# Patient Record
Sex: MALE | Race: WHITE | Employment: OTHER
[De-identification: names, ages, dates, MRNs, and addresses within clinical notes are randomized per-mention and may not be internally consistent; named-entity substitution may affect disease eponyms.]

---

## 2017-01-05 ENCOUNTER — OFFICE VISIT (OUTPATIENT)
Dept: FAMILY MEDICINE CLINIC | Facility: CLINIC | Age: 62
End: 2017-01-05

## 2017-01-05 VITALS
SYSTOLIC BLOOD PRESSURE: 132 MMHG | HEIGHT: 71 IN | WEIGHT: 225 LBS | HEART RATE: 72 BPM | BODY MASS INDEX: 31.5 KG/M2 | OXYGEN SATURATION: 98 % | DIASTOLIC BLOOD PRESSURE: 74 MMHG

## 2017-01-05 DIAGNOSIS — E11.610 CHARCOT FOOT DUE TO DIABETES MELLITUS (HCC): ICD-10-CM

## 2017-01-05 DIAGNOSIS — Z12.5 SCREENING FOR PROSTATE CANCER: Primary | ICD-10-CM

## 2017-01-05 DIAGNOSIS — E08.40 DIABETES MELLITUS DUE TO UNDERLYING CONDITION WITH DIABETIC NEUROPATHY, WITHOUT LONG-TERM CURRENT USE OF INSULIN (HCC): ICD-10-CM

## 2017-01-05 DIAGNOSIS — E78.2 MIXED HYPERLIPIDEMIA: ICD-10-CM

## 2017-01-05 PROBLEM — E78.5 HYPERLIPIDEMIA: Status: ACTIVE | Noted: 2017-01-05

## 2017-01-05 PROCEDURE — 99212 OFFICE O/P EST SF 10 MIN: CPT | Performed by: FAMILY MEDICINE

## 2017-01-05 PROCEDURE — G8422 PT INELIG BMI CALCULATION: HCPCS | Performed by: FAMILY MEDICINE

## 2017-01-05 RX ORDER — NAPROXEN 500 MG/1
TABLET ORAL
Qty: 60 TABLET | Refills: 6 | Status: ON HOLD
Start: 2017-01-05 | End: 2018-04-26 | Stop reason: HOSPADM

## 2017-01-05 RX ORDER — HYDROCODONE BITARTRATE AND ACETAMINOPHEN 5; 325 MG/1; MG/1
1 TABLET ORAL DAILY PRN
Qty: 30 TABLET | Refills: 0
Start: 2017-01-05 | End: 2017-02-03 | Stop reason: SDUPTHER

## 2017-01-05 RX ORDER — LISINOPRIL 5 MG/1
5 TABLET ORAL DAILY
Qty: 90 TABLET | Refills: 1 | Status: SHIPPED | OUTPATIENT
Start: 2017-01-05 | End: 2017-12-11 | Stop reason: CLARIF

## 2017-01-05 RX ORDER — GLIPIZIDE 5 MG/1
5 TABLET ORAL
Qty: 180 TABLET | Refills: 1 | Status: SHIPPED | OUTPATIENT
Start: 2017-01-05 | End: 2017-12-11 | Stop reason: CLARIF

## 2017-01-05 RX ORDER — SIMVASTATIN 40 MG
80 TABLET ORAL NIGHTLY
Qty: 180 TABLET | Refills: 1 | Status: SHIPPED | OUTPATIENT
Start: 2017-01-05 | End: 2018-04-24 | Stop reason: ALTCHOICE

## 2017-01-05 RX ORDER — GABAPENTIN 300 MG/1
900 CAPSULE ORAL 3 TIMES DAILY
Qty: 810 CAPSULE | Refills: 1 | Status: SHIPPED | OUTPATIENT
Start: 2017-01-05 | End: 2017-12-11 | Stop reason: CLARIF

## 2017-01-05 ASSESSMENT — PATIENT HEALTH QUESTIONNAIRE - PHQ9
2. FEELING DOWN, DEPRESSED OR HOPELESS: 0
1. LITTLE INTEREST OR PLEASURE IN DOING THINGS: 0
SUM OF ALL RESPONSES TO PHQ QUESTIONS 1-9: 0
SUM OF ALL RESPONSES TO PHQ9 QUESTIONS 1 & 2: 0

## 2017-01-05 ASSESSMENT — ENCOUNTER SYMPTOMS
CHEST TIGHTNESS: 0
ABDOMINAL DISTENTION: 0
SHORTNESS OF BREATH: 0
EYE PAIN: 0
BLOOD IN STOOL: 0
TROUBLE SWALLOWING: 0

## 2017-01-09 DIAGNOSIS — E11.610 CHARCOT FOOT DUE TO DIABETES MELLITUS (HCC): Primary | ICD-10-CM

## 2017-01-10 ENCOUNTER — NURSE ONLY (OUTPATIENT)
Dept: FAMILY MEDICINE CLINIC | Facility: CLINIC | Age: 62
End: 2017-01-10

## 2017-01-10 DIAGNOSIS — Z12.11 COLON CANCER SCREENING: Primary | ICD-10-CM

## 2017-01-10 DIAGNOSIS — Z12.11 COLON CANCER SCREENING: ICD-10-CM

## 2017-01-10 LAB
CONTROL: POSITIVE
HEMOCCULT STL QL: NEGATIVE

## 2017-01-10 PROCEDURE — 82274 ASSAY TEST FOR BLOOD FECAL: CPT | Performed by: FAMILY MEDICINE

## 2017-02-03 ENCOUNTER — OFFICE VISIT (OUTPATIENT)
Dept: FAMILY MEDICINE CLINIC | Facility: CLINIC | Age: 62
End: 2017-02-03

## 2017-02-03 VITALS
SYSTOLIC BLOOD PRESSURE: 120 MMHG | DIASTOLIC BLOOD PRESSURE: 64 MMHG | OXYGEN SATURATION: 92 % | WEIGHT: 226 LBS | BODY MASS INDEX: 31.64 KG/M2 | HEIGHT: 71 IN | HEART RATE: 72 BPM

## 2017-02-03 DIAGNOSIS — Z23 NEED FOR PROPHYLACTIC VACCINATION AGAINST DIPHTHERIA-TETANUS-PERTUSSIS (DTP): ICD-10-CM

## 2017-02-03 DIAGNOSIS — Z23 NEED FOR PROPHYLACTIC VACCINATION AGAINST STREPTOCOCCUS PNEUMONIAE (PNEUMOCOCCUS): ICD-10-CM

## 2017-02-03 DIAGNOSIS — Z23 NEED FOR PROPHYLACTIC VACCINATION AND INOCULATION AGAINST VARICELLA: ICD-10-CM

## 2017-02-03 DIAGNOSIS — E11.610 CHARCOT FOOT DUE TO DIABETES MELLITUS (HCC): ICD-10-CM

## 2017-02-03 DIAGNOSIS — Z00.00 MEDICARE WELCOME EXAM: Primary | ICD-10-CM

## 2017-02-03 PROCEDURE — 90732 PPSV23 VACC 2 YRS+ SUBQ/IM: CPT | Performed by: FAMILY MEDICINE

## 2017-02-03 PROCEDURE — 90471 IMMUNIZATION ADMIN: CPT | Performed by: FAMILY MEDICINE

## 2017-02-03 PROCEDURE — G0402 INITIAL PREVENTIVE EXAM: HCPCS | Performed by: FAMILY MEDICINE

## 2017-02-03 RX ORDER — SILDENAFIL 100 MG/1
100 TABLET, FILM COATED ORAL PRN
Qty: 30 TABLET | Refills: 3 | Status: ON HOLD | OUTPATIENT
Start: 2017-02-03 | End: 2018-08-04

## 2017-02-03 RX ORDER — HYDROCODONE BITARTRATE AND ACETAMINOPHEN 5; 325 MG/1; MG/1
1 TABLET ORAL DAILY PRN
Qty: 10 TABLET | Refills: 0 | Status: SHIPPED | OUTPATIENT
Start: 2017-02-03 | End: 2017-03-05

## 2017-02-03 ASSESSMENT — LIFESTYLE VARIABLES: HOW OFTEN DO YOU HAVE A DRINK CONTAINING ALCOHOL: 0

## 2017-02-03 ASSESSMENT — ANXIETY QUESTIONNAIRES: GAD7 TOTAL SCORE: 0

## 2017-02-03 ASSESSMENT — PATIENT HEALTH QUESTIONNAIRE - PHQ9: SUM OF ALL RESPONSES TO PHQ QUESTIONS 1-9: 0

## 2017-07-26 ENCOUNTER — OFFICE VISIT (OUTPATIENT)
Dept: PODIATRY | Age: 62
End: 2017-07-26
Payer: MEDICARE

## 2017-07-26 VITALS — HEIGHT: 70 IN | BODY MASS INDEX: 35.22 KG/M2 | WEIGHT: 246 LBS

## 2017-07-26 DIAGNOSIS — B35.1 DERMATOPHYTOSIS OF NAIL: ICD-10-CM

## 2017-07-26 DIAGNOSIS — E11.51 TYPE II DIABETES MELLITUS WITH PERIPHERAL CIRCULATORY DISORDER (HCC): Primary | ICD-10-CM

## 2017-07-26 DIAGNOSIS — E11.42 DIABETIC POLYNEUROPATHY ASSOCIATED WITH TYPE 2 DIABETES MELLITUS (HCC): ICD-10-CM

## 2017-07-26 PROCEDURE — 11721 DEBRIDE NAIL 6 OR MORE: CPT | Performed by: PODIATRIST

## 2017-10-09 ENCOUNTER — OFFICE VISIT (OUTPATIENT)
Dept: PODIATRY | Age: 62
End: 2017-10-09
Payer: MEDICARE

## 2017-10-09 VITALS
HEART RATE: 73 BPM | BODY MASS INDEX: 35.22 KG/M2 | SYSTOLIC BLOOD PRESSURE: 96 MMHG | WEIGHT: 246 LBS | DIASTOLIC BLOOD PRESSURE: 56 MMHG | HEIGHT: 70 IN

## 2017-10-09 DIAGNOSIS — E11.42 DIABETIC POLYNEUROPATHY ASSOCIATED WITH TYPE 2 DIABETES MELLITUS (HCC): ICD-10-CM

## 2017-10-09 DIAGNOSIS — E11.51 TYPE II DIABETES MELLITUS WITH PERIPHERAL CIRCULATORY DISORDER (HCC): Primary | ICD-10-CM

## 2017-10-09 DIAGNOSIS — B35.1 DERMATOPHYTOSIS OF NAIL: ICD-10-CM

## 2017-10-09 PROCEDURE — 11721 DEBRIDE NAIL 6 OR MORE: CPT | Performed by: PODIATRIST

## 2017-10-09 NOTE — PROGRESS NOTES
[facility] Podiatry  Return Patient    Chief Complaint   Patient presents with    Diabetes     [facility] & [place]    Peripheral Neuropathy       Subjective: Carlos Aviles comes to clinic for Diabetes [facility] & [place]) and Peripheral Neuropathy    he is a diabetic and states that he checks his sugars once a day. Pt currently has complaint of thickened, elongated nails that they cannot manage by themselves. Pt's primary care physician is No primary care provider on file. Pt's last blood sugar was 112 . Lab Results   Component Value Date    LABA1C 5.4 01/09/2017      Complains of numbness in the feet bilat. Past Medical History:   Diagnosis Date    Hyperlipidemia     Hypertension     Neuropathy (HCC)     Type II or unspecified type diabetes mellitus without mention of complication, not stated as uncontrolled     Vertigo        Allergies   Allergen Reactions    Dye [Barium-Containing Compounds]      Current Outpatient Prescriptions on File Prior to Visit   Medication Sig Dispense Refill    sildenafil (VIAGRA) 100 MG tablet Take 1 tablet by mouth as needed for Erectile Dysfunction 30 tablet 3    simvastatin (ZOCOR) 40 MG tablet Take 2 tablets by mouth nightly TAKE 1 TABLET BY MOUTH AT BEDTIME 180 tablet 1    metFORMIN (GLUCOPHAGE) 1000 MG tablet Take 1 tablet by mouth 2 times daily (with meals) 180 tablet 1    lisinopril (PRINIVIL;ZESTRIL) 5 MG tablet Take 1 tablet by mouth daily TAKE ONE TABLET BY MOUTH EVERY DAY 90 tablet 1    glipiZIDE (GLUCOTROL) 5 MG tablet Take 1 tablet by mouth 2 times daily (before meals) TAKE ONE TABLET BY MOUTH TWICE A DAY BEFORE MEALS 180 tablet 1    gabapentin (NEURONTIN) 300 MG capsule Take 3 capsules by mouth 3 times daily 810 capsule 1    naproxen (NAPROSYN) 500 MG tablet TAKE ONE TABLET BY MOUTH TWICE A DAY 60 tablet 6     No current facility-administered medications on file prior to visit. Review of Systems          Objective:  General: AAO x 3 in NAD. Derm  Toenail Description  Sites of Onychomycosis Involvement (Check affected area)  [x] [x] [x] [x] [x] [x] [x] [x] [x] [x]  5 4 3 2 1 1 2 3 4 5                          Right                                        Left    Thickness  [x] [x] [x] [x] [x] [x] [x] [x] [x] [x]  5 4 3 2 1 1 2 3 4 5                         Right                                        Left    Dystrophic Changes   [x] [x] [x] [x] [x] [x] [x] [x] [x] [x]  5 4 3 2 1 1 2 3 4 5                         Right                                        Left    Color   [x] [x] [x] [x] [x] [x] [x] [x] [x] [x]  5 4 3 2 1 1 2 3 4 5                          Right                                        Left    Incurvation/Ingrowin   [] [] [] [] [] [] [] [] [] []  5 4 3 2 1 1 2 3 4 5                         Right                                        Left    Inflammation/Pain   [x] [x] [x] [x] [x] [x] [x] [x] [x] [x]  5 4 3 2 1 1 2 3 4 5                         Right                                        Left      Dermatologic Exam:  Skin lesion/ulceration Absent . Skin No rashes or nodules noted. .       Musculoskeletal:     1st MPJ ROM decreased, Bilateral.  Muscle strength 5/5, Bilateral.  Pain present upon palpation of toenails 1-5, Bilateral. decreased medial longitudinal arch, Bilateral.  Ankle ROM decreased,Bilateral.    Dorsally contracted digits present digits 2, Bilateral.     Vascular: DP and PT pulses palpable 1/4, Bilateral.  CFT <5 seconds, Bilateral.  Hair growth absent to the level of the digits, Bilateral.  Edema present, Bilateral.  Varicosities absent, Bilateral. Erythema absent, Bilateral    Neurological: Sensation diminshed to light touch to level of digits, Bilateral.  Protective sensation intact 6/10 sites via 5.07/10g [place]-Jorge Monofilament, Bilateral.  negative Tinel's, Bilateral.  negative Valleix sign, Bilateral.      Integument: Warm, dry, supple, Bilateral.  Open lesion absent, Bilateral.  Interdigital maceration absent to web spaces 4, Bilateral.  Nails 1-5 left and 1-5 right thickened > 3.0 mm, dystrophic and crumbly, discolored with subungual debris. Fissures absent, Bilateral.     Assessment:  65 y.o. male with:  1. Type II diabetes mellitus with peripheral circulatory disorder (HCC)   DIABETES FOOT EXAM    51832 - [place] DEBRIDEMENT OF NAILS, 6 OR MORE   2. Diabetic polyneuropathy associated with type 2 diabetes mellitus (HCC)   DIABETES FOOT EXAM    27960 - MO DEBRIDEMENT OF NAILS, 6 OR MORE   3. Dermatophytosis of nail  33950 - MO DEBRIDEMENT OF NAILS, 6 OR MORE           Q7   []Yes    []No                Q8   [x]Yes    []No                     Q9   []Yes    []No    Plan:   Pt was evaluated and examined. Patient was given personalized discharge instructions. Nails 1-10 were debrided sharply in length and thickness with a nipper and , without incident. Pt will follow up in 9 weeks or sooner if any problems arise. Diagnosis was discussed with the pt and all of their questions were answered in detail. Proper foot hygiene and care was discussed with the pt. Informed patient on proper diabetic foot care and importance of tight glycemic control. Patient to check feet daily and contact the office with any questions/problems/concerns. Other comorbidity noted and will be managed by PCP.   10/9/2017    Electronically signed by Dr. Lor Shukla DPM on 10/9/2017 at 2:05 PM

## 2017-12-11 ENCOUNTER — OFFICE VISIT (OUTPATIENT)
Dept: PODIATRY | Age: 62
End: 2017-12-11
Payer: MEDICARE

## 2017-12-11 VITALS
HEART RATE: 77 BPM | SYSTOLIC BLOOD PRESSURE: 106 MMHG | WEIGHT: 246 LBS | DIASTOLIC BLOOD PRESSURE: 63 MMHG | BODY MASS INDEX: 35.22 KG/M2 | HEIGHT: 70 IN

## 2017-12-11 DIAGNOSIS — L97.512 RIGHT FOOT ULCER, WITH FAT LAYER EXPOSED (HCC): ICD-10-CM

## 2017-12-11 DIAGNOSIS — L97.509 DIABETIC FOOT ULCER WITH OSTEOMYELITIS (HCC): ICD-10-CM

## 2017-12-11 DIAGNOSIS — B35.1 DERMATOPHYTOSIS OF NAIL: ICD-10-CM

## 2017-12-11 DIAGNOSIS — E11.42 DIABETIC POLYNEUROPATHY ASSOCIATED WITH TYPE 2 DIABETES MELLITUS (HCC): ICD-10-CM

## 2017-12-11 DIAGNOSIS — E11.69 DIABETIC FOOT ULCER WITH OSTEOMYELITIS (HCC): ICD-10-CM

## 2017-12-11 DIAGNOSIS — E11.51 TYPE II DIABETES MELLITUS WITH PERIPHERAL CIRCULATORY DISORDER (HCC): Primary | ICD-10-CM

## 2017-12-11 DIAGNOSIS — M86.9 DIABETIC FOOT ULCER WITH OSTEOMYELITIS (HCC): ICD-10-CM

## 2017-12-11 DIAGNOSIS — E11.621 DIABETIC FOOT ULCER WITH OSTEOMYELITIS (HCC): ICD-10-CM

## 2017-12-11 PROCEDURE — G8417 CALC BMI ABV UP PARAM F/U: HCPCS | Performed by: PODIATRIST

## 2017-12-11 PROCEDURE — G8427 DOCREV CUR MEDS BY ELIG CLIN: HCPCS | Performed by: PODIATRIST

## 2017-12-11 PROCEDURE — G8484 FLU IMMUNIZE NO ADMIN: HCPCS | Performed by: PODIATRIST

## 2017-12-11 PROCEDURE — 11721 DEBRIDE NAIL 6 OR MORE: CPT | Performed by: PODIATRIST

## 2017-12-11 PROCEDURE — 4004F PT TOBACCO SCREEN RCVD TLK: CPT | Performed by: PODIATRIST

## 2017-12-11 PROCEDURE — 11042 DBRDMT SUBQ TIS 1ST 20SQCM/<: CPT | Performed by: PODIATRIST

## 2017-12-11 PROCEDURE — 73630 X-RAY EXAM OF FOOT: CPT | Performed by: PODIATRIST

## 2017-12-11 PROCEDURE — 3044F HG A1C LEVEL LT 7.0%: CPT | Performed by: PODIATRIST

## 2017-12-11 PROCEDURE — 3017F COLORECTAL CA SCREEN DOC REV: CPT | Performed by: PODIATRIST

## 2017-12-11 RX ORDER — SIMVASTATIN 40 MG
TABLET ORAL
COMMUNITY
Start: 2017-10-23 | End: 2018-01-15

## 2017-12-11 RX ORDER — HYDROCODONE BITARTRATE AND ACETAMINOPHEN 5; 325 MG/1; MG/1
TABLET ORAL
Refills: 0 | Status: ON HOLD | COMMUNITY
Start: 2017-10-31 | End: 2018-04-26 | Stop reason: HOSPADM

## 2017-12-11 NOTE — PROGRESS NOTES
[facility] Podiatry  Return Patient    Chief Complaint   Patient presents with    Diabetes     [facility] & [place]    Peripheral Neuropathy       Subjective: John Valenzuela comes to clinic for Diabetes [facility] & [place]) and Peripheral Neuropathy    he is a diabetic and states that he checks his sugars daily. He does not check his feet nightly. Pt currently has complaint of thickened, elongated nails that they cannot manage by themselves. Pt's primary care physician is Suze Humphries MD   Pt's last blood sugar was 70 . Lab Results   Component Value Date    LABA1C 5.4 01/09/2017      Complains of numbness in the feet bilat. Past Medical History:   Diagnosis Date    Hyperlipidemia     Hypertension     Neuropathy (HCC)     Type II or unspecified type diabetes mellitus without mention of complication, not stated as uncontrolled     Vertigo        Allergies   Allergen Reactions    Dye [Barium-Containing Compounds]      Current Outpatient Prescriptions on File Prior to Visit   Medication Sig Dispense Refill    sildenafil (VIAGRA) 100 MG tablet Take 1 tablet by mouth as needed for Erectile Dysfunction 30 tablet 3    naproxen (NAPROSYN) 500 MG tablet TAKE ONE TABLET BY MOUTH TWICE A DAY 60 tablet 6    simvastatin (ZOCOR) 40 MG tablet Take 2 tablets by mouth nightly TAKE 1 TABLET BY MOUTH AT BEDTIME 180 tablet 1     No current facility-administered medications on file prior to visit. Review of Systems          Objective:  General: AAO x 3 in NAD.     Derm  Toenail Description  Sites of Onychomycosis Involvement (Check affected area)  [x] [x] [x] [x] [x] [x] [x] [x] [x] [x]  5 4 3 2 1 1 2 3 4 5                          Right                                        Left    Thickness  [x] [x] [x] [x] [x] [x] [x] [x] [x] [x]  5 4 3 2 1 1 2 3 4 5                         Right                                        Left    Dystrophic Changes   [x] [x] [x] [x] [x] [x] [x] [x] [x] [x]  5 4 3 2 1 1 2 3 4 5 Right                                        Left    Color   [x] [x] [x] [x] [x] [x] [x] [x] [x] [x]  5 4 3 2 1 1 2 3 4 5                          Right                                        Left    Incurvation/Ingrowin   [] [] [] [] [] [] [] [] [] []  5 4 3 2 1 1 2 3 4 5                         Right                                        Left    Inflammation/Pain   [x] [x] [x] [x] [x] [x] [x] [x] [x] [x]  5 4 3 2 1 1 2 3 4 5                         Right                                        Left    Dermatologic Exam:  Skin ulceration is full thickness in the right plantar medial midfoot with subq tissue exposed. There is undermining noted and HPK periwound. meauring 1cmx1.1cm x 0.2cm in depth. No SOI  Skin No rashes or nodules noted. .       Musculoskeletal:     1st MPJ ROM decreased, Bilateral.  Muscle strength 5/5, Bilateral.  Pain present upon palpation of toenails 1-5, Bilateral. decreased medial longitudinal arch, Bilateral.  Ankle ROM decreased,Bilateral.    Dorsally contracted digits present digits 2, Bilateral.     Vascular: DP and PT pulses palpable 1/4, Bilateral.  CFT <5 seconds, Bilateral.  Hair growth absent to the level of the digits, Bilateral.  Edema present, Bilateral.  Varicosities absent, Bilateral. Erythema absent, Bilateral    Neurological: Sensation diminshed to light touch to level of digits, Bilateral.  Protective sensation intact 6/10 sites via 5.07/10g [place]-Jorge Monofilament, Bilateral.  negative Tinel's, Bilateral.  negative Valleix sign, Bilateral.      Integument: Warm, dry, supple, Bilateral.  Open lesion absent, Bilateral.  Interdigital maceration absent to web spaces 4, Bilateral.  Nails 1-5 left and 1-5 right thickened > 3.0 mm, dystrophic and crumbly, discolored with subungual debris. Fissures absent, Bilateral.       Assessment:  65 y.o. male with:  1.  Type II diabetes mellitus with peripheral circulatory disorder ([facility] [address]   DIABETES FOOT EXAM    49508 - [place] DEBRIDEMENT OF NAILS, 6 OR MORE    [place] DEBRIDEMENT, SKIN, SUB-Q TISSUE,=<20 SQ CM   2. Diabetic polyneuropathy associated with type 2 diabetes mellitus (HCC)  [facility] DIABETES FOOT EXAM    81266 - [place] DEBRIDEMENT OF NAILS, 6 OR MORE   3. Dermatophytosis of nail  41964 - SC DEBRIDEMENT OF NAILS, 6 OR MORE   4. Diabetic foot ulcer with osteomyelitis ([address]  SC DEBRIDEMENT, SKIN, SUB-Q TISSUE,=<20 SQ CM   5. Right foot ulcer, with fat layer exposed ([address]  SC DEBRIDEMENT, SKIN, SUB-Q TISSUE,=<20 SQ CM       Q7   []Yes    []No                Q8   [x]Yes    []No                     Q9   []Yes    []No    Plan:   Pt was evaluated and examined. Patient was given personalized discharge instructions. Sharp excisional debridement down thru and including subcutaneous tissue was done after topical EMLA cream was applied with a currett and tissue nippers. .  All necrotic tissue was removed. Hemostasis was achieved via direct pressure. X rays 3 views right foot show the above findings. Sterile dressings were placed on the patient. 0/10 post procedural pain  . Nails 1-10 were debrided sharply in length and thickness with a nipper and , without incident. Pt will follow up in 2 weeks or sooner if any problems arise. Diagnosis was discussed with the pt and all of their questions were answered in detail. Proper foot hygiene and care was discussed with the pt. Informed patient on proper diabetic foot care and importance of tight glycemic control. Patient to check feet daily and contact the office with any questions/problems/concerns.    Other comorbidity noted and will be managed by PCP.  12/11/2017    Electronically signed by Dr. Liat Cramer DPM

## 2017-12-21 ENCOUNTER — OFFICE VISIT (OUTPATIENT)
Dept: PODIATRY | Age: 62
End: 2017-12-21
Payer: MEDICARE

## 2017-12-21 VITALS — HEIGHT: 70 IN | WEIGHT: 246 LBS | BODY MASS INDEX: 35.22 KG/M2

## 2017-12-21 DIAGNOSIS — L97.512 RIGHT FOOT ULCER, WITH FAT LAYER EXPOSED (HCC): ICD-10-CM

## 2017-12-21 DIAGNOSIS — M86.9 DIABETIC FOOT ULCER WITH OSTEOMYELITIS (HCC): ICD-10-CM

## 2017-12-21 DIAGNOSIS — E11.51 TYPE II DIABETES MELLITUS WITH PERIPHERAL CIRCULATORY DISORDER (HCC): Primary | ICD-10-CM

## 2017-12-21 DIAGNOSIS — L97.509 DIABETIC FOOT ULCER WITH OSTEOMYELITIS (HCC): ICD-10-CM

## 2017-12-21 DIAGNOSIS — E11.42 DIABETIC POLYNEUROPATHY ASSOCIATED WITH TYPE 2 DIABETES MELLITUS (HCC): ICD-10-CM

## 2017-12-21 DIAGNOSIS — E11.69 DIABETIC FOOT ULCER WITH OSTEOMYELITIS (HCC): ICD-10-CM

## 2017-12-21 DIAGNOSIS — E11.621 DIABETIC FOOT ULCER WITH OSTEOMYELITIS (HCC): ICD-10-CM

## 2017-12-21 PROCEDURE — 4004F PT TOBACCO SCREEN RCVD TLK: CPT | Performed by: PODIATRIST

## 2017-12-21 PROCEDURE — G8427 DOCREV CUR MEDS BY ELIG CLIN: HCPCS | Performed by: PODIATRIST

## 2017-12-21 PROCEDURE — G8484 FLU IMMUNIZE NO ADMIN: HCPCS | Performed by: PODIATRIST

## 2017-12-21 PROCEDURE — 11042 DBRDMT SUBQ TIS 1ST 20SQCM/<: CPT | Performed by: PODIATRIST

## 2017-12-21 PROCEDURE — G8417 CALC BMI ABV UP PARAM F/U: HCPCS | Performed by: PODIATRIST

## 2017-12-21 PROCEDURE — 3017F COLORECTAL CA SCREEN DOC REV: CPT | Performed by: PODIATRIST

## 2017-12-21 PROCEDURE — 3044F HG A1C LEVEL LT 7.0%: CPT | Performed by: PODIATRIST

## 2017-12-21 NOTE — PROGRESS NOTES
[facility] Podiatry  Return Patient Progress Note      Barrett Ramirez  AGE: 65 y.o. GENDER: male  : 1955  TODAY'S DATE:  2017    Subjective:       Barrett Ramirez is a 65 y.o. male presents to the office today for follow up of an ulceration. Denies F/C/N/V. Wound Type:diabetic and pressure  Wound Location: right planter foot  Modifying factors:diabetes, poor glucose control and chronic pressure            Lab Results   Component Value Date    LABA1C 5.4 2017       ALLERGIES    Allergies   Allergen Reactions    Dye [Barium-Containing Compounds]        Medications:    Current Outpatient Prescriptions:     simvastatin (ZOCOR) 40 MG tablet, , Disp: , Rfl:     HYDROcodone-acetaminophen (NORCO) 5-325 MG per tablet, TAKE 1 TABLET BY MOUTH EVERY DAY AS NEEDED FOR PAIN, Disp: , Rfl: 0    sildenafil (VIAGRA) 100 MG tablet, Take 1 tablet by mouth as needed for Erectile Dysfunction, Disp: 30 tablet, Rfl: 3    naproxen (NAPROSYN) 500 MG tablet, TAKE ONE TABLET BY MOUTH TWICE A DAY, Disp: 60 tablet, Rfl: 6    simvastatin (ZOCOR) 40 MG tablet, Take 2 tablets by mouth nightly TAKE 1 TABLET BY MOUTH AT BEDTIME, Disp: 180 tablet, Rfl: 1    Review of Systems    Objective:       Physical Exam:  Ht 5' 10\" (1.778 m)   Wt 246 lb (111.6 kg)   BMI 35.30 kg/m²     NV status remains unchanged since last visit. Wound #:   1    diabetic foot ulcer   right right    Wound measurements:  #1  4 mm by 6 mm  by   1 mm         Wound Depth: subcutaneous. Drainage:    serosanguinous Type: clear    Wound Bed status:   Granulation Tissue: 100%   Necrotic 0%  Type:   Epithelialization 30%   Hypergranular 0%   Periwound hyperkeratosis:  present  Erythema absent  Odor:no  Maceration:no  Undermining/tract/tunneling:no  Culture taken:   no           Assessment:     Assessment: Patient is a 65 y.o. male with:  1.  Type II diabetes mellitus with peripheral circulatory disorder (HCC)  [place] DEBRIDEMENT, SKIN,

## 2018-01-15 ENCOUNTER — OFFICE VISIT (OUTPATIENT)
Dept: PODIATRY | Age: 63
End: 2018-01-15
Payer: MEDICARE

## 2018-01-15 VITALS — BODY MASS INDEX: 35.22 KG/M2 | WEIGHT: 246 LBS | HEIGHT: 70 IN

## 2018-01-15 DIAGNOSIS — M86.9 DIABETIC FOOT ULCER WITH OSTEOMYELITIS (HCC): ICD-10-CM

## 2018-01-15 DIAGNOSIS — E11.69 DIABETIC FOOT ULCER WITH OSTEOMYELITIS (HCC): ICD-10-CM

## 2018-01-15 DIAGNOSIS — E11.51 TYPE II DIABETES MELLITUS WITH PERIPHERAL CIRCULATORY DISORDER (HCC): Primary | ICD-10-CM

## 2018-01-15 DIAGNOSIS — E11.42 DIABETIC POLYNEUROPATHY ASSOCIATED WITH TYPE 2 DIABETES MELLITUS (HCC): ICD-10-CM

## 2018-01-15 DIAGNOSIS — L97.512 RIGHT FOOT ULCER, WITH FAT LAYER EXPOSED (HCC): ICD-10-CM

## 2018-01-15 DIAGNOSIS — L97.509 DIABETIC FOOT ULCER WITH OSTEOMYELITIS (HCC): ICD-10-CM

## 2018-01-15 DIAGNOSIS — E11.621 DIABETIC FOOT ULCER WITH OSTEOMYELITIS (HCC): ICD-10-CM

## 2018-01-15 PROCEDURE — G8484 FLU IMMUNIZE NO ADMIN: HCPCS | Performed by: PODIATRIST

## 2018-01-15 PROCEDURE — 3017F COLORECTAL CA SCREEN DOC REV: CPT | Performed by: PODIATRIST

## 2018-01-15 PROCEDURE — 3046F HEMOGLOBIN A1C LEVEL >9.0%: CPT | Performed by: PODIATRIST

## 2018-01-15 PROCEDURE — G8427 DOCREV CUR MEDS BY ELIG CLIN: HCPCS | Performed by: PODIATRIST

## 2018-01-15 PROCEDURE — G8417 CALC BMI ABV UP PARAM F/U: HCPCS | Performed by: PODIATRIST

## 2018-01-15 PROCEDURE — 11042 DBRDMT SUBQ TIS 1ST 20SQCM/<: CPT | Performed by: PODIATRIST

## 2018-01-15 PROCEDURE — 4004F PT TOBACCO SCREEN RCVD TLK: CPT | Performed by: PODIATRIST

## 2018-01-15 RX ORDER — GABAPENTIN 300 MG/1
900 CAPSULE ORAL 3 TIMES DAILY
COMMUNITY

## 2018-01-15 RX ORDER — LISINOPRIL 5 MG/1
5 TABLET ORAL DAILY
Status: ON HOLD | COMMUNITY
Start: 2018-01-05 | End: 2018-08-10 | Stop reason: HOSPADM

## 2018-02-05 ENCOUNTER — OFFICE VISIT (OUTPATIENT)
Dept: PODIATRY | Age: 63
End: 2018-02-05
Payer: MEDICARE

## 2018-02-05 VITALS
WEIGHT: 206 LBS | DIASTOLIC BLOOD PRESSURE: 70 MMHG | HEIGHT: 70 IN | HEART RATE: 84 BPM | SYSTOLIC BLOOD PRESSURE: 111 MMHG | BODY MASS INDEX: 29.49 KG/M2

## 2018-02-05 DIAGNOSIS — E11.51 TYPE II DIABETES MELLITUS WITH PERIPHERAL CIRCULATORY DISORDER (HCC): Primary | ICD-10-CM

## 2018-02-05 DIAGNOSIS — M86.9 DIABETIC FOOT ULCER WITH OSTEOMYELITIS (HCC): ICD-10-CM

## 2018-02-05 DIAGNOSIS — L97.509 DIABETIC FOOT ULCER WITH OSTEOMYELITIS (HCC): ICD-10-CM

## 2018-02-05 DIAGNOSIS — E11.42 DIABETIC POLYNEUROPATHY ASSOCIATED WITH TYPE 2 DIABETES MELLITUS (HCC): ICD-10-CM

## 2018-02-05 DIAGNOSIS — E11.69 DIABETIC FOOT ULCER WITH OSTEOMYELITIS (HCC): ICD-10-CM

## 2018-02-05 DIAGNOSIS — E11.621 DIABETIC FOOT ULCER WITH OSTEOMYELITIS (HCC): ICD-10-CM

## 2018-02-05 DIAGNOSIS — L97.512 RIGHT FOOT ULCER, WITH FAT LAYER EXPOSED (HCC): ICD-10-CM

## 2018-02-05 PROCEDURE — G8427 DOCREV CUR MEDS BY ELIG CLIN: HCPCS | Performed by: PODIATRIST

## 2018-02-05 PROCEDURE — G8417 CALC BMI ABV UP PARAM F/U: HCPCS | Performed by: PODIATRIST

## 2018-02-05 PROCEDURE — 99213 OFFICE O/P EST LOW 20 MIN: CPT | Performed by: PODIATRIST

## 2018-02-05 PROCEDURE — A5500 DIAB SHOE FOR DENSITY INSERT: HCPCS | Performed by: PODIATRIST

## 2018-02-05 PROCEDURE — 4004F PT TOBACCO SCREEN RCVD TLK: CPT | Performed by: PODIATRIST

## 2018-02-05 PROCEDURE — A5513 MULTI DEN INSERT CUSTOM MOLD: HCPCS | Performed by: PODIATRIST

## 2018-02-05 PROCEDURE — 3046F HEMOGLOBIN A1C LEVEL >9.0%: CPT | Performed by: PODIATRIST

## 2018-02-05 PROCEDURE — G8484 FLU IMMUNIZE NO ADMIN: HCPCS | Performed by: PODIATRIST

## 2018-02-05 PROCEDURE — 11042 DBRDMT SUBQ TIS 1ST 20SQCM/<: CPT | Performed by: PODIATRIST

## 2018-02-05 PROCEDURE — 3017F COLORECTAL CA SCREEN DOC REV: CPT | Performed by: PODIATRIST

## 2018-02-05 NOTE — PROGRESS NOTES
[facility] Podiatry  Return Patient Progress Note      Butch Ansari  AGE: 65 y.o. GENDER: male  : 1955  TODAY'S DATE:  2018    Subjective:       Butch Ansari is a 65 y.o. male presents to the office today for follow up of an ulceration. Denies F/C/N/V. Wound Type:diabetic and pressure  Wound Location: right planter foot  Modifying factors:diabetes, poor glucose control and chronic pressure            Lab Results   Component Value Date    LABA1C 5.4 2017       ALLERGIES    Allergies   Allergen Reactions    Dye [Barium-Containing Compounds]        Medications:    Current Outpatient Prescriptions:     lisinopril (PRINIVIL;ZESTRIL) 5 MG tablet, Take 5 mg by mouth daily, Disp: , Rfl:     metFORMIN (GLUCOPHAGE) 1000 MG tablet, Take 1,000 mg by mouth 2 times daily, Disp: , Rfl:     gabapentin (NEURONTIN) 300 MG capsule, Take 300 mg by mouth 3 times daily Take 3 pills 3 times a day ., Disp: , Rfl:     HYDROcodone-acetaminophen (NORCO) 5-325 MG per tablet, TAKE 1 TABLET BY MOUTH EVERY DAY AS NEEDED FOR PAIN, Disp: , Rfl: 0    sildenafil (VIAGRA) 100 MG tablet, Take 1 tablet by mouth as needed for Erectile Dysfunction, Disp: 30 tablet, Rfl: 3    naproxen (NAPROSYN) 500 MG tablet, TAKE ONE TABLET BY MOUTH TWICE A DAY, Disp: 60 tablet, Rfl: 6    simvastatin (ZOCOR) 40 MG tablet, Take 2 tablets by mouth nightly TAKE 1 TABLET BY MOUTH AT BEDTIME, Disp: 180 tablet, Rfl: 1    Review of Systems    Objective:       Physical Exam:  /70 (Site: Right Arm, Position: Sitting, Cuff Size: Large Adult)   Pulse 84   Ht 5' 10\" (1.778 m)   Wt 206 lb (93.4 kg)   BMI 29.56 kg/m²     NV status remains unchanged since last visit. Wound #:   1    diabetic foot ulcer   right right    Wound measurements:  #1  8 mm by 7 mm  by 2  mm         Wound Depth: subcutaneous.      Drainage:    serosanguinous Type: clear    Wound Bed status:   Granulation Tissue: 100%   Necrotic 0%  Type: Epithelialization 30%   Hypergranular 0%   Periwound hyperkeratosis:  present  Erythema absent  Odor:no  Maceration:no  Undermining/tract/tunneling:no  Culture taken:   no           Assessment:     Assessment: Patient is a 65 y.o. male with:  1. Type II diabetes mellitus with peripheral circulatory disorder (HCC)  [place] DEBRIDEMENT, SKIN, SUB-Q TISSUE,=<20 SQ CM    [place] MULTI DEN INSERT CUSTOM MOLD    [place] MULTI DEN INSERT CUSTOM MOLD    [place] DIAB SHOE FOR DENSITY INSERT    [place] DIAB SHOE FOR DENSITY INSERT   2. Diabetic polyneuropathy associated with type 2 diabetes mellitus (HCC)  [place] DEBRIDEMENT, SKIN, SUB-Q TISSUE,=<20 SQ CM    [place] MULTI DEN INSERT CUSTOM MOLD    [place] MULTI DEN INSERT CUSTOM MOLD    [place] DIAB SHOE FOR DENSITY INSERT    [place] DIAB SHOE FOR DENSITY INSERT   3. Diabetic foot ulcer with osteomyelitis (HCC)  [place] DEBRIDEMENT, SKIN, SUB-Q TISSUE,=<20 SQ CM    [place] MULTI DEN INSERT CUSTOM MOLD    NY MULTI DEN INSERT CUSTOM MOLD    NY DIAB SHOE FOR DENSITY INSERT    [place] DIAB SHOE FOR DENSITY INSERT   4. Right foot ulcer, with fat layer exposed ([address]  [place] DEBRIDEMENT, SKIN, SUB-Q TISSUE,=<20 SQ CM    [place] MULTI DEN INSERT CUSTOM MOLD    NY MULTI DEN INSERT CUSTOM MOLD    NY DIAB SHOE FOR DENSITY INSERT    [place] DIAB SHOE FOR DENSITY INSERT       Overall Wound Assessment  Wound is has improved. Size has decreased  Appearance has significantly improved      Plan:     Pt examined and evaluated. Current condition and treatment options discussed in detail. Jose Ron Age:  65 y.o. Gender:  male   :  1955  Today's Date:  2018      Procedure: With the patient in supine position, Lidocaine soaked gauze was applied per physician order at beginning of wound evaluation. It was subsequently removed.     Using a curette and Pick-up, sharp excisional debridement of the wound down to and included the removal of the following tissue:     [] epidermis, [x] dermis, [x]  subcutaneous tissue,  [] muscle/fascia tissue, and/or  [] bone     Also debrided was all  [x] fibrin, [x] biofilm, [x] slough,  [x] necrotic,  [] hypergranulation tissue, and/or  [] hyperkeratotic tissue. Wound was copiously irrigated with normal saline solution. Bleeding:  Minimal.  Hemostasis:  pressure     100%  of wound debrided. Patient tolerated procedure well. Pain 0 / 10 during procedure and pain 0 / 10 after procedure. Discussed appropriate home care of this wound. Wound redressed. Patient instructions were given. Discussed appropriate home care of this wound. Dressings: No orders of the defined types were placed in this encounter. No orders of the defined types were placed in this encounter. Follow up: 2 week. Electronically signed by Dr. Yani Ray DPM on 2/5/2018 at 1:57 PM  2/5/2018    . Subjective:  Pt is here for diabetic shoe and insert despensing. Relates to no pedal pain. Denies N/F/V/C/SOB/Cp    Objective:  Shoes dispensed today. A:  Diabetic with neuropathy, PVD, hammertoe right and left 2nd    Plan:  Dispensed extra depth diabetic shoes X2 off the shelf depth inlay shoes to accomodate multidensity inserts right and left. Dispensed custom diabetic multidensity inserts X 2. Inserts are custom molded to patient's feet by heat molding with direct contact to patients foot. Devices are multidensity in nature in compliance with CMS guidelines. . Instruct patient to change inserts at 4 month intervals. Reviewed with patient CMS Medicare DMEPOS Supplier Standards and patient signed and acknowledge receipt of these standards. Instruct patient to wear shoes only indoors until certain shoes are comfortable and to wear only one hour the first day then increase at hourly increments the first week. . Patient advised to call our office if there is any problems with the shoes or inserts. Patient to be seen in 4 weeks for follow-up care.

## 2018-03-29 ENCOUNTER — OFFICE VISIT (OUTPATIENT)
Dept: PODIATRY | Age: 63
End: 2018-03-29
Payer: MEDICARE

## 2018-03-29 VITALS — WEIGHT: 206 LBS | BODY MASS INDEX: 29.49 KG/M2 | HEIGHT: 70 IN

## 2018-03-29 DIAGNOSIS — E11.69 DIABETIC FOOT ULCER WITH OSTEOMYELITIS (HCC): ICD-10-CM

## 2018-03-29 DIAGNOSIS — E11.51 TYPE II DIABETES MELLITUS WITH PERIPHERAL CIRCULATORY DISORDER (HCC): Primary | ICD-10-CM

## 2018-03-29 DIAGNOSIS — M86.9 DIABETIC FOOT ULCER WITH OSTEOMYELITIS (HCC): ICD-10-CM

## 2018-03-29 DIAGNOSIS — E11.621 DIABETIC FOOT ULCER WITH OSTEOMYELITIS (HCC): ICD-10-CM

## 2018-03-29 DIAGNOSIS — L97.509 DIABETIC FOOT ULCER WITH OSTEOMYELITIS (HCC): ICD-10-CM

## 2018-03-29 DIAGNOSIS — L97.512 RIGHT FOOT ULCER, WITH FAT LAYER EXPOSED (HCC): ICD-10-CM

## 2018-03-29 PROCEDURE — G8484 FLU IMMUNIZE NO ADMIN: HCPCS | Performed by: PODIATRIST

## 2018-03-29 PROCEDURE — 11042 DBRDMT SUBQ TIS 1ST 20SQCM/<: CPT | Performed by: PODIATRIST

## 2018-03-29 PROCEDURE — G8417 CALC BMI ABV UP PARAM F/U: HCPCS | Performed by: PODIATRIST

## 2018-03-29 PROCEDURE — 3046F HEMOGLOBIN A1C LEVEL >9.0%: CPT | Performed by: PODIATRIST

## 2018-03-29 PROCEDURE — G8427 DOCREV CUR MEDS BY ELIG CLIN: HCPCS | Performed by: PODIATRIST

## 2018-03-29 PROCEDURE — 3017F COLORECTAL CA SCREEN DOC REV: CPT | Performed by: PODIATRIST

## 2018-03-29 PROCEDURE — 4004F PT TOBACCO SCREEN RCVD TLK: CPT | Performed by: PODIATRIST

## 2018-03-29 NOTE — PROGRESS NOTES
absent  Odor:no  Maceration:no  Undermining/tract/tunneling:no  Culture taken:   no             X rays 3 views right foot show a plantar prominance noted at the medial arech at the cuneiform and 1st metatarsal joint. Assessment:     Assessment: Patient is a 65 y.o. male with:  1. Type II diabetes mellitus with peripheral circulatory disorder (HCC)  [place] DEBRIDEMENT, SKIN, SUB-Q TISSUE,=<20 SQ CM   2. Diabetic foot ulcer with osteomyelitis ([address]  DC DEBRIDEMENT, SKIN, SUB-Q TISSUE,=<20 SQ CM   3. Right foot ulcer, with fat layer exposed ([address]  DC DEBRIDEMENT, SKIN, SUB-Q TISSUE,=<20 SQ CM       Overall Wound Assessment  Wound is has gotten deeper  Size has decreased  Appearance has not changed      Plan:     Pt examined and evaluated. Current condition and treatment options discussed in detail. Suze Leon Age:  65 y.o. Gender:  male   :  1955  Today's Date:  3/29/2018      Procedure: With the patient in supine position, Lidocaine soaked gauze was applied per physician order at beginning of wound evaluation. It was subsequently removed. Using a curette and Pick-up, sharp excisional debridement of the wound down to and included the removal of the following tissue:     [] epidermis, [x] dermis, [x]  subcutaneous tissue,  [] muscle/fascia tissue,   and/or  [] bone     Also debrided was all  [x] fibrin, [x] biofilm, [x] slough,  [x] necrotic,  [] hypergranulation tissue, and/or  [] hyperkeratotic tissue. Wound was copiously irrigated with normal saline solution. Bleeding:  Minimal.  Hemostasis:  pressure     100%  of wound debrided. Patient tolerated procedure well. Pain 0 / 10 during procedure and pain 0 / 10 after procedure. Discussed appropriate home care of this wound. Wound redressed. Patient instructions were given. Discussed appropriate home care of this wound. Dressings: Follow up: 2 week.       Electronically signed by Dr. Elias Calzada DPM on 3/29/2018 at 1:06 PM  3/29/2018

## 2018-03-30 ENCOUNTER — TELEPHONE (OUTPATIENT)
Dept: PODIATRY | Age: 63
End: 2018-03-30

## 2018-04-12 ENCOUNTER — OFFICE VISIT (OUTPATIENT)
Dept: PODIATRY | Age: 63
End: 2018-04-12
Payer: MEDICARE

## 2018-04-12 VITALS — BODY MASS INDEX: 29.49 KG/M2 | WEIGHT: 206 LBS | HEIGHT: 70 IN

## 2018-04-12 DIAGNOSIS — E11.621 DIABETIC FOOT ULCER WITH OSTEOMYELITIS (HCC): ICD-10-CM

## 2018-04-12 DIAGNOSIS — E11.51 TYPE II DIABETES MELLITUS WITH PERIPHERAL CIRCULATORY DISORDER (HCC): Primary | ICD-10-CM

## 2018-04-12 DIAGNOSIS — L97.509 DIABETIC FOOT ULCER WITH OSTEOMYELITIS (HCC): ICD-10-CM

## 2018-04-12 DIAGNOSIS — L97.512 RIGHT FOOT ULCER, WITH FAT LAYER EXPOSED (HCC): ICD-10-CM

## 2018-04-12 DIAGNOSIS — E11.69 DIABETIC FOOT ULCER WITH OSTEOMYELITIS (HCC): ICD-10-CM

## 2018-04-12 DIAGNOSIS — M86.9 DIABETIC FOOT ULCER WITH OSTEOMYELITIS (HCC): ICD-10-CM

## 2018-04-12 PROCEDURE — 3017F COLORECTAL CA SCREEN DOC REV: CPT | Performed by: PODIATRIST

## 2018-04-12 PROCEDURE — G8417 CALC BMI ABV UP PARAM F/U: HCPCS | Performed by: PODIATRIST

## 2018-04-12 PROCEDURE — 2022F DILAT RTA XM EVC RTNOPTHY: CPT | Performed by: PODIATRIST

## 2018-04-12 PROCEDURE — 11042 DBRDMT SUBQ TIS 1ST 20SQCM/<: CPT | Performed by: PODIATRIST

## 2018-04-12 PROCEDURE — G8427 DOCREV CUR MEDS BY ELIG CLIN: HCPCS | Performed by: PODIATRIST

## 2018-04-12 PROCEDURE — 3046F HEMOGLOBIN A1C LEVEL >9.0%: CPT | Performed by: PODIATRIST

## 2018-04-12 PROCEDURE — 4004F PT TOBACCO SCREEN RCVD TLK: CPT | Performed by: PODIATRIST

## 2018-04-12 RX ORDER — VARENICLINE TARTRATE
KIT
COMMUNITY
Start: 2018-03-15 | End: 2018-04-20 | Stop reason: SDDI

## 2018-04-20 ENCOUNTER — HOSPITAL ENCOUNTER (OUTPATIENT)
Dept: PREADMISSION TESTING | Age: 63
Discharge: HOME | DRG: 988 | End: 2018-04-24
Payer: MEDICARE

## 2018-04-20 VITALS
OXYGEN SATURATION: 96 % | DIASTOLIC BLOOD PRESSURE: 63 MMHG | HEIGHT: 71 IN | TEMPERATURE: 97.5 F | RESPIRATION RATE: 17 BRPM | SYSTOLIC BLOOD PRESSURE: 106 MMHG | HEART RATE: 73 BPM | WEIGHT: 223.9 LBS | BODY MASS INDEX: 31.35 KG/M2

## 2018-04-20 LAB
ABSOLUTE EOS #: 0.3 K/UL (ref 0–0.4)
ABSOLUTE IMMATURE GRANULOCYTE: ABNORMAL K/UL (ref 0–0.3)
ABSOLUTE LYMPH #: 2.3 K/UL (ref 1–4.8)
ABSOLUTE MONO #: 0.9 K/UL (ref 0.2–0.8)
ANION GAP SERPL CALCULATED.3IONS-SCNC: 11 MMOL/L (ref 9–17)
BASOPHILS # BLD: 1 % (ref 0–2)
BASOPHILS ABSOLUTE: 0.2 K/UL (ref 0–0.2)
BUN BLDV-MCNC: 19 MG/DL (ref 8–23)
BUN/CREAT BLD: 15 (ref 9–20)
CALCIUM SERPL-MCNC: 9.7 MG/DL (ref 8.6–10.4)
CHLORIDE BLD-SCNC: 98 MMOL/L (ref 98–107)
CO2: 27 MMOL/L (ref 20–31)
CREAT SERPL-MCNC: 1.25 MG/DL (ref 0.7–1.2)
DIFFERENTIAL TYPE: ABNORMAL
EKG ATRIAL RATE: 68 BPM
EKG P AXIS: -4 DEGREES
EKG P-R INTERVAL: 222 MS
EKG Q-T INTERVAL: 358 MS
EKG QRS DURATION: 86 MS
EKG QTC CALCULATION (BAZETT): 380 MS
EKG R AXIS: 15 DEGREES
EKG T AXIS: 44 DEGREES
EKG VENTRICULAR RATE: 68 BPM
EOSINOPHILS RELATIVE PERCENT: 2 % (ref 1–4)
GFR AFRICAN AMERICAN: >60 ML/MIN
GFR NON-AFRICAN AMERICAN: 59 ML/MIN
GFR SERPL CREATININE-BSD FRML MDRD: ABNORMAL ML/MIN/{1.73_M2}
GFR SERPL CREATININE-BSD FRML MDRD: ABNORMAL ML/MIN/{1.73_M2}
GLUCOSE BLD-MCNC: 146 MG/DL (ref 70–99)
HCT VFR BLD CALC: 44.3 % (ref 41–53)
HEMOGLOBIN: 14.7 G/DL (ref 13.5–17.5)
IMMATURE GRANULOCYTES: ABNORMAL %
LYMPHOCYTES # BLD: 16 % (ref 24–44)
MCH RBC QN AUTO: 30.1 PG (ref 26–34)
MCHC RBC AUTO-ENTMCNC: 33.2 G/DL (ref 31–37)
MCV RBC AUTO: 90.6 FL (ref 80–100)
MONOCYTES # BLD: 7 % (ref 1–7)
NRBC AUTOMATED: ABNORMAL PER 100 WBC
PDW BLD-RTO: 12.4 % (ref 11.5–14.5)
PLATELET # BLD: 273 K/UL (ref 130–400)
PLATELET ESTIMATE: ABNORMAL
PMV BLD AUTO: ABNORMAL FL (ref 6–12)
POTASSIUM SERPL-SCNC: 4.9 MMOL/L (ref 3.7–5.3)
RBC # BLD: 4.89 M/UL (ref 4.5–5.9)
RBC # BLD: ABNORMAL 10*6/UL
SEG NEUTROPHILS: 74 % (ref 36–66)
SEGMENTED NEUTROPHILS ABSOLUTE COUNT: 10.8 K/UL (ref 1.8–7.7)
SODIUM BLD-SCNC: 136 MMOL/L (ref 135–144)
WBC # BLD: 14.5 K/UL (ref 3.5–11)
WBC # BLD: ABNORMAL 10*3/UL

## 2018-04-20 PROCEDURE — 80048 BASIC METABOLIC PNL TOTAL CA: CPT

## 2018-04-20 PROCEDURE — 85025 COMPLETE CBC W/AUTO DIFF WBC: CPT

## 2018-04-20 PROCEDURE — 93005 ELECTROCARDIOGRAM TRACING: CPT

## 2018-04-20 PROCEDURE — 36415 COLL VENOUS BLD VENIPUNCTURE: CPT

## 2018-04-24 ENCOUNTER — HOSPITAL ENCOUNTER (INPATIENT)
Age: 63
LOS: 2 days | Discharge: HOME | DRG: 988 | End: 2018-04-26
Attending: PODIATRIST | Admitting: PODIATRIST
Payer: MEDICARE

## 2018-04-24 ENCOUNTER — APPOINTMENT (OUTPATIENT)
Dept: GENERAL RADIOLOGY | Age: 63
DRG: 988 | End: 2018-04-24
Attending: PODIATRIST
Payer: MEDICARE

## 2018-04-24 ENCOUNTER — OFFICE VISIT (OUTPATIENT)
Dept: PODIATRY | Age: 63
End: 2018-04-24
Payer: MEDICARE

## 2018-04-24 ENCOUNTER — ANESTHESIA (OUTPATIENT)
Dept: OPERATING ROOM | Age: 63
DRG: 988 | End: 2018-04-24
Payer: MEDICARE

## 2018-04-24 ENCOUNTER — ANESTHESIA EVENT (OUTPATIENT)
Dept: OPERATING ROOM | Age: 63
DRG: 988 | End: 2018-04-24
Payer: MEDICARE

## 2018-04-24 VITALS — WEIGHT: 206 LBS | BODY MASS INDEX: 29.49 KG/M2 | HEIGHT: 70 IN

## 2018-04-24 VITALS — OXYGEN SATURATION: 98 % | DIASTOLIC BLOOD PRESSURE: 47 MMHG | SYSTOLIC BLOOD PRESSURE: 79 MMHG

## 2018-04-24 DIAGNOSIS — E11.628 DIABETIC FOOT INFECTION (HCC): Primary | ICD-10-CM

## 2018-04-24 DIAGNOSIS — M79.5 FOREIGN BODY (FB) IN SOFT TISSUE: ICD-10-CM

## 2018-04-24 DIAGNOSIS — S90.852A FOREIGN BODY IN LEFT FOOT, INITIAL ENCOUNTER: Primary | ICD-10-CM

## 2018-04-24 DIAGNOSIS — Z98.890 S/P FOOT SURGERY, LEFT: ICD-10-CM

## 2018-04-24 DIAGNOSIS — L08.9 DIABETIC FOOT INFECTION (HCC): Primary | ICD-10-CM

## 2018-04-24 DIAGNOSIS — E11.610 CHARCOT FOOT DUE TO DIABETES MELLITUS (HCC): ICD-10-CM

## 2018-04-24 DIAGNOSIS — L03.032 CELLULITIS AND ABSCESS OF TOE OF LEFT FOOT: ICD-10-CM

## 2018-04-24 DIAGNOSIS — L02.612 CELLULITIS AND ABSCESS OF TOE OF LEFT FOOT: ICD-10-CM

## 2018-04-24 DIAGNOSIS — M79.672 PAIN IN LEFT FOOT: ICD-10-CM

## 2018-04-24 PROBLEM — L03.116 CELLULITIS OF LEFT FOOT: Status: ACTIVE | Noted: 2018-04-24

## 2018-04-24 PROBLEM — L03.90 CELLULITIS: Status: ACTIVE | Noted: 2018-04-24

## 2018-04-24 PROBLEM — Z72.0 TOBACCO ABUSE: Status: ACTIVE | Noted: 2018-04-24

## 2018-04-24 LAB
ANION GAP SERPL CALCULATED.3IONS-SCNC: 12 MMOL/L (ref 9–17)
BUN BLDV-MCNC: 17 MG/DL (ref 8–23)
BUN BLDV-MCNC: 17 MG/DL (ref 8–23)
BUN/CREAT BLD: 15 (ref 9–20)
CALCIUM SERPL-MCNC: 9.3 MG/DL (ref 8.6–10.4)
CHLORIDE BLD-SCNC: 94 MMOL/L (ref 98–107)
CO2: 24 MMOL/L (ref 20–31)
CREAT SERPL-MCNC: 1.14 MG/DL (ref 0.7–1.2)
CREAT SERPL-MCNC: 1.18 MG/DL (ref 0.7–1.2)
GFR AFRICAN AMERICAN: >60 ML/MIN
GFR AFRICAN AMERICAN: >60 ML/MIN
GFR NON-AFRICAN AMERICAN: >60 ML/MIN
GFR NON-AFRICAN AMERICAN: >60 ML/MIN
GFR SERPL CREATININE-BSD FRML MDRD: ABNORMAL ML/MIN/{1.73_M2}
GFR SERPL CREATININE-BSD FRML MDRD: ABNORMAL ML/MIN/{1.73_M2}
GFR SERPL CREATININE-BSD FRML MDRD: NORMAL ML/MIN/{1.73_M2}
GFR SERPL CREATININE-BSD FRML MDRD: NORMAL ML/MIN/{1.73_M2}
GLUCOSE BLD-MCNC: 121 MG/DL (ref 70–99)
GLUCOSE BLD-MCNC: 128 MG/DL (ref 75–110)
GLUCOSE BLD-MCNC: 98 MG/DL (ref 75–110)
HCT VFR BLD CALC: 39.3 % (ref 41–53)
HEMOGLOBIN: 13.5 G/DL (ref 13.5–17.5)
MCH RBC QN AUTO: 31.3 PG (ref 26–34)
MCHC RBC AUTO-ENTMCNC: 34.3 G/DL (ref 31–37)
MCV RBC AUTO: 91.3 FL (ref 80–100)
NRBC AUTOMATED: ABNORMAL PER 100 WBC
PDW BLD-RTO: 13.2 % (ref 11.5–14.5)
PLATELET # BLD: 251 K/UL (ref 130–400)
PMV BLD AUTO: 7 FL (ref 6–12)
POTASSIUM SERPL-SCNC: 4.4 MMOL/L (ref 3.7–5.3)
RBC # BLD: 4.31 M/UL (ref 4.5–5.9)
SODIUM BLD-SCNC: 130 MMOL/L (ref 135–144)
WBC # BLD: 12.6 K/UL (ref 3.5–11)

## 2018-04-24 PROCEDURE — 3600000012 HC SURGERY LEVEL 2 ADDTL 15MIN: Performed by: PODIATRIST

## 2018-04-24 PROCEDURE — 2500000003 HC RX 250 WO HCPCS: Performed by: ANESTHESIOLOGY

## 2018-04-24 PROCEDURE — 2580000003 HC RX 258: Performed by: ANESTHESIOLOGY

## 2018-04-24 PROCEDURE — 6370000000 HC RX 637 (ALT 250 FOR IP): Performed by: INTERNAL MEDICINE

## 2018-04-24 PROCEDURE — 87075 CULTR BACTERIA EXCEPT BLOOD: CPT

## 2018-04-24 PROCEDURE — 1200000000 HC SEMI PRIVATE

## 2018-04-24 PROCEDURE — 6360000002 HC RX W HCPCS: Performed by: INTERNAL MEDICINE

## 2018-04-24 PROCEDURE — 99222 1ST HOSP IP/OBS MODERATE 55: CPT | Performed by: INTERNAL MEDICINE

## 2018-04-24 PROCEDURE — G8417 CALC BMI ABV UP PARAM F/U: HCPCS | Performed by: PODIATRIST

## 2018-04-24 PROCEDURE — 2580000003 HC RX 258: Performed by: INTERNAL MEDICINE

## 2018-04-24 PROCEDURE — 90715 TDAP VACCINE 7 YRS/> IM: CPT | Performed by: INTERNAL MEDICINE

## 2018-04-24 PROCEDURE — 87040 BLOOD CULTURE FOR BACTERIA: CPT

## 2018-04-24 PROCEDURE — 4004F PT TOBACCO SCREEN RCVD TLK: CPT | Performed by: PODIATRIST

## 2018-04-24 PROCEDURE — 3017F COLORECTAL CA SCREEN DOC REV: CPT | Performed by: PODIATRIST

## 2018-04-24 PROCEDURE — 87070 CULTURE OTHR SPECIMN AEROBIC: CPT

## 2018-04-24 PROCEDURE — 36415 COLL VENOUS BLD VENIPUNCTURE: CPT

## 2018-04-24 PROCEDURE — 82947 ASSAY GLUCOSE BLOOD QUANT: CPT

## 2018-04-24 PROCEDURE — 7100000000 HC PACU RECOVERY - FIRST 15 MIN: Performed by: PODIATRIST

## 2018-04-24 PROCEDURE — 82565 ASSAY OF CREATININE: CPT

## 2018-04-24 PROCEDURE — 0JCR0ZZ EXTIRPATION OF MATTER FROM LEFT FOOT SUBCUTANEOUS TISSUE AND FASCIA, OPEN APPROACH: ICD-10-PCS | Performed by: PODIATRIST

## 2018-04-24 PROCEDURE — 3600000002 HC SURGERY LEVEL 2 BASE: Performed by: PODIATRIST

## 2018-04-24 PROCEDURE — 3700000001 HC ADD 15 MINUTES (ANESTHESIA): Performed by: PODIATRIST

## 2018-04-24 PROCEDURE — 28003 TREATMENT OF FOOT INFECTION: CPT | Performed by: PODIATRIST

## 2018-04-24 PROCEDURE — 6360000002 HC RX W HCPCS: Performed by: ANESTHESIOLOGY

## 2018-04-24 PROCEDURE — 84520 ASSAY OF UREA NITROGEN: CPT

## 2018-04-24 PROCEDURE — 3700000000 HC ANESTHESIA ATTENDED CARE: Performed by: PODIATRIST

## 2018-04-24 PROCEDURE — 0J9R0ZZ DRAINAGE OF LEFT FOOT SUBCUTANEOUS TISSUE AND FASCIA, OPEN APPROACH: ICD-10-PCS | Performed by: PODIATRIST

## 2018-04-24 PROCEDURE — 10121 INC&RMVL FB SUBQ TISS COMP: CPT | Performed by: PODIATRIST

## 2018-04-24 PROCEDURE — 88300 SURGICAL PATH GROSS: CPT

## 2018-04-24 PROCEDURE — 2500000003 HC RX 250 WO HCPCS: Performed by: PODIATRIST

## 2018-04-24 PROCEDURE — 99221 1ST HOSP IP/OBS SF/LOW 40: CPT | Performed by: INTERNAL MEDICINE

## 2018-04-24 PROCEDURE — 99213 OFFICE O/P EST LOW 20 MIN: CPT | Performed by: PODIATRIST

## 2018-04-24 PROCEDURE — 87077 CULTURE AEROBIC IDENTIFY: CPT

## 2018-04-24 PROCEDURE — 87205 SMEAR GRAM STAIN: CPT

## 2018-04-24 PROCEDURE — 7100000001 HC PACU RECOVERY - ADDTL 15 MIN: Performed by: PODIATRIST

## 2018-04-24 PROCEDURE — G8427 DOCREV CUR MEDS BY ELIG CLIN: HCPCS | Performed by: PODIATRIST

## 2018-04-24 PROCEDURE — 87186 SC STD MICRODIL/AGAR DIL: CPT

## 2018-04-24 PROCEDURE — 80048 BASIC METABOLIC PNL TOTAL CA: CPT

## 2018-04-24 PROCEDURE — 85027 COMPLETE CBC AUTOMATED: CPT

## 2018-04-24 PROCEDURE — 3209999900 FLUORO FOR SURGICAL PROCEDURES

## 2018-04-24 PROCEDURE — 90471 IMMUNIZATION ADMIN: CPT | Performed by: INTERNAL MEDICINE

## 2018-04-24 PROCEDURE — 73620 X-RAY EXAM OF FOOT: CPT

## 2018-04-24 PROCEDURE — 6370000000 HC RX 637 (ALT 250 FOR IP): Performed by: PODIATRIST

## 2018-04-24 RX ORDER — NICOTINE POLACRILEX 4 MG
15 LOZENGE BUCCAL PRN
Status: DISCONTINUED | OUTPATIENT
Start: 2018-04-24 | End: 2018-04-26 | Stop reason: HOSPADM

## 2018-04-24 RX ORDER — SODIUM CHLORIDE 0.9 % (FLUSH) 0.9 %
10 SYRINGE (ML) INJECTION PRN
Status: DISCONTINUED | OUTPATIENT
Start: 2018-04-24 | End: 2018-04-26 | Stop reason: HOSPADM

## 2018-04-24 RX ORDER — LIDOCAINE HYDROCHLORIDE 20 MG/ML
INJECTION, SOLUTION EPIDURAL; INFILTRATION; INTRACAUDAL; PERINEURAL PRN
Status: DISCONTINUED | OUTPATIENT
Start: 2018-04-24 | End: 2018-04-24 | Stop reason: SDUPTHER

## 2018-04-24 RX ORDER — LISINOPRIL 5 MG/1
5 TABLET ORAL DAILY
Status: DISCONTINUED | OUTPATIENT
Start: 2018-04-24 | End: 2018-04-26 | Stop reason: HOSPADM

## 2018-04-24 RX ORDER — SODIUM CHLORIDE, SODIUM LACTATE, POTASSIUM CHLORIDE, CALCIUM CHLORIDE 600; 310; 30; 20 MG/100ML; MG/100ML; MG/100ML; MG/100ML
INJECTION, SOLUTION INTRAVENOUS CONTINUOUS PRN
Status: DISCONTINUED | OUTPATIENT
Start: 2018-04-24 | End: 2018-04-24 | Stop reason: SDUPTHER

## 2018-04-24 RX ORDER — HYDROMORPHONE HCL 110MG/55ML
0.5 PATIENT CONTROLLED ANALGESIA SYRINGE INTRAVENOUS EVERY 5 MIN PRN
Status: DISCONTINUED | OUTPATIENT
Start: 2018-04-24 | End: 2018-04-24 | Stop reason: HOSPADM

## 2018-04-24 RX ORDER — DEXAMETHASONE SODIUM PHOSPHATE 10 MG/ML
4 INJECTION INTRAMUSCULAR; INTRAVENOUS
Status: DISCONTINUED | OUTPATIENT
Start: 2018-04-24 | End: 2018-04-24 | Stop reason: HOSPADM

## 2018-04-24 RX ORDER — FENTANYL CITRATE 50 UG/ML
25 INJECTION, SOLUTION INTRAMUSCULAR; INTRAVENOUS EVERY 5 MIN PRN
Status: DISCONTINUED | OUTPATIENT
Start: 2018-04-24 | End: 2018-04-24 | Stop reason: HOSPADM

## 2018-04-24 RX ORDER — ONDANSETRON 2 MG/ML
4 INJECTION INTRAMUSCULAR; INTRAVENOUS EVERY 6 HOURS PRN
Status: DISCONTINUED | OUTPATIENT
Start: 2018-04-24 | End: 2018-04-24

## 2018-04-24 RX ORDER — PROPOFOL 10 MG/ML
INJECTION, EMULSION INTRAVENOUS PRN
Status: DISCONTINUED | OUTPATIENT
Start: 2018-04-24 | End: 2018-04-24 | Stop reason: SDUPTHER

## 2018-04-24 RX ORDER — MIDAZOLAM HYDROCHLORIDE 1 MG/ML
INJECTION INTRAMUSCULAR; INTRAVENOUS PRN
Status: DISCONTINUED | OUTPATIENT
Start: 2018-04-24 | End: 2018-04-24 | Stop reason: SDUPTHER

## 2018-04-24 RX ORDER — GABAPENTIN 300 MG/1
300 CAPSULE ORAL 3 TIMES DAILY
Status: DISCONTINUED | OUTPATIENT
Start: 2018-04-24 | End: 2018-04-25

## 2018-04-24 RX ORDER — ONDANSETRON 2 MG/ML
4 INJECTION INTRAMUSCULAR; INTRAVENOUS EVERY 6 HOURS PRN
Status: DISCONTINUED | OUTPATIENT
Start: 2018-04-24 | End: 2018-04-26 | Stop reason: HOSPADM

## 2018-04-24 RX ORDER — OXYCODONE HYDROCHLORIDE AND ACETAMINOPHEN 5; 325 MG/1; MG/1
1 TABLET ORAL EVERY 4 HOURS PRN
Status: DISCONTINUED | OUTPATIENT
Start: 2018-04-24 | End: 2018-04-26

## 2018-04-24 RX ORDER — LABETALOL HYDROCHLORIDE 5 MG/ML
5 INJECTION, SOLUTION INTRAVENOUS EVERY 10 MIN PRN
Status: DISCONTINUED | OUTPATIENT
Start: 2018-04-24 | End: 2018-04-24 | Stop reason: HOSPADM

## 2018-04-24 RX ORDER — ONDANSETRON 2 MG/ML
4 INJECTION INTRAMUSCULAR; INTRAVENOUS
Status: DISCONTINUED | OUTPATIENT
Start: 2018-04-24 | End: 2018-04-24 | Stop reason: HOSPADM

## 2018-04-24 RX ORDER — OXYCODONE HYDROCHLORIDE AND ACETAMINOPHEN 5; 325 MG/1; MG/1
2 TABLET ORAL EVERY 4 HOURS PRN
Status: DISCONTINUED | OUTPATIENT
Start: 2018-04-24 | End: 2018-04-26

## 2018-04-24 RX ORDER — FENTANYL CITRATE 50 UG/ML
25 INJECTION, SOLUTION INTRAMUSCULAR; INTRAVENOUS EVERY 5 MIN PRN
Status: DISCONTINUED | OUTPATIENT
Start: 2018-04-24 | End: 2018-04-26 | Stop reason: HOSPADM

## 2018-04-24 RX ORDER — ACETAMINOPHEN 325 MG/1
650 TABLET ORAL EVERY 4 HOURS PRN
Status: DISCONTINUED | OUTPATIENT
Start: 2018-04-24 | End: 2018-04-26 | Stop reason: HOSPADM

## 2018-04-24 RX ORDER — DEXTROSE MONOHYDRATE 25 G/50ML
12.5 INJECTION, SOLUTION INTRAVENOUS PRN
Status: DISCONTINUED | OUTPATIENT
Start: 2018-04-24 | End: 2018-04-26 | Stop reason: HOSPADM

## 2018-04-24 RX ORDER — MEPERIDINE HYDROCHLORIDE 50 MG/ML
12.5 INJECTION INTRAMUSCULAR; INTRAVENOUS; SUBCUTANEOUS EVERY 5 MIN PRN
Status: DISCONTINUED | OUTPATIENT
Start: 2018-04-24 | End: 2018-04-24 | Stop reason: HOSPADM

## 2018-04-24 RX ORDER — DIPHENHYDRAMINE HYDROCHLORIDE 50 MG/ML
12.5 INJECTION INTRAMUSCULAR; INTRAVENOUS
Status: DISCONTINUED | OUTPATIENT
Start: 2018-04-24 | End: 2018-04-24 | Stop reason: HOSPADM

## 2018-04-24 RX ORDER — ASPIRIN 81 MG/1
81 TABLET ORAL DAILY
Status: DISCONTINUED | OUTPATIENT
Start: 2018-04-24 | End: 2018-04-26 | Stop reason: HOSPADM

## 2018-04-24 RX ORDER — MORPHINE SULFATE 2 MG/ML
2 INJECTION, SOLUTION INTRAMUSCULAR; INTRAVENOUS
Status: DISCONTINUED | OUTPATIENT
Start: 2018-04-24 | End: 2018-04-26 | Stop reason: HOSPADM

## 2018-04-24 RX ORDER — MORPHINE SULFATE 4 MG/ML
4 INJECTION, SOLUTION INTRAMUSCULAR; INTRAVENOUS
Status: DISCONTINUED | OUTPATIENT
Start: 2018-04-24 | End: 2018-04-26 | Stop reason: HOSPADM

## 2018-04-24 RX ORDER — NICOTINE 21 MG/24HR
1 PATCH, TRANSDERMAL 24 HOURS TRANSDERMAL DAILY
Status: DISCONTINUED | OUTPATIENT
Start: 2018-04-24 | End: 2018-04-26 | Stop reason: HOSPADM

## 2018-04-24 RX ORDER — FENTANYL CITRATE 50 UG/ML
INJECTION, SOLUTION INTRAMUSCULAR; INTRAVENOUS PRN
Status: DISCONTINUED | OUTPATIENT
Start: 2018-04-24 | End: 2018-04-24 | Stop reason: SDUPTHER

## 2018-04-24 RX ORDER — ONDANSETRON 4 MG/1
4 TABLET, ORALLY DISINTEGRATING ORAL EVERY 6 HOURS PRN
Status: DISCONTINUED | OUTPATIENT
Start: 2018-04-24 | End: 2018-04-26 | Stop reason: HOSPADM

## 2018-04-24 RX ORDER — ONDANSETRON 2 MG/ML
INJECTION INTRAMUSCULAR; INTRAVENOUS PRN
Status: DISCONTINUED | OUTPATIENT
Start: 2018-04-24 | End: 2018-04-24 | Stop reason: SDUPTHER

## 2018-04-24 RX ORDER — DEXTROSE MONOHYDRATE 50 MG/ML
100 INJECTION, SOLUTION INTRAVENOUS PRN
Status: DISCONTINUED | OUTPATIENT
Start: 2018-04-24 | End: 2018-04-26 | Stop reason: HOSPADM

## 2018-04-24 RX ORDER — SODIUM CHLORIDE 0.9 % (FLUSH) 0.9 %
10 SYRINGE (ML) INJECTION EVERY 12 HOURS SCHEDULED
Status: DISCONTINUED | OUTPATIENT
Start: 2018-04-24 | End: 2018-04-26 | Stop reason: HOSPADM

## 2018-04-24 RX ORDER — HYDRALAZINE HYDROCHLORIDE 20 MG/ML
5 INJECTION INTRAMUSCULAR; INTRAVENOUS EVERY 10 MIN PRN
Status: DISCONTINUED | OUTPATIENT
Start: 2018-04-24 | End: 2018-04-24 | Stop reason: HOSPADM

## 2018-04-24 RX ADMIN — MIDAZOLAM HYDROCHLORIDE 2 MG: 1 INJECTION, SOLUTION INTRAMUSCULAR; INTRAVENOUS at 20:12

## 2018-04-24 RX ADMIN — SODIUM CHLORIDE, POTASSIUM CHLORIDE, SODIUM LACTATE AND CALCIUM CHLORIDE: 600; 310; 30; 20 INJECTION, SOLUTION INTRAVENOUS at 20:08

## 2018-04-24 RX ADMIN — VANCOMYCIN HYDROCHLORIDE 1500 MG: 1 INJECTION, POWDER, LYOPHILIZED, FOR SOLUTION INTRAVENOUS at 14:49

## 2018-04-24 RX ADMIN — PHENYLEPHRINE HYDROCHLORIDE 200 MCG: 10 INJECTION INTRAVENOUS at 20:20

## 2018-04-24 RX ADMIN — FENTANYL CITRATE 25 MCG: 50 INJECTION INTRAMUSCULAR; INTRAVENOUS at 22:35

## 2018-04-24 RX ADMIN — TETANUS TOXOID, REDUCED DIPHTHERIA TOXOID AND ACELLULAR PERTUSSIS VACCINE, ADSORBED 0.5 ML: 5; 2.5; 8; 8; 2.5 SUSPENSION INTRAMUSCULAR at 16:42

## 2018-04-24 RX ADMIN — CEFEPIME 2 G: 2 INJECTION, POWDER, FOR SOLUTION INTRAMUSCULAR; INTRAVENOUS at 17:41

## 2018-04-24 RX ADMIN — ONDANSETRON 4 MG: 2 INJECTION, SOLUTION INTRAMUSCULAR; INTRAVENOUS at 20:50

## 2018-04-24 RX ADMIN — PROPOFOL 200 MG: 10 INJECTION, EMULSION INTRAVENOUS at 20:12

## 2018-04-24 RX ADMIN — FENTANYL CITRATE 25 MCG: 50 INJECTION INTRAMUSCULAR; INTRAVENOUS at 22:25

## 2018-04-24 RX ADMIN — LIDOCAINE HYDROCHLORIDE 5 ML: 20 INJECTION, SOLUTION EPIDURAL; INFILTRATION; INTRACAUDAL; PERINEURAL at 20:12

## 2018-04-24 RX ADMIN — ACETAMINOPHEN 650 MG: 325 TABLET ORAL at 22:03

## 2018-04-24 RX ADMIN — FENTANYL CITRATE 100 MCG: 50 INJECTION, SOLUTION INTRAMUSCULAR; INTRAVENOUS at 20:12

## 2018-04-24 RX ADMIN — PHENYLEPHRINE HYDROCHLORIDE 100 MCG: 10 INJECTION INTRAVENOUS at 20:26

## 2018-04-24 ASSESSMENT — PULMONARY FUNCTION TESTS
PIF_VALUE: 1
PIF_VALUE: 4
PIF_VALUE: 1
PIF_VALUE: 3
PIF_VALUE: 7
PIF_VALUE: 0
PIF_VALUE: 1
PIF_VALUE: 2
PIF_VALUE: 1
PIF_VALUE: 11
PIF_VALUE: 2
PIF_VALUE: 0
PIF_VALUE: 1
PIF_VALUE: 1
PIF_VALUE: 3
PIF_VALUE: 1
PIF_VALUE: 2
PIF_VALUE: 1
PIF_VALUE: 6
PIF_VALUE: 1
PIF_VALUE: 1
PIF_VALUE: 2
PIF_VALUE: 2
PIF_VALUE: 1
PIF_VALUE: 2
PIF_VALUE: 1
PIF_VALUE: 2
PIF_VALUE: 0
PIF_VALUE: 1
PIF_VALUE: 3
PIF_VALUE: 1
PIF_VALUE: 0
PIF_VALUE: 1
PIF_VALUE: 2
PIF_VALUE: 1
PIF_VALUE: 2
PIF_VALUE: 0
PIF_VALUE: 2
PIF_VALUE: 1
PIF_VALUE: 1
PIF_VALUE: 0
PIF_VALUE: 3
PIF_VALUE: 2
PIF_VALUE: 1
PIF_VALUE: 11
PIF_VALUE: 1

## 2018-04-24 ASSESSMENT — PAIN SCALES - GENERAL
PAINLEVEL_OUTOF10: 3
PAINLEVEL_OUTOF10: 10
PAINLEVEL_OUTOF10: 9
PAINLEVEL_OUTOF10: 3
PAINLEVEL_OUTOF10: 6
PAINLEVEL_OUTOF10: 3
PAINLEVEL_OUTOF10: 0

## 2018-04-25 LAB
BUN BLDV-MCNC: 19 MG/DL (ref 8–23)
CREAT SERPL-MCNC: 1.23 MG/DL (ref 0.7–1.2)
GFR AFRICAN AMERICAN: >60 ML/MIN
GFR NON-AFRICAN AMERICAN: 60 ML/MIN
GFR SERPL CREATININE-BSD FRML MDRD: ABNORMAL ML/MIN/{1.73_M2}
GFR SERPL CREATININE-BSD FRML MDRD: ABNORMAL ML/MIN/{1.73_M2}
GLUCOSE BLD-MCNC: 113 MG/DL (ref 75–110)
GLUCOSE BLD-MCNC: 131 MG/DL (ref 75–110)
GLUCOSE BLD-MCNC: 159 MG/DL (ref 75–110)
GLUCOSE BLD-MCNC: 168 MG/DL (ref 75–110)

## 2018-04-25 PROCEDURE — 97166 OT EVAL MOD COMPLEX 45 MIN: CPT

## 2018-04-25 PROCEDURE — 6370000000 HC RX 637 (ALT 250 FOR IP): Performed by: STUDENT IN AN ORGANIZED HEALTH CARE EDUCATION/TRAINING PROGRAM

## 2018-04-25 PROCEDURE — 2580000003 HC RX 258: Performed by: PODIATRIST

## 2018-04-25 PROCEDURE — 6370000000 HC RX 637 (ALT 250 FOR IP): Performed by: PODIATRIST

## 2018-04-25 PROCEDURE — 97535 SELF CARE MNGMENT TRAINING: CPT

## 2018-04-25 PROCEDURE — 6360000002 HC RX W HCPCS: Performed by: INTERNAL MEDICINE

## 2018-04-25 PROCEDURE — 1200000000 HC SEMI PRIVATE

## 2018-04-25 PROCEDURE — G8979 MOBILITY GOAL STATUS: HCPCS

## 2018-04-25 PROCEDURE — 97161 PT EVAL LOW COMPLEX 20 MIN: CPT

## 2018-04-25 PROCEDURE — 99232 SBSQ HOSP IP/OBS MODERATE 35: CPT | Performed by: INTERNAL MEDICINE

## 2018-04-25 PROCEDURE — G8978 MOBILITY CURRENT STATUS: HCPCS

## 2018-04-25 PROCEDURE — 6370000000 HC RX 637 (ALT 250 FOR IP): Performed by: INTERNAL MEDICINE

## 2018-04-25 PROCEDURE — 6360000002 HC RX W HCPCS: Performed by: PODIATRIST

## 2018-04-25 PROCEDURE — 2580000003 HC RX 258: Performed by: INTERNAL MEDICINE

## 2018-04-25 PROCEDURE — 6370000000 HC RX 637 (ALT 250 FOR IP): Performed by: NURSE PRACTITIONER

## 2018-04-25 PROCEDURE — 82565 ASSAY OF CREATININE: CPT

## 2018-04-25 PROCEDURE — G8988 SELF CARE GOAL STATUS: HCPCS

## 2018-04-25 PROCEDURE — 97530 THERAPEUTIC ACTIVITIES: CPT

## 2018-04-25 PROCEDURE — G8987 SELF CARE CURRENT STATUS: HCPCS

## 2018-04-25 PROCEDURE — 36415 COLL VENOUS BLD VENIPUNCTURE: CPT

## 2018-04-25 PROCEDURE — 84520 ASSAY OF UREA NITROGEN: CPT

## 2018-04-25 PROCEDURE — 82947 ASSAY GLUCOSE BLOOD QUANT: CPT

## 2018-04-25 PROCEDURE — 97116 GAIT TRAINING THERAPY: CPT

## 2018-04-25 RX ORDER — BUTALBITAL, ACETAMINOPHEN AND CAFFEINE 50; 325; 40 MG/1; MG/1; MG/1
1 TABLET ORAL EVERY 4 HOURS PRN
Status: DISCONTINUED | OUTPATIENT
Start: 2018-04-25 | End: 2018-04-26 | Stop reason: HOSPADM

## 2018-04-25 RX ORDER — GABAPENTIN 300 MG/1
900 CAPSULE ORAL 3 TIMES DAILY
Status: DISCONTINUED | OUTPATIENT
Start: 2018-04-25 | End: 2018-04-26 | Stop reason: HOSPADM

## 2018-04-25 RX ORDER — BUTALBITAL, ACETAMINOPHEN AND CAFFEINE 50; 325; 40 MG/1; MG/1; MG/1
1 TABLET ORAL ONCE
Status: COMPLETED | OUTPATIENT
Start: 2018-04-25 | End: 2018-04-25

## 2018-04-25 RX ORDER — SIMVASTATIN 40 MG
40 TABLET ORAL NIGHTLY
Status: DISCONTINUED | OUTPATIENT
Start: 2018-04-25 | End: 2018-04-26 | Stop reason: HOSPADM

## 2018-04-25 RX ORDER — SIMVASTATIN 40 MG
40 TABLET ORAL NIGHTLY
Status: ON HOLD | COMMUNITY
End: 2018-08-04

## 2018-04-25 RX ORDER — MULTIVITAMIN WITH FOLIC ACID 400 MCG
1 TABLET ORAL DAILY
Status: DISCONTINUED | OUTPATIENT
Start: 2018-04-25 | End: 2018-04-26 | Stop reason: HOSPADM

## 2018-04-25 RX ADMIN — CEFEPIME 2 G: 2 INJECTION, POWDER, FOR SOLUTION INTRAMUSCULAR; INTRAVENOUS at 06:42

## 2018-04-25 RX ADMIN — ONDANSETRON 4 MG: 4 TABLET, ORALLY DISINTEGRATING ORAL at 08:53

## 2018-04-25 RX ADMIN — GABAPENTIN 900 MG: 300 CAPSULE ORAL at 14:18

## 2018-04-25 RX ADMIN — SALINE NASAL SPRAY 1 SPRAY: 1.5 SOLUTION NASAL at 18:06

## 2018-04-25 RX ADMIN — OXYCODONE HYDROCHLORIDE AND ACETAMINOPHEN 2 TABLET: 5; 325 TABLET ORAL at 04:00

## 2018-04-25 RX ADMIN — OXYCODONE HYDROCHLORIDE AND ACETAMINOPHEN 2 TABLET: 5; 325 TABLET ORAL at 19:51

## 2018-04-25 RX ADMIN — BUTALBITAL, ACETAMINOPHEN, AND CAFFEINE 1 TABLET: 50; 325; 40 TABLET ORAL at 12:28

## 2018-04-25 RX ADMIN — ASPIRIN 81 MG: 81 TABLET, COATED ORAL at 12:27

## 2018-04-25 RX ADMIN — Medication 10 ML: at 09:29

## 2018-04-25 RX ADMIN — GABAPENTIN 900 MG: 300 CAPSULE ORAL at 20:48

## 2018-04-25 RX ADMIN — METFORMIN HYDROCHLORIDE 500 MG: 500 TABLET, FILM COATED ORAL at 17:58

## 2018-04-25 RX ADMIN — MULTIVITAMIN TABLET 1 TABLET: TABLET at 17:58

## 2018-04-25 RX ADMIN — VANCOMYCIN HYDROCHLORIDE 1500 MG: 1 INJECTION, POWDER, LYOPHILIZED, FOR SOLUTION INTRAVENOUS at 03:43

## 2018-04-25 RX ADMIN — METFORMIN HYDROCHLORIDE 500 MG: 500 TABLET, FILM COATED ORAL at 12:27

## 2018-04-25 RX ADMIN — OXYCODONE HYDROCHLORIDE AND ACETAMINOPHEN 2 TABLET: 5; 325 TABLET ORAL at 00:06

## 2018-04-25 RX ADMIN — OXYCODONE HYDROCHLORIDE AND ACETAMINOPHEN 2 TABLET: 5; 325 TABLET ORAL at 08:53

## 2018-04-25 RX ADMIN — CEFEPIME 2 G: 2 INJECTION, POWDER, FOR SOLUTION INTRAMUSCULAR; INTRAVENOUS at 19:07

## 2018-04-25 RX ADMIN — Medication 10 ML: at 20:49

## 2018-04-25 RX ADMIN — SALINE NASAL SPRAY 1 SPRAY: 1.5 SOLUTION NASAL at 20:53

## 2018-04-25 RX ADMIN — SIMVASTATIN 40 MG: 40 TABLET, FILM COATED ORAL at 20:48

## 2018-04-25 RX ADMIN — VANCOMYCIN HYDROCHLORIDE 1500 MG: 1 INJECTION, POWDER, LYOPHILIZED, FOR SOLUTION INTRAVENOUS at 16:27

## 2018-04-25 ASSESSMENT — PAIN DESCRIPTION - PAIN TYPE
TYPE: SURGICAL PAIN
TYPE: SURGICAL PAIN
TYPE: ACUTE PAIN
TYPE: SURGICAL PAIN
TYPE: SURGICAL PAIN

## 2018-04-25 ASSESSMENT — PAIN DESCRIPTION - FREQUENCY
FREQUENCY: CONTINUOUS

## 2018-04-25 ASSESSMENT — PAIN DESCRIPTION - ORIENTATION
ORIENTATION: LEFT

## 2018-04-25 ASSESSMENT — PAIN DESCRIPTION - LOCATION
LOCATION: FOOT
LOCATION: HEAD
LOCATION: FOOT
LOCATION: FOOT

## 2018-04-25 ASSESSMENT — PAIN DESCRIPTION - ONSET
ONSET: ON-GOING
ONSET: ON-GOING

## 2018-04-25 ASSESSMENT — PAIN DESCRIPTION - DESCRIPTORS
DESCRIPTORS: ACHING;DISCOMFORT;HEADACHE;SORE
DESCRIPTORS: DULL
DESCRIPTORS: BURNING
DESCRIPTORS: ACHING;DISCOMFORT;SORE
DESCRIPTORS: DULL
DESCRIPTORS: DISCOMFORT;SORE

## 2018-04-25 ASSESSMENT — PAIN SCALES - GENERAL
PAINLEVEL_OUTOF10: 7
PAINLEVEL_OUTOF10: 8
PAINLEVEL_OUTOF10: 5
PAINLEVEL_OUTOF10: 7
PAINLEVEL_OUTOF10: 8
PAINLEVEL_OUTOF10: 5
PAINLEVEL_OUTOF10: 8
PAINLEVEL_OUTOF10: 8

## 2018-04-25 ASSESSMENT — PAIN DESCRIPTION - PROGRESSION: CLINICAL_PROGRESSION: GRADUALLY IMPROVING

## 2018-04-26 VITALS
HEIGHT: 70 IN | WEIGHT: 205.91 LBS | HEART RATE: 78 BPM | SYSTOLIC BLOOD PRESSURE: 104 MMHG | OXYGEN SATURATION: 96 % | TEMPERATURE: 98.6 F | BODY MASS INDEX: 29.48 KG/M2 | DIASTOLIC BLOOD PRESSURE: 58 MMHG | RESPIRATION RATE: 16 BRPM

## 2018-04-26 LAB
ABSOLUTE EOS #: 0.3 K/UL (ref 0–0.4)
ABSOLUTE IMMATURE GRANULOCYTE: ABNORMAL K/UL (ref 0–0.3)
ABSOLUTE LYMPH #: 2 K/UL (ref 1–4.8)
ABSOLUTE MONO #: 0.9 K/UL (ref 0.2–0.8)
ANION GAP SERPL CALCULATED.3IONS-SCNC: 14 MMOL/L (ref 9–17)
BASOPHILS # BLD: 1 % (ref 0–2)
BASOPHILS ABSOLUTE: 0.1 K/UL (ref 0–0.2)
BUN BLDV-MCNC: 16 MG/DL (ref 8–23)
BUN/CREAT BLD: 13 (ref 9–20)
CALCIUM SERPL-MCNC: 9.4 MG/DL (ref 8.6–10.4)
CHLORIDE BLD-SCNC: 96 MMOL/L (ref 98–107)
CO2: 23 MMOL/L (ref 20–31)
CREAT SERPL-MCNC: 1.19 MG/DL (ref 0.7–1.2)
DIFFERENTIAL TYPE: ABNORMAL
EOSINOPHILS RELATIVE PERCENT: 3 % (ref 1–4)
GFR AFRICAN AMERICAN: >60 ML/MIN
GFR NON-AFRICAN AMERICAN: >60 ML/MIN
GFR SERPL CREATININE-BSD FRML MDRD: ABNORMAL ML/MIN/{1.73_M2}
GFR SERPL CREATININE-BSD FRML MDRD: ABNORMAL ML/MIN/{1.73_M2}
GLUCOSE BLD-MCNC: 122 MG/DL (ref 75–110)
GLUCOSE BLD-MCNC: 153 MG/DL (ref 75–110)
GLUCOSE BLD-MCNC: 156 MG/DL (ref 70–99)
GLUCOSE BLD-MCNC: 184 MG/DL (ref 75–110)
HCT VFR BLD CALC: 39.4 % (ref 41–53)
HEMOGLOBIN: 13.5 G/DL (ref 13.5–17.5)
IMMATURE GRANULOCYTES: ABNORMAL %
LYMPHOCYTES # BLD: 19 % (ref 24–44)
MCH RBC QN AUTO: 31.4 PG (ref 26–34)
MCHC RBC AUTO-ENTMCNC: 34.3 G/DL (ref 31–37)
MCV RBC AUTO: 91.7 FL (ref 80–100)
MONOCYTES # BLD: 8 % (ref 1–7)
NRBC AUTOMATED: ABNORMAL PER 100 WBC
PDW BLD-RTO: 12.7 % (ref 11.5–14.5)
PLATELET # BLD: 269 K/UL (ref 130–400)
PLATELET ESTIMATE: ABNORMAL
PMV BLD AUTO: 6.9 FL (ref 6–12)
POTASSIUM SERPL-SCNC: 4.7 MMOL/L (ref 3.7–5.3)
RBC # BLD: 4.29 M/UL (ref 4.5–5.9)
RBC # BLD: ABNORMAL 10*6/UL
SEG NEUTROPHILS: 69 % (ref 36–66)
SEGMENTED NEUTROPHILS ABSOLUTE COUNT: 7.3 K/UL (ref 1.8–7.7)
SODIUM BLD-SCNC: 133 MMOL/L (ref 135–144)
SURGICAL PATHOLOGY REPORT: NORMAL
VANCOMYCIN TROUGH DATE LAST DOSE: ABNORMAL
VANCOMYCIN TROUGH DOSE AMOUNT: ABNORMAL
VANCOMYCIN TROUGH TIME LAST DOSE: ABNORMAL
VANCOMYCIN TROUGH: 21.9 UG/ML (ref 10–20)
WBC # BLD: 10.5 K/UL (ref 3.5–11)
WBC # BLD: ABNORMAL 10*3/UL

## 2018-04-26 PROCEDURE — 6360000002 HC RX W HCPCS: Performed by: INTERNAL MEDICINE

## 2018-04-26 PROCEDURE — 99232 SBSQ HOSP IP/OBS MODERATE 35: CPT | Performed by: INTERNAL MEDICINE

## 2018-04-26 PROCEDURE — 6370000000 HC RX 637 (ALT 250 FOR IP): Performed by: STUDENT IN AN ORGANIZED HEALTH CARE EDUCATION/TRAINING PROGRAM

## 2018-04-26 PROCEDURE — 80202 ASSAY OF VANCOMYCIN: CPT

## 2018-04-26 PROCEDURE — 36415 COLL VENOUS BLD VENIPUNCTURE: CPT

## 2018-04-26 PROCEDURE — 6370000000 HC RX 637 (ALT 250 FOR IP): Performed by: INTERNAL MEDICINE

## 2018-04-26 PROCEDURE — 80048 BASIC METABOLIC PNL TOTAL CA: CPT

## 2018-04-26 PROCEDURE — 6360000002 HC RX W HCPCS: Performed by: STUDENT IN AN ORGANIZED HEALTH CARE EDUCATION/TRAINING PROGRAM

## 2018-04-26 PROCEDURE — 97116 GAIT TRAINING THERAPY: CPT

## 2018-04-26 PROCEDURE — 2580000003 HC RX 258: Performed by: INTERNAL MEDICINE

## 2018-04-26 PROCEDURE — 97535 SELF CARE MNGMENT TRAINING: CPT

## 2018-04-26 PROCEDURE — 6370000000 HC RX 637 (ALT 250 FOR IP): Performed by: PODIATRIST

## 2018-04-26 PROCEDURE — 82947 ASSAY GLUCOSE BLOOD QUANT: CPT

## 2018-04-26 PROCEDURE — 85025 COMPLETE CBC W/AUTO DIFF WBC: CPT

## 2018-04-26 RX ORDER — SENNA PLUS 8.6 MG/1
1 TABLET ORAL ONCE
Status: COMPLETED | OUTPATIENT
Start: 2018-04-26 | End: 2018-04-26

## 2018-04-26 RX ORDER — HYDROCODONE BITARTRATE AND ACETAMINOPHEN 5; 325 MG/1; MG/1
2 TABLET ORAL EVERY 4 HOURS PRN
Status: DISCONTINUED | OUTPATIENT
Start: 2018-04-26 | End: 2018-04-26 | Stop reason: HOSPADM

## 2018-04-26 RX ORDER — HEPARIN SODIUM 5000 [USP'U]/ML
5000 INJECTION, SOLUTION INTRAVENOUS; SUBCUTANEOUS EVERY 8 HOURS SCHEDULED
Status: DISCONTINUED | OUTPATIENT
Start: 2018-04-26 | End: 2018-04-26 | Stop reason: HOSPADM

## 2018-04-26 RX ORDER — HYDROCODONE BITARTRATE AND ACETAMINOPHEN 5; 325 MG/1; MG/1
1 TABLET ORAL EVERY 4 HOURS PRN
Status: DISCONTINUED | OUTPATIENT
Start: 2018-04-26 | End: 2018-04-26 | Stop reason: HOSPADM

## 2018-04-26 RX ORDER — HYDROCODONE BITARTRATE AND ACETAMINOPHEN 5; 325 MG/1; MG/1
1 TABLET ORAL EVERY 4 HOURS PRN
Qty: 28 TABLET | Refills: 0 | Status: SHIPPED | OUTPATIENT
Start: 2018-04-26 | End: 2018-05-03

## 2018-04-26 RX ORDER — AMOXICILLIN 500 MG/1
500 CAPSULE ORAL 3 TIMES DAILY
Qty: 30 CAPSULE | Refills: 0 | Status: SHIPPED | OUTPATIENT
Start: 2018-04-26 | End: 2018-05-06

## 2018-04-26 RX ADMIN — SIMVASTATIN 40 MG: 40 TABLET, FILM COATED ORAL at 20:33

## 2018-04-26 RX ADMIN — HYDROCODONE BITARTRATE AND ACETAMINOPHEN 2 TABLET: 5; 325 TABLET ORAL at 12:24

## 2018-04-26 RX ADMIN — LISINOPRIL 5 MG: 5 TABLET ORAL at 08:16

## 2018-04-26 RX ADMIN — GABAPENTIN 900 MG: 300 CAPSULE ORAL at 14:32

## 2018-04-26 RX ADMIN — MULTIVITAMIN TABLET 1 TABLET: TABLET at 17:00

## 2018-04-26 RX ADMIN — CEFEPIME 2 G: 2 INJECTION, POWDER, FOR SOLUTION INTRAMUSCULAR; INTRAVENOUS at 06:55

## 2018-04-26 RX ADMIN — ASPIRIN 81 MG: 81 TABLET, COATED ORAL at 08:17

## 2018-04-26 RX ADMIN — VANCOMYCIN HYDROCHLORIDE 1500 MG: 1 INJECTION, POWDER, LYOPHILIZED, FOR SOLUTION INTRAVENOUS at 03:05

## 2018-04-26 RX ADMIN — GABAPENTIN 900 MG: 300 CAPSULE ORAL at 08:17

## 2018-04-26 RX ADMIN — HEPARIN SODIUM 5000 UNITS: 5000 INJECTION, SOLUTION INTRAVENOUS; SUBCUTANEOUS at 08:15

## 2018-04-26 RX ADMIN — BUTALBITAL, ACETAMINOPHEN, AND CAFFEINE 1 TABLET: 50; 325; 40 TABLET ORAL at 03:05

## 2018-04-26 RX ADMIN — GABAPENTIN 900 MG: 300 CAPSULE ORAL at 20:33

## 2018-04-26 RX ADMIN — METFORMIN HYDROCHLORIDE 500 MG: 500 TABLET, FILM COATED ORAL at 08:16

## 2018-04-26 RX ADMIN — HYDROCODONE BITARTRATE AND ACETAMINOPHEN 2 TABLET: 5; 325 TABLET ORAL at 08:16

## 2018-04-26 RX ADMIN — SENNOSIDES 8.6 MG: 8.6 TABLET, FILM COATED ORAL at 14:36

## 2018-04-26 RX ADMIN — HEPARIN SODIUM 5000 UNITS: 5000 INJECTION, SOLUTION INTRAVENOUS; SUBCUTANEOUS at 14:36

## 2018-04-26 RX ADMIN — CEFEPIME 2 G: 2 INJECTION, POWDER, FOR SOLUTION INTRAMUSCULAR; INTRAVENOUS at 17:00

## 2018-04-26 RX ADMIN — METFORMIN HYDROCHLORIDE 500 MG: 500 TABLET, FILM COATED ORAL at 17:00

## 2018-04-26 RX ADMIN — HYDROCODONE BITARTRATE AND ACETAMINOPHEN 2 TABLET: 5; 325 TABLET ORAL at 18:22

## 2018-04-26 ASSESSMENT — PAIN SCALES - GENERAL
PAINLEVEL_OUTOF10: 8
PAINLEVEL_OUTOF10: 9
PAINLEVEL_OUTOF10: 3
PAINLEVEL_OUTOF10: 3
PAINLEVEL_OUTOF10: 9
PAINLEVEL_OUTOF10: 7
PAINLEVEL_OUTOF10: 6
PAINLEVEL_OUTOF10: 8

## 2018-04-26 ASSESSMENT — PAIN DESCRIPTION - LOCATION
LOCATION: FOOT
LOCATION: HEAD
LOCATION: FOOT
LOCATION: HEAD

## 2018-04-26 ASSESSMENT — PAIN DESCRIPTION - PAIN TYPE
TYPE: ACUTE PAIN
TYPE: SURGICAL PAIN
TYPE: ACUTE PAIN
TYPE: SURGICAL PAIN

## 2018-04-26 ASSESSMENT — PAIN DESCRIPTION - DESCRIPTORS
DESCRIPTORS: HEADACHE
DESCRIPTORS: HEADACHE

## 2018-04-26 ASSESSMENT — PAIN DESCRIPTION - ORIENTATION
ORIENTATION: LEFT
ORIENTATION: LEFT

## 2018-04-26 ASSESSMENT — PAIN DESCRIPTION - PROGRESSION
CLINICAL_PROGRESSION: GRADUALLY IMPROVING
CLINICAL_PROGRESSION: GRADUALLY IMPROVING

## 2018-04-26 ASSESSMENT — PAIN DESCRIPTION - FREQUENCY
FREQUENCY: INTERMITTENT
FREQUENCY: INTERMITTENT

## 2018-04-26 ASSESSMENT — PAIN DESCRIPTION - ONSET: ONSET: ON-GOING

## 2018-04-29 LAB
CULTURE: ABNORMAL
DIRECT EXAM: ABNORMAL
Lab: ABNORMAL
ORGANISM: ABNORMAL
ORGANISM: ABNORMAL
SPECIMEN DESCRIPTION: ABNORMAL
SPECIMEN DESCRIPTION: ABNORMAL
STATUS: ABNORMAL

## 2018-04-30 LAB
CULTURE: NORMAL
Lab: NORMAL
Lab: NORMAL
SPECIMEN DESCRIPTION: NORMAL
STATUS: NORMAL
STATUS: NORMAL

## 2018-05-01 ENCOUNTER — OFFICE VISIT (OUTPATIENT)
Dept: PODIATRY | Age: 63
End: 2018-05-01

## 2018-05-01 VITALS — HEIGHT: 70 IN | WEIGHT: 206 LBS | RESPIRATION RATE: 16 BRPM | HEART RATE: 68 BPM | BODY MASS INDEX: 29.49 KG/M2

## 2018-05-01 DIAGNOSIS — Z98.890 POST-OPERATIVE STATE: Primary | ICD-10-CM

## 2018-05-01 PROCEDURE — 99024 POSTOP FOLLOW-UP VISIT: CPT | Performed by: PODIATRIST

## 2018-05-10 ENCOUNTER — OFFICE VISIT (OUTPATIENT)
Dept: PODIATRY | Age: 63
End: 2018-05-10

## 2018-05-10 VITALS — RESPIRATION RATE: 16 BRPM | HEART RATE: 68 BPM | HEIGHT: 70 IN | BODY MASS INDEX: 29.49 KG/M2 | WEIGHT: 206 LBS

## 2018-05-10 DIAGNOSIS — Z98.890 POST-OPERATIVE STATE: Primary | ICD-10-CM

## 2018-05-10 PROCEDURE — 99024 POSTOP FOLLOW-UP VISIT: CPT | Performed by: PODIATRIST

## 2018-05-24 ENCOUNTER — OFFICE VISIT (OUTPATIENT)
Dept: PODIATRY | Age: 63
End: 2018-05-24

## 2018-05-24 VITALS — BODY MASS INDEX: 29.49 KG/M2 | RESPIRATION RATE: 16 BRPM | HEART RATE: 68 BPM | WEIGHT: 206 LBS | HEIGHT: 70 IN

## 2018-05-24 DIAGNOSIS — Z98.890 POST-OPERATIVE STATE: Primary | ICD-10-CM

## 2018-05-24 PROCEDURE — 99024 POSTOP FOLLOW-UP VISIT: CPT | Performed by: PODIATRIST

## 2018-06-07 ENCOUNTER — OFFICE VISIT (OUTPATIENT)
Dept: PODIATRY | Age: 63
End: 2018-06-07
Payer: MEDICARE

## 2018-06-07 VITALS — BODY MASS INDEX: 29.49 KG/M2 | WEIGHT: 206 LBS | HEART RATE: 72 BPM | RESPIRATION RATE: 16 BRPM | HEIGHT: 70 IN

## 2018-06-07 DIAGNOSIS — I73.9 PVD (PERIPHERAL VASCULAR DISEASE) (HCC): ICD-10-CM

## 2018-06-07 DIAGNOSIS — M79.672 PAIN IN BOTH FEET: ICD-10-CM

## 2018-06-07 DIAGNOSIS — E11.621 DIABETIC FOOT ULCER WITH OSTEOMYELITIS (HCC): Primary | ICD-10-CM

## 2018-06-07 DIAGNOSIS — M86.9 DIABETIC FOOT ULCER WITH OSTEOMYELITIS (HCC): Primary | ICD-10-CM

## 2018-06-07 DIAGNOSIS — M79.671 PAIN IN BOTH FEET: ICD-10-CM

## 2018-06-07 DIAGNOSIS — L97.512 RIGHT FOOT ULCER, WITH FAT LAYER EXPOSED (HCC): ICD-10-CM

## 2018-06-07 DIAGNOSIS — L97.509 DIABETIC FOOT ULCER WITH OSTEOMYELITIS (HCC): Primary | ICD-10-CM

## 2018-06-07 DIAGNOSIS — E11.69 DIABETIC FOOT ULCER WITH OSTEOMYELITIS (HCC): Primary | ICD-10-CM

## 2018-06-07 PROCEDURE — 3046F HEMOGLOBIN A1C LEVEL >9.0%: CPT | Performed by: PODIATRIST

## 2018-06-07 PROCEDURE — 11042 DBRDMT SUBQ TIS 1ST 20SQCM/<: CPT | Performed by: PODIATRIST

## 2018-06-07 PROCEDURE — 4004F PT TOBACCO SCREEN RCVD TLK: CPT | Performed by: PODIATRIST

## 2018-06-07 PROCEDURE — 99024 POSTOP FOLLOW-UP VISIT: CPT | Performed by: PODIATRIST

## 2018-06-07 PROCEDURE — 3017F COLORECTAL CA SCREEN DOC REV: CPT | Performed by: PODIATRIST

## 2018-06-07 PROCEDURE — G8417 CALC BMI ABV UP PARAM F/U: HCPCS | Performed by: PODIATRIST

## 2018-06-07 PROCEDURE — G8428 CUR MEDS NOT DOCUMENT: HCPCS | Performed by: PODIATRIST

## 2018-06-07 PROCEDURE — 2022F DILAT RTA XM EVC RTNOPTHY: CPT | Performed by: PODIATRIST

## 2018-07-12 ENCOUNTER — OFFICE VISIT (OUTPATIENT)
Dept: PODIATRY | Age: 63
End: 2018-07-12
Payer: MEDICARE

## 2018-07-12 VITALS — BODY MASS INDEX: 29.49 KG/M2 | HEIGHT: 70 IN | WEIGHT: 206 LBS

## 2018-07-12 DIAGNOSIS — L97.509 DIABETIC FOOT ULCER WITH OSTEOMYELITIS (HCC): Primary | ICD-10-CM

## 2018-07-12 DIAGNOSIS — E11.69 DIABETIC FOOT ULCER WITH OSTEOMYELITIS (HCC): Primary | ICD-10-CM

## 2018-07-12 DIAGNOSIS — I73.9 PERIPHERAL VASCULAR DISEASE (HCC): ICD-10-CM

## 2018-07-12 DIAGNOSIS — M79.671 PAIN IN BOTH FEET: ICD-10-CM

## 2018-07-12 DIAGNOSIS — M86.9 DIABETIC FOOT ULCER WITH OSTEOMYELITIS (HCC): Primary | ICD-10-CM

## 2018-07-12 DIAGNOSIS — L97.512 RIGHT FOOT ULCER, WITH FAT LAYER EXPOSED (HCC): ICD-10-CM

## 2018-07-12 DIAGNOSIS — E11.621 DIABETIC FOOT ULCER WITH OSTEOMYELITIS (HCC): Primary | ICD-10-CM

## 2018-07-12 DIAGNOSIS — M79.672 PAIN IN BOTH FEET: ICD-10-CM

## 2018-07-12 PROCEDURE — G8427 DOCREV CUR MEDS BY ELIG CLIN: HCPCS | Performed by: PODIATRIST

## 2018-07-12 PROCEDURE — 4004F PT TOBACCO SCREEN RCVD TLK: CPT | Performed by: PODIATRIST

## 2018-07-12 PROCEDURE — 99024 POSTOP FOLLOW-UP VISIT: CPT | Performed by: PODIATRIST

## 2018-07-12 PROCEDURE — 3046F HEMOGLOBIN A1C LEVEL >9.0%: CPT | Performed by: PODIATRIST

## 2018-07-12 PROCEDURE — 2022F DILAT RTA XM EVC RTNOPTHY: CPT | Performed by: PODIATRIST

## 2018-07-12 PROCEDURE — 11042 DBRDMT SUBQ TIS 1ST 20SQCM/<: CPT | Performed by: PODIATRIST

## 2018-07-12 PROCEDURE — G8417 CALC BMI ABV UP PARAM F/U: HCPCS | Performed by: PODIATRIST

## 2018-07-12 PROCEDURE — 3017F COLORECTAL CA SCREEN DOC REV: CPT | Performed by: PODIATRIST

## 2018-07-12 NOTE — PROGRESS NOTES
[facility] PHYSICIANS  [facility] [address]  Suite [facility] [place]   Dept: [phone]  Dept Fax: [phone]    RETURN WOUND CARE PROGRESS NOTE  Date of patient's visit: 2018  Patient's Name:  Robinson Gold YOB: 1955            Patient Care Team:  Brittanie Dennis MD as PCP - General (Family Medicine)      Chief Complaint   Patient presents with    Foot Ulcer     Right Foot       Robinson Gold  AGE: 65 y.o. GENDER: male  : 1955  TODAY'S DATE:  2018    Subjective:       Robinson Gold is a 65 y.o. male presents to the office today for follow up of an ulceration. Denies F/C/N/V. Wound Type:diabetic and pressure  Wound Location:right plantar medial foot  Modifying factors:diabetes, poor glucose control and chronic pressure            Lab Results   Component Value Date    LABA1C 5.4 2017       ALLERGIES    Allergies   Allergen Reactions    Dye [Iodides] Rash     Rash and swelling       Medications:    Current Outpatient Prescriptions:     simvastatin (ZOCOR) 40 MG tablet, Take 40 mg by mouth nightly, Disp: , Rfl:     aspirin 81 MG tablet, Take 81 mg by mouth daily, Disp: , Rfl:     metFORMIN (GLUCOPHAGE) 500 MG tablet, Take 500 mg by mouth 2 times daily (with meals) , Disp: , Rfl:     lisinopril (PRINIVIL;ZESTRIL) 5 MG tablet, Take 5 mg by mouth daily, Disp: , Rfl:     gabapentin (NEURONTIN) 300 MG capsule, Take 900 mg by mouth 3 times daily. Take 3 pills 3 times a day ., Disp: , Rfl:     sildenafil (VIAGRA) 100 MG tablet, Take 1 tablet by mouth as needed for Erectile Dysfunction, Disp: 30 tablet, Rfl: 3    Review of Systems  Review of Systems - History obtained from chart review and the patient  General ROS: negative for - chills, fatigue, fever, night sweats or weight gain  Constitutional: Negative for chills, diaphoresis, fatigue, fever and unexpected weight change.   Musculoskeletal: Positive for arthralgias, gait problem and joint swelling. Neurological ROS: negative for - behavioral changes, confusion, headaches or seizures. Negative for weakness and numbness. Dermatological ROS: negative for - mole changes, rash  Cardiovascular: Negative for leg swelling. Gastrointestinal: Negative for constipation, diarrhea, nausea and vomiting. Objective:       Physical Exam:  Ht 5' 10\" (1.778 m)   Wt 206 lb (93.4 kg)   BMI 29.56 kg/m²     NV status remains unchanged since last visit. Wound #:   1    diabetic foot ulcer   right right    Wound measurements:  #1  2 cm by 2 cm  by   3 cm         Wound Depth: subcutaneous. Drainage:    serosanguinous Type: serosanguinous    Wound Bed status:   Granulation Tissue: 100%   Necrotic 0%  Type:   Epithelialization 0%   Hypergranular 0%   Periwound hyperkeratosis:  present  Erythema absent  Odor:no  Maceration:no  Undermining/tract/tunneling:no  Culture taken:   no             Assessment:     Assessment: Patient is a 65 y.o. male with:   Diagnosis Orders   1. Diabetic foot ulcer with osteomyelitis ([address]  MN DEBRIDEMENT, SKIN, SUB-Q TISSUE,=<20 SQ CM   2. Right foot ulcer, with fat layer exposed ([address]  MN DEBRIDEMENT, SKIN, SUB-Q TISSUE,=<20 SQ CM   3. Peripheral vascular disease (HCC)  MN DEBRIDEMENT, SKIN, SUB-Q TISSUE,=<20 SQ CM   4. Pain in both feet  MN DEBRIDEMENT, SKIN, SUB-Q TISSUE,=<20 SQ CM       Overall Wound Assessment  Wound is has worsened. Size has increased  Appearance has improved      Plan:     Pt examined and evaluated. Current condition and treatment options discussed in detail. Teresaanju Wing Age:  65 y.o. Gender:  male   :  1955  Today's Date:  2018      Procedure: With the patient in supine position, Lidocaine soaked gauze was applied per physician order at beginning of wound evaluation. It was subsequently removed.     Using a #15 blade scalpel and Pick-up, the wound was debrided down to and included the removal of the following tissue:     [] epidermis, [] dermis, [x]  subcutaneous tissue,  [] muscle/fascia tissue,   and/or  [] bone     Also debrided was all  [x] fibrin, [x] biofilm, [x] slough,  [x] necrotic,  [] hypergranulation tissue, and/or  [x] hyperkeratotic tissue. Wound was copiously irrigated with normal saline solution. Bleeding:  Minimal.  Hemostasis:  pressure     100%  of wound debrided. Patient tolerated procedure well. Pain 1 / 10 during procedure and pain 1 / 10 after procedure. Discussed appropriate home care of this wound. Wound redressed. Patient instructions were given. Discussed appropriate home care of this wound. Dressings: No orders of the defined types were placed in this encounter. No orders of the defined types were placed in this encounter. Follow up: 1 week.       Electronically signed by Ana Maria Ott DPM on 7/12/2018 at 3:27 PM  7/12/2018

## 2018-07-17 ENCOUNTER — OFFICE VISIT (OUTPATIENT)
Dept: PODIATRY | Age: 63
End: 2018-07-17
Payer: MEDICARE

## 2018-07-17 VITALS — BODY MASS INDEX: 29.49 KG/M2 | HEIGHT: 70 IN | WEIGHT: 206 LBS

## 2018-07-17 DIAGNOSIS — E11.69 DIABETIC FOOT ULCER WITH OSTEOMYELITIS (HCC): Primary | ICD-10-CM

## 2018-07-17 DIAGNOSIS — M14.671 CHARCOT'S JOINT OF FOOT, RIGHT: ICD-10-CM

## 2018-07-17 DIAGNOSIS — L97.509 DIABETIC FOOT ULCER WITH OSTEOMYELITIS (HCC): Primary | ICD-10-CM

## 2018-07-17 DIAGNOSIS — I73.9 PVD (PERIPHERAL VASCULAR DISEASE) (HCC): ICD-10-CM

## 2018-07-17 DIAGNOSIS — E11.621 DIABETIC FOOT ULCER WITH OSTEOMYELITIS (HCC): Primary | ICD-10-CM

## 2018-07-17 DIAGNOSIS — L97.512 RIGHT FOOT ULCER, WITH FAT LAYER EXPOSED (HCC): ICD-10-CM

## 2018-07-17 DIAGNOSIS — M86.9 DIABETIC FOOT ULCER WITH OSTEOMYELITIS (HCC): Primary | ICD-10-CM

## 2018-07-17 PROCEDURE — 11042 DBRDMT SUBQ TIS 1ST 20SQCM/<: CPT | Performed by: PODIATRIST

## 2018-07-17 PROCEDURE — 99024 POSTOP FOLLOW-UP VISIT: CPT | Performed by: PODIATRIST

## 2018-07-17 RX ORDER — DOXYCYCLINE HYCLATE 100 MG
100 TABLET ORAL 2 TIMES DAILY
Qty: 28 TABLET | Refills: 0 | Status: SHIPPED | OUTPATIENT
Start: 2018-07-17 | End: 2018-07-31

## 2018-07-17 NOTE — PROGRESS NOTES
[facility]YSICIANS  [facility] [address]  [address] [place] [address]  Dept: [phone]  Dept Fax: [phone]    POST-OP PROGRESS NOTE  Date of patient's visit: 7/17/2018  Patient's Name:  Eric Varghese YOB: 1955            Patient Care Team:  Vika Cunningham MD as PCP - General (Family Medicine)        Chief Complaint   Patient presents with    Post-op Problem     Right Foot    Foot Swelling     Right           Subjective: Eric Varghese is a 65 y.o. male who presents to the office today several week(s). Patient states has concerns with right foot swelling and pain   Patient relates pain is Absent  and improved. Pain is rated 0 out of 10 and is described as none. Currently denies F/C/N/V. Physical Examination:  Incision is coapted, sutures/steri-strips are intact. Minimal bleeding post operatively. Edema present. No erythema. No Pus. Operative correction is satisfactory. Wound to the right foot measures 3x2x0.1 cm in depth with HPK tissue and 100% granular tissue. Assessment: Eric Varghese is status post left foot I and D  Normal post operative course. Doing well       Diagnosis Orders   1. Diabetic foot ulcer with osteomyelitis ([address]  MN DEBRIDEMENT, SKIN, SUB-Q TISSUE,=<20 SQ CM   2. PVD (peripheral vascular disease) ([facility])  MN DEBRIDEMENT, SKIN, SUB-Q TISSUE,=<20 SQ CM   3. Charcot's joint of foot, right  MN DEBRIDEMENT, SKIN, SUB-Q TISSUE,=<20 SQ CM   4. Right foot ulcer, with fat layer exposed ([address]  MN DEBRIDEMENT, SKIN, SUB-Q TISSUE,=<20 SQ CM         No diagnosis found. Plan:  Patient examined and evaluated. Current condition and treatment options discussed in detail. Advised pt to his condtion. Compression stockings dispensed. Milly Lopeznell excisional debridement down thru and including subcutaneous tissue was done after topical EMLA cream was applied with a currett and tissue nippers. .  All necrotic tissue was removed. Hemostasis was achieved via direct pressure. Sterile dressings were placed on the patient. 0/10 post procedural pain. verbal and written instructions given to patient. Orders: No orders of the defined types were placed in this encounter. Contact office with any questions/problems/concerns. RTC in 4week(s).      Electronically signed by Chary Flores DPM on 7/17/2018 at 3:48 PM  7/17/2018

## 2018-07-26 ENCOUNTER — OFFICE VISIT (OUTPATIENT)
Dept: PODIATRY | Age: 63
End: 2018-07-26
Payer: MEDICARE

## 2018-07-26 VITALS — RESPIRATION RATE: 16 BRPM | HEIGHT: 70 IN | HEART RATE: 68 BPM | BODY MASS INDEX: 29.49 KG/M2 | WEIGHT: 206 LBS

## 2018-07-26 DIAGNOSIS — M79.672 PAIN IN BOTH FEET: ICD-10-CM

## 2018-07-26 DIAGNOSIS — L97.512 RIGHT FOOT ULCER, WITH FAT LAYER EXPOSED (HCC): Primary | ICD-10-CM

## 2018-07-26 DIAGNOSIS — I73.9 PVD (PERIPHERAL VASCULAR DISEASE) (HCC): ICD-10-CM

## 2018-07-26 DIAGNOSIS — M79.671 PAIN IN BOTH FEET: ICD-10-CM

## 2018-07-26 PROCEDURE — 11042 DBRDMT SUBQ TIS 1ST 20SQCM/<: CPT | Performed by: PODIATRIST

## 2018-07-26 PROCEDURE — G8427 DOCREV CUR MEDS BY ELIG CLIN: HCPCS | Performed by: PODIATRIST

## 2018-07-26 PROCEDURE — G8417 CALC BMI ABV UP PARAM F/U: HCPCS | Performed by: PODIATRIST

## 2018-07-26 PROCEDURE — 4004F PT TOBACCO SCREEN RCVD TLK: CPT | Performed by: PODIATRIST

## 2018-07-26 PROCEDURE — 3017F COLORECTAL CA SCREEN DOC REV: CPT | Performed by: PODIATRIST

## 2018-07-26 NOTE — PROGRESS NOTES
[facility] PHYSICIANS  [facility] [address]  Suite [facility] [place]   Dept: [phone]  Dept Fax: [phone]    RETURN PATIENT PROGRESS NOTE  Date of patient's visit: 7/26/2018  Patient's Name:  Dana Butts YOB: 1955            Patient Care Team:  Meche Castro MD as PCP - General (Family Medicine)       Dana Butts 65 y.o. male that presents for follow-up of diabetic ulcer right foot  Chief Complaint   Patient presents with    Foot Ulcer       Symptoms began months ago and are unchanged . Patient relates pain is Absent . Pain is rated 0 out of 10 and is described as moderate. Treatments prior to today's visit include: packing with fibricol and changing his dressings daily. Currently denies F/C/N/V. Allergies   Allergen Reactions    Dye [Iodides] Rash     Rash and swelling       Past Medical History:   Diagnosis Date    Charcot foot due to diabetes mellitus ([facility] 2014    Right foot     Diabetic polyneuropathy associated with type 2 diabetes mellitus ([facility]     Fractures, multiple 07/21/2014    Right foot fractures     Hyperlipidemia     Hypertension     Neuropathy ([facility]     Pneumonia 2009    Tobacco abuse 4/24/2018    Type II or unspecified type diabetes mellitus without mention of complication, not stated as uncontrolled     Vertigo     Wound, open     Left Ball of  foot, pt. stepped on sharp object. Covered by dressing.  Wound, open     Right posterior -Diabetic Ulcer       Prior to Admission medications    Medication Sig Start Date End Date Taking?  Authorizing Provider   doxycycline hyclate (VIBRA-TABS) 100 MG tablet Take 1 tablet by mouth 2 times daily for 14 days 7/17/18 7/31/18  Veronique Dimas DPM   simvastatin (ZOCOR) 40 MG tablet Take 40 mg by mouth nightly    Historical Provider, MD   aspirin 81 MG tablet Take 81 mg by mouth daily    Historical Provider, MD   metFORMIN (GLUCOPHAGE) 500 MG tablet Take 500 mg by mouth 2 times daily (with meals)  4/4/18   Historical Provider, MD   lisinopril (PRINIVIL;ZESTRIL) 5 MG tablet Take 5 mg by mouth daily 1/5/18   Historical Provider, MD   gabapentin (NEURONTIN) 300 MG capsule Take 900 mg by mouth 3 times daily. Take 3 pills 3 times a day . Historical Provider, MD   sildenafil (VIAGRA) 100 MG tablet Take 1 tablet by mouth as needed for Erectile Dysfunction 2/3/17   Caty Freeman MD       Review of Systems  Review of Systems - History obtained from chart review and the patient  General ROS: negative for - chills, fatigue, fever, night sweats or weight gain  Constitutional: Negative for chills, diaphoresis, fatigue, fever and unexpected weight change. Musculoskeletal: Positive for arthralgias, gait problem and joint swelling. Neurological ROS: negative for - behavioral changes, confusion, headaches or seizures. Negative for weakness and numbness. Dermatological ROS: negative for - mole changes, rash  Cardiovascular: Negative for leg swelling. Gastrointestinal: Negative for constipation, diarrhea, nausea and vomiting. Lower Extremity Physical Examination:     Vitals:   Vitals:    07/26/18 1520   Pulse: 68   Resp: 16     General: AAO x 3 in NAD. NV status remains unchanged since last visit. Wound #:   1    diabetic foot ulcer   right right    Wound measurements:  #1  1.2 cm by 1.4 cm  by   2 cm         Wound Depth: subcutaneous. Drainage:    serosanguinous Type: serosanguinous    Wound Bed status:   Granulation Tissue: 100%   Necrotic 0%  Type:   Epithelialization 0%   Hypergranular 0%   Periwound hyperkeratosis:  present  Erythema absent  Odor:no  Maceration:no  Undermining/tract/tunneling:no  Culture taken:   no           Asessment: Patient is a 65 y.o. male with:    Diagnosis Orders   1. Right foot ulcer, with fat layer exposed ([address]  [place] DEBRIDEMENT, SKIN, SUB-Q TISSUE,=<20 SQ CM   2.  PVD (peripheral vascular disease) ([facility])  [place] DEBRIDEMENT, SKIN, SUB-Q TISSUE,=<20 SQ CM   3. Pain in both feet  [place] DEBRIDEMENT, SKIN, SUB-Q TISSUE,=<20 SQ CM       Plan: Patient examined and evaluated. Current condition and treatment options discussed in detail. Advised pt to his condtion. Sharp excisional debridement down thru and including subcutaneous tissue was done after topical EMLA cream was applied with a currett and tissue nippers. .  All necrotic tissue was removed. Hemostasis was achieved via direct pressure. Sterile dressings were placed on the patient. 0/10 post procedural pain. .  Verbal and written instructions given to patient. Contact office with any questions/problems/concerns. No orders of the defined types were placed in this encounter. No orders of the defined types were placed in this encounter. RTC in 2week(s)  Continue to use the fibricol .     7/26/2018      Electronically signed by Carolina Valladares DPM on 7/26/2018 at 3:36 PM  7/26/2018

## 2018-08-04 ENCOUNTER — HOSPITAL ENCOUNTER (INPATIENT)
Age: 63
LOS: 6 days | Discharge: HOME | DRG: 580 | End: 2018-08-10
Attending: FAMILY MEDICINE | Admitting: INTERNAL MEDICINE
Payer: MEDICARE

## 2018-08-04 ENCOUNTER — APPOINTMENT (OUTPATIENT)
Dept: GENERAL RADIOLOGY | Age: 63
DRG: 580 | End: 2018-08-04
Payer: MEDICARE

## 2018-08-04 DIAGNOSIS — L02.611 ABSCESS OF RIGHT FOOT: ICD-10-CM

## 2018-08-04 DIAGNOSIS — L08.9 RIGHT FOOT INFECTION: Primary | ICD-10-CM

## 2018-08-04 PROBLEM — Z72.0 TOBACCO ABUSE: Status: ACTIVE | Noted: 2018-08-04

## 2018-08-04 PROBLEM — R07.9 CHEST PAIN AT REST: Status: ACTIVE | Noted: 2018-08-04

## 2018-08-04 PROBLEM — L03.116 CELLULITIS OF LEFT LEG: Status: ACTIVE | Noted: 2018-08-04

## 2018-08-04 PROBLEM — E11.49 WELL CONTROLLED TYPE 2 DIABETES MELLITUS WITH NEUROLOGICAL MANIFESTATIONS (HCC): Status: ACTIVE | Noted: 2018-08-04

## 2018-08-04 LAB
% CKMB: 1.5 % (ref 0–3.5)
ABSOLUTE EOS #: 0.1 K/UL (ref 0–0.4)
ABSOLUTE IMMATURE GRANULOCYTE: ABNORMAL K/UL (ref 0–0.3)
ABSOLUTE LYMPH #: 1.9 K/UL (ref 1–4.8)
ABSOLUTE MONO #: 1 K/UL (ref 0.2–0.8)
ANION GAP SERPL CALCULATED.3IONS-SCNC: 12 MMOL/L (ref 9–17)
BASOPHILS # BLD: 0 % (ref 0–2)
BASOPHILS ABSOLUTE: 0 K/UL (ref 0–0.2)
BUN BLDV-MCNC: 17 MG/DL (ref 8–23)
BUN/CREAT BLD: 15 (ref 9–20)
C-REACTIVE PROTEIN: 84.4 MG/L (ref 0–5)
CALCIUM SERPL-MCNC: 9.4 MG/DL (ref 8.6–10.4)
CHLORIDE BLD-SCNC: 98 MMOL/L (ref 98–107)
CK MB: <1 NG/ML
CKMB INTERPRETATION: NORMAL
CO2: 21 MMOL/L (ref 20–31)
CREAT SERPL-MCNC: 1.17 MG/DL (ref 0.7–1.2)
DIFFERENTIAL TYPE: ABNORMAL
EKG ATRIAL RATE: 98 BPM
EKG P AXIS: 31 DEGREES
EKG P-R INTERVAL: 186 MS
EKG Q-T INTERVAL: 316 MS
EKG QRS DURATION: 80 MS
EKG QTC CALCULATION (BAZETT): 403 MS
EKG R AXIS: 36 DEGREES
EKG T AXIS: 55 DEGREES
EKG VENTRICULAR RATE: 98 BPM
EOSINOPHILS RELATIVE PERCENT: 1 % (ref 1–4)
GFR AFRICAN AMERICAN: >60 ML/MIN
GFR NON-AFRICAN AMERICAN: >60 ML/MIN
GFR SERPL CREATININE-BSD FRML MDRD: ABNORMAL ML/MIN/{1.73_M2}
GFR SERPL CREATININE-BSD FRML MDRD: ABNORMAL ML/MIN/{1.73_M2}
GLUCOSE BLD-MCNC: 160 MG/DL (ref 75–110)
GLUCOSE BLD-MCNC: 167 MG/DL (ref 70–99)
HCT VFR BLD CALC: 37.3 % (ref 41–53)
HEMOGLOBIN: 12.7 G/DL (ref 13.5–17.5)
IMMATURE GRANULOCYTES: ABNORMAL %
LACTIC ACID: 1.1 MMOL/L (ref 0.5–2.2)
LYMPHOCYTES # BLD: 12 % (ref 24–44)
MCH RBC QN AUTO: 31.1 PG (ref 26–34)
MCHC RBC AUTO-ENTMCNC: 34.1 G/DL (ref 31–37)
MCV RBC AUTO: 91.3 FL (ref 80–100)
MONOCYTES # BLD: 7 % (ref 1–7)
MYOGLOBIN: 29 NG/ML (ref 28–72)
MYOGLOBIN: 37 NG/ML (ref 28–72)
NRBC AUTOMATED: ABNORMAL PER 100 WBC
PDW BLD-RTO: 14.2 % (ref 11.5–14.5)
PLATELET # BLD: 245 K/UL (ref 130–400)
PLATELET ESTIMATE: ABNORMAL
PMV BLD AUTO: 7.5 FL (ref 6–12)
POTASSIUM SERPL-SCNC: 4.5 MMOL/L (ref 3.7–5.3)
RBC # BLD: 4.09 M/UL (ref 4.5–5.9)
RBC # BLD: ABNORMAL 10*6/UL
SEDIMENTATION RATE, ERYTHROCYTE: 45 MM (ref 0–15)
SEG NEUTROPHILS: 80 % (ref 36–66)
SEGMENTED NEUTROPHILS ABSOLUTE COUNT: 12.8 K/UL (ref 1.8–7.7)
SODIUM BLD-SCNC: 131 MMOL/L (ref 135–144)
TOTAL CK: 67 U/L (ref 39–308)
TROPONIN INTERP: NORMAL
TROPONIN INTERP: NORMAL
TROPONIN T: <0.03 NG/ML
TROPONIN T: <0.03 NG/ML
WBC # BLD: 15.9 K/UL (ref 3.5–11)
WBC # BLD: ABNORMAL 10*3/UL

## 2018-08-04 PROCEDURE — 87181 SC STD AGAR DILUTION PER AGT: CPT

## 2018-08-04 PROCEDURE — 36415 COLL VENOUS BLD VENIPUNCTURE: CPT

## 2018-08-04 PROCEDURE — 85651 RBC SED RATE NONAUTOMATED: CPT

## 2018-08-04 PROCEDURE — 82947 ASSAY GLUCOSE BLOOD QUANT: CPT

## 2018-08-04 PROCEDURE — 6360000002 HC RX W HCPCS: Performed by: NURSE PRACTITIONER

## 2018-08-04 PROCEDURE — 6370000000 HC RX 637 (ALT 250 FOR IP): Performed by: INTERNAL MEDICINE

## 2018-08-04 PROCEDURE — 82550 ASSAY OF CK (CPK): CPT

## 2018-08-04 PROCEDURE — 99223 1ST HOSP IP/OBS HIGH 75: CPT | Performed by: PODIATRIST

## 2018-08-04 PROCEDURE — 80048 BASIC METABOLIC PNL TOTAL CA: CPT

## 2018-08-04 PROCEDURE — 1200000000 HC SEMI PRIVATE

## 2018-08-04 PROCEDURE — 82553 CREATINE MB FRACTION: CPT

## 2018-08-04 PROCEDURE — 28002 TREATMENT OF FOOT INFECTION: CPT | Performed by: PODIATRIST

## 2018-08-04 PROCEDURE — 87186 SC STD MICRODIL/AGAR DIL: CPT

## 2018-08-04 PROCEDURE — 2500000003 HC RX 250 WO HCPCS: Performed by: STUDENT IN AN ORGANIZED HEALTH CARE EDUCATION/TRAINING PROGRAM

## 2018-08-04 PROCEDURE — 0J9Q0ZZ DRAINAGE OF RIGHT FOOT SUBCUTANEOUS TISSUE AND FASCIA, OPEN APPROACH: ICD-10-PCS | Performed by: PODIATRIST

## 2018-08-04 PROCEDURE — 83874 ASSAY OF MYOGLOBIN: CPT

## 2018-08-04 PROCEDURE — 87205 SMEAR GRAM STAIN: CPT

## 2018-08-04 PROCEDURE — 6370000000 HC RX 637 (ALT 250 FOR IP): Performed by: NURSE PRACTITIONER

## 2018-08-04 PROCEDURE — 99223 1ST HOSP IP/OBS HIGH 75: CPT | Performed by: INTERNAL MEDICINE

## 2018-08-04 PROCEDURE — 93005 ELECTROCARDIOGRAM TRACING: CPT

## 2018-08-04 PROCEDURE — 87040 BLOOD CULTURE FOR BACTERIA: CPT

## 2018-08-04 PROCEDURE — 2500000003 HC RX 250 WO HCPCS: Performed by: INTERNAL MEDICINE

## 2018-08-04 PROCEDURE — 99285 EMERGENCY DEPT VISIT HI MDM: CPT

## 2018-08-04 PROCEDURE — 86140 C-REACTIVE PROTEIN: CPT

## 2018-08-04 PROCEDURE — 96375 TX/PRO/DX INJ NEW DRUG ADDON: CPT

## 2018-08-04 PROCEDURE — 85025 COMPLETE CBC W/AUTO DIFF WBC: CPT

## 2018-08-04 PROCEDURE — 86403 PARTICLE AGGLUT ANTBDY SCRN: CPT

## 2018-08-04 PROCEDURE — 84484 ASSAY OF TROPONIN QUANT: CPT

## 2018-08-04 PROCEDURE — 96365 THER/PROPH/DIAG IV INF INIT: CPT

## 2018-08-04 PROCEDURE — 71045 X-RAY EXAM CHEST 1 VIEW: CPT

## 2018-08-04 PROCEDURE — 87075 CULTR BACTERIA EXCEPT BLOOD: CPT

## 2018-08-04 PROCEDURE — 87070 CULTURE OTHR SPECIMN AEROBIC: CPT

## 2018-08-04 PROCEDURE — 2580000003 HC RX 258: Performed by: FAMILY MEDICINE

## 2018-08-04 PROCEDURE — 6360000002 HC RX W HCPCS: Performed by: FAMILY MEDICINE

## 2018-08-04 PROCEDURE — 81003 URINALYSIS AUTO W/O SCOPE: CPT

## 2018-08-04 PROCEDURE — 83605 ASSAY OF LACTIC ACID: CPT

## 2018-08-04 PROCEDURE — 73630 X-RAY EXAM OF FOOT: CPT

## 2018-08-04 RX ORDER — GABAPENTIN 300 MG/1
900 CAPSULE ORAL 3 TIMES DAILY
Status: DISCONTINUED | OUTPATIENT
Start: 2018-08-04 | End: 2018-08-10 | Stop reason: HOSPADM

## 2018-08-04 RX ORDER — 0.9 % SODIUM CHLORIDE 0.9 %
250 INTRAVENOUS SOLUTION INTRAVENOUS ONCE
Status: DISCONTINUED | OUTPATIENT
Start: 2018-08-04 | End: 2018-08-06

## 2018-08-04 RX ORDER — GLIPIZIDE 10 MG/1
40 TABLET ORAL ONCE
Status: COMPLETED | OUTPATIENT
Start: 2018-08-04 | End: 2018-08-04

## 2018-08-04 RX ORDER — SODIUM CHLORIDE 9 MG/ML
INJECTION, SOLUTION INTRAVENOUS CONTINUOUS
Status: DISCONTINUED | OUTPATIENT
Start: 2018-08-04 | End: 2018-08-04

## 2018-08-04 RX ORDER — MORPHINE SULFATE 2 MG/ML
2 INJECTION, SOLUTION INTRAMUSCULAR; INTRAVENOUS ONCE
Status: COMPLETED | OUTPATIENT
Start: 2018-08-04 | End: 2018-08-04

## 2018-08-04 RX ORDER — ONDANSETRON 2 MG/ML
4 INJECTION INTRAMUSCULAR; INTRAVENOUS EVERY 6 HOURS PRN
Status: DISCONTINUED | OUTPATIENT
Start: 2018-08-04 | End: 2018-08-10 | Stop reason: HOSPADM

## 2018-08-04 RX ORDER — SODIUM CHLORIDE 0.9 % (FLUSH) 0.9 %
10 SYRINGE (ML) INJECTION PRN
Status: DISCONTINUED | OUTPATIENT
Start: 2018-08-04 | End: 2018-08-06 | Stop reason: SDUPTHER

## 2018-08-04 RX ORDER — SIMVASTATIN 40 MG
40 TABLET ORAL NIGHTLY
Status: DISCONTINUED | OUTPATIENT
Start: 2018-08-05 | End: 2018-08-04

## 2018-08-04 RX ORDER — GLIPIZIDE 10 MG/1
20 TABLET ORAL
COMMUNITY

## 2018-08-04 RX ORDER — METOPROLOL TARTRATE 5 MG/5ML
2.5 INJECTION INTRAVENOUS PRN
Status: ACTIVE | OUTPATIENT
Start: 2018-08-04 | End: 2018-08-05

## 2018-08-04 RX ORDER — MORPHINE SULFATE 2 MG/ML
2 INJECTION, SOLUTION INTRAMUSCULAR; INTRAVENOUS
Status: DISCONTINUED | OUTPATIENT
Start: 2018-08-04 | End: 2018-08-05

## 2018-08-04 RX ORDER — SODIUM CHLORIDE 0.9 % (FLUSH) 0.9 %
10 SYRINGE (ML) INJECTION EVERY 12 HOURS SCHEDULED
Status: DISCONTINUED | OUTPATIENT
Start: 2018-08-04 | End: 2018-08-10 | Stop reason: HOSPADM

## 2018-08-04 RX ORDER — MORPHINE SULFATE 4 MG/ML
4 INJECTION, SOLUTION INTRAMUSCULAR; INTRAVENOUS
Status: DISCONTINUED | OUTPATIENT
Start: 2018-08-04 | End: 2018-08-05

## 2018-08-04 RX ORDER — ASPIRIN 81 MG/1
81 TABLET, CHEWABLE ORAL DAILY
Status: DISCONTINUED | OUTPATIENT
Start: 2018-08-05 | End: 2018-08-10 | Stop reason: HOSPADM

## 2018-08-04 RX ORDER — AMINOPHYLLINE DIHYDRATE 25 MG/ML
100 INJECTION, SOLUTION INTRAVENOUS
Status: ACTIVE | OUTPATIENT
Start: 2018-08-04 | End: 2018-08-04

## 2018-08-04 RX ORDER — ONDANSETRON 2 MG/ML
4 INJECTION INTRAMUSCULAR; INTRAVENOUS EVERY 6 HOURS PRN
Status: DISCONTINUED | OUTPATIENT
Start: 2018-08-04 | End: 2018-08-04 | Stop reason: SDUPTHER

## 2018-08-04 RX ORDER — LIDOCAINE HYDROCHLORIDE 10 MG/ML
5 INJECTION, SOLUTION EPIDURAL; INFILTRATION; INTRACAUDAL; PERINEURAL ONCE
Status: COMPLETED | OUTPATIENT
Start: 2018-08-04 | End: 2018-08-04

## 2018-08-04 RX ORDER — LISINOPRIL 5 MG/1
5 TABLET ORAL DAILY
Status: DISCONTINUED | OUTPATIENT
Start: 2018-08-05 | End: 2018-08-05

## 2018-08-04 RX ORDER — NITROGLYCERIN 0.4 MG/1
0.4 TABLET SUBLINGUAL EVERY 5 MIN PRN
Status: ACTIVE | OUTPATIENT
Start: 2018-08-04 | End: 2018-08-05

## 2018-08-04 RX ORDER — NAPROXEN 500 MG/1
500 TABLET ORAL 2 TIMES DAILY
Status: ON HOLD | COMMUNITY
End: 2018-08-10 | Stop reason: HOSPADM

## 2018-08-04 RX ORDER — ONDANSETRON 4 MG/1
4 TABLET, ORALLY DISINTEGRATING ORAL EVERY 6 HOURS PRN
Status: DISCONTINUED | OUTPATIENT
Start: 2018-08-04 | End: 2018-08-10 | Stop reason: HOSPADM

## 2018-08-04 RX ORDER — SODIUM CHLORIDE 0.9 % (FLUSH) 0.9 %
10 SYRINGE (ML) INJECTION PRN
Status: ACTIVE | OUTPATIENT
Start: 2018-08-04 | End: 2018-08-05

## 2018-08-04 RX ORDER — ASPIRIN 81 MG/1
324 TABLET, CHEWABLE ORAL ONCE
Status: COMPLETED | OUTPATIENT
Start: 2018-08-04 | End: 2018-08-04

## 2018-08-04 RX ADMIN — ASPIRIN 81 MG 324 MG: 81 TABLET ORAL at 15:40

## 2018-08-04 RX ADMIN — MORPHINE SULFATE 2 MG: 2 INJECTION, SOLUTION INTRAMUSCULAR; INTRAVENOUS at 15:40

## 2018-08-04 RX ADMIN — GABAPENTIN 900 MG: 300 CAPSULE ORAL at 22:40

## 2018-08-04 RX ADMIN — METFORMIN HYDROCHLORIDE 500 MG: 500 TABLET ORAL at 22:29

## 2018-08-04 RX ADMIN — CEFTRIAXONE SODIUM 1 G: 1 INJECTION, POWDER, FOR SOLUTION INTRAMUSCULAR; INTRAVENOUS at 16:22

## 2018-08-04 RX ADMIN — VANCOMYCIN HYDROCHLORIDE 1500 MG: 1 INJECTION, POWDER, LYOPHILIZED, FOR SOLUTION INTRAVENOUS at 17:22

## 2018-08-04 RX ADMIN — MORPHINE SULFATE 4 MG: 4 INJECTION, SOLUTION INTRAMUSCULAR; INTRAVENOUS at 22:23

## 2018-08-04 RX ADMIN — TAZOBACTAM SODIUM AND PIPERACILLIN SODIUM 3.38 G: 375; 3 INJECTION, SOLUTION INTRAVENOUS at 22:25

## 2018-08-04 RX ADMIN — GLIPIZIDE 40 MG: 10 TABLET ORAL at 22:29

## 2018-08-04 RX ADMIN — SODIUM CHLORIDE: 9 INJECTION, SOLUTION INTRAVENOUS at 16:22

## 2018-08-04 RX ADMIN — LIDOCAINE HYDROCHLORIDE 5 ML: 10 INJECTION, SOLUTION EPIDURAL; INFILTRATION; INTRACAUDAL; PERINEURAL at 20:32

## 2018-08-04 RX ADMIN — MORPHINE SULFATE 2 MG: 2 INJECTION, SOLUTION INTRAMUSCULAR; INTRAVENOUS at 19:04

## 2018-08-04 ASSESSMENT — PAIN SCALES - GENERAL
PAINLEVEL_OUTOF10: 4
PAINLEVEL_OUTOF10: 6
PAINLEVEL_OUTOF10: 1
PAINLEVEL_OUTOF10: 7
PAINLEVEL_OUTOF10: 6
PAINLEVEL_OUTOF10: 2
PAINLEVEL_OUTOF10: 2
PAINLEVEL_OUTOF10: 3

## 2018-08-04 ASSESSMENT — PAIN DESCRIPTION - DESCRIPTORS
DESCRIPTORS: PRESSURE;SHARP
DESCRIPTORS: PRESSURE;SHARP
DESCRIPTORS: PRESSURE

## 2018-08-04 ASSESSMENT — PAIN DESCRIPTION - PROGRESSION
CLINICAL_PROGRESSION: GRADUALLY IMPROVING
CLINICAL_PROGRESSION: GRADUALLY WORSENING

## 2018-08-04 ASSESSMENT — PAIN DESCRIPTION - PAIN TYPE
TYPE: ACUTE PAIN
TYPE: ACUTE PAIN

## 2018-08-04 ASSESSMENT — ENCOUNTER SYMPTOMS
ABDOMINAL PAIN: 0
CHEST TIGHTNESS: 0
DIARRHEA: 0
SHORTNESS OF BREATH: 0
VOMITING: 0
CONSTIPATION: 0
COUGH: 0
NAUSEA: 0
COLOR CHANGE: 1

## 2018-08-04 ASSESSMENT — PAIN DESCRIPTION - ORIENTATION
ORIENTATION: RIGHT;LEFT
ORIENTATION: LEFT;LOWER;MID
ORIENTATION: RIGHT

## 2018-08-04 ASSESSMENT — PAIN DESCRIPTION - LOCATION
LOCATION: CHEST;FOOT
LOCATION: CHEST;FOOT

## 2018-08-04 ASSESSMENT — PAIN DESCRIPTION - ONSET
ONSET: ON-GOING
ONSET: ON-GOING

## 2018-08-04 ASSESSMENT — PAIN DESCRIPTION - FREQUENCY
FREQUENCY: INTERMITTENT

## 2018-08-04 NOTE — PROGRESS NOTES
Pharmacy Accuracy Service Medication History Note    The patient's list of current home medications has been reviewed. Source(s) of information: Patient/Surescripts    Based on information provided by the above source(s), I have updated the patient's home med list as described below. Please review the ACTION REQUESTED BY PHYSICIAN section of this note below for any discrepancies on current hospital orders. I changed or updated the following medications on the patient's home medication list:  Discontinued · None     Added · Naproxen 500mg- 500mg BID     Adjusted   · None   Other Notes · None         PHYSICIAN ACTION REQUESTED  Discrepancies on current hospital orders that need to be addressed by a physician:    Medication Action Requested     Naproxen     Restart if appropriate         Please feel free to call me with any questions about this encounter. Thank you.     Joy Bales PharmD  Pharmacy Medication Accuracy Review Service  Phone:  [phone]  Fax: [phone]      Electronically signed by CARLOS Herrera [facility] on 8/4/2018 at 6:05 PM

## 2018-08-04 NOTE — H&P
[facility]    HISTORY AND PHYSICAL EXAMINATION            Date:   8/4/2018  Patient name:  Molly Carlson  Date of admission:  8/4/2018  3:10 PM  MRN:   [id number]  Account:  [de-identified]  YOB: 1955  PCP:    Jaz Aparicio MD  Room:   [place] [address]  Code Status:    Prior    Chief Complaint:     Chief Complaint   Patient presents with    Chest Pain     x 1 day; intermittent;     Foot Pain     right     History Obtained From:     Patient, medical record    History of Present Illness:     Mr Markel Tellez is a nice 59year old gentleman with history of diabetes, hypertension, extensive tobacco abuse, neuropathy with ulcers in the past. Reasonably active at home. Admitted with multiple symptoms. Main symptom of right leg swelling, pain, noted yesterday morning, evolved into bleb with pain. Also noted significantly worsened swelling. In the ER noted to have leukocytosis of 15. Also complained of chest pain, intermittent, central chest, for last two days that lasts for few minutes. No associated SOB, coughing, palpitations, fevers. Past Medical History:     Past Medical History:   Diagnosis Date    Charcot foot due to diabetes mellitus ([address] 2014    Right foot     Diabetic polyneuropathy associated with type 2 diabetes mellitus ([address]     Fractures, multiple 07/21/2014    Right foot fractures     Hyperlipidemia     Hypertension     Neuropathy     Pneumonia 2009    Tobacco abuse 4/24/2018    Type II or unspecified type diabetes mellitus without mention of complication, not stated as uncontrolled     Vertigo     Wound, open     Left Ball of  foot, pt. stepped on sharp object. Covered by dressing.     Wound, open     Right posterior -Diabetic Ulcer      Past Surgical History:     Past Surgical History:   Procedure Laterality Date    APPENDECTOMY      at age 24   [facility] FOOT DEBRIDEMENT Left 04/24/2018    I&D foreign body removal    KNEE ARTHROSCOPY Right 1989    KNEE ARTHROSCOPY Left 1990    KNEE SURGERY Bilateral 1983    arthroscopy    NECK SURGERY  1987    AL DEEP [address] FOOT INFEC,1 BURSA Left 4/24/2018    LEFT FOOT MULTIPLE  INCISIONS  AND DRAINAGE AND REMOVAL FOREIGN BODY  IRENE performed by Williams Thurman DPM at [facility]      Medications Prior to Admission:     Prior to Admission medications    Medication Sig Start Date End Date Taking? Authorizing Provider   simvastatin (ZOCOR) 40 MG tablet Take 40 mg by mouth nightly   Yes Historical Provider, MD   aspirin 81 MG tablet Take 81 mg by mouth daily   Yes Historical Provider, MD   metFORMIN (GLUCOPHAGE) 500 MG tablet Take 500 mg by mouth 2 times daily (with meals)  4/4/18  Yes Historical Provider, MD   lisinopril (PRINIVIL;ZESTRIL) 5 MG tablet Take 5 mg by mouth daily 1/5/18  Yes Historical Provider, MD   gabapentin (NEURONTIN) 300 MG capsule Take 900 mg by mouth 3 times daily. Take 3 pills 3 times a day . Yes Historical Provider, MD   sildenafil (VIAGRA) 100 MG tablet Take 1 tablet by mouth as needed for Erectile Dysfunction 2/3/17   Clementina Butts MD      Allergies:     Dye [iodides]    Social History:     Tobacco:    reports that he has been smoking. He has been smoking about 0.25 packs per day. He has never used smokeless tobacco.  Alcohol:      reports that he does not drink alcohol. Drug Use:  reports that he uses drugs about 1 time per week. Family History:     Family History   Problem Relation Age of Onset    Diabetes Mother     Cancer Father     Hypertension Maternal Grandmother      Review of Systems:     Positive and Negative as described in HPI. CONSTITUTIONAL:  negative for fevers, chills, sweats, has fatigue  HEENT:  negative for vision, hearing changes, runny nose, throat pain  RESPIRATORY:  negative for shortness of breath, cough, congestion, wheezing.   CARDIOVASCULAR:  Has chest pain  GASTROINTESTINAL:  negative for nausea, vomiting, diarrhea, constipation, change in bowel habits, abdominal pain   GENITOURINARY:  negative for difficulty of urination, burning with urination, frequency   INTEGUMENT: has rash in the foot  HEMATOLOGIC/LYMPHATIC:  negative for swelling/edema   ALLERGIC/IMMUNOLOGIC:  negative for urticaria , itching  ENDOCRINE:  negative increase in drinking, increase in urination, hot or cold intolerance  MUSCULOSKELETAL:  negative joint pains, muscle aches, swelling of joints  NEUROLOGICAL:  negative for headaches, dizziness, lightheadedness, numbness, pain, tingling extremities  BEHAVIOR/PSYCH:  negative for depression, anxiety    Physical Exam:   /62   Pulse 80   Temp 98.6 °F (37 °C) (Oral)   Resp 15   Ht 6' (1.829 m)   Wt 230 lb (104.3 kg)   SpO2 98%   BMI 31.19 kg/m²   Temp (24hrs), Av.6 °F (37 °C), Min:98.6 °F (37 °C), Max:98.6 °F (37 °C)    No results for input(s): POCGLU in the last 72 hours. No intake or output data in the 24 hours ending 18 [id number]    General Appearance:  alert, well appearing, and in no acute distress  Mental status: oriented to person, place, and time with normal affect  Head:  normocephalic, atraumatic. Eye: no icterus, redness, pupils equal and reactive, extraocular eye movements intact, conjunctiva clear  Ear: normal external ear, no discharge, hearing intact  Nose:  no drainage noted  Mouth: mucous membranes moist  Neck: supple, no carotid bruits, thyroid not palpable  Lungs: Bilateral equal air entry, clear to ausculation, no wheezing, rales or rhonchi, normal effort  Cardiovascular: normal rate, regular rhythm, no murmur, gallop, rub.   Abdomen: Soft, nontender, nondistended, normal bowel sounds, no hepatomegaly or splenomegaly  Neurologic: There are no new focal motor or sensory deficits, normal muscle tone and bulk, no abnormal sensation, normal speech, cranial nerves II through XII grossly intact  Skin: No gross lesions except as described  Extremities:  peripheral pulses palpable, right foot has

## 2018-08-04 NOTE — ED PROVIDER NOTES
Provider, MD   aspirin 81 MG tablet Take 81 mg by mouth daily   Yes Historical Provider, MD   metFORMIN (GLUCOPHAGE) 500 MG tablet Take 500 mg by mouth 2 times daily (with meals)  4/4/18  Yes Historical Provider, MD   lisinopril (PRINIVIL;ZESTRIL) 5 MG tablet Take 5 mg by mouth daily 1/5/18  Yes Historical Provider, MD   gabapentin (NEURONTIN) 300 MG capsule Take 900 mg by mouth 3 times daily. Take 3 pills 3 times a day . Yes Historical Provider, MD   sildenafil (VIAGRA) 100 MG tablet Take 1 tablet by mouth as needed for Erectile Dysfunction 2/3/17   Bettejane Gosselin, MD     Current vitals: /66   Pulse 100   Temp 98.6 °F (37 °C) (Oral)   Resp 16   Ht 6' (1.829 m)   Wt 230 lb (104.3 kg)   SpO2 97%   BMI 31.19 kg/m²   Exam Brief:  GENERAL: A&Ox3, in NAD. HEENT: NC AT, PERRL, EOMI, TM's without fluid behind the membranes bilaterally, nasal mucosa negative. Posterior kenya-pharynx without erythema and no exudate. NECK:  Soft & supple, non-tender to the touch, neg lymphadenopathy  LUNGS: CTAB  HEART:  RRR without murmur  ABDOMEN: Bowel sounds present, soft, non-tender. EXTREMITIES: No c/c/e. Erythema on the dorsum of the foot on the right with a diabetic callus on the arch just behind the ball of the foot and a small blister on the ball of the foot. Neuro: MSE: A & O x 3   CN II - XII: Grossly intact   Motor: DTR 3+/4 bilaterally UE and LE   Sensory:     Cortical: Graphasthesia and tactile ID intact    General: Light touch intact. No other deficiets noted. Babinski: Down going bilaterally. Labs Reviewed   CBC WITH AUTO DIFFERENTIAL   BASIC METABOLIC PANEL   TROP/MYOGLOBIN   TROP/MYOGLOBIN   TROP/MYOGLOBIN   CK ISOENZYMES   SEDIMENTATION RATE   C-REACTIVE PROTEIN       No results found.     Current orders:  Orders Placed This Encounter   Procedures    XR FOOT RIGHT (MIN 3 VIEWS)     Standing Status:   Standing     Number of Occurrences:   1     Order Specific Question:   Reason for exam: Answer:   diabetic ulcer, redness, warmth,    XR CHEST PORTABLE     Standing Status:   Standing     Number of Occurrences:   1     Order Specific Question:   Reason for exam:     Answer:   Chest Pain    CBC Auto Differential     Standing Status:   Standing     Number of Occurrences:   1    Basic Metabolic Panel     Standing Status:   Standing     Number of Occurrences:   1    TROP/MYOGLOBIN     Standing Status:   Standing     Number of Occurrences:   3    Creat Kinase-MB Iso     Standing Status:   Standing     Number of Occurrences:   1    Sedimentation Rate     Standing Status:   Standing     Number of Occurrences:   1    C-Reactive Protein     Standing Status:   Standing     Number of Occurrences:   1    Telemetry monitoring     Standing Status:   Standing     Number of Occurrences:   1     Order Specific Question:   Patient may go off unit without monitor     Answer:   No    Initiate Oxygen Therapy Protocol     - If patient has any of the following conditions, initiate oxygen therapy: SpO2 less than 92%, Cyanosis, Chest Pain, Dyspnea, Home oxygen, or Altered level of consciousness    - If oxygen therapy initiated, enter the RT51 Nasal cannula oxygen order using Per Protocol order mode using the defaulted order parameters and titrate as specified in that order    - If oxygen therapy initiated, notify provider         Standing Status:   Standing     Number of Occurrences:   4    EKG 12 Lead     Standing Status:   Standing     Number of Occurrences:   1     Order Specific Question:   Reason for Exam?     Answer:   Chest pain    Insert peripheral IV     Standing Status:   Standing     Number of Occurrences:   1       RN Notes:   Pt presents to ED with c/o chest pain x 1 day; pt states pain started yesterday; intermittent, for short intervals; less than a minute, pressure pain, 2/10 at this time; SOB and diaphoresis;  nausea started this morning; pt states right hand and finger numbness started 1 week ago those procedures where I was not present during the key portions. I have reviewed the emergency nurses triage note. I agree with the chief complaint, past medical history, past surgical history, allergies, medications, social and family history as documented unless otherwise noted below. Documentation of the HPI, Physical Exam and Medical Decision Making performed by medical students or scribes is based on my personal performance of the HPI, PE and MDM. For Phys Assistant/ Nurse Practitioner cases/documentation I have had a face to face evaluation this patient and have completed at least one if not all key elements of the E/M (history, physical exam, and MDM). Additional findings are as noted.         (Please note that portions of this note were completed with a voice recognition program.  Efforts were made to edit the dictations but occasionally words are mis-transcribed.)    @Chalo Burgos DO,   3:33 PM  8/4/18    Attending Emergency  Medicine Physician      Audelia Hendrickson,   08/04/18 [address] Burgos,   08/04/18 0831

## 2018-08-04 NOTE — ED PROVIDER NOTES
[facility] ED  eMERGENCY dEPARTMENT eNCOUnter      Pt Name: Jaun Boas  MRN: [id number]  [place] 1955  Date of evaluation: 8/4/2018  Provider: Wan Elizondo       Chief Complaint   Patient presents with    Chest Pain     x 1 day; intermittent;     Foot Pain     right         HISTORY OF PRESENT ILLNESS  (Location/Symptom, Timing/Onset, Context/Setting, Quality, Duration, Modifying Factors, Severity.)   Jaun Boas is a 59 y.o. male who presents to the emergency department Private auto with complaint of chest pain and foot wound. Midsternal and left mid chest pain started yesterday without exertional activity. Pain is rated a 2 and described as pressure. Pain has been intermittent without aggravating factors. Pain last 1-2 minutes before resolving on its own. He does have associated diaphoresis and shortness of breath without dizziness. He has also noticed numbness and tingling to the right hand intermittently that has been going on for several days. His numbness and tingling through fourth fingers and occasionally into the thumb. He complains of generalized weakness but denies focal weakness, headaches, blurred vision or slurred speech. He is diabetic and history very of hypertension and hyperlipidemia but denies history of CAD. He is a smoker. He is also concern for possible foreign body in the foot. He is under the care of podiatrist for diabetic ulcer. 2 days ago he noticed a bump near the ulcer is concern for her body. He does not remember stepping on anything. He denies bleeding. Blood sugars have been running less than 200. Nursing Notes were reviewed. ALLERGIES     Dye [iodides]    CURRENT MEDICATIONS       Previous Medications    ASPIRIN 81 MG TABLET    Take 81 mg by mouth daily    GABAPENTIN (NEURONTIN) 300 MG CAPSULE    Take 900 mg by mouth 3 times daily. Take 3 pills 3 times a day .     LISINOPRIL (PRINIVIL;ZESTRIL) 5 MG TABLET    Take 5 Systems   Constitutional: Positive for fatigue. Negative for activity change and fever. Respiratory: Negative for cough, chest tightness and shortness of breath. Cardiovascular: Positive for chest pain. Negative for palpitations. Gastrointestinal: Negative for abdominal pain, constipation, diarrhea, nausea and vomiting. Musculoskeletal: Positive for arthralgias. Negative for joint swelling. Skin: Positive for color change and wound. Neurological: Negative for dizziness and headaches. Except as noted above the remainder of the review of systems was reviewed and negative. PHYSICAL EXAM    (up to 7 for level 4, 8 or more for level 5)     ED Triage Vitals   BP Temp Temp Source Pulse Resp SpO2 Height Weight   08/04/18 1513 08/04/18 1513 08/04/18 1513 08/04/18 1513 08/04/18 1525 08/04/18 1513 08/04/18 1513 08/04/18 1513   135/66 98.6 °F (37 °C) Oral 100 16 97 % 6' (1.829 m) 230 lb (104.3 kg)       Physical Exam   Constitutional: He appears well-developed and well-nourished. HENT:   Head: Normocephalic and atraumatic. Cardiovascular: Normal rate, regular rhythm and intact distal pulses. Good capillary refill   Pulmonary/Chest: Effort normal and breath sounds normal. No respiratory distress. He exhibits no tenderness. Abdominal: Soft. He exhibits no distension. There is no tenderness. Musculoskeletal:        Feet:    Erythema to the dorsum of the right mid and distal foot that does not extend to the toes. skin is hot to touch   Neurological:   Patient was alert and oriented. He is answering questions and following commands appropriately. He is able to provide a full history. No cranial nerve deficit. Moving all 4 extremities equally and purposefully   Skin: Skin is warm and dry. There is erythema.          DIAGNOSTIC RESULTS     EKG: All EKG's are interpreted by the Emergency Department Physician who either signs or Co-signs this chart in the absence of a cardiologist.    EKG was interpreted by Dr. Archana Dias:   Non-plain film images such as CT, Ultrasound and MRI are read by the radiologist. Plain radiographic images are visualized and preliminarily interpreted by the emergency physician with the below findings:    Interpretation per the Radiologist below, if available at the time of this note:    XR CHEST PORTABLE   Final Result   1. Minimal interstitial edema. XR FOOT RIGHT (MIN 3 VIEWS)   Final Result   1. Diffuse soft tissues swelling with focal lucency involving the distal   aspect of the 1st proximal phalanx. Osteomyelitis not excluded given history   of ulceration at the great toe. MRI of the forefoot could be obtained for   further evaluation. 2. Charcot arthropathy of the midfoot. LABS:  Labs Reviewed   CBC WITH AUTO DIFFERENTIAL - Abnormal; Notable for the following:        Result Value    WBC 15.9 (*)     RBC 4.09 (*)     Hemoglobin 12.7 (*)     Hematocrit 37.3 (*)     Seg Neutrophils 80 (*)     Lymphocytes 12 (*)     Segs Absolute 12.80 (*)     Absolute Mono # 1.00 (*)     All other components within normal limits   BASIC METABOLIC PANEL - Abnormal; Notable for the following:     Glucose 167 (*)     Sodium 131 (*)     All other components within normal limits   SEDIMENTATION RATE - Abnormal; Notable for the following:     Sed Rate 45 (*)     All other components within normal limits   CULTURE BLOOD #1   CULTURE BLOOD #1   TROP/MYOGLOBIN   CK ISOENZYMES   LACTIC ACID   TROP/MYOGLOBIN   TROP/MYOGLOBIN   C-REACTIVE PROTEIN       All other labs were within normal range or not returned as of this dictation.     EMERGENCY DEPARTMENT COURSE and DIFFERENTIAL DIAGNOSIS/MDM:   Vitals:    Vitals:    08/04/18 1626 08/04/18 1641 08/04/18 1658 08/04/18 1711   BP: 115/63 110/61 (!) 96/56 (!) 112/50   Pulse: 88 86 91 85   Resp: 20 17 19 15   Temp:       TempSrc:       SpO2: 97% 97% 98% 98%   Weight:       Height:           Medical Decision Making: 60-year-old male with chest pain and infection to the foot. He is afebrile and appears ill but not toxic. White count was over 15,000. X-ray of foot cannot rule out osteomyelitis. ESR is 45 and CRP is pending. Vancomycin and Zosyn have been ordered and he will receive further evaluation on admission. Cardiac workup did not have any significant abnormalities here in the hospital but he will be admitted for stress test on Monday. CONSULTS:  IP CONSULT TO INTERNAL MEDICINE  PHARMACY TO DOSE VANCOMYCIN  IP CONSULT TO PODIATRY      FINAL IMPRESSION      1. Right foot infection          DISPOSITION/PLAN   DISPOSITION Admitted 08/04/2018 04:49:36 PM      PATIENT REFERRED TO:   Eliana Gatica MD  [address] [place] [address]  [phone]            DISCHARGE MEDICATIONS:     New Prescriptions    No medications on file           (Please note that portions of this note were completed with a voice recognition program.  Efforts were made to edit the dictations but occasionally words are mis-transcribed. )    ROCHELLE EVERETT CNP, APRN - CNP  08/04/18 5011

## 2018-08-05 PROBLEM — N17.9 AKI (ACUTE KIDNEY INJURY) (HCC): Status: ACTIVE | Noted: 2018-08-05

## 2018-08-05 LAB
ANION GAP SERPL CALCULATED.3IONS-SCNC: 12 MMOL/L (ref 9–17)
ANION GAP SERPL CALCULATED.3IONS-SCNC: 9 MMOL/L (ref 9–17)
BUN BLDV-MCNC: 19 MG/DL (ref 8–23)
BUN BLDV-MCNC: 22 MG/DL (ref 8–23)
BUN/CREAT BLD: 12 (ref 9–20)
BUN/CREAT BLD: 15 (ref 9–20)
CALCIUM SERPL-MCNC: 8.6 MG/DL (ref 8.6–10.4)
CALCIUM SERPL-MCNC: 8.8 MG/DL (ref 8.6–10.4)
CHLORIDE BLD-SCNC: 102 MMOL/L (ref 98–107)
CHLORIDE BLD-SCNC: 103 MMOL/L (ref 98–107)
CO2: 23 MMOL/L (ref 20–31)
CO2: 24 MMOL/L (ref 20–31)
CREAT SERPL-MCNC: 1.49 MG/DL (ref 0.7–1.2)
CREAT SERPL-MCNC: 1.56 MG/DL (ref 0.7–1.2)
GFR AFRICAN AMERICAN: 55 ML/MIN
GFR AFRICAN AMERICAN: 58 ML/MIN
GFR NON-AFRICAN AMERICAN: 45 ML/MIN
GFR NON-AFRICAN AMERICAN: 48 ML/MIN
GFR SERPL CREATININE-BSD FRML MDRD: ABNORMAL ML/MIN/{1.73_M2}
GLUCOSE BLD-MCNC: 135 MG/DL (ref 70–99)
GLUCOSE BLD-MCNC: 143 MG/DL (ref 75–110)
GLUCOSE BLD-MCNC: 145 MG/DL (ref 75–110)
GLUCOSE BLD-MCNC: 163 MG/DL (ref 75–110)
GLUCOSE BLD-MCNC: 180 MG/DL (ref 75–110)
GLUCOSE BLD-MCNC: 60 MG/DL (ref 70–99)
GLUCOSE BLD-MCNC: 65 MG/DL (ref 75–110)
HCT VFR BLD CALC: 35.3 % (ref 41–53)
HEMOGLOBIN: 11.7 G/DL (ref 13.5–17.5)
MCH RBC QN AUTO: 30.5 PG (ref 26–34)
MCHC RBC AUTO-ENTMCNC: 33.2 G/DL (ref 31–37)
MCV RBC AUTO: 91.9 FL (ref 80–100)
NRBC AUTOMATED: ABNORMAL PER 100 WBC
PDW BLD-RTO: 14.1 % (ref 11.5–14.5)
PLATELET # BLD: 219 K/UL (ref 130–400)
PMV BLD AUTO: 7.2 FL (ref 6–12)
POTASSIUM SERPL-SCNC: 4.2 MMOL/L (ref 3.7–5.3)
POTASSIUM SERPL-SCNC: 4.3 MMOL/L (ref 3.7–5.3)
RBC # BLD: 3.84 M/UL (ref 4.5–5.9)
SODIUM BLD-SCNC: 135 MMOL/L (ref 135–144)
SODIUM BLD-SCNC: 138 MMOL/L (ref 135–144)
WBC # BLD: 12.6 K/UL (ref 3.5–11)

## 2018-08-05 PROCEDURE — 99232 SBSQ HOSP IP/OBS MODERATE 35: CPT | Performed by: INTERNAL MEDICINE

## 2018-08-05 PROCEDURE — 85027 COMPLETE CBC AUTOMATED: CPT

## 2018-08-05 PROCEDURE — 82947 ASSAY GLUCOSE BLOOD QUANT: CPT

## 2018-08-05 PROCEDURE — 1200000000 HC SEMI PRIVATE

## 2018-08-05 PROCEDURE — 6360000002 HC RX W HCPCS: Performed by: NURSE PRACTITIONER

## 2018-08-05 PROCEDURE — 6370000000 HC RX 637 (ALT 250 FOR IP): Performed by: INTERNAL MEDICINE

## 2018-08-05 PROCEDURE — 2500000003 HC RX 250 WO HCPCS: Performed by: INTERNAL MEDICINE

## 2018-08-05 PROCEDURE — 36415 COLL VENOUS BLD VENIPUNCTURE: CPT

## 2018-08-05 PROCEDURE — 6360000002 HC RX W HCPCS: Performed by: INTERNAL MEDICINE

## 2018-08-05 PROCEDURE — 2580000003 HC RX 258: Performed by: INTERNAL MEDICINE

## 2018-08-05 PROCEDURE — 2580000003 HC RX 258: Performed by: NURSE PRACTITIONER

## 2018-08-05 PROCEDURE — 80048 BASIC METABOLIC PNL TOTAL CA: CPT

## 2018-08-05 RX ORDER — NICOTINE 21 MG/24HR
1 PATCH, TRANSDERMAL 24 HOURS TRANSDERMAL DAILY
Status: DISCONTINUED | OUTPATIENT
Start: 2018-08-05 | End: 2018-08-10 | Stop reason: HOSPADM

## 2018-08-05 RX ORDER — SODIUM CHLORIDE 9 MG/ML
INJECTION, SOLUTION INTRAVENOUS CONTINUOUS
Status: DISCONTINUED | OUTPATIENT
Start: 2018-08-05 | End: 2018-08-05

## 2018-08-05 RX ORDER — KETOROLAC TROMETHAMINE 30 MG/ML
30 INJECTION, SOLUTION INTRAMUSCULAR; INTRAVENOUS EVERY 6 HOURS PRN
Status: DISCONTINUED | OUTPATIENT
Start: 2018-08-05 | End: 2018-08-08

## 2018-08-05 RX ORDER — NICOTINE 21 MG/24HR
1 PATCH, TRANSDERMAL 24 HOURS TRANSDERMAL DAILY PRN
Status: DISCONTINUED | OUTPATIENT
Start: 2018-08-05 | End: 2018-08-09 | Stop reason: SDUPTHER

## 2018-08-05 RX ORDER — DEXTROSE AND SODIUM CHLORIDE 5; .45 G/100ML; G/100ML
INJECTION, SOLUTION INTRAVENOUS CONTINUOUS
Status: DISCONTINUED | OUTPATIENT
Start: 2018-08-05 | End: 2018-08-06

## 2018-08-05 RX ADMIN — VANCOMYCIN HYDROCHLORIDE 1500 MG: 1 INJECTION, POWDER, LYOPHILIZED, FOR SOLUTION INTRAVENOUS at 17:11

## 2018-08-05 RX ADMIN — MORPHINE SULFATE 4 MG: 4 INJECTION, SOLUTION INTRAMUSCULAR; INTRAVENOUS at 00:23

## 2018-08-05 RX ADMIN — ENOXAPARIN SODIUM 40 MG: 40 INJECTION SUBCUTANEOUS at 10:21

## 2018-08-05 RX ADMIN — KETOROLAC TROMETHAMINE 30 MG: 30 INJECTION, SOLUTION INTRAMUSCULAR at 05:11

## 2018-08-05 RX ADMIN — GABAPENTIN 900 MG: 300 CAPSULE ORAL at 10:20

## 2018-08-05 RX ADMIN — TAZOBACTAM SODIUM AND PIPERACILLIN SODIUM 3.38 G: 375; 3 INJECTION, SOLUTION INTRAVENOUS at 06:40

## 2018-08-05 RX ADMIN — VANCOMYCIN HYDROCHLORIDE 1500 MG: 1 INJECTION, POWDER, LYOPHILIZED, FOR SOLUTION INTRAVENOUS at 04:37

## 2018-08-05 RX ADMIN — TAZOBACTAM SODIUM AND PIPERACILLIN SODIUM 3.38 G: 375; 3 INJECTION, SOLUTION INTRAVENOUS at 14:13

## 2018-08-05 RX ADMIN — SODIUM CHLORIDE: 9 INJECTION, SOLUTION INTRAVENOUS at 02:15

## 2018-08-05 RX ADMIN — GABAPENTIN 900 MG: 300 CAPSULE ORAL at 14:12

## 2018-08-05 RX ADMIN — ASPIRIN 81 MG 81 MG: 81 TABLET ORAL at 10:20

## 2018-08-05 RX ADMIN — ENOXAPARIN SODIUM 40 MG: 40 INJECTION SUBCUTANEOUS at 00:21

## 2018-08-05 RX ADMIN — DEXTROSE AND SODIUM CHLORIDE: 5; 450 INJECTION, SOLUTION INTRAVENOUS at 08:06

## 2018-08-05 RX ADMIN — DEXTROSE AND SODIUM CHLORIDE: 5; 450 INJECTION, SOLUTION INTRAVENOUS at 17:15

## 2018-08-05 RX ADMIN — GABAPENTIN 900 MG: 300 CAPSULE ORAL at 20:46

## 2018-08-05 RX ADMIN — KETOROLAC TROMETHAMINE 30 MG: 30 INJECTION, SOLUTION INTRAMUSCULAR at 18:17

## 2018-08-05 RX ADMIN — TAZOBACTAM SODIUM AND PIPERACILLIN SODIUM 3.38 G: 375; 3 INJECTION, SOLUTION INTRAVENOUS at 20:46

## 2018-08-05 RX ADMIN — Medication 10 ML: at 20:46

## 2018-08-05 ASSESSMENT — PAIN DESCRIPTION - PROGRESSION
CLINICAL_PROGRESSION: GRADUALLY WORSENING
CLINICAL_PROGRESSION: GRADUALLY WORSENING
CLINICAL_PROGRESSION: GRADUALLY IMPROVING
CLINICAL_PROGRESSION: GRADUALLY WORSENING
CLINICAL_PROGRESSION: GRADUALLY IMPROVING
CLINICAL_PROGRESSION: GRADUALLY WORSENING
CLINICAL_PROGRESSION: GRADUALLY WORSENING

## 2018-08-05 ASSESSMENT — PAIN DESCRIPTION - PAIN TYPE
TYPE: ACUTE PAIN
TYPE: SURGICAL PAIN

## 2018-08-05 ASSESSMENT — PAIN DESCRIPTION - ORIENTATION
ORIENTATION: RIGHT;LEFT
ORIENTATION: RIGHT;LEFT
ORIENTATION: RIGHT
ORIENTATION: RIGHT

## 2018-08-05 ASSESSMENT — PAIN DESCRIPTION - LOCATION
LOCATION: FOOT
LOCATION: CHEST;FOOT
LOCATION: FOOT
LOCATION: CHEST;FOOT

## 2018-08-05 ASSESSMENT — PAIN DESCRIPTION - ONSET
ONSET: ON-GOING

## 2018-08-05 ASSESSMENT — PAIN DESCRIPTION - DESCRIPTORS
DESCRIPTORS: PRESSURE
DESCRIPTORS: PRESSURE;SHARP
DESCRIPTORS: DISCOMFORT
DESCRIPTORS: PRESSURE;SHARP

## 2018-08-05 ASSESSMENT — ENCOUNTER SYMPTOMS
SHORTNESS OF BREATH: 0
ABDOMINAL PAIN: 0
CHEST TIGHTNESS: 0
COUGH: 0
COLOR CHANGE: 1

## 2018-08-05 ASSESSMENT — PAIN DESCRIPTION - FREQUENCY
FREQUENCY: INTERMITTENT
FREQUENCY: INTERMITTENT
FREQUENCY: CONTINUOUS
FREQUENCY: INTERMITTENT

## 2018-08-05 ASSESSMENT — PAIN SCALES - GENERAL
PAINLEVEL_OUTOF10: 6
PAINLEVEL_OUTOF10: 5
PAINLEVEL_OUTOF10: 2
PAINLEVEL_OUTOF10: 5
PAINLEVEL_OUTOF10: 7
PAINLEVEL_OUTOF10: 6

## 2018-08-05 NOTE — CARE COORDINATION
Case Management Initial Discharge Plan  Jonny Fuentes,         Readmission Risk              Risk of Unplanned Readmission:        8               Met with:patient to discuss discharge plans. Information verified: address, contacts, phone number, , insurance Yes  PCP: Jayleen Saunders MD  Date of last visit: 6 months ago    Insurance Provider: [employer]    Discharge Planning  Current Residence:  Private home  Living Arrangements:  Spouse/Significant Other, Children   Home has 1 stories/2 stairs to climb  Support Systems:  Spouse/Significant Other, Children, Friends/Neighbors  Current Services PTA:  None Agency: No previous home care  Patient able to perform ADL's:Assisted  DME in home:  Walker, cane, knee scooter, glucometer  DME used to aid ambulation prior to admission:   walker  DME used during admission:  walker    Potential Assistance Needed:  N/A    Ph[facility]acy: [facility] in [address] [place] [address] Medications:  No  Does patient want to participate in local refill/ meds to beds program?  No    Patient agreeable to home care: only if IV antibiotics needed  Freedom of choice provided:  Yes, list of home care agencies given to pt      Type of Home Care Services:  None  Patient expects to be discharged to:  Home    Prior SNF/Rehab Placement and Facility: None  Agreeable to SNF/Rehab: No  [place] of choice provided: n/a   Evaluation: n/a    Expected Discharge date:  18  Follow Up Appointment: Best Day/ Time: Thursday AM    Transportation provider: wife  Transportation arrangements needed for discharge: No    Discharge Plan:   Met with patient to review plan of care. Pt drowsy and does not want to engage in lengthy conversation. He has not used home care in the past.  Had to do drsg changes this year and his wife did them and he did not need IV antibiotics at that time. He would only consider home care if he needs IV Antibiotics.     Denies need for any more

## 2018-08-05 NOTE — FLOWSHEET NOTE
Patient + spouse was present. Patient had consult for DNR,  inquires about consult. Patient declines about the order. Patient states he wants to sleep. Patient declines visit.  leaves prayer card for possible follow up.     08/05/18 5338   Encounter Summary   Services provided to: Patient and family together   Referral/Consult From: [facility]   Support System Spouse   Continue Visiting (8-5-18)   Complexity of Encounter Low   Length of Encounter 15 minutes   Spiritual Assessment Completed Yes   Routine   Type Initial   Assessment Passive   Intervention Explored feelings, thoughts, concerns; Discussed illness/injury and it's impact; Discussed belief system/Confucianism practices/ab   Outcome Refused/declined

## 2018-08-05 NOTE — CONSULTS
Consultation Note  Podiatric Medicine and Surgery     Subjective     Chief Complaint: R foot cellulitis and abscess    HPI:  Júnior Sawant is a 59 y.o. male seen at bedside for evaluation of right foot cellulitis and abscess. He states he noticed increased redness, swelling, and pain to the right foot starting 2 days ago. He states he follows with Dr. Ousmane Real and recently completed a course of antibiotics this Monday. States his wife noticed a small raised black dot that has been icnreasing in size and appears to have fluid in it. He has a history of charcot neuroarthropathy and diabetic foot ulceration to the right foot. Hx of previous left foot surgery which is healed. States he wears diabetic shoes. PCP is Luis Murphy MD    ROS:   Review of Systems   Constitutional: Positive for chills. Negative for fatigue and fever. Respiratory: Negative for cough, chest tightness and shortness of breath. Cardiovascular: Positive for chest pain (intermittant. negative currently). Gastrointestinal: Negative for abdominal pain. Musculoskeletal: Positive for arthralgias (r foot pain), joint swelling and myalgias. Skin: Positive for color change (redness) and wound (r foot). Neurological: Positive for weakness (2/2 pain) and numbness (2/2 neuropathy). Negative for dizziness. Past Medical History   has a past medical history of Charcot foot due to diabetes mellitus ([facility] [address] Diabetic polyneuropathy associated with type 2 diabetes mellitus ([facility] [address] Fractures, multiple; Hyperlipidemia; Hypertension; Neuropathy; Pneumonia; Tobacco abuse; Type II or unspecified type diabetes mellitus without mention of complication, not stated as uncontrolled; Vertigo; Wound, open; and Wound, open. Past Surgical History   has a past surgical history that includes Neck surgery (1987); Appendectomy; knee surgery (Bilateral, 1983); Knee arthroscopy (Right, 1989); Knee arthroscopy (Left, 1990);  Foot Debridement (Left, 04/24/2018); and pr deep dissec foot infec,1 bursa (Left, 4/24/2018). Medications  Prior to Admission medications    Medication Sig Start Date End Date Taking? Authorizing Provider   naproxen (NAPROSYN) 500 MG tablet Take 500 mg by mouth 2 times daily   Yes Historical Provider, MD   glipiZIDE (GLUCOTROL) 10 MG tablet Take 40 mg by mouth once   Yes Historical Provider, MD   aspirin 81 MG tablet Take 81 mg by mouth daily   Yes Historical Provider, MD   metFORMIN (GLUCOPHAGE) 500 MG tablet Take 500 mg by mouth 2 times daily (with meals)  4/4/18  Yes Historical Provider, MD   lisinopril (PRINIVIL;ZESTRIL) 5 MG tablet Take 5 mg by mouth daily 1/5/18  Yes Historical Provider, MD   gabapentin (NEURONTIN) 300 MG capsule Take 900 mg by mouth 3 times daily. Take 3 pills 3 times a day . Yes Historical Provider, MD    Scheduled Meds:   [START ON 8/5/2018] aspirin  81 mg Oral Daily    gabapentin  900 mg Oral TID    [START ON 8/5/2018] lisinopril  5 mg Oral Daily    metFORMIN  500 mg Oral BID WC    sodium chloride flush  10 mL Intravenous 2 times per day    enoxaparin  40 mg Subcutaneous Daily    piperacillin-tazobactam  3.375 g Intravenous Q8H    dipyridamole (PERSANTINE) IVPB  0.57 mg/kg Intravenous Once    0.9 % sodium chloride  250 mL Intravenous Once    [START ON 8/5/2018] vancomycin  1,500 mg Intravenous Q12H    vancomycin (VANCOCIN) intermittent dosing (placeholder)   Other RX Placeholder    lidocaine PF  5 mL Intradermal Once     Continuous Infusions:  PRN Meds:.sodium chloride flush, magnesium hydroxide, aminophylline, metoprolol, nitroGLYCERIN, sodium chloride flush, ondansetron **OR** ondansetron, morphine **OR** morphine    Allergies  is allergic to dye [iodides]. Family History  family history includes Cancer in his father; Diabetes in his mother; Hypertension in his maternal grandmother. Social History   reports that he has been smoking. He has been smoking about 0.25 packs per day.  He has never used smokeless tobacco.   reports that he does not drink alcohol. reports that he uses drugs about 1 time per week. Objective     Vitals:  Patient Vitals for the past 8 hrs:   BP Temp Temp src Pulse Resp SpO2 Height Weight   18 (!) 122/97 99.5 °F (37.5 °C) Oral 75 18 98 % - -   18 (!) 104/51 - - 79 21 95 % - -   18 (!) 101/52 - - 80 21 95 % - -   18 192 (!) 103/49 - - 81 21 97 % - -   18 191 (!) 104/52 - - 84 18 97 % - -   18 1856 - 98.6 °F (37 °C) - 81 16 - - -   18 1844 104/61 - - 84 17 98 % - -   18 1827 116/70 - - 82 17 - - -   18 1814 (!) 109/59 - - 81 15 96 % - -   18 1741 (!) 105/58 - - 82 12 97 % - -   18 1726 103/62 - - 80 15 98 % - -   18 1711 (!) 112/50 - - 85 15 98 % - -   18 1658 (!) 96/56 - - 91 19 98 % - -   18 1641 110/61 - - 86 17 97 % - -   18 1626 115/63 - - 88 20 97 % - -   18 1611 116/67 - - 89 18 97 % - -   18 1556 106/64 - - 93 12 96 % - -   18 1541 106/70 - - 93 18 97 % - -   18 1527 100/69 - - 95 12 97 % - -   18 1525 - - - - 16 - - -   18 1513 135/66 98.6 °F (37 °C) Oral 100 - 97 % 6' (1.829 m) 230 lb (104.3 kg)   18 1512 135/66 - - - - - - -     Average, Min, and Max for last 24 hours Vitals:  TEMPERATURE:  Temp  Av.9 °F (37.2 °C)  Min: 98.6 °F (37 °C)  Max: 99.5 °F (37.5 °C)    RESPIRATIONS RANGE: Resp  Av.9  Min: 12  Max: 21    PULSE RANGE: Pulse  Av.5  Min: 75  Max: 100    BLOOD PRESSURE RANGE:  Systolic (04DVL), HMP:803 , Min:96 , ZGB:636   ; Diastolic (79GSK), XUD:75, Min:49, Max:97      PULSE OXIMETRY RANGE: SpO2  Av.9 %  Min: 95 %  Max: 98 %  I&O:  No intake/output data recorded.     CBC:  Recent Labs      18   1530   WBC  15.9*   HGB  12.7*   HCT  37.3*   PLT  245   CRP  84.4*        BMP:  Recent Labs      18   1530   NA  131*   K  4.5   CL  98   CO2  21   BUN  17   CREATININE  1.17   GLUCOSE foot  2. Diabetic foot ulcer, right midfoot (Yanes grade 2)   3. 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 *        Plan     · Patient examined and evaluated at bedside   · Treatment options discussed in detail with the patient   · Xrays reviewed - no evidence of ST emphysema. Midfoot charcot. · Patient has an abscess to the plantar right foot. Plan for bedside incision and drainage. · F/u Cultures obtained from I&D  · IV Abx: Continue Vanc/Zosyn   · Patient consented for an Incision and drainage below fascial plains in one incision. . Right foot marked. Time out performed with RN in room. An  2 cmincision was made to the plantar aspect of the right foot overlying fluctuant  abscess. Blunt dissection performed using a hemostat down to the level of muscle. Wound did track proximally about 4cm. 6mL of purulent drainage expressed. Wound irrigated and cultures obtained. Patient tolerated the procedure well. Packing and DSD applied. Hemostasis spontaneous with packing. · Dressing applied to Right foot: Plain packing, DSD   · NWB to Right lower extremity  · Discussed with Dr. Karen Bustamante, [place]  Podiatric Medicine & Surgery      I performed a history and physical examination of the patient and discussed management with the resident. I reviewed the residents note and agree with the documented findings and plan of care. Any areas of disagreement are noted on the chart. I was personally present for the key portions of any procedures. I have documented in the chart those procedures where I was not present during the key portions. I have reviewed the Podiatry Resident progress note.  I agree with the chief complaint, past medical history, past surgical history, allergies, medications, social and family history as documented unless otherwise noted below. Documentation of the HPI, Physical Exam and Medical Decision Making performed by medical students or scribes is based on my personal performance of the HPI, PE and MDM. I have personally evaluated this patient and have completed at least one if not all key elements of the E/M (history, physical exam, and MDM). Additional findings are as noted.      Electronically signed by Alex Booker DPM on 8/6/2018 at 8:49 AM

## 2018-08-05 NOTE — PROGRESS NOTES
abx.  · F/u Cultures - [facility]  · IV Abx: Continue Vanc/Zosyn   · Dressing changed to Right foot: Plain packing, DSD   · NWB to Right lower extremity  · Discussed with Dr. Chandan Allred, [place]   Podiatric Medicine & Surgery

## 2018-08-06 ENCOUNTER — APPOINTMENT (OUTPATIENT)
Dept: MRI IMAGING | Age: 63
DRG: 580 | End: 2018-08-06
Payer: MEDICARE

## 2018-08-06 ENCOUNTER — APPOINTMENT (OUTPATIENT)
Dept: NUCLEAR MEDICINE | Age: 63
DRG: 580 | End: 2018-08-06
Payer: MEDICARE

## 2018-08-06 LAB
ABSOLUTE EOS #: 0.3 K/UL (ref 0–0.4)
ABSOLUTE IMMATURE GRANULOCYTE: ABNORMAL K/UL (ref 0–0.3)
ABSOLUTE LYMPH #: 1.7 K/UL (ref 1–4.8)
ABSOLUTE MONO #: 0.6 K/UL (ref 0.2–0.8)
ANION GAP SERPL CALCULATED.3IONS-SCNC: 10 MMOL/L (ref 9–17)
BASOPHILS # BLD: 1 % (ref 0–2)
BASOPHILS ABSOLUTE: 0 K/UL (ref 0–0.2)
BUN BLDV-MCNC: 21 MG/DL (ref 8–23)
BUN/CREAT BLD: 14 (ref 9–20)
CALCIUM SERPL-MCNC: 8.6 MG/DL (ref 8.6–10.4)
CHLORIDE BLD-SCNC: 101 MMOL/L (ref 98–107)
CO2: 22 MMOL/L (ref 20–31)
CREAT SERPL-MCNC: 1.55 MG/DL (ref 0.7–1.2)
DIFFERENTIAL TYPE: ABNORMAL
EOSINOPHILS RELATIVE PERCENT: 3 % (ref 1–4)
GFR AFRICAN AMERICAN: 55 ML/MIN
GFR NON-AFRICAN AMERICAN: 46 ML/MIN
GFR SERPL CREATININE-BSD FRML MDRD: ABNORMAL ML/MIN/{1.73_M2}
GFR SERPL CREATININE-BSD FRML MDRD: ABNORMAL ML/MIN/{1.73_M2}
GLUCOSE BLD-MCNC: 146 MG/DL (ref 75–110)
GLUCOSE BLD-MCNC: 149 MG/DL (ref 75–110)
GLUCOSE BLD-MCNC: 159 MG/DL (ref 70–99)
GLUCOSE BLD-MCNC: 176 MG/DL (ref 75–110)
GLUCOSE BLD-MCNC: 191 MG/DL (ref 75–110)
HCT VFR BLD CALC: 33.7 % (ref 41–53)
HEMOGLOBIN: 11.4 G/DL (ref 13.5–17.5)
IMMATURE GRANULOCYTES: ABNORMAL %
LV EF: 62 %
LVEF MODALITY: NORMAL
LYMPHOCYTES # BLD: 22 % (ref 24–44)
MCH RBC QN AUTO: 30.9 PG (ref 26–34)
MCHC RBC AUTO-ENTMCNC: 33.9 G/DL (ref 31–37)
MCV RBC AUTO: 91.2 FL (ref 80–100)
MONOCYTES # BLD: 8 % (ref 1–7)
NRBC AUTOMATED: ABNORMAL PER 100 WBC
PDW BLD-RTO: 13.9 % (ref 11.5–14.5)
PLATELET # BLD: 211 K/UL (ref 130–400)
PLATELET ESTIMATE: ABNORMAL
PMV BLD AUTO: 7.4 FL (ref 6–12)
POTASSIUM SERPL-SCNC: 4.6 MMOL/L (ref 3.7–5.3)
RBC # BLD: 3.69 M/UL (ref 4.5–5.9)
RBC # BLD: ABNORMAL 10*6/UL
SEG NEUTROPHILS: 66 % (ref 36–66)
SEGMENTED NEUTROPHILS ABSOLUTE COUNT: 5.1 K/UL (ref 1.8–7.7)
SODIUM BLD-SCNC: 133 MMOL/L (ref 135–144)
VANCOMYCIN TROUGH DATE LAST DOSE: ABNORMAL
VANCOMYCIN TROUGH DOSE AMOUNT: ABNORMAL
VANCOMYCIN TROUGH TIME LAST DOSE: ABNORMAL
VANCOMYCIN TROUGH: 26.6 UG/ML (ref 10–20)
WBC # BLD: 7.7 K/UL (ref 3.5–11)
WBC # BLD: ABNORMAL 10*3/UL

## 2018-08-06 PROCEDURE — 2580000003 HC RX 258: Performed by: INTERNAL MEDICINE

## 2018-08-06 PROCEDURE — 36415 COLL VENOUS BLD VENIPUNCTURE: CPT

## 2018-08-06 PROCEDURE — A9500 TC99M SESTAMIBI: HCPCS | Performed by: INTERNAL MEDICINE

## 2018-08-06 PROCEDURE — 3430000000 HC RX DIAGNOSTIC RADIOPHARMACEUTICAL: Performed by: INTERNAL MEDICINE

## 2018-08-06 PROCEDURE — 80048 BASIC METABOLIC PNL TOTAL CA: CPT

## 2018-08-06 PROCEDURE — 99232 SBSQ HOSP IP/OBS MODERATE 35: CPT | Performed by: INTERNAL MEDICINE

## 2018-08-06 PROCEDURE — 93017 CV STRESS TEST TRACING ONLY: CPT

## 2018-08-06 PROCEDURE — 78452 HT MUSCLE IMAGE SPECT MULT: CPT

## 2018-08-06 PROCEDURE — 6370000000 HC RX 637 (ALT 250 FOR IP): Performed by: NURSE PRACTITIONER

## 2018-08-06 PROCEDURE — 82947 ASSAY GLUCOSE BLOOD QUANT: CPT

## 2018-08-06 PROCEDURE — 6370000000 HC RX 637 (ALT 250 FOR IP): Performed by: INTERNAL MEDICINE

## 2018-08-06 PROCEDURE — 6360000002 HC RX W HCPCS: Performed by: NURSE PRACTITIONER

## 2018-08-06 PROCEDURE — 6360000002 HC RX W HCPCS: Performed by: INTERNAL MEDICINE

## 2018-08-06 PROCEDURE — 80202 ASSAY OF VANCOMYCIN: CPT

## 2018-08-06 PROCEDURE — 85025 COMPLETE CBC W/AUTO DIFF WBC: CPT

## 2018-08-06 PROCEDURE — 2500000003 HC RX 250 WO HCPCS: Performed by: INTERNAL MEDICINE

## 2018-08-06 PROCEDURE — 1200000000 HC SEMI PRIVATE

## 2018-08-06 RX ORDER — DEXTROSE MONOHYDRATE 25 G/50ML
12.5 INJECTION, SOLUTION INTRAVENOUS PRN
Status: DISCONTINUED | OUTPATIENT
Start: 2018-08-06 | End: 2018-08-10 | Stop reason: HOSPADM

## 2018-08-06 RX ORDER — METOPROLOL TARTRATE 5 MG/5ML
2.5 INJECTION INTRAVENOUS PRN
Status: ACTIVE | OUTPATIENT
Start: 2018-08-06 | End: 2018-08-07

## 2018-08-06 RX ORDER — SODIUM CHLORIDE 0.9 % (FLUSH) 0.9 %
10 SYRINGE (ML) INJECTION PRN
Status: ACTIVE | OUTPATIENT
Start: 2018-08-06 | End: 2018-08-07

## 2018-08-06 RX ORDER — AMINOPHYLLINE DIHYDRATE 25 MG/ML
100 INJECTION, SOLUTION INTRAVENOUS
Status: ACTIVE | OUTPATIENT
Start: 2018-08-06 | End: 2018-08-06

## 2018-08-06 RX ORDER — DEXTROSE MONOHYDRATE 50 MG/ML
100 INJECTION, SOLUTION INTRAVENOUS PRN
Status: DISCONTINUED | OUTPATIENT
Start: 2018-08-06 | End: 2018-08-10 | Stop reason: HOSPADM

## 2018-08-06 RX ORDER — SODIUM CHLORIDE 450 MG/100ML
INJECTION, SOLUTION INTRAVENOUS CONTINUOUS
Status: DISCONTINUED | OUTPATIENT
Start: 2018-08-06 | End: 2018-08-08

## 2018-08-06 RX ORDER — OXYCODONE HYDROCHLORIDE AND ACETAMINOPHEN 5; 325 MG/1; MG/1
1 TABLET ORAL EVERY 4 HOURS PRN
Status: DISCONTINUED | OUTPATIENT
Start: 2018-08-06 | End: 2018-08-07

## 2018-08-06 RX ORDER — NICOTINE POLACRILEX 4 MG
15 LOZENGE BUCCAL PRN
Status: DISCONTINUED | OUTPATIENT
Start: 2018-08-06 | End: 2018-08-10 | Stop reason: HOSPADM

## 2018-08-06 RX ORDER — OXYCODONE HYDROCHLORIDE AND ACETAMINOPHEN 5; 325 MG/1; MG/1
2 TABLET ORAL EVERY 4 HOURS PRN
Status: DISCONTINUED | OUTPATIENT
Start: 2018-08-06 | End: 2018-08-07

## 2018-08-06 RX ORDER — 0.9 % SODIUM CHLORIDE 0.9 %
250 INTRAVENOUS SOLUTION INTRAVENOUS ONCE
Status: DISCONTINUED | OUTPATIENT
Start: 2018-08-06 | End: 2018-08-10 | Stop reason: HOSPADM

## 2018-08-06 RX ADMIN — VANCOMYCIN HYDROCHLORIDE 1500 MG: 1 INJECTION, POWDER, LYOPHILIZED, FOR SOLUTION INTRAVENOUS at 03:56

## 2018-08-06 RX ADMIN — ENOXAPARIN SODIUM 40 MG: 40 INJECTION SUBCUTANEOUS at 11:25

## 2018-08-06 RX ADMIN — TETRAKIS(2-METHOXYISOBUTYLISOCYANIDE)COPPER(I) TETRAFLUOROBORATE 40.3 MILLICURIE: 1 INJECTION, POWDER, LYOPHILIZED, FOR SOLUTION INTRAVENOUS at 13:05

## 2018-08-06 RX ADMIN — SODIUM CHLORIDE: 4.5 INJECTION, SOLUTION INTRAVENOUS at 22:19

## 2018-08-06 RX ADMIN — ASPIRIN 81 MG 81 MG: 81 TABLET ORAL at 11:26

## 2018-08-06 RX ADMIN — KETOROLAC TROMETHAMINE 30 MG: 30 INJECTION, SOLUTION INTRAMUSCULAR at 13:07

## 2018-08-06 RX ADMIN — GABAPENTIN 900 MG: 300 CAPSULE ORAL at 11:26

## 2018-08-06 RX ADMIN — TAZOBACTAM SODIUM AND PIPERACILLIN SODIUM 3.38 G: 375; 3 INJECTION, SOLUTION INTRAVENOUS at 22:19

## 2018-08-06 RX ADMIN — REGADENOSON 0.4 MG: 0.08 INJECTION, SOLUTION INTRAVENOUS at 09:33

## 2018-08-06 RX ADMIN — KETOROLAC TROMETHAMINE 30 MG: 30 INJECTION, SOLUTION INTRAMUSCULAR at 01:00

## 2018-08-06 RX ADMIN — TETRAKIS(2-METHOXYISOBUTYLISOCYANIDE)COPPER(I) TETRAFLUOROBORATE 16.3 MILLICURIE: 1 INJECTION, POWDER, LYOPHILIZED, FOR SOLUTION INTRAVENOUS at 09:33

## 2018-08-06 RX ADMIN — TAZOBACTAM SODIUM AND PIPERACILLIN SODIUM 3.38 G: 375; 3 INJECTION, SOLUTION INTRAVENOUS at 05:53

## 2018-08-06 RX ADMIN — GABAPENTIN 900 MG: 300 CAPSULE ORAL at 15:32

## 2018-08-06 RX ADMIN — SODIUM CHLORIDE: 4.5 INJECTION, SOLUTION INTRAVENOUS at 12:16

## 2018-08-06 RX ADMIN — TAZOBACTAM SODIUM AND PIPERACILLIN SODIUM 3.38 G: 375; 3 INJECTION, SOLUTION INTRAVENOUS at 13:07

## 2018-08-06 RX ADMIN — KETOROLAC TROMETHAMINE 30 MG: 30 INJECTION, SOLUTION INTRAMUSCULAR at 06:46

## 2018-08-06 RX ADMIN — DEXTROSE AND SODIUM CHLORIDE: 5; 450 INJECTION, SOLUTION INTRAVENOUS at 05:53

## 2018-08-06 RX ADMIN — INSULIN LISPRO 1 UNITS: 100 INJECTION, SOLUTION INTRAVENOUS; SUBCUTANEOUS at 23:55

## 2018-08-06 RX ADMIN — KETOROLAC TROMETHAMINE 30 MG: 30 INJECTION, SOLUTION INTRAMUSCULAR at 19:18

## 2018-08-06 RX ADMIN — OXYCODONE HYDROCHLORIDE AND ACETAMINOPHEN 2 TABLET: 5; 325 TABLET ORAL at 22:13

## 2018-08-06 RX ADMIN — GABAPENTIN 900 MG: 300 CAPSULE ORAL at 20:51

## 2018-08-06 ASSESSMENT — PAIN DESCRIPTION - DESCRIPTORS
DESCRIPTORS: ACHING

## 2018-08-06 ASSESSMENT — PAIN DESCRIPTION - LOCATION
LOCATION: FOOT

## 2018-08-06 ASSESSMENT — PAIN SCALES - GENERAL
PAINLEVEL_OUTOF10: 6
PAINLEVEL_OUTOF10: 4
PAINLEVEL_OUTOF10: 0
PAINLEVEL_OUTOF10: 7
PAINLEVEL_OUTOF10: 7
PAINLEVEL_OUTOF10: 6
PAINLEVEL_OUTOF10: 5

## 2018-08-06 ASSESSMENT — PAIN DESCRIPTION - ORIENTATION
ORIENTATION: RIGHT

## 2018-08-06 ASSESSMENT — PAIN DESCRIPTION - PAIN TYPE
TYPE: ACUTE PAIN
TYPE: SURGICAL PAIN
TYPE: ACUTE PAIN
TYPE: SURGICAL PAIN

## 2018-08-06 ASSESSMENT — PAIN DESCRIPTION - PROGRESSION
CLINICAL_PROGRESSION: GRADUALLY WORSENING
CLINICAL_PROGRESSION: GRADUALLY WORSENING

## 2018-08-06 ASSESSMENT — PAIN DESCRIPTION - FREQUENCY
FREQUENCY: CONTINUOUS

## 2018-08-06 ASSESSMENT — PAIN DESCRIPTION - ONSET
ONSET: ON-GOING

## 2018-08-06 NOTE — PROGRESS NOTES
Subcutaneous Daily    piperacillin-tazobactam  3.375 g Intravenous Q8H    vancomycin  1,500 mg Intravenous Q12H    vancomycin (VANCOCIN) intermittent dosing (placeholder)   Other RX Placeholder     Continuous Infusions:    sodium chloride       PRN Meds: regadenoson, aminophylline, metoprolol, sodium chloride flush, ketorolac, nicotine, magnesium hydroxide, ondansetron **OR** ondansetron    Data:     Past Medical History:   has a past medical history of ALEJANDRO (acute kidney injury) ([facility] Charcot foot due to diabetes mellitus ([facility] Diabetic polyneuropathy associated with type 2 diabetes mellitus ([facility] Fractures, multiple; Hyperlipidemia; Hypertension; Neuropathy; Pneumonia; Tobacco abuse; Type II or unspecified type diabetes mellitus without mention of complication, not stated as uncontrolled; Vertigo; Wound, open; and Wound, open. Social History:   reports that he has been smoking. He has been smoking about 0.25 packs per day. He has never used smokeless tobacco. He reports that he uses drugs about 1 time per week. He reports that he does not drink alcohol. Family History:   Family History   Problem Relation Age of Onset    Diabetes Mother     Cancer Father     Hypertension Maternal Grandmother      Vitals:  /66   Pulse 62   Temp 97.9 °F (36.6 °C) (Oral)   Resp 20   Ht 6' (1.829 m)   Wt 230 lb (104.3 kg)   SpO2 96%   BMI 31.19 kg/m²   Temp (24hrs), Av.5 °F (36.9 °C), Min:97.9 °F (36.6 °C), Max:99 °F (37.2 °C)    Recent Labs      18   1102  18   1633  18   2059  18   0628   POCGLU  180*  145*  163*  149*     I/O (24Hr):     Intake/Output Summary (Last 24 hours) at 18 0853  Last data filed at 18 9910   Gross per 24 hour   Intake          2397.05 ml   Output             1625 ml   Net           772.05 ml     Labs:    Lab Results   Component Value Date/Time    SPECIAL NOT REPORTED 2018 11:25 PM     Lab Results   Component Value Date/Time    CULTURE

## 2018-08-06 NOTE — PROCEDURES
[address]                   [address] [place] [address], [address] [facility], [place]                                CARDIAC STRESS TEST    PATIENT NAME: Mica Dia                   :        1955  MED REC NO:   [id number]                             ROOM:       2006  ACCOUNT NO:   [de-identified]                           ADMIT DATE: 2018  PROVIDER:     Grisel Buckley    DATE OF STUDY:  2018    PRIMARY CARE PROVIDER: Mattie Peña MD    PERFORMING PHYSICIAN: DO ISABELLE Richardson MYOVIEW STRESS TEST    INDICATION:  Chest pain. 100% max predicted heart rate:  158%  85% max predicted heart rate:  134%  Resting heart rate:  57  Maximum heart rate achieved:  90  Duration:  1:00  Resting BP: 124/73  Peak BP:  129/67  Peak Double Product: N/A  % of Age Predicted Maximum:  56%  METS:  1.0  Medications Given: 0.4 mg Lexiscan. Reason for Termination:  Medication Infusion Complete. During the stress the patient reported:  No symptoms. Heart Rate Response:  Normal.  Blood Pressure Response:  Normal response. Baseline EKG demonstrated:  Sinus rhythm. Stress EKG Changes Demonstrated:  No abnormal change. ECG IMPRESSION:  Negative. FINAL IMPRESSION:  Negative. Nuclear medicine report to follow.         Carol Saglado    D: 2018 14:34:34       T: 2018 14:35:14     MA/BURKE_EDIT  Job#: [id number]     Doc#: Unknown

## 2018-08-06 NOTE — PROGRESS NOTES
Nutrition Assessment    Type and Reason for Visit: Initial, Positive Nutrition Screen (PU/non-healing wound)    Nutrition Recommendations: 1. Suggest including 2,000 kcal level with carb controlled diet. 2. Pt is declining taking an ONS. Malnutrition Assessment:  · Malnutrition Status: At risk for malnutrition  · Findings of the 6 clinical characteristics of malnutrition (Minimum of 2 out of 6 clinical characteristics is required to make the diagnosis of moderate or severe Protein Calorie Malnutrition based on AND/ASPEN Guidelines):  1. Energy Intake-Not available, not able to assess    2. Weight Loss-No significant weight loss  3. Fat Loss-No significant subcutaneous fat loss  4. Muscle Loss-No significant muscle mass loss  5. Fluid Accumulation-Mild fluid accumulation, Extremities  6.  Strength-Not measured    Nutrition Diagnosis:   · Problem: Increased nutrient needs  · Etiology: related to Increased demand for energy/nutrients due to (Wound healing)     Signs and symptoms:  as evidenced by Presence of wounds, Localized or generalized fluid accumulation, Other (8/4 XR Foot Rt=results)    Nutrition Assessment:  · Subjective Assessment: Per Pt:  denies having any (R) foot pain, appetite is good, and is eating well @ meals. Noted +wt gain of 10.9 kg (11.7%) since 7/26 (93.4 kg), x past 9 days?   · Nutrition-Focused Physical Findings: GI:  +soft, +nontender, +nausea, +last BM (8/4), +passing flatus, +active bowel sounds; PV:  +RLE, pitting, +1; Skin:  +wound, foot (R) blisters/calluses (Ace wrap, c/d/i)  · Wound Type:  (Blisters/Calluses)  · Current Nutrition Therapies:  · Oral Diet Orders: Carb Control 4 Carbs/Meal   · Oral Diet intake: %  · Anthropometric Measures:  · Ht: 6' (182.9 cm)   · Admission Body Wt: 229 lb 15 oz (104.3 kg)  · Usual Body Wt: 205 lb 14.6 oz (93.4 kg) (7/26/18)  · % Weight Change: 11.7% (gain),  x 9 days  · Ideal Body Wt: 169 lb 12.1 oz (77 kg), % Ideal Body 135%  · BMI Classification: BMI 30.0 - 34.9 Obese Class I  · Comparative Standards (Estimated Nutrition Needs):  · Estimated Daily Total Kcal: 2,100-2,250 kcal  · Estimated Daily Protein (g):  g    Estimated Intake vs Estimated Needs: Insufficient Data    Nutrition Risk Level: High    Nutrition Interventions:   Modify current diet, ONS not indicated  Continued Inpatient Monitoring, Discharge Planning, Education Not Indicated    Nutrition Evaluation:   · Evaluation: Goals set   · Goals: PO intake to meet >75% of estimated kcal/protein needs, with good glycemic control, management of renal labs    · Monitoring: Meal Intake, Diet Tolerance, Skin Integrity, Wound Healing, Ascites/Edema, Pertinent Labs    See Adult Nutrition Doc Flowsheet for more detail.      Electronically signed by Chris Hussein, FERDINAND, LD on 8/6/18 at [address] [place] [address]    Contact Number: [phone]

## 2018-08-06 NOTE — PLAN OF CARE
[facility]    Second Note  For more detailed information please refer to the progress note of the day      8/6/2018    4:37 PM    Name:   Molly Carlson  MRN:     [id number]     [place]:      [de-identified]   Room:   2006/2006-02  IP Day:  2  Admit Date:  8/4/2018  3:10 PM    PCP:   Jaz Aparicio MD  Code Status:  Full Code      Pt vitals were reviewed   Vancomycin trough high, await pharmacy dose change  IV fluids continued along with BMP in am  Discussed with podiatry team  Stress test reviewed that was low risk       Catalino Fontaine MD  8/6/2018  4:37 PM

## 2018-08-06 NOTE — PROGRESS NOTES
medial and plantar right foot with associated increase in warmth. Does not probe to bone, sinus track, or undermine. No fluctuance, crepitus, or induration. Interdigital maceration absent. Incision and drainage site to right plantar medial midfoot. I&D measures approximately 3 cm in length. No further purulence expressed from the I and D site. There is improvement in soft tissue edema, cellulitis remains relatively unchanged. Serosanguineous drainage noted on dressing. 1cc of purulence expressed. Imaging:   XR CHEST PORTABLE   Final Result   1. Minimal interstitial edema. XR FOOT RIGHT (MIN 3 VIEWS)   Final Result   1. Diffuse soft tissues swelling with focal lucency involving the distal   aspect of the 1st proximal phalanx. Osteomyelitis not excluded given history   of ulceration at the great toe. MRI of the forefoot could be obtained for   further evaluation. 2. Charcot arthropathy of the midfoot. NM Myocardial Spect Rest Exercise or Rx    (Results Pending)       Cultures: GPC    Assessment   Mayco Tran is a 59 y.o. male with   1. Abscess with cellulitis, right foot  2. Diabetic foot ulcer, right midfoot (Yanes grade 2)   3. Charcot neuroarthropathy, right foot   4. S/p bedside I&D 8/4/18  5. 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 *       Plan     · Patient examined and evaluated at bedside   · Treatment options discussed in detail with the patient   · Xrays reviewed - no evidence of ST emphysema. Midfoot charcot. · Patient has superficial abscess to the plantar right foot.    · There is some clinical improvement and improvement in leukocytosis today. No purulence expressed today. Continue to monitor improvement with IV abx. · Ordered MRI without contrast to evaluate for any remaining abscess.    · Leukocytosis resolved   · F/u Cultures - GPC  · IV Abx: Continue Vanc/Zosyn   · Dressing changed to Right foot: Plain packing, DSD (irrigated with sterile saline)  · NWB to Right lower extremity  · Discussed with Dr. Samuel Moran, [facility]   Podiatric Medicine & Surgery

## 2018-08-07 ENCOUNTER — APPOINTMENT (OUTPATIENT)
Dept: MRI IMAGING | Age: 63
DRG: 580 | End: 2018-08-07
Payer: MEDICARE

## 2018-08-07 ENCOUNTER — APPOINTMENT (OUTPATIENT)
Dept: ULTRASOUND IMAGING | Age: 63
DRG: 580 | End: 2018-08-07
Payer: MEDICARE

## 2018-08-07 LAB
ABSOLUTE EOS #: 0.3 K/UL (ref 0–0.4)
ABSOLUTE IMMATURE GRANULOCYTE: ABNORMAL K/UL (ref 0–0.3)
ABSOLUTE LYMPH #: 2.3 K/UL (ref 1–4.8)
ABSOLUTE MONO #: 0.5 K/UL (ref 0.2–0.8)
ANION GAP SERPL CALCULATED.3IONS-SCNC: 11 MMOL/L (ref 9–17)
BASOPHILS # BLD: 0 % (ref 0–2)
BASOPHILS ABSOLUTE: 0 K/UL (ref 0–0.2)
BUN BLDV-MCNC: 19 MG/DL (ref 8–23)
BUN/CREAT BLD: 11 (ref 9–20)
CALCIUM SERPL-MCNC: 8.9 MG/DL (ref 8.6–10.4)
CHLORIDE BLD-SCNC: 99 MMOL/L (ref 98–107)
CO2: 22 MMOL/L (ref 20–31)
CREAT SERPL-MCNC: 1.67 MG/DL (ref 0.7–1.2)
DIFFERENTIAL TYPE: ABNORMAL
EOSINOPHILS RELATIVE PERCENT: 5 % (ref 1–4)
ESTIMATED AVERAGE GLUCOSE: 137 MG/DL
GFR AFRICAN AMERICAN: 51 ML/MIN
GFR NON-AFRICAN AMERICAN: 42 ML/MIN
GFR SERPL CREATININE-BSD FRML MDRD: ABNORMAL ML/MIN/{1.73_M2}
GFR SERPL CREATININE-BSD FRML MDRD: ABNORMAL ML/MIN/{1.73_M2}
GLUCOSE BLD-MCNC: 106 MG/DL (ref 75–110)
GLUCOSE BLD-MCNC: 109 MG/DL (ref 70–99)
GLUCOSE BLD-MCNC: 144 MG/DL (ref 75–110)
GLUCOSE BLD-MCNC: 161 MG/DL (ref 75–110)
GLUCOSE BLD-MCNC: 175 MG/DL (ref 75–110)
HBA1C MFR BLD: 6.4 % (ref 4–6)
HCT VFR BLD CALC: 33.2 % (ref 41–53)
HEMOGLOBIN: 11.1 G/DL (ref 13.5–17.5)
IMMATURE GRANULOCYTES: ABNORMAL %
LYMPHOCYTES # BLD: 34 % (ref 24–44)
MCH RBC QN AUTO: 30.8 PG (ref 26–34)
MCHC RBC AUTO-ENTMCNC: 33.3 G/DL (ref 31–37)
MCV RBC AUTO: 92.3 FL (ref 80–100)
MONOCYTES # BLD: 8 % (ref 1–7)
NRBC AUTOMATED: ABNORMAL PER 100 WBC
PDW BLD-RTO: 13.8 % (ref 11.5–14.5)
PLATELET # BLD: 223 K/UL (ref 130–400)
PLATELET ESTIMATE: ABNORMAL
PMV BLD AUTO: 7.2 FL (ref 6–12)
POTASSIUM SERPL-SCNC: 5 MMOL/L (ref 3.7–5.3)
POTASSIUM SERPL-SCNC: 5.4 MMOL/L (ref 3.7–5.3)
RBC # BLD: 3.6 M/UL (ref 4.5–5.9)
RBC # BLD: ABNORMAL 10*6/UL
SEG NEUTROPHILS: 53 % (ref 36–66)
SEGMENTED NEUTROPHILS ABSOLUTE COUNT: 3.5 K/UL (ref 1.8–7.7)
SODIUM BLD-SCNC: 132 MMOL/L (ref 135–144)
WBC # BLD: 6.6 K/UL (ref 3.5–11)
WBC # BLD: ABNORMAL 10*3/UL

## 2018-08-07 PROCEDURE — 2500000003 HC RX 250 WO HCPCS: Performed by: INTERNAL MEDICINE

## 2018-08-07 PROCEDURE — 82947 ASSAY GLUCOSE BLOOD QUANT: CPT

## 2018-08-07 PROCEDURE — 1200000000 HC SEMI PRIVATE

## 2018-08-07 PROCEDURE — 83036 HEMOGLOBIN GLYCOSYLATED A1C: CPT

## 2018-08-07 PROCEDURE — 2580000003 HC RX 258: Performed by: INTERNAL MEDICINE

## 2018-08-07 PROCEDURE — 6370000000 HC RX 637 (ALT 250 FOR IP): Performed by: INTERNAL MEDICINE

## 2018-08-07 PROCEDURE — 36415 COLL VENOUS BLD VENIPUNCTURE: CPT

## 2018-08-07 PROCEDURE — 6360000002 HC RX W HCPCS: Performed by: NURSE PRACTITIONER

## 2018-08-07 PROCEDURE — 51798 US URINE CAPACITY MEASURE: CPT

## 2018-08-07 PROCEDURE — 73718 MRI LOWER EXTREMITY W/O DYE: CPT

## 2018-08-07 PROCEDURE — 76775 US EXAM ABDO BACK WALL LIM: CPT

## 2018-08-07 PROCEDURE — 6370000000 HC RX 637 (ALT 250 FOR IP): Performed by: NURSE PRACTITIONER

## 2018-08-07 PROCEDURE — 84132 ASSAY OF SERUM POTASSIUM: CPT

## 2018-08-07 PROCEDURE — 85025 COMPLETE CBC W/AUTO DIFF WBC: CPT

## 2018-08-07 PROCEDURE — 80048 BASIC METABOLIC PNL TOTAL CA: CPT

## 2018-08-07 PROCEDURE — 99232 SBSQ HOSP IP/OBS MODERATE 35: CPT | Performed by: INTERNAL MEDICINE

## 2018-08-07 PROCEDURE — 6360000002 HC RX W HCPCS: Performed by: INTERNAL MEDICINE

## 2018-08-07 RX ORDER — HYDROCODONE BITARTRATE AND ACETAMINOPHEN 5; 325 MG/1; MG/1
2 TABLET ORAL EVERY 6 HOURS PRN
Status: DISCONTINUED | OUTPATIENT
Start: 2018-08-07 | End: 2018-08-10 | Stop reason: HOSPADM

## 2018-08-07 RX ORDER — DEXTROSE MONOHYDRATE 25 G/50ML
25 INJECTION, SOLUTION INTRAVENOUS ONCE
Status: COMPLETED | OUTPATIENT
Start: 2018-08-07 | End: 2018-08-07

## 2018-08-07 RX ORDER — DEXTROSE 25 % IN WATER 25 %
25 SYRINGE (ML) INTRAVENOUS ONCE
Status: DISCONTINUED | OUTPATIENT
Start: 2018-08-07 | End: 2018-08-07

## 2018-08-07 RX ADMIN — CEFTAROLINE FOSAMIL 600 MG: 600 POWDER, FOR SOLUTION INTRAVENOUS at 11:48

## 2018-08-07 RX ADMIN — ENOXAPARIN SODIUM 40 MG: 40 INJECTION SUBCUTANEOUS at 09:01

## 2018-08-07 RX ADMIN — TAZOBACTAM SODIUM AND PIPERACILLIN SODIUM 3.38 G: 375; 3 INJECTION, SOLUTION INTRAVENOUS at 07:28

## 2018-08-07 RX ADMIN — Medication 10 ML: at 10:36

## 2018-08-07 RX ADMIN — KETOROLAC TROMETHAMINE 30 MG: 30 INJECTION, SOLUTION INTRAMUSCULAR at 10:36

## 2018-08-07 RX ADMIN — DEXTROSE MONOHYDRATE 25 G: 25 INJECTION, SOLUTION INTRAVENOUS at 11:48

## 2018-08-07 RX ADMIN — INSULIN LISPRO 2 UNITS: 100 INJECTION, SOLUTION INTRAVENOUS; SUBCUTANEOUS at 13:14

## 2018-08-07 RX ADMIN — INSULIN LISPRO 1 UNITS: 100 INJECTION, SOLUTION INTRAVENOUS; SUBCUTANEOUS at 17:25

## 2018-08-07 RX ADMIN — Medication 10 ML: at 22:59

## 2018-08-07 RX ADMIN — INSULIN LISPRO 1 UNITS: 100 INJECTION, SOLUTION INTRAVENOUS; SUBCUTANEOUS at 21:47

## 2018-08-07 RX ADMIN — DEXTROSE MONOHYDRATE 25 G: 250 INJECTION, SOLUTION INTRAVENOUS at 12:40

## 2018-08-07 RX ADMIN — TAZOBACTAM SODIUM AND PIPERACILLIN SODIUM 3.38 G: 375; 3 INJECTION, SOLUTION INTRAVENOUS at 14:23

## 2018-08-07 RX ADMIN — KETOROLAC TROMETHAMINE 30 MG: 30 INJECTION, SOLUTION INTRAMUSCULAR at 01:26

## 2018-08-07 RX ADMIN — GABAPENTIN 900 MG: 300 CAPSULE ORAL at 09:01

## 2018-08-07 RX ADMIN — TAZOBACTAM SODIUM AND PIPERACILLIN SODIUM 3.38 G: 375; 3 INJECTION, SOLUTION INTRAVENOUS at 22:46

## 2018-08-07 RX ADMIN — GABAPENTIN 900 MG: 300 CAPSULE ORAL at 21:47

## 2018-08-07 RX ADMIN — ASPIRIN 81 MG 81 MG: 81 TABLET ORAL at 09:01

## 2018-08-07 RX ADMIN — VANCOMYCIN HYDROCHLORIDE 1500 MG: 5 INJECTION, POWDER, LYOPHILIZED, FOR SOLUTION INTRAVENOUS at 04:40

## 2018-08-07 RX ADMIN — HYDROCODONE BITARTRATE AND ACETAMINOPHEN 2 TABLET: 5; 325 TABLET ORAL at 22:46

## 2018-08-07 RX ADMIN — HYDROCODONE BITARTRATE AND ACETAMINOPHEN 2 TABLET: 5; 325 TABLET ORAL at 14:28

## 2018-08-07 RX ADMIN — INSULIN HUMAN 10 UNITS: 100 INJECTION, SOLUTION PARENTERAL at 11:48

## 2018-08-07 RX ADMIN — KETOROLAC TROMETHAMINE 30 MG: 30 INJECTION, SOLUTION INTRAMUSCULAR at 20:03

## 2018-08-07 RX ADMIN — OXYCODONE HYDROCHLORIDE AND ACETAMINOPHEN 2 TABLET: 5; 325 TABLET ORAL at 04:43

## 2018-08-07 RX ADMIN — GABAPENTIN 900 MG: 300 CAPSULE ORAL at 13:11

## 2018-08-07 RX ADMIN — SODIUM CHLORIDE: 4.5 INJECTION, SOLUTION INTRAVENOUS at 13:16

## 2018-08-07 ASSESSMENT — PAIN DESCRIPTION - FREQUENCY
FREQUENCY: CONTINUOUS

## 2018-08-07 ASSESSMENT — PAIN SCALES - GENERAL
PAINLEVEL_OUTOF10: 6
PAINLEVEL_OUTOF10: 7
PAINLEVEL_OUTOF10: 7
PAINLEVEL_OUTOF10: 5
PAINLEVEL_OUTOF10: 7
PAINLEVEL_OUTOF10: 0
PAINLEVEL_OUTOF10: 7
PAINLEVEL_OUTOF10: 7
PAINLEVEL_OUTOF10: 2
PAINLEVEL_OUTOF10: 5

## 2018-08-07 ASSESSMENT — PAIN DESCRIPTION - ORIENTATION
ORIENTATION: RIGHT

## 2018-08-07 ASSESSMENT — PAIN DESCRIPTION - DESCRIPTORS
DESCRIPTORS: ACHING

## 2018-08-07 ASSESSMENT — PAIN DESCRIPTION - LOCATION
LOCATION: FOOT

## 2018-08-07 ASSESSMENT — PAIN DESCRIPTION - ONSET
ONSET: ON-GOING

## 2018-08-07 ASSESSMENT — PAIN DESCRIPTION - PAIN TYPE
TYPE: ACUTE PAIN

## 2018-08-07 ASSESSMENT — PAIN DESCRIPTION - PROGRESSION
CLINICAL_PROGRESSION: NOT CHANGED
CLINICAL_PROGRESSION: NOT CHANGED

## 2018-08-07 NOTE — PROGRESS NOTES
0758 110/70 97.5 °F (36.4 °C) Oral 53 16 96 %     Average, Min, and Max for last 24 hours Vitals:  TEMPERATURE:  Temp  Av.8 °F (36.6 °C)  Min: 97.5 °F (36.4 °C)  Max: 98.1 °F (36.7 °C)    RESPIRATIONS RANGE: Resp  Av  Min: 14  Max: 18    PULSE RANGE: Pulse  Av.8  Min: 53  Max: 67    BLOOD PRESSURE RANGE:  Systolic (73JHZ), XXB:076 , Min:109 , SJ   ; Diastolic (52MRO), UJR:32, Min:60, Max:70      PULSE OXIMETRY RANGE: SpO2  Av.3 %  Min: 96 %  Max: 100 %    I/O last 3 completed shifts: In: 1669 [P.O.:360; I.V.:1309]  Out: 950 [Urine:950]    CBC:  Recent Labs      18   1530  18   0659  18   0806  18   0555   WBC  15.9*  12.6*  7.7  6.6   HGB  12.7*  11.7*  11.4*  11.1*   HCT  37.3*  35.3*  33.7*  33.2*   PLT  245  219  211  223   CRP  84.4*   --    --    --         BMP:  Recent Labs      18   1705  18   0806  18   0918   NA  135  133*  132*   K  4.3  4.6  5.4*   CL  102  101  99   CO2  24  22  22   BUN  22  21  19   CREATININE  1.49*  1.55*  1.67*   GLUCOSE  135*  159*  109*   CALCIUM  8.6  8.6  8.9        Coags:  No results for input(s): APTT, PROT, INR in the last 72 hours. Lab Results   Component Value Date    SEDRATE 45 (H) 2018     Lab Results   Component Value Date    CRP 84.4 (H) 2018       Bilateral Lower Extremity Physical Exam: Previous physical exam, patient off the floor  Vascular: DP and PT pulses are palpable +1/4. CFT <3 seconds to all digits. Hair growth is absent to the level of the digits. Nonpitting edema right foot .       Neuro: Saph/sural/SP/DP/plantar sensation diminished to light touch.     Musculoskeletal: Muscle strength is 5/5 to all lower extremity muscle groups left. Pt states it is too painful to do muscle test on Right. He does have increased . Gross deformity is present at the right midfoot 2/2 charcot neuroarthropathy.     Dermatologic: Diffuse cellulitis to medial right midfoot.    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 *       Plan     · Patient off the floor for MRI, will reattempt to see patient later today. · Patient has superficial abscess to the plantar right foot.    · F/u MRI   · Leukocytosis resolved   · F/u Cultures - GPC  · IV Abx: Continue Vanc/Zosyn   · Dressing to Right foot: Plain packing, DSD    · NWB to Right lower extremity  · Will discuss with Dr. Chandan Allred, [facility]   Podiatric Medicine & Surgery

## 2018-08-08 LAB
ANION GAP SERPL CALCULATED.3IONS-SCNC: 10 MMOL/L (ref 9–17)
BUN BLDV-MCNC: 19 MG/DL (ref 8–23)
BUN/CREAT BLD: 11 (ref 9–20)
CALCIUM SERPL-MCNC: 8.5 MG/DL (ref 8.6–10.4)
CHLORIDE BLD-SCNC: 103 MMOL/L (ref 98–107)
CO2: 22 MMOL/L (ref 20–31)
CREAT SERPL-MCNC: 1.66 MG/DL (ref 0.7–1.2)
GFR AFRICAN AMERICAN: 51 ML/MIN
GFR NON-AFRICAN AMERICAN: 42 ML/MIN
GFR SERPL CREATININE-BSD FRML MDRD: ABNORMAL ML/MIN/{1.73_M2}
GFR SERPL CREATININE-BSD FRML MDRD: ABNORMAL ML/MIN/{1.73_M2}
GLUCOSE BLD-MCNC: 126 MG/DL (ref 75–110)
GLUCOSE BLD-MCNC: 150 MG/DL (ref 75–110)
GLUCOSE BLD-MCNC: 209 MG/DL (ref 75–110)
GLUCOSE BLD-MCNC: 93 MG/DL (ref 75–110)
GLUCOSE BLD-MCNC: 94 MG/DL (ref 70–99)
POTASSIUM SERPL-SCNC: 5.1 MMOL/L (ref 3.7–5.3)
SODIUM BLD-SCNC: 135 MMOL/L (ref 135–144)

## 2018-08-08 PROCEDURE — 99232 SBSQ HOSP IP/OBS MODERATE 35: CPT | Performed by: INTERNAL MEDICINE

## 2018-08-08 PROCEDURE — 2580000003 HC RX 258: Performed by: INTERNAL MEDICINE

## 2018-08-08 PROCEDURE — 36415 COLL VENOUS BLD VENIPUNCTURE: CPT

## 2018-08-08 PROCEDURE — 6360000002 HC RX W HCPCS: Performed by: INTERNAL MEDICINE

## 2018-08-08 PROCEDURE — 2500000003 HC RX 250 WO HCPCS: Performed by: INTERNAL MEDICINE

## 2018-08-08 PROCEDURE — 1200000000 HC SEMI PRIVATE

## 2018-08-08 PROCEDURE — 80048 BASIC METABOLIC PNL TOTAL CA: CPT

## 2018-08-08 PROCEDURE — 6370000000 HC RX 637 (ALT 250 FOR IP): Performed by: INTERNAL MEDICINE

## 2018-08-08 PROCEDURE — 6370000000 HC RX 637 (ALT 250 FOR IP): Performed by: NURSE PRACTITIONER

## 2018-08-08 PROCEDURE — 82947 ASSAY GLUCOSE BLOOD QUANT: CPT

## 2018-08-08 PROCEDURE — 6360000002 HC RX W HCPCS: Performed by: NURSE PRACTITIONER

## 2018-08-08 RX ORDER — SODIUM CHLORIDE 9 MG/ML
INJECTION, SOLUTION INTRAVENOUS CONTINUOUS
Status: DISCONTINUED | OUTPATIENT
Start: 2018-08-08 | End: 2018-08-10 | Stop reason: HOSPADM

## 2018-08-08 RX ADMIN — HYDROCODONE BITARTRATE AND ACETAMINOPHEN 2 TABLET: 5; 325 TABLET ORAL at 20:43

## 2018-08-08 RX ADMIN — INSULIN LISPRO 4 UNITS: 100 INJECTION, SOLUTION INTRAVENOUS; SUBCUTANEOUS at 12:04

## 2018-08-08 RX ADMIN — HYDROCODONE BITARTRATE AND ACETAMINOPHEN 2 TABLET: 5; 325 TABLET ORAL at 08:18

## 2018-08-08 RX ADMIN — HYDROCODONE BITARTRATE AND ACETAMINOPHEN 2 TABLET: 5; 325 TABLET ORAL at 14:19

## 2018-08-08 RX ADMIN — GABAPENTIN 900 MG: 300 CAPSULE ORAL at 08:06

## 2018-08-08 RX ADMIN — TAZOBACTAM SODIUM AND PIPERACILLIN SODIUM 3.38 G: 375; 3 INJECTION, SOLUTION INTRAVENOUS at 13:28

## 2018-08-08 RX ADMIN — CEFTAROLINE FOSAMIL 600 MG: 600 POWDER, FOR SOLUTION INTRAVENOUS at 00:04

## 2018-08-08 RX ADMIN — INSULIN LISPRO 2 UNITS: 100 INJECTION, SOLUTION INTRAVENOUS; SUBCUTANEOUS at 16:43

## 2018-08-08 RX ADMIN — SODIUM CHLORIDE: 9 INJECTION, SOLUTION INTRAVENOUS at 16:44

## 2018-08-08 RX ADMIN — TAZOBACTAM SODIUM AND PIPERACILLIN SODIUM 3.38 G: 375; 3 INJECTION, SOLUTION INTRAVENOUS at 06:04

## 2018-08-08 RX ADMIN — ENOXAPARIN SODIUM 40 MG: 40 INJECTION SUBCUTANEOUS at 08:07

## 2018-08-08 RX ADMIN — CEFTAROLINE FOSAMIL 600 MG: 600 POWDER, FOR SOLUTION INTRAVENOUS at 12:04

## 2018-08-08 RX ADMIN — Medication 10 ML: at 08:06

## 2018-08-08 RX ADMIN — CEFTAROLINE FOSAMIL 600 MG: 600 POWDER, FOR SOLUTION INTRAVENOUS at 23:58

## 2018-08-08 RX ADMIN — ASPIRIN 81 MG 81 MG: 81 TABLET ORAL at 08:06

## 2018-08-08 RX ADMIN — SODIUM CHLORIDE: 4.5 INJECTION, SOLUTION INTRAVENOUS at 02:23

## 2018-08-08 RX ADMIN — GABAPENTIN 900 MG: 300 CAPSULE ORAL at 20:43

## 2018-08-08 RX ADMIN — GABAPENTIN 900 MG: 300 CAPSULE ORAL at 13:28

## 2018-08-08 RX ADMIN — KETOROLAC TROMETHAMINE 30 MG: 30 INJECTION, SOLUTION INTRAMUSCULAR at 13:34

## 2018-08-08 ASSESSMENT — PAIN SCALES - GENERAL
PAINLEVEL_OUTOF10: 5
PAINLEVEL_OUTOF10: 5
PAINLEVEL_OUTOF10: 6
PAINLEVEL_OUTOF10: 6
PAINLEVEL_OUTOF10: 7
PAINLEVEL_OUTOF10: 5

## 2018-08-08 ASSESSMENT — PAIN DESCRIPTION - ONSET: ONSET: ON-GOING

## 2018-08-08 ASSESSMENT — PAIN DESCRIPTION - FREQUENCY
FREQUENCY: CONTINUOUS
FREQUENCY: CONTINUOUS

## 2018-08-08 ASSESSMENT — PAIN DESCRIPTION - PAIN TYPE
TYPE: ACUTE PAIN
TYPE: ACUTE PAIN

## 2018-08-08 ASSESSMENT — PAIN DESCRIPTION - PROGRESSION: CLINICAL_PROGRESSION: NOT CHANGED

## 2018-08-08 ASSESSMENT — PAIN DESCRIPTION - ORIENTATION
ORIENTATION: RIGHT
ORIENTATION: RIGHT

## 2018-08-08 ASSESSMENT — PAIN DESCRIPTION - DESCRIPTORS
DESCRIPTORS: ACHING
DESCRIPTORS: ACHING;DISCOMFORT;SORE

## 2018-08-08 ASSESSMENT — PAIN DESCRIPTION - LOCATION
LOCATION: FOOT
LOCATION: FOOT

## 2018-08-08 NOTE — CONSULTS
(NICODERM CQ) 21 MG/24HR 1 patch Daily PRN   aspirin chewable tablet 81 mg Daily   gabapentin (NEURONTIN) capsule 900 mg TID   sodium chloride flush 0.9 % injection 10 mL 2 times per day   magnesium hydroxide (MILK OF MAGNESIA) 400 MG/5ML suspension 30 mL Daily PRN   enoxaparin (LOVENOX) injection 40 mg Daily   ondansetron (ZOFRAN-ODT) disintegrating tablet 4 mg Q6H PRN   Or    ondansetron (ZOFRAN) injection 4 mg Q6H PRN       Allergies:  Percocet [oxycodone-acetaminophen] and Dye [iodides]    Social History:   Social History     Social History    Marital status:      Spouse name: N/A    Number of children: N/A    Years of education: N/A     Occupational History    Not on file. Social History Main Topics    Smoking status: Current Every Day Smoker     Packs/day: 0.25    Smokeless tobacco: Never Used    Alcohol use No    Drug use: Yes     Frequency: 1.0 time per week    Sexual activity: Not on file     Other Topics Concern    Not on file     Social History Narrative    No narrative on file       Family History:   Family History   Problem Relation Age of Onset    Diabetes Mother     Cancer Father     Hypertension Maternal Grandmother        Review of Systems:    Constitutional: No fever, no chills, no night sweats, fatigue, generalized weakness, loss of appetite  HEENT:  No headache, otalgia, itchy eyes, epistaxis, nasal discharge or sore throat. Cardiac:  Mild intermittent chest pain, no dyspnea, orthopnea or PND, palpitations  Chest:  No cough, hemoptysis, pleuritic chest pain, wheezing,SOB  Abdomen:  No abdominal pain, nausea, vomiting, diarrhea, melena, dysphagia hematemesis,constipation, abdominal bloating, flank pain  Neuro:  No CVA, TIA or seizure like activity. Skin:   No rashes, no itching. :   No hematuria, no pyuria, no dysuria, no flank pain.   Extremities:  Right foot pain and swelling      Objective:  CURRENT TEMPERATURE:  Temp: 98.2 °F (36.8 °C)  MAXIMUM TEMPERATURE OVER 24HRS:  Temp (24hrs), Av.8 °F (36.6 °C), Min:97.6 °F (36.4 °C), Max:98.2 °F (36.8 °C)    CURRENT RESPIRATORY RATE:  Resp: 14  CURRENT PULSE:  Pulse: 54  CURRENT BLOOD PRESSURE:  BP: 103/67  24HR BLOOD PRESSURE RANGE:  Systolic (21HYC), DMR:556 , Min:99 , VCF:681   ; Diastolic (38TDN), ORZ:84, Min:48, Max:68    24HR INTAKE/OUTPUT:    Intake/Output Summary (Last 24 hours) at 18 1540  Last data filed at 18 1346   Gross per 24 hour   Intake             3673 ml   Output             2475 ml   Net             1198 ml     No data found. Physical Exam:  GENERAL APPEARANCE: Alert and cooperative, and appears to be in no acute distress. HEAD: normocephalic  EYES: PERRL, EOMI. Not pale, anicteric   NOSE:  No nasal discharge. THROAT:  Oral cavity and pharynx normal. Moist  CARDIAC: Normal S1 and S2. No S3, S4 or murmurs. Rhythm is regular. LUNGS: Clear to auscultation and percussion without rales, rhonchi, wheezing or diminished breath sounds. NECK: Neck supple, non-tender without lymphadenopathy, masses or thyromegaly. JVD-neg  BACK: Examination of the spine reveals normal gait and posture, no spinal deformity, symmetry of spinal muscles, without tenderness, decreased range of motion or muscular spasm  MUSKULOSKELETAL: Adequately aligned spine. No joint erythema or tenderness. EXTREMITIES: No edema.  Right foot swelling and tenderness dressing noted  NEURO:Nonfocal      Labs:   CBC:  Recent Labs      18   0806  18   0555   WBC  7.7  6.6   RBC  3.69*  3.60*   HGB  11.4*  11.1*   HCT  33.7*  33.2*   MCV  91.2  92.3   MCH  30.9  30.8   MCHC  33.9  33.3   RDW  13.9  13.8   PLT  211  223   MPV  7.4  7.2      BMP: Recent Labs      18   0806  18   0918  18   1522  18   0530   NA  133*  132*   --   135   K  4.6  5.4*  5.0  5.1   CL  101  99   --   103   CO2  22  22   --   22   BUN  21  19   --   19   CREATININE  1.55*  1.67*   --   1.66*   GLUCOSE  159*  109*   --   94 obstructing. 2. Type 2 diabetes non-insulin-dependent diabetes mellitus. 3.  diabetic neuropathy. 4. Essential hypertension  5. Chest pain with negative stress test       Plan:  1. Comprehensive urine testing including Urinalysis, Urine sodium, potassium, chloride, Urine protein and creatinine to quantify the proteinuria if present. Will check urinary eosinophils. 2. Post void bladder scan  3. DC IV Toradol avoid NSAIDs  4. Continue to hold lisinopril and metformin. 5. Continue IV fluids as ordered  6. Avoid hypotension. 7. Avoid other nephrotoxic agents avoid contrast  8. BMP in the morning      Thank you for the consultation.       Electronically signed by MD Neo on 8/8/2018 at 3:40 PM

## 2018-08-08 NOTE — PROGRESS NOTES
[facility]    Progress Note    8/8/2018    3:04 PM    Name:   Luna Waters  MRN:     [id number]     [place]:      [de-identified]   Room:   2006/2006-02  IP Day:  4  Admit Date:  8/4/2018  3:10 PM    PCP:   Dannie Bojorquez MD  Code Status:  Full Code    Subjective:     C/C:   Chief Complaint   Patient presents with    Chest Pain     x 1 day; intermittent;     Foot Pain     right     Interval History Status: improved. Continued pain in foot  No issues overnight  Ceftaroline continued at this time, tolerating well  Blood sugars well controlled    Brief History:     Mr Pablo Holbrook is a nice 59year old gentleman with history of diabetes, hypertension, extensive tobacco abuse, neuropathy with ulcers in the past. Reasonably active at home. Admitted with multiple symptoms. Main symptom of right leg swelling, pain, noted yesterday morning, evolved into bleb with pain. Also noted significantly worsened swelling. In the ER noted to have leukocytosis of 15. Also complained of chest pain, intermittent, central chest, for last two days that lasts for few minutes. No associated SOB, coughing, palpitations, fevers.      Review of Systems:     Constitutional:  negative for chills, fevers, sweats  Respiratory:  negative for cough, dyspnea on exertion, shortness of breath, wheezing  Cardiovascular: no chest pain last night  Gastrointestinal:  negative for abdominal pain, constipation, diarrhea, nausea, vomiting  Neurological:  negative for dizziness, headache    Medications: Allergies:     Allergies   Allergen Reactions    Percocet [Oxycodone-Acetaminophen]      Facial swelling    Dye [Iodides] Rash     Rash and swelling       Current Meds:   Scheduled Meds:    ceftaroline fosamil (TEFLARO) IVPB  600 mg Intravenous Q12H    0.9 % sodium chloride  250 mL Intravenous Once    insulin lispro  0-12 Units Subcutaneous TID     insulin lispro  0-6 Units Subcutaneous Nightly    nicotine  1 patch Transdermal Daily    aspirin  81 mg Oral Daily    gabapentin  900 mg Oral TID    sodium chloride flush  10 mL Intravenous 2 times per day    enoxaparin  40 mg Subcutaneous Daily    piperacillin-tazobactam  3.375 g Intravenous Q8H     Continuous Infusions:    sodium chloride 100 mL/hr at 18 0223    dextrose       PRN Meds: HYDROcodone 5 mg - acetaminophen, glucose, dextrose, glucagon (rDNA), dextrose, ketorolac, nicotine, magnesium hydroxide, ondansetron **OR** ondansetron    Data:     Past Medical History:   has a past medical history of ALEJANDRO (acute [facility]) ([facility] Charcot foot due to diabetes mellitus ([facility] Diabetic polyneuropathy associated with type 2 diabetes mellitus ([facility] Fractures, multiple; Hyperlipidemia; Hypertension; Neuropathy; Pneumonia; Tobacco abuse; Type II or unspecified type diabetes mellitus without mention of complication, not stated as uncontrolled; Vertigo; Wound, open; and Wound, open. Social History:   reports that he has been smoking. He has been smoking about 0.25 packs per day. He has never used smokeless tobacco. He reports that he uses drugs about 1 time per week. He reports that he does not drink alcohol. Family History:   Family History   Problem Relation Age of Onset    Diabetes Mother     Cancer Father     Hypertension Maternal Grandmother      Vitals:  /67   Pulse 54   Temp 98.2 °F (36.8 °C) (Oral)   Resp 14   Ht 6' (1.829 m)   Wt 230 lb (104.3 kg)   SpO2 97%   BMI 31.19 kg/m²   Temp (24hrs), Av.8 °F (36.6 °C), Min:97.6 °F (36.4 °C), Max:98.2 °F (36.8 °C)    Recent Labs      18   1617  18   2048  18   0530  18   1121   POCGLU  161*  144*  93  209*     I/O (24Hr):     Intake/Output Summary (Last 24 hours) at 18 1504  Last data filed at 18 1346   Gross per 24 hour   Intake             3673 ml   Output             2475 ml   Net             1198 ml     Labs:    Lab Results Component Value Date/Time    SPECIAL NOT REPORTED 08/04/2018 11:25 PM     Lab Results   Component Value Date/Time    CULTURE (A) 08/04/2018 11:25 PM     METHICILLIN RESISTANT STAPHYLOCOCCUS AUREUS MODERATE GROWTH    CULTURE NO ANAEROBIC ORGANISMS ISOLATED AT 2 DAYS (A) 08/04/2018 11:25 PM     Physical Examination:        General appearance:  alert, cooperative and no distress  Mental Status:  oriented to person, place and time and normal affect  Lungs:  clear to auscultation bilaterally, normal effort  Heart:  regular rate and rhythm, no murmur  Abdomen:  soft, nontender, nondistended, normal bowel sounds, no masses, hepatomegaly, splenomegaly  Extremities:  Dressing noted in foot, discussed with podiatry team  Skin:  As above    Assessment:        Primary Problem  Abscess of right foot    Active Hospital Problems    Diagnosis Date Noted    [facility] [N17.9] 08/05/2018    Cellulitis of left leg [L03.116] 08/04/2018    Abscess of right foot [L02.611] 08/04/2018    Chest pain at rest [R07.9] 08/04/2018    Tobacco abuse [Z72.0] 08/04/2018    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 MRSA with resistance to tetracycline. Ceftaroline continued, would stop zosyn at this time. Possible IV home antibiotic, await ID evaluation  - Chest pain, negative stress test  - ALEJANDRO; with hyperkalemia: potassium improved. Likely related to vancomycin.  Stopped drug yesterday  - Hyperkalemia, improved now  - Tobacco abuse  - Well controlled diabetes, change fluids to half normal saline      Gabe Morales MD  8/8/2018  3:04 PM

## 2018-08-08 NOTE — PLAN OF CARE
Problem: Falls - Risk of:  Goal: Will remain free from falls  Will remain free from falls   Outcome: Met This Shift  Patient is a fall risk during this admission. Fall risk assessment was performed. Patient is absent of falls. Bed is in the lowest position. Wheels on the bed are locked. Call light and bed side table are within reach. Clutter is removed. Patient was educated to call out when needing assistance or wanting to get out of bed. Patient offered toileting assistance during rounding. Hourly rounds have been performed.        Problem: Skin Integrity:  Goal: Demonstration of wound healing without infection will improve  Demonstration of wound healing without infection will improve   Outcome: Ongoing  Wound healing patient receiving dressing changes per Pod. IV antibiotics, ID consult

## 2018-08-08 NOTE — PLAN OF CARE
Problem: Falls - Risk of:  Goal: Absence of physical injury  Absence of physical injury   Outcome: Ongoing  Patient ambulatory in room with a steady gait    Problem: Pain:  Goal: Control of acute pain  Control of acute pain   Outcome: Ongoing  Pain in foot

## 2018-08-08 NOTE — PROGRESS NOTES
history and other test results may increase or decrease the risk   stratification reported for this examination. Risk stratification criteria are adapted from \"Noninvasive Risk   Stratification\" criteria from [employer]. Al,   ACC/AATS/AHA/ASE/ASNC/SCAI/SCCT/STS 2017 Appropriate Use Criteria For   Coronary Revascularization in Patients With Stable Ischemic Heart Disease   [facility] Volume 69, Issue 17, May 2017      High risk (>3% annual death or MI)      1. Severe resting LV dysfunction (LVEF >35%) not readily explained by non   coronary causes      2. Resting perfusion abnormalities greater than 10% of the myocardium in   patients without prior history or evidence of MI      3. Stress-induced perfusion abnormalities encumbering greater than or equal   to 10% myocardium or stress segmental scores indicating multiple vascular   territories with abnormalities      4. Stress-induced LV dilatation (TID ratio greater than 1.19 for exercise   and greater than 1.39 for regadenoson)      Intermediate risk (1% to 3% annual death or MI)      1. Mild/moderate resting LV dysfunction (LVEF 35% to 49%) not readily   explained by non coronary causes. 2.  Resting perfusion abnormalities in 5%-9.9% of the myocardium in patients   without a history or prior evidence of MI      3. Stress-induced perfusion abnormality encumbering 5%-9.9% of the   myocardium or stress segmental scores indicating 1 vascular territory with   abnormalities but without LV dilation      4. Small wall motion abnormality involving 1-2 segments and only 1 coronary   bed. Low Risk (Less than 1% annual death or MI)      1. Normal or small myocardial perfusion defect at rest or with stress   encumbering less than 5% of the myocardium. XR CHEST PORTABLE   Final Result   1. Minimal interstitial edema. XR FOOT RIGHT (MIN 3 VIEWS)   Final Result   1.  Diffuse soft tissues swelling with focal lucency involving the distal   aspect of the

## 2018-08-09 ENCOUNTER — APPOINTMENT (OUTPATIENT)
Dept: INTERVENTIONAL RADIOLOGY/VASCULAR | Age: 63
DRG: 580 | End: 2018-08-09
Payer: MEDICARE

## 2018-08-09 LAB
ANION GAP SERPL CALCULATED.3IONS-SCNC: 10 MMOL/L (ref 9–17)
ANION GAP SERPL CALCULATED.3IONS-SCNC: 8 MMOL/L (ref 9–17)
BILIRUBIN URINE: NEGATIVE
BUN BLDV-MCNC: 19 MG/DL (ref 8–23)
BUN/CREAT BLD: 11 (ref 9–20)
CALCIUM SERPL-MCNC: 8.4 MG/DL (ref 8.6–10.4)
CHLORIDE BLD-SCNC: 103 MMOL/L (ref 98–107)
CHLORIDE BLD-SCNC: 106 MMOL/L (ref 98–107)
CO2: 23 MMOL/L (ref 20–31)
CO2: 23 MMOL/L (ref 20–31)
COLOR: YELLOW
COMMENT UA: NORMAL
CREAT SERPL-MCNC: 1.8 MG/DL (ref 0.7–1.2)
CREATININE URINE: 85.6 MG/DL (ref 39–259)
EOSINOPHIL,URINE: NORMAL
GFR AFRICAN AMERICAN: 47 ML/MIN
GFR NON-AFRICAN AMERICAN: 38 ML/MIN
GFR SERPL CREATININE-BSD FRML MDRD: ABNORMAL ML/MIN/{1.73_M2}
GFR SERPL CREATININE-BSD FRML MDRD: ABNORMAL ML/MIN/{1.73_M2}
GLUCOSE BLD-MCNC: 100 MG/DL (ref 75–110)
GLUCOSE BLD-MCNC: 104 MG/DL (ref 75–110)
GLUCOSE BLD-MCNC: 110 MG/DL (ref 70–99)
GLUCOSE BLD-MCNC: 133 MG/DL (ref 75–110)
GLUCOSE BLD-MCNC: 167 MG/DL (ref 75–110)
GLUCOSE URINE: NEGATIVE
KETONES, URINE: NEGATIVE
LEUKOCYTE ESTERASE, URINE: NEGATIVE
NITRITE, URINE: NEGATIVE
PH UA: 5.5 (ref 5–8)
POTASSIUM SERPL-SCNC: 5 MMOL/L (ref 3.7–5.3)
POTASSIUM SERPL-SCNC: 5.4 MMOL/L (ref 3.7–5.3)
PROTEIN UA: NEGATIVE
SODIUM BLD-SCNC: 136 MMOL/L (ref 135–144)
SODIUM BLD-SCNC: 137 MMOL/L (ref 135–144)
SODIUM,UR: 60 MMOL/L
SPECIFIC GRAVITY UA: 1.02 (ref 1–1.03)
TURBIDITY: CLEAR
URINE HGB: NEGATIVE
UROBILINOGEN, URINE: NORMAL

## 2018-08-09 PROCEDURE — 36415 COLL VENOUS BLD VENIPUNCTURE: CPT

## 2018-08-09 PROCEDURE — 82570 ASSAY OF URINE CREATININE: CPT

## 2018-08-09 PROCEDURE — 2580000003 HC RX 258: Performed by: INTERNAL MEDICINE

## 2018-08-09 PROCEDURE — 81003 URINALYSIS AUTO W/O SCOPE: CPT

## 2018-08-09 PROCEDURE — 6370000000 HC RX 637 (ALT 250 FOR IP): Performed by: NURSE PRACTITIONER

## 2018-08-09 PROCEDURE — 82947 ASSAY GLUCOSE BLOOD QUANT: CPT

## 2018-08-09 PROCEDURE — 84300 ASSAY OF URINE SODIUM: CPT

## 2018-08-09 PROCEDURE — 2580000003 HC RX 258: Performed by: NURSE PRACTITIONER

## 2018-08-09 PROCEDURE — C1751 CATH, INF, PER/CENT/MIDLINE: HCPCS

## 2018-08-09 PROCEDURE — 1200000000 HC SEMI PRIVATE

## 2018-08-09 PROCEDURE — 6370000000 HC RX 637 (ALT 250 FOR IP): Performed by: INTERNAL MEDICINE

## 2018-08-09 PROCEDURE — 80051 ELECTROLYTE PANEL: CPT

## 2018-08-09 PROCEDURE — 6360000002 HC RX W HCPCS: Performed by: INTERNAL MEDICINE

## 2018-08-09 PROCEDURE — 87205 SMEAR GRAM STAIN: CPT

## 2018-08-09 PROCEDURE — 99232 SBSQ HOSP IP/OBS MODERATE 35: CPT | Performed by: INTERNAL MEDICINE

## 2018-08-09 PROCEDURE — 36569 INSJ PICC 5 YR+ W/O IMAGING: CPT

## 2018-08-09 PROCEDURE — 99222 1ST HOSP IP/OBS MODERATE 55: CPT | Performed by: INTERNAL MEDICINE

## 2018-08-09 PROCEDURE — 76937 US GUIDE VASCULAR ACCESS: CPT

## 2018-08-09 PROCEDURE — 80048 BASIC METABOLIC PNL TOTAL CA: CPT

## 2018-08-09 RX ORDER — HEPARIN SODIUM 5000 [USP'U]/ML
5000 INJECTION, SOLUTION INTRAVENOUS; SUBCUTANEOUS EVERY 12 HOURS SCHEDULED
Status: DISCONTINUED | OUTPATIENT
Start: 2018-08-09 | End: 2018-08-10 | Stop reason: HOSPADM

## 2018-08-09 RX ORDER — SODIUM CHLORIDE 0.9 % (FLUSH) 0.9 %
10 SYRINGE (ML) INJECTION EVERY 12 HOURS SCHEDULED
Status: DISCONTINUED | OUTPATIENT
Start: 2018-08-09 | End: 2018-08-09 | Stop reason: SDUPTHER

## 2018-08-09 RX ORDER — SODIUM CHLORIDE 0.9 % (FLUSH) 0.9 %
10 SYRINGE (ML) INJECTION EVERY 12 HOURS SCHEDULED
Status: DISCONTINUED | OUTPATIENT
Start: 2018-08-09 | End: 2018-08-10 | Stop reason: HOSPADM

## 2018-08-09 RX ORDER — LIDOCAINE HYDROCHLORIDE 10 MG/ML
5 INJECTION, SOLUTION EPIDURAL; INFILTRATION; INTRACAUDAL; PERINEURAL ONCE
Status: DISCONTINUED | OUTPATIENT
Start: 2018-08-09 | End: 2018-08-10 | Stop reason: HOSPADM

## 2018-08-09 RX ORDER — DIPHENHYDRAMINE HCL 25 MG
25 TABLET ORAL EVERY 6 HOURS PRN
Status: DISCONTINUED | OUTPATIENT
Start: 2018-08-09 | End: 2018-08-10 | Stop reason: HOSPADM

## 2018-08-09 RX ORDER — SODIUM CHLORIDE 0.9 % (FLUSH) 0.9 %
10 SYRINGE (ML) INJECTION PRN
Status: DISCONTINUED | OUTPATIENT
Start: 2018-08-09 | End: 2018-08-09 | Stop reason: SDUPTHER

## 2018-08-09 RX ORDER — SODIUM POLYSTYRENE SULFONATE 15 G/60ML
15 SUSPENSION ORAL; RECTAL ONCE
Status: COMPLETED | OUTPATIENT
Start: 2018-08-09 | End: 2018-08-09

## 2018-08-09 RX ORDER — SODIUM CHLORIDE 0.9 % (FLUSH) 0.9 %
10 SYRINGE (ML) INJECTION PRN
Status: DISCONTINUED | OUTPATIENT
Start: 2018-08-09 | End: 2018-08-10 | Stop reason: HOSPADM

## 2018-08-09 RX ADMIN — GABAPENTIN 900 MG: 300 CAPSULE ORAL at 15:53

## 2018-08-09 RX ADMIN — HYDROCODONE BITARTRATE AND ACETAMINOPHEN 2 TABLET: 5; 325 TABLET ORAL at 02:37

## 2018-08-09 RX ADMIN — CEFTAROLINE FOSAMIL 600 MG: 600 POWDER, FOR SOLUTION INTRAVENOUS at 12:34

## 2018-08-09 RX ADMIN — HYDROCODONE BITARTRATE AND ACETAMINOPHEN 2 TABLET: 5; 325 TABLET ORAL at 08:43

## 2018-08-09 RX ADMIN — SODIUM POLYSTYRENE SULFONATE 15 G: 15 SUSPENSION ORAL; RECTAL at 08:43

## 2018-08-09 RX ADMIN — SODIUM CHLORIDE, PRESERVATIVE FREE 10 ML: 5 INJECTION INTRAVENOUS at 22:06

## 2018-08-09 RX ADMIN — ASPIRIN 81 MG 81 MG: 81 TABLET ORAL at 08:34

## 2018-08-09 RX ADMIN — HYDROCODONE BITARTRATE AND ACETAMINOPHEN 2 TABLET: 5; 325 TABLET ORAL at 22:01

## 2018-08-09 RX ADMIN — SODIUM CHLORIDE: 9 INJECTION, SOLUTION INTRAVENOUS at 03:33

## 2018-08-09 RX ADMIN — HYDROCODONE BITARTRATE AND ACETAMINOPHEN 2 TABLET: 5; 325 TABLET ORAL at 15:53

## 2018-08-09 RX ADMIN — INSULIN LISPRO 1 UNITS: 100 INJECTION, SOLUTION INTRAVENOUS; SUBCUTANEOUS at 22:01

## 2018-08-09 RX ADMIN — GABAPENTIN 900 MG: 300 CAPSULE ORAL at 08:34

## 2018-08-09 RX ADMIN — HEPARIN SODIUM 5000 UNITS: 5000 INJECTION INTRAVENOUS; SUBCUTANEOUS at 22:01

## 2018-08-09 RX ADMIN — GABAPENTIN 900 MG: 300 CAPSULE ORAL at 22:00

## 2018-08-09 RX ADMIN — SODIUM CHLORIDE: 9 INJECTION, SOLUTION INTRAVENOUS at 16:38

## 2018-08-09 RX ADMIN — DEXTROSE MONOHYDRATE 100 ML/HR: 50 INJECTION, SOLUTION INTRAVENOUS at 12:34

## 2018-08-09 RX ADMIN — HEPARIN SODIUM 5000 UNITS: 5000 INJECTION INTRAVENOUS; SUBCUTANEOUS at 08:42

## 2018-08-09 ASSESSMENT — PAIN DESCRIPTION - ORIENTATION
ORIENTATION: RIGHT
ORIENTATION: RIGHT

## 2018-08-09 ASSESSMENT — PAIN DESCRIPTION - DESCRIPTORS
DESCRIPTORS: ACHING;DISCOMFORT;SORE
DESCRIPTORS: ACHING;DISCOMFORT;SORE

## 2018-08-09 ASSESSMENT — PAIN SCALES - GENERAL
PAINLEVEL_OUTOF10: 7
PAINLEVEL_OUTOF10: 4
PAINLEVEL_OUTOF10: 6

## 2018-08-09 ASSESSMENT — PAIN DESCRIPTION - LOCATION
LOCATION: FOOT
LOCATION: FOOT

## 2018-08-09 ASSESSMENT — ENCOUNTER SYMPTOMS
SHORTNESS OF BREATH: 0
COLOR CHANGE: 1
NAUSEA: 1
RESPIRATORY NEGATIVE: 1
EYES NEGATIVE: 1
ALLERGIC/IMMUNOLOGIC NEGATIVE: 1

## 2018-08-09 ASSESSMENT — PAIN DESCRIPTION - FREQUENCY
FREQUENCY: CONTINUOUS
FREQUENCY: CONTINUOUS

## 2018-08-09 ASSESSMENT — PAIN DESCRIPTION - PAIN TYPE: TYPE: ACUTE PAIN

## 2018-08-09 NOTE — CONSULTS
this patient. Please call with questions. Zane Salinas MD  Office: [phone]    This note is created with the assistance of a speech recognition program.  While intending to generate a document that actually reflects the content of the visit, the document can still have some errors including those of syntax and sound a like substitutions which may escape proof reading. It such instances, actual meaning can be extrapolated by contextual diversion.

## 2018-08-09 NOTE — PROGRESS NOTES
swelling    Dye [Iodides] Rash     Rash and swelling     Current Meds:   Scheduled Meds:    heparin (porcine)  5,000 Units Subcutaneous 2 times per day    lidocaine 1 % injection  5 mL Intradermal Once    sodium chloride flush  10 mL Intravenous 2 times per day    lidocaine 1 % injection  5 mL Intradermal Once    ceftaroline fosamil (TEFLARO) IVPB  600 mg Intravenous Q12H    0.9 % sodium chloride  250 mL Intravenous Once    insulin lispro  0-12 Units Subcutaneous TID WC    insulin lispro  0-6 Units Subcutaneous Nightly    nicotine  1 patch Transdermal Daily    aspirin  81 mg Oral Daily    gabapentin  900 mg Oral TID    sodium chloride flush  10 mL Intravenous 2 times per day     Continuous Infusions:    sodium chloride 75 mL/hr at 08/09/18 0333    dextrose 100 mL/hr (08/09/18 1234)     PRN Meds: sodium chloride flush, HYDROcodone 5 mg - acetaminophen, glucose, dextrose, glucagon (rDNA), dextrose, nicotine, magnesium hydroxide, ondansetron **OR** ondansetron    Data:     Past Medical History:   has a past medical history of ALEJANDRO [facility]) ([facility] Charcot foot due to diabetes mellitus ([facility] Diabetic polyneuropathy associated with type 2 diabetes mellitus ([facility] Fractures, multiple; Hyperlipidemia; Hypertension; Neuropathy; Pneumonia; Tobacco abuse; Type II or unspecified type diabetes mellitus without mention of complication, not stated as uncontrolled; Vertigo; Wound, open; and Wound, open. Social History:   reports that he has been smoking. He has been smoking about 0.25 packs per day. He has never used smokeless tobacco. He reports that he uses drugs about 1 time per week. He reports that he does not drink alcohol.      Family History:   Family History   Problem Relation Age of Onset    Diabetes Mother     Cancer Father     Hypertension Maternal Grandmother      Vitals:  /60   Pulse 52   Temp 98.1 °F (36.7 °C) (Oral)   Resp 16   Ht 6' (1.829 m)   Wt 230 lb (104.3 kg) SpO2 98%   BMI 31.19 kg/m²   Temp (24hrs), Av.7 °F (36.5 °C), Min:97.3 °F (36.3 °C), Max:98.1 °F (36.7 °C)    Recent Labs      18   1607  18   2039  18   0604  18   1110   POCGLU  150*  126*  100  133*     I/O (24Hr): Intake/Output Summary (Last 24 hours) at 18 1421  Last data filed at 18 1229   Gross per 24 hour   Intake             2040 ml   Output             1200 ml   Net              840 ml     Labs:    Lab Results   Component Value Date/Time    SPECIAL NOT REPORTED 2018 11:25 PM     Lab Results   Component Value Date/Time    CULTURE (A) 2018 11:25 PM     METHICILLIN RESISTANT STAPHYLOCOCCUS AUREUS MODERATE GROWTH    CULTURE NO ANAEROBIC ORGANISMS ISOLATED AT 3 DAYS (A) 2018 11:25 PM     Physical Examination:        General appearance:  alert, cooperative and no distress  Mental Status:  oriented to person, place and time and normal affect  Lungs:  clear to auscultation bilaterally, normal effort  Heart:  regular rate and rhythm, no murmur  Abdomen:  soft, nontender, nondistended, normal bowel sounds, no masses, hepatomegaly, splenomegaly  Extremities: right foot area noted with some redness and swelling, much improved since admission  Skin:  As above    Assessment:        Primary Problem  Abscess of right foot    Active Hospital Problems    Diagnosis Date Noted    Bandemia [D72.825]     [facility] [N17.9] 2018    Cellulitis of left leg [L03.116] 2018    Abscess of right foot [L02.611] 2018    Chest pain at rest [R07.9] 2018    Tobacco abuse [Z72.0] 2018    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 MRSA.  Ceftaroline continued, discussed with ID team, if insurance approves, ceftarolin would be best course of action, if not possibly levofloxacin.  - Chest pain, negative stress test  - ALEJANDRO; with hyperkalemia: potassium improved. Likely related to vancomycin and also Toradol.  Creatinine at 1.7 today, possible discharge home next 24 hours depending on creatinine levels in am  - Tobacco abuse  - Well controlled diabetes, change fluids to half normal saline      Maru Arriaga MD  8/9/2018  2:21 PM

## 2018-08-09 NOTE — PLAN OF CARE
Problem: Falls - Risk of:  Goal: Will remain free from falls  Will remain free from falls   Outcome: Met This Shift  Patient is a fall risk during this admission. Fall risk assessment was performed. Patient is absent of falls. Bed is in the lowest position. Wheels on the bed are locked. Call light and bed side table are within reach. Clutter is removed. Patient was educated to call out when needing assistance or wanting to get out of bed. Patient offered toileting assistance during rounding. Hourly rounds have been performed. Problem: Pain:  Goal: Control of acute pain  Control of acute pain   Outcome: Ongoing  Patient continue with pain to right foot.  Patient takes Norco prn

## 2018-08-09 NOTE — PROGRESS NOTES
Progress Note  Podiatric Medicine and Surgery     Subjective     CC: R foot cellulitis and abscess    Patient seen and examined at bedside. No acute events overnight  Afebrile, VS stable        HPI :  Alfreda Shi is a 59 y.o. male seen at bedside for evaluation of right foot cellulitis and abscess. He states he noticed increased redness, swelling, and pain to the right foot starting 2 days ago. He states he follows with Dr. Norberto Gaines and recently completed a course of antibiotics this Monday. States his wife noticed a small raised black dot that has been icnreasing in size and appears to have fluid in it. He has a history of charcot neuroarthropathy and diabetic foot ulceration to the right foot. Hx of previous left foot surgery which is healed. States he wears diabetic shoes. PCP is Akin Barrera MD    ROS: Denies N/V/F/C/SOB/CP. Otherwise negative except at stated in the HPI.      Medications:  Scheduled Meds:   heparin (porcine)  5,000 Units Subcutaneous 2 times per day    ceftaroline fosamil (TEFLARO) IVPB  600 mg Intravenous Q12H    0.9 % sodium chloride  250 mL Intravenous Once    insulin lispro  0-12 Units Subcutaneous TID WC    insulin lispro  0-6 Units Subcutaneous Nightly    nicotine  1 patch Transdermal Daily    aspirin  81 mg Oral Daily    gabapentin  900 mg Oral TID    sodium chloride flush  10 mL Intravenous 2 times per day       Continuous Infusions:   sodium chloride 75 mL/hr at 18 0333    dextrose         PRN Meds:HYDROcodone 5 mg - acetaminophen, glucose, dextrose, glucagon (rDNA), dextrose, nicotine, magnesium hydroxide, ondansetron **OR** ondansetron    Objective     Vitals:  Patient Vitals for the past 8 hrs:   BP Temp Temp src Pulse Resp SpO2   18 0748 135/60 98.1 °F (36.7 °C) Oral 52 16 98 %     Average, Min, and Max for last 24 hours Vitals:  TEMPERATURE:  Temp  Av.7 °F (36.5 °C)  Min: 97.3 °F (36.3 °C)  Max: 98.1 °F (36.7 °C)    RESPIRATIONS RANGE: Resp Av  Min: 16  Max: 18    PULSE RANGE: Pulse  Av  Min: 52  Max: 62    BLOOD PRESSURE RANGE:  Systolic (59EPA), TPO:478 , Min:117 , YS   ; Diastolic (81PXB), QMH:86, Min:60, Max:63      PULSE OXIMETRY RANGE: SpO2  Av.5 %  Min: 97 %  Max: 98 %    I/O last 3 completed shifts: In: 4335 [P.O.:480; I.V.:]  Out: 1950 [Urine:1950]    CBC:  Recent Labs      18   0555   WBC  6.6   HGB  11.1*   HCT  33.2*   PLT  223        BMP:  Recent Labs      18   0918  18   1522  18   0530  18   0526   NA  132*   --   135  137   K  5.4*  5.0  5.1  5.4*   CL  99   --   103  106   CO2  22   --   22  23   BUN  19   --   19  19   CREATININE  1.67*   --   1.66*  1.80*   GLUCOSE  109*   --   94  110*   CALCIUM  8.9   --   8.5*  8.4*        Coags:  No results for input(s): APTT, PROT, INR in the last 72 hours. Lab Results   Component Value Date    SEDRATE 45 (H) 2018     Lab Results   Component Value Date    CRP 84.4 (H) 2018       Bilateral Lower Extremity Physical Exam:   Vascular: DP and PT pulses are palpable +1/4. CFT <3 seconds to all digits. Hair growth is absent to the level of the digits. Nonpitting edema right foot .       Neuro: Saph/sural/SP/DP/plantar sensation diminished to light touch.     Musculoskeletal: Muscle strength is 5/5 to all lower extremity muscle groups left. Pt states it is too painful to do muscle test on Right. He does have increased . Gross deformity is present at the right midfoot 2/2 charcot neuroarthropathy.     Dermatologic:   Full thickness ulcer #1 located to right plantar medial midfoot and measures approximately 0.2 cm x 0.3cm x 2.5cm. Base is mixed fibrogranular and is deep to level of muscle. Periwound skin is macerated. Scant amount of serous drainage noted with no associated mal odor. Erythema to medial and plantar right foot with associated increase in warmth- improved. Does not probe to bone, sinus track, or undermine.  No fluctuance, crepitus, or induration. Interdigital maceration absent. Imaging:   US RETROPERITONEAL LIMITED   Final Result   Probable nonobstructive lower pole right renal calculus. Otherwise   unremarkable bilateral renal ultrasound. MRI FOOT RIGHT WO CONTRAST   Final Result   1. Ulcer with associated susceptibility artifact subjacent to the distal 1st   metatarsal diaphysis at the medial plantar forefoot which may be related to a   recent incision and drainage and may contain packing material.   2. Ulcer at the medial plantar base of the midfoot with subjacent fluid   containing sinus tract and associated phlegmonous changes without discrete   well organized abscess formation identified at this point. Findings at this   point suggest phlegmonous changes. 3. Findings consistent with Charcot arthropathy/neuropathic foot about the   tarsometatarsal joints. 4. No clear MR evidence for osteomyelitis. 5. Severe cellulitis of the dorsum of the foot. Mild cellulitis of the   remainder of the foot. Myositis of the visualized intertarsal musculature. The findings were sent to the Radiology Results [address] at 11:45   am on 8/7/2018to be communicated to a licensed caregiver. NM Myocardial Spect Rest Exercise or Rx   Final Result   1. No definitive scintigraphic evidence for reversible ischemia or infarct. 2. Left ventricular ejection fraction of 62%. 3.  Please see report for EKG portion of the examination which will be   performed separately by physician from cardiology. Risk stratification:  Low risk      Note:  Risk stratification incorporates both clinical history and test   results. Final risk determination is the responsibility of the ordering   provider as history and other test results may increase or decrease the risk   stratification reported for this examination.       Risk stratification criteria are adapted from \"Noninvasive Risk   Stratification\" midfoot. Cultures: MRSA    Assessment   Elsa Subramanian is a 59 y.o. male with   1. Abscess with cellulitis, right foot- improving  2. Diabetic foot ulcer, right midfoot (Yanes grade 2)   3. Charcot neuroarthropathy, right foot   4. S/p bedside I&D 8/4/18  5. 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 *       Plan     · Patient examined and evaluated at bedside  · Medical management per primary team   · IV Abx: Ceftaroline/Zosyn. ID consult- abx recommendations  · Nephrology consulted- persistent elevation of creatinine  · Dressing to Right foot: Plain packing, DSD    · NWB to Right lower extremity  · No acute surgical intervention planned from podiatry standpoint. [id number] Ena Joya for discharge from podiatry standpoint. Will need daily dressing changes with plain packing, DSD to right foot. F/up with Dr. Tyson Anderson within 1 week of discharge.    · Will discuss with Dr. Horace Marrero, [place]   Podiatric Medicine & Surgery

## 2018-08-10 VITALS
OXYGEN SATURATION: 95 % | DIASTOLIC BLOOD PRESSURE: 67 MMHG | SYSTOLIC BLOOD PRESSURE: 136 MMHG | BODY MASS INDEX: 31.15 KG/M2 | HEART RATE: 55 BPM | TEMPERATURE: 97.6 F | HEIGHT: 72 IN | WEIGHT: 230 LBS | RESPIRATION RATE: 18 BRPM

## 2018-08-10 LAB
ANION GAP SERPL CALCULATED.3IONS-SCNC: 10 MMOL/L (ref 9–17)
BUN BLDV-MCNC: 17 MG/DL (ref 8–23)
BUN/CREAT BLD: 11 (ref 9–20)
CALCIUM SERPL-MCNC: 9.1 MG/DL (ref 8.6–10.4)
CHLORIDE BLD-SCNC: 105 MMOL/L (ref 98–107)
CO2: 22 MMOL/L (ref 20–31)
CREAT SERPL-MCNC: 1.53 MG/DL (ref 0.7–1.2)
CULTURE: NORMAL
CULTURE: NORMAL
GFR AFRICAN AMERICAN: 56 ML/MIN
GFR NON-AFRICAN AMERICAN: 46 ML/MIN
GFR SERPL CREATININE-BSD FRML MDRD: ABNORMAL ML/MIN/{1.73_M2}
GFR SERPL CREATININE-BSD FRML MDRD: ABNORMAL ML/MIN/{1.73_M2}
GLUCOSE BLD-MCNC: 107 MG/DL (ref 75–110)
GLUCOSE BLD-MCNC: 95 MG/DL (ref 75–110)
GLUCOSE BLD-MCNC: 97 MG/DL (ref 70–99)
GLUCOSE BLD-MCNC: 99 MG/DL (ref 75–110)
Lab: NORMAL
Lab: NORMAL
POTASSIUM SERPL-SCNC: 5 MMOL/L (ref 3.7–5.3)
SODIUM BLD-SCNC: 137 MMOL/L (ref 135–144)
SPECIMEN DESCRIPTION: NORMAL
SPECIMEN DESCRIPTION: NORMAL
STATUS: NORMAL
STATUS: NORMAL

## 2018-08-10 PROCEDURE — 6360000002 HC RX W HCPCS: Performed by: INTERNAL MEDICINE

## 2018-08-10 PROCEDURE — 80048 BASIC METABOLIC PNL TOTAL CA: CPT

## 2018-08-10 PROCEDURE — 2580000003 HC RX 258: Performed by: INTERNAL MEDICINE

## 2018-08-10 PROCEDURE — 36415 COLL VENOUS BLD VENIPUNCTURE: CPT

## 2018-08-10 PROCEDURE — 99239 HOSP IP/OBS DSCHRG MGMT >30: CPT | Performed by: INTERNAL MEDICINE

## 2018-08-10 PROCEDURE — 6370000000 HC RX 637 (ALT 250 FOR IP): Performed by: INTERNAL MEDICINE

## 2018-08-10 PROCEDURE — 82947 ASSAY GLUCOSE BLOOD QUANT: CPT

## 2018-08-10 RX ORDER — HYDROCODONE BITARTRATE AND ACETAMINOPHEN 5; 325 MG/1; MG/1
2 TABLET ORAL EVERY 6 HOURS PRN
Qty: 14 TABLET | Refills: 0 | Status: SHIPPED | OUTPATIENT
Start: 2018-08-10 | End: 2018-08-17

## 2018-08-10 RX ADMIN — GABAPENTIN 900 MG: 300 CAPSULE ORAL at 08:33

## 2018-08-10 RX ADMIN — HYDROCODONE BITARTRATE AND ACETAMINOPHEN 2 TABLET: 5; 325 TABLET ORAL at 04:51

## 2018-08-10 RX ADMIN — CEFTAROLINE FOSAMIL 600 MG: 600 POWDER, FOR SOLUTION INTRAVENOUS at 13:19

## 2018-08-10 RX ADMIN — SODIUM CHLORIDE: 9 INJECTION, SOLUTION INTRAVENOUS at 07:01

## 2018-08-10 RX ADMIN — ASPIRIN 81 MG 81 MG: 81 TABLET ORAL at 08:33

## 2018-08-10 RX ADMIN — CEFTAROLINE FOSAMIL 600 MG: 600 POWDER, FOR SOLUTION INTRAVENOUS at 00:45

## 2018-08-10 RX ADMIN — HYDROCODONE BITARTRATE AND ACETAMINOPHEN 2 TABLET: 5; 325 TABLET ORAL at 11:04

## 2018-08-10 RX ADMIN — GABAPENTIN 900 MG: 300 CAPSULE ORAL at 13:19

## 2018-08-10 RX ADMIN — HEPARIN SODIUM 5000 UNITS: 5000 INJECTION INTRAVENOUS; SUBCUTANEOUS at 08:34

## 2018-08-10 ASSESSMENT — PAIN DESCRIPTION - ORIENTATION: ORIENTATION: RIGHT

## 2018-08-10 ASSESSMENT — PAIN SCALES - GENERAL
PAINLEVEL_OUTOF10: 4
PAINLEVEL_OUTOF10: 3
PAINLEVEL_OUTOF10: 7
PAINLEVEL_OUTOF10: 6

## 2018-08-10 ASSESSMENT — PAIN DESCRIPTION - PAIN TYPE: TYPE: ACUTE PAIN

## 2018-08-10 ASSESSMENT — PAIN DESCRIPTION - LOCATION: LOCATION: FOOT

## 2018-08-10 ASSESSMENT — PAIN DESCRIPTION - FREQUENCY: FREQUENCY: CONTINUOUS

## 2018-08-10 ASSESSMENT — PAIN DESCRIPTION - DESCRIPTORS: DESCRIPTORS: ACHING;DISCOMFORT

## 2018-08-10 NOTE — PROGRESS NOTES
stratification criteria are adapted from \"Noninvasive Risk   Stratification\" criteria from [employer] [facility]. Al,   ACC/AATS/AHA/ASE/ASNC/SCAI/SCCT/STS 2017 Appropriate Use Criteria For   Coronary Revascularization in Patients With Stable Ischemic Heart Disease   [facility] Volume 69, Issue 17, May 2017      High risk (>3% annual death or MI)      1. Severe resting LV dysfunction (LVEF >35%) not readily explained by non   coronary causes      2. Resting perfusion abnormalities greater than 10% of the myocardium in   patients without prior history or evidence of MI      3. Stress-induced perfusion abnormalities encumbering greater than or equal   to 10% myocardium or stress segmental scores indicating multiple vascular   territories with abnormalities      4. Stress-induced LV dilatation (TID ratio greater than 1.19 for exercise   and greater than 1.39 for regadenoson)      Intermediate risk (1% to 3% annual death or MI)      1. Mild/moderate resting LV dysfunction (LVEF 35% to 49%) not readily   explained by non coronary causes. 2.  Resting perfusion abnormalities in 5%-9.9% of the myocardium in patients   without a history or prior evidence of MI      3. Stress-induced perfusion abnormality encumbering 5%-9.9% of the   myocardium or stress segmental scores indicating 1 vascular territory with   abnormalities but without LV dilation      4. Small wall motion abnormality involving 1-2 segments and only 1 coronary   bed. Low Risk (Less than 1% annual death or MI)      1. Normal or small myocardial perfusion defect at rest or with stress   encumbering less than 5% of the myocardium. XR CHEST PORTABLE   Final Result   1. Minimal interstitial edema. XR FOOT RIGHT (MIN 3 VIEWS)   Final Result   1. Diffuse soft tissues swelling with focal lucency involving the distal   aspect of the 1st proximal phalanx. Osteomyelitis not excluded given history   of ulceration at the great toe.   MRI of the forefoot could be obtained for   further evaluation. 2. Charcot arthropathy of the midfoot. Cultures: MRSA    Assessment   Alfreda Shi is a 59 y.o. male with   1. Abscess with cellulitis, right foot- improving  2. Diabetic foot ulcer, right midfoot (Yanes grade 2)   3. Charcot neuroarthropathy, right foot   4. S/p bedside I&D 8/4/18  5. 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 *       Plan     · Patient examined and evaluated at bedside  · Medical management per primary team   · IV Abx: Ceftaroline/Zosyn per ID- received midline. · Nephrology on board  · Dressing to Right foot: Plain packing, DSD    · NWB to Right lower extremity  · No acute surgical intervention planned from podiatry standpoint. [id number] Ena Joya for discharge from podiatry standpoint. Will need daily dressing changes with plain packing, DSD to right foot. F/up with Dr. Norberto Gaines within 1 week of discharge.    · Will discuss with Dr. Jodi Cortez, [place]   Podiatric Medicine & Surgery

## 2018-08-10 NOTE — PROGRESS NOTES
[facility]    Progress Note    8/10/2018    8:50 AM    Name:   Genoveva Joy  MRN:     [id number]     [place]:      [de-identified]   Room:   2006/2006-02  IP Day:  6  Admit Date:  8/4/2018  3:10 PM    PCP:   Patty Gu MD  Code Status:  Full Code    Subjective:     C/C:   Chief Complaint   Patient presents with    Chest Pain     x 1 day; intermittent;     Foot Pain     right       Interval History Status: Improving    Patient feels better today. Plan for discharge with home wound care and close podiatry follow up. I reviewed new lab results, imaging, and vitals summary from the past 24 hours. Also, I spoke with the RN caring for patient. Also, I reviewed all nursing/consult/specialty notes and addressed any concerns that may have been brought to light by Consultants, RNs, , or Social Workers. Brief History:     Mr Virgilio Piedra is a nice 59year old gentleman with history of diabetes, hypertension, extensive tobacco abuse, neuropathy with ulcers in the past. Reasonably active at home. Admitted with multiple symptoms. Main symptom of right leg swelling, pain, noted yesterday morning, evolved into bleb with pain. Also noted significantly worsened swelling. In the ER noted to have leukocytosis of 15. Also complained of chest pain, intermittent, central chest, for last two days that lasts for few minutes. No associated SOB, coughing, palpitations, fevers. Seen by podiatry with need for incision and drainage of foot at bedside. MRI showed no deep abscess but charcot changes mid foot. Cultures positive for MRSA. Also has stress test that showed no ischemia. Worsened creatinine from baseline normal to 1.6, likely attributed from vancomycin along with IV toradol.  Awaiting improvement    Review of Systems:     Negative except for those highlighted:    CONSTITUTIONAL:  fevers, chills, sweats, fatigue, weight loss, generalized weakness  HEENT: Vision changes, hearing changes, runny nose, throat pain  RESPIRATORY:  shortness of breath, cough, congestion, wheezing. CARDIOVASCULAR:  chest pain, palpitations  GASTROINTESTINAL:  nausea, vomiting, diarrhea, constipation, change in bowel habits, abdominal pain   GENITOURINARY:  difficulty of urination, burning with urination, frequency   INTEGUMENT:  rash, skin lesions, easy bruising   HEMATOLOGIC/LYMPHATIC:  swelling/edema   ALLERGIC/IMMUNOLOGIC:  urticaria , itching  ENDOCRINE:  increase in drinking, increase in urination, hot or cold intolerance  MUSCULOSKELETAL:  Right leg swelling and pain  NEUROLOGICAL:  headaches, dizziness, lightheadedness, numbness, pain, tingling extremities  BEHAVIOR/PSYCH:  depression, anxiety    Medications: Allergies:     Allergies   Allergen Reactions    Percocet [Oxycodone-Acetaminophen]      Facial swelling    Dye [Iodides] Rash     Rash and swelling       Current Meds:   Scheduled Meds:    heparin (porcine)  5,000 Units Subcutaneous 2 times per day    lidocaine 1 % injection  5 mL Intradermal Once    sodium chloride flush  10 mL Intravenous 2 times per day    lidocaine 1 % injection  5 mL Intradermal Once    ceftaroline fosamil (TEFLARO) IVPB  600 mg Intravenous Q12H    0.9 % sodium chloride  250 mL Intravenous Once    insulin lispro  0-12 Units Subcutaneous TID WC    insulin lispro  0-6 Units Subcutaneous Nightly    nicotine  1 patch Transdermal Daily    aspirin  81 mg Oral Daily    gabapentin  900 mg Oral TID    sodium chloride flush  10 mL Intravenous 2 times per day     Continuous Infusions:    sodium chloride 75 mL/hr at 08/10/18 0701    dextrose 100 mL/hr (08/09/18 1234)     PRN Meds: sodium chloride flush, diphenhydrAMINE, HYDROcodone 5 mg - acetaminophen, glucose, dextrose, glucagon (rDNA), dextrose, magnesium hydroxide, ondansetron **OR** ondansetron    Data:     Past Medical History:   has a past medical history of ALEJANDRO (acute kidney injury) ([facility] 2018  Radiology exam is complete. No Radiologist dictation. Please follow up with ordering provider. Us Retroperitoneal Limited    Result Date: 2018  EXAMINATION: ULTRASOUND OF THE KIDNEYS 2018 12:51 pm COMPARISON: None. HISTORY: ORDERING SYSTEM PROVIDED HISTORY: BLADDER OUTLET SYMPTOMS, BPH SUSPECT TECHNOLOGIST PROVIDED HISTORY: Ordering Physician Provided Reason for Exam: elevated creatinine FINDINGS: The right kidney measures 9.9 cm and length with renal cortical thickness of 15 mm. The left kidney measures 10.9 cm in length with cortical thickness of 15 mm. Renal parenchymal echogenicity is maintained with no hydronephrosis. No cystic or solid renal mass. Small echogenic focus in the lower pole of the right kidney, likely representing a nonobstructive calculus. Probable nonobstructive lower pole right renal calculus. Otherwise unremarkable bilateral renal ultrasound. Physical Examination:        /67   Pulse 55   Temp 97.6 °F (36.4 °C) (Oral)   Resp 18   Ht 6' (1.829 m)   Wt 230 lb (104.3 kg)   SpO2 95%   BMI 31.19 kg/m²   Temp (24hrs), Av.9 °F (36.6 °C), Min:97.6 °F (36.4 °C), Max:98.1 °F (36.7 °C)    Recent Labs      18   1110  18   1630  18   2152  08/10/18   0626   POCGLU  133*  104  167*  107       Intake/Output Summary (Last 24 hours) at 08/10/18 0850  Last data filed at 18 2207   Gross per 24 hour   Intake              939 ml   Output             2275 ml   Net            -1336 ml       General Appearance:  alert, well appearing, and in no acute distress  Mental status: oriented to person, place, and time with normal affect  Head:  normocephalic, atraumatic.   Eye: no icterus, redness, pupils equal and reactive, extraocular eye movements intact, conjunctiva clear  Ear: normal external ear, no discharge, hearing intact  Nose:  no drainage noted  Mouth: mucous membranes moist  Neck: supple, no carotid bruits, thyroid not

## 2018-08-10 NOTE — PROGRESS NOTES
Department of Internal Medicine  Nephrology [facility], MD    Progress Note        SUBJECTIVE: This is a 59 y.o. male with history of diabetes 2 non-insulin-dependent diabetes mellitus, essential hypertension, extensive tobacco abuse smokes half pack per day, neuropathy with ulcers in the past. Reasonably active at home. Patient admitted with with multiple symptoms. Patient noticed right foot swelling on Friday evening, with foot pain, evolved into bleb. Patient also been feeling intermittent chest pain, intermittent, central chest, for last two days that lasts for few minutes. No associated SOB, coughing, palpitations, fevers. On initial investigation lab work showed elevated WBCs, except for showed diffuse soft tissue swelling and focal lucency involving the distal aspect of the first proximal phalanx. He underwent MRI but there was no clear evidence of possible cellulitis. Wound cultures positive for MRSA blood cultures are no growth  serum creatinine was 1.1 mg/dL on admission 8/4/2018. Serum creatinine increased to 1.5 mg/dL on the 8/5/2018. Most recent serum creatinine was plateaued at 1.6 mg/dL. Renal ultrasound showed nonobstructive lower pole right renal calculus no hydronephrosis otherwise unremarkable renal ultrasound. Patient was taking naproxen 2 tablets twice a day for few years and he was also receiving IV Toradol as well as lisinopril. Which was discontinued due to worsening kidney function. Patient also received IV vancomycin and Vancomycin trough levels were slightly elevated to 26     Interval history: Today, patient Not feeling good, complains of nausea decrease in appetite and loose stools. He does not have shortness of breath or chest pain.   He continues to have pain and swelling in the right foot  Serum creatinine has plateaued at 1.5 mg/dL    Physical Exam:    VITALS:  /67   Pulse 55   Temp 97.6 °F (36.4 °C) (Oral)   Resp 18   Ht 6' (1.829 m)   [facility]

## 2018-08-10 NOTE — PROGRESS NOTES
The pt was discharged to home. The discharge papers were given and reviewed with the pt and his wife. He was escorted to the front door per wheelchair in stable condition.

## 2018-08-10 NOTE — PLAN OF CARE
Problem: Falls - Risk of:  Goal: Will remain free from falls  Will remain free from falls   Outcome: Ongoing      Problem: Pain:  Goal: Pain level will decrease  Pain level will decrease   Outcome: Ongoing      Problem: Skin Integrity:  Goal: Demonstration of wound healing without infection will improve  Demonstration of wound healing without infection will improve   Outcome: Ongoing

## 2018-08-11 LAB
CULTURE: ABNORMAL
CULTURE: ABNORMAL
DIRECT EXAM: ABNORMAL
DIRECT EXAM: ABNORMAL
Lab: ABNORMAL
ORGANISM: ABNORMAL
ORGANISM: ABNORMAL
SPECIMEN DESCRIPTION: ABNORMAL
STATUS: ABNORMAL

## 2018-08-12 NOTE — DISCHARGE SUMMARY
Lab Results   Component Value Date    CHOL 132 01/09/2017    TRIG 128 01/09/2017    HDL 37 01/09/2017     U/A:    Lab Results   Component Value Date    COLORU YELLOW 08/08/2018    TURBIDITY CLEAR 08/08/2018    SPECGRAV 1.020 08/08/2018    HGBUR NEGATIVE 08/08/2018    PHUR 5.5 08/08/2018    PROTEINU NEGATIVE 08/08/2018    GLUCOSEU NEGATIVE 08/08/2018    KETUA NEGATIVE 08/08/2018    BILIRUBINUR NEGATIVE 08/08/2018    UROBILINOGEN Normal 08/08/2018    NITRU NEGATIVE 08/08/2018    LEUKOCYTESUR NEGATIVE 08/08/2018       Radiology:    Xr Foot Right (min 3 Views)    Result Date: 8/4/2018  EXAMINATION: 3 XRAY VIEWS OF THE RIGHT FOOT 8/4/2018 3:29 pm COMPARISON: None. HISTORY: ORDERING SYSTEM PROVIDED HISTORY: diabetic ulcer, redness, warmth, TECHNOLOGIST PROVIDED HISTORY: Reason for exam:->diabetic ulcer, redness, warmth, Ordering Physician Provided Reason for Exam: new ulcerative sore on bottom of right foot below grt toe Acuity: Unknown Type of Exam: Unknown Relevant Medical/Surgical History: diabetes  charcot foot  HTN FINDINGS: Three views of the right foot demonstrate destructive changes of the tarsal metatarsal articulations with loss of joint space and malalignment of the 1st and 2nd tarsometatarsal joints compatible with underlying Charcot arthropathy and disruption of the Lisfranc joint there are mild-to-moderate osteoarthritic changes of the 1st MTP joint. There is lucency noted at the distal aspect of the 1st proximal phalanx medially. Osteomyelitis cannot be excluded given history of ulceration of the great toe. There is diffuse soft tissue swelling. 1. Diffuse soft tissues swelling with focal lucency involving the distal aspect of the 1st proximal phalanx. Osteomyelitis not excluded given history of ulceration at the great toe. MRI of the forefoot could be obtained for further evaluation. 2. Charcot arthropathy of the midfoot.      Xr Chest Portable    Result Date: 8/4/2018  EXAMINATION: SINGLE XRAY VIEW OF THE CHEST 8/4/2018 3:40 pm COMPARISON: January 20, 2009 HISTORY: ORDERING SYSTEM PROVIDED HISTORY: Chest Pain TECHNOLOGIST PROVIDED HISTORY: Reason for exam:->Chest Pain Ordering Physician Provided Reason for Exam: chest pain Acuity: Acute Type of Exam: Initial Relevant Medical/Surgical History: HTN, diabetes FINDINGS: Frontal view of the chest demonstrates no lines or tubes. Stable cardiomediastinal silhouette. The lungs show mild vascular prominence in the mid and lower lung zones. No pneumothorax. No pulmonary edema. No dense consolidation or pleural effusion. No acute osseous abnormality. 1. Minimal interstitial edema. Mri Foot Right Wo Contrast    Result Date: 8/7/2018  EXAMINATION: MRI OF THE RIGHT FOOT WITHOUT CONTRAST, 8/7/2018 8:45 am TECHNIQUE: Multiplanar multisequence MRI of the right foot was performed without the administration of intravenous contrast. COMPARISON: Plain radiographs of the right foot from 08/04/2018 HISTORY: ORDERING SYSTEM PROVIDED HISTORY: abscess and cellulitis right foot, s/p bedside I&D TECHNOLOGIST PROVIDED HISTORY: Ordering Physician Provided Reason for Exam: Pt to ER 8/4/18 with open foot wound to right foot. Chronic right foot pain. Cellulitis and abscess, increased redness and swelling. Acuity: Chronic A 61-year-old male with abscess and cellulitis of the right foot status post bedside incision and drainage; open wound to the right foot, chronic right foot pain, cellulitis and abscess with increased redness and swelling of the right foot FINDINGS: LISFRANC JOINT: A normal Lisfranc ligament complex is not identified. BONE MARROW: Moderate to severe degenerative changes including erosive changes, marrow edema, subcortical cystic changes, and loss of the normal tarsometatarsal articulations are seen about the 1st through 5th tarsal metatarsal joints raising concern for Charcot arthropathy/neuropathic foot.  No definite marrow edema are osseous destruction subjacent to the ulcers to suggest acute osteomyelitis. GREATER AND LESSER MTP JOINTS: Mild degenerative changes of the 1st MTP/ MTS joints. SOFT TISSUES: Moderate to severe edema and soft tissue swelling of the dorsum of the forefoot. Mild edema throughout the remainder of the visualized subcutaneous fat. Ulcer at the medial plantar base of the midfoot on image 22, series 10 with a subjacent fluid containing sinus tract and associated phlegmonous changes on image 10, series 8 and image 22, series 10. No discrete well-organized abscess formation identified in this location. There is an ulcer with associated susceptibility artifact subjacent to the distal 1st metatarsal diaphysis at the medial and plantar aspect of the forefoot on image 24, series 9 and image 18, series 8. There may be packing material within this lesion related to recent incision and drainage. Moderate diffuse edema throughout the visualized intertarsal musculature. No discrete organized fluid collection is identified. TENDONS:  The visualized peroneal, flexor, and extensor tendons appear grossly intact without evidence of tearing or tenosynovitis. 1. Ulcer with associated susceptibility artifact subjacent to the distal 1st metatarsal diaphysis at the medial plantar forefoot which may be related to a recent incision and drainage and may contain packing material. 2. Ulcer at the medial plantar base of the midfoot with subjacent fluid containing sinus tract and associated phlegmonous changes without discrete well organized abscess formation identified at this point. Findings at this point suggest phlegmonous changes. 3. Findings consistent with Charcot arthropathy/neuropathic foot about the tarsometatarsal joints. 4. No clear MR evidence for osteomyelitis. 5. Severe cellulitis of the dorsum of the foot. Mild cellulitis of the remainder of the foot. Myositis of the visualized intertarsal musculature.  The findings were sent to the Radiology Results Communication Center at 11:45 am on 8/7/2018to be communicated to a licensed caregiver. Nm Myocardial Spect Rest Exercise Or Rx    Result Date: 8/6/2018  EXAMINATION: MYOCARDIAL PERFUSION IMAGING 8/6/2018 2:08 pm TECHNIQUE: Rest dose:  40.3 mCi Tc-99m sestamibi intravenously Stress dose:  16.3  mCi Tc-99m sestamibi intravenously Under cardiology supervision, 0.4 mg Lexiscan was infused intravenously prior to injection of the stress dose. SPECT imaging was acquired following injection of the sestamibi. ECG gating was obtained following the stress acquisition. COMPARISON: None Available. HISTORY: ORDERING SYSTEM PROVIDED HISTORY: chest pain TECHNOLOGIST PROVIDED HISTORY: Procedure Type->Rx Ordering Physician Provided Reason for Exam: cp Acuity: Unknown Type of Exam: Unknown 55-year-old male with chest pain, numbness, nausea, sweating, underlying diabetes FINDINGS: The patient achieved a maximum heart rate of 90 beats per minute, 56 % of the maximum age predicted heart rate of 158 beats per minute. Perfusion: There is no scintigraphic evidence for a reversible or fixed perfusion defect to suggest reversible ischemia or infarct. Function: The gated SPECT data demonstrates normal left ventricular size and normal wall motion. Left ventricular ejection fraction:  62% TID score:  1.07  (Threshold value of 1.39 is used for Lexiscan stress with Tc-99m). There is no stress-induced cavitary dilatation to suggest compensated triple vessel disease. End diastolic volume:  532UM Scores are visually adjusted to account for potential artifact. Summed stress score:  0 Summed rest score:  0 Summed reversibility score:  0     1. No definitive scintigraphic evidence for reversible ischemia or infarct. 2. Left ventricular ejection fraction of 62%. 3.  Please see report for EKG portion of the examination which will be performed separately by physician from cardiology.  Risk stratification:  Low risk Note:  Risk stratification incorporates both clinical history and test results. Final risk determination is the responsibility of the ordering provider as history and other test results may increase or decrease the risk stratification reported for this examination. Risk stratification criteria are adapted from \"Noninvasive Risk Stratification\" criteria from Rita Jacob. Al, ACC/AATS/AHA/ASE/ASNC/SCAI/SCCT/STS 2017 Appropriate Use Criteria For Coronary Revascularization in Patients With Stable Ischemic Heart Disease [facility] Volume 69, Issue 17, May 2017 High risk (>3% annual death or MI) 1. Severe resting LV dysfunction (LVEF >35%) not readily explained by non coronary causes 2. Resting perfusion abnormalities greater than 10% of the myocardium in patients without prior history or evidence of MI 3. Stress-induced perfusion abnormalities encumbering greater than or equal to 10% myocardium or stress segmental scores indicating multiple vascular territories with abnormalities 4. Stress-induced LV dilatation (TID ratio greater than 1.19 for exercise and greater than 1.39 for regadenoson) Intermediate risk (1% to 3% annual death or MI) 1. Mild/moderate resting LV dysfunction (LVEF 35% to 49%) not readily explained by non coronary causes. 2.  Resting perfusion abnormalities in 5%-9.9% of the myocardium in patients without a history or prior evidence of MI 3. Stress-induced perfusion abnormality encumbering 5%-9.9% of the myocardium or stress segmental scores indicating 1 vascular territory with abnormalities but without LV dilation 4. Small wall motion abnormality involving 1-2 segments and only 1 coronary bed. Low Risk (Less than 1% annual death or MI) 1. Normal or small myocardial perfusion defect at rest or with stress encumbering less than 5% of the myocardium. Ir Ultrasound Guidance Vascular Access    Result Date: 8/9/2018  Radiology exam is complete. No Radiologist dictation. Please follow up with ordering provider.      [facility] podiatry, ID, PCP    Diet: cardiac diet, diabetic diet    Activity: As tolerated    Instructions to Patient: F/U with PCP, Podiatry, ID    Discharge Medications:      Medication List      START taking these medications    ceftaroline fosamil 600 MG Solr  Commonly known as:  TEFLARO  Infuse 600 mg intravenously every 12 hours     HYDROcodone-acetaminophen 5-325 MG per tablet  Commonly known as:  NORCO  Take 2 tablets by mouth every 6 hours as needed for Pain for up to 7 days. .        Nia Ramírez taking these medications    aspirin 81 MG tablet     gabapentin 300 MG capsule  Commonly known as:  NEURONTIN     glipiZIDE 10 MG tablet  Commonly known as:  GLUCOTROL     metFORMIN 500 MG tablet  Commonly known as:  GLUCOPHAGE        STOP taking these medications    lisinopril 5 MG tablet  Commonly known as:  PRINIVIL;ZESTRIL     naproxen 500 MG tablet  Commonly known as:  NAPROSYN     sildenafil 100 MG tablet  Commonly known as:  VIAGRA     simvastatin 40 MG tablet  Commonly known as:  ZOCOR           Where to Get Your Medications      You can get these medications from any pharmacy    Bring a paper prescription for each of these medications  · ceftaroline fosamil 600 MG Solr  · HYDROcodone-acetaminophen 5-325 MG per tablet         Time Spent on discharge is  31 mins in patient examination, evaluation, counseling as well as medication reconciliation, prescriptions for required medications, discharge plan and follow up. Electronically signed by   Malina Sorenson MD  8/11/2018  11:28 PM      Thank you Dr. Luis Murphy MD for the opportunity to be involved in this patient's care.

## 2018-08-13 ENCOUNTER — TELEPHONE (OUTPATIENT)
Dept: INFECTIOUS DISEASES | Age: 63
End: 2018-08-13

## 2018-08-16 ENCOUNTER — OFFICE VISIT (OUTPATIENT)
Dept: PODIATRY | Age: 63
End: 2018-08-16
Payer: MEDICARE

## 2018-08-16 VITALS — RESPIRATION RATE: 16 BRPM | WEIGHT: 230 LBS | HEIGHT: 68 IN | BODY MASS INDEX: 34.86 KG/M2

## 2018-08-16 DIAGNOSIS — E11.51 TYPE II DIABETES MELLITUS WITH PERIPHERAL CIRCULATORY DISORDER (HCC): ICD-10-CM

## 2018-08-16 DIAGNOSIS — L97.512 RIGHT FOOT ULCER, WITH FAT LAYER EXPOSED (HCC): ICD-10-CM

## 2018-08-16 DIAGNOSIS — I73.9 PVD (PERIPHERAL VASCULAR DISEASE) (HCC): ICD-10-CM

## 2018-08-16 DIAGNOSIS — L02.611 ABSCESS OF RIGHT FOOT: ICD-10-CM

## 2018-08-16 PROCEDURE — 3017F COLORECTAL CA SCREEN DOC REV: CPT | Performed by: PODIATRIST

## 2018-08-16 PROCEDURE — 11042 DBRDMT SUBQ TIS 1ST 20SQCM/<: CPT | Performed by: PODIATRIST

## 2018-08-16 PROCEDURE — 1111F DSCHRG MED/CURRENT MED MERGE: CPT | Performed by: PODIATRIST

## 2018-08-16 PROCEDURE — G8427 DOCREV CUR MEDS BY ELIG CLIN: HCPCS | Performed by: PODIATRIST

## 2018-08-16 PROCEDURE — 3044F HG A1C LEVEL LT 7.0%: CPT | Performed by: PODIATRIST

## 2018-08-16 PROCEDURE — G8417 CALC BMI ABV UP PARAM F/U: HCPCS | Performed by: PODIATRIST

## 2018-08-16 PROCEDURE — 4004F PT TOBACCO SCREEN RCVD TLK: CPT | Performed by: PODIATRIST

## 2018-08-16 PROCEDURE — 2022F DILAT RTA XM EVC RTNOPTHY: CPT | Performed by: PODIATRIST

## 2018-08-30 ENCOUNTER — OFFICE VISIT (OUTPATIENT)
Dept: PODIATRY | Age: 63
End: 2018-08-30

## 2018-08-30 VITALS — HEIGHT: 68 IN | RESPIRATION RATE: 16 BRPM | WEIGHT: 230 LBS | BODY MASS INDEX: 34.86 KG/M2

## 2018-08-30 DIAGNOSIS — Z98.890 POST-OPERATIVE STATE: ICD-10-CM

## 2018-08-30 DIAGNOSIS — E11.51 TYPE II DIABETES MELLITUS WITH PERIPHERAL CIRCULATORY DISORDER (HCC): Primary | ICD-10-CM

## 2018-08-30 DIAGNOSIS — E11.610 CHARCOT FOOT DUE TO DIABETES MELLITUS (HCC): ICD-10-CM

## 2018-08-30 DIAGNOSIS — I73.9 PVD (PERIPHERAL VASCULAR DISEASE) (HCC): ICD-10-CM

## 2018-08-30 PROCEDURE — 99024 POSTOP FOLLOW-UP VISIT: CPT | Performed by: PODIATRIST

## 2018-09-10 ENCOUNTER — OFFICE VISIT (OUTPATIENT)
Dept: INFECTIOUS DISEASES | Age: 63
End: 2018-09-10
Payer: MEDICARE

## 2018-09-10 VITALS
HEART RATE: 64 BPM | BODY MASS INDEX: 32.06 KG/M2 | TEMPERATURE: 98.3 F | SYSTOLIC BLOOD PRESSURE: 123 MMHG | DIASTOLIC BLOOD PRESSURE: 72 MMHG | WEIGHT: 229 LBS | HEIGHT: 71 IN

## 2018-09-10 DIAGNOSIS — L02.611 ABSCESS OF RIGHT FOOT: ICD-10-CM

## 2018-09-10 DIAGNOSIS — L08.9 DIABETIC INFECTION OF RIGHT FOOT (HCC): Primary | ICD-10-CM

## 2018-09-10 DIAGNOSIS — E11.628 DIABETIC INFECTION OF RIGHT FOOT (HCC): Primary | ICD-10-CM

## 2018-09-10 DIAGNOSIS — L08.9 RIGHT FOOT INFECTION: ICD-10-CM

## 2018-09-10 PROCEDURE — 99213 OFFICE O/P EST LOW 20 MIN: CPT | Performed by: INTERNAL MEDICINE

## 2018-09-10 PROCEDURE — 4004F PT TOBACCO SCREEN RCVD TLK: CPT | Performed by: INTERNAL MEDICINE

## 2018-09-10 PROCEDURE — 2022F DILAT RTA XM EVC RTNOPTHY: CPT | Performed by: INTERNAL MEDICINE

## 2018-09-10 PROCEDURE — G8427 DOCREV CUR MEDS BY ELIG CLIN: HCPCS | Performed by: INTERNAL MEDICINE

## 2018-09-10 PROCEDURE — 3044F HG A1C LEVEL LT 7.0%: CPT | Performed by: INTERNAL MEDICINE

## 2018-09-10 PROCEDURE — 3017F COLORECTAL CA SCREEN DOC REV: CPT | Performed by: INTERNAL MEDICINE

## 2018-09-10 PROCEDURE — G8417 CALC BMI ABV UP PARAM F/U: HCPCS | Performed by: INTERNAL MEDICINE

## 2018-09-10 NOTE — PROGRESS NOTES
[facility] - Initial Consult Note  Today's Date: 9/10/2018    Impression :   · Right diabetic foot infection, possible osteomyelitis  · Initial MRI was negative, could not rule out osteomyelitis clinically. · Culture of the pus. MRSA  · Post 4 weeks of ceftaroline, foot, recovered. Stopped 9/10/18  ·     Recommendations   · Pulling the PICC line in the office today, his ceftaroline will be stopped. He needs no further antibiotics because his foot looks excellent. Back to normal  · Watch for diarrhea, or relapse of induration. · Follow-up with podiatry  · See me when necessary if infections relapse. Diagnosis Orders   1. Diabetic infection of right foot ([address]         Return if symptoms worsen or fail to improve. History of Present Illness:   Jonny Fuentes is a 59y.o.-year-old  male who presents with   Chief Complaint   Patient presents with    Frequent Infections     Post [place]'s     Visit of 9/10/18  This is a gentleman who I have seen at [facility] for right diabetic foot infection affecting the sole of the midfoot, with induration, abscess formation, with drainage showing MRSA. MRI of the time was done and was negative for osteomyelitis, yet there was a concern that it could have been done too early to show the osteomyelitis and hence the patient was spent on 4 weeks of ceftaroline to be evaluated today in the office. Today the redness has gone induration has resolved. No more purulence or drainage. He is able to walk without any tenderness on the right foot. I pulled the PICC line today in the office and was starting ceftaroline. No nausea, vomiting, diarrhea, no dysuria. No chest pain. I have personally reviewed the past medical history, past surgical history, medications, social history, and family history, and I have updated the database accordingly.   Past Medical History:     Past Medical History:   Diagnosis Date    ALEJANDRO (acute kidney injury) ([facility] 8/5/2018    Cellulitis of right foot     and abscess    Charcot foot due to diabetes mellitus ([facility] 2014    Right foot     [facility]     Fractures, multiple 07/21/2014    Right foot fractures     Hyperlipidemia     Hypertension     Leukocytosis     Neuropathy     diabetic with charcot affecting the right foot    Pain in right foot     redness and swelling    Pneumonia 2009    Tobacco abuse 4/24/2018    Type II or unspecified type diabetes mellitus without mention of complication, not stated as uncontrolled     Vertigo     Wound, open     Left Ball of  foot, pt. stepped on sharp object. Covered by dressing.  Wound, open     Right posterior -Diabetic Ulcer       Past Surgical  History:     Past Surgical History:   Procedure Laterality Date    APPENDECTOMY      at age 24   [facility] FOOT DEBRIDEMENT Left 04/24/2018    I&D foreign body removal    KNEE ARTHROSCOPY Right 1989    KNEE ARTHROSCOPY Left 1990    KNEE SURGERY Bilateral 1983    arthroscopy    NECK SURGERY  1987    CA DEEP DISSEC FOOT INFEC,1 BURSA Left 4/24/2018    LEFT FOOT MULTIPLE  INCISIONS  AND DRAINAGE AND REMOVAL FOREIGN BODY  IRENE performed by Anatoliy Dominguez DPM at [address] [place] [address]       Medications:     Current Outpatient Prescriptions:     ceftaroline fosamil (TEFLARO) 600 MG SOLR, Infuse 600 mg intravenously every 12 hours, Disp: 32012 mg, Rfl: 0    glipiZIDE (GLUCOTROL) 10 MG tablet, Take 40 mg by mouth once, Disp: , Rfl:     aspirin 81 MG tablet, Take 81 mg by mouth daily, Disp: , Rfl:     metFORMIN (GLUCOPHAGE) 500 MG tablet, Take 500 mg by mouth 2 times daily (with meals) , Disp: , Rfl:     gabapentin (NEURONTIN) 300 MG capsule, Take 900 mg by mouth 3 times daily.  Take 3 pills 3 times a day ., Disp: , Rfl:       Social History:     Social History     Social History    Marital status:      Spouse name: N/A    Number of children: N/A    Years Psychiatric: He has a normal mood and affect. His behavior is normal.         Medical Decision Making:   I have independently reviewed/ordered the following labs:    CBC with Differential:   Lab Results   Component Value Date    WBC 6.6 08/07/2018    WBC 7.7 08/06/2018    HGB 11.1 08/07/2018    HGB 11.4 08/06/2018    HCT 33.2 08/07/2018    HCT 33.7 08/06/2018     08/07/2018     08/06/2018    LYMPHOPCT 34 08/07/2018    LYMPHOPCT 22 08/06/2018    MONOPCT 8 08/07/2018    MONOPCT 8 08/06/2018     BMP:   Lab Results   Component Value Date     08/10/2018     08/09/2018    K 5.0 08/10/2018    K 5.0 08/09/2018     08/10/2018     08/09/2018    CO2 22 08/10/2018    CO2 23 08/09/2018    BUN 17 08/10/2018    BUN 19 08/09/2018    CREATININE 1.53 08/10/2018    CREATININE 1.80 08/09/2018     Hepatic Function Panel:   Lab Results   Component Value Date    PROT 7.5 01/09/2017    PROT 6.8 12/30/2014    LABALBU 4.5 01/09/2017    LABALBU 4.0 12/30/2014    BILITOT 0.39 01/09/2017    BILITOT 0.31 12/30/2014    ALKPHOS 89 01/09/2017    ALKPHOS 92 12/30/2014    ALT 15 01/09/2017    ALT 31 12/30/2014    AST 16 01/09/2017    AST 24 12/30/2014     No results found for: RPR  No results found for: HIV  No results found for: [facility]  Lab Results   Component Value Date    RBC 3.60 08/07/2018    WBC 6.6 08/07/2018    TURBIDITY CLEAR 08/08/2018     Lab Results   Component Value Date    CREATININE 1.53 08/10/2018    GLUCOSE 97 08/10/2018     Thank you for allowing us to participate in the care of this patient. Please call with questions. Luna Gutierrez MD  - Office: [phone]    Please note that this chart was generated using voice recognition Dragon dictation software. Although every effort was made to ensure the accuracy of this automated transcription, some errors in transcription may have occurred.

## 2018-09-11 ASSESSMENT — ENCOUNTER SYMPTOMS
GASTROINTESTINAL NEGATIVE: 1
ALLERGIC/IMMUNOLOGIC NEGATIVE: 1
EYES NEGATIVE: 1
RESPIRATORY NEGATIVE: 1

## 2018-09-27 ENCOUNTER — OFFICE VISIT (OUTPATIENT)
Dept: PODIATRY | Age: 63
End: 2018-09-27
Payer: MEDICARE

## 2018-09-27 VITALS — WEIGHT: 230 LBS | HEIGHT: 68 IN | BODY MASS INDEX: 34.86 KG/M2

## 2018-09-27 DIAGNOSIS — M77.42 METATARSALGIA OF LEFT FOOT: Primary | ICD-10-CM

## 2018-09-27 PROCEDURE — 73630 X-RAY EXAM OF FOOT: CPT | Performed by: PODIATRIST

## 2018-09-27 PROCEDURE — 99213 OFFICE O/P EST LOW 20 MIN: CPT | Performed by: PODIATRIST

## 2018-09-27 RX ORDER — NAPROXEN 500 MG/1
TABLET ORAL
COMMUNITY
Start: 2018-09-26 | End: 2020-12-07

## 2018-10-30 ENCOUNTER — OFFICE VISIT (OUTPATIENT)
Dept: PODIATRY | Age: 63
End: 2018-10-30
Payer: MEDICARE

## 2018-10-30 VITALS — BODY MASS INDEX: 32.93 KG/M2 | HEIGHT: 70 IN | WEIGHT: 230 LBS

## 2018-10-30 DIAGNOSIS — I73.9 PERIPHERAL VASCULAR DISORDER (HCC): ICD-10-CM

## 2018-10-30 DIAGNOSIS — E11.51 TYPE II DIABETES MELLITUS WITH PERIPHERAL CIRCULATORY DISORDER (HCC): ICD-10-CM

## 2018-10-30 DIAGNOSIS — M79.672 PAIN IN BOTH FEET: Primary | ICD-10-CM

## 2018-10-30 DIAGNOSIS — B35.1 DERMATOPHYTOSIS OF NAIL: ICD-10-CM

## 2018-10-30 DIAGNOSIS — M79.671 PAIN IN BOTH FEET: Primary | ICD-10-CM

## 2018-10-30 PROCEDURE — 3044F HG A1C LEVEL LT 7.0%: CPT | Performed by: PODIATRIST

## 2018-10-30 PROCEDURE — 11721 DEBRIDE NAIL 6 OR MORE: CPT | Performed by: PODIATRIST

## 2018-10-30 PROCEDURE — 4004F PT TOBACCO SCREEN RCVD TLK: CPT | Performed by: PODIATRIST

## 2018-10-30 PROCEDURE — G8417 CALC BMI ABV UP PARAM F/U: HCPCS | Performed by: PODIATRIST

## 2018-10-30 PROCEDURE — G8484 FLU IMMUNIZE NO ADMIN: HCPCS | Performed by: PODIATRIST

## 2018-10-30 PROCEDURE — 3017F COLORECTAL CA SCREEN DOC REV: CPT | Performed by: PODIATRIST

## 2018-10-30 PROCEDURE — G8427 DOCREV CUR MEDS BY ELIG CLIN: HCPCS | Performed by: PODIATRIST

## 2018-10-30 PROCEDURE — 2022F DILAT RTA XM EVC RTNOPTHY: CPT | Performed by: PODIATRIST

## 2018-12-18 ENCOUNTER — OFFICE VISIT (OUTPATIENT)
Dept: PODIATRY | Age: 63
End: 2018-12-18
Payer: MEDICARE

## 2018-12-18 VITALS — BODY MASS INDEX: 32.93 KG/M2 | WEIGHT: 230 LBS | RESPIRATION RATE: 16 BRPM | HEIGHT: 70 IN

## 2018-12-18 DIAGNOSIS — M79.671 BILATERAL FOOT PAIN: ICD-10-CM

## 2018-12-18 DIAGNOSIS — E11.51 TYPE II DIABETES MELLITUS WITH PERIPHERAL CIRCULATORY DISORDER (HCC): Primary | ICD-10-CM

## 2018-12-18 DIAGNOSIS — M79.672 BILATERAL FOOT PAIN: ICD-10-CM

## 2018-12-18 DIAGNOSIS — M14.671 CHARCOT'S JOINT OF FOOT, RIGHT: ICD-10-CM

## 2018-12-18 DIAGNOSIS — I73.9 PVD (PERIPHERAL VASCULAR DISEASE) (HCC): ICD-10-CM

## 2018-12-18 DIAGNOSIS — B35.1 DERMATOPHYTOSIS OF NAIL: ICD-10-CM

## 2018-12-18 PROCEDURE — 11721 DEBRIDE NAIL 6 OR MORE: CPT | Performed by: PODIATRIST

## 2018-12-18 PROCEDURE — G8427 DOCREV CUR MEDS BY ELIG CLIN: HCPCS | Performed by: PODIATRIST

## 2018-12-18 PROCEDURE — 99213 OFFICE O/P EST LOW 20 MIN: CPT | Performed by: PODIATRIST

## 2018-12-18 PROCEDURE — 4004F PT TOBACCO SCREEN RCVD TLK: CPT | Performed by: PODIATRIST

## 2018-12-18 PROCEDURE — 3044F HG A1C LEVEL LT 7.0%: CPT | Performed by: PODIATRIST

## 2018-12-18 PROCEDURE — 3017F COLORECTAL CA SCREEN DOC REV: CPT | Performed by: PODIATRIST

## 2018-12-18 PROCEDURE — 2022F DILAT RTA XM EVC RTNOPTHY: CPT | Performed by: PODIATRIST

## 2018-12-18 PROCEDURE — G8417 CALC BMI ABV UP PARAM F/U: HCPCS | Performed by: PODIATRIST

## 2018-12-18 PROCEDURE — G8484 FLU IMMUNIZE NO ADMIN: HCPCS | Performed by: PODIATRIST

## 2018-12-18 NOTE — PROGRESS NOTES
[facility]  Return Patient    Chief Complaint   Patient presents with    Diabetes    Foot Pain     bilateral       Subjective: Juana Mcqueen comes to clinic for Diabetes and Foot Pain (bilateral)    he is a diabetic and states that they check their sugars daily. Pt currently has complaint of thickened, elongated nails that they cannot manage by themselves. Pt is also having a shooting pain to left heel and trouble walking on right foot. Right foot turns inward when walking Pt's primary care physician is Adam Price MD .  He was seen 3 months ago. Pt's last blood sugar was 155 . Lab Results   Component Value Date    LABA1C 6.4 (H) 08/07/2018      Complains of numbness in the feet bilat. Past Medical History:   Diagnosis Date    ALEJANDRO (acute kidney injury) ([address] 8/5/2018    Cellulitis of right foot     and abscess    Charcot foot due to diabetes mellitus ([address] 2014    Right foot     Diabetic polyneuropathy associated with type 2 diabetes mellitus [facility]     Fractures, multiple 07/21/2014    Right foot fractures     Hyperlipidemia     Hypertension     Leukocytosis     Neuropathy     diabetic with charcot affecting the right foot    Pain in right foot     redness and swelling    Pneumonia 2009    Tobacco abuse 4/24/2018    Type II or unspecified type diabetes mellitus without mention of complication, not stated as uncontrolled     Vertigo     Wound, open     Left Ball of  foot, pt. stepped on sharp object. Covered by dressing.     Wound, open     Right posterior -Diabetic Ulcer       Allergies   Allergen Reactions    Morphine Itching    Percocet [Oxycodone-Acetaminophen]      Facial swelling    Dye [Iodides] Rash     Rash and swelling     Current Outpatient Prescriptions on File Prior to Visit   Medication Sig Dispense Refill    naproxen (NAPROSYN) 500 MG tablet       glipiZIDE (GLUCOTROL) 10 MG tablet Take 40 mg by mouth once      aspirin 81 MG tablet Take 81 mg by

## 2019-01-15 ENCOUNTER — OFFICE VISIT (OUTPATIENT)
Dept: PODIATRY | Age: 64
End: 2019-01-15
Payer: MEDICARE

## 2019-01-15 VITALS — WEIGHT: 241 LBS | BODY MASS INDEX: 33.74 KG/M2 | HEIGHT: 71 IN

## 2019-01-15 DIAGNOSIS — M14.671 CHARCOT'S JOINT OF FOOT, RIGHT: ICD-10-CM

## 2019-01-15 DIAGNOSIS — I73.9 PERIPHERAL VASCULAR DISORDER (HCC): ICD-10-CM

## 2019-01-15 DIAGNOSIS — M79.672 BILATERAL FOOT PAIN: Primary | ICD-10-CM

## 2019-01-15 DIAGNOSIS — E11.51 TYPE II DIABETES MELLITUS WITH PERIPHERAL CIRCULATORY DISORDER (HCC): ICD-10-CM

## 2019-01-15 DIAGNOSIS — M79.671 BILATERAL FOOT PAIN: Primary | ICD-10-CM

## 2019-01-15 DIAGNOSIS — B35.1 DERMATOPHYTOSIS OF NAIL: ICD-10-CM

## 2019-01-15 PROCEDURE — 99213 OFFICE O/P EST LOW 20 MIN: CPT | Performed by: PODIATRIST

## 2019-01-15 PROCEDURE — 11721 DEBRIDE NAIL 6 OR MORE: CPT | Performed by: PODIATRIST

## 2019-01-15 PROCEDURE — 2022F DILAT RTA XM EVC RTNOPTHY: CPT | Performed by: PODIATRIST

## 2019-01-15 PROCEDURE — 3046F HEMOGLOBIN A1C LEVEL >9.0%: CPT | Performed by: PODIATRIST

## 2019-01-15 PROCEDURE — G8427 DOCREV CUR MEDS BY ELIG CLIN: HCPCS | Performed by: PODIATRIST

## 2019-01-15 PROCEDURE — 4004F PT TOBACCO SCREEN RCVD TLK: CPT | Performed by: PODIATRIST

## 2019-01-15 PROCEDURE — G8484 FLU IMMUNIZE NO ADMIN: HCPCS | Performed by: PODIATRIST

## 2019-01-15 PROCEDURE — 3017F COLORECTAL CA SCREEN DOC REV: CPT | Performed by: PODIATRIST

## 2019-01-15 PROCEDURE — G8417 CALC BMI ABV UP PARAM F/U: HCPCS | Performed by: PODIATRIST

## 2019-01-15 RX ORDER — LISINOPRIL 10 MG/1
10 TABLET ORAL DAILY
Status: ON HOLD | COMMUNITY
Start: 2018-11-15 | End: 2022-10-05

## 2019-01-15 RX ORDER — SIMVASTATIN 40 MG
40 TABLET ORAL NIGHTLY
COMMUNITY
Start: 2018-11-13

## 2019-02-05 ENCOUNTER — OFFICE VISIT (OUTPATIENT)
Dept: PODIATRY | Age: 64
End: 2019-02-05
Payer: MEDICARE

## 2019-02-05 DIAGNOSIS — M79.671 BILATERAL FOOT PAIN: ICD-10-CM

## 2019-02-05 DIAGNOSIS — M79.672 BILATERAL FOOT PAIN: ICD-10-CM

## 2019-02-05 DIAGNOSIS — E11.51 TYPE II DIABETES MELLITUS WITH PERIPHERAL CIRCULATORY DISORDER (HCC): Primary | ICD-10-CM

## 2019-02-05 PROCEDURE — A5500 DIAB SHOE FOR DENSITY INSERT: HCPCS | Performed by: PODIATRIST

## 2019-02-05 PROCEDURE — A5513 MULTI DEN INSERT CUSTOM MOLD: HCPCS | Performed by: PODIATRIST

## 2019-02-05 PROCEDURE — 99999 PR OFFICE/OUTPT VISIT,PROCEDURE ONLY: CPT | Performed by: PODIATRIST

## 2019-03-19 ENCOUNTER — OFFICE VISIT (OUTPATIENT)
Dept: PODIATRY | Age: 64
End: 2019-03-19
Payer: MEDICARE

## 2019-03-19 VITALS — HEIGHT: 70 IN | WEIGHT: 241 LBS | BODY MASS INDEX: 34.5 KG/M2 | RESPIRATION RATE: 16 BRPM

## 2019-03-19 DIAGNOSIS — E11.51 TYPE II DIABETES MELLITUS WITH PERIPHERAL CIRCULATORY DISORDER (HCC): Primary | ICD-10-CM

## 2019-03-19 DIAGNOSIS — S93.492A SPRAIN OF ANTERIOR TALOFIBULAR LIGAMENT OF LEFT ANKLE, INITIAL ENCOUNTER: ICD-10-CM

## 2019-03-19 DIAGNOSIS — I73.9 PERIPHERAL VASCULAR DISORDER (HCC): ICD-10-CM

## 2019-03-19 DIAGNOSIS — B35.1 DERMATOPHYTOSIS OF NAIL: ICD-10-CM

## 2019-03-19 DIAGNOSIS — M79.672 BILATERAL FOOT PAIN: ICD-10-CM

## 2019-03-19 DIAGNOSIS — M79.671 BILATERAL FOOT PAIN: ICD-10-CM

## 2019-03-19 PROCEDURE — 11721 DEBRIDE NAIL 6 OR MORE: CPT | Performed by: PODIATRIST

## 2019-03-19 PROCEDURE — 3017F COLORECTAL CA SCREEN DOC REV: CPT | Performed by: PODIATRIST

## 2019-03-19 PROCEDURE — 3046F HEMOGLOBIN A1C LEVEL >9.0%: CPT | Performed by: PODIATRIST

## 2019-03-19 PROCEDURE — G8484 FLU IMMUNIZE NO ADMIN: HCPCS | Performed by: PODIATRIST

## 2019-03-19 PROCEDURE — G8417 CALC BMI ABV UP PARAM F/U: HCPCS | Performed by: PODIATRIST

## 2019-03-19 PROCEDURE — 99213 OFFICE O/P EST LOW 20 MIN: CPT | Performed by: PODIATRIST

## 2019-03-19 PROCEDURE — G8427 DOCREV CUR MEDS BY ELIG CLIN: HCPCS | Performed by: PODIATRIST

## 2019-03-19 PROCEDURE — 2022F DILAT RTA XM EVC RTNOPTHY: CPT | Performed by: PODIATRIST

## 2019-03-19 PROCEDURE — 4004F PT TOBACCO SCREEN RCVD TLK: CPT | Performed by: PODIATRIST

## 2019-05-30 ENCOUNTER — OFFICE VISIT (OUTPATIENT)
Dept: PODIATRY | Age: 64
End: 2019-05-30
Payer: MEDICARE

## 2019-05-30 VITALS — RESPIRATION RATE: 16 BRPM | WEIGHT: 241 LBS | BODY MASS INDEX: 34.5 KG/M2 | HEIGHT: 70 IN

## 2019-05-30 DIAGNOSIS — E11.51 TYPE II DIABETES MELLITUS WITH PERIPHERAL CIRCULATORY DISORDER (HCC): Primary | ICD-10-CM

## 2019-05-30 DIAGNOSIS — M79.671 BILATERAL FOOT PAIN: ICD-10-CM

## 2019-05-30 DIAGNOSIS — I73.9 PERIPHERAL VASCULAR DISORDER (HCC): ICD-10-CM

## 2019-05-30 DIAGNOSIS — M79.672 BILATERAL FOOT PAIN: ICD-10-CM

## 2019-05-30 DIAGNOSIS — B35.1 DERMATOPHYTOSIS OF NAIL: ICD-10-CM

## 2019-05-30 PROCEDURE — 99213 OFFICE O/P EST LOW 20 MIN: CPT | Performed by: PODIATRIST

## 2019-05-30 PROCEDURE — 3046F HEMOGLOBIN A1C LEVEL >9.0%: CPT | Performed by: PODIATRIST

## 2019-05-30 PROCEDURE — 11721 DEBRIDE NAIL 6 OR MORE: CPT | Performed by: PODIATRIST

## 2019-05-30 PROCEDURE — G8417 CALC BMI ABV UP PARAM F/U: HCPCS | Performed by: PODIATRIST

## 2019-05-30 PROCEDURE — 2022F DILAT RTA XM EVC RTNOPTHY: CPT | Performed by: PODIATRIST

## 2019-05-30 PROCEDURE — 4004F PT TOBACCO SCREEN RCVD TLK: CPT | Performed by: PODIATRIST

## 2019-05-30 PROCEDURE — 3017F COLORECTAL CA SCREEN DOC REV: CPT | Performed by: PODIATRIST

## 2019-05-30 PROCEDURE — G8428 CUR MEDS NOT DOCUMENT: HCPCS | Performed by: PODIATRIST

## 2019-05-30 NOTE — PROGRESS NOTES
[facility] PHYSICIANS  [facility] [address]  [address] [facility] [address]  Dept: [phone]  Dept Fax: [phone]    DIABETIC NAIL PROGRESS NOTE  Date of patient's visit: 5/30/2019  Patient's Name:  Ji Gutiérrez YOB: 1955            Patient Care Team:  Brody Marley MD as PCP - General (Family Medicine)  Samy Danielle MD as Consulting Physician (Infectious Diseases)  Melina Reed DPM as Physician (Podiatry)          Chief Complaint   Patient presents with    Diabetes    Nail Problem       Subjective:   Ji Gutiérrez comes to clinic for Diabetes and Nail Problem    he is a diabetic and states that he checks sugars regularly Pt currently has complaint of thickened, elongated nails that they cannot manage by themselves. Pt's primary care physician is Brody Marley MD last seen 2/3/17   Pt's last blood sugar was   142. He wanted to have us check his feet as he has some pain to the right foot. Lab Results   Component Value Date    LABA1C 6.4 (H) 08/07/2018      Complains of numbness in the feet bilat. Past Medical History:   Diagnosis Date    ALEJANDRO (acute kidney injury) ([address] 8/5/2018    Cellulitis of right foot     and abscess    Charcot foot due to diabetes mellitus ([address] 2014    Right foot     Diabetic polyneuropathy associated with type 2 diabetes mellitus ([address]     Fractures, multiple 07/21/2014    Right foot fractures     Hyperlipidemia     Hypertension     Leukocytosis     Neuropathy     diabetic with charcot affecting the right foot    Pain in right foot     redness and swelling    Pneumonia 2009    Tobacco abuse 4/24/2018    Type II or unspecified type diabetes mellitus without mention of complication, not stated as uncontrolled     Vertigo     Wound, open     Left Ball of  foot, pt. stepped on sharp object. Covered by dressing.     Wound, open     Right posterior -Diabetic Ulcer       Allergies   Allergen Reactions    Morphine Itching    Percocet [Oxycodone-Acetaminophen]      Facial swelling    Dye [Iodides] Rash     Rash and swelling     Current Outpatient Medications on File Prior to Visit   Medication Sig Dispense Refill    lisinopril (PRINIVIL;ZESTRIL) 10 MG tablet Take 10 mg by mouth daily      simvastatin (ZOCOR) 40 MG tablet Take 40 mg by mouth daily      naproxen (NAPROSYN) 500 MG tablet       glipiZIDE (GLUCOTROL) 10 MG tablet Take 40 mg by mouth once      aspirin 81 MG tablet Take 81 mg by mouth daily      metFORMIN (GLUCOPHAGE) 500 MG tablet Take 500 mg by mouth 2 times daily (with meals)       gabapentin (NEURONTIN) 300 MG capsule Take 900 mg by mouth 3 times daily. Take 3 pills 3 times a day . No current facility-administered medications on file prior to visit. Review of Systems    Review of Systems:   History obtained from chart review and the patient  General ROS: negative for - chills, fatigue, fever, night sweats or weight gain  Constitutional: Negative for chills, diaphoresis, fatigue, fever and unexpected weight change. Musculoskeletal: Positive for arthralgias, gait problem and joint swelling. Neurological ROS: negative for - behavioral changes, confusion, headaches or seizures. Negative for weakness and numbness. Dermatological ROS: negative for - mole changes, rash  Cardiovascular: Negative for leg swelling. Gastrointestinal: Negative for constipation, diarrhea, nausea and vomiting. Objective:  General: AAO x 3 in NAD.     Derm  Toenail Description  Sites of Onychomycosis Involvement (Check affected area)  [x] [x] [x] [x] [x] [x] [x] [x] [x] [x]  5 4 3 2 1 1 2 3 4 5                          Right                                        Left    Thickness  [x] [x] [x] [x] [x] [x] [x] [x] [x] [x]  5 4 3 2 1 1 2 3 4 5                         Right                                        Left    Dystrophic Changes   [x] [x] [x] [x] [x] [x] [x] [x] [x] [x]  5 4 3 2 1 1 2 3 4 5 Right                                        Left    Color   [x] [x] [x] [x] [x] [x] [x] [x] [x] [x]  5 4 3 2 1 1 2 3 4 5                          Right                                        Left    Incurvation/Ingrowin   [] [] [] [] [] [] [] [] [] []  5 4 3 2 1 1 2 3 4 5                         Right                                        Left    Inflammation/Pain   [x] [x] [x] [x] [x] [x] [x] [x] [x] [x]  5 4 3 2 1 1 2 3 4 5                         Right                                        Left      Dermatologic Exam:  Skin lesion/ulceration Absent . Skin No rashes or nodules noted. .       Musculoskeletal:     1st MPJ ROM decreased, Bilateral.  Muscle strength 5/5, Bilateral.  Pain present upon palpation of toenails 1-5, Bilateral. decreased medial longitudinal arch, Bilateral.  Ankle ROM decreased,Bilateral.    Dorsally contracted digits present digits 2, Bilateral.     Vascular: DP and PT pulses palpable 1/4, Bilateral.  CFT <5 seconds, Bilateral.  Hair growth absent to the level of the digits, Bilateral.  Edema present, Bilateral.  Varicosities absent, Bilateral. Erythema absent, Bilateral    Neurological: Sensation diminshed to light touch to level of digits, Bilateral.  Protective sensation intact 6/10 sites via 5.07/10g [place]-Jorge Monofilament, Bilateral.  negative Tinel's, Bilateral.  negative Valleix sign, Bilateral.      Integument: Warm, dry, supple, Bilateral.  Open lesion absent, Bilateral.  Interdigital maceration absent to web spaces 4, Bilateral.  Nails 1-5 left and 1-5 right thickened > 3.0 mm, dystrophic and crumbly, discolored with subungual debris.   Fissures absent, Bilateral.     Visual inspection:  Deformity: hammertoe deformity tyrone feet  amputation: absent  Skin lesions: absent  Edema: right- 2+ pitting edema, left- 2+ pitting edema    Sensory exam:  Monofilament sensation: abnormal - 6/10 via SW 5.07/10g monofilament to the plantar foot bilateral feet    Pulses: abnormal - 1/4 dorsalis pedis pulse and 1/4 Posterior tibial pulse,   Pinprick: Impaired  Proprioception: Impaired  Vibration (128 Hz): Impaired       DM with PVD       [x]Yes    []No      Assessment:  59 y.o. male with:   Diagnosis Orders   1. Type II diabetes mellitus with peripheral circulatory disorder (HCC)   DIABETES FOOT EXAM   2. Dermatophytosis of nail   DIABETES FOOT EXAM   3. Bilateral foot pain   DIABETES FOOT EXAM   4. Peripheral vascular disorder (HCC)   DIABETES FOOT EXAM           Q7   []Yes    []No                Q8   [x]Yes    []No                     Q9   []Yes    []No    Plan:   Pt was evaluated and examined. Patient was given personalized discharge instructions. Nails 1-10 were debrided sharply in length and thickness with a nipper and , without incident. Pt will follow up in 9 weeks or sooner if any problems arise. Diagnosis was discussed with the pt and all of their questions were answered in detail. Proper foot hygiene and care was discussed with the pt. Informed patient on proper diabetic foot care and importance of tight glycemic control. Patient to check feet daily and contact the office with any questions/problems/concerns.    Other comorbidity noted and will be managed by PCP.  5/30/2019    Electronically signed by Sofia Doan DPM on 5/30/2019 at 3:32 PM  5/30/2019

## 2019-06-17 ENCOUNTER — HOSPITAL ENCOUNTER (OUTPATIENT)
Dept: GENERAL RADIOLOGY | Age: 64
Discharge: HOME | End: 2019-06-19
Payer: MEDICARE

## 2019-06-17 ENCOUNTER — HOSPITAL ENCOUNTER (OUTPATIENT)
Age: 64
Discharge: HOME | End: 2019-06-19
Payer: MEDICARE

## 2019-06-17 DIAGNOSIS — R05.9 COUGH: ICD-10-CM

## 2019-06-17 PROCEDURE — 71046 X-RAY EXAM CHEST 2 VIEWS: CPT

## 2019-06-26 ENCOUNTER — HOSPITAL ENCOUNTER (OUTPATIENT)
Dept: CT IMAGING | Age: 64
Discharge: HOME | End: 2019-06-28
Payer: MEDICARE

## 2019-06-26 DIAGNOSIS — R91.1 LUNG NODULE: ICD-10-CM

## 2019-06-26 LAB
CREAT SERPL-MCNC: 1.48 MG/DL (ref 0.7–1.2)
GFR AFRICAN AMERICAN: 58 ML/MIN
GFR NON-AFRICAN AMERICAN: 48 ML/MIN
GFR SERPL CREATININE-BSD FRML MDRD: ABNORMAL ML/MIN/{1.73_M2}
GFR SERPL CREATININE-BSD FRML MDRD: ABNORMAL ML/MIN/{1.73_M2}

## 2019-06-26 PROCEDURE — 71260 CT THORAX DX C+: CPT

## 2019-06-26 PROCEDURE — 2580000003 HC RX 258: Performed by: FAMILY MEDICINE

## 2019-06-26 PROCEDURE — 6360000004 HC RX CONTRAST MEDICATION: Performed by: FAMILY MEDICINE

## 2019-06-26 PROCEDURE — 82565 ASSAY OF CREATININE: CPT

## 2019-06-26 PROCEDURE — 36415 COLL VENOUS BLD VENIPUNCTURE: CPT

## 2019-06-26 RX ORDER — 0.9 % SODIUM CHLORIDE 0.9 %
80 INTRAVENOUS SOLUTION INTRAVENOUS ONCE
Status: COMPLETED | OUTPATIENT
Start: 2019-06-26 | End: 2019-06-26

## 2019-06-26 RX ORDER — SODIUM CHLORIDE 0.9 % (FLUSH) 0.9 %
10 SYRINGE (ML) INJECTION
Status: COMPLETED | OUTPATIENT
Start: 2019-06-26 | End: 2019-06-26

## 2019-06-26 RX ADMIN — SODIUM CHLORIDE 80 ML: 9 INJECTION, SOLUTION INTRAVENOUS at 08:32

## 2019-06-26 RX ADMIN — IOPAMIDOL 80 ML: 755 INJECTION, SOLUTION INTRAVENOUS at 08:32

## 2019-06-26 RX ADMIN — Medication 10 ML: at 08:32

## 2019-08-13 ENCOUNTER — PROCEDURE VISIT (OUTPATIENT)
Dept: PODIATRY | Age: 64
End: 2019-08-13
Payer: MEDICARE

## 2019-08-13 VITALS — WEIGHT: 241 LBS | HEIGHT: 70 IN | RESPIRATION RATE: 16 BRPM | BODY MASS INDEX: 34.5 KG/M2

## 2019-08-13 DIAGNOSIS — M79.672 BILATERAL FOOT PAIN: ICD-10-CM

## 2019-08-13 DIAGNOSIS — M79.671 BILATERAL FOOT PAIN: ICD-10-CM

## 2019-08-13 DIAGNOSIS — M19.071 DEGENERATIVE JOINT DISEASE OF BOTH ANKLES AND FEET: ICD-10-CM

## 2019-08-13 DIAGNOSIS — B35.1 DERMATOPHYTOSIS OF NAIL: ICD-10-CM

## 2019-08-13 DIAGNOSIS — E11.51 TYPE II DIABETES MELLITUS WITH PERIPHERAL CIRCULATORY DISORDER (HCC): Primary | ICD-10-CM

## 2019-08-13 DIAGNOSIS — M19.072 DEGENERATIVE JOINT DISEASE OF BOTH ANKLES AND FEET: ICD-10-CM

## 2019-08-13 DIAGNOSIS — I73.9 PERIPHERAL VASCULAR DISORDER (HCC): ICD-10-CM

## 2019-08-13 PROCEDURE — 2022F DILAT RTA XM EVC RTNOPTHY: CPT | Performed by: PODIATRIST

## 2019-08-13 PROCEDURE — 4004F PT TOBACCO SCREEN RCVD TLK: CPT | Performed by: PODIATRIST

## 2019-08-13 PROCEDURE — 99213 OFFICE O/P EST LOW 20 MIN: CPT | Performed by: PODIATRIST

## 2019-08-13 PROCEDURE — 3017F COLORECTAL CA SCREEN DOC REV: CPT | Performed by: PODIATRIST

## 2019-08-13 PROCEDURE — 3046F HEMOGLOBIN A1C LEVEL >9.0%: CPT | Performed by: PODIATRIST

## 2019-08-13 PROCEDURE — 11721 DEBRIDE NAIL 6 OR MORE: CPT | Performed by: PODIATRIST

## 2019-08-13 PROCEDURE — G8417 CALC BMI ABV UP PARAM F/U: HCPCS | Performed by: PODIATRIST

## 2019-08-13 PROCEDURE — G8427 DOCREV CUR MEDS BY ELIG CLIN: HCPCS | Performed by: PODIATRIST

## 2019-10-29 ENCOUNTER — OFFICE VISIT (OUTPATIENT)
Dept: PODIATRY | Age: 64
End: 2019-10-29
Payer: MEDICARE

## 2019-10-29 VITALS — WEIGHT: 241 LBS | BODY MASS INDEX: 34.5 KG/M2 | RESPIRATION RATE: 18 BRPM | HEIGHT: 70 IN

## 2019-10-29 DIAGNOSIS — B35.1 DERMATOPHYTOSIS OF NAIL: ICD-10-CM

## 2019-10-29 DIAGNOSIS — M79.672 BILATERAL FOOT PAIN: ICD-10-CM

## 2019-10-29 DIAGNOSIS — M79.671 BILATERAL FOOT PAIN: ICD-10-CM

## 2019-10-29 DIAGNOSIS — I73.9 PERIPHERAL VASCULAR DISORDER (HCC): ICD-10-CM

## 2019-10-29 DIAGNOSIS — E11.51 TYPE II DIABETES MELLITUS WITH PERIPHERAL CIRCULATORY DISORDER (HCC): Primary | ICD-10-CM

## 2019-10-29 DIAGNOSIS — T24.201A PARTIAL THICKNESS BURN OF RIGHT LOWER EXTREMITY, INITIAL ENCOUNTER: ICD-10-CM

## 2019-10-29 PROCEDURE — 99213 OFFICE O/P EST LOW 20 MIN: CPT | Performed by: PODIATRIST

## 2019-10-29 PROCEDURE — 3017F COLORECTAL CA SCREEN DOC REV: CPT | Performed by: PODIATRIST

## 2019-10-29 PROCEDURE — 2022F DILAT RTA XM EVC RTNOPTHY: CPT | Performed by: PODIATRIST

## 2019-10-29 PROCEDURE — 4004F PT TOBACCO SCREEN RCVD TLK: CPT | Performed by: PODIATRIST

## 2019-10-29 PROCEDURE — G8417 CALC BMI ABV UP PARAM F/U: HCPCS | Performed by: PODIATRIST

## 2019-10-29 PROCEDURE — G8427 DOCREV CUR MEDS BY ELIG CLIN: HCPCS | Performed by: PODIATRIST

## 2019-10-29 PROCEDURE — 11721 DEBRIDE NAIL 6 OR MORE: CPT | Performed by: PODIATRIST

## 2019-10-29 PROCEDURE — G8484 FLU IMMUNIZE NO ADMIN: HCPCS | Performed by: PODIATRIST

## 2019-10-29 PROCEDURE — 3046F HEMOGLOBIN A1C LEVEL >9.0%: CPT | Performed by: PODIATRIST

## 2019-10-29 RX ORDER — TAMSULOSIN HYDROCHLORIDE 0.4 MG/1
CAPSULE ORAL
Refills: 5 | COMMUNITY
Start: 2019-09-24 | End: 2020-12-07

## 2019-10-29 RX ORDER — SULFAMETHOXAZOLE AND TRIMETHOPRIM 800; 160 MG/1; MG/1
TABLET ORAL
Refills: 0 | Status: ON HOLD | COMMUNITY
Start: 2019-09-24 | End: 2020-03-20

## 2019-10-29 RX ORDER — CETIRIZINE HYDROCHLORIDE 10 MG/1
10 TABLET ORAL NIGHTLY
COMMUNITY
Start: 2019-10-21

## 2020-01-07 ENCOUNTER — OFFICE VISIT (OUTPATIENT)
Dept: PODIATRY | Age: 65
End: 2020-01-07
Payer: MEDICARE

## 2020-01-07 PROCEDURE — 2022F DILAT RTA XM EVC RTNOPTHY: CPT | Performed by: PODIATRIST

## 2020-01-07 PROCEDURE — G8484 FLU IMMUNIZE NO ADMIN: HCPCS | Performed by: PODIATRIST

## 2020-01-07 PROCEDURE — G8428 CUR MEDS NOT DOCUMENT: HCPCS | Performed by: PODIATRIST

## 2020-01-07 PROCEDURE — 3046F HEMOGLOBIN A1C LEVEL >9.0%: CPT | Performed by: PODIATRIST

## 2020-01-07 PROCEDURE — 4004F PT TOBACCO SCREEN RCVD TLK: CPT | Performed by: PODIATRIST

## 2020-01-07 PROCEDURE — 99213 OFFICE O/P EST LOW 20 MIN: CPT | Performed by: PODIATRIST

## 2020-01-07 PROCEDURE — G8417 CALC BMI ABV UP PARAM F/U: HCPCS | Performed by: PODIATRIST

## 2020-01-07 PROCEDURE — 11721 DEBRIDE NAIL 6 OR MORE: CPT | Performed by: PODIATRIST

## 2020-01-07 PROCEDURE — 3017F COLORECTAL CA SCREEN DOC REV: CPT | Performed by: PODIATRIST

## 2020-01-07 NOTE — PROGRESS NOTES
stated as uncontrolled     Vertigo     Wound, open     Left Ball of  foot, pt. stepped on sharp object. Covered by dressing.  Wound, open     Right posterior -Diabetic Ulcer       Allergies   Allergen Reactions    Morphine Itching    Percocet [Oxycodone-Acetaminophen]      Facial swelling    Dye [Iodides] Rash     Rash and swelling     Current Outpatient Medications on File Prior to Visit   Medication Sig Dispense Refill    cetirizine (ZYRTEC) 10 MG tablet       metFORMIN (GLUCOPHAGE) 1000 MG tablet       sulfamethoxazole-trimethoprim (BACTRIM DS;SEPTRA DS) 800-160 MG per tablet TAKE 1 TABLET BY MOUTH TWICE DAILY  0    tamsulosin (FLOMAX) 0.4 MG capsule TAKE 1 CAPSULE BY MOUTH EVERY DAY 1/2 HOUR FOLLOWING THE SAME MEAL EACH DAY  5    silver sulfADIAZINE (SILVADENE) 1 % cream Apply topically daily. 50 g 1    lisinopril (PRINIVIL;ZESTRIL) 10 MG tablet Take 10 mg by mouth daily      simvastatin (ZOCOR) 40 MG tablet Take 40 mg by mouth daily      naproxen (NAPROSYN) 500 MG tablet       glipiZIDE (GLUCOTROL) 10 MG tablet Take 40 mg by mouth once      aspirin 81 MG tablet Take 81 mg by mouth daily      metFORMIN (GLUCOPHAGE) 500 MG tablet Take 500 mg by mouth 2 times daily (with meals)       gabapentin (NEURONTIN) 300 MG capsule Take 900 mg by mouth 3 times daily. Take 3 pills 3 times a day . No current facility-administered medications on file prior to visit. Review of Systems    Review of Systems:   History obtained from chart review and the patient  General ROS: negative for - chills, fatigue, fever, night sweats or weight gain  Constitutional: Negative for chills, diaphoresis, fatigue, fever and unexpected weight change. Musculoskeletal: Positive for arthralgias, gait problem and joint swelling. Neurological ROS: negative for - behavioral changes, confusion, headaches or seizures. Negative for weakness and numbness.    Dermatological ROS: negative for - mole changes, rash  Cardiovascular: Negative for leg swelling. Gastrointestinal: Negative for constipation, diarrhea, nausea and vomiting. Objective:  General: AAO x 3 in NAD. Derm  Toenail Description  Sites of Onychomycosis Involvement (Check affected area)  [x] [x] [x] [x] [x] [x] [x] [x] [x] [x]  5 4 3 2 1 1 2 3 4 5                          Right                                        Left    Thickness  [x] [x] [x] [x] [x] [x] [x] [x] [x] [x]  5 4 3 2 1 1 2 3 4 5                         Right                                        Left    Dystrophic Changes   [x] [x] [x] [x] [x] [x] [x] [x] [x] [x]  5 4 3 2 1 1 2 3 4 5                         Right                                        Left    Color   [x] [x] [x] [x] [x] [x] [x] [x] [x] [x]  5 4 3 2 1 1 2 3 4 5                          Right                                        Left    Incurvation/Ingrowin   [] [] [] [] [] [] [] [] [] []  5 4 3 2 1 1 2 3 4 5                         Right                                        Left    Inflammation/Pain   [x] [x] [x] [x] [x] [x] [x] [x] [x] [x]  5 4 3 2 1 1 2 3 4 5                         Right                                        Left      Dermatologic Exam:   Dry scaly skin noted to the plantar surface of the right and left foot.   Small superficial fissuring of the skin noted to the plantar heel bilateral      Skin is thin, with flaky sloughing skin as well as decreased hair growth to the lower leg   Small red hemosiderin deposits seen dorsal foot    Musculoskeletal:     1st MPJ ROM decreased, Bilateral.  Muscle strength 5/5, Bilateral.  Pain present upon palpation of toenails 1-5, Bilateral. decreased medial longitudinal arch, Bilateral.  Ankle ROM decreased,Bilateral.    Dorsally contracted digits present digits 2, Bilateral.     Vascular: DP and PT pulses palpable 1/4, Bilateral.  CFT <5 seconds, Bilateral.  Hair growth absent to the level of the digits, Bilateral.  Edema present, Bilateral.  Varicosities absent, Bilateral. Erythema absent, Bilateral    Neurological: Sensation diminshed to light touch to level of digits, Bilateral.  Protective sensation intact 6/10 sites via 5.07/10g [place]orge Monofilament, Bilateral.  negative Tinel's, Bilateral.  negative Valleix sign, Bilateral.      Integument: Warm, dry, supple, Bilateral.  Open lesion absent, Bilateral.  Interdigital maceration absent to web spaces 4, Bilateral.  Nails 1-5 left and 1-5 right thickened > 3.0 mm, dystrophic and crumbly, discolored with subungual debris. Fissures absent, Bilateral.     Visual inspection:  Deformity: hammertoe deformity tyrone feet  amputation: absent  Skin lesions: present - as above  Edema: right- 2+ pitting edema, left- 2+ pitting edema    Sensory exam:  Monofilament sensation: abnormal - 6/10 via SW 5.07/10g monofilament to the plantar foot bilateral feet    Pulses: abnormal - 1/4 dorsalis pedis pulse and 1/4 Posterior tibial pulse,   Pinprick: Impaired  Proprioception: Impaired  Vibration (128 Hz): Impaired       DM with PVD       [x]Yes    []No      Assessment:  62 y.o. male with:   Diagnosis Orders   1. Type II diabetes mellitus with peripheral circulatory disorder (HCC)   DIABETES FOOT EXAM    41415 - [place] DEBRIDEMENT OF NAILS, 6 OR MORE   2. Dermatophytosis of nail  HM DIABETES FOOT EXAM    54408 - [place] DEBRIDEMENT OF NAILS, 6 OR MORE   3. Bilateral foot pain   DIABETES FOOT EXAM    72379 - [place] DEBRIDEMENT OF NAILS, 6 OR MORE   4. Peripheral vascular disorder (HCC)  [facility] DIABETES FOOT EXAM    99467 - [place] DEBRIDEMENT OF NAILS, 6 OR MORE   5. Xerosis of skin   DIABETES FOOT EXAM             Q7   []Yes    []No                Q8   [x]Yes    []No                     Q9   []Yes    []No    Plan:   Pt was evaluated and examined. Patient was given personalized discharge instructions. For patients xerosis of skin pt to apply OTC foot cream or Aquaphor to the area to be applied daily.   Pt to use a pummuce stone to the plantar surface of the feet weekly     Nails 1-10 were debrided sharply in length and thickness with a nipper and , without incident. Pt will follow up in 9 weeks or sooner if any problems arise. Diagnosis was discussed with the pt and all of their questions were answered in detail. Proper foot hygiene and care was discussed with the pt. Informed patient on proper diabetic foot care and importance of tight glycemic control. Patient to check feet daily and contact the office with any questions/problems/concerns.    Other comorbidity noted and will be managed by PCP.  1/7/2020    Electronically signed by Marci Duran DPM on 1/7/2020 at 3:10 PM  1/7/2020

## 2020-03-05 ENCOUNTER — OFFICE VISIT (OUTPATIENT)
Dept: PODIATRY | Age: 65
End: 2020-03-05
Payer: MEDICARE

## 2020-03-05 VITALS — WEIGHT: 241 LBS | RESPIRATION RATE: 16 BRPM | HEIGHT: 70 IN | BODY MASS INDEX: 34.5 KG/M2

## 2020-03-05 PROCEDURE — G8428 CUR MEDS NOT DOCUMENT: HCPCS | Performed by: PODIATRIST

## 2020-03-05 PROCEDURE — 2022F DILAT RTA XM EVC RTNOPTHY: CPT | Performed by: PODIATRIST

## 2020-03-05 PROCEDURE — 99213 OFFICE O/P EST LOW 20 MIN: CPT | Performed by: PODIATRIST

## 2020-03-05 PROCEDURE — 3046F HEMOGLOBIN A1C LEVEL >9.0%: CPT | Performed by: PODIATRIST

## 2020-03-05 PROCEDURE — G8484 FLU IMMUNIZE NO ADMIN: HCPCS | Performed by: PODIATRIST

## 2020-03-05 PROCEDURE — G8417 CALC BMI ABV UP PARAM F/U: HCPCS | Performed by: PODIATRIST

## 2020-03-05 PROCEDURE — 4004F PT TOBACCO SCREEN RCVD TLK: CPT | Performed by: PODIATRIST

## 2020-03-05 PROCEDURE — 3017F COLORECTAL CA SCREEN DOC REV: CPT | Performed by: PODIATRIST

## 2020-03-05 RX ORDER — DOXYCYCLINE HYCLATE 100 MG
100 TABLET ORAL 2 TIMES DAILY
Qty: 28 TABLET | Refills: 0 | Status: SHIPPED | OUTPATIENT
Start: 2020-03-05 | End: 2020-03-19

## 2020-03-05 NOTE — PROGRESS NOTES
circulatory disorder ([facility]  Misc. Devices MISC    doxycycline hyclate (VIBRA-TABS) 100 MG tablet   3. PVD (peripheral vascular disease) ([facility]  Misc. Devices MISC    doxycycline hyclate (VIBRA-TABS) 100 MG tablet   4. Pain in both feet  Misc. Devices MISC    doxycycline hyclate (VIBRA-TABS) 100 MG tablet           Q7   []Yes    []No                Q8   [x]Yes    []No                     Q9   []Yes    []No    Plan:   Pt was evaluated and examined. Patient was given personalized discharge instructions. rx given for Doxycycline to be taken BID x 14 days    rx for diabetic shoes. Pt will follow up in 1 weeks or sooner if any problems arise. Diagnosis was discussed with the pt and all of their questions were answered in detail. Proper foot hygiene and care was discussed with the pt. Informed patient on proper diabetic foot care and importance of tight glycemic control. Patient to check feet daily and contact the office with any questions/problems/concerns.    Other comorbidity noted and will be managed by PCP.  3/5/2020    Electronically signed by Darian Olea DPM on 3/5/2020 at 1:55 PM  3/5/2020

## 2020-03-12 ENCOUNTER — OFFICE VISIT (OUTPATIENT)
Dept: PODIATRY | Age: 65
End: 2020-03-12
Payer: MEDICARE

## 2020-03-12 VITALS — WEIGHT: 241 LBS | HEIGHT: 70 IN | RESPIRATION RATE: 16 BRPM | BODY MASS INDEX: 34.5 KG/M2

## 2020-03-12 PROCEDURE — 4004F PT TOBACCO SCREEN RCVD TLK: CPT | Performed by: PODIATRIST

## 2020-03-12 PROCEDURE — 3046F HEMOGLOBIN A1C LEVEL >9.0%: CPT | Performed by: PODIATRIST

## 2020-03-12 PROCEDURE — G8484 FLU IMMUNIZE NO ADMIN: HCPCS | Performed by: PODIATRIST

## 2020-03-12 PROCEDURE — G8417 CALC BMI ABV UP PARAM F/U: HCPCS | Performed by: PODIATRIST

## 2020-03-12 PROCEDURE — 11042 DBRDMT SUBQ TIS 1ST 20SQCM/<: CPT | Performed by: PODIATRIST

## 2020-03-12 PROCEDURE — 99213 OFFICE O/P EST LOW 20 MIN: CPT | Performed by: PODIATRIST

## 2020-03-12 PROCEDURE — G8427 DOCREV CUR MEDS BY ELIG CLIN: HCPCS | Performed by: PODIATRIST

## 2020-03-12 PROCEDURE — 2022F DILAT RTA XM EVC RTNOPTHY: CPT | Performed by: PODIATRIST

## 2020-03-12 PROCEDURE — 3017F COLORECTAL CA SCREEN DOC REV: CPT | Performed by: PODIATRIST

## 2020-03-12 NOTE — PROGRESS NOTES
[facility] PHYSICIANS  [facility]  [address] [place] [address]  Dept: [phone]  Dept Fax: [phone]    RETURN WOUND CARE PROGRESS NOTE  Date of patient's visit: 3/12/2020  Patient's Name:  Samson Ramirez YOB: 1955            Patient Care Team:  Mary Beltran MD as PCP - General (Family Medicine)  Ju De Anda MD as Consulting Physician (Infectious Diseases)  Arie Suggs DPM as Physician (Podiatry)      Chief Complaint   Patient presents with    Diabetes     blood sugar was 137    Peripheral Neuropathy    Toe Pain    Wound Check       Samson Ramirez  AGE: 62 y.o. GENDER: male  : 1955  TODAY'S DATE:  3/12/2020  Pt's primary care physician is Mary Beltran MD last seen 2019  Subjective:       Samson Ramirez is a 62 y.o. male presents to the office today for follow up of an ulceration. Denies F/C/N/V. Wound Type:diabetic  Wound Location:right 3rd toe laterally  Modifying factors:diabetes, poor glucose control and chronic pressure            Lab Results   Component Value Date    LABA1C 6.4 (H) 2018       ALLERGIES    Allergies   Allergen Reactions    Morphine Itching    Percocet [Oxycodone-Acetaminophen]      Facial swelling    Dye [Iodides] Rash     Rash and swelling       Medications:    Current Outpatient Medications:     mupirocin (BACTROBAN) 2 % ointment, Apply 3 times daily. , Disp: 30 g, Rfl: 1    Misc.  Devices MISC, 1 PAIR OF DIABETIC SHOES (1 LEFT/ 1 RIGHT) 1-3 PAIRS OF INSERTS (LEFT/ RIGHT), Disp: 2 each, Rfl: 0    doxycycline hyclate (VIBRA-TABS) 100 MG tablet, Take 1 tablet by mouth 2 times daily for 14 days, Disp: 28 tablet, Rfl: 0    cetirizine (ZYRTEC) 10 MG tablet, , Disp: , Rfl:     metFORMIN (GLUCOPHAGE) 1000 MG tablet, , Disp: , Rfl:     sulfamethoxazole-trimethoprim (BACTRIM DS;SEPTRA DS) 800-160 MG per tablet, TAKE 1 TABLET BY MOUTH TWICE DAILY, Disp: , Rfl: 0    tamsulosin (FLOMAX) 0.4 MG capsule, TAKE 1 CAPSULE BY MOUTH EVERY DAY 1/2 HOUR FOLLOWING THE SAME MEAL EACH DAY, Disp: , Rfl: 5    silver sulfADIAZINE (SILVADENE) 1 % cream, Apply topically daily. , Disp: 50 g, Rfl: 1    lisinopril (PRINIVIL;ZESTRIL) 10 MG tablet, Take 10 mg by mouth daily, Disp: , Rfl:     simvastatin (ZOCOR) 40 MG tablet, Take 40 mg by mouth daily, Disp: , Rfl:     naproxen (NAPROSYN) 500 MG tablet, , Disp: , Rfl:     glipiZIDE (GLUCOTROL) 10 MG tablet, Take 40 mg by mouth once, Disp: , Rfl:     aspirin 81 MG tablet, Take 81 mg by mouth daily, Disp: , Rfl:     metFORMIN (GLUCOPHAGE) 500 MG tablet, Take 500 mg by mouth 2 times daily (with meals) , Disp: , Rfl:     gabapentin (NEURONTIN) 300 MG capsule, Take 900 mg by mouth 3 times daily. Take 3 pills 3 times a day ., Disp: , Rfl:     Review of Systems  Review of Systems - History obtained from chart review and the patient  General ROS: negative for - chills, fatigue, fever, night sweats or weight gain  Constitutional: Negative for chills, diaphoresis, fatigue, fever and unexpected weight change. Musculoskeletal: Positive for arthralgias, gait problem and joint swelling. Neurological ROS: negative for - behavioral changes, confusion, headaches or seizures. Negative for weakness and numbness. Dermatological ROS: negative for - mole changes, rash  Cardiovascular: Negative for leg swelling. Gastrointestinal: Negative for constipation, diarrhea, nausea and vomiting. Objective:       Physical Exam:  Resp 16   Ht 5' 10\" (1.778 m)   Wt 241 lb (109.3 kg)   BMI 34.58 kg/m²     NV status remains unchanged since last visit. Wound #:   1    diabetic foot ulcer   right foot    Wound measurements:  #1  5 mm by 4 mm  by   2 mm         Wound Depth: subcutaneous.      Drainage:    minimal Type: serosanguinous    Wound Bed status:   Granulation Tissue: 90%   Necrotic 10%  Type:   Epithelialization 0%   Hypergranular 0%   Periwound hyperkeratosis:

## 2020-03-19 ENCOUNTER — ANESTHESIA EVENT (OUTPATIENT)
Dept: OPERATING ROOM | Age: 65
End: 2020-03-19
Payer: MEDICARE

## 2020-03-19 ENCOUNTER — OFFICE VISIT (OUTPATIENT)
Dept: PODIATRY | Age: 65
End: 2020-03-19
Payer: MEDICARE

## 2020-03-19 VITALS — HEIGHT: 70 IN | BODY MASS INDEX: 34.5 KG/M2 | RESPIRATION RATE: 16 BRPM | WEIGHT: 241 LBS

## 2020-03-19 PROCEDURE — 2022F DILAT RTA XM EVC RTNOPTHY: CPT | Performed by: PODIATRIST

## 2020-03-19 PROCEDURE — 99213 OFFICE O/P EST LOW 20 MIN: CPT | Performed by: PODIATRIST

## 2020-03-19 PROCEDURE — G8417 CALC BMI ABV UP PARAM F/U: HCPCS | Performed by: PODIATRIST

## 2020-03-19 PROCEDURE — 4004F PT TOBACCO SCREEN RCVD TLK: CPT | Performed by: PODIATRIST

## 2020-03-19 PROCEDURE — 3046F HEMOGLOBIN A1C LEVEL >9.0%: CPT | Performed by: PODIATRIST

## 2020-03-19 PROCEDURE — G8428 CUR MEDS NOT DOCUMENT: HCPCS | Performed by: PODIATRIST

## 2020-03-19 PROCEDURE — G8484 FLU IMMUNIZE NO ADMIN: HCPCS | Performed by: PODIATRIST

## 2020-03-19 PROCEDURE — 3017F COLORECTAL CA SCREEN DOC REV: CPT | Performed by: PODIATRIST

## 2020-03-20 ENCOUNTER — ANESTHESIA (OUTPATIENT)
Dept: OPERATING ROOM | Age: 65
End: 2020-03-20
Payer: MEDICARE

## 2020-03-20 ENCOUNTER — HOSPITAL ENCOUNTER (OUTPATIENT)
Age: 65
Setting detail: OUTPATIENT SURGERY
Discharge: HOME | End: 2020-03-20
Attending: PODIATRIST | Admitting: PODIATRIST
Payer: MEDICARE

## 2020-03-20 VITALS
HEIGHT: 71 IN | SYSTOLIC BLOOD PRESSURE: 119 MMHG | WEIGHT: 257.5 LBS | HEART RATE: 81 BPM | BODY MASS INDEX: 36.05 KG/M2 | RESPIRATION RATE: 15 BRPM | TEMPERATURE: 97.3 F | DIASTOLIC BLOOD PRESSURE: 62 MMHG | OXYGEN SATURATION: 95 %

## 2020-03-20 VITALS — TEMPERATURE: 94.6 F | SYSTOLIC BLOOD PRESSURE: 131 MMHG | OXYGEN SATURATION: 97 % | DIASTOLIC BLOOD PRESSURE: 54 MMHG

## 2020-03-20 LAB
GLUCOSE BLD-MCNC: 102 MG/DL (ref 75–110)
GLUCOSE BLD-MCNC: 122 MG/DL (ref 75–110)

## 2020-03-20 PROCEDURE — 88304 TISSUE EXAM BY PATHOLOGIST: CPT

## 2020-03-20 PROCEDURE — 7100000010 HC PHASE II RECOVERY - FIRST 15 MIN: Performed by: PODIATRIST

## 2020-03-20 PROCEDURE — 2580000003 HC RX 258: Performed by: ANESTHESIOLOGY

## 2020-03-20 PROCEDURE — 2500000003 HC RX 250 WO HCPCS: Performed by: PODIATRIST

## 2020-03-20 PROCEDURE — 2709999900 HC NON-CHARGEABLE SUPPLY: Performed by: PODIATRIST

## 2020-03-20 PROCEDURE — 3700000000 HC ANESTHESIA ATTENDED CARE: Performed by: PODIATRIST

## 2020-03-20 PROCEDURE — 3600000012 HC SURGERY LEVEL 2 ADDTL 15MIN: Performed by: PODIATRIST

## 2020-03-20 PROCEDURE — 6360000002 HC RX W HCPCS: Performed by: NURSE ANESTHETIST, CERTIFIED REGISTERED

## 2020-03-20 PROCEDURE — 82947 ASSAY GLUCOSE BLOOD QUANT: CPT

## 2020-03-20 PROCEDURE — 87205 SMEAR GRAM STAIN: CPT

## 2020-03-20 PROCEDURE — 2500000003 HC RX 250 WO HCPCS: Performed by: NURSE ANESTHETIST, CERTIFIED REGISTERED

## 2020-03-20 PROCEDURE — 88311 DECALCIFY TISSUE: CPT

## 2020-03-20 PROCEDURE — 28005 TREAT FOOT BONE LESION: CPT | Performed by: PODIATRIST

## 2020-03-20 PROCEDURE — 3700000001 HC ADD 15 MINUTES (ANESTHESIA): Performed by: PODIATRIST

## 2020-03-20 PROCEDURE — 87075 CULTR BACTERIA EXCEPT BLOOD: CPT

## 2020-03-20 PROCEDURE — 87070 CULTURE OTHR SPECIMN AEROBIC: CPT

## 2020-03-20 PROCEDURE — 7100000011 HC PHASE II RECOVERY - ADDTL 15 MIN: Performed by: PODIATRIST

## 2020-03-20 PROCEDURE — 3600000002 HC SURGERY LEVEL 2 BASE: Performed by: PODIATRIST

## 2020-03-20 RX ORDER — FENTANYL CITRATE 50 UG/ML
25 INJECTION, SOLUTION INTRAMUSCULAR; INTRAVENOUS EVERY 5 MIN PRN
Status: DISCONTINUED | OUTPATIENT
Start: 2020-03-20 | End: 2020-03-20 | Stop reason: HOSPADM

## 2020-03-20 RX ORDER — FENTANYL CITRATE 50 UG/ML
50 INJECTION, SOLUTION INTRAMUSCULAR; INTRAVENOUS EVERY 5 MIN PRN
Status: DISCONTINUED | OUTPATIENT
Start: 2020-03-20 | End: 2020-03-20 | Stop reason: HOSPADM

## 2020-03-20 RX ORDER — PROPOFOL 10 MG/ML
INJECTION, EMULSION INTRAVENOUS CONTINUOUS PRN
Status: DISCONTINUED | OUTPATIENT
Start: 2020-03-20 | End: 2020-03-20 | Stop reason: SDUPTHER

## 2020-03-20 RX ORDER — LIDOCAINE HYDROCHLORIDE 20 MG/ML
INJECTION, SOLUTION EPIDURAL; INFILTRATION; INTRACAUDAL; PERINEURAL PRN
Status: DISCONTINUED | OUTPATIENT
Start: 2020-03-20 | End: 2020-03-20 | Stop reason: SDUPTHER

## 2020-03-20 RX ORDER — CEFAZOLIN SODIUM 1 G/3ML
INJECTION, POWDER, FOR SOLUTION INTRAMUSCULAR; INTRAVENOUS PRN
Status: DISCONTINUED | OUTPATIENT
Start: 2020-03-20 | End: 2020-03-20 | Stop reason: SDUPTHER

## 2020-03-20 RX ORDER — SODIUM CHLORIDE, SODIUM LACTATE, POTASSIUM CHLORIDE, CALCIUM CHLORIDE 600; 310; 30; 20 MG/100ML; MG/100ML; MG/100ML; MG/100ML
INJECTION, SOLUTION INTRAVENOUS CONTINUOUS
Status: DISCONTINUED | OUTPATIENT
Start: 2020-03-21 | End: 2020-03-20 | Stop reason: HOSPADM

## 2020-03-20 RX ORDER — DOXYCYCLINE HYCLATE 100 MG
100 TABLET ORAL 2 TIMES DAILY
Qty: 20 TABLET | Refills: 0 | Status: SHIPPED | OUTPATIENT
Start: 2020-03-20 | End: 2020-03-30

## 2020-03-20 RX ORDER — HYDROCODONE BITARTRATE AND ACETAMINOPHEN 5; 325 MG/1; MG/1
1 TABLET ORAL EVERY 6 HOURS PRN
Qty: 28 TABLET | Refills: 0 | Status: SHIPPED | OUTPATIENT
Start: 2020-03-20 | End: 2020-03-27

## 2020-03-20 RX ORDER — ONDANSETRON 2 MG/ML
4 INJECTION INTRAMUSCULAR; INTRAVENOUS
Status: DISCONTINUED | OUTPATIENT
Start: 2020-03-20 | End: 2020-03-20 | Stop reason: HOSPADM

## 2020-03-20 RX ORDER — DOXYCYCLINE HYCLATE 100 MG
100 TABLET ORAL 2 TIMES DAILY
Status: ON HOLD | COMMUNITY
End: 2020-03-20 | Stop reason: HOSPADM

## 2020-03-20 RX ORDER — SODIUM CHLORIDE 9 MG/ML
INJECTION, SOLUTION INTRAVENOUS CONTINUOUS
Status: DISCONTINUED | OUTPATIENT
Start: 2020-03-21 | End: 2020-03-20

## 2020-03-20 RX ORDER — LIDOCAINE HYDROCHLORIDE 10 MG/ML
1 INJECTION, SOLUTION EPIDURAL; INFILTRATION; INTRACAUDAL; PERINEURAL
Status: DISCONTINUED | OUTPATIENT
Start: 2020-03-21 | End: 2020-03-20 | Stop reason: HOSPADM

## 2020-03-20 RX ORDER — SODIUM CHLORIDE 0.9 % (FLUSH) 0.9 %
10 SYRINGE (ML) INJECTION PRN
Status: DISCONTINUED | OUTPATIENT
Start: 2020-03-20 | End: 2020-03-20 | Stop reason: HOSPADM

## 2020-03-20 RX ORDER — SODIUM CHLORIDE 0.9 % (FLUSH) 0.9 %
10 SYRINGE (ML) INJECTION EVERY 12 HOURS SCHEDULED
Status: DISCONTINUED | OUTPATIENT
Start: 2020-03-20 | End: 2020-03-20 | Stop reason: HOSPADM

## 2020-03-20 RX ADMIN — LIDOCAINE HYDROCHLORIDE 100 MG: 20 INJECTION, SOLUTION EPIDURAL; INFILTRATION; INTRACAUDAL; PERINEURAL at 07:24

## 2020-03-20 RX ADMIN — PROPOFOL 75 MCG/KG/MIN: 10 INJECTION, EMULSION INTRAVENOUS at 07:24

## 2020-03-20 RX ADMIN — CEFAZOLIN 2000 MG: 1 INJECTION, POWDER, FOR SOLUTION INTRAMUSCULAR; INTRAVENOUS at 07:28

## 2020-03-20 RX ADMIN — SODIUM CHLORIDE, POTASSIUM CHLORIDE, SODIUM LACTATE AND CALCIUM CHLORIDE: 600; 310; 30; 20 INJECTION, SOLUTION INTRAVENOUS at 06:41

## 2020-03-20 ASSESSMENT — PULMONARY FUNCTION TESTS
PIF_VALUE: 1
PIF_VALUE: 0
PIF_VALUE: 1

## 2020-03-20 ASSESSMENT — PAIN SCALES - GENERAL
PAINLEVEL_OUTOF10: 0

## 2020-03-20 NOTE — ANESTHESIA PRE PROCEDURE
 [START ON 3/21/2020] lactated ringers infusion   Intravenous Continuous Marvia Angst,  mL/hr at 03/20/20 9779      sodium chloride flush 0.9 % injection 10 mL  10 mL Intravenous 2 times per day Marvia Angst, DO        sodium chloride flush 0.9 % injection 10 mL  10 mL Intravenous PRN Marvia Angst, DO        [START ON 3/21/2020] lidocaine PF 1 % injection 1 mL  1 mL Intradermal Once PRN Marvia Angst, DO           Allergies: Allergies   Allergen Reactions    Morphine Itching    Percocet [Oxycodone-Acetaminophen]      Facial swelling    Dye [Iodides] Rash     Rash and swelling       Problem List:    Patient Active Problem List   Diagnosis Code    Essential hypertension I10    Type II or unspecified type diabetes mellitus without mention of complication, not stated as uncontrolled E11.9    Neuropathy G62.9    Vertigo R44    Charcot foot due to diabetes mellitus ([address] E11.610    Hyperlipidemia E78.5    Cellulitis L03.90    Cellulitis of left foot L03. 80    Right foot infection L08.9    Diabetic polyneuropathy associated with type 2 diabetes mellitus ([address] E11.42    Foreign body (FB) in soft tissue M79.5    Tobacco abuse Z72.0    Cellulitis of left leg L03. 116    Abscess of right foot L02.611    Chest pain at rest R07.9    Tobacco abuse Z72.0    Well controlled type 2 diabetes mellitus with neurological manifestations ([address] E11.49    ALEJANDRO (acute kidney injury) ([address] N17.9    Bandemia D72.825       Past Medical History:        Diagnosis Date    ALEJANDRO (acute kidney injury) ([address] 8/5/2018    Cellulitis of right foot     and abscess    Charcot foot due to diabetes mellitus ([address] 2014    Right foot     Diabetic polyneuropathy associated with type 2 diabetes mellitus ([address]     Fractures, multiple 07/21/2014    Right foot fractures     Hyperlipidemia     Hypertension     Leukocytosis     Neuropathy     diabetic with charcot affecting the right foot    Pain in 08/07/2018    RBC 3.60 08/07/2018    HGB 11.1 08/07/2018    HCT 33.2 08/07/2018    MCV 92.3 08/07/2018    RDW 13.8 08/07/2018     08/07/2018       CMP:   Lab Results   Component Value Date     08/10/2018    K 5.0 08/10/2018     08/10/2018    CO2 22 08/10/2018    BUN 17 08/10/2018    CREATININE 1.48 06/26/2019    GFRAA 58 06/26/2019    LABGLOM 48 06/26/2019    GLUCOSE 97 08/10/2018    PROT 7.5 01/09/2017    CALCIUM 9.1 08/10/2018    BILITOT 0.39 01/09/2017    ALKPHOS 89 01/09/2017    AST 16 01/09/2017    ALT 15 01/09/2017       POC Tests:   Recent Labs     03/20/20  0636   POCGLU 102       Coags: No results found for: PROTIME, INR, APTT    HCG (If Applicable): No results found for: PREGTESTUR, PREGSERUM, HCG, HCGQUANT     ABGs: No results found for: PHART, PO2ART, LXV5WEO, LOZ2BKU, BEART, W5XSOLPE     Type & Screen (If Applicable):  No results found for: LABABO, LABRH    Anesthesia Evaluation    Airway: Mallampati: I  TM distance: >3 FB   Neck ROM: full  Mouth opening: > = 3 FB Dental:          Pulmonary:       (-) pneumonia                           Cardiovascular:    (+) hypertension:,     (-)  angina                Neuro/Psych:               GI/Hepatic/Renal:             Endo/Other:    (+) Diabetes, . Abdominal:           Vascular:                                        Anesthesia Plan      MAC     ASA 3             Anesthetic plan and risks discussed with patient.                     Mary Ann De Leon MD   3/20/2020

## 2020-03-20 NOTE — H&P
History and Physical Update    Pt Name: Creig Primrose  MRN: [id number]  YOB: 1955  Date of evaluation: 3/20/2020      [x] I have reviewed the progress note found in Epic by Dr. Larry Monsalve from 03/05/2020 which meets the criteria for an Interval History and Physical note. [x] I have examined  Creig Primrose a 62 y.o., male who is scheduled for a right foot incision and drainage with bone biopsy by Dr. Larry Monsalve due to right foot cellulitis/abscess. The patient denies health changes since his appointment with Dr. Larry Monsalve on 03/05/2020. Pt denies fever, chills, productive cough, SOB, and chest pain. History of MRSA. History of diabetes. Pt's blood glucose today is 102. Naproxen and ASA were last taken on 03/19/2020. Vital signs: /63   Pulse 90   Temp 98.6 °F (37 °C) (Oral)   Resp 18   Ht 5' 11\" (1.803 m)   Wt 257 lb 8 oz (116.8 kg)   SpO2 95%   BMI 35.91 kg/m²      Allergies:  Morphine; Percocet [oxycodone-acetaminophen]; and Dye [iodides]    Past medical history, surgical history, social history, and family history were reviewed and updated in EPIC as indicated. Medications:    Prior to Admission medications    Medication Sig Start Date End Date Taking? Authorizing Provider   doxycycline hyclate (VIBRA-TABS) 100 MG tablet Take 100 mg by mouth 2 times daily   Yes Historical Provider, MD   cetirizine (ZYRTEC) 10 MG tablet  10/21/19  Yes Historical Provider, MD   metFORMIN (GLUCOPHAGE) 1000 MG tablet  8/14/19  Yes Historical Provider, MD   tamsulosin (FLOMAX) 0.4 MG capsule TAKE 1 CAPSULE BY MOUTH EVERY DAY 1/2 HOUR FOLLOWING THE SAME MEAL EACH DAY 9/24/19  Yes Historical Provider, MD   silver sulfADIAZINE (SILVADENE) 1 % cream Apply topically daily.  10/29/19  Yes Obdulia Johnson DPM   lisinopril (PRINIVIL;ZESTRIL) 10 MG tablet Take 10 mg by mouth daily 11/15/18  Yes Historical Provider, MD   simvastatin (ZOCOR) 40 MG tablet Take 40 mg by mouth daily 11/13/18  Yes diaphoresis, fatigue, fever and unexpected weight change. Musculoskeletal: Positive for arthralgias, gait problem and joint swelling. Neurological ROS: negative for - behavioral changes, confusion, headaches or seizures. Negative for weakness and numbness. Dermatological ROS: negative for - mole changes, rash  Cardiovascular: Negative for leg swelling. Gastrointestinal: Negative for constipation, diarrhea, nausea and vomiting. Objective:  Dermatologic Exam:  Blister formation to the right 3rd and 4th toes with edema   Skin is thin, with flaky sloughing skin as well as decreased hair growth to the lower leg  Small red hemosiderin deposits seen dorsal foot   Musculoskeletal:     1st MPJ ROM decreased, Bilateral.  Muscle strength 5/5, Bilateral.  Pain present upon palpation of right foot interdigital 3, 4 digits right      decreased medial longitudinal arch, Bilateral.  Ankle ROM decreased,Bilateral.    Dorsally contracted digits present digits 2, Bilateral.      Vascular: DP pulses 1/4 bilateral.  PT pulses 0/4 bilateral.   CFT <5 seconds, Bilateral.  Hair growth absent to the level of the digits, Bilateral.  Edema present, Bilateral.  Varicosities absent, Bilateral. Erythema absent, Bilateral     Neurological: Sensation diminshed to light touch to level of digits, Bilateral.  Protective sensation intact 6/10 sites via 5.07/10g [place] Monofilament, Bilateral.  negative Tinel's, Bilateral.  negative Valleix sign, Bilateral.       Integument: Warm, dry, supple, Bilateral.  Open lesion absent, Bilateral.  Interdigital maceration absent to web spaces 4, Bilateral.  .  Fissures absent, Bilateral.   General: AAO x 3 in NAD.        absentVisual inspection:  Deformity: hammertoe deformity tyrone feet  amputation: absent  Skin lesions: present - as above  Edema: right- 2+ pitting edema, left- 2+ pitting edema     Sensory exam:  Monofilament sensation: abnormal - 6/10 via SW 5.07/10g monofilament to the

## 2020-03-20 NOTE — ANESTHESIA POSTPROCEDURE EVALUATION
Department of Anesthesiology  Postprocedure Note    Patient: Mario Covington  MRN: [id number]  YOB: 1955  Date of evaluation: 3/20/2020  Time:  10:29 AM     Procedure Summary     Date:  03/20/20 Room / Location:  [address] - INPATIENT    Anesthesia Start:  0720 Anesthesia Stop:  3300    Procedure:  RIGHT  FOOT   INCISION AND DRAINAGE WITH BONE BIOPSY (Right Foot) Diagnosis:  (DX RIGHT FOOT CELLULITIS/ABSCESS)    Surgeon:  Janice Lefort, DPM Responsible Provider:  Mary Ann De Leon MD    Anesthesia Type:  MAC, general ASA Status:  3          Anesthesia Type: MAC, general    Amy Phase I: Amy Score: 10    Amy Phase II: Amy Score: 9    Last vitals: Reviewed and per EMR flowsheets.        Anesthesia Post Evaluation    Complications: no

## 2020-03-20 NOTE — OP NOTE
PODIATRY OP NOTE     PATIENT NAME: Forrest Santiago  YOB: 1955  -  62 y.o. male  MRN: [id number]  DATE: 3/20/2020  BILLING #: [id number]     Surgeon(s):  Estrada Berrios DPM      ASSISTANTS: Ruth Nissen, DPM      PRE-OP DIAGNOSIS:   1. Osteomyelitis, right foot  2. Cellulitis, right foot  3. Infected diabetic ulceration 2nd digit, right foot   4. Diabetes type II with associated peripheral neuropathy      POST-OP DIAGNOSIS: Same as above.     PROCEDURE:   1. Incision and drainage of bone, right foot     ANESTHESIA: MAC, 10cc 1:1 mixture of 1% lidocaine plain and 0.5% marcaine      HEMOSTASIS: Pneumatic ankle tourniquet @ 250 mmHg for 16 minutes.     ESTIMATED BLOOD LOSS: Less than 3cc.     MATERIALS: 4-0 Monocryl, 4-0 Prolene  * No implants in log *     INJECTABLES: None     SPECIMEN:   ID Type Source Tests Collected by Time Destination   1 : RIGHT 3RD TOE PRE-LAVAGE Swab Toe CULTURE, ANAEROBIC AND AEROBIC [place], [place] 3/20/2020 0750     A : RIGHT 3RD TOE MIDDLE AND PROXIMAL PHALYNX BONE BIOPSY Bone Toe SURGICAL PATHOLOGY [place], [place] 3/20/2020 2603           COMPLICATIONS: None     FINDINGS: Decreased osseous density and necrosis of proximal phalanx and middle phalanx of right 2nd digit consistent with osteomyelitis. INDICATIONS FOR PROCEDURE:  Patient is a 62year-old  male well known to Dr. Christina kee with a chief complaint of infected diabetic ulceration and osteomyelitis of the second digit of the right foot. Pain film radiographs of the right foot demonstrate osteomyelitis of the second digit. There is a wound present to the second digit with purulent drainage. The patient has elected to undergo surgical treatment. In pre-op all risks and rewards of the aforementioned procedure were discussed at length prior to the patient signing the consent form which was witnessed by a nurse and placed in his chart.  The right foot was marked, labs and images reviewed, NPO status confirmed. No guarantees were given or implied. PROCEDURE IN DETAIL: The patient was brought into the operating room and placed on the operating table in the supine position. A timeout was performed confirming the patient identity, correct site, correct procedure, allergies, and preoperative antibiotics. Ankle tourniquet was applied to the right ankle. After adequate sedation by Anesthesia, a local block of 10cc of 1:1 mixture of 1% lidocaine plain and 0.5% Marcaine plain was injected. The surgical site was then scrubbed, prepped, and draped in the usual aseptic manner. An Esmarch bandage was utilized to exsanguinate the right foot. The pneumatic ankle tourniquet was inflated to 250 mmHg. Attention was directed to the lateral aspect of the second digit of the right foot where there was a wound that probes to bone with purulent drainage appreciated. The second digit demonstrated geovanni cellulitis. The wound had a fibrotic base with central necrotic. 1.5 cc of purulence was expressed. Incision and drainage was performed utilizing a #15 blade down to the level of periosteum. Two semielliptical incisions were utilized to excise the wound. All non-viable and fibrotic tissue was excised utilizing a combination of #15 blade and rongeur. There was adequate bleeding noted to the wound bed. The proximal phalanx was identified and freed from all soft tissue attachments. The distal proximal phalanx was noted to be decrease in osseous density and necrotic with cortical erosions consistent with osteomyelitis. Utilizing bone cutters the distal proximal phalanx was resected and excised and sent for pathology. Upon inspection of the middle phalanx but also demonstrated decreased osseous density and necrosis consistent with osteomyelitis. Middle phalanx was then freed from all soft tissue attachments and excised, it was passed from the table to be sent for pathology. Swab cultures were obtained.  The surgical site was then irrigated with copious amounts of sterile saline. The remaining surgical site appears healthy and viable. The surgical site was then closed in layers utilizing 4-0 Monocryl and 3-0 Prolene. Dressings were applied consisting of Adaptic, gauze 4 x 4's, Melissa, Kerlix, Ace. Patient tolerated above procedure and anesthesia well without complications. The patient was transferred from the operating room to the PACU with vital signs stable vascular status intact to the right foot.      Silvino Jamison DPM   Podiatric Medicine & Surgery   3/20/2020 at 10:53 AM

## 2020-03-22 LAB
CULTURE: ABNORMAL
CULTURE: ABNORMAL
DIRECT EXAM: ABNORMAL
Lab: ABNORMAL
SPECIMEN DESCRIPTION: ABNORMAL

## 2020-03-24 LAB — SURGICAL PATHOLOGY REPORT: NORMAL

## 2020-03-25 PROBLEM — Z72.0 TOBACCO ABUSE: Status: RESOLVED | Noted: 2018-04-24 | Resolved: 2020-03-24

## 2020-03-31 ENCOUNTER — OFFICE VISIT (OUTPATIENT)
Dept: PODIATRY | Age: 65
End: 2020-03-31

## 2020-03-31 VITALS — HEIGHT: 70 IN | RESPIRATION RATE: 16 BRPM | TEMPERATURE: 97.6 F | WEIGHT: 241 LBS | BODY MASS INDEX: 34.5 KG/M2

## 2020-03-31 PROCEDURE — 99024 POSTOP FOLLOW-UP VISIT: CPT | Performed by: PODIATRIST

## 2020-03-31 NOTE — PROGRESS NOTES
[facility] PHYSICIANS  [facility]  [address] [place] [address]  Dept: [phone]  Dept Fax: [phone]    POST-OP PROGRESS NOTE  Date of patient's visit: 3/31/2020  Patient's Name:  Mary Carmen Redmond YOB: 1955            Patient Care Team:  Claire William MD as PCP - General (Family Medicine)  Nancy Skinner MD as Consulting Physician (Infectious Diseases)  Erum Boucher DPM as Physician (Podiatry)        Chief Complaint   Patient presents with    Post-Op Check     1 week post op    Diabetes     blood sugar was 120 today       Pt's primary care physician is Claire William MD last seen 12/22/2020     Subjective: Mary Carmen Redmond is a 62 y.o. male who presents to the office today 1week(s)  S/P 3rd toe arthroplasty and removal of infected bone  for correction of osteomyelitis from a wound  Problem List Items Addressed This Visit     None      Visit Diagnoses     Type II diabetes mellitus with peripheral circulatory disorder ([facility] [address]    -  Primary      . Patient relates pain is Present and improved. Pain is rated 1 out of 10 and is described as mild. Currently denies F/C/N/V. Is patient taking pain medications as prescribed and is controlling pain    Physical Examination:  Incision is coapted with slight masceration tissue interdigitally. Minimal bleeding post operatively. Edema present. No erythema. No Pus. Operative correction is satisfactory. Assessment: Mary Carmen Redmond is status post as above  Normal post operative course. Doing well          ICD-10-CM    1. Type II diabetes mellitus with peripheral circulatory disorder (HCC) E11.51          Plan:  Patient examined and evaluated. Current condition and treatment options discussed in detail. Advised pt to place betadine to the mascerated interspace daily and change dressing daily with slivercell. Verbal and written instructions given to patient.   Orders: No orders of the defined types were placed in this encounter. Contact office with any questions/problems/concerns. RTC in 1week(s).      Electronically signed by Luke Eckert DPM on 3/31/2020 at 10:19 AM  3/31/2020

## 2020-04-07 ENCOUNTER — OFFICE VISIT (OUTPATIENT)
Dept: PODIATRY | Age: 65
End: 2020-04-07

## 2020-04-07 VITALS — RESPIRATION RATE: 18 BRPM | BODY MASS INDEX: 34.5 KG/M2 | HEIGHT: 70 IN | WEIGHT: 241 LBS | TEMPERATURE: 97.7 F

## 2020-04-07 PROCEDURE — 99024 POSTOP FOLLOW-UP VISIT: CPT | Performed by: PODIATRIST

## 2020-04-07 NOTE — PROGRESS NOTES
[facility] PHYSICIANS  [facility]  [address] [address] [place] [address]  Dept: [phone]  Dept Fax: [phone]    POST-OP PROGRESS NOTE  Date of patient's visit: 4/7/2020  Patient's Name:  Doroteo Medina YOB: 1955            Patient Care Team:  Joseph Dimas MD as PCP - General (Family Medicine)  Garth Jaramillo MD as Consulting Physician (Infectious Diseases)  Giovanni Palmer DPM as Physician (Podiatry)        Chief Complaint   Patient presents with    Post-Op Check     2 wk post op    Toe Pain    Diabetes       Pt's primary care physician is Joseph Dimas MD last seen 12/22/2019     Subjective: Doroteo Medina is a 62 y.o. male who presents to the office today 2week(s)  S/P right foot I and D of bone for correction of osteomyelitis right foot  Problem List Items Addressed This Visit     None      Visit Diagnoses     Type II diabetes mellitus with peripheral circulatory disorder ([facility] [address]    -  Primary    Post-op pain          . Patient relates pain is Present and improved. Pain is rated 1 out of 10 and is described as none. Currently denies F/C/N/V. Is patient taking pain medications as prescribed and is controlling pain    Physical Examination:  Incision is coapted, sutures/steri-strips are intact. Minimal bleeding post operatively. Edema present. No erythema. No Pus. Operative correction is satisfactory. Assessment: Doroteo Medina is status post   Normal post operative course. Doing well          ICD-10-CM    1. Type II diabetes mellitus with peripheral circulatory disorder (HCC) E11.51    2. Post-op pain G89.18          Plan:  Patient examined and evaluated. Current condition and treatment options discussed in detail. Advised pt to keep the area very dry and use betadine interdigitally. Verbal and written instructions given to patient. Orders: No orders of the defined types were placed in this encounter.      Contact office with any

## 2020-04-14 ENCOUNTER — OFFICE VISIT (OUTPATIENT)
Dept: PODIATRY | Age: 65
End: 2020-04-14

## 2020-04-14 VITALS — TEMPERATURE: 98.1 F | WEIGHT: 241 LBS | HEIGHT: 70 IN | BODY MASS INDEX: 34.5 KG/M2

## 2020-04-14 PROCEDURE — 99024 POSTOP FOLLOW-UP VISIT: CPT | Performed by: PODIATRIST

## 2020-04-14 RX ORDER — LORATADINE 10 MG/1
10 TABLET ORAL DAILY
Status: ON HOLD | COMMUNITY
End: 2020-12-11

## 2020-04-14 RX ORDER — HYDROCODONE BITARTRATE AND ACETAMINOPHEN 5; 325 MG/1; MG/1
1 TABLET ORAL PRN
Status: ON HOLD | COMMUNITY
End: 2020-12-11 | Stop reason: HOSPADM

## 2020-04-14 RX ORDER — FENOFIBRATE 160 MG/1
160 TABLET ORAL DAILY
COMMUNITY
End: 2020-12-07

## 2020-04-14 NOTE — PROGRESS NOTES
[facility] PHYSICIANS  [facility]  [address] [address]  Dept: [phone]  Dept Fax: [phone]    POST-OP PROGRESS NOTE  Date of patient's visit: 4/14/2020  Patient's Name:  Shannan Monet YOB: 1955            Patient Care Team:  Mook Harding MD as PCP - General (Family Medicine)  Jaimie Salamanca MD as Consulting Physician (Infectious Diseases)  Jerry Dang DPM as Physician (Podiatry)        Chief Complaint   Patient presents with    Post-Op Check       Pt's primary care physician is Mook Harding MD last seen 12/22/2019     Subjective: Shannan Monet is a 62 y.o. male who presents to the office today 4 week(s)  S/P right foot I and D of bone for correction of osteomyelitis right foot  Problem List Items Addressed This Visit     None      Visit Diagnoses     Post-operative state    -  Primary      . Patient relates pain is Absent and improved. Pain is rated 0 out of 10 and is described as none. Currently denies F/C/N/V. Is patient taking pain medications as prescribed and is controlling pain    Physical Examination:   Minimal bleeding post operatively. Edema present. No erythema. No Pus. Operative correction is satisfactory. Assessment: Shannan Monet is status post   Normal post operative course. Doing well          ICD-10-CM    1. Post-operative state Z98.890          Plan:  Patient examined and evaluated. Current condition and treatment options discussed in detail. Verbal and written instructions given to patient. Orders: No orders of the defined types were placed in this encounter. I placed 4 sutures to the wound to allow for closer approxmation of the tissue    Contact office with any questions/problems/concerns.   RTC in 1 week       Electronically signed by Jerry Dang DPM on 4/14/2020 at 2:50 PM  4/14/2020

## 2020-04-21 ENCOUNTER — OFFICE VISIT (OUTPATIENT)
Dept: PODIATRY | Age: 65
End: 2020-04-21

## 2020-04-21 VITALS — RESPIRATION RATE: 18 BRPM | WEIGHT: 241 LBS | BODY MASS INDEX: 34.5 KG/M2 | TEMPERATURE: 98.3 F | HEIGHT: 70 IN

## 2020-04-21 PROCEDURE — 99024 POSTOP FOLLOW-UP VISIT: CPT | Performed by: PODIATRIST

## 2020-04-28 ENCOUNTER — OFFICE VISIT (OUTPATIENT)
Dept: PODIATRY | Age: 65
End: 2020-04-28
Payer: MEDICARE

## 2020-04-28 VITALS — WEIGHT: 241 LBS | TEMPERATURE: 98.2 F | BODY MASS INDEX: 34.5 KG/M2 | HEIGHT: 70 IN | RESPIRATION RATE: 16 BRPM

## 2020-04-28 PROCEDURE — 11042 DBRDMT SUBQ TIS 1ST 20SQCM/<: CPT | Performed by: PODIATRIST

## 2020-04-28 PROCEDURE — 99024 POSTOP FOLLOW-UP VISIT: CPT | Performed by: PODIATRIST

## 2020-04-28 NOTE — PROGRESS NOTES
[facility] PHYSICIANS  [facility]  [address] [place] [address]  Dept: [phone]  Dept Fax: [phone]    POST-OP PROGRESS NOTE  Date of patient's visit: 4/28/2020  Patient's Name:  Tj Barrios YOB: 1955            Patient Care Team:  Pari Vasquez MD as PCP - General (Family Medicine)  Albina Randolph MD as Consulting Physician (Infectious Diseases)  Mely Matson DPM as Physician (Podiatry)  Mely Matson DPM as Physician (Podiatry)        Chief Complaint   Patient presents with    Post-Op Check    Diabetes       Pt's primary care physician is Pari Vasquez MD last seen 2/11/20    Subjective: Tj Barrios is a 62 y.o. male who presents to the office today 4week(s)  S/P  3rd toe arthroplasty for correction of infected bone  Problem List Items Addressed This Visit     None      . Patient relates pain is Absent  . Pain is rated 0 out of 10 and is described as none. Currently denies F/C/N/V. Is patient taking pain medications as prescribed and is controlling pain    Physical Examination:  Wound #:   1    diabetic foot ulcer and dehisced surgical wound   right foot    Wound measurements:  #1  5 mm by 4 mm  by   2 mm        Wound Depth: subcutaneous. Drainage:    minimal, serosanguinous Type:minimal, serosanguinous  Wound Bed status:   Granulation Tissue: 80%   Necrotic 20%  Type:   Epithelialization 0%   Hypergranular 0%   Periwound hyperkeratosis:  absent  Erythema absent    Odor:no  Maceration:yes  Undermining/tract/tunneling:no  Culture taken:   Previous bone culture                   Assessment: Tj Barrios is status post as above  Normal post operative course. Doing well         Diagnosis Orders   1. Right foot ulcer, with fat layer exposed ([address]  HI DEBRIDEMENT, SKIN, SUB-Q TISSUE,=<20 SQ CM   2. Diabetic foot ulcer with osteomyelitis ([address]  HI DEBRIDEMENT, SKIN, SUB-Q TISSUE,=<20 SQ CM   3.  PVD (peripheral vascular

## 2020-05-14 ENCOUNTER — OFFICE VISIT (OUTPATIENT)
Dept: PODIATRY | Age: 65
End: 2020-05-14
Payer: MEDICARE

## 2020-05-14 VITALS — HEIGHT: 70 IN | TEMPERATURE: 98.4 F | RESPIRATION RATE: 16 BRPM | BODY MASS INDEX: 34.5 KG/M2 | WEIGHT: 241 LBS

## 2020-05-14 PROCEDURE — 11042 DBRDMT SUBQ TIS 1ST 20SQCM/<: CPT | Performed by: PODIATRIST

## 2020-05-14 PROCEDURE — 99024 POSTOP FOLLOW-UP VISIT: CPT | Performed by: PODIATRIST

## 2020-05-14 NOTE — PROGRESS NOTES
foot with new wound dehiscense 3rd toe right foot  Normal post operative course. Doing well          ICD-10-CM    1. Post-operative state Z98.890    2. Type II diabetes mellitus with peripheral circulatory disorder (HCC) E11.51    3. Right foot ulcer, with fat layer exposed ([facility] [address] L97.512          Plan:  Patient examined and evaluated. Current condition and treatment options discussed in detail. Advised pt to his conditon. With the patient in supine position, Lidocaine soaked gauze was applied per physician order at beginning of wound evaluation. It was subsequently removed. Using a #15 blade scalpel and Pick-up, the wound was debrided down to and included the removal of the following tissue:     [] epidermis, [] dermis, [x]  subcutaneous tissue,  [] muscle/fascia tissue,   and/or  [] bone     Also debrided was all  [x] fibrin, [] biofilm, [x] slough,  [x] necrotic,  [] hypergranulation tissue, and/or  [] hyperkeratotic tissue. Wound was copiously irrigated with normal saline solution. Bleeding:  Minimal.  Hemostasis:  pressure     100%  of wound debrided. Patient tolerated procedure well. Pain 0 / 10 during procedure and pain 0 / 10 after procedure. Discussed appropriate home care of this wound. Wound redressed. Patient instructions were given. Dispensed dressing supplies and instructions on their use. .  Verbal and written instructions given to patient. Orders: No orders of the defined types were placed in this encounter. Contact office with any questions/problems/concerns. RTC in 2week(s).      Electronically signed by Luke Eckert DPM on 5/14/2020 at 3:23 PM  5/14/2020

## 2020-06-04 ENCOUNTER — OFFICE VISIT (OUTPATIENT)
Dept: PODIATRY | Age: 65
End: 2020-06-04

## 2020-06-04 VITALS — TEMPERATURE: 97.8 F | WEIGHT: 241 LBS | RESPIRATION RATE: 16 BRPM | BODY MASS INDEX: 34.5 KG/M2 | HEIGHT: 70 IN

## 2020-06-04 PROCEDURE — 99024 POSTOP FOLLOW-UP VISIT: CPT | Performed by: PODIATRIST

## 2020-06-04 NOTE — PROGRESS NOTES
[facility] PHYSICIANS  [facility]  [address] [place] [address]  Dept: [phone]  Dept Fax: [phone]    POST-OP PROGRESS NOTE  Date of patient's visit: 6/4/2020  Patient's Name:  Gabriella Evans YOB: 1955            Patient Care Team:  Franchesca Barrett MD as PCP - General (Family Medicine)  Carolyn Duong MD as Consulting Physician (Infectious Diseases)  Billie Browning DPM as Physician (Podiatry)  Billie Browning DPM as Physician (Podiatry)            Pt's primary care physician is Franchesca Barrett MD last seen 4/28/20    Subjective: Gabriella Evans is a 62 y.o. male who presents to the office today 11 week(s)  S/P I & D D for correction of ulcer  Problem List Items Addressed This Visit     None      . Patient relates pain is Absent  and . Pain is rated 0 out of 10 and is described as none. Currently denies F/C/N/V. Is patient taking pain medications as prescribed and is controlling pain    Physical Examination:  Incision is coapted, sutures/steri-strips are intact. Minimal bleeding post operatively. Edema present. No erythema. No Pus. Operative correction is satisfactory. wound is closed          Assessment: Gabriella Evans is status post as above  Normal post operative course. Doing well        No diagnosis found. Plan:  Patient examined and evaluated. Current condition and treatment options discussed in detail. Advised pt to keep the toes spaced out to prevent it from rubbing. .  Verbal and written instructions given to patient. Orders: No orders of the defined types were placed in this encounter. Contact office with any questions/problems/concerns. RTC in 4week(s).      Electronically signed by Billie Browning DPM on 6/4/2020 at 3:23 PM  6/4/2020

## 2020-07-30 ENCOUNTER — OFFICE VISIT (OUTPATIENT)
Dept: PODIATRY | Age: 65
End: 2020-07-30
Payer: MEDICARE

## 2020-07-30 VITALS — HEIGHT: 70 IN | WEIGHT: 241 LBS | TEMPERATURE: 98 F | RESPIRATION RATE: 16 BRPM | BODY MASS INDEX: 34.5 KG/M2

## 2020-07-30 PROCEDURE — G8417 CALC BMI ABV UP PARAM F/U: HCPCS | Performed by: PODIATRIST

## 2020-07-30 PROCEDURE — G8427 DOCREV CUR MEDS BY ELIG CLIN: HCPCS | Performed by: PODIATRIST

## 2020-07-30 PROCEDURE — 3046F HEMOGLOBIN A1C LEVEL >9.0%: CPT | Performed by: PODIATRIST

## 2020-07-30 PROCEDURE — 99213 OFFICE O/P EST LOW 20 MIN: CPT | Performed by: PODIATRIST

## 2020-07-30 PROCEDURE — 2022F DILAT RTA XM EVC RTNOPTHY: CPT | Performed by: PODIATRIST

## 2020-07-30 PROCEDURE — 4004F PT TOBACCO SCREEN RCVD TLK: CPT | Performed by: PODIATRIST

## 2020-07-30 PROCEDURE — 3017F COLORECTAL CA SCREEN DOC REV: CPT | Performed by: PODIATRIST

## 2020-07-30 RX ORDER — METHYLPREDNISOLONE 4 MG/1
TABLET ORAL
Qty: 1 KIT | Refills: 0 | Status: SHIPPED | OUTPATIENT
Start: 2020-07-30 | End: 2020-08-05

## 2020-07-30 NOTE — PROGRESS NOTES
[facility] PHYSICIANS  [facility]  [address] [place] [address]  Dept: [phone]  Dept Fax: [phone]    RETURN PATIENT PROGRESS NOTE  Date of patient's visit: 7/30/2020  Patient's Name:  Leigh Tran YOB: 1955            Patient Care Team:  Carolyn Andres MD as PCP - General (Family Medicine)  Leticia Levin MD as Consulting Physician (Infectious Diseases)  Reed Hogan DPM as Physician (Podiatry)  Reed Hogan DPM as Physician (Podiatry)       Leigh Tran 60 y.o. male that presents for follow-up of   Chief Complaint   Patient presents with    Ankle Pain     right ankle, x1 day    Diabetes     blood sugar was 127 today    Peripheral Neuropathy     Pt's primary care physician is Carolyn Andres MD last seen 06/19/2020  Symptoms began 1 day(s) ago and are unchanged . Patient relates pain is Present. Pain is rated 9 out of 10 and is described as intermittent, severe. Treatments prior to today's visit include: pads. Pt states his mother passed away recently. Currently denies F/C/N/V.      Allergies   Allergen Reactions    Morphine Itching    Percocet [Oxycodone-Acetaminophen]      Facial swelling    Dye [Iodides] Rash     Rash and swelling       Past Medical History:   Diagnosis Date    ALEJANDRO (acute kidney injury) ([address] 8/5/2018    Cellulitis of right foot     and abscess    Charcot foot due to diabetes mellitus ([address] 2014    Right foot     Diabetic polyneuropathy associated with type 2 diabetes mellitus ([address]     Fractures, multiple 07/21/2014    Right foot fractures     Hyperlipidemia     Hypertension     Leukocytosis     Neuropathy     diabetic with charcot affecting the right foot    Pain in right foot     redness and swelling    Pneumonia 2009    Tobacco abuse 4/24/2018    Type II or unspecified type diabetes mellitus without mention of complication, not stated as uncontrolled     Vertigo     Wound, open Left Ball of  foot, pt. stepped on sharp object. Covered by dressing.  Wound, open     Right posterior -Diabetic Ulcer       Prior to Admission medications    Medication Sig Start Date End Date Taking? Authorizing Provider   loratadine (CLARITIN) 10 MG tablet Take 10 mg by mouth daily   Yes Historical Provider, MD   HYDROcodone-acetaminophen (NORCO) 5-325 MG per tablet Take 1 tablet by mouth as needed. Yes Historical Provider, MD   fenofibrate (TRIGLIDE) 160 MG tablet Take 160 mg by mouth daily   Yes Historical Provider, MD   Misc. Devices MISC 1 PAIR OF DIABETIC SHOES (1 LEFT/ 1 RIGHT)  1-3 PAIRS OF INSERTS (LEFT/ RIGHT) 3/5/20  Yes Yuri Farias DPM   cetirizine (ZYRTEC) 10 MG tablet  10/21/19  Yes Historical Provider, MD   metFORMIN (GLUCOPHAGE) 1000 MG tablet  8/14/19  Yes Historical Provider, MD   tamsulosin (FLOMAX) 0.4 MG capsule TAKE 1 CAPSULE BY MOUTH EVERY DAY 1/2 HOUR FOLLOWING THE SAME MEAL EACH DAY 9/24/19  Yes Historical Provider, MD   silver sulfADIAZINE (SILVADENE) 1 % cream Apply topically daily. 10/29/19  Yes Yuri Farias DPM   lisinopril (PRINIVIL;ZESTRIL) 10 MG tablet Take 10 mg by mouth daily 11/15/18  Yes Historical Provider, MD   simvastatin (ZOCOR) 40 MG tablet Take 40 mg by mouth daily 11/13/18  Yes Historical Provider, MD   naproxen (NAPROSYN) 500 MG tablet  9/26/18  Yes Historical Provider, MD   glipiZIDE (GLUCOTROL) 10 MG tablet Take 40 mg by mouth once   Yes Historical Provider, MD   aspirin 81 MG tablet Take 81 mg by mouth daily   Yes Historical Provider, MD   metFORMIN (GLUCOPHAGE) 500 MG tablet Take 500 mg by mouth 2 times daily (with meals)  4/4/18  Yes Historical Provider, MD   gabapentin (NEURONTIN) 300 MG capsule Take 900 mg by mouth 3 times daily. Take 3 pills 3 times a day .    Yes Historical Provider, MD       Review of Systems    Review of Systems:  History obtained from chart review and the patient  General ROS: negative for - chills, fatigue, fever, night sweats or weight gain  Constitutional: Negative for chills, diaphoresis, fatigue, fever and unexpected weight change. Musculoskeletal: Positive for arthralgias, gait problem and joint swelling. Neurological ROS: negative for - behavioral changes, confusion, headaches or seizures. Negative for weakness and numbness. Dermatological ROS: negative for - mole changes, rash  Cardiovascular: Negative for leg swelling. Gastrointestinal: Negative for constipation, diarrhea, nausea and vomiting. Lower Extremity Physical Examination:     Vitals:   Vitals:    07/30/20 1357   Resp: 16   Temp: 98 °F (36.7 °C)     General: AAO x 3 in NAD. Dermatologic Exam:  Skin lesion/ulceration Absent . Skin No rashes or nodules noted. .   Skin is thin, with flaky sloughing skin as well as decreased hair growth to the lower leg  Small red hemosiderin deposits seen dorsal foot   Musculoskeletal:     1st MPJ ROM decreased, Bilateral.  Muscle strength 5/5, Bilateral.  Pain present upon palpation ofright ankle at the lateral aspect decreased medial longitudinal arch, Bilateral.  Ankle ROM decreased,Bilateral.    Dorsally contracted digits present digits 2, Bilateral.     Vascular: DP pulses 1/4 bilateral.  PT pulses 0/4 bilateral.   CFT <5 seconds, Bilateral.  Hair growth absent to the level of the digits, Bilateral.  Edema present, Bilateral.  Varicosities absent, Bilateral. Erythema absent, Bilateral    Neurological: Sensation diminshed to light touch to level of digits, Bilateral.  Protective sensation intact 6/10 sites via 5.07/10g [place]orge Monofilament, Bilateral.  negative Tinel's, Bilateral.  negative Valleix sign, Bilateral.      Integument: Warm, dry, supple, Bilateral.  Open lesion absent, Bilateral.  Interdigital maceration absent to web spaces 4, Bilateral.    Fissures absent, Bilateral.       Right ankle xrays  intraossesous mass to the right medial malleolus not seen on previous x rays    Asessment: Patient is

## 2020-08-08 ENCOUNTER — HOSPITAL ENCOUNTER (OUTPATIENT)
Age: 65
Setting detail: SPECIMEN
Discharge: HOME | End: 2020-08-08
Payer: MEDICARE

## 2020-08-08 ENCOUNTER — OFFICE VISIT (OUTPATIENT)
Dept: FAMILY MEDICINE CLINIC | Age: 65
End: 2020-08-08
Payer: MEDICARE

## 2020-08-08 VITALS
OXYGEN SATURATION: 98 % | TEMPERATURE: 97.7 F | WEIGHT: 242 LBS | HEART RATE: 92 BPM | BODY MASS INDEX: 34.65 KG/M2 | HEIGHT: 70 IN

## 2020-08-08 PROCEDURE — 99202 OFFICE O/P NEW SF 15 MIN: CPT | Performed by: NURSE PRACTITIONER

## 2020-08-08 PROCEDURE — G8417 CALC BMI ABV UP PARAM F/U: HCPCS | Performed by: NURSE PRACTITIONER

## 2020-08-08 PROCEDURE — 4004F PT TOBACCO SCREEN RCVD TLK: CPT | Performed by: NURSE PRACTITIONER

## 2020-08-08 PROCEDURE — G8427 DOCREV CUR MEDS BY ELIG CLIN: HCPCS | Performed by: NURSE PRACTITIONER

## 2020-08-08 PROCEDURE — 3017F COLORECTAL CA SCREEN DOC REV: CPT | Performed by: NURSE PRACTITIONER

## 2020-08-08 ASSESSMENT — ENCOUNTER SYMPTOMS
VOMITING: 0
SORE THROAT: 1
NAUSEA: 0
ABDOMINAL PAIN: 0
COUGH: 1
SINUS PAIN: 0
DIARRHEA: 0
EYE PAIN: 0
SHORTNESS OF BREATH: 0
BACK PAIN: 0

## 2020-08-08 ASSESSMENT — PATIENT HEALTH QUESTIONNAIRE - PHQ9
1. LITTLE INTEREST OR PLEASURE IN DOING THINGS: 0
2. FEELING DOWN, DEPRESSED OR HOPELESS: 0
SUM OF ALL RESPONSES TO PHQ QUESTIONS 1-9: 0
SUM OF ALL RESPONSES TO PHQ QUESTIONS 1-9: 0
SUM OF ALL RESPONSES TO PHQ9 QUESTIONS 1 & 2: 0

## 2020-08-08 NOTE — LETTER
[address] [facility]  [place] [address]  Phone: [phone]  Fax: [phone]    ROCHELLE Diaz CNP        August 8, 2020     Patient: Damaso Mays   YOB: 1955   Date of Visit: 8/8/2020       To Whom It May Concern: It is my medical opinion that Heydi Mayo is excused pending COVID results. If you have any questions or concerns, please don't hesitate to call.     Sincerely,        ROCHELLE Diaz CNP

## 2020-08-08 NOTE — PATIENT INSTRUCTIONS
Advance Care Planning  People with COVID-19 may have no symptoms, mild symptoms, such as fever, cough, and shortness of breath or they may have more severe illness, developing severe and fatal pneumonia. As a result, Advance Care Planning with attention to naming a health care decision maker (someone you trust to make healthcare decisions for you if you could not speak for yourself) and sharing other health care preferences is important BEFORE a possible health crisis. Please contact your Primary Care Provider to discuss Advance Care Planning. Preventing the Spread of Coronavirus Disease 2019 in Homes and Residential Communities  For the most recent information go to [email or web address]    Prevention steps for People with confirmed or suspected COVID-19 (including persons under investigation) who do not need to be hospitalized  and   People with confirmed COVID-19 who were hospitalized and determined to be medically stable to go home    Your healthcare provider and public health staff will evaluate whether you can be cared for at home. If it is determined that you do not need to be hospitalized and can be isolated at home, you will be monitored by staff from your local or state health department. You should follow the prevention steps below until a healthcare provider or local or state health department says you can return to your normal activities. Stay home except to get medical care  People who are mildly ill with COVID-19 are able to isolate at home during their illness. You should restrict activities outside your home, except for getting medical care. Do not go to work, school, or public areas. Avoid using public transportation, ride-sharing, or taxis. Separate yourself from other people and animals in your home  People: As much as possible, you should stay in a specific room and away from other people in your home.  Also, you should use a separate before eating or preparing food. If soap and water are not readily available, use an alcohol-based hand  with at least 60% alcohol, covering all surfaces of your hands and rubbing them together until they feel dry. Soap and water are the best option if hands are visibly dirty. Avoid touching your eyes, nose, and mouth with unwashed hands. Avoid sharing personal household items  You should not share dishes, drinking glasses, cups, eating utensils, towels, or bedding with other people or pets in your home. After using these items, they should be washed thoroughly with soap and water. Clean all high-touch surfaces everyday  High touch surfaces include counters, tabletops, doorknobs, bathroom fixtures, toilets, phones, keyboards, tablets, and bedside tables. Also, clean any surfaces that may have blood, stool, or body fluids on them. Use a household cleaning spray or wipe, according to the label instructions. Labels contain instructions for safe and effective use of the cleaning product including precautions you should take when applying the product, such as wearing gloves and making sure you have good ventilation during use of the product. Monitor your symptoms  Seek prompt medical attention if your illness is worsening (e.g., difficulty breathing). Before seeking care, call your healthcare provider and tell them that you have, or are being evaluated for, COVID-19. Put on a facemask before you enter the facility. These steps will help the healthcare providers office to keep other people in the office or waiting room from getting infected or exposed. Ask your healthcare provider to call the local or state health department. Persons who are placed under active monitoring or facilitated self-monitoring should follow instructions provided by their local health department or occupational health professionals, as appropriate. When working with your local health department check their available hours.   If you have a medical emergency and need to call 911, notify the dispatch personnel that you have, or are being evaluated for COVID-19. If possible, put on a facemask before emergency medical services arrive. Discontinuing home isolation  Patients with confirmed COVID-19 should remain under home isolation precautions until the risk of secondary transmission to others is thought to be low. The decision to discontinue home isolation precautions should be made on a case-by-case basis, in consultation with healthcare providers and state and local health departments.

## 2020-08-08 NOTE — PROGRESS NOTES
RIGHT)  1-3 PAIRS OF INSERTS (LEFT/ RIGHT) 2 each 0    cetirizine (ZYRTEC) 10 MG tablet       metFORMIN (GLUCOPHAGE) 1000 MG tablet       tamsulosin (FLOMAX) 0.4 MG capsule TAKE 1 CAPSULE BY MOUTH EVERY DAY 1/2 HOUR FOLLOWING THE SAME MEAL EACH DAY  5    silver sulfADIAZINE (SILVADENE) 1 % cream Apply topically daily. 50 g 1    lisinopril (PRINIVIL;ZESTRIL) 10 MG tablet Take 10 mg by mouth daily      simvastatin (ZOCOR) 40 MG tablet Take 40 mg by mouth daily      naproxen (NAPROSYN) 500 MG tablet       glipiZIDE (GLUCOTROL) 10 MG tablet Take 40 mg by mouth once      aspirin 81 MG tablet Take 81 mg by mouth daily      metFORMIN (GLUCOPHAGE) 500 MG tablet Take 500 mg by mouth 2 times daily (with meals)       gabapentin (NEURONTIN) 300 MG capsule Take 900 mg by mouth 3 times daily. Take 3 pills 3 times a day . No current facility-administered medications for this visit. Allergies   Allergen Reactions    Morphine Itching    Percocet [Oxycodone-Acetaminophen]      Facial swelling    Dye [Iodides] Rash     Rash and swelling       Subjective:      Review of Systems   Constitutional: Negative for chills and fatigue. HENT: Positive for sore throat. Negative for congestion, ear pain and sinus pain. Eyes: Negative for pain and visual disturbance. Respiratory: Positive for cough. Negative for shortness of breath. Cardiovascular: Negative for chest pain and palpitations. Gastrointestinal: Negative for abdominal pain, diarrhea, nausea and vomiting. Genitourinary: Negative for penile pain and testicular pain. Musculoskeletal: Negative for back pain, joint swelling and neck pain. Skin: Negative for rash. Neurological: Positive for headaches. Negative for dizziness and light-headedness. Hematological: Does not bruise/bleed easily. All other systems reviewed and are negative. Objective:     Physical Exam  Vitals signs and nursing note reviewed.    Constitutional:       Appearance: Normal appearance. He is not ill-appearing. HENT:      Nose: Nose normal.      Mouth/Throat:      Mouth: Mucous membranes are moist.      Pharynx: No posterior oropharyngeal erythema. Cardiovascular:      Rate and Rhythm: Normal rate. Pulmonary:      Effort: Pulmonary effort is normal. No respiratory distress. Breath sounds: Normal breath sounds. Skin:     General: Skin is warm and dry. Neurological:      General: No focal deficit present. Mental Status: He is alert and oriented to person, place, and time. Pulse 92   Temp 97.7 °F (36.5 °C)   Ht 5' 10\" (1.778 m)   Wt 242 lb (109.8 kg)   SpO2 98%   BMI 34.72 kg/m²   Lab Review   No visits with results within 2 Month(s) from this visit. Latest known visit with results is:   Admission on 03/20/2020, Discharged on 03/20/2020   Component Date Value    POC Glucose 03/20/2020 102     Specimen Description 03/20/2020 . TOE RIGHT     Special Requests 03/20/2020 NOT REPORTED     Direct Exam 03/20/2020 RARE NEUTROPHILS*    Direct Exam 03/20/2020 FEW GRAM NEGATIVE RODS*    Direct Exam 03/20/2020 MODERATE GRAM POSITIVE COCCI IN PAIRS*    Culture 03/20/2020 NORMAL ARABELLA     Culture 03/20/2020 MIXED ANAEROBIC ARABELLA*    POC Glucose 03/20/2020 122*    Surgical Pathology Report 03/20/2020                      Value:-- Diagnosis --    Right third middle toe bone: Acute osteomyelitis. LIYAH Jay  **Electronically Signed Out**         rdd/3/23/2020       Clinical Information  Pre-op Diagnosis:  RIGHT FOOT CELLULITIS/ABSCESS   Operative Findings:  RIGHT 3RD TOE MIDDLE AND PROXIMAL PHALANX BONE  BIOPSY   Operation Performed:  INCISION & DRAINAGE WITH BONE BIOPSY     Source of Specimen  1: RIGHT 3RD TOE MIDDLE AND PROXIMAL PHALYNX BONE BIOPSY (A)    Gross Description  \"MICHELLE BOCANEGRA, RIGHT 3RD MIDDLE TOE\" Two pieces of firm bone, 1.5 x  1.5 x 0.5 cm. Entirely 1cs after decalcification.   tm      Microscopic Description  Microscopic

## 2020-08-10 LAB — SARS-COV-2, NAA: NOT DETECTED

## 2020-08-11 ENCOUNTER — TELEPHONE (OUTPATIENT)
Dept: FAMILY MEDICINE CLINIC | Age: 65
End: 2020-08-11

## 2020-12-01 ENCOUNTER — OFFICE VISIT (OUTPATIENT)
Dept: PODIATRY | Age: 65
End: 2020-12-01
Payer: MEDICARE

## 2020-12-01 VITALS — TEMPERATURE: 98.1 F | WEIGHT: 242 LBS | RESPIRATION RATE: 16 BRPM | BODY MASS INDEX: 33.88 KG/M2 | HEIGHT: 71 IN

## 2020-12-01 PROCEDURE — 2022F DILAT RTA XM EVC RTNOPTHY: CPT | Performed by: PODIATRIST

## 2020-12-01 PROCEDURE — 3046F HEMOGLOBIN A1C LEVEL >9.0%: CPT | Performed by: PODIATRIST

## 2020-12-01 PROCEDURE — G8417 CALC BMI ABV UP PARAM F/U: HCPCS | Performed by: PODIATRIST

## 2020-12-01 PROCEDURE — 99213 OFFICE O/P EST LOW 20 MIN: CPT | Performed by: PODIATRIST

## 2020-12-01 PROCEDURE — 4004F PT TOBACCO SCREEN RCVD TLK: CPT | Performed by: PODIATRIST

## 2020-12-01 PROCEDURE — G8484 FLU IMMUNIZE NO ADMIN: HCPCS | Performed by: PODIATRIST

## 2020-12-01 PROCEDURE — G8427 DOCREV CUR MEDS BY ELIG CLIN: HCPCS | Performed by: PODIATRIST

## 2020-12-01 PROCEDURE — 3017F COLORECTAL CA SCREEN DOC REV: CPT | Performed by: PODIATRIST

## 2020-12-01 RX ORDER — SITAGLIPTIN 100 MG/1
100 TABLET, FILM COATED ORAL DAILY
COMMUNITY
Start: 2020-11-13

## 2020-12-01 NOTE — PROGRESS NOTES
Left Ball of  foot, pt. stepped on sharp object. Covered by dressing.  Wound, open     Right posterior -Diabetic Ulcer       Prior to Admission medications    Medication Sig Start Date End Date Taking? Authorizing Provider   JANUVIA 100 MG tablet  11/13/20  Yes Historical Provider, MD   loratadine (CLARITIN) 10 MG tablet Take 10 mg by mouth daily   Yes Historical Provider, MD   HYDROcodone-acetaminophen (NORCO) 5-325 MG per tablet Take 1 tablet by mouth as needed. Yes Historical Provider, MD   fenofibrate (TRIGLIDE) 160 MG tablet Take 160 mg by mouth daily   Yes Historical Provider, MD Gu[place]ac. Devices MISC 1 PAIR OF DIABETIC SHOES (1 LEFT/ 1 RIGHT)  1-3 PAIRS OF INSERTS (LEFT/ RIGHT) 3/5/20  Yes Gardner ColaJAX   cetirizine (ZYRTEC) 10 MG tablet  10/21/19  Yes Historical Provider, MD   tamsulosin (FLOMAX) 0.4 MG capsule TAKE 1 CAPSULE BY MOUTH EVERY DAY 1/2 HOUR FOLLOWING THE SAME MEAL EACH DAY 9/24/19  Yes Historical Provider, MD   silver sulfADIAZINE (SILVADENE) 1 % cream Apply topically daily. 10/29/19  Yes Gardner ColaJAX   lisinopril (PRINIVIL;ZESTRIL) 10 MG tablet Take 10 mg by mouth daily 11/15/18  Yes Historical Provider, MD   simvastatin (ZOCOR) 40 MG tablet Take 40 mg by mouth daily 11/13/18  Yes Historical Provider, MD   naproxen (NAPROSYN) 500 MG tablet  9/26/18  Yes Historical Provider, MD   glipiZIDE (GLUCOTROL) 10 MG tablet Take 40 mg by mouth once   Yes Historical Provider, MD   aspirin 81 MG tablet Take 81 mg by mouth daily   Yes Historical Provider, MD   gabapentin (NEURONTIN) 300 MG capsule Take 900 mg by mouth 3 times daily. Take 3 pills 3 times a day . Yes Historical Provider, MD       Review of Systems    Review of Systems:  History obtained from chart review and the patient  General ROS: negative for - chills, fatigue, fever, night sweats or weight gain  Constitutional: Negative for chills, diaphoresis, fatigue, fever and unexpected weight change.   Musculoskeletal: Positive for arthralgias, gait problem and joint swelling. Neurological ROS: negative for - behavioral changes, confusion, headaches or seizures. Negative for weakness and numbness. Dermatological ROS: negative for - mole changes, rash  Cardiovascular: Negative for leg swelling. Gastrointestinal: Negative for constipation, diarrhea, nausea and vomiting. Lower Extremity Physical Examination:     Vitals:   Vitals:    12/01/20 1301   Resp: 16   Temp: 98.1 °F (36.7 °C)     General: AAO x 3 in NAD. Dermatologic Exam:  Skin lesion/ulceration Absent . Skin No rashes or nodules noted. .   Skin is thin, with flaky sloughing skin as well as decreased hair growth to the lower leg  Small red hemosiderin deposits seen dorsal foot   Musculoskeletal:     1st MPJ ROM decreased, Bilateral.  Muscle strength 5/5, Bilateral.  Pain present upon palpation of right great toe at the IPJ of the right hallux. Pain with ROM  decreased medial longitudinal arch, Bilateral.  Ankle ROM decreased,Bilateral.    Dorsally contracted digits present digits 2, Bilateral.     Vascular: DP pulses 1/4 bilateral.  PT pulses 0/4 bilateral.   CFT <5 seconds, Bilateral.  Hair growth absent to the level of the digits, Bilateral.  Edema present, Bilateral.  Varicosities absent, Bilateral. Erythema absent, Bilateral    Neurological: Sensation diminshed to light touch to level of digits, Bilateral.  Protective sensation intact 6/10 sites via 5.07/10g [place]-Jorge Monofilament, Bilateral.  negative Tinel's, Bilateral.  negative Valleix sign, Bilateral.      Integument: Warm, dry, supple, Bilateral.  Open lesion absent, Bilateral.  Interdigital maceration absent to web spaces 4, Bilateral.  .  Fissures absent, Bilateral.     X rays right foot 3 views  Fracture of the medial aspect of the proximal phalax with dorsal dislocation at the IPJ. Asessment: Patient is a 60 y.o. male with:    Diagnosis Orders   1.  Right foot pain  XR FOOT RIGHT (MIN 3 VIEWS)   2. Type II diabetes mellitus with peripheral circulatory disorder ([facility] [address]     3. Displaced fracture of proximal phalanx of right great toe, initial encounter for closed fracture         Plan: Patient examined and evaluated. Current condition and treatment options discussed in detail. Advised pt to the x ray findings. I had a long discussion today with the patient about the likely diagnosis and its natural history, physical exam and imaging findings, as well as treatment options. We discussed both surgical and nonsurgical treatments, including risks and benefits. From a nonoperative standpoint, we discussed rest/activity modification, NSAIDs/Acetaminophen/topical anesthetics, orthotics, bracing/immobilization, and physical therapy. Surgically, we discussed fixation of the fragment vs remvoing the fracture fragment. Pt would rather not have hardware in him as it has caused issues in the past and just remove the fragment. I am in agreement of this procedure  . Verbal and written instructions given to patient. Contact office with any questions/problems/concerns. Orders Placed This Encounter   Procedures    XR FOOT RIGHT (MIN 3 VIEWS)     No orders of the defined types were placed in this encounter. I have educated the patient on the details of the procedures as well as medically reasonable risks, benefits, alternatives and prognosis.  I also educated the patient on perioperative management including preoperative medical clearance, preoperative physical therapy consultation, postoperative care and rehabilitation, anticipated time to full weightbearing, return to shoes and maximum medical improvement, Lastly I educated that patient to their apparent understanding on potential complications including but not limited to infection, recurrence, overcorrection or undercorrection, persistent pain, swelling or disability, nerve injury or entrapment, numbness, tingling, bone and soft tissue healing complications, complications with anesthesia and deep venous thrombosis/ pulmonary embolism. The patient has elected to proceed with surgical intervention.    Answered all questions to patients satisfaction  Call if any other questions  Follow up in the hospital     RTC in 1week(s) post op.    12/1/2020      Electronically signed by Mike Brock DPM on 12/1/2020 at 1:06 PM  12/1/2020

## 2020-12-07 ENCOUNTER — HOSPITAL ENCOUNTER (OUTPATIENT)
Dept: PREADMISSION TESTING | Age: 65
Discharge: HOME | End: 2020-12-11
Payer: MEDICARE

## 2020-12-07 ENCOUNTER — HOSPITAL ENCOUNTER (OUTPATIENT)
Dept: PREADMISSION TESTING | Age: 65
Setting detail: SPECIMEN
Discharge: HOME | End: 2020-12-11
Payer: MEDICARE

## 2020-12-07 VITALS
OXYGEN SATURATION: 97 % | TEMPERATURE: 96 F | DIASTOLIC BLOOD PRESSURE: 69 MMHG | HEIGHT: 71 IN | WEIGHT: 252 LBS | SYSTOLIC BLOOD PRESSURE: 125 MMHG | BODY MASS INDEX: 35.28 KG/M2 | RESPIRATION RATE: 18 BRPM

## 2020-12-07 LAB
ANION GAP SERPL CALCULATED.3IONS-SCNC: 9 MMOL/L (ref 9–17)
BUN BLDV-MCNC: 24 MG/DL (ref 8–23)
BUN/CREAT BLD: 12 (ref 9–20)
CALCIUM SERPL-MCNC: 9.6 MG/DL (ref 8.6–10.4)
CHLORIDE BLD-SCNC: 97 MMOL/L (ref 98–107)
CO2: 24 MMOL/L (ref 20–31)
CREAT SERPL-MCNC: 1.97 MG/DL (ref 0.7–1.2)
GFR AFRICAN AMERICAN: 42 ML/MIN
GFR NON-AFRICAN AMERICAN: 34 ML/MIN
GFR SERPL CREATININE-BSD FRML MDRD: ABNORMAL ML/MIN/{1.73_M2}
GFR SERPL CREATININE-BSD FRML MDRD: ABNORMAL ML/MIN/{1.73_M2}
GLUCOSE BLD-MCNC: 289 MG/DL (ref 70–99)
HCT VFR BLD CALC: 39.9 % (ref 40.7–50.3)
HEMOGLOBIN: 13.4 G/DL (ref 13–17)
MCH RBC QN AUTO: 30.7 PG (ref 25.2–33.5)
MCHC RBC AUTO-ENTMCNC: 33.6 G/DL (ref 28.4–34.8)
MCV RBC AUTO: 91.3 FL (ref 82.6–102.9)
NRBC AUTOMATED: 0 PER 100 WBC
PDW BLD-RTO: 13.5 % (ref 11.8–14.4)
PLATELET # BLD: 223 K/UL (ref 138–453)
PMV BLD AUTO: 9.7 FL (ref 8.1–13.5)
POTASSIUM SERPL-SCNC: 4.7 MMOL/L (ref 3.7–5.3)
RBC # BLD: 4.37 M/UL (ref 4.21–5.77)
SODIUM BLD-SCNC: 130 MMOL/L (ref 135–144)
WBC # BLD: 10.8 K/UL (ref 3.5–11.3)

## 2020-12-07 PROCEDURE — 80048 BASIC METABOLIC PNL TOTAL CA: CPT

## 2020-12-07 PROCEDURE — 93005 ELECTROCARDIOGRAM TRACING: CPT | Performed by: ANESTHESIOLOGY

## 2020-12-07 PROCEDURE — 85027 COMPLETE CBC AUTOMATED: CPT

## 2020-12-07 PROCEDURE — 36415 COLL VENOUS BLD VENIPUNCTURE: CPT

## 2020-12-07 PROCEDURE — U0003 INFECTIOUS AGENT DETECTION BY NUCLEIC ACID (DNA OR RNA); SEVERE ACUTE RESPIRATORY SYNDROME CORONAVIRUS 2 (SARS-COV-2) (CORONAVIRUS DISEASE [COVID-19]), AMPLIFIED PROBE TECHNIQUE, MAKING USE OF HIGH THROUGHPUT TECHNOLOGIES AS DESCRIBED BY CMS-2020-01-R: HCPCS

## 2020-12-07 ASSESSMENT — PAIN SCALES - GENERAL: PAINLEVEL_OUTOF10: 10

## 2020-12-07 ASSESSMENT — PAIN DESCRIPTION - LOCATION: LOCATION: TOE (COMMENT WHICH ONE)

## 2020-12-07 ASSESSMENT — PAIN DESCRIPTION - ORIENTATION: ORIENTATION: RIGHT

## 2020-12-07 ASSESSMENT — PAIN DESCRIPTION - DESCRIPTORS: DESCRIPTORS: SHARP

## 2020-12-07 ASSESSMENT — PAIN DESCRIPTION - PAIN TYPE: TYPE: CHRONIC PAIN

## 2020-12-07 ASSESSMENT — PAIN DESCRIPTION - FREQUENCY: FREQUENCY: CONTINUOUS

## 2020-12-07 NOTE — PRE-PROCEDURE INSTRUCTIONS
[facility] ON Friday, 12/11/2020 at 1115 AM    Please call [phone] when you arrive at the main entrance of the hospital.     Continue to take your home medications as you normally do up to and including the night before surgery with the exception of any blood thinning medications. Please stop any blood thinning medications as directed by your surgeon or prescribing physician. Failure to stop certain medications may interfere with your scheduled surgery. These may include:  Aspirin, Warfarin (Coumadin), Clopidogrel (Plavix), Ibuprofen (Motrin, Advil), Naproxen (Aleve), Meloxicam (Mobic), Celecoxib (Celebrex), Eliquis, Pradaxa, Xarelto, Effient, Fish Oil, Herbal supplements. If you are diabetic, do not take any of your diabetic medications by mouth the morning of surgery. If you are taking insulin contact the doctor that manages your diabetes for instructions about any changes to your insulin dosages the day before surgery. Do not inject insulin or other injectable diabetic medications the morning of surgery unless otherwise instructed by the doctor who manages your diabetes. Please take the following medication(s) the day of surgery with a small sip of water:  Norco if needed for pain, gabapentin     Please use your inhalers at home the day of surgery. PREPARING FOR YOUR SURGERY:     Before surgery, you can play an important role in your own health. Because skin is not sterile, we need to be sure that your skin is as free of germs as possible before surgery by carefully washing before surgery. Preparing or prepping skin before surgery can reduce the risk of a surgical site infection.   Do not shave the area of your body where your surgery will be performed unless you received specific permission from your physician. You will need to shower at home the night before surgery and the morning of surgery with a special soap called chlorhexidine gluconate (CHG*).      *Not to be used by people allergic to Chlorhexidine Gluconate (CHG). Following these instructions will help you be sure that your skin is clean before surgery. Instructions on cleaning your skin before surgery: The night before your surgery:      You will need to shower with warm water (not hot) and the CHG soap.  Use a clean wash cloth and a clean towel. Have clean clothes available to put on after the shower.    First wash your hair with regular shampoo. Rinse your hair and body thoroughly to remove the shampoo.  Wash your face and genital area (private parts) with your regular soap or water only. Thoroughly rinse your body with warm water from the neck down.  Turn water off to prevent rinsing the soap off too soon.  With a clean wet washcloth and half of the CHG soap in the bottle, lather your entire body from the neck down. Do not use CHG soap near your eyes or ears to avoid injury to those areas.  Wash thoroughly, paying special attention to the area where your surgery will be performed.  Wash your body gently for five (5) minutes. Avoid scrubbing your skin too hard.  Turn the water back on and rinse your body thoroughly.  Pat yourself dry with a clean, soft towel. Do not apply lotion, cream or powder.  Dress with clean freshly washed clothes. The morning of surgery:     Repeat shower following steps above - using remaining half of CHG soap in bottle. Patient Instructions:    Floydene Ada If you are having any type of anesthesia you are to have nothing to eat or drink after midnight the night before your surgery. This includes gum, hard candy, mints, water or smoking or chewing tobacco.  The only exception to this is a small sip of water to take with any morning dose of heart, blood pressure, or seizure medications. No alcoholic beverages for 24 hours prior to surgery.  Brush your teeth but do not swallow water.  Bring your eyeglasses and case with you.

## 2020-12-08 LAB
EKG ATRIAL RATE: 86 BPM
EKG P AXIS: 1 DEGREES
EKG P-R INTERVAL: 196 MS
EKG Q-T INTERVAL: 338 MS
EKG QRS DURATION: 84 MS
EKG QTC CALCULATION (BAZETT): 404 MS
EKG R AXIS: 8 DEGREES
EKG T AXIS: 37 DEGREES
EKG VENTRICULAR RATE: 86 BPM
SARS-COV-2, RAPID: NORMAL
SARS-COV-2: NORMAL
SARS-COV-2: NOT DETECTED
SOURCE: NORMAL

## 2020-12-08 PROCEDURE — 93010 ELECTROCARDIOGRAM REPORT: CPT | Performed by: INTERNAL MEDICINE

## 2020-12-08 NOTE — PROGRESS NOTES
Spoke with Earle Banks at Dr. Neena Bean and she is aware that a renal clearance is needed.  She verbalized understanding

## 2020-12-09 ENCOUNTER — TELEPHONE (OUTPATIENT)
Dept: PRIMARY CARE CLINIC | Age: 65
End: 2020-12-09

## 2020-12-10 RX ORDER — SODIUM CHLORIDE 9 MG/ML
INJECTION, SOLUTION INTRAVENOUS CONTINUOUS
Status: CANCELLED | OUTPATIENT
Start: 2020-12-11

## 2020-12-10 RX ORDER — SODIUM CHLORIDE, SODIUM LACTATE, POTASSIUM CHLORIDE, CALCIUM CHLORIDE 600; 310; 30; 20 MG/100ML; MG/100ML; MG/100ML; MG/100ML
INJECTION, SOLUTION INTRAVENOUS CONTINUOUS
Status: CANCELLED | OUTPATIENT
Start: 2020-12-11

## 2020-12-10 RX ORDER — SODIUM CHLORIDE 0.9 % (FLUSH) 0.9 %
10 SYRINGE (ML) INJECTION EVERY 12 HOURS SCHEDULED
Status: CANCELLED | OUTPATIENT
Start: 2020-12-10

## 2020-12-10 RX ORDER — SODIUM CHLORIDE 0.9 % (FLUSH) 0.9 %
10 SYRINGE (ML) INJECTION PRN
Status: CANCELLED | OUTPATIENT
Start: 2020-12-10

## 2020-12-10 RX ORDER — LIDOCAINE HYDROCHLORIDE 10 MG/ML
1 INJECTION, SOLUTION EPIDURAL; INFILTRATION; INTRACAUDAL; PERINEURAL
Status: CANCELLED | OUTPATIENT
Start: 2020-12-11 | End: 2020-12-11

## 2020-12-11 ENCOUNTER — HOSPITAL ENCOUNTER (OUTPATIENT)
Age: 65
Setting detail: OUTPATIENT SURGERY
Discharge: HOME | End: 2020-12-11
Attending: PODIATRIST | Admitting: PODIATRIST
Payer: MEDICARE

## 2020-12-11 VITALS
HEART RATE: 70 BPM | DIASTOLIC BLOOD PRESSURE: 79 MMHG | TEMPERATURE: 96.7 F | RESPIRATION RATE: 14 BRPM | SYSTOLIC BLOOD PRESSURE: 118 MMHG | OXYGEN SATURATION: 96 % | BODY MASS INDEX: 35.15 KG/M2 | WEIGHT: 252 LBS

## 2020-12-11 PROCEDURE — 28234 INCISION OF FOOT TENDON: CPT | Performed by: PODIATRIST

## 2020-12-11 PROCEDURE — 3600000002 HC SURGERY LEVEL 2 BASE: Performed by: PODIATRIST

## 2020-12-11 PROCEDURE — 7100000011 HC PHASE II RECOVERY - ADDTL 15 MIN: Performed by: PODIATRIST

## 2020-12-11 PROCEDURE — 7100000010 HC PHASE II RECOVERY - FIRST 15 MIN: Performed by: PODIATRIST

## 2020-12-11 PROCEDURE — 2500000003 HC RX 250 WO HCPCS: Performed by: PODIATRIST

## 2020-12-11 PROCEDURE — 2709999900 HC NON-CHARGEABLE SUPPLY: Performed by: PODIATRIST

## 2020-12-11 PROCEDURE — 28124 PARTIAL REMOVAL OF TOE: CPT | Performed by: PODIATRIST

## 2020-12-11 PROCEDURE — 3600000012 HC SURGERY LEVEL 2 ADDTL 15MIN: Performed by: PODIATRIST

## 2020-12-11 RX ORDER — HYDROCODONE BITARTRATE AND ACETAMINOPHEN 10; 325 MG/1; MG/1
1 TABLET ORAL EVERY 6 HOURS PRN
Qty: 28 TABLET | Refills: 0 | Status: SHIPPED | OUTPATIENT
Start: 2020-12-11 | End: 2020-12-18

## 2020-12-11 RX ORDER — BUPIVACAINE HYDROCHLORIDE 5 MG/ML
INJECTION, SOLUTION EPIDURAL; INTRACAUDAL PRN
Status: DISCONTINUED | OUTPATIENT
Start: 2020-12-11 | End: 2020-12-11 | Stop reason: ALTCHOICE

## 2020-12-11 ASSESSMENT — PAIN - FUNCTIONAL ASSESSMENT
PAIN_FUNCTIONAL_ASSESSMENT: PREVENTS OR INTERFERES SOME ACTIVE ACTIVITIES AND ADLS
PAIN_FUNCTIONAL_ASSESSMENT: 0-10

## 2020-12-11 ASSESSMENT — PAIN DESCRIPTION - DESCRIPTORS: DESCRIPTORS: SHARP

## 2020-12-11 ASSESSMENT — PAIN SCALES - GENERAL: PAINLEVEL_OUTOF10: 0

## 2020-12-11 NOTE — OP NOTE
PATIENT NAME: Martha Damico  YOB: 1955  -  60 y.o. male  MRN: [id number]  DATE: 12/11/2020  BILLING #: [id number]     Surgeon(s):  Gosia Covarrubias DPM      ASSISTANTS: Alanna Gilliland DPM     PRE-OP DIAGNOSIS:   1. Painful right hallux hammertoe  2. Fracture of proximal phalanx of the hallux, right  3. Contracted extensor digitorum tendon 3rd digit, right     POST-OP DIAGNOSIS: Same as above.     PROCEDURE:   1. Hallux arthroplasty , right  2. Extensor tenotomy of the 3rd digit, right     ANESTHESIA: MAC/Local (10cc of a 1:1 mixture of 1% lidocaine plain and 0.5% marcaine plain)     HEMOSTASIS: Pneumatic ankle tourniquet @ 250 mmHg for 19 minutes.     ESTIMATED BLOOD LOSS: Less than 5cc.     MATERIALS:   * No implants in log *     INJECTABLES: 10cc of 0.5% marcaine plain     SPECIMEN:   * No specimens in log *     COMPLICATIONS: None.     FINDINGS: As expected. INDICATION FOR PROCEDURE: The patient has a fracture of the medial aspect of the proximal phalanx of the right hallux for some time. The patient also has an extensus deformity of the 3rd digit secondary to a contracture of the dorsal extensor digitorum tendons. This has failed conservative treatments thus far, and the patient elects to undergo surgical correction. No guarantees were given or implied. All risks and benefits discussed with the patient in detail. Informed consent was obtained, signed and placed in the patient's chart. PROCEDURE IN DETAIL: The patient was transported from pre-op to the operating room and placed on the operating table in the supine position with a safety strap across the lap. After adequate sedation by the Anesthesia, a Carrillo block of 10cc of a 1:1 mixture of 1% lidocaine plain and 0.5% Marcaine plain was injected. The foot was then prepped and draped in the usual aseptic fashion.  A pneumatic ankle tourniquet was placed on the right lower extremity, and inflated to 250mmHg where it remained inflated for 19 minutes. Attention was directed to the right dorsal hallux. A transverse incision was created over the hallux interphalangeal joint with the #15 blade. The incision was deepened with a combination of sharp and blunt dissection. The extensor tendon was identified and retracted laterally. There was noted to be a bone fragment medially. The remaining distal head of the proximal phalanx was fractured as well but appeared to be minimally displaced. Using a rongeur the remaining distal head of the proximal phalanx was resected flush with the fractured medial portion. The site was irrigated with normal saline. Attention was then directed to the dorsal aspect of the 3rd MTPJ where there was noted to be an extensus deformity of the extensor digitorum tendon. Using a #15 blade the overlying skin was incised and then deepened to the level of the tendon which was visualized. The extensor tendon was then transected completely and improvement was noted to the alignment of the 3rd digit. The surgical site was irrigated with normal saline. Dressings consisted of adaptic, sterile 4 x 4s, Kerlix and ACE bandage were applied. The patient tolerated the above procedure and anesthesia well without complications. The patient was transported from the operating room to the PACU with vital signs stable and vascular status intact to the right foot. The patient will follow up with Dr. Christian Renee as scheduled.       Electronically signed by Alex Leo DPM on 12/11/2020 at 1:44 PM

## 2020-12-11 NOTE — H&P
History and Physical Update    Pt Name: Karen Araujo  MRN: [id number]  YOB: 1955  Date of evaluation: 12/11/2020      [x] I have reviewed the Progress Note by Dr Sandy Thornton dated 12/1/20 in epic which meets the criteria for an Interval History and Physical note and is attached below. [x] I have examined  Karen Araujo  There are no changes to the patient who is scheduled for a Local right hallux exostectomy vs arthroplasty by Dr Rocky Do dated 12/1/20 for right hallux fracture. The patient denies new health changes, fever, chills, w heezing, cough, increased SOB, chest pain, open sores or wounds. +DM      PMH, Surgical History, Social History, Psych, and Family History reviewed and updated in EPIC in appropriate section. Vital signs: /74   Pulse 66   Temp 96 °F (35.6 °C) (Oral)   Resp 16   Wt 252 lb (114.3 kg)   SpO2 97%   BMI 35.15 kg/m²     Allergies:  Morphine; Percocet [oxycodone-acetaminophen]; and Dye [iodides]    Medications:    Prior to Admission medications    Medication Sig Start Date End Date Taking? Authorizing Provider   JANUVIA 100 MG tablet 50 mg daily  11/13/20  Yes Historical Provider, MD   HYDROcodone-acetaminophen (NORCO) 5-325 MG per tablet Take 1 tablet by mouth as needed. Yes Historical Provider, MD   cetirizine (ZYRTEC) 10 MG tablet  10/21/19  Yes Historical Provider, MD   lisinopril (PRINIVIL;ZESTRIL) 10 MG tablet Take 10 mg by mouth daily 11/15/18  Yes Historical Provider, MD   simvastatin (ZOCOR) 40 MG tablet Take 40 mg by mouth daily 11/13/18  Yes Historical Provider, MD   glipiZIDE (GLUCOTROL) 10 MG tablet Take 40 mg by mouth once   Yes Historical Provider, MD   aspirin 81 MG tablet Take 81 mg by mouth daily   Yes Historical Provider, MD   gabapentin (NEURONTIN) 300 MG capsule Take 900 mg by mouth 3 times daily. Take 3 pills 3 times a day . Yes Historical Provider, MD   Misc.  Devices MISC 1 PAIR OF DIABETIC SHOES (1 LEFT/ 1 RIGHT)  1-3 PAIRS OF INSERTS (LEFT/ RIGHT) 3/5/20   Farhan Marrero DPM       This is a 60 y.o. male who is pleasant, cooperative, alert and oriented x3, in no acute distress. Heart: Heart sounds are normal.  HR 66 regular rate and rhythm without murmur, gallop or rub. Lungs: Normal respiratory effort with diminished breath sounds, unlabored and without wheezes or rales bilaterally   Abdomen: round, soft, nontender, nondistended with bowel sounds. Eremites:  Band-Aid over right foot      Labs:  Recent Labs     12/07/20  1442   HGB 13.4   HCT 39.9*   WBC 10.8   MCV 91.3      *   K 4.7   CL 97*   CO2 24   BUN 24*   CREATININE 1.97*   GLUCOSE 289*       Recent Labs     12/07/20  24 [facility]       Not Detected             Dilip Krishnan, ANP-BC  Electronically signed 12/11/2020 at 11:46 AM        Farhan Marrero DPM    Podiatrist    Specialty:  Podiatry    Progress Notes    Signed    Encounter Date:  12/1/2020          Related encounter: Office Visit from 12/1/2020 in 2014 [facility] Collapse All       [facility]  [address]  [address]  Dept: [phone]  Dept Fax: [phone]     RETURN PATIENT PROGRESS NOTE  Date of patient's visit: 12/1/2020  Patient's Name:  Adonay Aldrich  YOB: 1955             Patient Care Team:  Loretta Sevilla MD as PCP - General (Family Medicine)  Humza Theodore MD as Consulting Physician (Infectious Diseases)  Farhan Marrero DPM as Physician (Podiatry)  Farhan Marrero DPM as Physician (Podiatry)         Adonay Aldrich 60 y.o. male that presents for follow-up of        Chief Complaint   Patient presents with    Toe Pain       right great toe x3 days    Wound Check    Diabetes       blood sugar was 141 today      Pt's primary care physician is Loretta Sevilla MD last seen 08/08/2020  Symptoms began 2 day(s) ago and are unchanged .   Patient relates pain is Present. Pain is rated 10 out of 10 and is described as intermittent, mild. Treatments prior to today's visit include: none. States he hit his toe hard and felt pain immediately . Currently denies F/C/N/V. Allergies   Allergen Reactions    Morphine Itching    Percocet [Oxycodone-Acetaminophen]         Facial swelling    Dye [Iodides] Rash       Rash and swelling         Past Medical History        Past Medical History:   Diagnosis Date    ALEJANDRO (acute kidney injury) ([facility] [address] 8/5/2018    Cellulitis of right foot       and abscess    Charcot foot due to diabetes mellitus ([facility] [address] 2014     Right foot     [facility] with [facility]      Fractures, multiple 07/21/2014     Right foot fractures     Hyperlipidemia      Hypertension      Leukocytosis      Neuropathy       diabetic with charcot affecting the right foot    Pain in right foot       redness and swelling    Pneumonia 2009    Tobacco abuse 4/24/2018    Type II or unspecified type diabetes mellitus without mention of complication, not stated as uncontrolled      Vertigo      Wound, open       Left Ball of  foot, pt. stepped on sharp object. Covered by dressing.  Wound, open       Right posterior -Diabetic Ulcer           Home Medications           Prior to Admission medications    Medication Sig Start Date End Date Taking? Authorizing Provider   JANUVIA 100 MG tablet   11/13/20   Yes Historical Provider, MD   loratadine (CLARITIN) 10 MG tablet Take 10 mg by mouth daily     Yes Historical Provider, MD   HYDROcodone-acetaminophen (NORCO) 5-325 MG per tablet Take 1 tablet by mouth as needed. Yes Historical Provider, MD   fenofibrate (TRIGLIDE) 160 MG tablet Take 160 mg by mouth daily     Yes Historical Provider, MD   Misc.  Devices MISC 1 PAIR OF DIABETIC SHOES (1 LEFT/ 1 RIGHT)  1-3 PAIRS OF INSERTS (LEFT/ RIGHT) 3/5/20   Yes Lady Sanchez DPM   cetirizine (ZYRTEC) 10 MG tablet   10/21/19   Yes Historical Provider, MD   tamsulosin (FLOMAX) 0.4 MG capsule TAKE 1 CAPSULE BY MOUTH EVERY DAY 1/2 HOUR FOLLOWING THE SAME MEAL EACH DAY 9/24/19   Yes Historical Provider, MD   silver sulfADIAZINE (SILVADENE) 1 % cream Apply topically daily. 10/29/19   Yes Angi Tse DPM   lisinopril (PRINIVIL;ZESTRIL) 10 MG tablet Take 10 mg by mouth daily 11/15/18   Yes Historical Provider, MD   simvastatin (ZOCOR) 40 MG tablet Take 40 mg by mouth daily 11/13/18   Yes Historical Provider, MD   naproxen (NAPROSYN) 500 MG tablet   9/26/18   Yes Historical Provider, MD   glipiZIDE (GLUCOTROL) 10 MG tablet Take 40 mg by mouth once     Yes Historical Provider, MD   aspirin 81 MG tablet Take 81 mg by mouth daily     Yes Historical Provider, MD   gabapentin (NEURONTIN) 300 MG capsule Take 900 mg by mouth 3 times daily. Take 3 pills 3 times a day . Yes Historical Provider, MD           Review of Systems     Review of Systems:  History obtained from chart review and the patient  General ROS: negative for - chills, fatigue, fever, night sweats or weight gain  Constitutional: Negative for chills, diaphoresis, fatigue, fever and unexpected weight change. Musculoskeletal: Positive for arthralgias, gait problem and joint swelling. Neurological ROS: negative for - behavioral changes, confusion, headaches or seizures. Negative for weakness and numbness. Dermatological ROS: negative for - mole changes, rash  Cardiovascular: Negative for leg swelling. Gastrointestinal: Negative for constipation, diarrhea, nausea and vomiting. Lower Extremity Physical Examination:      Vitals:       Vitals:     12/01/20 1301   Resp: 16   Temp: 98.1 °F (36.7 °C)      General: AAO x 3 in NAD. Dermatologic Exam:  Skin lesion/ulceration Absent . Skin No rashes or nodules noted. .   Skin is thin, with flaky sloughing skin as well as decreased hair growth to the lower leg  Small red hemosiderin deposits seen dorsal foot   Musculoskeletal:     1st MPJ ROM decreased, Bilateral.  Muscle strength 5/5, Bilateral.  Pain present upon palpation of right great toe at the IPJ of the right hallux. Pain with ROM  decreased medial longitudinal arch, Bilateral.  Ankle ROM decreased,Bilateral.    Dorsally contracted digits present digits 2, Bilateral.      Vascular: DP pulses 1/4 bilateral.  PT pulses 0/4 bilateral.   CFT <5 seconds, Bilateral.  Hair growth absent to the level of the digits, Bilateral.  Edema present, Bilateral.  Varicosities absent, Bilateral. Erythema absent, Bilateral     Neurological: Sensation diminshed to light touch to level of digits, Bilateral.  Protective sensation intact 6/10 sites via 5.07/10g Metter-Jorge Monofilament, Bilateral.  negative Tinel's, Bilateral.  negative Valleix sign, Bilateral.       Integument: Warm, dry, supple, Bilateral.  Open lesion absent, Bilateral.  Interdigital maceration absent to web spaces 4, Bilateral.  .  Fissures absent, Bilateral.      X rays right foot 3 views  Fracture of the medial aspect of the proximal phalax with dorsal dislocation at the IPJ. Asessment: Patient is a 60 y.o. male with:     Diagnosis Orders   1. Right foot pain  XR FOOT RIGHT (MIN 3 VIEWS)   2. Type II diabetes mellitus with peripheral circulatory disorder ([facility] [address]      3. Displaced fracture of proximal phalanx of right great toe, initial encounter for closed fracture            Plan: Patient examined and evaluated. Current condition and treatment options discussed in detail. Advised pt to the x ray findings. I had a long discussion today with the patient about the likely diagnosis and its natural history, physical exam and imaging findings, as well as treatment options. We discussed both surgical and nonsurgical treatments, including risks and benefits.  From a nonoperative standpoint, we discussed rest/activity modification, NSAIDs/Acetaminophen/topical anesthetics, orthotics, bracing/immobilization, and physical therapy. Surgically, we discussed fixation of the fragment vs remvoing the fracture fragment. Pt would rather not have hardware in him as it has caused issues in the past and just remove the fragment. I am in agreement of this procedure  . Verbal and written instructions given to patient. Contact office with any questions/problems/concerns. Orders Placed This Encounter   Procedures    XR FOOT RIGHT (MIN 3 VIEWS)        Encounter Medications    No orders of the defined types were placed in this encounter. I have educated the patient on the details of the procedures as well as medically reasonable risks, benefits, alternatives and prognosis. I also educated the patient on perioperative management including preoperative medical clearance, preoperative physical therapy consultation, postoperative care and rehabilitation, anticipated time to full weightbearing, return to shoes and maximum medical improvement, Lastly I educated that patient to their apparent understanding on potential complications including but not limited to infection, recurrence, overcorrection or undercorrection, persistent pain, swelling or disability, nerve injury or entrapment, numbness, tingling, bone and soft tissue healing complications, complications with anesthesia and deep venous thrombosis/ pulmonary embolism. The patient has elected to proceed with surgical intervention.    Answered all questions to patients satisfaction  Call if any other questions  Follow up in the hospital      RTC in 1week(s) post op.    12/1/2020        Electronically signed by Angi Tse DPM on 12/1/2020 at 1:06 PM  12/1/2020

## 2020-12-17 ENCOUNTER — OFFICE VISIT (OUTPATIENT)
Dept: PODIATRY | Age: 65
End: 2020-12-17

## 2020-12-17 VITALS — RESPIRATION RATE: 16 BRPM | TEMPERATURE: 97.9 F | WEIGHT: 252 LBS | HEIGHT: 71 IN | BODY MASS INDEX: 35.28 KG/M2

## 2020-12-17 PROCEDURE — 99024 POSTOP FOLLOW-UP VISIT: CPT | Performed by: PODIATRIST

## 2020-12-17 RX ORDER — HYDROCODONE BITARTRATE AND ACETAMINOPHEN 10; 325 MG/1; MG/1
1 TABLET ORAL EVERY 8 HOURS PRN
Qty: 21 TABLET | Refills: 0 | Status: SHIPPED | OUTPATIENT
Start: 2020-12-17 | End: 2020-12-24

## 2020-12-17 NOTE — PROGRESS NOTES
Plan:  Patient examined and evaluated. Current condition and treatment options discussed in detail. Advised pt to keep the new dressings clean dry and intact. Pt to use surgical shoe to weightbear and a cane /walker for assistance . Verbal and written instructions given to patient. Orders: No orders of the defined types were placed in this encounter. Contact office with any questions/problems/concerns. RTC in 2week(s).      Electronically signed by Angi Tse DPM on 12/17/2020 at 1:44 PM  12/17/2020

## 2020-12-29 ENCOUNTER — OFFICE VISIT (OUTPATIENT)
Dept: PODIATRY | Age: 65
End: 2020-12-29

## 2020-12-29 VITALS — BODY MASS INDEX: 36.08 KG/M2 | RESPIRATION RATE: 18 BRPM | WEIGHT: 252 LBS | HEIGHT: 70 IN | TEMPERATURE: 97.4 F

## 2020-12-29 PROCEDURE — 99024 POSTOP FOLLOW-UP VISIT: CPT | Performed by: PODIATRIST

## 2020-12-29 NOTE — PROGRESS NOTES
[facility]  [facility]  [address] [place] [address]  Dept: [phone]  Dept Fax: [phone]    POST-OP PROGRESS NOTE  Date of patient's visit: 12/29/2020  Patient's Name:  Trino Stafford YOB: 1955            Patient Care Team:  Yoselin Zhao MD as PCP - General (Family Medicine)  Ailin Fonseca MD as Consulting Physician (Infectious Diseases)  Anastasia Andrews DPM as Physician (Podiatry)  Anastasia Andrews DPM as Physician (Podiatry)        Chief Complaint   Patient presents with    Post-Op Check     3 wks post op    Diabetes     blood sugar was 150 today    Foot Pain     right foot       Pt's primary care physician is Yoselin Zhao MD last seen 08/08/2020     Subjective: Trino Stafford is a 60 y.o. male who presents to the office today 3week(s)  S/P left hallux arthroplasty and tenotomy of the left 3rd toe extensor tendon  for correction of left hallux djd and left 3rd hammertoe   Problem List Items Addressed This Visit     Right foot pain      Other Visit Diagnoses     Post-operative state    -  Primary    Type II diabetes mellitus with peripheral circulatory disorder ([facility] [address]          . Patient relates pain is Absent  and improved. Pain is rated 1 out of 10 and is described as mild. Currently denies F/C/N/V. Is patient taking pain medications as prescribed and is controlling pain    Physical Examination:  Incision is coapted, sutures/steri-strips are intact. Minimal bleeding post operatively. Edema present. No erythema. No Pus. Operative correction is satisfactory. Radiographs:      Assessment: Trino Stafford is status post as above  Normal post operative course. Doing well          ICD-10-CM    1. Post-operative state  Z98.890    2. Type II diabetes mellitus with peripheral circulatory disorder (HCC)  E11.51    3.  Right foot pain  M79.671 Plan:  Patient examined and evaluated. Current condition and treatment options discussed in detail. Advised pt to his treatment plan. Patient presents for suture removal. The wound is well healed without signs of infection. The sutures are removed. Wound care and activity instructions given. .  Verbal and written instructions given to patient. Pt able to get in shower in 2 days  Orders: No orders of the defined types were placed in this encounter. Contact office with any questions/problems/concerns. RTC in 2week(s) with x rays.      Electronically signed by Angelica Beaulieu DPM on 12/29/2020 at 12:54 PM  12/29/2020

## 2021-03-02 ENCOUNTER — OFFICE VISIT (OUTPATIENT)
Dept: PODIATRY | Age: 66
End: 2021-03-02
Payer: MEDICARE

## 2021-03-02 VITALS — WEIGHT: 248 LBS | RESPIRATION RATE: 16 BRPM | TEMPERATURE: 97 F | HEIGHT: 70 IN | BODY MASS INDEX: 35.5 KG/M2

## 2021-03-02 DIAGNOSIS — M79.671 PAIN IN BOTH FEET: ICD-10-CM

## 2021-03-02 DIAGNOSIS — M79.672 PAIN IN BOTH FEET: ICD-10-CM

## 2021-03-02 DIAGNOSIS — I73.9 PVD (PERIPHERAL VASCULAR DISEASE) (HCC): ICD-10-CM

## 2021-03-02 DIAGNOSIS — E11.51 TYPE II DIABETES MELLITUS WITH PERIPHERAL CIRCULATORY DISORDER (HCC): Primary | ICD-10-CM

## 2021-03-02 DIAGNOSIS — B35.1 DERMATOPHYTOSIS OF NAIL: ICD-10-CM

## 2021-03-02 PROCEDURE — 11721 DEBRIDE NAIL 6 OR MORE: CPT | Performed by: PODIATRIST

## 2021-03-02 NOTE — PROGRESS NOTES
strength 5/5, Bilateral.  Pain present upon palpation of toenails 1-5, Bilateral. decreased medial longitudinal arch, Bilateral.  Ankle ROM decreased,Bilateral.    Dorsally contracted digits present digits 2, Bilateral.     Vascular: DP pulses 1/4 bilateral.  PT pulses 0/4 bilateral.   CFT <5 seconds, Bilateral.  Hair growth absent to the level of the digits, Bilateral.  Edema present, Bilateral.  Varicosities absent, Bilateral. Erythema absent, Bilateral    Neurological: Sensation diminshed to light touch to level of digits, Bilateral.  Protective sensation intact 6/10 sites via 5.07/10g [place] Monofilament, Bilateral.  negative Tinel's, Bilateral.  negative Valleix sign, Bilateral.      Integument: Warm, dry, supple, Bilateral.  Open lesion absent, Bilateral.  Interdigital maceration absent to web spaces 4, Bilateral.  Nails 1-5 left and 1-5 right thickened > 3.0 mm, dystrophic and crumbly, discolored with subungual debris. Fissures absent, Bilateral.   General: AAO x 3 in NAD.     Derm  Toenail Description  Sites of Onychomycosis Involvement (Check affected area)  [x] [x] [x] [x] [x] [x] [x] [x] [x] [x]  5 4 3 2 1 1 2 3 4 5                          Right                                        Left    Thickness  [x] [x] [x] [x] [x] [x] [x] [x] [x] [x]  5 4 3 2 1 1 2 3 4 5                         Right                                        Left    Dystrophic Changes   [x] [x] [x] [x] [x] [x] [x] [x] [x] [x]  5 4 3 2 1 1 2 3 4 5                         Right                                        Left    Color   [x] [x] [x] [x] [x] [x] [x] [x] [x] [x]  5 4 3 2 1 1 2 3 4 5                          Right                                        Left    Incurvation/Ingrowin   [] [] [] [] [] [] [] [] [] []  5 4 3 2 1 1 2 3 4 5                         Right                                        Left    Inflammation/Pain   [x] [x] [x] [x] [x] [x] [x] [x] [x] [x]  5 4 3 2 1 1 2 3 4 5                         Right Left        Visual inspection:  Deformity: hammertoe deformity tryone feet  amputation: absent  Skin lesions: absent  Edema: right- 2+ pitting edema, left- 2+ pitting edema    Sensory exam:  Monofilament sensation: abnormal - 6/10 via SW 5.07/10g monofilament to the plantar foot bilateral feet    Pulses: abnormal - 1/4 dorsalis pedis pulse and 1/4 Posterior tibial pulse,   Pinprick: Impaired  Proprioception: Impaired  Vibration (128 Hz): Impaired       DM with PVD       [x]Yes    []No      Assessment:  60 y.o. male with:   Diagnosis Orders   1. Type II diabetes mellitus with peripheral circulatory disorder (HCC)   DIABETES FOOT EXAM    76230 - IN DEBRIDEMENT OF NAILS, 6 OR MORE   2. PVD (peripheral vascular disease) ([facility])   DIABETES FOOT EXAM    72282 - IN DEBRIDEMENT OF NAILS, 6 OR MORE   3. Dermatophytosis of nail   DIABETES FOOT EXAM    77005 - IN DEBRIDEMENT OF NAILS, 6 OR MORE   4. Pain in both feet  HM DIABETES FOOT EXAM    96011 - IN DEBRIDEMENT OF NAILS, 6 OR MORE           Q7   []Yes    []No                Q8   [x]Yes    []No                     Q9   []Yes    []No    Plan:   Pt was evaluated and examined. Patient was given personalized discharge instructions. Nails 1-10 were debrided sharply in length and thickness with a nipper and , without incident. Pt will follow up in 9 weeks or sooner if any problems arise. Diagnosis was discussed with the pt and all of their questions were answered in detail. Proper foot hygiene and care was discussed with the pt. Informed patient on proper diabetic foot care and importance of tight glycemic control. Patient to check feet daily and contact the office with any questions/problems/concerns.    Other comorbidity noted and will be managed by PCP.  3/2/2021    Electronically signed by Hannah Mcleod DPM on 3/2/2021 at 1:34 PM  3/2/2021

## 2021-05-06 ENCOUNTER — HOSPITAL ENCOUNTER (EMERGENCY)
Age: 66
Discharge: HOME | End: 2021-05-06
Attending: EMERGENCY MEDICINE
Payer: MEDICARE

## 2021-05-06 ENCOUNTER — APPOINTMENT (OUTPATIENT)
Dept: GENERAL RADIOLOGY | Age: 66
End: 2021-05-06
Payer: MEDICARE

## 2021-05-06 VITALS
WEIGHT: 247 LBS | OXYGEN SATURATION: 98 % | BODY MASS INDEX: 35.36 KG/M2 | HEART RATE: 95 BPM | TEMPERATURE: 98.3 F | DIASTOLIC BLOOD PRESSURE: 85 MMHG | HEIGHT: 70 IN | SYSTOLIC BLOOD PRESSURE: 140 MMHG | RESPIRATION RATE: 16 BRPM

## 2021-05-06 DIAGNOSIS — T14.8XXA PUNCTURE WOUND: Primary | ICD-10-CM

## 2021-05-06 DIAGNOSIS — N28.9 RENAL INSUFFICIENCY: ICD-10-CM

## 2021-05-06 DIAGNOSIS — R07.9 CHEST PAIN, UNSPECIFIED TYPE: ICD-10-CM

## 2021-05-06 DIAGNOSIS — S90.852A ACUTE FOREIGN BODY OF LEFT FOOT, INITIAL ENCOUNTER: ICD-10-CM

## 2021-05-06 LAB
ABSOLUTE EOS #: 0.22 K/UL (ref 0–0.44)
ABSOLUTE IMMATURE GRANULOCYTE: 0.08 K/UL (ref 0–0.3)
ABSOLUTE LYMPH #: 2.62 K/UL (ref 1.1–3.7)
ABSOLUTE MONO #: 0.71 K/UL (ref 0.1–1.2)
ANION GAP SERPL CALCULATED.3IONS-SCNC: 12 MMOL/L (ref 9–17)
BASOPHILS # BLD: 1 % (ref 0–2)
BASOPHILS ABSOLUTE: 0.07 K/UL (ref 0–0.2)
BUN BLDV-MCNC: 33 MG/DL (ref 8–23)
BUN/CREAT BLD: 15 (ref 9–20)
CALCIUM SERPL-MCNC: 9.9 MG/DL (ref 8.6–10.4)
CHLORIDE BLD-SCNC: 95 MMOL/L (ref 98–107)
CO2: 21 MMOL/L (ref 20–31)
CREAT SERPL-MCNC: 2.15 MG/DL (ref 0.7–1.2)
DIFFERENTIAL TYPE: ABNORMAL
EOSINOPHILS RELATIVE PERCENT: 2 % (ref 1–4)
GFR AFRICAN AMERICAN: 38 ML/MIN
GFR NON-AFRICAN AMERICAN: 31 ML/MIN
GFR SERPL CREATININE-BSD FRML MDRD: ABNORMAL ML/MIN/{1.73_M2}
GFR SERPL CREATININE-BSD FRML MDRD: ABNORMAL ML/MIN/{1.73_M2}
GLUCOSE BLD-MCNC: 266 MG/DL (ref 70–99)
HCT VFR BLD CALC: 40.9 % (ref 40.7–50.3)
HEMOGLOBIN: 13.9 G/DL (ref 13–17)
IMMATURE GRANULOCYTES: 1 %
LYMPHOCYTES # BLD: 24 % (ref 24–43)
MCH RBC QN AUTO: 30.2 PG (ref 25.2–33.5)
MCHC RBC AUTO-ENTMCNC: 34 G/DL (ref 28.4–34.8)
MCV RBC AUTO: 88.9 FL (ref 82.6–102.9)
MONOCYTES # BLD: 7 % (ref 3–12)
MYOGLOBIN: 69 NG/ML (ref 28–72)
NRBC AUTOMATED: 0 PER 100 WBC
PDW BLD-RTO: 13.3 % (ref 11.8–14.4)
PLATELET # BLD: 221 K/UL (ref 138–453)
PLATELET ESTIMATE: ABNORMAL
PMV BLD AUTO: 9.8 FL (ref 8.1–13.5)
POTASSIUM SERPL-SCNC: 4.9 MMOL/L (ref 3.7–5.3)
RBC # BLD: 4.6 M/UL (ref 4.21–5.77)
RBC # BLD: ABNORMAL 10*6/UL
SEG NEUTROPHILS: 65 % (ref 36–65)
SEGMENTED NEUTROPHILS ABSOLUTE COUNT: 7.09 K/UL (ref 1.5–8.1)
SODIUM BLD-SCNC: 128 MMOL/L (ref 135–144)
TROPONIN INTERP: NORMAL
TROPONIN T: NORMAL NG/ML
TROPONIN, HIGH SENSITIVITY: 12 NG/L (ref 0–22)
WBC # BLD: 10.8 K/UL (ref 3.5–11.3)
WBC # BLD: ABNORMAL 10*3/UL

## 2021-05-06 PROCEDURE — 6370000000 HC RX 637 (ALT 250 FOR IP): Performed by: PHYSICIAN ASSISTANT

## 2021-05-06 PROCEDURE — 99283 EMERGENCY DEPT VISIT LOW MDM: CPT

## 2021-05-06 PROCEDURE — 80048 BASIC METABOLIC PNL TOTAL CA: CPT

## 2021-05-06 PROCEDURE — 84484 ASSAY OF TROPONIN QUANT: CPT

## 2021-05-06 PROCEDURE — 85025 COMPLETE CBC W/AUTO DIFF WBC: CPT

## 2021-05-06 PROCEDURE — 93005 ELECTROCARDIOGRAM TRACING: CPT | Performed by: PHYSICIAN ASSISTANT

## 2021-05-06 PROCEDURE — 73630 X-RAY EXAM OF FOOT: CPT

## 2021-05-06 PROCEDURE — 83874 ASSAY OF MYOGLOBIN: CPT

## 2021-05-06 RX ORDER — CIPROFLOXACIN 500 MG/1
500 TABLET, FILM COATED ORAL 2 TIMES DAILY
Qty: 14 TABLET | Refills: 0 | Status: SHIPPED | OUTPATIENT
Start: 2021-05-06 | End: 2021-05-13

## 2021-05-06 RX ORDER — ONDANSETRON 4 MG/1
4 TABLET, ORALLY DISINTEGRATING ORAL ONCE
Status: COMPLETED | OUTPATIENT
Start: 2021-05-06 | End: 2021-05-06

## 2021-05-06 RX ORDER — CIPROFLOXACIN 500 MG/1
500 TABLET, FILM COATED ORAL ONCE
Status: COMPLETED | OUTPATIENT
Start: 2021-05-06 | End: 2021-05-06

## 2021-05-06 RX ORDER — IBUPROFEN 800 MG/1
800 TABLET ORAL ONCE
Status: COMPLETED | OUTPATIENT
Start: 2021-05-06 | End: 2021-05-06

## 2021-05-06 RX ORDER — CIPROFLOXACIN 500 MG/1
500 TABLET, FILM COATED ORAL EVERY 12 HOURS SCHEDULED
Status: DISCONTINUED | OUTPATIENT
Start: 2021-05-06 | End: 2021-05-06 | Stop reason: HOSPADM

## 2021-05-06 RX ADMIN — CIPROFLOXACIN 500 MG: 500 TABLET ORAL at 19:05

## 2021-05-06 RX ADMIN — IBUPROFEN 800 MG: 800 TABLET, FILM COATED ORAL at 18:35

## 2021-05-06 RX ADMIN — ONDANSETRON 4 MG: 4 TABLET, ORALLY DISINTEGRATING ORAL at 18:35

## 2021-05-06 ASSESSMENT — ENCOUNTER SYMPTOMS
COUGH: 0
NAUSEA: 0
VOMITING: 0
SHORTNESS OF BREATH: 0
SORE THROAT: 0
RHINORRHEA: 0
CHEST TIGHTNESS: 0

## 2021-05-06 ASSESSMENT — PAIN DESCRIPTION - FREQUENCY: FREQUENCY: INTERMITTENT

## 2021-05-06 ASSESSMENT — PAIN DESCRIPTION - LOCATION: LOCATION: FOOT

## 2021-05-06 ASSESSMENT — PAIN SCALES - GENERAL
PAINLEVEL_OUTOF10: 6
PAINLEVEL_OUTOF10: 6

## 2021-05-06 ASSESSMENT — PAIN DESCRIPTION - DESCRIPTORS: DESCRIPTORS: PRESSURE

## 2021-05-06 ASSESSMENT — PAIN DESCRIPTION - ORIENTATION: ORIENTATION: RIGHT

## 2021-05-06 NOTE — ED PROVIDER NOTES
[address] [place] [address] ED  EMERGENCY DEPARTMENT ENCOUNTER   ATTENDING ATTESTATION     Pt Name: Harjit Blackwood  MRN: [id number]  [place] 1955  Date of evaluation: 5/6/21       Harjit Blackwood is a 60 y.o. male who presents with Foot Injury (right, foreign body bottom of foot, states has neuropathy)      MDM:     71-year-old male presents with complaints of a possible foreign body the right foot. He states he stepped on a nail earlier. X-ray shows a 1 mm area that could be retained foreign body, plan is consultation with podiatry and reevaluation. Vitals:   Vitals:    05/06/21 1706   BP: (!) 140/85   Pulse: 95   Resp: 16   Temp: 98.3 °F (36.8 °C)   TempSrc: Oral   SpO2: 98%   Weight: 247 lb (112 kg)   Height: 5' 10\" (1.778 m)         I personally evaluated and examined the patient in conjunction with the Midlevel provider and agree with the assessment, treatment plan, and disposition of the patient as recorded by the midlevel. I performed a history and physical examination of the patient and discussed management with the midlevel. I reviewed the midlevels note and agree with the documented findings and plan of care. Any areas of disagreement are noted on the chart. I was personally present for the key portions of any procedures. I have documented in the chart those procedures where I was not present during the key portions. I have personally reviewed all images and agree with the midlevel's interpretation. I have reviewed the emergency nurses triage note. I agree with the chief complaint, past medical history, past surgical history, allergies, medications, social and family history as documented unless otherwise noted.     Anjana Connor MD  Attending Emergency  Physician                 Agapito Chou MD  05/06/21 9608

## 2021-05-06 NOTE — ED PROVIDER NOTES
[address] [place] [address] ED  [facility]      Pt Name: Jae Nguyen  MRN: [id number]  [place] 1955  Date ofevaluation: 5/6/2021  Provider: Pardeep Fernández Dr       Chief Complaint   Patient presents with    Foot Injury     right, foreign body bottom of foot, states has neuropathy         HISTORY OF PRESENT ILLNESS  (Location/Symptom, Timing/Onset, Context/Setting, Quality, Duration, Modifying Factors, Severity.)   Jae Nguyen is a 60 y.o. male who presents to the emergency department with puncture wound to the right foot. Patient states he has history of neuropathy. He is a diabetic. Patient states he stepped on a nail while wearing shoes earlier today. He looked down and noticed some blood to his right foot. Patient then decided come to the ER. He was seen yesterday for a dog bite and was placed on Keflex and also given a tetanus shot. Nursing Notes were reviewed. ALLERGIES     Morphine, Percocet [oxycodone-acetaminophen], and Dye [iodides]    CURRENT MEDICATIONS       Discharge Medication List as of 5/6/2021  9:39 PM      CONTINUE these medications which have NOT CHANGED    Details   JANUVIA 100 MG tablet 50 mg daily , DAWHistorical Med      cetirizine (ZYRTEC) 10 MG tablet Historical Med      lisinopril (PRINIVIL;ZESTRIL) 10 MG tablet Take 10 mg by mouth dailyHistorical Med      simvastatin (ZOCOR) 40 MG tablet Take 40 mg by mouth dailyHistorical Med      glipiZIDE (GLUCOTROL) 10 MG tablet Take 40 mg by mouth onceHistorical Med      aspirin 81 MG tablet Take 81 mg by mouth dailyHistorical Med      gabapentin (NEURONTIN) 300 MG capsule Take 900 mg by mouth 3 times daily. Take 3 pills 3 times a day . Historical Med      fluticasone-salmeterol (ADVAIR) 250-50 MCG/DOSE AEPB Historical Med      Misc.  Devices MISC Disp-2 each, R-0, Print1 PAIR OF DIABETIC SHOES (1 LEFT/ 1 RIGHT) 1-3 PAIRS OF INSERTS (LEFT/ RIGHT)             PAST MEDICAL HISTORY Diagnosis Date    ALEJANDRO (acute kidney injury) ([facility] 2018    Cellulitis of right foot     and abscess    Charcot foot due to diabetes mellitus ([facility] [facility]     Right foot     Diabetic polyneuropathy associated with type 2 diabetes mellitus ([facility] [address] [facility]     Fractures, multiple 2014    Right foot fractures     Hyperlipidemia     Hypertension     Leukocytosis     Neuropathy     diabetic with charcot affecting the right foot    Pain in right foot     redness and swelling    Pneumonia 2009    Tobacco abuse 2018    Type II or unspecified type diabetes mellitus without mention of complication, not stated as uncontrolled     Vertigo     Wound, open     Left Ball of  foot, pt. stepped on sharp object. Covered by dressing.  Wound, open     Right posterior -Diabetic Ulcer       SURGICAL HISTORY           Procedure Laterality Date    APPENDECTOMY      at age 24    ARTHROPLASTY Right 2020    RIGHT HALLUX EXOSTECTOMY AND 3RD DIGIT EXTENSIOR TENOTOMY performed by Favio Carrasquillo DPM at [address] [place] Left 2018    I&D foreign body removal    FOOT DEBRIDEMENT Right 3/20/2020    RIGHT  FOOT   INCISION AND DRAINAGE WITH BONE BIOPSY performed by Favio Carrasquillo DPM at [place] [address] ARTHROSCOPY Left     KNEE SURGERY Bilateral     arthroscopy    NECK SURGERY      ME DEEP [address] FOOT INFEC,1 BURSA Left 2018    LEFT FOOT MULTIPLE  INCISIONS  AND DRAINAGE AND REMOVAL FOREIGN BODY  IRENE performed by Favio Carrasquillo DPM at [place] [address]           Problem Relation Age of Onset    Diabetes Mother     Cancer Father     Hypertension Maternal Grandmother      Family Status   Relation Name Status    Mother  Alive    Father      MGM  (Not Specified)        SOCIAL HISTORY      reports that he has been smoking. He has been smoking about 1.00 pack per day.  He has never used smokeless tobacco. He reports previous drug use. He reports that he does not drink alcohol. REVIEW OFSYSTEMS    (2-9 systems for level 4, 10 or more for level 5)   Review of Systems    Except as noted above the remainder of the review of systems was reviewed and negative. PHYSICAL EXAM    (up to 7 for level 4, 8 or more for level 5)     ED Triage Vitals [05/06/21 1706]   BP Temp Temp Source Pulse Resp SpO2 Height Weight   (!) 140/85 98.3 °F (36.8 °C) Oral 95 16 98 % 5' 10\" (1.778 m) 247 lb (112 kg)      Physical Exam  Constitutional:       Appearance: He is well-developed. HENT:      Head: Normocephalic and atraumatic. Neck:      Musculoskeletal: Normal range of motion and neck supple. Cardiovascular:      Rate and Rhythm: Normal rate and regular rhythm. Pulmonary:      Effort: Pulmonary effort is normal.      Breath sounds: Normal breath sounds. Abdominal:      Palpations: Abdomen is soft. Musculoskeletal: Normal range of motion. Feet:    Skin:     General: Skin is warm. Neurological:      Mental Status: He is alert and oriented to person, place, and time.    Psychiatric:         Behavior: Behavior normal.                 DIAGNOSTIC RESULTS     EKG: All EKG's are interpreted by the Emergency Department Physician who either signs or Co-signs this chart in the absence of a cardiologist.        RADIOLOGY:   Non-plain film images such as CT, Ultrasound and MRI are read by the radiologist. Plain radiographic images arevisualized and preliminarily interpreted by the emergency physician with the below findings:        Interpretation per the Radiologist below, if available at thetime of this note:          ED BEDSIDE ULTRASOUND:   Performed by ED Physician - none    LABS:  Labs Reviewed   CBC WITH AUTO DIFFERENTIAL - Abnormal; Notable for the following components:       Result Value    Immature Granulocytes 1 (*)     All other components within normal limits   BASIC METABOLIC PANEL - Abnormal; Notable for the following components:    Glucose 266 (*)     BUN 33 (*)     CREATININE 2.15 (*)     Sodium 128 (*)     Chloride 95 (*)     GFR Non- 31 (*)     GFR  38 (*)     All other components within normal limits   TROP/MYOGLOBIN   TROP/MYOGLOBIN   TROP/MYOGLOBIN       All other labs were within normal range or not returned as of this dictation. EMERGENCY DEPARTMENT COURSE and DIFFERENTIAL DIAGNOSIS/MDM:   Vitals:    Vitals:    05/06/21 1706   BP: (!) 140/85   Pulse: 95   Resp: 16   Temp: 98.3 °F (36.8 °C)   TempSrc: Oral   SpO2: 98%   Weight: 247 lb (112 kg)   Height: 5' 10\" (1.778 m)     Case discussed with podiatry they will be down to see the patient. X-rays concerning for possible foreign body. No visible foreign body on physical exam.  Patient was reassessed after the x-ray he informed me that he was having some chest discomfort and nausea. Therefore EKG, Zofran and cardiac enzymes were added on. Patient was seen by podiatry. Cipro was given in the ER and will be discharged home with as well after discussing with podiatry. Patient also instructed follow-up with his family doctor for blood sugar control and to monitor his kidneys    Patient was not agreeable to have a second set of cardiac enzymes drawn. Risk and benefits were discussed. He was discharged home. .    CONSULTS:  IP CONSULT TO PODIATRY    PROCEDURES:  Procedures        FINAL IMPRESSION      1. Puncture wound    2. Acute foreign body of left foot, initial encounter    3. Renal insufficiency    4.  Chest pain, unspecified type          DISPOSITION/PLAN   DISPOSITION Decision To Discharge 05/06/2021 09:36:09 PM      PATIENTREFERRED TO:   Edouard Hammond, [address]  [phone]    In 1 day      Sweta Georges MD  [address] [place] [address]  [phone]    In 3 days        DISCHARGE MEDICATIONS:     Discharge Medication List as of 5/6/2021  9:39 PM      START taking these medications    Details   ciprofloxacin (CIPRO) 500 MG tablet Take 1 tablet by mouth 2 times daily for 7 days, Disp-14 tablet, R-0Normal                 (Please note that portions of this note were completed with a voice recognition program.  Efforts were made to edit thedictations but occasionally words are mis-transcribed.)    YOEL Valle PA-C  05/06/21 0737       Clark Mcgee PA-C  05/06/21 6815

## 2021-05-07 LAB
EKG ATRIAL RATE: 83 BPM
EKG P AXIS: 21 DEGREES
EKG P-R INTERVAL: 206 MS
EKG Q-T INTERVAL: 346 MS
EKG QRS DURATION: 86 MS
EKG QTC CALCULATION (BAZETT): 406 MS
EKG R AXIS: 18 DEGREES
EKG T AXIS: 55 DEGREES
EKG VENTRICULAR RATE: 83 BPM

## 2021-05-07 PROCEDURE — 93010 ELECTROCARDIOGRAM REPORT: CPT | Performed by: INTERNAL MEDICINE

## 2021-05-07 NOTE — CONSULTS
Consultation Note  Podiatric Medicine and Surgery     Subjective     Chief Complaint: Right foot foreign body, puncture wound    HPI:  Lita Yates is a 60 y.o. male who presents to the emergency department with puncture wound to the right foot. Patient states he has history of neuropathy. He is a diabetic. Patient states he stepped on a nail while wearing shoes earlier today with limited pain due to his neuropathic status. He looked down and noticed some blood to his right foot in his shoe. He arrived at the ER with his wife immediately as his history of pedal wounds and surgeries has him keen on pedal injury  He was seen yesterday for a dog bite and was placed on Keflex and also given a tetanus shot. PCP is Gregorio Yeung MD    ROS:   Review of Systems   Constitutional: Negative for chills, fatigue and fever. HENT: Negative for rhinorrhea and sore throat. Eyes: Negative for visual disturbance. Respiratory: Negative for cough, chest tightness and shortness of breath. Cardiovascular: Negative for chest pain and palpitations. Gastrointestinal: Negative for nausea and vomiting. Musculoskeletal: Negative for arthralgias and myalgias. Skin: Positive for wound. Neurological: Positive for numbness. Negative for weakness and headaches. Psychiatric/Behavioral: Negative for agitation and confusion. Past Medical History   has a past medical history of ALEJANDRO (acute kidney injury) ([facility] [address] Cellulitis of right foot, Charcot foot due to diabetes mellitus ([facility] [address] Diabetic polyneuropathy associated with type 2 diabetes mellitus ([facility] [address] Fractures, multiple, Hyperlipidemia, Hypertension, Leukocytosis, Neuropathy, Pain in right foot, Pneumonia, Tobacco abuse, Type II or unspecified type diabetes mellitus without mention of complication, not stated as uncontrolled, Vertigo, Wound, open, and Wound, open.     Past Surgical History   has a past surgical history that includes Neck surgery (1987); Appendectomy; knee surgery (Bilateral, 1983); Knee arthroscopy (Right, 1989); Knee arthroscopy (Left, 1990); Foot Debridement (Left, 04/24/2018); pr deep dissec foot infec,1 bursa (Left, 4/24/2018); Colonoscopy; Foot Debridement (Right, 3/20/2020); and arthroplasty (Right, 12/11/2020). Medications  Prior to Admission medications    Medication Sig Start Date End Date Taking? Authorizing Provider   JANUVIA 100 MG tablet 50 mg daily  11/13/20  Yes Historical Provider, MD   cetirizine (ZYRTEC) 10 MG tablet  10/21/19  Yes Historical Provider, MD   lisinopril (PRINIVIL;ZESTRIL) 10 MG tablet Take 10 mg by mouth daily 11/15/18  Yes Historical Provider, MD   simvastatin (ZOCOR) 40 MG tablet Take 40 mg by mouth daily 11/13/18  Yes Historical Provider, MD   glipiZIDE (GLUCOTROL) 10 MG tablet Take 40 mg by mouth once   Yes Historical Provider, MD   aspirin 81 MG tablet Take 81 mg by mouth daily   Yes Historical Provider, MD   gabapentin (NEURONTIN) 300 MG capsule Take 900 mg by mouth 3 times daily. Take 3 pills 3 times a day . Yes Historical Provider, MD   fluticasone-salmeterol (ADVAIR) 250-50 MCG/DOSE AEPB  11/1/20   Historical Provider, MD   Misc. Devices MISC 1 PAIR OF DIABETIC SHOES (1 LEFT/ 1 RIGHT)  1-3 PAIRS OF INSERTS (LEFT/ RIGHT) 3/5/20   Ana M Hardy DPM    Scheduled Meds:  Continuous Infusions:  PRN Meds:. Allergies  is allergic to morphine; percocet [oxycodone-acetaminophen]; and dye [iodides]. Family History  family history includes Cancer in his father; Diabetes in his mother; Hypertension in his maternal grandmother. Social History   reports that he has been smoking. He has been smoking about 1.00 pack per day. He has never used smokeless tobacco.   reports no history of alcohol use. reports previous drug use.     Objective     Vitals:  Patient Vitals for the past 8 hrs:   BP Temp Temp src Pulse Resp SpO2 Height Weight   05/06/21 1706 (!) 140/85 98.3 °F (36.8 °C) Oral 95 16 98 % 5' 10\" (1.778 m) 247 lb (112 kg)     Average, Min, and Max for last 24 hours Vitals:  TEMPERATURE:  Temp  Av.3 °F (36.8 °C)  Min: 98.3 °F (36.8 °C)  Max: 98.3 °F (36.8 °C)    RESPIRATIONS RANGE: Resp  Av  Min: 16  Max: 16    PULSE RANGE: Pulse  Av  Min: 95  Max: 95    BLOOD PRESSURE RANGE:  Systolic (25CBR), JUU:389 , Min:140 , QFE:709   ; Diastolic (91EKJ), LKF:44, Min:85, Max:85      PULSE OXIMETRY RANGE: SpO2  Av %  Min: 98 %  Max: 98 %  I&O:  No intake/output data recorded. CBC:  Recent Labs     21  1835   WBC 10.8   HGB 13.9   HCT 40.9           BMP:  Recent Labs     21  1835   *   K 4.9   CL 95*   CO2 21   BUN 33*   CREATININE 2.15*   GLUCOSE 266*   CALCIUM 9.9        Coags:  No results for input(s): APTT, PROT, INR in the last 72 hours. Lab Results   Component Value Date    LABA1C 6.4 (H) 2018     Lab Results   Component Value Date    SEDRATE 45 (H) 2018     Lab Results   Component Value Date    CRP 84.4 (H) 2018         Lower Extremity Physical Exam:  Vascular: DP and PT pulses are palpable. CFT <3 seconds to all digits. Hair growth is absent to the level of the digits. Localized non-pitting right foot medial arch edema. Neuro: Saph/sural/SP/DP/plantar sensation diminished to light touch. Musculoskeletal: Muscle strength is 5/5 to all lower extremity muscle groups. Gross deformity is present right foot lesser toe post-surgical decreased length. Dermatologic: Full thickness puncture woundlocated right foot medial arch and measures approximately 1cm x 1cm x 0.5cm. Base is granular. Periwound skin is jagged. Sanguinous drainage noted with out associated mal odor. Erythema present locally with out associated increase in warmth. Does not probe to bone, sinus track, or undermine. No fluctuance, crepitus, or induration. Wound site appears clean without visible debris noted. Interdigital maceration absent.        Clinical: Imaging:   XR FOOT RIGHT (MIN 3 VIEWS)   Final Result   1. There are punctate radiodensities at the margin of the plantar skin, seen   on lateral view, which may reside in the skin, but could be external.   Clinical correlation is recommended. 2. Osseous findings are unchanged from earlier radiograph. XR FOOT RIGHT (MIN 3 VIEWS)   Final Result   1 mm density overlying the plantar soft tissues of the foot at the level of   the distal aspect of the metatarsals on the lateral view may reflect small   retained foreign body. Interval progression of erosive changes seen affecting the medial head of the   1st proximal phalanx. This may reflect underlying inflammatory arthritis;   however, correlate clinically with signs of infection. Possible subacute nondisplaced fracture of the 5th proximal phalanx. Changes suggestive of underlying Charcot arthropathy of the midfoot with pes   planus deformity. Changes of prior amputation of the 3rd toe. Cultures: none    Assessment     Chris Colunga is a 60 y.o. male with   1. Puncture wound, right foot  2. DMII with neuropathy  3. Foreign body, right foot  4. Hx of osteomyelitis, right foot    Active Problems:    * No active hospital problems. *  Resolved Problems:    * No resolved hospital problems. *        Plan     · Patient examined and evaluated at bedside   · Treatment options discussed in detail with the patient  · Radiographs reviewed and discussed in detail with patient and small fragment located superficially within wound displayed to patient. Follow up radiographs reviewed with foreign body removed from site of puncture. · Sharp excisional debridement performed of the puncture site of the right foot down to and including dermal tissue via 15 blade. Small fragment located and removed. Wound site irrigated with copious amounts of NSS and dressed with adaptic, 4x4's, kerlix, ace. No anesthesia required.  Hemostasis obtained with direct pressure. Patient reports 0/10 post procedure pain.         · Patient prescribed cipro per ED for pseudomonal risk, 7 days; continue keflex per ED as prescribed  · Discussed the importance of maintaining heel weight bearing status at all times with daily dressing changes using NSS rinse, adaptic, 4x4's, kerlix, ace once per day - he expressed an understanding  · Educated him on the emergent nature of any wound changes representing potential infection and he will promptly arrive to the ED if symptoms arise  · He understands the importance of tight glycemic control and the pedal risks posed by his diabetic condition  · Advised twice daily pedal exams, never going barefoot, always wearing shoes, maintaining a clean living environment free of debris on the floor, and job conditions and the risks involved  · HWB to Right lower extremity  · Discussed with Dr. Sri Mabry and pt will follow up early next week in his office    Delicia Chen DPM   Podiatric Medicine & Surgery   5/6/2021 at 9:28 PM

## 2021-05-11 ENCOUNTER — OFFICE VISIT (OUTPATIENT)
Dept: PODIATRY | Age: 66
End: 2021-05-11
Payer: MEDICARE

## 2021-05-11 VITALS — WEIGHT: 247 LBS | HEIGHT: 70 IN | BODY MASS INDEX: 35.36 KG/M2 | RESPIRATION RATE: 16 BRPM

## 2021-05-11 DIAGNOSIS — E11.51 TYPE II DIABETES MELLITUS WITH PERIPHERAL CIRCULATORY DISORDER (HCC): Primary | ICD-10-CM

## 2021-05-11 DIAGNOSIS — M14.671 CHARCOT'S JOINT OF RIGHT FOOT: ICD-10-CM

## 2021-05-11 DIAGNOSIS — S91.331A PUNCTURE WOUND OF RIGHT FOOT, INITIAL ENCOUNTER: ICD-10-CM

## 2021-05-11 DIAGNOSIS — E11.42 DIABETIC POLYNEUROPATHY ASSOCIATED WITH TYPE 2 DIABETES MELLITUS (HCC): ICD-10-CM

## 2021-05-11 DIAGNOSIS — M79.671 PAIN IN RIGHT FOOT: ICD-10-CM

## 2021-05-11 DIAGNOSIS — M79.671 RIGHT FOOT PAIN: ICD-10-CM

## 2021-05-11 PROCEDURE — 4004F PT TOBACCO SCREEN RCVD TLK: CPT | Performed by: PODIATRIST

## 2021-05-11 PROCEDURE — 3017F COLORECTAL CA SCREEN DOC REV: CPT | Performed by: PODIATRIST

## 2021-05-11 PROCEDURE — G8427 DOCREV CUR MEDS BY ELIG CLIN: HCPCS | Performed by: PODIATRIST

## 2021-05-11 PROCEDURE — 3046F HEMOGLOBIN A1C LEVEL >9.0%: CPT | Performed by: PODIATRIST

## 2021-05-11 PROCEDURE — 99214 OFFICE O/P EST MOD 30 MIN: CPT | Performed by: PODIATRIST

## 2021-05-11 PROCEDURE — G8417 CALC BMI ABV UP PARAM F/U: HCPCS | Performed by: PODIATRIST

## 2021-05-11 PROCEDURE — 2022F DILAT RTA XM EVC RTNOPTHY: CPT | Performed by: PODIATRIST

## 2021-05-11 RX ORDER — HYDROCODONE BITARTRATE AND ACETAMINOPHEN 5; 325 MG/1; MG/1
TABLET ORAL
Status: ON HOLD | COMMUNITY
Start: 2021-05-05 | End: 2021-06-08 | Stop reason: ALTCHOICE

## 2021-05-11 RX ORDER — ALBUTEROL SULFATE 90 UG/1
2 AEROSOL, METERED RESPIRATORY (INHALATION) EVERY 6 HOURS PRN
Status: ON HOLD | COMMUNITY
Start: 2021-04-01 | End: 2022-10-05 | Stop reason: ALTCHOICE

## 2021-05-11 RX ORDER — INSULIN GLARGINE 100 [IU]/ML
10 INJECTION, SOLUTION SUBCUTANEOUS NIGHTLY
Status: ON HOLD | COMMUNITY
Start: 2021-03-12 | End: 2021-09-17 | Stop reason: SDUPTHER

## 2021-05-11 RX ORDER — PEN NEEDLE, DIABETIC 31 G X1/4"
NEEDLE, DISPOSABLE MISCELLANEOUS
Status: ON HOLD | COMMUNITY
Start: 2021-03-15 | End: 2021-06-08

## 2021-05-11 RX ORDER — MELOXICAM 15 MG/1
15 TABLET ORAL NIGHTLY
Status: ON HOLD | COMMUNITY
Start: 2021-05-05 | End: 2022-10-05

## 2021-05-11 NOTE — PROGRESS NOTES
[facility] PHYSICIANS  [facility]  [address] [place] [address]  Dept: [phone]  Dept Fax: [phone]    RETURN PATIENT PROGRESS NOTE  Date of patient's visit: 5/11/2021  Patient's Name:  Jeannine Cochran YOB: 1955            Patient Care Team:  Leydi Rodriguez MD as PCP - General (Family Medicine)  Suad Marcano MD as Consulting Physician (Infectious Diseases)  Yohan Sinha DPM as Physician (Podiatry)  Yohan Sinha DPM as Physician (Podiatry)       Jeannine Cochran 60 y.o. male that presents for follow-up of No chief complaint on file. Pt's primary care physician is Leydi Rodriguez MD last seen3/24/2021  Pt has a new complaint of a right foot puncture wound from a nail that went thru his shoe Thursday night. He is a diabetic with neuropathy and noticed blood all over his sock. He went to the ED and was given his tentanus booster as well as antibiotics. He was previously on antibiotics from . Allergies   Allergen Reactions    Morphine Itching    Percocet [Oxycodone-Acetaminophen]      Facial swelling    Dye [Iodides] Rash     Rash and swelling       Past Medical History:   Diagnosis Date    ALEJANDRO (acute kidney injury) ([address] 8/5/2018    Cellulitis of right foot     and abscess    Charcot foot due to diabetes mellitus ([address] 2014    Right foot     Diabetic polyneuropathy associated with type 2 diabetes mellitus ([address]     Fractures, multiple 07/21/2014    Right foot fractures     Hyperlipidemia     Hypertension     Leukocytosis     Neuropathy     diabetic with charcot affecting the right foot    Pain in right foot     redness and swelling    Pneumonia 2009    Tobacco abuse 4/24/2018    Type II or unspecified type diabetes mellitus without mention of complication, not stated as uncontrolled     Vertigo     Wound, open     Left Ball of  foot, pt. stepped on sharp object. Covered by dressing.     Wound, open     Right posterior -Diabetic Ulcer       Prior to Admission medications    Medication Sig Start Date End Date Taking? Authorizing Provider   ciprofloxacin (CIPRO) 500 MG tablet Take 1 tablet by mouth 2 times daily for 7 days 5/6/21 5/13/21  Vivian Roca PA-C   fluticasone-salmeterol (ADVAIR) 250-50 MCG/DOSE AEPB  11/1/20   Historical Provider, MD CHAKRABORTY 100 MG tablet 50 mg daily  11/13/20   Historical Provider, MD   Misc. Devices MISC 1 PAIR OF DIABETIC SHOES (1 LEFT/ 1 RIGHT)  1-3 PAIRS OF INSERTS (LEFT/ RIGHT) 3/5/20   Fernanda Jeter DPM   cetirizine (ZYRTEC) 10 MG tablet  10/21/19   Historical Provider, MD   lisinopril (PRINIVIL;ZESTRIL) 10 MG tablet Take 10 mg by mouth daily 11/15/18   Historical Provider, MD   simvastatin (ZOCOR) 40 MG tablet Take 40 mg by mouth daily 11/13/18   Historical Provider, MD   glipiZIDE (GLUCOTROL) 10 MG tablet Take 40 mg by mouth once    Historical Provider, MD   aspirin 81 MG tablet Take 81 mg by mouth daily    Historical Provider, MD   gabapentin (NEURONTIN) 300 MG capsule Take 900 mg by mouth 3 times daily. Take 3 pills 3 times a day . Historical Provider, MD       Review of Systems    Review of Systems:  History obtained from chart review and the patient  General ROS: negative for - chills, fatigue, fever, night sweats or weight gain  Constitutional: Negative for chills, diaphoresis, fatigue, fever and unexpected weight change. Musculoskeletal: Positive for arthralgias, gait problem and joint swelling. Neurological ROS: negative for - behavioral changes, confusion, headaches or seizures. Negative for weakness and numbness. Dermatological ROS: negative for - mole changes, rash  Cardiovascular: Negative for leg swelling. Gastrointestinal: Negative for constipation, diarrhea, nausea and vomiting. Lower Extremity Physical Examination:     Vitals: There were no vitals filed for this visit. General: AAO x 3 in NAD.    Dermatologic Exam:  Puncture wound to the right foot plantar midfoot at the site of the charcot collapse   Skin is thin, with flaky sloughing skin as well as decreased hair growth to the lower leg  Small red hemosiderin deposits seen dorsal foot   Musculoskeletal:     1st MPJ ROM decreased, Bilateral.  Muscle strength 5/5, Bilateral.  Pain present upon palpation of right foot plantar foot at the punture wound site. . decreased medial longitudinal arch, Bilateral.  Ankle ROM decreased,Bilateral.    Dorsally contracted digits present digits 2, Bilateral.     Vascular: DP pulses 1/4 bilateral.  PT pulses 0/4 bilateral.   CFT <5 seconds, Bilateral.  Hair growth absent to the level of the digits, Bilateral.  Edema present, Bilateral.  Varicosities absent, Bilateral. Erythema absent, Bilateral    Neurological: Sensation diminshed to light touch to level of digits, Bilateral.  Protective sensation intact 6/10 sites via 5.07/10g [place] Monofilament, Bilateral.  negative Tinel's, Bilateral.  negative Valleix sign, Bilateral.      Integument: Warm, dry, supple, Bilateral.  Open lesion absent, Bilateral.  Interdigital maceration absent to web spaces 4, Bilateral.    Fissures absent, Bilateral.     Asessment: Patient is a 60 y.o. male with:    Diagnosis Orders   1. Type II diabetes mellitus with peripheral circulatory disorder (HCC)     2. Diabetic polyneuropathy associated with type 2 diabetes mellitus ([facility]     3. Puncture wound of right foot, initial encounter     4. Pain in right foot     5. Charcot's joint of right foot           Plan: Patient examined and evaluated. Current condition and treatment options discussed in detail. Advised pt to his condition. Pt is placed in silvercell and an offloading pad applied. Pt to place bactroban to the wounds and pt is to continue to take his antibiotics. I feel pt can go back into his diabetic shoes as it is offloaded to the right foot charcot site where the puncture wound was.   Verbal and written instructions given to patient. Contact office with any questions/problems/concerns. No orders of the defined types were placed in this encounter. No orders of the defined types were placed in this encounter. All labs were reviewed and all imagining including the above findings were reviewed PRIOR to the patients arrival and with the patient today. Previous patient encounter was reviewed. Encounters from the patients other medical providers were reviewed and noted. Time was spent educating the patient and their families/caregivers on proper care of the feet and ankles. All the above diagnosis were addressed at todays visit and all questions were answered. A total of 35 minutes was spent with this patients encounter which included charting after the patients visit   RTC in 2week(s).     5/11/2021      Electronically signed by Jayson Godinez DPM on 5/11/2021 at 8:26 AM  5/11/2021

## 2021-05-26 ENCOUNTER — HOSPITAL ENCOUNTER (EMERGENCY)
Age: 66
Discharge: HOME | End: 2021-05-26
Attending: EMERGENCY MEDICINE
Payer: MEDICARE

## 2021-05-26 ENCOUNTER — APPOINTMENT (OUTPATIENT)
Dept: GENERAL RADIOLOGY | Age: 66
End: 2021-05-26
Payer: MEDICARE

## 2021-05-26 VITALS
HEART RATE: 64 BPM | TEMPERATURE: 98.2 F | HEIGHT: 70 IN | RESPIRATION RATE: 16 BRPM | OXYGEN SATURATION: 95 % | WEIGHT: 246 LBS | SYSTOLIC BLOOD PRESSURE: 103 MMHG | DIASTOLIC BLOOD PRESSURE: 71 MMHG | BODY MASS INDEX: 35.22 KG/M2

## 2021-05-26 DIAGNOSIS — U07.1 COVID-19: Primary | ICD-10-CM

## 2021-05-26 LAB
ABSOLUTE EOS #: 0.04 K/UL (ref 0–0.44)
ABSOLUTE IMMATURE GRANULOCYTE: 0.14 K/UL (ref 0–0.3)
ABSOLUTE LYMPH #: 1.98 K/UL (ref 1.1–3.7)
ABSOLUTE MONO #: 0.54 K/UL (ref 0.1–1.2)
ANION GAP SERPL CALCULATED.3IONS-SCNC: 14 MMOL/L (ref 9–17)
BASOPHILS # BLD: 0 % (ref 0–2)
BASOPHILS ABSOLUTE: <0.03 K/UL (ref 0–0.2)
BETA-HYDROXYBUTYRATE: 0.15 MMOL/L (ref 0.02–0.27)
BUN BLDV-MCNC: 32 MG/DL (ref 8–23)
BUN/CREAT BLD: 17 (ref 9–20)
CALCIUM SERPL-MCNC: 8.9 MG/DL (ref 8.6–10.4)
CHLORIDE BLD-SCNC: 97 MMOL/L (ref 98–107)
CO2: 21 MMOL/L (ref 20–31)
CREAT SERPL-MCNC: 1.9 MG/DL (ref 0.7–1.2)
DIFFERENTIAL TYPE: ABNORMAL
EOSINOPHILS RELATIVE PERCENT: 0 % (ref 1–4)
GFR AFRICAN AMERICAN: 43 ML/MIN
GFR NON-AFRICAN AMERICAN: 36 ML/MIN
GFR SERPL CREATININE-BSD FRML MDRD: ABNORMAL ML/MIN/{1.73_M2}
GFR SERPL CREATININE-BSD FRML MDRD: ABNORMAL ML/MIN/{1.73_M2}
GLUCOSE BLD-MCNC: 410 MG/DL (ref 70–99)
HCT VFR BLD CALC: 38.3 % (ref 40.7–50.3)
HEMOGLOBIN: 13 G/DL (ref 13–17)
IMMATURE GRANULOCYTES: 1 %
LYMPHOCYTES # BLD: 19 % (ref 24–43)
MCH RBC QN AUTO: 30 PG (ref 25.2–33.5)
MCHC RBC AUTO-ENTMCNC: 33.9 G/DL (ref 28.4–34.8)
MCV RBC AUTO: 88.5 FL (ref 82.6–102.9)
MONOCYTES # BLD: 5 % (ref 3–12)
NRBC AUTOMATED: 0 PER 100 WBC
PDW BLD-RTO: 13.2 % (ref 11.8–14.4)
PLATELET # BLD: 228 K/UL (ref 138–453)
PLATELET ESTIMATE: ABNORMAL
PMV BLD AUTO: 9.7 FL (ref 8.1–13.5)
POTASSIUM SERPL-SCNC: 5.2 MMOL/L (ref 3.7–5.3)
RBC # BLD: 4.33 M/UL (ref 4.21–5.77)
RBC # BLD: ABNORMAL 10*6/UL
SARS-COV-2, RAPID: DETECTED
SEG NEUTROPHILS: 75 % (ref 36–65)
SEGMENTED NEUTROPHILS ABSOLUTE COUNT: 7.7 K/UL (ref 1.5–8.1)
SODIUM BLD-SCNC: 132 MMOL/L (ref 135–144)
SPECIMEN DESCRIPTION: ABNORMAL
WBC # BLD: 10.4 K/UL (ref 3.5–11.3)
WBC # BLD: ABNORMAL 10*3/UL

## 2021-05-26 PROCEDURE — 85025 COMPLETE CBC W/AUTO DIFF WBC: CPT

## 2021-05-26 PROCEDURE — 99283 EMERGENCY DEPT VISIT LOW MDM: CPT

## 2021-05-26 PROCEDURE — 96374 THER/PROPH/DIAG INJ IV PUSH: CPT

## 2021-05-26 PROCEDURE — 96372 THER/PROPH/DIAG INJ SC/IM: CPT

## 2021-05-26 PROCEDURE — 80048 BASIC METABOLIC PNL TOTAL CA: CPT

## 2021-05-26 PROCEDURE — 6360000002 HC RX W HCPCS: Performed by: NURSE PRACTITIONER

## 2021-05-26 PROCEDURE — 82010 KETONE BODYS QUAN: CPT

## 2021-05-26 PROCEDURE — 71045 X-RAY EXAM CHEST 1 VIEW: CPT

## 2021-05-26 PROCEDURE — 87635 SARS-COV-2 COVID-19 AMP PRB: CPT

## 2021-05-26 PROCEDURE — 6370000000 HC RX 637 (ALT 250 FOR IP): Performed by: NURSE PRACTITIONER

## 2021-05-26 RX ORDER — METHYLPREDNISOLONE SODIUM SUCCINATE 125 MG/2ML
125 INJECTION, POWDER, LYOPHILIZED, FOR SOLUTION INTRAMUSCULAR; INTRAVENOUS ONCE
Status: COMPLETED | OUTPATIENT
Start: 2021-05-26 | End: 2021-05-26

## 2021-05-26 RX ORDER — DEXTROMETHORPHAN HYDROBROMIDE AND PROMETHAZINE HYDROCHLORIDE 15; 6.25 MG/5ML; MG/5ML
5 SYRUP ORAL 4 TIMES DAILY PRN
Qty: 118 ML | Refills: 0 | Status: SHIPPED | OUTPATIENT
Start: 2021-05-26 | End: 2021-06-02

## 2021-05-26 RX ORDER — METHYLPREDNISOLONE 4 MG/1
TABLET ORAL
Qty: 1 KIT | Refills: 0 | Status: SHIPPED | OUTPATIENT
Start: 2021-05-26 | End: 2021-06-01

## 2021-05-26 RX ADMIN — INSULIN HUMAN 7 UNITS: 100 INJECTION, SOLUTION PARENTERAL at 12:47

## 2021-05-26 RX ADMIN — METHYLPREDNISOLONE SODIUM SUCCINATE 125 MG: 125 INJECTION, POWDER, FOR SOLUTION INTRAMUSCULAR; INTRAVENOUS at 12:06

## 2021-05-26 ASSESSMENT — ENCOUNTER SYMPTOMS
SORE THROAT: 0
COUGH: 1
NAUSEA: 0
COLOR CHANGE: 0
RHINORRHEA: 0
SINUS PRESSURE: 0
WHEEZING: 0
CONSTIPATION: 0
SHORTNESS OF BREATH: 1
DIARRHEA: 0
VOMITING: 0
ABDOMINAL PAIN: 0

## 2021-05-27 ENCOUNTER — CARE COORDINATION (OUTPATIENT)
Dept: CARE COORDINATION | Age: 66
End: 2021-05-27

## 2021-05-27 NOTE — CARE COORDINATION
Patient contacted regarding COVID-19 diagnosis. Discussed COVID-19 related testing which was available at this time. Test results were positive. Patient informed of results, if available? Patient aware of results while in ED      LPN Care Coordinator contacted the patient by telephone to perform post discharge assessment. Call within 2 business days of discharge: Yes. Verified name and  with patient as identifiers. Provided introduction to self, and explanation of the CTN/ACM role, and reason for call due to risk factors for infection and/or exposure to COVID-19. Symptoms reviewed with patient who verbalized the following symptoms: cough, shortness of breath, no new symptoms and no worsening symptoms. Due to no new or worsening symptoms encounter was not routed to provider for escalation. Discussed follow-up appointments. If no appointment was previously scheduled, appointment scheduling offered: Patient to call PCP for ED follow up call, for worsening of symptoms. [facility][1] follow up appointment(s):   Future Appointments   Date Time Provider Florencio Murillo   8/3/2021  8:30 AM [facility]     Non-[facility] follow up appointment(s): no    Non-face-to-face services provided:  N/A     Advance Care Planning:   Does patient have an Advance Directive:  reviewed and current. Educated patient about risk for severe COVID-19 due to risk factors according to CDC guidelines. LPN CC reviewed discharge instructions, medical action plan and red flag symptoms patient who verbalized understanding. Discussed COVID vaccination status No. Education provided on COVID-19 vaccination as appropriate. Discussed exposure protocols and quarantine with CDC Guidelines. Patient was given an opportunity to verbalize any questions and concerns and agrees to contact LPN CC or health care provider for questions related to their healthcare.     Reviewed and educated patient on any new and changed medications related to discharge diagnosis     Was patient discharged with a pulse oximeter? No Discussed and confirmed pulse oximeter discharge instructions and when to notify provider or seek emergency care. For patients discharged due to monoclonal antibody infusion or pulse ox at discharge; information provided for remote patient monitoring, and agrees to enroll for follow up No.  Patient's preferred e-mail:    Patient's preferred phone number:   Based on remote monitoring alert triggers, patient will be contacted by nurse care manager for worsening symptoms. LPN CC provided contact information. Plan for follow-up call in 3-5 days based on severity of symptoms and risk factors. Patient is still taking prednisone, PCP had put patient on an ATB 5 days ago. Patient instructed per writer if shortness of breath gets worse to contact PCP or go back to ED. Patient still has to  promethazine-DM from pharmacy. Patient able to talk in full sentences without difficulty.

## 2021-05-27 NOTE — ED PROVIDER NOTES
[address] [place] [address] ED  eMERGENCY dEPARTMENT eNCOUnter      Pt Name: Harjit Blackwood  MRN: [id number]  Marielagfjose 1955  Date of evaluation: 5/26/2021  Provider: [address] [place] [address] NP, ROCHELLE Martines 0304       Chief Complaint   Patient presents with    Shortness of Breath    Cough         HISTORY OF PRESENT ILLNESS  (Location/Symptom, Timing/Onset, Context/Setting, Quality, Duration, Modifying Factors, Severity.)   Harjit Blackwood is a 60 y.o. male who presents to the emergency department by private vehicle for evaluation of a cough and shortness of breath. Patient states that he has had a cough and some shortness of breath in the last 1 week. He is also had some generalized weakness and fatigue. He states that he just has not felt well. He got his first Covid vaccine a couple of weeks ago and is scheduled for his second 1 on June 2. He denies any nausea or vomiting. He does report that his blood sugars have been elevated but he was recently on prednisone from his primary care physician. Nursing Notes were reviewed. ALLERGIES     Morphine, Percocet [oxycodone-acetaminophen], and Dye [iodides]    CURRENT MEDICATIONS       Discharge Medication List as of 5/26/2021  1:44 PM      CONTINUE these medications which have NOT CHANGED    Details   mupirocin (BACTROBAN) 2 % ointment APPLY TO AFFECTED AREA THREE TIMES DAILY, Historical Med      meloxicam (MOBIC) 15 MG tablet TAKE 1 TABLET BY MOUTH EVERY DAYHistorical Med      TRUEPLUS PEN NEEDLES 31G X 6 MM MISC DAWHistorical Med      LANTUS SOLOSTAR 100 UNIT/ML injection pen DAWHistorical Med      HYDROcodone-acetaminophen (NORCO) 5-325 MG per tablet TAKE 1 TABLET BY MOUTH TWICE DAILY AS NEEDEDHistorical Med      albuterol sulfate  (90 Base) MCG/ACT inhaler Historical Med      fluticasone-salmeterol (ADVAIR) 250-50 MCG/DOSE AEPB Historical Med      JANUVIA 100 MG tablet 50 mg daily , DAWHistorical Med      Misc.  Devices MISC Disp-2 each, R-0, Print1 PAIR OF DIABETIC SHOES (1 LEFT/ 1 RIGHT) 1-3 PAIRS OF INSERTS (LEFT/ RIGHT)      cetirizine (ZYRTEC) 10 MG tablet Historical Med      lisinopril (PRINIVIL;ZESTRIL) 10 MG tablet Take 10 mg by mouth dailyHistorical Med      simvastatin (ZOCOR) 40 MG tablet Take 40 mg by mouth dailyHistorical Med      glipiZIDE (GLUCOTROL) 10 MG tablet Take 40 mg by mouth onceHistorical Med      aspirin 81 MG tablet Take 81 mg by mouth dailyHistorical Med      gabapentin (NEURONTIN) 300 MG capsule Take 900 mg by mouth 3 times daily. Take 3 pills 3 times a day . Historical Med             PAST MEDICAL HISTORY         Diagnosis Date    ALEJANDRO (acute kidney injury) ([facility] 8/5/2018    Cellulitis of right foot     and abscess    Charcot foot due to diabetes mellitus ([facility] 2014    Right foot     Diabetic polyneuropathy associated with type 2 diabetes mellitus ([facility]     Fractures, multiple 07/21/2014    Right foot fractures     Hyperlipidemia     Hypertension     Leukocytosis     Neuropathy     diabetic with charcot affecting the right foot    Pain in right foot     redness and swelling    Pneumonia 2009    Tobacco abuse 4/24/2018    Type II or unspecified type diabetes mellitus without mention of complication, not stated as uncontrolled     Vertigo     Wound, open     Left Ball of  foot, pt. stepped on sharp object. Covered by dressing.     Wound, open     Right posterior -Diabetic Ulcer       SURGICAL HISTORY           Procedure Laterality Date    APPENDECTOMY      at age 24    ARTHROPLASTY Right 12/11/2020    RIGHT HALLUX EXOSTECTOMY AND 3RD DIGIT EXTENSIOR TENOTOMY performed by Kyle Walls DPM at [facility] Left 04/24/2018    I&D foreign body removal    FOOT DEBRIDEMENT Right 3/20/2020    RIGHT  FOOT   INCISION AND DRAINAGE WITH BONE BIOPSY performed by Kyle Walls DPM at [facility] ARTHROSCOPY Right 1989    KNEE ARTHROSCOPY Left 1990    KNEE SURGERY Bilateral 1983    arthroscopy    NECK SURGERY  1987    MD DEEP DISSEC FOOT INFEC,1 BURSA Left 2018    LEFT FOOT MULTIPLE  INCISIONS  AND DRAINAGE AND REMOVAL FOREIGN BODY  IRENE performed by Naya Whitmore DPM at [place] [address]           Problem Relation Age of Onset    Diabetes Mother     Cancer Father     Hypertension Maternal Grandmother      Family Status   Relation Name Status    Mother  Alive    Father      MGM  (Not Specified)        SOCIAL HISTORY      reports that he has been smoking. He has been smoking about 1.00 pack per day. He has never used smokeless tobacco. He reports previous drug use. He reports that he does not drink alcohol. REVIEW OF SYSTEMS    (2-9 systems for level 4, 10 or more for level 5)     Review of Systems   Constitutional: Negative for chills, fever and unexpected weight change. HENT: Negative for congestion, rhinorrhea, sinus pressure and sore throat. Respiratory: Positive for cough and shortness of breath. Negative for wheezing. Cardiovascular: Negative for chest pain and palpitations. Gastrointestinal: Negative for abdominal pain, constipation, diarrhea, nausea and vomiting. Genitourinary: Negative for dysuria and hematuria. Musculoskeletal: Negative for arthralgias and myalgias. Skin: Negative for color change and rash. Neurological: Negative for dizziness, weakness and headaches. Hematological: Negative for adenopathy. All other systems reviewed and are negative. Except as noted above the remainder of the review of systems was reviewed and negative. PHYSICAL EXAM    (up to 7 for level 4, 8 or more for level 5)     ED Triage Vitals [21 1142]   BP Temp Temp Source Pulse Resp SpO2 Height Weight   103/71 98.2 °F (36.8 °C) Oral 64 16 96 % 5' 10\" (1.778 m) 246 lb (111.6 kg)       Physical Exam  Vitals reviewed. Constitutional:       Appearance: He is well-developed. HENT:      Head: Normocephalic and atraumatic. Eyes:      Conjunctiva/sclera: Conjunctivae normal.      Pupils: Pupils are equal, round, and reactive to light. Cardiovascular:      Rate and Rhythm: Normal rate and regular rhythm. Pulmonary:      Effort: Pulmonary effort is normal. No respiratory distress. Breath sounds: Normal breath sounds. No stridor. Abdominal:      General: Bowel sounds are normal.      Palpations: Abdomen is soft. Musculoskeletal:         General: Normal range of motion. Cervical back: Normal range of motion and neck supple. Lymphadenopathy:      Cervical: No cervical adenopathy. Skin:     General: Skin is warm and dry. Findings: No rash. Neurological:      Mental Status: He is alert and oriented to person, place, and time. DIAGNOSTIC RESULTS   Y:   Non-plain film images such as CT, Ultrasound and MRI are read by the radiologist. Plain radiographic images are visualized and preliminarily interpreted by the emergency physician with the below findings:    XR FOOT RIGHT (MIN 3 VIEWS)    Result Date: 5/6/2021  EXAMINATION: THREE XRAY VIEWS OF THE RIGHT FOOT 5/6/2021 9:02 pm COMPARISON: Radiograph performed earlier the same day. HISTORY: ORDERING SYSTEM PROVIDED HISTORY: REPEAT XRAY. s/p REMOVAL OF Fb TECHNOLOGIST PROVIDED HISTORY: REPEAT XRAY. s/p REMOVAL OF Fb Reason for Exam: status post removal of foreign body Acuity: Unknown Type of Exam: Unknown FINDINGS: There are few punctate radiodensities in the plantar soft tissues of the foot. There is soft tissue edema. Erosive changes in 1st proximal phalanx, irregularity in the 5th proximal phalanx, chronic changes in the 3rd digit phalanges, and Charcot arthropathy in the midfoot are unchanged. Calcaneal enthesophytes are noted. 1. There are punctate radiodensities at the margin of the plantar skin, seen on lateral view, which may reside in the skin, but could be external. Clinical correlation is recommended.  2. Osseous findings are unchanged from earlier radiograph. XR FOOT RIGHT (MIN 3 VIEWS)    Result Date: 5/6/2021  EXAMINATION: THREE XRAY VIEWS OF THE RIGHT FOOT 5/6/2021 5:24 pm COMPARISON: Right foot radiograph dated 12/01/2020. HISTORY: ORDERING SYSTEM PROVIDED HISTORY: rule out FB TECHNOLOGIST PROVIDED HISTORY: rule out FB Reason for Exam: rule out FB Acuity: Acute Type of Exam: Initial Relevant Medical/Surgical History: pt stepped on a nail today with rt foot, rule out FB FINDINGS: The erosive changes of the medial head of the 1st proximal phalanx appears to have progressed when compared to the prior study. There is slight cortical irregularity of the distal shaft of the 5th proximal phalanx, which may be subacute in origin. There is redemonstration of changes of prior amputation of the 3rd digit, appearing grossly similar to the prior study. There is redemonstration of multiple foci of sclerosis and lucency seen within the midfoot as well as productive degenerative changes, which may reflect underlying Charcot arthropathy. There is moderate-sized plantar calcaneal spur. There is a 1 mm density seen overlying the plantar soft tissues of the foot at the level of the distal aspect of the metatarsal seen on the lateral view, which may reflect a small retained foreign body     1 mm density overlying the plantar soft tissues of the foot at the level of the distal aspect of the metatarsals on the lateral view may reflect small retained foreign body. Interval progression of erosive changes seen affecting the medial head of the 1st proximal phalanx. This may reflect underlying inflammatory arthritis; however, correlate clinically with signs of infection. Possible subacute nondisplaced fracture of the 5th proximal phalanx. Changes suggestive of underlying Charcot arthropathy of the midfoot with pes planus deformity. Changes of prior amputation of the 3rd toe.      XR CHEST PORTABLE    Result Date: 5/26/2021  EXAMINATION: ONE XRAY VIEW OF THE CHEST 5/26/2021 12:01 pm COMPARISON: 06/17/2019 HISTORY: ORDERING SYSTEM PROVIDED HISTORY: Chest Pain TECHNOLOGIST PROVIDED HISTORY: Chest Pain Acuity: Acute Type of Exam: Unknown FINDINGS: Single portable frontal view of the chest is submitted for review. The cardiac silhouette is borderline prominent. Lung parenchyma is clear without focal airspace consolidation, sizeable pleural effusion, or pneumothorax. Stable left upper lobe nodule measuring 1.2 cm, essentially unchanged as compared to 08/04/2018 study. Trachea is midline. Visualized osseous structures and soft tissues are grossly intact. Borderline cardiomegaly. No evidence for acute cardiopulmonary pathology. 1.2 cm left upper lobe nodule, stable from multiple previous studies. No further follow-up required for this finding. .     Interpretation per the Radiologist below, if available at the time of this note:    XR CHEST PORTABLE   Final Result   Borderline cardiomegaly. No evidence for acute cardiopulmonary pathology. 1.2 cm left upper lobe nodule, stable from multiple previous studies. No   further follow-up required for this finding. [facility]                  LABS:  Labs Reviewed   COVID-19, RAPID - Abnormal; Notable for the following components:       Result Value    SARS-CoV-2, Rapid DETECTED (*)     All other components within normal limits   CBC WITH AUTO DIFFERENTIAL - Abnormal; Notable for the following components:    Hematocrit 38.3 (*)     Seg Neutrophils 75 (*)     Lymphocytes 19 (*)     Eosinophils % 0 (*)     Immature Granulocytes 1 (*)     All other components within normal limits   BASIC METABOLIC PANEL - Abnormal; Notable for the following components:    Glucose 410 (*)     BUN 32 (*)     CREATININE 1.90 (*)     Sodium 132 (*)     Chloride 97 (*)     GFR Non- 36 (*)     GFR  43 (*)     All other components within normal limits   BETA-HYDROXYBUTYRATE       All other labs were within normal range or not returned as of this dictation. EMERGENCY DEPARTMENT COURSE and DIFFERENTIAL DIAGNOSIS/MDM:   Vitals:    Vitals:    05/26/21 1142 05/26/21 1200 05/26/21 1230   BP: 103/71     Pulse: 64     Resp: 16     Temp: 98.2 °F (36.8 °C)     TempSrc: Oral     SpO2: 96% 97% 95%   Weight: 246 lb (111.6 kg)     Height: 5' 10\" (1.778 m)         Medical Decision Making: Patient was found to have COVID-19. His blood sugar is also elevated but he is also been on steroids. He will be discharged home on some cough medication and on just a small Medrol Dosepak. Follow-up with his primary care physician for recheck and reevaluation. His vital signs are stable and he is not hypoxic. He is alert and able to speak in full sentences.   He does not require admission to the hospital.  Medications   methylPREDNISolone sodium (SOLU-MEDROL) injection 125 mg (125 mg Intravenous Given 5/26/21 1206)   insulin regular (HUMULIN R;NOVOLIN R) injection 7 Units (7 Units Subcutaneous Given 5/26/21 1247)     FINAL IMPRESSION      1. COVID-19          DISPOSITION/PLAN   DISPOSITION Decision To Discharge 05/26/2021 01:43:32 PM      PATIENT REFERRED TO:   Mitchell Mejia MD  [address]  [phone]    Schedule an appointment as soon as possible for a visit       [facility] ED  [address] [facility]  [phone]    If symptoms worsen      DISCHARGE MEDICATIONS:     Discharge Medication List as of 5/26/2021  1:44 PM      START taking these medications    Details   promethazine-dextromethorphan (PROMETHAZINE-DM) 6.25-15 MG/5ML syrup Take 5 mLs by mouth 4 times daily as needed for Cough, Disp-118 mL, R-0Normal      methylPREDNISolone (MEDROL, ROEL,) 4 MG tablet Take by mouth., Disp-1 kit, R-0Normal                 (Please note that portions of this note were completed with a voice recognition program.  Efforts were made to edit the dictations but occasionally words are mis-transcribed.)    Susana Serrano

## 2021-05-27 NOTE — ED PROVIDER NOTES
eMERGENCY dEPARTMENT eNCOUnter   [address] Name: Jovan Lynn  MRN: [id number]  [place] 1955  Date of evaluation: 5/27/21     Jovan Lynn is a 60 y.o. male with CC: Shortness of Breath and Cough      Based on the medical record the care appears appropriate. I was personally available for consultation in the Emergency Department.     Feroz De Santiago MD  Attending Emergency Physician                    Feroz De Santiago MD  05/27/21 6632

## 2021-06-01 ENCOUNTER — CARE COORDINATION (OUTPATIENT)
Dept: CARE COORDINATION | Age: 66
End: 2021-06-01

## 2021-06-01 NOTE — CARE COORDINATION
Patient contacted regarding COVID-19 diagnosis. Discussed COVID-19 related testing which was not done at this time. Test results were not done. Patient informed of results, if available? N/A    LPN Care Coordinator contacted the patient by telephone to perform follow-up assessment. Verified name and  with patient as identifiers. Patient has following risk factors of: diabetes. Symptoms reviewed with patient who verbalized the following symptoms: cough, shortness of breath, no new symptoms and no worsening symptoms. Due to no new or worsening symptoms encounter was not routed to provider for escalation. Educated patient about risk for severe COVID-19 due to risk factors according to CDC guidelines. LPN CC reviewed discharge instructions, medical action plan and red flag symptoms with the patient who verbalized understanding. Discussed COVID vaccination status: patient has had 1 Covid 19 vaccine, asking writer when he should get the 2nd vaccine, writer advised patient to call PCP for advise. Education provided on COVID-19 vaccination as appropriate. Discussed exposure protocols and quarantine with CDC Guidelines. Patient was given an opportunity to verbalize any questions and concerns and agrees to contact LPN CC or health care provider for questions related to their healthcare. Was patient discharged with a pulse oximeter? No Discussed and confirmed pulse oximeter discharge instructions and when to notify provider or seek emergency care. LPN CC provided contact information. Plan for follow-up call in 5-7 days based on severity of symptoms and risk factors. Patient still has a bit of a cough, states some days he has shortness of breath and other days he is fine. Patient able to talk in full sentences without diffficulty. Patient states he feels he is doing better.

## 2021-06-06 ENCOUNTER — APPOINTMENT (OUTPATIENT)
Dept: GENERAL RADIOLOGY | Age: 66
DRG: 623 | End: 2021-06-06
Payer: MEDICARE

## 2021-06-06 ENCOUNTER — HOSPITAL ENCOUNTER (INPATIENT)
Age: 66
LOS: 7 days | Discharge: HOME | DRG: 623 | End: 2021-06-13
Attending: EMERGENCY MEDICINE | Admitting: STUDENT IN AN ORGANIZED HEALTH CARE EDUCATION/TRAINING PROGRAM
Payer: MEDICARE

## 2021-06-06 DIAGNOSIS — G89.18 POST-OPERATIVE PAIN: ICD-10-CM

## 2021-06-06 DIAGNOSIS — M86.9 OSTEOMYELITIS OF RIGHT FOOT, UNSPECIFIED TYPE (HCC): Primary | ICD-10-CM

## 2021-06-06 DIAGNOSIS — G89.18 POST-OP PAIN: ICD-10-CM

## 2021-06-06 LAB
ABSOLUTE EOS #: 0.18 K/UL (ref 0–0.44)
ABSOLUTE IMMATURE GRANULOCYTE: 0.1 K/UL (ref 0–0.3)
ABSOLUTE LYMPH #: 2.21 K/UL (ref 1.1–3.7)
ABSOLUTE MONO #: 0.63 K/UL (ref 0.1–1.2)
ANION GAP SERPL CALCULATED.3IONS-SCNC: 12 MMOL/L (ref 9–17)
BASOPHILS # BLD: 0 % (ref 0–2)
BASOPHILS ABSOLUTE: 0.05 K/UL (ref 0–0.2)
BUN BLDV-MCNC: 35 MG/DL (ref 8–23)
BUN/CREAT BLD: 18 (ref 9–20)
C-REACTIVE PROTEIN: 63.4 MG/L (ref 0–5)
CALCIUM SERPL-MCNC: 9.2 MG/DL (ref 8.6–10.4)
CHLORIDE BLD-SCNC: 100 MMOL/L (ref 98–107)
CO2: 20 MMOL/L (ref 20–31)
CREAT SERPL-MCNC: 1.92 MG/DL (ref 0.7–1.2)
DIFFERENTIAL TYPE: ABNORMAL
EOSINOPHILS RELATIVE PERCENT: 1 % (ref 1–4)
GFR AFRICAN AMERICAN: 43 ML/MIN
GFR NON-AFRICAN AMERICAN: 35 ML/MIN
GFR SERPL CREATININE-BSD FRML MDRD: ABNORMAL ML/MIN/{1.73_M2}
GFR SERPL CREATININE-BSD FRML MDRD: ABNORMAL ML/MIN/{1.73_M2}
GLUCOSE BLD-MCNC: 275 MG/DL (ref 70–99)
GLUCOSE BLD-MCNC: 388 MG/DL (ref 75–110)
HCT VFR BLD CALC: 40.6 % (ref 40.7–50.3)
HEMOGLOBIN: 13.6 G/DL (ref 13–17)
IMMATURE GRANULOCYTES: 1 %
LACTIC ACID: 0.9 MMOL/L (ref 0.5–2.2)
LYMPHOCYTES # BLD: 17 % (ref 24–43)
MCH RBC QN AUTO: 30.2 PG (ref 25.2–33.5)
MCHC RBC AUTO-ENTMCNC: 33.5 G/DL (ref 28.4–34.8)
MCV RBC AUTO: 90 FL (ref 82.6–102.9)
MONOCYTES # BLD: 5 % (ref 3–12)
NRBC AUTOMATED: 0 PER 100 WBC
PDW BLD-RTO: 13.4 % (ref 11.8–14.4)
PLATELET # BLD: 164 K/UL (ref 138–453)
PLATELET ESTIMATE: ABNORMAL
PMV BLD AUTO: 10.2 FL (ref 8.1–13.5)
POTASSIUM SERPL-SCNC: 4.7 MMOL/L (ref 3.7–5.3)
RBC # BLD: 4.51 M/UL (ref 4.21–5.77)
RBC # BLD: ABNORMAL 10*6/UL
SEDIMENTATION RATE, ERYTHROCYTE: 37 MM (ref 0–20)
SEG NEUTROPHILS: 76 % (ref 36–65)
SEGMENTED NEUTROPHILS ABSOLUTE COUNT: 9.79 K/UL (ref 1.5–8.1)
SODIUM BLD-SCNC: 132 MMOL/L (ref 135–144)
WBC # BLD: 13 K/UL (ref 3.5–11.3)
WBC # BLD: ABNORMAL 10*3/UL

## 2021-06-06 PROCEDURE — 94640 AIRWAY INHALATION TREATMENT: CPT

## 2021-06-06 PROCEDURE — 85025 COMPLETE CBC W/AUTO DIFF WBC: CPT

## 2021-06-06 PROCEDURE — 99222 1ST HOSP IP/OBS MODERATE 55: CPT | Performed by: STUDENT IN AN ORGANIZED HEALTH CARE EDUCATION/TRAINING PROGRAM

## 2021-06-06 PROCEDURE — 87040 BLOOD CULTURE FOR BACTERIA: CPT

## 2021-06-06 PROCEDURE — 86140 C-REACTIVE PROTEIN: CPT

## 2021-06-06 PROCEDURE — 6370000000 HC RX 637 (ALT 250 FOR IP): Performed by: EMERGENCY MEDICINE

## 2021-06-06 PROCEDURE — 6360000002 HC RX W HCPCS: Performed by: STUDENT IN AN ORGANIZED HEALTH CARE EDUCATION/TRAINING PROGRAM

## 2021-06-06 PROCEDURE — 6370000000 HC RX 637 (ALT 250 FOR IP): Performed by: STUDENT IN AN ORGANIZED HEALTH CARE EDUCATION/TRAINING PROGRAM

## 2021-06-06 PROCEDURE — 86403 PARTICLE AGGLUT ANTBDY SCRN: CPT

## 2021-06-06 PROCEDURE — 87070 CULTURE OTHR SPECIMN AEROBIC: CPT

## 2021-06-06 PROCEDURE — 82947 ASSAY GLUCOSE BLOOD QUANT: CPT

## 2021-06-06 PROCEDURE — 2580000003 HC RX 258: Performed by: STUDENT IN AN ORGANIZED HEALTH CARE EDUCATION/TRAINING PROGRAM

## 2021-06-06 PROCEDURE — 2580000003 HC RX 258: Performed by: EMERGENCY MEDICINE

## 2021-06-06 PROCEDURE — 87186 SC STD MICRODIL/AGAR DIL: CPT

## 2021-06-06 PROCEDURE — 99223 1ST HOSP IP/OBS HIGH 75: CPT | Performed by: PODIATRIST

## 2021-06-06 PROCEDURE — 99283 EMERGENCY DEPT VISIT LOW MDM: CPT

## 2021-06-06 PROCEDURE — 85652 RBC SED RATE AUTOMATED: CPT

## 2021-06-06 PROCEDURE — 80048 BASIC METABOLIC PNL TOTAL CA: CPT

## 2021-06-06 PROCEDURE — 28002 TREATMENT OF FOOT INFECTION: CPT | Performed by: PODIATRIST

## 2021-06-06 PROCEDURE — 1200000000 HC SEMI PRIVATE

## 2021-06-06 PROCEDURE — 87205 SMEAR GRAM STAIN: CPT

## 2021-06-06 PROCEDURE — 83605 ASSAY OF LACTIC ACID: CPT

## 2021-06-06 PROCEDURE — 73630 X-RAY EXAM OF FOOT: CPT

## 2021-06-06 PROCEDURE — 6360000002 HC RX W HCPCS: Performed by: EMERGENCY MEDICINE

## 2021-06-06 RX ORDER — HYDROCODONE BITARTRATE AND ACETAMINOPHEN 5; 325 MG/1; MG/1
1 TABLET ORAL ONCE
Status: COMPLETED | OUTPATIENT
Start: 2021-06-06 | End: 2021-06-06

## 2021-06-06 RX ORDER — HYDROCODONE BITARTRATE AND ACETAMINOPHEN 5; 325 MG/1; MG/1
1 TABLET ORAL EVERY 4 HOURS PRN
Status: DISCONTINUED | OUTPATIENT
Start: 2021-06-06 | End: 2021-06-11

## 2021-06-06 RX ORDER — ASPIRIN 81 MG/1
81 TABLET ORAL DAILY
Status: DISCONTINUED | OUTPATIENT
Start: 2021-06-07 | End: 2021-06-13 | Stop reason: HOSPADM

## 2021-06-06 RX ORDER — SODIUM CHLORIDE 0.9 % (FLUSH) 0.9 %
5-40 SYRINGE (ML) INJECTION PRN
Status: DISCONTINUED | OUTPATIENT
Start: 2021-06-06 | End: 2021-06-13 | Stop reason: HOSPADM

## 2021-06-06 RX ORDER — BUDESONIDE AND FORMOTEROL FUMARATE DIHYDRATE 160; 4.5 UG/1; UG/1
2 AEROSOL RESPIRATORY (INHALATION) 2 TIMES DAILY
Status: DISCONTINUED | OUTPATIENT
Start: 2021-06-06 | End: 2021-06-13 | Stop reason: HOSPADM

## 2021-06-06 RX ORDER — ONDANSETRON 4 MG/1
4 TABLET, ORALLY DISINTEGRATING ORAL EVERY 8 HOURS PRN
Status: DISCONTINUED | OUTPATIENT
Start: 2021-06-06 | End: 2021-06-13 | Stop reason: HOSPADM

## 2021-06-06 RX ORDER — HEPARIN SODIUM 5000 [USP'U]/ML
5000 INJECTION, SOLUTION INTRAVENOUS; SUBCUTANEOUS EVERY 8 HOURS SCHEDULED
Status: DISCONTINUED | OUTPATIENT
Start: 2021-06-06 | End: 2021-06-13 | Stop reason: HOSPADM

## 2021-06-06 RX ORDER — ACETAMINOPHEN 650 MG/1
650 SUPPOSITORY RECTAL EVERY 6 HOURS PRN
Status: DISCONTINUED | OUTPATIENT
Start: 2021-06-06 | End: 2021-06-13 | Stop reason: HOSPADM

## 2021-06-06 RX ORDER — BENZONATATE 100 MG/1
100 CAPSULE ORAL 3 TIMES DAILY PRN
Status: DISCONTINUED | OUTPATIENT
Start: 2021-06-06 | End: 2021-06-13 | Stop reason: HOSPADM

## 2021-06-06 RX ORDER — CETIRIZINE HYDROCHLORIDE 10 MG/1
10 TABLET ORAL NIGHTLY
Status: DISCONTINUED | OUTPATIENT
Start: 2021-06-06 | End: 2021-06-13 | Stop reason: HOSPADM

## 2021-06-06 RX ORDER — SODIUM CHLORIDE 9 MG/ML
25 INJECTION, SOLUTION INTRAVENOUS PRN
Status: DISCONTINUED | OUTPATIENT
Start: 2021-06-06 | End: 2021-06-13 | Stop reason: HOSPADM

## 2021-06-06 RX ORDER — NICOTINE POLACRILEX 4 MG
15 LOZENGE BUCCAL PRN
Status: DISCONTINUED | OUTPATIENT
Start: 2021-06-06 | End: 2021-06-13 | Stop reason: HOSPADM

## 2021-06-06 RX ORDER — ACETAMINOPHEN 325 MG/1
650 TABLET ORAL EVERY 6 HOURS PRN
Status: DISCONTINUED | OUTPATIENT
Start: 2021-06-06 | End: 2021-06-13 | Stop reason: HOSPADM

## 2021-06-06 RX ORDER — GABAPENTIN 100 MG/1
200 CAPSULE ORAL 3 TIMES DAILY
Status: DISCONTINUED | OUTPATIENT
Start: 2021-06-06 | End: 2021-06-13 | Stop reason: HOSPADM

## 2021-06-06 RX ORDER — DEXTROSE MONOHYDRATE 25 G/50ML
12.5 INJECTION, SOLUTION INTRAVENOUS PRN
Status: DISCONTINUED | OUTPATIENT
Start: 2021-06-06 | End: 2021-06-13 | Stop reason: HOSPADM

## 2021-06-06 RX ORDER — MELOXICAM 7.5 MG/1
7.5 TABLET ORAL 2 TIMES DAILY
Status: DISCONTINUED | OUTPATIENT
Start: 2021-06-06 | End: 2021-06-13 | Stop reason: HOSPADM

## 2021-06-06 RX ORDER — SODIUM CHLORIDE 0.9 % (FLUSH) 0.9 %
5-40 SYRINGE (ML) INJECTION EVERY 12 HOURS SCHEDULED
Status: DISCONTINUED | OUTPATIENT
Start: 2021-06-06 | End: 2021-06-13 | Stop reason: HOSPADM

## 2021-06-06 RX ORDER — ONDANSETRON 2 MG/ML
4 INJECTION INTRAMUSCULAR; INTRAVENOUS EVERY 6 HOURS PRN
Status: DISCONTINUED | OUTPATIENT
Start: 2021-06-06 | End: 2021-06-13 | Stop reason: HOSPADM

## 2021-06-06 RX ORDER — ATORVASTATIN CALCIUM 20 MG/1
20 TABLET, FILM COATED ORAL DAILY
Status: DISCONTINUED | OUTPATIENT
Start: 2021-06-06 | End: 2021-06-13 | Stop reason: HOSPADM

## 2021-06-06 RX ORDER — BENZONATATE 100 MG/1
100 CAPSULE ORAL ONCE
Status: COMPLETED | OUTPATIENT
Start: 2021-06-06 | End: 2021-06-06

## 2021-06-06 RX ORDER — SODIUM CHLORIDE 9 MG/ML
INJECTION, SOLUTION INTRAVENOUS CONTINUOUS
Status: DISCONTINUED | OUTPATIENT
Start: 2021-06-06 | End: 2021-06-10

## 2021-06-06 RX ORDER — DEXTROSE MONOHYDRATE 50 MG/ML
100 INJECTION, SOLUTION INTRAVENOUS PRN
Status: DISCONTINUED | OUTPATIENT
Start: 2021-06-06 | End: 2021-06-13 | Stop reason: HOSPADM

## 2021-06-06 RX ADMIN — HYDROCODONE BITARTRATE AND ACETAMINOPHEN 1 TABLET: 5; 325 TABLET ORAL at 22:12

## 2021-06-06 RX ADMIN — INSULIN LISPRO 5 UNITS: 100 INJECTION, SOLUTION INTRAVENOUS; SUBCUTANEOUS at 22:11

## 2021-06-06 RX ADMIN — BUDESONIDE AND FORMOTEROL FUMARATE DIHYDRATE 2 PUFF: 160; 4.5 AEROSOL RESPIRATORY (INHALATION) at 20:42

## 2021-06-06 RX ADMIN — ATORVASTATIN CALCIUM 20 MG: 20 TABLET, FILM COATED ORAL at 22:13

## 2021-06-06 RX ADMIN — HEPARIN SODIUM 5000 UNITS: 5000 INJECTION INTRAVENOUS; SUBCUTANEOUS at 22:14

## 2021-06-06 RX ADMIN — SODIUM CHLORIDE: 9 INJECTION, SOLUTION INTRAVENOUS at 17:59

## 2021-06-06 RX ADMIN — CETIRIZINE HYDROCHLORIDE 10 MG: 10 TABLET, FILM COATED ORAL at 22:13

## 2021-06-06 RX ADMIN — BENZONATATE 100 MG: 100 CAPSULE ORAL at 18:30

## 2021-06-06 RX ADMIN — VANCOMYCIN HYDROCHLORIDE 2500 MG: 5 INJECTION, POWDER, LYOPHILIZED, FOR SOLUTION INTRAVENOUS at 14:51

## 2021-06-06 RX ADMIN — GABAPENTIN 200 MG: 100 CAPSULE ORAL at 22:12

## 2021-06-06 RX ADMIN — MELOXICAM 7.5 MG: 7.5 TABLET ORAL at 22:12

## 2021-06-06 RX ADMIN — HYDROCODONE BITARTRATE AND ACETAMINOPHEN 1 TABLET: 5; 325 TABLET ORAL at 14:07

## 2021-06-06 RX ADMIN — CEFEPIME HYDROCHLORIDE 2000 MG: 2 INJECTION, POWDER, FOR SOLUTION INTRAVENOUS at 13:37

## 2021-06-06 ASSESSMENT — PAIN DESCRIPTION - LOCATION
LOCATION: FOOT
LOCATION: FOOT

## 2021-06-06 ASSESSMENT — PAIN SCALES - GENERAL
PAINLEVEL_OUTOF10: 5
PAINLEVEL_OUTOF10: 10
PAINLEVEL_OUTOF10: 5
PAINLEVEL_OUTOF10: 9
PAINLEVEL_OUTOF10: 0

## 2021-06-06 ASSESSMENT — ENCOUNTER SYMPTOMS
CHEST TIGHTNESS: 0
SHORTNESS OF BREATH: 0
NAUSEA: 0
ABDOMINAL DISTENTION: 0
SORE THROAT: 0
VOMITING: 0
APNEA: 0
DIARRHEA: 0
COUGH: 0
COLOR CHANGE: 1
SINUS PAIN: 0
EYES NEGATIVE: 1
CONSTIPATION: 0
COLOR CHANGE: 0

## 2021-06-06 ASSESSMENT — PAIN DESCRIPTION - DESCRIPTORS: DESCRIPTORS: SHOOTING

## 2021-06-06 ASSESSMENT — PAIN DESCRIPTION - PAIN TYPE
TYPE: ACUTE PAIN
TYPE: ACUTE PAIN

## 2021-06-06 ASSESSMENT — PAIN DESCRIPTION - ORIENTATION
ORIENTATION: RIGHT
ORIENTATION: RIGHT

## 2021-06-06 NOTE — PROGRESS NOTES
Admitted from ER /PCU to room 1005-2. Pt alert and oriented. Pt oriented to call light system and agreeable to call for assistance. Admission documentation completed  Flu shot not ordered. Pneumonia shot not indicated.

## 2021-06-06 NOTE — H&P
COVID-19 was treated in February third 2021 and received bamalivumab on 2/3/2021. Patient still has persistent cough afterwards. Past Medical History:     Past Medical History:   Diagnosis Date    ALEJANDRO (acute kidney injury) ([facility] [address] 8/5/2018    Cellulitis of right foot     and abscess    Charcot foot due to diabetes mellitus ([facility] [address] 2014    Right foot     Diabetic polyneuropathy associated with type 2 diabetes mellitus ([facility] [address]     Fractures, multiple 07/21/2014    Right foot fractures     Hyperlipidemia     Hypertension     Leukocytosis     Neuropathy     diabetic with charcot affecting the right foot    Pain in right foot     redness and swelling    Pneumonia 2009    Tobacco abuse 4/24/2018    Type II or unspecified type diabetes mellitus without mention of complication, not stated as uncontrolled     Vertigo     Wound, open     Left Ball of  foot, pt. stepped on sharp object. Covered by dressing.  Wound, open     Right posterior -Diabetic Ulcer        Past Surgical History:     Past Surgical History:   Procedure Laterality Date    APPENDECTOMY      at age 24    ARTHROPLASTY Right 12/11/2020    RIGHT HALLUX EXOSTECTOMY AND 3RD DIGIT EXTENSIOR TENOTOMY performed by Krysten Copeland DPM at [address] [facility] Left 04/24/2018    I&D foreign body removal    FOOT DEBRIDEMENT Right 3/20/2020    RIGHT  FOOT   INCISION AND DRAINAGE WITH BONE BIOPSY performed by Krysten Copeland DPM at [address] [facility] ARTHROSCOPY Right [address] ARTHROSCOPY Left 1990    KNEE SURGERY Bilateral 1983    arthroscopy   [facility]    GA DEEP [address] INFEC,1 BURSA Left 4/24/2018    LEFT FOOT MULTIPLE  INCISIONS  AND DRAINAGE AND REMOVAL FOREIGN BODY  IRENE performed by Krysten Copeland DPM at [address] [facility]        Medications Prior to Admission:     Prior to Admission medications    Medication Sig Start Date End Date Taking?  Authorizing Provider   mupirocin (BACTROBAN) 2 % ointment APPLY TO AFFECTED AREA THREE TIMES DAILY 5/5/21   Historical Provider, MD   meloxicam (MOBIC) 15 MG tablet TAKE 1 TABLET BY MOUTH EVERY DAY 5/5/21   Historical Provider, MD   TRUEPLUS PEN NEEDLES 31G X 6 MM MISC  3/15/21   Historical Provider, MD ROSEANN ALLISONAR 100 UNIT/ML injection pen  3/12/21   Historical Provider, MD   HYDROcodone-acetaminophen ([facility]) 5-325 MG per tablet TAKE 1 TABLET BY MOUTH TWICE DAILY AS NEEDED 5/5/21   Historical Provider, MD   albuterol sulfate  (90 Base) MCG/ACT inhaler  4/1/21   Historical Provider, MD   fluticasone-salmeterol (ADVAIR) 250-50 MCG/DOSE AEPB  11/1/20   Historical Provider, MD   JANUVIA 100 MG tablet 50 mg daily  11/13/20   Historical Provider, MD   Misc. Devices MISC 1 PAIR OF DIABETIC SHOES (1 LEFT/ 1 RIGHT)  1-3 PAIRS OF INSERTS (LEFT/ RIGHT) 3/5/20   Favio Carrasquillo DPM   cetirizine (ZYRTEC) 10 MG tablet  10/21/19   Historical Provider, MD   lisinopril (PRINIVIL;ZESTRIL) 10 MG tablet Take 10 mg by mouth daily 11/15/18   Historical Provider, MD   simvastatin (ZOCOR) 40 MG tablet Take 40 mg by mouth daily 11/13/18   Historical Provider, MD   glipiZIDE (GLUCOTROL) 10 MG tablet Take 40 mg by mouth once    Historical Provider, MD   aspirin 81 MG tablet Take 81 mg by mouth daily    Historical Provider, MD   gabapentin (NEURONTIN) 300 MG capsule Take 900 mg by mouth 3 times daily. Take 3 pills 3 times a day . Historical Provider, MD        Allergies:     Morphine, Percocet [oxycodone-acetaminophen], and Dye [iodides]    Social History:     Tobacco:    reports that he has been smoking. He has been smoking about 1.00 pack per day. He has never used smokeless tobacco.  Alcohol:      reports no history of alcohol use. Drug Use:  reports previous drug use.     Family History:     Family History   Problem Relation Age of Onset    Diabetes Mother     Cancer Father     Hypertension Maternal Grandmother        Review of Systems:     Positive and Negative as described in HPI. CONSTITUTIONAL:  negative for fevers, chills, sweats, fatigue, weight loss  HEENT:  negative for vision, hearing changes, runny nose, throat pain  RESPIRATORY:  negative for shortness of breath, cough, congestion, wheezing  CARDIOVASCULAR:  negative for chest pain, palpitations  GASTROINTESTINAL:  negative for nausea, vomiting, diarrhea, constipation, change in bowel habits, abdominal pain   GENITOURINARY:  negative for difficulty of urination, burning with urination, frequency   INTEGUMENT:  negative for rash, skin lesions, easy bruising   HEMATOLOGIC/LYMPHATIC:  negative for swelling/edema   ALLERGIC/IMMUNOLOGIC:  negative for urticaria , itching  ENDOCRINE:  negative increase in drinking, increase in urination, hot or cold intolerance  MUSCULOSKELETAL:  negative joint pains, muscle aches, swelling of joints  NEUROLOGICAL:  negative for headaches, dizziness, lightheadedness, numbness, pain, tingling extremities  BEHAVIOR/PSYCH:  negative for depression, anxiety    Physical Exam:   BP (!) 114/58   Pulse 84   Temp 98.4 °F (36.9 °C)   Resp 14   Ht 5' 11\" (1.803 m)   Wt 240 lb 12.8 oz (109.2 kg)   SpO2 98%   BMI 33.58 kg/m²   Temp (24hrs), Av.4 °F (36.9 °C), Min:98.4 °F (36.9 °C), Max:98.4 °F (36.9 °C)    No results for input(s): POCGLU in the last 72 hours.   No intake or output data in the 24 hours ending 21 1430    General Appearance:  alert, well appearing, and in no acute distress  Mental status: oriented to person, place, and time  Head:  normocephalic, atraumatic  Eye: no icterus, redness, pupils equal and reactive, extraocular eye movements intact, conjunctiva clear  Ear: normal external ear, no discharge, hearing intact  Nose:  no drainage noted  Mouth: mucous membranes moist  Neck: supple, no carotid bruits, thyroid not palpable  Lungs: Decreased breath sounds bilaterally, no wheezing, no rhonchi  Cardiovascular: normal rate, regular rhythm, no murmur, gallop, rub.  Abdomen: Soft, nontender, slight distention, normal bowel sounds  Neurologic: There are no new focal motor or sensory deficits, normal muscle tone and bulk, no abnormal sensation, normal speech, cranial nerves II through XII grossly intact  Skin: No gross lesions, rashes, bruising or bleeding on exposed skin area  Extremities:  peripheral pulses palpable, no pedal edema or calf pain with palpation  Psych: normal affect     Investigations:      Laboratory Testing:  Recent Results (from the past 24 hour(s))   Culture, Wound    Collection Time: 06/06/21 11:02 AM    Specimen: Foot   Result Value Ref Range    Specimen Description . FOOT RIGHT     Special Requests NOT REPORTED     Direct Exam PENDING     Culture PENDING    Basic Metabolic Panel w/ Reflex to MG    Collection Time: 06/06/21 11:05 AM   Result Value Ref Range    Glucose 275 (H) 70 - 99 mg/dL    BUN 35 (H) 8 - 23 mg/dL    CREATININE 1.92 (H) 0.70 - 1.20 mg/dL    Bun/Cre Ratio 18 9 - 20    Calcium 9.2 8.6 - 10.4 mg/dL    Sodium 132 (L) 135 - 144 mmol/L    Potassium 4.7 3.7 - 5.3 mmol/L    Chloride 100 98 - 107 mmol/L    CO2 20 20 - 31 mmol/L    Anion Gap 12 9 - 17 mmol/L    GFR Non-African American 35 (L) >60 mL/min    GFR  43 (L) >60 mL/min    GFR Comment          GFR Staging NOT REPORTED    C-Reactive Protein    Collection Time: 06/06/21 11:05 AM   Result Value Ref Range    CRP 63.4 (H) 0.0 - 5.0 mg/L   Sedimentation Rate    Collection Time: 06/06/21 11:05 AM   Result Value Ref Range    Sed Rate 37 (H) 0 - 20 mm   Lactic Acid    Collection Time: 06/06/21 11:05 AM   Result Value Ref Range    Lactic Acid 0.9 0.5 - 2.2 mmol/L   CBC Auto Differential    Collection Time: 06/06/21 11:05 AM   Result Value Ref Range    WBC 13.0 (H) 3.5 - 11.3 k/uL    RBC 4.51 4.21 - 5.77 m/uL    Hemoglobin 13.6 13.0 - 17.0 g/dL    Hematocrit 40.6 (L) 40.7 - 50.3 %    MCV 90.0 82.6 - 102.9 fL    MCH 30.2 25.2 - 33.5 pg    MCHC 33.5 28.4 - 34.8 g/dL    RDW 13.4 11.8 - 14.4 %    Platelets 049 502 - 463 k/uL    MPV 10.2 8.1 - 13.5 fL    NRBC Automated 0.0 0.0 per 100 WBC    Differential Type NOT REPORTED     Seg Neutrophils 76 (H) 36 - 65 %    Lymphocytes 17 (L) 24 - 43 %    Monocytes 5 3 - 12 %    Eosinophils % 1 1 - 4 %    Basophils 0 0 - 2 %    Immature Granulocytes 1 (H) 0 %    Segs Absolute 9.79 (H) 1.50 - 8.10 k/uL    Absolute Lymph # 2.21 1.10 - 3.70 k/uL    Absolute Mono # 0.63 0.10 - 1.20 k/uL    Absolute Eos # 0.18 0.00 - 0.44 k/uL    Basophils Absolute 0.05 0.00 - 0.20 k/uL    Absolute Immature Granulocyte 0.10 0.00 - 0.30 k/uL    WBC Morphology NOT REPORTED     RBC Morphology NOT REPORTED     Platelet Estimate NOT REPORTED        Imaging/Diagnostics:  XR FOOT RIGHT (MIN 3 VIEWS)    Result Date: 6/6/2021  Soft tissue ulceration and soft tissue swelling. Persistent osseous irregularity at the 1st and 3rd digits, similar to prior, for which osteomyelitis could be considered in the appropriate clinical setting. Charcot arthropathy again demonstrated of the midfoot. Assessment :      Hospital Problems         Last Modified POA    Osteomyelitis ([facility] [address] 6/6/2021 Yes          Plan:     Patient status inpatient in the Med/Surge    1. Right foot osteomyelitis. Appreciate podiatry and ID recommendations. Continue with IV antibiotics of vancomycin and cefepime. Follow-up wound and blood cultures. Status post incision and drainage on 6/6/2021 in the ER. 2. Diabetes with history of CKD stage III. Hold oral hypoglycemics. Insulin sliding scale  3. Monitor blood pressure, gentle IV hydration as patient is hypotensive in the ER. 4. PT/OT  5.  Encourage smoking cessation    Consultations:   IP CONSULT TO PODIATRY  IP CONSULT TO INTERNAL MEDICINE  IP CONSULT TO SOCIAL WORK  IP CONSULT TO INFECTIOUS DISEASES    Patient is admitted as inpatient status because of co-morbidities listed above, severity of signs and symptoms as outlined, requirement for current medical

## 2021-06-06 NOTE — ED PROVIDER NOTES
EMERGENCY DEPARTMENT ENCOUNTER    Pt Name: Arcenio Bhatt  MRN: [id number]  [place] 1955  Date of evaluation: 6/6/21  CHIEF COMPLAINT       Chief Complaint   Patient presents with    Foot Pain     HISTORY OF PRESENT ILLNESS   71-year-old male presents emergency room for concern about wound to the right foot. Patient is diabetic with Charcot foot stepped on a nail recently and has been monitored by podiatry. He states that there has been increased swelling and redness to the foot. He has neuropathy and does not have much sensation to the foot. No fevers. REVIEW OF SYSTEMS     Review of Systems   Constitutional: Negative for activity change, chills and diaphoresis. HENT: Negative for congestion, sinus pain and tinnitus. Eyes: Negative. Respiratory: Negative for apnea, chest tightness and shortness of breath. Gastrointestinal: Negative for abdominal distention, constipation, diarrhea and vomiting. Genitourinary: Negative for difficulty urinating and frequency. Musculoskeletal: Negative for arthralgias and myalgias. Skin: Negative for color change and rash. Neurological: Negative for dizziness. Hematological: Negative. Psychiatric/Behavioral: Negative.         PASTMEDICAL HISTORY     Past Medical History:   Diagnosis Date    ALEJANDRO (acute kidney injury) ([address] 8/5/2018    Cellulitis of right foot     and abscess    Charcot foot due to diabetes mellitus ([address] 2014    Right foot     Diabetic polyneuropathy associated with type 2 diabetes mellitus ([address]     Fractures, multiple 07/21/2014    Right foot fractures     Hyperlipidemia     Hypertension     Leukocytosis     Neuropathy     diabetic with charcot affecting the right foot    Pain in right foot     redness and swelling    Pneumonia 2009    Tobacco abuse 4/24/2018    Type II or unspecified type diabetes mellitus without mention of complication, not stated as uncontrolled     Vertigo     Wound, open     Left [place] of  foot, pt. stepped on sharp object. Covered by dressing.  Wound, open     Right posterior -Diabetic Ulcer     Past Problem List  Patient Active Problem List   Diagnosis Code    Essential hypertension I10    Type II or unspecified type diabetes mellitus without mention of complication, not stated as uncontrolled E11.9    Neuropathy G62.9    Vertigo R44    Charcot foot due to diabetes mellitus ([facility] [address] E11.610    Hyperlipidemia E78.5    Cellulitis L03.90    Cellulitis of left foot L03. 80    Right foot infection L08.9    Diabetic polyneuropathy associated with type 2 diabetes mellitus ([facility] [address] E11.42    Foreign body (FB) in soft tissue M79.5    Cellulitis of left leg L03. 116    Abscess of right foot L02.611    Chest pain at rest R07.9    Tobacco abuse Z72.0    Well controlled type 2 diabetes mellitus with neurological manifestations ([facility]) E11.49    ALEJANDRO (acute kidney injury) ([facility] [address] N17.9    Bandemia D72.825    Chronic multifocal osteomyelitis of right foot ([facility]) M86.371    Diabetic foot ulcer with osteomyelitis ([facility] [address] E11.621, E11.69, L97.509, M86.9    Acquired hammer toe deformity of lesser toe of right foot M20.41    Post-op pain G89.18    Right foot pain M79.671    Hallux hammertoe, right M20.31    Osteomyelitis ([facility]) M86.9     SURGICAL HISTORY       Past Surgical History:   Procedure Laterality Date    APPENDECTOMY      at age 24    ARTHROPLASTY Right 12/11/2020    RIGHT HALLUX EXOSTECTOMY AND 3RD DIGIT EXTENSIOR TENOTOMY performed by Joleen Man DPM at [facility] Left 04/24/2018    I&D foreign body removal    FOOT DEBRIDEMENT Right 3/20/2020    RIGHT  FOOT   INCISION AND DRAINAGE WITH BONE BIOPSY performed by Joleen Man DPM at [place] [address] ARTHROSCOPY Left 1990    KNEE SURGERY Bilateral 1983    arthroscopy    NECK SURGERY  1987    AL DEEP [address]FEC[address] BURSA Left 4/24/2018    LEFT FOOT MULTIPLE INCISIONS  AND DRAINAGE AND REMOVAL FOREIGN BODY  IRENE performed by Marleni Madrid DPM at [facility]       Previous Medications    ALBUTEROL SULFATE  (90 BASE) MCG/ACT INHALER        ASPIRIN 81 MG TABLET    Take 81 mg by mouth daily    CETIRIZINE (ZYRTEC) 10 MG TABLET        FLUTICASONE-SALMETEROL (ADVAIR) 250-50 MCG/DOSE AEPB        GABAPENTIN (NEURONTIN) 300 MG CAPSULE    Take 900 mg by mouth 3 times daily. Take 3 pills 3 times a day . GLIPIZIDE (GLUCOTROL) 10 MG TABLET    Take 40 mg by mouth once    HYDROCODONE-ACETAMINOPHEN (NORCO) 5-325 MG PER TABLET    TAKE 1 TABLET BY MOUTH TWICE DAILY AS NEEDED    JANUVIA 100 MG TABLET    50 mg daily     LANTUS SOLOSTAR 100 UNIT/ML INJECTION PEN        LISINOPRIL (PRINIVIL;ZESTRIL) 10 MG TABLET    Take 10 mg by mouth daily    MELOXICAM (MOBIC) 15 MG TABLET    TAKE 1 TABLET BY MOUTH EVERY DAY    MISC. DEVICES MISC    1 PAIR OF DIABETIC SHOES (1 LEFT/ 1 RIGHT)  1-3 PAIRS OF INSERTS (LEFT/ RIGHT)    MUPIROCIN (BACTROBAN) 2 % OINTMENT    APPLY TO AFFECTED AREA THREE TIMES DAILY    SIMVASTATIN (ZOCOR) 40 MG TABLET    Take 40 mg by mouth daily    TRUEPLUS PEN NEEDLES 31G X 6 MM MISC         ALLERGIES     is allergic to morphine, percocet [oxycodone-acetaminophen], and dye [iodides]. FAMILY HISTORY     He indicated that his mother is alive. He indicated that his father is . He indicated that the status of his maternal grandmother is unknown.      SOCIAL HISTORY       Social History     Tobacco Use    Smoking status: Current Every Day Smoker     Packs/day: 1.00    Smokeless tobacco: Never Used   Vaping Use    Vaping Use: Never used   Substance Use Topics    Alcohol use: No    Drug use: Not Currently     PHYSICAL EXAM     INITIAL VITALS: BP (!) 114/58   Pulse 84   Temp 98.4 °F (36.9 °C)   Resp 14   Ht 5' 11\" (1.803 m)   Wt 240 lb 12.8 oz (109.2 kg)   SpO2 98%   BMI 33.58 kg/m²    Physical Exam  Constitutional:       General: He is not in acute distress. Appearance: He is well-developed. HENT:      Head: Normocephalic. Eyes:      Pupils: Pupils are equal, round, and reactive to light. Cardiovascular:      Rate and Rhythm: Normal rate and regular rhythm. Heart sounds: Normal heart sounds. Pulmonary:      Effort: Pulmonary effort is normal. No respiratory distress. Breath sounds: Normal breath sounds. Abdominal:      General: Bowel sounds are normal.      Palpations: Abdomen is soft. Tenderness: There is no abdominal tenderness. Musculoskeletal:         General: Normal range of motion. Right foot: Charcot foot present. Feet:    Skin:     General: Skin is warm and dry. Neurological:      Mental Status: He is alert and oriented to person, place, and time. MEDICAL DECISION MAKIN-year-old male presenting to the emergency room with worsening redness and swelling to the right foot in conjunction with history of diabetes and Charcot foot. Patient following with Dr. Helder Alvarez with podiatry. The foot is slightly erythematous with ulceration to the bottom of the foot. Per podiatry there was purulent drainage upon I&D of the ulceration. Patient admitted to medicine with podiatry consult. Zosyn and cefepime started here in the ED. ED Course as of  1511   Sun 2021   1219 Patient evaluated by podiatry; plan for admission, IV abx    [EG]   21  Case discussed with [facility] who will accept    [EG]      ED Course User Index  [EG] Salvatore Wolf MD       CRITICAL CARE:       PROCEDURES:    Procedures    DIAGNOSTIC RESULTS   EKG:All EKG's are interpreted by the Emergency Department Physician who either signs or Co-signs this chart in the absence of a cardiologist.        RADIOLOGY:All plain film, CT, MRI, and formal ultrasound images (except ED bedside ultrasound) are read by the radiologist, see reports below, unless otherwisenoted in MDM or here.   XR FOOT RIGHT (MIN 3 VIEWS) Final Result   Soft tissue ulceration and soft tissue swelling. Persistent osseous   irregularity at the 1st and 3rd digits, similar to prior, for which   osteomyelitis could be considered in the appropriate clinical setting. Charcot arthropathy again demonstrated of the midfoot. LABS: All lab results were reviewed by myself, and all abnormals are listed below.   Labs Reviewed   CULTURE, WOUND - Abnormal; Notable for the following components:       Result Value    Direct Exam MODERATE NEUTROPHILS (*)     Direct Exam MANY GRAM POSITIVE COCCI IN PAIRS (*)     Direct Exam FEW GRAM POSITIVE RODS (*)     All other components within normal limits   BASIC METABOLIC PANEL W/ REFLEX TO MG FOR LOW K - Abnormal; Notable for the following components:    Glucose 275 (*)     BUN 35 (*)     CREATININE 1.92 (*)     Sodium 132 (*)     GFR Non- 35 (*)     GFR  43 (*)     All other components within normal limits   C-REACTIVE PROTEIN - Abnormal; Notable for the following components:    CRP 63.4 (*)     All other components within normal limits   SEDIMENTATION RATE - Abnormal; Notable for the following components:    Sed Rate 37 (*)     All other components within normal limits   CBC WITH AUTO DIFFERENTIAL - Abnormal; Notable for the following components:    WBC 13.0 (*)     Hematocrit 40.6 (*)     Seg Neutrophils 76 (*)     Lymphocytes 17 (*)     Immature Granulocytes 1 (*)     Segs Absolute 9.79 (*)     All other components within normal limits   CULTURE, BLOOD 1   CULTURE, BLOOD 1   LACTIC ACID       EMERGENCY DEPARTMENTCOURSE:         Vitals:    Vitals:    06/06/21 1025 06/06/21 1230   BP: (!) 114/58    Pulse: 84    Resp: 14    Temp: 98.4 °F (36.9 °C)    SpO2: 98%    Weight: 240 lb 12.8 oz (109.2 kg)    Height:  5' 11\" (1.803 m)       The patient was given the following medications while in the emergency department:  Orders Placed This Encounter   Medications    cefepime (MAXIPIME)

## 2021-06-06 NOTE — CONSULTS
Consultation Note  Podiatric Medicine and Surgery     Subjective     Chief Complaint: Right foot infection    HPI:  Jovan Lynn is a 60 y.o. male seen at [address] for a wound on the right foot is well-known to Dr. Jolly Collins. On 5/05 patient presented to the ED because he was bitten by a dog. At that time was given a tetanus shot and placed on Keflex. The following day on 5/06/2021 was seen by podiatry in the ED after stepping on a nail to the right foot. In addition to the Keflex he is already taking patient was prescribed Cipro. At this time patient states he is no longer taking any antibiotics but presents because the right foot has become increasingly swollen, red, and painful with drainage. He denies any constitutional symptoms at this time. Of note, patient has history of Charcot arthropathy but does not wear custom molded shoes. PCP is Adelso Wilde MD    ROS:   Review of Systems   Constitutional: Negative for chills and fever. HENT: Negative for congestion and sore throat. Respiratory: Negative for cough and shortness of breath. Cardiovascular: Negative for chest pain and leg swelling. Gastrointestinal: Negative for diarrhea, nausea and vomiting. Musculoskeletal: Positive for gait problem, joint swelling and myalgias. Negative for arthralgias. Skin: Positive for color change and wound. Neurological: Positive for numbness. Negative for syncope. Psychiatric/Behavioral: Negative for behavioral problems and confusion.        Past Medical History   has a past medical history of ALEJANDRO (acute kidney injury) ([facility] [address] Cellulitis of right foot, Charcot foot due to diabetes mellitus ([facility] [address] Diabetic polyneuropathy associated with type 2 diabetes mellitus ([facility] [address] Fractures, multiple, Hyperlipidemia, Hypertension, Leukocytosis, Neuropathy, Pain in right foot, Pneumonia, Tobacco abuse, Type II or unspecified type diabetes mellitus without mention of complication, not stated as uncontrolled, Vertigo, Wound, open, and Wound, open. Past Surgical History   has a past surgical history that includes Neck surgery (1987); Appendectomy; knee surgery (Bilateral, 1983); Knee arthroscopy (Right, 1989); Knee arthroscopy (Left, 1990); Foot Debridement (Left, 04/24/2018); pr deep dissec foot infec,1 bursa (Left, 4/24/2018); Colonoscopy; Foot Debridement (Right, 3/20/2020); and arthroplasty (Right, 12/11/2020). Medications  Prior to Admission medications    Medication Sig Start Date End Date Taking? Authorizing Provider   mupirocin (BACTROBAN) 2 % ointment APPLY TO AFFECTED AREA THREE TIMES DAILY 5/5/21   Historical Provider, MD   meloxicam (MOBIC) 15 MG tablet TAKE 1 TABLET BY MOUTH EVERY DAY 5/5/21   Historical Provider, MD   TRUEPLUS PEN NEEDLES 31G X 6 MM MISC  3/15/21   Historical Provider, MD   LANTUS SOLOSTAR 100 UNIT/ML injection pen  3/12/21   Historical Provider, MD   HYDROcodone-acetaminophen ([facility]) 5-325 MG per tablet TAKE 1 TABLET BY MOUTH TWICE DAILY AS NEEDED 5/5/21   Historical Provider, MD   albuterol sulfate  (90 Base) MCG/ACT inhaler  4/1/21   Historical Provider, MD   fluticasone-salmeterol (ADVAIR) 250-50 MCG/DOSE AEPB  11/1/20   Historical Provider, MD   JANUVIA 100 MG tablet 50 mg daily  11/13/20   Historical Provider, MD   Misc. Devices MISC 1 PAIR OF DIABETIC SHOES (1 LEFT/ 1 RIGHT)  1-3 PAIRS OF INSERTS (LEFT/ RIGHT) 3/5/20   Nicole Dhillon DPM   cetirizine (ZYRTEC) 10 MG tablet  10/21/19   Historical Provider, MD   lisinopril (PRINIVIL;ZESTRIL) 10 MG tablet Take 10 mg by mouth daily 11/15/18   Historical Provider, MD   simvastatin (ZOCOR) 40 MG tablet Take 40 mg by mouth daily 11/13/18   Historical Provider, MD   glipiZIDE (GLUCOTROL) 10 MG tablet Take 40 mg by mouth once    Historical Provider, MD   aspirin 81 MG tablet Take 81 mg by mouth daily    Historical Provider, MD   gabapentin (NEURONTIN) 300 MG capsule Take 900 mg by mouth 3 times daily.  Take 3 pills 3 times a day .    Historical Provider, MD    Scheduled Meds:  Continuous Infusions:  PRN Meds:. Allergies  is allergic to morphine, percocet [oxycodone-acetaminophen], and dye [iodides]. Family History  family history includes Cancer in his father; Diabetes in his mother; Hypertension in his maternal grandmother. Social History   reports that he has been smoking. He has been smoking about 1.00 pack per day. He has never used smokeless tobacco.   reports no history of alcohol use. reports previous drug use. Objective     Vitals:  Patient Vitals for the past 8 hrs:   BP Temp Pulse Resp SpO2 Weight   21 1025 (!) 114/58 98.4 °F (36.9 °C) 84 14 98 % 240 lb 12.8 oz (109.2 kg)     Average, Min, and Max for last 24 hours Vitals:  TEMPERATURE:  Temp  Av.4 °F (36.9 °C)  Min: 98.4 °F (36.9 °C)  Max: 98.4 °F (36.9 °C)    RESPIRATIONS RANGE: Resp  Av  Min: 14  Max: 14    PULSE RANGE: Pulse  Av  Min: 84  Max: 84    BLOOD PRESSURE RANGE:  Systolic (88CER), TWN:417 , Min:114 , EGJ:477   ; Diastolic (26ENY), CYU:88, Min:58, Max:58      PULSE OXIMETRY RANGE: SpO2  Av %  Min: 98 %  Max: 98 %  I&O:  No intake/output data recorded. CBC:  Recent Labs     21  1105   WBC 13.0*   HGB 13.6   HCT 40.6*           BMP:  Recent Labs     21  1105   *   K 4.7      CO2 20   BUN 35*   CREATININE 1.92*   GLUCOSE 275*   CALCIUM 9.2        Coags:  No results for input(s): APTT, PROT, INR in the last 72 hours. Lab Results   Component Value Date    LABA1C 6.4 (H) 2018     Lab Results   Component Value Date    SEDRATE 37 (H) 2021     Lab Results   Component Value Date    CRP 84.4 (H) 2018         Lower Extremity Physical Exam:    Vascular: DP and PT pulses are Palpable . CFT <3 seconds to all digits. Hair growth is absent to the level of the digits. Moderate non-pitting edema to the right lower extremity.       Neuro: Saph/sural/SP/DP/plantar sensation diminished to light touch.    Musculoskeletal: Muscle strength is adequate ROM, adequate strength to all lower extremity muscle groups. Gross deformity is medial rocker-bottom deformity. TTP periwound. Compartments are soft and compressible. Dermatologic: Open wound to right medial foot that measures approximately 0.5 cm x 0.5 cm x 0.5 cm with undermining proximally. The wound base is granular with erythematous periwound skin. Seropurulent drainage is noted with no associated malodor. There is erythema periwound that tracks proximally towards the leg with associated increase in warmth. Does not probe to bone or sinus tract. Fluctuance appreciated proximally to wound. No crepitus or induration. Interdigital maceration absent. Clinical:      Imaging:   XR FOOT RIGHT (MIN 3 VIEWS)   Final Result   Soft tissue ulceration and soft tissue swelling. Persistent osseous   irregularity at the 1st and 3rd digits, similar to prior, for which   osteomyelitis could be considered in the appropriate clinical setting. Charcot arthropathy again demonstrated of the midfoot. Cultures:  Wound Cx, 6/06: Pending    Assessment     Daphnie Barillas is a 60 y.o. male with   1. Diabetic foot ulcer to level of subcutaneous, right  2. Recent hx of puncture wound, right foot   3. Cellulitis, RLE  4. 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 Hx of Charcot Arthropathy     Active Problems:    Osteomyelitis ([address]  Resolved Problems:    * No resolved hospital problems. *        Plan     · Patient examined and evaluated at bedside. · Treatment options discussed in detail with the patient. · Radiographs reviewed and discussed in detail with the patient. · Low suspicion for soft tissue emphysema or osteomyelitis. · MRI of the RLE ordered to rule out deep abscess/osteomyelitis. · Bedside I&D, procedure note below. · Plan for admission for IV abx, medical management per Internal Medicine.   · Abx: Vanc/Cefepime   · Recommend ID consult   · Dressing applied to Right foot: Packed with 1/4 iodoform, betadine, DSD, Ace. · Heel-touch WB  to Right lower extremity in surgical shoe. · Patient okay to have diet. No imminent surgery planned at this time. Will see patient on the floor in the morning. · Discussed with  Dr. Henok Spaulding. Procedure: Patient understands that right foot I&D needs to be performed. Patient agrees to procedure. Right foot was agreed upon as the correct foot. Timeout performed with patient and writer in room. No local anesthetic was required because patient is neuropathic. An incison  was made with #11 blade over apex of the abscess to the level of subcutaneous tissue and past fascial plains and into the busal space . 1cc of purulent drainage expressed. The tissue planes were dilated using a hemostat and any loculations were freed. Roughly 1cc purulence was expressed. The site was irrigated with sterile saline then packed with 1/4\" Iodoform packing. Dry sterile dressing applied. Hemostasis spontaneous with direct pressure and packing. Reports 2/10 post debridement pain. Patient tolerated procedure well. Electronically signed by Yaima Nix DPM on 6/6/2021 at 12:27 PM    I performed a history and physical examination of the patient and discussed management with the resident. I reviewed the residents note and agree with the documented findings and plan of care. Any areas of disagreement are noted on the chart. I was personally present for the key portions of any procedures. I have documented in the chart those procedures where I was not present during the key portions. I have reviewed the Podiatry Resident progress note. I agree with the chief complaint, past medical history, past surgical history, allergies, medications, social and family history as documented unless otherwise noted below.  Documentation of the HPI, Physical Exam and Medical Decision Making performed by medical students or scribes is based on my personal performance of the HPI, PE and MDM. I have personally evaluated this patient and have completed at least one if not all key elements of the E/M (history, physical exam, and MDM). Additional findings are as noted.      Electronically signed by Ana M Hardy DPM on 6/7/2021 at 11:10 AM

## 2021-06-07 ENCOUNTER — APPOINTMENT (OUTPATIENT)
Dept: MRI IMAGING | Age: 66
DRG: 623 | End: 2021-06-07
Payer: MEDICARE

## 2021-06-07 LAB
ABSOLUTE EOS #: 0.21 K/UL (ref 0–0.44)
ABSOLUTE IMMATURE GRANULOCYTE: 0.05 K/UL (ref 0–0.3)
ABSOLUTE LYMPH #: 2.25 K/UL (ref 1.1–3.7)
ABSOLUTE MONO #: 0.48 K/UL (ref 0.1–1.2)
ANION GAP SERPL CALCULATED.3IONS-SCNC: 12 MMOL/L (ref 9–17)
BASOPHILS # BLD: 0 % (ref 0–2)
BASOPHILS ABSOLUTE: 0.03 K/UL (ref 0–0.2)
BUN BLDV-MCNC: 33 MG/DL (ref 8–23)
BUN/CREAT BLD: 18 (ref 9–20)
CALCIUM SERPL-MCNC: 8.6 MG/DL (ref 8.6–10.4)
CHLORIDE BLD-SCNC: 101 MMOL/L (ref 98–107)
CO2: 20 MMOL/L (ref 20–31)
CREAT SERPL-MCNC: 1.8 MG/DL (ref 0.7–1.2)
DIFFERENTIAL TYPE: ABNORMAL
EKG ATRIAL RATE: 63 BPM
EKG P AXIS: 6 DEGREES
EKG P-R INTERVAL: 232 MS
EKG Q-T INTERVAL: 380 MS
EKG QRS DURATION: 90 MS
EKG QTC CALCULATION (BAZETT): 388 MS
EKG R AXIS: 5 DEGREES
EKG T AXIS: 30 DEGREES
EKG VENTRICULAR RATE: 63 BPM
EOSINOPHILS RELATIVE PERCENT: 3 % (ref 1–4)
GFR AFRICAN AMERICAN: 46 ML/MIN
GFR NON-AFRICAN AMERICAN: 38 ML/MIN
GFR SERPL CREATININE-BSD FRML MDRD: ABNORMAL ML/MIN/{1.73_M2}
GFR SERPL CREATININE-BSD FRML MDRD: ABNORMAL ML/MIN/{1.73_M2}
GLUCOSE BLD-MCNC: 188 MG/DL (ref 75–110)
GLUCOSE BLD-MCNC: 196 MG/DL (ref 75–110)
GLUCOSE BLD-MCNC: 226 MG/DL (ref 70–99)
GLUCOSE BLD-MCNC: 292 MG/DL (ref 75–110)
GLUCOSE BLD-MCNC: 350 MG/DL (ref 75–110)
HCT VFR BLD CALC: 37.7 % (ref 40.7–50.3)
HEMOGLOBIN: 12.3 G/DL (ref 13–17)
IMMATURE GRANULOCYTES: 1 %
LYMPHOCYTES # BLD: 26 % (ref 24–43)
MCH RBC QN AUTO: 29.8 PG (ref 25.2–33.5)
MCHC RBC AUTO-ENTMCNC: 32.6 G/DL (ref 28.4–34.8)
MCV RBC AUTO: 91.3 FL (ref 82.6–102.9)
MONOCYTES # BLD: 6 % (ref 3–12)
NRBC AUTOMATED: 0 PER 100 WBC
PDW BLD-RTO: 13.3 % (ref 11.8–14.4)
PLATELET # BLD: 155 K/UL (ref 138–453)
PLATELET ESTIMATE: ABNORMAL
PMV BLD AUTO: 9.9 FL (ref 8.1–13.5)
POTASSIUM SERPL-SCNC: 4.6 MMOL/L (ref 3.7–5.3)
RBC # BLD: 4.13 M/UL (ref 4.21–5.77)
RBC # BLD: ABNORMAL 10*6/UL
SEG NEUTROPHILS: 65 % (ref 36–65)
SEGMENTED NEUTROPHILS ABSOLUTE COUNT: 5.51 K/UL (ref 1.5–8.1)
SODIUM BLD-SCNC: 133 MMOL/L (ref 135–144)
WBC # BLD: 8.5 K/UL (ref 3.5–11.3)
WBC # BLD: ABNORMAL 10*3/UL

## 2021-06-07 PROCEDURE — 99222 1ST HOSP IP/OBS MODERATE 55: CPT | Performed by: INTERNAL MEDICINE

## 2021-06-07 PROCEDURE — 80048 BASIC METABOLIC PNL TOTAL CA: CPT

## 2021-06-07 PROCEDURE — 2500000003 HC RX 250 WO HCPCS: Performed by: STUDENT IN AN ORGANIZED HEALTH CARE EDUCATION/TRAINING PROGRAM

## 2021-06-07 PROCEDURE — 82947 ASSAY GLUCOSE BLOOD QUANT: CPT

## 2021-06-07 PROCEDURE — 99232 SBSQ HOSP IP/OBS MODERATE 35: CPT | Performed by: STUDENT IN AN ORGANIZED HEALTH CARE EDUCATION/TRAINING PROGRAM

## 2021-06-07 PROCEDURE — 6360000002 HC RX W HCPCS: Performed by: STUDENT IN AN ORGANIZED HEALTH CARE EDUCATION/TRAINING PROGRAM

## 2021-06-07 PROCEDURE — 94640 AIRWAY INHALATION TREATMENT: CPT

## 2021-06-07 PROCEDURE — 73718 MRI LOWER EXTREMITY W/O DYE: CPT

## 2021-06-07 PROCEDURE — 36415 COLL VENOUS BLD VENIPUNCTURE: CPT

## 2021-06-07 PROCEDURE — 6370000000 HC RX 637 (ALT 250 FOR IP): Performed by: STUDENT IN AN ORGANIZED HEALTH CARE EDUCATION/TRAINING PROGRAM

## 2021-06-07 PROCEDURE — 85025 COMPLETE CBC W/AUTO DIFF WBC: CPT

## 2021-06-07 PROCEDURE — 2580000003 HC RX 258: Performed by: STUDENT IN AN ORGANIZED HEALTH CARE EDUCATION/TRAINING PROGRAM

## 2021-06-07 PROCEDURE — 93005 ELECTROCARDIOGRAM TRACING: CPT | Performed by: STUDENT IN AN ORGANIZED HEALTH CARE EDUCATION/TRAINING PROGRAM

## 2021-06-07 PROCEDURE — 1200000000 HC SEMI PRIVATE

## 2021-06-07 RX ORDER — INSULIN GLARGINE 100 [IU]/ML
10 INJECTION, SOLUTION SUBCUTANEOUS DAILY
Status: DISCONTINUED | OUTPATIENT
Start: 2021-06-07 | End: 2021-06-13 | Stop reason: HOSPADM

## 2021-06-07 RX ADMIN — BUDESONIDE AND FORMOTEROL FUMARATE DIHYDRATE 2 PUFF: 160; 4.5 AEROSOL RESPIRATORY (INHALATION) at 21:11

## 2021-06-07 RX ADMIN — GABAPENTIN 200 MG: 100 CAPSULE ORAL at 21:33

## 2021-06-07 RX ADMIN — HEPARIN SODIUM 5000 UNITS: 5000 INJECTION INTRAVENOUS; SUBCUTANEOUS at 15:39

## 2021-06-07 RX ADMIN — BENZONATATE 100 MG: 100 CAPSULE ORAL at 15:39

## 2021-06-07 RX ADMIN — GABAPENTIN 200 MG: 100 CAPSULE ORAL at 08:59

## 2021-06-07 RX ADMIN — CEFEPIME HYDROCHLORIDE 2000 MG: 2 INJECTION, POWDER, FOR SOLUTION INTRAVENOUS at 00:59

## 2021-06-07 RX ADMIN — HYDROCODONE BITARTRATE AND ACETAMINOPHEN 1 TABLET: 5; 325 TABLET ORAL at 07:29

## 2021-06-07 RX ADMIN — INSULIN LISPRO 5 UNITS: 100 INJECTION, SOLUTION INTRAVENOUS; SUBCUTANEOUS at 21:32

## 2021-06-07 RX ADMIN — HYDROCODONE BITARTRATE AND ACETAMINOPHEN 1 TABLET: 5; 325 TABLET ORAL at 12:56

## 2021-06-07 RX ADMIN — MELOXICAM 7.5 MG: 7.5 TABLET ORAL at 21:33

## 2021-06-07 RX ADMIN — VANCOMYCIN HYDROCHLORIDE 1500 MG: 5 INJECTION, POWDER, LYOPHILIZED, FOR SOLUTION INTRAVENOUS at 15:39

## 2021-06-07 RX ADMIN — HYDROCODONE BITARTRATE AND ACETAMINOPHEN 1 TABLET: 5; 325 TABLET ORAL at 22:28

## 2021-06-07 RX ADMIN — BUDESONIDE AND FORMOTEROL FUMARATE DIHYDRATE 2 PUFF: 160; 4.5 AEROSOL RESPIRATORY (INHALATION) at 09:39

## 2021-06-07 RX ADMIN — SILVER SULFADIAZINE: 10 CREAM TOPICAL at 16:09

## 2021-06-07 RX ADMIN — CEFEPIME HYDROCHLORIDE 2000 MG: 2 INJECTION, POWDER, FOR SOLUTION INTRAVENOUS at 12:04

## 2021-06-07 RX ADMIN — CETIRIZINE HYDROCHLORIDE 10 MG: 10 TABLET, FILM COATED ORAL at 21:32

## 2021-06-07 RX ADMIN — ASPIRIN 81 MG: 81 TABLET, COATED ORAL at 08:59

## 2021-06-07 RX ADMIN — INSULIN LISPRO 2 UNITS: 100 INJECTION, SOLUTION INTRAVENOUS; SUBCUTANEOUS at 09:00

## 2021-06-07 RX ADMIN — INSULIN LISPRO 2 UNITS: 100 INJECTION, SOLUTION INTRAVENOUS; SUBCUTANEOUS at 12:04

## 2021-06-07 RX ADMIN — HEPARIN SODIUM 5000 UNITS: 5000 INJECTION INTRAVENOUS; SUBCUTANEOUS at 06:02

## 2021-06-07 RX ADMIN — INSULIN GLARGINE 10 UNITS: 100 INJECTION, SOLUTION SUBCUTANEOUS at 09:00

## 2021-06-07 RX ADMIN — ATORVASTATIN CALCIUM 20 MG: 20 TABLET, FILM COATED ORAL at 21:33

## 2021-06-07 RX ADMIN — HEPARIN SODIUM 5000 UNITS: 5000 INJECTION INTRAVENOUS; SUBCUTANEOUS at 21:32

## 2021-06-07 RX ADMIN — INSULIN LISPRO 4 UNITS: 100 INJECTION, SOLUTION INTRAVENOUS; SUBCUTANEOUS at 17:39

## 2021-06-07 RX ADMIN — MELOXICAM 7.5 MG: 7.5 TABLET ORAL at 08:59

## 2021-06-07 RX ADMIN — GABAPENTIN 200 MG: 100 CAPSULE ORAL at 15:38

## 2021-06-07 ASSESSMENT — PAIN SCALES - GENERAL
PAINLEVEL_OUTOF10: 8
PAINLEVEL_OUTOF10: 6
PAINLEVEL_OUTOF10: 7
PAINLEVEL_OUTOF10: 5
PAINLEVEL_OUTOF10: 8
PAINLEVEL_OUTOF10: 6
PAINLEVEL_OUTOF10: 5
PAINLEVEL_OUTOF10: 5

## 2021-06-07 ASSESSMENT — PAIN DESCRIPTION - LOCATION
LOCATION: BACK;FOOT
LOCATION: FOOT;BACK
LOCATION: BACK;FOOT

## 2021-06-07 ASSESSMENT — PAIN - FUNCTIONAL ASSESSMENT: PAIN_FUNCTIONAL_ASSESSMENT: ACTIVITIES ARE NOT PREVENTED

## 2021-06-07 ASSESSMENT — PAIN DESCRIPTION - PAIN TYPE
TYPE: ACUTE PAIN

## 2021-06-07 NOTE — CARE COORDINATION
Case Management Initial Discharge Plan  Malena Nageotte,         Readmission Risk              Risk of Unplanned Readmission:  13             Met with:patient to discuss discharge plans. Information verified: address, contacts, phone number, , insurance Yes  PCP: Faina Ly MD  Date of last visit: 3 weeks ago     Insurance Provider: [address]     Discharge Planning  Current Residence:  Private home   Living Arrangements:  Spouse/Significant Other       Home has 1 stories/2 stairs to climb to enter the home   Support Systems:  Spouse/Significant Other       Current Services PTA:  None  Agency: none      Patient able to perform ADL's:Independent  DME in home:  Walker, cane, knee scooter, glucometer, RW  DME used to aid ambulation prior to admission:   Cane every now and then   DME used during admission:  tbd , possible walker, he is heel weight bearing with surgical shoe only     Potential Assistance Needed:  N/A    Pharmacy: [facility]    Potential Assistance Purchasing Medications:  No  Does patient want to participate in local refill/ meds to beds program?  No    Patient agreeable to home care: Yes only if needed for iv atb , otherwise no   Freedom of choice provided:  yes      Type of Home Care Services:  None  Patient expects to be discharged to:  home    Prior SNF/Rehab Placement and Facility:  None   Agreeable to SNF/Rehab: No  [place] of choice provided: n/a   Evaluation: n/a    Expected Discharge date:  21  Follow Up Appointment: Best Day/ Time: Monday PM    Transportation provider: per family  Transportation arrangements needed for discharge: No    Discharge Plan:   Patient lives at home with wife. He is independent using cane once in while. He has history of COVID in feb. He has had cough ever since covid. .    Patient admitted to rule out osteo to right foot. Podiatry and ID are following.  2 years ago he needed IV atb and went thru [facility] and use [facility] for home care.    He is hoping not to need any iv atb on discharge. If he does he would want to use same companies. Podiatry is not planning on any surgery at this time. I&D done at bedside yesterday. POD   Plan      · MRI of the RLE ordered to rule out deep abscess/osteomyelitis. · Abx: Vanc/Cefepime   ? Recommend ID consult   · Dressing applied to Right foot: Packed with 1/4 iodoform, betadine, DSD, Ace. · Heel-touch WB to Right lower extremity in surgical shoe.   · No imminent surgery planned at this time.  Will await results of MRI for further treatment planning. ID has been consulted. Will send face sheet and progress notes from today to [place] to follow.      Electronically signed by Felisha Wilkins RN on 6/7/21 at 2:37 PM EDT

## 2021-06-07 NOTE — PROGRESS NOTES
Occupational [address]  Occupational Therapy Not Seen Note    Patient not available for Occupational Therapy due to:    [x] Testing:    [] Hemodialysis    [] Cancelled by RN:    []Refusal by Patient:    [] Surgery:     [] Intubation:     [] Pain Medication:    [] Sedation:     [] Spine Precautions :    [] Medical Instability:    [] Other:

## 2021-06-07 NOTE — CONSULTS
Pharmacy Note  Vancomycin Consult - Initial Note     Rosa Torres is a 60 y.o. male ordered Vancomycin. .  Current diagnosis for which MRSA is suspected/confirmed: SSTI  Vancomycin order/consult received from Dr. Emanuel Borjas. Additional antimicrobials:  Cefepime    Patient Active Problem List   Diagnosis    Essential hypertension    Type II or unspecified type diabetes mellitus without mention of complication, not stated as uncontrolled    Neuropathy    Vertigo    Charcot foot due to diabetes mellitus ([address]    Hyperlipidemia    Cellulitis    Cellulitis of left foot    Right foot infection    Diabetic polyneuropathy associated with type 2 diabetes mellitus ([address]    Foreign body (FB) in soft tissue    Cellulitis of left leg    Abscess of right foot    Chest pain at rest    Tobacco abuse    Well controlled type 2 diabetes mellitus with neurological manifestations ([address]    ALEJANDRO (acute kidney injury) ([address]    Bandemia    Chronic multifocal osteomyelitis of right foot ([address]    Diabetic foot ulcer with osteomyelitis ([address]    Acquired hammer toe deformity of lesser toe of right foot    Post-op pain    Right foot pain    Hallux hammertoe, right    Osteomyelitis (HCC)       Height:     Wt Readings from Last 1 Encounters:   06/06/21 240 lb 12.8 oz (109.2 kg)     Allergies:  Morphine, Percocet [oxycodone-acetaminophen], and Dye [iodides]       No results found for: PROCAL  Recent Labs     06/06/21  1105   BUN 35*     Recent Labs     06/06/21  1105   CREATININE 1.92*     Recent Labs     06/06/21  1105   WBC 13.0*     No intake or output data in the 24 hours ending 06/06/21 2002    Temp: 98.4 F    Culture Date / Source  /  Results  6/6/21               Blood        No growth x 4 hr    Actual Weight:    Wt Readings from Last 1 Encounters:   06/06/21 240 lb 12.8 oz (109.2 kg)     CrCl based on IBW\"  41.38 mL/min      PLAN   Initial loading dose of 25mg/kg (max of 2500 mg) = 2500  mg   2.    Vancomycin 1500 mg IV every 24 hours.    3.   Ensured BUN/sCr ordered at baseline and at least every 3rd day. 4.   ONLY for suspected pneumonia or COPD: MRSA nasal swab  is not ordered. Non respiratory infection. .    5. Trough ordered for: 6/8/21 14:00. Trough Goals (Non-dialysis patient) Peaks are not routinely recommended. 10-20 mcg/mL for mild skin/soft tissue infections or UTI.  15-20 mcg/mL is the target trough for all other indications that MRSA infection is suspected. TROUGH TIMING: (Additional levels drawn based on renal function and/or clinical response). Dosing interval Timing of Trough   Every 8 hr, 12 hr, or 18 hr regimen Prior to the 4th dose  Twice weekly troughs for every 8 hour dosing   Every 24 hr regimen Prior to the 3rd or 4th dose   Every 36 hr regimen Prior to the 3rd dose       Thank you for the consult. Pharmacy will continue to follow.   Jose Zapata, [address] [place] [address]  6/6/2021  8:02 PM

## 2021-06-07 NOTE — PROGRESS NOTES
Day of Therapy:2  Current Dose:vancomycin 1500mg ivpb q24h   Culture Results           Blood:pending            Sputum:           Wound:foot - pending            Urine: Other:  Temp Readings from Last 3 Encounters:   06/07/21 97.7 °F (36.5 °C) (Oral)   05/26/21 98.2 °F (36.8 °C) (Oral)   05/06/21 98.3 °F (36.8 °C) (Oral)     Recent Labs     06/06/21  1105 06/07/21  0528   WBC 13.0* 8.5     Recent Labs     06/06/21  1105 06/07/21  0528   CREATININE 1.92* 1.80*     Estimated Creatinine Clearance: 52 mL/min (A) (based on SCr of 1.8 mg/dL (H)).   No intake or output data in the 24 hours ending 06/07/21 6646

## 2021-06-07 NOTE — PROGRESS NOTES
Progress Note  Podiatric Medicine and Surgery     Subjective     CC: Right foot infection    Patient seen and examined at bedside. Pain under control   Afebrile, vital signs stable       HPI :  Kashif Lay is a 60 y.o. male seen at [address] for a wound on the right foot is well-known to Dr. Hiren Lazo. On 5/05 patient presented to the ED because he was bitten by a dog. At that time was given a tetanus shot and placed on Keflex. The following day on 5/06/2021 was seen by podiatry in the ED after stepping on a nail to the right foot. In addition to the Keflex he is already taking patient was prescribed Cipro. At this time patient states he is no longer taking any antibiotics but presents because the right foot has become increasingly swollen, red, and painful with drainage. He denies any constitutional symptoms at this time. Of note, patient has history of Charcot arthropathy but does not wear custom molded shoes.     PCP is Alex Stanton MD    ROS: Denies N/V/F/C/SOB/CP/Cough.        Medications:  Scheduled Meds:   cefepime  2,000 mg Intravenous Q12H    aspirin  81 mg Oral Daily    cetirizine  10 mg Oral Nightly    atorvastatin  20 mg Oral Daily    sodium chloride flush  5-40 mL Intravenous 2 times per day    heparin (porcine)  5,000 Units Subcutaneous 3 times per day    gabapentin  200 mg Oral TID    insulin lispro  0-12 Units Subcutaneous TID WC    insulin lispro  0-6 Units Subcutaneous Nightly    budesonide-formoterol  2 puff Inhalation BID    meloxicam  7.5 mg Oral BID    vancomycin (VANCOCIN) intermittent dosing (placeholder)   Other RX Placeholder    vancomycin  1,500 mg Intravenous Q24H       Continuous Infusions:   sodium chloride      sodium chloride 50 mL/hr at 06/06/21 1759    dextrose         PRN Meds:HYDROcodone-acetaminophen, sodium chloride flush, sodium chloride, ondansetron **OR** ondansetron, magnesium hydroxide, acetaminophen **OR** acetaminophen, benzonatate, glucose, dextrose, glucagon (rDNA), dextrose    Objective     Vitals:  Patient Vitals for the past 8 hrs:   BP Temp Temp src Pulse Resp SpO2   21 0009 112/77 98.1 °F (36.7 °C) Oral 74 16 96 %   21 1949 (!) 120/56 98.1 °F (36.7 °C) Oral 71 16 94 %     Average, Min, and Max for last 24 hours Vitals:  TEMPERATURE:  Temp  Av.2 °F (36.8 °C)  Min: 98.1 °F (36.7 °C)  Max: 98.4 °F (36.9 °C)    RESPIRATIONS RANGE: Resp  Av  Min: 14  Max: 18    PULSE RANGE: Pulse  Av.8  Min: 70  Max: 84    BLOOD PRESSURE RANGE:  Systolic (78GRF), PGO:205 , Min:112 , JFI:679   ; Diastolic (57IGX), TJK:62, Min:56, Max:77      PULSE OXIMETRY RANGE: SpO2  Av.8 %  Min: 94 %  Max: 99 %    No intake/output data recorded. CBC:  Recent Labs     21  1105   WBC 13.0*   HGB 13.6   HCT 40.6*      CRP 63.4*        BMP:  Recent Labs     21  1105   *   K 4.7      CO2 20   BUN 35*   CREATININE 1.92*   GLUCOSE 275*   CALCIUM 9.2        Coags:  No results for input(s): APTT, PROT, INR in the last 72 hours. Lab Results   Component Value Date    SEDRATE 37 (H) 2021     Recent Labs     21  1105   CRP 63.4*       Lower Extremity Physical Exam:  Vascular: DP and PT pulses are Palpable . CFT <3 seconds to all digits. Hair growth is absent to the level of the digits. Moderate non-pitting edema to the right lower extremity.       Neuro: Saph/sural/SP/DP/plantar sensation diminished to light touch.     Musculoskeletal: Muscle strength is adequate ROM, adequate strength to all lower extremity muscle groups. Gross deformity is medial rocker-bottom deformity. TTP periwound. Compartments are soft and compressible.      Dermatologic: Open wound to right medial foot that measures approximately 0.5 cm x 0.5 cm x 0.5 cm with undermining proximally. The wound base is granular with erythematous periwound skin. Minimal seropurulent drainage is noted with no associated malodor.   There is erythema periwound that tracks proximally towards the leg with associated increase in warmth, improved from yesterday's exam.  Does not probe to bone or sinus tract. No crepitus, fluctuance or induration. Interdigital maceration absent. Clinical Images:      Imaging:   XR FOOT RIGHT (MIN 3 VIEWS)   Final Result   Soft tissue ulceration and soft tissue swelling. Persistent osseous   irregularity at the 1st and 3rd digits, similar to prior, for which   osteomyelitis could be considered in the appropriate clinical setting. Charcot arthropathy again demonstrated of the midfoot. MRI FOOT RIGHT WO CONTRAST    (Results Pending)       Cultures:   Wound Cx, 6/06: GPC, GPR    Assessment   Brand Alireza is a 60 y.o. male with   1. Diabetic foot ulcer to level of subcutaneous, right  2. Recent hx of puncture wound, right foot   3. Cellulitis, RLE  4. 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 Hx of Charcot Arthropathy     Principal Problem:    Osteomyelitis ([address]  Active Problems:    Essential hypertension    Neuropathy    Charcot foot due to diabetes mellitus ([address]    Diabetic polyneuropathy associated with type 2 diabetes mellitus ([address]    Tobacco abuse  Resolved Problems:    * No resolved hospital problems. *       Plan     · Patient examined and evaluated at bedside. · Treatment options discussed in detail with the patient. · MRI of the RLE ordered to rule out deep abscess/osteomyelitis. · Continue medical management per Internal Medicine. · Abx: Vanc/Cefepime   ? Recommend ID consult   · Dressing applied to Right foot: Packed with 1/4 iodoform, betadine, DSD, Ace. · Heel-touch WB to Right lower extremity in surgical shoe. · No imminent surgery planned at this time. Will await results of MRI for further treatment planning. Will see patient daily. · Discussed with  Dr. Hiren Lazo.     Misael Quintanilla, MIAM   Podiatric Medicine & Surgery   6/7/2021 at 1:05 AM

## 2021-06-07 NOTE — PROGRESS NOTES
Comprehensive Nutrition Assessment    Type and Reason for Visit:  Initial, Positive Nutrition Screen, Wound    Nutrition Recommendations/Plan:   1. Continue current 4 Carb Choices (60gms/meal); Low Sodium (2gm) Low potassium (<3000 mg/odalis) and low Phosphorous (<1000 mg) diet  2. Add Glucerna 2x/d to meal trays  3. Monitor p.o intakes, supplement acceptance, and labs    Nutrition Assessment:  Patient admitted for possible osteomyelitis- MRI to r/o. Patient with multiple episodes of cellulitis and infection in his right foot. Bedside I&D performed in ER by Podiatry. Pt reports 11# wt loss x 5 weeks ~3.4%. UBW: 250#. States having a decreased appetite and not knowing why. Patient consumed 100% of lunch today. Will add Glucerna 2x/d to meal trays to help meet protein intake. Will monitor p.o intakes and supplement acceptance. Malnutrition Assessment:  Malnutrition Status: At risk for malnutrition (Comment)    Context:  Chronic Illness     Findings of the 6 clinical characteristics of malnutrition:  Energy Intake:  Mild decrease in energy intake (Comment)  Weight Loss:  1 - Mild weight loss (specify amount and time period) (3.4% x 5 weeks)     Body Fat Loss:  Unable to assess     Muscle Mass Loss:  Unable to assess    Fluid Accumulation:  No significant fluid accumulation     Strength:  Not Performed    Estimated Daily Nutrient Needs:  Energy (kcal):  2288 kcal (1.2); Weight Used for Energy Requirements:  Current     Protein (g):   gm (1.2-1.5 g/kg IBW); Weight Used for Protein Requirements:  Ideal        Method Used for Fluid Requirements:  1 ml/kcal      Nutrition Related Findings:  Hx: T2DM and diabetic neiropathy. Glucose:226 (H). GI: WDL. No edema      Wounds:  Open Wounds (Right foot)       Current Nutrition Therapies:    ADULT DIET; Regular; 4 carb choices (60 gm/meal); Low Sodium (2 gm); Low Potassium (Less than 3000 mg/day);  Low Phosphorus (Less than 1000 mg)    Anthropometric Measures:  · Height: 5' 11\" (180.3 cm)  · Current Body Weight: 241 lb 6.4 oz (109.5 kg)   · Usual Body Weight: 250 lb (113.4 kg)     · Ideal Body Weight: 172 lbs; % Ideal Body Weight 140.3 %   · BMI: 33.7  · Adjusted Body Weight:  ; No Adjustment   · BMI Categories: Obese Class 1 (BMI 30.0-34. 9)       Nutrition Diagnosis:   · Increased nutrient needs related to increase demand for energy/nutrients as evidenced by wounds    Nutrition Interventions:   Food and/or Nutrient Delivery:  Continue Current Diet, Start Oral Nutrition Supplement  Nutrition Education/Counseling:  Education not indicated   Coordination of Nutrition Care:  Continue to monitor while inpatient    Goals:  p.o intake to meet >75% of estimated needs       Nutrition Monitoring and Evaluation:   Behavioral-Environmental Outcomes:  None Identified   Food/Nutrient Intake Outcomes:  Supplement Intake, Food and Nutrient Intake  Physical Signs/Symptoms Outcomes:  Biochemical Data, Skin, Weight     Discharge Planning:     Too soon to determine     Luis May, [address] [place] [address], [address] [place] [address]  Office Number: [phone]

## 2021-06-07 NOTE — ACP (ADVANCE CARE PLANNING)
Advance Care Planning     Advance Care Planning Activator (Inpatient)  Conversation Note      Date of ACP Conversation: 6/7/2021     Conversation Conducted with: Patient with Decision Making Capacity    ACP Activator: Siddharth Kunz RN        Health Care Decision Maker:     Current Designated Health Care Decision Maker:     Primary Decision Maker: [facility] - [phone]  Click here to complete Healthcare Decision Makers including section of the Healthcare Decision Maker Relationship (ie \"Primary\")  Today we documented Decision Maker(s) consistent with Legal Next of Kin hierarchy. Care Preferences    Ventilation: \"If you were in your present state of health and suddenly became very ill and were unable to breathe on your own, what would your preference be about the use of a ventilator (breathing machine) if it were available to you? \"      Would the patient desire the use of ventilator (breathing machine)?: yes    \"If your health worsens and it becomes clear that your chance of recovery is unlikely, what would your preference be about the use of a ventilator (breathing machine) if it were available to you? \"     Would the patient desire the use of ventilator (breathing machine)?: Yes      Resuscitation  \"CPR works best to restart the heart when there is a sudden event, like a heart attack, in someone who is otherwise healthy. Unfortunately, CPR does not typically restart the heart for people who have serious health conditions or who are very sick. \"    \"In the event your heart stopped as a result of an underlying serious health condition, would you want attempts to be made to restart your heart (answer \"yes\" for attempt to resuscitate) or would you prefer a natural death (answer \"no\" for do not attempt to resuscitate)? \" yes       [x] Yes   [] No   Educated Patient / Jessie Cleaning regarding differences between Advance Directives and portable DNR orders.     Length of ACP Conversation in minutes: Conversation Outcomes:  [x] ACP discussion completed  [] Existing advance directive reviewed with patient; no changes to patient's previously recorded wishes  [] New Advance Directive completed  [] Portable Do Not Rescitate prepared for Provider review and signature  [] POLST/POST/MOLST/MOST prepared for Provider review and signature      Follow-up plan:    [] Schedule follow-up conversation to continue planning  [] Referred individual to Provider for additional questions/concerns   [] Advised patient/agent/surrogate to review completed ACP document and update if needed with changes in condition, patient preferences or care setting    [x] This note routed to one or more involved healthcare providers

## 2021-06-07 NOTE — PROGRESS NOTES
Physical Therapy  DATE: 2021    NAME: Kashif Lay  MRN: [id number]   : 1955    Patient not seen this date for Physical Therapy due to:  [] Blood transfusion in progress  [] Cancel by RN  [] Hemodialysis  []  Refusal by Patient   [] Spine Precautions   [] Strict Bedrest  [] Surgery  [x] Testing: MRI      [] Other        [] PT being discontinued at this time. Patient independent. No further needs. [] PT being discontinued at this time as the patient has been transferred to hospice care. No further needs.     Tremayne Gee, PT

## 2021-06-07 NOTE — PLAN OF CARE
Problem: Breathing Pattern - Ineffective  Goal: Ability to achieve and maintain a regular respiratory rate  Outcome: Ongoing     Problem:  Body Temperature -  Risk of, Imbalanced  Goal: Ability to maintain a body temperature within defined limits  Outcome: Ongoing     Problem: Falls - Risk of:  Goal: Will remain free from falls  Description: Will remain free from falls  Outcome: Ongoing     Problem: Falls - Risk of:  Goal: Absence of physical injury  Description: Absence of physical injury  Outcome: Ongoing     Problem: Pain:  Goal: Pain level will decrease  Description: Pain level will decrease  Outcome: Ongoing

## 2021-06-07 NOTE — CONSULTS
[facility]  Initial Consult Note  Date: 6/7/2021    Hospital day :1     Impression:   1. Diabetes mellitus with associated diabetic neuropathy and Charcot deformity in the right foot. 2. Recent puncturewound to the plantar aspect of the right foot  3. Cellulitis of the right lower extremity    Recommendations   · Continue intravenous antimicrobial therapy with cefepime and vancomycin  · The patient had bedside I&D done and the Gram stain so far with gram-positive cocci as well as gram-positive rods  · MRI of the foot is ordered for later today  · We will follow the MRI findings and make antibiotic recommendations thereafter    Chief complaint/reason for consultation:   Diabetic foot infection, puncture wound    History of Present Illness:   Cole Garcia is a 60y.o.-year-old male who was initially admitted on 6/6/2021. Eleni Garcia is seen and evaluated at bedside he tells me that he recently sustained a dog bite to his right arm on May 5, 2021 and he was seen in the urgent care and was given a tetanus shot and also was prescribed Keflex for that. He does report that the following day on 5/6/2021 he stepped on a nail in his right foot and he was seen by Dr. Jaky Cook from podiatry and levofloxacin was added to his antibiotics regimen. The patient reports that he completed the course of antibiotics last week on Wednesday. The patient is an underlying diabetic has a Charcot arthropathy and also has diabetic neuropathy. He does wear custom molded shoes and he reports that on Friday he noticed some redness in the foot and it was more so a red streak going up to his ankle. As the weekend progressed the foot started to swell up with increasing redness and pain but no associated fevers or chills. He came into the emergency room for evaluation and was found to have a soft tissue ulceration soft tissue swelling.     The patient was admitted started on antimicrobial therapy with cefepime and vancomycin. The patient was seen by the podiatry service and an MRI of the right lower extremity was ordered to rule out a deep abscess/osteomyelitis. I was asked to evaluate and help with antibiotic choice. I have personally reviewed the past medical history, past surgical history, medications, social history, and family history, and I have updated the database accordingly. Past Medical History:     Past Medical History:   Diagnosis Date    ALEJANDRO (acute kidney injury) ([facility] [address] 8/5/2018    Cellulitis of right foot     and abscess    Charcot foot due to diabetes mellitus ([facility] [address] 2014    Right foot     Diabetic polyneuropathy associated with type 2 diabetes mellitus ([facility] [address]     Fractures, multiple 07/21/2014    Right foot fractures     Hyperlipidemia     Hypertension     Leukocytosis     Neuropathy     diabetic with charcot affecting the right foot    Pain in right foot     redness and swelling    Pneumonia 2009    Tobacco abuse 4/24/2018    Type II or unspecified type diabetes mellitus without mention of complication, not stated as uncontrolled     Vertigo     Wound, open     Left Ball of  foot, pt. stepped on sharp object. Covered by dressing.     Wound, open     Right posterior -Diabetic Ulcer     Past Surgical  History:     Past Surgical History:   Procedure Laterality Date    APPENDECTOMY      at age 24    ARTHROPLASTY Right 12/11/2020    RIGHT HALLUX EXOSTECTOMY AND 3RD DIGIT EXTENSIOR TENOTOMY performed by Krysten Copeland DPM at [address] [facility] Left 04/24/2018    I&D foreign body removal    FOOT DEBRIDEMENT Right 3/20/2020    RIGHT  FOOT   INCISION AND DRAINAGE WITH BONE BIOPSY performed by Krysten Copeland DPM at [facility] ARTHROSCOPY Left 1990    KNEE SURGERY Bilateral 1983    arthroscopy    NECK SURGERY  1987    IA DEEP [address] [facility],[address] [facility]UR[place] Left 4/24/2018    LEFT FOOT MULTIPLE  INCISIONS  AND DRAINAGE AND REMOVAL FOREIGN BODY  IRENE performed by Jayson Godinez DPM at [facility] OR     Medications:      insulin glargine  10 Units Subcutaneous Daily    cefepime  2,000 mg Intravenous Q12H    aspirin  81 mg Oral Daily    cetirizine  10 mg Oral Nightly    atorvastatin  20 mg Oral Daily    sodium chloride flush  5-40 mL Intravenous 2 times per day    heparin (porcine)  5,000 Units Subcutaneous 3 times per day    gabapentin  200 mg Oral TID    insulin lispro  0-12 Units Subcutaneous TID WC    insulin lispro  0-6 Units Subcutaneous Nightly    budesonide-formoterol  2 puff Inhalation BID    meloxicam  7.5 mg Oral BID    vancomycin (VANCOCIN) intermittent dosing (placeholder)   Other RX Placeholder    vancomycin  1,500 mg Intravenous Q24H     Social History:     Social History     Socioeconomic History    Marital status:      Spouse name: Not on file    Number of children: Not on file    Years of education: Not on file    Highest education level: Not on file   Occupational History    Not on file   Tobacco Use    Smoking status: Current Every Day Smoker     Packs/day: 1.00    Smokeless tobacco: Never Used   Vaping Use    Vaping Use: Never used   Substance and Sexual Activity    Alcohol use: No    Drug use: Not Currently    Sexual activity: Not on file   Other Topics Concern    Not on file   Social History Narrative    Not on file     Social Determinants of Health     Financial Resource Strain:     Difficulty of Paying Living Expenses:    Food Insecurity:     Worried About Running Out of Food in the Last Year:     Ran Out of Food in the Last Year:    Transportation Needs:     Lack of Transportation (Medical):      Lack of Transportation (Non-Medical):    Physical Activity:     Days of Exercise per Week:     Minutes of Exercise per Session:    Stress:     Feeling of Stress :    Social Connections:     Frequency of Communication with Friends and Family:     Frequency of Social Gatherings with Friends and Family:     Attends [employer] Services:     Active Member of Clubs or Organizations:     Attends Club or Organization Meetings:     Marital Status:    Intimate Partner Violence:     Fear of Current or Ex-Partner:     Emotionally Abused:     Physically Abused:     Sexually Abused:      Family History:     Family History   Problem Relation Age of Onset    Diabetes Mother     Cancer Father     Hypertension Maternal Grandmother       Allergies:   Morphine, Percocet [oxycodone-acetaminophen], and Dye [iodides]     Review of Systems:   General: No fevers or chills. Eyes: No double vision or blurry vision. ENT: No sore throat or runny nose. Cardiovascular: No chest pain or palpitations. Lung: No shortness of breath or cough. Abdomen: No nausea, vomiting, diarrhea, or abdominal pain. Genitourinary: No increased urinary frequency, or dysuria. Musculoskeletal: Right foot pain and swelling as well as redness  Hematologic: No bleeding or bruising. Neurologic: No headache, weakness, numbness, or tingling. Physical Examination :   /69   Pulse 70   Temp 98 °F (36.7 °C) (Oral)   Resp 16   Ht 5' 11\" (1.803 m)   Wt 241 lb 6.4 oz (109.5 kg)   SpO2 96%   BMI 33.67 kg/m²     Temperature Range: Temp: 98 °F (36.7 °C) Temp  Av °F (36.7 °C)  Min: 97.7 °F (36.5 °C)  Max: 98.2 °F (36.8 °C)  General Appearance: Awake, alert, and in no apparent distress  Head: Normocephalic, without obvious abnormality, atraumatic  Eyes: Pupils equal, round, reactive, to light and accommodation; extraocular movements intact; sclera anicteric; conjunctivae pink  ENT: Oropharynx clear, without erythema, exudate, or thrush. Neck: Supple, without lymphadenopathy. Pulmonary/Chest: Clear to auscultation, without wheezes, rales, or rhonchi  Cardiovascular: Regular rate and rhythm without murmurs, rubs, or gallops. Abdomen: Soft, nontender, nondistended.   Obese abdomen positive bowel sounds in all 4 quadrants  Extremities: The right foot dressing was not removed  Neurologic: No gross sensory or motor deficits. Skin: Warm and dry with no rash. Medical Decision Making:   I have independently reviewed/ordered the following labs:  CBC with Differential:   Recent Labs     06/06/21  1105 06/07/21  0528   WBC 13.0* 8.5   HGB 13.6 12.3*   HCT 40.6* 37.7*    155   LYMPHOPCT 17* 26   MONOPCT 5 6     BMP:   Recent Labs     06/06/21  1105 06/07/21  0528   * 133*   K 4.7 4.6    101   CO2 20 20   BUN 35* 33*   CREATININE 1.92* 1.80*     Hepatic Function Panel: No results for input(s): PROT, LABALBU, BILIDIR, IBILI, BILITOT, ALKPHOS, ALT, AST in the last 72 hours. No results found for: PROCAL    Lab Results   Component Value Date    CRP 63.4 06/06/2021    CRP 84.4 08/04/2018     No results found for: FERRITIN  No results found for: FIBRINOGEN  No results found for: DDIMER  No results found for: LDH    Lab Results   Component Value Date    SEDRATE 37 (H) 06/06/2021       Lab Results   Component Value Date    COVID19 DETECTED 05/26/2021    COVID19 Not Detected 12/07/2020    COVID19 Not Detected 08/08/2020     No results found for requested labs within last 30 days. Imaging Studies:   XR FOOT RIGHT (MIN 3 VIEWS)    Result Date: 6/6/2021  EXAMINATION: THREE XRAY VIEWS OF THE RIGHT FOOT 6/6/2021 11:24 am COMPARISON: 05/06/2021 HISTORY: ORDERING SYSTEM PROVIDED HISTORY: diabetic foot wound TECHNOLOGIST PROVIDED HISTORY: diabetic foot wound Reason for Exam: diabetic food wound and foot pain. decreased ROM, painful to move, bear weight. Acuity: Chronic Type of Exam: Ongoing FINDINGS: Erosive changes again demonstrated of the 1st proximal phalanx. Osteolysis of the portions of the 3rd proximal and middle phalanges again demonstrated. Soft tissue emphysema is seen adjacent to the proximal 1st metatarsal along the plantar surface, with soft tissue swelling.   Irregularity of the midfoot can be in keeping with Charcot arthropathy. Dorsal midfoot spurring. Plantar calcaneal spur. Soft tissue ulceration and soft tissue swelling. Persistent osseous irregularity at the 1st and 3rd digits, similar to prior, for which osteomyelitis could be considered in the appropriate clinical setting. Charcot arthropathy again demonstrated of the midfoot. Cultures:     Culture, Blood 1 [[id number]] Collected: 06/06/21 1058   Order Status: Completed Specimen: Blood Updated: 06/07/21 0033    Specimen Description . BLOOD    Special Requests LT[facility]    Culture NO GROWTH 10 HOURS   Culture, Blood 1 [[id number]] Collected: 06/06/21 1105   Order Status: Completed Specimen: Blood Updated: 06/07/21 0033    Specimen Description . BLOOD    Special Requests [facility]    Culture NO GROWTH 10 HOURS   Culture, Wound [[id number]] (Abnormal) Collected: 06/06/21 1102   Order Status: Completed Specimen: Foot Updated: 06/06/21 1441    Specimen Description . FOOT RIGHT    Special Requests NOT REPORTED    Direct Exam MODERATE NEUTROPHILSAbnormal      MANY GRAM POSITIVE COCCI IN PAIRSAbnormal      FEW GRAM POSITIVE RODSAbnormal     Culture PENDING           Thank you for allowing us to participate in the care of this patient. Please call with questions. Electronically signed by [address], [place] on 6/7/2021 at 11:25 AM      [facility]  [address], [place]  [employer] messaging  OFFICE: [phone]      This note is created with the assistance of a speech recognition program.  While intending to generate a document that actually reflects the content of the visit, the document can still have some errors including those of syntax and sound a like substitutions which may escape proof reading. In such instances, actual meaning can be extrapolated by contextual diversion.

## 2021-06-07 NOTE — PROGRESS NOTES
[facility]  Office: [address], DO, Shaquillejaquelin Samson, DO, Charlotte Disla, DO, Sourav Gloria Blood, DO, Lewis Orozco MD, Francis Clayton MD, Sarah Harris MD, Maureen Horvath MD, Anirudh Garsia MD, Pravin Varner MD, Christian Her MD, Samantha Gifford MD, Manda Ng, DO, Helder Mane MD, Chase Feliciano DO, Chinmay Maldonado MD,  Camille Wilde DO, Kandice Paniagua MD, Inez Mccabe MD, Luis Barnett MD, Henry Marcano MD, Carly Rodriguez [facility], [facility], CNP, Daniel Carmona, CNP, Julio Saunders, CNS, Lionel Delarosa, [facility], Beryle Fix, CNP, Stephany Aquino, CNP, Svetlana Cazares, CNP, Tc Fontaine, CNP, Rochelle Diggs PA-C, Russ Interiano [facility], Grace Walker, CNP, Sandeep Palmer, CNP, Sarah Murphy, CNP, Arabella De Anda, CNP, Jamey Godwin, [facility], Mendez Daniele, [facility]    Progress Note    6/7/2021    10:13 AM    Name:   Ryan Sandoval  MRN:     [id number]     Acct:      [de-identified]   Room:   [address] [place] [address] Day:  1  Admit Date:  6/6/2021 10:33 AM    PCP:   Chidi Mccullough MD  Code Status:  Full Code    Subjective:     C/C:   Chief Complaint   Patient presents with    Foot Pain     Interval History Status: not changed. Patient seen and examined. Drainiage improved after bedside incision and drainiage in the ER by podiatry. Brief History:     71-year-old male past medical history of hypertension, diabetes, diabetic neuropathy, tobacco use, multiple episodes of cellulitis and infections of his right foot presents after stepping on a nail approximately 2 weeks ago, patient noticed bloody drainage starting Friday and has continued to worsen and decided to present to the ER afterwards. Patient denies any fevers or chills, does admit to generalized malaise.          Patient has a past medical history of COVID-19 was treated in February third 2021 and received bamalivumab on 2/3/2021.   Patient still has persistent cough afterwards. Review of Systems:     Constitutional:  negative for chills, fevers, sweats  Respiratory:  negative for cough, dyspnea on exertion, shortness of breath, wheezing  Cardiovascular:  negative for chest pain, chest pressure/discomfort, lower extremity edema, palpitations  Gastrointestinal:  negative for abdominal pain, constipation, diarrhea, nausea, vomiting  Neurological:  negative for dizziness, headache    Medications: Allergies:     Allergies   Allergen Reactions    Morphine Itching    Percocet [Oxycodone-Acetaminophen]      Facial swelling    Dye [Iodides] Rash     Rash and swelling       Current Meds:   Scheduled Meds:    insulin glargine  10 Units Subcutaneous Daily    cefepime  2,000 mg Intravenous Q12H    aspirin  81 mg Oral Daily    cetirizine  10 mg Oral Nightly    atorvastatin  20 mg Oral Daily    sodium chloride flush  5-40 mL Intravenous 2 times per day    heparin (porcine)  5,000 Units Subcutaneous 3 times per day    gabapentin  200 mg Oral TID    insulin lispro  0-12 Units Subcutaneous TID WC    insulin lispro  0-6 Units Subcutaneous Nightly    budesonide-formoterol  2 puff Inhalation BID    meloxicam  7.5 mg Oral BID    vancomycin (VANCOCIN) intermittent dosing (placeholder)   Other RX Placeholder    vancomycin  1,500 mg Intravenous Q24H     Continuous Infusions:    sodium chloride      sodium chloride 50 mL/hr at 06/06/21 1759    dextrose       PRN Meds: HYDROcodone-acetaminophen, sodium chloride flush, sodium chloride, ondansetron **OR** ondansetron, magnesium hydroxide, acetaminophen **OR** acetaminophen, benzonatate, glucose, dextrose, glucagon (rDNA), dextrose    Data:     Past Medical History:   has a past medical history of ALEJANDRO (acute kidney injury) ([facility] [address] Cellulitis of right foot, Charcot foot due to diabetes mellitus ([facility] [address] Diabetic [facility]urop[facility] Fractures, multiple, Hyperlipidemia, Hypertension, Leukocytosis, Neuropathy, Pain in right foot, Pneumonia, Tobacco abuse, Type II or unspecified type diabetes mellitus without mention of complication, not stated as uncontrolled, Vertigo, Wound, open, and Wound, open. Social History:   reports that he has been smoking. He has been smoking about 1.00 pack per day. He has never used smokeless tobacco. He reports previous drug use. He reports that he does not drink alcohol. Family History:   Family History   Problem Relation Age of Onset    Diabetes Mother     Cancer Father     Hypertension Maternal Grandmother        Vitals:  /69   Pulse 70   Temp 98 °F (36.7 °C) (Oral)   Resp 16   Ht 5' 11\" (1.803 m)   Wt 241 lb 6.4 oz (109.5 kg)   SpO2 96%   BMI 33.67 kg/m²   Temp (24hrs), Av.1 °F (36.7 °C), Min:97.7 °F (36.5 °C), Max:98.4 °F (36.9 °C)    Recent Labs     21  0740   POCGLU 388* 196*       I/O (24Hr):     Intake/Output Summary (Last 24 hours) at 2021 1013  Last data filed at 2021 0600  Gross per 24 hour   Intake 1080 ml   Output 1300 ml   Net -220 ml       Labs:  Hematology:  Recent Labs     21  0528   WBC 13.0* 8.5   RBC 4.51 4.13*   HGB 13.6 12.3*   HCT 40.6* 37.7*   MCV 90.0 91.3   MCH 30.2 29.8   MCHC 33.5 32.6   RDW 13.4 13.3    155   MPV 10.2 9.9   SEDRATE 37*  --    CRP 63.4*  --      Chemistry:  Recent Labs     21  0528   * 133*   K 4.7 4.6    101   CO2 20 20   GLUCOSE 275* 226*   BUN 35* 33*   CREATININE 1.92* 1.80*   ANIONGAP 12 12   LABGLOM 35* 38*   GFRAA 43* 46*   CALCIUM 9.2 8.6     Recent Labs     21  0740   POCGLU 388* 196*     ABG:No results found for: POCPH, PHART, PH, POCPCO2, ZJX0FXL, PCO2, POCPO2, PO2ART, PO2, POCHCO3, GDD4YTY, HCO3, NBEA, PBEA, BEART, BE, THGBART, THB, SDQ5SRR, VMUN8YYO, I2SWSQGR, O2SAT, FIO2  Lab Results   Component Value Date/Time    SPECIAL [facility] 2021 11:05 AM     Lab Results   Component Value Date/Time    CULTURE NO GROWTH 10 HOURS 06/06/2021 11:05 AM       Radiology:  XR FOOT RIGHT (MIN 3 VIEWS)    Result Date: 6/6/2021  Soft tissue ulceration and soft tissue swelling. Persistent osseous irregularity at the 1st and 3rd digits, similar to prior, for which osteomyelitis could be considered in the appropriate clinical setting. Charcot arthropathy again demonstrated of the midfoot. Physical Examination:        General appearance:  alert, cooperative and no distress  Mental Status:  oriented to person, place and time and normal affect  Lungs:  clear to auscultation bilaterally, normal effort  Heart:  regular rate and rhythm, no murmur  Abdomen:  soft, nontender, nondistended, normal bowel sounds, no masses, hepatomegaly, splenomegaly  Extremities: foot pain improved. Skin:  no gross lesions, rashes, induration    Assessment:        Hospital Problems         Last Modified POA    * (Principal) Osteomyelitis ([facility] [address] 6/6/2021 Yes    Essential hypertension 6/6/2021 Yes    Neuropathy 6/6/2021 Yes    Charcot foot due to diabetes mellitus ([facility] [address] 6/6/2021 Yes    Diabetic polyneuropathy associated with type 2 diabetes mellitus ([facility] [address] 6/6/2021 Yes    Tobacco abuse 6/6/2021 Yes          Plan:        1. Right foot osteomyelitis. Appreciate podiatry and ID recs. Vancomycin and cefepime. Final wound coultures still pending. mobid dose reduced due to creatinine clearance  2. Diabetes with peripheral neuropathy and CKD stage III. Insulin sliding scale. Latnus 10 units, neurontin for peripheral neuropathy  3. CAD, PVD. ASA 81 mg, Lipitor 20 mg  4. Remote history of COVID, previously treated 4 months prior. Remove isolation.  Not infectious for COVID at this time    Nava Mcfarland MD  6/7/2021  10:13 AM

## 2021-06-08 ENCOUNTER — ANESTHESIA (OUTPATIENT)
Dept: OPERATING ROOM | Age: 66
DRG: 623 | End: 2021-06-08
Payer: MEDICARE

## 2021-06-08 ENCOUNTER — ANESTHESIA EVENT (OUTPATIENT)
Dept: OPERATING ROOM | Age: 66
DRG: 623 | End: 2021-06-08
Payer: MEDICARE

## 2021-06-08 VITALS — OXYGEN SATURATION: 97 % | SYSTOLIC BLOOD PRESSURE: 107 MMHG | DIASTOLIC BLOOD PRESSURE: 58 MMHG

## 2021-06-08 LAB
ANION GAP SERPL CALCULATED.3IONS-SCNC: 10 MMOL/L (ref 9–17)
BUN BLDV-MCNC: 29 MG/DL (ref 8–23)
BUN/CREAT BLD: 15 (ref 9–20)
CALCIUM SERPL-MCNC: 8.9 MG/DL (ref 8.6–10.4)
CHLORIDE BLD-SCNC: 102 MMOL/L (ref 98–107)
CO2: 20 MMOL/L (ref 20–31)
CREAT SERPL-MCNC: 1.89 MG/DL (ref 0.7–1.2)
CULTURE: ABNORMAL
CULTURE: ABNORMAL
DIRECT EXAM: ABNORMAL
GFR AFRICAN AMERICAN: 44 ML/MIN
GFR NON-AFRICAN AMERICAN: 36 ML/MIN
GFR SERPL CREATININE-BSD FRML MDRD: ABNORMAL ML/MIN/{1.73_M2}
GFR SERPL CREATININE-BSD FRML MDRD: ABNORMAL ML/MIN/{1.73_M2}
GLUCOSE BLD-MCNC: 206 MG/DL (ref 75–110)
GLUCOSE BLD-MCNC: 207 MG/DL (ref 70–99)
GLUCOSE BLD-MCNC: 239 MG/DL (ref 75–110)
GLUCOSE BLD-MCNC: 273 MG/DL (ref 75–110)
GLUCOSE BLD-MCNC: 94 MG/DL (ref 75–110)
GLUCOSE BLD-MCNC: 96 MG/DL (ref 75–110)
Lab: ABNORMAL
POTASSIUM SERPL-SCNC: 5 MMOL/L (ref 3.7–5.3)
SODIUM BLD-SCNC: 132 MMOL/L (ref 135–144)
SPECIMEN DESCRIPTION: ABNORMAL

## 2021-06-08 PROCEDURE — 94640 AIRWAY INHALATION TREATMENT: CPT

## 2021-06-08 PROCEDURE — 28003 TREATMENT OF FOOT INFECTION: CPT | Performed by: PODIATRIST

## 2021-06-08 PROCEDURE — 3700000001 HC ADD 15 MINUTES (ANESTHESIA): Performed by: PODIATRIST

## 2021-06-08 PROCEDURE — 6370000000 HC RX 637 (ALT 250 FOR IP): Performed by: STUDENT IN AN ORGANIZED HEALTH CARE EDUCATION/TRAINING PROGRAM

## 2021-06-08 PROCEDURE — 3700000000 HC ANESTHESIA ATTENDED CARE: Performed by: PODIATRIST

## 2021-06-08 PROCEDURE — 6360000002 HC RX W HCPCS: Performed by: STUDENT IN AN ORGANIZED HEALTH CARE EDUCATION/TRAINING PROGRAM

## 2021-06-08 PROCEDURE — 97116 GAIT TRAINING THERAPY: CPT

## 2021-06-08 PROCEDURE — 6360000002 HC RX W HCPCS: Performed by: INTERNAL MEDICINE

## 2021-06-08 PROCEDURE — 3600000012 HC SURGERY LEVEL 2 ADDTL 15MIN: Performed by: PODIATRIST

## 2021-06-08 PROCEDURE — 87185 SC STD ENZYME DETCJ PER NZM: CPT

## 2021-06-08 PROCEDURE — 87075 CULTR BACTERIA EXCEPT BLOOD: CPT

## 2021-06-08 PROCEDURE — 2580000003 HC RX 258: Performed by: STUDENT IN AN ORGANIZED HEALTH CARE EDUCATION/TRAINING PROGRAM

## 2021-06-08 PROCEDURE — 80048 BASIC METABOLIC PNL TOTAL CA: CPT

## 2021-06-08 PROCEDURE — 36415 COLL VENOUS BLD VENIPUNCTURE: CPT

## 2021-06-08 PROCEDURE — 7100000001 HC PACU RECOVERY - ADDTL 15 MIN: Performed by: PODIATRIST

## 2021-06-08 PROCEDURE — 6360000002 HC RX W HCPCS: Performed by: NURSE ANESTHETIST, CERTIFIED REGISTERED

## 2021-06-08 PROCEDURE — 87070 CULTURE OTHR SPECIMN AEROBIC: CPT

## 2021-06-08 PROCEDURE — 97530 THERAPEUTIC ACTIVITIES: CPT

## 2021-06-08 PROCEDURE — 2500000003 HC RX 250 WO HCPCS: Performed by: PODIATRIST

## 2021-06-08 PROCEDURE — 2060000000 HC ICU INTERMEDIATE R&B

## 2021-06-08 PROCEDURE — 2580000003 HC RX 258: Performed by: NURSE ANESTHETIST, CERTIFIED REGISTERED

## 2021-06-08 PROCEDURE — 3600000002 HC SURGERY LEVEL 2 BASE: Performed by: PODIATRIST

## 2021-06-08 PROCEDURE — 86403 PARTICLE AGGLUT ANTBDY SCRN: CPT

## 2021-06-08 PROCEDURE — 2500000003 HC RX 250 WO HCPCS: Performed by: NURSE ANESTHETIST, CERTIFIED REGISTERED

## 2021-06-08 PROCEDURE — 7100000000 HC PACU RECOVERY - FIRST 15 MIN: Performed by: PODIATRIST

## 2021-06-08 PROCEDURE — 2580000003 HC RX 258: Performed by: INTERNAL MEDICINE

## 2021-06-08 PROCEDURE — 82947 ASSAY GLUCOSE BLOOD QUANT: CPT

## 2021-06-08 PROCEDURE — 99232 SBSQ HOSP IP/OBS MODERATE 35: CPT | Performed by: INTERNAL MEDICINE

## 2021-06-08 PROCEDURE — 97162 PT EVAL MOD COMPLEX 30 MIN: CPT

## 2021-06-08 PROCEDURE — 97166 OT EVAL MOD COMPLEX 45 MIN: CPT

## 2021-06-08 PROCEDURE — 2709999900 HC NON-CHARGEABLE SUPPLY: Performed by: PODIATRIST

## 2021-06-08 PROCEDURE — 0J9Q0ZZ DRAINAGE OF RIGHT FOOT SUBCUTANEOUS TISSUE AND FASCIA, OPEN APPROACH: ICD-10-PCS | Performed by: PODIATRIST

## 2021-06-08 PROCEDURE — 87205 SMEAR GRAM STAIN: CPT

## 2021-06-08 PROCEDURE — 87076 CULTURE ANAEROBE IDENT EACH: CPT

## 2021-06-08 PROCEDURE — 87186 SC STD MICRODIL/AGAR DIL: CPT

## 2021-06-08 RX ORDER — LIDOCAINE HYDROCHLORIDE 10 MG/ML
INJECTION, SOLUTION EPIDURAL; INFILTRATION; INTRACAUDAL; PERINEURAL PRN
Status: DISCONTINUED | OUTPATIENT
Start: 2021-06-08 | End: 2021-06-08 | Stop reason: HOSPADM

## 2021-06-08 RX ORDER — MIDAZOLAM HYDROCHLORIDE 1 MG/ML
INJECTION INTRAMUSCULAR; INTRAVENOUS PRN
Status: DISCONTINUED | OUTPATIENT
Start: 2021-06-08 | End: 2021-06-08 | Stop reason: SDUPTHER

## 2021-06-08 RX ORDER — FENTANYL CITRATE 50 UG/ML
50 INJECTION, SOLUTION INTRAMUSCULAR; INTRAVENOUS EVERY 5 MIN PRN
Status: DISCONTINUED | OUTPATIENT
Start: 2021-06-08 | End: 2021-06-08 | Stop reason: HOSPADM

## 2021-06-08 RX ORDER — FENTANYL CITRATE 50 UG/ML
INJECTION, SOLUTION INTRAMUSCULAR; INTRAVENOUS PRN
Status: DISCONTINUED | OUTPATIENT
Start: 2021-06-08 | End: 2021-06-08 | Stop reason: SDUPTHER

## 2021-06-08 RX ORDER — SODIUM CHLORIDE 9 MG/ML
INJECTION, SOLUTION INTRAVENOUS CONTINUOUS PRN
Status: DISCONTINUED | OUTPATIENT
Start: 2021-06-08 | End: 2021-06-08 | Stop reason: SDUPTHER

## 2021-06-08 RX ORDER — ONDANSETRON 2 MG/ML
4 INJECTION INTRAMUSCULAR; INTRAVENOUS
Status: DISCONTINUED | OUTPATIENT
Start: 2021-06-08 | End: 2021-06-08 | Stop reason: HOSPADM

## 2021-06-08 RX ORDER — FENTANYL CITRATE 50 UG/ML
25 INJECTION, SOLUTION INTRAMUSCULAR; INTRAVENOUS EVERY 5 MIN PRN
Status: DISCONTINUED | OUTPATIENT
Start: 2021-06-08 | End: 2021-06-08 | Stop reason: HOSPADM

## 2021-06-08 RX ORDER — LIDOCAINE HYDROCHLORIDE 20 MG/ML
INJECTION, SOLUTION EPIDURAL; INFILTRATION; INTRACAUDAL; PERINEURAL PRN
Status: DISCONTINUED | OUTPATIENT
Start: 2021-06-08 | End: 2021-06-08 | Stop reason: SDUPTHER

## 2021-06-08 RX ORDER — PROPOFOL 10 MG/ML
INJECTION, EMULSION INTRAVENOUS CONTINUOUS PRN
Status: DISCONTINUED | OUTPATIENT
Start: 2021-06-08 | End: 2021-06-08 | Stop reason: SDUPTHER

## 2021-06-08 RX ADMIN — CEFEPIME HYDROCHLORIDE 2000 MG: 2 INJECTION, POWDER, FOR SOLUTION INTRAVENOUS at 12:30

## 2021-06-08 RX ADMIN — INSULIN GLARGINE 10 UNITS: 100 INJECTION, SOLUTION SUBCUTANEOUS at 09:34

## 2021-06-08 RX ADMIN — PROPOFOL 100 MCG/KG/MIN: 10 INJECTION, EMULSION INTRAVENOUS at 16:37

## 2021-06-08 RX ADMIN — SODIUM CHLORIDE: 9 INJECTION, SOLUTION INTRAVENOUS at 12:32

## 2021-06-08 RX ADMIN — INSULIN LISPRO 2 UNITS: 100 INJECTION, SOLUTION INTRAVENOUS; SUBCUTANEOUS at 21:18

## 2021-06-08 RX ADMIN — GABAPENTIN 200 MG: 100 CAPSULE ORAL at 09:34

## 2021-06-08 RX ADMIN — HEPARIN SODIUM 5000 UNITS: 5000 INJECTION INTRAVENOUS; SUBCUTANEOUS at 21:19

## 2021-06-08 RX ADMIN — CEFAZOLIN 1000 MG: 1 INJECTION, POWDER, FOR SOLUTION INTRAMUSCULAR; INTRAVENOUS at 22:57

## 2021-06-08 RX ADMIN — MELOXICAM 7.5 MG: 7.5 TABLET ORAL at 09:33

## 2021-06-08 RX ADMIN — CEFAZOLIN 1000 MG: 1 INJECTION, POWDER, FOR SOLUTION INTRAMUSCULAR; INTRAVENOUS at 15:45

## 2021-06-08 RX ADMIN — SODIUM CHLORIDE: 9 INJECTION, SOLUTION INTRAVENOUS at 21:40

## 2021-06-08 RX ADMIN — MIDAZOLAM 2 MG: 1 INJECTION INTRAMUSCULAR; INTRAVENOUS at 16:33

## 2021-06-08 RX ADMIN — INSULIN LISPRO 4 UNITS: 100 INJECTION, SOLUTION INTRAVENOUS; SUBCUTANEOUS at 09:34

## 2021-06-08 RX ADMIN — CETIRIZINE HYDROCHLORIDE 10 MG: 10 TABLET, FILM COATED ORAL at 21:19

## 2021-06-08 RX ADMIN — ASPIRIN 81 MG: 81 TABLET, COATED ORAL at 09:34

## 2021-06-08 RX ADMIN — SILVER SULFADIAZINE: 10 CREAM TOPICAL at 09:36

## 2021-06-08 RX ADMIN — MELOXICAM 7.5 MG: 7.5 TABLET ORAL at 21:19

## 2021-06-08 RX ADMIN — Medication 50 MCG: at 16:37

## 2021-06-08 RX ADMIN — HEPARIN SODIUM 5000 UNITS: 5000 INJECTION INTRAVENOUS; SUBCUTANEOUS at 06:18

## 2021-06-08 RX ADMIN — HYDROCODONE BITARTRATE AND ACETAMINOPHEN 1 TABLET: 5; 325 TABLET ORAL at 22:57

## 2021-06-08 RX ADMIN — CEFEPIME HYDROCHLORIDE 2000 MG: 2 INJECTION, POWDER, FOR SOLUTION INTRAVENOUS at 00:16

## 2021-06-08 RX ADMIN — LIDOCAINE HYDROCHLORIDE 50 MG: 20 INJECTION, SOLUTION EPIDURAL; INFILTRATION; INTRACAUDAL; PERINEURAL at 16:33

## 2021-06-08 RX ADMIN — ATORVASTATIN CALCIUM 20 MG: 20 TABLET, FILM COATED ORAL at 21:19

## 2021-06-08 RX ADMIN — BUDESONIDE AND FORMOTEROL FUMARATE DIHYDRATE 2 PUFF: 160; 4.5 AEROSOL RESPIRATORY (INHALATION) at 10:07

## 2021-06-08 RX ADMIN — GABAPENTIN 200 MG: 100 CAPSULE ORAL at 21:19

## 2021-06-08 RX ADMIN — INSULIN LISPRO 6 UNITS: 100 INJECTION, SOLUTION INTRAVENOUS; SUBCUTANEOUS at 12:30

## 2021-06-08 RX ADMIN — SODIUM CHLORIDE: 9 INJECTION, SOLUTION INTRAVENOUS at 16:32

## 2021-06-08 ASSESSMENT — ENCOUNTER SYMPTOMS
GASTROINTESTINAL NEGATIVE: 1
ALLERGIC/IMMUNOLOGIC NEGATIVE: 1
RESPIRATORY NEGATIVE: 1
COLOR CHANGE: 1
SHORTNESS OF BREATH: 0

## 2021-06-08 ASSESSMENT — PULMONARY FUNCTION TESTS
PIF_VALUE: 1
PIF_VALUE: 0
PIF_VALUE: 0
PIF_VALUE: 1
PIF_VALUE: 0
PIF_VALUE: 1
PIF_VALUE: 0
PIF_VALUE: 0
PIF_VALUE: 1
PIF_VALUE: 0
PIF_VALUE: 1
PIF_VALUE: 1
PIF_VALUE: 0
PIF_VALUE: 1
PIF_VALUE: 1
PIF_VALUE: 0
PIF_VALUE: 1
PIF_VALUE: 1
PIF_VALUE: 0
PIF_VALUE: 1
PIF_VALUE: 0
PIF_VALUE: 1

## 2021-06-08 ASSESSMENT — PAIN SCALES - GENERAL
PAINLEVEL_OUTOF10: 7
PAINLEVEL_OUTOF10: 0
PAINLEVEL_OUTOF10: 0
PAINLEVEL_OUTOF10: 7
PAINLEVEL_OUTOF10: 0
PAINLEVEL_OUTOF10: 5
PAINLEVEL_OUTOF10: 0

## 2021-06-08 ASSESSMENT — PAIN DESCRIPTION - ORIENTATION: ORIENTATION: RIGHT

## 2021-06-08 ASSESSMENT — PAIN DESCRIPTION - LOCATION: LOCATION: BACK;FOOT

## 2021-06-08 ASSESSMENT — PAIN DESCRIPTION - PAIN TYPE: TYPE: ACUTE PAIN;CHRONIC PAIN

## 2021-06-08 ASSESSMENT — PAIN DESCRIPTION - DESCRIPTORS: DESCRIPTORS: ACHING

## 2021-06-08 NOTE — PROGRESS NOTES
Progress Note  Podiatric Medicine and Surgery     Subjective     CC: Right foot infection    Patient seen and examined at bedside. Pain under control   Afebrile, vital signs stable       HPI :  Jeannine Cochran is a 60 y.o. male seen at [phone] for a wound on the right foot is well-known to Dr. Elliot Viera. On 5/05 patient presented to the ED because he was bitten by a dog. At that time was given a tetanus shot and placed on Keflex. The following day on 5/06/2021 was seen by podiatry in the ED after stepping on a nail to the right foot. In addition to the Keflex he is already taking patient was prescribed Cipro. At this time patient states he is no longer taking any antibiotics but presents because the right foot has become increasingly swollen, red, and painful with drainage. He denies any constitutional symptoms at this time. Of note, patient has history of Charcot arthropathy but does not wear custom molded shoes.     PCP is Alex Flowers MD    ROS: Denies N/V/F/C/SOB/CP/Cough.        Medications:  Scheduled Meds:   insulin glargine  10 Units Subcutaneous Daily    silver sulfADIAZINE   Topical Daily    cefepime  2,000 mg Intravenous Q12H    aspirin  81 mg Oral Daily    cetirizine  10 mg Oral Nightly    atorvastatin  20 mg Oral Daily    sodium chloride flush  5-40 mL Intravenous 2 times per day    heparin (porcine)  5,000 Units Subcutaneous 3 times per day    gabapentin  200 mg Oral TID    insulin lispro  0-12 Units Subcutaneous TID WC    insulin lispro  0-6 Units Subcutaneous Nightly    budesonide-formoterol  2 puff Inhalation BID    meloxicam  7.5 mg Oral BID    vancomycin (VANCOCIN) intermittent dosing (placeholder)   Other RX Placeholder    vancomycin  1,500 mg Intravenous Q24H       Continuous Infusions:   sodium chloride      sodium chloride 50 mL/hr at 06/06/21 1759    dextrose         PRN Meds:HYDROcodone-acetaminophen, sodium chloride flush, sodium chloride, ondansetron **OR** periwound. Compartments are soft and compressible.      Dermatologic: Open wound to right medial foot that measures approximately 0.5 cm x 0.5 cm x 0.5 cm with undermining proximally. The wound base is granular with erythematous periwound skin. Seropurulent drainage is noted with mild associated malodor. There is erythema periwound that tracks proximally towards the leg with associated increase in warmth. Does not probe to bone or sinus tract. Undermines proximally toward tarsal tunnel region. No crepitus or induration. Fluctuance noted. Interdigital maceration absent. Imaging:   MRI FOOT RIGHT WO CONTRAST   Final Result   1. Plantar ulceration of the medial soft tissues of the foot at the level of   the midfoot with underlying T2 hyperintense collection measuring   approximately 2.3 x 3.1 x 1.2 cm which is most suggestive of abscess versus   phlegmon. 2. No osteomyelitis in the region of the midfoot. 3. Chronic changes of Charcot arthropathy. 4. Chronically eroded appearance of the distal aspect of the proximal 1st   phalanx with patchy edema of the great toe in the region of the   interphalangeal joint as well as small interphalangeal joint effusion. While   findings could be related to remote trauma or remote infection at this site,   superimposed acute osteomyelitis of the great toe difficult to exclude. Correlate clinically to exclude the possibility of any soft tissue wounds of   the great toe. In the setting of adjacent soft tissue wound, infection would   be more likely. XR FOOT RIGHT (MIN 3 VIEWS)   Final Result   Soft tissue ulceration and soft tissue swelling. Persistent osseous   irregularity at the 1st and 3rd digits, similar to prior, for which   osteomyelitis could be considered in the appropriate clinical setting. Charcot arthropathy again demonstrated of the midfoot.              Cultures:   Wound Cx, 6/06: MSSA    Assessment   Daniel Felix is a 60 y.o. male with 1. Diabetic foot ulcer to level of subcutaneous, right  2. Recent hx of puncture wound, right foot   3. Cellulitis, RLE  4. 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 Hx of Charcot Arthropathy     Principal Problem:    Osteomyelitis ([facility] [address]  Active Problems:    Essential hypertension    Neuropathy    Charcot foot due to diabetes mellitus ([facility] [address]    Diabetic polyneuropathy associated with type 2 diabetes mellitus ([facility] [address]    Tobacco abuse  Resolved Problems:    * No resolved hospital problems. *       Plan     · Patient examined and evaluated at bedside. · Treatment options discussed in detail with the patient. · MRI reviewed and shows abscess adjacent to wound with possible osteomyelitis of the hallux. Discussed with patient that he will benefit from a formal I&D in the OR today. Patient understands and is agreeable. · Continue medical management per Internal Medicine. · ID on board   · Abx: Ancef  · Dressing applied to Right foot: Packed with 1/4 iodoform, betadine, DSD, Ace. · Heel-touch WB to Right lower extremity in surgical shoe. · Plan is for OR today, p.m.  · NPO now  · Hold AC  · Consented   · Covid vaccinated  · Discussed with  Dr. Willy Pichardo.     Raquel Zepeda DPM   Podiatric Medicine & Surgery   6/8/2021 at 10:02 AM

## 2021-06-08 NOTE — PLAN OF CARE
Problem: Falls - Risk of:  Goal: Will remain free from falls  Description: Will remain free from falls  6/8/2021 1510 by Verner Spell, RN  Outcome: Ongoing  Siderails up x 2  Hourly rounding. Call light in reach. Instructed to call for assist before attempting out of bed. Remains free from falls and accidental injury at this time. Floor free from obstacles, and bed is locked and in lowest position. Adequate lighting provided. Bed alarm on. Fall sticker on wristband.  Fall Sign posted in doorway       Problem: Falls - Risk of:  Goal: Absence of physical injury  Description: Absence of physical injury  Outcome: Ongoing     Problem: Pain:  Goal: Pain level will decrease  Description: Pain level will decrease  6/8/2021 1510 by Verner Spell, RN  Outcome: Ongoing     Problem: Pain:  Goal: Control of acute pain  Description: Control of acute pain  6/8/2021 1510 by Verner Spell, RN  Outcome: Ongoing     Problem: Pain:  Goal: Control of chronic pain  Description: Control of chronic pain  Outcome: Ongoing     Problem: Discharge Planning:  Goal: Discharged to appropriate level of care  Description: Discharged to appropriate level of care  Outcome: Ongoing     Problem: Serum Glucose Level - Abnormal:  Goal: Ability to maintain appropriate glucose levels will improve  Description: Ability to maintain appropriate glucose levels will improve  Outcome: Ongoing     Problem: Sensory Perception - Impaired:  Goal: Ability to maintain a stable neurologic state will improve  Description: Ability to maintain a stable neurologic state will improve  Outcome: Ongoing

## 2021-06-08 NOTE — PROGRESS NOTES
[facility]  Office: [address], DO, Sara Watson, DO, Yesenia Garrett, DO, Haley Dominguez Blood, DO, Thiago Call MD, Ottoniel Mooney MD, Olimpia De Santiago MD, Lillie Wing MD, Veronica Rodriguez MD, Thaddeus Wilkins MD, Chao Caputo MD, Mildred Adkins MD, Bridget Armijo, DO, Yajaira Puga MD, Edith Farrar, DO, Hans Paz MD,  Florence Clifton, DO, Blaze Marcial MD, Kristan Mendoza MD, Jean Abdullahi MD, Anatoliy Short MD, Rachana Hines, [facility], [facility], CNP, Georges Thornton, CNP, Brayan Bell, CNS, Shruti Lubin, CNP, Ariana Scruggs, CNP, Guille Ambrocio, CNP, Estrella Pastor, CNP, Jagjit Newsome, CNP, LETY TinocoC, Marielena George, [facility], Chikis Andres, CNP, Susana Pearson, [facility], Howard Guthrie, CNP, Marco Sherwood, CNP, Willis Johnson, [facility], [facility], [facility]    Progress Note    6/8/2021    2:36 PM    Name:   Daphnie Barillas  MRN:     [id number]     [place]:      [de-identified]   Room:   [address] [place] [address] Day:  2  Admit Date:  6/6/2021 10:33 AM    PCP:   Donis Power MD  Code Status:  Full Code    Subjective:     C/C:   Chief Complaint   Patient presents with    Foot Pain     Interval History Status: improved. Patient reports decreased pain and erythema of the foot, continued purulent drainage. Denies any chest pain, fevers chills, nausea vomiting or complaints. Overall feels much better    Brief History: This is a 36-year-old male with a past medical history of diabetes with diabetic neuropathy that presents with foot pain with erythema and swelling. He is found to have fluid collection which was drained at the bedside. He underwent MRI to evaluate for possible osteomyelitis. MRI did not show evidence of osteomyelitis but did show continued fluid collection concerning for continued abscess.     Review of Systems:     Constitutional:  negative for chills, fevers, sweats  Respiratory:  negative for cough, dyspnea on exertion, shortness of breath, wheezing  Cardiovascular:  negative for chest pain, chest pressure/discomfort, lower extremity edema, palpitations  Gastrointestinal:  negative for abdominal pain, constipation, diarrhea, nausea, vomiting  Neurological:  negative for dizziness, headache    Medications: Allergies:     Allergies   Allergen Reactions    Morphine Itching    Percocet [Oxycodone-Acetaminophen]      Facial swelling    Dye [Iodides] Rash     Rash and swelling       Current Meds:   Scheduled Meds:    ceFAZolin  1,000 mg Intravenous Q8H    insulin glargine  10 Units Subcutaneous Daily    silver sulfADIAZINE   Topical Daily    aspirin  81 mg Oral Daily    cetirizine  10 mg Oral Nightly    atorvastatin  20 mg Oral Daily    sodium chloride flush  5-40 mL Intravenous 2 times per day    heparin (porcine)  5,000 Units Subcutaneous 3 times per day    gabapentin  200 mg Oral TID    insulin lispro  0-12 Units Subcutaneous TID WC    insulin lispro  0-6 Units Subcutaneous Nightly    budesonide-formoterol  2 puff Inhalation BID    meloxicam  7.5 mg Oral BID     Continuous Infusions:    sodium chloride      sodium chloride 50 mL/hr at 06/08/21 1232    dextrose       PRN Meds: HYDROcodone-acetaminophen, sodium chloride flush, sodium chloride, ondansetron **OR** ondansetron, magnesium hydroxide, acetaminophen **OR** acetaminophen, benzonatate, glucose, dextrose, glucagon (rDNA), dextrose    Data:     Past Medical History:   has a past medical history of ALEJANDRO (acute kidney injury) ([facility] [address] Cellulitis of right foot, Ch[facility] Diabetic polyneuropathy associated with type 2 diabetes mellitus ([facility] Fractures, multiple, Hyperlipidemia, Hypertension, Leukocytosis, Neuropathy, Pain in right foot, Pneumonia, Tobacco abuse, Type II or unspecified type diabetes mellitus without mention of complication, not stated as uncontrolled, Vertigo, Wound, open, and Wound, open.    Social History:   reports that he has been smoking. He has been smoking about 1.00 pack per day. He has never used smokeless tobacco. He reports previous drug use. He reports that he does not drink alcohol. Family History:   Family History   Problem Relation Age of Onset    Diabetes Mother     Cancer Father     Hypertension Maternal Grandmother        Vitals:  /60   Pulse 71   Temp 98.2 °F (36.8 °C) (Oral)   Resp 18   Ht 5' 11\" (1.803 m)   Wt 239 lb 3 oz (108.5 kg)   SpO2 96%   BMI 33.36 kg/m²   Temp (24hrs), Av.1 °F (36.7 °C), Min:97.5 °F (36.4 °C), Max:98.4 °F (36.9 °C)    Recent Labs     21  1654 21  0713 21  1139   POCGLU 292* 350* 206* 273*       I/O (24Hr):     Intake/Output Summary (Last 24 hours) at 2021 1436  Last data filed at 2021 1424  Gross per 24 hour   Intake 705 ml   Output 4900 ml   Net -4195 ml       Labs:  Hematology:  Recent Labs     21  1105 21  0528   WBC 13.0* 8.5   RBC 4.51 4.13*   HGB 13.6 12.3*   HCT 40.6* 37.7*   MCV 90.0 91.3   MCH 30.2 29.8   MCHC 33.5 32.6   RDW 13.4 13.3    155   MPV 10.2 9.9   SEDRATE 37*  --    CRP 63.4*  --      Chemistry:  Recent Labs     21  1105 21  0528 21  0453   * 133* 132*   K 4.7 4.6 5.0    101 102   CO2 20 20 20   GLUCOSE 275* 226* 207*   BUN 35* 33* 29*   CREATININE 1.92* 1.80* 1.89*   ANIONGAP 12 12 10   LABGLOM 35* 38* 36*   GFRAA 43* 46* 44*   CALCIUM 9.2 8.6 8.9     Recent Labs     21  0740 21  1125 21  1654 21  2003 21  0713 21  1139   POCGLU 196* 188* 292* 350* 206* 273*     ABG:No results found for: POCPH, PHART, PH, POCPCO2, CTA5ULD, PCO2, POCPO2, PO2ART, PO2, POCHCO3, KHG7CYI, HCO3, NBEA, PBEA, BEART, BE, THGBART, THB, [id number], SFLG6TPB, R0LMOXQQ, O2SAT, FIO2  Lab Results   Component Value Date/Time    SPECIAL LTAC 2021 11:05 AM     Lab Results   Component Value Date/Time    CULTURE NO GROWTH 2 DAYS 06/06/2021 11:05 AM       Radiology:  XR FOOT RIGHT (MIN 3 VIEWS)    Result Date: 6/6/2021  Soft tissue ulceration and soft tissue swelling. Persistent osseous irregularity at the 1st and 3rd digits, similar to prior, for which osteomyelitis could be considered in the appropriate clinical setting. Charcot arthropathy again demonstrated of the midfoot. MRI FOOT RIGHT WO CONTRAST    Result Date: 6/7/2021  1. Plantar ulceration of the medial soft tissues of the foot at the level of the midfoot with underlying T2 hyperintense collection measuring approximately 2.3 x 3.1 x 1.2 cm which is most suggestive of abscess versus phlegmon. 2. No osteomyelitis in the region of the midfoot. 3. Chronic changes of Charcot arthropathy. 4. Chronically eroded appearance of the distal aspect of the proximal 1st phalanx with patchy edema of the great toe in the region of the interphalangeal joint as well as small interphalangeal joint effusion. While findings could be related to remote trauma or remote infection at this site, superimposed acute osteomyelitis of the great toe difficult to exclude. Correlate clinically to exclude the possibility of any soft tissue wounds of the great toe. In the setting of adjacent soft tissue wound, infection would be more likely.        Physical Examination:        General appearance:  alert, cooperative and no distress  Mental Status:  oriented to person, place and time and normal affect  Lungs:  clear to auscultation bilaterally, normal effort  Heart:  regular rate and rhythm, no murmur  Abdomen:  soft, nontender, nondistended, normal bowel sounds, no masses, hepatomegaly, splenomegaly  Extremities:  no edema, redness, tenderness in the calves  Skin:  no gross lesions, rashes, induration    Assessment:        Hospital Problems         Last Modified POA    * (Principal) Diabetic infection of right foot ([facility] [address] 6/8/2021 Yes    Essential hypertension 6/6/2021 Yes    Neuropathy 6/6/2021 Yes    Charcot foot due to diabetes mellitus ([facility] [address] 6/6/2021 Yes    Diabetic polyneuropathy associated with type 2 diabetes mellitus [facility] 6/6/2021 Yes    Tobacco abuse 6/6/2021 Yes    Osteomyelitis ([facility] 6/8/2021 Yes          Plan:        1. Continue antibiotics per ID  2. Further I&D per podiatry recommendations  3. Antihypertensives as ordered  4. Monitoring control blood sugar, insulin scale  5. GI and DVT prophylaxis  6. Continue local wound care  7.  Tobacco cessation    Krystal Wright DO  6/8/2021  2:36 PM

## 2021-06-08 NOTE — PROGRESS NOTES
Physical Therapy    Facility/Department: [facility] PROGRESSIVE CARE  Initial Assessment    NAME: Chris Colunga  : 1955  MRN: [id number]    Date of Service: 2021    Discharge Recommendations:         PER HPI:  58-year-old male past medical history of hypertension, diabetes, diabetic neuropathy, tobacco use, multiple episodes of cellulitis and infections of his right foot presents after stepping on a nail approximately 2 weeks ago, patient noticed bloody drainage starting Friday and has continued to worsen and decided to present to the ER afterwards. Willem Aguilar denies any fevers or chills, does admit to generalized malaise. Assessment   Body structures, Functions, Activity limitations: Decreased functional mobility ; Decreased balance;Decreased sensation;Decreased endurance  Assessment: Skilled therapy needed to maximize independence with functional mobility, improve balance and overall safety. Prognosis: Good  Decision Making: Medium Complexity  Exam: AROM, strength, endurance, balance, mobility, AM-PAC  Clinical Presentation: evolving  PT Education: Goals;Precautions;PT Role;Plan of Care;Gait Training;Transfer Training;Weight-bearing Education;General Safety  REQUIRES PT FOLLOW UP: Yes  Activity Tolerance  Activity Tolerance: Patient Tolerated treatment well       Patient Diagnosis(es): The encounter diagnosis was Osteomyelitis of right foot, unspecified type ([address] has a past medical history of ALEJANDRO (acute kidney injury) ([address] Cellulitis of right foot, Charcot foot due to diabetes mellitus ([address] Diabetic polyneuropathy associated with type 2 diabetes mellitus ([address] Fractures, multiple, Hyperlipidemia, Hypertension, Leukocytosis, Neuropathy, Pain in right foot, Pneumonia, Tobacco abuse, Type II or unspecified type diabetes mellitus without mention of complication, not stated as uncontrolled, Vertigo, Wound, open, and Wound, open.    has a past surgical history that includes Neck surgery (); Appendectomy; knee surgery (Bilateral, 1983); Knee arthroscopy (Right, 1989); Knee arthroscopy (Left, 1990); Foot Debridement (Left, 04/24/2018); pr deep dissec foot infec,1 bursa (Left, 4/24/2018); Colonoscopy; Foot Debridement (Right, 3/20/2020); and arthroplasty (Right, 12/11/2020).     Restrictions  Restrictions/Precautions  Restrictions/Precautions: General Precautions, Up as Tolerated, Weight Bearing  Lower Extremity Weight Bearing Restrictions  Right Lower Extremity Weight Bearing:  (Heel touch weightbearing with surgical shoe)  Position Activity Restriction  Other position/activity restrictions: up with assist, up as venkata, RUE IV  Vision/Hearing  Vision: Impaired  Vision Exceptions: Wears glasses at all times  Hearing: Within functional limits     Subjective  General  Chart Reviewed: Yes  Patient assessed for rehabilitation services?: Yes  Family / Caregiver Present: No  Follows Commands: Impaired  General Comment  Comments: Elio rAevalo RN states patient approrpriate for therapy  Subjective  Subjective: patient agreeable to work with therapy  Pain Screening  Patient Currently in Pain: Yes          Orientation  Orientation  Overall Orientation Status: Within Functional Limits  Social/Functional History  Social/Functional History  Lives With: Spouse, Family (grandson (23), wife works full time)  Type of Home: House  Home Layout: One level  Home Access: Stairs to enter without rails, Stairs to enter with rails  Entrance Stairs - Number of Steps: 1-2  Entrance Stairs - Rails: Both  Bathroom Shower/Tub: Tub/Shower unit  Bathroom Toilet: Standard  Bathroom Equipment: Grab bars in shower, Shower chair  Home Equipment: Rolling walker, 4 wheeled walker, Cane (knee scooter)  ADL Assistance: Independent  Homemaking Assistance: Independent (share household chores)  Homemaking Responsibilities: Yes  Ambulation Assistance: Independent  Transfer Assistance: Independent  Active : Yes  Occupation: Retired  Type of occupation: hauling scrap metal  Leisure & Hobbies: mowing lawns, hanging out with grand children  Additional Comments: Pt denies any recent falls. Pt states he steped on a nails, it stayed in his shoe all day so it puntured his foot multiple times  Cognition   Cognition  Overall Cognitive Status: Exceptions  Following Commands: Follows one step commands with repetition  Attention Span: Appears intact  Memory: Appears intact  Safety Judgement: Decreased awareness of need for assistance;Decreased awareness of need for safety  Problem Solving: Assistance required to identify errors made  Insights: Decreased awareness of deficits  Initiation: Requires cues for some    Objective     Observation/Palpation  Observation: Ace wrap on R foot  Edema: none    AROM RLE (degrees)  RLE AROM: WFL  AROM LLE (degrees)  LLE AROM : WFL  AROM RUE (degrees)  RUE General AROM: refer to OT  AROM LUE (degrees)  LUE General AROM: refer to OT  Strength RLE  Strength RLE: WFL  Strength LLE  Strength LLE: WFL  Strength RUE  Comment: refer to OT  Strength LUE  Comment: refer to OT  Motor Control  Gross Motor?: WFL  Coordination  Heel to Shin: Normal  Sensation  Overall Sensation Status: Impaired (Neuropathy in B feet constantly, intermittently in B hands)  Bed mobility  Supine to Sit: Unable to assess  Sit to Supine: Unable to assess  Comment: patient sitting up in chair and wished to remain up in chair  Transfers  Sit to Stand: Stand by assistance  Stand to sit: Stand by assistance  Comment: cues for safety  Ambulation  Ambulation?: Yes  More Ambulation?: No  Ambulation 1  Surface: level tile  Device: No Device  Other Apparatus:  (R surgical shoe)  Assistance: Minimal assistance  Quality of Gait: impulsive  Gait Deviations: Decreased step length; Slow Kaela  Distance: 200'  Comments: patient initially stood and started walking without sock on L foot, had patient sit down and explained the importance of having proper footwear on with having neuropathy. Patient encoruaged to take short steps to avoid rocking onto to front of R foot. Occasionally would put pressure on front of  R foot. Balance  Posture: Good  Sitting - Static: Good  Sitting - Dynamic: Good  Standing - Static: Fair;+  Standing - Dynamic: [address]  Times per week: 1-2x/day for 5-6 days/week  Current Treatment Recommendations: Transfer Training, Endurance Training, Patient/Caregiver Education & Training, Balance Training, Gait Training, Safety Education & Training  Safety Devices  Type of devices: Call light within reach, Left in chair, Gait belt      AM-PAC Score  AM-PAC Inpatient Mobility Raw Score : 20 (06/08/21 1046)  AM-PAC Inpatient T-Scale Score : 47.67 (06/08/21 1046)  Mobility Inpatient CMS 0-100% Score: 35.83 (06/08/21 1046)  Mobility Inpatient CMS G-Code Modifier : Lakesha Patel (06/08/21 1046)          Goals  Short term goals  Time Frame for Short term goals: 10 visits  Short term goal 1: Independent bed mobility  Short term goal 2:  Independent sit<>stand  Short term goal 3: Patient to ambulate 300' without device L heel weight bearing with surgical shoe Independent  Patient Goals   Patient goals : to go home       Therapy Time   Individual Concurrent Group Co-treatment   Time In 2097         Time Out 0902 (additional 10 minutes for chart review)         Minutes 29+10=39                 Bryce Borden, PT

## 2021-06-08 NOTE — ANESTHESIA PRE PROCEDURE
Darrin Aleman  mL/hr at 06/08/21 1545 1,000 mg at 06/08/21 1545    insulin glargine (LANTUS) injection vial 10 Units  10 Units Subcutaneous Daily Almaz Painting MD   10 Units at 06/08/21 0934    silver sulfADIAZINE (SILVADENE) 1 % cream   Topical Daily Almaz Painting MD   Given at 06/08/21 0936    aspirin EC tablet 81 mg  81 mg Oral Daily Almaz Painting MD   81 mg at 06/08/21 0934    cetirizine (ZYRTEC) tablet 10 mg  10 mg Oral Nightly Almaz Painting MD   10 mg at 06/07/21 2132    HYDROcodone-acetaminophen (NORCO) 5-325 MG per tablet 1 tablet  1 tablet Oral Q4H PRN Almaz Painting MD   1 tablet at 06/07/21 2228    atorvastatin (LIPITOR) tablet 20 mg  20 mg Oral Daily Almaz Painting MD   20 mg at 06/07/21 2133    sodium chloride flush 0.9 % injection 5-40 mL  5-40 mL Intravenous 2 times per day Almaz Painting MD        sodium chloride flush 0.9 % injection 5-40 mL  5-40 mL Intravenous PRN Almaz Painting MD        0.9 % sodium chloride infusion  25 mL Intravenous PRN Almaz Painting MD        ondansetron (ZOFRAN-ODT) disintegrating tablet 4 mg  4 mg Oral Q8H PRN Almaz Painting MD        Or    ondansetron (ZOFRAN) injection 4 mg  4 mg Intravenous Q6H PRN Almaz Painting MD        magnesium hydroxide (MILK OF MAGNESIA) 400 MG/5ML suspension 30 mL  30 mL Oral Daily PRN Almaz Painting MD        acetaminophen (TYLENOL) tablet 650 mg  650 mg Oral Q6H PRN Almaz Painting MD        Or    acetaminophen (TYLENOL) suppository 650 mg  650 mg Rectal Q6H PRN Almaz Painting MD        0.9 % sodium chloride infusion   Intravenous Continuous Almaz Painting MD 50 mL/hr at 06/08/21 1232 New Bag at 06/08/21 1232    heparin (porcine) injection 5,000 Units  5,000 Units Subcutaneous 3 times per day Almaz Paniting MD   5,000 Units at 06/08/21 0618    benzonatate (TESSALON) capsule 100 mg  100 mg Oral TID PRN Almaz Painting MD   100 mg at 06/07/21 1539    gabapentin (NEURONTIN) capsule 200 mg  200 mg Oral TID Johnna Singh MD   200 mg at 06/08/21 0934    insulin lispro (HUMALOG) injection vial 0-12 Units  0-12 Units Subcutaneous TID WC Johnna Singh MD   6 Units at 06/08/21 1230    insulin lispro (HUMALOG) injection vial 0-6 Units  0-6 Units Subcutaneous Nightly Johnna Singh MD   5 Units at 06/07/21 2132    glucose (GLUTOSE) 40 % oral gel 15 g  15 g Oral PRN Johnna Singh MD        dextrose 50 % IV solution  12.5 g Intravenous PRN Johnna Singh MD        glucagon (rDNA) injection 1 mg  1 mg Intramuscular PRN Johnna Singh MD        dextrose 5 % solution  100 mL/hr Intravenous PRN Johnna Singh MD        budesonide-formoterol (SYMBICORT) 160-4.5 MCG/ACT inhaler 2 puff  2 puff Inhalation BID Johnna Singh MD   2 puff at 06/08/21 1007    meloxicam (MOBIC) tablet 7.5 mg  7.5 mg Oral BID Johnna Singh MD   7.5 mg at 06/08/21 0933       Allergies: Allergies   Allergen Reactions    Morphine Itching    Percocet [Oxycodone-Acetaminophen]      Facial swelling    Dye [Iodides] Rash     Rash and swelling       Problem List:    Patient Active Problem List   Diagnosis Code    Essential hypertension I10    Type II or unspecified type diabetes mellitus without mention of complication, not stated as uncontrolled E11.9    Neuropathy G62.9    Vertigo R44    Charcot foot due to diabetes mellitus ([facility] E11.610    Hyperlipidemia E78.5    Cellulitis L03.90    Cellulitis of left foot L03. 80    Right foot infection L08.9    Diabetic polyneuropathy associated with type 2 diabetes mellitus ([facility] E11.42    Foreign body (FB) in soft tissue M79.5    Cellulitis of left leg L03. 116    Abscess of right foot L02.611    Chest pain at rest R07.9    Tobacco abuse Z72.0    Well controlled type 2 diabetes mellitus with neurological manifestations (HCC) E11.49    ALEJANDRO (acute kidney injury) ([facility] N17.9 LABABO, [address]    Drug/Infectious Status (If Applicable):  No results found for: HIV, HEPCAB    COVID-19 Screening (If Applicable):   Lab Results   Component Value Date    COVID19 DETECTED 05/26/2021    COVID19 Not Detected 12/07/2020    COVID19 Not Detected 08/08/2020           Anesthesia Evaluation    Airway: Mallampati: I  TM distance: >3 FB   Neck ROM: full  Mouth opening: > = 3 FB Dental:          Pulmonary:       (-) shortness of breath                           Cardiovascular:    (+) hypertension:,     (-)  angina            Stress test reviewed                Neuro/Psych:               GI/Hepatic/Renal:   (+) renal disease: CRI,           Endo/Other:    (+) Diabetes, . Abdominal:           Vascular:                                        Anesthesia Plan      MAC     ASA 3             Anesthetic plan and risks discussed with patient.                       Daniel Lombarod MD   6/8/2021

## 2021-06-08 NOTE — ANESTHESIA POSTPROCEDURE EVALUATION
Department of Anesthesiology  Postprocedure Note    Patient: Arelis Pierre  MRN: [id number]  YOB: 1955  Date of evaluation: 6/8/2021  Time:  7:45 PM     Procedure Summary     Date: 06/08/21 Room / Location: [address] - INPATIENT    Anesthesia Start: 2522 Anesthesia Stop: 0333    Procedure: FOOT DEBRIDEMENT INCISION AND DRAINAGE (Right Foot) Diagnosis: (OSTEOMYELITIS)    Surgeons: Dolores Rivas DPM Responsible Provider: Duayne Sandifer, MD    Anesthesia Type: MAC ASA Status: 3          Anesthesia Type: MAC    Amy Phase I: Amy Score: 10    Amy Phase II:      Last vitals: Reviewed and per EMR flowsheets.        Anesthesia Post Evaluation    Complications: no

## 2021-06-08 NOTE — PROGRESS NOTES
[facility]  Progress Note    Chris Colunga  MRN: [id number]  Date: 6/8/2021  LOS: 2     Reason for F/U :   Diabetic foot infection    Impression :   1. Diabetes mellitus with associated diabetic neuropathy and Charcot deformity in the right foot. 2. Recent puncturewound to the plantar aspect of the right foot with associated osteomyelitis  3. Cellulitis of the right lower extremity worsening in the medial aspect of the foot    Recommendations:   · The culture has had a heavy growth of methicillin susceptible Staph aureus. · I will continue antimicrobial therapy but switch to cefazolin and stop the cefepime and vancomycin. · The MRI of the foot does show an abscess and the patient is scheduled for I&D as he continues to have purulent drainage as well as cellulitic changes of the medial aspect of the foot    Infection Control Recommendations:   Universal precautions    Discharge Planning:   Estimated Length of IV antimicrobials: To be determined  Patient will need Midline Catheter Insertion/ PICC line Insertion: No  Patient will need: Home IV , [place],  SNF,  LTAC: Undetermined  Patient willneed outpatient wound care: No    Medical Decision making / Summary of Stay:   Chris Colunga is a 60y.o.-year-old male who was initially admitted on 6/6/2021. Aracelis Harper is seen and evaluated at bedside he tells me that he recently sustained a dog bite to his right arm on May 5, 2021 and he was seen in the urgent care and was given a tetanus shot and also was prescribed Keflex for that. He does report that the following day on 5/6/2021 he stepped on a nail in his right foot and he was seen by Dr. Valorie Keith from podiatry and levofloxacin was added to his antibiotics regimen. The patient reports that he completed the course of antibiotics last week on Wednesday. The patient is an underlying diabetic has a Charcot arthropathy and also has diabetic neuropathy.   He does wear custom molded shoes and he reports that on Friday he noticed some redness in the foot and it was more so a red streak going up to his ankle. As the weekend progressed the foot started to swell up with increasing redness and pain but no associated fevers or chills. He came into the emergency room for evaluation and was found to have a soft tissue ulceration soft tissue swelling. The patient was admitted started on antimicrobial therapy with cefepime and vancomycin. The patient was seen by the podiatry service and an MRI of the right lower extremity was ordered to rule out a deep abscess/osteomyelitis. I was asked to evaluate and help with antibiotic choice. Current evaluation:2021    /60   Pulse 71   Temp 98.2 °F (36.8 °C) (Oral)   Resp 18   Ht 5' 11\" (1.803 m)   Wt 239 lb 3 oz (108.5 kg)   SpO2 96%   BMI 33.36 kg/m²     Temperature Range: Temp: 98.2 °F (36.8 °C) Temp  Av °F (36.7 °C)  Min: 97.5 °F (36.4 °C)  Max: 98.4 °F (36.9 °C)  The patient is seen and evaluated at bedside and has worsening foot changes and he does not report any subjective fevers or chills. No abdominal pain nausea vomiting. Review of Systems   Constitutional: Negative. Respiratory: Negative. Cardiovascular: Negative. Gastrointestinal: Negative. Genitourinary: Negative. Musculoskeletal: Negative. Skin: Positive for color change and wound. Allergic/Immunologic: Negative. Neurological: Negative. Physical Examination :     Physical Exam  Constitutional:       Appearance: He is well-developed. HENT:      Head: Normocephalic and atraumatic. Cardiovascular:      Rate and Rhythm: Normal rate. Heart sounds: Normal heart sounds. No friction rub. No gallop. Pulmonary:      Effort: Pulmonary effort is normal.      Breath sounds: Normal breath sounds. No wheezing. Abdominal:      General: Bowel sounds are normal.      Palpations: Abdomen is soft. There is no mass. Tenderness:  There is no abdominal tenderness. Musculoskeletal:      Cervical back: Normal range of motion and neck supple. Comments: The foot does have worsening erythema in the medial aspect of the foot and there is still a deep plantar wound with purulent drainage and there is some dorsal foot cellulitis   Lymphadenopathy:      Cervical: No cervical adenopathy. Skin:     General: Skin is warm and dry. Neurological:      Mental Status: He is alert and oriented to person, place, and time. Laboratory data:   I have independently reviewed the followinglabs:  CBC with Differential:   Recent Labs     06/06/21  1105 06/07/21  0528   WBC 13.0* 8.5   HGB 13.6 12.3*   HCT 40.6* 37.7*    155   LYMPHOPCT 17* 26   MONOPCT 5 6     BMP:   Recent Labs     06/07/21  0528 06/08/21  0453   * 132*   K 4.6 5.0    102   CO2 20 20   BUN 33* 29*   CREATININE 1.80* 1.89*     Hepatic Function Panel: No results for input(s): PROT, LABALBU, BILIDIR, IBILI, BILITOT, ALKPHOS, ALT, AST in the last 72 hours. No results found for: PROCAL  Lab Results   Component Value Date    CRP 63.4 06/06/2021    CRP 84.4 08/04/2018     Lab Results   Component Value Date    SEDRATE 37 (H) 06/06/2021         No results found for: DDIMER  No results found for: FERRITIN  No results found for: LDH  No results found for: FIBRINOGEN    Results in Past 30 Days  Result Component Current Result Ref Range Previous Result Ref Range   SARS-CoV-2, Rapid DETECTED (A) (5/26/2021) Not Detected Not in Time Range      Lab Results   Component Value Date    COVID19 DETECTED 05/26/2021    COVID19 Not Detected 12/07/2020    COVID19 Not Detected 08/08/2020       No results for input(s): [facility] in the last 72 hours. Imaging Studies:   MRI OF THE RIGHT FOOT WITHOUT CONTRAST, 6/7/2021 2:39 pm  Impression   1.  Plantar ulceration of the medial soft tissues of the foot at the level of   the midfoot with underlying T2 hyperintense collection measuring SUSCEPTIBLE   oxacillin Sensitive This staph isolate may be susceptible to Penicillin. Please contact Microbiology for confirmatory testing of susceptibility if Pencillin is a therapeutic consideration. Final    Synercid   Final     NOT REPORTED    rifampin   Final     NOT REPORTED    tetracycline Sensitive  Final     <=1   SUSCEPTIBLE   tigecycline   Final     NOT REPORTED    trimethoprim-sulfamethoxazole Sensitive  Final     <=10   SUSCEPTIBLE   vancomycin   Final     NOT REPORTED        Culture, Blood 1 [[id number]] Collected: 06/06/21 1105   Order Status: Completed Specimen: Blood Updated: 06/08/21 0007    Specimen Description . BLOOD    Special Requests LTAC    Culture NO GROWTH 2 DAYS   Culture, Blood 1 [[id number]] Collected: 06/06/21 1058   Order Status: Completed Specimen: Blood Updated: 06/08/21 0006    Specimen Description . BLOOD    Special Requests LTAC    Culture NO GROWTH 2 DAYS         Medications:      insulin glargine  10 Units Subcutaneous Daily    silver sulfADIAZINE   Topical Daily    cefepime  2,000 mg Intravenous Q12H    aspirin  81 mg Oral Daily    cetirizine  10 mg Oral Nightly    atorvastatin  20 mg Oral Daily    sodium chloride flush  5-40 mL Intravenous 2 times per day    heparin (porcine)  5,000 Units Subcutaneous 3 times per day    gabapentin  200 mg Oral TID    insulin lispro  0-12 Units Subcutaneous TID WC    insulin lispro  0-6 Units Subcutaneous Nightly    budesonide-formoterol  2 puff Inhalation BID    meloxicam  7.5 mg Oral BID    vancomycin (VANCOCIN) intermittent dosing (placeholder)   Other RX Placeholder    vancomycin  1,500 mg Intravenous Q24H           [facility]  Romulo Shah MD  [employer] messaging  OFFICE: [phone]      Electronically signed by Romulo Shah MD on 6/8/2021 at 11:18 AM  Thank you for allowing us to participate in the care of this patient. Please call with questions.     This note iscreated with the assistance of a speech recognition program.  While intending to generate a document that actually reflects the content of the visit, the document can still have some errors including those of syntax andsound a like substitutions which may escape proof reading. In such instances, actual meaning can be extrapolated by contextual diversion.

## 2021-06-08 NOTE — CARE COORDINATION
Discharge planning    Spoke with ID and anticipating oral atb on discharge but following cultures. Podiatry is planning on I&D due to abscess seen on MRI. Alanna from [facility] is still following. Patient states he and his wife can do dressing changes. Will agree with home care ( [facility] as had in past ) IF he needs iv atb.

## 2021-06-08 NOTE — PROGRESS NOTES
Occupational Therapy   Occupational Therapy Initial Assessment  Date: 2021   Patient Name: Marlon Cano  MRN: [id number]     : 1955    RN Jess Johnson reports patient is medically stable for therapy treatment this date. Chart reviewed prior to treatment and patient is agreeable for therapy. All lines intact and patient positioned comfortably at end of treatment. All patient needs addressed prior to ending therapy session. Date of Service: 2021    Discharge Recommendations:     OT Equipment Recommendations  Equipment Needed: Yes  Mobility Devices: ADL Assistive Devices  ADL Assistive Devices: Long-handled Shoe Horn;Long-handled Sponge;Reacher;Sock-Aid Hard    Assessment   Performance deficits / Impairments: Decreased functional mobility ; Decreased safe awareness;Decreased cognition;Decreased ADL status; Decreased strength;Decreased sensation;Decreased high-level IADLs;Decreased balance;Decreased posture  Assessment: Skilled OT services are indicated to increase I and safety during functional tasks to return home at prior level of function as able. Prognosis: Good  Decision Making: Medium Complexity  OT Education: OT Role;Transfer Training;Energy Conservation;Plan of Care;ADL Adaptive Strategies;Precautions; Equipment  Patient Education: safety in function, fall prevention/call light use, recommendations for continued skilled therapy, heel touch weight bearing precations, use of surgical shoe and proper foot wear when up  REQUIRES OT FOLLOW UP: Yes  Activity Tolerance  Activity Tolerance: Patient Tolerated treatment well  Activity Tolerance: fair -  Safety Devices  Safety Devices in place: Yes  Type of devices: Nurse notified;Gait belt;Call light within reach; Left in chair;Patient at risk for falls           Patient Diagnosis(es): The encounter diagnosis was Osteomyelitis of right foot, unspecified type ([facility] [address]      has a past medical history of ALEJANDRO (acute kidney injury) ([facility] [address] Cellulitis of right foot, Charcot foot due to diabetes mellitus ([facility] [address] Diabetic polyneuropathy associated with type 2 diabetes mellitus ([facility] [address] Fractures, multiple, Hyperlipidemia, Hypertension, Leukocytosis, Neuropathy, Pain in right foot, Pneumonia, Tobacco abuse, Type II or unspecified type diabetes mellitus without mention of complication, not stated as uncontrolled, Vertigo, Wound, open, and Wound, open. has a past surgical history that includes Neck surgery (1987); Appendectomy; knee surgery (Bilateral, 1983); Knee arthroscopy (Right, 1989); Knee arthroscopy (Left, 1990); Foot Debridement (Left, 04/24/2018); pr deep dissec foot infec,1 bursa (Left, 4/24/2018); Colonoscopy; Foot Debridement (Right, 3/20/2020); and arthroplasty (Right, 12/11/2020). PER H&P: 80-year-old male past medical history of hypertension, diabetes, diabetic neuropathy, tobacco use, multiple episodes of cellulitis and infections of his right foot presents after stepping on a nail approximately 2 weeks ago, patient noticed bloody drainage starting Friday and has continued to worsen and decided to present to the ER afterwards. Ben Sis denies any fevers or chills, does admit to generalized malaise. Restrictions  Restrictions/Precautions  Restrictions/Precautions: General Precautions, Up as Tolerated, Weight Bearing  Lower Extremity Weight Bearing Restrictions  Right Lower Extremity Weight Bearing:  (Heel touch weightbearing with surgical shoe)  Position Activity Restriction  Other position/activity restrictions: up with assist, up as venkata, RUE IV    Subjective   General  Chart Reviewed: Yes  Patient assessed for rehabilitation services?: Yes  Family / Caregiver Present: No  Subjective  Subjective: Pt resting in bedside chair, agreeable to OT eval  Patient Currently in Pain: Yes  Comments / Details: Pt states he has pain in R foot when he touches it a certain way, RN aware.     Social/Functional History  Social/Functional History  Lives With: Spouse, Family (grandson (23), wife works full time)  Type of Home: House  Home Layout: One level  Home Access: Stairs to enter without rails, Stairs to enter with rails  Entrance Stairs - Number of Steps: 1-2  Entrance Stairs - Rails: Both  Bathroom Shower/Tub: Tub/Shower unit  Bathroom Toilet: Standard  Bathroom Equipment: Grab bars in shower, Shower chair  Home Equipment: Rolling walker, 4 wheeled walker, Cane (knee scooter)  ADL Assistance: Independent  Homemaking Assistance: Independent (share household chores)  Homemaking Responsibilities: Yes  Ambulation Assistance: Independent  Transfer Assistance: Independent  Active : Yes  Occupation: Retired  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 Pt states he steped on a nails, it stayed in his shoe all day so it puntured his foot multiple times       Objective   Vision: Impaired  Vision Exceptions: Wears glasses at all times  Hearing: Within functional limits    Orientation  Overall Orientation Status: Within Functional Limits  Observation/Palpation  Observation: Ace wrap on R foot  Edema: none       Balance  Sitting Balance: Stand by assistance  Standing Balance: Minimal assistance (Pt declined AD)  Standing Balance  Time: standing venkata 2-3 min  Activity: functional mobility  Functional Mobility  Functional - Mobility Device: No device  Activity:  (bedside chair to door, door down hallway to nursing station, nursing station back to bedside chair)  Assist Level: Minimal assistance  Functional Mobility Comments: Pt is impulsive. Pt was educated on use of surgical shoe and heel weight bearing restiction.  Pt required Mod verbal cues/tactile assist for upright posture, maintaining R heel weight bearing (taking short steps, not rocking to front of foot), pacing self, awareness/assist with lines, slowing down movements all to increase safety and reduce risk of falls.  Toilet Transfers  Toilet Transfer: Unable to assess  Toilet Transfers Comments: N/T Pt with no needs at this time       ADL  Feeding: Setup  Grooming: Setup;Stand by assistance (seated)  UE Bathing: Setup;Stand by assistance  LE Bathing: Setup;Contact guard assistance (seated)  UE Dressing: Setup;Minimal assistance (with hosp gown)  LE Dressing: Setup;Contact guard assistance;Minimal assistance (to don R sock sitting in bedside chair, Min A to don R shoe while standing and leaning on door frame. Pt was educated on safer ways to don shoes, pt not recepetive to education stating he aways does it like that.)  Toileting: Minimal assistance  Additional Comments: Pt is impulsive with decreased safety awareness       Tone RUE  RUE Tone: Normotonic  Tone LUE  LUE Tone: Normotonic  Coordination  Movements Are Fluid And Coordinated: Yes     Bed mobility  Supine to Sit: Unable to assess  Sit to Supine: Unable to assess  Comment: Pt sitting up in chair at beginning and end of session       Transfers  Stand Step Transfers: Contact guard assistance  Sit to stand: Contact guard assistance  Stand to sit: Contact guard assistance  Transfer Comments: Pt required Min verbal cues/tactile assist for upright posture, pacing self, slowing down movements, controlled stand to sit, maintaining R heel weight bearing restrictions, and reaching back prior to sitting all to increase safety and reduce risk of fall. Cognition  Overall Cognitive Status: Exceptions  Following Commands: Follows one step commands with repetition; Follows one step commands with increased time  Attention Span: Appears intact  Memory: Appears intact  Safety Judgement: Decreased awareness of need for assistance;Decreased awareness of need for safety  Problem Solving: Assistance required to identify errors made;Decreased awareness of errors  Insights: Decreased awareness of deficits  Initiation: Requires cues for some  Sequencing: Does not require

## 2021-06-08 NOTE — OP NOTE
the right ankle but not inflated. After adequate sedation was given by the anesthesia team, a local block of 10 cc of 1:1 mixture of 1% lidocaine plain and 0.5% Marcaine plain was injected to the right foot preoperatively. The surgical site was then scrubbed, prepped, and draped in the usual aseptic manner. A timeout was performed confirming the patient's identity, correct site, correct procedure, allergies, and preoperative antibiotics. Attention was directed to the plantar-medial aspect of the left foot where an open wound was noted. An incision was made to extend a bit proximally with a #15 blade. There was roughly 3cc of purulent drainage expressed. There was noted to be tracking proximally along the posterior tibial tendon sheath. Another incision was made proximal to the wound site where roughly 3cc of purulent drainage was expressed. A hemostat was used to free any loculations and the purulent drainage was expressed until normal bleeding tissue was appreciated. All necrotic and fibrotic tissue was excised. 3 liters of normal saline was used with pulse lavage to irrigate the surgical site. The wound had minimal bleeding. Wound cultures were taken post irrigation. The surgical site was then packed with 1/4\" iodoform packing. Dressings consisting of adaptic, 4 x 4s, Kerlix and an Ace bandage were applied. The patient tolerated the above procedure and anesthesia well without complications. The patient was transported from the operating room to the PACU with vital signs stable and vascular status intact to the left foot. Patient was then transferred back to the floor. Will follow up daily with patient. Patient will be PWB to heel touch only . Instructed to keep dressing C/D/I.      Electronically signed by Eugene Slater DPM on 6/8/2021 at 6:15 PM

## 2021-06-08 NOTE — BRIEF OP NOTE
BRIEF OP NOTE    PATIENT NAME: Yesenia Joseph  YOB: 1955  -  60 y.o. male  MRN: [id number]  DATE: 6/8/2021  BILLING #: [id number]    Surgeon(s):  Jayson Godinez DPM     PRE-OP DIAGNOSIS:   1. Diabetic foot infection, right  2. Cellulitis, RLE  3. Type II Diabetes Mellitus with associated peripheral neuropathy  4. Hx of Charcot arthropathy     POST-OP DIAGNOSIS: Same as above. PROCEDURE:   1. Incision and drainage with multiple incisions, right foot     ANESTHESIA: MAC with local block (10 cc of 1:1 mixture 1% lidocaine plain and 0.5% marcaine plain)    HEMOSTASIS: Direct pressure     ESTIMATED BLOOD LOSS: Less than 5cc. MATERIALS:   * No implants in log *      SPECIMEN:   ID Type Source Tests Collected by Time Destination   1 : RIGHT FOOT CULTURE Swab Foot CULTURE, ANAEROBIC AND AEROBIC Jonel Kehr [place], [place] 6/8/2021 5107        COMPLICATIONS: None    FINDINGS: Purulent drainage was noted tracking proximally along the tibialis posterior tendon sheath. All purulence was expressed until normal bleeding tissue was appreciated. The surgical site was packed open with packing strip. Further details to follow in op report. The patient was counseled at length about the risks of sheela Covid-19 during their perioperative period and any recovery window from their procedure. The patient was made aware that sheela Covid-19  may worsen their prognosis for recovering from their procedure  and lend to a higher morbidity and/or mortality risk. All material risks, benefits, and reasonable alternatives including postponing the procedure were discussed. The patient does wish to proceed with the procedure at this time.     Chavez Mejia DPM   Podiatric Medicine & Surgery   6/8/2021 at 5:51 PM

## 2021-06-09 LAB
ANION GAP SERPL CALCULATED.3IONS-SCNC: 11 MMOL/L (ref 9–17)
BUN BLDV-MCNC: 21 MG/DL (ref 8–23)
BUN/CREAT BLD: 12 (ref 9–20)
CALCIUM SERPL-MCNC: 8.8 MG/DL (ref 8.6–10.4)
CHLORIDE BLD-SCNC: 102 MMOL/L (ref 98–107)
CO2: 21 MMOL/L (ref 20–31)
CREAT SERPL-MCNC: 1.71 MG/DL (ref 0.7–1.2)
GFR AFRICAN AMERICAN: 49 ML/MIN
GFR NON-AFRICAN AMERICAN: 41 ML/MIN
GFR SERPL CREATININE-BSD FRML MDRD: ABNORMAL ML/MIN/{1.73_M2}
GFR SERPL CREATININE-BSD FRML MDRD: ABNORMAL ML/MIN/{1.73_M2}
GLUCOSE BLD-MCNC: 121 MG/DL (ref 75–110)
GLUCOSE BLD-MCNC: 203 MG/DL (ref 75–110)
GLUCOSE BLD-MCNC: 207 MG/DL (ref 75–110)
GLUCOSE BLD-MCNC: 220 MG/DL (ref 75–110)
GLUCOSE BLD-MCNC: 234 MG/DL (ref 70–99)
POTASSIUM SERPL-SCNC: 4.8 MMOL/L (ref 3.7–5.3)
SODIUM BLD-SCNC: 134 MMOL/L (ref 135–144)

## 2021-06-09 PROCEDURE — 6370000000 HC RX 637 (ALT 250 FOR IP): Performed by: STUDENT IN AN ORGANIZED HEALTH CARE EDUCATION/TRAINING PROGRAM

## 2021-06-09 PROCEDURE — 2060000000 HC ICU INTERMEDIATE R&B

## 2021-06-09 PROCEDURE — 99232 SBSQ HOSP IP/OBS MODERATE 35: CPT | Performed by: NURSE PRACTITIONER

## 2021-06-09 PROCEDURE — 80048 BASIC METABOLIC PNL TOTAL CA: CPT

## 2021-06-09 PROCEDURE — 6360000002 HC RX W HCPCS: Performed by: STUDENT IN AN ORGANIZED HEALTH CARE EDUCATION/TRAINING PROGRAM

## 2021-06-09 PROCEDURE — 36415 COLL VENOUS BLD VENIPUNCTURE: CPT

## 2021-06-09 PROCEDURE — 82947 ASSAY GLUCOSE BLOOD QUANT: CPT

## 2021-06-09 PROCEDURE — 2580000003 HC RX 258: Performed by: STUDENT IN AN ORGANIZED HEALTH CARE EDUCATION/TRAINING PROGRAM

## 2021-06-09 PROCEDURE — 94640 AIRWAY INHALATION TREATMENT: CPT

## 2021-06-09 PROCEDURE — 99232 SBSQ HOSP IP/OBS MODERATE 35: CPT | Performed by: INTERNAL MEDICINE

## 2021-06-09 RX ADMIN — MELOXICAM 7.5 MG: 7.5 TABLET ORAL at 08:55

## 2021-06-09 RX ADMIN — HEPARIN SODIUM 5000 UNITS: 5000 INJECTION INTRAVENOUS; SUBCUTANEOUS at 06:25

## 2021-06-09 RX ADMIN — HYDROCODONE BITARTRATE AND ACETAMINOPHEN 1 TABLET: 5; 325 TABLET ORAL at 07:03

## 2021-06-09 RX ADMIN — CEFAZOLIN 1000 MG: 1 INJECTION, POWDER, FOR SOLUTION INTRAMUSCULAR; INTRAVENOUS at 06:26

## 2021-06-09 RX ADMIN — INSULIN GLARGINE 10 UNITS: 100 INJECTION, SOLUTION SUBCUTANEOUS at 08:55

## 2021-06-09 RX ADMIN — GABAPENTIN 200 MG: 100 CAPSULE ORAL at 20:17

## 2021-06-09 RX ADMIN — ONDANSETRON 4 MG: 4 TABLET, ORALLY DISINTEGRATING ORAL at 09:00

## 2021-06-09 RX ADMIN — INSULIN LISPRO 6 UNITS: 100 INJECTION, SOLUTION INTRAVENOUS; SUBCUTANEOUS at 18:02

## 2021-06-09 RX ADMIN — HYDROCODONE BITARTRATE AND ACETAMINOPHEN 1 TABLET: 5; 325 TABLET ORAL at 12:15

## 2021-06-09 RX ADMIN — CEFAZOLIN 1000 MG: 1 INJECTION, POWDER, FOR SOLUTION INTRAMUSCULAR; INTRAVENOUS at 15:01

## 2021-06-09 RX ADMIN — HEPARIN SODIUM 5000 UNITS: 5000 INJECTION INTRAVENOUS; SUBCUTANEOUS at 15:01

## 2021-06-09 RX ADMIN — GABAPENTIN 200 MG: 100 CAPSULE ORAL at 15:01

## 2021-06-09 RX ADMIN — BUDESONIDE AND FORMOTEROL FUMARATE DIHYDRATE 2 PUFF: 160; 4.5 AEROSOL RESPIRATORY (INHALATION) at 21:15

## 2021-06-09 RX ADMIN — ATORVASTATIN CALCIUM 20 MG: 20 TABLET, FILM COATED ORAL at 20:17

## 2021-06-09 RX ADMIN — MELOXICAM 7.5 MG: 7.5 TABLET ORAL at 20:17

## 2021-06-09 RX ADMIN — INSULIN LISPRO 4 UNITS: 100 INJECTION, SOLUTION INTRAVENOUS; SUBCUTANEOUS at 08:55

## 2021-06-09 RX ADMIN — SILVER SULFADIAZINE: 10 CREAM TOPICAL at 08:55

## 2021-06-09 RX ADMIN — CETIRIZINE HYDROCHLORIDE 10 MG: 10 TABLET, FILM COATED ORAL at 20:17

## 2021-06-09 RX ADMIN — HYDROCODONE BITARTRATE AND ACETAMINOPHEN 1 TABLET: 5; 325 TABLET ORAL at 02:57

## 2021-06-09 RX ADMIN — HYDROCODONE BITARTRATE AND ACETAMINOPHEN 1 TABLET: 5; 325 TABLET ORAL at 18:06

## 2021-06-09 RX ADMIN — INSULIN LISPRO 2 UNITS: 100 INJECTION, SOLUTION INTRAVENOUS; SUBCUTANEOUS at 21:51

## 2021-06-09 RX ADMIN — HEPARIN SODIUM 5000 UNITS: 5000 INJECTION INTRAVENOUS; SUBCUTANEOUS at 20:17

## 2021-06-09 RX ADMIN — GABAPENTIN 200 MG: 100 CAPSULE ORAL at 08:55

## 2021-06-09 RX ADMIN — ASPIRIN 81 MG: 81 TABLET, COATED ORAL at 08:55

## 2021-06-09 RX ADMIN — CEFAZOLIN 1000 MG: 1 INJECTION, POWDER, FOR SOLUTION INTRAMUSCULAR; INTRAVENOUS at 23:50

## 2021-06-09 ASSESSMENT — PAIN DESCRIPTION - LOCATION
LOCATION: BACK;FOOT
LOCATION: FOOT;ARM
LOCATION: BACK;FOOT

## 2021-06-09 ASSESSMENT — PAIN SCALES - GENERAL
PAINLEVEL_OUTOF10: 9
PAINLEVEL_OUTOF10: 5
PAINLEVEL_OUTOF10: 6
PAINLEVEL_OUTOF10: 5
PAINLEVEL_OUTOF10: 0
PAINLEVEL_OUTOF10: 9
PAINLEVEL_OUTOF10: 3
PAINLEVEL_OUTOF10: 4
PAINLEVEL_OUTOF10: 6

## 2021-06-09 ASSESSMENT — ENCOUNTER SYMPTOMS
RESPIRATORY NEGATIVE: 1
NAUSEA: 1
VOMITING: 1

## 2021-06-09 ASSESSMENT — PAIN DESCRIPTION - PAIN TYPE
TYPE: ACUTE PAIN;CHRONIC PAIN

## 2021-06-09 ASSESSMENT — PAIN DESCRIPTION - DESCRIPTORS
DESCRIPTORS: ACHING

## 2021-06-09 ASSESSMENT — PAIN DESCRIPTION - ORIENTATION
ORIENTATION: RIGHT

## 2021-06-09 NOTE — PROGRESS NOTES
[facility]  Progress Note    Harjit Blackwood  MRN: [id number]  Date: 6/9/2021  LOS: 3     Reason for F/U :   Diabetic foot infection    Impression :   1. Diabetes mellitus with associated diabetic neuropathy and Charcot deformity in the right foot  2. Recent puncture wound to the plantar aspect of the right foot with associated osteomyelitis and abscess formation  · Status post incision and drainage of the right foot abscess 6/8/2021  3. Cellulitis of the right lower extremity    Recommendations:   · The culture had heavy growth of MSSA  · Patient continues on cefazolin  · Patient is status post incision and drainage of the right foot abscess  · Surgical cultures were sent  · Per podiatry, tentative plan for delayed closure of the wound 6/11/2021    Infection Control Recommendations:   Universal precautions    Discharge Planning:   Estimated Length of IV antimicrobials: To be determined  Patient will need Midline Catheter Insertion/ PICC line Insertion: No  Patient will need: Home IV , [facility]  S[facility],  LTAC: Undetermined  Patient willneed outpatient wound care: No    Medical Decision making / Summary of Stay:   Irma Esparza a 60y.o.-year-old male who was initially admitted on 6/6/2021.   Tami Huitron is seen and evaluated at bedside he tells me that he recently sustained a dog bite to his right arm on May 5, 2021 and he was seen in the urgent care and was given a tetanus shot and also was prescribed Keflex for that. He does report that the following day on 5/6/2021 he stepped on a nail in his right foot and he was seen by Dr. Thu Johnson podiatry and levofloxacin was added to his antibiotics regimen.  The patient reports that he completed the course of antibiotics last week on Wednesday. The patient is an underlying diabetic has a Charcot arthropathy and also has diabetic neuropathy.   He does wear custom molded shoes and he reports that on Friday he noticed some redness in the foot and it was more so a red streak going up to his ankle. As the weekend progressed the foot started to swell up with increasing redness and pain but no associated fevers or chills. He came into the emergency room for evaluation and was found to have a soft tissue ulceration soft tissue swelling. The patient was admitted started on antimicrobial therapy with cefepime and vancomycin.  The patient was seen by the podiatry service and an MRI of the right lower extremity was ordered to rule out a deep abscess/osteomyelitis. I was asked to evaluate and help with antibiotic choice. Current evaluation:2021    /77   Pulse 55   Temp 98.2 °F (36.8 °C) (Oral)   Resp 18   Ht 5' 11\" (1.803 m)   Wt 237 lb 3 oz (107.6 kg)   SpO2 97%   BMI 33.08 kg/m²     Temperature Range: Temp: 98.2 °F (36.8 °C) Temp  Av °F (36.7 °C)  Min: 97 °F (36.1 °C)  Max: 98.4 °F (36.9 °C)    Patient was seen and evaluated sitting up at bedside, nauseous with vomiting this morning that he thinks is from drinking too much coffee  No pain in the foot  No fevers or chills    Review of Systems   Constitutional: Negative. HENT: Negative. Respiratory: Negative. Cardiovascular: Negative. Gastrointestinal: Positive for nausea and vomiting. Genitourinary: Negative. Musculoskeletal: Negative. Skin: Negative. Neurological: Negative. Psychiatric/Behavioral: Negative. Physical Examination :     Physical Exam  Constitutional:       General: He is not in acute distress. Appearance: Normal appearance. He is normal weight. HENT:      Head: Normocephalic and atraumatic. Cardiovascular:      Rate and Rhythm: Normal rate and regular rhythm. Pulmonary:      Effort: Pulmonary effort is normal. No respiratory distress. Abdominal:      General: Abdomen is flat. Bowel sounds are normal.      Palpations: Abdomen is soft. Musculoskeletal:         General: Normal range of motion.       Comments: Right foot with dressing in place, not removed   Skin:     General: Skin is warm and dry. Neurological:      General: No focal deficit present. Mental Status: He is alert and oriented to person, place, and time. Mental status is at baseline. Psychiatric:         Mood and Affect: Mood normal.         Behavior: Behavior normal.         Laboratory data:   I have independently reviewed the followinglabs:  CBC with Differential:   Recent Labs     06/06/21  1105 06/07/21  0528   WBC 13.0* 8.5   HGB 13.6 12.3*   HCT 40.6* 37.7*    155   LYMPHOPCT 17* 26   MONOPCT 5 6     BMP:   Recent Labs     06/08/21  0453 06/09/21  0423   * 134*   K 5.0 4.8    102   CO2 20 21   BUN 29* 21   CREATININE 1.89* 1.71*     Hepatic Function Panel: No results for input(s): PROT, LABALBU, BILIDIR, IBILI, BILITOT, ALKPHOS, ALT, AST in the last 72 hours. No results found for: PROCAL  Lab Results   Component Value Date    CRP 63.4 06/06/2021    CRP 84.4 08/04/2018     Lab Results   Component Value Date    SEDRATE 37 (H) 06/06/2021         No results found for: DDIMER  No results found for: FERRITIN  No results found for: LDH  No results found for: FIBRINOGEN    Results in Past 30 Days  Result Component Current Result Ref Range Previous Result Ref Range   SARS-CoV-2, Rapid DETECTED (A) (5/26/2021) Not Detected Not in Time Range      Lab Results   Component Value Date    COVID19 DETECTED 05/26/2021    COVID19 Not Detected 12/07/2020    COVID19 Not Detected 08/08/2020       No results for input(s): [facility] in the last 72 hours. Imaging Studies:   MRI right foot   Impression:       1. Plantar ulceration of the medial soft tissues of the foot at the level of   the midfoot with underlying T2 hyperintense collection measuring   approximately 2.3 x 3.1 x 1.2 cm which is most suggestive of abscess versus   phlegmon. 2. No osteomyelitis in the region of the midfoot. 3. Chronic changes of Charcot arthropathy.    4. Chronically eroded appearance of the distal aspect of the proximal 1st   phalanx with patchy edema of the great toe in the region of the   interphalangeal joint as well as small interphalangeal joint effusion.  While   findings could be related to remote trauma or remote infection at this site,   superimposed acute osteomyelitis of the great toe difficult to exclude. Correlate clinically to exclude the possibility of any soft tissue wounds of   the great toe.  In the setting of adjacent soft tissue wound, infection would   be more likely. Cultures:     Culture, Blood 1 [[id number]] Collected: 06/06/21 1105   Order Status: Completed Specimen: Blood Updated: 06/09/21 0006    Specimen Description . BLOOD    Special Requests LTAC    Culture NO GROWTH 3 DAYS   Culture, Blood 1 [[id number]] Collected: 06/06/21 1058   Order Status: Completed Specimen: Blood Updated: 06/09/21 0006    Specimen Description . BLOOD    Special Requests LTAC    Culture NO GROWTH 3 DAYS   Culture, Anaerobic and Aerobic [[id number]] (Abnormal) Collected: 06/08/21 1700   Order Status: Completed Specimen: Swab from Foot Updated: 06/09/21 0001    Specimen Description . FOOT    Special Requests NOT REPORTED    Direct Exam MODERATE NEUTROPHILSAbnormal      FEW GRAM POSITIVE COCCIAbnormal     Culture PENDING   Culture, Wound [[id number]] (Abnormal)  Collected: 06/06/21 1102   Order Status: Completed Specimen: Foot Updated: 06/08/21 0815    Specimen Description . FOOT RIGHT    Special Requests NOT REPORTED    Direct Exam MODERATE NEUTROPHILSAbnormal      MANY GRAM POSITIVE COCCI IN PAIRSAbnormal      FEW GRAM POSITIVE RODSAbnormal     Culture STAPHYLOCOCCUS AUREUS HEAVY GROWTH This isolate is methicillin susceptible. Abnormal      NORMAL SKIN FLORAAbnormal          Medications:      ceFAZolin  1,000 mg Intravenous Q8H    insulin glargine  10 Units Subcutaneous Daily    silver sulfADIAZINE   Topical Daily    aspirin  81 mg Oral Daily    cetirizine  10 mg Oral Nightly    atorvastatin  20 mg Oral Daily    sodium chloride flush  5-40 mL Intravenous 2 times per day    heparin (porcine)  5,000 Units Subcutaneous 3 times per day    gabapentin  200 mg Oral TID    insulin lispro  0-12 Units Subcutaneous TID WC    insulin lispro  0-6 Units Subcutaneous Nightly    budesonide-formoterol  2 puff Inhalation BID    meloxicam  7.5 mg Oral BID           [facility]  ROCHELLE Flores CNP  [employer] messaging  OFFICE: [phone]      Electronically signed by ROCHELLE Flores CNP on 6/9/2021 at 7:53 AM  Thank you for allowing us to participate in the care of this patient. Please call with questions. This note iscreated with the assistance of a speech recognition program.  While intending to generate a document that actually reflects the content of the visit, the document can still have some errors including those of syntax andsound a like substitutions which may escape proof reading. In such instances, actual meaning can be extrapolated by contextual diversion.

## 2021-06-09 NOTE — PROGRESS NOTES
Progress Note  Podiatric Medicine and Surgery     Subjective     CC: Right foot infection    Patient seen and examined at bedside. Chart and results reviewed  Patient tolerating diet  Tolerating IV antibiotics  Overall recovering well postoperatively  POD# 1 s/p I&D right foot  Plan for or on Friday for delayed closure of right foot    HPI :  Jae Nguyen is a 60 y.o. male seen at [facility] [address] for a wound on the right foot is well-known to Dr. Manda Vazquez. On 5/05 patient presented to the ED because he was bitten by a dog. At that time was given a tetanus shot and placed on Keflex. The following day on 5/06/2021 was seen by podiatry in the ED after stepping on a nail to the right foot. In addition to the Keflex he is already taking patient was prescribed Cipro. At this time patient states he is no longer taking any antibiotics but presents because the right foot has become increasingly swollen, red, and painful with drainage. He denies any constitutional symptoms at this time. Of note, patient has history of Charcot arthropathy but does not wear custom molded shoes.     PCP is Alex Shultz MD    ROS: Denies N/V/F/C/SOB/CP/Cough.        Medications:  Scheduled Meds:   ceFAZolin  1,000 mg Intravenous Q8H    insulin glargine  10 Units Subcutaneous Daily    silver sulfADIAZINE   Topical Daily    aspirin  81 mg Oral Daily    cetirizine  10 mg Oral Nightly    atorvastatin  20 mg Oral Daily    sodium chloride flush  5-40 mL Intravenous 2 times per day    heparin (porcine)  5,000 Units Subcutaneous 3 times per day    gabapentin  200 mg Oral TID    insulin lispro  0-12 Units Subcutaneous TID WC    insulin lispro  0-6 Units Subcutaneous Nightly    budesonide-formoterol  2 puff Inhalation BID    meloxicam  7.5 mg Oral BID       Continuous Infusions:   sodium chloride      sodium chloride 50 mL/hr at 06/08/21 2140    dextrose         PRN Meds:HYDROcodone-acetaminophen, sodium chloride flush, sodium chloride, ondansetron **OR** ondansetron, magnesium hydroxide, acetaminophen **OR** acetaminophen, benzonatate, glucose, dextrose, glucagon (rDNA), dextrose    Objective     Vitals:  Patient Vitals for the past 8 hrs:   BP Temp Temp src Pulse Resp SpO2 Weight   21 0747 122/77 98.2 °F (36.8 °C) Oral 55 18 97 % --   21 0450 -- -- -- -- -- -- 237 lb 3 oz (107.6 kg)   21 0350 (!) 117/51 98.1 °F (36.7 °C) Oral 62 18 97 % --     Average, Min, and Max for last 24 hours Vitals:  TEMPERATURE:  Temp  Av.9 °F (36.6 °C)  Min: 97 °F (36.1 °C)  Max: 98.4 °F (36.9 °C)    RESPIRATIONS RANGE: Resp  Av.2  Min: 0  Max: 22    PULSE RANGE: Pulse  Av.1  Min: 55  Max: 80    BLOOD PRESSURE RANGE:  Systolic (01GRW), ZLL:644 , Min:107 , TCB:047   ; Diastolic (96MKG), QOP:22, Min:48, Max:77      PULSE OXIMETRY RANGE: SpO2  Av.1 %  Min: 90 %  Max: 100 %    I/O last 3 completed shifts: In: 7345 [I.V.:1328; IV Piggyback:150]  Out: 2820 [Urine:2800; Blood:20]    CBC:  Recent Labs     21  1105 21  0528   WBC 13.0* 8.5   HGB 13.6 12.3*   HCT 40.6* 37.7*    155   CRP 63.4*  --         BMP:  Recent Labs     21  0528 21  0453 21  0423   * 132* 134*   K 4.6 5.0 4.8    102 102   CO2 20 20 21   BUN 33* 29* 21   CREATININE 1.80* 1.89* 1.71*   GLUCOSE 226* 207* 234*   CALCIUM 8.6 8.9 8.8        Coags:  No results for input(s): APTT, PROT, INR in the last 72 hours. Lab Results   Component Value Date    SEDRATE 37 (H) 2021     Recent Labs     21  1105   CRP 63.4*       Lower Extremity Physical Exam:  Vascular: DP and PT pulses are Palpable . CFT <3 seconds to all digits. Hair growth is absent to the level of the digits.   Moderate non-pitting edema to the right lower extremity.       Neuro: Saph/sural/SP/DP/plantar sensation diminished to light touch.     Musculoskeletal: Muscle strength is adequate ROM, adequate strength to all lower extremity muscle groups. Gross deformity is medial rocker-bottom deformity. TTP periwound. Compartments are soft and compressible.      Dermatologic:  Open surgical wounds to dorsal medial aspect and plantar medial aspect of right foot with retention sutures in place. No purulent drainage appreciated today. No malodor today. Does not probe to bone. Wound does probe to deep fascia. No fluctuance or induration. Erythema improved from previous exam.      Imaging:   MRI FOOT RIGHT WO CONTRAST   Final Result   1. Plantar ulceration of the medial soft tissues of the foot at the level of   the midfoot with underlying T2 hyperintense collection measuring   approximately 2.3 x 3.1 x 1.2 cm which is most suggestive of abscess versus   phlegmon. 2. No osteomyelitis in the region of the midfoot. 3. Chronic changes of Charcot arthropathy. 4. Chronically eroded appearance of the distal aspect of the proximal 1st   phalanx with patchy edema of the great toe in the region of the   interphalangeal joint as well as small interphalangeal joint effusion. While   findings could be related to remote trauma or remote infection at this site,   superimposed acute osteomyelitis of the great toe difficult to exclude. Correlate clinically to exclude the possibility of any soft tissue wounds of   the great toe. In the setting of adjacent soft tissue wound, infection would   be more likely. XR FOOT RIGHT (MIN 3 VIEWS)   Final Result   Soft tissue ulceration and soft tissue swelling. Persistent osseous   irregularity at the 1st and 3rd digits, similar to prior, for which   osteomyelitis could be considered in the appropriate clinical setting. Charcot arthropathy again demonstrated of the midfoot. Cultures:   Culture, Anaerobic and Aerobic [[id number]] (Abnormal) Collected: 06/08/21 1700   Order Status: Completed Specimen: Swab from Foot Updated: 06/09/21 0001    Specimen Description . FOOT    Special Requests NOT REPORTED Direct Exam MODERATE NEUTROPHILSAbnormal      FEW GRAM POSITIVE COCCIAbnormal     Culture PENDING   Culture, Wound [[id number]] (Abnormal)  Collected: 06/06/21 1102   Order Status: Completed Specimen: Foot Updated: 06/08/21 0815    Specimen Description . FOOT RIGHT    Special Requests NOT REPORTED    Direct Exam MODERATE NEUTROPHILSAbnormal      MANY GRAM POSITIVE COCCI IN PAIRSAbnormal      FEW GRAM POSITIVE RODSAbnormal     Culture STAPHYLOCOCCUS AUREUS HEAVY GROWTH This isolate is methicillin susceptible. Abnormal      NORMAL SKIN FLORAAbnormal          Assessment   Perico Salguero is a 60 y.o. male with   1. S/p I&D, right foot (DOS: 06/08/2021)  2. Diabetic foot ulcer to level of subcutaneous, right  3. Recent hx of puncture wound, right foot   4. Cellulitis, RLE  5. 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 Hx of Charcot Arthropathy     Principal Problem:    Diabetic infection of right foot ([address]  Active Problems:    Essential hypertension    Neuropathy    Charcot foot due to diabetes mellitus ([address]    Diabetic polyneuropathy associated with type 2 diabetes mellitus ([address]    Tobacco abuse    Osteomyelitis ([address]  Resolved Problems:    * No resolved hospital problems. *       Plan     · Patient examined and evaluated at bedside. · Treatment options discussed in detail with the patient. · MRI reviewed and shows abscess adjacent to wound with possible osteomyelitis of the hallux. · Continue medical management per Internal Medicine. · ID on board   · Abx: Ancef  · Tentative plan for delayed closure right foot on Friday, 06/11/2021, at approximately 2:30 PM  · Dressing applied to Right foot: Packed with 1/4 iodoform, betadine, DSD, Ace. · Heel-touch WB to Right lower extremity in surgical shoe. · Discussed with  Dr. Mackenzie Rowan.     [place] DPM   Podiatric Medicine & Surgery   6/9/2021 at 9:25 AM

## 2021-06-09 NOTE — PLAN OF CARE
Problem: Falls - Risk of:  Goal: Will remain free from falls  Description: Will remain free from falls  6/9/2021 0129 by Naresh Evans RN  Outcome: Ongoing   Patient instructed to call for assistance with standing and walking. Bed in lowest position, hourly rounding   Problem: Pain:  Goal: Control of acute pain  Description: Control of acute pain  6/9/2021 0129 by Naresh Evans RN  Outcome: Ongoing  PRN pain medication, dressing changes, feet elevated off bed. Problem: Airway Clearance - Ineffective  Goal: Achieve or maintain patent airway  Outcome: Completed     Problem: Gas Exchange - Impaired  Goal: Absence of hypoxia  Outcome: Completed     Problem: Gas Exchange - Impaired  Goal: Promote optimal lung function  Outcome: Completed     Problem: Breathing Pattern - Ineffective  Goal: Ability to achieve and maintain a regular respiratory rate  Outcome: Completed     Problem: Body Temperature -  Risk of, Imbalanced  Goal: Ability to maintain a body temperature within defined limits  Outcome: Completed     Problem: Body Temperature -  Risk of, Imbalanced  Goal: Will regain or maintain usual level of consciousness  Outcome: Completed   Patient is COVID negative, no respiratory issues noted.

## 2021-06-09 NOTE — PROGRESS NOTES
[facility]  Office: [address], DO, Armond Bob, DO, Seradra Martinez, DO, Alyse Covert Blood, DO, Pamela Tapia MD, Abraham Barber MD, Hernan Victoria MD, Diego Carlisle MD, Collette Denmark, MD, Alfonso Kim MD, Mayra Abdullahi MD, Robert Rodgers MD, Sarah Lopez, DO, Olivia Cat MD, Marivel Dugan DO, Sheridan Howe MD,  Yi Galeana DO, Ally Rivera MD, Eliott Spatz, MD, Ashley Dial MD, Ashley Garcia MD, Blas García, [facility], [facility] Caesar, CNP, Selvin Smallwood, CNP, Liana Unger, CNS, Marly Hopkins, CNP, Hanna Knowles, CNP, Sona Darden, CNP, Irena Nam, CNP, Bernice Porter, CNP, Skyler Mcnamara PA-C, Melissa Shannon, [facility], Duane Abed, CNP, Kaushik Villalpando, CNP, Destiney Brown, CNP, Adriana Lizama, CNP, Brett Hernandez, CNP, [facility], [facility]    Progress Note    6/9/2021    8:49 AM    Name:   Jae Nguyen  MRN:     [id number]     [place]:      [de-identified]   Room:   [address] [place] [address] Day:  3  Admit Date:  6/6/2021 10:33 AM    PCP:   Madalyn Shin MD  Code Status:  Full Code    Subjective:     C/C:   Chief Complaint   Patient presents with    Foot Pain     Interval History Status: improved. Patient with I&D yesterday with improvement in foot swelling. Has postoperative pain but no acute complaints. Denies any chest pain, shortness of breath, nausea vomiting, fevers or chills or complaints. Brief History: This is a 78-year-old male with a past medical history of diabetes with diabetic neuropathy that presents with foot pain with erythema and swelling. He is found to have fluid collection which was drained at the bedside. He underwent MRI to evaluate for possible osteomyelitis. MRI did not show evidence of osteomyelitis but did show continued fluid collection concerning for continued abscess.     Review of Systems:     Constitutional:  negative for chills, fevers, sweats  Respiratory: negative for cough, dyspnea on exertion, shortness of breath, wheezing  Cardiovascular:  negative for chest pain, chest pressure/discomfort, lower extremity edema, palpitations  Gastrointestinal:  negative for abdominal pain, constipation, diarrhea, nausea, vomiting  Neurological:  negative for dizziness, headache    Medications: Allergies:     Allergies   Allergen Reactions    Morphine Itching    Percocet [Oxycodone-Acetaminophen]      Facial swelling    Dye [Iodides] Rash     Rash and swelling       Current Meds:   Scheduled Meds:    ceFAZolin  1,000 mg Intravenous Q8H    insulin glargine  10 Units Subcutaneous Daily    silver sulfADIAZINE   Topical Daily    aspirin  81 mg Oral Daily    cetirizine  10 mg Oral Nightly    atorvastatin  20 mg Oral Daily    sodium chloride flush  5-40 mL Intravenous 2 times per day    heparin (porcine)  5,000 Units Subcutaneous 3 times per day    gabapentin  200 mg Oral TID    insulin lispro  0-12 Units Subcutaneous TID WC    insulin lispro  0-6 Units Subcutaneous Nightly    budesonide-formoterol  2 puff Inhalation BID    meloxicam  7.5 mg Oral BID     Continuous Infusions:    sodium chloride      sodium chloride 50 mL/hr at 06/08/21 2140    dextrose       PRN Meds: HYDROcodone-acetaminophen, sodium chloride flush, sodium chloride, ondansetron **OR** ondansetron, magnesium hydroxide, acetaminophen **OR** acetaminophen, benzonatate, glucose, dextrose, glucagon (rDNA), dextrose    Data:     Past Medical History:   has a past medical history of [facility]LEJANDRO [facility]) [facility] Cellulitis of right foot, Charcot foot due to diabetes mellitus ([facility] Diabetic polyneuropathy associated with type 2 diabetes mellitus ([facility] Fractures, multiple, Hyperlipidemia, Hypertension, Leukocytosis, Neuropathy, Pain in right foot, Pneumonia, Tobacco abuse, Type II or unspecified type diabetes mellitus without mention of complication, not stated as uncontrolled, Vertigo, Wound, open, and Wound, open. Social History:   reports that he has been smoking. He has been smoking about 1.00 pack per day. He has never used smokeless tobacco. He reports previous drug use. He reports that he does not drink alcohol. Family History:   Family History   Problem Relation Age of Onset    Diabetes Mother     Cancer Father     Hypertension Maternal Grandmother        Vitals:  /77   Pulse 55   Temp 98.2 °F (36.8 °C) (Oral)   Resp 18   Ht 5' 11\" (1.803 m)   Wt 237 lb 3 oz (107.6 kg)   SpO2 97%   BMI 33.08 kg/m²   Temp (24hrs), Av.9 °F (36.6 °C), Min:97 °F (36.1 °C), Max:98.4 °F (36.9 °C)    Recent Labs     21  1719 21  1805 21  1938 21  0713   POCGLU 94 96 239* 207*       I/O (24Hr):     Intake/Output Summary (Last 24 hours) at 2021 0849  Last data filed at 2021 0827  Gross per 24 hour   Intake 1528 ml   Output 2120 ml   Net -592 ml       Labs:  Hematology:  Recent Labs     21  1105 21  0528   WBC 13.0* 8.5   RBC 4.51 4.13*   HGB 13.6 12.3*   HCT 40.6* 37.7*   MCV 90.0 91.3   MCH 30.2 29.8   MCHC 33.5 32.6   RDW 13.4 13.3    155   MPV 10.2 9.9   SEDRATE 37*  --    CRP 63.4*  --      Chemistry:  Recent Labs     21  0528 21  0453 21  0423   * 132* 134*   K 4.6 5.0 4.8    102 102   CO2 20 20 21   GLUCOSE 226* 207* 234*   BUN 33* 29* 21   CREATININE 1.80* 1.89* 1.71*   ANIONGAP 12 10 11   LABGLOM 38* 36* 41*   GFRAA 46* 44* 49*   CALCIUM 8.6 8.9 8.8     Recent Labs     21  0713 21  1139 21  1719 21  1805 21  1938 21  0713   POCGLU 206* 273* 94 96 239* 207*     ABG:No results found for: POCPH, PHART, PH, POCPCO2, KLC7WOH, PCO2, POCPO2, PO2ART, PO2, POCHCO3, XZN8ASU, HCO3, NBEA, PBEA, BEART, BE, THGBART, THB, GSI2YHR, DSAS6CPJ, N5IMOAPF, O2SAT, FIO2  Lab Results   Component Value Date/Time    SPECIAL NOT REPORTED 2021 05:00 PM     Lab Results   Component Value Date/Time CULTURE PENDING 06/08/2021 05:00 PM       Radiology:  XR FOOT RIGHT (MIN 3 VIEWS)    Result Date: 6/6/2021  Soft tissue ulceration and soft tissue swelling. Persistent osseous irregularity at the 1st and 3rd digits, similar to prior, for which osteomyelitis could be considered in the appropriate clinical setting. Charcot arthropathy again demonstrated of the midfoot. MRI FOOT RIGHT WO CONTRAST    Result Date: 6/7/2021  1. Plantar ulceration of the medial soft tissues of the foot at the level of the midfoot with underlying T2 hyperintense collection measuring approximately 2.3 x 3.1 x 1.2 cm which is most suggestive of abscess versus phlegmon. 2. No osteomyelitis in the region of the midfoot. 3. Chronic changes of Charcot arthropathy. 4. Chronically eroded appearance of the distal aspect of the proximal 1st phalanx with patchy edema of the great toe in the region of the interphalangeal joint as well as small interphalangeal joint effusion. While findings could be related to remote trauma or remote infection at this site, superimposed acute osteomyelitis of the great toe difficult to exclude. Correlate clinically to exclude the possibility of any soft tissue wounds of the great toe. In the setting of adjacent soft tissue wound, infection would be more likely.        Physical Examination:        General appearance:  alert, cooperative and no distress  Mental Status:  oriented to person, place and time and normal affect  Lungs:  clear to auscultation bilaterally, normal effort  Heart:  regular rate and rhythm, no murmur  Abdomen:  soft, nontender, nondistended, normal bowel sounds, no masses, hepatomegaly, splenomegaly  Extremities:  no edema, redness, tenderness in the calves  Skin:  no gross lesions, rashes, induration    Assessment:        Hospital Problems         Last Modified POA    * (Principal) Diabetic infection of right foot ([facility] 6/8/2021 Yes    Essential hypertension 6/6/2021 Yes    Neuropathy 6/6/2021 Yes    Charcot foot due to diabetes mellitus ([facility] 6/6/2021 Yes    Diabetic polyneuropathy associated with type 2 diabetes mellitus ([facility] 6/6/2021 Yes    Tobacco abuse 6/6/2021 Yes    [facility]el[facility] ([facility] 6/8/2021 Yes          Plan:        1. Continue local wound care  2. Antibiotics per ID  3. Insulin scale for glycemic control  4. Continue present antihypertensives  5. GI and DVT prophylaxis  6. Tobacco cessation  7.  Discharge planning, home today with outpatient follow-up and delayed wound closure as an outpatient if okay with ID and podiatry    Lizzie Mansfield DO  6/9/2021  8:49 AM

## 2021-06-09 NOTE — PROGRESS NOTES
Transitions of Care Pharmacy Service   Medication Review    The patient's list of current home medications has been reviewed. Source(s) of information: patient, Surescripts refill report      Please feel free to call me with any questions about this encounter. Thank you. Reji Mcdonough [facility]   Transitions of Care Pharmacy Service  Phone:  [phone]  Fax: [phone]      Electronically signed by Reji Mcdonough [facility] on 6/9/2021 at 6:02 PM           Medications Prior to Admission:   meloxicam (MOBIC) 15 MG tablet, Take 15 mg by mouth nightly   LANTUS SOLOSTAR 100 UNIT/ML injection pen, Inject 10 Units into the skin nightly   albuterol sulfate  (90 Base) MCG/ACT inhaler, Inhale 2 puffs into the lungs every 6 hours as needed   gabapentin (NEURONTIN) 300 MG capsule, Take 900 mg by mouth 3 times daily.  Take 3 caps (=900mg) 3 times a day  JANUVIA 100 MG tablet, Take 100 mg by mouth daily   cetirizine (ZYRTEC) 10 MG tablet, Take 10 mg by mouth nightly   lisinopril (PRINIVIL;ZESTRIL) 10 MG tablet, Take 10 mg by mouth daily  simvastatin (ZOCOR) 40 MG tablet, Take 40 mg by mouth nightly   glipiZIDE (GLUCOTROL) 10 MG tablet, Take 20 mg by mouth 2 times daily (before meals) Takes 2 tabs (=20mg) BID  aspirin 81 MG tablet, Take 81 mg by mouth daily

## 2021-06-09 NOTE — PROGRESS NOTES
Occupational Therapy  [facility]  Occupational Therapy Not Seen Note    Patient not available for Occupational Therapy due to:    [] Testing:    [] Hemodialysis    [] Cancelled by RN:    [x]Refusal by Patient: 6/9: (CX) Pt refusing therapy stating he was nauseous all morning and would like to rest. Will try back later as able.    [] Surgery:     [] Intubation:     [] Pain Medication:    [] Sedation:     [] Spine Precautions :    [] Medical Instability:    [] Other:    Rachel Parham, OT

## 2021-06-09 NOTE — PLAN OF CARE
Problem:  Body Temperature -  Risk of, Imbalanced  Goal: Complications related to the disease process, condition or treatment will be avoided or minimized  Outcome: Ongoing     Problem: Isolation Precautions - Risk of Spread of Infection  Goal: Prevent transmission of infection  Outcome: Ongoing     Problem: Nutrition Deficits  Goal: Optimize nutritional status  Outcome: Ongoing     Problem: Risk for Fluid Volume Deficit  Goal: Maintain normal heart rhythm  Outcome: Ongoing     Problem: Risk for Fluid Volume Deficit  Goal: Maintain absence of muscle cramping  Outcome: Ongoing     Problem: Risk for Fluid Volume Deficit  Goal: Maintain normal serum potassium, sodium, calcium, phosphorus, and pH  Outcome: Ongoing     Problem: Loneliness or Risk for Loneliness  Goal: Demonstrate positive use of time alone when socialization is not possible  Outcome: Ongoing     Problem: Fatigue  Goal: Verbalize increase energy and improved vitality  Outcome: Ongoing     Problem: Patient Education: Go to Patient Education Activity  Goal: Patient/Family Education  Outcome: Ongoing     Problem: Falls - Risk of:  Goal: Will remain free from falls  Description: Will remain free from falls  Outcome: Ongoing     Problem: Falls - Risk of:  Goal: Absence of physical injury  Description: Absence of physical injury  Outcome: Ongoing     Problem: Pain:  Goal: Pain level will decrease  Description: Pain level will decrease  Outcome: Ongoing     Problem: Pain:  Goal: Control of acute pain  Description: Control of acute pain  Outcome: Ongoing     Problem: Pain:  Goal: Control of chronic pain  Description: Control of chronic pain  Outcome: Ongoing     Problem: Nutrition  Goal: Optimal nutrition therapy  Outcome: Ongoing

## 2021-06-10 LAB
ANION GAP SERPL CALCULATED.3IONS-SCNC: 8 MMOL/L (ref 9–17)
BUN BLDV-MCNC: 17 MG/DL (ref 8–23)
BUN/CREAT BLD: 10 (ref 9–20)
C-REACTIVE PROTEIN: 33.7 MG/L (ref 0–5)
CALCIUM SERPL-MCNC: 8.8 MG/DL (ref 8.6–10.4)
CHLORIDE BLD-SCNC: 105 MMOL/L (ref 98–107)
CO2: 22 MMOL/L (ref 20–31)
CREAT SERPL-MCNC: 1.74 MG/DL (ref 0.7–1.2)
CULTURE: ABNORMAL
DIRECT EXAM: ABNORMAL
DIRECT EXAM: ABNORMAL
GFR AFRICAN AMERICAN: 48 ML/MIN
GFR NON-AFRICAN AMERICAN: 40 ML/MIN
GFR SERPL CREATININE-BSD FRML MDRD: ABNORMAL ML/MIN/{1.73_M2}
GFR SERPL CREATININE-BSD FRML MDRD: ABNORMAL ML/MIN/{1.73_M2}
GLUCOSE BLD-MCNC: 139 MG/DL (ref 70–99)
GLUCOSE BLD-MCNC: 155 MG/DL (ref 75–110)
GLUCOSE BLD-MCNC: 274 MG/DL (ref 75–110)
GLUCOSE BLD-MCNC: 291 MG/DL (ref 75–110)
GLUCOSE BLD-MCNC: 362 MG/DL (ref 75–110)
Lab: ABNORMAL
POTASSIUM SERPL-SCNC: 4.8 MMOL/L (ref 3.7–5.3)
SODIUM BLD-SCNC: 135 MMOL/L (ref 135–144)
SPECIMEN DESCRIPTION: ABNORMAL

## 2021-06-10 PROCEDURE — 2580000003 HC RX 258: Performed by: STUDENT IN AN ORGANIZED HEALTH CARE EDUCATION/TRAINING PROGRAM

## 2021-06-10 PROCEDURE — 97116 GAIT TRAINING THERAPY: CPT

## 2021-06-10 PROCEDURE — 86140 C-REACTIVE PROTEIN: CPT

## 2021-06-10 PROCEDURE — 80048 BASIC METABOLIC PNL TOTAL CA: CPT

## 2021-06-10 PROCEDURE — 36415 COLL VENOUS BLD VENIPUNCTURE: CPT

## 2021-06-10 PROCEDURE — 97535 SELF CARE MNGMENT TRAINING: CPT

## 2021-06-10 PROCEDURE — 97530 THERAPEUTIC ACTIVITIES: CPT

## 2021-06-10 PROCEDURE — 6370000000 HC RX 637 (ALT 250 FOR IP): Performed by: STUDENT IN AN ORGANIZED HEALTH CARE EDUCATION/TRAINING PROGRAM

## 2021-06-10 PROCEDURE — 97110 THERAPEUTIC EXERCISES: CPT

## 2021-06-10 PROCEDURE — 99232 SBSQ HOSP IP/OBS MODERATE 35: CPT | Performed by: INTERNAL MEDICINE

## 2021-06-10 PROCEDURE — 6360000002 HC RX W HCPCS: Performed by: STUDENT IN AN ORGANIZED HEALTH CARE EDUCATION/TRAINING PROGRAM

## 2021-06-10 PROCEDURE — 2060000000 HC ICU INTERMEDIATE R&B

## 2021-06-10 PROCEDURE — 94640 AIRWAY INHALATION TREATMENT: CPT

## 2021-06-10 PROCEDURE — 82947 ASSAY GLUCOSE BLOOD QUANT: CPT

## 2021-06-10 RX ADMIN — SODIUM CHLORIDE, PRESERVATIVE FREE 10 ML: 5 INJECTION INTRAVENOUS at 20:59

## 2021-06-10 RX ADMIN — INSULIN LISPRO 5 UNITS: 100 INJECTION, SOLUTION INTRAVENOUS; SUBCUTANEOUS at 20:59

## 2021-06-10 RX ADMIN — GABAPENTIN 200 MG: 100 CAPSULE ORAL at 08:08

## 2021-06-10 RX ADMIN — CETIRIZINE HYDROCHLORIDE 10 MG: 10 TABLET, FILM COATED ORAL at 20:22

## 2021-06-10 RX ADMIN — INSULIN LISPRO 6 UNITS: 100 INJECTION, SOLUTION INTRAVENOUS; SUBCUTANEOUS at 11:31

## 2021-06-10 RX ADMIN — INSULIN LISPRO 2 UNITS: 100 INJECTION, SOLUTION INTRAVENOUS; SUBCUTANEOUS at 08:08

## 2021-06-10 RX ADMIN — HEPARIN SODIUM 5000 UNITS: 5000 INJECTION INTRAVENOUS; SUBCUTANEOUS at 06:23

## 2021-06-10 RX ADMIN — INSULIN LISPRO 6 UNITS: 100 INJECTION, SOLUTION INTRAVENOUS; SUBCUTANEOUS at 18:03

## 2021-06-10 RX ADMIN — MELOXICAM 7.5 MG: 7.5 TABLET ORAL at 20:22

## 2021-06-10 RX ADMIN — BUDESONIDE AND FORMOTEROL FUMARATE DIHYDRATE 2 PUFF: 160; 4.5 AEROSOL RESPIRATORY (INHALATION) at 09:23

## 2021-06-10 RX ADMIN — CEFAZOLIN 1000 MG: 1 INJECTION, POWDER, FOR SOLUTION INTRAMUSCULAR; INTRAVENOUS at 23:18

## 2021-06-10 RX ADMIN — ASPIRIN 81 MG: 81 TABLET, COATED ORAL at 08:08

## 2021-06-10 RX ADMIN — HEPARIN SODIUM 5000 UNITS: 5000 INJECTION INTRAVENOUS; SUBCUTANEOUS at 20:59

## 2021-06-10 RX ADMIN — SILVER SULFADIAZINE: 10 CREAM TOPICAL at 08:13

## 2021-06-10 RX ADMIN — SODIUM CHLORIDE: 9 INJECTION, SOLUTION INTRAVENOUS at 06:23

## 2021-06-10 RX ADMIN — CEFAZOLIN 1000 MG: 1 INJECTION, POWDER, FOR SOLUTION INTRAMUSCULAR; INTRAVENOUS at 06:24

## 2021-06-10 RX ADMIN — BUDESONIDE AND FORMOTEROL FUMARATE DIHYDRATE 2 PUFF: 160; 4.5 AEROSOL RESPIRATORY (INHALATION) at 21:24

## 2021-06-10 RX ADMIN — HYDROCODONE BITARTRATE AND ACETAMINOPHEN 1 TABLET: 5; 325 TABLET ORAL at 15:12

## 2021-06-10 RX ADMIN — CEFAZOLIN 1000 MG: 1 INJECTION, POWDER, FOR SOLUTION INTRAMUSCULAR; INTRAVENOUS at 14:02

## 2021-06-10 RX ADMIN — HYDROCODONE BITARTRATE AND ACETAMINOPHEN 1 TABLET: 5; 325 TABLET ORAL at 23:17

## 2021-06-10 RX ADMIN — MELOXICAM 7.5 MG: 7.5 TABLET ORAL at 08:08

## 2021-06-10 RX ADMIN — GABAPENTIN 200 MG: 100 CAPSULE ORAL at 20:21

## 2021-06-10 RX ADMIN — INSULIN GLARGINE 10 UNITS: 100 INJECTION, SOLUTION SUBCUTANEOUS at 08:08

## 2021-06-10 RX ADMIN — ATORVASTATIN CALCIUM 20 MG: 20 TABLET, FILM COATED ORAL at 20:22

## 2021-06-10 RX ADMIN — GABAPENTIN 200 MG: 100 CAPSULE ORAL at 14:02

## 2021-06-10 ASSESSMENT — PAIN SCALES - GENERAL
PAINLEVEL_OUTOF10: 8
PAINLEVEL_OUTOF10: 0
PAINLEVEL_OUTOF10: 4
PAINLEVEL_OUTOF10: 7
PAINLEVEL_OUTOF10: 8

## 2021-06-10 ASSESSMENT — ENCOUNTER SYMPTOMS
COLOR CHANGE: 1
ALLERGIC/IMMUNOLOGIC NEGATIVE: 1
RESPIRATORY NEGATIVE: 1
GASTROINTESTINAL NEGATIVE: 1

## 2021-06-10 NOTE — PROGRESS NOTES
[facility]  Progress Note    Agustin Maxwell  MRN: [id number]  Date: 6/10/2021  LOS: 4     Reason for F/U :   Diabetic foot infection    Impression :   1. Diabetes mellitus with associated diabetic neuropathy and Charcot deformity in the right foot. 2. Recent puncturewound to the plantar aspect of the right foot with associated osteomyelitis  3. Cellulitis of the right lower extremity worsening in the medial aspect of the foot  · Status post I&D 6/8/2021  · Culture with MSSA    Recommendations:   · Continue intravenous antimicrobial therapy with cefazolin through July 4, 2021. · I had a discussion with the patient about discharge antibiotics and given the extent of the infection of the foot as well as the extensive surgery required I have suggested IV antibiotics. · The patient was initially reluctant but has agreed. · The patient is scheduled for surgical closure in the operating room tomorrow. · I will plan on placing a midline and discharging on IV antibiotic therapy. Infection Control Recommendations:   Universal precautions    Discharge Planning:   Estimated Length of IV antimicrobials: July 4, 2021  Patient will need Midline Catheter Insertion  Patient will need: Home IV   Patient willneed outpatient wound care: Yes    Medical Decision making / Summary of Stay:   Agustin Maxwell is a 60y.o.-year-old male who was initially admitted on 6/6/2021. Natalie Mcknight is seen and evaluated at bedside he tells me that he recently sustained a dog bite to his right arm on May 5, 2021 and he was seen in the urgent care and was given a tetanus shot and also was prescribed Keflex for that. He does report that the following day on 5/6/2021 he stepped on a nail in his right foot and he was seen by Dr. Liana Green from podiatry and levofloxacin was added to his antibiotics regimen. The patient reports that he completed the course of antibiotics last week on Wednesday.   The patient is an underlying diabetic Physical Exam  Constitutional:       Appearance: He is well-developed. HENT:      Head: Normocephalic and atraumatic. Cardiovascular:      Rate and Rhythm: Normal rate. Heart sounds: Normal heart sounds. No friction rub. No gallop. Pulmonary:      Effort: Pulmonary effort is normal.      Breath sounds: Normal breath sounds. No wheezing. Abdominal:      General: Bowel sounds are normal.      Palpations: Abdomen is soft. There is no mass. Tenderness: There is no abdominal tenderness. Musculoskeletal:      Cervical back: Normal range of motion and neck supple. Comments: The foot dressing was not removed   Lymphadenopathy:      Cervical: No cervical adenopathy. Skin:     General: Skin is warm and dry. Neurological:      Mental Status: He is alert and oriented to person, place, and time. Laboratory data:   I have independently reviewed the followinglabs:  CBC with Differential:   No results for input(s): WBC, HGB, HCT, PLT, SEGSPCT, BANDSPCT, LYMPHOPCT, MONOPCT, EOSPCT in the last 72 hours. BMP:   Recent Labs     06/09/21  0423 06/10/21  0525   * 135   K 4.8 4.8    105   CO2 21 22   BUN 21 17   CREATININE 1.71* 1.74*     Hepatic Function Panel: No results for input(s): PROT, LABALBU, BILIDIR, IBILI, BILITOT, ALKPHOS, ALT, AST in the last 72 hours.       No results found for: PROCAL  Lab Results   Component Value Date    CRP 33.7 06/10/2021    CRP 63.4 06/06/2021    CRP 84.4 08/04/2018     Lab Results   Component Value Date    SEDRATE 37 (H) 06/06/2021         No results found for: DDIMER  No results found for: FERRITIN  No results found for: LDH  No results found for: FIBRINOGEN    Results in Past 30 Days  Result Component Current Result Ref Range Previous Result Ref Range   SARS-CoV-2, Rapid DETECTED (A) (5/26/2021) Not Detected Not in Time Range      Lab Results   Component Value Date    COVID19 DETECTED 05/26/2021    COVID19 Not Detected 12/07/2020    COVID19 Not Detected 08/08/2020       No results for input(s): LUPILLO in the last 72 hours. Imaging Studies:   MRI OF THE RIGHT FOOT WITHOUT CONTRAST, 6/7/2021 2:39 pm  Impression   1. Plantar ulceration of the medial soft tissues of the foot at the level of   the midfoot with underlying T2 hyperintense collection measuring   approximately 2.3 x 3.1 x 1.2 cm which is most suggestive of abscess versus   phlegmon. 2. No osteomyelitis in the region of the midfoot. 3. Chronic changes of Charcot arthropathy. 4. Chronically eroded appearance of the distal aspect of the proximal 1st   phalanx with patchy edema of the great toe in the region of the   interphalangeal joint as well as small interphalangeal joint effusion.  While   findings could be related to remote trauma or remote infection at this site,   superimposed acute osteomyelitis of the great toe difficult to exclude. Correlate clinically to exclude the possibility of any soft tissue wounds of   the great toe.  In the setting of adjacent soft tissue wound, infection would   be more likely. Cultures:     Culture, Blood 1 [[id number]] Collected: 06/06/21 1105   Order Status: Completed Specimen: Blood Updated: 06/10/21 0014    Specimen Description . BLOOD    Special Requests [facility]    Culture NO GROWTH 4 DAYS   Culture, Blood 1 [[id number]] Collected: 06/06/21 1058   Order Status: Completed Specimen: Blood Updated: 06/10/21 0014    Specimen Description . BLOOD    Special Requests [facility]    Culture NO GROWTH 4 DAYS   Culture, Anaerobic and Aerobic [[id number]] (Abnormal) Collected: 06/08/21 1700   Order Status: Completed Specimen: Swab from Foot Updated: 06/09/21 1193    Specimen Description . FOOT    Special Requests NOT REPORTED    Direct Exam MODERATE NEUTROPHILSAbnormal      FEW GRAM POSITIVE COCCIAbnormal     Culture STAPHYLOCOCCUS AUREUS LIGHT [address]      NORMAL ARABELLA       Culture, Wound [[id number]] (Abnormal)  Collected: 06/06/21 1102   Order Status: Completed Specimen: Foot Updated: 06/08/21 0815    Specimen Description . FOOT RIGHT    Special Requests NOT REPORTED    Direct Exam MODERATE NEUTROPHILSAbnormal      MANY GRAM POSITIVE COCCI IN PAIRSAbnormal      FEW GRAM POSITIVE RODSAbnormal     Culture STAPHYLOCOCCUS AUREUS HEAVY GROWTH This isolate is methicillin susceptible. Abnormal      NORMAL SKIN FLORAAbnormal    Staphylococcus aureus (1)    Antibiotic Interpretation DIMA Status    penicillin   Final     NOT REPORTED    cefoxitin screen  NOT REPORTED Final    ciprofloxacin   Final     NOT REPORTED    clindamycin Sensitive  Final     <=0.25   SUSCEPTIBLE   erythromycin Sensitive  Final     <=0.25   SUSCEPTIBLE   gentamicin Sensitive  Final     <=0.5   SUSCEPTIBLE   gentamicin Sensitive Gentamicin is used only in combination with other active agents that test susceptible. Final    Induced Clind Resist  NOT REPORTED Final    levofloxacin Sensitive  Final     0.25   SUSCEPTIBLE   linezolid   Final     NOT REPORTED    moxifloxacin   Final     NOT REPORTED    nitrofurantoin   Final     NOT REPORTED    oxacillin Sensitive  Final     <=0.25   SUSCEPTIBLE   oxacillin Sensitive This staph isolate may be susceptible to Penicillin. Please contact Microbiology for confirmatory testing of susceptibility if Pencillin is a therapeutic consideration.  Final    Synercid   Final     NOT REPORTED    rifampin   Final     NOT REPORTED    tetracycline Sensitive  Final     <=1   SUSCEPTIBLE   tigecycline   Final     NOT REPORTED    trimethoprim-sulfamethoxazole Sensitive  Final     <=10   SUSCEPTIBLE   vancomycin   Final     NOT REPORTED        Medications:      ceFAZolin  1,000 mg Intravenous Q8H    insulin glargine  10 Units Subcutaneous Daily    silver sulfADIAZINE   Topical Daily    aspirin  81 mg Oral Daily    cetirizine  10 mg Oral Nightly    atorvastatin  20 mg Oral Daily    sodium chloride flush  5-40 mL Intravenous 2 times per day    heparin (porcine)  5,000 Units Subcutaneous 3 times per day    gabapentin  200 mg Oral TID    insulin lispro  0-12 Units Subcutaneous TID WC    insulin lispro  0-6 Units Subcutaneous Nightly    budesonide-formoterol  2 puff Inhalation BID    meloxicam  7.5 mg Oral BID           [facility]  Rina Marcelo MD  Perfect [employer] messaging  OFFICE: [phone]      Electronically signed by Rina Marcelo MD on 6/10/2021 at 4:14 PM  Thank you for allowing us to participate in the care of this patient. Please call with questions. This note iscreated with the assistance of a speech recognition program.  While intending to generate a document that actually reflects the content of the visit, the document can still have some errors including those of syntax andsound a like substitutions which may escape proof reading. In such instances, actual meaning can be extrapolated by contextual diversion.

## 2021-06-10 NOTE — PROGRESS NOTES
[facility]  Office: [address], DO, Alise Santiago, DO, Leola Hua, DO, Capri Cole Irma, DO, Rosita Sheikh MD, Marcial Mckeon MD, Delmi Lim MD, Jana Tello MD, Aaliyah Rodas MD, Rosalba Alanis MD, Tania Perez MD, Gail Pham MD, Victor Manuel Bonner DO, Brittany Stanley MD, Emelyn Dorsey DO, Emanuel Borjas MD,  Vick Sanchez DO, Chris Michaels MD, Neo Doss MD, Garfield Jeronimo MD, Nancy Guajardo MD, Mohinder Neumann, [facility], [address], [facility], Jeremy Humphries, CNP, America Young, CNS, Pau Haji, CNP, Lokesh Potts, CNP, Emelia Spring, CNP, Tanmay Ho, CNP, Karolina Hauser, CNP, iVnay Saenz PA-C, Adriana Rainey, [facility], Eric Javier, CNP, Saúl Montero, CNP, Sandra Crum, CNP, Lacinda Frankel, CNP, Dilcia Gilbert, [facility], Josefina Tello, [facility]    Progress Note    6/10/2021    1:50 PM    Name:   Rosa Torres  MRN:     [id number]     [place]:      [de-identified]   Room:   [address] [place] [address] Day:  4  Admit Date:  6/6/2021 10:33 AM    PCP:   Manny Bartlett MD  Code Status:  Full Code    Subjective:     C/C:   Chief Complaint   Patient presents with    Foot Pain     Interval History Status: improved. Patient is resting comfortably denies any complaints of chest pain, shortness of breath, nausea vomiting, fevers or chills. Awaiting wound closure tomorrow. Brief History: This is a 46-year-old male with a past medical history of diabetes with diabetic neuropathy that presents with foot pain with erythema and swelling.  He is found to have fluid collection which was drained at the bedside. [facility] underwent MRI to evaluate for possible osteomyelitis.  MRI did not show evidence of osteomyelitis but did show continued fluid collection concerning for continued abscess.     Review of Systems:     Constitutional:  negative for chills, fevers, sweats  Respiratory:  negative for cough, dyspnea on exertion, shortness of breath, wheezing  Cardiovascular:  negative for chest pain, chest pressure/discomfort, lower extremity edema, palpitations  Gastrointestinal:  negative for abdominal pain, constipation, diarrhea, nausea, vomiting  Neurological:  negative for dizziness, headache    Medications: Allergies: Allergies   Allergen Reactions    Morphine Itching    Percocet [Oxycodone-Acetaminophen]      Facial swelling    Dye [Iodides] Rash     Rash and swelling       Current Meds:   Scheduled Meds:    ceFAZolin  1,000 mg Intravenous Q8H    insulin glargine  10 Units Subcutaneous Daily    silver sulfADIAZINE   Topical Daily    aspirin  81 mg Oral Daily    cetirizine  10 mg Oral Nightly    atorvastatin  20 mg Oral Daily    sodium chloride flush  5-40 mL Intravenous 2 times per day    heparin (porcine)  5,000 Units Subcutaneous 3 times per day    gabapentin  200 mg Oral TID    insulin lispro  0-12 Units Subcutaneous TID WC    insulin lispro  0-6 Units Subcutaneous Nightly    budesonide-formoterol  2 puff Inhalation BID    meloxicam  7.5 mg Oral BID     Continuous Infusions:    sodium chloride      dextrose       PRN Meds: HYDROcodone-acetaminophen, sodium chloride flush, sodium chloride, ondansetron **OR** ondansetron, magnesium hydroxide, acetaminophen **OR** acetaminophen, benzonatate, glucose, dextrose, glucagon (rDNA), dextrose    Data:     Past Medical History:   has a past medical history of ALEJANDRO (acute kidney injury) ([facility] [address] Cellulitis of right foot, Charcot foot due to diabetes mellitus ([facility] [address] Diabetic poly[facility]uropathy associated with type [facility] Fractures, multiple, Hyperlipidemia, Hypertension, Leukocytosis, Neuropathy, Pain in right foot, Pneumonia, Tobacco abuse, Type II or unspecified type diabetes mellitus without mention of complication, not stated as uncontrolled, Vertigo, Wound, open, and Wound, open. Social History:   reports that he has been smoking.  He has been smoking about 1.00 pack per day. He has never used smokeless tobacco. He reports previous drug use. He reports that he does not drink alcohol. Family History:   Family History   Problem Relation Age of Onset    Diabetes Mother     Cancer Father     Hypertension Maternal Grandmother        Vitals:  /71   Pulse 69   Temp 97.2 °F (36.2 °C) (Oral)   Resp 18   Ht 5' 11\" (1.803 m)   Wt 237 lb 3 oz (107.6 kg)   SpO2 96%   BMI 33.08 kg/m²   Temp (24hrs), Av.9 °F (36.6 °C), Min:97.2 °F (36.2 °C), Max:98.2 °F (36.8 °C)    Recent Labs     21  1630 06/09/21  2040 06/10/21  0717 06/10/21  1118   POCGLU 220* 203* 155* 274*       I/O (24Hr): Intake/Output Summary (Last 24 hours) at 6/10/2021 1350  Last data filed at 6/10/2021 0629  Gross per 24 hour   Intake 1900 ml   Output 2500 ml   Net -600 ml       Labs:  Hematology:No results for input(s): WBC, RBC, HGB, HCT, MCV, MCH, MCHC, RDW, PLT, MPV, SEDRATE, CRP, INR, DDIMER, AX5VWEKA, LABABSO in the last 72 hours.     Invalid input(s): PT  Chemistry:  Recent Labs     21  0453 21  0423 06/10/21  0525   * 134* 135   K 5.0 4.8 4.8    102 105   CO2 20 21 22   GLUCOSE 207* 234* 139*   BUN 29* 21 17   CREATININE 1.89* 1.71* 1.74*   ANIONGAP 10 11 8*   LABGLOM 36* 41* 40*   GFRAA 44* 49* 48*   CALCIUM 8.9 8.8 8.8     Recent Labs     21  0713 21  1108 21  1630 21  2040 06/10/21  0717 06/10/21  1118   POCGLU 207* 121* 220* 203* 155* 274*     ABG:No results found for: POCPH, PHART, PH, POCPCO2, RZJ4WAB, PCO2, POCPO2, PO2ART, PO2, POCHCO3, ZSN2UDL, HCO3, NBEA, PBEA, BEART, BE, THGBART, THB, ZAB3VSS, PEHG2OJS, E1SFDQIS, O2SAT, FIO2  Lab Results   Component Value Date/Time    SPECIAL NOT REPORTED 2021 05:00 PM     Lab Results   Component Value Date/Time    CULTURE STAPHYLOCOCCUS AUREUS LIGHT GROWTH (A) 2021 05:00 PM    CULTURE NORMAL ARABELLA 2021 05:00 PM       Radiology:  XR FOOT RIGHT (MIN 3 Diabetic polyneuropathy associated with type 2 diabetes mellitus ([facility] 6/6/2021 Yes    Abscess of right foot 6/10/2021 Yes    Tobacco abuse 6/6/2021 Yes    Osteomyelitis ([facility] 6/8/2021 Yes          Plan:        1. Wound care  2. Delayed closure tomorrow  3. Antibiotics per ID, IV ancef  4. Insulin scale for glycemic control  5. Monitor and control blood pressure, continue present medications  6. GI and DVT prophylaxis  7. Tobacco cessation  8.  Discharge planning in 24 hours, after wound closure    Lizzie Mansfield DO  6/10/2021  1:50 PM

## 2021-06-10 NOTE — PROGRESS NOTES
Progress Note  Podiatric Medicine and Surgery     Subjective     CC: Right foot infection    Patient seen and examined at bedside. Chart and results reviewed  Patient tolerating diet  Tolerating IV antibiotics  Overall recovering well postoperatively  POD# 2 s/p I&D right foot  Plan for or on Friday for delayed closure of right foot    HPI :  Jaylon Rivera is a 60 y.o. male seen at [facility] [address] for a wound on the right foot is well-known to Dr. Abeba Adan. On 5/05 patient presented to the ED because he was bitten by a dog. At that time was given a tetanus shot and placed on Keflex. The following day on 5/06/2021 was seen by podiatry in the ED after stepping on a nail to the right foot. In addition to the Keflex he is already taking patient was prescribed Cipro. At this time patient states he is no longer taking any antibiotics but presents because the right foot has become increasingly swollen, red, and painful with drainage. He denies any constitutional symptoms at this time. Of note, patient has history of Charcot arthropathy but does not wear custom molded shoes.     PCP is Alex Fragoso MD    ROS: Denies N/V/F/C/SOB/CP/Cough.        Medications:  Scheduled Meds:   ceFAZolin  1,000 mg Intravenous Q8H    insulin glargine  10 Units Subcutaneous Daily    silver sulfADIAZINE   Topical Daily    aspirin  81 mg Oral Daily    cetirizine  10 mg Oral Nightly    atorvastatin  20 mg Oral Daily    sodium chloride flush  5-40 mL Intravenous 2 times per day    heparin (porcine)  5,000 Units Subcutaneous 3 times per day    gabapentin  200 mg Oral TID    insulin lispro  0-12 Units Subcutaneous TID WC    insulin lispro  0-6 Units Subcutaneous Nightly    budesonide-formoterol  2 puff Inhalation BID    meloxicam  7.5 mg Oral BID       Continuous Infusions:   sodium chloride      dextrose         PRN Meds:HYDROcodone-acetaminophen, sodium chloride flush, sodium chloride, ondansetron **OR** ondansetron, magnesium hydroxide, acetaminophen **OR** acetaminophen, benzonatate, glucose, dextrose, glucagon (rDNA), dextrose    Objective     Vitals:  Patient Vitals for the past 8 hrs:   BP Temp Temp src Pulse Resp SpO2   06/10/21 1147 120/71 97.2 °F (36.2 °C) Oral 69 18 96 %   06/10/21 0726 125/66 97.7 °F (36.5 °C) Oral 66 18 94 %   06/10/21 0422 115/65 98.1 °F (36.7 °C) Oral 64 18 94 %     Average, Min, and Max for last 24 hours Vitals:  TEMPERATURE:  Temp  Av.9 °F (36.6 °C)  Min: 97.2 °F (36.2 °C)  Max: 98.2 °F (36.8 °C)    RESPIRATIONS RANGE: Resp  Av.2  Min: 15  Max: 18    PULSE RANGE: Pulse  Av.3  Min: 64  Max: 80    BLOOD PRESSURE RANGE:  Systolic (17GRI), MZM:968 , Min:104 , RXE:045   ; Diastolic (02HNL), PZJ:29, Min:57, Max:71      PULSE OXIMETRY RANGE: SpO2  Av %  Min: 94 %  Max: 96 %    I/O last 3 completed shifts: In: 1950 [I.V.:1850; IV Piggyback:100]  Out: 2950 [Urine:2500; Emesis/NG output:450]    CBC:  No results for input(s): WBC, HGB, HCT, PLT, CRP in the last 72 hours. Invalid input(s):  ESR     BMP:  Recent Labs     21  0453 21  0423 06/10/21  0525   * 134* 135   K 5.0 4.8 4.8    102 105   CO2 20 21 22   BUN 29* 21 17   CREATININE 1.89* 1.71* 1.74*   GLUCOSE 207* 234* 139*   CALCIUM 8.9 8.8 8.8        Coags:  No results for input(s): APTT, PROT, INR in the last 72 hours. Lab Results   Component Value Date    SEDRATE 37 (H) 2021     No results for input(s): CRP in the last 72 hours. Lower Extremity Physical Exam:  Vascular: DP and PT pulses are Palpable . CFT <3 seconds to all digits. Hair growth is absent to the level of the digits. Moderate non-pitting edema to the right lower extremity, improved from previous exam.       Neuro: Saph/sural/SP/DP/plantar sensation diminished to light touch.     Musculoskeletal: Muscle strength is adequate ROM, adequate strength to all lower extremity muscle groups. Gross deformity is medial rocker-bottom deformity.  TTP periwound. Compartments are soft and compressible.      Dermatologic:  Open surgical wounds to dorsal medial aspect and plantar medial aspect of right foot with retention sutures in place. No purulent drainage appreciated today. No malodor today. Does not probe to bone. Wound does probe to deep fascia. No fluctuance or induration. Erythema improving. Imaging:   MRI FOOT RIGHT WO CONTRAST   Final Result   1. Plantar ulceration of the medial soft tissues of the foot at the level of   the midfoot with underlying T2 hyperintense collection measuring   approximately 2.3 x 3.1 x 1.2 cm which is most suggestive of abscess versus   phlegmon. 2. No osteomyelitis in the region of the midfoot. 3. Chronic changes of Charcot arthropathy. 4. Chronically eroded appearance of the distal aspect of the proximal 1st   phalanx with patchy edema of the great toe in the region of the   interphalangeal joint as well as small interphalangeal joint effusion. While   findings could be related to remote trauma or remote infection at this site,   superimposed acute osteomyelitis of the great toe difficult to exclude. Correlate clinically to exclude the possibility of any soft tissue wounds of   the great toe. In the setting of adjacent soft tissue wound, infection would   be more likely. XR FOOT RIGHT (MIN 3 VIEWS)   Final Result   Soft tissue ulceration and soft tissue swelling. Persistent osseous   irregularity at the 1st and 3rd digits, similar to prior, for which   osteomyelitis could be considered in the appropriate clinical setting. Charcot arthropathy again demonstrated of the midfoot. Cultures:   Culture, Anaerobic and Aerobic [[id number]] (Abnormal) Collected: 06/08/21 1700   Order Status: Completed Specimen: Swab from Foot Updated: 06/09/21 0600    Specimen Description . FOOT    Special Requests NOT REPORTED    Direct Exam MODERATE NEUTROPHILSAbnormal      FEW GRAM POSITIVE COCCIAbnormal Culture STAPHYLOCOCCUS AUREUS LIGHT GROWTHAbnormal      NORMAL ARABELLA   Culture, Wound [[id number]] (Abnormal)  Collected: 06/06/21 1102   Order Status: Completed Specimen: Foot Updated: 06/08/21 0815    Specimen Description . FOOT RIGHT    Special Requests NOT REPORTED    Direct Exam MODERATE NEUTROPHILSAbnormal      MANY GRAM POSITIVE COCCI IN PAIRSAbnormal      FEW GRAM POSITIVE RODSAbnormal     Culture STAPHYLOCOCCUS AUREUS HEAVY GROWTH This isolate is methicillin susceptible. Abnormal      NORMAL SKIN FLORAAbnormal          Assessment   Glen Granado is a 60 y.o. male with   1. S/p I&D, right foot (DOS: 06/08/2021)  2. Diabetic foot ulcer to level of subcutaneous, right  3. Recent hx of puncture wound, right foot   4. Cellulitis, RLE  5. 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 Hx of Charcot Arthropathy     Principal Problem:    Diabetic infection of right foot ([address]  Active Problems:    Essential hypertension    Neuropathy    Charcot foot due to diabetes mellitus ([address]    Diabetic polyneuropathy associated with type 2 diabetes mellitus ([address]    Tobacco abuse    Osteomyelitis ([address]  Resolved Problems:    * No resolved hospital problems. *       Plan     · Patient examined and evaluated at bedside. · Treatment options discussed in detail with the patient. · MRI reviewed and shows abscess adjacent to wound with possible osteomyelitis of the hallux. · Continue medical management per Internal Medicine. · ID on board   · Abx: Ancef  · Plan for delayed closure right foot on Friday, 06/11/2021, at approximately 2:30 PM  · Dressing applied to Right foot: Packed with 1/4 iodoform, betadine, DSD, Ace. · Heel-touch WB to Right lower extremity in surgical shoe. · Discussed with  Dr. Arcelia Villalpando.     Ubaldo You DPM   Podiatric Medicine & Surgery   6/10/2021 at 12:07 PM

## 2021-06-10 NOTE — PLAN OF CARE
Problem:  Body Temperature -  Risk of, Imbalanced  Goal: Complications related to the disease process, condition or treatment will be avoided or minimized  6/10/2021 0014 by Gussie Jeans, RN  Outcome: Ongoing     Problem: Isolation Precautions - Risk of Spread of Infection  Goal: Prevent transmission of infection  6/10/2021 0014 by Gussie Jeans, RN  Outcome: Ongoing     Problem: Nutrition Deficits  Goal: Optimize nutritional status  6/10/2021 0014 by Gussie Jeans, RN  Outcome: Ongoing     Problem: Risk for Fluid Volume Deficit  Goal: Maintain normal heart rhythm  6/10/2021 0014 by Gussie Jeans, RN  Outcome: Ongoing     Problem: Risk for Fluid Volume Deficit  Goal: Maintain absence of muscle cramping  6/10/2021 0014 by Gussie Jeans, RN  Outcome: Ongoing     Problem: Risk for Fluid Volume Deficit  Goal: Maintain normal serum potassium, sodium, calcium, phosphorus, and pH  6/10/2021 0014 by Gussie Jeans, RN  Outcome: Ongoing     Problem: Loneliness or Risk for Loneliness  Goal: Demonstrate positive use of time alone when socialization is not possible  6/10/2021 0014 by Gussie Jeans, RN  Outcome: Ongoing     Problem: Fatigue  Goal: Verbalize increase energy and improved vitality  6/10/2021 0014 by Gussie Jeans, RN  Outcome: Ongoing     Problem: Patient Education: Go to Patient Education Activity  Goal: Patient/Family Education  6/10/2021 0014 by Gussie Jeans, RN  Outcome: Ongoing     Problem: Falls - Risk of:  Goal: Will remain free from falls  Description: Will remain free from falls  6/10/2021 0014 by Gussie Jeans, RN  Outcome: Ongoing     Problem: Falls - Risk of:  Goal: Absence of physical injury  Description: Absence of physical injury  6/10/2021 0014 by Gussie Jeans, RN  Outcome: Ongoing     Problem: Pain:  Goal: Pain level will decrease  Description: Pain level will decrease  6/10/2021 0014 by Gussie Jeans, RN  Outcome: Ongoing     Problem: Pain:  Goal: Control of acute pain  Description: Control of acute pain  6/10/2021 0014 by Colton Bhat RN  Outcome: Ongoing     Problem: Pain:  Goal: Control of chronic pain  Description: Control of chronic pain  6/10/2021 0014 by Colton Bhat RN  Outcome: Ongoing     Problem: Nutrition  Goal: Optimal nutrition therapy  6/10/2021 0014 by Colton Bhat RN  Outcome: Ongoing

## 2021-06-10 NOTE — PROGRESS NOTES
and Foot Debridement (Right, 6/8/2021). Restrictions  Restrictions/Precautions  Restrictions/Precautions: General Precautions, Up as Tolerated, Weight Bearing  Lower Extremity Weight Bearing Restrictions  Right Lower Extremity Weight Bearing:  (Heel touch weightbearing with surgical shoe)  Partial Weight Bearing Percentage Or Pounds: Heel touhc weight bearing with surgical  Position Activity Restriction  Other position/activity restrictions: up with assist, up as venkata, RUE IV  Subjective   General  Chart Reviewed: Yes  Patient assessed for rehabilitation services?: Yes  Response to previous treatment: Patient with no complaints from previous session  Family / Caregiver Present: Yes (wife)  Subjective  Subjective: Pt resting in bedside chair, agreeable to OT eval      Orientation  Orientation  Overall Orientation Status: Within Functional Limits  Objective    ADL  Grooming: Setup;Stand by assistance  UE Bathing: Setup;Stand by assistance  LE Bathing: Setup;Contact guard assistance  UE Dressing: Modified independent   LE Dressing: Maximum assistance;Supervision (Patient able to don/doff sock and shoe, but Max A to don surgical shoe despite encouragement to complete IND)        Balance  Sitting Balance: Independent    Standing Balance: Stand by assistance  Standing Balance  Time: standing venkata 2-3 min  Activity: bathing/dressing    Functional Mobility  Functional - Mobility Device: No device  Activity: Other  Assist Level: Stand by assistance  Functional Mobility Comments: Patient completed functional mobility up/down hallway with surgical shoe and tennis shoe on. SBA for safety.  Patient has lateral sway with mobility d/t tennis shoe and surgical shoe having tall/short bases making patient uneven when standing, but patient compensates well    Bed mobility  Comment: Patient sitting EOB upon arrival and returned EOB at end of session    Transfers  Sit to stand: Modified independent  Stand to sit: Modified independent  Transfer Comments: Minimal use of hands      Cognition  Overall Cognitive Status: [facility]  Safety Judgement: Decreased awareness of need for safety      Plan   Plan  Times per week: 4-5x/wk 1x/day as venkata  Times per day: Daily  Current Treatment Recommendations: Strengthening, Balance Training, Functional Mobility Training, Safety Education & Training, Home Management Training, Self-Care / ADL, Equipment Evaluation, Education, & procurement, Patient/Caregiver Education & Training  AM-PAC Score        AM-PAC Inpatient Daily Activity Raw Score: 19 (06/10/21 1138)  AM-PAC Inpatient ADL T-Scale Score : 40.22 (06/10/21 1138)  ADL Inpatient CMS 0-100% Score: 42.8 (06/10/21 1138)  ADL Inpatient CMS G-Code Modifier : CK (06/10/21 1138)    Goals  Short term goals  Time Frame for Short term goals: By discharge, pt to demo  Short term goal 1: bed mobility to Independent without use of bedrails,  Short term goal 2: ADL transfers and functional mobility to Mod I wtih use of AD as needed maintaining R heel weightbearing and using proper safety. Short term goal 3: UB/LB ADLs to Set up/SBA with use of AD/AE as needed using proper safety. Short term goal 4: I with B UE HEP to maintain strength for functional tasks with use of handouts as needed. Short term goal 5: toileting to Mod I with use of AD/grab bars as needed. Long term goals  Long term goal 1: Pt to be I with fall prevention educations, EC/WS tech, and recommendations for AE with use of handouts as needed. Patient Goals   Patient goals : To go home! Therapy Time   Individual Concurrent Group Co-treatment   Time In 3712         Time Out 1155         Minutes 29           Upon writer exit, call light within reach, pt retired to bed. All lines intact and patient positioned comfortably. All patient needs addressed prior to ending therapy session. Chart reviewed prior to treatment and patient is agreeable for therapy.   RN reports patient is medically stable for

## 2021-06-10 NOTE — PLAN OF CARE
Problem: Risk for Fluid Volume Deficit  Goal: Maintain normal heart rhythm  Outcome: Ongoing  Note: Pt in NSR      Problem: Falls - Risk of:  Goal: Will remain free from falls  Description: Will remain free from falls  Outcome: Ongoing

## 2021-06-10 NOTE — FLOWSHEET NOTE
Patient states about his medical problem. States he has no feeling in his foot, stepped on a nail didn't know it for days. States major infection, now surgery to clean and pack. States \" It is what it is. \"    shared in presence, listening, prayer. Follow up as needed. 06/10/21 1230   Encounter Summary   Services provided to: Patient   Referral/Consult From: [facility] Family members   Continue Visiting   (6-10-21)   Complexity of Encounter Moderate   Length of Encounter 15 minutes   Spiritual Assessment Completed Yes   Routine   Type Initial   Assessment Passive   Intervention Active listening;Explored feelings, thoughts, concerns;Prayer;Sustaining presence/ Ministry of presence; Discussed illness/injury and it's impact; Discussed belief system/Shinto practices/ab   Outcome Expressed gratitude;Receptive;Engaged in conversation;Expressed feelings/needs/concerns

## 2021-06-10 NOTE — PROGRESS NOTES
Physical Therapy  Facility/Department: [facility] PROGRESSIVE CARE  Daily Treatment Note  NAME: Jaylon Rivera  : 1955  MRN: [id number]    Date of Service: 6/10/2021    Assessment   Body structures, Functions, Activity limitations: Decreased functional mobility ; Decreased balance;Decreased sensation;Decreased endurance  Assessment: Skilled therapy needed to maximize independence with functional mobility, improve balance and overall safety. Prognosis: Good  Decision Making: Medium Complexity  Exam: AROM, strength, endurance, balance, mobility, AM-PAC  Clinical Presentation: evolving  PT Education: Goals;Precautions;PT Role;Plan of Care;Gait Training;Transfer Training;Weight-bearing Education;General Safety  REQUIRES PT FOLLOW UP: Yes  Activity Tolerance  Activity Tolerance: Patient Tolerated treatment well     Patient Diagnosis(es): The encounter diagnosis was Osteomyelitis of right foot, unspecified type ([facility] [address] has a past medical history of ALEJANDRO (acute kidney injury) ([facility] [address] Cellulitis of right foot, Charcot foot due to diabetes mellitus ([facility] [address] Diabetic polyneuropathy associated with type 2 diabetes mellitus ([address] Fractures, multiple, Hyperlipidemia, Hypertension, Leukocytosis, Neuropathy, Pain in right foot, Pneumonia, Tobacco abuse, Type II or unspecified type diabetes mellitus without mention of complication, not stated as uncontrolled, Vertigo, Wound, open, and Wound, open. has a past surgical history that includes Neck surgery (); Appendectomy; knee surgery (Bilateral, ); Knee arthroscopy (Right, ); Knee arthroscopy (Left, ); Foot Debridement (Left, 2018); pr deep dissec foot infec,1 bursa (Left, 2018); Colonoscopy; Foot Debridement (Right, 3/20/2020); arthroplasty (Right, 2020); and Foot Debridement (Right, 2021).     Restrictions  Restrictions/Precautions  Restrictions/Precautions: General Precautions, Up as Tolerated, Weight Bearing  Lower Extremity Weight Bearing

## 2021-06-10 NOTE — DISCHARGE INSTR - COC
Continuity of Care Form    Patient Name: Taryn King   :  1955  MRN:  [id number]    Admit date:  2021  Discharge date: 2021    Code Status Order: Full Code   Advance Directives:   Advance Care Flowsheet Documentation       Date/Time Healthcare Directive Type of Healthcare Directive Copy in [address] Agent's Name Healthcare Agent's Phone Number    21 1828  No, patient does not have an advance directive for healthcare treatment -- -- -- -- --            Admitting Physician:  Perla Houston MD  PCP: Lamine Ordaz MD    Discharging Nurse: [facility] Unit/Room#: 1005/1005-01  Discharging Unit Phone Number: [phone]    Emergency Contact:   Extended Emergency Contact Information  Primary Emergency Contact: Blanka Mix  Address: [place] [address], [address] Phone: [phone]  Mobile Phone: [phone]  Relation: Spouse    Past Surgical History:  Past Surgical History:   Procedure Laterality Date    APPENDECTOMY      at age 24    ARTHROPLASTY Right 2020    RIGHT HALLUX EXOSTECTOMY AND 3RD DIGIT EXTENSIOR TENOTOMY performed by Chester Villalba DPM at [address] [facility] Left 2018    I&D foreign body removal    FOOT DEBRIDEMENT Right 3/20/2020    RIGHT  FOOT   INCISION AND DRAINAGE WITH BONE BIOPSY performed by Chester Villalba DPM at [address] [place] [address] Right 2021    FOOT DEBRIDEMENT INCISION AND DRAINAGE performed by Chester Villalba DPM at [place] [address] ARTHROSCOPY Left    [facility] Bilateral 1983    arthroscopy   [address] [facility],[address] Left 2018    LEFT FOOT MULTIPLE  INCISIONS  AND DRAINAGE AND REMOVAL FOREIGN BODY  IRENE performed by Chester Villalba DPM at [facility]       Immunization History:   Immunization History   Administered antibiotic infusion as ordered -- next dose due at 1500    Patient's personal belongings (please select all that are sent with patient):  None    RN SIGNATURE:  Electronically signed by Ramon Young RN on 6/13/21 at 7:41 AM EDT    CASE MANAGEMENT/SOCIAL WORK SECTION    Inpatient Status Date: 6/6    Readmission Risk Assessment Score:  Readmission Risk              Risk of Unplanned Readmission:  10           Discharging to Facility/ Agency   · Name: [facility]   · Phone: [phone]  · Fax: [phone]    Discharging to Facility/ Agency   · Name: [facility]    · Phone: [phone]  · Fax: [phone]      / signature: Electronically signed by Chel Colmenares RN on 6/11/21 at 3:23 PM EDT    PHYSICIAN SECTION    Prognosis: Good    Condition at Discharge: Stable    Rehab Potential (if transferring to Rehab): Good    Recommended Labs or Other Treatments After Discharge:   Continue intravenous antimicrobial therapy with cefazolin through July 4, 2021. Check CBC, BMP, CRP on Mondays  Schedule for follow-up visit in 2-3 weeks  Fax results to Avelina Bañuelos MD FAX: [phone]      Physician Certification: I certify the above information and transfer of Jaylon Rivera  is necessary for the continuing treatment of the diagnosis listed and that he requires [address] for less 30 days.      Update Admission H&P: No change in H&P    PHYSICIAN SIGNATURE:  Electronically signed by Avelina Bañuelos MD on 6/10/21 at 5:12 PM EDT Electronically signed by Krystal Wright DO on 6/11/2021 at 3:55 PM

## 2021-06-11 ENCOUNTER — ANESTHESIA (OUTPATIENT)
Dept: OPERATING ROOM | Age: 66
DRG: 623 | End: 2021-06-11
Payer: MEDICARE

## 2021-06-11 ENCOUNTER — APPOINTMENT (OUTPATIENT)
Dept: INTERVENTIONAL RADIOLOGY/VASCULAR | Age: 66
DRG: 623 | End: 2021-06-11
Payer: MEDICARE

## 2021-06-11 ENCOUNTER — ANESTHESIA EVENT (OUTPATIENT)
Dept: OPERATING ROOM | Age: 66
DRG: 623 | End: 2021-06-11
Payer: MEDICARE

## 2021-06-11 VITALS
RESPIRATION RATE: 15 BRPM | SYSTOLIC BLOOD PRESSURE: 108 MMHG | DIASTOLIC BLOOD PRESSURE: 60 MMHG | OXYGEN SATURATION: 99 %

## 2021-06-11 LAB
ANION GAP SERPL CALCULATED.3IONS-SCNC: 11 MMOL/L (ref 9–17)
BUN BLDV-MCNC: 21 MG/DL (ref 8–23)
BUN/CREAT BLD: 12 (ref 9–20)
CALCIUM SERPL-MCNC: 8.8 MG/DL (ref 8.6–10.4)
CHLORIDE BLD-SCNC: 103 MMOL/L (ref 98–107)
CO2: 22 MMOL/L (ref 20–31)
CREAT SERPL-MCNC: 1.71 MG/DL (ref 0.7–1.2)
GFR AFRICAN AMERICAN: 49 ML/MIN
GFR NON-AFRICAN AMERICAN: 41 ML/MIN
GFR SERPL CREATININE-BSD FRML MDRD: ABNORMAL ML/MIN/{1.73_M2}
GFR SERPL CREATININE-BSD FRML MDRD: ABNORMAL ML/MIN/{1.73_M2}
GLUCOSE BLD-MCNC: 143 MG/DL (ref 75–110)
GLUCOSE BLD-MCNC: 145 MG/DL (ref 75–110)
GLUCOSE BLD-MCNC: 192 MG/DL (ref 75–110)
GLUCOSE BLD-MCNC: 197 MG/DL (ref 75–110)
GLUCOSE BLD-MCNC: 200 MG/DL (ref 70–99)
GLUCOSE BLD-MCNC: 236 MG/DL (ref 75–110)
POTASSIUM SERPL-SCNC: 4.9 MMOL/L (ref 3.7–5.3)
SODIUM BLD-SCNC: 136 MMOL/L (ref 135–144)

## 2021-06-11 PROCEDURE — 97116 GAIT TRAINING THERAPY: CPT

## 2021-06-11 PROCEDURE — 3600000012 HC SURGERY LEVEL 2 ADDTL 15MIN: Performed by: PODIATRIST

## 2021-06-11 PROCEDURE — 36410 VNPNXR 3YR/> PHY/QHP DX/THER: CPT

## 2021-06-11 PROCEDURE — 7100000001 HC PACU RECOVERY - ADDTL 15 MIN: Performed by: PODIATRIST

## 2021-06-11 PROCEDURE — 2580000003 HC RX 258: Performed by: STUDENT IN AN ORGANIZED HEALTH CARE EDUCATION/TRAINING PROGRAM

## 2021-06-11 PROCEDURE — 94640 AIRWAY INHALATION TREATMENT: CPT

## 2021-06-11 PROCEDURE — 0JBQ0ZZ EXCISION OF RIGHT FOOT SUBCUTANEOUS TISSUE AND FASCIA, OPEN APPROACH: ICD-10-PCS | Performed by: PODIATRIST

## 2021-06-11 PROCEDURE — 2500000003 HC RX 250 WO HCPCS: Performed by: PODIATRIST

## 2021-06-11 PROCEDURE — 6370000000 HC RX 637 (ALT 250 FOR IP): Performed by: STUDENT IN AN ORGANIZED HEALTH CARE EDUCATION/TRAINING PROGRAM

## 2021-06-11 PROCEDURE — 2580000003 HC RX 258: Performed by: INTERNAL MEDICINE

## 2021-06-11 PROCEDURE — 36415 COLL VENOUS BLD VENIPUNCTURE: CPT

## 2021-06-11 PROCEDURE — 3700000001 HC ADD 15 MINUTES (ANESTHESIA): Performed by: PODIATRIST

## 2021-06-11 PROCEDURE — 6360000002 HC RX W HCPCS: Performed by: STUDENT IN AN ORGANIZED HEALTH CARE EDUCATION/TRAINING PROGRAM

## 2021-06-11 PROCEDURE — 80048 BASIC METABOLIC PNL TOTAL CA: CPT

## 2021-06-11 PROCEDURE — 82947 ASSAY GLUCOSE BLOOD QUANT: CPT

## 2021-06-11 PROCEDURE — 76937 US GUIDE VASCULAR ACCESS: CPT

## 2021-06-11 PROCEDURE — 7100000000 HC PACU RECOVERY - FIRST 15 MIN: Performed by: PODIATRIST

## 2021-06-11 PROCEDURE — 13160 SEC CLSR SURG WND/DEHSN XTN: CPT | Performed by: PODIATRIST

## 2021-06-11 PROCEDURE — 6370000000 HC RX 637 (ALT 250 FOR IP): Performed by: NURSE PRACTITIONER

## 2021-06-11 PROCEDURE — 2500000003 HC RX 250 WO HCPCS: Performed by: NURSE ANESTHETIST, CERTIFIED REGISTERED

## 2021-06-11 PROCEDURE — 3700000000 HC ANESTHESIA ATTENDED CARE: Performed by: PODIATRIST

## 2021-06-11 PROCEDURE — 6360000002 HC RX W HCPCS: Performed by: NURSE ANESTHETIST, CERTIFIED REGISTERED

## 2021-06-11 PROCEDURE — 1200000000 HC SEMI PRIVATE

## 2021-06-11 PROCEDURE — 2709999900 HC NON-CHARGEABLE SUPPLY: Performed by: PODIATRIST

## 2021-06-11 PROCEDURE — 6370000000 HC RX 637 (ALT 250 FOR IP): Performed by: INTERNAL MEDICINE

## 2021-06-11 PROCEDURE — 99232 SBSQ HOSP IP/OBS MODERATE 35: CPT | Performed by: INTERNAL MEDICINE

## 2021-06-11 PROCEDURE — 6360000002 HC RX W HCPCS: Performed by: INTERNAL MEDICINE

## 2021-06-11 PROCEDURE — 94761 N-INVAS EAR/PLS OXIMETRY MLT: CPT

## 2021-06-11 PROCEDURE — 3600000002 HC SURGERY LEVEL 2 BASE: Performed by: PODIATRIST

## 2021-06-11 PROCEDURE — 6360000002 HC RX W HCPCS: Performed by: ANESTHESIOLOGY

## 2021-06-11 PROCEDURE — 6360000002 HC RX W HCPCS: Performed by: NURSE PRACTITIONER

## 2021-06-11 PROCEDURE — 2580000003 HC RX 258: Performed by: NURSE ANESTHETIST, CERTIFIED REGISTERED

## 2021-06-11 PROCEDURE — C1751 CATH, INF, PER/CENT/MIDLINE: HCPCS

## 2021-06-11 RX ORDER — SODIUM CHLORIDE, SODIUM LACTATE, POTASSIUM CHLORIDE, CALCIUM CHLORIDE 600; 310; 30; 20 MG/100ML; MG/100ML; MG/100ML; MG/100ML
INJECTION, SOLUTION INTRAVENOUS CONTINUOUS PRN
Status: DISCONTINUED | OUTPATIENT
Start: 2021-06-11 | End: 2021-06-11 | Stop reason: SDUPTHER

## 2021-06-11 RX ORDER — MIDAZOLAM HYDROCHLORIDE 1 MG/ML
INJECTION INTRAMUSCULAR; INTRAVENOUS PRN
Status: DISCONTINUED | OUTPATIENT
Start: 2021-06-11 | End: 2021-06-11 | Stop reason: SDUPTHER

## 2021-06-11 RX ORDER — HYDROCODONE BITARTRATE AND ACETAMINOPHEN 5; 325 MG/1; MG/1
2 TABLET ORAL EVERY 4 HOURS PRN
Status: DISCONTINUED | OUTPATIENT
Start: 2021-06-11 | End: 2021-06-13 | Stop reason: HOSPADM

## 2021-06-11 RX ORDER — LIDOCAINE HYDROCHLORIDE 20 MG/ML
INJECTION, SOLUTION EPIDURAL; INFILTRATION; INTRACAUDAL; PERINEURAL PRN
Status: DISCONTINUED | OUTPATIENT
Start: 2021-06-11 | End: 2021-06-11 | Stop reason: SDUPTHER

## 2021-06-11 RX ORDER — KETAMINE HCL IN NACL, ISO-OSM 100MG/10ML
SYRINGE (ML) INJECTION PRN
Status: DISCONTINUED | OUTPATIENT
Start: 2021-06-11 | End: 2021-06-11 | Stop reason: SDUPTHER

## 2021-06-11 RX ORDER — ONDANSETRON 2 MG/ML
4 INJECTION INTRAMUSCULAR; INTRAVENOUS
Status: DISCONTINUED | OUTPATIENT
Start: 2021-06-11 | End: 2021-06-11 | Stop reason: HOSPADM

## 2021-06-11 RX ORDER — HYDROCODONE BITARTRATE AND ACETAMINOPHEN 10; 325 MG/1; MG/1
1 TABLET ORAL EVERY 6 HOURS PRN
Qty: 28 TABLET | Refills: 0 | Status: SHIPPED | OUTPATIENT
Start: 2021-06-11 | End: 2021-06-18

## 2021-06-11 RX ORDER — FENTANYL CITRATE 50 UG/ML
25 INJECTION, SOLUTION INTRAMUSCULAR; INTRAVENOUS EVERY 5 MIN PRN
Status: DISCONTINUED | OUTPATIENT
Start: 2021-06-11 | End: 2021-06-11 | Stop reason: HOSPADM

## 2021-06-11 RX ORDER — FENTANYL CITRATE 50 UG/ML
50 INJECTION, SOLUTION INTRAMUSCULAR; INTRAVENOUS ONCE
Status: COMPLETED | OUTPATIENT
Start: 2021-06-11 | End: 2021-06-11

## 2021-06-11 RX ORDER — PROPOFOL 10 MG/ML
INJECTION, EMULSION INTRAVENOUS CONTINUOUS PRN
Status: DISCONTINUED | OUTPATIENT
Start: 2021-06-11 | End: 2021-06-11 | Stop reason: SDUPTHER

## 2021-06-11 RX ADMIN — SODIUM CHLORIDE, PRESERVATIVE FREE 10 ML: 5 INJECTION INTRAVENOUS at 20:26

## 2021-06-11 RX ADMIN — INSULIN LISPRO 2 UNITS: 100 INJECTION, SOLUTION INTRAVENOUS; SUBCUTANEOUS at 08:45

## 2021-06-11 RX ADMIN — BUDESONIDE AND FORMOTEROL FUMARATE DIHYDRATE 2 PUFF: 160; 4.5 AEROSOL RESPIRATORY (INHALATION) at 21:06

## 2021-06-11 RX ADMIN — HYDROCODONE BITARTRATE AND ACETAMINOPHEN 1 TABLET: 5; 325 TABLET ORAL at 17:09

## 2021-06-11 RX ADMIN — BUDESONIDE AND FORMOTEROL FUMARATE DIHYDRATE 2 PUFF: 160; 4.5 AEROSOL RESPIRATORY (INHALATION) at 07:43

## 2021-06-11 RX ADMIN — INSULIN LISPRO 2 UNITS: 100 INJECTION, SOLUTION INTRAVENOUS; SUBCUTANEOUS at 11:58

## 2021-06-11 RX ADMIN — MELOXICAM 7.5 MG: 7.5 TABLET ORAL at 20:26

## 2021-06-11 RX ADMIN — FENTANYL CITRATE 50 MCG: 50 INJECTION, SOLUTION INTRAMUSCULAR; INTRAVENOUS at 20:22

## 2021-06-11 RX ADMIN — PROPOFOL 125 MCG/KG/MIN: 10 INJECTION, EMULSION INTRAVENOUS at 15:08

## 2021-06-11 RX ADMIN — GABAPENTIN 200 MG: 100 CAPSULE ORAL at 08:44

## 2021-06-11 RX ADMIN — LIDOCAINE HYDROCHLORIDE 60 MG: 20 INJECTION, SOLUTION EPIDURAL; INFILTRATION; INTRACAUDAL; PERINEURAL at 15:08

## 2021-06-11 RX ADMIN — HYDROCODONE BITARTRATE AND ACETAMINOPHEN 1 TABLET: 5; 325 TABLET ORAL at 11:58

## 2021-06-11 RX ADMIN — INSULIN LISPRO 2 UNITS: 100 INJECTION, SOLUTION INTRAVENOUS; SUBCUTANEOUS at 20:26

## 2021-06-11 RX ADMIN — SILVER SULFADIAZINE: 10 CREAM TOPICAL at 08:44

## 2021-06-11 RX ADMIN — ONDANSETRON 4 MG: 2 INJECTION INTRAMUSCULAR; INTRAVENOUS at 09:03

## 2021-06-11 RX ADMIN — MIDAZOLAM 2 MG: 1 INJECTION INTRAMUSCULAR; INTRAVENOUS at 15:04

## 2021-06-11 RX ADMIN — GABAPENTIN 200 MG: 100 CAPSULE ORAL at 20:26

## 2021-06-11 RX ADMIN — SODIUM CHLORIDE, PRESERVATIVE FREE 10 ML: 5 INJECTION INTRAVENOUS at 09:03

## 2021-06-11 RX ADMIN — HYDROMORPHONE HYDROCHLORIDE 0.25 MG: 1 INJECTION, SOLUTION INTRAMUSCULAR; INTRAVENOUS; SUBCUTANEOUS at 16:11

## 2021-06-11 RX ADMIN — ASPIRIN 81 MG: 81 TABLET, COATED ORAL at 08:44

## 2021-06-11 RX ADMIN — MELOXICAM 7.5 MG: 7.5 TABLET ORAL at 08:44

## 2021-06-11 RX ADMIN — CEFAZOLIN 1000 MG: 1 INJECTION, POWDER, FOR SOLUTION INTRAMUSCULAR; INTRAVENOUS at 17:00

## 2021-06-11 RX ADMIN — HYDROCODONE BITARTRATE AND ACETAMINOPHEN 2 TABLET: 5; 325 TABLET ORAL at 22:17

## 2021-06-11 RX ADMIN — CEFAZOLIN 1000 MG: 1 INJECTION, POWDER, FOR SOLUTION INTRAMUSCULAR; INTRAVENOUS at 05:50

## 2021-06-11 RX ADMIN — ATORVASTATIN CALCIUM 20 MG: 20 TABLET, FILM COATED ORAL at 20:26

## 2021-06-11 RX ADMIN — Medication 20 MG: at 15:08

## 2021-06-11 RX ADMIN — CEFAZOLIN 1000 MG: 1 INJECTION, POWDER, FOR SOLUTION INTRAMUSCULAR; INTRAVENOUS at 22:21

## 2021-06-11 RX ADMIN — SODIUM CHLORIDE, POTASSIUM CHLORIDE, SODIUM LACTATE AND CALCIUM CHLORIDE: 600; 310; 30; 20 INJECTION, SOLUTION INTRAVENOUS at 15:04

## 2021-06-11 RX ADMIN — INSULIN LISPRO 2 UNITS: 100 INJECTION, SOLUTION INTRAVENOUS; SUBCUTANEOUS at 17:00

## 2021-06-11 RX ADMIN — INSULIN GLARGINE 10 UNITS: 100 INJECTION, SOLUTION SUBCUTANEOUS at 08:46

## 2021-06-11 RX ADMIN — CETIRIZINE HYDROCHLORIDE 10 MG: 10 TABLET, FILM COATED ORAL at 20:26

## 2021-06-11 RX ADMIN — HEPARIN SODIUM 5000 UNITS: 5000 INJECTION INTRAVENOUS; SUBCUTANEOUS at 20:26

## 2021-06-11 ASSESSMENT — PAIN DESCRIPTION - PAIN TYPE
TYPE: SURGICAL PAIN
TYPE: SURGICAL PAIN
TYPE: ACUTE PAIN
TYPE: SURGICAL PAIN

## 2021-06-11 ASSESSMENT — PULMONARY FUNCTION TESTS
PIF_VALUE: 0
PIF_VALUE: 0
PIF_VALUE: 1
PIF_VALUE: 0
PIF_VALUE: 1
PIF_VALUE: 0
PIF_VALUE: 1
PIF_VALUE: 1
PIF_VALUE: 2
PIF_VALUE: 1
PIF_VALUE: 1
PIF_VALUE: 0
PIF_VALUE: 1
PIF_VALUE: 0
PIF_VALUE: 1
PIF_VALUE: 0
PIF_VALUE: 1
PIF_VALUE: 1
PIF_VALUE: 0
PIF_VALUE: 0
PIF_VALUE: 1

## 2021-06-11 ASSESSMENT — PAIN DESCRIPTION - ONSET
ONSET: ON-GOING

## 2021-06-11 ASSESSMENT — PAIN DESCRIPTION - LOCATION
LOCATION: FOOT
LOCATION: FOOT
LOCATION: BACK;FOOT
LOCATION: FOOT

## 2021-06-11 ASSESSMENT — PAIN SCALES - GENERAL
PAINLEVEL_OUTOF10: 7
PAINLEVEL_OUTOF10: 9
PAINLEVEL_OUTOF10: 7
PAINLEVEL_OUTOF10: 9
PAINLEVEL_OUTOF10: 4
PAINLEVEL_OUTOF10: 9
PAINLEVEL_OUTOF10: 9
PAINLEVEL_OUTOF10: 7
PAINLEVEL_OUTOF10: 9

## 2021-06-11 ASSESSMENT — PAIN DESCRIPTION - FREQUENCY
FREQUENCY: CONTINUOUS

## 2021-06-11 ASSESSMENT — PAIN DESCRIPTION - ORIENTATION
ORIENTATION: RIGHT

## 2021-06-11 ASSESSMENT — PAIN DESCRIPTION - PROGRESSION
CLINICAL_PROGRESSION: GRADUALLY IMPROVING
CLINICAL_PROGRESSION: NOT CHANGED

## 2021-06-11 ASSESSMENT — ENCOUNTER SYMPTOMS: SHORTNESS OF BREATH: 0

## 2021-06-11 ASSESSMENT — PAIN DESCRIPTION - DESCRIPTORS
DESCRIPTORS: THROBBING;ACHING
DESCRIPTORS: PATIENT UNABLE TO DESCRIBE
DESCRIPTORS: DULL

## 2021-06-11 ASSESSMENT — PAIN - FUNCTIONAL ASSESSMENT
PAIN_FUNCTIONAL_ASSESSMENT: ACTIVITIES ARE NOT PREVENTED
PAIN_FUNCTIONAL_ASSESSMENT: ACTIVITIES ARE NOT PREVENTED

## 2021-06-11 NOTE — PROGRESS NOTES
Progress Note  Podiatric Medicine and Surgery     Subjective     CC: Right foot infection    S/P right foot I&D (DOS 6/8/21)  Patient seen and examined at bedside. Chart and results reviewed  Patient tolerating diet, NPO since midnight  Tolerating IV antibiotics    Delayed closure of right foot today   Ok to dc following ID clearance and surgical closure today    HPI :  Pradip Guerra is a 60 y.o. male seen at [address] for a wound on the right foot is well-known to Dr. Mike Bond. On 5/05 patient presented to the ED because he was bitten by a dog. At that time was given a tetanus shot and placed on Keflex. The following day on 5/06/2021 was seen by podiatry in the ED after stepping on a nail to the right foot. In addition to the Keflex he is already taking patient was prescribed Cipro. At this time patient states he is no longer taking any antibiotics but presents because the right foot has become increasingly swollen, red, and painful with drainage. He denies any constitutional symptoms at this time. Of note, patient has history of Charcot arthropathy but does not wear custom molded shoes.     PCP is Alex Hussein MD    ROS: Denies N/V/F/C/SOB/CP/Cough.        Medications:  Scheduled Meds:   ceFAZolin  1,000 mg Intravenous Q8H    insulin glargine  10 Units Subcutaneous Daily    silver sulfADIAZINE   Topical Daily    aspirin  81 mg Oral Daily    cetirizine  10 mg Oral Nightly    atorvastatin  20 mg Oral Daily    sodium chloride flush  5-40 mL Intravenous 2 times per day    heparin (porcine)  5,000 Units Subcutaneous 3 times per day    gabapentin  200 mg Oral TID    insulin lispro  0-12 Units Subcutaneous TID WC    insulin lispro  0-6 Units Subcutaneous Nightly    budesonide-formoterol  2 puff Inhalation BID    meloxicam  7.5 mg Oral BID       Continuous Infusions:   sodium chloride      dextrose         PRN Meds:HYDROcodone-acetaminophen, sodium chloride flush, sodium chloride, ondansetron **OR** ondansetron, magnesium hydroxide, acetaminophen **OR** acetaminophen, benzonatate, glucose, dextrose, glucagon (rDNA), dextrose    Objective     Vitals:  Patient Vitals for the past 8 hrs:   BP Temp Temp src Pulse Resp SpO2 Weight   21 1124 127/66 98.2 °F (36.8 °C) Oral 56 14 96 % --   21 0745 -- -- -- -- -- 96 % --   21 0725 119/66 98.4 °F (36.9 °C) Oral 54 16 96 % --   21 0622 -- -- -- -- -- -- 237 lb 2 oz (107.6 kg)     Average, Min, and Max for last 24 hours Vitals:  TEMPERATURE:  Temp  Av.1 °F (36.7 °C)  Min: 97.9 °F (36.6 °C)  Max: 98.4 °F (36.9 °C)    RESPIRATIONS RANGE: Resp  Avg: 15.7  Min: 14  Max: 16    PULSE RANGE: Pulse  Av.2  Min: 54  Max: 71    BLOOD PRESSURE RANGE:  Systolic (81BFX), GPW:212 , Min:119 , XYV:864   ; Diastolic (68JSO), NFX:95, Min:57, Max:71      PULSE OXIMETRY RANGE: SpO2  Av.1 %  Min: 92 %  Max: 96 %    I/O last 3 completed shifts: In: 240 [P.O.:240]  Out: 1400 [Urine:1400]    CBC:  Recent Labs     06/10/21  0525   CRP 33.7*        BMP:  Recent Labs     21  0423 06/10/21  0525 21  0550   * 135 136   K 4.8 4.8 4.9    105 103   CO2 21 22 22   BUN  17 21   CREATININE 1.71* 1.74* 1.71*   GLUCOSE 234* 139* 200*   CALCIUM 8.8 8.8 8.8        Coags:  No results for input(s): APTT, PROT, INR in the last 72 hours. Lab Results   Component Value Date    SEDRATE 37 (H) 2021     Recent Labs     06/10/21  0525   CRP 33.7*       Lower Extremity Physical Exam:  Exam from 6/10  Vascular: DP and PT pulses are Palpable . CFT <3 seconds to all digits. Hair growth is absent to the level of the digits. Moderate non-pitting edema to the right lower extremity, improved from previous exam.       Neuro: Saph/sural/SP/DP/plantar sensation diminished to light touch.     Musculoskeletal: Muscle strength is adequate ROM, adequate strength to all lower extremity muscle groups. Gross deformity is medial rocker-bottom deformity.  TTP periwound. Compartments are soft and compressible.      Dermatologic:  Open surgical wounds to dorsal medial aspect and plantar medial aspect of right foot with retention sutures in place. No purulent drainage appreciated today. No malodor today. Does not probe to bone. Wound does probe to deep fascia. No fluctuance or induration. Erythema improving. Imaging:   IR ULTRASOUND GUIDANCE VASCULAR ACCESS   Final Result      MRI FOOT RIGHT WO CONTRAST   Final Result   1. Plantar ulceration of the medial soft tissues of the foot at the level of   the midfoot with underlying T2 hyperintense collection measuring   approximately 2.3 x 3.1 x 1.2 cm which is most suggestive of abscess versus   phlegmon. 2. No osteomyelitis in the region of the midfoot. 3. Chronic changes of Charcot arthropathy. 4. Chronically eroded appearance of the distal aspect of the proximal 1st   phalanx with patchy edema of the great toe in the region of the   interphalangeal joint as well as small interphalangeal joint effusion. While   findings could be related to remote trauma or remote infection at this site,   superimposed acute osteomyelitis of the great toe difficult to exclude. Correlate clinically to exclude the possibility of any soft tissue wounds of   the great toe. In the setting of adjacent soft tissue wound, infection would   be more likely. XR FOOT RIGHT (MIN 3 VIEWS)   Final Result   Soft tissue ulceration and soft tissue swelling. Persistent osseous   irregularity at the 1st and 3rd digits, similar to prior, for which   osteomyelitis could be considered in the appropriate clinical setting. Charcot arthropathy again demonstrated of the midfoot. Cultures:   Culture, Anaerobic and Aerobic [[id number]] (Abnormal) Collected: 06/08/21 1700   Order Status: Completed Specimen: Swab from Foot Updated: 06/09/21 8152    Specimen Description . FOOT    Special Requests NOT REPORTED    Direct Exam MODERATE

## 2021-06-11 NOTE — PROGRESS NOTES
[facility]  Office: [address], DO, Divya Dines, DO, Zully Clair, DO, Heather Vazquez Blood, DO, Percy Blas MD, Binh Lozano MD, Kaylee Velasquez MD, Jase Tran MD, Rosa Dueñas MD, Emely Gonzalez MD, Jet Stiles MD, Mei Jennings MD, Chance Layton, DO, Cornelia Magana MD, Hazel Yost DO, Gisella Mandujano MD,  Gwendolyn Duron, DO, Tracie López MD, Megan Mcginnis MD, Krystin Billy MD, Agustín Myles MD, Abundio Mcelroy CNP, [facility], CNP, Nj Amas, CNP, Nichole Pulse, CNS, Chary Haled, CNP, Brisa Sanz, CNP, Marissa Moreno, CNP, Spring Pulse, CNP, Suzanne Parker, CNP, Holly Escobedo PA-C, [place] , DNP, Jered Hogan, CNP, Jigar Bazan, CNP, Dennis Duran, CNP, Wilda Reyes, CNP, Paulo Avilez, CNP, Semaj Jacobson, [address]    Progress Note    6/11/2021    11:58 AM    Name:   Jeanna Morgan  MRN:     [id number]     [place]:      [de-identified]   Room:   [address] [place] [address] Day:  5  Admit Date:  6/6/2021 10:33 AM    PCP:   Brendan Bailey MD  Code Status:  Full Code    Subjective:     C/C:   Chief Complaint   Patient presents with    Foot Pain     Interval History Status: improved. Patient resting, denies any complaints of chest pain, shortness of breath, nausea or vomiting. Awaiting wound closure today and possible discharge with IV antibiotics. Brief History: This is a 36-year-old male with a past medical history of diabetes with diabetic neuropathy that presents with foot pain with erythema and swelling.  He is found to have fluid collection which was drained at the bedside. Sana Cho underwent MRI to evaluate for possible osteomyelitis.  MRI did not show evidence of osteomyelitis but did show continued fluid collection concerning for continued abscess.     Review of Systems:     Constitutional:  negative for chills, fevers, sweats  Respiratory:  negative for cough, dyspnea on exertion, shortness of breath, wheezing  Cardiovascular:  negative for chest pain, chest pressure/discomfort, lower extremity edema, palpitations  Gastrointestinal:  negative for abdominal pain, constipation, diarrhea, nausea, vomiting  Neurological:  negative for dizziness, headache    Medications: Allergies: Allergies   Allergen Reactions    Morphine Itching    Percocet [Oxycodone-Acetaminophen]      Facial swelling    Dye [Iodides] Rash     Rash and swelling       Current Meds:   Scheduled Meds:    ceFAZolin  1,000 mg Intravenous Q8H    insulin glargine  10 Units Subcutaneous Daily    silver sulfADIAZINE   Topical Daily    aspirin  81 mg Oral Daily    cetirizine  10 mg Oral Nightly    atorvastatin  20 mg Oral Daily    sodium chloride flush  5-40 mL Intravenous 2 times per day    heparin (porcine)  5,000 Units Subcutaneous 3 times per day    gabapentin  200 mg Oral TID    insulin lispro  0-12 Units Subcutaneous TID WC    insulin lispro  0-6 Units Subcutaneous Nightly    budesonide-formoterol  2 puff Inhalation BID    meloxicam  7.5 mg Oral BID     Continuous Infusions:    sodium chloride      dextrose       PRN Meds: HYDROcodone-acetaminophen, sodium chloride flush, sodium chloride, ondansetron **OR** ondansetron, magnesium hydroxide, acetaminophen **OR** acetaminophen, benzonatate, glucose, dextrose, glucagon (rDNA), dextrose    Data:     Past Medical History:   has a past medical history of ALEJANDRO (acute kidney injury) ([facility] Cellulitis of right foot, Charcot foot due to diabetes mellitus ([facility] [facility] Diabetic polyneuropathy associated with type 2 diabetes mellitus [facility] Fractures, multiple, Hyperlipidemia, Hypertension, Leukocytosis, Neuropathy, Pain in right foot, Pneumonia, Tobacco abuse, Type II or unspecified type diabetes mellitus without mention of complication, not stated as uncontrolled, Vertigo, Wound, open, and Wound, open. Social History:   reports that he has been smoking.  He has been smoking about 1.00 pack per day. He has never used smokeless tobacco. He reports previous drug use. He reports that he does not drink alcohol. Family History:   Family History   Problem Relation Age of Onset    Diabetes Mother     Cancer Father     Hypertension Maternal Grandmother        Vitals:  /66   Pulse 56   Temp 98.2 °F (36.8 °C) (Oral)   Resp 14   Ht 5' 11\" (1.803 m)   Wt 237 lb 2 oz (107.6 kg)   SpO2 96%   BMI 33.07 kg/m²   Temp (24hrs), Av.1 °F (36.7 °C), Min:97.9 °F (36.6 °C), Max:98.4 °F (36.9 °C)    Recent Labs     06/10/21  1650 06/10/21  1955 06/11/21  0725 21  1109   POCGLU 291* 362* 192* 197*       I/O (24Hr): Intake/Output Summary (Last 24 hours) at 2021 1158  Last data filed at 2021 0413  Gross per 24 hour   Intake 240 ml   Output 1200 ml   Net -960 ml       Labs:  Hematology:  Recent Labs     06/10/21  0525   CRP 33.7*     Chemistry:  Recent Labs     21  0423 06/10/21  0525 21  0550   * 135 136   K 4.8 4.8 4.9    105 103   CO2 21 22 22   GLUCOSE 234* 139* 200*   BUN 21 17 21   CREATININE 1.71* 1.74* 1.71*   ANIONGAP 11 8* 11   LABGLOM 41* 40* 41*   GFRAA 49* 48* 49*   CALCIUM 8.8 8.8 8.8     Recent Labs     06/10/21  0717 06/10/21  1118 06/10/21  1650 06/10/21  1955 06/11/21  0725 21  1109   POCGLU 155* 274* 291* 362* 192* 197*     ABG:No results found for: POCPH, PHART, PH, POCPCO2, YSU1BHK, PCO2, POCPO2, PO2ART, PO2, POCHCO3, MYE3IRD, HCO3, NBEA, PBEA, BEART, BE, THGBART, THB, LTP1SBF, YJRR7AZN, G9DESJLT, O2SAT, FIO2  Lab Results   Component Value Date/Time    SPECIAL NOT REPORTED 2021 05:00 PM     Lab Results   Component Value Date/Time    CULTURE (A) 2021 05:00 PM     STAPHYLOCOCCUS AUREUS LIGHT GROWTH This isolate is methicillin susceptible. CULTURE NORMAL ARABELLA 2021 05:00 PM       Radiology:  XR FOOT RIGHT (MIN 3 VIEWS)    Result Date: 2021  Soft tissue ulceration and soft tissue swelling. Persistent osseous irregularity at the 1st and 3rd digits, similar to prior, for which osteomyelitis could be considered in the appropriate clinical setting. Charcot arthropathy again demonstrated of the midfoot. MRI FOOT RIGHT WO CONTRAST    Result Date: 6/7/2021  1. Plantar ulceration of the medial soft tissues of the foot at the level of the midfoot with underlying T2 hyperintense collection measuring approximately 2.3 x 3.1 x 1.2 cm which is most suggestive of abscess versus phlegmon. 2. No osteomyelitis in the region of the midfoot. 3. Chronic changes of Charcot arthropathy. 4. Chronically eroded appearance of the distal aspect of the proximal 1st phalanx with patchy edema of the great toe in the region of the interphalangeal joint as well as small interphalangeal joint effusion. While findings could be related to remote trauma or remote infection at this site, superimposed acute osteomyelitis of the great toe difficult to exclude. Correlate clinically to exclude the possibility of any soft tissue wounds of the great toe. In the setting of adjacent soft tissue wound, infection would be more likely.        Physical Examination:        General appearance:  alert, cooperative and no distress  Mental Status:  oriented to person, place and time and normal affect  Lungs:  clear to auscultation bilaterally, normal effort  Heart:  regular rate and rhythm, no murmur  Abdomen:  soft, nontender, nondistended, normal bowel sounds, no masses, hepatomegaly, splenomegaly  Extremities:  no edema, redness, tenderness in the calves  Skin:  no gross lesions, rashes, induration    Assessment:        Hospital Problems         Last Modified POA    * (Principal) Diabetic infection of right foot ([address] 6/8/2021 Yes    Essential hypertension 6/6/2021 Yes    Neuropathy 6/6/2021 Yes    Charcot foot due to diabetes mellitus ([address] 6/6/2021 Yes    Diabetic polyneuropathy associated with type 2 diabetes mellitus ([address] 6/6/2021 Yes Abscess of right foot 6/10/2021 Yes    Tobacco abuse 6/6/2021 Yes    Osteomyelitis ([address] 6/8/2021 Yes          Plan:        1. Continue Ancef as ordered  2. Wound closure today  3. Insulin scale  4. GI DVT prophylaxis  5. Monitor and control blood pressure  6. Tobacco cessation  7.  Discharge planning, home with IV antibiotics    Roya Jones DO  6/11/2021  11:58 AM

## 2021-06-11 NOTE — DISCHARGE SUMMARY
[facility]  Office: [address], , Hazel Wilkins, DO, Opal Collins DO, Mike Solis, DO, Lee Ann Aiken MD, Glenn Rosas MD, Marga Prasad MD, Jose Mckenzie MD, Eliane Shah MD, Exie Ahumada, MD, Charan Jiang MD, Rosanna Jovel MD, Ronn Umanzor DO, Ras Zapata MD, Phuong Villaseñor DO, Yonis Lovett MD,  Elizabeth Gillespie DO, Bg Rogers MD, Pinky Alejo MD, Kimberley Garcia MD, Ariana Rivas MD, Alivia Barrera [facility], [facility], [facility], Gamal Armando, [facility], Trinh Motta, CNS, Domo Lovett, CNP, Davey [place], CNP, Denton Harper, CNP, Bo Contreras, CNP, Marino Faustin, CNP, Abhay Michel PA-C, Rishabh Shaw, [facility], Amarilis Mukherjee, CNP, Meron Hazel, CNP, Hugo Barron, CNP, Tone You, CNP, Arden Sugsg, CNP, Hailey Camargo, [facility]    Discharge Summary     Patient ID: Arcenio Bhatt  :  1955   MRN: [id number]     ACCOUNT:  [de-identified]   Patient's PCP: Ulises Candelario MD  Admit Date: 2021   Discharge Date: 2021     Length of Stay: 5  Code Status:  Full Code  Admitting Physician: Yonis Lovett MD  Discharge Physician: Osbaldo Sebastian DO     Active Discharge Diagnoses:     Hospital Problem Lists:  Principal Problem:    Diabetic infection of right foot [facility])  Active Problems:    Essential hypertension    Neuropathy    Charcot foot due to diabetes mellitus ([facility]    Diabetic polyneuropathy associated with type 2 diabetes mellitus ([facility]    Abscess of right foot    Tobacco abuse    [facility]el[facility] ([facility]    Wound dehiscence  Resolved Problems:    * No resolved hospital problems.  *      Admission Condition:  fair     Discharged Condition: stable    Hospital Stay:     Hospital Course:  Arcenio Bhatt is a 60 y.o. male who was admitted for the management of  Diabetic infection of right foot ([facility] , presented to ER with Foot Pain    This is a 73-year-old male with history of diabetes with associated neuropathy who presents with pain and swelling with erythema of his foot. He is found to have a fluid collection of the right foot which underwent bedside I&D with podiatry. Subsequent MRI was completed which showed continued fluid collection concerning for abscess but no evidence of osteomyelitis. Recommendation was for further I&D in the OR which was completed. Grades were made for discharge home which delayed due to arranging the home IV antibiotics. Ultimately antibiotics were arranged and is able be discharged on the morning of the 13th in stable condition. Significant therapeutic interventions: As above, podiatry and ID consultations    Significant Diagnostic Studies:   Labs / Micro:  CBC:   Lab Results   Component Value Date    WBC 8.5 06/07/2021    RBC 4.13 06/07/2021    HGB 12.3 06/07/2021    HCT 37.7 06/07/2021    MCV 91.3 06/07/2021    MCH 29.8 06/07/2021    MCHC 32.6 06/07/2021    RDW 13.3 06/07/2021     06/07/2021     BMP:    Lab Results   Component Value Date    GLUCOSE 165 06/13/2021     06/13/2021    K 4.8 06/13/2021     06/13/2021    CO2 23 06/13/2021    ANIONGAP 10 06/13/2021    BUN 22 06/13/2021    CREATININE 1.73 06/13/2021    BUNCRER 13 06/13/2021    CALCIUM 9.2 06/13/2021    LABGLOM 40 06/13/2021    GFRAA 48 06/13/2021    GFR      06/13/2021    GFR NOT REPORTED 06/13/2021        Radiology:  XR FOOT RIGHT (MIN 3 VIEWS)    Result Date: 6/6/2021  Soft tissue ulceration and soft tissue swelling. Persistent osseous irregularity at the 1st and 3rd digits, similar to prior, for which osteomyelitis could be considered in the appropriate clinical setting. Charcot arthropathy again demonstrated of the midfoot. MRI FOOT RIGHT WO CONTRAST    Result Date: 6/7/2021  1.  Plantar ulceration of the medial soft tissues of the foot at the level of the midfoot with underlying T2 hyperintense collection measuring approximately 2.3 x 3.1 x 1.2 cm which is most suggestive of abscess versus phlegmon. 2. No osteomyelitis in the region of the midfoot. 3. Chronic changes of Charcot arthropathy. 4. Chronically eroded appearance of the distal aspect of the proximal 1st phalanx with patchy edema of the great toe in the region of the interphalangeal joint as well as small interphalangeal joint effusion. While findings could be related to remote trauma or remote infection at this site, superimposed acute osteomyelitis of the great toe difficult to exclude. Correlate clinically to exclude the possibility of any soft tissue wounds of the great toe. In the setting of adjacent soft tissue wound, infection would be more likely. Consultations:    Consults:     Final Specialist Recommendations/Findings:   IP CONSULT TO PODIATRY  IP CONSULT TO INTERNAL MEDICINE  IP CONSULT TO SOCIAL WORK  IP CONSULT TO INFECTIOUS DISEASES  PHARMACY TO DOSE VANCOMYCIN      The patient was seen and examined on day of discharge and this discharge summary is in conjunction with any daily progress note from day of discharge.     Discharge plan:     Disposition: Home    Physician Follow Up:   Christopher Urban  [phone]      they will provide your home antibiotics    [facility]   phone is [phone]    they will provide your home care for iv atb infusions     Han Apodaca MD  [address] [place] [address],[address] [facility], [address]  [phone]    In 1 week  post op follow up       Requiring Further Evaluation/Follow Up POST HOSPITALIZATION/Incidental Findings: Labs at discretion of PCP    Diet: diabetic diet    Activity: As tolerated    Instructions to Patient: Take medications as prescribed    Discharge Medications:      Medication List      START taking these medications    ceFAZolin  infusion  Commonly known as: ANCEF  Infuse 2,000 mg intravenously every 8 hours for 24 days Through July 4, 2021 evaluation, counseling as well as medication reconciliation, prescriptions for required medications, discharge plan and follow up. Electronically signed by   Roya Jones DO  6/11/2021  3:55 PM      Thank you Dr. Leydi Rodriguez MD for the opportunity to be involved in this patient's care.

## 2021-06-11 NOTE — BRIEF OP NOTE
PODIATRY BRIEF OP NOTE    PATIENT NAME: Kashif Lay  YOB: 1955  -  60 y.o. male  MRN: [id number]  DATE: 6/11/2021  BILLING #: [id number]    Surgeon(s):  Love Rosario DPM     ASSISTANTS: Laymon Bernheim, DPM PGY3; Dianne Oscar DPM PGY1     PRE-OP DIAGNOSIS:   1. Status post Incision and drainage right foot  2. Diabetic foot ulcer down to muscle belly right foot x2    POST-OP DIAGNOSIS: Same as above. PROCEDURE:   1. Delayed primary closure right foot    ANESTHESIA: MAC with 10 cc 1:1 mixture 1% lidocaine plain and 0.5% bupivacaine plain      HEMOSTASIS: direct pressure    ESTIMATED BLOOD LOSS: Less than 10cc. MATERIALS:   * No implants in log *    INJECTABLES: none    SPECIMEN:   * No specimens in log *    COMPLICATIONS: none    FINDINGS: no purulence, healthy granular wound base, wounds closed with minimal skin tension noted    The patient was counseled at length about the risks of sheela Covid-19 during their perioperative period and any recovery window from their procedure. The patient was made aware that sheela Covid-19  may worsen their prognosis for recovering from their procedure  and lend to a higher morbidity and/or mortality risk. All material risks, benefits, and reasonable alternatives including postponing the procedure were discussed. The patient does wish to proceed with the procedure at this time.     Laymon Bernheim, DPM   Podiatric Medicine & Surgery   6/11/2021 at 3:44 PM

## 2021-06-11 NOTE — ANESTHESIA POSTPROCEDURE EVALUATION
Department of Anesthesiology  Postprocedure Note    Patient: Anisha Richards  MRN: [id number]  YOB: 1955  Date of evaluation: 6/11/2021  Time:  7:26 PM     Procedure Summary     Date: 06/11/21 Room / Location: [address] - INPATIENT    Anesthesia Start: 9716 Anesthesia Stop: 3261    Procedure: RIGHT FOOT FLAP CLOSURE (Right ) Diagnosis: (IN PT)    Surgeons: Naomie Castillo DPM Responsible Provider: Mckenzie Gimenez MD    Anesthesia Type: MAC ASA Status: 3          Anesthesia Type: MAC    Amy Phase I: Amy Score: 10    Amy Phase II:      Last vitals: Reviewed and per EMR flowsheets.        Anesthesia Post Evaluation    Patient location during evaluation: PACU  Patient participation: complete - patient participated  Level of consciousness: awake  Airway patency: patent  Nausea & Vomiting: no nausea  Complications: no  Cardiovascular status: blood pressure returned to baseline  Respiratory status: acceptable  Hydration status: euvolemic

## 2021-06-11 NOTE — PROGRESS NOTES
Occupational Therapy  [facility]  Occupational Therapy Not Seen Note    Patient not available for Occupational Therapy due to:    [] Testing:    [] Hemodialysis    [] Cancelled by RN:    [x]Refusal by Patient: 6/11: (CX) Pt declining therapy at this time, stating he has been up walking around all morning and would like to rest. Will try back later as able.    [] Surgery:     [] Intubation:     [] Pain Medication:    [] Sedation:     [] Spine Precautions :    [] Medical Instability:    [] Other:    Maria Fernanda De Anda, OT

## 2021-06-11 NOTE — PROGRESS NOTES
Physical Therapy  Facility/Department: [facility]  Daily Treatment Note  NAME: Alva Hicks  : 1955  MRN: [id number]    Date of Service: 2021      Assessment   Body structures, Functions, Activity limitations: Decreased functional mobility ; Decreased balance;Decreased sensation;Decreased endurance  Assessment: Skilled therapy needed to maximize independence with functional mobility, improve balance and overall safety. Prognosis: Good  Decision Making: Medium Complexity  Exam: AROM, strength, endurance, balance, mobility, AM-PAC  Clinical Presentation: evolving  PT Education: Goals;Precautions;PT Role;Plan of Care;Gait Training;Transfer Training;Weight-bearing Education;General Safety  REQUIRES PT FOLLOW UP: Yes  Activity Tolerance  Activity Tolerance: Patient Tolerated treatment well     Patient Diagnosis(es): The encounter diagnosis was Osteomyelitis of right foot, unspecified type ([facility] has a past medical history of ALEJANDRO (acute kidney injury) ([facility] Cellulitis of right foot, Charcot foot due to diabetes mellitus ([facility] Diabetic polyneuropathy associated with type 2 diabetes mellitus ([facility] Fractures, multiple, Hyperlipidemia, Hypertension, Leukocytosis, Neuropathy, Pain in right foot, Pneumonia, Tobacco abuse, Type II or unspecified type diabetes mellitus without mention of complication, not stated as uncontrolled, Vertigo, Wound, open, and Wound, open. has a past surgical history that includes Neck surgery (); Appendectomy; knee surgery (Bilateral, ); Knee arthroscopy (Right, ); Knee arthroscopy (Left, ); Foot Debridement (Left, 2018); pr deep dissec foot infec,1 bursa (Left, 2018); Colonoscopy; Foot Debridement (Right, 3/20/2020); arthroplasty (Right, 2020); and Foot Debridement (Right, 2021).     Restrictions  Restrictions/Precautions  Restrictions/Precautions: General Precautions, Up as Tolerated, Weight Bearing  Lower Extremity Weight Bearing Restrictions  Right Lower Extremity Weight Bearing:  (Heel touch weightbearing with surgical shoe)  Partial Weight Bearing Percentage Or Pounds: Heel touhc weight bearing with surgical  Position Activity Restriction  Other position/activity restrictions: up with assist, up as ESHA hastings IV  Subjective   General  Chart Reviewed: Yes  Family / Caregiver Present: No  Subjective  Subjective: patient agreeable to work with therapy  General Comment  Comments: Willis Casanova RN states patient approrpriate for therapy          Orientation  Orientation  Overall Orientation Status: Within Functional Limits  Cognition      Objective   Bed mobility  Bridging: Modified independent   Rolling to Left: Modified independent  Rolling to Right: Modified independent  Supine to Sit: Modified independent  Sit to Supine: Modified independent  Scooting: Modified independent  Comment: Patient with good progression to seated EOB with good hand placement techniques. Transfers  Sit to Stand: Stand by assistance  Stand to sit: Stand by assistance  Bed to Chair: Stand by assistance  Lateral Transfers: Stand by assistance  Comment: Patient with impulsivity and requires verbal cues to slow down and self pace for safety. Patient with good understanding ,  Ambulation  Ambulation?: Yes  More Ambulation?: No  Ambulation 1  Surface: level tile  Device: No Device  Assistance: Contact guard assistance  Quality of Gait: impulsive  Gait Deviations: Decreased step length; Slow Kaela  Distance: 60 feet x1  Comments: Patient maintains WB restrictions well decreased ambulation due to pain and fatigue. Stairs/Curb  Stairs?: No  Neuromuscular Education  NDT Treatment: Gait ;Lower extremity; Sitting;Standing  Balance  Posture: Good  Sitting - Static: Good  Sitting - Dynamic: Good  Standing - Static: Fair;+  Standing - Dynamic: Fair  Exercises  Gluteal Sets: 10 x1  Hip Abduction: 10 x1  Comments: Patient performs seated YULIET LE AROM to promote proper synovial fluid movement and to maintain available arc of motion. Patient request short treatment today due to procedure this afternoon and wanting to rest before. AM-PAC Score  AM-PAC Inpatient Mobility Raw Score : 20 (06/11/21 1020)  AM-PAC Inpatient T-Scale Score : 47.67 (06/11/21 1020)  Mobility Inpatient CMS 0-100% Score: 35.83 (06/11/21 1020)  Mobility Inpatient CMS G-Code Modifier : CJ (06/11/21 1020)          Goals  Short term goals  Time Frame for Short term goals: 10 visits  Short term goal 1: Independent bed mobility  Short term goal 2:  Independent sit<>stand  Short term goal 3: Patient to ambulate 300' without device L heel weight bearing with surgical shoe Independent  Patient Goals   Patient goals : to go home    Plan    Plan  Times per week: 1-2x/day for 5-6 days/week  Current Treatment Recommendations: Transfer Training, Endurance Training, Patient/Caregiver Education & Training, Balance Training, Gait Training, Safety Education & Training  Safety Devices  Type of devices: Call light within reach, Left in chair, Gait belt     Therapy Time   Individual Concurrent Group Co-treatment   Time In 1005         Time Out 1019         Minutes [address]

## 2021-06-11 NOTE — CARE COORDINATION
Discharge planning    Patient to have closure of wound today and per podiatry can go home. Have been working with [facility] for start of care with iv atb. They are unable to start care until Sunday at 1400. Did leave message with jamie witt [facility] to see if can start tomorrow. If unable to start of care tomorrow will proceed with [facility] and will need iv atb on Sunday 0600 dose and they can do the 1400 dose start of care. Patient will need rx for ancef. Perfect serve to ID and still awaiting response. Patient to go out of [facility] office . Did message to [facility] to see who I should fax order too especially if over weekend. Alanna stated to fax to [facility] normal number.      Discharging to Facility/ Agency   · Name: [facility]   · Phone: [phone]  · Fax: [phone]    Discharging to Facility/ Agency   · Name: [facility]    · Phone: [phone]  · Fax: [phone]

## 2021-06-11 NOTE — PLAN OF CARE
Problem: Falls - Risk of:  Goal: Will remain free from falls  Description: Will remain free from falls  6/11/2021 0314 by Mary Thompson RN  Outcome: Ongoing     Problem: Pain:  Goal: Pain level will decrease  Description: Pain level will decrease  Outcome: Ongoing

## 2021-06-11 NOTE — ANESTHESIA PRE PROCEDURE
Department of Anesthesiology  Preprocedure Note       Name:  Jovan Lynn   Age:  60 y.o.  :  1955                                          MRN:  [id number]         Date:  2021      Surgeon: Joanne López):  Favio Carrasquillo DPM    Procedure: Procedure(s):  RIGHT FOOT FLAP CLOSURE    Medications prior to admission:   Prior to Admission medications    Medication Sig Start Date End Date Taking? Authorizing Provider   ceFAZolin (ANCEF) infusion Infuse 2,000 mg intravenously every 8 hours for 24 days Through 2021 compound per protocol 6/10/21 7/4/21  Nivia Trinh MD   meloxicam (MOBIC) 15 MG tablet Take 15 mg by mouth nightly  21   Historical Provider, MD   LANGEORGIA SOLOSTAR 100 UNIT/ML injection pen Inject 10 Units into the skin nightly  3/12/21   Historical Provider, MD   albuterol sulfate  (90 Base) MCG/ACT inhaler Inhale 2 puffs into the lungs every 6 hours as needed  21   Historical Provider, MD   JANUVIA 100 MG tablet Take 100 mg by mouth daily  20   Historical Provider, MD   Misc. Devices MISC 1 PAIR OF DIABETIC SHOES (1 LEFT/ 1 RIGHT)  1-3 PAIRS OF INSERTS (LEFT/ RIGHT) 3/5/20   Favio Carrasquillo DPM   cetirizine (ZYRTEC) 10 MG tablet Take 10 mg by mouth nightly  10/21/19   Historical Provider, MD   lisinopril (PRINIVIL;ZESTRIL) 10 MG tablet Take 10 mg by mouth daily 11/15/18   Historical Provider, MD   simvastatin (ZOCOR) 40 MG tablet Take 40 mg by mouth nightly  18   Historical Provider, MD   glipiZIDE (GLUCOTROL) 10 MG tablet Take 20 mg by mouth 2 times daily (before meals) Takes 2 tabs (=20mg) BID    Historical Provider, MD   aspirin 81 MG tablet Take 81 mg by mouth daily    Historical Provider, MD   gabapentin (NEURONTIN) 300 MG capsule Take 900 mg by mouth 3 times daily. Take 3 caps (=900mg) 3 times a day    Historical Provider, MD       Current medications:    No current facility-administered medications for this visit.      Current Outpatient PRN Bianka Less, DPM        heparin (porcine) injection 5,000 Units  5,000 Units Subcutaneous 3 times per day Bianka Less, DPM   5,000 Units at 06/10/21 2059    benzonatate (TESSALON) capsule 100 mg  100 mg Oral TID PRN Bianka Less, DPM   100 mg at 06/07/21 1539    gabapentin (NEURONTIN) capsule 200 mg  200 mg Oral TID Bianka Less, DPM   200 mg at 06/11/21 0844    insulin lispro (HUMALOG) injection vial 0-12 Units  0-12 Units Subcutaneous TID WC Bianka Less, DPM   2 Units at 06/11/21 1158    insulin lispro (HUMALOG) injection vial 0-6 Units  0-6 Units Subcutaneous Nightly Bianka Less, DPM   5 Units at 06/10/21 2059    glucose (GLUTOSE) 40 % oral gel 15 g  15 g Oral PRN Bianka Less, DPM        dextrose 50 % IV solution  12.5 g Intravenous PRN Bianka Less, DPM        glucagon (rDNA) injection 1 mg  1 mg Intramuscular PRN Bianka Less, DPM        dextrose 5 % solution  100 mL/hr Intravenous PRN Bianka Less, DPM        budesonide-formoterol (SYMBICORT) 160-4.5 MCG/ACT inhaler 2 puff  2 puff Inhalation BID Bianka Less, DPM   2 puff at 06/11/21 0743    meloxicam (MOBIC) tablet 7.5 mg  7.5 mg Oral BID Bianka Less, DPM   7.5 mg at 06/11/21 0844       Allergies: Allergies   Allergen Reactions    Morphine Itching    Percocet [Oxycodone-Acetaminophen]      Facial swelling    Dye [Iodides] Rash     Rash and swelling       Problem List:    Patient Active Problem List   Diagnosis Code    Essential hypertension I10    Type II or unspecified type diabetes mellitus without mention of complication, not stated as uncontrolled E11.9    Neuropathy G62.9    Vertigo R44    [facility]arcot foot [facility] to diabetes mellitus [facility] E11.610    Hyperlipidemia E78.5    Cellulitis L03.90    Cellulitis of left foot L03. 80    Right foot infection L08.9    Diabetic polyneuropathy associated with type 2 diabetes mellitus ([facility] E11.42    Foreign body (FB) in soft tissue M79.5    Cellulitis of left leg L03. 116    Abscess of right foot L02.611    Chest pain at rest R07.9    Tobacco abuse Z72.0    Well controlled type 2 diabetes mellitus with neurological manifestations ([facility] E11.49    ALEJANDRO (acute kidney injury) ([facility] N17.9    Bandemia D72.825    Chronic multifocal osteomyelitis of right foot ([facility] M86.371    Diabetic infection of right foot (HCC) E11.628, L08.9    Acquired hammer toe deformity of lesser toe of right foot M20.41    Post-op pain G89.18    Right foot pain M79.671    Hallux hammertoe, right M20.31    Osteomyelitis (HCC) M86.9       Past Medical History:        Diagnosis Date    ALEJANDRO (acute kidney injury) ([facility] 8/5/2018    Cellulitis of right foot     and abscess    Charcot foot due to diabetes mellitus ([facility] 2014    Right foot     Diabetic polyneuropathy associated with type 2 diabetes mellitus ([facility]     Fractures, multiple 07/21/2014    Right foot fractures     Hyperlipidemia     Hypertension     Leukocytosis     Neuropathy     diabetic with charcot affecting the right foot    Pain in right foot     redness and swelling    Pneumonia 2009    Tobacco abuse 4/24/2018    Type II or unspecified type diabetes mellitus without mention of complication, not stated as uncontrolled     Vertigo     Wound, open     Left Ball of  foot, pt. stepped on sharp object. Covered by dressing.     Wound, open     Right posterior -Diabetic Ulcer       Past Surgical History:        Procedure Laterality Date    APPENDECTOMY      at age 24    ARTHROPLASTY Right 12/11/2020    RIGHT HALLUX EXOSTECTOMY AND 3RD DIGIT EXTENSIOR TENOTOMY performed by Marleni Madrid DPM at [address] [facility] Left 04/24/2018    I&D foreign body removal    FOOT DEBRIDEMENT Right 3/20/2020    RIGHT  FOOT   INCISION AND DRAINAGE WITH BONE BIOPSY performed by Marleni Madrid DPM at [address] [place] [address] Right 6/8/2021    FOOT DEBRIDEMENT INCISION AND DRAINAGE performed by Ilsa Bejarano JAX Sanchez at [facility] ARTHROSCOPY Left 1990    KNEE SURGERY Bilateral 1983    arthroscopy   [facility] [address] FOOT INFEC,1 BURSA Left 4/24/2018    LEFT FOOT MULTIPLE  INCISIONS  AND DRAINAGE AND REMOVAL FOREIGN BODY  IRENE performed by Myles Hauser DPM at [facility] History:    Social History     Tobacco Use    Smoking status: Current Every Day Smoker     Packs/day: 1.00    Smokeless tobacco: Never Used   Substance Use Topics    Alcohol use: No                                Ready to quit: Not Answered  Counseling given: Not Answered      Vital Signs (Current): There were no vitals filed for this visit.                                            BP Readings from Last 3 Encounters:   06/11/21 127/66   06/08/21 (!) 107/58   05/26/21 103/71       NPO Status:                                                                                 BMI:   Wt Readings from Last 3 Encounters:   06/11/21 237 lb 2 oz (107.6 kg)   05/26/21 246 lb (111.6 kg)   05/11/21 247 lb (112 kg)     There is no height or weight on file to calculate BMI.    CBC:   Lab Results   Component Value Date    WBC 8.5 06/07/2021    RBC 4.13 06/07/2021    HGB 12.3 06/07/2021    HCT 37.7 06/07/2021    MCV 91.3 06/07/2021    RDW 13.3 06/07/2021     06/07/2021       CMP:   Lab Results   Component Value Date     06/11/2021    K 4.9 06/11/2021     06/11/2021    CO2 22 06/11/2021    BUN 21 06/11/2021    CREATININE 1.71 06/11/2021    GFRAA 49 06/11/2021    LABGLOM 41 06/11/2021    GLUCOSE 200 06/11/2021    PROT 7.5 01/09/2017    CALCIUM 8.8 06/11/2021    BILITOT 0.39 01/09/2017    ALKPHOS 89 01/09/2017    AST 16 01/09/2017    ALT 15 01/09/2017       POC Tests:   Recent Labs     06/11/21  1109   POCGLU 197*       Coags: No results found for: PROTIME, INR, APTT    HCG (If Applicable): No results found for: PREGTESTUR, PREGSERUM, HCG, HCGQUANT     ABGs: No results found for: PHART, PO2ART, LJP9RUO, RVI4OGI, BEART, Z8YMMUCC     Type & Screen (If Applicable):  No results found for: LABABO, LABRH    Drug/Infectious Status (If Applicable):  No results found for: HIV, HEPCAB    COVID-19 Screening (If Applicable):   Lab Results   Component Value Date    COVID19 DETECTED 05/26/2021    COVID19 Not Detected 12/07/2020    COVID19 Not Detected 08/08/2020           Anesthesia Evaluation   no history of anesthetic complications:   Airway: Mallampati: I  TM distance: >3 FB   Neck ROM: full  Mouth opening: > = 3 FB Dental:          Pulmonary:normal exam        (-) shortness of breath                           Cardiovascular:    (+) hypertension:,     (-)  angina            Stress test reviewed                Neuro/Psych:   (+) neuromuscular disease (neuropathy):,             GI/Hepatic/Renal:   (+) renal disease: CRI,           Endo/Other:    (+) Diabetes, . Abdominal:           Vascular:                                        Anesthesia Plan      MAC     ASA 3       Induction: intravenous. MIPS: Postoperative opioids intended and Prophylactic antiemetics administered. Anesthetic plan and risks discussed with patient. Plan discussed with CRNA.     Attending anesthesiologist reviewed and agrees with Sajan Cox MD   6/11/2021

## 2021-06-12 LAB
ANION GAP SERPL CALCULATED.3IONS-SCNC: 13 MMOL/L (ref 9–17)
BUN BLDV-MCNC: 20 MG/DL (ref 8–23)
BUN/CREAT BLD: 12 (ref 9–20)
CALCIUM SERPL-MCNC: 8.9 MG/DL (ref 8.6–10.4)
CHLORIDE BLD-SCNC: 100 MMOL/L (ref 98–107)
CO2: 22 MMOL/L (ref 20–31)
CREAT SERPL-MCNC: 1.73 MG/DL (ref 0.7–1.2)
CULTURE: NORMAL
CULTURE: NORMAL
GFR AFRICAN AMERICAN: 48 ML/MIN
GFR NON-AFRICAN AMERICAN: 40 ML/MIN
GFR SERPL CREATININE-BSD FRML MDRD: ABNORMAL ML/MIN/{1.73_M2}
GFR SERPL CREATININE-BSD FRML MDRD: ABNORMAL ML/MIN/{1.73_M2}
GLUCOSE BLD-MCNC: 197 MG/DL (ref 75–110)
GLUCOSE BLD-MCNC: 212 MG/DL (ref 70–99)
GLUCOSE BLD-MCNC: 265 MG/DL (ref 75–110)
GLUCOSE BLD-MCNC: 292 MG/DL (ref 75–110)
Lab: NORMAL
Lab: NORMAL
POTASSIUM SERPL-SCNC: 4.8 MMOL/L (ref 3.7–5.3)
SODIUM BLD-SCNC: 135 MMOL/L (ref 135–144)
SPECIMEN DESCRIPTION: NORMAL
SPECIMEN DESCRIPTION: NORMAL

## 2021-06-12 PROCEDURE — 6360000002 HC RX W HCPCS: Performed by: INTERNAL MEDICINE

## 2021-06-12 PROCEDURE — 6370000000 HC RX 637 (ALT 250 FOR IP): Performed by: NURSE PRACTITIONER

## 2021-06-12 PROCEDURE — 94640 AIRWAY INHALATION TREATMENT: CPT

## 2021-06-12 PROCEDURE — 6370000000 HC RX 637 (ALT 250 FOR IP): Performed by: INTERNAL MEDICINE

## 2021-06-12 PROCEDURE — 99232 SBSQ HOSP IP/OBS MODERATE 35: CPT | Performed by: INTERNAL MEDICINE

## 2021-06-12 PROCEDURE — 80048 BASIC METABOLIC PNL TOTAL CA: CPT

## 2021-06-12 PROCEDURE — 36415 COLL VENOUS BLD VENIPUNCTURE: CPT

## 2021-06-12 PROCEDURE — 2580000003 HC RX 258: Performed by: INTERNAL MEDICINE

## 2021-06-12 PROCEDURE — 1200000000 HC SEMI PRIVATE

## 2021-06-12 PROCEDURE — 82947 ASSAY GLUCOSE BLOOD QUANT: CPT

## 2021-06-12 RX ADMIN — MELOXICAM 7.5 MG: 7.5 TABLET ORAL at 08:18

## 2021-06-12 RX ADMIN — INSULIN LISPRO 6 UNITS: 100 INJECTION, SOLUTION INTRAVENOUS; SUBCUTANEOUS at 11:56

## 2021-06-12 RX ADMIN — BUDESONIDE AND FORMOTEROL FUMARATE DIHYDRATE 2 PUFF: 160; 4.5 AEROSOL RESPIRATORY (INHALATION) at 09:54

## 2021-06-12 RX ADMIN — MELOXICAM 7.5 MG: 7.5 TABLET ORAL at 21:10

## 2021-06-12 RX ADMIN — CEFAZOLIN 1000 MG: 1 INJECTION, POWDER, FOR SOLUTION INTRAMUSCULAR; INTRAVENOUS at 06:24

## 2021-06-12 RX ADMIN — INSULIN GLARGINE 10 UNITS: 100 INJECTION, SOLUTION SUBCUTANEOUS at 08:18

## 2021-06-12 RX ADMIN — CEFAZOLIN 1000 MG: 1 INJECTION, POWDER, FOR SOLUTION INTRAMUSCULAR; INTRAVENOUS at 22:44

## 2021-06-12 RX ADMIN — HEPARIN SODIUM 5000 UNITS: 5000 INJECTION INTRAVENOUS; SUBCUTANEOUS at 06:23

## 2021-06-12 RX ADMIN — CETIRIZINE HYDROCHLORIDE 10 MG: 10 TABLET, FILM COATED ORAL at 21:10

## 2021-06-12 RX ADMIN — SILVER SULFADIAZINE: 10 CREAM TOPICAL at 08:18

## 2021-06-12 RX ADMIN — INSULIN LISPRO 2 UNITS: 100 INJECTION, SOLUTION INTRAVENOUS; SUBCUTANEOUS at 08:18

## 2021-06-12 RX ADMIN — ATORVASTATIN CALCIUM 20 MG: 20 TABLET, FILM COATED ORAL at 21:10

## 2021-06-12 RX ADMIN — ASPIRIN 81 MG: 81 TABLET, COATED ORAL at 08:18

## 2021-06-12 RX ADMIN — INSULIN LISPRO 4 UNITS: 100 INJECTION, SOLUTION INTRAVENOUS; SUBCUTANEOUS at 17:53

## 2021-06-12 RX ADMIN — HEPARIN SODIUM 5000 UNITS: 5000 INJECTION INTRAVENOUS; SUBCUTANEOUS at 21:08

## 2021-06-12 RX ADMIN — HEPARIN SODIUM 5000 UNITS: 5000 INJECTION INTRAVENOUS; SUBCUTANEOUS at 14:15

## 2021-06-12 RX ADMIN — INSULIN LISPRO 3 UNITS: 100 INJECTION, SOLUTION INTRAVENOUS; SUBCUTANEOUS at 21:08

## 2021-06-12 RX ADMIN — GABAPENTIN 200 MG: 100 CAPSULE ORAL at 08:18

## 2021-06-12 RX ADMIN — CEFAZOLIN 1000 MG: 1 INJECTION, POWDER, FOR SOLUTION INTRAMUSCULAR; INTRAVENOUS at 14:15

## 2021-06-12 RX ADMIN — HYDROCODONE BITARTRATE AND ACETAMINOPHEN 2 TABLET: 5; 325 TABLET ORAL at 16:43

## 2021-06-12 RX ADMIN — HYDROCODONE BITARTRATE AND ACETAMINOPHEN 2 TABLET: 5; 325 TABLET ORAL at 21:10

## 2021-06-12 RX ADMIN — ONDANSETRON 4 MG: 4 TABLET, ORALLY DISINTEGRATING ORAL at 08:18

## 2021-06-12 RX ADMIN — GABAPENTIN 200 MG: 100 CAPSULE ORAL at 21:10

## 2021-06-12 RX ADMIN — GABAPENTIN 200 MG: 100 CAPSULE ORAL at 14:15

## 2021-06-12 RX ADMIN — HYDROCODONE BITARTRATE AND ACETAMINOPHEN 2 TABLET: 5; 325 TABLET ORAL at 06:23

## 2021-06-12 RX ADMIN — BUDESONIDE AND FORMOTEROL FUMARATE DIHYDRATE 2 PUFF: 160; 4.5 AEROSOL RESPIRATORY (INHALATION) at 20:05

## 2021-06-12 RX ADMIN — SODIUM CHLORIDE, PRESERVATIVE FREE 10 ML: 5 INJECTION INTRAVENOUS at 21:09

## 2021-06-12 RX ADMIN — HYDROCODONE BITARTRATE AND ACETAMINOPHEN 2 TABLET: 5; 325 TABLET ORAL at 12:01

## 2021-06-12 ASSESSMENT — ENCOUNTER SYMPTOMS
NAUSEA: 0
RESPIRATORY NEGATIVE: 1
VOMITING: 0

## 2021-06-12 ASSESSMENT — PAIN SCALES - GENERAL
PAINLEVEL_OUTOF10: 9
PAINLEVEL_OUTOF10: 7
PAINLEVEL_OUTOF10: 6
PAINLEVEL_OUTOF10: 8
PAINLEVEL_OUTOF10: 8
PAINLEVEL_OUTOF10: 7
PAINLEVEL_OUTOF10: 0
PAINLEVEL_OUTOF10: 3
PAINLEVEL_OUTOF10: 8
PAINLEVEL_OUTOF10: 7
PAINLEVEL_OUTOF10: 9
PAINLEVEL_OUTOF10: 5
PAINLEVEL_OUTOF10: 8

## 2021-06-12 NOTE — PROGRESS NOTES
Nutrition Assessment     Type and Reason for Visit: Reassess    Nutrition Recommendations/Plan:   1.) Continue current diet   2.) Start Glucerna 1x  3.) Monitor PO intake with supplement and weight. Nutrition Assessment:  Pt on a 5 CHO choice diet. I&D perfomed 6/8. Pt to be discharged to home on 6/13 per chart note. Pt states his appetite is good. States hes been eating around 75% of meals. Per weights in chart patient has lost 4# since 6/7. Writer recommended Glucerna 1x/day in order to meet protein needs. Will monitor PO intake with supplement and weight. Malnutrition Assessment:  Malnutrition Status: At risk for malnutrition (Comment)    Estimated Daily Nutrient Needs:  Energy (kcal): 2288 kcal (1.2); Weight Used for Energy Requirements:  Admission     Protein (g):  gm (1.2-1.5 g/kg IBW); Weight Used for Protein Requirements:  Ideal    Weight Used for Fluid Requirements:  1 ml/kcal      Nutrition Related Findings: Hx: T2DM and diabetic neuropathy. Glucose:212 (H). GI: WDL. No edema      Current Nutrition Therapies:    ADULT DIET;  Regular; 5 carb choices (75 gm/meal)  Adult Oral Nutrition Supplement; Diabetic Oral Supplement    Anthropometric Measures:  · Height: 5' 11\" (180.3 cm)  · Current Body Wt: 237 lb (107.5 kg)   · BMI: 33.1    Nutrition Diagnosis:   · Increased nutrient needs related to increase demand for energy/nutrients as evidenced by wounds    Nutrition Interventions:   Food and/or Nutrient Delivery:  Continue Current Diet, Start Oral Nutrition Supplement  Nutrition Education/Counseling:  Education not indicated   Coordination of Nutrition Care:  Continue to monitor while inpatient    Goals:  p.o intake to meet >75% of estimated needs       Nutrition Monitoring and Evaluation:   Behavioral-Environmental Outcomes:  None Identified   Food/Nutrient Intake Outcomes:  Supplement Intake, Food and Nutrient Intake  Physical Signs/Symptoms Outcomes:  Biochemical Data, Skin, Weight Discharge Planning:    Continue current diet     Electronically signed by Gudelia Marie MS, RD, LD on 6/12/21 at 2:44 PM EDT    Contact: Gudelia Marie, [address], [place]  Clinical Dietitian   [phone]

## 2021-06-12 NOTE — PROGRESS NOTES
[facility]  Progress Note    Jeannine Cochran  MRN: [id number]  Date: 6/12/2021  LOS: 6     Reason for F/U :   Diabetic foot infection    Impression :   1. Diabetes mellitus with associated diabetic neuropathy and Charcot deformity in the right foot  2. Recent puncture wound to the plantar aspect of the right foot with associated osteomyelitis and abscess formation  · Status post incision and drainage of the right foot abscess 6/8/2021  · Status post wound closure 6/11/2021  3. Cellulitis of the right lower extremity    Recommendations:   · The culture had heavy growth of MSSA  · Patient continues on cefazoline through July /4 2021  · Midline was placed  · Follow CBC, BUN/creatinine and liver enzymes weekly while on IV antibiotics. · No objection for discharge from infectious disease point of view. · Discussed with nursing staff. Infection Control Recommendations:   Universal precautions    Discharge Planning:   Estimated Length of IV antimicrobials: To be determined  Patient will need Midline Catheter Insertion/ PICC line Insertion: No  Patient will need: Home IV , [place],  SNF,  LTAC: Undetermined  Patient willneed outpatient wound care: No    Medical Decision making / Summary of Stay:   Josefina Bishop a 60y.o.-year-old male who was initially admitted on 6/6/2021.   Lobito Shaikh is seen and evaluated at bedside he tells me that he recently sustained a dog bite to his right arm on May 5, 2021 and he was seen in the urgent care and was given a tetanus shot and also was prescribed Keflex for that. He does report that the following day on 5/6/2021 he stepped on a nail in his right foot and he was seen by Dr. Eder Arvizu podiatry and levofloxacin was added to his antibiotics regimen.  The patient reports that he completed the course of antibiotics last week on Wednesday. The patient is an underlying diabetic has a Charcot arthropathy and also has diabetic neuropathy.   He does wear custom molded shoes and he reports that on Friday he noticed some redness in the foot and it was more so a red streak going up to his ankle. As the weekend progressed the foot started to swell up with increasing redness and pain but no associated fevers or chills. He came into the emergency room for evaluation and was found to have a soft tissue ulceration soft tissue swelling. The patient was admitted started on antimicrobial therapy with cefepime and vancomycin.  The patient was seen by the podiatry service and an MRI of the right lower extremity was ordered to rule out a deep abscess/osteomyelitis. I was asked to evaluate and help with antibiotic choice. Current evaluation:2021    /67   Pulse 53   Temp 98.6 °F (37 °C) (Oral)   Resp 18   Ht 5' 11\" (1.803 m)   Wt 237 lb (107.5 kg)   SpO2 96%   BMI 33.05 kg/m²     Temperature Range: Temp: 98.6 °F (37 °C) Temp  Av.9 °F (36.6 °C)  Min: 96.8 °F (36 °C)  Max: 98.7 °F (37.1 °C)    Patient was seen and evaluated sitting up at bedside  He is complaining of postoperative pain, denied fever or chills, denied nausea or vomiting, no diarrhea no other complaints. Midline in place    Review of Systems   Constitutional: Negative. HENT: Negative. Respiratory: Negative. Cardiovascular: Negative. Gastrointestinal: Negative for nausea and vomiting. Genitourinary: Negative. Musculoskeletal: Negative. Skin: Negative. Neurological: Negative. Psychiatric/Behavioral: Negative. Physical Examination :     Physical Exam  Constitutional:       General: He is not in acute distress. Appearance: Normal appearance. He is normal weight. HENT:      Head: Normocephalic and atraumatic. Cardiovascular:      Rate and Rhythm: Normal rate and regular rhythm. Pulmonary:      Effort: Pulmonary effort is normal. No respiratory distress. Abdominal:      General: Abdomen is flat.  Bowel sounds are normal.      Palpations: Abdomen is of the midfoot. 3. Chronic changes of Charcot arthropathy. 4. Chronically eroded appearance of the distal aspect of the proximal 1st   phalanx with patchy edema of the great toe in the region of the   interphalangeal joint as well as small interphalangeal joint effusion.  While   findings could be related to remote trauma or remote infection at this site,   superimposed acute osteomyelitis of the great toe difficult to exclude. Correlate clinically to exclude the possibility of any soft tissue wounds of   the great toe.  In the setting of adjacent soft tissue wound, infection would   be more likely. Cultures:     Culture, Blood 1 [[id number]] Collected: 06/06/21 1105   Order Status: Completed Specimen: Blood Updated: 06/09/21 0006    Specimen Description . BLOOD    Special Requests LTAC    Culture NO GROWTH 3 DAYS   Culture, Blood 1 [[id number]] Collected: 06/06/21 1058   Order Status: Completed Specimen: Blood Updated: 06/09/21 0006    Specimen Description . BLOOD    Special Requests LTAC    Culture NO GROWTH 3 DAYS   Culture, Anaerobic and Aerobic [[id number]] (Abnormal) Collected: 06/08/21 1700   Order Status: Completed Specimen: Swab from Foot Updated: 06/09/21 0001    Specimen Description . FOOT    Special Requests NOT REPORTED    Direct Exam MODERATE NEUTROPHILSAbnormal      FEW GRAM POSITIVE COCCIAbnormal     Culture PENDING   Culture, Wound [[id number]] (Abnormal)  Collected: 06/06/21 1102   Order Status: Completed Specimen: Foot Updated: 06/08/21 0815    Specimen Description . FOOT RIGHT    Special Requests NOT REPORTED    Direct Exam MODERATE NEUTROPHILSAbnormal      MANY GRAM POSITIVE COCCI IN PAIRSAbnormal      FEW GRAM POSITIVE RODSAbnormal     Culture STAPHYLOCOCCUS AUREUS HEAVY GROWTH This isolate is methicillin susceptible. Abnormal      NORMAL SKIN FLORAAbnormal          Medications:      ceFAZolin  1,000 mg Intravenous Q8H    insulin glargine  10 Units Subcutaneous Daily    silver sulfADIAZINE   Topical Daily    aspirin  81 mg Oral Daily    cetirizine  10 mg Oral Nightly    atorvastatin  20 mg Oral Daily    sodium chloride flush  5-40 mL Intravenous 2 times per day    heparin (porcine)  5,000 Units Subcutaneous 3 times per day    gabapentin  200 mg Oral TID    insulin lispro  0-12 Units Subcutaneous TID WC    insulin lispro  0-6 Units Subcutaneous Nightly    budesonide-formoterol  2 puff Inhalation BID    meloxicam  7.5 mg Oral BID           [facility]  Awa Fields MD  [employer] messaging  OFFICE: [phone]      Electronically signed by Awa Fields MD on 6/12/2021 at 11:29 AM  Thank you for allowing us to participate in the care of this patient. Please call with questions. This note iscreated with the assistance of a speech recognition program.  While intending to generate a document that actually reflects the content of the visit, the document can still have some errors including those of syntax andsound a like substitutions which may escape proof reading. In such instances, actual meaning can be extrapolated by contextual diversion.

## 2021-06-12 NOTE — PROGRESS NOTES
[facility]  Office: [address], DO, Maru Palacios, DO, Ava Joseph, DO, Marlo [facility], DO, Sang Call MD, Ruthann Hermosillo MD, Monroe Figueroa MD, Soledad Butler MD, Hue Whittington MD, Roya Lopez MD, Echo Tran MD, Helder Pereyra MD, Aleah Spicer DO, Wil Carrillo MD, Sherley Olson, DO, Demario Isaac MD,  Magda Saldana, DO, Hedy Flynn MD, Lesa Vuong MD, Breana Bliss MD, Desmond Potter MD, Sreedhar Jaramillo [facility], [facility], CNP, Francisco Giles, CNP, Maldonado Schmidt, CNS, Gideon Levy, CNP, Nivia Pinon, CNP, Nikia South, CNP, Samra Sierra, CNP, Alhaji Ball, CNP, Izzy Morel PA-C, Michi Rosales, [facility], Mack Griffin, CNP, Deandre García, CNP, Ifeanyi Longoria, CNP, Brian Aguero, CNP, Cece Martinez, [facility], Jered Gold, Alves [facility]    Progress Note    6/12/2021    9:02 AM    Name:   Jeannine Cochran  MRN:     [id number]     [place]:      [de-identified]   Room:   [address] [place] [address] Day:  6  Admit Date:  6/6/2021 10:33 AM    PCP:   Leydi Rodriguez MD  Code Status:  Full Code    Subjective:     C/C:   Chief Complaint   Patient presents with    Foot Pain     Interval History Status: improved. Patient resting comfortably denies any complaints of chest pain, shortness of breath, nausea vomiting, fevers or chills. Postoperative foot pain is well controlled. Anxiously waiting discharge, awaiting arrangements for IV antibiotics. Brief History: This is a 45-year-old male with a past medical history of diabetes with diabetic neuropathy that presents with foot pain with erythema and swelling.  He is found to have fluid collection which was drained at the bedside. Mills Cockayne underwent MRI to evaluate for possible osteomyelitis.  MRI did not show evidence of osteomyelitis but did show continued fluid collection concerning for continued abscess.     Review of Systems:     Constitutional:  negative for chills, fevers, sweats  Respiratory:  negative for cough, dyspnea on exertion, shortness of breath, wheezing  Cardiovascular:  negative for chest pain, chest pressure/discomfort, lower extremity edema, palpitations  Gastrointestinal:  negative for abdominal pain, constipation, diarrhea, nausea, vomiting  Neurological:  negative for dizziness, headache    Medications: Allergies:     Allergies   Allergen Reactions    Morphine Itching    Percocet [Oxycodone-Acetaminophen]      Facial swelling    Dye [Iodides] Rash     Rash and swelling       Current Meds:   Scheduled Meds:    ceFAZolin  1,000 mg Intravenous Q8H    insulin glargine  10 Units Subcutaneous Daily    silver sulfADIAZINE   Topical Daily    aspirin  81 mg Oral Daily    cetirizine  10 mg Oral Nightly    atorvastatin  20 mg Oral Daily    sodium chloride flush  5-40 mL Intravenous 2 times per day    heparin (porcine)  5,000 Units Subcutaneous 3 times per day    gabapentin  200 mg Oral TID    insulin lispro  0-12 Units Subcutaneous TID WC    insulin lispro  0-6 Units Subcutaneous Nightly    budesonide-formoterol  2 puff Inhalation BID    meloxicam  7.5 mg Oral BID     Continuous Infusions:    sodium chloride      dextrose       PRN Meds: HYDROcodone-acetaminophen, sodium chloride flush, sodium chloride, ondansetron **OR** ondansetron, magnesium hydroxide, acetaminophen **OR** acetaminophen, benzonatate, glucose, dextrose, glucagon (rDNA), dextrose    Data:     Past Medical History:   has a past medical history of ALEJANDRO (acute kidney injury) ([facility] [address] Cellulitis of right foot, Charcot foot due to diabetes mellitus ([facility] [facility] Diabetic polyneuropathy associated with type 2 diabetes mellitus ([facility] Fractures, multiple, Hyperlipidemia, Hypertension, Leukocytosis, Neuropathy, Pain in right foot, Pneumonia, Tobacco abuse, Type II or unspecified type diabetes mellitus without mention of complication, not stated as uncontrolled, Vertigo, Wound, open, and Wound, open. Social History:   reports that he has been smoking. He has been smoking about 1.00 pack per day. He has never used smokeless tobacco. He reports previous drug use. He reports that he does not drink alcohol. Family History:   Family History   Problem Relation Age of Onset    Diabetes Mother     Cancer Father     Hypertension Maternal Grandmother        Vitals:  /67   Pulse 53   Temp 98.6 °F (37 °C) (Oral)   Resp 18   Ht 5' 11\" (1.803 m)   Wt 237 lb (107.5 kg)   SpO2 96%   BMI 33.05 kg/m²   Temp (24hrs), Av.9 °F (36.6 °C), Min:96.8 °F (36 °C), Max:98.7 °F (37.1 °C)    Recent Labs     21  1548 21  1630 21  0723   POCGLU 145* 143* 236* 197*       I/O (24Hr): Intake/Output Summary (Last 24 hours) at 2021 0902  Last data filed at 2021 2346  Gross per 24 hour   Intake 360 ml   Output 1350 ml   Net -990 ml       Labs:  Hematology:  Recent Labs     06/10/21  0525   CRP 33.7*     Chemistry:  Recent Labs     06/10/21  0525 21  0550 21  0531    136 135   K 4.8 4.9 4.8    103 100   CO2 22 22 22   GLUCOSE 139* 200* 212*   BUN 17 21 20   CREATININE 1.74* 1.71* 1.73*   ANIONGAP 8* 11 13   LABGLOM 40* 41* 40*   GFRAA 48* 49* 48*   CALCIUM 8.8 8.8 8.9     Recent Labs     21  0725 21  1109 21  1548 21  1630 21  19321  0723   POCGLU 192* 197* 145* 143* 236* 197*     ABG:No results found for: POCPH, PHART, PH, POCPCO2, SIC0MPE, PCO2, POCPO2, PO2ART, PO2, POCHCO3, GJR4JWE, HCO3, NBEA, PBEA, BEART, BE, THGBART, THB, MIU8KHV, THCD2LFU, A6EXFDZI, O2SAT, FIO2  Lab Results   Component Value Date/Time    SPECIAL NOT REPORTED 2021 05:00 PM     Lab Results   Component Value Date/Time    CULTURE (A) 2021 05:00 PM     STAPHYLOCOCCUS AUREUS LIGHT GROWTH This isolate is methicillin susceptible.     CULTURE NORMAL ARABELLA 2021 05:00 PM    CULTURE (A) 2021 05:00 PM     PREVOTELLA (BACTEROIDES BIVIUS) BIVIA LIGHT GROWTH BETA LACTAMASE POSITIVE       Radiology:  XR FOOT RIGHT (MIN 3 VIEWS)    Result Date: 6/6/2021  Soft tissue ulceration and soft tissue swelling. Persistent osseous irregularity at the 1st and 3rd digits, similar to prior, for which osteomyelitis could be considered in the appropriate clinical setting. Charcot arthropathy again demonstrated of the midfoot. MRI FOOT RIGHT WO CONTRAST    Result Date: 6/7/2021  1. Plantar ulceration of the medial soft tissues of the foot at the level of the midfoot with underlying T2 hyperintense collection measuring approximately 2.3 x 3.1 x 1.2 cm which is most suggestive of abscess versus phlegmon. 2. No osteomyelitis in the region of the midfoot. 3. Chronic changes of Charcot arthropathy. 4. Chronically eroded appearance of the distal aspect of the proximal 1st phalanx with patchy edema of the great toe in the region of the interphalangeal joint as well as small interphalangeal joint effusion. While findings could be related to remote trauma or remote infection at this site, superimposed acute osteomyelitis of the great toe difficult to exclude. Correlate clinically to exclude the possibility of any soft tissue wounds of the great toe. In the setting of adjacent soft tissue wound, infection would be more likely.        Physical Examination:        General appearance:  alert, cooperative and no distress  Mental Status:  oriented to person, place and time and normal affect  Lungs:  clear to auscultation bilaterally, normal effort  Heart:  regular rate and rhythm, no murmur  Abdomen:  soft, nontender, nondistended, normal bowel sounds, no masses, hepatomegaly, splenomegaly  Extremities:  no edema, redness, tenderness in the calves, foot dressing in place  Skin:  no gross lesions, rashes, induration    Assessment:        Hospital Problems         Last Modified POA    * (Principal) Diabetic infection of right foot ([facility] 6/8/2021 Yes Essential hypertension 6/6/2021 Yes    Neuropathy 6/6/2021 Yes    Charcot foot due to diabetes mellitus ([facility] [facility] 6/6/2021 Yes    Diabetic polyneuropathy associated with type 2 diabetes mellitus ([facility] [address] 6/6/2021 Yes    Abscess of right foot 6/10/2021 Yes    Tobacco abuse 6/6/2021 Yes    Osteomyelitis ([facility] 6/8/2021 Yes    Wound dehiscence 6/11/2021 Yes          Plan:        1. Continue Ancef as ordered, waiting outpatient IV arrangements. 2. Continue present antihypertensives  3. Insulin scale for glycemic control  4. Continue wound care, dressing changes per podiatry  5. PT and OT  6. Smoking cessation  7. GI and DVT prophylaxis  8.  Discharge home with IV antibiotics when arrangements made    Mallory Tenorio DO  6/12/2021  9:02 AM

## 2021-06-12 NOTE — PROGRESS NOTES
Progress Note  Podiatric Medicine and Surgery     Subjective     CC: Right foot infection    S/P right foot I&D (DOS 6/8/21), delayed primary closure  Patient seen and examined at bedside. Chart and results reviewed  Patient tolerating diet  Tolerating IV antibiotics    Okay to discharge per podiatry when cleared with other services    HPI :  Cole Garcia is a 60 y.o. male seen at [address] for a wound on the right foot is well-known to Dr. Jaky Cook. On 5/05 patient presented to the ED because he was bitten by a dog. At that time was given a tetanus shot and placed on Keflex. The following day on 5/06/2021 was seen by podiatry in the ED after stepping on a nail to the right foot. In addition to the Keflex he is already taking patient was prescribed Cipro. At this time patient states he is no longer taking any antibiotics but presents because the right foot has become increasingly swollen, red, and painful with drainage. He denies any constitutional symptoms at this time. Of note, patient has history of Charcot arthropathy but does not wear custom molded shoes.     PCP is Sin Virgin Halsted, MD    ROS: Denies N/V/F/C/SOB/CP/Cough.        Medications:  Scheduled Meds:   ceFAZolin  1,000 mg Intravenous Q8H    insulin glargine  10 Units Subcutaneous Daily    silver sulfADIAZINE   Topical Daily    aspirin  81 mg Oral Daily    cetirizine  10 mg Oral Nightly    atorvastatin  20 mg Oral Daily    sodium chloride flush  5-40 mL Intravenous 2 times per day    heparin (porcine)  5,000 Units Subcutaneous 3 times per day    gabapentin  200 mg Oral TID    insulin lispro  0-12 Units Subcutaneous TID WC    insulin lispro  0-6 Units Subcutaneous Nightly    budesonide-formoterol  2 puff Inhalation BID    meloxicam  7.5 mg Oral BID       Continuous Infusions:   sodium chloride      dextrose         PRN Meds:HYDROcodone-acetaminophen, sodium chloride flush, sodium chloride, ondansetron **OR** ondansetron, magnesium hydroxide, acetaminophen **OR** acetaminophen, benzonatate, glucose, dextrose, glucagon (rDNA), dextrose    Objective     Vitals:  Patient Vitals for the past 8 hrs:   BP Temp Temp src Pulse Resp SpO2 Weight   21 0759 123/67 98.6 °F (37 °C) Oral 53 18 96 % --   21 0611 -- -- -- -- -- -- 237 lb (107.5 kg)   21 0402 119/67 98.7 °F (37.1 °C) Axillary 66 18 94 % --     Average, Min, and Max for last 24 hours Vitals:  TEMPERATURE:  Temp  Av.9 °F (36.6 °C)  Min: 96.8 °F (36 °C)  Max: 98.7 °F (37.1 °C)    RESPIRATIONS RANGE: Resp  Av.1  Min: 0  Max: 20    PULSE RANGE: Pulse  Av.4  Min: 52  Max: 87    BLOOD PRESSURE RANGE:  Systolic (26RNF), IIM:489 , Min:91 , EW   ; Diastolic (29AJN), CCL:83, Min:55, Max:78      PULSE OXIMETRY RANGE: SpO2  Av.6 %  Min: 87 %  Max: 100 %    I/O last 3 completed shifts: In: 360 [P.O.:200; I.V.:160]  Out: 1350 [Urine:1350]    CBC:  Recent Labs     06/10/21  0525   CRP 33.7*        BMP:  Recent Labs     06/10/21  0525 21  0550 21  0531    136 135   K 4.8 4.9 4.8    103 100   CO2 22 22 22   BUN 17 21 20   CREATININE 1.74* 1.71* 1.73*   GLUCOSE 139* 200* 212*   CALCIUM 8.8 8.8 8.9        Coags:  No results for input(s): APTT, PROT, INR in the last 72 hours. Lab Results   Component Value Date    SEDRATE 37 (H) 2021     Recent Labs     06/10/21  0525   CRP 33.7*       Lower Extremity Physical Exam:  Exam from 6/10  Vascular: DP and PT pulses are Palpable . CFT <3 seconds to all digits. Hair growth is absent to the level of the digits. Moderate non-pitting edema to the right lower extremity, improved from previous exam.       Neuro: Saph/sural/SP/DP/plantar sensation diminished to light touch.     Musculoskeletal: Muscle strength is adequate ROM, adequate strength to all lower extremity muscle groups. Gross deformity is medial rocker-bottom deformity. TTP periwound. Compartments are soft and compressible.    Dermatologic:  Open surgical wounds to dorsal medial aspect and plantar medial aspect of right foot with retention sutures in place. No purulent drainage appreciated today. No malodor today. Does not probe to bone. Wound does probe to deep fascia. No fluctuance or induration. Erythema improving. Imaging:   IR ULTRASOUND GUIDANCE VASCULAR ACCESS   Final Result      MRI FOOT RIGHT WO CONTRAST   Final Result   1. Plantar ulceration of the medial soft tissues of the foot at the level of   the midfoot with underlying T2 hyperintense collection measuring   approximately 2.3 x 3.1 x 1.2 cm which is most suggestive of abscess versus   phlegmon. 2. No osteomyelitis in the region of the midfoot. 3. Chronic changes of Charcot arthropathy. 4. Chronically eroded appearance of the distal aspect of the proximal 1st   phalanx with patchy edema of the great toe in the region of the   interphalangeal joint as well as small interphalangeal joint effusion. While   findings could be related to remote trauma or remote infection at this site,   superimposed acute osteomyelitis of the great toe difficult to exclude. Correlate clinically to exclude the possibility of any soft tissue wounds of   the great toe. In the setting of adjacent soft tissue wound, infection would   be more likely. XR FOOT RIGHT (MIN 3 VIEWS)   Final Result   Soft tissue ulceration and soft tissue swelling. Persistent osseous   irregularity at the 1st and 3rd digits, similar to prior, for which   osteomyelitis could be considered in the appropriate clinical setting. Charcot arthropathy again demonstrated of the midfoot. Cultures:   Culture, Anaerobic and Aerobic [[id number]] (Abnormal) Collected: 06/08/21 1700   Order Status: Completed Specimen: Swab from Foot Updated: 06/09/21 4935    Specimen Description . FOOT    Special Requests NOT REPORTED    Direct Exam MODERATE NEUTROPHILSAbnormal      FEW GRAM POSITIVE COCCIAbnormal     Culture STAPHYLOCOCCUS AUREUS LIGHT GROWTHAbnormal      NORMAL ARABELLA   Culture, Wound [[id number]] (Abnormal)  Collected: 06/06/21 1102   Order Status: Completed Specimen: Foot Updated: 06/08/21 0815    Specimen Description . FOOT RIGHT    Special Requests NOT REPORTED    Direct Exam MODERATE NEUTROPHILSAbnormal      MANY GRAM POSITIVE COCCI IN PAIRSAbnormal      FEW GRAM POSITIVE RODSAbnormal     Culture STAPHYLOCOCCUS AUREUS HEAVY GROWTH This isolate is methicillin susceptible. Abnormal      NORMAL SKIN FLORAAbnormal          Assessment   Daniel Felix is a 60 y.o. male with   1. S/p I&D, right foot (DOS: 06/08/2021)  2. Diabetic foot ulcer to level of subcutaneous, right  3. Recent hx of puncture wound, right foot   4. Cellulitis, RLE  5. 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 Hx of Charcot Arthropathy     Principal Problem:    Diabetic infection of right foot ([address]  Active Problems:    Essential hypertension    Neuropathy    Charcot foot due to diabetes mellitus ([address]    Diabetic polyneuropathy associated with type 2 diabetes mellitus ([address]    Abscess of right foot    Tobacco abuse    Osteomyelitis ([address]    Wound dehiscence  Resolved Problems:    * No resolved hospital problems. *       Plan     · Patient examined and evaluated at bedside. · Treatment options discussed in detail with the patient. · Continue medical management per Internal Medicine. · ID on board   · Abx: Ancef   · Midline placed  · Plan to continue IV abx through 7/4  · Dressing left intact to Right foot following delayed primary closure  · Pt ok to d/c from a podiatry standpoint  · Case Management - d/c 6/13  · Heel-touch WB to Right lower extremity in surgical shoe. · Discussed with  Dr. Teri Woodard. Pt will f/u in office.     Magda Villarreal DPM   Podiatric Medicine & Surgery   6/12/2021 at 10:27 AM

## 2021-06-12 NOTE — PLAN OF CARE
Problem: Risk for Fluid Volume Deficit  Goal: Maintain normal heart rhythm  Outcome: Ongoing  No edema present. Mucous membranes moist. Tolerates fluids. Iv fluids per orders. Problem: Pain:  Goal: Control of acute pain  Description: Control of acute pain  Outcome: Ongoing  Patient states understanding of pain scale and interventions. Pain assessed with hourly rounding and PRN. Patient states pain level is 8 /10. Will continue to monitor.

## 2021-06-12 NOTE — CARE COORDINATION
Discharge planning     Message left from Eva Felder from [facility] requesting information regarding IV access and rx. Faxed over rx, face sheet, iv access information to [facility] office. Called on call at [facility] and they will send to the on call pharmacist at [phone] to call writer back. [facility] OFFICE  Phone [phone]  Fax [phone]    [place] writer attempted several agencies and [facility]. [facility]ose    On 6/13 to given 0600 dose and have patient discharged in am so can meet RN staffing at his home to teach infusions. Will have to arrange delivery of ancef thru [facility] for either later today to wife or early am       Call back from Gabby at [facility] and faxed directly to her the above information. Her fax is [phone].  Her phone is [phone]    Mariebl from [address] called and all set for start of care tomorrow at 2 pm

## 2021-06-12 NOTE — PLAN OF CARE
Nutrition Problem #1: Increased nutrient needs  Intervention: Food and/or Nutrient Delivery: Continue Current Diet, Start Oral Nutrition Supplement  Nutritional Goals: p.o intake to meet >75% of estimated needs

## 2021-06-13 VITALS
WEIGHT: 236.44 LBS | TEMPERATURE: 97.7 F | OXYGEN SATURATION: 97 % | DIASTOLIC BLOOD PRESSURE: 69 MMHG | HEART RATE: 53 BPM | SYSTOLIC BLOOD PRESSURE: 136 MMHG | HEIGHT: 71 IN | BODY MASS INDEX: 33.1 KG/M2 | RESPIRATION RATE: 16 BRPM

## 2021-06-13 LAB
ANION GAP SERPL CALCULATED.3IONS-SCNC: 10 MMOL/L (ref 9–17)
BUN BLDV-MCNC: 22 MG/DL (ref 8–23)
BUN/CREAT BLD: 13 (ref 9–20)
CALCIUM SERPL-MCNC: 9.2 MG/DL (ref 8.6–10.4)
CHLORIDE BLD-SCNC: 102 MMOL/L (ref 98–107)
CO2: 23 MMOL/L (ref 20–31)
CREAT SERPL-MCNC: 1.73 MG/DL (ref 0.7–1.2)
GFR AFRICAN AMERICAN: 48 ML/MIN
GFR NON-AFRICAN AMERICAN: 40 ML/MIN
GFR SERPL CREATININE-BSD FRML MDRD: ABNORMAL ML/MIN/{1.73_M2}
GFR SERPL CREATININE-BSD FRML MDRD: ABNORMAL ML/MIN/{1.73_M2}
GLUCOSE BLD-MCNC: 164 MG/DL (ref 75–110)
GLUCOSE BLD-MCNC: 165 MG/DL (ref 70–99)
POTASSIUM SERPL-SCNC: 4.8 MMOL/L (ref 3.7–5.3)
SODIUM BLD-SCNC: 135 MMOL/L (ref 135–144)

## 2021-06-13 PROCEDURE — 6360000002 HC RX W HCPCS: Performed by: INTERNAL MEDICINE

## 2021-06-13 PROCEDURE — 2580000003 HC RX 258: Performed by: INTERNAL MEDICINE

## 2021-06-13 PROCEDURE — 6370000000 HC RX 637 (ALT 250 FOR IP): Performed by: INTERNAL MEDICINE

## 2021-06-13 PROCEDURE — 36415 COLL VENOUS BLD VENIPUNCTURE: CPT

## 2021-06-13 PROCEDURE — 80048 BASIC METABOLIC PNL TOTAL CA: CPT

## 2021-06-13 PROCEDURE — 82947 ASSAY GLUCOSE BLOOD QUANT: CPT

## 2021-06-13 RX ADMIN — ASPIRIN 81 MG: 81 TABLET, COATED ORAL at 08:55

## 2021-06-13 RX ADMIN — HEPARIN SODIUM 5000 UNITS: 5000 INJECTION INTRAVENOUS; SUBCUTANEOUS at 06:16

## 2021-06-13 RX ADMIN — INSULIN LISPRO 2 UNITS: 100 INJECTION, SOLUTION INTRAVENOUS; SUBCUTANEOUS at 08:58

## 2021-06-13 RX ADMIN — CEFAZOLIN 1000 MG: 1 INJECTION, POWDER, FOR SOLUTION INTRAMUSCULAR; INTRAVENOUS at 06:15

## 2021-06-13 RX ADMIN — SILVER SULFADIAZINE: 10 CREAM TOPICAL at 08:58

## 2021-06-13 RX ADMIN — SODIUM CHLORIDE, PRESERVATIVE FREE 10 ML: 5 INJECTION INTRAVENOUS at 08:57

## 2021-06-13 RX ADMIN — GABAPENTIN 200 MG: 100 CAPSULE ORAL at 08:55

## 2021-06-13 ASSESSMENT — PAIN DESCRIPTION - DESCRIPTORS: DESCRIPTORS: DULL

## 2021-06-13 ASSESSMENT — PAIN SCALES - GENERAL
PAINLEVEL_OUTOF10: 6
PAINLEVEL_OUTOF10: 6

## 2021-06-13 NOTE — CARE COORDINATION
Discharge planning    Patient to go home today after this am antibiotic dosage. He stated that his wife did not receive the medication at that instructed time. Call to [facility] to investigate and they will call back     [address]  Phone [phone]  Fax [phone] with fred at [address] and faxed over signed and completed becki to [facility] office.          Discharging to Facility/ Agency   · Name: [facility]   · Phone: [phone]  · Fax: [phone]

## 2021-06-13 NOTE — PLAN OF CARE
Problem:  Body Temperature -  Risk of, Imbalanced  Goal: Complications related to the disease process, condition or treatment will be avoided or minimized  Outcome: Met This Shift     Problem: Falls - Risk of:  Goal: Will remain free from falls  Description: Will remain free from falls  Outcome: Met This Shift  Goal: Absence of physical injury  Description: Absence of physical injury  Outcome: Met This Shift     Problem: Isolation Precautions - Risk of Spread of Infection  Goal: Prevent transmission of infection  Outcome: Ongoing     Problem: Nutrition Deficits  Goal: Optimize nutritional status  Outcome: Ongoing     Problem: Risk for Fluid Volume Deficit  Goal: Maintain normal heart rhythm  Outcome: Ongoing  Goal: Maintain absence of muscle cramping  Outcome: Ongoing  Goal: Maintain normal serum potassium, sodium, calcium, phosphorus, and pH  Outcome: Ongoing     Problem: Loneliness or Risk for Loneliness  Goal: Demonstrate positive use of time alone when socialization is not possible  Outcome: Ongoing     Problem: Fatigue  Goal: Verbalize increase energy and improved vitality  Outcome: Ongoing     Problem: Patient Education: Go to Patient Education Activity  Goal: Patient/Family Education  Outcome: Ongoing     Problem: Pain:  Goal: Pain level will decrease  Description: Pain level will decrease  Outcome: Ongoing  Goal: Control of acute pain  Description: Control of acute pain  Outcome: Ongoing  Goal: Control of chronic pain  Description: Control of chronic pain  Outcome: Ongoing

## 2021-06-14 ENCOUNTER — CARE COORDINATION (OUTPATIENT)
Dept: CASE MANAGEMENT | Age: 66
End: 2021-06-14

## 2021-06-14 LAB — GLUCOSE BLD-MCNC: 214 MG/DL (ref 75–110)

## 2021-06-14 NOTE — CARE COORDINATION
[facility] Transitions Initial Follow Up Call- Toni Owen MD- one time call     Call within 2 business days of discharge: Yes    Patient: Josefina Carter Patient : [id number]   MRN: [id number]  Reason for Admission: right foot osteomyelitis, diabetic foot infection, I & D  Discharge Date: 21 RARS: Readmission Risk Score: 11      Last Discharge [facility]       Complaint Diagnosis Description Type Department Provider    21 Foot Pain Osteomyelitis of right foot, unspecified type ([facility] . .. ED to Hosp-Admission (Discharged) (ADMITTED) MAE Merida DO; Saritha Lui. .. Spoke with: Sameer Vargas     Call to pt who states he is doing pretty good. States foot is still wrapped so he cannot see if red or swollen- denies drainage on dressing. States he has appt tomorrow with Dr Travon Luther. Confirms he is using walking boot and getting around okay   Denies fever/ chills, SOB  Confirms he has been out of isolation (originally diagnosed with COVID )  Confirms he has IV abx supplies to last through Thursday then they will deliver more then  Writer informs this is final ([facility]) phone call- v/u. Encouraged call writer/ [facility] or providers if questions or concerns- v/u. Episode closed       Transitions of Care Initial Call    Challenges to be reviewed by the provider   Additional needs identified to be addressed with provider: No  none             Method of communication with provider : none      Advance Care Planning:   Does patient have an Advance Directive:  not on file; education provided. Was this a readmission? No  Patient stated reason for admission: foot infection   Patients top risk factors for readmission: lack of knowledge about disease and medical condition-diabetic foot infection    Care Transition Nurse (CTN) contacted the patient by telephone to perform post hospital discharge assessment. Verified name and  with patient as identifiers.  Provided introduction to self, and explanation tomorrow with Dr Valorie Keith (pod) and he will call to schedule with Dr Wander Holman and reviewed discharge summary and/or continuity of care documents  Communication with home health agencies or other community services the patient is currently using-SOC last nigth for IV abx   Assessment and support for treatment adherence and medication management-confirms new and DC meds    Care Transitions 24 Hour Call    Do you have any ongoing symptoms?: No  Do you have a copy of your discharge instructions?: Yes  Do you have all of your prescriptions and are they filled?: Yes  Have you been contacted by a [facility] Pharmacist?: No  Have you scheduled your follow up appointment?: Yes  How are you going to get to your appointment?: Car - drive self  Were you discharged with any Home Care or Post Acute Services: Yes  Post Acute Services: Home Health (Comment: [facility] )  Do you feel like you have everything you need to keep you well at home?: Yes  Care Transitions Interventions         Follow Up  Future Appointments   Date Time Provider Florencio Murillo   6/15/2021  1:30 PM AJX [facility]   8/3/2021  8:30 AM JAX S[facility]chez [facility]       Rosa Farooq RN

## 2021-06-14 NOTE — OP NOTE
PATIENT NAME: Yesenia Joseph  YOB: 1955  -  60 y.o. male  MRN: [id number]  DATE: 6/11/2021  BILLING #: [id number]     Surgeon(s):  Jayson Godinez DPM      ASSISTANTS: Marlene Jerome, MIAM PGY3; Mary BELLAMYM PGY3      PRE-OP DIAGNOSIS:   1. Status post Incision and drainage right foot  2. Diabetic foot ulcer down to muscle belly right foot x2     POST-OP DIAGNOSIS: Same as above.     PROCEDURE:   1. Delayed primary closure right foot     ANESTHESIA: MAC with 10 cc 1:1 mixture 1% lidocaine plain and 0.5% bupivacaine plain        HEMOSTASIS: direct pressure     ESTIMATED BLOOD LOSS: Less than 10cc.     MATERIALS:   * No implants in log *     INJECTABLES: none     SPECIMEN:   * No specimens in log *     COMPLICATIONS: none     FINDINGS: no purulence, healthy granular wound base, wounds closed with minimal skin tension noted     The patient was counseled at length about the risks of sheela Covid-19 during their perioperative period and any recovery window from their procedure.  The patient was made aware that sheela Covid-19  may worsen their prognosis for recovering from their procedure  and lend to a higher morbidity and/or mortality risk.  All material risks, benefits, and reasonable alternatives including postponing the procedure were discussed. The patient does wish to proceed with the procedure at this time. INDICATION FOR PROCEDURE:  Patient was admitted to the hospital for an infection of the right foot and underwent incision and drainage 6/8/2021. The infection was allowed to resolve and was treated with IV antibiotics per ID. Patient is returning to the OR today for delayed primary closure to aid in wound healing and limb salvage. Risks and benefits were discussed. No guarantees were given or implied.  Consent is signed and in the chart.     PROCEDURE IN DETAIL: The patient was transported from pre-op to the operating room and placed on the operating table in the supine position with a safety strap across the lap. An ankle tourniquet was applied but not inflated during course of procedure. After adequate sedation by anesthesia, a local block of 10cc of a 1:1 mixture of 1% lidocaine plain and 0.5% marcaine plain was injected. The foot was then prepped and draped in the usual aseptic fashion. Attention was directed to the right foot at the medial site of the wound. The retention sutures and packing were removed. All non-viable soft tissue was excisionally debrided with a #15 blade down and including the level of muscle and fascia. No purulent drainage appreciated. The surgical site was then irrigated with copious amounts of normal saline. The wound edges were undermined using sharp dissection in order to allow for closure of the wound with minimal tension. Attention was then directed towards the plantar surgical incision. The retention suture was removed. All non-viable soft tissue was excisionally debrided with a #15 blade down to and including the level of muscle and fascia. No purulent drainage appreciated. The surgical site was then irrigated with copious amounts of normal saline. The wound edges were undermined using sharp dissection in order to allow for closure of the wound with minimal tension. Next, a sequential layered closure was performed using 2-0 monocryl and 3-0 prolene to both wounds. The incision was able to be closed in its entirety with minimal skin tension noted. Dressings consisting of adaptic, 4 x 4s, kerlix and an ACE bandage were applied. The patient tolerated the above procedure and anesthesia well without complications. The patient was transported from the operating room to the PACU with vital signs stable and vascular status intact. Patient was transferred to inpatient floors after post op monitoring. Patient to follow-up with Dr. Lili Lyn within 1 week of discharge.      Electronically signed by Brad Alcocer DPM on 6/14/2021 at 7:39 AM

## 2021-06-15 ENCOUNTER — OFFICE VISIT (OUTPATIENT)
Dept: PODIATRY | Age: 66
End: 2021-06-15

## 2021-06-15 VITALS — WEIGHT: 236 LBS | RESPIRATION RATE: 18 BRPM | BODY MASS INDEX: 33.79 KG/M2 | HEIGHT: 70 IN

## 2021-06-15 DIAGNOSIS — E11.51 TYPE II DIABETES MELLITUS WITH PERIPHERAL CIRCULATORY DISORDER (HCC): ICD-10-CM

## 2021-06-15 DIAGNOSIS — Z98.890 POST-OPERATIVE STATE: Primary | ICD-10-CM

## 2021-06-15 PROCEDURE — 99024 POSTOP FOLLOW-UP VISIT: CPT | Performed by: PODIATRIST

## 2021-06-15 RX ORDER — HYDROCODONE BITARTRATE AND ACETAMINOPHEN 10; 325 MG/1; MG/1
1 TABLET ORAL EVERY 6 HOURS PRN
Qty: 28 TABLET | Refills: 0 | Status: SHIPPED | OUTPATIENT
Start: 2021-06-15 | End: 2021-06-22

## 2021-06-15 RX ORDER — CIPROFLOXACIN 500 MG/1
TABLET, FILM COATED ORAL
COMMUNITY
Start: 2021-05-15 | End: 2021-06-30 | Stop reason: ALTCHOICE

## 2021-06-22 ENCOUNTER — OFFICE VISIT (OUTPATIENT)
Dept: PODIATRY | Age: 66
End: 2021-06-22

## 2021-06-22 VITALS — RESPIRATION RATE: 16 BRPM | BODY MASS INDEX: 33.04 KG/M2 | WEIGHT: 236 LBS | HEIGHT: 71 IN

## 2021-06-22 DIAGNOSIS — E11.51 TYPE II DIABETES MELLITUS WITH PERIPHERAL CIRCULATORY DISORDER (HCC): ICD-10-CM

## 2021-06-22 DIAGNOSIS — Z98.890 POST-OPERATIVE STATE: Primary | ICD-10-CM

## 2021-06-22 PROCEDURE — 99024 POSTOP FOLLOW-UP VISIT: CPT | Performed by: PODIATRIST

## 2021-06-22 NOTE — PROGRESS NOTES
[facility]YSICIANS  [facility]  [address] [place] [address]  Dept: [phone]  Dept Fax: [phone]    POST-OP PROGRESS NOTE  Date of patient's visit: 6/22/2021  Patient's Name:  Daphnie Barillas YOB: 1955            Patient Care Team:  Donis Power MD as PCP - General (Family Medicine)  Guerline Dueñas MD as Consulting Physician (Infectious Diseases)  Marleni Madrid DPM as Physician (Podiatry)  Marleni Madrid DPM as Physician (Podiatry)        Chief Complaint   Patient presents with    Post-Op Check     2 wk post op    Diabetes    Foot Pain         Subjective: Daphnie Barillas is a 60 y.o. male who presents to the office today 2week(s)  S/P I and d with [facility] for correction of right foot abscess after pt stepped on a nail and walked around for a week  Problem List Items Addressed This Visit     None      Visit Diagnoses     Post-operative state    -  Primary    Type II diabetes mellitus with peripheral circulatory disorder ([facility] [address]          . Patient relates pain is Present and improved. Pain is rated 2 out of 10 and is described as mild. Currently denies F/C/N/V. Is patient taking pain medications as prescribed and is controlling shellie    Physical Examination:  Incision is coapted, sutures/steri-strips are intact. Minimal bleeding post operatively. Edema present. No erythema. No Pus. Operative correction is satisfactory. Assessment: Daphnie Barillas is status post as above  Normal post operative course. Doing well          ICD-10-CM    1. Post-operative state  Z98.890    2. Type II diabetes mellitus with peripheral circulatory disorder (HCC)  E11.51          Plan:  Patient examined and evaluated. Current condition and treatment options discussed in detail. Advised pt to his condition. Sutures are not ready to come out yet and will remove nextw [place]. Pt is in an orthowedge shoe and its causing midfoot plantar pain.   Pt to go back into normal surgical shoe that's flat. Verbal and written instructions given to patient. Orders: No orders of the defined types were placed in this encounter. Contact office with any questions/problems/concerns.   RTC in 1 week(s) for suture remvoal.     Electronically signed by Edouard Hammond DPM on 6/22/2021 at 1:45 PM  6/22/2021

## 2021-06-29 ENCOUNTER — OFFICE VISIT (OUTPATIENT)
Dept: PODIATRY | Age: 66
End: 2021-06-29

## 2021-06-29 VITALS — BODY MASS INDEX: 33.04 KG/M2 | RESPIRATION RATE: 18 BRPM | WEIGHT: 236 LBS | HEIGHT: 71 IN

## 2021-06-29 DIAGNOSIS — Z98.890 POST-OPERATIVE STATE: Primary | ICD-10-CM

## 2021-06-29 DIAGNOSIS — E11.51 TYPE II DIABETES MELLITUS WITH PERIPHERAL CIRCULATORY DISORDER (HCC): ICD-10-CM

## 2021-06-29 PROCEDURE — 99024 POSTOP FOLLOW-UP VISIT: CPT | Performed by: PODIATRIST

## 2021-06-29 NOTE — PROGRESS NOTES
[facility] PHYSICIANS  [facility]  [address] [place] [address]  Dept: [phone]  Dept Fax: [phone]    POST-OP PROGRESS NOTE  Date of patient's visit: 6/29/2021  Patient's Name:  Lili Michel YOB: 1955            Patient Care Team:  Sweta Georges MD as PCP - General (Family Medicine)  Althea Tolbert MD as Consulting Physician (Infectious Diseases)  Manuel Florez DPM as Physician (Podiatry)  Manuel Florez DPM as Physician (Podiatry)  Gavi Turk MD as Consulting Physician (Infectious Diseases)        Chief Complaint   Patient presents with    Post-Op Check    Diabetes       Pt's primary care physician is Sweta Georges MD last seen 06/25/2021     Subjective: Lili Michel is a 60 y.o. male who presents to the office today 3week(s)  S/P I and d right foot for correction of abscess and cellulitis  Problem List Items Addressed This Visit     None      Visit Diagnoses     Post-operative state    -  Primary    Type II diabetes mellitus with peripheral circulatory disorder ([facility] [address]          . Patient relates pain is Present and improved. Pain is rated 2 out of 10 and is described as constant, severe. Currently denies F/C/N/V. Is patient taking pain medications as prescribed and is controlling pain    Physical Examination:  Incision is coapted, sutures/steri-strips are intact. Minimal bleeding post operatively. Edema present. No erythema. No Pus. Operative correction is satisfactory. Assessment: Lili Michel is status post as above  Normal post operative course. Doing well          ICD-10-CM    1. Post-operative state  Z98.890    2. Type II diabetes mellitus with peripheral circulatory disorder (HCC)  E11.51          Plan:  Patient examined and evaluated. Current condition and treatment options discussed in detail.   Advised pt to his condition      Patient presents for suture removal. The wound is well healed without signs of infection. The sutures are removed. Wound care and activity instructions given. steri strips applied today . Verbal and written instructions given to patient. Orders: No orders of the defined types were placed in this encounter. Contact office with any questions/problems/concerns. RTC in 2week(s).      Electronically signed by Paulino Mcgowan DPM on 6/29/2021 at 8:49 AM  6/29/2021

## 2021-06-30 ENCOUNTER — OFFICE VISIT (OUTPATIENT)
Dept: INFECTIOUS DISEASES | Age: 66
End: 2021-06-30
Payer: MEDICARE

## 2021-06-30 VITALS
WEIGHT: 240 LBS | HEART RATE: 88 BPM | BODY MASS INDEX: 33.47 KG/M2 | DIASTOLIC BLOOD PRESSURE: 77 MMHG | SYSTOLIC BLOOD PRESSURE: 134 MMHG | TEMPERATURE: 96.6 F | OXYGEN SATURATION: 97 %

## 2021-06-30 DIAGNOSIS — S91.331S: Primary | ICD-10-CM

## 2021-06-30 DIAGNOSIS — M86.371 CHRONIC MULTIFOCAL OSTEOMYELITIS OF RIGHT FOOT (HCC): ICD-10-CM

## 2021-06-30 DIAGNOSIS — L08.9: Primary | ICD-10-CM

## 2021-06-30 DIAGNOSIS — Z98.890 STATUS POST INCISION AND DRAINAGE: ICD-10-CM

## 2021-06-30 PROCEDURE — 99213 OFFICE O/P EST LOW 20 MIN: CPT | Performed by: INTERNAL MEDICINE

## 2021-06-30 PROCEDURE — 4004F PT TOBACCO SCREEN RCVD TLK: CPT | Performed by: INTERNAL MEDICINE

## 2021-06-30 PROCEDURE — 1111F DSCHRG MED/CURRENT MED MERGE: CPT | Performed by: INTERNAL MEDICINE

## 2021-06-30 PROCEDURE — G8417 CALC BMI ABV UP PARAM F/U: HCPCS | Performed by: INTERNAL MEDICINE

## 2021-06-30 PROCEDURE — 3017F COLORECTAL CA SCREEN DOC REV: CPT | Performed by: INTERNAL MEDICINE

## 2021-06-30 PROCEDURE — G8427 DOCREV CUR MEDS BY ELIG CLIN: HCPCS | Performed by: INTERNAL MEDICINE

## 2021-06-30 ASSESSMENT — ENCOUNTER SYMPTOMS
GASTROINTESTINAL NEGATIVE: 1
ALLERGIC/IMMUNOLOGIC NEGATIVE: 1
RESPIRATORY NEGATIVE: 1

## 2021-06-30 NOTE — PROGRESS NOTES
[facility]  Office Progress Note  Today's Date and Time: 6/30/2021, 11:04 AM    Impression:     1. Puncture wound of right foot excluding toes with infection, sequela    2. Chronic multifocal osteomyelitis of right foot (HCC)    3. Status post incision and drainage         Recommendations   · The patient will complete the intravenous antimicrobial therapy with cefazolin on July 4, 2021. · The right foot does not show any changes of residual infection at this time and I would recommend that he stay off antimicrobial therapy. · The patient is concerned about not being on any antimicrobial therapy. · I told him we will continue to follow his progress once antimicrobial therapy is complete    I have ordered the following medications/ labs:  No orders of the defined types were placed in this encounter. No orders of the defined types were placed in this encounter. Chief complaint/reason for consultation:     Chief Complaint   Patient presents with    Follow-up     here for follow up, no issues        History of Present Illness:   Harriet Nageotte is a 60y.o.-year-old male who I am seeing in follow-up after recent hospital stay at [facility] and [facility]. The patient does have a history of a puncture wound to the right foot and he has underlying Charcot arthropathy related to diabetic neuropathy. The patient was found to have a right foot infection related to the puncture wound and an MRI of the foot showed an abscess on the medial aspect of the foot. The patient was taken to the operating room 6/8/2021 had incision and drainage with multiple incisions in the right foot and operative cultures grew out MSSA  The patient was discharged on intravenous antimicrobial therapy with cefazolin through July 4, 2021 and has been doing local wound care.     The patient has been tolerating antibiotic therapy well at home does report having difficulty with the dose that is at 10 PM as he typically is sleeping at night. He does not report any subjective fevers or chills. He is tolerating the antimicrobial therapy well with no rash or diarrhea. I have personally reviewedthe past medical history, medications, social history, and I have updated the database accordingly. Past Medical History:     Past Medical History:   Diagnosis Date    Abscess of right foot 8/4/2018    Acquired hammer toe deformity of lesser toe of right foot     ALEJANDRO (acute kidney injury) ([address] 8/5/2018    Cellulitis 4/24/2018    Cellulitis of left foot 4/24/2018    Cellulitis of right foot     and abscess    Charcot foot due to diabetes mellitus ([address] 2014    Right foot     Chest pain at rest 8/4/2018    Chronic multifocal osteomyelitis of right foot ([address]     Diabetic polyneuropathy associated with type 2 diabetes mellitus ([address]     Essential hypertension     Fractures, multiple 07/21/2014    Right foot fractures     Hyperlipidemia     Hypertension     Leukocytosis     Neuropathy     diabetic with charcot affecting the right foot    Pain in right foot     redness and swelling    Pneumonia 2009    Right foot infection     Right foot pain     Tobacco abuse 4/24/2018    Type II or unspecified type diabetes mellitus without mention of complication, not stated as uncontrolled     Vertigo     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 stepped on sharp object. Covered by dressing.  Wound, open     Right posterior -Diabetic Ulcer     Medications:     Current Outpatient Medications   Medication Sig Dispense Refill    silver sulfADIAZINE (SILVADENE) 1 % cream Apply topically daily.  50 g 1    ceFAZolin (ANCEF) infusion Infuse 2,000 mg intravenously every 8 hours for 24 days Through July 4, 2021 compound per protocol 144 g 0    meloxicam (MOBIC) 15 MG tablet Take 15 mg by mouth nightly       LANTUS SOLOSTAR 100 UNIT/ML injection pen Inject 10 Units into the skin nightly       albuterol sulfate  (90 Base) MCG/ACT inhaler Inhale 2 puffs into the lungs every 6 hours as needed       JANUVIA 100 MG tablet Take 100 mg by mouth daily       Misc. Devices MISC 1 PAIR OF DIABETIC SHOES (1 LEFT/ 1 RIGHT)  1-3 PAIRS OF INSERTS (LEFT/ RIGHT) 2 each 0    cetirizine (ZYRTEC) 10 MG tablet Take 10 mg by mouth nightly       lisinopril (PRINIVIL;ZESTRIL) 10 MG tablet Take 10 mg by mouth daily      simvastatin (ZOCOR) 40 MG tablet Take 40 mg by mouth nightly       glipiZIDE (GLUCOTROL) 10 MG tablet Take 20 mg by mouth 2 times daily (before meals) Takes 2 tabs (=20mg) BID      aspirin 81 MG tablet Take 81 mg by mouth daily      gabapentin (NEURONTIN) 300 MG capsule Take 900 mg by mouth 3 times daily. Take 3 caps (=900mg) 3 times a day       No current facility-administered medications for this visit. Allergies:   Morphine, Percocet [oxycodone-acetaminophen], and Dye [iodides]     Review of Systems:   Review of Systems   Constitutional: Negative. Respiratory: Negative. Cardiovascular: Negative. Gastrointestinal: Negative. Genitourinary: Negative. Musculoskeletal: Negative. Allergic/Immunologic: Negative. Neurological: Negative. Physical Examination :   /77   Pulse 88   Temp 96.6 °F (35.9 °C)   Wt 240 lb (108.9 kg)   SpO2 97%   BMI 33.47 kg/m²     Physical Exam  Constitutional:       Appearance: He is well-developed. HENT:      Head: Normocephalic and atraumatic. Cardiovascular:      Rate and Rhythm: Normal rate. Heart sounds: Normal heart sounds. No friction rub. No gallop. Pulmonary:      Effort: Pulmonary effort is normal.      Breath sounds: Normal breath sounds. No wheezing. Abdominal:      General: Bowel sounds are normal.      Palpations: Abdomen is soft. There is no mass. Tenderness: There is no abdominal tenderness.    Musculoskeletal: Cervical back: Normal range of motion and neck supple. Comments: The right foot does have a Charcot deformity but to the prior abscess has resolved the Steri-Strips in place. There is some mild hyperpigmentation of the skin but not much cellulitic changes noted. Lymphadenopathy:      Cervical: No cervical adenopathy. Skin:     General: Skin is warm and dry. Neurological:      Mental Status: He is alert and oriented to person, place, and time. Laboratory studies :  Medical Decision Making:   I have independently reviewed the following labs:  CBC with Differential:  Lab Results   Component Value Date    WBC 8.5 06/07/2021    WBC 13.0 06/06/2021    HGB 12.3 06/07/2021    HGB 13.6 06/06/2021    HCT 37.7 06/07/2021    HCT 40.6 06/06/2021     06/07/2021     06/06/2021    LYMPHOPCT 26 06/07/2021    LYMPHOPCT 17 06/06/2021    MONOPCT 6 06/07/2021    MONOPCT 5 06/06/2021       BMP:  Lab Results   Component Value Date     06/13/2021     06/12/2021    K 4.8 06/13/2021    K 4.8 06/12/2021     06/13/2021     06/12/2021    CO2 23 06/13/2021    CO2 22 06/12/2021    BUN 22 06/13/2021    BUN 20 06/12/2021    CREATININE 1.73 06/13/2021    CREATININE 1.73 06/12/2021       Hepatic Function Panel:   Lab Results   Component Value Date    PROT 7.5 01/09/2017    PROT 6.8 12/30/2014    LABALBU 4.5 01/09/2017    LABALBU 4.0 12/30/2014    BILITOT 0.39 01/09/2017    BILITOT 0.31 12/30/2014    ALKPHOS 89 01/09/2017    ALKPHOS 92 12/30/2014    ALT 15 01/09/2017    ALT 31 12/30/2014    AST 16 01/09/2017    AST 24 12/30/2014       Lab Results   Component Value Date    CRP 33.7 (H) 06/10/2021     Lab Results   Component Value Date    SEDRATE 37 (H) 06/06/2021         Thank you for allowing us to participate in the care of this patient. Pleasecall with questions.     Dhruv Lomas MD  Perfect [employer] messaging: [phone]    This note is created with the assistance of a speech

## 2021-07-06 ENCOUNTER — OFFICE VISIT (OUTPATIENT)
Dept: PODIATRY | Age: 66
End: 2021-07-06

## 2021-07-06 VITALS — RESPIRATION RATE: 16 BRPM | HEIGHT: 71 IN | WEIGHT: 240 LBS | BODY MASS INDEX: 33.6 KG/M2

## 2021-07-06 DIAGNOSIS — Z98.890 POST-OPERATIVE STATE: Primary | ICD-10-CM

## 2021-07-06 DIAGNOSIS — E11.51 TYPE II DIABETES MELLITUS WITH PERIPHERAL CIRCULATORY DISORDER (HCC): ICD-10-CM

## 2021-07-06 PROCEDURE — 99024 POSTOP FOLLOW-UP VISIT: CPT | Performed by: PODIATRIST

## 2021-07-06 RX ORDER — DOXYCYCLINE HYCLATE 100 MG
100 TABLET ORAL 2 TIMES DAILY
Qty: 28 TABLET | Refills: 0 | Status: SHIPPED | OUTPATIENT
Start: 2021-07-06 | End: 2021-07-20

## 2021-07-06 RX ORDER — HYDROCODONE BITARTRATE AND ACETAMINOPHEN 10; 325 MG/1; MG/1
1 TABLET ORAL EVERY 6 HOURS PRN
Qty: 28 TABLET | Refills: 0 | Status: SHIPPED | OUTPATIENT
Start: 2021-07-06 | End: 2021-07-13

## 2021-07-06 NOTE — PROGRESS NOTES
[facility] PHYSICIANS  [facility]  [address] [place] [address]  Dept: [phone]  Dept Fax: [phone]    POST-OP PROGRESS NOTE  Date of patient's visit: 7/6/2021  Patient's Name:  Brent Luna YOB: 1955            Patient Care Team:  Tatum Bro MD as PCP - General (Family Medicine)  Catalino Moreno MD as Consulting Physician (Infectious Diseases)  Enid Lubin DPM as Physician (Podiatry)  Enid Lubin DPM as Physician (Podiatry)  Lawyer Gaudencio MD as Consulting Physician (Infectious Diseases)        Chief Complaint   Patient presents with    Post-Op Check     4 wk post op    Wound Check    Diabetes       Pt's primary care physician is Tatum Bro MD last seen 06/25/2021     Subjective: Brent Luna is a 60 y.o. male who presents to the office today 4week(s)  S/P I and D of right foot  for correction of abscess right foot  Problem List Items Addressed This Visit     None      Visit Diagnoses     Post-operative state    -  Primary    Type II diabetes mellitus with peripheral circulatory disorder ([facility] [address]          . Patient relates pain is Present and improved. Pain is rated 1 out of 10 and is described as intermittent. Currently denies F/C/N/V. Is patient taking pain medications as prescribed and is controlling pain    Pt relates to walking on his foot but is not cutting his grass right now    Physical Examination:  Slight dehissence to the plantar incision  Minimal bleeding post operatively. Edema present. No erythema. No Pus. Operative correction is satisfactory. Radiographs:       Assessment: Brent Luna is status post as above  Normal post operative course. Doing well          ICD-10-CM    1. Post-operative state  Z98.890    2. Type II diabetes mellitus with peripheral circulatory disorder (HCC)  E11.51          Plan:  Patient examined and evaluated.   Current condition and treatment options discussed in detail. Advised pt to his condtion    The remaining sutures that are not holding the skin together were removed today . Steri strips placed to the incison sites. Pt is to keep off of his foot to allow the plantar incision to heal.  Verbal and written instructions given to patient. Orders: No orders of the defined types were placed in this encounter. Contact office with any questions/problems/concerns. RTC in 1week(s)    rx provided for doxycycline to be taken as directed  .      Electronically signed by Aaliyah Bazan DPM on 7/6/2021 at 8:44 AM  7/6/2021

## 2021-07-20 ENCOUNTER — OFFICE VISIT (OUTPATIENT)
Dept: PODIATRY | Age: 66
End: 2021-07-20

## 2021-07-20 VITALS — BODY MASS INDEX: 33.6 KG/M2 | RESPIRATION RATE: 18 BRPM | HEIGHT: 71 IN | WEIGHT: 240 LBS

## 2021-07-20 DIAGNOSIS — Z98.890 POST-OPERATIVE STATE: Primary | ICD-10-CM

## 2021-07-20 DIAGNOSIS — E11.51 TYPE II DIABETES MELLITUS WITH PERIPHERAL CIRCULATORY DISORDER (HCC): ICD-10-CM

## 2021-07-20 PROCEDURE — 99024 POSTOP FOLLOW-UP VISIT: CPT | Performed by: PODIATRIST

## 2021-07-20 NOTE — PROGRESS NOTES
[facility] PHYSICIANS  [facility]  [address] [place] [address]  Dept: [phone]  Dept Fax: [phone]    POST-OP PROGRESS NOTE  Date of patient's visit: 7/20/2021  Patient's Name:  Martha Damico YOB: 1955            Patient Care Team:  Jagjit Walton MD as PCP - General (Family Medicine)  Bro Monsalve MD as Consulting Physician (Infectious Diseases)  Gosia Covarrubias DPM as Physician (Podiatry)  Gosia Covarrubias DPM as Physician (Podiatry)  Jorge Mishra MD as Consulting Physician (Infectious Diseases)        Chief Complaint   Patient presents with    Post-Op Check     6 wk post op    Diabetes       Pt's primary care physician is Jagjit Walton MD last seen 06/25/2021     Subjective: Martha Damico is a 60 y.o. male who presents to the office today 6week(s)  S/P I and D multiple inciisons  for correction of infected secondary to stepping on a nail  Problem List Items Addressed This Visit     None      Visit Diagnoses     Post-operative state    -  Primary    Type II diabetes mellitus with peripheral circulatory disorder ([facility] [address]          . Patient relates pain is Present and improved. Pain is rated 2 out of 10 and is described as constant, severe. Currently denies F/C/N/V. Is patient taking pain medications as prescribed and is controlling pain    Physical Examination:  Slight dehissence to the plantar medial incision but superficila. No probe to bone. No undermining  No erythema minimial edema and no SOI      Assessment: Martha Damico is status post as above  Normal post operative course. Doing well          ICD-10-CM    1. Post-operative state  Z98.890    2. Type II diabetes mellitus with peripheral circulatory disorder (HCC)  E11.51          Plan:  Patient examined and evaluated. Current condition and treatment options discussed in detail.   Advised pt to continue to use the flat surgical shoe and change dressings daily with bactroban and DSD. Verbal and written instructions given to patient. Orders: No orders of the defined types were placed in this encounter. Contact office with any questions/problems/concerns. RTC in 10day(s).      Electronically signed by Becky Magana DPM on 7/20/2021 at 9:27 AM  7/20/2021

## 2021-08-03 ENCOUNTER — OFFICE VISIT (OUTPATIENT)
Dept: PODIATRY | Age: 66
End: 2021-08-03
Payer: MEDICARE

## 2021-08-03 VITALS — HEIGHT: 71 IN | BODY MASS INDEX: 33.6 KG/M2 | RESPIRATION RATE: 18 BRPM | WEIGHT: 240 LBS

## 2021-08-03 DIAGNOSIS — M86.9 DIABETIC FOOT ULCER WITH OSTEOMYELITIS (HCC): ICD-10-CM

## 2021-08-03 DIAGNOSIS — L97.512 RIGHT FOOT ULCER, WITH FAT LAYER EXPOSED (HCC): Primary | ICD-10-CM

## 2021-08-03 DIAGNOSIS — E11.69 DIABETIC FOOT ULCER WITH OSTEOMYELITIS (HCC): ICD-10-CM

## 2021-08-03 DIAGNOSIS — Z98.890 POST-OPERATIVE STATE: ICD-10-CM

## 2021-08-03 DIAGNOSIS — L97.509 DIABETIC FOOT ULCER WITH OSTEOMYELITIS (HCC): ICD-10-CM

## 2021-08-03 DIAGNOSIS — M79.671 PAIN IN RIGHT FOOT: ICD-10-CM

## 2021-08-03 DIAGNOSIS — E11.51 TYPE II DIABETES MELLITUS WITH PERIPHERAL CIRCULATORY DISORDER (HCC): ICD-10-CM

## 2021-08-03 DIAGNOSIS — E11.621 DIABETIC FOOT ULCER WITH OSTEOMYELITIS (HCC): ICD-10-CM

## 2021-08-03 PROCEDURE — 99024 POSTOP FOLLOW-UP VISIT: CPT | Performed by: PODIATRIST

## 2021-08-03 PROCEDURE — 11042 DBRDMT SUBQ TIS 1ST 20SQCM/<: CPT | Performed by: PODIATRIST

## 2021-08-03 RX ORDER — CYCLOBENZAPRINE HCL 5 MG
5 TABLET ORAL 2 TIMES DAILY PRN
Qty: 20 TABLET | Refills: 0 | Status: SHIPPED | OUTPATIENT
Start: 2021-08-03 | End: 2021-08-13

## 2021-08-03 NOTE — PROGRESS NOTES
[facility] PHYSICIANS  [facility]  [address] [place] [address]  Dept: [phone]  Dept Fax: [phone]    POST-OP PROGRESS NOTE  Date of patient's visit: 8/3/2021  Patient's Name:  Martha Stewart YOB: 1955            Patient Care Team:  Zoey Velasquez MD as PCP - General (Family Medicine)  Isaac Walters MD as Consulting Physician (Infectious Diseases)  Tracie Simon DPM as Physician (Podiatry)  Tracie Simon DPM as Physician (Podiatry)  Edward Liao MD as Consulting Physician (Infectious Diseases)        Chief Complaint   Patient presents with    Post-Op Check    Diabetes    Wound Check       Pt's primary care physician is Zoey Velasquez MD last seen 07/22/2021     Subjective: Martha Stewart is a 73 y.o. male who presents to the office today 8week(s)  S/P I and do for correction of abscess secondary to stepping on an old nail that went thru his shoe and into the foot  Problem List Items Addressed This Visit     None      Visit Diagnoses     Post-operative state    -  Primary    Type II diabetes mellitus with peripheral circulatory disorder (HCC)        Pain in right foot          . Patient relates pain is Present and improved. Pain is rated 2 out of 10 and is described as constant, severe. Currently denies F/C/N/V. Is patient taking pain medications as prescribed and is controlling pain    Physical Examination:  Wound #:   1    diabetic foot ulcer   right foot    Wound measurements:  #1  5 mm by 8 mm  by   2 mm        Wound Depth: subcutaneous.     Drainage:    serosanguinous Type:serosanguinous  Wound Bed status:   Granulation Tissue: 80%   Necrotic 20%  Type:   Epithelialization 0%   Hypergranular 0%   Periwound hyperkeratosis:  present  Erythema absent    Odor:no  Maceration:no  Undermining/tract/tunneling:no  Culture taken:   no             Assessment: Martha Stewart is status post as above  Normal post operative course. Doing well          ICD-10-CM    1. Post-operative state  Z98.890    2. Type II diabetes mellitus with peripheral circulatory disorder (HCC)  E11.51    3. Pain in right foot  M79.671          Plan:  Patient examined and evaluated. Current condition and treatment options discussed in detail. Advised pt to his condtion      With the patient in supine position, Lidocaine soaked gauze was applied per physician order at beginning of wound evaluation. It was subsequently removed. Using a #15 blade scalpel and Pick-up, the wound was debrided down to and included the removal of the following tissue:     [] epidermis, [] dermis, [x]  subcutaneous tissue,  [] muscle/fascia tissue,   and/or  [] bone     Also debrided was all  [x] fibrin, [x] biofilm, [x] slough,  [x] necrotic,  [] hypergranulation tissue, and/or  [] hyperkeratotic tissue. Wound was copiously irrigated with normal saline solution. Bleeding:  Minimal.  Hemostasis:  pressure     100%  of wound debrided. Patient tolerated procedure well. Pain 0 / 10 during procedure and pain 0 / 10 after procedure. Discussed appropriate home care of this wound. Wound redressed. Patient instructions were given. Dispensed dressing supplies and instructions on their use. .  Verbal and written instructions given to patient. Orders: No orders of the defined types were placed in this encounter. Contact office with any questions/problems/concerns. RTC in 2week(s)    .      Electronically signed by Chiara Reyna DPM on 8/3/2021 at 8:30 AM  8/3/2021

## 2021-08-11 ENCOUNTER — HOSPITAL ENCOUNTER (OUTPATIENT)
Dept: MRI IMAGING | Age: 66
Discharge: HOME | End: 2021-08-13
Payer: MEDICARE

## 2021-08-11 DIAGNOSIS — M54.16 LUMBAR RADICULAR PAIN: ICD-10-CM

## 2021-08-11 PROCEDURE — 72148 MRI LUMBAR SPINE W/O DYE: CPT

## 2021-08-24 ENCOUNTER — OFFICE VISIT (OUTPATIENT)
Dept: PODIATRY | Age: 66
End: 2021-08-24
Payer: MEDICARE

## 2021-08-24 VITALS — HEIGHT: 71 IN | BODY MASS INDEX: 33.6 KG/M2 | WEIGHT: 240 LBS

## 2021-08-24 DIAGNOSIS — M86.9 DIABETIC FOOT ULCER WITH OSTEOMYELITIS (HCC): ICD-10-CM

## 2021-08-24 DIAGNOSIS — E11.621 DIABETIC FOOT ULCER WITH OSTEOMYELITIS (HCC): ICD-10-CM

## 2021-08-24 DIAGNOSIS — L97.512 RIGHT FOOT ULCER, WITH FAT LAYER EXPOSED (HCC): ICD-10-CM

## 2021-08-24 DIAGNOSIS — M79.671 PAIN IN RIGHT FOOT: ICD-10-CM

## 2021-08-24 DIAGNOSIS — L97.509 DIABETIC FOOT ULCER WITH OSTEOMYELITIS (HCC): ICD-10-CM

## 2021-08-24 DIAGNOSIS — Z98.890 POST-OPERATIVE STATE: Primary | ICD-10-CM

## 2021-08-24 DIAGNOSIS — E11.69 DIABETIC FOOT ULCER WITH OSTEOMYELITIS (HCC): ICD-10-CM

## 2021-08-24 PROCEDURE — 11042 DBRDMT SUBQ TIS 1ST 20SQCM/<: CPT | Performed by: PODIATRIST

## 2021-08-24 PROCEDURE — 99024 POSTOP FOLLOW-UP VISIT: CPT | Performed by: PODIATRIST

## 2021-08-24 NOTE — PROGRESS NOTES
[facility] PHYSICIANS  [facility]  [address] [place] [address]  Dept: [phone]  Dept Fax: [phone]    POST-OP PROGRESS NOTE  Date of patient's visit: 8/24/2021  Patient's Name:  Rosanna Harper YOB: 1955            Patient Care Team:  Alicia Samuel MD as PCP - General (Family Medicine)  Frances Forbes MD as Consulting Physician (Infectious Diseases)  Medardo Estevez DPM as Physician (Podiatry)  Medardo Estevez DPM as Physician (Podiatry)  Mary Jo Bruce MD as Consulting Physician (Infectious Diseases)        Chief Complaint   Patient presents with    Post-Op Check     rtc 10 weeks- right foot- I and D multiple inciisons         Pt's primary care physician is Alicia Samuel MD last seen 8/19/21     Subjective: Rosanna Harper is a 73 y.o. male who presents to the office today 10week(s)  S/P  right foot- I and D multiple inciisons  for correction of foot infection. Problem List Items Addressed This Visit     None      Patient relates pain is Present and improved. Pain is rated 7 out of 10 and is described as constant, moderate, severe. Currently denies F/C/N/V. Is patient taking pain medications as prescribed and is controlling pain no    Physical Examination:  Wound #:   1    diabetic foot ulcer and dehisced surgical wound   right plantar medial foot    Wound measurements:  #1  8 mm by 3 mm  by   2 mm        Wound Depth: subcutaneous. Drainage:    serosanguinous Type:serosanguinous  Wound Bed status:   Granulation Tissue: 100%   Necrotic 0%   Epithelialization 0%   Hypergranular 0%   Periwound hyperkeratosis:  absent  Erythema absent    Odor:no  Maceration:no  Undermining/tract/tunneling:no  Culture taken:   no                   Assessment: Rosanna Harper is status post as above  Normal post operative course. Doing well         Diagnosis Orders   1. Post-operative state  SD DEBRIDEMENT, SKIN, SUB-Q TISSUE,=<20 SQ CM   2. Pain in right foot  W[place] DEBRIDEMENT, SKIN, SUB-Q TISSUE,=<20 SQ CM   3. Right foot ulcer, with fat layer exposed ([address]  W[place] DEBRIDEMENT, SKIN, SUB-Q TISSUE,=<20 SQ CM   4. Diabetic foot ulcer with osteomyelitis ([address]  W[place] DEBRIDEMENT, SKIN, SUB-Q TISSUE,=<20 SQ CM           Plan:  Patient examined and evaluated. Current condition and treatment options discussed in detail. Advised pt to his conditon      With the patient in supine position, Lidocaine soaked gauze was applied per physician order at beginning of wound evaluation. It was subsequently removed. Using a #15 blade scalpel and Pick-up, the wound was debrided down to and included the removal of the following tissue:     [] epidermis, [] dermis, [x]  subcutaneous tissue,  [] muscle/fascia tissue,   and/or  [] bone     Also debrided was all  [] fibrin, [x] biofilm, [] slough,  [x] necrotic,  [] hypergranulation tissue, and/or  [] hyperkeratotic tissue. Wound was copiously irrigated with normal saline solution. Bleeding:  Minimal.  Hemostasis:  pressure     100%  of wound debrided. Patient tolerated procedure well. Pain 0 / 10 during procedure and pain 0 / 10 after procedure. Discussed appropriate home care of this wound. Wound redressed. Patient instructions were given. Dispensed dressing supplies and instructions on their use. .  Verbal and written instructions given to patient. Orders: No orders of the defined types were placed in this encounter. Contact office with any questions/problems/concerns. RTC in 2week(s).      Electronically signed by Josiah Garcia DPM on 8/24/2021 at 1:18 PM  8/24/2021

## 2021-08-24 NOTE — PATIENT INSTRUCTIONS
Schedule a Vaccine  When you qualify to receive the vaccine, call the [facility]) COVID-19 Vaccination Hotline to schedule your appointment or to get additional information about the [facility]) locations which are offering the COVID-19 vaccine. To be 94% effective, it's important that you receive two doses of one of the COVID-19 vaccines. -If you are receiving the Al[employer] Peter vaccine, your second shot will be scheduled as close to 21 days after the first shot as possible. -If you are receiving the Moderna vaccine, your second shot will be scheduled as close to 28 days after the first shot as possible. [facility]) COVID-19 Vaccination Hotline: [phone]    Links to [facility]) website and [facility] website:    [facility] [employer]aHome Team [email or web address]    [email or web address]

## 2021-09-13 ENCOUNTER — APPOINTMENT (OUTPATIENT)
Dept: GENERAL RADIOLOGY | Age: 66
DRG: 988 | End: 2021-09-13
Payer: MEDICARE

## 2021-09-13 ENCOUNTER — HOSPITAL ENCOUNTER (INPATIENT)
Age: 66
LOS: 4 days | Discharge: HOME HEALTH | DRG: 988 | End: 2021-09-17
Attending: EMERGENCY MEDICINE | Admitting: INTERNAL MEDICINE
Payer: MEDICARE

## 2021-09-13 DIAGNOSIS — L97.513 ULCER OF RIGHT FOOT WITH NECROSIS OF MUSCLE (HCC): ICD-10-CM

## 2021-09-13 DIAGNOSIS — L03.115 CELLULITIS OF RIGHT FOOT: ICD-10-CM

## 2021-09-13 DIAGNOSIS — L97.518 ULCER OF RIGHT FOOT WITH OTHER SEVERITY (HCC): Primary | ICD-10-CM

## 2021-09-13 PROBLEM — E11.65 HYPERGLYCEMIA DUE TO DIABETES MELLITUS (HCC): Status: ACTIVE | Noted: 2021-09-13

## 2021-09-13 PROBLEM — Z79.4 TYPE 2 DIABETES MELLITUS TREATED WITH INSULIN (HCC): Status: ACTIVE | Noted: 2018-08-04

## 2021-09-13 PROBLEM — E11.9 TYPE 2 DIABETES MELLITUS TREATED WITH INSULIN (HCC): Status: ACTIVE | Noted: 2018-08-04

## 2021-09-13 PROBLEM — E11.8 DIABETIC FOOT (HCC): Status: ACTIVE | Noted: 2021-09-13

## 2021-09-13 LAB
ABSOLUTE EOS #: 0.18 K/UL (ref 0–0.44)
ABSOLUTE IMMATURE GRANULOCYTE: 0.08 K/UL (ref 0–0.3)
ABSOLUTE LYMPH #: 1.96 K/UL (ref 1.1–3.7)
ABSOLUTE MONO #: 0.74 K/UL (ref 0.1–1.2)
ANION GAP SERPL CALCULATED.3IONS-SCNC: 10 MMOL/L (ref 9–17)
BASOPHILS # BLD: 1 % (ref 0–2)
BASOPHILS ABSOLUTE: 0.05 K/UL (ref 0–0.2)
BUN BLDV-MCNC: 22 MG/DL (ref 8–23)
BUN/CREAT BLD: 15 (ref 9–20)
C-REACTIVE PROTEIN: 130.8 MG/L (ref 0–5)
CALCIUM SERPL-MCNC: 9.5 MG/DL (ref 8.6–10.4)
CHLORIDE BLD-SCNC: 95 MMOL/L (ref 98–107)
CO2: 24 MMOL/L (ref 20–31)
CREAT SERPL-MCNC: 1.49 MG/DL (ref 0.7–1.2)
DIFFERENTIAL TYPE: ABNORMAL
EOSINOPHILS RELATIVE PERCENT: 2 % (ref 1–4)
ESTIMATED AVERAGE GLUCOSE: 203 MG/DL
GFR AFRICAN AMERICAN: 57 ML/MIN
GFR NON-AFRICAN AMERICAN: 47 ML/MIN
GFR SERPL CREATININE-BSD FRML MDRD: ABNORMAL ML/MIN/{1.73_M2}
GFR SERPL CREATININE-BSD FRML MDRD: ABNORMAL ML/MIN/{1.73_M2}
GLUCOSE BLD-MCNC: 135 MG/DL (ref 75–110)
GLUCOSE BLD-MCNC: 355 MG/DL (ref 75–110)
GLUCOSE BLD-MCNC: 383 MG/DL (ref 70–99)
HBA1C MFR BLD: 8.7 % (ref 4–6)
HCT VFR BLD CALC: 38.1 % (ref 40.7–50.3)
HEMOGLOBIN: 12.9 G/DL (ref 13–17)
IMMATURE GRANULOCYTES: 1 %
LYMPHOCYTES # BLD: 18 % (ref 24–43)
MCH RBC QN AUTO: 30.2 PG (ref 25.2–33.5)
MCHC RBC AUTO-ENTMCNC: 33.9 G/DL (ref 28.4–34.8)
MCV RBC AUTO: 89.2 FL (ref 82.6–102.9)
MONOCYTES # BLD: 7 % (ref 3–12)
NRBC AUTOMATED: 0 PER 100 WBC
PDW BLD-RTO: 13.4 % (ref 11.8–14.4)
PLATELET # BLD: 200 K/UL (ref 138–453)
PLATELET ESTIMATE: ABNORMAL
PMV BLD AUTO: 9.7 FL (ref 8.1–13.5)
POTASSIUM SERPL-SCNC: 4.8 MMOL/L (ref 3.7–5.3)
RBC # BLD: 4.27 M/UL (ref 4.21–5.77)
RBC # BLD: ABNORMAL 10*6/UL
SEDIMENTATION RATE, ERYTHROCYTE: 37 MM (ref 0–20)
SEG NEUTROPHILS: 71 % (ref 36–65)
SEGMENTED NEUTROPHILS ABSOLUTE COUNT: 7.72 K/UL (ref 1.5–8.1)
SODIUM BLD-SCNC: 129 MMOL/L (ref 135–144)
WBC # BLD: 10.7 K/UL (ref 3.5–11.3)
WBC # BLD: ABNORMAL 10*3/UL

## 2021-09-13 PROCEDURE — 85025 COMPLETE CBC W/AUTO DIFF WBC: CPT

## 2021-09-13 PROCEDURE — 2580000003 HC RX 258: Performed by: NURSE PRACTITIONER

## 2021-09-13 PROCEDURE — 6370000000 HC RX 637 (ALT 250 FOR IP): Performed by: NURSE PRACTITIONER

## 2021-09-13 PROCEDURE — 87077 CULTURE AEROBIC IDENTIFY: CPT

## 2021-09-13 PROCEDURE — 82947 ASSAY GLUCOSE BLOOD QUANT: CPT

## 2021-09-13 PROCEDURE — 87186 SC STD MICRODIL/AGAR DIL: CPT

## 2021-09-13 PROCEDURE — 1200000000 HC SEMI PRIVATE

## 2021-09-13 PROCEDURE — 99222 1ST HOSP IP/OBS MODERATE 55: CPT | Performed by: NURSE PRACTITIONER

## 2021-09-13 PROCEDURE — 87040 BLOOD CULTURE FOR BACTERIA: CPT

## 2021-09-13 PROCEDURE — 86140 C-REACTIVE PROTEIN: CPT

## 2021-09-13 PROCEDURE — 85652 RBC SED RATE AUTOMATED: CPT

## 2021-09-13 PROCEDURE — 99255 IP/OBS CONSLTJ NEW/EST HI 80: CPT | Performed by: PODIATRIST

## 2021-09-13 PROCEDURE — 6360000002 HC RX W HCPCS: Performed by: NURSE PRACTITIONER

## 2021-09-13 PROCEDURE — 83036 HEMOGLOBIN GLYCOSYLATED A1C: CPT

## 2021-09-13 PROCEDURE — APPSS45 APP SPLIT SHARED TIME 31-45 MINUTES: Performed by: NURSE PRACTITIONER

## 2021-09-13 PROCEDURE — 99283 EMERGENCY DEPT VISIT LOW MDM: CPT

## 2021-09-13 PROCEDURE — 80048 BASIC METABOLIC PNL TOTAL CA: CPT

## 2021-09-13 PROCEDURE — 87070 CULTURE OTHR SPECIMN AEROBIC: CPT

## 2021-09-13 PROCEDURE — 86403 PARTICLE AGGLUT ANTBDY SCRN: CPT

## 2021-09-13 PROCEDURE — 73630 X-RAY EXAM OF FOOT: CPT

## 2021-09-13 PROCEDURE — 87205 SMEAR GRAM STAIN: CPT

## 2021-09-13 RX ORDER — ONDANSETRON 2 MG/ML
4 INJECTION INTRAMUSCULAR; INTRAVENOUS EVERY 6 HOURS PRN
Status: DISCONTINUED | OUTPATIENT
Start: 2021-09-13 | End: 2021-09-18 | Stop reason: HOSPADM

## 2021-09-13 RX ORDER — INSULIN GLARGINE 100 [IU]/ML
10 INJECTION, SOLUTION SUBCUTANEOUS NIGHTLY
Status: DISCONTINUED | OUTPATIENT
Start: 2021-09-13 | End: 2021-09-16

## 2021-09-13 RX ORDER — DEXTROSE MONOHYDRATE 50 MG/ML
100 INJECTION, SOLUTION INTRAVENOUS PRN
Status: DISCONTINUED | OUTPATIENT
Start: 2021-09-13 | End: 2021-09-18 | Stop reason: HOSPADM

## 2021-09-13 RX ORDER — DEXTROSE MONOHYDRATE 25 G/50ML
12.5 INJECTION, SOLUTION INTRAVENOUS PRN
Status: DISCONTINUED | OUTPATIENT
Start: 2021-09-13 | End: 2021-09-18 | Stop reason: HOSPADM

## 2021-09-13 RX ORDER — POTASSIUM CHLORIDE 7.45 MG/ML
10 INJECTION INTRAVENOUS PRN
Status: DISCONTINUED | OUTPATIENT
Start: 2021-09-13 | End: 2021-09-18 | Stop reason: HOSPADM

## 2021-09-13 RX ORDER — SODIUM CHLORIDE 9 MG/ML
25 INJECTION, SOLUTION INTRAVENOUS PRN
Status: DISCONTINUED | OUTPATIENT
Start: 2021-09-13 | End: 2021-09-18 | Stop reason: HOSPADM

## 2021-09-13 RX ORDER — ASPIRIN 81 MG/1
81 TABLET ORAL DAILY
Status: DISCONTINUED | OUTPATIENT
Start: 2021-09-13 | End: 2021-09-18 | Stop reason: HOSPADM

## 2021-09-13 RX ORDER — ATORVASTATIN CALCIUM 20 MG/1
20 TABLET, FILM COATED ORAL DAILY
Status: DISCONTINUED | OUTPATIENT
Start: 2021-09-13 | End: 2021-09-18 | Stop reason: HOSPADM

## 2021-09-13 RX ORDER — LISINOPRIL 10 MG/1
10 TABLET ORAL DAILY
Status: DISCONTINUED | OUTPATIENT
Start: 2021-09-13 | End: 2021-09-18 | Stop reason: HOSPADM

## 2021-09-13 RX ORDER — GABAPENTIN 300 MG/1
900 CAPSULE ORAL 3 TIMES DAILY
Status: DISCONTINUED | OUTPATIENT
Start: 2021-09-13 | End: 2021-09-18 | Stop reason: HOSPADM

## 2021-09-13 RX ORDER — HYDROCODONE BITARTRATE AND ACETAMINOPHEN 5; 325 MG/1; MG/1
1 TABLET ORAL EVERY 4 HOURS PRN
Status: DISCONTINUED | OUTPATIENT
Start: 2021-09-13 | End: 2021-09-18 | Stop reason: HOSPADM

## 2021-09-13 RX ORDER — ALOGLIPTIN 12.5 MG/1
12.5 TABLET, FILM COATED ORAL DAILY
Status: DISCONTINUED | OUTPATIENT
Start: 2021-09-13 | End: 2021-09-18 | Stop reason: HOSPADM

## 2021-09-13 RX ORDER — ONDANSETRON 4 MG/1
4 TABLET, ORALLY DISINTEGRATING ORAL EVERY 8 HOURS PRN
Status: DISCONTINUED | OUTPATIENT
Start: 2021-09-13 | End: 2021-09-18 | Stop reason: HOSPADM

## 2021-09-13 RX ORDER — ACETAMINOPHEN 325 MG/1
650 TABLET ORAL EVERY 6 HOURS PRN
Status: DISCONTINUED | OUTPATIENT
Start: 2021-09-13 | End: 2021-09-15

## 2021-09-13 RX ORDER — SODIUM CHLORIDE 0.9 % (FLUSH) 0.9 %
5-40 SYRINGE (ML) INJECTION EVERY 12 HOURS SCHEDULED
Status: DISCONTINUED | OUTPATIENT
Start: 2021-09-13 | End: 2021-09-18 | Stop reason: HOSPADM

## 2021-09-13 RX ORDER — NICOTINE POLACRILEX 4 MG
15 LOZENGE BUCCAL PRN
Status: DISCONTINUED | OUTPATIENT
Start: 2021-09-13 | End: 2021-09-18 | Stop reason: HOSPADM

## 2021-09-13 RX ORDER — ACETAMINOPHEN 650 MG/1
650 SUPPOSITORY RECTAL EVERY 6 HOURS PRN
Status: DISCONTINUED | OUTPATIENT
Start: 2021-09-13 | End: 2021-09-15

## 2021-09-13 RX ORDER — GLIPIZIDE 10 MG/1
20 TABLET ORAL
Status: DISCONTINUED | OUTPATIENT
Start: 2021-09-13 | End: 2021-09-18 | Stop reason: HOSPADM

## 2021-09-13 RX ORDER — SODIUM CHLORIDE 0.9 % (FLUSH) 0.9 %
10 SYRINGE (ML) INJECTION PRN
Status: DISCONTINUED | OUTPATIENT
Start: 2021-09-13 | End: 2021-09-18 | Stop reason: HOSPADM

## 2021-09-13 RX ORDER — MAGNESIUM SULFATE 1 G/100ML
1000 INJECTION INTRAVENOUS PRN
Status: DISCONTINUED | OUTPATIENT
Start: 2021-09-13 | End: 2021-09-18 | Stop reason: HOSPADM

## 2021-09-13 RX ORDER — FENTANYL CITRATE 50 UG/ML
50 INJECTION, SOLUTION INTRAMUSCULAR; INTRAVENOUS ONCE
Status: COMPLETED | OUTPATIENT
Start: 2021-09-13 | End: 2021-09-13

## 2021-09-13 RX ORDER — HYDROCODONE BITARTRATE AND ACETAMINOPHEN 5; 325 MG/1; MG/1
2 TABLET ORAL EVERY 4 HOURS PRN
Status: DISCONTINUED | OUTPATIENT
Start: 2021-09-13 | End: 2021-09-18 | Stop reason: HOSPADM

## 2021-09-13 RX ORDER — POLYETHYLENE GLYCOL 3350 17 G/17G
17 POWDER, FOR SOLUTION ORAL DAILY PRN
Status: DISCONTINUED | OUTPATIENT
Start: 2021-09-13 | End: 2021-09-18 | Stop reason: HOSPADM

## 2021-09-13 RX ORDER — OXYCODONE HYDROCHLORIDE 5 MG/1
10 TABLET ORAL EVERY 4 HOURS PRN
Status: DISCONTINUED | OUTPATIENT
Start: 2021-09-13 | End: 2021-09-13

## 2021-09-13 RX ORDER — FENTANYL CITRATE 50 UG/ML
100 INJECTION, SOLUTION INTRAMUSCULAR; INTRAVENOUS
Status: DISCONTINUED | OUTPATIENT
Start: 2021-09-13 | End: 2021-09-18 | Stop reason: HOSPADM

## 2021-09-13 RX ORDER — POTASSIUM CHLORIDE 20 MEQ/1
40 TABLET, EXTENDED RELEASE ORAL PRN
Status: DISCONTINUED | OUTPATIENT
Start: 2021-09-13 | End: 2021-09-18 | Stop reason: HOSPADM

## 2021-09-13 RX ORDER — OXYCODONE HYDROCHLORIDE 5 MG/1
5 TABLET ORAL EVERY 4 HOURS PRN
Status: DISCONTINUED | OUTPATIENT
Start: 2021-09-13 | End: 2021-09-13

## 2021-09-13 RX ORDER — SODIUM CHLORIDE 9 MG/ML
INJECTION, SOLUTION INTRAVENOUS CONTINUOUS
Status: DISCONTINUED | OUTPATIENT
Start: 2021-09-13 | End: 2021-09-18 | Stop reason: HOSPADM

## 2021-09-13 RX ORDER — ALBUTEROL SULFATE 90 UG/1
2 AEROSOL, METERED RESPIRATORY (INHALATION) EVERY 6 HOURS PRN
Status: DISCONTINUED | OUTPATIENT
Start: 2021-09-13 | End: 2021-09-18 | Stop reason: HOSPADM

## 2021-09-13 RX ADMIN — INSULIN HUMAN 12 UNITS: 100 INJECTION, SOLUTION PARENTERAL at 13:03

## 2021-09-13 RX ADMIN — ENOXAPARIN SODIUM 30 MG: 30 INJECTION SUBCUTANEOUS at 22:52

## 2021-09-13 RX ADMIN — SODIUM CHLORIDE: 9 INJECTION, SOLUTION INTRAVENOUS at 17:17

## 2021-09-13 RX ADMIN — INSULIN GLARGINE 10 UNITS: 100 INJECTION, SOLUTION SUBCUTANEOUS at 22:52

## 2021-09-13 RX ADMIN — FENTANYL CITRATE 100 MCG: 50 INJECTION, SOLUTION INTRAMUSCULAR; INTRAVENOUS at 20:16

## 2021-09-13 RX ADMIN — ATORVASTATIN CALCIUM 20 MG: 20 TABLET, FILM COATED ORAL at 19:06

## 2021-09-13 RX ADMIN — GLIPIZIDE 20 MG: 10 TABLET ORAL at 19:06

## 2021-09-13 RX ADMIN — FENTANYL CITRATE 100 MCG: 50 INJECTION, SOLUTION INTRAMUSCULAR; INTRAVENOUS at 14:51

## 2021-09-13 RX ADMIN — INSULIN LISPRO 15 UNITS: 100 INJECTION, SOLUTION INTRAVENOUS; SUBCUTANEOUS at 17:22

## 2021-09-13 RX ADMIN — VANCOMYCIN HYDROCHLORIDE 2500 MG: 5 INJECTION, POWDER, LYOPHILIZED, FOR SOLUTION INTRAVENOUS at 14:09

## 2021-09-13 RX ADMIN — GABAPENTIN 900 MG: 300 CAPSULE ORAL at 22:53

## 2021-09-13 RX ADMIN — HYDROCODONE BITARTRATE AND ACETAMINOPHEN 2 TABLET: 5; 325 TABLET ORAL at 22:53

## 2021-09-13 RX ADMIN — CEFEPIME HYDROCHLORIDE 2000 MG: 2 INJECTION, POWDER, FOR SOLUTION INTRAVENOUS at 12:58

## 2021-09-13 RX ADMIN — FENTANYL CITRATE 100 MCG: 50 INJECTION, SOLUTION INTRAMUSCULAR; INTRAVENOUS at 17:16

## 2021-09-13 RX ADMIN — FENTANYL CITRATE 50 MCG: 50 INJECTION, SOLUTION INTRAMUSCULAR; INTRAVENOUS at 12:58

## 2021-09-13 RX ADMIN — GABAPENTIN 900 MG: 300 CAPSULE ORAL at 17:29

## 2021-09-13 RX ADMIN — ASPIRIN 81 MG: 81 TABLET, COATED ORAL at 17:29

## 2021-09-13 ASSESSMENT — ENCOUNTER SYMPTOMS
COLOR CHANGE: 1
ABDOMINAL DISTENTION: 0
WHEEZING: 0
NAUSEA: 0
EYE PAIN: 0
PHOTOPHOBIA: 0
SHORTNESS OF BREATH: 0
CONSTIPATION: 0
FACIAL SWELLING: 0
VOMITING: 0
SINUS PAIN: 0
COLOR CHANGE: 0
RHINORRHEA: 0
COUGH: 0
SINUS PRESSURE: 0
SORE THROAT: 0
DIARRHEA: 0
CHEST TIGHTNESS: 0
ABDOMINAL PAIN: 0

## 2021-09-13 ASSESSMENT — PAIN DESCRIPTION - ORIENTATION
ORIENTATION: RIGHT
ORIENTATION: LOWER

## 2021-09-13 ASSESSMENT — PAIN DESCRIPTION - FREQUENCY: FREQUENCY: CONTINUOUS

## 2021-09-13 ASSESSMENT — PAIN DESCRIPTION - PAIN TYPE
TYPE: ACUTE PAIN

## 2021-09-13 ASSESSMENT — PAIN SCALES - GENERAL
PAINLEVEL_OUTOF10: 9

## 2021-09-13 ASSESSMENT — PAIN DESCRIPTION - DESCRIPTORS
DESCRIPTORS: BURNING;PRESSURE
DESCRIPTORS: THROBBING
DESCRIPTORS: BURNING;PRESSURE

## 2021-09-13 ASSESSMENT — PAIN DESCRIPTION - LOCATION
LOCATION: FOOT

## 2021-09-13 ASSESSMENT — PAIN DESCRIPTION - PROGRESSION: CLINICAL_PROGRESSION: NOT CHANGED

## 2021-09-13 ASSESSMENT — PAIN DESCRIPTION - ONSET: ONSET: ON-GOING

## 2021-09-13 NOTE — CONSULTS
Consultation Note  Podiatric Medicine and Surgery     Subjective     Chief Complaint: Diabetic ulceration, right foot    HPI:  Trino Stafford is a 73 y.o. male seen at [facility] for diabetic foot ulceration to the plantar aspect of the right foot suspected to be infected. Patient was admitted to the hospital on 0606/21 for diabetic foot infection with abscess formation. Patient underwent surgical debridement and layered closure on 06/11/2021. Patient also has underlying Charcot arthropathy. Patient is well established with Dr. Osbaldo Kinghire was last seen approximately 2 weeks ago. Patient was on a course of IV antimicrobial therapy until 07/04/2021. Patient was also given oral antibiotics afterwards which he finished taking at the end of August.  Over the past 2-3 days patient has noticed increased redness, drainage, pain from the right foot. The most concerning these is his pain as the patient is normally neuropathic. Patient relates that he feels that his arch is collapsing and his foot is becoming unstable. Radiographs of the right foot were obtained in the ED which were negative for any soft tissue emphysema or foreign body. There does appear to be advanced changes of the Charcot arthropathy. Patient denies any trauma to the right foot. PCP is Yoselin Zhao MD    ROS:   Review of Systems   Constitutional: Negative for activity change, chills and fever. HENT: Negative for facial swelling and sore throat. Eyes: Negative for photophobia and pain. Respiratory: Negative for chest tightness and shortness of breath. Cardiovascular: Positive for leg swelling. Negative for chest pain and palpitations. Gastrointestinal: Negative for abdominal pain, diarrhea, nausea and vomiting. Musculoskeletal: Positive for arthralgias, gait problem and joint swelling. Skin: Positive for wound (RLE). Neurological: Positive for weakness. Negative for headaches.    Psychiatric/Behavioral: Negative for agitation and behavioral problems. Past Medical History   has a past medical history of Abscess of right foot, Acquired hammer toe deformity of lesser toe of right foot, ALEJANDRO (acute kidney injury) ([address] Cellulitis, Cellulitis of left foot, Cellulitis of right foot, Charcot foot due to diabetes mellitus ([address] Chest pain at rest, Chronic multifocal osteomyelitis of right foot ([address] Diabetic polyneuropathy associated with type 2 diabetes mellitus ([address] Essential hypertension, Fractures, multiple, Hyperlipidemia, Hypertension, Leukocytosis, Neuropathy, Pain in right foot, Pneumonia, Right foot infection, Right foot pain, Tobacco abuse, Type II or unspecified type diabetes mellitus without mention of complication, not stated as uncontrolled, Vertigo, 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 Past Surgical History   has a past surgical history that includes Neck surgery (1987); Appendectomy; knee surgery (Bilateral, 1983); Knee arthroscopy (Right, 1989); Knee arthroscopy (Left, 1990); Foot Debridement (Left, 04/24/2018); pr deep dissec foot infec,1 bursa (Left, 4/24/2018); Colonoscopy; Foot Debridement (Right, 3/20/2020); arthroplasty (Right, 12/11/2020); Foot Debridement (Right, 6/8/2021); and Foot surgery (Right, 6/11/2021). Medications  Prior to Admission medications    Medication Sig Start Date End Date Taking? Authorizing Provider   silver sulfADIAZINE (SILVADENE) 1 % cream Apply topically daily.  6/12/21   Olivia Lee DO   meloxicam (MOBIC) 15 MG tablet Take 15 mg by mouth nightly  5/5/21   Historical Provider, MD   LANTUS SOLOSTAR 100 UNIT/ML injection pen Inject 10 Units into the skin nightly  3/12/21   Historical Provider, MD   albuterol sulfate  (90 Base) MCG/ACT inhaler Inhale 2 puffs into the lungs every 6 hours as needed  4/1/21   Historical Provider, MD   JANUVIA 100 MG tablet Take 100 mg by mouth daily  11/13/20   Historical Provider, MD Guerr[employer]. Devices MISC 1 PAIR OF DIABETIC SHOES (1 LEFT/ 1 RIGHT)  1-3 PAIRS OF INSERTS (LEFT/ RIGHT) 3/5/20   Medardo Estevez DPM   cetirizine (ZYRTEC) 10 MG tablet Take 10 mg by mouth nightly  10/21/19   Historical Provider, MD   lisinopril (PRINIVIL;ZESTRIL) 10 MG tablet Take 10 mg by mouth daily 11/15/18   Historical Provider, MD   simvastatin (ZOCOR) 40 MG tablet Take 40 mg by mouth nightly  11/13/18   Historical Provider, MD   glipiZIDE (GLUCOTROL) 10 MG tablet Take 20 mg by mouth 2 times daily (before meals) Takes 2 tabs (=20mg) BID    Historical Provider, MD   aspirin 81 MG tablet Take 81 mg by mouth daily    Historical Provider, MD   gabapentin (NEURONTIN) 300 MG capsule Take 900 mg by mouth 3 times daily. Take 3 caps (=900mg) 3 times a day    Historical Provider, MD    Scheduled Meds:   vancomycin  2,500 mg IntraVENous Once    aspirin  81 mg Oral Daily    gabapentin  900 mg Oral TID    glipiZIDE  20 mg Oral BID AC    alogliptin  12.5 mg Oral Daily    insulin glargine  10 Units SubCUTAneous Nightly    lisinopril  10 mg Oral Daily    atorvastatin  20 mg Oral Daily    [START ON 9/14/2021] cefepime  2,000 mg IntraVENous Q12H    insulin lispro  0-18 Units SubCUTAneous TID WC    insulin lispro  0-9 Units SubCUTAneous Nightly    enoxaparin  30 mg SubCUTAneous BID    [START ON 9/14/2021] vancomycin  1,500 mg IntraVENous Q24H    vancomycin (VANCOCIN) intermittent dosing (placeholder)   Other RX Placeholder     Continuous Infusions:   sodium chloride      sodium chloride       PRN Meds:.albuterol sulfate HFA, sodium chloride, potassium chloride **OR** potassium alternative oral replacement **OR** potassium chloride, magnesium sulfate, ondansetron **OR** ondansetron, polyethylene glycol, acetaminophen **OR** acetaminophen, fentanNYL    Allergies  is allergic to morphine, other, percocet [oxycodone-acetaminophen], and dye [iodides].     Family History  family history includes Cancer in his father; Diabetes in his mother; Hypertension in his maternal grandmother. Social History   reports that he has been smoking. He has been smoking about 1.00 pack per day. He has never used smokeless tobacco.   reports no history of alcohol use. reports previous drug use. Objective     Vitals:  Patient Vitals for the past 8 hrs:   BP Temp Temp src Pulse Resp Height Weight   21 1038 92/80 98.2 °F (36.8 °C) Tympanic 97 21 5' 11\" (1.803 m) 248 lb (112.5 kg)     Average, Min, and Max for last 24 hours Vitals:  TEMPERATURE:  Temp  Av.2 °F (36.8 °C)  Min: 98.2 °F (36.8 °C)  Max: 98.2 °F (36.8 °C)    RESPIRATIONS RANGE: Resp  Av  Min: 21  Max: 21    PULSE RANGE: Pulse  Av  Min: 97  Max: 97    BLOOD PRESSURE RANGE:  Systolic (38DFN), GMH:04 , Min:92 , ADW:06   ; Diastolic (48KIZ), UNF:58, Min:80, Max:80      PULSE OXIMETRY RANGE: No data recorded  I&O:  No intake/output data recorded. CBC:  Recent Labs     21  1104   WBC 10.7   HGB 12.9*   HCT 38.1*      .8*        BMP:  Recent Labs     21  1104   *   K 4.8   CL 95*   CO2 24   BUN 22   CREATININE 1.49*   GLUCOSE 383*   CALCIUM 9.5        Coags:  No results for input(s): APTT, PROT, INR in the last 72 hours. Lab Results   Component Value Date    LABA1C 6.4 (H) 2018     Lab Results   Component Value Date    SEDRATE 37 (H) 2021     Lab Results   Component Value Date    .8 (H) 2021         Physical Exam:  Vascular: DP and PT pulses are palpable. CFT brisk to all digits. Hair growth is absent to the level of the digits. Moderate edema. Neuro: Saph/sural/SP/DP/plantar sensation diminished to light touch. Musculoskeletal: Muscle strength is decreased ROM, decreased strength to all lower extremity muscle groups. Gross deformity is present with rocker-bottom foot deformity right lower extremity. Compartments are soft and compressible.  No pain with compression of posterior calf. Pain with palpation to medial longitudinal arch at ulceration site. Dermatologic: Full-thickness thickness ulcer to the level of subcutaneous tissue #1 located plantar medial arch and measures approximately 2.2cm x 2.5cm x 0.3cm. Base is fibrogranular. Periwound skin is hyperkeratotic and macerated. Moderate serosanguineous drainage noted with associated mal odor. Erythema present from the ulceration site extending proximally and medially along the iris pedis to the medial aspect of the ankle with  associated increase in warmth. Does not probe to bone, sinus track, or undermine. There is surrounding fluctuance concerning for abscess. No crepitus, or induration. Interdigital maceration absent. Clinical: None    Imaging:   XR FOOT RIGHT (MIN 3 VIEWS)   Final Result   Similar-appearing osseous structures with Charcot deformity of the midfoot. Plantar soft tissue swelling. Cultures: Obtained    Assessment     Adonay Aldrich is a 73 y.o. male with   Cellulitis, right foot  Possible abscess, right foot  Charcot arthropathy, right foot  Diabetes mellitus type 2 with associated peripheral apathy  PAD  Status post I&D, left foot (DOS 06/11/2021)    Principal Problem:    Diabetic infection of right foot ([address]  Active Problems:    Essential hypertension    Hyperlipidemia    Diabetic polyneuropathy associated with type 2 diabetes mellitus ([address]    Type 2 diabetes mellitus treated with insulin ([address]    Wound dehiscence    Hyperglycemia due to diabetes mellitus ([address]  Resolved Problems:    * No resolved hospital problems. *        Plan     Patient examined and evaluated at bedside   All treatment options discussed in detail with the patient  Radiographs of the right foot reviewed and discussed in detail with patient--no soft tissue emphysema, foreign body. There does appear to be advanced changes of Charcot arthropathy of the midfoot.   MRI right foot ordered  Admitted to medicine as primary service for medical management  ID consulted  IV antibiotics with vancomycin and cefepime  No surgical intervention planned at this time however there is concern for abscess formation or worsening Charcot arthropathy. Patient may require surgical intervention pending MRI results. Further recommendations to follow. Dressing applied to Right lower extremity: Betadine, DSD, Ace  Nonweightbearing to Right lower extremity      Jeannette Smith DPM   Podiatric Medicine & Surgery   9/13/2021 at 4:04 PM     I performed a history and physical examination of the patient and discussed management with the resident. I reviewed the residents note and agree with the documented findings and plan of care. Any areas of disagreement are noted on the chart. I was personally present for the key portions of any procedures. I have documented in the chart those procedures where I was not present during the key portions. I have reviewed the Podiatry Resident progress note. I agree with the chief complaint, past medical history, past surgical history, allergies, medications, social and family history as documented unless otherwise noted below. Documentation of the HPI, Physical Exam and Medical Decision Making performed by medical students or scribes is based on my personal performance of the HPI, PE and MDM. I have personally evaluated this patient and have completed at least one if not all key elements of the E/M (history, physical exam, and MDM). Additional findings are as noted.      Electronically signed by Gosia Covarrubias DPM on 9/13/2021

## 2021-09-13 NOTE — H&P
[facility]  Office: [address], DO, Tonya Wong, DO, Carlo Walker, DO, Lennox Mars Blood, DO, Tran English MD, Martínez Edwards MD, Helio Rodriguez MD, Erick Stanley MD, Ирина Vilchis MD, Momo Dimas MD, Candice Mehta MD, Kierra aJmison, DO, Artem Esquivel, DO, Mary Dorsey MD,  Jaelyn Zamorano DO, Burna Cockayne, MD, Nani Potts MD, Brina Joshi MD, Lorena Hutchinson MD, , Tony Cadet MD, Massimo Danielle MD, Bert Roman MD, Austin Santoyo [facility], [address], CNP, Lucian Kelsey, CNP, Paddy Duane, CNS, Christianne Rubin, CNP, Meghana Rivera, CNP, Shante Cronin, CNP, Luba Chandler, CNP, Aundrea Hooker, CNP, Kristopher Ag PA-C, Daisey Meigs, [facility], Nora Jurado, CNP, Williams Rios, CNP, Fred Bojorquez, CNP, Sophia Billy, CNP, Baylee Hooker, CNP, Clarence Catherine, CNP, Edouard Donahue, CNP, Arlyn Jensen, [facility]    HISTORY AND PHYSICAL EXAMINATION            Date:   9/13/2021  Patient name:  Lescheikh Roa  Date of admission:  9/13/2021 10:47 AM  MRN:   [id number]  Account:  [de-identified]  YOB: 1955  PCP:    Marcellus Milan MD  Room:   [place] [address]  Code Status:    Prior    Chief Complaint:     Chief Complaint   Patient presents with    Foot Pain    Post-op Problem     pt states that his foot is bleeding from the surgical site        History Obtained From:     patient    History of Present Illness:     Sunday Crispin is a 73 y.o. Non- / non  male who presents with Foot Pain and Post-op Problem (pt states that his foot is bleeding from the surgical site )   and is admitted to the hospital for the management of Diabetic infection of right foot ([facility] [address] Patient has a known chronic diabetic foot infection to the right lower foot. Patient was noted her prior history for several months.   Patient had a wound dehiscence several months ago and required wound care and long-term IV antibiotics use administered through a PICC. Patient reports that the wound is healing well and as of 2 weeks ago at his most recent podiatry appointment healing was progressing as expected. Saturday the patient had his foot examined by his wife who found him to be open and draining. Podiatry was contacted this morning and advised that he needed to report to emergency department. At the time my exam patient is resting comfortably. No fevers or chills. Patient reports no pain to the right lower extremity. Patient complains of chronic back and pelvis pain. Patient is hyperglycemic and was administered subcutaneous insulin by the emergency department. Podiatry has evaluated the patient and cleaned and dressed the wound. Patient will be admitted for establishment of wound care and broad-spectrum antibiotics.     Past Medical History:     Past Medical History:   Diagnosis Date    Abscess of right foot 8/4/2018    Acquired hammer toe deformity of lesser toe of right foot     ALEJANDRO (acute kidney injury) ([address] 8/5/2018    Cellulitis 4/24/2018    Cellulitis of left foot 4/24/2018    Cellulitis of right foot     and abscess    Charcot foot due to diabetes mellitus ([address] 2014    Right foot     Chest pain at rest 8/4/2018    Chronic multifocal osteomyelitis of right foot ([address]     Diabetic polyneuropathy associated with type 2 diabetes mellitus ([address]     Essential hypertension     Fractures, multiple 07/21/2014    Right foot fractures     Hyperlipidemia     Hypertension     Leukocytosis     Neuropathy     diabetic with charcot affecting the right foot    Pain in right foot     redness and swelling    Pneumonia 2009    Right foot infection     Right foot pain     Tobacco abuse 4/24/2018    Type II or unspecified type diabetes mellitus without mention of complication, not stated as uncontrolled     Vertigo     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 stepped on sharp object. Covered by dressing.  Wound, open     Right posterior -Diabetic Ulcer        Past Surgical History:     Past Surgical History:   Procedure Laterality Date    APPENDECTOMY      at age 24    ARTHROPLASTY Right 12/11/2020    RIGHT HALLUX EXOSTECTOMY AND 3RD DIGIT EXTENSIOR TENOTOMY performed by Moreno Tim DPM at [address] Left 04/24/2018    I&D foreign body removal    FOOT DEBRIDEMENT Right 3/20/2020    RIGHT  FOOT   INCISION AND DRAINAGE WITH BONE BIOPSY [facility] Right 6/8/2021    FOOT DEBRIDEMENT INCISION AND DRAINAGE performed by Moreno Tim DPM at [address] Right 6/11/2021    RIGHT FOOT FLAP CLOSURE performed by Moreno Tim DPM at [place] [address] ARTHROSCOPY Left 1990    KNEE SURGERY Bilateral 1983    arthroscopy   [address]    [place] DEEP [address] INFEC,1 BURSA Left 4/24/2018    LEFT FOOT MULTIPLE  INCISIONS  AND DRAINAGE AND REMOVAL FOREIGN BODY  IRENE performed by Moreno Tim DPM at [facility]        Medications Prior to Admission:     Prior to Admission medications    Medication Sig Start Date End Date Taking? Authorizing Provider   silver sulfADIAZINE (SILVADENE) 1 % cream Apply topically daily. 6/12/21   Aly Lee DO   meloxicam (MOBIC) 15 MG tablet Take 15 mg by mouth nightly  5/5/21   Historical Provider, MD   LANTUS SOLOSTAR 100 UNIT/ML injection pen Inject 10 Units into the skin nightly  3/12/21   Historical Provider, MD   albuterol sulfate  (90 Base) MCG/ACT inhaler Inhale 2 puffs into the lungs every 6 hours as needed  4/1/21   Historical Provider, MD CHAKRABORTY 100 MG tablet Take 100 mg by mouth daily  11/13/20   Historical Provider, MD   Misc.  Devices MISC 1 PAIR OF DIABETIC SHOES (1 LEFT/ 1 RIGHT)  1-3 PAIRS OF INSERTS (LEFT/ RIGHT) 3/5/20   Angi Tse, DPM   cetirizine (ZYRTEC) 10 MG tablet Take 10 mg by mouth nightly  10/21/19   Historical Provider, MD   lisinopril (PRINIVIL;ZESTRIL) 10 MG tablet Take 10 mg by mouth daily 11/15/18   Historical Provider, MD   simvastatin (ZOCOR) 40 MG tablet Take 40 mg by mouth nightly  11/13/18   Historical Provider, MD   glipiZIDE (GLUCOTROL) 10 MG tablet Take 20 mg by mouth 2 times daily (before meals) Takes 2 tabs (=20mg) BID    Historical Provider, MD   aspirin 81 MG tablet Take 81 mg by mouth daily    Historical Provider, MD   gabapentin (NEURONTIN) 300 MG capsule Take 900 mg by mouth 3 times daily. Take 3 caps (=900mg) 3 times a day    Historical Provider, MD        Allergies:     Morphine, Other, Percocet [oxycodone-acetaminophen], and Dye [iodides]    Social History:     Tobacco:    reports that he has been smoking. He has been smoking about 1.00 pack per day. He has never used smokeless tobacco.  Alcohol:      reports no history of alcohol use. Drug Use:  reports previous drug use. Family History:     Family History   Problem Relation Age of Onset    Diabetes Mother     Cancer Father     Hypertension Maternal Grandmother        Review of Systems:     Positive and Negative as described in HPI. Review of Systems   Constitutional: Positive for chills and fever. Negative for activity change. HENT: Negative for sinus pressure and sinus pain. Eyes: Negative for photophobia and visual disturbance. Respiratory: Negative for shortness of breath and wheezing. Cardiovascular: Negative for chest pain and palpitations. Gastrointestinal: Negative for abdominal distention and abdominal pain. Endocrine: Negative for polyphagia and polyuria. Genitourinary: Negative for decreased urine volume and urgency. Musculoskeletal: Negative for arthralgias and myalgias. Skin: Positive for color change (RLE) and wound (RLE).    Neurological: Negative for dizziness, speech difficulty and weakness. Hematological: Negative for adenopathy. Does not bruise/bleed easily. Psychiatric/Behavioral: Negative for self-injury and suicidal ideas. Physical Exam:   BP 92/80   Pulse 97   Temp 98.2 °F (36.8 °C) (Tympanic)   Resp 21   Ht 5' 11\" (1.803 m)   Wt 248 lb (112.5 kg)   BMI 34.59 kg/m²   Temp (24hrs), Av.2 °F (36.8 °C), Min:98.2 °F (36.8 °C), Max:98.2 °F (36.8 °C)    No results for input(s): POCGLU in the last 72 hours. No intake or output data in the 24 hours ending 21 1423    Physical Exam  Constitutional:       General: He is not in acute distress. Appearance: Normal appearance. He is normal weight. He is ill-appearing (chronic). He is not toxic-appearing. HENT:      Head: Normocephalic and atraumatic. Mouth/Throat:      Mouth: Mucous membranes are moist.   Eyes:      Pupils: Pupils are equal, round, and reactive to light. Cardiovascular:      Rate and Rhythm: Normal rate. Pulses: Normal pulses. Heart sounds: No murmur heard. No gallop. Pulmonary:      Effort: Pulmonary effort is normal. No respiratory distress. Breath sounds: Normal breath sounds. No wheezing. Abdominal:      General: Abdomen is flat. Bowel sounds are normal.      Palpations: Abdomen is soft. Musculoskeletal:         General: Signs of injury present. No swelling or tenderness. Normal range of motion. Cervical back: Normal range of motion and neck supple. No rigidity. Right lower leg: Edema present. Skin:     General: Skin is warm and dry. Capillary Refill: Capillary refill takes less than 2 seconds. Coloration: Skin is not pale. Neurological:      General: No focal deficit present. Mental Status: He is alert and oriented to person, place, and time.    Psychiatric:         Mood and Affect: Mood normal.         Behavior: Behavior normal.         Investigations:      Laboratory Testing:  Recent Results (from the past 24 hour(s))   CBC Auto Differential    Collection Time: 09/13/21 11:04 AM   Result Value Ref Range    WBC 10.7 3.5 - 11.3 k/uL    RBC 4.27 4.21 - 5.77 m/uL    Hemoglobin 12.9 (L) 13.0 - 17.0 g/dL    Hematocrit 38.1 (L) 40.7 - 50.3 %    MCV 89.2 82.6 - 102.9 fL    MCH 30.2 25.2 - 33.5 pg    MCHC 33.9 28.4 - 34.8 g/dL    RDW 13.4 11.8 - 14.4 %    Platelets 023 751 - 894 k/uL    MPV 9.7 8.1 - 13.5 fL    NRBC Automated 0.0 0.0 per 100 WBC    Differential Type NOT REPORTED     Seg Neutrophils 71 (H) 36 - 65 %    Lymphocytes 18 (L) 24 - 43 %    Monocytes 7 3 - 12 %    Eosinophils % 2 1 - 4 %    Basophils 1 0 - 2 %    Immature Granulocytes 1 (H) 0 %    Segs Absolute 7.72 1.50 - 8.10 k/uL    Absolute Lymph # 1.96 1.10 - 3.70 k/uL    Absolute Mono # 0.74 0.10 - 1.20 k/uL    Absolute Eos # 0.18 0.00 - 0.44 k/uL    Basophils Absolute 0.05 0.00 - 0.20 k/uL    Absolute Immature Granulocyte 0.08 0.00 - 0.30 k/uL    WBC Morphology NOT REPORTED     RBC Morphology NOT REPORTED     Platelet Estimate NOT REPORTED    Basic Metabolic Panel    Collection Time: 09/13/21 11:04 AM   Result Value Ref Range    Glucose 383 (H) 70 - 99 mg/dL    BUN 22 8 - 23 mg/dL    CREATININE 1.49 (H) 0.70 - 1.20 mg/dL    Bun/Cre Ratio 15 9 - 20    Calcium 9.5 8.6 - 10.4 mg/dL    Sodium 129 (L) 135 - 144 mmol/L    Potassium 4.8 3.7 - 5.3 mmol/L    Chloride 95 (L) 98 - 107 mmol/L    CO2 24 20 - 31 mmol/L    Anion Gap 10 9 - 17 mmol/L    GFR Non-African American 47 (L) >60 mL/min    GFR  57 (L) >60 mL/min    GFR Comment          GFR Staging NOT REPORTED    Sedimentation Rate    Collection Time: 09/13/21 11:04 AM   Result Value Ref Range    Sed Rate 37 (H) 0 - 20 mm       Imaging/Diagnostics:  XR FOOT RIGHT (MIN 3 VIEWS)    Result Date: 9/13/2021  Similar-appearing osseous structures with Charcot deformity of the midfoot. Plantar soft tissue swelling.        Assessment :      Hospital Problems         Last Modified POA    * (Principal) Diabetic infection of right

## 2021-09-13 NOTE — PROGRESS NOTES
Patient weight is between 101-149kg. For prophylaxis with Enoxaparin, Pharmacy adjusted the dose to account for the patient's increased body weight in accordance with hospital approved protocol. The dose has been changed to 30mg BID. Please contact pharmacy with any concerns @ [phone]. Thank you.    Daivd Merlin, [facility]  9/13/2021 2:51 PM

## 2021-09-13 NOTE — ED PROVIDER NOTES
[address] [place] [address] ED  eMERGENCY dEPARTMENT eNCOUnter      Pt Name: Enrique Ayers  MRN: [id number]  Marielagfjose 1955  Date of evaluation: 9/13/2021  Provider: [address] [place] [address] NP, ROCHELLE Martines 0235       Chief Complaint   Patient presents with    Foot Pain    Post-op Problem     pt states that his foot is bleeding from the surgical site          HISTORY OF PRESENT ILLNESS  (Location/Symptom, Timing/Onset, Context/Setting, Quality, Duration, Modifying Factors, Severity.)   Enrique Ayers is a 73 y.o. male who presents to the emergency department today by private vehicle for evaluation of some redness and swelling to the right foot. Patient has a history of diabetes and Charcot of the right foot. He has had a wound that has been managed by Dr. Brody Shepherd. He was on IV antibiotics for 3 weeks back in June when this first started. He states that it started to heal and is only a very small pinpoint area that was still open. He states over the weekend somehow the wound has became open and is ulcerated. There are some significant swelling and redness around the wound. He denies any fevers or chills      Nursing Notes were reviewed. ALLERGIES     Morphine, Other, Percocet [oxycodone-acetaminophen], and Dye [iodides]    CURRENT MEDICATIONS       Previous Medications    ALBUTEROL SULFATE  (90 BASE) MCG/ACT INHALER    Inhale 2 puffs into the lungs every 6 hours as needed     ASPIRIN 81 MG TABLET    Take 81 mg by mouth daily    CETIRIZINE (ZYRTEC) 10 MG TABLET    Take 10 mg by mouth nightly     GABAPENTIN (NEURONTIN) 300 MG CAPSULE    Take 900 mg by mouth 3 times daily.  Take 3 caps (=900mg) 3 times a day    GLIPIZIDE (GLUCOTROL) 10 MG TABLET    Take 20 mg by mouth 2 times daily (before meals) Takes 2 tabs (=20mg) BID    JANUVIA 100 MG TABLET    Take 100 mg by mouth daily     LANTUS SOLOSTAR 100 UNIT/ML INJECTION PEN    Inject 10 Units into the skin nightly     LISINOPRIL (PRINIVIL;ZESTRIL) 10 MG TABLET    Take 10 mg by mouth daily    MELOXICAM (MOBIC) 15 MG TABLET    Take 15 mg by mouth nightly     MISC. DEVICES MISC    1 PAIR OF DIABETIC SHOES (1 LEFT/ 1 RIGHT)  1-3 PAIRS OF INSERTS (LEFT/ RIGHT)    SILVER SULFADIAZINE (SILVADENE) 1 % CREAM    Apply topically daily. SIMVASTATIN (ZOCOR) 40 MG TABLET    Take 40 mg by mouth nightly        PAST MEDICAL HISTORY         Diagnosis Date    Abscess of right foot 8/4/2018    Acquired hammer toe deformity of lesser toe of right foot     ALEJANDRO (acute kidney injury) ([address] 8/5/2018    Cellulitis 4/24/2018    Cellulitis of left foot 4/24/2018    Cellulitis of right foot     and abscess    Charcot foot due to diabetes mellitus ([address] 2014    Right foot     Chest pain at rest 8/4/2018    Chronic multifocal osteomyelitis of right foot ([address]     Diabetic polyneuropathy associated with type 2 diabetes mellitus ([address]     Essential hypertension     Fractures, multiple 07/21/2014    Right foot fractures     Hyperlipidemia     Hypertension     Leukocytosis     Neuropathy     diabetic with charcot affecting the right foot    Pain in right foot     redness and swelling    Pneumonia 2009    Right foot infection     Right foot pain     Tobacco abuse 4/24/2018    Type II or unspecified type diabetes mellitus without mention of complication, not stated as uncontrolled     Vertigo     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 stepped on sharp object. Covered by dressing.     Wound, open     Right posterior -Diabetic Ulcer       SURGICAL HISTORY           Procedure Laterality Date    APPENDECTOMY      at age 24    ARTHROPLASTY Right 12/11/2020    RIGHT HALLUX EXOSTECTOMY AND 3RD DIGIT EXTENSIOR TENOTOMY performed by Moreno Tim DPM at [address] Left 04/24/2018    I&D foreign body removal    FOOT DEBRIDEMENT Right 3/20/2020    RIGHT  FOOT   INCISION AND DRAINAGE WITH BONE BIOPSY performed by Hillary Chris DPM at [address] Right 2021    FOOT DEBRIDEMENT INCISION AND DRAINAGE performed by Hillary Chris DPM at [address] [place] [address] Right 2021    RIGHT FOOT FLAP CLOSURE performed by Hillary Chris DPM at [facility] ARTHROSCOPY Left     KNEE SURGERY Bilateral     arthroscopy    NECK SURGERY      [place] DEEP [address] FOOT INFEC,1 BURSA Left 2018    LEFT FOOT MULTIPLE  INCISIONS  AND DRAINAGE AND REMOVAL FOREIGN BODY  IRENE performed by Hillary Chris DPM at [place] [address]           Problem Relation Age of Onset    Diabetes Mother     Cancer Father     Hypertension Maternal Grandmother      Family Status   Relation Name Status    Mother  Alive    Father      MGM  (Not Specified)        SOCIAL HISTORY      reports that he has been smoking. He has been smoking about 1.00 pack per day. He has never used smokeless tobacco. He reports previous drug use. He reports that he does not drink alcohol. REVIEW OF SYSTEMS    (2-9 systems for level 4, 10 or more for level 5)     Review of Systems   Constitutional: Negative for chills, fever and unexpected weight change. HENT: Negative for congestion, rhinorrhea, sinus pressure and sore throat. Respiratory: Negative for cough, shortness of breath and wheezing. Cardiovascular: Negative for chest pain and palpitations. Gastrointestinal: Negative for abdominal pain, constipation, diarrhea, nausea and vomiting. Genitourinary: Negative for dysuria and hematuria. Musculoskeletal: Negative for arthralgias and myalgias. Skin: Positive for wound. Negative for color change and rash. Neurological: Negative for dizziness, weakness and headaches. Hematological: Negative for adenopathy. All other systems reviewed and are negative.        Except as noted above the remainder of the review of systems was reviewed and negative. PHYSICAL EXAM    (up to 7 for level 4, 8 or more for level 5)     ED Triage Vitals [09/13/21 1038]   BP Temp Temp Source Pulse Resp SpO2 Height Weight   92/80 98.2 °F (36.8 °C) Tympanic 97 21 -- 5' 11\" (1.803 m) 248 lb (112.5 kg)       Physical Exam  Vitals reviewed. Constitutional:       Appearance: He is well-developed. HENT:      Head: Normocephalic and atraumatic. Eyes:      Conjunctiva/sclera: Conjunctivae normal.      Pupils: Pupils are equal, round, and reactive to light. Cardiovascular:      Rate and Rhythm: Normal rate and regular rhythm. Pulmonary:      Effort: Pulmonary effort is normal. No respiratory distress. Breath sounds: Normal breath sounds. No stridor. Abdominal:      General: Bowel sounds are normal.      Palpations: Abdomen is soft. Musculoskeletal:         General: Normal range of motion. Cervical back: Normal range of motion and neck supple. Legs:    Lymphadenopathy:      Cervical: No cervical adenopathy. Skin:     General: Skin is warm and dry. Findings: No rash. Neurological:      Mental Status: He is alert and oriented to person, place, and time. RADIOLOGY:   Non-plain film images such as CT, Ultrasound and MRI are read by the radiologist. Plain radiographic images are visualized and preliminarily interpreted by the emergency physician with the below findings:    XR FOOT RIGHT (MIN 3 VIEWS)    Result Date: 9/13/2021  EXAMINATION: THREE XRAY VIEWS OF THE RIGHT FOOT 9/13/2021 10:59 am COMPARISON: Right foot radiographs performed 06/06/2021. HISTORY: ORDERING SYSTEM PROVIDED HISTORY: foot infection TECHNOLOGIST PROVIDED HISTORY: foot infection Reason for Exam: redness and swelling Acuity: Unknown Type of Exam: Unknown FINDINGS: Osseous structures are stable compared to prior exam.  There is Charcot deformity of the midfoot that is stable.   There is a calcaneal spur at the plantar fascial attachment. There is soft tissue swelling along the plantar aspect of the foot. Similar-appearing osseous structures with Charcot deformity of the midfoot. Plantar soft tissue swelling. Interpretation per the Radiologist below, if available at the time of this note:    XR FOOT RIGHT (MIN 3 VIEWS)   Final Result   Similar-appearing osseous structures with Charcot deformity of the midfoot. Plantar soft tissue swelling. LABS:  Labs Reviewed   CBC WITH AUTO DIFFERENTIAL - Abnormal; Notable for the following components:       Result Value    Hemoglobin 12.9 (*)     Hematocrit 38.1 (*)     Seg Neutrophils 71 (*)     Lymphocytes 18 (*)     Immature Granulocytes 1 (*)     All other components within normal limits   BASIC METABOLIC PANEL - Abnormal; Notable for the following components:    Glucose 383 (*)     CREATININE 1.49 (*)     Sodium 129 (*)     Chloride 95 (*)     GFR Non- 47 (*)     GFR  57 (*)     All other components within normal limits   SEDIMENTATION RATE - Abnormal; Notable for the following components:    Sed Rate 37 (*)     All other components within normal limits   CULTURE, WOUND   C-REACTIVE PROTEIN       All other labs were within normal range or not returned as of this dictation. EMERGENCY DEPARTMENT COURSE and DIFFERENTIAL DIAGNOSIS/MDM:   Vitals:    Vitals:    09/13/21 1038   BP: 92/80   Pulse: 97   Resp: 21   Temp: 98.2 °F (36.8 °C)   TempSrc: Tympanic   Weight: 248 lb (112.5 kg)   Height: 5' 11\" (1.803 m)       Medical Decision Making: The podiatry resident came and evaluated this patient. The patient also has some hyperglycemia and hyponatremia. He sent the wound for culture. He would like vancomycin and cefepime started which were ordered.   He would like the patient admitted to the hospital service due to his other core avidity so I spoke with Lucho Lea the NP for the [facility] service who agrees with admission. CONSULTS:  PHARMACY TO DOSE VANCOMYCIN      FINAL IMPRESSION      1. Ulcer of right foot with other severity ([address]    2. Cellulitis of right foot          DISPOSITION/PLAN   DISPOSITION Admitted 09/13/2021 12:50:38 PM      PATIENT REFERRED TO:   No follow-up provider specified.     DISCHARGE MEDICATIONS:     New Prescriptions    No medications on file           (Please note that portions of this note were completed with a voice recognition program.  Efforts were made to edit the dictations but occasionally words are mis-transcribed.)    [address] [place] [address] NP, APRN - CNP  Certified Nurse Practitioner          ROCHELLE Garcia CNP  09/13/21 2422

## 2021-09-13 NOTE — RT PROTOCOL NOTE

## 2021-09-13 NOTE — PROGRESS NOTES
Vancomycin Dosing by Pharmacy - Initial Note   TODAY'S DATE:  9/13/2021  Patient name, age:  Brad Thao, 73 y.o. Consulting provider: Dr. Wild Lentz    Indication: diabetic foot infection  Additional antimicrobials:  cefepime    Actual Weight:    Wt Readings from Last 1 Encounters:   09/13/21 248 lb (112.5 kg)     Labs/Ancillary Data  Estimated Creatinine Clearance: 63 mL/min (A) (based on SCr of 1.49 mg/dL (H)). Recent Labs     09/13/21  1104   CREATININE 1.49*   BUN 22   WBC 10.7     No results found for: PROCAL  Temp: 98.2    Pertinent Cultures  pending    MRSA Nasal Swab: N/A. Non-respiratory infection. Dion Cope PLAN   Initial loading dose of 25mg/kg (max of 2,500 mg) = 2500  mg  x 1, then  vancomycin 1500 mg IV every 24 hours. Ensured BUN/sCr ordered at baseline and every 48 hours x at least 3 levels, then at least weekly. Vancomycin level ordered for 9/15/21 @ 0600. Will use bayesian method for dosing. This level will not be a trough. Target AUC/DIMA: 400-600. Vancomycin Target Concentration Parameters  Treatment  Population Target AUC/DIMA Target Trough   Invasive MRSA Infection (bacteremia, pneumonia, meningitis, endocarditis, osteomyelitis)  Sepsis (undifferentiated) 400-600 N/A   Infection due to non-MRSA pathogen  Empiric treatment of non-invasive MRSA infection  (SSTI, UTI) <500 10-15 mg/L   CrCl < 29 mL/min  Rapidly fluctuating serum creatinine   ALEJANDRO N/A < 15 mg/L     Renal replacement therapy is dosed by levels, per hospital protocol. Abbreviations  * Pauc: probability that AUC is >400 (efficacy); Pconc: probability that Ctrough is above 20 ?g/mL (toxicity); Tox: Probability of nephrotoxicity, based on Genna et al. Clin Infect Dis 2009. Thank you for the consult. Pharmacy will continue to follow.

## 2021-09-14 ENCOUNTER — APPOINTMENT (OUTPATIENT)
Dept: MRI IMAGING | Age: 66
DRG: 988 | End: 2021-09-14
Payer: MEDICARE

## 2021-09-14 LAB
ANION GAP SERPL CALCULATED.3IONS-SCNC: 8 MMOL/L (ref 9–17)
BUN BLDV-MCNC: 21 MG/DL (ref 8–23)
BUN/CREAT BLD: 14 (ref 9–20)
CALCIUM SERPL-MCNC: 9.3 MG/DL (ref 8.6–10.4)
CHLORIDE BLD-SCNC: 100 MMOL/L (ref 98–107)
CO2: 24 MMOL/L (ref 20–31)
CREAT SERPL-MCNC: 1.5 MG/DL (ref 0.7–1.2)
GFR AFRICAN AMERICAN: 57 ML/MIN
GFR NON-AFRICAN AMERICAN: 47 ML/MIN
GFR SERPL CREATININE-BSD FRML MDRD: ABNORMAL ML/MIN/{1.73_M2}
GFR SERPL CREATININE-BSD FRML MDRD: ABNORMAL ML/MIN/{1.73_M2}
GLUCOSE BLD-MCNC: 180 MG/DL (ref 75–110)
GLUCOSE BLD-MCNC: 181 MG/DL (ref 70–99)
GLUCOSE BLD-MCNC: 214 MG/DL (ref 75–110)
GLUCOSE BLD-MCNC: 249 MG/DL (ref 75–110)
GLUCOSE BLD-MCNC: 252 MG/DL (ref 75–110)
HCT VFR BLD CALC: 37.1 % (ref 40.7–50.3)
HEMOGLOBIN: 12.3 G/DL (ref 13–17)
MCH RBC QN AUTO: 30.4 PG (ref 25.2–33.5)
MCHC RBC AUTO-ENTMCNC: 33.2 G/DL (ref 28.4–34.8)
MCV RBC AUTO: 91.8 FL (ref 82.6–102.9)
NRBC AUTOMATED: 0 PER 100 WBC
PDW BLD-RTO: 13.3 % (ref 11.8–14.4)
PLATELET # BLD: 200 K/UL (ref 138–453)
PMV BLD AUTO: 9.8 FL (ref 8.1–13.5)
POTASSIUM SERPL-SCNC: 4.6 MMOL/L (ref 3.7–5.3)
RBC # BLD: 4.04 M/UL (ref 4.21–5.77)
SODIUM BLD-SCNC: 132 MMOL/L (ref 135–144)
WBC # BLD: 9.5 K/UL (ref 3.5–11.3)

## 2021-09-14 PROCEDURE — 6370000000 HC RX 637 (ALT 250 FOR IP): Performed by: NURSE PRACTITIONER

## 2021-09-14 PROCEDURE — 1200000000 HC SEMI PRIVATE

## 2021-09-14 PROCEDURE — 6360000002 HC RX W HCPCS: Performed by: FAMILY MEDICINE

## 2021-09-14 PROCEDURE — 82947 ASSAY GLUCOSE BLOOD QUANT: CPT

## 2021-09-14 PROCEDURE — 73718 MRI LOWER EXTREMITY W/O DYE: CPT

## 2021-09-14 PROCEDURE — 2580000003 HC RX 258: Performed by: FAMILY MEDICINE

## 2021-09-14 PROCEDURE — 80048 BASIC METABOLIC PNL TOTAL CA: CPT

## 2021-09-14 PROCEDURE — 97530 THERAPEUTIC ACTIVITIES: CPT

## 2021-09-14 PROCEDURE — 99232 SBSQ HOSP IP/OBS MODERATE 35: CPT | Performed by: NURSE PRACTITIONER

## 2021-09-14 PROCEDURE — 97535 SELF CARE MNGMENT TRAINING: CPT

## 2021-09-14 PROCEDURE — 6360000002 HC RX W HCPCS: Performed by: NURSE PRACTITIONER

## 2021-09-14 PROCEDURE — 2580000003 HC RX 258: Performed by: NURSE PRACTITIONER

## 2021-09-14 PROCEDURE — 97166 OT EVAL MOD COMPLEX 45 MIN: CPT

## 2021-09-14 PROCEDURE — 85027 COMPLETE CBC AUTOMATED: CPT

## 2021-09-14 PROCEDURE — 97116 GAIT TRAINING THERAPY: CPT

## 2021-09-14 PROCEDURE — 97163 PT EVAL HIGH COMPLEX 45 MIN: CPT

## 2021-09-14 PROCEDURE — 36415 COLL VENOUS BLD VENIPUNCTURE: CPT

## 2021-09-14 PROCEDURE — 99254 IP/OBS CNSLTJ NEW/EST MOD 60: CPT | Performed by: INTERNAL MEDICINE

## 2021-09-14 RX ADMIN — GABAPENTIN 900 MG: 300 CAPSULE ORAL at 21:47

## 2021-09-14 RX ADMIN — GLIPIZIDE 20 MG: 10 TABLET ORAL at 06:22

## 2021-09-14 RX ADMIN — HYDROCODONE BITARTRATE AND ACETAMINOPHEN 2 TABLET: 5; 325 TABLET ORAL at 06:21

## 2021-09-14 RX ADMIN — INSULIN LISPRO 9 UNITS: 100 INJECTION, SOLUTION INTRAVENOUS; SUBCUTANEOUS at 11:51

## 2021-09-14 RX ADMIN — ACETAMINOPHEN 650 MG: 325 TABLET ORAL at 20:23

## 2021-09-14 RX ADMIN — INSULIN GLARGINE 10 UNITS: 100 INJECTION, SOLUTION SUBCUTANEOUS at 21:48

## 2021-09-14 RX ADMIN — SODIUM CHLORIDE: 9 INJECTION, SOLUTION INTRAVENOUS at 07:48

## 2021-09-14 RX ADMIN — ALOGLIPTIN 12.5 MG: 12.5 TABLET, FILM COATED ORAL at 09:06

## 2021-09-14 RX ADMIN — ASPIRIN 81 MG: 81 TABLET, COATED ORAL at 09:06

## 2021-09-14 RX ADMIN — GABAPENTIN 900 MG: 300 CAPSULE ORAL at 09:06

## 2021-09-14 RX ADMIN — INSULIN LISPRO 3 UNITS: 100 INJECTION, SOLUTION INTRAVENOUS; SUBCUTANEOUS at 08:01

## 2021-09-14 RX ADMIN — HYDROCODONE BITARTRATE AND ACETAMINOPHEN 2 TABLET: 5; 325 TABLET ORAL at 11:50

## 2021-09-14 RX ADMIN — GABAPENTIN 900 MG: 300 CAPSULE ORAL at 14:53

## 2021-09-14 RX ADMIN — ENOXAPARIN SODIUM 30 MG: 30 INJECTION SUBCUTANEOUS at 21:51

## 2021-09-14 RX ADMIN — VANCOMYCIN HYDROCHLORIDE 1500 MG: 5 INJECTION, POWDER, LYOPHILIZED, FOR SOLUTION INTRAVENOUS at 14:00

## 2021-09-14 RX ADMIN — CEFEPIME HYDROCHLORIDE 2000 MG: 2 INJECTION, POWDER, FOR SOLUTION INTRAVENOUS at 12:38

## 2021-09-14 RX ADMIN — INSULIN LISPRO 3 UNITS: 100 INJECTION, SOLUTION INTRAVENOUS; SUBCUTANEOUS at 21:48

## 2021-09-14 RX ADMIN — ATORVASTATIN CALCIUM 20 MG: 20 TABLET, FILM COATED ORAL at 09:06

## 2021-09-14 RX ADMIN — ENOXAPARIN SODIUM 30 MG: 30 INJECTION SUBCUTANEOUS at 08:04

## 2021-09-14 RX ADMIN — INSULIN LISPRO 6 UNITS: 100 INJECTION, SOLUTION INTRAVENOUS; SUBCUTANEOUS at 17:42

## 2021-09-14 RX ADMIN — LISINOPRIL 10 MG: 10 TABLET ORAL at 09:06

## 2021-09-14 RX ADMIN — CEFEPIME HYDROCHLORIDE 2000 MG: 2 INJECTION, POWDER, FOR SOLUTION INTRAVENOUS at 01:20

## 2021-09-14 RX ADMIN — GLIPIZIDE 20 MG: 10 TABLET ORAL at 16:29

## 2021-09-14 ASSESSMENT — PAIN SCALES - GENERAL
PAINLEVEL_OUTOF10: 7
PAINLEVEL_OUTOF10: 1
PAINLEVEL_OUTOF10: 0
PAINLEVEL_OUTOF10: 3
PAINLEVEL_OUTOF10: 8

## 2021-09-14 ASSESSMENT — PAIN DESCRIPTION - PROGRESSION: CLINICAL_PROGRESSION: NOT CHANGED

## 2021-09-14 ASSESSMENT — PAIN DESCRIPTION - PAIN TYPE: TYPE: ACUTE PAIN

## 2021-09-14 ASSESSMENT — PAIN - FUNCTIONAL ASSESSMENT: PAIN_FUNCTIONAL_ASSESSMENT: PREVENTS OR INTERFERES SOME ACTIVE ACTIVITIES AND ADLS

## 2021-09-14 ASSESSMENT — PAIN DESCRIPTION - FREQUENCY: FREQUENCY: CONTINUOUS

## 2021-09-14 ASSESSMENT — PAIN DESCRIPTION - ONSET: ONSET: ON-GOING

## 2021-09-14 ASSESSMENT — PAIN DESCRIPTION - LOCATION: LOCATION: FOOT

## 2021-09-14 ASSESSMENT — PAIN DESCRIPTION - DESCRIPTORS: DESCRIPTORS: BURNING;PRESSURE

## 2021-09-14 NOTE — PROGRESS NOTES
Progress Note  Podiatric Medicine and Surgery     Subjective     CC: Diabetic ulceration, right foot    Patient seen and examined at bedside. Physical therapy working with patient. Pain under control. Afebrile, vital signs stable       HPI :  Martha Stewart is a 73 y.o. male seen at [facility] for diabetic foot ulceration to the plantar aspect of the right foot suspected to be infected. Patient was admitted to the hospital on 0606/21 for diabetic foot infection with abscess formation. Patient underwent surgical debridement and layered closure on 06/11/2021. Patient also has underlying Charcot arthropathy. Patient is well established with Dr. Davis Purple was last seen approximately 2 weeks ago. Patient was on a course of IV antimicrobial therapy until 07/04/2021. Patient was also given oral antibiotics afterwards which he finished taking at the end of August.  Over the past 2-3 days patient has noticed increased redness, drainage, pain from the right foot. The most concerning these is his pain as the patient is normally neuropathic. Patient relates that he feels that his arch is collapsing and his foot is becoming unstable. Radiographs of the right foot were obtained in the ED which were negative for any soft tissue emphysema or foreign body. There does appear to be advanced changes of the Charcot arthropathy. Patient denies any trauma to the right foot.        PCP is Alex Hwang MD    ROS: Denies N/V/F/C/SOB/CP/Cough.        Medications:  Scheduled Meds:   aspirin  81 mg Oral Daily    gabapentin  900 mg Oral TID    glipiZIDE  20 mg Oral BID AC    alogliptin  12.5 mg Oral Daily    lisinopril  10 mg Oral Daily    atorvastatin  20 mg Oral Daily    sodium chloride flush  5-40 mL IntraVENous 2 times per day    cefepime  2,000 mg IntraVENous Q12H    insulin lispro  0-18 Units SubCUTAneous TID WC    insulin lispro  0-9 Units SubCUTAneous Nightly    enoxaparin  30 mg SubCUTAneous BID    vancomycin 1,500 mg IntraVENous Q24H    vancomycin (VANCOCIN) intermittent dosing (placeholder)   Other RX Placeholder    insulin glargine  10 Units SubCUTAneous Nightly       Continuous Infusions:   sodium chloride 75 mL/hr at 21 0748    sodium chloride      dextrose         PRN Meds:albuterol sulfate HFA, sodium chloride flush, sodium chloride, potassium chloride **OR** potassium alternative oral replacement **OR** potassium chloride, magnesium sulfate, ondansetron **OR** ondansetron, polyethylene glycol, acetaminophen **OR** acetaminophen, fentanNYL, HYDROmorphone, glucose, dextrose, glucagon (rDNA), dextrose, HYDROcodone 5 mg - acetaminophen **OR** HYDROcodone 5 mg - acetaminophen    Objective     Vitals:  Patient Vitals for the past 8 hrs:   BP Temp Temp src Pulse Resp SpO2 Weight   21 0922 130/80 98.1 °F (36.7 °C) Oral 82 16 97 % --   21 0646 -- -- -- -- -- -- 253 lb 12.8 oz (115.1 kg)     Average, Min, and Max for last 24 hours Vitals:  TEMPERATURE:  Temp  Av.2 °F (36.8 °C)  Min: 98.1 °F (36.7 °C)  Max: 98.3 °F (36.8 °C)    RESPIRATIONS RANGE: Resp  Av.7  Min: 16  Max: 21    PULSE RANGE: Pulse  Av.7  Min: 82  Max: 97    BLOOD PRESSURE RANGE:  Systolic (69ECB), VPW:776 , Min:92 , VXJ:419   ; Diastolic (60SOV), UQB:62, Min:75, Max:80      PULSE OXIMETRY RANGE: SpO2  Av.5 %  Min: 94 %  Max: 97 %    I/O last 3 completed shifts:  In: -   Out: 700 [Urine:700]    CBC:  Recent Labs     21  05   WBC 10.7 9.5   HGB 12.9* 12.3*   HCT 38.1* 37.1*    200   .8*  --         BMP:  Recent Labs     21  05   * 132*   K 4.8 4.6   CL 95* 100   CO2 24 24   BUN 22 21   CREATININE 1.49* 1.50*   GLUCOSE 383* 181*   CALCIUM 9.5 9.3        Coags:  No results for input(s): APTT, PROT, INR in the last 72 hours.     Lab Results   Component Value Date    SEDRATE 37 (H) 2021     Recent Labs     21  1104   .8*       Lower Extremity Physical Exam:  Vascular: DP and PT pulses are palpable. CFT brisk to all digits. Hair growth is absent to the level of the digits. Moderate edema.       Neuro: Saph/sural/SP/DP/plantar sensation diminished to light touch.     Musculoskeletal: Muscle strength is decreased ROM, decreased strength to all lower extremity muscle groups. Gross deformity is present with rocker-bottom foot deformity right lower extremity. Compartments are soft and compressible. No pain with compression of posterior calf. Pain with palpation to medial longitudinal arch at ulceration site.     Dermatologic: Full-thickness thickness ulcer to the level of subcutaneous tissue #1 located plantar medial arch and measures approximately 2.2cm x 2.5cm x 0.3cm. Base is fibrogranular. Periwound skin is hyperkeratotic and macerated. Moderate serosanguineous drainage noted with associated mal odor. Erythema present from the ulceration site extending proximally and medially along the iris pedis to the medial aspect of the ankle with  associated increase in warmth--improved from yesterday's exam. Does not probe to bone, sinus track, or undermine. There is surrounding fluctuance concerning for abscess. No crepitus, or induration. Interdigital maceration absent. Clinical Images:            Imaging:   MRI FOOT RIGHT WO CONTRAST   Final Result   1. Deep ulceration and sinus tract with subjacent complex fluid   collection/phlegmonous changes at the plantar medial midfoot with surrounding   severe cellulitis and induration of the skin. This region measures 3.2 x 1.8   x 1.4 cm. Moderate to severe intertarsal myositis. Mild cellulitis of the   remainder of the foot. 2. Severe degenerative changes/Charcot arthropathy and neuropathic changes at   the TMT joints. 3. No MR evidence for osteomyelitis or osseous destruction at this point.    4. Moderate complex likely infectious tenosynovitis of the flexor tendon to   the 1st digit inseparable from the region of phlegmonous change/early abscess   formation. 5. Moderate degenerative changes of the 1st MTP/MTS joints. 6. Prior osteotomy of the proximal phalanx 3rd digit. 7. Mild-to-moderate degenerative changes of the midfoot. The findings were sent to the Radiology Results [address] at 9:46   am on 9/14/2021to be communicated to a licensed caregiver. XR FOOT RIGHT (MIN 3 VIEWS)   Final Result   Similar-appearing osseous structures with Charcot deformity of the midfoot. Plantar soft tissue swelling. Cultures:   Right foot, 9/13: GPC, GNR, Staph aureus, normal [place]     Assessment   Vilma Jones is a 73 y.o. male with   1. Cellulitis, right foot-improving  2. Early abscess, right foot  3. Charcot arthropathy, right foot  4. Diabetes mellitus type 2 with associated peripheral apathy  5. PAD  6. Status post I&D, right foot (DOS 06/11/2021)    Principal Problem:    Diabetic infection of right foot ([address]  Active Problems:    Essential hypertension    Hyperlipidemia    Diabetic polyneuropathy associated with type 2 diabetes mellitus ([address]    Type 2 diabetes mellitus treated with insulin ([address]    Wound dehiscence    Hyperglycemia due to diabetes mellitus ([address]  Resolved Problems:    * No resolved hospital problems. *       Plan     · Patient examined and evaluated at bedside   · All treatment options discussed in detail with the patient  · MRI of the right foot reviewed and discussed in detail with patient--early abscess/phlegmon at medial midfoot, acute charcot changes, tenosynovitis of flexor hallucis longus, myositis, and cellulitis. This will likely need surgical treatment, patient understands and is agreeable to plan. · Continue medical management per primary, thank you. · ID consulted  ? IV antibiotics: vancomycin and cefepime  · Inpatient consult placed for CROW fitting of RLE. · COVID-19 swab ordered.    · Plan is OR for I&D of RLE on 9/16/2021 at 3:45 p.m. · Dressing applied to Right lower extremity: packed with 1/4 inch iodoform, DSD, Ace  · Nonweightbearing to Right lower extremity  · Discussed with Dr. Aimee Olsen.      Annalisa Davis DPM   Podiatric Medicine & Surgery   9/14/2021 at 10:27 AM

## 2021-09-14 NOTE — PROGRESS NOTES
[facility]  Office: [address], DO, Gagandeep [place], DO, Lilli Leeint, DO, Ravi Light Blood, DO, Stefany Andino MD, Amy Jerome MD, Annamarie Vidal MD, Reji Lo MD, Destiney Gamez MD, Magaly Wheeler MD, Jamaica Norton MD, Lamonte Cooks, DO, Holly Funk, DO, Amanda Rhodes MD,  Zayra Edwards, DO, Sofya Maldonado MD, Rosalinda Ahmadi MD, Paulette Lowry MD, Enrique Cintron MD, , Sadie López MD, Octavio Gayle MD, Rebecca Ortiz MD, Williams Walker [facility], [facility], CNP, Kendal Winston, CNP, Darnell De La Vega, CNS, Jeanette Bradford, CNP, Becky Fontenot, CNP, Isaak Ayers, CNP, Bert Caballero, CNP, Janae Martínez CNP, Brianna Mathur PA-C, Tyler Eckert, [facility], Dvaey Man, CNP, Gerhard Harrell, CNP, Marcel Poe, CNP, Lydia Mcgee, CNP, Jess Mena, CNP, Candace Shah, CNP, Jake Suero, CNP, Alcira [facility], [facility]    Progress Note    9/14/2021    9:26 AM    Name:   Elvira Mclaughlin  MRN:     [id number]     [place]:      [de-identified]   Room:   2010/2010-02  IP Day:  1  Admit Date:  9/13/2021 10:47 AM    PCP:   Lizeth eBllo MD  Code Status:  Full Code    Subjective:     C/C:   Chief Complaint   Patient presents with    Foot Pain    Post-op Problem     pt states that his foot is bleeding from the surgical site      Interval History Status: not changed. Condition stable. Podiatry on board and reports no intention for surgical procedure. Renal function stable. Glycemic control continues. MRI ordered by podiatry. Brief History:     9/13 - Patient has a known chronic diabetic foot infection to the right lower foot. Patient was noted her prior history for several months. Patient had a wound dehiscence several months ago and required wound care and long-term IV antibiotics use administered through a PICC.   Patient reports that the wound is healing well and as of 2 weeks ago at his most recent podiatry appointment healing was progressing as expected. Saturday the patient had his foot examined by his wife who found him to be open and draining. Podiatry was contacted this morning and advised that he needed to report to emergency department. 9/14 - Condition stable. Podiatry on board and reports no intention for surgical procedure. Renal function stable. Glycemic control continues. MRI ordered by podiatry. Review of Systems:     Constitutional:  negative for chills, fevers, sweats  Respiratory:  negative for cough, dyspnea on exertion, shortness of breath, wheezing  Cardiovascular:  negative for chest pain, chest pressure/discomfort, lower extremity edema, palpitations  Gastrointestinal:  negative for abdominal pain, constipation, diarrhea, nausea, vomiting  Musculoskeletal: Negative for arthralgias and myalgias. Skin: Positive for color change (RLE) and wound (RLE). Neurological:  negative for dizziness, headache    Medications: Allergies:     Allergies   Allergen Reactions    Morphine Itching    Other Other (See Comments)    Percocet [Oxycodone-Acetaminophen]      Facial swelling    Dye [Iodides] Rash     Rash and swelling       Current Meds:   Scheduled Meds:    aspirin  81 mg Oral Daily    gabapentin  900 mg Oral TID    glipiZIDE  20 mg Oral BID AC    alogliptin  12.5 mg Oral Daily    lisinopril  10 mg Oral Daily    atorvastatin  20 mg Oral Daily    sodium chloride flush  5-40 mL IntraVENous 2 times per day    cefepime  2,000 mg IntraVENous Q12H    insulin lispro  0-18 Units SubCUTAneous TID WC    insulin lispro  0-9 Units SubCUTAneous Nightly    enoxaparin  30 mg SubCUTAneous BID    vancomycin  1,500 mg IntraVENous Q24H    vancomycin (VANCOCIN) intermittent dosing (placeholder)   Other RX Placeholder    insulin glargine  10 Units SubCUTAneous Nightly     Continuous Infusions:    sodium chloride 75 mL/hr at 09/14/21 0748    sodium chloride      dextrose       PRN Meds: albuterol sulfate 180*       I/O (24Hr): Intake/Output Summary (Last 24 hours) at 9/14/2021 0926  Last data filed at 9/14/2021 0748  Gross per 24 hour   Intake --   Output 1400 ml   Net -1400 ml       Labs:  Hematology:  Recent Labs     09/13/21  1104 09/14/21  0552   WBC 10.7 9.5   RBC 4.27 4.04*   HGB 12.9* 12.3*   HCT 38.1* 37.1*   MCV 89.2 91.8   MCH 30.2 30.4   MCHC 33.9 33.2   RDW 13.4 13.3    200   MPV 9.7 9.8   SEDRATE 37*  --    .8*  --      Chemistry:  Recent Labs     09/13/21  1104 09/14/21  0552   * 132*   K 4.8 4.6   CL 95* 100   CO2 24 24   GLUCOSE 383* 181*   BUN 22 21   CREATININE 1.49* 1.50*   ANIONGAP 10 8*   LABGLOM 47* 47*   GFRAA 57* 57*   CALCIUM 9.5 9.3     Recent Labs     09/13/21  1104 09/13/21  1720 09/13/21  2250 09/14/21  0621   LABA1C 8.7*  --   --   --    POCGLU  --  355* 135* 180*     ABG:No results found for: POCPH, PHART, PH, POCPCO2, NKX7SKK, PCO2, POCPO2, PO2ART, PO2, POCHCO3, LFW7QSC, HCO3, NBEA, PBEA, BEART, BE, THGBART, THB, TLT3WHV, DJHU4CLK, C9DADBQO, O2SAT, FIO2  Lab Results   Component Value Date/Time    SPECIAL RAC, 10ML 09/13/2021 02:15 PM     Lab Results   Component Value Date/Time    CULTURE NO GROWTH 12 HOURS 09/13/2021 02:15 PM       Radiology:  XR FOOT RIGHT (MIN 3 VIEWS)    Result Date: 9/13/2021  Similar-appearing osseous structures with Charcot deformity of the midfoot. Plantar soft tissue swelling. Physical Examination:        General appearance:  alert, cooperative and no distress  Mental Status:  oriented to person, place and time and normal affect  Lungs:  clear to auscultation bilaterally, normal effort  Heart:  regular rate and rhythm, no murmur  Abdomen:  soft, nontender, nondistended, normal bowel sounds, no masses, hepatomegaly, splenomegaly  Extremities:  no edema, redness, tenderness in the calves. Wound present. Dressing in place and not taken down by internal medicine. No swelling or tenderness. Normal range of motion.    Skin:  no gross lesions, rashes, induration. Dressing in place. Assessment:        Hospital Problems         Last Modified POA    * (Principal) Diabetic infection of right foot ([address] 9/13/2021 Yes    Essential hypertension 9/13/2021 Yes    Hyperlipidemia 9/13/2021 Yes    Diabetic polyneuropathy associated with type 2 diabetes mellitus ([address] 9/13/2021 Yes    Type 2 diabetes mellitus treated with insulin ([address] 9/13/2021 Yes    Wound dehiscence 9/13/2021 Yes    Hyperglycemia due to diabetes mellitus ([address] 9/13/2021 Yes          Plan:        Diabetic foot infection secondary to diabetic polyneuropathy associated with type 2 diabetes and hyperglycemia with wound dehiscence  Podiatry consultation and wound dressings   Vancomycin and cefepime at podiatry recommendation  Infectious disease consultation, ?   PICC  Glycemic control with glipizide, Lantus, sliding scale, A1c 8.7  Essential hypertension and hyperlipidemia  Lisinopril  Lipitor       ROCHELLE Pierre NP  9/14/2021  9:26 AM

## 2021-09-14 NOTE — PLAN OF CARE
Problem: Pain:  Goal: Pain level will decrease  Description: Pain level will decrease  Outcome: Ongoing     Problem: Infection:  Goal: Will remain free from infection  Description: Will remain free from infection  Outcome: Ongoing     Problem: Safety:  Goal: Free from accidental physical injury  Description: Free from accidental physical injury  Outcome: Ongoing     Problem: Daily Care:  Goal: Daily care needs are met  Description: Daily care needs are met  Outcome: Ongoing     Problem: Skin Integrity:  Goal: Skin integrity will stabilize  Description: Skin integrity will stabilize  Outcome: Ongoing     Problem: Discharge Planning:  Goal: Patients continuum of care needs are met  Description: Patients continuum of care needs are met  Outcome: Ongoing

## 2021-09-14 NOTE — CONSULTS
[facility]  Initial Consult Note  Date: 9/14/2021    Hospital day :1     Impression:   1. Charcot arthropathy of the right foot. 2. History of right foot abscess status post I&D 6/8/2021 and had MSSA infection at that time. 3. Residual wound now with wound dehiscence and secondary infection and abscess and culture with Staph aureus as well as a nonlactose fermenting gram-negative sandrita    Recommendations   · Continue intravenous antimicrobial therapy with cefepime. · Given the patient previously had MSSA I will stop the vancomycin. · The patient is scheduled for surgical debridement/drainage in the operating room 9/16/2021  · I will follow the clinical progress and culture data and adjust therapy accordingly    Chief complaint/reason for consultation:   Diabetic foot infection/wound dehiscence    History of Present Illness:   Kelvin Vaca is a 73y.o.-year-old male who was initially admitted on 9/13/2021. Chantel Salas has a history of diabetes mellitus with Charcot arthropathy related to diabetic and neuropathy. The patient has a history of a puncture wound to the right foot and subsequently developed an abscess in the medial aspect of the foot for which he had an I&D done on 6/8/2021 with cultures growing out MSSA. The patient was treated with intravenous antimicrobial therapy with cefazolin through July 4, 2021 and he has been following with his podiatrist since that time and he has had a wound which she was undergoing local wound care. The patient reports that over the weekend he started to notice some drainage/bleeding from his right foot wound as well as some increasing redness but no significant soft tissue swelling. He never had any fevers or chills. No sweats, nausea or vomiting.     Due to the above symptoms he ended up coming into the emergency department and he was found to have diabetic foot wound with secondary soft tissue infection/cellulitis of the right foot with possible neurological manifestations ([facility] [address] 8/4/2018    Wound dehiscence     Wound, open     Left Ball of  foot, pt. stepped on sharp object. Covered by dressing.     Wound, open     Right posterior -Diabetic Ulcer     Past Surgical  History:     Past Surgical History:   Procedure Laterality Date    APPENDECTOMY      at age 24    ARTHROPLASTY Right 12/11/2020    RIGHT HALLUX EXOSTECTOMY AND 3RD DIGIT EXTENSIOR TENOTOMY performed by Josiah Garcia DPM at [address] [facility] Left 04/24/2018    I&D foreign body removal    FOOT DEBRIDEMENT Right 3/20/2020    RIGHT  FOOT   INCISION AND DRAINAGE WITH BONE BIOPSY performed by Josiah Garcia DPM at [address] [place] [address] Right 6/8/2021    FOOT DEBRIDEMENT INCISION AND DRAINAGE performed by Josiah Garcia DPM at [address] [place] [address] Right 6/11/2021    RIGHT FOOT FLAP CLOSURE performed by Josiah Garcia DPM at [place] [address] ARTHROSCOPY Left 1990   [facility] Bilateral 1983    arthroscopy   [facility]    [place] DEEP [address] INFEC,1 BUR[place] Left 4/24/2018    LEFT FOOT MULTIPLE  INCISIONS  AND DRAINAGE AND REMOVAL FOREIGN BODY  IRENE performed by Josiah Garcia DPM at [facility]     Medications:      aspirin  81 mg Oral Daily    gabapentin  900 mg Oral TID    glipiZIDE  20 mg Oral BID AC    alogliptin  12.5 mg Oral Daily    lisinopril  10 mg Oral Daily    atorvastatin  20 mg Oral Daily    sodium chloride flush  5-40 mL IntraVENous 2 times per day    cefepime  2,000 mg IntraVENous Q12H    insulin lispro  0-18 Units SubCUTAneous TID WC    insulin lispro  0-9 Units SubCUTAneous Nightly    enoxaparin  30 mg SubCUTAneous BID    vancomycin  1,500 mg IntraVENous Q24H    vancomycin (VANCOCIN) intermittent dosing (placeholder)   Other RX Placeholder    insulin glargine  10 Units SubCUTAneous Nightly     Social History:     Social History     Socioeconomic History    Marital pain.  Genitourinary: No increased urinary frequency, or dysuria. Musculoskeletal: Right foot swelling, drainage, redness  Hematologic: No bleeding or bruising. Neurologic: No headache, weakness, numbness, or tingling. Physical Examination :   /67   Pulse 71   Temp 97.5 °F (36.4 °C) (Oral)   Resp 18   Ht 5' 11\" (1.803 m)   Wt 253 lb 12.8 oz (115.1 kg)   SpO2 98%   BMI 35.40 kg/m²     Temperature Range: Temp: 97.5 °F (36.4 °C) Temp  Av.1 °F (36.7 °C)  Min: 97.5 °F (36.4 °C)  Max: 98.4 °F (36.9 °C)  General Appearance: Awake, alert, and in no apparent distress  Head: Normocephalic, without obvious abnormality, atraumatic  Eyes: Pupils equal, round, reactive, to light and accommodation; extraocular movements intact; sclera anicteric; conjunctivae pink  ENT: Oropharynx clear, without erythema, exudate, or thrush. Neck: Supple, without lymphadenopathy. Pulmonary/Chest: Clear to auscultation, without wheezes, rales, or rhonchi  Cardiovascular: Regular rate and rhythm without murmurs, rubs, or gallops. Abdomen: Soft, nontender, nondistended. Extremities: No cyanosis, clubbing, edema, or effusions. Neurologic: No gross sensory or motor deficits. Skin: Warm and dry with no rash. The dressing on the right foot was not removed. The pictures from the right foot were reviewed and attached below. Medical Decision Making:   I have independently reviewed/ordered the following labs:  CBC with Differential:   Recent Labs     21  1104 21  0552   WBC 10.7 9.5   HGB 12.9* 12.3*   HCT 38.1* 37.1*    200   LYMPHOPCT 18*  --    MONOPCT 7  --      BMP:   Recent Labs     21  1104 21  0552   * 132*   K 4.8 4.6   CL 95* 100   CO2 24 24   BUN 22 21   CREATININE 1.49* 1.50*     Hepatic Function Panel: No results for input(s): PROT, LABALBU, BILIDIR, IBILI, BILITOT, ALKPHOS, ALT, AST in the last 72 hours.     No results found for: PROCAL    Lab Results   Component Value Date    .8 09/13/2021    CRP 33.7 06/10/2021    CRP 63.4 06/06/2021     No results found for: FERRITIN  No results found for: FIBRINOGEN  No results found for: DDIMER  No results found for: LDH    Lab Results   Component Value Date    SEDRATE 37 (H) 09/13/2021       Lab Results   Component Value Date    COVID19 DETECTED 05/26/2021    COVID19 Not Detected 12/07/2020    COVID19 Not Detected 08/08/2020     No results found for requested labs within last 30 days. Imaging Studies:   THREE XRAY VIEWS OF THE RIGHT FOOT 9/13/2021 10:59 am FINDINGS:   Similar-appearing osseous structures with Charcot deformity of the midfoot. Plantar soft tissue swelling. MRI OF THE RIGHT FOOT WITHOUT CONTRAST, 9/14/2021 8:14 am  FINDINGS:   1. Deep ulceration and sinus tract with subjacent complex fluid collection/phlegmonous changes at the plantar medial midfoot with surrounding severe cellulitis and induration of the skin. This region measures 3.2 x 1.8 x 1.4 cm. Moderate to severe intertarsal myositis. Mild cellulitis of the remainder of the foot. 2. Severe degenerative changes/Charcot arthropathy and neuropathic changes at the TMT joints. 3. No MR evidence for osteomyelitis or osseous destruction at this point. 4. Moderate complex likely infectious tenosynovitis of the flexor tendon to the 1st digit inseparable from the region of phlegmonous change/early abscess formation. 5. Moderate degenerative changes of the 1st MTP/MTS joints. 6. Prior osteotomy of the proximal phalanx 3rd digit. 7. Mild-to-moderate degenerative changes of the midfoot. The findings were sent to the Radiology Results [id number] at 9:46 am on 9/14/2021to be communicated to a licensed caregiver. Cultures:     Culture, Blood 1 [[id number]] Collected: 09/13/21 1415   Order Status: Completed Specimen: Blood Updated: 09/14/21 1647    Specimen Description . BLOOD    Special Requests RAC, 10ML    Culture NO GROWTH 23 HOURS   Culture, Blood 2 [[id number]] Collected: 09/13/21 1258   Order Status: Completed Specimen: Blood Updated: 09/14/21 1647    Specimen Description . BLOOD    Special Requests LAC,10ML    Culture NO GROWTH 23 HOURS   COVID-19, Rapid [[id number]]    Order Status: Canceled Specimen: Nasopharyngeal Swab    Culture, Wound [[id number]] (Abnormal) Collected: 09/13/21 1245   Order Status: Completed Specimen: Foot Updated: 09/14/21 1201    Specimen Description . FOOT RIGHT    Special Requests NOT REPORTED    Direct Exam MODERATE GRAM POSITIVE COCCI IN PAIRSAbnormal      FEW GRAM NEGATIVE RODSAbnormal      NO NEUTROPHILS SEEN    Culture STAPHYLOCOCCUS AUREUS HEAVY GROWTHAbnormal      NON LACTOSE FERMENTING GRAM NEGATIVE RODS LIGHT GROWTHAbnormal      NORMAL SKIN FLORAAbnormal            Thank you for allowing us to participate in the care of this patient. Please call with questions. Electronically signed by Gavi Turk MD on 9/14/2021 at 5:33 PM      [facility]  Gavi Turk MD  [employer] messaging  OFFICE: [phone]      This note is created with the assistance of a speech recognition program.  While intending to generate a document that actually reflects the content of the visit, the document can still have some errors including those of syntax and sound a like substitutions which may escape proof reading. In such instances, actual meaning can be extrapolated by contextual diversion.

## 2021-09-14 NOTE — PROGRESS NOTES
therapy and benefits of being up OOB as able, weight shifting and postural control  REQUIRES OT FOLLOW UP: Yes  Activity Tolerance  Activity Tolerance: Patient limited by fatigue;Patient limited by pain (limited by pt is set in own ways and resisitive to safety education)  Activity Tolerance: poor plus; pt fatigues easily  Safety Devices  Safety Devices in place: Yes  Type of devices: Call light within reach; Chair alarm in place; Left in chair;Patient at risk for falls;Gait belt;Nurse notified (BLE's elevated on stool/pillow under to prevent skin issues/off load and adhere to MD orders; podiatrist in room with pt at exit)           Patient Diagnosis(es): The primary encounter diagnosis was Ulcer of right foot with other severity ([address] A diagnosis of Cellulitis of right foot was also pertinent to this visit. has a past medical history of Abscess of right foot, Acquired hammer toe deformity of lesser toe of right foot, ALEJANDRO (acute kidney injury) ([address] Cellulitis, Cellulitis of left foot, Cellulitis of right foot, Charcot foot due to diabetes mellitus ([address] Chest pain at rest, Chronic multifocal osteomyelitis of right foot ([address] Diabetic polyneuropathy associated with type 2 diabetes mellitus ([address] Essential hypertension, Fractures, multiple, Hyperlipidemia, Hypertension, Leukocytosis, Neuropathy, Pain in right foot, Pneumonia, Right foot infection, Right foot pain, Tobacco abuse, Type II or unspecified type diabetes mellitus without mention of complication, not stated as uncontrolled, Vertigo, 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 has a past surgical history that includes Neck surgery (1987); Appendectomy; knee surgery (Bilateral, 1983); Knee arthroscopy (Right, 1989); Knee arthroscopy (Left, 1990); Foot Debridement (Left, 04/24/2018); pr deep dissec foot infec,1 bursa (Left, 4/24/2018); Colonoscopy;  Foot Debridement (Right, 3/20/2020); arthroplasty (Right, 12/11/2020); Foot Debridement (Right, 6/8/2021); and Foot surgery (Right, 6/11/2021).         PER H&P: Dorian Hopkins is a 73 y.o. Non- / non  male who presents with Foot Pain and Post-op Problem (pt states that his foot is bleeding from the surgical site )   and is admitted to the hospital for the management of Diabetic infection of right foot ([address]    Patient has a known chronic diabetic foot infection to the right lower foot. Patient was noted her prior history for several months. Patient had a wound dehiscence several months ago and required wound care and long-term IV antibiotics use administered through a PICC. Patient reports that the wound is healing well and as of 2 weeks ago at his most recent podiatry appointment healing was progressing as expected. Saturday the patient had his foot examined by his wife who found him to be open and draining. Podiatry was contacted this morning and advised that he needed to report to emergency department. Restrictions  Restrictions/Precautions  Restrictions/Precautions: Weight Bearing, Fall Risk, General Precautions, Up as Tolerated, Contact Precautions  Lower Extremity Weight Bearing Restrictions  Right Lower Extremity Weight Bearing: Non Weight Bearing  Position Activity Restriction  Other position/activity restrictions: alarms, LUE IV, BR with BRP, elevate afected limb    Subjective   General  Chart Reviewed: Yes  Patient assessed for rehabilitation services?: Yes  Family / Caregiver Present: No  Patient Currently in Pain: Yes  Pre Treatment Pain Screening  Intervention List: Nurse/Physician notified  Comments / Details: Pt states his R foot hurts and rates 9/10 and states he is likely due for pain meds soon.   Vital Signs  Patient Currently in Pain: Yes    Social/Functional History  Social/Functional History  Lives With: Family (wife and grandson- both work during day)  Type of Home: [address] [place] [address],[address]: One level  Home Access: Stairs to enter with rails  Entrance Stairs - Number of Steps: 3  Entrance Stairs - Rails: Both  Bathroom Shower/Tub: Tub/Shower unit  Bathroom Toilet: Standard  Home Equipment: Rolling walker, Standard walker, 4 wheeled walker, Quad cane (knee scooter)  ADL Assistance: Independent  Homemaking Assistance: Independent  Ambulation Assistance: Independent  Transfer Assistance: Independent  Active : Yes  Occupation: On disability  Leisure & Hobbies: fishing  Additional Comments: Reports 2 falls in last week- unsure what caused fall;  overall states balance has \"not been good for two weeks\" and is unsure why       Objective   Vision: Impaired  Vision Exceptions: Wears glasses at all times (pt denies visual changes)  Hearing: Within functional limits    Orientation  Overall Orientation Status: Within Functional Limits  Observation/Palpation  Posture: Fair (with RW)  Observation: Pt does not adhere to RLE NWB despite max verbal instruction/demo and is impulsive/poor safety awareness in function. Scar: R foot with dressing and ace wrapped  Balance  Sitting Balance: Contact guard assistance (CG to SBA)  Standing Balance: Moderate assistance (x2 with RW)  Standing Balance  Time: stand venkata < 1 min and pt fatigues easily  Functional Mobility  Functional - Mobility Device: Rolling Walker  Activity:  (bed and hopping towards door/turning around and back to chair)  Assist Level: Moderate assistance (x2 for safety/balance and pt not adhering to RLE NWB despite edu/demo)  Functional Mobility Comments: MAX verbal instruction/tactile assist and demo for upright posture, pursed lip breathing, weight shifting, extending BUE's on AD to increase support, proper RLE position to maintain NWB/importance of 100% adherence to MD orders, pacing, slowing down movements, RW safety/mgt and awareness/assist with IV pole/line to increase overall safety/reduce falls. Pt with poor-fair carry over.     Toilet Transfers  Toilet Transfers Comments: N/T and pt with no needs during session     ADL  Feeding: Independent  Grooming: Setup;Stand by assistance (seated)  UE Bathing: Setup;Stand by assistance  LE Bathing: Setup;Dependent/Total  UE Dressing: Setup;Stand by assistance (of t-shirt)  LE Dressing: Setup;Dependent/Total (pt was able to jeannette L sock seated EOB with set up/SBA; x2 staff for clothing mgt with RW due to RLE NWB)  Toileting: Dependent/Total  Additional Comments: *Pt is DEP when up with RW due to 2 staff assist for balance support/safety and due to RLE NWB. Pt is impulsive and does not adhere to RLE NWB despite edu/demo of importance of adherence to MD order 100%. Podatrist in during end of session and edu pt again on importance of 100% adherence to RLE NWB. Tone RUE  RUE Tone: Normotonic  Tone LUE  LUE Tone: Normotonic  Coordination  Movements Are Fluid And Coordinated: No     Bed mobility  Supine to Sit: Stand by assistance  Sit to Supine: Unable to assess (pt agreed to sit up in chair after edu on the benefits of being up OOB as able)  Comment: Pt with good use of rail and min cues for pursed lip breathing tech vs holding breath. Transfers  Stand Step Transfers: Moderate assistance;2 Person assistance (with RW)  Sit to stand: Moderate assistance;2 Person assistance  Stand to sit: Moderate assistance;2 Person assistance  Transfer Comments: MAX verbal instruction/tactile assist and demo for B hand placement, nose over toes, upright posture, RW safety/mgt, pacing, pursed lip breathing, weight shifting, proper position of RLE/NWB status and importance of adherence, squaring self/AD up to surface prior to sitting and controlled as well as awareness/assist with lines to increase overall safety. Cognition  Overall Cognitive Status: Exceptions  Arousal/Alertness: Appropriate responses to stimuli  Following Commands:  Follows all commands without difficulty  Attention Span: Appears intact  Memory: Appears intact  Safety Judgement: Decreased awareness of need for safety;Decreased awareness of need for assistance  Problem Solving: Assistance required to implement solutions;Assistance required to correct errors made;Assistance required to generate solutions;Assistance required to identify errors made;Decreased awareness of errors  Insights: Not aware of deficits  Initiation: Requires cues for some  Sequencing: Requires cues for some  Cognition Comment: Pt is impulsive in function and resisistive at times to education regarding safety/importance of maintaining RLE NWB. Pt with poor-fair follow through. Perception  Overall Perceptual Status: WFL     Sensation  Overall Sensation Status: WFL (for BUE's; pt states he has no feeling in his B feet except some pressure)        LUE AROM (degrees)  LUE AROM : WFL  RUE AROM (degrees)  RUE AROM : WFL  LUE Strength  Gross LUE Strength: WFL  LUE Strength Comment: BUE strength grossly 4 plus/5  RUE Strength  Gross RUE Strength: WFL                   Plan   Plan  Times per week: 4-5x/week 1x/day as venkata  Current Treatment Recommendations: Strengthening, Balance Training, Functional Mobility Training, Safety Education & Training, Positioning, Pain Management, Home Management Training, Endurance Training, Neuromuscular Re-education, Patient/Caregiver Education & Training, Self-Care / ADL, Equipment Evaluation, Education, & procurement, Cognitive/Perceptual Training                                                  AM-PAC Score   13    Co-treatment with PT warranted secondary to decreased safety and independence requiring 2 skilled therapy professionals to address individual discipline's goals.  OT addressing preparation for ADL transfer, sitting and standing balance for increased ADL performance, sitting/activity tolerance, functional reaching, environmental safety/scanning, fall prevention, functional mobility for ADL transfers, ability to sequence and follow directions and functional UE strength. Goals  Short term goals  Time Frame for Short term goals: by discharge, pt to demo  Short term goal 1: bed mob tasks with use of rail as needed to MOD I. Short term goal 2: I with BUE HEP with use of handouts as needed to maintain strength. Short term goal 3: UB ADL to set up and LB ADL to mod assist of 1 and use of AD/AE as needed. Short term goal 4: toileting tasks with use of AD/BSC and grab bar as needed to mod assist of 1. Short term goal 5: ADL transfers and functional mob with AD to min assist of 1 and 100% adherence to RLE NWB. Long term goals  Long term goal 1: Pt to stand with CG and AD venkata > 5 mins as able to reduce falls in function with 100% adherence to RLE NWB. Long term goal 2: Pt/caregivers to be I with pressure relief, proper positioning, EC/WS and fall prevention tech as well as DME/AE recommendations with use of handouts. Patient Goals   Patient goals : Pt states he wants to go home!        Therapy Time   Individual Concurrent Group Co-treatment   Time In 1025         Time Out 1103         Minutes [address] [place]

## 2021-09-14 NOTE — PROGRESS NOTES
Vancomycin Dosing by Pharmacy - Daily Note   Vancomycin Therapy Day:  2  Indication: Diabetic foot infection    Allergies:  Morphine, Other, Percocet [oxycodone-acetaminophen], and Dye [iodides]   Actual Weight:    Wt Readings from Last 1 Encounters:   09/14/21 253 lb 12.8 oz (115.1 kg)       Labs/Ancillary Data  Estimated Creatinine Clearance: 63 mL/min (A) (based on SCr of 1.5 mg/dL (H)). Recent Labs     09/13/21  1104 09/14/21  0552   CREATININE 1.49* 1.50*   BUN 22 21   WBC 10.7 9.5     No results found for: PROCAL    Intake/Output Summary (Last 24 hours) at 9/14/2021 0709  Last data filed at 9/14/2021 0216  Gross per 24 hour   Intake --   Output 700 ml   Net -700 ml     Temp: 98.3F    Culture Date / Source  /  Results  Culture, Wound [[id number]] (Abnormal)    Collected: 09/13/21 1245    Updated: 09/14/21 1201    Specimen Source: Foot     Specimen Description . FOOT RIGHT    Special Requests NOT REPORTED    Direct Exam MODERATE GRAM POSITIVE COCCI IN PAIRSAbnormal      FEW GRAM NEGATIVE RODSAbnormal      NO NEUTROPHILS SEEN    Culture STAPHYLOCOCCUS AUREUS HEAVY GROWTHAbnormal      NON LACTOSE FERMENTING GRAM NEGATIVE RODS LIGHT GROWTHAbnormal      NORMAL SKIN FLORAAbnormal      Recent vancomycin administrations                     vancomycin (VANCOCIN) 2,500 mg in dextrose 5 % 500 mL IVPB (mg) 2,500 mg New Bag 09/13/21 1409                  Vanco Random: No results for input(s): VANCORANDOM in the last 72 hours. Vanco Trough: No results for input(s): VANCOTROUGH in the last 72 hours. MRSA Nasal Swab: N/A. Non-respiratory infection. Naz Cory PLAN   1. Continue as ordered.   Level tomorrow AM @ 0600      Vancomycin Target Concentration Parameters  Treatment  Population Target AUC/DIMA Target Trough    Invasive MRSA Infection (bacteremia, pneumonia, meningitis, endocarditis, osteomyelitis)   Sepsis (undifferentiated) 400-600 N/A    Infection due to non-MRSA pathogen   Empiric treatment of non-invasive MRSA infection  (SSTI, UTI) <500 10-15 mg/L    CrCl < 29 mL/min   Rapidly fluctuating serum creatinine    ALEJANDRO N/A < 15 mg/L     Renal replacement therapy is dosed by levels, per hospital protocol. Abbreviations  * Pauc: probability that AUC is >400 (efficacy); Pconc: probability that Ctrough is above 20 ?g/mL (toxicity); Tox: Probability of nephrotoxicity, based on Genna et al. Clin Infect Dis 2009. Thank you for the consult. Pharmacy will continue to follow.

## 2021-09-14 NOTE — PROGRESS NOTES
Patient arrived to floor from er via wheelchair. He was oriented to the room, given the call light, area is free of clutter and his belongings are near by.

## 2021-09-14 NOTE — CARE COORDINATION
Case Management Initial Discharge Plan  Richi Zayas,             Met with:patient or spouse/SO to discuss discharge plans. Information verified: address, contacts, phone number, , insurance Yes  PCP: Fab Roblero MD  Date of last visit: 2021    Insurance Provider: [facility] Medicare    Discharge Planning    Living Arrangements:  Spouse/Significant Other   Support Systems:  Spouse/Significant Other    Home has 1 stories  3 stairs to climb to get into front door, 0 stairs to climb to reach second floor  Location of bedroom/bathroom in home  - main floor    Patient able to perform ADL's:Independent    Current Services (outpatient & in home) none  DME equipment: walker, cane, knee scooter, glucometer  DME provider: N/A    Pharmacy: [facility] [place] Needed:  Home Care    Patient agreeable to home care: Yes  Freedom of choice provided:  yes    Prior SNF/Rehab Placement and Facility: No  Agreeable to SNF/Rehab: No  [place] of choice provided: n/a   Evaluation: n/a    Expected Discharge date:  09/15/21  Patient expects to be discharged to: Follow Up Appointment: Best Day/ Time:      Transportation provider: wife  Transportation arrangements needed for discharge: No    Readmission Risk              Risk of Unplanned Readmission:  14             Does patient have a readmission risk score greater than 14?: Yes  If yes, follow-up appointment must be made within 7 days of discharge. Goal of Care:       Discharge Plan: Met with patient and wife at bedside. Lives with wife, independent and drives. Admitted for chronic rt diabetic foot infection. Had surgical debridement in  for abscess. Pt has noticed increased redness and pain from area this past week. Has history of Charcot arthropathy. Follows with Dr. Jarod Pennington. Podiatry and ID consulted.   Currently on IV Maxipime and Vanco.  Had MRI rt foot today - has fluid collection with no evidence of osteomyelitis. Has had Adaline Needles in the past and agreeable to [address] if needed. Potential referral called to [facility] and she will follow. Continue to follow ID and podiatry plan of care.                Electronically signed by Carolyn Barclay RN on 9/14/21 at 12:30 PM EDT

## 2021-09-14 NOTE — ACP (ADVANCE CARE PLANNING)
Advance Care Planning     Advance Care Planning Activator (Inpatient)  Conversation Note      Date of ACP Conversation: 9/14/2021     Conversation Conducted with: Patient with Decision Making Capacity    ACP Activator: Sergio Amador RN    {When Decision Maker makes decisions on behalf of the incapacitated patient: Decision Maker is asked to consider and make decisions based on patient values, known preferences, or best interests. Health Care Decision Maker:     Current Designated Health Care Decision Maker:     Primary Decision Maker: Ra Peaceing - [phone]     Click here to complete Healthcare Decision Makers including section of the Healthcare Decision Maker Relationship (ie \"Primary\")      Care Preferences    Ventilation: \"If you were in your present state of health and suddenly became very ill and were unable to breathe on your own, what would your preference be about the use of a ventilator (breathing machine) if it were available to you? \"      Would the patient desire the use of ventilator (breathing machine)?: yes    \"If your health worsens and it becomes clear that your chance of recovery is unlikely, what would your preference be about the use of a ventilator (breathing machine) if it were available to you? \"     Would the patient desire the use of ventilator (breathing machine)?: No      Resuscitation  \"CPR works best to restart the heart when there is a sudden event, like a heart attack, in someone who is otherwise healthy. Unfortunately, CPR does not typically restart the heart for people who have serious health conditions or who are very sick. \"    \"In the event your heart stopped as a result of an underlying serious health condition, would you want attempts to be made to restart your heart (answer \"yes\" for attempt to resuscitate) or would you prefer a natural death (answer \"no\" for do not attempt to resuscitate)? \" yes       [] Yes   [x] No   Educated Patient / Patsy Hogan regarding differences between Advance Directives and portable DNR orders.     Length of ACP Conversation in minutes:  5    Conversation Outcomes:  [x] ACP discussion completed  [] Existing advance directive reviewed with patient; no changes to patient's previously recorded wishes  [] New Advance Directive completed  [] Portable Do Not Rescitate prepared for Provider review and signature  [] POLST/POST/MOLST/MOST prepared for Provider review and signature      Follow-up plan:    [] Schedule follow-up conversation to continue planning  [] Referred individual to Provider for additional questions/concerns   [] Advised patient/agent/surrogate to review completed ACP document and update if needed with changes in condition, patient preferences or care setting    [] This note routed to one or more involved healthcare providers

## 2021-09-14 NOTE — PROGRESS NOTES
Physical Therapy    Facility/Department: [facility]  Initial Assessment    NAME: Calli Portillo  : 1955  MRN: [id number]    Date of Service: 2021    Discharge Recommendations:    Pt currently functioning below baseline. Would suggest additional therapy at time of discharge to maximize long term outcomes and prevent re-admission. Please refer to AM-PAC score for current level of function. Will continue to assess. Calli Portillo is a 73 y.o. male who presents to the emergency department today by private vehicle for evaluation of some redness and swelling to the right foot. Patient has a history of diabetes and Charcot of the right foot. He has had a wound that has been managed by Dr. Ekaterina Starr. He was on IV antibiotics for 3 weeks back in  when this first started. He states that it started to heal and is only a very small pinpoint area that was still open. He states over the weekend somehow the wound has became open and is ulcerated. There are some significant swelling and redness around the wound. He denies any fevers or chills      Assessment   Assessment: Pt. is strict NWB Rt. LE for acute charcot foot, with potential surgery this admission per podiatry. Up amb. in room with RW and mod x 2. Will continue to progress. Specific instructions for Next Treatment: progress gait, NWB Rt. LE  Prognosis: Good  Decision Making: High Complexity  PT Education: Goals;PT Role;Plan of Care;General Safety;Weight-bearing Education;Gait Training  Patient Education: Pt. appears frustrated with situation and is not receptive to PT/OT edu. REQUIRES PT FOLLOW UP: Yes  Activity Tolerance  Activity Tolerance: Treatment limited secondary to medical complications (free text); Patient limited by endurance; Patient limited by cognitive status       Patient Diagnosis(es): The primary encounter diagnosis was Ulcer of right foot with other severity ([address]  A diagnosis of Cellulitis of right foot was also pertinent to this visit. has a past medical history of Abscess of right foot, Acquired hammer toe deformity of lesser toe of right foot, ALEJANDRO (acute kidney injury) ([address] Cellulitis, Cellulitis of left foot, Cellulitis of right foot, Charcot foot due to diabetes mellitus ([address] Chest pain at rest, Chronic multifocal osteomyelitis of right foot ([address] Diabetic polyneuropathy associated with type 2 diabetes mellitus ([address] Essential hypertension, Fractures, multiple, Hyperlipidemia, Hypertension, Leukocytosis, Neuropathy, Pain in right foot, Pneumonia, Right foot infection, Right foot pain, Tobacco abuse, Type II or unspecified type diabetes mellitus without mention of complication, not stated as uncontrolled, Vertigo, 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 has a past surgical history that includes Neck surgery (1987); Appendectomy; knee surgery (Bilateral, 1983); Knee arthroscopy (Right, 1989); Knee arthroscopy (Left, 1990); Foot Debridement (Left, 04/24/2018); pr deep dissec foot infec,1 bursa (Left, 4/24/2018); Colonoscopy; Foot Debridement (Right, 3/20/2020); arthroplasty (Right, 12/11/2020); Foot Debridement (Right, 6/8/2021); and Foot surgery (Right, 6/11/2021).     Restrictions  Restrictions/Precautions  Restrictions/Precautions: Weight Bearing, Fall Risk, General Precautions, Up as Tolerated, Contact Precautions  Lower Extremity Weight Bearing Restrictions  Right Lower Extremity Weight Bearing: Non Weight Bearing  Position Activity Restriction  Other position/activity restrictions: alarms, LUE IV, BR with BRP, elevate afected limb  Vision/Hearing  Vision: Impaired  Vision Exceptions: Wears glasses at all times  Hearing: Within functional limits     Subjective  General  Chart Reviewed: Yes  Patient assessed for rehabilitation services?: Yes  Family / Caregiver Present: No  Follows Commands: Within Functional Limits  Subjective  Subjective: \"waiting for MRI results to see if surgery is needed\";  9/10 pain bottom of Rt. foot  Pain Screening  Patient Currently in Pain: Yes          Orientation  Orientation  Overall Orientation Status: Within Normal Limits  Social/Functional History  Social/Functional History  Lives With: Family (wife and grandson- both work during day)  Type of Home: House  Home Layout: One level  Home Access: Stairs to enter with rails  Entrance Stairs - Number of Steps: 3  Entrance Stairs - Rails: Both  Bathroom Shower/Tub: Tub/Shower unit  Bathroom Toilet: Standard  Home Equipment: Rolling walker, Standard walker, 4 wheeled walker, Quad cane (knee scooter)  ADL Assistance: Independent  Homemaking Assistance: Independent  Ambulation Assistance: Independent  Transfer Assistance: Independent  Active : Yes  Occupation: On disability  Leisure & Hobbies: fishing  Additional Comments: Reports 2 falls in last week- unsure what caused fall;  overall states balance has \"not been good for two weeks\" and is unsure why  Cognition   Cognition  Overall Cognitive Status: Exceptions  Arousal/Alertness: Appropriate responses to stimuli  Following Commands: Follows all commands without difficulty  Attention Span: Appears intact  Memory: Appears intact  Safety Judgement: Decreased awareness of need for safety;Decreased awareness of need for assistance  Problem Solving: Assistance required to implement solutions;Assistance required to correct errors made;Assistance required to generate solutions;Assistance required to identify errors made;Decreased awareness of errors  Insights: Not aware of deficits  Initiation: Requires cues for some  Sequencing: Requires cues for some  Cognition Comment: Pt is impulsive in function and resisistive at times to education regarding safety/importance of maintaining RLE NWB. Pt with poor-fair follow through.     Objective     Observation/Palpation  Posture: Fair (with RW)  Observation: Pt does not adhere to RLE NWB despite max verbal instruction/demo and is impulsive/poor safety awareness in function. Scar: R foot with dressing and ace wrapped    AROM RLE (degrees)  RLE AROM: WFL  RLE General AROM: ace wrap ankle/foot with minimal available ankle ROM  AROM LLE (degrees)  LLE AROM : WFL  Strength RLE  Strength RLE: WFL  Strength LLE  Strength LLE: WFL  Strength Other  Other: *     Sensation  Overall Sensation Status: Impaired (unable to feel floor when walking; can sense deeper pressure)  Bed mobility  Supine to Sit: Stand by assistance  Sit to Supine: Unable to assess (pt agreed to sit up in chair after edu on the benefits of being up OOB as able)  Comment: Pt. with good use of rail and min cues for pursed lip breathing tech vs. holding breath. Transfers  Sit to Stand: Moderate Assistance;2 Person Assistance (to maintain NWB Rt. LE)  Stand to sit: Minimal Assistance;2 Person Assistance  Comment: Pt. tries to put pressure in heel. Podiatry in room towards end of eval to reiterate with pt. STRICT NWB RT. LE, even in seated position. Ambulation  Ambulation?: Yes  Ambulation 1  Surface: level tile  Device: Rolling Walker  Assistance: Moderate assistance;2 Person assistance  Quality of Gait: moves impulsively; max effort to maintain NWB Rt. LE and ambulate with RW;  VC/TC for shorter length advancement of RW  with straight amb. and when turning (large walker movements unsafe, ian. with turning)  Pt. very unsteady/ fall risk. Distance: 25ft. Comments: up to chair with chair alarm     Balance  Posture: Good  Sitting - Static: Good  Sitting - Dynamic: Good  Standing - Static: Fair (while trying to maintain NWB Rt. LE)        Plan   Plan  Times per week: 1-2x/day; 5-6days/wk  Specific instructions for Next Treatment: progress gait, NWB Rt.  LE  Current Treatment Recommendations: Strengthening, Transfer Training, Endurance Training, ROM, Balance Training, Functional Mobility Training, Gait Training, Safety Education & Training, Home Exercise Program, Patient/Caregiver Education & Training  Safety Devices  Type of devices: All fall risk precautions in place, Gait belt, Patient at risk for falls, Call light within reach, Chair alarm in place, Left in chair    G-Code       OutComes Score                                                  AM-PAC Score  AM-PAC Inpatient Mobility Raw Score : 13 (09/14/21 1344)  AM-PAC Inpatient T-Scale Score : 36.74 (09/14/21 1344)  Mobility Inpatient CMS 0-100% Score: 64.91 (09/14/21 1344)  Mobility Inpatient CMS G-Code Modifier : CL (09/14/21 1344)       Functional Outcome Measure-   Single Leg Stance Test: 0 sec. (<5 sec.= fall risk)     Goals  Short term goals  Time Frame for Short term goals: 12 visits:  Short term goal 1: Pt. to be indep. with bed mob. Short term goal 2: Pt. to be CGA for sit to stand transfer with RW, maintaining NWB Rt. % of time  Short term goal 3: Pt. to be min A x1 for gait with RW, maintaining NWB Rt. % of time, 25ft. Short term goal 4: Pt. to tolerate 25+ min. of PT for ther ex/ gait/ balance / functional mob. training  Short term goal 5: Should pt. d/c home, will need to safely negotiate 3 steps to enter his home, NWB Rt. LE  Patient Goals   Patient goals : ambulate       Therapy Time   Individual Concurrent Group Co-treatment   Time In 1006         Time Out 1054         Minutes 48           Treatment time:  38min. Co-treatment with OT warranted secondary to decreased safety and independence requiring 2 skilled therapy professionals to address individual discipline's goals.         Arlette Rhodes, PT

## 2021-09-14 NOTE — PLAN OF CARE
Problem: Pain:  Goal: Control of acute pain  Description: Control of acute pain  Outcome: Ongoing  Note: Pt reports adequate pain control with current meds     Problem: Falls - Risk of:  Goal: Will remain free from falls  Description: Will remain free from falls  Outcome: Ongoing  Note: Fall safety precautions remain in effect.  Calls for assist appropriately

## 2021-09-15 LAB
GLUCOSE BLD-MCNC: 187 MG/DL (ref 75–110)
GLUCOSE BLD-MCNC: 206 MG/DL (ref 75–110)
GLUCOSE BLD-MCNC: 264 MG/DL (ref 75–110)
GLUCOSE BLD-MCNC: 88 MG/DL (ref 75–110)

## 2021-09-15 PROCEDURE — 6360000002 HC RX W HCPCS: Performed by: NURSE PRACTITIONER

## 2021-09-15 PROCEDURE — 6370000000 HC RX 637 (ALT 250 FOR IP): Performed by: NURSE PRACTITIONER

## 2021-09-15 PROCEDURE — APPSS45 APP SPLIT SHARED TIME 31-45 MINUTES: Performed by: NURSE PRACTITIONER

## 2021-09-15 PROCEDURE — 99232 SBSQ HOSP IP/OBS MODERATE 35: CPT | Performed by: INTERNAL MEDICINE

## 2021-09-15 PROCEDURE — 2580000003 HC RX 258: Performed by: NURSE PRACTITIONER

## 2021-09-15 PROCEDURE — 97530 THERAPEUTIC ACTIVITIES: CPT

## 2021-09-15 PROCEDURE — 97110 THERAPEUTIC EXERCISES: CPT

## 2021-09-15 PROCEDURE — 82947 ASSAY GLUCOSE BLOOD QUANT: CPT

## 2021-09-15 PROCEDURE — 1200000000 HC SEMI PRIVATE

## 2021-09-15 PROCEDURE — 99222 1ST HOSP IP/OBS MODERATE 55: CPT | Performed by: NURSE PRACTITIONER

## 2021-09-15 RX ORDER — LIDOCAINE HYDROCHLORIDE 10 MG/ML
1 INJECTION, SOLUTION EPIDURAL; INFILTRATION; INTRACAUDAL; PERINEURAL
Status: CANCELLED | OUTPATIENT
Start: 2021-09-16 | End: 2021-09-16

## 2021-09-15 RX ORDER — ACETAMINOPHEN 650 MG/1
650 SUPPOSITORY RECTAL EVERY 6 HOURS PRN
Status: DISCONTINUED | OUTPATIENT
Start: 2021-09-15 | End: 2021-09-18 | Stop reason: HOSPADM

## 2021-09-15 RX ORDER — ACETAMINOPHEN 325 MG/1
650 TABLET ORAL EVERY 6 HOURS PRN
Status: DISCONTINUED | OUTPATIENT
Start: 2021-09-15 | End: 2021-09-18 | Stop reason: HOSPADM

## 2021-09-15 RX ORDER — SODIUM CHLORIDE, SODIUM LACTATE, POTASSIUM CHLORIDE, CALCIUM CHLORIDE 600; 310; 30; 20 MG/100ML; MG/100ML; MG/100ML; MG/100ML
INJECTION, SOLUTION INTRAVENOUS CONTINUOUS
Status: CANCELLED | OUTPATIENT
Start: 2021-09-16

## 2021-09-15 RX ADMIN — GLIPIZIDE 20 MG: 10 TABLET ORAL at 06:10

## 2021-09-15 RX ADMIN — SODIUM CHLORIDE: 9 INJECTION, SOLUTION INTRAVENOUS at 16:46

## 2021-09-15 RX ADMIN — GABAPENTIN 900 MG: 300 CAPSULE ORAL at 08:39

## 2021-09-15 RX ADMIN — ALOGLIPTIN 12.5 MG: 12.5 TABLET, FILM COATED ORAL at 08:39

## 2021-09-15 RX ADMIN — ASPIRIN 81 MG: 81 TABLET, COATED ORAL at 08:39

## 2021-09-15 RX ADMIN — ENOXAPARIN SODIUM 30 MG: 30 INJECTION SUBCUTANEOUS at 21:24

## 2021-09-15 RX ADMIN — LISINOPRIL 10 MG: 10 TABLET ORAL at 08:45

## 2021-09-15 RX ADMIN — POLYETHYLENE GLYCOL 3350 17 G: 17 POWDER, FOR SOLUTION ORAL at 09:40

## 2021-09-15 RX ADMIN — INSULIN GLARGINE 10 UNITS: 100 INJECTION, SOLUTION SUBCUTANEOUS at 21:24

## 2021-09-15 RX ADMIN — GABAPENTIN 900 MG: 300 CAPSULE ORAL at 14:13

## 2021-09-15 RX ADMIN — SODIUM CHLORIDE: 9 INJECTION, SOLUTION INTRAVENOUS at 01:03

## 2021-09-15 RX ADMIN — INSULIN LISPRO 5 UNITS: 100 INJECTION, SOLUTION INTRAVENOUS; SUBCUTANEOUS at 21:25

## 2021-09-15 RX ADMIN — CEFEPIME HYDROCHLORIDE 2000 MG: 2 INJECTION, POWDER, FOR SOLUTION INTRAVENOUS at 12:31

## 2021-09-15 RX ADMIN — INSULIN LISPRO 6 UNITS: 100 INJECTION, SOLUTION INTRAVENOUS; SUBCUTANEOUS at 12:28

## 2021-09-15 RX ADMIN — GABAPENTIN 900 MG: 300 CAPSULE ORAL at 21:24

## 2021-09-15 RX ADMIN — ATORVASTATIN CALCIUM 20 MG: 20 TABLET, FILM COATED ORAL at 08:39

## 2021-09-15 RX ADMIN — ENOXAPARIN SODIUM 30 MG: 30 INJECTION SUBCUTANEOUS at 08:40

## 2021-09-15 RX ADMIN — CEFEPIME HYDROCHLORIDE 2000 MG: 2 INJECTION, POWDER, FOR SOLUTION INTRAVENOUS at 01:03

## 2021-09-15 RX ADMIN — GLIPIZIDE 20 MG: 10 TABLET ORAL at 16:46

## 2021-09-15 RX ADMIN — ACETAMINOPHEN 650 MG: 325 TABLET ORAL at 08:39

## 2021-09-15 RX ADMIN — INSULIN LISPRO 3 UNITS: 100 INJECTION, SOLUTION INTRAVENOUS; SUBCUTANEOUS at 08:40

## 2021-09-15 ASSESSMENT — ENCOUNTER SYMPTOMS
ALLERGIC/IMMUNOLOGIC NEGATIVE: 1
GASTROINTESTINAL NEGATIVE: 1
RESPIRATORY NEGATIVE: 1

## 2021-09-15 ASSESSMENT — PAIN DESCRIPTION - PROGRESSION: CLINICAL_PROGRESSION: NOT CHANGED

## 2021-09-15 ASSESSMENT — PAIN SCALES - GENERAL
PAINLEVEL_OUTOF10: 3
PAINLEVEL_OUTOF10: 3
PAINLEVEL_OUTOF10: 4
PAINLEVEL_OUTOF10: 7
PAINLEVEL_OUTOF10: 0
PAINLEVEL_OUTOF10: 0

## 2021-09-15 ASSESSMENT — PAIN DESCRIPTION - FREQUENCY
FREQUENCY: CONTINUOUS
FREQUENCY: CONTINUOUS

## 2021-09-15 ASSESSMENT — PAIN DESCRIPTION - ORIENTATION
ORIENTATION: RIGHT
ORIENTATION: RIGHT

## 2021-09-15 ASSESSMENT — PAIN DESCRIPTION - PAIN TYPE
TYPE: CHRONIC PAIN
TYPE: CHRONIC PAIN

## 2021-09-15 ASSESSMENT — PAIN DESCRIPTION - ONSET
ONSET: ON-GOING
ONSET: ON-GOING

## 2021-09-15 ASSESSMENT — PAIN DESCRIPTION - DESCRIPTORS
DESCRIPTORS: BURNING;PRESSURE
DESCRIPTORS: PRESSURE;BURNING

## 2021-09-15 ASSESSMENT — PAIN DESCRIPTION - LOCATION
LOCATION: FOOT
LOCATION: FOOT

## 2021-09-15 ASSESSMENT — PAIN - FUNCTIONAL ASSESSMENT
PAIN_FUNCTIONAL_ASSESSMENT: PREVENTS OR INTERFERES SOME ACTIVE ACTIVITIES AND ADLS
PAIN_FUNCTIONAL_ASSESSMENT: PREVENTS OR INTERFERES SOME ACTIVE ACTIVITIES AND ADLS

## 2021-09-15 NOTE — PLAN OF CARE
Problem: Pain:  Goal: Patient's pain/discomfort is manageable  Description: Patient's pain/discomfort is manageable  Outcome: Ongoing     Problem: Safety:  Goal: Free from accidental physical injury  Description: Free from accidental physical injury  Outcome: Ongoing     Problem: Daily Care:  Goal: Daily care needs are met  Description: Daily care needs are met  Outcome: Ongoing     Problem: Skin Integrity:  Goal: Skin integrity will stabilize  Description: Skin integrity will stabilize  Outcome: Ongoing

## 2021-09-15 NOTE — PROGRESS NOTES
[facility]  Progress Note    Trino Stafford  MRN: [id number]  Date: 9/15/2021  LOS: 2     Reason for F/U :   Right-sided diabetic foot infection    Impression :   Charcot arthropathy of the right foot. History of right foot abscess status post I&D 6/8/2021 and had MSSA infection at that time. Residual wound now with wound dehiscence and secondary infection and abscess and culture with Staph aureus as well as a nonlactose fermenting gram-negative sandrita    Recommendations:   Continue intravenous antimicrobial therapy with cefepime. The patient is [place] for surgical debridement to the operating room tomorrow. The patient currently has staph aureus isolated as well as a nonlactose fermenting gram-negative sandrita yet to be identified. We will follow the culture data and operative findings and make final recommendations thereafter    Infection Control Recommendations:   Universal precautions    Discharge Planning:   Estimated Length of IV antimicrobials: To be determined  Patient will need Midline Catheter Insertion/ PICC line Insertion: No  Patient will need: Home IV , [place],  SNF,  LTAC: Undetermined  Patient willneed outpatient wound care: No    Medical Decision making / Summary of Stay:   Trino Stafford is a 73y.o.-year-old male who was initially admitted on 9/13/2021. Triston Melendrez has a history of diabetes mellitus with Charcot arthropathy related to diabetic and neuropathy. The patient has a history of a puncture wound to the right foot and subsequently developed an abscess in the medial aspect of the foot for which he had an I&D done on 6/8/2021 with cultures growing out MSSA.   The patient was treated with intravenous antimicrobial therapy with cefazolin through July 4, 2021 and he has been following with his podiatrist since that time and he has had a wound which she was undergoing local wound care.     The patient reports that over the weekend he started to notice some drainage/bleeding from his right foot wound as well as some increasing redness but no significant soft tissue swelling. He never had any fevers or chills. No sweats, nausea or vomiting.     Due to the above symptoms he ended up coming into the emergency department and he was found to have diabetic foot wound with secondary soft tissue infection/cellulitis of the right foot with possible abscess. There was some wound dehiscence following the prior surgical incision. X-rays do not show any changes of osteomyelitis or emphysema or foreign body. MRI was ordered and the patient was started on IV antimicrobial therapy.     The MRI showed a deep ulceration and sinus tract with subadjacent complex fluid collection/phlegmon was changes at the plantar medial midfoot with surrounding severe cellulitis and induration of the skin. There is moderate to severe intertarsal myositis and mild cellulitis of the remainder of the foot.     The patient is scheduled for surgical debridement in the operating room on 2021. I was asked to evaluate and help with antibiotic choice. Current evaluation:9/15/2021    BP (!) 141/70   Pulse 73   Temp 97.9 °F (36.6 °C) (Oral)   Resp 16   Ht 5' 11\" (1.803 m)   Wt 253 lb 12.9 oz (115.1 kg)   SpO2 98%   BMI 35.40 kg/m²     Temperature Range: Temp: 97.9 °F (36.6 °C) Temp  Av.1 °F (36.7 °C)  Min: 97.9 °F (36.6 °C)  Max: 98.2 °F (36.8 °C)  The patient is seen and evaluated at bedside and he is awake and alert in no acute distress. Is sitting up in the chair at bedside does not report any subjective fevers or chills. No abdominal pain nausea vomiting or diarrhea. No chest pains or palpitations. Review of Systems   Constitutional: Negative. Respiratory: Negative. Cardiovascular: Negative. Gastrointestinal: Negative. Genitourinary: Negative. Musculoskeletal: Negative. Skin: Positive for wound. Allergic/Immunologic: Negative. Neurological: Negative. Physical Examination :     Physical Exam  Constitutional:       Appearance: He is well-developed. HENT:      Head: Normocephalic and atraumatic. Cardiovascular:      Rate and Rhythm: Normal rate. Heart sounds: Normal heart sounds. No friction rub. No gallop. Pulmonary:      Effort: Pulmonary effort is normal.      Breath sounds: Normal breath sounds. No wheezing. Abdominal:      General: Bowel sounds are normal.      Palpations: Abdomen is soft. There is no mass. Tenderness: There is no abdominal tenderness. Musculoskeletal:      Cervical back: Normal range of motion and neck supple. Lymphadenopathy:      Cervical: No cervical adenopathy. Skin:     General: Skin is warm and dry. Comments: There is a dressing in the right foot which was not removed   Neurological:      Mental Status: He is alert and oriented to person, place, and time. Laboratory data:   I have independently reviewed the followinglabs:  CBC with Differential:   Recent Labs     09/13/21  1104 09/14/21  0552   WBC 10.7 9.5   HGB 12.9* 12.3*   HCT 38.1* 37.1*    200   LYMPHOPCT 18*  --    MONOPCT 7  --      BMP:   Recent Labs     09/13/21  1104 09/14/21  0552   * 132*   K 4.8 4.6   CL 95* 100   CO2 24 24   BUN 22 21   CREATININE 1.49* 1.50*     Hepatic Function Panel: No results for input(s): PROT, LABALBU, BILIDIR, IBILI, BILITOT, ALKPHOS, ALT, AST in the last 72 hours. No results found for: PROCAL  Lab Results   Component Value Date    .8 09/13/2021    CRP 33.7 06/10/2021    CRP 63.4 06/06/2021     Lab Results   Component Value Date    SEDRATE 37 (H) 09/13/2021         No results found for: DDIMER  No results found for: FERRITIN  No results found for: LDH  No results found for: FIBRINOGEN    No results found for requested labs within last 30 days.      Lab Results   Component Value Date    COVID19 DETECTED 05/26/2021    COVID19 Not Detected 12/07/2020    COVID19 Not Detected MODERATE GRAM POSITIVE COCCI IN PAIRSAbnormal      FEW GRAM NEGATIVE RODSAbnormal      NO NEUTROPHILS SEEN    Culture STAPHYLOCOCCUS AUREUS HEAVY GROWTHAbnormal      NON LACTOSE FERMENTING GRAM NEGATIVE RODS LIGHT GROWTHAbnormal      NORMAL SKIN FLORAAbnormal          Medications:      aspirin  81 mg Oral Daily    gabapentin  900 mg Oral TID    glipiZIDE  20 mg Oral BID AC    alogliptin  12.5 mg Oral Daily    lisinopril  10 mg Oral Daily    atorvastatin  20 mg Oral Daily    sodium chloride flush  5-40 mL IntraVENous 2 times per day    cefepime  2,000 mg IntraVENous Q12H    insulin lispro  0-18 Units SubCUTAneous TID WC    insulin lispro  0-9 Units SubCUTAneous Nightly    enoxaparin  30 mg SubCUTAneous BID    insulin glargine  10 Units SubCUTAneous Nightly           [facility]  Suad Dunne MD  [employer] messaging  OFFICE: [phone]      Electronically signed by Suad Dunne MD on 9/15/2021 at 4:35 PM  Thank you for allowing us to participate in the care of this patient. Please call with questions. This note iscreated with the assistance of a speech recognition program.  While intending to generate a document that actually reflects the content of the visit, the document can still have some errors including those of syntax andsound a like substitutions which may escape proof reading. In such instances, actual meaning can be extrapolated by contextual diversion.

## 2021-09-15 NOTE — PROGRESS NOTES
[facility]  Office: [address], DO, Levelwilmer Castrobs, DO, Lane Yennifer, DO, Bryant Pastrana Blood, DO, Kilo Cassidy MD, Yanira Jung MD, Adriane Quintanilla MD, Aaron Guzman MD, Pratibha Damico MD, Alcira Freed MD, Osito Tucker MD, Wilmar Posey, DO, Blas Pastor, DO, Vee Ibarra MD,  Rupert Sanchez, DO, Lamar Cameron MD, Be Spicer MD, Raenette Felty, MD, Ollie Robledo MD, , Hussein Mathur MD, Brooks Peralta MD, Alba Carvajal MD, Aruna Platt, [facility], [facility], CNP, Joi Dillon, CNP, Sathish Mcnulty, CNS, Ramos Salazar, CNP, Artem Dominguez, CNP, Guerline Hayden, CNP, Yoni Blend, CNP, Fiorella Rdz, CNP, Miguel Srivastava PA-C, Dayton Bhat, [facility], Mahendra Portillo, CNP, Joanie Nieves, CNP, Kamaljit Amador, CNP, Celia Gupta, CNP, Prasad Finley, CNP, Radha Douglas, CNP, Karen Davidson, CNP, Rosa Elena Yarbrough, [facility]    Progress Note    9/15/2021    9:04 AM    Name:   Kenya Scruggs  MRN:     [id number]     [place]:      [de-identified]   Room:   2010/2010-02  IP Day:  2  Admit Date:  9/13/2021 10:47 AM    PCP:   Artem Winkler MD  Code Status:  Full Code    Subjective:     C/C:   Chief Complaint   Patient presents with    Foot Pain    Post-op Problem     pt states that his foot is bleeding from the surgical site      Interval History Status: not changed. Condition stable. Podiatry on board and reports no intention for surgical procedure. Renal function stable. Glycemic control continues. MRI ordered by podiatry. Patient is complaining of constipation for the past 48 hours secondary to narcotic use. Brief History:     9/13 - Patient has a known chronic diabetic foot infection to the right lower foot. Patient was noted her prior history for several months. Patient had a wound dehiscence several months ago and required wound care and long-term IV antibiotics use administered through a PICC.   Patient reports that the wound is healing well and as of 2 weeks ago at his most recent podiatry appointment healing was progressing as expected. Saturday the patient had his foot examined by his wife who found him to be open and draining. Podiatry was contacted this morning and advised that he needed to report to emergency department. 9/14 - Condition stable. Podiatry on board and reports no intention for surgical procedure. Renal function stable. Glycemic control continues. MRI ordered by podiatry. Review of Systems:     Constitutional:  negative for chills, fevers, sweats  Respiratory:  negative for cough, dyspnea on exertion, shortness of breath, wheezing  Cardiovascular:  negative for chest pain, chest pressure/discomfort, lower extremity edema, palpitations  Gastrointestinal:  negative for abdominal pain, constipation, diarrhea, nausea, vomiting  Musculoskeletal: Negative for arthralgias and myalgias. Skin: Positive for color change (RLE) and wound (RLE). Neurological:  negative for dizziness, headache    Medications: Allergies:     Allergies   Allergen Reactions    Morphine Itching    Other Other (See Comments)    Percocet [Oxycodone-Acetaminophen]      Facial swelling    Dye [Iodides] Rash     Rash and swelling       Current Meds:   Scheduled Meds:    aspirin  81 mg Oral Daily    gabapentin  900 mg Oral TID    glipiZIDE  20 mg Oral BID AC    alogliptin  12.5 mg Oral Daily    lisinopril  10 mg Oral Daily    atorvastatin  20 mg Oral Daily    sodium chloride flush  5-40 mL IntraVENous 2 times per day    cefepime  2,000 mg IntraVENous Q12H    insulin lispro  0-18 Units SubCUTAneous TID WC    insulin lispro  0-9 Units SubCUTAneous Nightly    enoxaparin  30 mg SubCUTAneous BID    insulin glargine  10 Units SubCUTAneous Nightly     Continuous Infusions:    sodium chloride 75 mL/hr at 09/15/21 0103    sodium chloride      dextrose       PRN Meds: acetaminophen **OR** acetaminophen, albuterol sulfate HFA, sodium chloride flush, sodium chloride, potassium chloride **OR** potassium alternative oral replacement **OR** potassium chloride, magnesium sulfate, ondansetron **OR** ondansetron, polyethylene glycol, fentanNYL, HYDROmorphone, glucose, dextrose, glucagon (rDNA), dextrose, HYDROcodone 5 mg - acetaminophen **OR** HYDROcodone 5 mg - acetaminophen    Data:     Past Medical History:   has a past medical history of Abscess of right foot, Acquired hammer toe deformity of lesser toe of right foot, ALEJANDRO (acute kidney injury) ([facility] [address] Cellulitis, Cellulitis of left foot, Cellulitis of right foot, Charcot foot due to diabetes mellitus ([facility] [address] [facility] Chest pain at rest, Chronic multifocal osteomyelitis of right foot ([facility] [address] [facility] Diabetic polyneuropathy associated with type 2 diabetes mellitus ([facility] Essential hypertension, Fractures, multiple, Hyperlipidemia, Hypertension, Leukocytosis, Neuropathy, Pain in right foot, Pneumonia, Right foot infection, Right foot pain, Tobacco abuse, Type II or unspecified type diabetes mellitus without mention of complication, not stated as uncontrolled, Vertigo, 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 Social History:   reports that he has been smoking. He has been smoking about 1.00 pack per day. He has never used smokeless tobacco. He reports previous drug use. He reports that he does not drink alcohol.      Family History:   Family History   Problem Relation Age of Onset    Diabetes Mother     Cancer Father     Hypertension Maternal Grandmother        Vitals:  BP (!) 141/70   Pulse 73   Temp 97.9 °F (36.6 °C) (Oral)   Resp 16   Ht 5' 11\" (1.803 m)   Wt 253 lb 12.9 oz (115.1 kg)   SpO2 98%   BMI 35.40 kg/m²   Temp (24hrs), Av °F (36.7 °C), Min:97.5 °F (36.4 °C), Max:98.4 °F (36.9 °C)    Recent Labs     21  1130 21  1641 214 09/15/21  0611   POCGLU 252* 214* 249* 187*       I/O (24Hr): Intake/Output Summary (Last 24 hours) at 9/15/2021 0904  Last data filed at 9/15/2021 0857  Gross per 24 hour   Intake 1341 ml   Output 3500 ml   Net -2159 ml       Labs:  Hematology:  Recent Labs     09/13/21  1104 09/14/21  0552   WBC 10.7 9.5   RBC 4.27 4.04*   HGB 12.9* 12.3*   HCT 38.1* 37.1*   MCV 89.2 91.8   MCH 30.2 30.4   MCHC 33.9 33.2   RDW 13.4 13.3    200   MPV 9.7 9.8   SEDRATE 37*  --    .8*  --      Chemistry:  Recent Labs     09/13/21  1104 09/14/21  0552   * 132*   K 4.8 4.6   CL 95* 100   CO2 24 24   GLUCOSE 383* 181*   BUN 22 21   CREATININE 1.49* 1.50*   ANIONGAP 10 8*   LABGLOM 47* 47*   GFRAA 57* 57*   CALCIUM 9.5 9.3     Recent Labs     09/13/21  1104 09/13/21  1720 09/13/21  2250 09/14/21  0621 09/14/21  1130 09/14/21  1641 09/14/21  2044 09/15/21  0611   LABA1C 8.7*  --   --   --   --   --   --   --    POCGLU  --    < > 135* 180* 252* 214* 249* 187*    < > = values in this interval not displayed. ABG:No results found for: POCPH, PHART, PH, POCPCO2, VHU1YWL, PCO2, POCPO2, PO2ART, PO2, POCHCO3, WAU6ALD, HCO3, NBEA, PBEA, BEART, BE, THGBART, THB, HXT3GLB, RMQD7AGC, K3KXMUBP, O2SAT, FIO2  Lab Results   Component Value Date/Time    SPECIAL RAC, 10ML 09/13/2021 02:15 PM     Lab Results   Component Value Date/Time    CULTURE NO GROWTH 2 DAYS 09/13/2021 02:15 PM       Radiology:  XR FOOT RIGHT (MIN 3 VIEWS)    Result Date: 9/13/2021  Similar-appearing osseous structures with Charcot deformity of the midfoot. Plantar soft tissue swelling. Physical Examination:        General appearance:  alert, cooperative and no distress  Mental Status:  oriented to person, place and time and normal affect  Lungs:  clear to auscultation bilaterally, normal effort  Heart:  regular rate and rhythm, no murmur  Abdomen:  soft, nontender, nondistended, normal bowel sounds, no masses, hepatomegaly, splenomegaly  Extremities:  no edema, redness, tenderness in the calves.  Wound present. Dressing in place and not taken down by internal medicine. No swelling or tenderness. Normal range of motion. Skin:  no gross lesions, rashes, induration. Dressing in place.      Assessment:        Hospital Problems         Last Modified POA    * (Principal) Diabetic infection of right foot ([address] 9/13/2021 Yes    Essential hypertension 9/13/2021 Yes    Hyperlipidemia 9/13/2021 Yes    Diabetic polyneuropathy associated with type 2 diabetes mellitus ([address] 9/13/2021 Yes    Type 2 diabetes mellitus treated with insulin ([address] 9/13/2021 Yes    Wound dehiscence 9/13/2021 Yes    Hyperglycemia due to diabetes mellitus ([address] 9/13/2021 Yes    Foot ulcer ([address] 9/14/2021 Yes    Cellulitis of right foot 9/14/2021 Yes          Plan:        Diabetic foot infection secondary to diabetic polyneuropathy associated with type 2 diabetes and hyperglycemia with wound dehiscence   Plans for OR on 9/16 per podiatry  Vancomycin discontinued and cefepime continued per ID  Glycemic control with glipizide, Lantus, sliding scale, A1c 8.7  New complaints of constipation  Avoid narcotics  Provide stool softeners  Essential hypertension and hyperlipidemia  Continue lisinopril and Lipitor       ROCHELLE Epps NP  9/15/2021  9:04 AM

## 2021-09-15 NOTE — CARE COORDINATION
Social work: Met with patient at bedside to discuss SNF. He is agreeable, choice given of [facility]. Referral faxed, will need precert prior to dc once accepted.

## 2021-09-15 NOTE — PROGRESS NOTES
Progress Note  Podiatric Medicine and Surgery     Subjective     CC: Diabetic ulceration, right foot    Patient seen and examined at bedside. He reports tolerating diet well. No new issues overnight. Pain under control. Afebrile, vital signs stable       HPI :  Martha Stewart is a 73 y.o. male seen [facility] for diabetic foot ulceration to the plantar aspect of the right foot suspected to be infected. Patient was admitted to the hospital on 0606/21 for diabetic foot infection with abscess formation. Patient underwent surgical debridement and layered closure on 06/11/2021. Patient also has underlying Charcot arthropathy. Patient is well established with Dr. Davis Purple was last seen approximately 2 weeks ago. Patient was on a course of IV antimicrobial therapy until 07/04/2021. Patient was also given oral antibiotics afterwards which he finished taking at the end of August.  Over the past 2-3 days patient has noticed increased redness, drainage, pain from the right foot. The most concerning these is his pain as the patient is normally neuropathic. Patient relates that he feels that his arch is collapsing and his foot is becoming unstable. Radiographs of the right foot were obtained in the ED which were negative for any soft tissue emphysema or foreign body. There does appear to be advanced changes of the Charcot arthropathy. Patient denies any trauma to the right foot.        PCP is Alex Hwang MD    ROS: Denies N/V/F/C/SOB/CP/Cough.        Medications:  Scheduled Meds:   aspirin  81 mg Oral Daily    gabapentin  900 mg Oral TID    glipiZIDE  20 mg Oral BID AC    alogliptin  12.5 mg Oral Daily    lisinopril  10 mg Oral Daily    atorvastatin  20 mg Oral Daily    sodium chloride flush  5-40 mL IntraVENous 2 times per day    cefepime  2,000 mg IntraVENous Q12H    insulin lispro  0-18 Units SubCUTAneous TID WC    insulin lispro  0-9 Units SubCUTAneous Nightly    enoxaparin  30 mg SubCUTAneous BID    insulin glargine  10 Units SubCUTAneous Nightly       Continuous Infusions:   sodium chloride 75 mL/hr at 09/15/21 0103    sodium chloride      dextrose         PRN Meds:acetaminophen **OR** acetaminophen, albuterol sulfate HFA, sodium chloride flush, sodium chloride, potassium chloride **OR** potassium alternative oral replacement **OR** potassium chloride, magnesium sulfate, ondansetron **OR** ondansetron, polyethylene glycol, fentanNYL, HYDROmorphone, glucose, dextrose, glucagon (rDNA), dextrose, HYDROcodone 5 mg - acetaminophen **OR** HYDROcodone 5 mg - acetaminophen    Objective     Vitals:  Patient Vitals for the past 8 hrs:   BP Temp Temp src Pulse Resp SpO2   09/15/21 0710 (!) 141/70 97.9 °F (36.6 °C) Oral 73 16 98 %     Average, Min, and Max for last 24 hours Vitals:  TEMPERATURE:  Temp  Av °F (36.7 °C)  Min: 97.5 °F (36.4 °C)  Max: 98.4 °F (36.9 °C)    RESPIRATIONS RANGE: Resp  Av.7  Min: 16  Max: 18    PULSE RANGE: Pulse  Av.5  Min: 71  Max: 84    BLOOD PRESSURE RANGE:  Systolic (01NMY), DOY:791 , Min:118 , YSK:750   ; Diastolic (40PZR), SHM:42, Min:67, Max:70      PULSE OXIMETRY RANGE: SpO2  Av.5 %  Min: 97 %  Max: 98 %    I/O last 3 completed shifts: In: 6464 [P.O.:400; I.V.:941]  Out: 3550 [Urine:3550]    CBC:  Recent Labs     21  1104 21  0552   WBC 10.7 9.5   HGB 12.9* 12.3*   HCT 38.1* 37.1*    200   .8*  --         BMP:  Recent Labs     21  1104 21  0552   * 132*   K 4.8 4.6   CL 95* 100   CO2 24 24   BUN 22 21   CREATININE 1.49* 1.50*   GLUCOSE 383* 181*   CALCIUM 9.5 9.3        Coags:  No results for input(s): APTT, PROT, INR in the last 72 hours. Lab Results   Component Value Date    SEDRATE 37 (H) 2021     Recent Labs     21  1104   .8*       Lower Extremity Physical Exam:  Vascular: DP and PT pulses are palpable. CFT brisk to all digits.   Hair growth is absent to the level of the 7. Mild-to-moderate degenerative changes of the midfoot. The findings were sent to the Radiology Results [address] at 9:46   am on 9/14/2021to be communicated to a licensed caregiver. XR FOOT RIGHT (MIN 3 VIEWS)   Final Result   Similar-appearing osseous structures with Charcot deformity of the midfoot. Plantar soft tissue swelling. Cultures:   Right foot, 9/13: GPC, GNR, Staph aureus, normal [place]     Assessment   Pratibha Butler is a 73 y.o. male with   1. Cellulitis, right foot-improving  2. Early abscess, right foot  3. Charcot arthropathy, right foot  4. Diabetes mellitus type 2 with associated peripheral apathy  5. PAD  6. Status post I&D, right foot (DOS 06/11/2021)    Principal Problem:    Diabetic infection of right foot ([address]  Active Problems:    Essential hypertension    Hyperlipidemia    Diabetic polyneuropathy associated with type 2 diabetes mellitus ([address]    Type 2 diabetes mellitus treated with insulin ([address]    Wound dehiscence    Hyperglycemia due to diabetes mellitus ([address]    Foot ulcer ([address]    Cellulitis of right foot  Resolved Problems:    * No resolved hospital problems. *       Plan     · Patient examined and evaluated at bedside   · All treatment options discussed in detail with the patient. Patient continues to be amenable to surgical treatment. · Continue medical management per primary, thank you. · ID consulted  ? IV antibiotics: cefepime  · Patient has been fitted for CROW  · Plan is OR for I&D of RLE tomorrow, 9/16/2021 at 3:45 p.m.    · NPO at 7:00 a.m. tomorrow   · Hold AC after midnight   · COVID vaccinated   · Needs consented & marked   · Dressing applied to Right lower extremity: packed with 1/4 inch iodoform, DSD, Ace  · Nonweightbearing to Right lower extremity   · Patient states he does NOT wish to transfer to SNF. He would like [facility] upon discharge. · Will discuss with Dr. Maye Squires.      Watson Baires, MIAM   Podiatric Medicine & Surgery 9/15/2021 at 11:07 AM

## 2021-09-15 NOTE — PROGRESS NOTES
Physical Therapy  Facility/Department: [facility]  Daily Treatment Note  NAME: Elvira Mclaughlin  : 1955  MRN: [id number]    Date of Service: 9/15/2021    Discharge Recommendations:  Subacute/Skilled Nursing Facility        Assessment   Body structures, Functions, Activity limitations: Decreased functional mobility   Assessment: Pt. is strict NWB Rt. LE for acute charcot foot, with potential surgery this admission per podiatry. Up amb. in room with RW and mod x 2. Will continue to progress. Activity Tolerance  Activity Tolerance: Patient limited by fatigue;Patient limited by endurance     Patient Diagnosis(es): The primary encounter diagnosis was Ulcer of right foot with other severity ([address] A diagnosis of Cellulitis of right foot was also pertinent to this visit. has a past medical history of Abscess of right foot, Acquired hammer toe deformity of lesser toe of right foot, ALEJANDRO (acute kidney injury) ([address] Cellulitis, Cellulitis of left foot, Cellulitis of right foot, Charcot foot due to diabetes mellitus ([address] Chest pain at rest, Chronic multifocal osteomyelitis of right foot ([address] Diabetic polyneuropathy associated with type 2 diabetes mellitus ([address] Essential hypertension, Fractures, multiple, Hyperlipidemia, Hypertension, Leukocytosis, Neuropathy, Pain in right foot, Pneumonia, Right foot infection, Right foot pain, Tobacco abuse, Type II or unspecified type diabetes mellitus without mention of complication, not stated as uncontrolled, Vertigo, 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 has a past surgical history that includes Neck surgery (); Appendectomy; knee surgery (Bilateral, ); Knee arthroscopy (Right, ); Knee arthroscopy (Left, ); Foot Debridement (Left, 2018); pr deep dissec foot infec,1 bursa (Left, 2018); Colonoscopy;  Foot Debridement (Right, 3/20/2020); arthroplasty (Right, 12/11/2020); Foot Debridement (Right, 6/8/2021); and Foot surgery (Right, 6/11/2021). Restrictions  Restrictions/Precautions  Restrictions/Precautions: Weight Bearing, Fall Risk, General Precautions, Up as Tolerated, Contact Precautions  Lower Extremity Weight Bearing Restrictions  Right Lower Extremity Weight Bearing: Non Weight Bearing  Position Activity Restriction  Other position/activity restrictions: alarms, LUE IV, BR with BRP, elevate afected limb  Subjective   General  Chart Reviewed: Yes  Subjective  Subjective: pt is agreeable for PT  Pain Screening  Patient Currently in Pain: Denies  Pain Assessment  Pain Assessment: 0-10  Vital Signs  Patient Currently in Pain: Denies       Orientation     Cognition      Objective   Bed mobility  Scooting: Contact guard assistance  Transfers  Sit to Stand: Moderate Assistance  Stand to sit: Moderate Assistance  Stand Pivot Transfers: Moderate Assistance  Ambulation  Ambulation?: Yes  Ambulation 1  Surface: level tile  Device: Rolling Walker  Assistance: Moderate assistance  Quality of Gait: moves impulsively; max effort to maintain NWB Rt. LE for transfer with  walker movements unsafe, ian. with turning)  Pt. very unsteady/ fall risk.   Gait Deviations: Decreased step length;Decreased step height  Distance: 3 ft NWB R LE     Balance  Posture: Good  Sitting - Static: Good  Sitting - Dynamic: Good  Standing - Static: Fair  Exercises  Comments: seated UE medium resistance tband ex x 15reps         Comment: assisted pt to bedside chair               G-Code     OutComes Score                                                     AM-PAC Score  AM-PAC Inpatient Mobility Raw Score : 10 (09/15/21 1208)  AM-PAC Inpatient T-Scale Score : 32.29 (09/15/21 1208)  Mobility Inpatient CMS 0-100% Score: 76.75 (09/15/21 1208)  Mobility Inpatient CMS G-Code Modifier : CL (09/15/21 1208)          Goals  Short term goals  Time Frame for Short term goals: 12 visits:  Short term goal 1: Pt. to be indep. with bed mob. Short term goal 2: Pt. to be CGA for sit to stand transfer with RW, maintaining NWB Rt. % of time  Short term goal 3: Pt. to be min A x1 for gait with RW, maintaining NWB Rt. % of time, 25ft. Short term goal 4: Pt. to tolerate 25+ min. of PT for ther ex/ gait/ balance / functional mob. training  Short term goal 5: Should pt. d/c home, will need to safely negotiate 3 steps to enter his home, NWB Rt. LE  Patient Goals   Patient goals : ambulate    Plan    Plan  Times per week: 1-2x/day; 5-6days/wk  Specific instructions for Next Treatment: progress gait, NWB Rt. LE  Current Treatment Recommendations: Strengthening, Transfer Training, Endurance Training, ROM, Balance Training, Functional Mobility Training, Gait Training, Safety Education & Training, Home Exercise Program, Patient/Caregiver Education & Training  Safety Devices  Type of devices:  All fall risk precautions in place, Gait belt, Patient at risk for falls, Call light within reach, Chair alarm in place, Left in chair     Therapy Time   Individual Concurrent Group Co-treatment   Time In  1119         Time Out  1142         Minutes  23                 MILLIE CAMARGO, PTA

## 2021-09-15 NOTE — FLOWSHEET NOTE
Patient states well, no major needs expressed. Decline visit at this time. 09/14/21 2057   Encounter Summary   Services provided to: Patient   Referral/Consult From: Rounding   Continue Visiting   (9-14-21)   Complexity of Encounter Low   Length of Encounter 15 minutes   Spiritual Assessment Completed Yes   Routine   Type Initial   Assessment Passive   Intervention Explored feelings, thoughts, concerns; Discussed illness/injury and it's impact   Outcome Refused/declined

## 2021-09-16 ENCOUNTER — ANESTHESIA (OUTPATIENT)
Dept: OPERATING ROOM | Age: 66
DRG: 988 | End: 2021-09-16
Payer: MEDICARE

## 2021-09-16 ENCOUNTER — ANESTHESIA EVENT (OUTPATIENT)
Dept: OPERATING ROOM | Age: 66
DRG: 988 | End: 2021-09-16
Payer: MEDICARE

## 2021-09-16 VITALS — OXYGEN SATURATION: 99 % | SYSTOLIC BLOOD PRESSURE: 115 MMHG | DIASTOLIC BLOOD PRESSURE: 66 MMHG

## 2021-09-16 LAB
ABSOLUTE EOS #: 0.25 K/UL (ref 0–0.44)
ABSOLUTE IMMATURE GRANULOCYTE: 0.06 K/UL (ref 0–0.3)
ABSOLUTE LYMPH #: 1.98 K/UL (ref 1.1–3.7)
ABSOLUTE MONO #: 0.66 K/UL (ref 0.1–1.2)
ANION GAP SERPL CALCULATED.3IONS-SCNC: 7 MMOL/L (ref 9–17)
BASOPHILS # BLD: 1 % (ref 0–2)
BASOPHILS ABSOLUTE: 0.04 K/UL (ref 0–0.2)
BUN BLDV-MCNC: 18 MG/DL (ref 8–23)
BUN/CREAT BLD: 14 (ref 9–20)
CALCIUM SERPL-MCNC: 9.5 MG/DL (ref 8.6–10.4)
CHLORIDE BLD-SCNC: 100 MMOL/L (ref 98–107)
CO2: 25 MMOL/L (ref 20–31)
CREAT SERPL-MCNC: 1.32 MG/DL (ref 0.7–1.2)
CULTURE: ABNORMAL
DIFFERENTIAL TYPE: ABNORMAL
DIRECT EXAM: ABNORMAL
EOSINOPHILS RELATIVE PERCENT: 3 % (ref 1–4)
GFR AFRICAN AMERICAN: >60 ML/MIN
GFR NON-AFRICAN AMERICAN: 54 ML/MIN
GFR SERPL CREATININE-BSD FRML MDRD: ABNORMAL ML/MIN/{1.73_M2}
GFR SERPL CREATININE-BSD FRML MDRD: ABNORMAL ML/MIN/{1.73_M2}
GLUCOSE BLD-MCNC: 170 MG/DL (ref 75–110)
GLUCOSE BLD-MCNC: 181 MG/DL (ref 75–110)
GLUCOSE BLD-MCNC: 191 MG/DL (ref 75–110)
GLUCOSE BLD-MCNC: 244 MG/DL (ref 70–99)
GLUCOSE BLD-MCNC: 266 MG/DL (ref 75–110)
HCT VFR BLD CALC: 37.4 % (ref 40.7–50.3)
HEMOGLOBIN: 12.9 G/DL (ref 13–17)
IMMATURE GRANULOCYTES: 1 %
LYMPHOCYTES # BLD: 27 % (ref 24–43)
Lab: ABNORMAL
MCH RBC QN AUTO: 30.8 PG (ref 25.2–33.5)
MCHC RBC AUTO-ENTMCNC: 34.5 G/DL (ref 28.4–34.8)
MCV RBC AUTO: 89.3 FL (ref 82.6–102.9)
MONOCYTES # BLD: 9 % (ref 3–12)
NRBC AUTOMATED: 0 PER 100 WBC
PDW BLD-RTO: 13 % (ref 11.8–14.4)
PLATELET # BLD: 224 K/UL (ref 138–453)
PLATELET ESTIMATE: ABNORMAL
PMV BLD AUTO: 9.2 FL (ref 8.1–13.5)
POTASSIUM SERPL-SCNC: 4.8 MMOL/L (ref 3.7–5.3)
RBC # BLD: 4.19 M/UL (ref 4.21–5.77)
RBC # BLD: ABNORMAL 10*6/UL
SEG NEUTROPHILS: 59 % (ref 36–65)
SEGMENTED NEUTROPHILS ABSOLUTE COUNT: 4.33 K/UL (ref 1.5–8.1)
SODIUM BLD-SCNC: 132 MMOL/L (ref 135–144)
SPECIMEN DESCRIPTION: ABNORMAL
WBC # BLD: 7.3 K/UL (ref 3.5–11.3)
WBC # BLD: ABNORMAL 10*3/UL

## 2021-09-16 PROCEDURE — 6370000000 HC RX 637 (ALT 250 FOR IP)

## 2021-09-16 PROCEDURE — 86403 PARTICLE AGGLUT ANTBDY SCRN: CPT

## 2021-09-16 PROCEDURE — 3600000002 HC SURGERY LEVEL 2 BASE: Performed by: PODIATRIST

## 2021-09-16 PROCEDURE — 87075 CULTR BACTERIA EXCEPT BLOOD: CPT

## 2021-09-16 PROCEDURE — 1200000000 HC SEMI PRIVATE

## 2021-09-16 PROCEDURE — 2709999900 HC NON-CHARGEABLE SUPPLY: Performed by: PODIATRIST

## 2021-09-16 PROCEDURE — 85025 COMPLETE CBC W/AUTO DIFF WBC: CPT

## 2021-09-16 PROCEDURE — 6370000000 HC RX 637 (ALT 250 FOR IP): Performed by: NURSE PRACTITIONER

## 2021-09-16 PROCEDURE — APPNB30 APP NON BILLABLE TIME 0-30 MINS: Performed by: NURSE PRACTITIONER

## 2021-09-16 PROCEDURE — 6360000002 HC RX W HCPCS: Performed by: NURSE ANESTHETIST, CERTIFIED REGISTERED

## 2021-09-16 PROCEDURE — 7100000000 HC PACU RECOVERY - FIRST 15 MIN: Performed by: PODIATRIST

## 2021-09-16 PROCEDURE — 3700000000 HC ANESTHESIA ATTENDED CARE: Performed by: PODIATRIST

## 2021-09-16 PROCEDURE — 2580000003 HC RX 258: Performed by: NURSE ANESTHETIST, CERTIFIED REGISTERED

## 2021-09-16 PROCEDURE — 7100000001 HC PACU RECOVERY - ADDTL 15 MIN: Performed by: PODIATRIST

## 2021-09-16 PROCEDURE — 2500000003 HC RX 250 WO HCPCS: Performed by: PODIATRIST

## 2021-09-16 PROCEDURE — 3700000001 HC ADD 15 MINUTES (ANESTHESIA): Performed by: PODIATRIST

## 2021-09-16 PROCEDURE — 87186 SC STD MICRODIL/AGAR DIL: CPT

## 2021-09-16 PROCEDURE — 80048 BASIC METABOLIC PNL TOTAL CA: CPT

## 2021-09-16 PROCEDURE — 87070 CULTURE OTHR SPECIMN AEROBIC: CPT

## 2021-09-16 PROCEDURE — 36415 COLL VENOUS BLD VENIPUNCTURE: CPT

## 2021-09-16 PROCEDURE — 3600000012 HC SURGERY LEVEL 2 ADDTL 15MIN: Performed by: PODIATRIST

## 2021-09-16 PROCEDURE — 2580000003 HC RX 258: Performed by: NURSE PRACTITIONER

## 2021-09-16 PROCEDURE — 2500000003 HC RX 250 WO HCPCS: Performed by: NURSE ANESTHETIST, CERTIFIED REGISTERED

## 2021-09-16 PROCEDURE — 28002 TREATMENT OF FOOT INFECTION: CPT | Performed by: PODIATRIST

## 2021-09-16 PROCEDURE — 99232 SBSQ HOSP IP/OBS MODERATE 35: CPT | Performed by: NURSE PRACTITIONER

## 2021-09-16 PROCEDURE — 6360000002 HC RX W HCPCS: Performed by: NURSE PRACTITIONER

## 2021-09-16 PROCEDURE — APPSS45 APP SPLIT SHARED TIME 31-45 MINUTES: Performed by: NURSE PRACTITIONER

## 2021-09-16 PROCEDURE — 87176 TISSUE HOMOGENIZATION CULTR: CPT

## 2021-09-16 PROCEDURE — 0J9Q0ZZ DRAINAGE OF RIGHT FOOT SUBCUTANEOUS TISSUE AND FASCIA, OPEN APPROACH: ICD-10-PCS | Performed by: PODIATRIST

## 2021-09-16 PROCEDURE — 82947 ASSAY GLUCOSE BLOOD QUANT: CPT

## 2021-09-16 PROCEDURE — 6360000002 HC RX W HCPCS

## 2021-09-16 PROCEDURE — 87205 SMEAR GRAM STAIN: CPT

## 2021-09-16 PROCEDURE — 97110 THERAPEUTIC EXERCISES: CPT

## 2021-09-16 RX ORDER — LIDOCAINE HYDROCHLORIDE 20 MG/ML
INJECTION, SOLUTION INFILTRATION; PERINEURAL PRN
Status: DISCONTINUED | OUTPATIENT
Start: 2021-09-16 | End: 2021-09-16 | Stop reason: SDUPTHER

## 2021-09-16 RX ORDER — LIDOCAINE HYDROCHLORIDE 10 MG/ML
INJECTION, SOLUTION EPIDURAL; INFILTRATION; INTRACAUDAL; PERINEURAL PRN
Status: DISCONTINUED | OUTPATIENT
Start: 2021-09-16 | End: 2021-09-16 | Stop reason: HOSPADM

## 2021-09-16 RX ORDER — SODIUM CHLORIDE 9 MG/ML
INJECTION, SOLUTION INTRAVENOUS CONTINUOUS PRN
Status: DISCONTINUED | OUTPATIENT
Start: 2021-09-16 | End: 2021-09-16 | Stop reason: SDUPTHER

## 2021-09-16 RX ORDER — BUPIVACAINE HYDROCHLORIDE 5 MG/ML
INJECTION, SOLUTION EPIDURAL; INTRACAUDAL PRN
Status: DISCONTINUED | OUTPATIENT
Start: 2021-09-16 | End: 2021-09-16 | Stop reason: HOSPADM

## 2021-09-16 RX ORDER — ONDANSETRON 2 MG/ML
4 INJECTION INTRAMUSCULAR; INTRAVENOUS
Status: DISCONTINUED | OUTPATIENT
Start: 2021-09-16 | End: 2021-09-16 | Stop reason: HOSPADM

## 2021-09-16 RX ORDER — FENTANYL CITRATE 50 UG/ML
25 INJECTION, SOLUTION INTRAMUSCULAR; INTRAVENOUS EVERY 5 MIN PRN
Status: DISCONTINUED | OUTPATIENT
Start: 2021-09-16 | End: 2021-09-16 | Stop reason: HOSPADM

## 2021-09-16 RX ORDER — PROPOFOL 10 MG/ML
INJECTION, EMULSION INTRAVENOUS PRN
Status: DISCONTINUED | OUTPATIENT
Start: 2021-09-16 | End: 2021-09-16 | Stop reason: SDUPTHER

## 2021-09-16 RX ORDER — INSULIN GLARGINE 100 [IU]/ML
20 INJECTION, SOLUTION SUBCUTANEOUS NIGHTLY
Status: DISCONTINUED | OUTPATIENT
Start: 2021-09-16 | End: 2021-09-18 | Stop reason: HOSPADM

## 2021-09-16 RX ADMIN — CEFEPIME HYDROCHLORIDE 2000 MG: 2 INJECTION, POWDER, FOR SOLUTION INTRAVENOUS at 13:05

## 2021-09-16 RX ADMIN — SODIUM CHLORIDE: 9 INJECTION, SOLUTION INTRAVENOUS at 05:49

## 2021-09-16 RX ADMIN — GABAPENTIN 900 MG: 300 CAPSULE ORAL at 21:06

## 2021-09-16 RX ADMIN — ENOXAPARIN SODIUM 30 MG: 30 INJECTION SUBCUTANEOUS at 21:06

## 2021-09-16 RX ADMIN — ACETAMINOPHEN 650 MG: 325 TABLET ORAL at 05:49

## 2021-09-16 RX ADMIN — SODIUM CHLORIDE: 9 INJECTION, SOLUTION INTRAVENOUS at 16:30

## 2021-09-16 RX ADMIN — INSULIN LISPRO 2 UNITS: 100 INJECTION, SOLUTION INTRAVENOUS; SUBCUTANEOUS at 21:06

## 2021-09-16 RX ADMIN — CEFEPIME HYDROCHLORIDE 2000 MG: 2 INJECTION, POWDER, FOR SOLUTION INTRAVENOUS at 01:32

## 2021-09-16 RX ADMIN — HYDROCODONE BITARTRATE AND ACETAMINOPHEN 2 TABLET: 5; 325 TABLET ORAL at 21:09

## 2021-09-16 RX ADMIN — PROPOFOL 125 MCG/KG/MIN: 10 INJECTION, EMULSION INTRAVENOUS at 16:36

## 2021-09-16 RX ADMIN — INSULIN GLARGINE 20 UNITS: 100 INJECTION, SOLUTION SUBCUTANEOUS at 21:07

## 2021-09-16 RX ADMIN — GLIPIZIDE 20 MG: 10 TABLET ORAL at 19:10

## 2021-09-16 RX ADMIN — INSULIN LISPRO 9 UNITS: 100 INJECTION, SOLUTION INTRAVENOUS; SUBCUTANEOUS at 08:26

## 2021-09-16 RX ADMIN — GLIPIZIDE 20 MG: 10 TABLET ORAL at 05:49

## 2021-09-16 RX ADMIN — LIDOCAINE HYDROCHLORIDE 80 MG: 20 INJECTION, SOLUTION INFILTRATION; PERINEURAL at 16:36

## 2021-09-16 ASSESSMENT — PULMONARY FUNCTION TESTS
PIF_VALUE: 0
PIF_VALUE: 1
PIF_VALUE: 0
PIF_VALUE: 2
PIF_VALUE: 0
PIF_VALUE: 1
PIF_VALUE: 0

## 2021-09-16 ASSESSMENT — PAIN DESCRIPTION - PROGRESSION
CLINICAL_PROGRESSION: NOT CHANGED
CLINICAL_PROGRESSION: NOT CHANGED

## 2021-09-16 ASSESSMENT — PAIN SCALES - GENERAL
PAINLEVEL_OUTOF10: 4
PAINLEVEL_OUTOF10: 9
PAINLEVEL_OUTOF10: 2
PAINLEVEL_OUTOF10: 0

## 2021-09-16 ASSESSMENT — PAIN DESCRIPTION - FREQUENCY
FREQUENCY: CONTINUOUS
FREQUENCY: CONTINUOUS

## 2021-09-16 ASSESSMENT — PAIN DESCRIPTION - ORIENTATION
ORIENTATION: RIGHT
ORIENTATION: RIGHT

## 2021-09-16 ASSESSMENT — PAIN DESCRIPTION - DESCRIPTORS
DESCRIPTORS: BURNING;PRESSURE
DESCRIPTORS: BURNING

## 2021-09-16 ASSESSMENT — PAIN DESCRIPTION - ONSET
ONSET: ON-GOING
ONSET: ON-GOING

## 2021-09-16 ASSESSMENT — PAIN DESCRIPTION - LOCATION
LOCATION: FOOT
LOCATION: FOOT

## 2021-09-16 ASSESSMENT — PAIN DESCRIPTION - PAIN TYPE
TYPE: CHRONIC PAIN
TYPE: CHRONIC PAIN

## 2021-09-16 ASSESSMENT — ENCOUNTER SYMPTOMS: SHORTNESS OF BREATH: 0

## 2021-09-16 NOTE — PROGRESS NOTES
[facility]  Office: [address], DO, Levelwilmer Payan, DO, Lane De Oliveira, DO, Bryant Pastrana Blood, DO, Kilo Cassidy MD, Yanira Jung MD, Adriane Quintanilla MD, Aaron Guzman MD, Pratibha Damico MD, Alcira Freed MD, Osito Tucker MD, Wilmar Posey, DO, Blas Pastor, DO, Vee Ibarra MD,  Rupert Sanchez, DO, Lamar Cameron MD, Be Spicer MD, Raenette Felty, MD, Ollie Robledo MD, , Hussein Mathur MD, Brooks Peralta MD, Alba Carvajal MD, Aruna Platt, [facility], [facility], CNP, Joi Dillon, CNP, Sathish Mcnulty, CNS, Ramos Salazar, CNP, Artem Dominguez, CNP, Guerline Hayden, CNP, Yoni Blend, CNP, Fiorella Rdz, CNP, Miguel Srivastava PA-C, Dayton Bhat, [facility], Mahendra Portillo, CNP, Joanie Nieves, CNP, Kamaljit Amador, CNP, Celia Gupta, CNP, Prasad Finley, CNP, Radha Douglas, CNP, Karen Davidson, CNP, [place]    Progress Note    9/16/2021    10:20 AM    Name:   Kenya Scruggs  MRN:     [id number]     [place]:      [de-identified]   Room:   2010/2010-02  IP Day:  3  Admit Date:  9/13/2021 10:47 AM    PCP:   Artem Winkler MD  Code Status:  Full Code    Subjective:     C/C:   Chief Complaint   Patient presents with    Foot Pain    Post-op Problem     pt states that his foot is bleeding from the surgical site      Interval History Status: not changed. Condition stable. Renal function stable. Glycemic control continues. Patient is complaining of constipation for the past 48 hours secondary to narcotic use. Surgery planned for today. Brief History:     9/13 - Patient has a known chronic diabetic foot infection to the right lower foot. Patient was noted her prior history for several months. Patient had a wound dehiscence several months ago and required wound care and long-term IV antibiotics use administered through a PICC.   Patient reports that the wound is healing well and as of 2 weeks ago at his most recent podiatry appointment healing was progressing as expected. Saturday the patient had his foot examined by his wife who found him to be open and draining. Podiatry was contacted this morning and advised that he needed to report to emergency department. 9/14 - Condition stable. Podiatry on board and reports no intention for surgical procedure. Renal function stable. Glycemic control continues. MRI ordered by podiatry. 9/15-9/16 - Surgery planned debridement for 9/16. Further treatment to be determined following procedure. Review of Systems:     Constitutional:  negative for chills, fevers, sweats  Respiratory:  negative for cough, dyspnea on exertion, shortness of breath, wheezing  Cardiovascular:  negative for chest pain, chest pressure/discomfort, lower extremity edema, palpitations  Gastrointestinal:  negative for abdominal pain, constipation, diarrhea, nausea, vomiting  Musculoskeletal: Negative for arthralgias and myalgias. Skin: Positive for color change (RLE) and wound (RLE). Neurological:  negative for dizziness, headache    Medications: Allergies:     Allergies   Allergen Reactions    Morphine Itching    Other Other (See Comments)    Percocet [Oxycodone-Acetaminophen]      Facial swelling    Dye [Iodides] Rash     Rash and swelling       Current Meds:   Scheduled Meds:    aspirin  81 mg Oral Daily    gabapentin  900 mg Oral TID    glipiZIDE  20 mg Oral BID AC    alogliptin  12.5 mg Oral Daily    lisinopril  10 mg Oral Daily    atorvastatin  20 mg Oral Daily    sodium chloride flush  5-40 mL IntraVENous 2 times per day    cefepime  2,000 mg IntraVENous Q12H    insulin lispro  0-18 Units SubCUTAneous TID WC    insulin lispro  0-9 Units SubCUTAneous Nightly    enoxaparin  30 mg SubCUTAneous BID    insulin glargine  10 Units SubCUTAneous Nightly     Continuous Infusions:    sodium chloride 75 mL/hr at 09/16/21 0549    sodium chloride      dextrose       PRN Meds: acetaminophen **OR** acetaminophen, albuterol sulfate HFA, sodium chloride flush, sodium chloride, potassium chloride **OR** potassium alternative oral replacement **OR** potassium chloride, magnesium sulfate, ondansetron **OR** ondansetron, polyethylene glycol, fentanNYL, HYDROmorphone, glucose, dextrose, glucagon (rDNA), dextrose, HYDROcodone 5 mg - acetaminophen **OR** HYDROcodone 5 mg - acetaminophen    Data:     Past Medical History:   has a past medical history of Abscess of right foot, Acquired hammer toe deformity of lesser toe of right foot, ALEJANDRO (acute kidney injury) ([facility] [address] Cellulitis, Cellulitis of left foot, Cellulitis of right foot, Charcot foot due to diabetes mellitus ([facility] [address] Chest pain at rest, Chronic multifocal osteomyelitis of right foot ([facility] [address] Diabetic polyneuropathy associated with type 2 diabetes mellitus ([facility] [address] Essential hypertension, Fractures, multiple, Hyperlipidemia, Hypertension, Leukocytosis, Neuropathy, Pain in right foot, Pneumonia, Right foot infection, Right foot pain, Tobacco abuse, Type II or unspecified type diabetes mellitus without mention of complication, not stated as uncontrolled, Vertigo, 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 Social History:   reports that he has been smoking. He has been smoking about 1.00 pack per day. He has never used smokeless tobacco. He reports previous drug use. He reports that he does not drink alcohol.      Family History:   Family History   Problem Relation Age of Onset    Diabetes Mother     Cancer Father     Hypertension Maternal Grandmother        Vitals:  /62   Pulse 74   Temp 97.5 °F (36.4 °C) (Oral)   Resp 16   Ht 5' 11\" (1.803 m)   Wt 252 lb (114.3 kg)   SpO2 96%   BMI 35.15 kg/m²   Temp (24hrs), Av °F (36.7 °C), Min:97.5 °F (36.4 °C), Max:98.4 °F (36.9 °C)    Recent Labs     09/15/21  1139 09/15/21  1703 09/15/21  2038 09/16/21  0617   POCGLU 206* 88 264* 266*       I/O (24Hr): Intake/Output Summary (Last 24 hours) at 9/16/2021 1020  Last data filed at 9/16/2021 0617  Gross per 24 hour   Intake 2320 ml   Output 900 ml   Net 1420 ml       Labs:  Hematology:  Recent Labs     09/13/21  1104 09/14/21  0552   WBC 10.7 9.5   RBC 4.27 4.04*   HGB 12.9* 12.3*   HCT 38.1* 37.1*   MCV 89.2 91.8   MCH 30.2 30.4   MCHC 33.9 33.2   RDW 13.4 13.3    200   MPV 9.7 9.8   SEDRATE 37*  --    .8*  --      Chemistry:  Recent Labs     09/13/21  1104 09/14/21  0552   * 132*   K 4.8 4.6   CL 95* 100   CO2 24 24   GLUCOSE 383* 181*   BUN 22 21   CREATININE 1.49* 1.50*   ANIONGAP 10 8*   LABGLOM 47* 47*   GFRAA 57* 57*   CALCIUM 9.5 9.3     Recent Labs     09/13/21  1104 09/13/21  1720 09/14/21  2044 09/15/21  0611 09/15/21  1139 09/15/21  1703 09/15/21  2038 09/16/21  0617   LABA1C 8.7*  --   --   --   --   --   --   --    POCGLU  --    < > 249* 187* 206* 88 264* 266*    < > = values in this interval not displayed. ABG:No results found for: POCPH, PHART, PH, POCPCO2, SJT4IGN, PCO2, POCPO2, PO2ART, PO2, POCHCO3, YPO4YBR, HCO3, NBEA, PBEA, BEART, BE, THGBART, THB, SDJ6VHS, KMYR6TPX, K2VJLLHN, O2SAT, FIO2  Lab Results   Component Value Date/Time    SPECIAL RAC, 10ML 09/13/2021 02:15 PM     Lab Results   Component Value Date/Time    CULTURE NO GROWTH 3 DAYS 09/13/2021 02:15 PM       Radiology:  XR FOOT RIGHT (MIN 3 VIEWS)    Result Date: 9/13/2021  Similar-appearing osseous structures with Charcot deformity of the midfoot. Plantar soft tissue swelling.        Physical Examination:        General appearance:  alert, cooperative and no distress  Mental Status:  oriented to person, place and time and normal affect  Lungs:  clear to auscultation bilaterally, normal effort  Heart:  regular rate and rhythm, no murmur  Abdomen:  soft, nontender, nondistended, normal bowel sounds, no masses, hepatomegaly, splenomegaly  Extremities:  no edema, redness, tenderness in the calves. Wound present. Dressing in place and not taken down by internal medicine. No swelling or tenderness. Normal range of motion. Skin:  no gross lesions, rashes, induration. Dressing in place.      Assessment:        Hospital Problems         Last Modified POA    * (Principal) Diabetic infection of right foot ([address] 9/13/2021 Yes    Essential hypertension 9/13/2021 Yes    Hyperlipidemia 9/13/2021 Yes    Diabetic polyneuropathy associated with type 2 diabetes mellitus ([address] 9/13/2021 Yes    Type 2 diabetes mellitus treated with insulin ([address] 9/13/2021 Yes    Wound dehiscence 9/13/2021 Yes    Hyperglycemia due to diabetes mellitus ([address] 9/13/2021 Yes    Foot ulcer ([address] 9/14/2021 Yes    Cellulitis of right foot 9/14/2021 Yes          Plan:        Diabetic foot infection secondary to diabetic polyneuropathy associated with type 2 diabetes and hyperglycemia with wound dehiscence   Plans for OR on 9/16 per podiatry  Vancomycin discontinued and cefepime continued per ID  Glycemic control with glipizide, Lantus, sliding scale, A1c 8.7  Increase nightly Lantus dose to 20  New complaints of constipation  Avoid narcotics  Provide stool softeners  Essential hypertension and hyperlipidemia  Continue lisinopril and Lipitor       ROCHELLE Mccartney NP  9/16/2021  10:20 AM

## 2021-09-16 NOTE — ANESTHESIA POSTPROCEDURE EVALUATION
Department of Anesthesiology  Postprocedure Note    Patient: Philomena Tran  MRN: [id number]  YOB: 1955  Date of evaluation: 9/16/2021  Time:  5:55 PM     Procedure Summary     Date: 09/16/21 Room / Location: [place] [address]    Anesthesia Start: 1630 Anesthesia Stop: 9493    Procedure: RIGHT FOOT  INCISION AND DRAINAGE   WITH PULSE LAVAGE (Right Foot) Diagnosis: (diabetic foot right    in pt)    Surgeons: Josiah Garcia DPM Responsible Provider: Juan M Madrigal MD    Anesthesia Type: TIVA ASA Status: 3          Anesthesia Type: TIVA    Amy Phase I: Amy Score: 9    Amy Phase II:      Last vitals: Reviewed and per EMR flowsheets.        Anesthesia Post Evaluation    Patient location during evaluation: PACU  Patient participation: complete - patient participated  Level of consciousness: awake  Airway patency: patent  Nausea & Vomiting: no nausea  Complications: no  Cardiovascular status: blood pressure returned to baseline  Respiratory status: acceptable  Hydration status: euvolemic

## 2021-09-16 NOTE — ANESTHESIA PRE PROCEDURE
Department of Anesthesiology  Preprocedure Note       Name:  Hollie Cruz   Age:  73 y.o.  :  1955                                          MRN:  [id number]         Date:  2021      Surgeon: Anabella Capellan):  Mike Brock DPM    Procedure: Procedure(s):  RIGHT FOOT  INCISION AND DRAINAGE   WITH PULSE LAVAGE    Medications prior to admission:   Prior to Admission medications    Medication Sig Start Date End Date Taking? Authorizing Provider   silver sulfADIAZINE (SILVADENE) 1 % cream Apply topically daily. 21   Vik Lee DO   meloxicam (MOBIC) 15 MG tablet Take 15 mg by mouth nightly  21   Historical Provider, MD   LANGEORGIA SOLOSTAR 100 UNIT/ML injection pen Inject 10 Units into the skin nightly  3/12/21   Historical Provider, MD   albuterol sulfate  (90 Base) MCG/ACT inhaler Inhale 2 puffs into the lungs every 6 hours as needed  21   Historical Provider, MD   JANUVIA 100 MG tablet Take 100 mg by mouth daily  20   Historical Provider, MD Guerrac. Devices MISC 1 PAIR OF DIABETIC SHOES (1 LEFT/ 1 RIGHT)  1-3 PAIRS OF INSERTS (LEFT/ RIGHT) 3/5/20   Mike Brock DPM   cetirizine (ZYRTEC) 10 MG tablet Take 10 mg by mouth nightly  10/21/19   Historical Provider, MD   lisinopril (PRINIVIL;ZESTRIL) 10 MG tablet Take 10 mg by mouth daily 11/15/18   Historical Provider, MD   simvastatin (ZOCOR) 40 MG tablet Take 40 mg by mouth nightly  18   Historical Provider, MD   glipiZIDE (GLUCOTROL) 10 MG tablet Take 20 mg by mouth 2 times daily (before meals) Takes 2 tabs (=20mg) BID    Historical Provider, MD   aspirin 81 MG tablet Take 81 mg by mouth daily    Historical Provider, MD   gabapentin (NEURONTIN) 300 MG capsule Take 900 mg by mouth 3 times daily. Take 3 caps (=900mg) 3 times a day    Historical Provider, MD       Current medications:    No current facility-administered medications for this visit. No current outpatient medications on file.      Facility-Administered Medications Ordered in Other Visits   Medication Dose Route Frequency Provider Last Rate Last Admin    insulin glargine (LANTUS) injection vial 20 Units  20 Units SubCUTAneous Nightly Satira Penta, APRN - NP        acetaminophen (TYLENOL) tablet 650 mg  650 mg Oral Q6H PRN Satira Penta, APRN - NP   650 mg at 09/16/21 0549    Or    acetaminophen (TYLENOL) suppository 650 mg  650 mg Rectal Q6H PRN Satira Penta, APRN - NP        albuterol sulfate  (90 Base) MCG/ACT inhaler 2 puff  2 puff Inhalation Q6H PRN Satira Penta, APRN - NP        aspirin EC tablet 81 mg  81 mg Oral Daily Satira Penta, APRN - NP   81 mg at 09/15/21 3300    gabapentin (NEURONTIN) capsule 900 mg  900 mg Oral TID Satira Penta, APRN - NP   900 mg at 09/15/21 2124    glipiZIDE (GLUCOTROL) tablet 20 mg  20 mg Oral BID AC Satira Penta, APRN - NP   20 mg at 09/16/21 0549    alogliptin (NESINA) tablet 12.5 mg  12.5 mg Oral Daily Satira Penta, APRN - NP   12.5 mg at 09/15/21 0839    lisinopril (PRINIVIL;ZESTRIL) tablet 10 mg  10 mg Oral Daily Satira Penta, APRN - NP   10 mg at 09/15/21 0845    atorvastatin (LIPITOR) tablet 20 mg  20 mg Oral Daily Satira Penta, APRN - NP   20 mg at 09/15/21 0839    0.9 % sodium chloride infusion   IntraVENous Continuous Satira Penta, APRN - NP 75 mL/hr at 09/16/21 0549 New Bag at 09/16/21 0549    sodium chloride flush 0.9 % injection 5-40 mL  5-40 mL IntraVENous 2 times per day Satira Penta, APRN - NP        sodium chloride flush 0.9 % injection 10 mL  10 mL IntraVENous PRN Satira Penta, APRN - NP        0.9 % sodium chloride infusion  25 mL IntraVENous PRN Satira Penta, APRN - NP        potassium chloride (KLOR-CON M) extended release tablet 40 mEq  40 mEq Oral PRN Satira Penta, APRN - NP        Or    potassium bicarb-citric acid (EFFER-K) effervescent tablet 40 mEq  40 mEq Oral PRN Satira Penta, APRN - NP        Or    potassium chloride 10 mEq/100 mL IVPB (Peripheral Other Other (See Comments)    Percocet [Oxycodone-Acetaminophen]      Facial swelling    Dye [Iodides] Rash     Rash and swelling       Problem List:    Patient Active Problem List   Diagnosis Code    Essential hypertension I10    Type II or unspecified type diabetes mellitus without mention of complication, not stated as uncontrolled E11.9    Neuropathy G62.9    Vertigo R44    Charcot foot due to diabetes mellitus ([facility] [address] E11.610    Hyperlipidemia E78.5    Cellulitis L03.90    Cellulitis of left foot L03. 80    Right foot infection L08.9    Diabetic polyneuropathy associated with type 2 diabetes mellitus ([facility] [address] E11.42    Foreign body (FB) in soft tissue M79.5    Cellulitis of left leg L03. 116    Abscess of right foot L02.611    Chest pain at rest R07.9    Tobacco abuse Z72.0    Type 2 diabetes mellitus treated with insulin ([facility]) E11.9, Z79.4    ALEJANDRO (acute kidney injury) ([facility] [address] N17.9    Bandemia D72.825    Chronic multifocal osteomyelitis of right foot ([facility]) M86.371    Diabetic infection of right foot ([facility]) E11.628, L08.9    Acquired hammer toe deformity of lesser toe of right foot M20.41    Post-op pain G89.18    Right foot pain M79.671    Hallux hammertoe, right M20.31    Osteomyelitis ([facility]) M86.9    Wound dehiscence T81.30XA    Diabetic foot ([facility]) E11.8    Hyperglycemia due to diabetes mellitus ([facility]) E11.65    Foot ulcer ([facility]) L97.509    Cellulitis of right foot L03.115       Past Medical History:        Diagnosis Date    Abscess of right foot 8/4/2018    Acquired hammer toe deformity of lesser toe of right foot     ALEJANDRO (acute kidney injury) ([facility] [address] 8/5/2018    Cellulitis 4/24/2018    Cellulitis of left foot 4/24/2018    Cellulitis of right foot     and abscess    Charcot foot due to diabetes mellitus ([facility] [address] 2014    Right foot     Chest pain at rest 8/4/2018    Chronic multifocal osteomyelitis of right foot ([facility] [address]     Diabetic polyneuropathy associated with type 2 diabetes mellitus ([facility] [address]     Essential hypertension     Fractures, multiple 07/21/2014    Right foot fractures     Hyperlipidemia     Hypertension     Leukocytosis     Neuropathy     diabetic with charcot affecting the right foot    Pain in right foot     redness and swelling    Pneumonia 2009    Right foot infection     Right foot pain     Tobacco abuse 4/24/2018    Type II or unspecified type diabetes mellitus without mention of complication, not stated as uncontrolled     Vertigo     Well 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 stepped on sharp object. Covered by dressing.  Wound, open     Right posterior -Diabetic Ulcer       Past Surgical History:        Procedure Laterality Date    APPENDECTOMY      at age 24    ARTHROPLASTY Right 12/11/2020    RIGHT HALLUX EXOSTECTOMY AND 3RD DIGIT EXTENSIOR TENOTOMY performed by Kateryna Wilder DPM at [address] [facility] Left 04/24/2018    I&D foreign body removal    FOOT DEBRIDEMENT Right 3/20/2020    RIGHT  FOOT   INCISION AND DRAINAGE WITH BONE BIOPSY performed by Kateryna Wilder DPM at [address] Right 6/8/2021    FOOT DEBRIDEMENT INCISION AND DRAINAGE performed by Kateryna Wilder DPM at [address] [facility] Right 6/11/2021    RIGHT FO[facility] 1990    KNEE SURGERY Bilateral 1983    arthroscopy   [facility] [address] Left 4/24/2018    LEFT FOOT MULTIPLE  INCISIONS  AND DRAINAGE AND REMOVAL FOREIGN BODY  IRENE [facility] History:    Social History     Tobacco Use    Smoking status: Current Every Day Smoker     Packs/day: 1.00    Smokeless tobacco: Never Used   Substance Use Topics    Alcohol use:  No Ready to quit: Not Answered  Counseling given: Not Answered      Vital Signs (Current): There were no vitals filed for this visit.                                            BP Readings from Last 3 Encounters:   09/16/21 122/71   06/30/21 134/77   06/13/21 136/69       NPO Status:                                                                                 BMI:   Wt Readings from Last 3 Encounters:   09/16/21 252 lb (114.3 kg)   08/24/21 240 lb (108.9 kg)   08/03/21 240 lb (108.9 kg)     There is no height or weight on file to calculate BMI.    CBC:   Lab Results   Component Value Date    WBC 7.3 09/16/2021    RBC 4.19 09/16/2021    HGB 12.9 09/16/2021    HCT 37.4 09/16/2021    MCV 89.3 09/16/2021    RDW 13.0 09/16/2021     09/16/2021       CMP:   Lab Results   Component Value Date     09/16/2021    K 4.8 09/16/2021     09/16/2021    CO2 25 09/16/2021    BUN 18 09/16/2021    CREATININE 1.32 09/16/2021    GFRAA >60 09/16/2021    LABGLOM 54 09/16/2021    GLUCOSE 244 09/16/2021    PROT 7.5 01/09/2017    CALCIUM 9.5 09/16/2021    BILITOT 0.39 01/09/2017    ALKPHOS 89 01/09/2017    AST 16 01/09/2017    ALT 15 01/09/2017       POC Tests:   Recent Labs     09/16/21  1151   POCGLU 191*       Coags: No results found for: PROTIME, INR, APTT    HCG (If Applicable): No results found for: PREGTESTUR, PREGSERUM, HCG, HCGQUANT     ABGs: No results found for: PHART, PO2ART, ZGW1MWJ, QXU1OPU, BEART, R1XXLLUH     Type & Screen (If Applicable):  No results found for: LABABO, LABRH    Drug/Infectious Status (If Applicable):  No results found for: HIV, HEPCAB    COVID-19 Screening (If Applicable):   Lab Results   Component Value Date    COVID19 DETECTED 05/26/2021    COVID19 Not Detected 12/07/2020    COVID19 Not Detected 08/08/2020           Anesthesia Evaluation   no history of anesthetic complications:   Airway: Mallampati: I  TM distance: >3 FB   Neck ROM: full  Mouth opening: > = 3 FB Dental:          Pulmonary:normal exam        (-) shortness of breath                           Cardiovascular:    (+) hypertension:,     (-)  angina            Stress test reviewed                Neuro/Psych:   (+) neuromuscular disease (neuropathy):,             GI/Hepatic/Renal:   (+) renal disease: CRI,           Endo/Other:    (+) Diabetes, . Abdominal:             Vascular: Other Findings:               Anesthesia Plan      TIVA     ASA 3       Induction: intravenous. MIPS: Postoperative opioids intended and Prophylactic antiemetics administered. Anesthetic plan and risks discussed with patient. Plan discussed with CRNA.     Attending anesthesiologist reviewed and agrees with Bruna Jordan MD   9/16/2021

## 2021-09-16 NOTE — PROGRESS NOTES
Occupational Therapy  Facility/Department: [facility] MED SURG  Daily Treatment Note  NAME: Karen Araujo  : 1955  MRN: [id number]    Date of Service: 2021    Discharge Recommendations:  Patient would benefit from continued therapy after discharge. Pt currently functioning below baseline. Would suggest additional therapy at time of discharge to maximize long term outcomes and prevent re-admission. Please refer to AM-PAC score for current level of function. recommended  OT Equipment Recommendations  ADL Assistive Devices: Toileting - 3-in-1 Commode;Grab Bars - shower; Reacher;Long-handled Shoe Horn;Long-handled Sponge;Emergency Alert System    Assessment   Performance deficits / Impairments: Decreased functional mobility ; Decreased safe awareness;Decreased balance;Decreased ADL status; Decreased cognition;Decreased high-level IADLs;Decreased endurance;Decreased sensation;Decreased posture  Assessment: At this time, pt. cont to be a fall risk d/t RLE NWB status. Nursing continues to report pt. is impulsive and noncompliant with following RLE NWB orders. Skilled OT indicated to maximize I/safety awareness in order to return home. Prognosis: Fair  OT Education: OT Role;Home Exercise Program  REQUIRES OT FOLLOW UP: Yes  Activity Tolerance  Activity Tolerance: Pt. agreeable to OT education/HEP only d/t leaving for surgery soon  Safety Devices  Safety Devices in place: Yes  Type of devices: Call light within reach; Chair alarm in place; Left in chair;Patient at risk for falls;Gait belt;Nurse notified         Patient Diagnosis(es): The primary encounter diagnosis was Ulcer of right foot with other severity ([address] A diagnosis of Cellulitis of right foot was also pertinent to this visit.       has a past medical history of Abscess of right foot, Acquired hammer toe deformity of lesser toe of right foot, ALEJANDRO (acute kidney injury) ([address] Cellulitis, Cellulitis of left foot, Cellulitis of right foot, Charcot foot due to diabetes mellitus ([facility] Chest pain at rest, Chronic multifocal osteomyelitis of right foot ([facility] Diabetic polyneuropathy associated with type 2 diabetes mellitus ([address] Essential hypertension, Fractures, multiple, Hyperlipidemia, Hypertension, Leukocytosis, Neuropathy, Pain in right foot, Pneumonia, Right foot infection, Right foot pain, Tobacco abuse, Type II or unspecified type diabetes mellitus without mention of complication, not stated as uncontrolled, Vertigo, Well controlled type 2 diabetes mellitus with neurological manifestations ([facility] Wound dehiscence, Wound, open, and Wound, open. has a past surgical history that includes Neck surgery (1987); Appendectomy; knee surgery (Bilateral, 1983); Knee arthroscopy (Right, 1989); Knee arthroscopy (Left, 1990); Foot Debridement (Left, 04/24/2018); pr deep dissec foot infec,1 bursa (Left, 4/24/2018); Colonoscopy; Foot Debridement (Right, 3/20/2020); arthroplasty (Right, 12/11/2020); Foot Debridement (Right, 6/8/2021); and Foot surgery (Right, 6/11/2021). Restrictions  Restrictions/Precautions  Restrictions/Precautions: Weight Bearing, Fall Risk, General Precautions, Up as Tolerated, Contact Precautions  Lower Extremity Weight Bearing Restrictions  Right Lower Extremity Weight Bearing: Non Weight Bearing  Position Activity Restriction  Other position/activity restrictions: alarms, LUE IV, BR with BRP, elevate afected limb  Subjective   General  Chart Reviewed: Yes  Patient assessed for rehabilitation services?: Yes  Family / Caregiver Present: No      Orientation  Orientation  Overall Orientation Status: Within Functional Limits  Objective              Cognition  Overall Cognitive Status: Exceptions  Arousal/Alertness: Appropriate responses to stimuli  Following Commands:  Follows all commands without difficulty  Attention Span: Appears intact  Memory: Appears intact  Safety Judgement: Decreased awareness of need for safety;Decreased awareness of need for assistance  Problem Solving: Assistance required to implement solutions;Assistance required to correct errors made;Assistance required to generate solutions;Assistance required to identify errors made;Decreased awareness of errors  Insights: Not aware of deficits  Initiation: Requires cues for some  Sequencing: Requires cues for some     Perception  Overall Perceptual Status: WFL              Type of ROM/Therapeutic Exercise  Type of ROM/Therapeutic Exercise: Resistive Bands  Comment: Pt. issued HEP  and appeared to understand demonstration and reasons to promote UE strengthening program. Pt. appeared receptive to information. Exercises  Shoulder W - External Rotation with Resistance - 1 x daily - 7 x weekly - 3 sets - 10 reps  Shoulder PNF D2 with Resistance - 1 x daily - 7 x weekly - 3 sets - 10 reps  Shoulder Internal Rotation with Resistance - 1 x daily - 7 x weekly - 3 sets - 10 reps                 Plan   Plan  Times per week: 4-5x/week 1x/day as venkata  Times per day: Daily  Current Treatment Recommendations: Strengthening, Balance Training, Functional Mobility Training, Safety Education & Training, Positioning, Pain Management, Home Management Training, Endurance Training, Neuromuscular Re-education, Patient/Caregiver Education & Training, Self-Care / ADL, Equipment Evaluation, Education, & procurement, Cognitive/Perceptual Training                                                       AM-PAC Score        -[facility] Inpatient Daily Activity Raw Score: 13 (09/16/21 1446)  AM-PAC Inpatient ADL T-Scale Score : 32.03 (09/16/21 1446)  ADL Inpatient CMS 0-100% Score: 63.03 (09/16/21 1446)  ADL Inpatient CMS G-Code Modifier : CL (09/16/21 1446)    Goals  Short term goals  Time Frame for Short term goals: by discharge, pt to demo  Short term goal 1: bed mob tasks with use of rail as needed to MOD I. Short term goal 2: I with BUE HEP with use of handouts as needed to maintain strength.   Short term goal 3: UB ADL to

## 2021-09-16 NOTE — PLAN OF CARE
Problem: Pain:  Goal: Pain level will decrease  Description: Pain level will decrease  Outcome: Ongoing  Goal: Control of acute pain  Description: Control of acute pain  Outcome: Ongoing  Goal: Control of chronic pain  Description: Control of chronic pain  Outcome: Ongoing  Goal: Patient's pain/discomfort is manageable  Description: Patient's pain/discomfort is manageable  Outcome: Ongoing     Problem: Infection:  Goal: Will remain free from infection  Description: Will remain free from infection  Outcome: Ongoing     Problem: Safety:  Goal: Free from accidental physical injury  Description: Free from accidental physical injury  Outcome: Ongoing     Problem: Daily Care:  Goal: Daily care needs are met  Description: Daily care needs are met  Outcome: Ongoing     Problem: Skin Integrity:  Goal: Skin integrity will stabilize  Description: Skin integrity will stabilize  Outcome: Ongoing     Problem: Discharge Planning:  Goal: Patients continuum of care needs are met  Description: Patients continuum of care needs are met  Outcome: Ongoing     Problem: Falls - Risk of:  Goal: Will remain free from falls  Description: Will remain free from falls  Outcome: Ongoing  Goal: Absence of physical injury  Description: Absence of physical injury  Outcome: Ongoing     Problem: IP BALANCE  Goal: LTG - Patient will maintain balance to allow for safe/functional mobility  Outcome: Ongoing

## 2021-09-16 NOTE — OP NOTE
PODIATRY BRIEF OP NOTE    PATIENT NAME: Glendy Bonds  YOB: 1955  -  73 y.o. male  MRN: [id number]  DATE: 9/16/2021  [facility]LL[facility] #: [id number]    Surgeon(s):  Faye Batres DPM     ASSISTANTS: Teena Mcnulty DPM, PGY1    PRE-OP DIAGNOSIS:   1. Nonhealing wound down to level of subcutaneous tissue, right foot  2. Charcot arthropathy, right foot  3. Type 2 diabetes mellitus with peripheral neuropathy    POST-OP DIAGNOSIS: Same as above. PROCEDURE:   1. Incision and drainage of nonviable tissue, right foot    ANESTHESIA: MAC with local block of 9 cc of 1:1 mixture 1% lidocaine plain and 0.5% Marcaine plain to right foot    HEMOSTASIS: Direct pressure    ESTIMATED BLOOD LOSS: Less than 100cc. MATERIALS: 2-0 Nylon, 2-0 Prolene  * No implants in log *    INJECTABLES: 9 cc of 1:1 mixture 1% lidocaine plain and 0.5% Marcaine plain to right foot    SPECIMEN:   ID Type Source Tests Collected by Time Destination   1 : RIGHT FOOT ABSCESS Tissue Foot GRAM STAIN, CULTURE, ANAEROBIC AND AEROBIC [place], [place] 9/16/2021 5032        COMPLICATIONS: None    INDICATIONS FOR PROCEDURE:  The patient is a 73year old male with a history of nonhealing ulcer w/ abscess. Patient has a history of Charcot arthropathy. MRI was obtained, which showed marrow edema likely indicating infectious process. Patient had elevated white blood cell count. Physical exam indicated a nonhealing ulceration with purulent drainage, malodor and brian-wound erythema. The patient was cleared from a Cardiology standpoint. Patient failed conservative treatments and elected to undergo surgery. Risks and benefits were discussed. No guarantees were given or implied. Consent is signed and in the chart. PROCEDURE IN DETAIL:  Under mild sedation, the patient was brought back to the operating room and placed on the OR table in a supine position. IV sedation was initiated by the Anesthesia Department.  The right foot was then anesthetized with 10 cc of a 1:1 solution of 1% lidocaine and 0.5% Marcaine plain. The operative limb was then prepped and draped in the usual aseptic fashion. Time-out was performed to confirm the correct patient, correct site, and correct procedure. Everyone in the room was in agreement. Attention was directed towards the plantar right foot. There is an open ulceration extending down to the level of subcutaneous tissue with purulent drainage. Hyperkeratotic brian-wound was overlying the majority of the wound bed with some boggy tissue and purulent malodorous drainage. At this point, two converging semi-elliptical incision were made with a #15 blade, excising overlying hyperkeratotic tissue. There was purulent material throughout the entire ulceration. Fibrosis extended down to the level of the subcutaneous tissue level. There was noted to be some tracking and undermining in the distal direction. All purulent drainage was expressed utill there was normal bleeding tissue noted. All nonviable and fibrotic tissue was excised . A rongeur was utilized to remove any devitalized necrotic tissue that remained status post incision. At this point, the wound was flushed with saline. The soft tissue that had been excised previously was sent for culture. Following this, the wounds were again flushed with saline. Dressings consisting of Adaptic, 4 x 4s, Kerlix and an Ace bandage were applied. The patient tolerated the above procedure and anesthesia well without complications. The patient was transported from the operating room to the PACU with vital signs stable and vascular status intact to the right foot. Patient was then transferred back to the floor. Will follow up daily with patient. Patient will be weightbearing to right foot in CROW boot. Instructed to keep dressing clean, dry, and intact.

## 2021-09-16 NOTE — CARE COORDINATION
Social work: Met with patient at bedside to confirm [employer], as he stated yesterday he wanted to go home after he agreed to [facility] when SW met with him. Patient wants to speak with Dr. Faizan Alejandro and he get opinion home vs snf before deciding on plan. At this time loan barry is able to accept if plan is SNF. Patient is also open to home care.

## 2021-09-16 NOTE — PROGRESS NOTES
Physician Progress Note      Nadine Nicolas  [facility] #:                  [id number]  :                       1955  ADMIT DATE:       2021 10:47 AM  DISCH DATE:  RESPONDING  PROVIDER #:        Shayla Ansari NP          QUERY TEXT:    Pt admitted with infected right foot wound at site of prior debridement of   ulceration with delayed primary closure in 2021. ? If possible, please   document in progress notes and discharge summary:    The medical record reflects the following:  ?? Risk Factors: debridement followed by delayed primary wound closure of   infected right foot diabetic ulcer with MSSA abscess in 2021, DM with   Charcot's arthropathy and hyperglycemia;  blood glucose 383 on admission,   HgA1C 8.7 (203) on ;  ?? Clinical Indicators: right foot infection with dehiscence, cellulitis,   abscess, myositis and tenosynovitis in the setting of above risk factors; Per    H & P and  progress note:  \"Diabetic foot infection secondary to   diabetic polyneuropathy associated with type 2 diabetes and hyperglycemia with   wound dehiscence\"  ? ? Treatment: admission, IV antibiotics as directed by ID, plans for   debridement in OR on  by podiatry  Options provided:  -- Infected diabetic foot wound with cellulitis, abscess, myositis and   tenosynovitis as a postoperative complication  -- Infected diabetic foot wound with cellulitis, abscess, myositis and   tenosynovitis is not a postoperative complication but is due to Charcot's   arthropathy  -- Other - I will add my own diagnosis  -- Disagree - Not applicable / Not valid  -- Disagree - Clinically unable to determine / Unknown  -- Refer to Clinical Documentation Reviewer    PROVIDER RESPONSE TEXT:    Infected diabetic foot wound with cellulitis, abscess, myositis and   tenosynovitis is not a postoperative complication but due to Charcot's   arthropathy.     Query created by: Buzz Sanders on 9/15/2021 11:13 AM      Electronically signed by:  Larry Ansari NP 9/16/2021 10:19 AM

## 2021-09-16 NOTE — CARE COORDINATION
DC Planning    Cost for IV medication per week $50.45 per [place] and supply cost $18 per day until meets out of pocket. Pt is down in surgery and unable to relay information at this time.

## 2021-09-16 NOTE — CARE COORDINATION
DC Planning    Faxed H+P, ID not, face sheet to [facility] possiblity of pt going home vs snf. He is still considering snf. [place] of possible home IV ABX. He has had IV ABX with [place] and [facility].

## 2021-09-16 NOTE — PLAN OF CARE
Problem: Pain:  Goal: Patient's pain/discomfort is manageable  Description: Patient's pain/discomfort is manageable  9/15/2021 0262 by Shadi Oquendo RN  Outcome: Ongoing     Problem: Safety:  Goal: Free from accidental physical injury  Description: Free from accidental physical injury  Outcome: Ongoing     Problem: Daily Care:  Goal: Daily care needs are met  Description: Daily care needs are met  Outcome: Ongoing     Problem: Skin Integrity:  Goal: Skin integrity will stabilize  Description: Skin integrity will stabilize  Outcome: Ongoing     Problem: Falls - Risk of:  Goal: Will remain free from falls  Description: Will remain free from falls  Outcome: Ongoing

## 2021-09-16 NOTE — PROGRESS NOTES
Progress Note  Podiatric Medicine and Surgery     Subjective     CC: Diabetic ulceration, right foot    Patient seen and examined at bedside. Patient has been NPO since 7 a.m. for the OR today. He denies new issues overnight. Pain under control to the right foot. Afebrile, vital signs stable       HPI :  Sindhu Bearden is a 73 y.o. male seen at [facility] for diabetic foot ulceration to the plantar aspect of the right foot suspected to be infected. Patient was admitted to the hospital on 0606/21 for diabetic foot infection with abscess formation. Patient underwent surgical debridement and layered closure on 06/11/2021. Patient also has underlying Charcot arthropathy. Patient is well established with Dr. Amanda Torres was last seen approximately 2 weeks ago. Patient was on a course of IV antimicrobial therapy until 07/04/2021. Patient was also given oral antibiotics afterwards which he finished taking at the end of August.  Over the past 2-3 days patient has noticed increased redness, drainage, pain from the right foot. The most concerning these is his pain as the patient is normally neuropathic. Patient relates that he feels that his arch is collapsing and his foot is becoming unstable. Radiographs of the right foot were obtained in the ED which were negative for any soft tissue emphysema or foreign body. There does appear to be advanced changes of the Charcot arthropathy. Patient denies any trauma to the right foot.        PCP is Alex Mancia MD    ROS: Denies N/V/F/C/SOB/CP/Cough.        Medications:  Scheduled Meds:   aspirin  81 mg Oral Daily    gabapentin  900 mg Oral TID    glipiZIDE  20 mg Oral BID AC    alogliptin  12.5 mg Oral Daily    lisinopril  10 mg Oral Daily    atorvastatin  20 mg Oral Daily    sodium chloride flush  5-40 mL IntraVENous 2 times per day    cefepime  2,000 mg IntraVENous Q12H    insulin lispro  0-18 Units SubCUTAneous TID WC    insulin lispro  0-9 Units SubCUTAneous Nightly    enoxaparin  30 mg SubCUTAneous BID    insulin glargine  10 Units SubCUTAneous Nightly       Continuous Infusions:   sodium chloride 75 mL/hr at 21 0549    sodium chloride      dextrose         PRN Meds:acetaminophen **OR** acetaminophen, albuterol sulfate HFA, sodium chloride flush, sodium chloride, potassium chloride **OR** potassium alternative oral replacement **OR** potassium chloride, magnesium sulfate, ondansetron **OR** ondansetron, polyethylene glycol, fentanNYL, HYDROmorphone, glucose, dextrose, glucagon (rDNA), dextrose, HYDROcodone 5 mg - acetaminophen **OR** HYDROcodone 5 mg - acetaminophen    Objective     Vitals:  Patient Vitals for the past 8 hrs:   BP Temp Temp src Pulse Resp SpO2 Weight   21 0822 123/62 97.5 °F (36.4 °C) Oral 74 16 96 % --   21 0446 -- -- -- -- -- -- 252 lb (114.3 kg)     Average, Min, and Max for last 24 hours Vitals:  TEMPERATURE:  Temp  Av °F (36.7 °C)  Min: 97.5 °F (36.4 °C)  Max: 98.4 °F (36.9 °C)    RESPIRATIONS RANGE: Resp  Av  Min: 16  Max: 16    PULSE RANGE: Pulse  Av.5  Min: 74  Max: 89    BLOOD PRESSURE RANGE:  Systolic (04BGX), UHW:440 , Min:123 , TYJ:281   ; Diastolic (82VJL), XXS:37, Min:62, Max:76      PULSE OXIMETRY RANGE: SpO2  Av.5 %  Min: 95 %  Max: 96 %    I/O last 3 completed shifts: In: 2320 [P.O.:640; I.V.:1680]  Out: 1550 [QZHCF:2827]    CBC:  Recent Labs     21  0552   WBC 10.7 9.5   HGB 12.9* 12.3*   HCT 38.1* 37.1*    200   .8*  --         BMP:  Recent Labs     21  0552   * 132*   K 4.8 4.6   CL 95* 100   CO2 24 24   BUN 22 21   CREATININE 1.49* 1.50*   GLUCOSE 383* 181*   CALCIUM 9.5 9.3        Coags:  No results for input(s): APTT, PROT, INR in the last 72 hours. Lab Results   Component Value Date    SEDRATE 37 (H) 2021     Recent Labs     21  1104   .8*       Lower Extremity Physical Exam: Dressing left intact.  RLE is warm and well-perfused. Physical exam from 9/15  Vascular: DP and PT pulses are palpable. CFT brisk to all digits. Hair growth is absent to the level of the digits. Moderate edema.       Neuro: Saph/sural/SP/DP/plantar sensation diminished to light touch.     Musculoskeletal: Muscle strength is decreased ROM, decreased strength to all lower extremity muscle groups. Gross deformity is present with rocker-bottom foot deformity right lower extremity. Compartments are soft and compressible. No pain with compression of posterior calf. Pain with palpation to medial longitudinal arch at ulceration site.     Dermatologic: Full-thickness thickness ulcer to the level of subcutaneous tissue #1 located plantar medial arch and measures approximately 2.2cm x 2.5cm x 0.3cm. Base is fibrogranular. Periwound skin is hyperkeratotic and macerated. Moderate serosanguineous drainage noted with associated mal odor. Erythema present from the ulceration site extending proximally and medially along the iris pedis to the medial aspect of the ankle with  associated increase in warmth--improved from yesterday's exam. Does not probe to bone, sinus track, or undermine. No crepitus, or induration. Interdigital maceration absent. Clinical Images: 9/14            Imaging:   MRI FOOT RIGHT WO CONTRAST   Final Result   1. Deep ulceration and sinus tract with subjacent complex fluid   collection/phlegmonous changes at the plantar medial midfoot with surrounding   severe cellulitis and induration of the skin. This region measures 3.2 x 1.8   x 1.4 cm. Moderate to severe intertarsal myositis. Mild cellulitis of the   remainder of the foot. 2. Severe degenerative changes/Charcot arthropathy and neuropathic changes at   the TMT joints. 3. No MR evidence for osteomyelitis or osseous destruction at this point.    4. Moderate complex likely infectious tenosynovitis of the flexor tendon to   the 1st digit inseparable from the region of phlegmonous change/early abscess   formation. 5. Moderate degenerative changes of the 1st MTP/MTS joints. 6. Prior osteotomy of the proximal phalanx 3rd digit. 7. Mild-to-moderate degenerative changes of the midfoot. The findings were sent to the Radiology Results [address] at 9:46   am on 9/14/2021to be communicated to a licensed caregiver. XR FOOT RIGHT (MIN 3 VIEWS)   Final Result   Similar-appearing osseous structures with Charcot deformity of the midfoot. Plantar soft tissue swelling. Cultures:   Right foot, 9/13: GPC, GNR, Staph aureus, Enterobacter cloacae, Morganella morganii, normal [place]     Assessment   Martha Stewart is a 73 y.o. male with   1. Cellulitis, right foot-improving  2. Early abscess, right foot  3. Charcot arthropathy, right foot  4. Diabetes mellitus type 2 with associated peripheral neuropathy  5. PAD  6. Status post I&D, right foot (DOS 06/11/2021)    Principal Problem:    Diabetic infection of right foot ([address]  Active Problems:    Essential hypertension    Hyperlipidemia    Diabetic polyneuropathy associated with type 2 diabetes mellitus ([address]    Type 2 diabetes mellitus treated with insulin ([address]    Wound dehiscence    Hyperglycemia due to diabetes mellitus ([address]    Foot ulcer ([address]    Cellulitis of right foot  Resolved Problems:    * No resolved hospital problems. *       Plan     · Patient examined and evaluated at bedside   · All treatment options discussed in detail with the patient. Patient continues to be amenable to surgical treatment. · Continue medical management per primary, thank you. · ID consulted  ? IV antibiotics: cefepime  · Patient has been fitted for CROW  · Plan is OR for I&D of RLE today at 3:45 p.m.    · NPO   · Hold AC   · COVID vaccinated   · Consent signed, witness, and in the patient's chart. RLE marked.    · Dressing intact to Right lower extremity: packed with 1/4 inch iodoform, DSD, Ace  · Nonweightbearing to Right lower extremity   · Patient states he does NOT wish to transfer to SNF. He would like [facility] upon discharge. · Discussed with Dr. Vinnie Hollis.      Bernardo Ashton DPM   Podiatric Medicine & Surgery   9/16/2021 at 10:03 AM

## 2021-09-17 ENCOUNTER — APPOINTMENT (OUTPATIENT)
Dept: INTERVENTIONAL RADIOLOGY/VASCULAR | Age: 66
DRG: 988 | End: 2021-09-17
Payer: MEDICARE

## 2021-09-17 VITALS
RESPIRATION RATE: 16 BRPM | HEIGHT: 71 IN | OXYGEN SATURATION: 96 % | SYSTOLIC BLOOD PRESSURE: 123 MMHG | DIASTOLIC BLOOD PRESSURE: 80 MMHG | BODY MASS INDEX: 35.7 KG/M2 | WEIGHT: 255 LBS | TEMPERATURE: 98.4 F | HEART RATE: 86 BPM

## 2021-09-17 LAB
GLUCOSE BLD-MCNC: 139 MG/DL (ref 75–110)
GLUCOSE BLD-MCNC: 184 MG/DL (ref 75–110)
GLUCOSE BLD-MCNC: 210 MG/DL (ref 75–110)
GLUCOSE BLD-MCNC: 251 MG/DL (ref 75–110)

## 2021-09-17 PROCEDURE — 6360000002 HC RX W HCPCS

## 2021-09-17 PROCEDURE — 99232 SBSQ HOSP IP/OBS MODERATE 35: CPT | Performed by: INTERNAL MEDICINE

## 2021-09-17 PROCEDURE — 05HY33Z INSERTION OF INFUSION DEVICE INTO UPPER VEIN, PERCUTANEOUS APPROACH: ICD-10-PCS | Performed by: INTERNAL MEDICINE

## 2021-09-17 PROCEDURE — 2580000003 HC RX 258

## 2021-09-17 PROCEDURE — 6370000000 HC RX 637 (ALT 250 FOR IP)

## 2021-09-17 PROCEDURE — APPSS45 APP SPLIT SHARED TIME 31-45 MINUTES: Performed by: NURSE PRACTITIONER

## 2021-09-17 PROCEDURE — 36410 VNPNXR 3YR/> PHY/QHP DX/THER: CPT

## 2021-09-17 PROCEDURE — C1751 CATH, INF, PER/CENT/MIDLINE: HCPCS

## 2021-09-17 PROCEDURE — 82947 ASSAY GLUCOSE BLOOD QUANT: CPT

## 2021-09-17 PROCEDURE — 76937 US GUIDE VASCULAR ACCESS: CPT

## 2021-09-17 RX ORDER — HYDROCODONE BITARTRATE AND ACETAMINOPHEN 5; 325 MG/1; MG/1
1 TABLET ORAL EVERY 4 HOURS PRN
Qty: 9 TABLET | Refills: 0 | Status: SHIPPED | OUTPATIENT
Start: 2021-09-17 | End: 2021-09-20

## 2021-09-17 RX ORDER — INSULIN GLARGINE 100 [IU]/ML
15 INJECTION, SOLUTION SUBCUTANEOUS NIGHTLY
Qty: 5 PEN | Refills: 3 | Status: SHIPPED | OUTPATIENT
Start: 2021-09-17

## 2021-09-17 RX ADMIN — GLIPIZIDE 20 MG: 10 TABLET ORAL at 06:31

## 2021-09-17 RX ADMIN — ALOGLIPTIN 12.5 MG: 12.5 TABLET, FILM COATED ORAL at 09:04

## 2021-09-17 RX ADMIN — ATORVASTATIN CALCIUM 20 MG: 20 TABLET, FILM COATED ORAL at 09:04

## 2021-09-17 RX ADMIN — GABAPENTIN 900 MG: 300 CAPSULE ORAL at 09:04

## 2021-09-17 RX ADMIN — ASPIRIN 81 MG: 81 TABLET, COATED ORAL at 09:04

## 2021-09-17 RX ADMIN — ENOXAPARIN SODIUM 30 MG: 30 INJECTION SUBCUTANEOUS at 09:06

## 2021-09-17 RX ADMIN — CEFEPIME HYDROCHLORIDE 2000 MG: 2 INJECTION, POWDER, FOR SOLUTION INTRAVENOUS at 12:36

## 2021-09-17 RX ADMIN — ONDANSETRON 4 MG: 4 TABLET, ORALLY DISINTEGRATING ORAL at 09:04

## 2021-09-17 RX ADMIN — GABAPENTIN 900 MG: 300 CAPSULE ORAL at 15:14

## 2021-09-17 RX ADMIN — GLIPIZIDE 20 MG: 10 TABLET ORAL at 17:57

## 2021-09-17 RX ADMIN — CEFEPIME HYDROCHLORIDE 2000 MG: 2 INJECTION, POWDER, FOR SOLUTION INTRAVENOUS at 01:24

## 2021-09-17 RX ADMIN — INSULIN LISPRO 3 UNITS: 100 INJECTION, SOLUTION INTRAVENOUS; SUBCUTANEOUS at 12:36

## 2021-09-17 RX ADMIN — SODIUM CHLORIDE, PRESERVATIVE FREE 10 ML: 5 INJECTION INTRAVENOUS at 09:06

## 2021-09-17 RX ADMIN — INSULIN LISPRO 6 UNITS: 100 INJECTION, SOLUTION INTRAVENOUS; SUBCUTANEOUS at 17:57

## 2021-09-17 RX ADMIN — LISINOPRIL 10 MG: 10 TABLET ORAL at 09:04

## 2021-09-17 RX ADMIN — SODIUM CHLORIDE: 9 INJECTION, SOLUTION INTRAVENOUS at 09:04

## 2021-09-17 ASSESSMENT — ENCOUNTER SYMPTOMS
NAUSEA: 1
RESPIRATORY NEGATIVE: 1
ALLERGIC/IMMUNOLOGIC NEGATIVE: 1

## 2021-09-17 ASSESSMENT — PAIN SCALES - GENERAL
PAINLEVEL_OUTOF10: 9
PAINLEVEL_OUTOF10: 0
PAINLEVEL_OUTOF10: 9

## 2021-09-17 NOTE — CARE COORDINATION
Social work:  Writer re-visited with patient to confirm plans- home vs SNF. Patient is opting to go home with [facility]. Referral to [facility] cancelled.

## 2021-09-17 NOTE — CARE COORDINATION
Crow boot delivered to room. Dr. Tom Prasad rounded and pt will D/C home on Cefepime 2000mg IV every 12 hours. Awaiting script. Christina with [facility] informed and PICC to be placed. IV solution and supplies to be delivered to home today. Spoke to [place] with [facility] and they cannot start care until Monday. Informed Christina regarding situation with home care and pt is already independent with IV administration in the home. Informed Maribel and pt will be on [facility] schedule to be seen on Monday.

## 2021-09-17 NOTE — PROGRESS NOTES
SubCUTAneous Nightly    enoxaparin  30 mg SubCUTAneous BID       Continuous Infusions:   sodium chloride 75 mL/hr at 21 0549    sodium chloride      dextrose         PRN Meds:acetaminophen **OR** acetaminophen, albuterol sulfate HFA, sodium chloride flush, sodium chloride, potassium chloride **OR** potassium alternative oral replacement **OR** potassium chloride, magnesium sulfate, ondansetron **OR** ondansetron, polyethylene glycol, fentanNYL, HYDROmorphone, glucose, dextrose, glucagon (rDNA), dextrose, HYDROcodone 5 mg - acetaminophen **OR** HYDROcodone 5 mg - acetaminophen    Objective     Vitals:  Patient Vitals for the past 8 hrs:   Weight   21 0352 255 lb (115.7 kg)     Average, Min, and Max for last 24 hours Vitals:  TEMPERATURE:  Temp  Av.6 °F (36.4 °C)  Min: 97 °F (36.1 °C)  Max: 98.1 °F (36.7 °C)    RESPIRATIONS RANGE: Resp  Avg: 10.1  Min: 0  Max: 26    PULSE RANGE: Pulse  Av.8  Min: 68  Max: 79    BLOOD PRESSURE RANGE:  Systolic (10ACG), CLV:111 , Min:115 , XOI:912   ; Diastolic (99NQI), GKS:86, Min:62, Max:85      PULSE OXIMETRY RANGE: SpO2  Av %  Min: 96 %  Max: 100 %    I/O last 3 completed shifts: In: 5688 [P.O.:400; I.V.:1360]  Out: 1000 [Urine:900; Blood:100]    CBC:  Recent Labs     21  1049   WBC 7.3   HGB 12.9*   HCT 37.4*           BMP:  Recent Labs     21  1049   *   K 4.8      CO2 25   BUN 18   CREATININE 1.32*   GLUCOSE 244*   CALCIUM 9.5        Coags:  No results for input(s): APTT, PROT, INR in the last 72 hours. Lab Results   Component Value Date    SEDRATE 37 (H) 2021     No results for input(s): CRP in the last 72 hours. Lower Extremity Physical Exam:     Vascular: DP and PT pulses are palpable. CFT brisk to all digits. Hair growth is absent to the level of the digits.   Moderate edema.       Neuro: Saph/sural/SP/DP/plantar sensation diminished to light touch.     Musculoskeletal: Muscle strength is decreased ROM, decreased strength to all lower extremity muscle groups. Gross deformity is present with rocker-bottom foot deformity right lower extremity. Compartments are soft and compressible. No pain with compression of posterior calf. Pain with palpation to medial longitudinal arch at ulceration site.     Dermatologic: Skin is well coapted with sutures intact to the medial right foot. No dehiscence, drainage, or fluctuance noted. Mild erythema with associated increase in warmth noted consistent with post op course. Clinical Images:                   Imaging:   MRI FOOT RIGHT WO CONTRAST   Final Result   1. Deep ulceration and sinus tract with subjacent complex fluid   collection/phlegmonous changes at the plantar medial midfoot with surrounding   severe cellulitis and induration of the skin. This region measures 3.2 x 1.8   x 1.4 cm. Moderate to severe intertarsal myositis. Mild cellulitis of the   remainder of the foot. 2. Severe degenerative changes/Charcot arthropathy and neuropathic changes at   the TMT joints. 3. No MR evidence for osteomyelitis or osseous destruction at this point. 4. Moderate complex likely infectious tenosynovitis of the flexor tendon to   the 1st digit inseparable from the region of phlegmonous change/early abscess   formation. 5. Moderate degenerative changes of the 1st MTP/MTS joints. 6. Prior osteotomy of the proximal phalanx 3rd digit. 7. Mild-to-moderate degenerative changes of the midfoot. The findings were sent to the Radiology Results [address] at 9:46   am on 9/14/2021to be communicated to a licensed caregiver. XR FOOT RIGHT (MIN 3 VIEWS)   Final Result   Similar-appearing osseous structures with Charcot deformity of the midfoot. Plantar soft tissue swelling.              Cultures:   Right foot, 9/13: GPC, GNR, Staph aureus, Enterobacter cloacae, Morganella morganii, normal [place]ison   Tissue Cx 9/16: NGTD    Assessment   Philomena Tran is a 73 y.o. male with   1. S/p I&D, right foot (DOS: 9/16/21)  2. Cellulitis, right foot-improving  3. Early abscess, right foot  4. Charcot arthropathy, right foot  5. Diabetes mellitus type 2 with associated peripheral neuropathy  6. PAD    Principal Problem:    Diabetic infection of right foot ([facility] [address]  Active Problems:    Essential hypertension    Hyperlipidemia    Diabetic polyneuropathy associated with type 2 diabetes mellitus ([facility] [address]    Type 2 diabetes mellitus treated with insulin ([facility] [address]    Wound dehiscence    Hyperglycemia due to diabetes mellitus ([facility] [address]    Foot ulcer ([facility] [address]    Cellulitis of right foot  Resolved Problems:    * No resolved hospital problems. *       Plan     · Patient examined and evaluated at bedside   · All treatment options discussed in detail with the patient. Patient continues to be amenable to surgical treatment. · Continue medical management per primary, thank you. · Anbx: cefepime, ID following. Appreciate recs  · Patient has been fitted for CROW  · Patient is POD #1 s/p right foot I&D. Will await CROW boot and cultures for further recommendations. Patient states he does NOT wish to transfer to SNF. He would like Chilango Gomez upon discharge. · Dressing intact to Right lower extremity: adaptic, DSD, ACE  · Nonweightbearing to Right lower extremity   · Discussed with Dr. Nayely Mcarthur.      Gaetano Monet DPM   Podiatric Medicine & Surgery   9/17/2021 at 6:20 AM

## 2021-09-17 NOTE — PROGRESS NOTES
[facility]  Office: [address], DO, Eduardo Lara, DO, Hari Ordonezmeng, DO, Ghulam Foster Blood, DO, Carol Stark MD, Celia Cutler MD, Kumar Trevino MD, Bernie Prado MD, Trice Tracey MD, Steve Ibanez MD, Latosha Robbins MD, Elio Hanna, DO, Kevin Phillips, DO, Jadene Boast, MD,  Rozina Doss DO, Osmar Cruz MD, Pili Rivera MD, Phan Jarrett MD, Eneida Yang MD, , Vik Cruz MD, Nyoka Hodgkins, MD, Jacqueline Gomez MD, Danna De Guzman CNP, [facility], CNP, Bassem Bhatia, CNP, Jamie Cuba, CNS, Ekaterina Sue, CNP, Shelby Sy, CNP, Gabriela Foot, CNP, Joni Gamma, CNP, Diandra Loser, CNP, Allie Mckeon PA-C, Carlyn Lee DNP, Remy Coello, CNP, Ally Aranda, CNP, Helen Romero, CNP, Lisa Gallardo, CNP, Khari Lentz, CNP, Sherie Beckford, CNP, Cass Esparza, CNP, [facility]    Progress Note    9/17/2021    10:24 AM    Name:   Osmany Gavin  MRN:     [id number]     [place]:      [de-identified]   Room:   2010/2010-02  IP Day:  4  Admit Date:  9/13/2021 10:47 AM    PCP:   Bambi Montgomery MD  Code Status:  Full Code    Subjective:     C/C:   Chief Complaint   Patient presents with    Foot Pain    Post-op Problem     pt states that his foot is bleeding from the surgical site      Interval History Status: not changed. Glycemic control better today. OR yesterday per podiatry and operative notes are reviewed. Offloading postoperative boot has been ordered. Infectious disease remains on board and anticipates the need for outpatient IV antibiotics. Patient also require wound care and dressing changes in the home. We are working towards discharge. Brief History:     9/13 - Patient has a known chronic diabetic foot infection to the right lower foot. Patient was noted her prior history for several months.   Patient had a wound dehiscence several months ago and required wound care and long-term IV antibiotics use administered through a PICC. Patient reports that the wound is healing well and as of 2 weeks ago at his most recent podiatry appointment healing was progressing as expected. Saturday the patient had his foot examined by his wife who found him to be open and draining. Podiatry was contacted this morning and advised that he needed to report to emergency department. 9/14 - Condition stable. Podiatry on board and reports no intention for surgical procedure. Renal function stable. Glycemic control continues. MRI ordered by podiatry. 9/15-9/16 - Surgery planned debridement for 9/16. Further treatment to be determined following procedure. Review of Systems:     Constitutional:  negative for chills, fevers, sweats  Respiratory:  negative for cough, dyspnea on exertion, shortness of breath, wheezing  Cardiovascular:  negative for chest pain, chest pressure/discomfort, lower extremity edema, palpitations  Gastrointestinal:  negative for abdominal pain, constipation, diarrhea, nausea, vomiting  Musculoskeletal: Negative for arthralgias and myalgias. Skin: Positive for color change (RLE) and wound (RLE). Neurological:  negative for dizziness, headache    Medications: Allergies:     Allergies   Allergen Reactions    Morphine Itching    Other Other (See Comments)    Percocet [Oxycodone-Acetaminophen]      Facial swelling    Dye [Iodides] Rash     Rash and swelling       Current Meds:   Scheduled Meds:    insulin glargine  20 Units SubCUTAneous Nightly    aspirin  81 mg Oral Daily    gabapentin  900 mg Oral TID    glipiZIDE  20 mg Oral BID AC    alogliptin  12.5 mg Oral Daily    lisinopril  10 mg Oral Daily    atorvastatin  20 mg Oral Daily    sodium chloride flush  5-40 mL IntraVENous 2 times per day    cefepime  2,000 mg IntraVENous Q12H    insulin lispro  0-18 Units SubCUTAneous TID     insulin lispro  0-9 Units SubCUTAneous Nightly    enoxaparin  30 mg SubCUTAneous BID     Continuous Infusions:    sodium chloride 75 mL/hr at 09/17/21 0904    sodium chloride      dextrose       PRN Meds: acetaminophen **OR** acetaminophen, albuterol sulfate HFA, sodium chloride flush, sodium chloride, potassium chloride **OR** potassium alternative oral replacement **OR** potassium chloride, magnesium sulfate, ondansetron **OR** ondansetron, polyethylene glycol, fentanNYL, HYDROmorphone, glucose, dextrose, glucagon (rDNA), dextrose, HYDROcodone 5 mg - acetaminophen **OR** HYDROcodone 5 mg - acetaminophen    Data:     Past Medical History:   has a past medical history of Abscess of right foot, Acquired hammer toe deformity of lesser toe of right foot, ALEJANDRO (acute kidney injury) ([facility] [address] Cellulitis, Cellulitis of left foot, Cellulitis of right foot, Charcot foot due to diabetes mellitus ([facility] [address] Chest pain at rest, Chronic multifocal osteomyelitis of right foot ([facility] [address] Diabetic polyneuropathy associated with type 2 diabetes mellitus ([facility] [address] Essential hypertension, Fractures, multiple, Hyperlipidemia, Hypertension, Leukocytosis, Neuropathy, Pain in right foot, Pneumonia, Right foot infection, Right foot pain, Tobacco abuse, Type II or unspecified type diabetes mellitus without mention of complication, not stated as uncontrolled, Vertigo, Well controlled type 2 diabetes mellitus with neurological manifestations ([facility] [address] Wound dehiscence, Wound, open, and Wound, open. Social History:   reports that he has been smoking. He has been smoking about 1.00 pack per day. He has never used smokeless tobacco. He reports previous drug use. He reports that he does not drink alcohol.      Family History:   Family History   Problem Relation Age of Onset    Diabetes Mother     Cancer Father     Hypertension Maternal Grandmother        Vitals:  /65   Pulse 66   Temp 98.1 °F (36.7 °C) (Oral)   Resp 16   Ht 5' 11\" (1.803 m)   Wt 255 lb (115.7 kg)   SpO2 96%   BMI 35.57 kg/m²   Temp (24hrs), Av.7 °F (36.5 °C), Min:97 °F (36.1 °C), Max:98.1 °F (36.7 °C)    Recent Labs     21  0635   POCGLU 191* 170* 181* 139*       I/O (24Hr): Intake/Output Summary (Last 24 hours) at 2021 1024  Last data filed at 2021 1800  Gross per 24 hour   Intake 375 ml   Output 100 ml   Net 275 ml       Labs:  Hematology:  Recent Labs     21  1049   WBC 7.3   RBC 4.19*   HGB 12.9*   HCT 37.4*   MCV 89.3   MCH 30.8   MCHC 34.5   RDW 13.0      MPV 9.2     Chemistry:  Recent Labs     21  1049   *   K 4.8      CO2 25   GLUCOSE 244*   BUN 18   CREATININE 1.32*   ANIONGAP 7*   LABGLOM 54*   GFRAA >60   CALCIUM 9.5     Recent Labs     09/15/21  2038 09/16/21  0617 21  11521  0635   POCGLU 264* 266* 191* 170* 181* 139*     ABG:No results found for: POCPH, PHART, PH, POCPCO2, GEK1QCS, PCO2, POCPO2, PO2ART, PO2, POCHCO3, STJ8PYZ, HCO3, NBEA, PBEA, BEART, BE, THGBART, THB, YSF6OIS, OEWI5HYZ, P8OVZIZA, O2SAT, FIO2  Lab Results   Component Value Date/Time    SPECIAL NOT REPORTED 2021 04:58 PM     Lab Results   Component Value Date/Time    CULTURE PENDING 2021 04:58 PM       Radiology:  XR FOOT RIGHT (MIN 3 VIEWS)    Result Date: 2021  Similar-appearing osseous structures with Charcot deformity of the midfoot. Plantar soft tissue swelling. Physical Examination:        General appearance:  alert, cooperative and no distress  Mental Status:  oriented to person, place and time and normal affect  Lungs:  clear to auscultation bilaterally, normal effort  Heart:  regular rate and rhythm, no murmur  Abdomen:  soft, nontender, nondistended, normal bowel sounds, no masses, hepatomegaly, splenomegaly  Extremities:  no edema, redness, tenderness in the calves. Wound present. Dressing in place and not taken down by internal medicine. No swelling or tenderness. Normal range of motion.    Skin:  no gross lesions, rashes, induration. Dressing in place. Assessment:        Hospital Problems         Last Modified POA    * (Principal) Diabetic infection of right foot ([address] 9/13/2021 Yes    Essential hypertension 9/13/2021 Yes    Hyperlipidemia 9/13/2021 Yes    Diabetic polyneuropathy associated with type 2 diabetes mellitus ([address] 9/13/2021 Yes    Type 2 diabetes mellitus treated with insulin ([address] 9/13/2021 Yes    Wound dehiscence 9/13/2021 Yes    Hyperglycemia due to diabetes mellitus ([address] 9/13/2021 Yes    Foot ulcer ([address] 9/14/2021 Yes    Cellulitis of right foot 9/14/2021 Yes          Plan:        Diabetic foot infection secondary to diabetic polyneuropathy associated with type 2 diabetes and hyperglycemia with wound dehiscence   Surgical debridement completed yesterday per podiatry  cefepime continued per ID -outpatient antibiotics at their discretion  Glycemic control with glipizide, Lantus, sliding scale, A1c 8.7  Increase nightly Lantus dose to 20 -BGL better controlled today  Begin discharge planning, patient will likely need outpatient IV antibiotics, offloading pressure boot, wound care in the home.   Essential hypertension and hyperlipidemia -controlled  Continue lisinopril and Lipitor       ROCHELLE Marshall NP  9/17/2021  10:24 AM

## 2021-09-17 NOTE — PLAN OF CARE
Problem: Pain:  Goal: Pain level will decrease  Description: Pain level will decrease  Outcome: Ongoing  Goal: Control of acute pain  Description: Control of acute pain  Outcome: Ongoing  Goal: Control of chronic pain  Description: Control of chronic pain  Outcome: Ongoing  Goal: Patient's pain/discomfort is manageable  Description: Patient's pain/discomfort is manageable  Outcome: Ongoing     Problem: Infection:  Goal: Will remain free from infection  Description: Will remain free from infection  Outcome: Ongoing     Problem: Safety:  Goal: Free from accidental physical injury  Description: Free from accidental physical injury  Outcome: Ongoing     Problem: Daily Care:  Goal: Daily care needs are met  Description: Daily care needs are met  Outcome: Ongoing     Problem: Skin Integrity:  Goal: Skin integrity will stabilize  Description: Skin integrity will stabilize  Outcome: Ongoing     Problem: Discharge Planning:  Goal: Patients continuum of care needs are met  Description: Patients continuum of care needs are met  Outcome: Ongoing     Problem: Falls - Risk of:  Goal: Will remain free from falls  Description: Will remain free from falls  Outcome: Ongoing  Note: Patient is a fall risk during this admission. Fall risk assessment was performed. Patient is absent of falls. Bed is in the lowest position. Wheels on the bed are locked. Call light and bed side table are within reach. Clutter is removed. Patient was educated to call out when needing assistance or wanting to get out of bed. Patient offered toileting assistance during rounding. Hourly rounds have been performed.     Goal: Absence of physical injury  Description: Absence of physical injury  Outcome: Ongoing     Problem: IP BALANCE  Goal: LTG - Patient will maintain balance to allow for safe/functional mobility  Outcome: Ongoing

## 2021-09-17 NOTE — PLAN OF CARE
Siderails up x 2  Hourly rounding  Call light in reach  Instructed to call for assist before attempting out of bed. Remains free from falls and accidental injury at this time   Floor free from obstacles  Bed is locked and in lowest position  Adequate lighting provided  Bed alarm on, [place] star and Stay with Me signs posted    Checked for incontinence every 2 hours and prn. Pericare as needed. Assisted to reposition off back frequently. On waffle mattress. Heels off bed with pillows. Pain level assessment complete.    Patient educated on pain scale and control interventions  PRN pain medication given per patient request  Patient instructed to call out with new onset of pain or unrelieved pain

## 2021-09-17 NOTE — CARE COORDINATION
Per [facility] Leticia boot will be delivered today by 12:30pm.

## 2021-09-17 NOTE — PROGRESS NOTES
[facility]  Progress Note    Vidya Tovar  MRN: [id number]  Date: 9/17/2021  LOS: 4     Reason for F/U :   Right-sided diabetic foot infection    Impression :   Charcot arthropathy of the right foot. History of right foot abscess   status post I&D 6/8/2021 and had MSSA infection at that time. Residual wound now with wound dehiscence and secondary infection and abscess   Culture with MSSA, Enterobacter cloacae and Morganella morganii  Status post surgical drainage 9/16/2021    Recommendations:   Continue intravenous antimicrobial therapy with cefepime. The patient was taken to the operating room and underwent surgical drainage and there was no deep-seated infection with no bone involvement. The plan is for PICC line placement and discharge on IV antimicrobial therapy through 10/22/2021  The patient will need to follow-up with me in the office in 4 weeks    Infection Control Recommendations:   Universal precautions    Discharge Planning:   Estimated Length of IV antimicrobials: Cefepime 2 g IV every 12 hours through 10/22/2021  Patient will need PICC line Insertion  Patient will need: Home IV   Patient willneed outpatient wound care: Yes    Medical Decision making / Summary of Stay:   Vidya Tovar is a 73y.o.-year-old male who was initially admitted on 9/13/2021. Janus Severe has a history of diabetes mellitus with Charcot arthropathy related to diabetic and neuropathy. The patient has a history of a puncture wound to the right foot and subsequently developed an abscess in the medial aspect of the foot for which he had an I&D done on 6/8/2021 with cultures growing out MSSA.   The patient was treated with intravenous antimicrobial therapy with cefazolin through July 4, 2021 and he has been following with his podiatrist since that time and he has had a wound which she was undergoing local wound care.     The patient reports that over the weekend he started to notice some drainage/bleeding from his right foot wound as well as some increasing redness but no significant soft tissue swelling. He never had any fevers or chills. No sweats, nausea or vomiting.     Due to the above symptoms he ended up coming into the emergency department and he was found to have diabetic foot wound with secondary soft tissue infection/cellulitis of the right foot with possible abscess. There was some wound dehiscence following the prior surgical incision. X-rays do not show any changes of osteomyelitis or emphysema or foreign body. MRI was ordered and the patient was started on IV antimicrobial therapy.     The MRI showed a deep ulceration and sinus tract with subadjacent complex fluid collection/phlegmon was changes at the plantar medial midfoot with surrounding severe cellulitis and induration of the skin. There is moderate to severe intertarsal myositis and mild cellulitis of the remainder of the foot.     The patient is scheduled for surgical debridement in the operating room on 2021. I was asked to evaluate and help with antibiotic choice. Current evaluation:2021    /70   Pulse 73   Temp 98.4 °F (36.9 °C) (Oral)   Resp 16   Ht 5' 11\" (1.803 m)   Wt 255 lb (115.7 kg)   SpO2 95%   BMI 35.57 kg/m²     Temperature Range: Temp: 98.4 °F (36.9 °C) Temp  Av.8 °F (36.6 °C)  Min: 97 °F (36.1 °C)  Max: 98.4 °F (36.9 °C)  The patient is seen and evaluated at bedside and was taken to the operating room yesterday was found to have an ulcer extending down to the subcutaneous tissues with purulent drainage and the patient underwent incision and drainage and all the purulent drainage was expressed and all nonviable/fibrotic tissue was excised. The patient does not have any fevers or chills. He reports having some nausea and emesis today but he thinks it was related to some perfume exposure. Review of Systems   Constitutional: Negative. Respiratory: Negative. Cardiovascular: Negative. Gastrointestinal: Positive for nausea. Genitourinary: Negative. Musculoskeletal: Negative. Skin: Positive for wound. Allergic/Immunologic: Negative. Neurological: Negative. Physical Examination :     Physical Exam  Constitutional:       Appearance: He is well-developed. HENT:      Head: Normocephalic and atraumatic. Cardiovascular:      Rate and Rhythm: Normal rate. Heart sounds: Normal heart sounds. No friction rub. No gallop. Pulmonary:      Effort: Pulmonary effort is normal.      Breath sounds: Normal breath sounds. No wheezing. Abdominal:      General: Bowel sounds are normal.      Palpations: Abdomen is soft. There is no mass. Tenderness: There is no abdominal tenderness. Musculoskeletal:      Cervical back: Normal range of motion and neck supple. Lymphadenopathy:      Cervical: No cervical adenopathy. Skin:     General: Skin is warm and dry. Comments: The right foot dressing was removed as the patient was being placed into his orthotic shoe. The patient underwent debridement and closure of the ulceration on the plantar aspect of the foot. Neurological:      Mental Status: He is alert and oriented to person, place, and time. Laboratory data:   I have independently reviewed the followinglabs:  CBC with Differential:   Recent Labs     09/16/21  1049   WBC 7.3   HGB 12.9*   HCT 37.4*      LYMPHOPCT 27   MONOPCT 9     BMP:   Recent Labs     09/16/21  1049   *   K 4.8      CO2 25   BUN 18   CREATININE 1.32*     Hepatic Function Panel: No results for input(s): PROT, LABALBU, BILIDIR, IBILI, BILITOT, ALKPHOS, ALT, AST in the last 72 hours.       No results found for: PROCAL  Lab Results   Component Value Date    .8 09/13/2021    CRP 33.7 06/10/2021    CRP 63.4 06/06/2021     Lab Results   Component Value Date    SEDRATE 37 (H) 09/13/2021         No results found for: DDIMER  No results found for: FERRITIN  No results found for: LDH  No results found for: FIBRINOGEN    No results found for requested labs within last 30 days. Lab Results   Component Value Date    COVID19 DETECTED 05/26/2021    COVID19 Not Detected 12/07/2020    COVID19 Not Detected 08/08/2020       No results for input(s): LUPILLO in the last 72 hours. Imaging Studies:   MRI OF THE RIGHT FOOT WITHOUT CONTRAST, 9/14/2021 8:14 am  Impression   1. Deep ulceration and sinus tract with subjacent complex fluid   collection/phlegmonous changes at the plantar medial midfoot with surrounding   severe cellulitis and induration of the skin.  This region measures 3.2 x 1.8   x 1.4 cm.  Moderate to severe intertarsal myositis.  Mild cellulitis of the   remainder of the foot. 2. Severe degenerative changes/Charcot arthropathy and neuropathic changes at   the TMT joints. 3. No MR evidence for osteomyelitis or osseous destruction at this point. 4. Moderate complex likely infectious tenosynovitis of the flexor tendon to   the 1st digit inseparable from the region of phlegmonous change/early abscess   formation. 5. Moderate degenerative changes of the 1st MTP/MTS joints. 6. Prior osteotomy of the proximal phalanx 3rd digit. 7. Mild-to-moderate degenerative changes of the midfoot. The findings were sent to the Radiology Results [address] at 9:46   am on 9/14/2021to be communicated to a licensed caregiver.           Cultures:     Culture, Anaerobic and Aerobic [[id number]] Collected: 09/16/21 1658   Order Status: Completed Specimen: Tissue from Foot Updated: 09/17/21 0942    Specimen Description . FOOT RIGHT FOOT ABSCESS    Special Requests NOT REPORTED    Direct Exam NO NEUTROPHILS SEEN     NO BACTERIA SEEN    Culture PENDING   Culture, Blood 1 [[id number]] Collected: 09/13/21 1415   Order Status: Completed Specimen: Blood Updated: 09/17/21 9081    Specimen Description . BLOOD    Special Requests RAC, 10ML    Culture NO GROWTH 4 DAYS   Culture, Blood 2 [[id number]] Collected: 09/13/21 1258   Order Status: Completed Specimen: Blood Updated: 09/17/21 0844    Specimen Description . BLOOD    Special Requests LAC,10ML    Culture NO GROWTH 4 DAYS     Culture, Wound [[id number]] (Abnormal)  Collected: 09/13/21 1245   Order Status: Completed Specimen: Foot Updated: 09/16/21 0809    Specimen Description . FOOT RIGHT    Special Requests NOT REPORTED    Direct Exam MODERATE GRAM POSITIVE COCCI IN PAIRSAbnormal      FEW GRAM NEGATIVE RODSAbnormal      NO NEUTROPHILS SEEN    Culture STAPHYLOCOCCUS AUREUS HEAVY GROWTH This isolate is methicillin susceptible. Abnormal      ENTEROBACTER CLOACAE LIGHT GROWTHAbnormal      MORGANELLA MORGANII LIGHT GROWTHAbnormal      NORMAL SKIN FLORAAbnormal    Staphylococcus aureus (1)    Antibiotic Interpretation DIMA Status    penicillin  NOT REPORTED Final    cefoxitin screen  NOT REPORTED Final    ciprofloxacin  NOT REPORTED Final    clindamycin Sensitive <=0.25 Final    erythromycin Sensitive <=0.25 Final    gentamicin Sensitive <=0.5 Final    gentamicin Sensitive Gentamicin is used only in combination with other active agents that test susceptible. Final    Induced Clind Resist  NOT REPORTED Final    levofloxacin Sensitive 0.25 Final    linezolid  NOT REPORTED Final    moxifloxacin  NOT REPORTED Final    nitrofurantoin  NOT REPORTED Final    oxacillin Sensitive <=0.25 Final    oxacillin Sensitive This staph isolate may be susceptible to Penicillin. Please contact Microbiology for confirmatory testing of susceptibility if Pencillin is a therapeutic consideration.  Final    Synercid  NOT REPORTED Final    rifampin  NOT REPORTED Final    tetracycline Sensitive <=1 Final    tigecycline  NOT REPORTED Final    trimethoprim-sulfamethoxazole Sensitive <=10 Final    vancomycin  NOT REPORTED Final    Enterobacter cloacae (2)    Antibiotic Interpretation DIMA Status    amikacin  NOT REPORTED Final    aztreonam Sensitive <=1 Final    ceFAZolin Resistant >=64 Final    cefepime  NOT REPORTED Final    cefTRIAXone Sensitive <=1 Final    ciprofloxacin Sensitive <=0.25 Final    ertapenem  NOT REPORTED Final    gentamicin Sensitive <=1 Final    meropenem  NOT REPORTED Final    nitrofurantoin  NOT REPORTED Final    tigecycline  NOT REPORTED Final    tobramycin Sensitive <=1 Final    trimethoprim-sulfamethoxazole Sensitive <=20 Final    piperacillin-tazobactam Sensitive <=4 Final    Morganella morganii (3)    Antibiotic Interpretation DIMA Status    amikacin  NOT REPORTED Final    ampicillin Resistant >=32 Final    ampicillin-sulbactam  NOT REPORTED Final    aztreonam Sensitive <=1 Final    ceFAZolin Resistant >=64 Final    cefepime  NOT REPORTED Final    cefTRIAXone Sensitive <=1 Final    ciprofloxacin Sensitive <=0.25 Final    ertapenem  NOT REPORTED Final    gentamicin Sensitive <=1 Final    meropenem  NOT REPORTED Final    nitrofurantoin  NOT REPORTED Final    tigecycline  NOT REPORTED Final    tobramycin Sensitive <=1 Final    trimethoprim-sulfamethoxazole Sensitive <=20 Final    piperacillin-tazobactam Sensitive <=4 Final        Medications:      insulin glargine  20 Units SubCUTAneous Nightly    aspirin  81 mg Oral Daily    gabapentin  900 mg Oral TID    glipiZIDE  20 mg Oral BID AC    alogliptin  12.5 mg Oral Daily    lisinopril  10 mg Oral Daily    atorvastatin  20 mg Oral Daily    sodium chloride flush  5-40 mL IntraVENous 2 times per day    cefepime  2,000 mg IntraVENous Q12H    insulin lispro  0-18 Units SubCUTAneous TID WC    insulin lispro  0-9 Units SubCUTAneous Nightly    enoxaparin  30 mg SubCUTAneous BID           [facility]  Varsha Irby MD  [employer] messaging  OFFICE: [phone]      Electronically signed by Varsha Irby MD on 9/17/2021 at 12:46 PM  Thank you for allowing us to participate in the care of this patient. Please call with questions.     This note iscreated with the assistance of a speech recognition program.  While intending to generate a document that actually reflects the content of the visit, the document can still have some errors including those of syntax andsound a like substitutions which may escape proof reading. In such instances, actual meaning can be extrapolated by contextual diversion.

## 2021-09-17 NOTE — PLAN OF CARE
[facility]  Office: [address], DO, Gagandeep Norfolk, DO, Lilli Leeint, DO, Ravi Light Blood, DO, Stefany Andino MD, Amy Jerome MD, Annamarie Vdial MD, Reji Lo MD, Destiney Gamez MD, Magaly Wheeler MD, Jamaica Norton MD, Lamonte Cooks, DO, Holly Funk, DO, Amanda Rhodes MD,  Zayra Edwards, DO, Sofya Maldonado MD, Rosalinda Ahmadi MD, Paulette Lowry MD, Enrique Cintron MD, , Sadie López MD, Octavio Gayle MD, Rebecca Ortiz MD, Williams Walker, [facility], [facility], CNP, Kendal Winston, CNP, Darnell De La Vega, CNS, Jeanette Bradford, CNP, Becky Fontenot, CNP, Isaak Ayers, CNP, Bert Caballero, CNP, Janae Martínez, CNP, Brianna Mathur PA-C, Tyler Eckert, [facility], Davey Man, CNP, Gerhard Harrell, CNP, Marcel Poe, CNP, Lydia Mcgee, CNP, Jess Mena, CNP, Candace Shah, CNP, Jake Suero, CNP, Alcira Patterson, [facility]    Second Visit Note  For more detailed information please refer to the progress note of the day      9/17/2021    5:12 PM    Name:   Elvira Mclaughlin  MRN:     [id number]     [place]:      [de-identified]   Room:   2010/2010-02  IP Day:  4  Admit Date:  9/13/2021 10:47 AM    PCP:   Lizeth Bello MD  Code Status:  Full Code    Attestation for earlier note by NP. Pt vitals were reviewed   New labs were reviewed   Patient was seen    Patient's pain is controlled. He has his boot on. Updated plan :     1. Diabetic foot infection- Waiting for Picc line to be placed. IV Cefepime per ID. Home care ordered. 2. DM2- BS better on Lantus, SSI  3. HTN- BP stable  4. HPL  5. Gi / DVT proph  6.  PT/OT    Isabelle signed, Med rec done    Arun Barron MD  9/17/2021  5:12 PM

## 2021-09-17 NOTE — PROGRESS NOTES
DATE: 2021    NAME: Gaviota Corcoran  MRN: [id number]   : 1955    Patient not seen this date for Physical Therapy due to:  [] Blood transfusion in progress  [] Cancel by RN  [] Hemodialysis  [x]  Refusal by Patient  pt is vomiting   [] Spine Precautions   [] Strict Bedrest  [] Surgery  [] Testing      [] Other        [] PT being discontinued at this time. Patient independent. No further needs. [] PT being discontinued at this time as the patient has been transferred to hospice care. No further needs.     MILLIE CAMARGO, PTA

## 2021-09-17 NOTE — PROGRESS NOTES
Occupational Therapy    DATE: 2021    NAME: Lili Michel  MRN: [id number]   : 1955    Patient not seen this date for Occupational Therapy due to:  [] Blood transfusion in progress  [] Cancel by RN  [] Hemodialysis  [x]  Refusal by Patient - Pt reports he is nauseous and vomiting. Will continue to follow   [] Spine Precautions   [] Strict Bedrest  [] Surgery  [] Testing      [] Other        [] OT being discontinued at this time. Patient independent. No further needs. [] OT being discontinued at this time as the patient has been transferred to hospice care. No further needs.     Saskia Burgess OTR/L

## 2021-09-17 NOTE — PROGRESS NOTES
Occupational Therapy    DATE: 2021                      NAME: Glendy Bonds  MRN: [id number]              : 1955     Patient not seen this date for Occupational Therapy due to:  []? Blood transfusion in progress  []? Cancel by RN  []? Hemodialysis  [x]? Refusal by Patient - OT attempted second time. Pt declined to participate in therapy this date. \"I don't want to get up right now, I  l'll do it later\". Will continue to follow  []? Spine Precautions    []? Strict Bedrest  []? Surgery  []? Testing                                             []? Other                                                   []? OT being discontinued at this time. Patient independent. No further needs. []? OT being discontinued at this time as the patient has been transferred to hospice care.  No further needs.     Saskia Burgess OTR/L

## 2021-09-17 NOTE — PROGRESS NOTES
Access RN called stating they will be here in about 30 minutes to place PICC. Night shift RN updated.

## 2021-09-17 NOTE — DISCHARGE INSTR - COC
Continuity of Care Form    Patient Name: Gaviota Corcoran   :  1955  MRN:  [id number]    Admit date:  2021  Discharge date:  ***    Code Status Order: Full Code   Advance Directives:      Admitting Physician:  Colette Lane MD  PCP: Carmen Pascual MD    Discharging Nurse: [facility] Unit/Room#:   Discharging Unit Phone Number: ***    Emergency Contact:   Extended Emergency Contact Information  Primary Emergency Contact: Blanka Mix  Address: [place] [address], [address] Phone: [phone]  Work Phone: [phone]  Mobile Phone: [phone]  Relation: Spouse    Past Surgical History:  Past Surgical History:   Procedure Laterality Date    APPENDECTOMY      at age 24    ARTHROPLASTY Right 2020    RIGHT HALLUX EXOSTECTOMY AND 3RD DIGIT EXTENSIOR TENOTOMY performed by Moreno Tim DPM at [address] [facility] Left 2018    I&D foreign body removal    FOOT DEBRIDEMENT Right 3/20/2020    RIGHT  FOOT   INCISION AND DRAINAGE WITH BONE BIOPSY performed by Moreno Tim DPM at [facility] Right 2021    FOOT DEBRIDEMENT INCISION AND DRAINAGE performed by Moreno Tim DPM at [facility] Right 2021    RIGHT FOOT  INCISION AND DRAINAGE   WITH PULSE LAVAGE performed by Moreno Tim DPM at [address] [facility] Right 2021    RIGHT FOOT FLAP CLOSURE performed by Moreno Tim DPM at [place] [address] ARTHROSCOPY Left    [facility] Bilateral 1983    arthroscopy   [address] [facility] INFEC,1 BURSA Left 2018    LEFT FOOT MULTIPLE  INCISIONS  AND DRAINAGE AND REMOVAL FOREIGN BODY  IRENE performed by Moreno Tim DPM at [address] [place] [address]       Immunization History:   Immunization History   Administered Date(s) Administered    COVID-19, Moderna, PF, 100mcg/0.5mL 2021, 2021    Influenza, Intradermal, Preservative free 2014    Pneumococcal Polysaccharide ([id number]) 2017    Tdap (Boostrix, Adacel) 2018       Active Problems:  Patient Active Problem List   Diagnosis Code    Essential hypertension I10    Type II or unspecified type diabetes mellitus without mention of complication, not stated as uncontrolled E11.9    Neuropathy G62.9    Vertigo R44    Charcot foot due to diabetes mellitus ([facility] E11.610    Hyperlipidemia E78.5    Cellulitis L03.90    Cellulitis of left foot L03. 80    Right foot infection L08.9    Diabetic polyneuropathy associated with type 2 diabetes mellitus ([facility] E11.42    Foreign body (FB) in soft tissue M79.5    Cellulitis of left leg L03. 116    Abscess of right foot L02.611    Chest pain at rest R07.9    Tobacco abuse Z72.0    Type 2 diabetes mellitus treated with insulin ([facility]) E11.9, Z79.4    ALEJANDRO (acute kidney injury) ([facility] N17.9    Bandemia D72.825    Chronic multifocal osteomyelitis of right foot ([facility]) M86.371    Diabetic infection of right foot ([facility]) E11.628, L08.9    Acquired hammer toe deformity of lesser toe of right foot M20.41    Post-op pain G89.18    Right foot pain M79.671    Hallux hammertoe, right M20.31    Osteomyelitis ([facility]) M86.9    Wound dehiscence T81.30XA    Diabetic foot ([facility]) E11.8    Hyperglycemia due to diabetes mellitus ([facility] E11.65    Foot ulcer ([facility]) L97.509    Cellulitis of right foot L03.115       Isolation/Infection:   Isolation            Contact          Patient Infection Status       Infection Onset Added Last Indicated Last Indicated By Review Planned Expiration Resolved Resolved By    MRSA  18 Caridad Singh RN        Right Foot - 2018    Resolved    COVID-19 21 COVID-19, Rapid   21     Dr. Lilliam Moura d/shantell Chase isolation    COVID-19 Rule Out 21 COVID-19, Rapid (Ordered)   21 Rule-Out Test Resulted    COVID-19 Rule Out 12/07/20 12/07/20 12/07/20 COVID-19 (Ordered)   12/08/20 Rule-Out Test Resulted    COVID-19 Rule Out 08/08/20 08/08/20 08/08/20 COVID-19 Ambulatory (Ordered)   08/10/20 Rule-Out Test Resulted            Nurse Assessment:  Last Vital Signs: /70   Pulse 73   Temp 98.4 °F (36.9 °C) (Oral)   Resp 16   Ht 5' 11\" (1.803 m)   Wt 255 lb (115.7 kg)   SpO2 95%   BMI 35.57 kg/m²     Last documented pain score (0-10 scale): Pain Level: 0  Last Weight:   Wt Readings from Last 1 Encounters:   09/17/21 255 lb (115.7 kg)     Mental Status:  oriented and alert    IV Access:  - PICC - site  L Cephalic, insertion date: 9/17/2021    Nursing Mobility/ADLs:  Walking   Independent  Transfer  Independent  Bathing  Independent  Dressing  Independent  Toileting  Independent  Feeding  Independent  Med [address]  Med Delivery   whole    Wound Care Documentation and Therapy:  Wound 09/13/21 Foot Anterior;Right (Active)   Dressing Status Clean;Dry; Intact; New dressing applied; Other (Comment) 09/17/21 1200   Wound Cleansed Betadine/povidone iodine 09/16/21 1900   Dressing/Treatment Ace wrap;Dry dressing 09/17/21 1200   Wound Assessment Other (Comment) 09/17/21 1200   Drainage Amount None 09/17/21 1200   Odor None 09/17/21 1200   Clary-wound Assessment Other (Comment) 09/17/21 1200   Margins Other (Comment) 09/17/21 1200   Number of days: 3        Elimination:  Continence:   · Bowel: Yes  · Bladder: Yes  Urinary Catheter: None   Colostomy/Ileostomy/Ileal Conduit: No       Date of Last BM: 9/16/2021    Intake/Output Summary (Last 24 hours) at 9/17/2021 1343  Last data filed at 9/17/2021 1242  Gross per 24 hour   Intake 375 ml   Output 875 ml   Net -500 ml     I/O last 3 completed shifts: In: 375 [I.V.:375]  Out: 100 [Blood:100]    Safety Concerns:      At Risk for Falls    Impairments/Disabilities:      None    Nutrition Therapy:  Current Nutrition Therapy:   - Oral Diet:  Carb Control 4 carbs/meal (1800kcals/day)    Routes of Feeding: Oral  Liquids: No Restrictions  Daily Fluid Restriction: no  Last Modified Barium Swallow with Video (Video Swallowing Test): not done    Treatments at the Time of Hospital Discharge:   Respiratory Treatments: ***  Oxygen Therapy:  is not on home oxygen therapy. Ventilator:    - No ventilator support    Rehab Therapies: Physical Therapy and Occupational Therapy  Weight Bearing Status/Restrictions: Non-weight bearing on right leg  Other Medical Equipment (for information only, NOT a DME order):  walker  Other Treatments: Skilled nursing assessment  Med teaching and compliance  Cefepime 2GM IV every 12 hours  PICC care per agency protocol    [place]  Phone: [phone]  Fax: [phone]      Patient's personal belongings (please select all that are sent with patient):  None    RN SIGNATURE:  Electronically signed by Darinel Colin RN on 9/17/21 at 9:24 PM EDT    CASE MANAGEMENT/SOCIAL WORK SECTION    Inpatient Status Date: ***    Readmission Risk Assessment Score:  Readmission Risk              Risk of Unplanned Readmission:  14           Discharging to Facility/ Agency   · Name: [facility]  · Address:  · Phone: [phone]  · Fax: [phone]      / signature: Electronically signed by Jolanta Conley RN on 9/17/21 at 1:43 PM EDT    PHYSICIAN SECTION    Prognosis: Good    Condition at Discharge: Stable    Rehab Potential (if transferring to Rehab): Good    Recommended Labs or Other Treatments After Discharge:   Check CBC, BMP, CRP on Mondays  Schedule for follow-up visit in 4 weeks  Fax results to Francisco Torres MD FAX: [phone]    Patient instructed to keep dressings clean, dry, and intact until follow-up. Avoid getting wet or moist.  We will follow-up with Dr. Raúl Guzman within 1 week of discharge. Continue to wear Ayondo. Take medications as instructed.       Physician Certification: I certify the above information and transfer of Dorian Hopkins  is necessary for the continuing treatment of the diagnosis listed and that he requires Home Care for greater 30 days.      Update Admission H&P: No change in H&P    PHYSICIAN SIGNATURE:  Electronically signed by Beatriz Clayton MD on 9/17/21 at 1:55 PM EDT

## 2021-09-17 NOTE — CARE COORDINATION
IR attempted to place PICC line and unsuccessful. House supervisor contacted access RN to place PICC. They will contact nurse with the time they will be here. Christina Renee informed and they will not deliver IV ATB until PICC is placed. Informed nurse Osvaldo Pinol to call [facility] if PICC is placed before shift is over. [facility] can be reached Saturday if needed to deliver IV ATB.

## 2021-09-18 NOTE — PROGRESS NOTES
Patient was discharged to home via private vehicle driven by wife. All patients belongings were with the patient. He was transported to his vehicle by a wheelchair.

## 2021-09-18 NOTE — DISCHARGE SUMMARY
[facility]  Office: [address], DO, Joedebo Mancilla, DO, Velthuy [place], DO, Jose Solis, DO, Ivett Sheikh MD, Chris Maldonado MD, Lorena Sands MD, Ousmane Lee MD, Syed Arechiga MD, Jason Renteria MD, Luz Elena Manning MD, Rozina Stout, DO, Anurag Daly, DO, Lida Sands MD,  Constantino Sanchez DO, Francis Mathews MD, Luis Marques MD, Yajaira Flower MD, Dorie España MD, , Kirk Zacarias MD, Caitlin Stanley MD, Dillon Lopez MD, Rivka He [facility], [facility], [facility], Lugene Nose, CNP, Horace De Leon, CNS, Negrito Bennett, CNP, Alpha Grain, CNP, Peg Jennifer, CNP, Mari Larson, CNP, Josh Sarabia, CNP, Muriel Dawson PA-C, Ivy Childress, [facility], Kirstie Jefferson, CNP, Phylicia Dillard, CNP, Lance Easton, CNP, [place], CNP, Bud Brush, CNP, William Pizarro, CNP, Severiano Grieves, CNP, [facility], [facility]    Discharge Summary     Patient ID: Dorian Hopkins  :  1955   MRN: [id number]     ACCOUNT:  [de-identified]   Patient's PCP: Mumtaz Barajas MD  Admit Date: 2021   Discharge Date: 2021     Length of Stay: 4  Code Status:  Prior  Admitting Physician: Gildardo Loaiza MD  Discharge Physician: ROCHELLE Milner NP     Active Discharge Diagnoses:     Hospital Problem Lists:  Principal Problem:    Diabetic infection of right foot [facility])  Active Problems:    Essential hypertension    Hyperlipidemia    Diabetic polyneuropathy associated with type 2 diabetes mellitus ([facility]    Type 2 diabetes mellitus treated with insulin ([facility]    Wound dehiscence    Hyperglycemia due to diabetes mellitus ([facility]    Foot ulcer ([facility]    Cellulitis of right foot  Resolved Problems:    * No resolved hospital problems.  *      Admission Condition:  fair     Discharged Condition: stable    Hospital Stay:     Hospital Course:  Dorian Hopkins is a 73 y.o. male who was admitted for the management of  Diabetic infection of right foot ([address] , presented to ER with Foot Pain and Post-op Problem (pt states that his foot is bleeding from the surgical site )       9/13 - Patient has a known chronic diabetic foot infection to the right lower foot.  Patient was noted her prior history for several months.  Patient had a wound dehiscence several months ago and required wound care and long-term IV antibiotics use administered through a PICC.  Patient reports that the wound is healing well and as of 2 weeks ago at his most recent podiatry appointment healing was progressing as expected.  Saturday the patient had his foot examined by his wife who found him to be open and draining.  Podiatry was contacted this morning and advised that he needed to report to emergency department.     9/14 - Condition stable. Podiatry on board and reports no intention for surgical procedure. Renal function stable. Glycemic control continues. MRI ordered by podiatry.      9/15-9/16 - Surgery planned debridement for 9/16. Further treatment to be determined following procedure. 9/17 - Glycemic control better today. OR yesterday per podiatry and operative notes are reviewed. Offloading postoperative boot has been ordered. Infectious disease remains on board and anticipates the need for outpatient IV antibiotics. Patient also require wound care and dressing changes in the home.       Significant therapeutic interventions: see above    Significant Diagnostic Studies:   Labs / Micro:  CBC:   Lab Results   Component Value Date    WBC 7.3 09/16/2021    RBC 4.19 09/16/2021    HGB 12.9 09/16/2021    HCT 37.4 09/16/2021    MCV 89.3 09/16/2021    MCH 30.8 09/16/2021    MCHC 34.5 09/16/2021    RDW 13.0 09/16/2021     09/16/2021     BMP:    Lab Results   Component Value Date    GLUCOSE 244 09/16/2021     09/16/2021    K 4.8 09/16/2021     09/16/2021    CO2 25 09/16/2021    ANIONGAP 7 09/16/2021    BUN 18 09/16/2021    CREATININE 1.32 09/16/2021 BUNCRER 14 09/16/2021    CALCIUM 9.5 09/16/2021    LABGLOM 54 09/16/2021    GFRAA >60 09/16/2021    GFR      09/16/2021    GFR NOT REPORTED 09/16/2021        Radiology:  XR FOOT RIGHT (MIN 3 VIEWS)    Result Date: 9/13/2021  Similar-appearing osseous structures with Charcot deformity of the midfoot. Plantar soft tissue swelling. MRI FOOT RIGHT WO CONTRAST    Result Date: 9/14/2021  1. Deep ulceration and sinus tract with subjacent complex fluid collection/phlegmonous changes at the plantar medial midfoot with surrounding severe cellulitis and induration of the skin. This region measures 3.2 x 1.8 x 1.4 cm. Moderate to severe intertarsal myositis. Mild cellulitis of the remainder of the foot. 2. Severe degenerative changes/Charcot arthropathy and neuropathic changes at the TMT joints. 3. No MR evidence for osteomyelitis or osseous destruction at this point. 4. Moderate complex likely infectious tenosynovitis of the flexor tendon to the 1st digit inseparable from the region of phlegmonous change/early abscess formation. 5. Moderate degenerative changes of the 1st MTP/MTS joints. 6. Prior osteotomy of the proximal phalanx 3rd digit. 7. Mild-to-moderate degenerative changes of the midfoot. The findings were sent to the Radiology Results [address] at 9:46 am on 9/14/2021to be communicated to a licensed caregiver. Consultations:    Consults:     Final Specialist Recommendations/Findings:   IP CONSULT TO INFECTIOUS DISEASES  INPATIENT CONSULT TO ORTHOTIST/PROSTHETIST      The patient was seen and examined on day of discharge and this discharge summary is in conjunction with any daily progress note from day of discharge.     Discharge plan:     Disposition: Home    Physician Follow Up:     Loren Lopez DPM  [facility] [address]  [phone]          Lindsey Renee/[facility]  [address]  [phone]  In 1 day  As needed Requiring Further Evaluation/Follow Up POST HOSPITALIZATION/Incidental Findings: poor glycemic control    Diet: diabetic diet    Activity: As tolerated    Instructions to Patient: engage in wound are and keep your appointments. Discharge Medications:      Medication List      START taking these medications    cefepime  infusion  Commonly known as: MAXIPIME  Infuse 2,000 mg intravenously every 12 hours Through 10/25/21 Compound per protocol     HYDROcodone-acetaminophen 5-325 MG per tablet  Commonly known as: NORCO  Take 1 tablet by mouth every 4 hours as needed for Pain for up to 3 days. CHANGE how you take these medications    Lantus SoloStar 100 UNIT/ML injection pen  Generic drug: insulin glargine  Inject 15 Units into the skin nightly  What changed: how much to take        CONTINUE taking these medications    albuterol sulfate  (90 Base) MCG/ACT inhaler     aspirin 81 MG tablet     cetirizine 10 MG tablet  Commonly known as: ZYRTEC     gabapentin 300 MG capsule  Commonly known as: NEURONTIN     glipiZIDE 10 MG tablet  Commonly known as: GLUCOTROL     Januvia 100 MG tablet  Generic drug: SITagliptin     lisinopril 10 MG tablet  Commonly known as: PRINIVIL;ZESTRIL     meloxicam 15 MG tablet  Commonly known as: MOBIC     Misc. Devices Misc  1 PAIR OF DIABETIC SHOES (1 LEFT/ 1 RIGHT)  1-3 PAIRS OF INSERTS (LEFT/ RIGHT)     silver sulfADIAZINE 1 % cream  Commonly known as: SILVADENE  Apply topically daily.      simvastatin 40 MG tablet  Commonly known as: ZOCOR           Where to Get Your Medications      [facility] [phone] - F [phone] [address] [facility], [address] [place] [address]    Phone: [phone]   Lantus SoloStar 100 UNIT/ML injection pen     You can get these medications from any pharmacy    Bring a paper prescription for each of these medications  cefepime infusion  HYDROcodone-acetaminophen 5-325 MG per tablet         No discharge procedures on file. Time Spent on discharge is  38 mins in patient examination, evaluation, counseling as well as medication reconciliation, prescriptions for required medications, discharge plan and follow up. Electronically signed by   ROCHELLE Murray NP  9/18/2021  7:50 AM      Thank you Dr. Myrtle Newman MD for the opportunity to be involved in this patient's care.

## 2021-09-19 LAB
CULTURE: NORMAL
CULTURE: NORMAL
Lab: NORMAL
Lab: NORMAL
SPECIMEN DESCRIPTION: NORMAL
SPECIMEN DESCRIPTION: NORMAL

## 2021-09-20 LAB
CULTURE: ABNORMAL
DIRECT EXAM: ABNORMAL
DIRECT EXAM: ABNORMAL
Lab: ABNORMAL
SPECIMEN DESCRIPTION: ABNORMAL

## 2021-09-23 ENCOUNTER — OFFICE VISIT (OUTPATIENT)
Dept: PODIATRY | Age: 66
End: 2021-09-23

## 2021-09-23 VITALS — HEIGHT: 71 IN | WEIGHT: 240 LBS | BODY MASS INDEX: 33.6 KG/M2

## 2021-09-23 DIAGNOSIS — Z98.890 POST-OPERATIVE STATE: Primary | ICD-10-CM

## 2021-09-23 PROCEDURE — 99024 POSTOP FOLLOW-UP VISIT: CPT | Performed by: PODIATRIST

## 2021-09-23 RX ORDER — HYDROCODONE BITARTRATE AND ACETAMINOPHEN 10; 325 MG/1; MG/1
1 TABLET ORAL EVERY 6 HOURS PRN
Qty: 28 TABLET | Refills: 0 | Status: SHIPPED | OUTPATIENT
Start: 2021-09-23 | End: 2021-09-30

## 2021-09-23 NOTE — PATIENT INSTRUCTIONS
Schedule a Vaccine  When you qualify to receive the vaccine, call the [facility]) COVID-19 Vaccination Hotline to schedule your appointment or to get additional information about the [facility]) locations which are offering the COVID-19 vaccine. To be 94% effective, it's important that you receive two doses of one of the COVID-19 vaccines. -If you are receiving the Alves Peter vaccine, your second shot will be scheduled as close to 21 days after the first shot as possible. -If you are receiving the Moderna vaccine, your second shot will be scheduled as close to 28 days after the first shot as possible. [facility]) COVID-19 Vaccination Hotline: [phone]    Links to [facility]) website and [facility] website:    [employer]/[facility]-monitoring-coronavirus-covid-19/covid-19-vaccine/[place]odd-vaccine    [email or web address]

## 2021-09-23 NOTE — PROGRESS NOTES
[facility] PHYSICIANS  [facility]  [address] [address] [place] [address]  Dept: [phone]  Dept Fax: [phone]    POST-OP PROGRESS NOTE  Date of patient's visit: 9/23/2021  Patient's Name:  Brad Thao YOB: 1955            Patient Care Team:  Jonel Callaway MD as PCP - General (Family Medicine)  Mason Gonzalez MD as Consulting Physician (Infectious Diseases)  Paulino Mcgowan DPM as Physician (Podiatry)  Paulino Mcgowan DPM as Physician (Podiatry)  Karl Schmid MD as Consulting Physician (Infectious Diseases)        Chief Complaint   Patient presents with    Post-Op Check     rtc 1 week- RIGHT FOOT  INCISION AND DRAINAGE   WITH PULSE LAVAGE            Subjective: Brad Thao is a 73 y.o. male who presents to the office today 1week(s)  S/P RIGHT FOOT  INCISION AND DRAINAGE   WITH PULSE LAVAGE for correction of foot infection  Problem List Items Addressed This Visit     None      Patient relates pain is Present and stayed the same. Pain is rated 4 out of 10 and is described as constant, mild, moderate. Currently denies F/C/N/V. Is patient taking pain medications as prescribed and is controlling pain yes, prn  Pt did get his CROW boot delivered to him in the hospital and he presents with the crow boot today and states he is walking in it    Physical Examination:  Central wound dehissence Minimal bleeding post operatively. Edema present. No erythema. No Pus. Operative correction is satisfactory. There is serous drainage and the wound edges are masertated          Assessment: Brad Thao is status post as above  Normal post operative course. Doing well        No diagnosis found. Plan:  Patient examined and evaluated. Current condition and treatment options discussed in detail. Advised pt to his conditon. The dehissed suutres were removed as they are not doing anything.   New DSD applied and nursing to come to his house QOD for dressing changes. Betadine the wound edges and use silvercell to the wound and DSD. Verbal and written instructions given to patient. Orders: No orders of the defined types were placed in this encounter. Contact office with any questions/problems/concerns. RTC in 1week(s).      Electronically signed by Marc JAX on 9/23/2021 at 10:52 AM  9/23/2021

## 2021-09-27 ENCOUNTER — TELEPHONE (OUTPATIENT)
Dept: PODIATRY | Age: 66
End: 2021-09-27

## 2021-09-27 NOTE — TELEPHONE ENCOUNTER
Called home nurse - informed of home health orders per Dr. Ritika Jaime- see media. Per nurse Alexia Javier unable to accommodate QOD can do home visit twice a week. Writer verbalized understanding and states will inform dr of home care status.  Nunu Camargo

## 2021-09-28 ENCOUNTER — OFFICE VISIT (OUTPATIENT)
Dept: PODIATRY | Age: 66
End: 2021-09-28
Payer: MEDICARE

## 2021-09-28 VITALS — WEIGHT: 240 LBS | HEIGHT: 71 IN | BODY MASS INDEX: 33.6 KG/M2

## 2021-09-28 DIAGNOSIS — M86.9 DIABETIC FOOT ULCER WITH OSTEOMYELITIS (HCC): ICD-10-CM

## 2021-09-28 DIAGNOSIS — T81.32XA DEEP DISRUPTION OR DEHISCENCE OF OPERATION WOUND, INITIAL ENCOUNTER: ICD-10-CM

## 2021-09-28 DIAGNOSIS — E11.621 DIABETIC FOOT ULCER WITH OSTEOMYELITIS (HCC): ICD-10-CM

## 2021-09-28 DIAGNOSIS — L97.509 DIABETIC FOOT ULCER WITH OSTEOMYELITIS (HCC): ICD-10-CM

## 2021-09-28 DIAGNOSIS — E11.69 DIABETIC FOOT ULCER WITH OSTEOMYELITIS (HCC): ICD-10-CM

## 2021-09-28 DIAGNOSIS — L97.513 RIGHT FOOT ULCER, WITH NECROSIS OF MUSCLE (HCC): Primary | ICD-10-CM

## 2021-09-28 PROCEDURE — 99024 POSTOP FOLLOW-UP VISIT: CPT | Performed by: PODIATRIST

## 2021-09-28 PROCEDURE — 11043 DBRDMT MUSC&/FSCA 1ST 20/<: CPT | Performed by: PODIATRIST

## 2021-09-28 RX ORDER — CEFEPIME HYDROCHLORIDE 2 G/1
INJECTION, POWDER, FOR SOLUTION INTRAVENOUS
Status: ON HOLD | COMMUNITY
Start: 2021-09-22 | End: 2022-10-05

## 2021-09-28 NOTE — PROGRESS NOTES
necrosis of muscle (HCC)  GA DEBRIDEMENT, SKIN, SUB-Q TISSUE,MUSCLE,=<20 SQ CM   2. Diabetic foot ulcer with osteomyelitis ([address]  GA DEBRIDEMENT, SKIN, SUB-Q TISSUE,MUSCLE,=<20 SQ CM   3. Deep disruption or dehiscence of operation wound, initial encounter  GA DEBRIDEMENT, SKIN, SUB-Q TISSUE,MUSCLE,=<20 SQ CM           Plan:  Patient examined and evaluated. Current condition and treatment options discussed in detail. Advised pt to his condtion. With the patient in supine position, Lidocaine soaked gauze was applied per physician order at beginning of wound evaluation. It was subsequently removed. Using a #15 blade scalpel and Pick-up, the wound was debrided down to and included the removal of the following tissue:     [] epidermis, [] dermis, [x]  subcutaneous tissue,  [x] muscle/fascia tissue,   and/or  [] bone     Also debrided was all  [] fibrin, [] biofilm, [] slough,  [x] necrotic,  [] hypergranulation tissue, and/or  [] hyperkeratotic tissue. Wound was copiously irrigated with normal saline solution. Bleeding:  Minimal.  Hemostasis:  pressure     100%  of wound debrided. Patient tolerated procedure well. Pain 0 / 10 during procedure and pain 0 / 10 after procedure. Discussed appropriate home care of this wound. Wound redressed. Patient instructions were given. Dispensed dressing supplies and instructions on their use. .  Verbal and written instructions given to patient. Orders: No orders of the defined types were placed in this encounter. Contact office with any questions/problems/concerns. RTC in 10day(s).      Electronically signed by Enrique Stafford DPM on 9/28/2021 at 8:26 AM  9/28/2021

## 2021-09-28 NOTE — PATIENT INSTRUCTIONS
Schedule a Vaccine  When you qualify to receive the vaccine, call the [facility]) COVID-19 Vaccination Hotline to schedule your appointment or to get additional information about the [facility]) locations which are offering the COVID-19 vaccine. To be 94% effective, it's important that you receive two doses of one of the COVID-19 vaccines. -If you are receiving the Alves Peter vaccine, your second shot will be scheduled as close to 21 days after the first shot as possible. -If you are receiving the Moderna vaccine, your second shot will be scheduled as close to 28 days after the first shot as possible. [facility]) COVID-19 Vaccination Hotline: [phone]    Links to [facility]) website and [facility] website:    [employer]/[facility]cy-[facility]-monitoring-coronavirus-covid-19/covid-19-vaccine/[place]/dodd-vaccine    [email or web address]

## 2021-10-05 ENCOUNTER — OFFICE VISIT (OUTPATIENT)
Dept: PODIATRY | Age: 66
End: 2021-10-05
Payer: MEDICARE

## 2021-10-05 VITALS — RESPIRATION RATE: 16 BRPM | HEIGHT: 71 IN | WEIGHT: 240 LBS | BODY MASS INDEX: 33.6 KG/M2

## 2021-10-05 DIAGNOSIS — Z98.890 POST-OPERATIVE STATE: Primary | ICD-10-CM

## 2021-10-05 DIAGNOSIS — E11.69 DIABETIC FOOT ULCER WITH OSTEOMYELITIS (HCC): ICD-10-CM

## 2021-10-05 DIAGNOSIS — T81.30XA WOUND DEHISCENCE: ICD-10-CM

## 2021-10-05 DIAGNOSIS — M86.9 DIABETIC FOOT ULCER WITH OSTEOMYELITIS (HCC): ICD-10-CM

## 2021-10-05 DIAGNOSIS — E11.621 DIABETIC FOOT ULCER WITH OSTEOMYELITIS (HCC): ICD-10-CM

## 2021-10-05 DIAGNOSIS — L97.513 RIGHT FOOT ULCER, WITH NECROSIS OF MUSCLE (HCC): ICD-10-CM

## 2021-10-05 DIAGNOSIS — L97.509 DIABETIC FOOT ULCER WITH OSTEOMYELITIS (HCC): ICD-10-CM

## 2021-10-05 PROCEDURE — 11043 DBRDMT MUSC&/FSCA 1ST 20/<: CPT | Performed by: PODIATRIST

## 2021-10-05 PROCEDURE — 11046 DBRDMT MUSC&/FSCA EA ADDL: CPT | Performed by: PODIATRIST

## 2021-10-05 PROCEDURE — 99024 POSTOP FOLLOW-UP VISIT: CPT | Performed by: PODIATRIST

## 2021-10-05 NOTE — PROGRESS NOTES
[facility] PHYSICIANS  [facility]  [address] [place] [address]  Dept: [phone]  Dept Fax: [phone]    POST-OP PROGRESS NOTE  Date of patient's visit: 10/5/2021  Patient's Name:  Mell Barclay YOB: 1955            Patient Care Team:  Sonia Johnson MD as PCP - General (Family Medicine)  Jeanette Gonzalez MD as Consulting Physician (Infectious Diseases)  Hailey Felton DPM as Physician (Podiatry)  Hailey Felton DPM as Physician (Podiatry)  Siva Gonzalez MD as Consulting Physician (Infectious Diseases)        Chief Complaint   Patient presents with    Post-Op Check    Wound Check    Diabetes         Subjective: Mell Barclay is a 73 y.o. male who presents to the office today 3week(s)  S/P wound dehissence aftger [facility] for correction of infection of the right foot   Problem List Items Addressed This Visit     None      Visit Diagnoses     Post-operative state    -  Primary      . Patient relates pain is Present and improved. Pain is rated 7 out of 10 and is described as constant, moderate. Currently denies F/C/N/V. Is patient taking pain medications as prescribed and is controlling pain    Physical Examination:  Wound #:   1    diabetic foot ulcer and dehisced surgical wound   right foot    Wound measurements:  #1  7 cm by 3 cm  by   2 mm        Wound Depth: muscle. Drainage:    minimal, serosanguinous Type:minimal, serosanguinous  Wound Bed status:   Granulation Tissue: 100%   Necrotic   Type: 0  Epithelialization 0%   Hypergranular 0%   Periwound hyperkeratosis:  absent  Erythema absent    Odor:no  Maceration:no  Undermining/tract/tunneling:no  Culture taken:   no             Assessment: Mell Barclay is status post I and d with [facility] and now has wound dehissence    Diagnosis Orders   1. Post-operative state  MS DEBRIDEMENT, SKIN, SUB-Q TISSUE,MUSCLE,=<20 SQ CM   2.  Right foot ulcer, with necrosis of muscle (HCC)  MS DEBRIDEMENT, SKIN, SUB-Q TISSUE,MUSCLE,=<20 SQ CM   3. Diabetic foot ulcer with osteomyelitis ([facility]  ID DEBRIDEMENT, SKIN, SUB-Q TISSUE,MUSCLE,=<20 SQ CM   4. Wound dehiscence  ID DEBRIDEMENT, SKIN, SUB-Q TISSUE,MUSCLE,=<20 SQ CM             ICD-10-CM    1. Post-operative state  Z98.890          Plan:  Patient examined and evaluated. Current condition and treatment options discussed in detail. Advised pt to his conditon. With the patient in supine position, Lidocaine soaked gauze was applied per physician order at beginning of wound evaluation. It was subsequently removed. Using a #15 blade scalpel and Pick-up, the wound was debrided down to and included the removal of the following tissue:     [] epidermis, [] dermis, []  subcutaneous tissue,  [x] muscle/fascia tissue,   and/or  [] bone     Also debrided was all  [x] fibrin, [x] biofilm, [] slough,  [] necrotic,  [] hypergranulation tissue, and/or  [] hyperkeratotic tissue. Wound was copiously irrigated with normal saline solution. Bleeding:  Minimal.  Hemostasis:  pressure     100%  of wound debrided. Patient tolerated procedure well. Pain 0 / 10 during procedure and pain 0 / 10 after procedure. Discussed appropriate home care of this wound. Wound redressed. Patient instructions were given. Dispensed dressing supplies and instructions on their use. .  Verbal and written instructions given to patient. Orders: No orders of the defined types were placed in this encounter. Contact office with any questions/problems/concerns. RTC in 1week(s)      Pt to continue to take his abx. rx provided for bactroban to apply BID  And keep covered in DSD. Pt to continue to use his CROW boot.      Electronically signed by Duncan Maldonado DPM on 10/5/2021 at 8:02 AM  10/5/2021

## 2021-10-12 ENCOUNTER — OFFICE VISIT (OUTPATIENT)
Dept: PODIATRY | Age: 66
End: 2021-10-12
Payer: MEDICARE

## 2021-10-12 VITALS — BODY MASS INDEX: 33.6 KG/M2 | WEIGHT: 240 LBS | HEIGHT: 71 IN | RESPIRATION RATE: 18 BRPM

## 2021-10-12 DIAGNOSIS — E11.51 TYPE II DIABETES MELLITUS WITH PERIPHERAL CIRCULATORY DISORDER (HCC): ICD-10-CM

## 2021-10-12 DIAGNOSIS — L97.513 RIGHT FOOT ULCER, WITH NECROSIS OF MUSCLE (HCC): ICD-10-CM

## 2021-10-12 DIAGNOSIS — Z98.890 POST-OPERATIVE STATE: Primary | ICD-10-CM

## 2021-10-12 PROCEDURE — 11043 DBRDMT MUSC&/FSCA 1ST 20/<: CPT | Performed by: PODIATRIST

## 2021-10-12 PROCEDURE — 99024 POSTOP FOLLOW-UP VISIT: CPT | Performed by: PODIATRIST

## 2021-10-12 NOTE — PROGRESS NOTES
[facility] PHYSICIANS  [facility]  [address] [place] [address]  Dept: [phone]  Dept Fax: [phone]    POST-OP PROGRESS NOTE  Date of patient's visit: 10/12/2021  Patient's Name:  Marichuy Torres YOB: 1955            Patient Care Team:  Amelia Garces MD as PCP - General (Family Medicine)  Ivett Rivera MD as Consulting Physician (Infectious Diseases)  Lance Tello DPM as Physician (Podiatry)  Lance Tello DPM as Physician (Podiatry)  Kaleigh Pollock MD as Consulting Physician (Infectious Diseases)        Chief Complaint   Patient presents with    Post-Op Check    Wound Check    Diabetes    Foot Pain       Pt's primary care physician is Amelia Garces MD last seen9/4/2021     Subjective: Marichuy Torres is a 73 y.o. male who presents to the office today 4week(s)  S/P I and d with attempted delayed primary closure  for correction of abscess right foot   Problem List Items Addressed This Visit     None      Visit Diagnoses     Post-operative state    -  Primary    Right foot ulcer, with necrosis of muscle ([address]        Type II diabetes mellitus with peripheral circulatory disorder ([address]          . Patient relates pain is Present and improved. Pain is rated 4 out of 10 and is described as constant, moderate. Currently denies F/C/N/V. Is patient taking pain medications as prescribed and is controlling pain  He is staying in his CROW boot     Physical Examination:  Wound #:   1    diabetic foot ulcer   right foot    Wound measurements:  #1  5 cm by 10 cm  by   3 mm        Wound Depth: muscle.     Drainage:    minimal, serosanguinous Type:minimal, serosanguinous  Wound Bed status:   Granulation Tissue: 90%   Necrotic 10%  Type:   Epithelialization 0%   Hypergranular 0%   Periwound hyperkeratosis:  absent  Erythema absent    Odor:no  Maceration:no  Undermining/tract/tunneling:no  Culture taken:   no                 Assessment: Mariella Piña is status post as above  Normal post operative course. Doing well          ICD-10-CM    1. Post-operative state  Z98.890    2. Right foot ulcer, with necrosis of muscle ([facility]  L97.513    3. Type II diabetes mellitus with peripheral circulatory disorder (HCC)  E11.51          Plan:  Patient examined and evaluated. Current condition and treatment options discussed in detail. Advised pt to his condtion. Pt to continue to stay in CAM boot at all times and change dressings with silvercell and DSD daily      With the patient in supine position, Lidocaine soaked gauze was applied per physician order at beginning of wound evaluation. It was subsequently removed. Using a #15 blade scalpel and Pick-up, the wound was debrided down to and included the removal of the following tissue:     [] epidermis, [] dermis, []  subcutaneous tissue,  [x] muscle/fascia tissue,   and/or  [] bone     Also debrided was all  [] fibrin, [] biofilm, [x] slough,  [x] necrotic,  [] hypergranulation tissue, and/or  [] hyperkeratotic tissue. Wound was copiously irrigated with normal saline solution. Bleeding:  Minimal.  Hemostasis:  pressure     100%  of wound debrided. Patient tolerated procedure well. Pain 0 / 10 during procedure and pain 0 / 10 after procedure. Discussed appropriate home care of this wound. Wound redressed. Patient instructions were given. Dispensed dressing supplies and instructions on their use. .  Verbal and written instructions given to patient. Orders: No orders of the defined types were placed in this encounter. Contact office with any questions/problems/concerns. RTC in 2week(s).      Electronically signed by Jackie Goldsmith DPM on 10/12/2021 at 8:50 AM  10/12/2021

## 2021-10-13 ENCOUNTER — TELEPHONE (OUTPATIENT)
Dept: INFECTIOUS DISEASES | Age: 66
End: 2021-10-13

## 2021-10-20 ENCOUNTER — OFFICE VISIT (OUTPATIENT)
Dept: INFECTIOUS DISEASES | Age: 66
End: 2021-10-20
Payer: MEDICARE

## 2021-10-20 VITALS
HEART RATE: 79 BPM | WEIGHT: 240 LBS | DIASTOLIC BLOOD PRESSURE: 84 MMHG | SYSTOLIC BLOOD PRESSURE: 143 MMHG | OXYGEN SATURATION: 98 % | HEIGHT: 71 IN | TEMPERATURE: 97.9 F | RESPIRATION RATE: 18 BRPM | BODY MASS INDEX: 33.6 KG/M2

## 2021-10-20 DIAGNOSIS — L08.9 DIABETIC INFECTION OF RIGHT FOOT (HCC): Primary | ICD-10-CM

## 2021-10-20 DIAGNOSIS — T81.30XA WOUND DEHISCENCE: ICD-10-CM

## 2021-10-20 DIAGNOSIS — T14.8XXA SURGICAL WOUND PRESENT: ICD-10-CM

## 2021-10-20 DIAGNOSIS — E11.628 DIABETIC INFECTION OF RIGHT FOOT (HCC): Primary | ICD-10-CM

## 2021-10-20 PROCEDURE — 1123F ACP DISCUSS/DSCN MKR DOCD: CPT | Performed by: INTERNAL MEDICINE

## 2021-10-20 PROCEDURE — 4004F PT TOBACCO SCREEN RCVD TLK: CPT | Performed by: INTERNAL MEDICINE

## 2021-10-20 PROCEDURE — 2022F DILAT RTA XM EVC RTNOPTHY: CPT | Performed by: INTERNAL MEDICINE

## 2021-10-20 PROCEDURE — G8484 FLU IMMUNIZE NO ADMIN: HCPCS | Performed by: INTERNAL MEDICINE

## 2021-10-20 PROCEDURE — G8417 CALC BMI ABV UP PARAM F/U: HCPCS | Performed by: INTERNAL MEDICINE

## 2021-10-20 PROCEDURE — 3017F COLORECTAL CA SCREEN DOC REV: CPT | Performed by: INTERNAL MEDICINE

## 2021-10-20 PROCEDURE — 4040F PNEUMOC VAC/ADMIN/RCVD: CPT | Performed by: INTERNAL MEDICINE

## 2021-10-20 PROCEDURE — G8427 DOCREV CUR MEDS BY ELIG CLIN: HCPCS | Performed by: INTERNAL MEDICINE

## 2021-10-20 PROCEDURE — 3052F HG A1C>EQUAL 8.0%<EQUAL 9.0%: CPT | Performed by: INTERNAL MEDICINE

## 2021-10-20 PROCEDURE — 99214 OFFICE O/P EST MOD 30 MIN: CPT | Performed by: INTERNAL MEDICINE

## 2021-10-20 ASSESSMENT — ENCOUNTER SYMPTOMS
RESPIRATORY NEGATIVE: 1
GASTROINTESTINAL NEGATIVE: 1
ALLERGIC/IMMUNOLOGIC NEGATIVE: 1

## 2021-10-20 NOTE — PROGRESS NOTES
[facility]  Office Progress Note  Today's Date and Time: 10/20/2021, 11:09 AM    Impression:     1. Diabetic infection of right foot ([address]    2. Wound dehiscence    3. Surgical wound present         Recommendations   · The patient did have surgical wound closure at the time of surgery but this is again dehisced. · Patient has an open wound in the plantar aspect of the foot which is clean. · There is no visible bone at this time and the wound has beefy red granulation tissue. · The patient would like to discontinue the antibiotics a few days early and I have obliged. · I have removed to the left upper extremity PICC line that he has in place. · Continue local wound care per the podiatry team.  · The patient will follow up with me on an as-needed basis and I have recommended that he keep a close eye on any inflammatory changes and he would need aggressive antibiotic therapy and early therapy to hopefully avoid any further complications. I have ordered the following medications/ labs:  No orders of the defined types were placed in this encounter. No orders of the defined types were placed in this encounter. Chief complaint/reason for consultation:     Chief Complaint   Patient presents with    Wound Check     picc line in pt. wondering if its getting removed         History of Present Illness:   Luke Marte is a 73y.o.-year-old male who I am seeing in follow-up and has a history of a right-sided foot Charcot deformity, right foot abscess for which he underwent I&D in June 2020 with MSSA isolated at that time. The patient did have wound dehiscence and subsequently had a residual wound in the foot which secondarily got infected which caused an abscess in the foot. The patient was hospitalized in September 2021 underwent I&D of the abscess with cultures growing out MSSA, Enterobacter, and Morganella morganii.   The patient was discharged on IV antimicrobial therapy with cefepime which he was scheduled to continue through 10/22/2021. The patient comes in for follow-up today tells me that he is doing okay does not report any subjective fevers or chills. The patient wants to discontinue the antibiotic therapy and he tells me he is trying to travel [facility] to attend the  for a recently passed aunt. I have personally reviewedthe past medical history, medications, social history, and I have updated the database accordingly. Past Medical History:     Past Medical History:   Diagnosis Date    Abscess of right foot 2018    Acquired hammer toe deformity of lesser toe of right foot     ALEJANDRO (acute kidney injury) ([address] 2018    Cellulitis 2018    Cellulitis of left foot 2018    Cellulitis of right foot     and abscess    Charcot foot due to diabetes mellitus ([address]     Right foot     Chest pain at rest 2018    Chronic multifocal osteomyelitis of right foot ([address]     Diabetic polyneuropathy associated with type 2 diabetes mellitus ([address]     Essential hypertension     Fractures, multiple 2014    Right foot fractures     Hyperlipidemia     Hypertension     Leukocytosis     Neuropathy     diabetic with charcot affecting the right foot    Pain in right foot     redness and swelling    Pneumonia 2009    Right foot infection     Right foot pain     Tobacco abuse 2018    Type II or unspecified type diabetes mellitus without mention of complication, not stated as uncontrolled     Vertigo     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 stepped on sharp object. Covered by dressing.     Wound, open     Right posterior -Diabetic Ulcer     Medications:     Current Outpatient Medications   Medication Sig Dispense Refill    ceFEPIme (MAXIPIME) 2 g injection       LANTUS SOLOSTAR 100 UNIT/ML injection pen Inject 15 Units into the skin nightly 5 pen 3    cefepime (MAXIPIME) infusion Infuse 2,000 mg intravenously every 12 hours Through 10/25/21 Compound per protocol 200 g 0    silver sulfADIAZINE (SILVADENE) 1 % cream Apply topically daily. 50 g 1    meloxicam (MOBIC) 15 MG tablet Take 15 mg by mouth nightly       albuterol sulfate  (90 Base) MCG/ACT inhaler Inhale 2 puffs into the lungs every 6 hours as needed       JANUVIA 100 MG tablet Take 100 mg by mouth daily       Misc. Devices MISC 1 PAIR OF DIABETIC SHOES (1 LEFT/ 1 RIGHT)  1-3 PAIRS OF INSERTS (LEFT/ RIGHT) 2 each 0    cetirizine (ZYRTEC) 10 MG tablet Take 10 mg by mouth nightly       lisinopril (PRINIVIL;ZESTRIL) 10 MG tablet Take 10 mg by mouth daily      simvastatin (ZOCOR) 40 MG tablet Take 40 mg by mouth nightly       glipiZIDE (GLUCOTROL) 10 MG tablet Take 20 mg by mouth 2 times daily (before meals) Takes 2 tabs (=20mg) BID      aspirin 81 MG tablet Take 81 mg by mouth daily      gabapentin (NEURONTIN) 300 MG capsule Take 900 mg by mouth 3 times daily. Take 3 caps (=900mg) 3 times a day       No current facility-administered medications for this visit. Allergies:   Morphine, Other, Percocet [oxycodone-acetaminophen], and Dye [iodides]     Review of Systems:   Review of Systems   Constitutional: Negative. Respiratory: Negative. Cardiovascular: Negative. Gastrointestinal: Negative. Genitourinary: Negative. Musculoskeletal: Negative. Skin: Positive for wound. Allergic/Immunologic: Negative. Neurological: Negative. Physical Examination :   BP (!) 143/84 (Site: Right Upper Arm, Position: Sitting, Cuff Size: Large Adult)   Pulse 79   Temp 97.9 °F (36.6 °C) (Temporal)   Resp 18   Ht 5' 11\" (1.803 m)   Wt 240 lb (108.9 kg)   SpO2 98% Comment: room air at rest  BMI 33.47 kg/m²     Physical Exam  Constitutional:       Appearance: He is well-developed. He is obese. HENT:      Head: Normocephalic and atraumatic. Cardiovascular:      Rate and Rhythm: Normal rate. Heart sounds: Normal heart sounds. No friction rub. No gallop. Pulmonary:      Effort: Pulmonary effort is normal.      Breath sounds: Normal breath sounds. No wheezing. Abdominal:      General: Bowel sounds are normal.      Palpations: Abdomen is soft. There is no mass. Tenderness: There is no abdominal tenderness. Musculoskeletal:         General: Deformity (Right foot Charcot deformity) present. Cervical back: Normal range of motion and neck supple. Lymphadenopathy:      Cervical: No cervical adenopathy. Skin:     General: Skin is warm and dry. Comments: The patient has a plantar aspect wound stage III, clean though there is some drainage on the dressing appreciated no signs of soft tissue infection. Neurological:      Mental Status: He is alert and oriented to person, place, and time.            Laboratory studies :  Medical Decision Making:   I have independently reviewed the following labs:  CBC with Differential:  Lab Results   Component Value Date    WBC 7.3 09/16/2021    WBC 9.5 09/14/2021    HGB 12.9 09/16/2021    HGB 12.3 09/14/2021    HCT 37.4 09/16/2021    HCT 37.1 09/14/2021     09/16/2021     09/14/2021    LYMPHOPCT 27 09/16/2021    LYMPHOPCT 18 09/13/2021    MONOPCT 9 09/16/2021    MONOPCT 7 09/13/2021       BMP:  Lab Results   Component Value Date     09/16/2021     09/14/2021    K 4.8 09/16/2021    K 4.6 09/14/2021     09/16/2021     09/14/2021    CO2 25 09/16/2021    CO2 24 09/14/2021    BUN 18 09/16/2021    BUN 21 09/14/2021    CREATININE 1.32 09/16/2021    CREATININE 1.50 09/14/2021       Hepatic Function Panel:   Lab Results   Component Value Date    PROT 7.5 01/09/2017    PROT 6.8 12/30/2014    LABALBU 4.5 01/09/2017    LABALBU 4.0 12/30/2014    BILITOT 0.39 01/09/2017    BILITOT 0.31 12/30/2014    ALKPHOS 89 01/09/2017    ALKPHOS 92 12/30/2014    ALT 15 01/09/2017    ALT 31 12/30/2014    AST 16 01/09/2017    AST 24 12/30/2014       Lab Results   Component Value Date    .8 09/13/2021    CRP 33.7 06/10/2021     Lab Results   Component Value Date    SEDRATE 37 09/13/2021    SEDRATE 37 06/06/2021         Thank you for allowing us to participate in the care of this patient. Pleasecall with questions. Joseph Sesay MD  [employer] messaging: [phone]    This note is created with the assistance of a speech recognition program.  While intending to generate a document that actually reflects the content ofthe visit, the document can still have some errors including those of syntax and sound a like substitutions which may escape proof reading. It such instances, actual meaning can be extrapolated by contextual diversion.

## 2021-10-26 ENCOUNTER — TELEPHONE (OUTPATIENT)
Dept: PULMONOLOGY | Age: 66
End: 2021-10-26

## 2021-10-26 ENCOUNTER — OFFICE VISIT (OUTPATIENT)
Dept: PODIATRY | Age: 66
End: 2021-10-26
Payer: MEDICARE

## 2021-10-26 VITALS — WEIGHT: 240 LBS | RESPIRATION RATE: 8 BRPM | HEIGHT: 71 IN | BODY MASS INDEX: 33.6 KG/M2

## 2021-10-26 DIAGNOSIS — L97.509 DIABETIC FOOT ULCER WITH OSTEOMYELITIS (HCC): ICD-10-CM

## 2021-10-26 DIAGNOSIS — M79.671 PAIN IN RIGHT FOOT: ICD-10-CM

## 2021-10-26 DIAGNOSIS — E11.621 DIABETIC FOOT ULCER WITH OSTEOMYELITIS (HCC): ICD-10-CM

## 2021-10-26 DIAGNOSIS — E11.69 DIABETIC FOOT ULCER WITH OSTEOMYELITIS (HCC): ICD-10-CM

## 2021-10-26 DIAGNOSIS — L97.512 RIGHT FOOT ULCER, WITH FAT LAYER EXPOSED (HCC): ICD-10-CM

## 2021-10-26 DIAGNOSIS — M86.9 DIABETIC FOOT ULCER WITH OSTEOMYELITIS (HCC): ICD-10-CM

## 2021-10-26 DIAGNOSIS — E11.51 TYPE II DIABETES MELLITUS WITH PERIPHERAL CIRCULATORY DISORDER (HCC): ICD-10-CM

## 2021-10-26 DIAGNOSIS — Z98.890 POST-OPERATIVE STATE: Primary | ICD-10-CM

## 2021-10-26 DIAGNOSIS — E11.610 TYPE 2 DIABETES MELLITUS WITH CHARCOT'S JOINT OF RIGHT FOOT (HCC): ICD-10-CM

## 2021-10-26 PROCEDURE — 99024 POSTOP FOLLOW-UP VISIT: CPT | Performed by: PODIATRIST

## 2021-10-26 PROCEDURE — 11042 DBRDMT SUBQ TIS 1ST 20SQCM/<: CPT | Performed by: PODIATRIST

## 2021-10-26 RX ORDER — AMLODIPINE BESYLATE 5 MG/1
5 TABLET ORAL DAILY
COMMUNITY
Start: 2021-10-20

## 2021-10-26 RX ORDER — MUPIROCIN CALCIUM 20 MG/G
CREAM TOPICAL
Qty: 30 G | Refills: 2 | Status: SHIPPED | OUTPATIENT
Start: 2021-10-26 | End: 2021-11-25

## 2021-10-26 RX ORDER — HYDROCODONE BITARTRATE AND ACETAMINOPHEN 10; 325 MG/1; MG/1
1 TABLET ORAL EVERY 6 HOURS PRN
Qty: 28 TABLET | Refills: 0 | Status: SHIPPED | OUTPATIENT
Start: 2021-10-26 | End: 2021-11-02

## 2021-10-26 NOTE — TELEPHONE ENCOUNTER
Sarina Valladares from [employer] called into the office requesting permission for company to pull PICC line for pt. Permission has been given for the PICC line to be pulled.  Liu Valverde

## 2021-10-26 NOTE — PROGRESS NOTES
[facility] PHYSICIANS  [facility]  [address] [place] [address]  Dept: [phone]  Dept Fax: [phone]    POST-OP PROGRESS NOTE  Date of patient's visit: 10/26/2021  Patient's Name:  Fito Platt YOB: 1955            Patient Care Team:  Ophelia Reddy MD as PCP - General (Family Medicine)  Marylen Co, MD as Consulting Physician (Infectious Diseases)  Colin Summers DPM as Physician (Podiatry)  Colin Summers DPM as Physician (Podiatry)  María Arechiga MD as Consulting Physician (Infectious Diseases)        Chief Complaint   Patient presents with    Post-Op Check     5 wk post op, pain, swelling, redness started 4 days ago    Diabetes    Foot Pain       Pt's primary care physician is Ophelia Reddy MD last seen 09/04/2021     Subjective: Fito Platt is a 73 y.o. male who presents to the office today 5week(s)  S/P I and do right foot  for correction of infection and cellulitis secondary to stepping on a nail   Problem List Items Addressed This Visit     None      Visit Diagnoses     Post-operative state    -  Primary    Right foot ulcer, with necrosis of muscle ([address]        Type II diabetes mellitus with peripheral circulatory disorder ([address]        Pain in right foot          . Patient relates pain is Present and improved. Pain is rated 4 out of 10 and is described as constant, severe. Currently denies F/C/N/V. Is patient taking pain medications as prescribed and is controlling pain  States he has been using his CROW boot at all times     Physical Examination:  Wound #:   1    diabetic foot ulcer and dehisced surgical wound   right foot    Wound measurements:  #1  5 cm by 1 cm  by   2 mm        Wound Depth: subcutaneous.     Drainage:    serosanguinous, serous Type:serosanguinous  Wound Bed status:   Granulation Tissue: 100%   Necrotic 0%  Type:   Epithelialization 0%   Hypergranular 0%   Periwound hyperkeratosis: absent  Erythema absent    Odor:no  Maceration:no  Undermining/tract/tunneling:no  Culture taken:   no             Radiographs: right foot 3 views:  No bony infection . Chronic changes of the midfoot secondary to charcot       Assessment: Zackary Dumont is status post as above  Normal post operative course. Doing well         Diagnosis Orders   1. Post-operative state  XR FOOT RIGHT (MIN 3 VIEWS)    HYDROcodone-acetaminophen (NORCO)  MG per tablet    NJ DEBRIDEMENT, SKIN, SUB-Q TISSUE,=<20 SQ CM   2. Type II diabetes mellitus with peripheral circulatory disorder (HCC)  NJ DEBRIDEMENT, SKIN, SUB-Q TISSUE,=<20 SQ CM   3. Pain in right foot  XR FOOT RIGHT (MIN 3 VIEWS)    HYDROcodone-acetaminophen (NORCO)  MG per tablet    NJ DEBRIDEMENT, SKIN, SUB-Q TISSUE,=<20 SQ CM   4. Right foot ulcer, with fat layer exposed ([address]  NJ DEBRIDEMENT, SKIN, SUB-Q TISSUE,=<20 SQ CM   5. Diabetic foot ulcer with osteomyelitis ([address]  NJ DEBRIDEMENT, SKIN, SUB-Q TISSUE,=<20 SQ CM   6. Type 2 diabetes mellitus with Charcot's joint of right foot (HCC)  NJ DEBRIDEMENT, SKIN, SUB-Q TISSUE,=<20 SQ CM         Plan:  Patient examined and evaluated. Current condition and treatment options discussed in detail. Advised pt to hhis conditon and the x ray findings. With the patient in supine position, Lidocaine soaked gauze was applied per physician order at beginning of wound evaluation. It was subsequently removed. Using a #15 blade scalpel and Pick-up, the wound was debrided down to and included the removal of the following tissue:     [] epidermis, [] dermis, [x]  subcutaneous tissue,  [] muscle/fascia tissue,   and/or  [] bone     Also debrided was all  [] fibrin, [x] biofilm, [x] slough,  [] necrotic,  [] hypergranulation tissue, and/or  [] hyperkeratotic tissue. Wound was copiously irrigated with normal saline solution. Bleeding:  Minimal.  Hemostasis:  pressure     100%  of wound debrided.     Patient tolerated procedure well. Pain 0 / 10 during procedure and pain 0 / 10 after procedure. Discussed appropriate home care of this wound. Wound redressed. Patient instructions were given. rx provided for pain medicine and for bactroban to apply daily   Dispensed dressing supplies and instructions on their use. .  Verbal and written instructions given to patient. Orders: No orders of the defined types were placed in this encounter. Contact office with any questions/problems/concerns. RTC in 2week(s).      Electronically signed by Michi Melara DPM on 10/26/2021 at 10:37 AM  10/26/2021

## 2021-11-09 ENCOUNTER — OFFICE VISIT (OUTPATIENT)
Dept: PODIATRY | Age: 66
End: 2021-11-09

## 2021-11-09 VITALS — HEIGHT: 71 IN | BODY MASS INDEX: 33.6 KG/M2 | WEIGHT: 240 LBS | RESPIRATION RATE: 18 BRPM

## 2021-11-09 DIAGNOSIS — E11.51 TYPE II DIABETES MELLITUS WITH PERIPHERAL CIRCULATORY DISORDER (HCC): ICD-10-CM

## 2021-11-09 DIAGNOSIS — M79.671 PAIN IN RIGHT FOOT: ICD-10-CM

## 2021-11-09 DIAGNOSIS — Z98.890 POST-OPERATIVE STATE: Primary | ICD-10-CM

## 2021-11-09 PROCEDURE — 99024 POSTOP FOLLOW-UP VISIT: CPT | Performed by: PODIATRIST

## 2021-11-09 NOTE — PROGRESS NOTES
[facility] PHYSICIANS  [facility]  [address] [place] [address]  Dept: [phone]  Dept Fax: [phone]    POST-OP PROGRESS NOTE  Date of patient's visit: 11/9/2021  Patient's Name:  Yarely Moncada YOB: 1955            Patient Care Team:  Leo Salinas MD as PCP - General (Family Medicine)  Tim Dickerson MD as Consulting Physician (Infectious Diseases)  Chloe Bird DPM as Physician (Podiatry)  Chleo Bird DPM as Physician (Podiatry)  Natalia Angel MD as Consulting Physician (Infectious Diseases)        Chief Complaint   Patient presents with    Post-Op Check     7 weeks post op    Diabetes    Foot Pain       Pt's primary care physician is Leo Salinas MD last seen 9/4/2021     Subjective: Yarely Moncada is a 73 y.o. male who presents to the office today 7week(s)  S/P I and D of right foot  for correction of   Problem List Items Addressed This Visit     None      Visit Diagnoses     Post-operative state    -  Primary    Type II diabetes mellitus with peripheral circulatory disorder (HCC)        Pain in right foot          . Patient relates pain is Present and improved. Pain is rated 4 out of 10 and is described as constant, severe. Currently denies F/C/N/V. Is patient taking pain medications as prescribed and is controlling pain    Physical Examination:    diabetic foot ulcer and dehisced surgical wound   right foot    Wound measurements:  #1  4 cm by 0.5 cm  by   2 mm        Wound Depth: subcutaneous. Drainage:    serosanguinous, serous Type:serosanguinous  Wound Bed status:   Granulation Tissue: 100%   Necrotic 0%  Type:   Epithelialization 0%   Hypergranular 0%   Periwound hyperkeratosis:  absent  Erythema absent    Odor:no  Maceration:no  Undermining/tract/tunneling:no  Culture taken:   no               Assessment: Yarely Moncada is status post as above  Normal post operative course.   Doing well ICD-10-CM    1. Post-operative state  Z98.890    2. Type II diabetes mellitus with peripheral circulatory disorder (HCC)  E11.51    3. Pain in right foot  M79.674          Plan:  Patient examined and evaluated. Current condition and treatment options discussed in detail. Advised pt to his condtion. No debridement today. Pt to stay in [address] [place] [address] at all times when weightbearing. Daily dressing changes. There is no HPK rim and the foot is offloaded properly. Verbal and written instructions given to patient. Orders: No orders of the defined types were placed in this encounter. Contact office with any questions/problems/concerns. RTC in 4week(s).      Electronically signed by Hung Palacios DPM on 11/9/2021 at 9:31 AM  11/9/2021

## 2021-11-23 ENCOUNTER — OFFICE VISIT (OUTPATIENT)
Dept: PODIATRY | Age: 66
End: 2021-11-23
Payer: MEDICARE

## 2021-11-23 VITALS — WEIGHT: 240 LBS | BODY MASS INDEX: 33.6 KG/M2 | HEIGHT: 71 IN | RESPIRATION RATE: 18 BRPM

## 2021-11-23 DIAGNOSIS — L97.512 RIGHT FOOT ULCER, WITH FAT LAYER EXPOSED (HCC): ICD-10-CM

## 2021-11-23 DIAGNOSIS — Z98.890 POST-OPERATIVE STATE: Primary | ICD-10-CM

## 2021-11-23 DIAGNOSIS — M79.671 PAIN IN RIGHT FOOT: ICD-10-CM

## 2021-11-23 DIAGNOSIS — E11.51 TYPE II DIABETES MELLITUS WITH PERIPHERAL CIRCULATORY DISORDER (HCC): ICD-10-CM

## 2021-11-23 PROCEDURE — 11042 DBRDMT SUBQ TIS 1ST 20SQCM/<: CPT | Performed by: PODIATRIST

## 2021-11-23 PROCEDURE — 99024 POSTOP FOLLOW-UP VISIT: CPT | Performed by: PODIATRIST

## 2021-11-23 NOTE — PROGRESS NOTES
[facility] PHYSICIANS  [facility]  [address] [place] [address]  Dept: [phone]  Dept Fax: [phone]    POST-OP PROGRESS NOTE  Date of patient's visit: 11/23/2021  Patient's Name:  Tyshawn Clifton YOB: 1955            Patient Care Team:  Lawanda George MD as PCP - General (Family Medicine)  Hudson Martinez MD as Consulting Physician (Infectious Diseases)  Joseph Madrigal DPM as Physician (Podiatry)  Joseph Madrigal DPM as Physician (Podiatry)  Santosh Amaral MD as Consulting Physician (Infectious Diseases)        Chief Complaint   Patient presents with    Post-Op Check     9 wk post op    Diabetes    Foot Pain       Pt's primary care physician is Lawanda George MD last seen 9/4/2021     Subjective: Tyshawn Clifton is a 73 y.o. male who presents to the office today 9week(s)  S/P I and D of bone with wound dehissence to the right foot. Problem List Items Addressed This Visit     None      Visit Diagnoses     Post-operative state    -  Primary    Type II diabetes mellitus with peripheral circulatory disorder (HCC)        Pain in right foot        Right foot ulcer, with fat layer exposed ([address]          . Patient relates pain is Present and improved. Pain is rated 2 out of 10 and is described as constant, moderate. Currently denies F/C/N/V. Is patient taking pain medications as prescribed and is controlling pain    Physical Examination:    Wound #:   1    diabetic foot ulcer and dehisced surgical wound   right foot    Wound measurements:  #1  5 mm by 9 mm  by   2 mm        Wound Depth: subcutaneous.     Drainage:    minimal, serosanguinous Type:minimal, serosanguinous  Wound Bed status:   Granulation Tissue: 100%   Necrotic 0%  Type:   Epithelialization 20%   Hypergranular 0%   Periwound hyperkeratosis:  present  Erythema absent    Odor:no  Maceration:no  Undermining/tract/tunneling:no  Culture taken:   no                 Assessment: Jade Mix is status post as above  Normal post operative course. Doing well          ICD-10-CM    1. Post-operative state  Z98.890    2. Type II diabetes mellitus with peripheral circulatory disorder (HCC)  E11.51    3. Pain in right foot  M79.671    4. Right foot ulcer, with fat layer exposed ([facility]  L97.512          Plan:  Patient examined and evaluated. Current condition and treatment options discussed in detail. Advised pt to his condtion. Pt just went hunting and is back in his CROW boot. Pt to stay in [address] [place] [address] at all times when walking and change dressings daily    With the patient in supine position, Lidocaine soaked gauze was applied per physician order at beginning of wound evaluation. It was subsequently removed. Using a #15 blade scalpel and Pick-up, the wound was debrided down to and included the removal of the following tissue:     [] epidermis, [] dermis, [x]  subcutaneous tissue,  [] muscle/fascia tissue,   and/or  [] bone     Also debrided was all  [] fibrin, [x] biofilm, [x] slough,  [] necrotic,  [] hypergranulation tissue, and/or  [] hyperkeratotic tissue. Wound was copiously irrigated with normal saline solution. Bleeding:  Minimal.  Hemostasis:  pressure     100%  of wound debrided. Patient tolerated procedure well. Pain 0 / 10 during procedure and pain 0 / 10 after procedure. Discussed appropriate home care of this wound. Wound redressed. Patient instructions were given. Dispensed dressing supplies and instructions on their use. .  Verbal and written instructions given to patient. Orders: No orders of the defined types were placed in this encounter. Contact office with any questions/problems/concerns. RTC in 3week(s).      Electronically signed by Hardy Vale DPM on 11/23/2021 at 10:15 AM  11/23/2021

## 2021-12-03 ENCOUNTER — APPOINTMENT (OUTPATIENT)
Dept: CT IMAGING | Age: 66
End: 2021-12-03
Payer: MEDICARE

## 2021-12-03 ENCOUNTER — APPOINTMENT (OUTPATIENT)
Dept: ULTRASOUND IMAGING | Age: 66
End: 2021-12-03
Payer: MEDICARE

## 2021-12-03 ENCOUNTER — HOSPITAL ENCOUNTER (EMERGENCY)
Age: 66
Discharge: HOME | End: 2021-12-03
Attending: EMERGENCY MEDICINE
Payer: MEDICARE

## 2021-12-03 VITALS
DIASTOLIC BLOOD PRESSURE: 68 MMHG | HEART RATE: 81 BPM | WEIGHT: 241 LBS | OXYGEN SATURATION: 90 % | TEMPERATURE: 97.9 F | HEIGHT: 71 IN | BODY MASS INDEX: 33.74 KG/M2 | RESPIRATION RATE: 20 BRPM | SYSTOLIC BLOOD PRESSURE: 132 MMHG

## 2021-12-03 DIAGNOSIS — K85.90 ACUTE PANCREATITIS, UNSPECIFIED COMPLICATION STATUS, UNSPECIFIED PANCREATITIS TYPE: ICD-10-CM

## 2021-12-03 DIAGNOSIS — R93.5 ABNORMAL CT OF THE ABDOMEN: ICD-10-CM

## 2021-12-03 DIAGNOSIS — R10.13 ABDOMINAL PAIN, EPIGASTRIC: Primary | ICD-10-CM

## 2021-12-03 DIAGNOSIS — D35.00 ADRENAL ADENOMA, UNSPECIFIED LATERALITY: ICD-10-CM

## 2021-12-03 LAB
ABSOLUTE EOS #: 0.15 K/UL (ref 0–0.44)
ABSOLUTE IMMATURE GRANULOCYTE: 0.04 K/UL (ref 0–0.3)
ABSOLUTE LYMPH #: 2.65 K/UL (ref 1.1–3.7)
ABSOLUTE MONO #: 0.7 K/UL (ref 0.1–1.2)
ALBUMIN SERPL-MCNC: 3.8 G/DL (ref 3.5–5.2)
ALBUMIN/GLOBULIN RATIO: ABNORMAL (ref 1–2.5)
ALP BLD-CCNC: 113 U/L (ref 40–129)
ALT SERPL-CCNC: 14 U/L (ref 5–41)
ANION GAP SERPL CALCULATED.3IONS-SCNC: 9 MMOL/L (ref 9–17)
AST SERPL-CCNC: 13 U/L
BASOPHILS # BLD: 1 % (ref 0–2)
BASOPHILS ABSOLUTE: 0.05 K/UL (ref 0–0.2)
BILIRUB SERPL-MCNC: 0.35 MG/DL (ref 0.3–1.2)
BUN BLDV-MCNC: 15 MG/DL (ref 8–23)
BUN/CREAT BLD: 11 (ref 9–20)
CALCIUM SERPL-MCNC: 9.7 MG/DL (ref 8.6–10.4)
CHLORIDE BLD-SCNC: 94 MMOL/L (ref 98–107)
CO2: 24 MMOL/L (ref 20–31)
CREAT SERPL-MCNC: 1.42 MG/DL (ref 0.7–1.2)
DIFFERENTIAL TYPE: ABNORMAL
EOSINOPHILS RELATIVE PERCENT: 2 % (ref 1–4)
GFR AFRICAN AMERICAN: >60 ML/MIN
GFR NON-AFRICAN AMERICAN: 50 ML/MIN
GFR SERPL CREATININE-BSD FRML MDRD: ABNORMAL ML/MIN/{1.73_M2}
GFR SERPL CREATININE-BSD FRML MDRD: ABNORMAL ML/MIN/{1.73_M2}
GLUCOSE BLD-MCNC: 232 MG/DL (ref 70–99)
HCT VFR BLD CALC: 32.2 % (ref 40.7–50.3)
HEMOGLOBIN: 11.2 G/DL (ref 13–17)
IMMATURE GRANULOCYTES: 0 %
LIPASE: 403 U/L (ref 13–60)
LYMPHOCYTES # BLD: 28 % (ref 24–43)
MCH RBC QN AUTO: 29.9 PG (ref 25.2–33.5)
MCHC RBC AUTO-ENTMCNC: 34.8 G/DL (ref 28.4–34.8)
MCV RBC AUTO: 86.1 FL (ref 82.6–102.9)
MONOCYTES # BLD: 7 % (ref 3–12)
NRBC AUTOMATED: 0 PER 100 WBC
PDW BLD-RTO: 13 % (ref 11.8–14.4)
PLATELET # BLD: 237 K/UL (ref 138–453)
PLATELET ESTIMATE: ABNORMAL
PMV BLD AUTO: 9.4 FL (ref 8.1–13.5)
POTASSIUM SERPL-SCNC: 4 MMOL/L (ref 3.7–5.3)
RBC # BLD: 3.74 M/UL (ref 4.21–5.77)
RBC # BLD: ABNORMAL 10*6/UL
SEG NEUTROPHILS: 62 % (ref 36–65)
SEGMENTED NEUTROPHILS ABSOLUTE COUNT: 6 K/UL (ref 1.5–8.1)
SODIUM BLD-SCNC: 127 MMOL/L (ref 135–144)
TOTAL PROTEIN: 7.2 G/DL (ref 6.4–8.3)
WBC # BLD: 9.6 K/UL (ref 3.5–11.3)
WBC # BLD: ABNORMAL 10*3/UL

## 2021-12-03 PROCEDURE — 99283 EMERGENCY DEPT VISIT LOW MDM: CPT

## 2021-12-03 PROCEDURE — 80053 COMPREHEN METABOLIC PANEL: CPT

## 2021-12-03 PROCEDURE — 83690 ASSAY OF LIPASE: CPT

## 2021-12-03 PROCEDURE — 96375 TX/PRO/DX INJ NEW DRUG ADDON: CPT

## 2021-12-03 PROCEDURE — 85025 COMPLETE CBC W/AUTO DIFF WBC: CPT

## 2021-12-03 PROCEDURE — 6360000004 HC RX CONTRAST MEDICATION: Performed by: PHYSICIAN ASSISTANT

## 2021-12-03 PROCEDURE — 74177 CT ABD & PELVIS W/CONTRAST: CPT

## 2021-12-03 PROCEDURE — 96374 THER/PROPH/DIAG INJ IV PUSH: CPT

## 2021-12-03 PROCEDURE — 2580000003 HC RX 258: Performed by: PHYSICIAN ASSISTANT

## 2021-12-03 PROCEDURE — 76705 ECHO EXAM OF ABDOMEN: CPT

## 2021-12-03 PROCEDURE — 6360000002 HC RX W HCPCS: Performed by: PHYSICIAN ASSISTANT

## 2021-12-03 PROCEDURE — 2500000003 HC RX 250 WO HCPCS: Performed by: PHYSICIAN ASSISTANT

## 2021-12-03 RX ORDER — 0.9 % SODIUM CHLORIDE 0.9 %
80 INTRAVENOUS SOLUTION INTRAVENOUS ONCE
Status: COMPLETED | OUTPATIENT
Start: 2021-12-03 | End: 2021-12-03

## 2021-12-03 RX ORDER — HYDROMORPHONE HYDROCHLORIDE 1 MG/ML
1 INJECTION, SOLUTION INTRAMUSCULAR; INTRAVENOUS; SUBCUTANEOUS ONCE
Status: COMPLETED | OUTPATIENT
Start: 2021-12-03 | End: 2021-12-03

## 2021-12-03 RX ORDER — ONDANSETRON 2 MG/ML
4 INJECTION INTRAMUSCULAR; INTRAVENOUS ONCE
Status: COMPLETED | OUTPATIENT
Start: 2021-12-03 | End: 2021-12-03

## 2021-12-03 RX ORDER — METHYLPREDNISOLONE SODIUM SUCCINATE 125 MG/2ML
125 INJECTION, POWDER, LYOPHILIZED, FOR SOLUTION INTRAMUSCULAR; INTRAVENOUS ONCE
Status: COMPLETED | OUTPATIENT
Start: 2021-12-03 | End: 2021-12-03

## 2021-12-03 RX ORDER — DIPHENHYDRAMINE HYDROCHLORIDE 50 MG/ML
25 INJECTION INTRAMUSCULAR; INTRAVENOUS ONCE
Status: COMPLETED | OUTPATIENT
Start: 2021-12-03 | End: 2021-12-03

## 2021-12-03 RX ORDER — SODIUM CHLORIDE 0.9 % (FLUSH) 0.9 %
10 SYRINGE (ML) INJECTION PRN
Status: DISCONTINUED | OUTPATIENT
Start: 2021-12-03 | End: 2021-12-03 | Stop reason: HOSPADM

## 2021-12-03 RX ORDER — ONDANSETRON 4 MG/1
4 TABLET, ORALLY DISINTEGRATING ORAL 3 TIMES DAILY PRN
Qty: 21 TABLET | Refills: 0 | Status: ON HOLD | OUTPATIENT
Start: 2021-12-03 | End: 2022-10-05

## 2021-12-03 RX ORDER — HYDROCODONE BITARTRATE AND ACETAMINOPHEN 5; 325 MG/1; MG/1
1 TABLET ORAL EVERY 6 HOURS PRN
Qty: 12 TABLET | Refills: 0 | Status: SHIPPED | OUTPATIENT
Start: 2021-12-03 | End: 2021-12-06

## 2021-12-03 RX ORDER — 0.9 % SODIUM CHLORIDE 0.9 %
1000 INTRAVENOUS SOLUTION INTRAVENOUS ONCE
Status: COMPLETED | OUTPATIENT
Start: 2021-12-03 | End: 2021-12-03

## 2021-12-03 RX ADMIN — ONDANSETRON 4 MG: 2 INJECTION INTRAMUSCULAR; INTRAVENOUS at 14:25

## 2021-12-03 RX ADMIN — SODIUM CHLORIDE 1000 ML: 9 INJECTION, SOLUTION INTRAVENOUS at 14:25

## 2021-12-03 RX ADMIN — SODIUM CHLORIDE 80 ML: 9 INJECTION, SOLUTION INTRAVENOUS at 17:43

## 2021-12-03 RX ADMIN — IOPAMIDOL 75 ML: 755 INJECTION, SOLUTION INTRAVENOUS at 17:43

## 2021-12-03 RX ADMIN — DIPHENHYDRAMINE HYDROCHLORIDE 25 MG: 50 INJECTION, SOLUTION INTRAMUSCULAR; INTRAVENOUS at 16:47

## 2021-12-03 RX ADMIN — FAMOTIDINE 20 MG: 10 INJECTION, SOLUTION INTRAVENOUS at 14:24

## 2021-12-03 RX ADMIN — SODIUM CHLORIDE, PRESERVATIVE FREE 10 ML: 5 INJECTION INTRAVENOUS at 17:43

## 2021-12-03 RX ADMIN — METHYLPREDNISOLONE SODIUM SUCCINATE 125 MG: 125 INJECTION, POWDER, FOR SOLUTION INTRAMUSCULAR; INTRAVENOUS at 16:47

## 2021-12-03 RX ADMIN — HYDROMORPHONE HYDROCHLORIDE 1 MG: 1 INJECTION, SOLUTION INTRAMUSCULAR; INTRAVENOUS; SUBCUTANEOUS at 14:25

## 2021-12-03 ASSESSMENT — PAIN DESCRIPTION - LOCATION: LOCATION: ABDOMEN

## 2021-12-03 ASSESSMENT — PAIN SCALES - GENERAL
PAINLEVEL_OUTOF10: 9
PAINLEVEL_OUTOF10: 9

## 2021-12-03 ASSESSMENT — PAIN DESCRIPTION - PAIN TYPE: TYPE: ACUTE PAIN

## 2021-12-03 NOTE — ED PROVIDER NOTES
EMERGENCY DEPARTMENT ENCOUNTER   ATTENDING ATTESTATION     Pt Name: Mell Barclay  MRN: [id number]  [place] 1955  Date of evaluation: 12/3/21   Mell Barclay is a 73 y.o. male with CC: Abdominal Pain (RUQ abdominal pain x1 week. Pt reports hx of appendicitis, peritonitis )    MDM:   The patient is a 41-year-old male who presented to the emergency department secondary to abdominal pain. Urinalysis revealed elevated lipase as well as transaminitis, patient denied a history of alcohol use or pancreatitis that he drinks years ago. Ultrasound the right upper quadrant negative for acute cholecystitis. The abdomen pelvis positive for adrenal mass stable from 2019 however patient stated he was not aware of an adrenal adenoma. Discussed with patient admission for pain control given elevated lipase however he declined he requested to be discharged home with a short course of pain meds and parameters to return to the emergency department. CRITICAL CARE:       EKG: All EKG's are interpreted by the Emergency Department Physician who either signs or Co-signs this chart in the absence of a cardiologist.      RADIOLOGY:All plain film, CT, MRI, and formal ultrasound images (except ED bedside ultrasound) are read by the radiologist, see reports below, unless otherwise noted in MDM or here. CT ABDOMEN PELVIS W IV CONTRAST Additional Contrast? None   Final Result   4 cm x 3.2 cm low-density left adrenal mass with punctate calcification,   stable from prior exam of 06/26/2019 most likely representing an adrenal   adenoma      Cholelithiasis      Hepatomegaly and fatty infiltration of the liver. Findings suggest duodenitis         US GALLBLADDER RUQ   Final Result   1. Cholelithiasis without evidence for acute cholecystitis or biliary   dilatation. 2.  Hepatomegaly with findings consistent with steatosis. 3.  Questionable right renal calculi.            LABS: All lab results were reviewed by myself, and all abnormals are listed below. Labs Reviewed   CBC WITH AUTO DIFFERENTIAL - Abnormal; Notable for the following components:       Result Value    RBC 3.74 (*)     Hemoglobin 11.2 (*)     Hematocrit 32.2 (*)     All other components within normal limits   COMPREHENSIVE METABOLIC PANEL - Abnormal; Notable for the following components:    Glucose 232 (*)     CREATININE 1.42 (*)     Sodium 127 (*)     Chloride 94 (*)     GFR Non- 50 (*)     All other components within normal limits   LIPASE - Abnormal; Notable for the following components:    Lipase 403 (*)     All other components within normal limits     CONSULTS:  None  FINAL IMPRESSION      1. Abdominal pain, epigastric            PASTMEDICAL HISTORY     Past Medical History:   Diagnosis Date    Abscess of right foot 8/4/2018    Acquired hammer toe deformity of lesser toe of right foot     ALEJANDRO (acute kidney injury) ([address] 8/5/2018    Cellulitis 4/24/2018    Cellulitis of left foot 4/24/2018    Cellulitis of right foot     and abscess    Charcot foot due to diabetes mellitus ([address] 2014    Right foot     Chest pain at rest 8/4/2018    Chronic multifocal osteomyelitis of right foot ([address]     Diabetic polyneuropathy associated with type 2 diabetes mellitus ([address]     Essential hypertension     Fractures, multiple 07/21/2014    Right foot fractures     Hyperlipidemia     Hypertension     Leukocytosis     Neuropathy     diabetic with charcot affecting the right foot    Pain in right foot     redness and swelling    Pneumonia 2009    Right foot infection     Right foot pain     Tobacco abuse 4/24/2018    Type II or unspecified type diabetes mellitus without mention of complication, not stated as uncontrolled     Vertigo     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 stepped on sharp object. Covered by dressing.     Wound, open     Right posterior -Diabetic Ulcer     SURGICAL HISTORY       Past Surgical History:   Procedure Laterality Date    APPENDECTOMY      at age 24    ARTHROPLASTY Right 12/11/2020    RIGHT HALLUX EXOSTECTOMY AND 3RD DIGIT EXTENSIOR TENOTOMY performed by Seth Ayala DPM at [address] [facility] Left 04/24/2018    I&D foreign body removal    FOOT DEBRIDEMENT Right 3/20/2020    RIGHT  FOOT   INCISION AND DRAINAGE WITH BONE BIOPSY performed by Seth Ayala DPM at [address] [place] [address] Right 6/8/2021    FOOT DEBRIDEMENT INCISION AND DRAINAGE performed by Seth Ayala DPM at [address] [place] [address] Right 9/16/2021    RIGHT FOOT  INCISION AND DRAINAGE   WITH PULSE LAVAGE performed by Seth Ayala DPM at [facility] Right 6/11/2021    RIGHT FOOT FLAP CLOSURE performed by Seth Ayala DPM at [place] [address] ARTHROSCOPY Left 1990    KNEE SURGERY Bilateral 1983    arthroscopy   [address] Left 4/24/2018    LEFT FOOT MULTIPLE  INCISIONS  AND DRAINAGE AND REMOVAL FOREIGN BODY  IRENE performed by Seth Ayala DPM at [address] [place] [address]       Previous Medications    ALBUTEROL SULFATE  (90 BASE) MCG/ACT INHALER    Inhale 2 puffs into the lungs every 6 hours as needed     AMLODIPINE (NORVASC) 5 MG TABLET    TAKE 1 TABLET BY MOUTH EVERY DAY    ASPIRIN 81 MG TABLET    Take 81 mg by mouth daily    CEFEPIME (MAXIPIME) 2 G INJECTION        CETIRIZINE (ZYRTEC) 10 MG TABLET    Take 10 mg by mouth nightly     GABAPENTIN (NEURONTIN) 300 MG CAPSULE    Take 900 mg by mouth 3 times daily.  Take 3 caps (=900mg) 3 times a day    GLIPIZIDE (GLUCOTROL) 10 MG TABLET    Take 20 mg by mouth 2 times daily (before meals) Takes 2 tabs (=20mg) BID    JANUVIA 100 MG TABLET    Take 100 mg by mouth daily     LANTUS SOLOSTAR 100 UNIT/ML INJECTION PEN    Inject 15 Units into the skin nightly    LISINOPRIL (PRINIVIL;ZESTRIL) 10 MG TABLET    Take 10 mg by mouth daily    MELOXICAM (MOBIC) 15 MG TABLET    Take 15 mg by mouth nightly     MISC. DEVICES MISC    1 PAIR OF DIABETIC SHOES (1 LEFT/ 1 RIGHT)  1-3 PAIRS OF INSERTS (LEFT/ RIGHT)    MUPIROCIN (BACTROBAN) 2 % OINTMENT        SILVER SULFADIAZINE (SILVADENE) 1 % CREAM    Apply topically daily. SIMVASTATIN (ZOCOR) 40 MG TABLET    Take 40 mg by mouth nightly      ALLERGIES     is allergic to morphine, other, percocet [oxycodone-acetaminophen], and dye [iodides]. FAMILY HISTORY     He indicated that his mother is alive. He indicated that his father is . He indicated that the status of his maternal grandmother is unknown. SOCIAL HISTORY       Social History     Tobacco Use    Smoking status: Current Every Day Smoker     Packs/day: 1.00    Smokeless tobacco: Never Used   Vaping Use    Vaping Use: Never used   Substance Use Topics    Alcohol use: No    Drug use: Not Currently       I personally evaluated and examined the patient in conjunction with the APC and agree with the assessment, treatment plan, and disposition of the patient as recorded by the APC. Sadi Tobias MD  The care is provided during an unprecedented national emergency due to the novel coronavirus, COVID 19.   Attending Emergency Physician          Sadi Tobias MD  [id number]

## 2021-12-16 ENCOUNTER — OFFICE VISIT (OUTPATIENT)
Dept: PODIATRY | Age: 66
End: 2021-12-16
Payer: MEDICARE

## 2021-12-16 VITALS — RESPIRATION RATE: 18 BRPM

## 2021-12-16 DIAGNOSIS — Z98.890 POST-OPERATIVE STATE: Primary | ICD-10-CM

## 2021-12-16 DIAGNOSIS — E11.51 TYPE II DIABETES MELLITUS WITH PERIPHERAL CIRCULATORY DISORDER (HCC): ICD-10-CM

## 2021-12-16 DIAGNOSIS — M79.671 PAIN IN RIGHT FOOT: ICD-10-CM

## 2021-12-16 DIAGNOSIS — L97.513 RIGHT FOOT ULCER, WITH NECROSIS OF MUSCLE (HCC): ICD-10-CM

## 2021-12-16 DIAGNOSIS — T81.30XA WOUND DEHISCENCE: ICD-10-CM

## 2021-12-16 PROCEDURE — 99024 POSTOP FOLLOW-UP VISIT: CPT | Performed by: PODIATRIST

## 2021-12-16 PROCEDURE — 11043 DBRDMT MUSC&/FSCA 1ST 20/<: CPT | Performed by: PODIATRIST

## 2021-12-17 RX ORDER — PEN NEEDLE, DIABETIC 31 GX5/16"
NEEDLE, DISPOSABLE MISCELLANEOUS
COMMUNITY
Start: 2021-11-29

## 2021-12-17 RX ORDER — HYDROCODONE BITARTRATE AND ACETAMINOPHEN 5; 325 MG/1; MG/1
TABLET ORAL
COMMUNITY
Start: 2021-12-06 | End: 2021-12-17

## 2021-12-17 NOTE — PROGRESS NOTES
post as abovce  Normal post operative course. Doing well          ICD-10-CM    1. Post-operative state  Z98.890    2. Type II diabetes mellitus with peripheral circulatory disorder (HCC)  E11.51    3. Pain in right foot  M79.671          Plan:  Patient examined and evaluated. Current condition and treatment options discussed in detail. Advised pt to his condition . supine  With the patient in supine position, Lidocaine soaked gauze was applied per physician order at beginning of wound evaluation. It was subsequently removed. Using a #15 blade scalpel and Pick-up, the wound was debrided down to and included the removal of the following tissue:     [] epidermis, [] dermis, []  subcutaneous tissue,  [x] muscle/fascia tissue,   and/or  [] bone     Also debrided was all  [] fibrin, [] biofilm, [] slough,  [x] necrotic,  [x] hypergranulation tissue, and/or  [] hyperkeratotic tissue. Wound was copiously irrigated with normal saline solution. Bleeding:  Minimal.  Hemostasis:  pressure     100%  of wound debrided. Patient tolerated procedure well. Pain 0 / 10 during procedure and pain 0 / 10 after procedure. Discussed appropriate home care of this wound. Wound redressed. Patient instructions were given. Dispensed dressing supplies and instructions on their use. .  Verbal and written instructions given to patient. Orders: No orders of the defined types were placed in this encounter. Contact office with any questions/problems/concerns. RTC in 2week(s)    Pt to stay in [address] [place] [address] at all times. Pt touse bactroban to the area and cover with DSD.      Electronically signed by Letitia Robbins DPM on 12/17/2021 at 7:53 AM  12/16/2021
Unknown

## 2022-01-04 ENCOUNTER — OFFICE VISIT (OUTPATIENT)
Dept: PODIATRY | Age: 67
End: 2022-01-04
Payer: MEDICARE

## 2022-01-04 VITALS — BODY MASS INDEX: 33.74 KG/M2 | WEIGHT: 241 LBS | RESPIRATION RATE: 16 BRPM | HEIGHT: 71 IN

## 2022-01-04 DIAGNOSIS — M79.671 PAIN IN RIGHT FOOT: ICD-10-CM

## 2022-01-04 DIAGNOSIS — E11.51 TYPE II DIABETES MELLITUS WITH PERIPHERAL CIRCULATORY DISORDER (HCC): Primary | ICD-10-CM

## 2022-01-04 DIAGNOSIS — T81.30XA WOUND DEHISCENCE: ICD-10-CM

## 2022-01-04 DIAGNOSIS — L97.513 RIGHT FOOT ULCER, WITH NECROSIS OF MUSCLE (HCC): ICD-10-CM

## 2022-01-04 PROCEDURE — 11043 DBRDMT MUSC&/FSCA 1ST 20/<: CPT | Performed by: PODIATRIST

## 2022-01-04 PROCEDURE — 3017F COLORECTAL CA SCREEN DOC REV: CPT | Performed by: PODIATRIST

## 2022-01-04 PROCEDURE — G8417 CALC BMI ABV UP PARAM F/U: HCPCS | Performed by: PODIATRIST

## 2022-01-04 PROCEDURE — G8427 DOCREV CUR MEDS BY ELIG CLIN: HCPCS | Performed by: PODIATRIST

## 2022-01-04 PROCEDURE — 4004F PT TOBACCO SCREEN RCVD TLK: CPT | Performed by: PODIATRIST

## 2022-01-04 PROCEDURE — 3046F HEMOGLOBIN A1C LEVEL >9.0%: CPT | Performed by: PODIATRIST

## 2022-01-04 PROCEDURE — 99213 OFFICE O/P EST LOW 20 MIN: CPT | Performed by: PODIATRIST

## 2022-01-04 PROCEDURE — 1123F ACP DISCUSS/DSCN MKR DOCD: CPT | Performed by: PODIATRIST

## 2022-01-04 PROCEDURE — 4040F PNEUMOC VAC/ADMIN/RCVD: CPT | Performed by: PODIATRIST

## 2022-01-04 PROCEDURE — 2022F DILAT RTA XM EVC RTNOPTHY: CPT | Performed by: PODIATRIST

## 2022-01-04 PROCEDURE — G8484 FLU IMMUNIZE NO ADMIN: HCPCS | Performed by: PODIATRIST

## 2022-01-04 NOTE — PROGRESS NOTES
[address] [facility]  [id number] [place], [place] [address] [place] [id number]  Dept: [phone]  Dept Fax: [phone]    RETURN WOUND CARE PROGRESS NOTE  Date of patient's visit: 2022  Patient's Name:  Gabe Grace YOB: 1955            Patient Care Team:  Chanelle Funk MD as PCP - General (Family Medicine)  Vinod Parker MD as Consulting Physician (Infectious Diseases)  Ramiro Motta DPM as Physician (Podiatry)  Ramiro Motta DPM as Physician (Podiatry)  Dyan Hurley MD as Consulting Physician (Infectious Diseases)      Chief Complaint   Patient presents with    Wound Check    Diabetes       Gabe Grace  AGE: 73 y.o. GENDER: male  : 1955  TODAY'S DATE:  2022  Pt's primary care physician is Chanelle Funk MD last seen 2021  Subjective:       Gabe Grace is a 73 y.o. male presents to the office today for follow up of an ulceration. Denies F/C/N/V.   Wound Type:diabetic, pressure and non-healing surgical  Wound Location:right foot  Modifying factors:diabetes, poor glucose control and chronic pressure            Lab Results   Component Value Date    LABA1C 8.7 (H) 2021       ALLERGIES    Allergies   Allergen Reactions    Morphine Itching    Other Other (See Comments)    Percocet [Oxycodone-Acetaminophen]      Facial swelling    Dye [Iodides] Rash     Rash and swelling       Medications:    Current Outpatient Medications:     TRUEPLUS PEN NEEDLES 31G X 8 MM MISC, , Disp: , Rfl:     ondansetron (ZOFRAN-ODT) 4 MG disintegrating tablet, Take 1 tablet by mouth 3 times daily as needed for Nausea or Vomiting, Disp: 21 tablet, Rfl: 0    mupirocin (BACTROBAN) 2 % ointment, , Disp: , Rfl:     amLODIPine (NORVASC) 5 MG tablet, TAKE 1 TABLET BY MOUTH EVERY DAY, Disp: , Rfl:     ceFEPIme (MAXIPIME) 2 g injection, , Disp: , Rfl:     LANTUS SOLOSTAR 100 UNIT/ML injection pen, Inject 15 Units into the skin nightly, Disp: 5 pen, Rfl: 3    silver sulfADIAZINE (SILVADENE) 1 % cream, Apply topically daily. , Disp: 50 g, Rfl: 1    meloxicam (MOBIC) 15 MG tablet, Take 15 mg by mouth nightly , Disp: , Rfl:     albuterol sulfate  (90 Base) MCG/ACT inhaler, Inhale 2 puffs into the lungs every 6 hours as needed , Disp: , Rfl:     JANUVIA 100 MG tablet, Take 100 mg by mouth daily , Disp: , Rfl:     Misc. Devices MISC, 1 PAIR OF DIABETIC SHOES (1 LEFT/ 1 RIGHT) 1-3 PAIRS OF INSERTS (LEFT/ RIGHT), Disp: 2 each, Rfl: 0    cetirizine (ZYRTEC) 10 MG tablet, Take 10 mg by mouth nightly , Disp: , Rfl:     lisinopril (PRINIVIL;ZESTRIL) 10 MG tablet, Take 10 mg by mouth daily, Disp: , Rfl:     simvastatin (ZOCOR) 40 MG tablet, Take 40 mg by mouth nightly , Disp: , Rfl:     glipiZIDE (GLUCOTROL) 10 MG tablet, Take 20 mg by mouth 2 times daily (before meals) Takes 2 tabs (=20mg) BID, Disp: , Rfl:     aspirin 81 MG tablet, Take 81 mg by mouth daily, Disp: , Rfl:     gabapentin (NEURONTIN) 300 MG capsule, Take 900 mg by mouth 3 times daily. Take 3 caps (=900mg) 3 times a day, Disp: , Rfl:     Review of Systems  Review of Systems - History obtained from chart review and the patient  General ROS: negative for - chills, fatigue, fever, night sweats or weight gain  Constitutional: Negative for chills, diaphoresis, fatigue, fever and unexpected weight change. Musculoskeletal: Positive for arthralgias, gait problem and joint swelling. Neurological ROS: negative for - behavioral changes, confusion, headaches or seizures. Negative for weakness and numbness. Dermatological ROS: negative for - mole changes, rash  Cardiovascular: Negative for leg swelling. Gastrointestinal: Negative for constipation, diarrhea, nausea and vomiting. Objective:       Physical Exam:  Resp 16   Ht 5' 11\" (1.803 m)   Wt 241 lb (109.3 kg)   BMI 33.61 kg/m²     NV status remains unchanged since last visit. Wound #:   1    diabetic foot ulcer and dehisced surgical wound   right foot    Wound measurements:  #1  8 mm by 9 mm  by   3 mm         Wound Depth: muscle. Drainage:    minimal Type: minimal    Wound Bed status:   Granulation Tissue: 100%   Necrotic 0%  Type:   Epithelialization 0%   Hypergranular 0%   Periwound hyperkeratosis:  absent  Erythema absent  Odor:no  Maceration:no  Undermining/tract/tunneling:no  Culture taken:   no             Assessment:     Assessment: Patient is a 73 y.o. male with:   Diagnosis Orders   1. Type II diabetes mellitus with peripheral circulatory disorder (HCC)  KS DEBRIDEMENT, SKIN, SUB-Q TISSUE,MUSCLE,=<20 SQ CM   2. Right foot ulcer, with necrosis of muscle (HCC)  KS DEBRIDEMENT, SKIN, SUB-Q TISSUE,MUSCLE,=<20 SQ CM   3. Pain in right foot  KS DEBRIDEMENT, SKIN, SUB-Q TISSUE,MUSCLE,=<20 SQ CM   4. Wound dehiscence  KS DEBRIDEMENT, SKIN, SUB-Q TISSUE,MUSCLE,=<20 SQ CM       Overall Wound Assessment  Wound is has moderately improved. Size has decreased  Appearance has significantly improved      Plan:     Pt examined and evaluated. Current condition and treatment options discussed in detail. Agustin Maxwell Age:  73 y.o. Gender:  male   :  1955  Today's Date:  2022      Procedure: With the patient in supine position, Lidocaine soaked gauze was applied per physician order at beginning of wound evaluation. It was subsequently removed. Using a #15 blade scalpel and Pick-up, the wound was debrided down to and included the removal of the following tissue:     [] epidermis, [] dermis, []  subcutaneous tissue,  [x] muscle/fascia tissue,   and/or  [] bone     Also debrided was all  [x] fibrin, [x] biofilm, [] slough,  [x] necrotic,  [] hypergranulation tissue, and/or  [] hyperkeratotic tissue. Wound was copiously irrigated with normal saline solution. Bleeding:  Minimal.  Hemostasis:  pressure     100%  of wound debrided.     Patient tolerated procedure well. Pain 0 / 10 during procedure and pain 0 / 10 after procedure. Discussed appropriate home care of this wound. Wound redressed. Patient instructions were given. Dispensed dressing supplies and instructions on their use. Dressings: No orders of the defined types were placed in this encounter. No orders of the defined types were placed in this encounter. Continue to use bactroban and dressings   Follow up: 2 week.       Electronically signed by Juan David Campo DPM on 1/4/2022 at 1:53 PM  1/4/2022

## 2022-01-20 ENCOUNTER — OFFICE VISIT (OUTPATIENT)
Dept: PODIATRY | Age: 67
End: 2022-01-20
Payer: MEDICARE

## 2022-01-20 VITALS — BODY MASS INDEX: 33.74 KG/M2 | RESPIRATION RATE: 18 BRPM | HEIGHT: 71 IN | WEIGHT: 241 LBS

## 2022-01-20 DIAGNOSIS — L97.513 RIGHT FOOT ULCER, WITH NECROSIS OF MUSCLE (HCC): ICD-10-CM

## 2022-01-20 DIAGNOSIS — E11.51 TYPE II DIABETES MELLITUS WITH PERIPHERAL CIRCULATORY DISORDER (HCC): Primary | ICD-10-CM

## 2022-01-20 DIAGNOSIS — M79.671 PAIN IN RIGHT FOOT: ICD-10-CM

## 2022-01-20 PROCEDURE — 11043 DBRDMT MUSC&/FSCA 1ST 20/<: CPT | Performed by: PODIATRIST

## 2022-01-20 PROCEDURE — 99999 PR OFFICE/OUTPT VISIT,PROCEDURE ONLY: CPT | Performed by: PODIATRIST

## 2022-01-20 RX ORDER — ISOSORBIDE DINITRATE 30 MG/1
30 TABLET ORAL DAILY
Status: ON HOLD | COMMUNITY
End: 2022-10-05

## 2022-01-20 RX ORDER — PANTOPRAZOLE SODIUM 40 MG/1
40 TABLET, DELAYED RELEASE ORAL DAILY
Status: ON HOLD | COMMUNITY
Start: 2021-12-17 | End: 2022-10-05

## 2022-01-20 NOTE — PROGRESS NOTES
[address] [facility] PODIATRY [facility]  [id number] [place] [id number]  Dept: [phone]  Dept Fax: [phone]    RETURN WOUND CARE PROGRESS NOTE  Date of patient's visit: 2022  Patient's Name:  Stefani Sanchez YOB: 1955            Patient Care Team:  Emilio Huddleston MD as PCP - General (Family Medicine)  Suzie Mathur MD as Consulting Physician (Infectious Diseases)  Dion Carmona DPM as Physician (Podiatry)  Dion Carmona DPM as Physician (Podiatry)  Josefina Cook MD as Consulting Physician (Infectious Diseases)      Chief Complaint   Patient presents with    Wound Check    Diabetes    Foot Pain       Stefani Sanchez  AGE: 73 y.o. GENDER: male  : 1955  TODAY'S DATE:  2022  Pt's primary care physician is Emilio Huddleston MD last seen 2021  Subjective:       Stefani Sanchez is a 73 y.o. male presents to the office today for follow up of an ulceration. Denies F/C/N/V.   Wound Type:diabetic, pressure and non-healing surgical  Wound Location:right foot plantar medial  Modifying factors:diabetes and decreased mobility            Lab Results   Component Value Date    LABA1C 8.7 (H) 2021       ALLERGIES    Allergies   Allergen Reactions    Morphine Itching    Other Other (See Comments)    Percocet [Oxycodone-Acetaminophen]      Facial swelling    Dye [Iodides] Rash     Rash and swelling       Medications:    Current Outpatient Medications:     isosorbide dinitrate (ISORDIL) 30 MG tablet, Take 30 mg by mouth daily, Disp: , Rfl:     pantoprazole (PROTONIX) 40 MG tablet, Take 40 mg by mouth daily, Disp: , Rfl:     TRUEPLUS PEN NEEDLES 31G X 8 MM MISC, , Disp: , Rfl:     ondansetron (ZOFRAN-ODT) 4 MG disintegrating tablet, Take 1 tablet by mouth 3 times daily as needed for Nausea or Vomiting, Disp: 21 tablet, Rfl: 0    mupirocin (BACTROBAN) 2 % ointment, , Disp: , Rfl:    amLODIPine (NORVASC) 5 MG tablet, TAKE 1 TABLET BY MOUTH EVERY DAY, Disp: , Rfl:     ceFEPIme (MAXIPIME) 2 g injection, , Disp: , Rfl:     LANTUS SOLOSTAR 100 UNIT/ML injection pen, Inject 15 Units into the skin nightly, Disp: 5 pen, Rfl: 3    silver sulfADIAZINE (SILVADENE) 1 % cream, Apply topically daily. , Disp: 50 g, Rfl: 1    meloxicam (MOBIC) 15 MG tablet, Take 15 mg by mouth nightly , Disp: , Rfl:     albuterol sulfate  (90 Base) MCG/ACT inhaler, Inhale 2 puffs into the lungs every 6 hours as needed , Disp: , Rfl:     JANUVIA 100 MG tablet, Take 100 mg by mouth daily , Disp: , Rfl:     Misc. Devices MISC, 1 PAIR OF DIABETIC SHOES (1 LEFT/ 1 RIGHT) 1-3 PAIRS OF INSERTS (LEFT/ RIGHT), Disp: 2 each, Rfl: 0    cetirizine (ZYRTEC) 10 MG tablet, Take 10 mg by mouth nightly , Disp: , Rfl:     lisinopril (PRINIVIL;ZESTRIL) 10 MG tablet, Take 10 mg by mouth daily, Disp: , Rfl:     simvastatin (ZOCOR) 40 MG tablet, Take 40 mg by mouth nightly , Disp: , Rfl:     glipiZIDE (GLUCOTROL) 10 MG tablet, Take 20 mg by mouth 2 times daily (before meals) Takes 2 tabs (=20mg) BID, Disp: , Rfl:     aspirin 81 MG tablet, Take 81 mg by mouth daily, Disp: , Rfl:     gabapentin (NEURONTIN) 300 MG capsule, Take 900 mg by mouth 3 times daily. Take 3 caps (=900mg) 3 times a day, Disp: , Rfl:     Review of Systems  Review of Systems - History obtained from chart review and the patient  General ROS: negative for - chills, fatigue, fever, night sweats or weight gain  Constitutional: Negative for chills, diaphoresis, fatigue, fever and unexpected weight change. Musculoskeletal: Positive for arthralgias, gait problem and joint swelling. Neurological ROS: negative for - behavioral changes, confusion, headaches or seizures. Negative for weakness and numbness. Dermatological ROS: negative for - mole changes, rash  Cardiovascular: Negative for leg swelling.    Gastrointestinal: Negative for constipation, diarrhea, nausea and vomiting. Objective:       Physical Exam:  Resp 18   Ht 5' 11\" (1.803 m)   Wt 241 lb (109.3 kg)   BMI 33.61 kg/m²     NV status remains unchanged since last visit. Wound #:   1    diabetic foot ulcer   right foot    Wound measurements:  #1  2 cm by 2 cm  by   3 mm         Wound Depth: muscle. Drainage:    minimal, serosanguinous Type: minimal, serosanguinous    Wound Bed status:   Granulation Tissue: 80%   Necrotic 20%  Type:   Epithelialization 0%   Hypergranular 0%   Periwound hyperkeratosis:  present  Erythema absent  Odor:no  Maceration:no  Undermining/tract/tunneling:no  Culture taken:   no             Assessment:     Assessment: Patient is a 73 y.o. male with:   Diagnosis Orders   1. Type II diabetes mellitus with peripheral circulatory disorder (HCC)  ID DEBRIDEMENT, SKIN, SUB-Q TISSUE,MUSCLE,=<20 SQ CM   2. Right foot ulcer, with necrosis of muscle (HCC)  ID DEBRIDEMENT, SKIN, SUB-Q TISSUE,MUSCLE,=<20 SQ CM   3. Pain in right foot  ID DEBRIDEMENT, SKIN, SUB-Q TISSUE,MUSCLE,=<20 SQ CM       Overall Wound Assessment  Wound is has slightly improved. Size has decreased  Appearance has significantly improved      Plan:     Pt examined and evaluated. Current condition and treatment options discussed in detail. Marlyn Vela Age:  73 y.o. Gender:  male   :  1955  Today's Date:  2022      Procedure: With the patient in supine position, Lidocaine soaked gauze was applied per physician order at beginning of wound evaluation. It was subsequently removed. Using a #15 blade scalpel and Pick-up, the wound was debrided down to and included the removal of the following tissue:     [] epidermis, [] dermis, []  subcutaneous tissue,  [x] muscle/fascia tissue,   and/or  [] bone     Also debrided was all  [] fibrin, [x] biofilm, [x] slough,  [x] necrotic,  [] hypergranulation tissue, and/or  [] hyperkeratotic tissue.     Wound was copiously irrigated with normal saline solution. Bleeding:  Minimal.  Hemostasis:  pressure     100%  of wound debrided. Patient tolerated procedure well. Pain 0 / 10 during procedure and pain 0 / 10 after procedure. Discussed appropriate home care of this wound. Wound redressed. Patient instructions were given. Dispensed dressing supplies and instructions on their use. Dressings: No orders of the defined types were placed in this encounter. No orders of the defined types were placed in this encounter. Pt to continue to wear CROW Boot       Follow up: 3 week.       Electronically signed by Dion Carmona DPM on 1/20/2022 at 10:41 AM  1/20/2022

## 2022-02-01 ENCOUNTER — OFFICE VISIT (OUTPATIENT)
Dept: PODIATRY | Age: 67
End: 2022-02-01
Payer: MEDICARE

## 2022-02-01 VITALS — WEIGHT: 241 LBS | HEIGHT: 71 IN | BODY MASS INDEX: 33.74 KG/M2 | RESPIRATION RATE: 18 BRPM

## 2022-02-01 DIAGNOSIS — E11.51 TYPE II DIABETES MELLITUS WITH PERIPHERAL CIRCULATORY DISORDER (HCC): Primary | ICD-10-CM

## 2022-02-01 DIAGNOSIS — L97.513 RIGHT FOOT ULCER, WITH NECROSIS OF MUSCLE (HCC): ICD-10-CM

## 2022-02-01 DIAGNOSIS — M79.671 PAIN IN RIGHT FOOT: ICD-10-CM

## 2022-02-01 PROCEDURE — 99999 PR OFFICE/OUTPT VISIT,PROCEDURE ONLY: CPT | Performed by: PODIATRIST

## 2022-02-01 PROCEDURE — 11043 DBRDMT MUSC&/FSCA 1ST 20/<: CPT | Performed by: PODIATRIST

## 2022-02-01 NOTE — PROGRESS NOTES
[address] [facility] PODIATRY [place]  [address] [place] [id number]  Dept: [phone]  Dept Fax: [phone]    RETURN WOUND CARE PROGRESS NOTE  Date of patient's visit: 2022  Patient's Name:  Rosa Elena Altamirano YOB: 1955            Patient Care Team:  Tone Bearden MD as PCP - General (Family Medicine)  Terry Freeman MD as Consulting Physician (Infectious Diseases)  Brody Cote DPM as Physician (Podiatry)  Brody Cote DPM as Physician (Podiatry)  Dang Duarte MD as Consulting Physician (Infectious Diseases)      Chief Complaint   Patient presents with    Foot Pain    Diabetes    Wound Check       Rosa Elena Altamirano  AGE: 73 y.o. GENDER: male  : 1955  TODAY'S DATE:  2022  Pt's primary care physician is Tone Bearden MD last seen 2022  Subjective:       Rosa Elena Altamirano is a 73 y.o. male presents to the office today for follow up of an ulceration. Denies F/C/N/V.   Wound Type:diabetic and pressure  Wound Location:right foot plantar  Modifying factors:diabetes, poor glucose control and chronic pressure            Lab Results   Component Value Date    LABA1C 8.7 (H) 2021       ALLERGIES    Allergies   Allergen Reactions    Morphine Itching    Other Other (See Comments)     Other reaction(s): Unknown    Percocet [Oxycodone-Acetaminophen]      Facial swelling    Dye [Iodides] Rash     Rash and swelling       Medications:    Current Outpatient Medications:     isosorbide dinitrate (ISORDIL) 30 MG tablet, Take 30 mg by mouth daily, Disp: , Rfl:     pantoprazole (PROTONIX) 40 MG tablet, Take 40 mg by mouth daily, Disp: , Rfl:     TRUEPLUS PEN NEEDLES 31G X 8 MM MISC, , Disp: , Rfl:     ondansetron (ZOFRAN-ODT) 4 MG disintegrating tablet, Take 1 tablet by mouth 3 times daily as needed for Nausea or Vomiting, Disp: 21 tablet, Rfl: 0    mupirocin (BACTROBAN) 2 % ointment, , Disp: , Rfl:     amLODIPine (NORVASC) 5 MG tablet, TAKE 1 TABLET BY MOUTH EVERY DAY, Disp: , Rfl:     ceFEPIme (MAXIPIME) 2 g injection, , Disp: , Rfl:     LANTUS SOLOSTAR 100 UNIT/ML injection pen, Inject 15 Units into the skin nightly, Disp: 5 pen, Rfl: 3    silver sulfADIAZINE (SILVADENE) 1 % cream, Apply topically daily. , Disp: 50 g, Rfl: 1    meloxicam (MOBIC) 15 MG tablet, Take 15 mg by mouth nightly , Disp: , Rfl:     albuterol sulfate  (90 Base) MCG/ACT inhaler, Inhale 2 puffs into the lungs every 6 hours as needed , Disp: , Rfl:     JANUVIA 100 MG tablet, Take 100 mg by mouth daily , Disp: , Rfl:     Misc. Devices MISC, 1 PAIR OF DIABETIC SHOES (1 LEFT/ 1 RIGHT) 1-3 PAIRS OF INSERTS (LEFT/ RIGHT), Disp: 2 each, Rfl: 0    cetirizine (ZYRTEC) 10 MG tablet, Take 10 mg by mouth nightly , Disp: , Rfl:     lisinopril (PRINIVIL;ZESTRIL) 10 MG tablet, Take 10 mg by mouth daily, Disp: , Rfl:     simvastatin (ZOCOR) 40 MG tablet, Take 40 mg by mouth nightly , Disp: , Rfl:     glipiZIDE (GLUCOTROL) 10 MG tablet, Take 20 mg by mouth 2 times daily (before meals) Takes 2 tabs (=20mg) BID, Disp: , Rfl:     aspirin 81 MG tablet, Take 81 mg by mouth daily, Disp: , Rfl:     gabapentin (NEURONTIN) 300 MG capsule, Take 900 mg by mouth 3 times daily. Take 3 caps (=900mg) 3 times a day, Disp: , Rfl:     Review of Systems  Review of Systems - History obtained from chart review and the patient  General ROS: negative for - chills, fatigue, fever, night sweats or weight gain  Constitutional: Negative for chills, diaphoresis, fatigue, fever and unexpected weight change. Musculoskeletal: Positive for arthralgias, gait problem and joint swelling. Neurological ROS: negative for - behavioral changes, confusion, headaches or seizures. Negative for weakness and numbness. Dermatological ROS: negative for - mole changes, rash  Cardiovascular: Negative for leg swelling.    Gastrointestinal: Negative for constipation, diarrhea, nausea and vomiting. Objective:       Physical Exam:  Resp 18   Ht 5' 11\" (1.803 m)   Wt 241 lb (109.3 kg)   BMI 33.61 kg/m²     NV status remains unchanged since last visit. Wound #:   1    diabetic foot ulcer   right foot    Wound measurements:  #1  3 cm by 3 cm  by   3 mm         Wound Depth: muscle. Drainage:    minimal, serosanguinous Type: serosanguinous    Wound Bed status:   Granulation Tissue: 80%   Necrotic 20%  Type:   Epithelialization 0%   Hypergranular 0%   Periwound hyperkeratosis:  absent  Erythema absent  Odor:no  Maceration:no  Undermining/tract/tunneling:no  Culture taken:   no             Assessment:     Assessment: Patient is a 73 y.o. male with:   Diagnosis Orders   1. Type II diabetes mellitus with peripheral circulatory disorder (HCC)  NH DEBRIDEMENT, SKIN, SUB-Q TISSUE,MUSCLE,=<20 SQ CM   2. Right foot ulcer, with necrosis of muscle (HCC)  NH DEBRIDEMENT, SKIN, SUB-Q TISSUE,MUSCLE,=<20 SQ CM   3. Pain in right foot  NH DEBRIDEMENT, SKIN, SUB-Q TISSUE,MUSCLE,=<20 SQ CM       Overall Wound Assessment  Wound is has moderately improved. Size has decreased  Appearance has improved      Plan:     Pt examined and evaluated. Current condition and treatment options discussed in detail. Jamari Felix Age:  73 y.o. Gender:  male   :  1955  Today's Date:  2022      Procedure: With the patient in supine position, Lidocaine soaked gauze was applied per physician order at beginning of wound evaluation. It was subsequently removed. Using a #15 blade scalpel and Pick-up, the wound was debrided down to and included the removal of the following tissue:     [] epidermis, [] dermis, []  subcutaneous tissue,  [x] muscle/fascia tissue,   and/or  [] bone     Also debrided was all  [] fibrin, [] biofilm, [x] slough,  [x] necrotic,  [] hypergranulation tissue, and/or  [] hyperkeratotic tissue.     Wound was copiously irrigated with normal saline solution. Bleeding:  Minimal.  Hemostasis:  pressure     100%  of wound debrided. Patient tolerated procedure well. Pain 0 / 10 during procedure and pain 0 / 10 after procedure. Discussed appropriate home care of this wound. Wound redressed. Patient instructions were given. Dispensed dressing supplies and instructions on their use. Dressings: No orders of the defined types were placed in this encounter. No orders of the defined types were placed in this encounter. Follow up: 2 week.   Pt can come out of the [address] [place] [address] boot     Electronically signed by Praful Hebert DPM on 2/1/2022 at 8:11 AM  2/1/2022

## 2022-02-15 ENCOUNTER — OFFICE VISIT (OUTPATIENT)
Dept: PODIATRY | Age: 67
End: 2022-02-15
Payer: MEDICARE

## 2022-02-15 VITALS — RESPIRATION RATE: 18 BRPM | HEIGHT: 71 IN | BODY MASS INDEX: 33.74 KG/M2 | WEIGHT: 241 LBS

## 2022-02-15 DIAGNOSIS — I73.9 PVD (PERIPHERAL VASCULAR DISEASE) (HCC): ICD-10-CM

## 2022-02-15 DIAGNOSIS — E11.51 TYPE II DIABETES MELLITUS WITH PERIPHERAL CIRCULATORY DISORDER (HCC): Primary | ICD-10-CM

## 2022-02-15 DIAGNOSIS — B35.1 DERMATOPHYTOSIS OF NAIL: ICD-10-CM

## 2022-02-15 DIAGNOSIS — E11.610 TYPE 2 DIABETES MELLITUS WITH CHARCOT'S JOINT OF RIGHT FOOT (HCC): ICD-10-CM

## 2022-02-15 DIAGNOSIS — L97.513 RIGHT FOOT ULCER, WITH NECROSIS OF MUSCLE (HCC): ICD-10-CM

## 2022-02-15 PROCEDURE — 11043 DBRDMT MUSC&/FSCA 1ST 20/<: CPT | Performed by: PODIATRIST

## 2022-02-15 PROCEDURE — 99999 PR OFFICE/OUTPT VISIT,PROCEDURE ONLY: CPT | Performed by: PODIATRIST

## 2022-02-15 PROCEDURE — 11721 DEBRIDE NAIL 6 OR MORE: CPT | Performed by: PODIATRIST

## 2022-02-15 NOTE — PROGRESS NOTES
[address] [facility] PODIATRY [facility]  [address] [place] [id number]  Dept: [phone]  Dept Fax: [phone]    RETURN WOUND CARE PROGRESS NOTE  Date of patient's visit: 2/15/2022  Patient's Name:  Diana Cortes YOB: 1955            Patient Care Team:  Ophelia Carrillo MD as PCP - General (Family Medicine)  Speedy Beltran MD as Consulting Physician (Infectious Diseases)  Satya Evans DPM as Physician (Podiatry)  Satya Evans DPM as Physician (Podiatry)  Dana Henry MD as Consulting Physician (Infectious Diseases)      Chief Complaint   Patient presents with    Wound Check    Diabetes    Foot Pain    Nail Problem       Diana Cortes  AGE: 73 y.o. GENDER: male  : 1955  TODAY'S DATE:  2/15/2022  Pt's primary care physician is Ophelia Carrillo MD last seen 2022  Subjective:       Diana Cortes is a 73 y.o. male presents to the office today for follow up of an ulceration. Denies F/C/N/V.   Wound Type:diabetic, pressure and non-healing surgical  Wound Location:right foot  Modifying factors:diabetes and chronic pressure            Lab Results   Component Value Date    LABA1C 8.7 (H) 2021       ALLERGIES    Allergies   Allergen Reactions    Morphine Itching    Other Other (See Comments)     Other reaction(s): Unknown    Percocet [Oxycodone-Acetaminophen]      Facial swelling    Dye [Iodides] Rash     Rash and swelling       Medications:    Current Outpatient Medications:     isosorbide dinitrate (ISORDIL) 30 MG tablet, Take 30 mg by mouth daily, Disp: , Rfl:     pantoprazole (PROTONIX) 40 MG tablet, Take 40 mg by mouth daily, Disp: , Rfl:     TRUEPLUS PEN NEEDLES 31G X 8 MM MISC, , Disp: , Rfl:     ondansetron (ZOFRAN-ODT) 4 MG disintegrating tablet, Take 1 tablet by mouth 3 times daily as needed for Nausea or Vomiting, Disp: 21 tablet, Rfl: 0    mupirocin (BACTROBAN) 2 % constipation, diarrhea, nausea and vomiting. Objective:       Physical Exam:  Resp 18   Ht 5' 11\" (1.803 m)   Wt 241 lb (109.3 kg)   BMI 33.61 kg/m²     NV status remains unchanged since last visit. Wound #:   1    diabetic foot ulcer and dehisced surgical wound   right foot plantar      Wound measurements:  #1  4 cm by 3 cm  by   3 mm         Wound Depth: muscle. Drainage:    minimal, serosanguinous Type: minimal, serosanguinous    Wound Bed status:   Granulation Tissue: 90%   Necrotic 10%  Type: Epithelialization 0%   Hypergranular 0%   Periwound hyperkeratosis:  present  Erythema absent  Odor:no  Maceration:no  Undermining/tract/tunneling:no  Culture taken:   no           Dermatologic Exam:  SEE above     Skin is thin, with flaky sloughing skin as well as decreased hair growth to the lower leg  Small red hemosiderin deposits seen dorsal foot   Musculoskeletal:     1st MPJ ROM decreased, Bilateral.  Muscle strength 5/5, Bilateral.  Pain present upon palpation of toenails 1-5, Bilateral. decreased medial longitudinal arch, Bilateral.  Ankle ROM decreased,Bilateral.    Dorsally contracted digits present digits 2, Bilateral.     Vascular: DP pulses 1/4 bilateral.  PT pulses 0/4 bilateral.   CFT <5 seconds, Bilateral.  Hair growth absent to the level of the digits, Bilateral.  Edema present, Bilateral.  Varicosities absent, Bilateral. Erythema absent, Bilateral    Neurological: Sensation diminshed to light touch to level of digits, Bilateral.  Protective sensation intact 6/10 sites via 5.07/10g Kegley-Jorge Monofilament, Bilateral.  negative Tinel's, Bilateral.  negative Valleix sign, Bilateral.      Integument: Warm, dry, supple, Bilateral.  Open lesion absent, Bilateral.  Interdigital maceration absent to web spaces 4, Bilateral.  Nails 1-5 left and 1-5 right thickened > 3.0 mm, dystrophic and crumbly, discolored with yellow subungual debris.   Fissures absent, Bilateral.     Assessment: Assessment: Patient is a 73 y.o. male with:   Diagnosis Orders   1. Type II diabetes mellitus with peripheral circulatory disorder ([facility])  43017 - ME DEBRIDEMENT OF NAILS, 6 OR MORE    mupirocin (BACTROBAN) 2 % ointment    ME DEBRIDEMENT, SKIN, SUB-Q TISSUE,MUSCLE,=<20 SQ CM   2. Right foot ulcer, with necrosis of muscle ([facility])  [address]79 - ME DEBRIDEMENT OF NAILS, 6 OR MORE    mupirocin (BACTROBAN) 2 % ointment    ME DEBRIDEMENT, SKIN, SUB-Q TISSUE,MUSCLE,=<20 SQ CM   3. Dermatophytosis of nail  17552 - ME DEBRIDEMENT OF NAILS, 6 OR MORE   4. 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 PVD (peripheral vascular disease) ([facility])  98119 - ME DEBRIDEMENT OF NAILS, 6 OR MORE    mupirocin (BACTROBAN) 2 % ointment    ME DEBRIDEMENT, SKIN, SUB-Q TISSUE,MUSCLE,=<20 SQ CM       Overall Wound Assessment  Wound is has worsened slightly. Size has increased  Appearance has worsened      Plan:     Pt examined and evaluated. Current condition and treatment options discussed in detail. George Dimas Age:  73 y.o. Gender:  male   :  1955  Today's Date:  2/15/2022      Procedure:          Nails 1,2,3,4,5 Right and 1,2,3,4,5 Left were debrided and ground smooth with a dremmel. The patient tolerated the procedure well without apparent complications. With the patient in supine position, Lidocaine soaked gauze was applied per physician order at beginning of wound evaluation. It was subsequently removed.     Using a #15 blade scalpel and Pick-up, the wound was debrided down to and included the removal of the following tissue:     [] epidermis, [] dermis, []  subcutaneous tissue,  [x] muscle/fascia tissue,   and/or  [] bone     Also debrided was all  [] fibrin, [] biofilm, [x] slough,  [x] necrotic,  [] hypergranulation tissue, and/or  [] hyperkeratotic tissue. Wound was copiously irrigated with normal saline solution. Bleeding:  Minimal.  Hemostasis:  pressure     100%  of wound debrided. Patient tolerated procedure well. Pain 2 / 10 during procedure and pain 2 / 10 after procedure. Discussed appropriate home care of this wound. Wound redressed. Patient instructions were given. Dispensed dressing supplies and instructions on their use. Dressings: No orders of the defined types were placed in this encounter. No orders of the defined types were placed in this encounter. Pt to go back into CROW boot at all times for the next two weeks    rx provided for bactroban to apply 2 times daily     Follow up: 2 week.       Electronically signed by Juan David Campo DPM on 2/15/2022 at 9:44 AM  2/15/2022

## 2022-03-01 ENCOUNTER — OFFICE VISIT (OUTPATIENT)
Dept: PODIATRY | Age: 67
End: 2022-03-01
Payer: MEDICARE

## 2022-03-01 VITALS — RESPIRATION RATE: 18 BRPM | WEIGHT: 241 LBS | BODY MASS INDEX: 33.74 KG/M2 | HEIGHT: 71 IN

## 2022-03-01 DIAGNOSIS — L97.513 RIGHT FOOT ULCER, WITH NECROSIS OF MUSCLE (HCC): ICD-10-CM

## 2022-03-01 DIAGNOSIS — M79.671 PAIN IN RIGHT FOOT: ICD-10-CM

## 2022-03-01 DIAGNOSIS — E11.610 TYPE 2 DIABETES MELLITUS WITH CHARCOT'S JOINT OF RIGHT FOOT (HCC): ICD-10-CM

## 2022-03-01 DIAGNOSIS — E11.51 TYPE II DIABETES MELLITUS WITH PERIPHERAL CIRCULATORY DISORDER (HCC): Primary | ICD-10-CM

## 2022-03-01 PROCEDURE — 11043 DBRDMT MUSC&/FSCA 1ST 20/<: CPT | Performed by: PODIATRIST

## 2022-03-01 PROCEDURE — 99999 PR OFFICE/OUTPT VISIT,PROCEDURE ONLY: CPT | Performed by: PODIATRIST

## 2022-03-01 RX ORDER — HYDROCODONE BITARTRATE AND ACETAMINOPHEN 10; 325 MG/1; MG/1
TABLET ORAL
Status: ON HOLD | COMMUNITY
Start: 2022-02-18 | End: 2022-10-07 | Stop reason: HOSPADM

## 2022-03-01 NOTE — PROGRESS NOTES
[address] [facility] PODIATRY [facility]  [address] [place] [id number]  Dept: [phone]  Dept Fax: [phone]    RETURN WOUND CARE PROGRESS NOTE  Date of patient's visit: 3/1/2022  Patient's Name:  Davey Albrecht YOB: 1955            Patient Care Team:  Dulce Maria Martinez MD as PCP - General (Family Medicine)  Candace Qureshi MD as Consulting Physician (Infectious Diseases)  Lars Lopez DPM as Physician (Podiatry)  Lars Lopez DPM as Physician (Podiatry)  Millicent Adan MD as Consulting Physician (Infectious Diseases)      Chief Complaint   Patient presents with    Wound Check    Diabetes       Davey Albrecht  AGE: 73 y.o. GENDER: male  : 1955  TODAY'S DATE:  3/1/2022  Pt's primary care physician is Dulce Maria Martinez MD last seen 2022  Subjective:       Davey Albrecht is a 73 y.o. male presents to the office today for follow up of an ulceration. Denies F/C/N/V.   Wound Type:diabetic and pressure  Wound Location:right foot plantar medial  Modifying factors:diabetes and poor glucose control            Lab Results   Component Value Date    LABA1C 8.7 (H) 2021       ALLERGIES    Allergies   Allergen Reactions    Morphine Itching    Other Other (See Comments)     Other reaction(s): Unknown    Percocet [Oxycodone-Acetaminophen]      Facial swelling    Dye [Iodides] Rash     Rash and swelling       Medications:    Current Outpatient Medications:     HYDROcodone-acetaminophen (NORCO)  MG per tablet, TAKE 1 TABLET BY MOUTH FOUR TIMES DAILY AS NEEDED, Disp: , Rfl:     isosorbide dinitrate (ISORDIL) 30 MG tablet, Take 30 mg by mouth daily, Disp: , Rfl:     pantoprazole (PROTONIX) 40 MG tablet, Take 40 mg by mouth daily, Disp: , Rfl:     TRUEPLUS PEN NEEDLES 31G X 8 MM MISC, , Disp: , Rfl:     ondansetron (ZOFRAN-ODT) 4 MG disintegrating tablet, Take 1 tablet by mouth 3 times daily as needed for Nausea or Vomiting, Disp: 21 tablet, Rfl: 0    mupirocin (BACTROBAN) 2 % ointment, , Disp: , Rfl:     amLODIPine (NORVASC) 5 MG tablet, TAKE 1 TABLET BY MOUTH EVERY DAY, Disp: , Rfl:     ceFEPIme (MAXIPIME) 2 g injection, , Disp: , Rfl:     LANTUS SOLOSTAR 100 UNIT/ML injection pen, Inject 15 Units into the skin nightly, Disp: 5 pen, Rfl: 3    silver sulfADIAZINE (SILVADENE) 1 % cream, Apply topically daily. , Disp: 50 g, Rfl: 1    meloxicam (MOBIC) 15 MG tablet, Take 15 mg by mouth nightly , Disp: , Rfl:     albuterol sulfate  (90 Base) MCG/ACT inhaler, Inhale 2 puffs into the lungs every 6 hours as needed , Disp: , Rfl:     JANUVIA 100 MG tablet, Take 100 mg by mouth daily , Disp: , Rfl:     Misc. Devices MISC, 1 PAIR OF DIABETIC SHOES (1 LEFT/ 1 RIGHT) 1-3 PAIRS OF INSERTS (LEFT/ RIGHT), Disp: 2 each, Rfl: 0    cetirizine (ZYRTEC) 10 MG tablet, Take 10 mg by mouth nightly , Disp: , Rfl:     lisinopril (PRINIVIL;ZESTRIL) 10 MG tablet, Take 10 mg by mouth daily, Disp: , Rfl:     simvastatin (ZOCOR) 40 MG tablet, Take 40 mg by mouth nightly , Disp: , Rfl:     glipiZIDE (GLUCOTROL) 10 MG tablet, Take 20 mg by mouth 2 times daily (before meals) Takes 2 tabs (=20mg) BID, Disp: , Rfl:     aspirin 81 MG tablet, Take 81 mg by mouth daily, Disp: , Rfl:     gabapentin (NEURONTIN) 300 MG capsule, Take 900 mg by mouth 3 times daily. Take 3 caps (=900mg) 3 times a day, Disp: , Rfl:     Review of Systems  Review of Systems - History obtained from chart review and the patient  General ROS: negative for - chills, fatigue, fever, night sweats or weight gain  Constitutional: Negative for chills, diaphoresis, fatigue, fever and unexpected weight change. Musculoskeletal: Positive for arthralgias, gait problem and joint swelling. Neurological ROS: negative for - behavioral changes, confusion, headaches or seizures. Negative for weakness and numbness.    Dermatological ROS: negative for - mole tissue,   and/or  [] bone     Also debrided was all  [] fibrin, [] biofilm, [] slough,  [x] necrotic,  [] hypergranulation tissue, and/or  [] hyperkeratotic tissue. Wound was copiously irrigated with normal saline solution. Bleeding:  Minimal.  Hemostasis:  drysol     100%  of wound debrided. Patient tolerated procedure well. Pain 0 / 10 during procedure and pain 0 / 10 after procedure. Discussed appropriate home care of this wound. Wound redressed. Patient instructions were given. Dispensed dressing supplies and instructions on their use. Dressings: No orders of the defined types were placed in this encounter. No orders of the defined types were placed in this encounter. Pt to stay in [address] [place] [address] at all times     Follow up: 3 week.       Electronically signed by Dion Carmona DPM on 3/1/2022 at 9:43 AM  3/1/2022

## 2022-03-22 ENCOUNTER — OFFICE VISIT (OUTPATIENT)
Dept: PODIATRY | Age: 67
End: 2022-03-22
Payer: MEDICARE

## 2022-03-22 VITALS — WEIGHT: 241 LBS | BODY MASS INDEX: 33.74 KG/M2 | HEIGHT: 71 IN

## 2022-03-22 DIAGNOSIS — L97.513 RIGHT FOOT ULCER, WITH NECROSIS OF MUSCLE (HCC): ICD-10-CM

## 2022-03-22 DIAGNOSIS — M79.671 PAIN IN RIGHT FOOT: ICD-10-CM

## 2022-03-22 DIAGNOSIS — E11.610 TYPE 2 DIABETES MELLITUS WITH CHARCOT'S JOINT OF RIGHT FOOT (HCC): ICD-10-CM

## 2022-03-22 DIAGNOSIS — E11.51 TYPE II DIABETES MELLITUS WITH PERIPHERAL CIRCULATORY DISORDER (HCC): Primary | ICD-10-CM

## 2022-03-22 PROCEDURE — 11043 DBRDMT MUSC&/FSCA 1ST 20/<: CPT | Performed by: PODIATRIST

## 2022-03-22 PROCEDURE — 99999 PR OFFICE/OUTPT VISIT,PROCEDURE ONLY: CPT | Performed by: PODIATRIST

## 2022-03-22 NOTE — PROGRESS NOTES
[address] [facility] PODIATRY [facility]  [address] [place] [id number]  Dept: [phone]  Dept Fax: [phone]    RETURN WOUND CARE PROGRESS NOTE  Date of patient's visit: 3/22/2022  Patient's Name:  Jeannine Cochran YOB: 1955            Patient Care Team:  Leydi Rodriguez MD as PCP - General (Family Medicine)  Suad Marcano MD as Consulting Physician (Infectious Diseases)  Yohan Sinha DPM as Physician (Podiatry)  Yohan Sinha DPM as Physician (Podiatry)  Claus Stevens MD as Consulting Physician (Infectious Diseases)      Chief Complaint   Patient presents with    Wound Check     right foot       Jeannine Cochran  AGE: 73 y.o. GENDER: male  : 1955  TODAY'S DATE:  3/22/2022    Subjective:       Jeannine Cochran is a 73 y.o. male presents to the office today for follow up of an ulceration. Denies F/C/N/V.   Wound Type:diabetic, pressure and non-healing surgical  Wound Location:right foot plantar medial foot  Modifying factors:diabetes, poor glucose control, chronic pressure and charcot foot right            Lab Results   Component Value Date    LABA1C 8.7 (H) 2021       ALLERGIES    Allergies   Allergen Reactions    Morphine Itching    Other Other (See Comments)     Other reaction(s): Unknown    Percocet [Oxycodone-Acetaminophen]      Facial swelling    Dye [Iodides] Rash     Rash and swelling       Medications:    Current Outpatient Medications:     HYDROcodone-acetaminophen (NORCO)  MG per tablet, TAKE 1 TABLET BY MOUTH FOUR TIMES DAILY AS NEEDED, Disp: , Rfl:     isosorbide dinitrate (ISORDIL) 30 MG tablet, Take 30 mg by mouth daily, Disp: , Rfl:     pantoprazole (PROTONIX) 40 MG tablet, Take 40 mg by mouth daily, Disp: , Rfl:     TRUEPLUS PEN NEEDLES 31G X 8 MM MISC, , Disp: , Rfl:     ondansetron (ZOFRAN-ODT) 4 MG disintegrating tablet, Take 1 tablet by mouth 3 times daily as needed for Nausea or Vomiting, Disp: 21 tablet, Rfl: 0    mupirocin (BACTROBAN) 2 % ointment, , Disp: , Rfl:     amLODIPine (NORVASC) 5 MG tablet, TAKE 1 TABLET BY MOUTH EVERY DAY, Disp: , Rfl:     ceFEPIme (MAXIPIME) 2 g injection, , Disp: , Rfl:     LANTUS SOLOSTAR 100 UNIT/ML injection pen, Inject 15 Units into the skin nightly, Disp: 5 pen, Rfl: 3    silver sulfADIAZINE (SILVADENE) 1 % cream, Apply topically daily. , Disp: 50 g, Rfl: 1    meloxicam (MOBIC) 15 MG tablet, Take 15 mg by mouth nightly , Disp: , Rfl:     albuterol sulfate  (90 Base) MCG/ACT inhaler, Inhale 2 puffs into the lungs every 6 hours as needed , Disp: , Rfl:     JANUVIA 100 MG tablet, Take 100 mg by mouth daily , Disp: , Rfl:     Misc. Devices MISC, 1 PAIR OF DIABETIC SHOES (1 LEFT/ 1 RIGHT) 1-3 PAIRS OF INSERTS (LEFT/ RIGHT), Disp: 2 each, Rfl: 0    cetirizine (ZYRTEC) 10 MG tablet, Take 10 mg by mouth nightly , Disp: , Rfl:     lisinopril (PRINIVIL;ZESTRIL) 10 MG tablet, Take 10 mg by mouth daily, Disp: , Rfl:     simvastatin (ZOCOR) 40 MG tablet, Take 40 mg by mouth nightly , Disp: , Rfl:     glipiZIDE (GLUCOTROL) 10 MG tablet, Take 20 mg by mouth 2 times daily (before meals) Takes 2 tabs (=20mg) BID, Disp: , Rfl:     aspirin 81 MG tablet, Take 81 mg by mouth daily, Disp: , Rfl:     gabapentin (NEURONTIN) 300 MG capsule, Take 900 mg by mouth 3 times daily. Take 3 caps (=900mg) 3 times a day, Disp: , Rfl:     Review of Systems  Review of Systems - History obtained from chart review and the patient  General ROS: negative for - chills, fatigue, fever, night sweats or weight gain  Constitutional: Negative for chills, diaphoresis, fatigue, fever and unexpected weight change. Musculoskeletal: Positive for arthralgias, gait problem and joint swelling. Neurological ROS: negative for - behavioral changes, confusion, headaches or seizures. Negative for weakness and numbness.    Dermatological ROS: negative for - mole changes, rash  Cardiovascular: Negative for leg swelling. Gastrointestinal: Negative for constipation, diarrhea, nausea and vomiting. Objective:       Physical Exam:  Ht 5' 11\" (1.803 m)   Wt 241 lb (109.3 kg)   BMI 33.61 kg/m²     NV status remains unchanged since last visit. Wound #:   1    diabetic foot ulcer   right foot    Wound measurements:  #1  5 mm by 8 mm  by   2 mm         Wound Depth: muscle. Drainage:    minimal, serosanguinous Type: minimal, serosanguinous    Wound Bed status:   Granulation Tissue: 90%   Necrotic 10%  Type:   Epithelialization 0%   Hypergranular 0%   Periwound hyperkeratosis:  present  Erythema absent  Odor:no  Maceration:no  Undermining/tract/tunneling:no  Culture taken:   no             Assessment:     Assessment: Patient is a 73 y.o. male with:  No diagnosis found. Overall Wound Assessment  Wound is has slightly improved. Size has decreased  Appearance has significantly improved      Plan:     Pt examined and evaluated. Current condition and treatment options discussed in detail. Davey Albrecht Age:  73 y.o. Gender:  male   :  1955  Today's Date:  3/22/2022      Procedure: With the patient in supine position, Lidocaine soaked gauze was applied per physician order at beginning of wound evaluation. It was subsequently removed. Using a #15 blade scalpel and Pick-up, the wound was debrided down to and included the removal of the following tissue:     [] epidermis, [] dermis, []  subcutaneous tissue,  [x] muscle/fascia tissue,   and/or  [] bone     Also debrided was all  [] fibrin, [] biofilm, [] slough,  [x] necrotic,  [x] hypergranulation tissue, and/or  [x] hyperkeratotic tissue. Wound was copiously irrigated with normal saline solution. Bleeding:  Minimal.  Hemostasis:  pressure     100%  of wound debrided. Patient tolerated procedure well. Pain 0 / 10 during procedure and pain 0 / 10 after procedure.     Discussed appropriate home care of this wound. Wound redressed. Patient instructions were given. Dispensed dressing supplies and instructions on their use. Dressings: No orders of the defined types were placed in this encounter. No orders of the defined types were placed in this encounter. Pt can come out of his CROW boot and use his normal diabetic shoes. If pt notcies increased drainage and wound looks worse pt to go back into CROW Follow up: 3w   week.       Electronically signed by Paul Ayala DPM on 3/22/2022 at 9:36 AM  3/22/2022

## 2022-03-22 NOTE — PATIENT INSTRUCTIONS
Schedule a Vaccine  When you qualify to receive the vaccine, call the [facility]) COVID-19 Vaccination Hotline to schedule your appointment or to get additional information about the [facility]) locations which are offering the COVID-19 vaccine. To be 94% effective, it's important that you receive two doses of one of the COVID-19 vaccines. -If you are receiving the Alves Peter vaccine, your second shot will be scheduled as close to 21 days after the first shot as possible. -If you are receiving the Moderna vaccine, your second shot will be scheduled as close to 28 days after the first shot as possible. [facility]) COVID-19 Vaccination Hotline: [phone]    Links to [facility]) website and [facility] website:    [employer]-[email or web address]/[email or web address]    [email or web address]

## 2022-04-19 ENCOUNTER — OFFICE VISIT (OUTPATIENT)
Dept: PODIATRY | Age: 67
End: 2022-04-19
Payer: MEDICARE

## 2022-04-19 VITALS — BODY MASS INDEX: 33.74 KG/M2 | WEIGHT: 241 LBS | RESPIRATION RATE: 18 BRPM | HEIGHT: 71 IN

## 2022-04-19 DIAGNOSIS — E11.51 TYPE II DIABETES MELLITUS WITH PERIPHERAL CIRCULATORY DISORDER (HCC): Primary | ICD-10-CM

## 2022-04-19 DIAGNOSIS — M79.671 PAIN IN RIGHT FOOT: ICD-10-CM

## 2022-04-19 DIAGNOSIS — E11.610 TYPE 2 DIABETES MELLITUS WITH CHARCOT'S JOINT OF RIGHT FOOT (HCC): ICD-10-CM

## 2022-04-19 DIAGNOSIS — I73.9 PVD (PERIPHERAL VASCULAR DISEASE) (HCC): ICD-10-CM

## 2022-04-19 DIAGNOSIS — L97.513 RIGHT FOOT ULCER, WITH NECROSIS OF MUSCLE (HCC): ICD-10-CM

## 2022-04-19 PROCEDURE — 11043 DBRDMT MUSC&/FSCA 1ST 20/<: CPT | Performed by: PODIATRIST

## 2022-04-19 PROCEDURE — 99999 PR OFFICE/OUTPT VISIT,PROCEDURE ONLY: CPT | Performed by: PODIATRIST

## 2022-04-19 NOTE — PROGRESS NOTES
[address] [facility] PODIATRY [facility]  [id number] [place] [id number]  Dept: [phone]  Dept Fax: [phone]    RETURN WOUND CARE PROGRESS NOTE  Date of patient's visit: 2022  Patient's Name:  Mimi Centeno YOB: 1955            Patient Care Team:  Christin Monet MD as PCP - General (Family Medicine)  Felicia Corona MD as Consulting Physician (Infectious Diseases)  Rozina Walton DPM as Physician (Podiatry)  Rozina Walton DPM as Physician (Podiatry)  Varsha Irby MD as Consulting Physician (Infectious Diseases)      Chief Complaint   Patient presents with    Diabetes    Wound Check    Foot Pain       Mimi Centeno  AGE: 73 y.o. GENDER: male  : 1955  TODAY'S DATE:  2022  Pt's primary care physician is Christin Monet MD last seen 2022  Subjective:       Mimi Centeno is a 73 y.o. male presents to the office today for follow up of an ulceration. Denies F/C/N/V.   Wound Type:diabetic, pressure and non-healing surgical  Wound Location:right foot medial plantar  Modifying factors:diabetes, poor glucose control and chronic pressure            Lab Results   Component Value Date    LABA1C 8.7 (H) 2021       ALLERGIES    Allergies   Allergen Reactions    Morphine Itching    Other Other (See Comments)     Other reaction(s): Unknown    Percocet [Oxycodone-Acetaminophen]      Facial swelling    Dye [Iodides] Rash     Rash and swelling       Medications:    Current Outpatient Medications:     HYDROcodone-acetaminophen (NORCO)  MG per tablet, TAKE 1 TABLET BY MOUTH FOUR TIMES DAILY AS NEEDED, Disp: , Rfl:     isosorbide dinitrate (ISORDIL) 30 MG tablet, Take 30 mg by mouth daily, Disp: , Rfl:     pantoprazole (PROTONIX) 40 MG tablet, Take 40 mg by mouth daily, Disp: , Rfl:     TRUEPLUS PEN NEEDLES 31G X 8 MM MISC, , Disp: , Rfl:     ondansetron (ZOFRAN-ODT) 4 MG disintegrating tablet, Take 1 tablet by mouth 3 times daily as needed for Nausea or Vomiting, Disp: 21 tablet, Rfl: 0    mupirocin (BACTROBAN) 2 % ointment, , Disp: , Rfl:     amLODIPine (NORVASC) 5 MG tablet, TAKE 1 TABLET BY MOUTH EVERY DAY, Disp: , Rfl:     ceFEPIme (MAXIPIME) 2 g injection, , Disp: , Rfl:     LANTUS SOLOSTAR 100 UNIT/ML injection pen, Inject 15 Units into the skin nightly, Disp: 5 pen, Rfl: 3    silver sulfADIAZINE (SILVADENE) 1 % cream, Apply topically daily. , Disp: 50 g, Rfl: 1    meloxicam (MOBIC) 15 MG tablet, Take 15 mg by mouth nightly , Disp: , Rfl:     albuterol sulfate  (90 Base) MCG/ACT inhaler, Inhale 2 puffs into the lungs every 6 hours as needed , Disp: , Rfl:     JANUVIA 100 MG tablet, Take 100 mg by mouth daily , Disp: , Rfl:     Misc. Devices MISC, 1 PAIR OF DIABETIC SHOES (1 LEFT/ 1 RIGHT) 1-3 PAIRS OF INSERTS (LEFT/ RIGHT), Disp: 2 each, Rfl: 0    cetirizine (ZYRTEC) 10 MG tablet, Take 10 mg by mouth nightly , Disp: , Rfl:     lisinopril (PRINIVIL;ZESTRIL) 10 MG tablet, Take 10 mg by mouth daily, Disp: , Rfl:     simvastatin (ZOCOR) 40 MG tablet, Take 40 mg by mouth nightly , Disp: , Rfl:     glipiZIDE (GLUCOTROL) 10 MG tablet, Take 20 mg by mouth 2 times daily (before meals) Takes 2 tabs (=20mg) BID, Disp: , Rfl:     aspirin 81 MG tablet, Take 81 mg by mouth daily, Disp: , Rfl:     gabapentin (NEURONTIN) 300 MG capsule, Take 900 mg by mouth 3 times daily. Take 3 caps (=900mg) 3 times a day, Disp: , Rfl:     Review of Systems  Review of Systems - History obtained from chart review and the patient  General ROS: negative for - chills, fatigue, fever, night sweats or weight gain  Constitutional: Negative for chills, diaphoresis, fatigue, fever and unexpected weight change. Musculoskeletal: Positive for arthralgias, gait problem and joint swelling. Neurological ROS: negative for - behavioral changes, confusion, headaches or seizures.  Negative for weakness and numbness. Dermatological ROS: negative for - mole changes, rash  Cardiovascular: Negative for leg swelling. Gastrointestinal: Negative for constipation, diarrhea, nausea and vomiting. Objective:       Physical Exam:  Resp 18   Ht 5' 11\" (1.803 m)   Wt 241 lb (109.3 kg)   BMI 33.61 kg/m²     NV status remains unchanged since last visit. Wound #:   1    diabetic foot ulcer   right foot    Wound measurements:  #1  9 mm by 8 mm  by   3 mm         Wound Depth: muscle. Drainage:    minimal, serosanguinous Type: minimal, serosanguinous    Wound Bed status:   Granulation Tissue: 90%   Necrotic 10%  Type:   Epithelialization 0%   Hypergranular 0%   Periwound hyperkeratosis:  absent  Erythema absent  Odor:no  Maceration:no  Undermining/tract/tunneling:no  Culture taken:   yes             Assessment:     Assessment: Patient is a 73 y.o. male with:   Diagnosis Orders   1. Type II diabetes mellitus with peripheral circulatory disorder (HCC)  AL DEBRIDEMENT, SKIN, SUB-Q TISSUE,MUSCLE,=<20 SQ CM   2. Pain in right foot  AL DEBRIDEMENT, SKIN, SUB-Q TISSUE,MUSCLE,=<20 SQ CM   3. Right foot ulcer, with necrosis of muscle (HCC)  AL DEBRIDEMENT, SKIN, SUB-Q TISSUE,MUSCLE,=<20 SQ CM   4. Type 2 diabetes mellitus with Charcot's joint of right foot (HCC)  AL DEBRIDEMENT, SKIN, SUB-Q TISSUE,MUSCLE,=<20 SQ CM   5. PVD (peripheral vascular disease) ([facility])  AL DEBRIDEMENT, SKIN, SUB-Q TISSUE,MUSCLE,=<20 SQ CM       Overall Wound Assessment  Wound is has improved. Size has decreased  Appearance has improved      Plan:     Pt examined and evaluated. Current condition and treatment options discussed in detail. Osmany Gavin Age:  73 y.o. Gender:  male   :  1955  Today's Date:  2022      Procedure: With the patient in supine position, Lidocaine soaked gauze was applied per physician order at beginning of wound evaluation. It was subsequently removed.     Using a #15 blade scalpel and Pick-up, the wound was debrided down to and included the removal of the following tissue:     [] epidermis, [] dermis, []  subcutaneous tissue,  [x] muscle/fascia tissue,   and/or  [] bone     Also debrided was all  [] fibrin, [x] biofilm, [x] slough,  [] necrotic,  [] hypergranulation tissue, and/or  [] hyperkeratotic tissue. Wound was copiously irrigated with normal saline solution. Bleeding:  Minimal.  Hemostasis:  pressure     100%  of wound debrided. Patient tolerated procedure well. Pain 0 / 10 during procedure and pain 0 / 10 after procedure. Discussed appropriate home care of this wound. Wound redressed. Patient instructions were given. Dispensed dressing supplies and instructions on their use. Dressings: No orders of the defined types were placed in this encounter. No orders of the defined types were placed in this encounter. Follow up: 2 week.       Electronically signed by Angelica Beaulieu DPM on 4/19/2022 at 10:30 AM  4/19/2022

## 2022-05-05 ENCOUNTER — OFFICE VISIT (OUTPATIENT)
Dept: PODIATRY | Age: 67
End: 2022-05-05
Payer: MEDICARE

## 2022-05-05 VITALS — BODY MASS INDEX: 33.74 KG/M2 | RESPIRATION RATE: 16 BRPM | HEIGHT: 71 IN | WEIGHT: 241 LBS

## 2022-05-05 DIAGNOSIS — B35.1 DERMATOPHYTOSIS OF NAIL: ICD-10-CM

## 2022-05-05 DIAGNOSIS — E11.51 TYPE II DIABETES MELLITUS WITH PERIPHERAL CIRCULATORY DISORDER (HCC): Primary | ICD-10-CM

## 2022-05-05 DIAGNOSIS — M79.671 PAIN IN RIGHT FOOT: ICD-10-CM

## 2022-05-05 DIAGNOSIS — I73.9 PVD (PERIPHERAL VASCULAR DISEASE) (HCC): ICD-10-CM

## 2022-05-05 DIAGNOSIS — E11.610 TYPE 2 DIABETES MELLITUS WITH CHARCOT'S JOINT OF RIGHT FOOT (HCC): ICD-10-CM

## 2022-05-05 DIAGNOSIS — L97.513 RIGHT FOOT ULCER, WITH NECROSIS OF MUSCLE (HCC): ICD-10-CM

## 2022-05-05 PROCEDURE — 11043 DBRDMT MUSC&/FSCA 1ST 20/<: CPT | Performed by: PODIATRIST

## 2022-05-05 PROCEDURE — 99999 PR OFFICE/OUTPT VISIT,PROCEDURE ONLY: CPT | Performed by: PODIATRIST

## 2022-05-05 PROCEDURE — 11721 DEBRIDE NAIL 6 OR MORE: CPT | Performed by: PODIATRIST

## 2022-05-05 NOTE — PROGRESS NOTES
[address] [facility] PODIATRY [facility]  [address] [place] [address]  Dept: [phone]  Dept Fax: [phone]    RETURN WOUND CARE PROGRESS NOTE  Date of patient's visit: 2022  Patient's Name:  Suad Huerta YOB: 1955            Patient Care Team:  Yolanda Norman MD as PCP - General (Family Medicine)  Marzena Rene MD as Consulting Physician (Infectious Diseases)  Aaliyah Bazan DPM as Physician (Podiatry)  Aaliyah Bazan DPM as Physician (Podiatry)  Norman Moreno MD as Consulting Physician (Infectious Diseases)      Chief Complaint   Patient presents with    Wound Check    Diabetes    Foot Pain     right foot    Nail Problem       Suad Huerta  AGE: 73 y.o. GENDER: male  : 1955  TODAY'S DATE:  2022  Pt's primary care physician is Yolanda Norman MD last seen 2022  Subjective:       Suad Huerta is a 73 y.o. male presents to the office today for follow up of an ulceration. Denies F/C/N/V.   Wound Type:diabetic, pressure and non-healing surgical  Wound Location:right foot plantar  Modifying factors:diabetes and poor glucose control            Lab Results   Component Value Date    LABA1C 8.7 (H) 2021       ALLERGIES    Allergies   Allergen Reactions    Morphine Itching    Other Other (See Comments)     Other reaction(s): Unknown    Percocet [Oxycodone-Acetaminophen]      Facial swelling    Dye [Iodides] Rash     Rash and swelling       Medications:    Current Outpatient Medications:     HYDROcodone-acetaminophen (NORCO)  MG per tablet, TAKE 1 TABLET BY MOUTH FOUR TIMES DAILY AS NEEDED, Disp: , Rfl:     isosorbide dinitrate (ISORDIL) 30 MG tablet, Take 30 mg by mouth daily, Disp: , Rfl:     pantoprazole (PROTONIX) 40 MG tablet, Take 40 mg by mouth daily, Disp: , Rfl:     TRUEPLUS PEN NEEDLES 31G X 8 MM MISC, , Disp: , Rfl:     ondansetron (ZOFRAN-ODT) 4 MG disintegrating tablet, Take 1 tablet by mouth 3 times daily as needed for Nausea or Vomiting, Disp: 21 tablet, Rfl: 0    mupirocin (BACTROBAN) 2 % ointment, , Disp: , Rfl:     amLODIPine (NORVASC) 5 MG tablet, TAKE 1 TABLET BY MOUTH EVERY DAY, Disp: , Rfl:     ceFEPIme (MAXIPIME) 2 g injection, , Disp: , Rfl:     LANTUS SOLOSTAR 100 UNIT/ML injection pen, Inject 15 Units into the skin nightly, Disp: 5 pen, Rfl: 3    silver sulfADIAZINE (SILVADENE) 1 % cream, Apply topically daily. , Disp: 50 g, Rfl: 1    meloxicam (MOBIC) 15 MG tablet, Take 15 mg by mouth nightly , Disp: , Rfl:     albuterol sulfate  (90 Base) MCG/ACT inhaler, Inhale 2 puffs into the lungs every 6 hours as needed , Disp: , Rfl:     JANUVIA 100 MG tablet, Take 100 mg by mouth daily , Disp: , Rfl:     Misc. Devices MISC, 1 PAIR OF DIABETIC SHOES (1 LEFT/ 1 RIGHT) 1-3 PAIRS OF INSERTS (LEFT/ RIGHT), Disp: 2 each, Rfl: 0    cetirizine (ZYRTEC) 10 MG tablet, Take 10 mg by mouth nightly , Disp: , Rfl:     lisinopril (PRINIVIL;ZESTRIL) 10 MG tablet, Take 10 mg by mouth daily, Disp: , Rfl:     simvastatin (ZOCOR) 40 MG tablet, Take 40 mg by mouth nightly , Disp: , Rfl:     glipiZIDE (GLUCOTROL) 10 MG tablet, Take 20 mg by mouth 2 times daily (before meals) Takes 2 tabs (=20mg) BID, Disp: , Rfl:     aspirin 81 MG tablet, Take 81 mg by mouth daily, Disp: , Rfl:     gabapentin (NEURONTIN) 300 MG capsule, Take 900 mg by mouth 3 times daily. Take 3 caps (=900mg) 3 times a day, Disp: , Rfl:     Review of Systems  Review of Systems - History obtained from chart review and the patient  General ROS: negative for - chills, fatigue, fever, night sweats or weight gain  Constitutional: Negative for chills, diaphoresis, fatigue, fever and unexpected weight change. Musculoskeletal: Positive for arthralgias, gait problem and joint swelling. Neurological ROS: negative for - behavioral changes, confusion, headaches or seizures.  Negative for weakness and numbness. Dermatological ROS: negative for - mole changes, rash  Cardiovascular: Negative for leg swelling. Gastrointestinal: Negative for constipation, diarrhea, nausea and vomiting. Objective:       Physical Exam:  Resp 16   Ht 5' 11\" (1.803 m)   Wt 241 lb (109.3 kg)   BMI 33.61 kg/m²     NV status remains unchanged since last visit. Wound #:   1  diabetic foot ulcer   right foot    Wound measurements:  #1  5 mm by 9 mm  by   3 mm         Wound Depth: muscle. Drainage:    minimal, serosanguinous Type: minimal, serosanguinous    Wound Bed status:   Granulation Tissue: 100%   Necrotic 0%  Type:   Epithelialization 0%   Hypergranular 0%   Periwound hyperkeratosis:  present  Erythema absent  Odor:no  Maceration:no  Undermining/tract/tunneling:no  Culture taken:   yes             Toenail Description  Sites of Onychomycosis Involvement (Check affected area)  [x] [x] [x] [x] [x] [x] [x] [x] [x] [x]  5 4 3 2 1 1 2 3 4 5                          Right                                        Left    Thickness  [x] [x] [x] [x] [x] [x] [x] [x] [x] [x]  5 4 3 2 1 1 2 3 4 5                         Right                                        Left    Dystrophic Changes   [x] [x] [x] [x] [x] [x] [x] [x] [x] [x]  5 4 3 2 1 1 2 3 4 5                         Right                                        Left    discolored   [x] [x] [x] [x] [x] [x] [x] [x] [x] [x]  5 4 3 2 1 1 2 3 4 5                          Right                                        Left    Incurvation/Ingrowin   [] [] [] [] [] [] [] [] [] []  5 4 3 2 1 1 2 3 4 5                         Right                                        Left    Inflammation/Pain   [x] [x] [x] [x] [x] [x] [x] [x] [x] [x]  5 4 3 2 1 1 2 3 4 5                         Right                                        Left          Assessment:     Assessment: Patient is a 73 y.o. male with:   Diagnosis Orders   1.  Type II diabetes mellitus with peripheral circulatory disorder ([address]  [place] DEBRIDEMENT OF NAILS, 6 OR MORE    mupirocin (BACTROBAN) 2 % ointment    VT DEBRIDEMENT, SKIN, SUB-Q TISSUE,MUSCLE,=<20 SQ CM   2. Right foot ulcer, with necrosis of muscle (HCC)  VT DEBRIDEMENT OF NAILS, 6 OR MORE    mupirocin (BACTROBAN) 2 % ointment    VT DEBRIDEMENT, SKIN, SUB-Q TISSUE,MUSCLE,=<20 SQ CM   3. Pain in right foot  VT DEBRIDEMENT OF NAILS, 6 OR MORE    mupirocin (BACTROBAN) 2 % ointment    VT DEBRIDEMENT, SKIN, SUB-Q TISSUE,MUSCLE,=<20 SQ CM   4. Dermatophytosis of nail  VT DEBRIDEMENT OF NAILS, 6 OR MORE   5. Type 2 diabetes mellitus with Charcot's joint of right foot (HCC)  [place] DEBRIDEMENT OF NAILS, 6 OR MORE    mupirocin (BACTROBAN) 2 % ointment    VT DEBRIDEMENT, SKIN, SUB-Q TISSUE,MUSCLE,=<20 SQ CM   6. PVD (peripheral vascular disease) (HCC)  VT DEBRIDEMENT OF NAILS, 6 OR MORE    VT DEBRIDEMENT, SKIN, SUB-Q TISSUE,MUSCLE,=<20 SQ CM       Overall Wound Assessment  Wound is has slightly improved. Size has decreased  Appearance has significantly improved      Plan:     Pt examined and evaluated. Current condition and treatment options discussed in detail. Vasquez Morales Age:  73 y.o. Gender:  male   :  1955  Today's Date:  2022      Procedure: With the patient in supine position, Lidocaine soaked gauze was applied per physician order at beginning of wound evaluation. It was subsequently removed. Using a #15 blade scalpel and Pick-up, the wound was debrided down to and included the removal of the following tissue:     [] epidermis, [] dermis, []  subcutaneous tissue,  [x] muscle/fascia tissue,   and/or  [] bone     Also debrided was all  [] fibrin, [x] biofilm, [x] slough,  [] necrotic,  [] hypergranulation tissue, and/or  [x] hyperkeratotic tissue. Wound was copiously irrigated with normal saline solution. Bleeding:  Minimal.  Hemostasis:  pressure     100%  of wound debrided. Patient tolerated procedure well.       Pain 0 / 10 during procedure and pain 0 / 10 after procedure. Discussed appropriate home care of this wound. Wound redressed. Patient instructions were given. Dispensed dressing supplies and instructions on their use. Dressings: No orders of the defined types were placed in this encounter. No orders of the defined types were placed in this encounter. Nails 1,2,3,4,5 Right and 1,2,3,4,5 Left were debrided and ground smooth with a dremmel. The patient tolerated the procedure well without apparent complications. Follow up: 3w   week.       Electronically signed by Farhan Marrero DPM on 5/5/2022 at 10:23 AM  5/5/2022

## 2022-06-02 ENCOUNTER — OFFICE VISIT (OUTPATIENT)
Dept: PODIATRY | Age: 67
End: 2022-06-02
Payer: MEDICARE

## 2022-06-02 VITALS — HEIGHT: 71 IN | WEIGHT: 241 LBS | BODY MASS INDEX: 33.74 KG/M2

## 2022-06-02 DIAGNOSIS — M79.672 LEFT FOOT PAIN: Primary | ICD-10-CM

## 2022-06-02 PROCEDURE — 1123F ACP DISCUSS/DSCN MKR DOCD: CPT | Performed by: PODIATRIST

## 2022-06-02 PROCEDURE — 99214 OFFICE O/P EST MOD 30 MIN: CPT | Performed by: PODIATRIST

## 2022-06-02 PROCEDURE — 3017F COLORECTAL CA SCREEN DOC REV: CPT | Performed by: PODIATRIST

## 2022-06-02 PROCEDURE — G8417 CALC BMI ABV UP PARAM F/U: HCPCS | Performed by: PODIATRIST

## 2022-06-02 PROCEDURE — G8427 DOCREV CUR MEDS BY ELIG CLIN: HCPCS | Performed by: PODIATRIST

## 2022-06-02 PROCEDURE — 4004F PT TOBACCO SCREEN RCVD TLK: CPT | Performed by: PODIATRIST

## 2022-06-02 NOTE — PROGRESS NOTES
[address] PODIATRY [facility]  [address] [facility] [address] [facility] [address]  Dept: [phone]  Dept Fax: [phone]    RETURN WOUND CARE PROGRESS NOTE  Date of patient's visit: 2022  Patient's Name:  Martha Stewart YOB: 1955            Patient Care Team:  Zoey Velasquez MD as PCP - General (Family Medicine)  Isaac Walters MD as Consulting Physician (Infectious Diseases)  Tracie Simon DPM as Physician (Podiatry)  Tracie Simon DPM as Physician (Podiatry)  Edward Liao MD as Consulting Physician (Infectious Diseases)      Chief Complaint   Patient presents with    Wound Check     right foot    Diabetes    Foot Pain     left foot       Martha Stewart  AGE: 73 y.o. GENDER: male  : 1955  TODAY'S DATE:  2022  Pt's primary care physician is Zoey Velasquez MD last seen 2022  Subjective:       Martha Stewart is a 73 y.o. male presents to the office today for follow up of an ulceration. Denies F/C/N/V. Wound Type:diabetic and pressure  Wound Location:right foot  Modifying factors:diabetes and poor glucose control        Pt has a new complaint of left foot pain that hurts when he is walking on it .   [facility] trauma           Lab Results   Component Value Date    LABA1C 8.7 (H) 2021       ALLERGIES    Allergies   Allergen Reactions    Morphine Itching    Other Other (See Comments)     Other reaction(s): Unknown    Percocet [Oxycodone-Acetaminophen]      Facial swelling    Dye [Iodides] Rash     Rash and swelling       Medications:    Current Outpatient Medications:     HYDROcodone-acetaminophen (NORCO)  MG per tablet, TAKE 1 TABLET BY MOUTH FOUR TIMES DAILY AS NEEDED, Disp: , Rfl:     isosorbide dinitrate (ISORDIL) 30 MG tablet, Take 30 mg by mouth daily, Disp: , Rfl:     pantoprazole (PROTONIX) 40 MG tablet, Take 40 mg by mouth daily, Disp: , Rfl:     TRUEPLUS PEN NEEDLES 31G X 8 MM MISC, , Disp: , Rfl:     ondansetron (ZOFRAN-ODT) 4 MG disintegrating tablet, Take 1 tablet by mouth 3 times daily as needed for Nausea or Vomiting, Disp: 21 tablet, Rfl: 0    mupirocin (BACTROBAN) 2 % ointment, , Disp: , Rfl:     amLODIPine (NORVASC) 5 MG tablet, TAKE 1 TABLET BY MOUTH EVERY DAY, Disp: , Rfl:     ceFEPIme (MAXIPIME) 2 g injection, , Disp: , Rfl:     LANTUS SOLOSTAR 100 UNIT/ML injection pen, Inject 15 Units into the skin nightly, Disp: 5 pen, Rfl: 3    silver sulfADIAZINE (SILVADENE) 1 % cream, Apply topically daily. , Disp: 50 g, Rfl: 1    meloxicam (MOBIC) 15 MG tablet, Take 15 mg by mouth nightly , Disp: , Rfl:     albuterol sulfate  (90 Base) MCG/ACT inhaler, Inhale 2 puffs into the lungs every 6 hours as needed , Disp: , Rfl:     JANUVIA 100 MG tablet, Take 100 mg by mouth daily , Disp: , Rfl:     Misc. Devices MISC, 1 PAIR OF DIABETIC SHOES (1 LEFT/ 1 RIGHT) 1-3 PAIRS OF INSERTS (LEFT/ RIGHT), Disp: 2 each, Rfl: 0    cetirizine (ZYRTEC) 10 MG tablet, Take 10 mg by mouth nightly , Disp: , Rfl:     lisinopril (PRINIVIL;ZESTRIL) 10 MG tablet, Take 10 mg by mouth daily, Disp: , Rfl:     simvastatin (ZOCOR) 40 MG tablet, Take 40 mg by mouth nightly , Disp: , Rfl:     glipiZIDE (GLUCOTROL) 10 MG tablet, Take 20 mg by mouth 2 times daily (before meals) Takes 2 tabs (=20mg) BID, Disp: , Rfl:     aspirin 81 MG tablet, Take 81 mg by mouth daily, Disp: , Rfl:     gabapentin (NEURONTIN) 300 MG capsule, Take 900 mg by mouth 3 times daily. Take 3 caps (=900mg) 3 times a day, Disp: , Rfl:     Review of Systems  Review of Systems - History obtained from chart review and the patient  General ROS: negative for - chills, fatigue, fever, night sweats or weight gain  Constitutional: Negative for chills, diaphoresis, fatigue, fever and unexpected weight change. Musculoskeletal: Positive for arthralgias, gait problem and joint swelling.   Neurological ROS: negative for - behavioral changes, confusion, headaches or seizures. Negative for weakness and numbness. Dermatological ROS: negative for - mole changes, rash  Cardiovascular: Negative for leg swelling. Gastrointestinal: Negative for constipation, diarrhea, nausea and vomiting. Objective:       Physical Exam:  Ht 5' 11\" (1.803 m)   Wt 241 lb (109.3 kg)   BMI 33.61 kg/m²     NV status remains unchanged since last visit. Wound #:   1    diabetic foot ulcer   right foot    Wound measurements:  #1  5 mm by 9 mm  by   3 mm         Wound Depth: muscle. Drainage:    minimal, serosanguinous Type: minimal    Wound Bed status:   Granulation Tissue: 100%   Necrotic 0%  Type:   Epithelialization 0%   Hypergranular 0%   Periwound hyperkeratosis:  present  Erythema absent  Odor:no  Maceration:no  Undermining/tract/tunneling:no  Culture taken:   Yes previously       Pain to the dordsal midfoot left foot      X rays left foot 3 views:  No fracture or dislocation seen. No acute findings       Assessment:     Assessment: Patient is a 73 y.o. male with:  No diagnosis found. Overall Wound Assessment  Wound is has slightly improved. Size has decreased  Appearance has improved      Plan:     Pt examined and evaluated. Current condition and treatment options discussed in detail. Vasquez Morales Age:  73 y.o. Gender:  male   :  1955  Today's Date:  2022      Procedure: With the patient in supine position, Lidocaine soaked gauze was applied per physician order at beginning of wound evaluation. It was subsequently removed.     Using a #15 blade scalpel and Pick-up, the wound was sharply and excsionally  debrided down to and included the removal of the following tissue:     [] epidermis, [] dermis, []  subcutaneous tissue,  [x] muscle/fascia tissue,   and/or  [] bone     Also debrided was all  [] fibrin, [] biofilm, [] slough,  [x] necrotic,  [] hypergranulation tissue, and/or  [] hyperkeratotic tissue. Wound was copiously irrigated with normal saline solution. Bleeding:  Minimal.  Hemostasis:  fulguration     100%  of wound debrided. Patient tolerated procedure well. Pain 0 / 10 during procedure and pain 0 / 10 after procedure. Discussed appropriate home care of this wound. Wound redressed. Patient instructions were given. Dispensed dressing supplies and instructions on their use. Dressings: No orders of the defined types were placed in this encounter. No orders of the defined types were placed in this encounter. Follow up: 2 weeks  X rays left fooot shoe the above findings. Pt to wear a stiff soled shoe   All labs were reviewed and all imagining including the above findings were reviewed PRIOR to the patients arrival and with the patient today. Previous patient encounter was reviewed. Encounters from the patients other medical providers were reviewed and noted. Time was spent educating the patient and their families/caregivers on proper care of the feet and ankles. All the above diagnosis were addressed at todays visit and all questions were answered. A total of 30 minutes was spent with this patients encounter which included charting after the patients visit       week.       Electronically signed by Alicia Lucia DPM on 6/2/2022 at 1:14 PM  6/2/2022

## 2022-06-16 ENCOUNTER — OFFICE VISIT (OUTPATIENT)
Dept: PODIATRY | Age: 67
End: 2022-06-16
Payer: MEDICARE

## 2022-06-16 DIAGNOSIS — L97.513 RIGHT FOOT ULCER, WITH NECROSIS OF MUSCLE (HCC): ICD-10-CM

## 2022-06-16 DIAGNOSIS — E11.51 TYPE II DIABETES MELLITUS WITH PERIPHERAL CIRCULATORY DISORDER (HCC): ICD-10-CM

## 2022-06-16 DIAGNOSIS — M79.672 LEFT FOOT PAIN: Primary | ICD-10-CM

## 2022-06-16 DIAGNOSIS — E11.610 TYPE 2 DIABETES MELLITUS WITH CHARCOT'S JOINT OF RIGHT FOOT (HCC): ICD-10-CM

## 2022-06-16 DIAGNOSIS — M79.671 PAIN IN RIGHT FOOT: ICD-10-CM

## 2022-06-16 PROCEDURE — 11043 DBRDMT MUSC&/FSCA 1ST 20/<: CPT | Performed by: PODIATRIST

## 2022-06-16 PROCEDURE — 99999 PR OFFICE/OUTPT VISIT,PROCEDURE ONLY: CPT | Performed by: PODIATRIST

## 2022-06-16 NOTE — PROGRESS NOTES
[address] [facility] PODIATRY [facility]  [address] [place] [address]  Dept: [phone]  Dept Fax: [phone]    RETURN PATIENT PROGRESS NOTE  Date of patient's visit: 6/16/2022  Patient's Name:  Trino Stafford YOB: 1955            Patient Care Team:  Yoselin Zhao MD as PCP - General (Family Medicine)  Ailin Fonseca MD as Consulting Physician (Infectious Diseases)  Anastasia Andrews DPM as Physician (Podiatry)  Anastasia Andrews DPM as Physician (Podiatry)  Jose Dia MD as Consulting Physician (Infectious Diseases)       Trino Stafford 73 y.o. male that presents for follow-up of   Chief Complaint   Patient presents with    Foot Pain    Diabetes     Pt's primary care physician is Yoselin Zhao MD last seen 05/23/2022  Symptoms began 1 month(s) ago and are unchanged . Patient relates pain is Present. Pain is rated 6 out of 10 and is described as constant, severe. Treatments prior to today's visit include: being in a cam boot but states it feels much better . Currently denies F/C/N/V.      Allergies   Allergen Reactions    Morphine Itching    Other Other (See Comments)     Other reaction(s): Unknown    Percocet [Oxycodone-Acetaminophen]      Facial swelling    Dye [Iodides] Rash     Rash and swelling       Past Medical History:   Diagnosis Date    Abscess of right foot 8/4/2018    Acquired hammer toe deformity of lesser toe of right foot     ALEJANDRO (acute kidney injury) ([address] 8/5/2018    Cellulitis 4/24/2018    Cellulitis of left foot 4/24/2018    Cellulitis of right foot     and abscess    Charcot foot due to diabetes mellitus ([address] 2014    Right foot     Chest pain at rest 8/4/2018    Chronic multifocal osteomyelitis of right foot ([address]     Diabetic polyneuropathy associated with type 2 diabetes mellitus ([address]     Essential hypertension     Fractures, multiple 07/21/2014    Right foot fractures     Hyperlipidemia     Hypertension     Leukocytosis     Neuropathy     diabetic with charcot affecting the right foot    Pain in right foot     redness and swelling    Pneumonia 2009    Right foot infection     Right foot pain     Tobacco abuse 4/24/2018    Type II or unspecified type diabetes mellitus without mention of complication, not stated as uncontrolled     Vertigo     Well controlled type 2 diabetes mellitus with neurological manifestations ([facility] 8/4/2018    Wound dehiscence     Wound, open     Left Ball of  foot, pt. stepped on sharp object. Covered by dressing.  Wound, open     Right posterior -Diabetic Ulcer       Prior to Admission medications    Medication Sig Start Date End Date Taking? Authorizing Provider   HYDROcodone-acetaminophen (Elda Sarna)  MG per tablet TAKE 1 TABLET BY MOUTH FOUR TIMES DAILY AS NEEDED 2/18/22  Yes Historical Provider, MD   isosorbide dinitrate (ISORDIL) 30 MG tablet Take 30 mg by mouth daily   Yes Historical Provider, MD   pantoprazole (PROTONIX) 40 MG tablet Take 40 mg by mouth daily 12/17/21  Yes Historical Provider, MD   TRUEPLUS PEN NEEDLES 31G X 8 MM [facility]  11/29/21  Yes Historical Provider, MD   ondansetron (ZOFRAN-ODT) 4 MG disintegrating tablet Take 1 tablet by mouth 3 times daily as needed for Nausea or Vomiting 96/3/02  Yes Gonzalez Post, MD   mupirocin Lacy Bushy) 2 % ointment  11/8/21  Yes Historical Provider, MD   amLODIPine (NORVASC) 5 MG tablet TAKE 1 TABLET BY MOUTH EVERY DAY 10/20/21  Yes Historical Provider, MD   ceFEPIme (MAXIPIME) 2 g injection  9/22/21  Yes Historical Provider, MD   LANTUS SOLOSTAR 100 UNIT/ML injection pen Inject 15 Units into the skin nightly 9/17/21  Yes ROCHELLE Gomes NP   silver sulfADIAZINE (SILVADENE) 1 % cream Apply topically daily.  6/12/21  Yes Adam Lee DO   meloxicam (MOBIC) 15 MG tablet Take 15 mg by mouth nightly  5/5/21  Yes Historical Provider, MD   albuterol sulfate  (90 Base) MCG/ACT inhaler Inhale 2 puffs into the lungs every 6 hours as needed  4/1/21  Yes Historical Provider, MD CHAKRABORTY 100 MG tablet Take 100 mg by mouth daily  11/13/20  Yes Historical Provider, MD   Misc. Devices MISC 1 PAIR OF DIABETIC SHOES (1 LEFT/ 1 RIGHT)  1-3 PAIRS OF INSERTS (LEFT/ RIGHT) 3/5/20  Yes Kateryna Wilder DPM   cetirizine (ZYRTEC) 10 MG tablet Take 10 mg by mouth nightly  10/21/19  Yes Historical Provider, MD   lisinopril (PRINIVIL;ZESTRIL) 10 MG tablet Take 10 mg by mouth daily 11/15/18  Yes Historical Provider, MD   simvastatin (ZOCOR) 40 MG tablet Take 40 mg by mouth nightly  11/13/18  Yes Historical Provider, MD   glipiZIDE (GLUCOTROL) 10 MG tablet Take 20 mg by mouth 2 times daily (before meals) Takes 2 tabs (=20mg) BID   Yes Historical Provider, MD   aspirin 81 MG tablet Take 81 mg by mouth daily   Yes Historical Provider, MD   gabapentin (NEURONTIN) 300 MG capsule Take 900 mg by mouth 3 times daily. Take 3 caps (=900mg) 3 times a day   Yes Historical Provider, MD       Review of Systems    Review of Systems:  History obtained from chart review and the patient  General ROS: negative for - chills, fatigue, fever, night sweats or weight gain  Constitutional: Negative for chills, diaphoresis, fatigue, fever and unexpected weight change. Musculoskeletal: Positive for arthralgias, gait problem and joint swelling. Neurological ROS: negative for - behavioral changes, confusion, headaches or seizures. Negative for weakness and numbness. Dermatological ROS: negative for - mole changes, rash  Cardiovascular: Negative for leg swelling. Gastrointestinal: Negative for constipation, diarrhea, nausea and vomiting. Lower Extremity Physical Examination:     Vitals: There were no vitals filed for this visit. General: AAO x 3 in NAD. Wound #:   1    diabetic foot ulcer   right foot    Wound measurements:  #1  5 mm by 3 mm  by   3 mm         Wound Depth: muscle.      Drainage: minimal, serosanguinous Type: minimal    Wound Bed status:   Granulation Tissue: 100%   Necrotic 0%  Type:   Epithelialization 0%   Hypergranular 0%   Periwound hyperkeratosis:  present  Erythema absent  Odor:no  Maceration:no  Undermining/tract/tunneling:no  Culture taken:   Yes previously       Pain to the dorsal midfoot left foot is resolved         Asessment: Patient is a 73 y.o. male with:    Diagnosis Orders   1. Left foot pain     2. Type II diabetes mellitus with peripheral circulatory disorder (HCC)  GA DEBRIDEMENT, SKIN, SUB-Q TISSUE,MUSCLE,=<20 SQ CM   3. Right foot ulcer, with necrosis of muscle (HCC)  GA DEBRIDEMENT, SKIN, SUB-Q TISSUE,MUSCLE,=<20 SQ CM   4. Pain in right foot  GA DEBRIDEMENT, SKIN, SUB-Q TISSUE,MUSCLE,=<20 SQ CM   5. Type 2 diabetes mellitus with Charcot's joint of right foot (HCC)  GA DEBRIDEMENT, SKIN, SUB-Q TISSUE,MUSCLE,=<20 SQ CM        Plan: Patient examined and evaluated. Current condition and treatment options discussed in detail. Advised pt to his conditon. Pt to come out of the CAM boot as the pain on the left foot is resolved to the midftoot      With the patient in supine position, Lidocaine soaked gauze was applied per physician order at beginning of wound evaluation. It was subsequently removed. Using a #15 blade scalpel and Pick-up, sharp excisional debridement of the wound was performed down to and included the removal of the following tissue:     [] epidermis, [] dermis, []  subcutaneous tissue,  [x] muscle/fascia tissue,   and/or  [] bone     Also debrided was all  [] fibrin, [] biofilm, [] slough,  [x] necrotic,  [] hypergranulation tissue, and/or  [] hyperkeratotic tissue. Wound was copiously irrigated with normal saline solution. Bleeding:  Minimal.  Hemostasis:  pressure     100%  of wound debrided. Patient tolerated procedure well. Pain 0 / 10 during procedure and pain 0 / 10 after procedure. Discussed appropriate home care of this wound. Wound redressed. Patient instructions were given. Dispensed dressing supplies and instructions on their use. .  Verbal and written instructions given to patient. Contact office with any questions/problems/concerns. No orders of the defined types were placed in this encounter. No orders of the defined types were placed in this encounter. RTC in 4week(s).     6/16/2022      Electronically signed by Bon Esquivel DPM on 6/16/2022 at 2:15 PM  6/16/2022

## 2022-07-14 ENCOUNTER — OFFICE VISIT (OUTPATIENT)
Dept: PODIATRY | Age: 67
End: 2022-07-14
Payer: MEDICARE

## 2022-07-14 VITALS — WEIGHT: 241 LBS | BODY MASS INDEX: 33.74 KG/M2 | HEIGHT: 71 IN

## 2022-07-14 DIAGNOSIS — M79.671 PAIN IN BOTH FEET: ICD-10-CM

## 2022-07-14 DIAGNOSIS — E11.610 TYPE 2 DIABETES MELLITUS WITH CHARCOT'S JOINT OF RIGHT FOOT (HCC): ICD-10-CM

## 2022-07-14 DIAGNOSIS — E11.51 TYPE II DIABETES MELLITUS WITH PERIPHERAL CIRCULATORY DISORDER (HCC): Primary | ICD-10-CM

## 2022-07-14 DIAGNOSIS — M79.672 PAIN IN BOTH FEET: ICD-10-CM

## 2022-07-14 DIAGNOSIS — L97.513 RIGHT FOOT ULCER, WITH NECROSIS OF MUSCLE (HCC): ICD-10-CM

## 2022-07-14 PROCEDURE — 99999 PR OFFICE/OUTPT VISIT,PROCEDURE ONLY: CPT | Performed by: PODIATRIST

## 2022-07-14 PROCEDURE — 11043 DBRDMT MUSC&/FSCA 1ST 20/<: CPT | Performed by: PODIATRIST

## 2022-07-14 NOTE — PROGRESS NOTES
[address] [facility] PODIATRY [facility]  [address] [place] [address]  Dept: [phone]  Dept Fax: [phone]    RETURN WOUND CARE PROGRESS NOTE  Date of patient's visit: 2022  Patient's Name:  Fito Platt YOB: 1955            Patient Care Team:  Ophelia Reddy MD as PCP - General (Family Medicine)  Marylen Co, MD as Consulting Physician (Infectious Diseases)  Colin Summers DPM as Physician (Podiatry)  Colin Summers DPM as Physician (Podiatry)  María Arechiga MD as Consulting Physician (Infectious Diseases)      Chief Complaint   Patient presents with    Diabetes     Type II diabetic    Wound Check     right foot    Foot Pain     bilateral feet       Fito Platt  AGE: 73 y.o. GENDER: male  : 1955  TODAY'S DATE:  2022  Pt's primary care physician is Ophelia Reddy MD last seen 2022  Subjective:       Fito Platt is a 73 y.o. male presents to the office today for follow up of an ulceration. Denies F/C/N/V.   Wound Type:diabetic and pressure  Wound Location:right foot plantarly  Modifying factors:diabetes and poor glucose control            Lab Results   Component Value Date    LABA1C 8.7 (H) 2021       ALLERGIES    Allergies   Allergen Reactions    Morphine Itching    Other Other (See Comments)     Other reaction(s): Unknown    Percocet [Oxycodone-Acetaminophen]      Facial swelling    Dye [Iodides] Rash     Rash and swelling       Medications:    Current Outpatient Medications:     HYDROcodone-acetaminophen (NORCO)  MG per tablet, TAKE 1 TABLET BY MOUTH FOUR TIMES DAILY AS NEEDED, Disp: , Rfl:     isosorbide dinitrate (ISORDIL) 30 MG tablet, Take 30 mg by mouth daily, Disp: , Rfl:     pantoprazole (PROTONIX) 40 MG tablet, Take 40 mg by mouth daily, Disp: , Rfl:     TRUEPLUS PEN NEEDLES 31G X 8 MM MISC, , Disp: , Rfl:     ondansetron (ZOFRAN-ODT) 4 MG disintegrating tablet, Take 1 tablet by mouth 3 times daily as needed for Nausea or Vomiting, Disp: 21 tablet, Rfl: 0    mupirocin (BACTROBAN) 2 % ointment, , Disp: , Rfl:     amLODIPine (NORVASC) 5 MG tablet, TAKE 1 TABLET BY MOUTH EVERY DAY, Disp: , Rfl:     ceFEPIme (MAXIPIME) 2 g injection, , Disp: , Rfl:     LANTUS SOLOSTAR 100 UNIT/ML injection pen, Inject 15 Units into the skin nightly, Disp: 5 pen, Rfl: 3    silver sulfADIAZINE (SILVADENE) 1 % cream, Apply topically daily. , Disp: 50 g, Rfl: 1    meloxicam (MOBIC) 15 MG tablet, Take 15 mg by mouth nightly , Disp: , Rfl:     albuterol sulfate  (90 Base) MCG/ACT inhaler, Inhale 2 puffs into the lungs every 6 hours as needed , Disp: , Rfl:     JANUVIA 100 MG tablet, Take 100 mg by mouth daily , Disp: , Rfl:     Misc. Devices MISC, 1 PAIR OF DIABETIC SHOES (1 LEFT/ 1 RIGHT) 1-3 PAIRS OF INSERTS (LEFT/ RIGHT), Disp: 2 each, Rfl: 0    cetirizine (ZYRTEC) 10 MG tablet, Take 10 mg by mouth nightly , Disp: , Rfl:     lisinopril (PRINIVIL;ZESTRIL) 10 MG tablet, Take 10 mg by mouth daily, Disp: , Rfl:     simvastatin (ZOCOR) 40 MG tablet, Take 40 mg by mouth nightly , Disp: , Rfl:     glipiZIDE (GLUCOTROL) 10 MG tablet, Take 20 mg by mouth 2 times daily (before meals) Takes 2 tabs (=20mg) BID, Disp: , Rfl:     aspirin 81 MG tablet, Take 81 mg by mouth daily, Disp: , Rfl:     gabapentin (NEURONTIN) 300 MG capsule, Take 900 mg by mouth 3 times daily. Take 3 caps (=900mg) 3 times a day, Disp: , Rfl:     Review of Systems  Review of Systems - History obtained from chart review and the patient  General ROS: negative for - chills, fatigue, fever, night sweats or weight gain  Constitutional: Negative for chills, diaphoresis, fatigue, fever and unexpected weight change. Musculoskeletal: Positive for arthralgias, gait problem and joint swelling. Neurological ROS: negative for - behavioral changes, confusion, headaches or seizures. Negative for weakness and numbness. Dermatological ROS: negative for - mole changes, rash  Cardiovascular: Negative for leg swelling. Gastrointestinal: Negative for constipation, diarrhea, nausea and vomiting. Objective:       Physical Exam:  Ht 5' 11\" (1.803 m)   Wt 241 lb (109.3 kg)   BMI 33.61 kg/m²     NV status remains unchanged since last visit. Wound #:   1    diabetic foot ulcer   Right foot plantarly     Wound measurements:  #1  3 mm by 9 mm  by   3 mm         Wound Depth: muscle. Drainage:    minimal, serosanguinous Type: serosanguinous    Wound Bed status:   Granulation Tissue: 90%   Necrotic 10%  Type:   Epithelialization 0%   Hypergranular 0%   Periwound hyperkeratosis:  present  Erythema absent  Odor:yes  Maceration:no  Undermining/tract/tunneling:no  Culture taken:   yes             Assessment:     Assessment: Patient is a 73 y.o. male with:   Diagnosis Orders   1. Type II diabetes mellitus with peripheral circulatory disorder (HCC)  DC DEBRIDEMENT, SKIN, SUB-Q TISSUE,MUSCLE,=<20 SQ CM      2. Right foot ulcer, with necrosis of muscle (HCC)  DC DEBRIDEMENT, SKIN, SUB-Q TISSUE,MUSCLE,=<20 SQ CM      3. Pain in both feet  DC DEBRIDEMENT, SKIN, SUB-Q TISSUE,MUSCLE,=<20 SQ CM      4. Type 2 diabetes mellitus with Charcot's joint of right foot (HCC)  DC DEBRIDEMENT, SKIN, SUB-Q TISSUE,MUSCLE,=<20 SQ CM          Overall Wound Assessment  Wound is has improved. Size has decreased  Appearance has significantly improved      Plan:     Pt examined and evaluated. Current condition and treatment options discussed in detail. Deb Harrison Age:  73 y.o. Gender:  male   :  1955  Today's Date:  2022      Procedure: With the patient in supine position, Lidocaine soaked gauze was applied per physician order at beginning of wound evaluation. It was subsequently removed.     Using a #15 blade scalpel and Pick-up, the wound was debrided down to and included the removal of the following tissue:     [] epidermis, [] dermis, []  subcutaneous tissue,  [x] muscle/fascia tissue,   and/or  [] bone     Also debrided was all  [] fibrin, [] biofilm, [] slough,  [x] necrotic,  [] hypergranulation tissue, and/or  [x] hyperkeratotic tissue. Wound was copiously irrigated with normal saline solution. Bleeding:  None. Hemostasis:  pressure     100%  of wound debrided. Patient tolerated procedure well. Pain 0 / 10 during procedure and pain 0 / 10 after procedure. Discussed appropriate home care of this wound. Wound redressed. Patient instructions were given. Discussed appropriate home care of this wound. , Dispensed dressing supplies and instructions on their use. Dressings: No orders of the defined types were placed in this encounter. No orders of the defined types were placed in this encounter. Follow up: 2 week.       Electronically signed by Roger Garcia DPM on 7/14/2022 at 10:37 AM  7/14/2022

## 2022-08-09 ENCOUNTER — OFFICE VISIT (OUTPATIENT)
Dept: PODIATRY | Age: 67
End: 2022-08-09
Payer: MEDICARE

## 2022-08-09 VITALS — BODY MASS INDEX: 33.74 KG/M2 | HEIGHT: 71 IN | WEIGHT: 241 LBS

## 2022-08-09 DIAGNOSIS — M25.571 ACUTE RIGHT ANKLE PAIN: ICD-10-CM

## 2022-08-09 DIAGNOSIS — M79.671 RIGHT FOOT PAIN: Primary | ICD-10-CM

## 2022-08-09 PROCEDURE — G8417 CALC BMI ABV UP PARAM F/U: HCPCS | Performed by: PODIATRIST

## 2022-08-09 PROCEDURE — G8427 DOCREV CUR MEDS BY ELIG CLIN: HCPCS | Performed by: PODIATRIST

## 2022-08-09 PROCEDURE — 1123F ACP DISCUSS/DSCN MKR DOCD: CPT | Performed by: PODIATRIST

## 2022-08-09 PROCEDURE — 3017F COLORECTAL CA SCREEN DOC REV: CPT | Performed by: PODIATRIST

## 2022-08-09 PROCEDURE — 99214 OFFICE O/P EST MOD 30 MIN: CPT | Performed by: PODIATRIST

## 2022-08-09 PROCEDURE — 4004F PT TOBACCO SCREEN RCVD TLK: CPT | Performed by: PODIATRIST

## 2022-08-09 NOTE — PROGRESS NOTES
[address] [facility] PODIATRY [facility]  [address] [place] [address]  Dept: [phone]  Dept Fax: [phone]    RETURN PATIENT PROGRESS NOTE  Date of patient's visit: 8/9/2022  Patient's Name:  Mayte Fofana YOB: 1955            Patient Care Team:  Zahira Munoz MD as PCP - General (Family Medicine)  Pavel Baldwin MD as Consulting Physician (Infectious Diseases)  Wilda Felder DPM as Physician (Podiatry)  Wilda Felder DPM as Physician (Podiatry)  Donna Corral MD as Consulting Physician (Infectious Diseases)       Mayte Fofana 78 y.o. male that presents for follow-up of   Chief Complaint   Patient presents with    Foot Injury     Right foot- pt fell down the stairs 4 days ago. Ankle Pain     Right ankle pain and swelling x4 days    Post-Op Check     Rightplantar foot    Leg Pain     RLE x4 days ago     Pt's primary care physician is Zahira Munoz MD last seen 05/23/2022  Symptoms began 4 day(s) ago and are increased . Patient relates pain is Present. Pain is rated 10 out of 10 and is described as intermittent, excruciating. Treatments prior to today's visit include: going to this appointment. Pt sttaes he fell down the stairs and felt pain. Did not feel a cracking . Currently denies F/C/N/V.      Allergies   Allergen Reactions    Morphine Itching    Other Other (See Comments)     Other reaction(s): Unknown    Percocet [Oxycodone-Acetaminophen]      Facial swelling    Dye [Iodides] Rash     Rash and swelling       Past Medical History:   Diagnosis Date    Abscess of right foot 8/4/2018    Acquired hammer toe deformity of lesser toe of right foot     ALEJANDRO (acute kidney injury) ([facility] [address] 8/5/2018    Cellulitis 4/24/2018    Cellulitis of left foot 4/24/2018    Cellulitis of right foot     and abscess    Charcot foot due to diabetes mellitus ([facility] [address] 2014    Right foot     Chest pain at rest 8/4/2018 cream Apply topically daily. 6/12/21  Yes Adam Lee,    meloxicam (MOBIC) 15 MG tablet Take 15 mg by mouth nightly  5/5/21  Yes Historical Provider, MD   albuterol sulfate  (90 Base) MCG/ACT inhaler Inhale 2 puffs into the lungs every 6 hours as needed  4/1/21  Yes Historical Provider, MD   JANUVIA 100 MG tablet Take 100 mg by mouth daily  11/13/20  Yes Historical Provider, MD Guerrac. Devices MISC 1 PAIR OF DIABETIC SHOES (1 LEFT/ 1 RIGHT)  1-3 PAIRS OF INSERTS (LEFT/ RIGHT) 3/5/20  Yes Governor Pall, DPM   cetirizine (ZYRTEC) 10 MG tablet Take 10 mg by mouth nightly  10/21/19  Yes Historical Provider, MD   lisinopril (PRINIVIL;ZESTRIL) 10 MG tablet Take 10 mg by mouth daily 11/15/18  Yes Historical Provider, MD   simvastatin (ZOCOR) 40 MG tablet Take 40 mg by mouth nightly  11/13/18  Yes Historical Provider, MD   glipiZIDE (GLUCOTROL) 10 MG tablet Take 20 mg by mouth 2 times daily (before meals) Takes 2 tabs (=20mg) BID   Yes Historical Provider, MD   aspirin 81 MG tablet Take 81 mg by mouth daily   Yes Historical Provider, MD   gabapentin (NEURONTIN) 300 MG capsule Take 900 mg by mouth 3 times daily. Take 3 caps (=900mg) 3 times a day   Yes Historical Provider, MD       Review of Systems    Review of Systems:  History obtained from chart review and the patient  General ROS: negative for - chills, fatigue, fever, night sweats or weight gain  Constitutional: Negative for chills, diaphoresis, fatigue, fever and unexpected weight change. Musculoskeletal: Positive for arthralgias, gait problem and joint swelling. Neurological ROS: negative for - behavioral changes, confusion, headaches or seizures. Negative for weakness and numbness. Dermatological ROS: negative for - mole changes, rash  Cardiovascular: Negative for leg swelling. Gastrointestinal: Negative for constipation, diarrhea, nausea and vomiting. Lower Extremity Physical Examination:     Vitals:  There were no vitals filed for this visit. General: AAO x 3 in NAD. Dermatologic Exam:  Skin lesion/ulceration Absent . Skin No rashes or nodules noted. .   Skin is thin, with flaky sloughing skin as well as decreased hair growth to the lower leg  Small red hemosiderin deposits seen dorsal foot   Musculoskeletal:     1st MPJ ROM decreased, Bilateral.  Muscle strength 5/5, Bilateral.  Pain present upon palpation of right foot. decreased medial longitudinal arch, Bilateral.  Ankle ROM decreased,Bilateral.    Dorsally contracted digits present digits 2, Bilateral.     Vascular: DP pulses 1/4 bilateral.  PT pulses 0/4 bilateral.   CFT <5 seconds, Bilateral.  Hair growth absent to the level of the digits, Bilateral.  Edema present, Bilateral.  Varicosities absent, Bilateral. Erythema absent, Bilateral    Neurological: Sensation diminshed to light touch to level of digits, Bilateral.  Protective sensation intact 6/10 sites via 5.07/10g Lawrence-Jorge Monofilament, Bilateral.  negative Tinel's, Bilateral.  negative Valleix sign, Bilateral.      Integument: Warm, dry, supple, Bilateral.  Open lesion absent, Bilateral.  Interdigital maceration absent to web spaces 4, Bilateral.  Nails 1-5 left and 1-5 right thickened > 3.0 mm, dystrophic and crumbly, discolored with yellow subungual debris. Fissures absent, Bilateral.       X rays right foot 3 views:  Impression                     Right foot:       1. 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 Moderate   plantar calcaneal spur. 2. Prior osteotomy of the proximal phalanx 3rd digit. 3. Mild soft tissue edema of the right foot. 4. No acute fracture or dislocation. X rays right ankle 3 views    Right ankle:       1. 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 Moderate   plantar calcaneal spur. 2. Mild soft tissue swelling and edema of the right ankle. 3. No acute fracture or dislocation.      Asessment: Patient is a 78 y.o. male with:    Diagnosis

## 2022-08-23 ENCOUNTER — OFFICE VISIT (OUTPATIENT)
Dept: PODIATRY | Age: 67
End: 2022-08-23
Payer: MEDICARE

## 2022-08-23 VITALS — HEIGHT: 71 IN | BODY MASS INDEX: 33.74 KG/M2 | WEIGHT: 241 LBS

## 2022-08-23 DIAGNOSIS — L97.513 RIGHT FOOT ULCER, WITH NECROSIS OF MUSCLE (HCC): Primary | ICD-10-CM

## 2022-08-23 DIAGNOSIS — M79.671 RIGHT FOOT PAIN: ICD-10-CM

## 2022-08-23 DIAGNOSIS — E11.610 TYPE 2 DIABETES MELLITUS WITH CHARCOT'S JOINT OF RIGHT FOOT (HCC): ICD-10-CM

## 2022-08-23 DIAGNOSIS — E11.51 TYPE II DIABETES MELLITUS WITH PERIPHERAL CIRCULATORY DISORDER (HCC): ICD-10-CM

## 2022-08-23 PROCEDURE — 99999 PR OFFICE/OUTPT VISIT,PROCEDURE ONLY: CPT | Performed by: PODIATRIST

## 2022-08-23 PROCEDURE — 11043 DBRDMT MUSC&/FSCA 1ST 20/<: CPT | Performed by: PODIATRIST

## 2022-08-23 NOTE — PROGRESS NOTES
tablet, Take 1 tablet by mouth 3 times daily as needed for Nausea or Vomiting, Disp: 21 tablet, Rfl: 0    mupirocin (BACTROBAN) 2 % ointment, , Disp: , Rfl:     amLODIPine (NORVASC) 5 MG tablet, TAKE 1 TABLET BY MOUTH EVERY DAY, Disp: , Rfl:     ceFEPIme (MAXIPIME) 2 g injection, , Disp: , Rfl:     LANTUS SOLOSTAR 100 UNIT/ML injection pen, Inject 15 Units into the skin nightly, Disp: 5 pen, Rfl: 3    silver sulfADIAZINE (SILVADENE) 1 % cream, Apply topically daily. , Disp: 50 g, Rfl: 1    meloxicam (MOBIC) 15 MG tablet, Take 15 mg by mouth nightly , Disp: , Rfl:     albuterol sulfate  (90 Base) MCG/ACT inhaler, Inhale 2 puffs into the lungs every 6 hours as needed , Disp: , Rfl:     JANUVIA 100 MG tablet, Take 100 mg by mouth daily , Disp: , Rfl:     Misc. Devices MISC, 1 PAIR OF DIABETIC SHOES (1 LEFT/ 1 RIGHT) 1-3 PAIRS OF INSERTS (LEFT/ RIGHT), Disp: 2 each, Rfl: 0    cetirizine (ZYRTEC) 10 MG tablet, Take 10 mg by mouth nightly , Disp: , Rfl:     lisinopril (PRINIVIL;ZESTRIL) 10 MG tablet, Take 10 mg by mouth daily, Disp: , Rfl:     simvastatin (ZOCOR) 40 MG tablet, Take 40 mg by mouth nightly , Disp: , Rfl:     glipiZIDE (GLUCOTROL) 10 MG tablet, Take 20 mg by mouth 2 times daily (before meals) Takes 2 tabs (=20mg) BID, Disp: , Rfl:     aspirin 81 MG tablet, Take 81 mg by mouth daily, Disp: , Rfl:     gabapentin (NEURONTIN) 300 MG capsule, Take 900 mg by mouth 3 times daily. Take 3 caps (=900mg) 3 times a day, Disp: , Rfl:     Review of Systems  Review of Systems - History obtained from chart review and the patient  General ROS: negative for - chills, fatigue, fever, night sweats or weight gain  Constitutional: Negative for chills, diaphoresis, fatigue, fever and unexpected weight change. Musculoskeletal: Positive for arthralgias, gait problem and joint swelling. Neurological ROS: negative for - behavioral changes, confusion, headaches or seizures. Negative for weakness and numbness.    Dermatological ROS: negative for - mole changes, rash  Cardiovascular: Negative for leg swelling. Gastrointestinal: Negative for constipation, diarrhea, nausea and vomiting. Objective:       Physical Exam:  Ht 5' 11\" (1.803 m)   Wt 241 lb (109.3 kg)   BMI 33.61 kg/m²     NV status remains unchanged since last visit. Wound #:   1    diabetic foot ulcer   right foot    Wound measurements:  #1  9 mm by 8 mm  by   3 mm         Wound Depth: muscle. Drainage:    moderate Type: serosanguinous    Wound Bed status:   Granulation Tissue: 100%   Necrotic 0%  Type:   Epithelialization 0%   Hypergranular 0%   Periwound hyperkeratosis:  present  Erythema absent  Odor:no  Maceration:no  Undermining/tract/tunneling:no  Culture taken:   no             Assessment:     Assessment: Patient is a 78 y.o. male with:   Diagnosis Orders   1. Right foot ulcer, with necrosis of muscle (HCC)  HI DEBRIDEMENT, SKIN, SUB-Q TISSUE,MUSCLE,=<20 SQ CM      2. Type II diabetes mellitus with peripheral circulatory disorder (HCC)  HI DEBRIDEMENT, SKIN, SUB-Q TISSUE,MUSCLE,=<20 SQ CM      3. Right foot pain  HI DEBRIDEMENT, SKIN, SUB-Q TISSUE,MUSCLE,=<20 SQ CM      4. Type 2 diabetes mellitus with Charcot's joint of right foot (HCC)  HI DEBRIDEMENT, SKIN, SUB-Q TISSUE,MUSCLE,=<20 SQ CM            Overall Wound Assessment  Wound is has slightly improved. Size has decreased  Appearance has not changed      Plan:     Pt examined and evaluated. Current condition and treatment options discussed in detail. Arely Kapoor Age:  78 y.o. Gender:  male   :  1955  Today's Date:  2022      Procedure: With the patient in supine position, Lidocaine soaked gauze was applied per physician order at beginning of wound evaluation. It was subsequently removed.     Using a #15 blade scalpel and Pick-up, the wound was debrided down to and included the removal of the following tissue:     [] epidermis, [] dermis, []  subcutaneous tissue,  [x] muscle/fascia tissue,   and/or  [] bone     Also debrided was all  [] fibrin, [] biofilm, [] slough,  [x] necrotic,  [] hypergranulation tissue, and/or  [x] hyperkeratotic tissue. Wound was copiously irrigated with normal saline solution. Bleeding:  Minimal.  Hemostasis:  pressure     100%  of wound debrided. Patient tolerated procedure well. Pain 0 / 10 during procedure and pain 0 / 10 after procedure. Discussed appropriate home care of this wound. Wound redressed. Patient instructions were given. Dispensed dressing supplies and instructions on their use. Dressings: No orders of the defined types were placed in this encounter. No orders of the defined types were placed in this encounter. Follow up: 3 week.       Electronically signed by Allison Lozano DPM on 8/23/2022 at 12:56 PM  8/23/2022

## 2022-09-06 ENCOUNTER — OFFICE VISIT (OUTPATIENT)
Dept: PODIATRY | Age: 67
End: 2022-09-06
Payer: MEDICARE

## 2022-09-06 VITALS — BODY MASS INDEX: 33.74 KG/M2 | HEIGHT: 71 IN | WEIGHT: 241 LBS

## 2022-09-06 DIAGNOSIS — E11.610 TYPE 2 DIABETES MELLITUS WITH CHARCOT'S JOINT OF RIGHT FOOT (HCC): ICD-10-CM

## 2022-09-06 DIAGNOSIS — M79.671 RIGHT FOOT PAIN: ICD-10-CM

## 2022-09-06 DIAGNOSIS — E11.51 TYPE II DIABETES MELLITUS WITH PERIPHERAL CIRCULATORY DISORDER (HCC): ICD-10-CM

## 2022-09-06 DIAGNOSIS — L97.513 RIGHT FOOT ULCER, WITH NECROSIS OF MUSCLE (HCC): Primary | ICD-10-CM

## 2022-09-06 DIAGNOSIS — M25.571 ACUTE RIGHT ANKLE PAIN: ICD-10-CM

## 2022-09-06 PROCEDURE — 99999 PR OFFICE/OUTPT VISIT,PROCEDURE ONLY: CPT | Performed by: PODIATRIST

## 2022-09-06 PROCEDURE — 11043 DBRDMT MUSC&/FSCA 1ST 20/<: CPT | Performed by: PODIATRIST

## 2022-09-06 NOTE — PATIENT INSTRUCTIONS
Schedule a Vaccine  When you qualify to receive the vaccine, call the [facility]) COVID-19 Vaccination Hotline to schedule your appointment or to get additional information about the [facility]) locations which are offering the COVID-19 vaccine. To be 94% effective, it's important that you receive two doses of one of the COVID-19 vaccines. -If you are receiving the Alves Peter vaccine, your second shot will be scheduled as close to 21 days after the first shot as possible. -If you are receiving the Moderna vaccine, your second shot will be scheduled as close to 28 days after the first shot as possible. [facility]) COVID-19 Vaccination Hotline: 642.563.3395    Links to [facility]) website and [facility] website:    [employer]-monitoring-coronavirus-covid-19/covid-19-vaccine/[place]-vaccine    [email or web address]

## 2022-09-06 NOTE — PROGRESS NOTES
[address] [facility] PODIATRY [facility]  [id number] [place] [id number]  Dept: [phone]  Dept Fax: [phone]    RETURN WOUND CARE PROGRESS NOTE  Date of patient's visit: 2022  Patient's Name:  Yarely Moncada YOB: 1955            Patient Care Team:  Leo Salinas MD as PCP - General (Family Medicine)  Tim Dickerson MD as Consulting Physician (Infectious Diseases)  Chloe Bird DPM as Physician (Podiatry)  Chloe Bird DPM as Physician (Podiatry)  Kun Aparicio MD as Consulting Physician (Infectious Diseases)      Chief Complaint   Patient presents with    Diabetes    Wound Check     Right foot       Yarely Moncada  AGE: 78 y.o. GENDER: male  : 1955  TODAY'S DATE:  2022    Subjective:       Yarely Moncada is a 78 y.o. male presents to the office today for follow up of an ulceration. Denies F/C/N/V.   Wound Type:diabetic and pressure  Wound Location: right foot plantar lateral   Modifying factors:diabetes            Lab Results   Component Value Date    LABA1C 8.7 (H) 2021       ALLERGIES    Allergies   Allergen Reactions    Morphine Itching    Other Other (See Comments)     Other reaction(s): Unknown    Percocet [Oxycodone-Acetaminophen]      Facial swelling    Dye [Iodides] Rash     Rash and swelling       Medications:    Current Outpatient Medications:     HYDROcodone-acetaminophen (NORCO)  MG per tablet, TAKE 1 TABLET BY MOUTH FOUR TIMES DAILY AS NEEDED, Disp: , Rfl:     isosorbide dinitrate (ISORDIL) 30 MG tablet, Take 30 mg by mouth daily, Disp: , Rfl:     pantoprazole (PROTONIX) 40 MG tablet, Take 40 mg by mouth daily, Disp: , Rfl:     TRUEPLUS PEN NEEDLES 31G X 8 MM MISC, , Disp: , Rfl:     ondansetron (ZOFRAN-ODT) 4 MG disintegrating tablet, Take 1 tablet by mouth 3 times daily as needed for Nausea or Vomiting, Disp: 21 tablet, Rfl: 0    mupirocin (BACTROBAN) 2 % ointment, , Disp: , Rfl:     amLODIPine (NORVASC) 5 MG tablet, TAKE 1 TABLET BY MOUTH EVERY DAY, Disp: , Rfl:     ceFEPIme (MAXIPIME) 2 g injection, , Disp: , Rfl:     LANTUS SOLOSTAR 100 UNIT/ML injection pen, Inject 15 Units into the skin nightly, Disp: 5 pen, Rfl: 3    silver sulfADIAZINE (SILVADENE) 1 % cream, Apply topically daily. , Disp: 50 g, Rfl: 1    meloxicam (MOBIC) 15 MG tablet, Take 15 mg by mouth nightly , Disp: , Rfl:     albuterol sulfate  (90 Base) MCG/ACT inhaler, Inhale 2 puffs into the lungs every 6 hours as needed , Disp: , Rfl:     JANUVIA 100 MG tablet, Take 100 mg by mouth daily , Disp: , Rfl:     Misc. Devices MISC, 1 PAIR OF DIABETIC SHOES (1 LEFT/ 1 RIGHT) 1-3 PAIRS OF INSERTS (LEFT/ RIGHT), Disp: 2 each, Rfl: 0    cetirizine (ZYRTEC) 10 MG tablet, Take 10 mg by mouth nightly , Disp: , Rfl:     lisinopril (PRINIVIL;ZESTRIL) 10 MG tablet, Take 10 mg by mouth daily, Disp: , Rfl:     simvastatin (ZOCOR) 40 MG tablet, Take 40 mg by mouth nightly , Disp: , Rfl:     glipiZIDE (GLUCOTROL) 10 MG tablet, Take 20 mg by mouth 2 times daily (before meals) Takes 2 tabs (=20mg) BID, Disp: , Rfl:     aspirin 81 MG tablet, Take 81 mg by mouth daily, Disp: , Rfl:     gabapentin (NEURONTIN) 300 MG capsule, Take 900 mg by mouth 3 times daily. Take 3 caps (=900mg) 3 times a day, Disp: , Rfl:     Review of Systems  Review of Systems - History obtained from chart review and the patient  General ROS: negative for - chills, fatigue, fever, night sweats or weight gain  Constitutional: Negative for chills, diaphoresis, fatigue, fever and unexpected weight change. Musculoskeletal: Positive for arthralgias, gait problem and joint swelling. Neurological ROS: negative for - behavioral changes, confusion, headaches or seizures. Negative for weakness and numbness. Dermatological ROS: negative for - mole changes, rash  Cardiovascular: Negative for leg swelling.    Gastrointestinal: Negative for constipation, diarrhea, nausea and vomiting. Objective:       Physical Exam:  Ht 5' 11\" (1.803 m)   Wt 241 lb (109.3 kg)   BMI 33.61 kg/m²     NV status remains unchanged since last visit. Wound #:   1    diabetic foot ulcer   right foot    Wound measurements:  #1  3 cm by 2 cm  by   3 mm         Wound Depth: muscle. Drainage:    minimal, serosanguinous Type: minimal, serosanguinous    Wound Bed status:   Granulation Tissue: 60%   Necrotic 40%  Type:   Epithelialization 0%   Hypergranular 0%   Periwound hyperkeratosis:  present  Erythema absent  Odor:no  Maceration:no  Undermining/tract/tunneling:no  Culture taken:   no             Assessment:     Assessment: Patient is a 78 y.o. male with:   Diagnosis Orders   1. Right foot ulcer, with necrosis of muscle (HCC)  FL DEBRIDEMENT, SKIN, SUB-Q TISSUE,MUSCLE,=<20 SQ CM      2. Type II diabetes mellitus with peripheral circulatory disorder (HCC)  FL DEBRIDEMENT, SKIN, SUB-Q TISSUE,MUSCLE,=<20 SQ CM      3. Right foot pain  FL DEBRIDEMENT, SKIN, SUB-Q TISSUE,MUSCLE,=<20 SQ CM      4. Type 2 diabetes mellitus with Charcot's joint of right foot (HCC)  FL DEBRIDEMENT, SKIN, SUB-Q TISSUE,MUSCLE,=<20 SQ CM      5. Acute right ankle pain  FL DEBRIDEMENT, SKIN, SUB-Q TISSUE,MUSCLE,=<20 SQ CM            Overall Wound Assessment  Wound is has significantly improved. Size has unchanged  Appearance has not changed      Plan:     Pt examined and evaluated. Current condition and treatment options discussed in detail. Alex Bojorquez Age:  78 y.o. Gender:  male   :  1955  Today's Date:  2022      Procedure: With the patient in lateral position, Lidocaine soaked gauze was applied per physician order at beginning of wound evaluation. It was subsequently removed.     Using a #15 blade scalpel and Pick-up, the wound was debrided down to and included the removal of the following tissue:     [] epidermis, [] dermis, []  subcutaneous tissue,  [x] muscle/fascia tissue,   and/or  [] bone     Also debrided was all  [] fibrin, [] biofilm, [] slough,  [x] necrotic,  [x] hypergranulation tissue, and/or  [] hyperkeratotic tissue. Wound was copiously irrigated with normal saline solution. Bleeding:  Minimal.  Hemostasis:  pressure     100%  of wound debrided. Patient tolerated procedure well. Pain 0 / 10 during procedure and pain 0 / 10 after procedure. Discussed appropriate home care of this wound. Wound redressed. Patient instructions were given. Dispensed dressing supplies and instructions on their use. Dressings: No orders of the defined types were placed in this encounter. No orders of the defined types were placed in this encounter. Follow up: 2 week.       Electronically signed by Kenya Garcia DPM on 9/6/2022 at 1:42 PM  9/6/2022

## 2022-09-13 ENCOUNTER — APPOINTMENT (OUTPATIENT)
Dept: GENERAL RADIOLOGY | Age: 67
End: 2022-09-13
Payer: MEDICARE

## 2022-09-13 ENCOUNTER — APPOINTMENT (OUTPATIENT)
Dept: CT IMAGING | Age: 67
End: 2022-09-13
Payer: MEDICARE

## 2022-09-13 ENCOUNTER — HOSPITAL ENCOUNTER (EMERGENCY)
Age: 67
Discharge: HOME | End: 2022-09-13
Attending: EMERGENCY MEDICINE
Payer: MEDICARE

## 2022-09-13 VITALS
WEIGHT: 238 LBS | OXYGEN SATURATION: 97 % | BODY MASS INDEX: 33.32 KG/M2 | SYSTOLIC BLOOD PRESSURE: 151 MMHG | RESPIRATION RATE: 18 BRPM | DIASTOLIC BLOOD PRESSURE: 70 MMHG | HEART RATE: 92 BPM | HEIGHT: 71 IN | TEMPERATURE: 98.3 F

## 2022-09-13 DIAGNOSIS — L03.119 CELLULITIS OF FOOT: Primary | ICD-10-CM

## 2022-09-13 LAB
ABSOLUTE EOS #: 0.34 K/UL (ref 0–0.44)
ABSOLUTE IMMATURE GRANULOCYTE: 0.03 K/UL (ref 0–0.3)
ABSOLUTE LYMPH #: 2.45 K/UL (ref 1.1–3.7)
ABSOLUTE MONO #: 0.83 K/UL (ref 0.1–1.2)
ANION GAP SERPL CALCULATED.3IONS-SCNC: 11 MMOL/L (ref 9–17)
BASOPHILS # BLD: 1 % (ref 0–2)
BASOPHILS ABSOLUTE: 0.05 K/UL (ref 0–0.2)
BUN BLDV-MCNC: 21 MG/DL (ref 8–23)
BUN/CREAT BLD: 10 (ref 9–20)
C-REACTIVE PROTEIN: 108.3 MG/L (ref 0–5)
CALCIUM SERPL-MCNC: 9.8 MG/DL (ref 8.6–10.4)
CHLORIDE BLD-SCNC: 97 MMOL/L (ref 98–107)
CO2: 26 MMOL/L (ref 20–31)
CREAT SERPL-MCNC: 2.13 MG/DL (ref 0.7–1.2)
EOSINOPHILS RELATIVE PERCENT: 4 % (ref 1–4)
GFR AFRICAN AMERICAN: 38 ML/MIN
GFR NON-AFRICAN AMERICAN: 31 ML/MIN
GFR SERPL CREATININE-BSD FRML MDRD: ABNORMAL ML/MIN/{1.73_M2}
GLUCOSE BLD-MCNC: 304 MG/DL (ref 70–99)
HCT VFR BLD CALC: 35.6 % (ref 40.7–50.3)
HEMOGLOBIN: 11.9 G/DL (ref 13–17)
IMMATURE GRANULOCYTES: 0 %
LACTIC ACID, SEPSIS: 1.5 MMOL/L (ref 0.5–1.9)
LYMPHOCYTES # BLD: 25 % (ref 24–43)
MCH RBC QN AUTO: 29.9 PG (ref 25.2–33.5)
MCHC RBC AUTO-ENTMCNC: 33.4 G/DL (ref 28.4–34.8)
MCV RBC AUTO: 89.4 FL (ref 82.6–102.9)
MONOCYTES # BLD: 9 % (ref 3–12)
NRBC AUTOMATED: 0 PER 100 WBC
PDW BLD-RTO: 13.3 % (ref 11.8–14.4)
PLATELET # BLD: 253 K/UL (ref 138–453)
PMV BLD AUTO: 9.7 FL (ref 8.1–13.5)
POTASSIUM SERPL-SCNC: 4.8 MMOL/L (ref 3.7–5.3)
RBC # BLD: 3.98 M/UL (ref 4.21–5.77)
SEDIMENTATION RATE, ERYTHROCYTE: 50 MM/HR (ref 0–20)
SEG NEUTROPHILS: 61 % (ref 36–65)
SEGMENTED NEUTROPHILS ABSOLUTE COUNT: 5.95 K/UL (ref 1.5–8.1)
SODIUM BLD-SCNC: 134 MMOL/L (ref 135–144)
WBC # BLD: 9.7 K/UL (ref 3.5–11.3)

## 2022-09-13 PROCEDURE — 87070 CULTURE OTHR SPECIMN AEROBIC: CPT

## 2022-09-13 PROCEDURE — 80048 BASIC METABOLIC PNL TOTAL CA: CPT

## 2022-09-13 PROCEDURE — 87205 SMEAR GRAM STAIN: CPT

## 2022-09-13 PROCEDURE — 85652 RBC SED RATE AUTOMATED: CPT

## 2022-09-13 PROCEDURE — 87040 BLOOD CULTURE FOR BACTERIA: CPT

## 2022-09-13 PROCEDURE — 73700 CT LOWER EXTREMITY W/O DYE: CPT

## 2022-09-13 PROCEDURE — 99284 EMERGENCY DEPT VISIT MOD MDM: CPT

## 2022-09-13 PROCEDURE — 85025 COMPLETE CBC W/AUTO DIFF WBC: CPT

## 2022-09-13 PROCEDURE — 83605 ASSAY OF LACTIC ACID: CPT

## 2022-09-13 PROCEDURE — 87075 CULTR BACTERIA EXCEPT BLOOD: CPT

## 2022-09-13 PROCEDURE — 86140 C-REACTIVE PROTEIN: CPT

## 2022-09-13 PROCEDURE — 87102 FUNGUS ISOLATION CULTURE: CPT

## 2022-09-13 PROCEDURE — 73630 X-RAY EXAM OF FOOT: CPT

## 2022-09-13 RX ORDER — SODIUM CHLORIDE 0.9 % (FLUSH) 0.9 %
5-40 SYRINGE (ML) INJECTION EVERY 12 HOURS SCHEDULED
Status: DISCONTINUED | OUTPATIENT
Start: 2022-09-13 | End: 2022-09-13 | Stop reason: HOSPADM

## 2022-09-13 RX ORDER — SODIUM CHLORIDE 9 MG/ML
INJECTION, SOLUTION INTRAVENOUS PRN
Status: DISCONTINUED | OUTPATIENT
Start: 2022-09-13 | End: 2022-09-13 | Stop reason: HOSPADM

## 2022-09-13 RX ORDER — CEPHALEXIN 500 MG/1
500 CAPSULE ORAL 2 TIMES DAILY
Qty: 14 CAPSULE | Refills: 0 | Status: SHIPPED | OUTPATIENT
Start: 2022-09-13 | End: 2022-09-20

## 2022-09-13 RX ORDER — SODIUM CHLORIDE 0.9 % (FLUSH) 0.9 %
5-40 SYRINGE (ML) INJECTION PRN
Status: DISCONTINUED | OUTPATIENT
Start: 2022-09-13 | End: 2022-09-13 | Stop reason: HOSPADM

## 2022-09-13 ASSESSMENT — PAIN - FUNCTIONAL ASSESSMENT: PAIN_FUNCTIONAL_ASSESSMENT: NONE - DENIES PAIN

## 2022-09-13 ASSESSMENT — ENCOUNTER SYMPTOMS
GASTROINTESTINAL NEGATIVE: 1
ALLERGIC/IMMUNOLOGIC NEGATIVE: 1
RESPIRATORY NEGATIVE: 1

## 2022-09-13 NOTE — ED PROVIDER NOTES
[address] [facility] ED  EMERGENCY DEPARTMENT ENCOUNTER   ATTENDING ATTESTATION     Pt Name: Yara Cheung  MRN: [id number]  [place] 1955  Date of evaluation: 9/13/22       Yara Cheung is a 78 y.o. male who presents with Abscess (Right foot infection)      MDM:   62-year-old presents with complaints of a possible right foot infection, podiatry does not believe there is a significant infection, we will await a CT and if negative discharge. Vitals:   Vitals:    09/13/22 1614   BP: (!) 151/70   Pulse: 92   Resp: 18   Temp: 98.3 °F (36.8 °C)   TempSrc: Oral   SpO2: 97%   Weight: 238 lb (108 kg)   Height: 5' 11\" (1.803 m)         This visit was performed by both a physician and an APC. I personally evaluated and examined the patient.  I performed all aspects of the MDM as documented     Phuc Vargas MD  Attending Emergency  Physician                  Calhoun Goodpasture, MD  09/13/22 [id number]

## 2022-09-13 NOTE — CONSULTS
Consultation Note  Podiatric Medicine and Surgery     Subjective     Chief Complaint: Charcot foot, open ulcer    HPI:  Jade Mix is a 78 y.o. male seen at [facility] for open ulcer to his right foot. Patient states that he stepped on a nail over a year ago and has been having problems with this ever since. patient follows up with Dr. Dion Godwin. He had some malodor that was changing and his wife states shape of her his ankle has changed recently since his last seen his podiatrist.  He has been compliant with all treatment for his wound and has worn a Crow boot in the past.  Patient denies nausea, fever, chills, diarrhea, vomiting. PCP is Ivonne Stewart MD    ROS:   Review of Systems   Constitutional: Negative. HENT: Negative. Respiratory: Negative. Cardiovascular: Negative. Gastrointestinal: Negative. Endocrine: Negative. Genitourinary: Negative. Skin:  Positive for wound. Allergic/Immunologic: Negative. Neurological: Negative. Past Medical History   has a past medical history of Abscess of right foot, Acquired hammer toe deformity of lesser toe of right foot, ALEJANDRO (acute kidney injury) ([address] Cellulitis, Cellulitis of left foot, Cellulitis of right foot, Charcot foot due to diabetes mellitus ([address] Chest pain at rest, Chronic multifocal osteomyelitis of right foot ([address] Diabetic polyneuropathy associated with type 2 diabetes mellitus ([address] Essential hypertension, Fractures, multiple, Hyperlipidemia, Hypertension, Leukocytosis, Neuropathy, Pain in right foot, Pneumonia, Right foot infection, Right foot pain, Tobacco abuse, Type II or unspecified type diabetes mellitus without mention of complication, not stated as uncontrolled, Vertigo, 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     Past Surgical History   has a past surgical history that includes Neck surgery (1987); Appendectomy; knee surgery (Bilateral, 1983); Knee arthroscopy (Right, 1989); Knee arthroscopy (Left, 1990); Foot Debridement (Left, 04/24/2018); pr deep dissec foot infec,1 bursa (Left, 4/24/2018); Colonoscopy; Foot Debridement (Right, 3/20/2020); arthroplasty (Right, 12/11/2020); Foot Debridement (Right, 6/8/2021); Foot surgery (Right, 6/11/2021); and Foot Debridement (Right, 9/16/2021). Medications  Prior to Admission medications    Medication Sig Start Date End Date Taking? Authorizing Provider   HYDROcodone-acetaminophen ([facility])  MG per tablet TAKE 1 TABLET BY MOUTH FOUR TIMES DAILY AS NEEDED 2/18/22   Historical Provider, MD   isosorbide dinitrate (ISORDIL) 30 MG tablet Take 30 mg by mouth daily    Historical Provider, MD   pantoprazole (PROTONIX) 40 MG tablet Take 40 mg by mouth daily 12/17/21   Historical Provider, MD   TRUEPLUS PEN NEEDLES 31G X 8 MM [address] [place] [address]  11/29/21   Historical Provider, MD   ondansetron (ZOFRAN-ODT) 4 MG disintegrating tablet Take 1 tablet by mouth 3 times daily as needed for Nausea or Vomiting 43/0/52   Ana Dailey MD   mupirocin [facility]) 2 % ointment  11/8/21   Historical Provider, MD   amLODIPine (NORVASC) 5 MG tablet TAKE 1 TABLET BY MOUTH EVERY DAY 10/20/21   Historical Provider, MD   ceFEPIme (MAXIPIME) 2 g injection  9/22/21   Historical Provider, MD   LANTUS SOLOSTAR 100 UNIT/ML injection pen Inject 15 Units into the skin nightly 9/17/21   ROCHELLE Gomez NP   silver sulfADIAZINE (SILVADENE) 1 % cream Apply topically daily. 6/12/21   Saunders Tyree Lee DO   meloxicam (MOBIC) 15 MG tablet Take 15 mg by mouth nightly  5/5/21   Historical Provider, MD   albuterol sulfate  (90 Base) MCG/ACT inhaler Inhale 2 puffs into the lungs every 6 hours as needed  4/1/21   Historical Provider, MD   JANUVIA 100 MG tablet Take 100 mg by mouth daily  11/13/20   Historical Provider, MD   Misc.  Devices MISC 1 PAIR OF DIABETIC SHOES (1 LEFT/ 1 RIGHT)  1-3 PAIRS OF INSERTS (LEFT/ RIGHT) 3/5/20   Jackie Goldsmith DPLIYAH   cetirizine (ZYRTEC) 10 MG tablet Take 10 mg by mouth nightly  10/21/19   Historical Provider, MD   lisinopril (PRINIVIL;ZESTRIL) 10 MG tablet Take 10 mg by mouth daily 11/15/18   Historical Provider, MD   simvastatin (ZOCOR) 40 MG tablet Take 40 mg by mouth nightly  18   Historical Provider, MD   glipiZIDE (GLUCOTROL) 10 MG tablet Take 20 mg by mouth 2 times daily (before meals) Takes 2 tabs (=20mg) BID    Historical Provider, MD   aspirin 81 MG tablet Take 81 mg by mouth daily    Historical Provider, MD   gabapentin (NEURONTIN) 300 MG capsule Take 900 mg by mouth 3 times daily. Take 3 caps (=900mg) 3 times a day    Historical Provider, MD    Scheduled Meds:   sodium chloride flush  5-40 mL IntraVENous 2 times per day     Continuous Infusions:   sodium chloride       PRN Meds:.sodium chloride flush, sodium chloride    Allergies  is allergic to morphine, other, percocet [oxycodone-acetaminophen], and dye [iodides]. Family History  family history includes Cancer in his father; Diabetes in his mother; Hypertension in his maternal grandmother. Social History   reports that he has been smoking cigarettes. He has been smoking an average of 1 pack per day. He has never used smokeless tobacco.   reports no history of alcohol use. reports that he does not currently use drugs.     Objective     Vitals:  Patient Vitals for the past 8 hrs:   BP Temp Temp src Pulse Resp SpO2 Height Weight   22 1614 (!) 151/70 98.3 °F (36.8 °C) Oral 92 18 97 % 5' 11\" (1.803 m) 238 lb (108 kg)     Average, Min, and Max for last 24 hours Vitals:  TEMPERATURE:  Temp  Av.3 °F (36.8 °C)  Min: 98.3 °F (36.8 °C)  Max: 98.3 °F (36.8 °C)    RESPIRATIONS RANGE: Resp  Av  Min: 18  Max: 18    PULSE RANGE: Pulse  Av  Min: 92  Max: 92    BLOOD PRESSURE RANGE:  Systolic (67ELJ), XPO:930 , Min:151 , RUK:401   ; Diastolic (24VTE), VBU:17, Min:70, Max:70      PULSE OXIMETRY RANGE: SpO2  Av %  Min: 97 %  Max: 97 %  I&O:  No intake/output data recorded. CBC:  Recent Labs     22   WBC 9.7   HGB 11.9*   HCT 35.6*      .3*        BMP:  Recent Labs     22   *   K 4.8   CL 97*   CO2 26   BUN 21   CREATININE 2.13*   GLUCOSE 304*   CALCIUM 9.8        Coags:  No results for input(s): APTT, PROT, INR in the last 72 hours. Lab Results   Component Value Date    LABA1C 8.7 (H) 2021     Lab Results   Component Value Date    SEDRATE 50 (H) 2022     Lab Results   Component Value Date    .3 (H) 2022         Physical Exam:  Vascular: DP and PT pulses are palpable. CFT <is 3 seconds  brisk to all digits. Hair growth is absent to the level of the digits. Focal edema right foot. Neuro: Saph/sural/SP/DP/plantar sensation absent to light touch. Musculoskeletal: Muscle strength is 4/5 to all lower extremity muscle groups. Gross deformity is absent. Compartments are soft and compressible. No pain with compression of posterior calf. Dermatologic: Full thickness ulcer #1 noted to the plantar medial foot and measures approximately 1.2cm x 2cm x 0.7cm. Base is granular. Periwound skin is hyperkeratotic. Does probe deep but not to bone. No fluctuance, crepitus, nor purulen drainage. Hyperkeratotic lesion around right ulcer. Clinical:         Imaging:   XR FOOT RIGHT (MIN 3 VIEWS)   Final Result   Plantar soft tissue ulceration. Punctate radiodensity superimposed over the plantar soft tissues may reflect   a very tiny radiopaque foreign body. No convincing radiographic evidence of osteomyelitis. CT FOOT RIGHT WO CONTRAST    (Results Pending)     CT- Charcot changes to midfoot. No soft tissue emphysema. Incidental bone cyst medial malleolus 1.1x1.6cm in size.     Cultures: Blood cultures- pending    Assessment     Fito Platt is a 78 y.o. male with   Charcot deformity with neuropathy, right foot  Chronic diabetic foot ulcer, right foot     Active Problems:    * No active hospital problems. *  Resolved Problems:    * No resolved hospital problems. *        Plan     Patient examined and evaluated at bedside. Treatment options discussed in detail with the patient. Plain film radiographs ordered. Negative for soft tissue emphysema, acute fracture/dislocation, foreign body, or osteomyelitis. CT of the RLE ordered to rule out deep abscess/osteomyelitis and review Charcot changes to r/o acute event. Likely to need MRI outpatient  Extensive discussion had with the patient regarding the complex nature of diabetes mellitus along with the numerous comorbidities that may be subsequently developed. As well as lymphedema/venous stasis and the importance of management to prevent wounds. Educated the patient on regular foot care, peripheral neuropathy, signs of infection, and peripheral arterial disease. All questions were answered to his apparent satisfaction. Affirmed patient that wound looks stable with no acute SOI. Patient demonstrates and verbalizes understanding. No debridement at this time. Dressing applied to right foot: sterile packing, DSD ACE  Plan for d/c to follow up outpatient  Rx for Keflex PO  PWB to Right lower extremity. Discussed with  Dr. Louis Oneil. Zachariah Ochoa DPM   Podiatric Medicine & Surgery   9/13/2022 at 7:35 PM      This note is created with the assistance of a speech recognition program.  While intending to generate a document that actually reflects the content of the visit, the document can still have some errors including those of syntax and sound a like substitutions which may escape proof reading. In such instances, actual meaning can be extrapolated by contextual diversion.

## 2022-09-13 NOTE — ED NOTES
Pt to ED from home. Stepped on a nail with his rt foot a year and a half ago and has been having complications since. Large wound to posterior medial aspect of rt foot. Approx 1cm hole in center, unknown depth. Pt denies pain- foot is numb, pt is a diabetic. Rt foot swollen, red, warm to touch. Pedal pulse present, 2+. Calloused, thick, white skin around wound opening. Clear drainage with foul odor. Denies fevers, chills. Multiple surgeries performed on rt foot, previous infections.       Stephanie Dee RN  09/13/22 2536

## 2022-09-14 NOTE — ED PROVIDER NOTES
[address] [place] [address] ED  [facility]      Pt Name: April Urena  MRN: [id number]  [place] 1955  Date ofevaluation: 9/13/2022  Provider: Janna Pugh PA-C    CHIEF COMPLAINT       Chief Complaint   Patient presents with    Abscess     Right foot infection         HISTORY OF PRESENT ILLNESS  (Location/Symptom, Timing/Onset, Context/Setting, Quality, Duration, Modifying Factors, Severity.)   April Urena is a 78 y.o. male who presents to the emergency department with right foot infection. History of diabetes. Reports having a odor from the wound above his foot. Follow-up with podiatry. wanted to come to the ER to get this evaluated before he gets worse. Nursing Notes were reviewed. ALLERGIES     Morphine, Other, Percocet [oxycodone-acetaminophen], and Dye [iodides]    CURRENT MEDICATIONS       Discharge Medication List as of 9/13/2022  8:04 PM        CONTINUE these medications which have NOT CHANGED    Details   HYDROcodone-acetaminophen (NORCO)  MG per tablet TAKE 1 TABLET BY MOUTH FOUR TIMES DAILY AS NEEDEDHistorical Med      isosorbide dinitrate (ISORDIL) 30 MG tablet Take 30 mg by mouth dailyHistorical Med      pantoprazole (PROTONIX) 40 MG tablet Take 40 mg by mouth dailyHistorical Med      TRUEPLUS PEN NEEDLES 31G X 8 MM MISC DAWHistorical Med      ondansetron (ZOFRAN-ODT) 4 MG disintegrating tablet Take 1 tablet by mouth 3 times daily as needed for Nausea or Vomiting, Disp-21 tablet, R-0Print      mupirocin (BACTROBAN) 2 % ointment Historical Med      amLODIPine (NORVASC) 5 MG tablet TAKE 1 TABLET BY MOUTH EVERY DAYHistorical Med      ceFEPIme (MAXIPIME) 2 g injection Historical Med      LANTUS SOLOSTAR 100 UNIT/ML injection pen Inject 15 Units into the skin nightly, Disp-5 pen, R-3, DAWNormal      silver sulfADIAZINE (SILVADENE) 1 % cream Apply topically daily. , Disp-50 g, R-1, Normal      meloxicam (MOBIC) 15 MG tablet Take 15 mg by mouth nightly Historical Med      albuterol sulfate  (90 Base) MCG/ACT inhaler Inhale 2 puffs into the lungs every 6 hours as needed Historical Med      JANUVIA 100 MG tablet Take 100 mg by mouth daily , DAWHistorical Med      Misc. Devices MISC Disp-2 each, R-0, Print1 PAIR OF DIABETIC SHOES (1 LEFT/ 1 RIGHT) 1-3 PAIRS OF INSERTS (LEFT/ RIGHT)      cetirizine (ZYRTEC) 10 MG tablet Take 10 mg by mouth nightly Historical Med      lisinopril (PRINIVIL;ZESTRIL) 10 MG tablet Take 10 mg by mouth dailyHistorical Med      simvastatin (ZOCOR) 40 MG tablet Take 40 mg by mouth nightly Historical Med      glipiZIDE (GLUCOTROL) 10 MG tablet Take 20 mg by mouth 2 times daily (before meals) Takes 2 tabs (=20mg) BIDHistorical Med      aspirin 81 MG tablet Take 81 mg by mouth dailyHistorical Med      gabapentin (NEURONTIN) 300 MG capsule Take 900 mg by mouth 3 times daily.  Take 3 caps (=900mg) 3 times a dayHistorical Med             PAST MEDICAL HISTORY         Diagnosis Date    Abscess of right foot 8/4/2018    Acquired hammer toe deformity of lesser toe of right foot     ALEJANDRO (acute kidney injury) ([address] 8/5/2018    Cellulitis 4/24/2018    Cellulitis of left foot 4/24/2018    Cellulitis of right foot     and abscess    Charcot foot due to diabetes mellitus ([address] 2014    Right foot     Chest pain at rest 8/4/2018    Chronic multifocal osteomyelitis of right foot ([address]     Diabetic polyneuropathy associated with type 2 diabetes mellitus ([address]     Essential hypertension     Fractures, multiple 07/21/2014    Right foot fractures     Hyperlipidemia     Hypertension     Leukocytosis     Neuropathy     diabetic with charcot affecting the right foot    Pain in right foot     redness and swelling    Pneumonia 2009    Right foot infection     Right foot pain     Tobacco abuse 4/24/2018    Type II or unspecified type diabetes mellitus without mention of complication, not stated as uncontrolled     Vertigo     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 stepped on sharp object. Covered by dressing. Wound, open     Right posterior -Diabetic Ulcer       SURGICAL HISTORY           Procedure Laterality Date    APPENDECTOMY      at age 24    ARTHROPLASTY Right 2020    RIGHT HALLUX EXOSTECTOMY AND 3RD DIGIT EXTENSIOR TENOTOMY performed by Poornima Martin DPM at [facility] Left 2018    I&D foreign body removal    FOOT DEBRIDEMENT Right 3/20/2020    RIGHT  FOOT   INCISION AND DRAINAGE WITH BONE BIOPSY performed by Poornima Martin DPM at [address] [place] [address] Right 2021    FOOT DEBRIDEMENT INCISION AND DRAINAGE performed by Poornima Martin DPM at [address] [place] [address] Right 2021    RIGHT FOOT  INCISION AND DRAINAGE   WITH PULSE LAVAGE performed by Poornima Martin DPM at [address] [place] [address] Right 2021    RIGHT FOOT FLAP CLOSURE performed by Poornima Martin DPM at [address] [place] [address] ARTHROSCOPY Right     KNEE ARTHROSCOPY Left     KNEE SURGERY Bilateral     arthroscopy    NECK SURGERY      IL DEEP [facility] Left 2018    LEFT FOOT MULTIPLE  INCISIONS  AND DRAINAGE AND REMOVAL FOREIGN BODY  IRENE performed by Poornima Martin DPM at [address]           Problem Relation Age of Onset    Diabetes Mother     Cancer Father     Hypertension Maternal Grandmother      Family Status   Relation Name Status    Mother  Alive    Father      MGM  (Not Specified)        SOCIAL HISTORY      reports that he has been smoking cigarettes. He has been smoking an average of 1 pack per day. He has never used smokeless tobacco. He reports that he does not currently use drugs. He reports that he does not drink alcohol.     REVIEW OFSYSTEMS    (2-9 systems for level 4, 10 or more for level 5)   Review of Systems    Except as noted above the remainder of the review of systems was reviewed and negative. PHYSICAL EXAM    (up to 7 for level 4, 8 or more for level 5)     ED Triage Vitals [09/13/22 1614]   BP Temp Temp Source Heart Rate Resp SpO2 Height Weight   (!) 151/70 98.3 °F (36.8 °C) Oral 92 18 97 % 5' 11\" (1.803 m) 238 lb (108 kg)      Physical Exam  Constitutional:       Appearance: He is well-developed. HENT:      Head: Normocephalic and atraumatic. Cardiovascular:      Rate and Rhythm: Normal rate and regular rhythm. Pulmonary:      Effort: Pulmonary effort is normal.      Breath sounds: Normal breath sounds. Abdominal:      Palpations: Abdomen is soft. Musculoskeletal:         General: Normal range of motion. Cervical back: Normal range of motion and neck supple. Feet:    Skin:     General: Skin is warm. Neurological:      Mental Status: He is alert and oriented to person, place, and time.    Psychiatric:         Behavior: Behavior normal.               DIAGNOSTIC RESULTS     EKG: All EKG's are interpreted by the Emergency Department Physician who either signs or Co-signs this chart in the absence of a cardiologist.        RADIOLOGY:   Non-plain film images such as CT, Ultrasound and MRI are read by the radiologist. Plain radiographic images arevisualized and preliminarily interpreted by the emergency physician with the below findings:        Interpretation per the Radiologist below, if available at thetime of this note:          ED BEDSIDE ULTRASOUND:   Performed by ED Physician - none    LABS:  Labs Reviewed   CBC WITH AUTO DIFFERENTIAL - Abnormal; Notable for the following components:       Result Value    RBC 3.98 (*)     Hemoglobin 11.9 (*)     Hematocrit 35.6 (*)     All other components within normal limits   BASIC METABOLIC PANEL - Abnormal; Notable for the following components:    Glucose 304 (*)     Creatinine 2.13 (*)     Sodium 134 (*)     Chloride 97 (*)     GFR Non- 31 (*)     GFR  American 38 (*)     All other components within normal limits   SEDIMENTATION RATE - Abnormal; Notable for the following components:    Sed Rate 50 (*)     All other components within normal limits   C-REACTIVE PROTEIN - Abnormal; Notable for the following components:    .3 (*)     All other components within normal limits   CULTURE, BLOOD 1   CULTURE, ANAEROBIC AND AEROBIC   CULTURE, FUNGUS   LACTATE, SEPSIS       All other labs were within normal range or not returned as of this dictation. EMERGENCY DEPARTMENT COURSE and DIFFERENTIAL DIAGNOSIS/MDM:   Vitals:    Vitals:    09/13/22 1614   BP: (!) 151/70   Pulse: 92   Resp: 18   Temp: 98.3 °F (36.8 °C)   TempSrc: Oral   SpO2: 97%   Weight: 238 lb (108 kg)   Height: 5' 11\" (1.803 m)     Patient seen by podiatry. Patient's appointment with podiatrist in the office in 2 days. We will give Keflex as directed and patient be discharged home. Patient seen by podiatry here in the ER. In a further evaluation admission is not felt needed. Given Keflex as directed. CONSULTS:  IP CONSULT TO PODIATRY    PROCEDURES:  Procedures        FINAL IMPRESSION      1.  Cellulitis of foot          DISPOSITION/PLAN   DISPOSITION Decision To Discharge 09/13/2022 08:00:12 PM      PATIENTREFERRED TO:   Colin Summers DPM  [address] [address]  [phone]    In 1 day        DISCHARGE MEDICATIONS:     Discharge Medication List as of 9/13/2022  8:04 PM        START taking these medications    Details   cephALEXin (KEFLEX) 500 MG capsule Take 1 capsule by mouth 2 times daily for 7 days, Disp-14 capsule, R-0Normal                 (Please note that portions of this note were completed with a voice recognition program.  Efforts were made to edit thedictations but occasionally words are mis-transcribed.)    YOEL Sargent PA-C  09/13/22 1519

## 2022-09-14 NOTE — DISCHARGE INSTRUCTIONS
Take meds as prescribed. Follow up with dr lucio as directed. Call tomorrow to verify your appointment.   Return to ER immediately if symptoms worsen or persist.

## 2022-09-15 ENCOUNTER — OFFICE VISIT (OUTPATIENT)
Dept: PODIATRY | Age: 67
End: 2022-09-15
Payer: MEDICARE

## 2022-09-15 VITALS — HEIGHT: 71 IN | BODY MASS INDEX: 33.74 KG/M2 | WEIGHT: 241 LBS

## 2022-09-15 DIAGNOSIS — E11.610 TYPE 2 DIABETES MELLITUS WITH CHARCOT'S JOINT OF RIGHT FOOT (HCC): ICD-10-CM

## 2022-09-15 DIAGNOSIS — M79.671 RIGHT FOOT PAIN: ICD-10-CM

## 2022-09-15 DIAGNOSIS — E11.51 TYPE II DIABETES MELLITUS WITH PERIPHERAL CIRCULATORY DISORDER (HCC): ICD-10-CM

## 2022-09-15 DIAGNOSIS — L97.513 RIGHT FOOT ULCER, WITH NECROSIS OF MUSCLE (HCC): Primary | ICD-10-CM

## 2022-09-15 DIAGNOSIS — M25.571 ACUTE RIGHT ANKLE PAIN: ICD-10-CM

## 2022-09-15 PROCEDURE — G8417 CALC BMI ABV UP PARAM F/U: HCPCS | Performed by: PODIATRIST

## 2022-09-15 PROCEDURE — 2022F DILAT RTA XM EVC RTNOPTHY: CPT | Performed by: PODIATRIST

## 2022-09-15 PROCEDURE — 4004F PT TOBACCO SCREEN RCVD TLK: CPT | Performed by: PODIATRIST

## 2022-09-15 PROCEDURE — G8427 DOCREV CUR MEDS BY ELIG CLIN: HCPCS | Performed by: PODIATRIST

## 2022-09-15 PROCEDURE — 1123F ACP DISCUSS/DSCN MKR DOCD: CPT | Performed by: PODIATRIST

## 2022-09-15 PROCEDURE — 11043 DBRDMT MUSC&/FSCA 1ST 20/<: CPT | Performed by: PODIATRIST

## 2022-09-15 PROCEDURE — 3046F HEMOGLOBIN A1C LEVEL >9.0%: CPT | Performed by: PODIATRIST

## 2022-09-15 PROCEDURE — 99214 OFFICE O/P EST MOD 30 MIN: CPT | Performed by: PODIATRIST

## 2022-09-15 PROCEDURE — 3017F COLORECTAL CA SCREEN DOC REV: CPT | Performed by: PODIATRIST

## 2022-09-15 NOTE — PATIENT INSTRUCTIONS
Schedule a Vaccine  When you qualify to receive the vaccine, call the [facility] COVID-19 Vaccination Hotline to schedule your appointment or to get additional information about the [facility] locations which are offering the COVID-19 vaccine. To be 94% effective, it's important that you receive two doses of one of the COVID-19 vaccines. -If you are receiving the Alves Peter vaccine, your second shot will be scheduled as close to 21 days after the first shot as possible. -If you are receiving the Moderna vaccine, your second shot will be scheduled as close to 28 days after the first shot as possible. [facility] COVID-19 Vaccination Hotline: [phone]    Links to [facility] website and [facility] website:    [email or web address]    [email or web address]

## 2022-09-15 NOTE — PROGRESS NOTES
[address] [facility] PODIATRY [facility]  [address] [place] [id number]  Dept: [phone]  Dept Fax: [phone]    RETURN WOUND CARE PROGRESS NOTE  Date of patient's visit: 9/15/2022  Patient's Name:  Mayte Fofana YOB: 1955            Patient Care Team:  Zahira Munoz MD as PCP - General (Family Medicine)  Pavel Baldwin MD as Consulting Physician (Infectious Diseases)  Wilda Felder DPM as Physician (Podiatry)  Wilda Felder DPM as Physician (Podiatry)  Donna Corral MD as Consulting Physician (Infectious Diseases)      Chief Complaint   Patient presents with    Diabetes    Wound Check     Right foot, onset 1 x week        Mayte Fofana  AGE: 78 y.o. GENDER: male  : 1955  TODAY'S DATE:  9/15/2022    Subjective:       Mayte Fofana is a 78 y.o. male presents to the office today for follow up of an ulceration. Denies F/C/N/V. Pt has a new comlaint of right ankle pain and pt went to the ED and had x rays taken aloign with antibiotics.   Pt has been in the crow boot since then and is changing his dressings daily   Wound Type:diabetic and pressure  Wound Location:right foot plantar medial arch  Modifying factors:diabetes and charcot right foot             Lab Results   Component Value Date    LABA1C 8.7 (H) 2021       ALLERGIES    Allergies   Allergen Reactions    Morphine Itching    Other Other (See Comments)     Other reaction(s): Unknown    Percocet [Oxycodone-Acetaminophen]      Facial swelling    Dye [Iodides] Rash     Rash and swelling       Medications:    Current Outpatient Medications:     cephALEXin (KEFLEX) 500 MG capsule, Take 1 capsule by mouth 2 times daily for 7 days, Disp: 14 capsule, Rfl: 0    HYDROcodone-acetaminophen (NORCO)  MG per tablet, TAKE 1 TABLET BY MOUTH FOUR TIMES DAILY AS NEEDED, Disp: , Rfl:     isosorbide dinitrate (ISORDIL) 30 MG tablet, Take 30 mg by mouth daily, Disp: , Rfl:     pantoprazole (PROTONIX) 40 MG tablet, Take 40 mg by mouth daily, Disp: , Rfl:     TRUEPLUS PEN NEEDLES 31G X 8 MM MISC, , Disp: , Rfl:     ondansetron (ZOFRAN-ODT) 4 MG disintegrating tablet, Take 1 tablet by mouth 3 times daily as needed for Nausea or Vomiting, Disp: 21 tablet, Rfl: 0    mupirocin (BACTROBAN) 2 % ointment, , Disp: , Rfl:     amLODIPine (NORVASC) 5 MG tablet, TAKE 1 TABLET BY MOUTH EVERY DAY, Disp: , Rfl:     ceFEPIme (MAXIPIME) 2 g injection, , Disp: , Rfl:     LANTUS SOLOSTAR 100 UNIT/ML injection pen, Inject 15 Units into the skin nightly, Disp: 5 pen, Rfl: 3    silver sulfADIAZINE (SILVADENE) 1 % cream, Apply topically daily. , Disp: 50 g, Rfl: 1    meloxicam (MOBIC) 15 MG tablet, Take 15 mg by mouth nightly , Disp: , Rfl:     albuterol sulfate  (90 Base) MCG/ACT inhaler, Inhale 2 puffs into the lungs every 6 hours as needed , Disp: , Rfl:     JANUVIA 100 MG tablet, Take 100 mg by mouth daily , Disp: , Rfl:     Misc. Devices MISC, 1 PAIR OF DIABETIC SHOES (1 LEFT/ 1 RIGHT) 1-3 PAIRS OF INSERTS (LEFT/ RIGHT), Disp: 2 each, Rfl: 0    cetirizine (ZYRTEC) 10 MG tablet, Take 10 mg by mouth nightly , Disp: , Rfl:     lisinopril (PRINIVIL;ZESTRIL) 10 MG tablet, Take 10 mg by mouth daily, Disp: , Rfl:     simvastatin (ZOCOR) 40 MG tablet, Take 40 mg by mouth nightly , Disp: , Rfl:     glipiZIDE (GLUCOTROL) 10 MG tablet, Take 20 mg by mouth 2 times daily (before meals) Takes 2 tabs (=20mg) BID, Disp: , Rfl:     aspirin 81 MG tablet, Take 81 mg by mouth daily, Disp: , Rfl:     gabapentin (NEURONTIN) 300 MG capsule, Take 900 mg by mouth 3 times daily.  Take 3 caps (=900mg) 3 times a day, Disp: , Rfl:     Review of Systems  Review of Systems - History obtained from chart review and the patient  General ROS: negative for - chills, fatigue, fever, night sweats or weight gain  Constitutional: Negative for chills, diaphoresis, fatigue, fever and unexpected weight change. Musculoskeletal: Positive for arthralgias, gait problem and joint swelling. Neurological ROS: negative for - behavioral changes, confusion, headaches or seizures. Negative for weakness and numbness. Dermatological ROS: negative for - mole changes, rash  Cardiovascular: Negative for leg swelling. Gastrointestinal: Negative for constipation, diarrhea, nausea and vomiting. Objective:       Physical Exam:  Ht 5' 11\" (1.803 m)   Wt 241 lb (109.3 kg)   BMI 33.61 kg/m²     NV status remains unchanged since last visit. Wound #:   1    diabetic foot ulcer   right foot    Wound measurements:  #1  3 cm by 3 cm  by   3 mm         Wound Depth: muscle. Drainage:    minimal, serosanguinous     Wound Bed status:   Granulation Tissue: 90%   Necrotic 10%  Type:   Epithelialization 0%   Hypergranular 0%   Periwound hyperkeratosis:  present  Erythema absent  Odor:no  Maceration:no  Undermining/tract/tunneling:no  Culture taken:   yes         Right  foot and ankle CT:  1. Chronic wound along the plantar soft tissues of the foot medially which is   also present on MRI from September 14, 2021. Surrounding soft tissue   irregularity most suggestive of phlegmon/necrotic soft tissue. No   well-defined drainable collection identified. The wound does closely   approximates the underlying bone. No subcutaneous gas identified beyond the   wound bed. 2. Chronic changes of Charcot arthropathy involving the midfoot. No   definitive superimposed CT evidence of osteomyelitis. Dedicated MRI of the   foot with without contrast could be obtained for further evaluation as   clinically indicated. Assessment:     Assessment: Patient is a 78 y.o. male with:   Diagnosis Orders   1. Right foot ulcer, with necrosis of muscle (HCC)  VA DEBRIDEMENT, SKIN, SUB-Q TISSUE,MUSCLE,=<20 SQ CM      2.  Type II diabetes mellitus with peripheral circulatory disorder (HCC)  VA DEBRIDEMENT, SKIN, SUB-Q TISSUE,MUSCLE,=<20 SQ CM      3. Right foot pain  AL DEBRIDEMENT, SKIN, SUB-Q TISSUE,MUSCLE,=<20 SQ CM      4. Type 2 diabetes mellitus with Charcot's joint of right foot (HCC)  AL DEBRIDEMENT, SKIN, SUB-Q TISSUE,MUSCLE,=<20 SQ CM      5. Acute right ankle pain  AL DEBRIDEMENT, SKIN, SUB-Q TISSUE,MUSCLE,=<20 SQ CM            Overall Wound Assessment  Wound is has worsened slightly. Size has unchanged  Appearance has worsened      Plan:     Pt examined and evaluated. Current condition and treatment options discussed in detail. Theotis Laser Age:  78 y.o. Gender:  male   :  1955  Today's Date:  9/15/2022      Procedure:        Reviewed CT with pt and it showed the above findings. Pt to sarah on antibiotics        I had a long discussion today with the patient about the likely diagnosis and its natural history, physical exam and imaging findings, as well as treatment options. We discussed both surgical and nonsurgical treatments, including risks and benefits. From a nonoperative standpoint, we discussed rest/activity modification, NSAIDs/Acetaminophen/topical anesthetics, orthotics, bracing/immobilization, and physical therapy. Surgically, we discussed charcot recon but pt declined at this time           With the patient in supine position, Lidocaine soaked gauze was applied per physician order at beginning of wound evaluation. It was subsequently removed. Using a #15 blade scalpel and Pick-up, the wound was debrided down to and included the removal of the following tissue:     [] epidermis, [] dermis, []  subcutaneous tissue,  [x] muscle/fascia tissue,   and/or  [] bone     Also debrided was all  [] fibrin, [] biofilm, [] slough,  [x] necrotic,  [x] hypergranulation tissue, and/or  [x] hyperkeratotic tissue. Wound was copiously irrigated with normal saline solution. Bleeding:  Minimal.  Hemostasis:  pressure     100%  of wound debrided. Patient tolerated procedure well.       Pain 0 / 10 during procedure and pain 0 / 10 after procedure. Discussed appropriate home care of this wound. Wound redressed. Patient instructions were given. Pt to stay in crow boot and change dressings daily. Pt has bactroban at home     Discussed appropriate home care of this wound. , Dispensed dressing supplies and instructions on their use. Dressings: No orders of the defined types were placed in this encounter. No orders of the defined types were placed in this encounter. All labs were reviewed and all imagining including the above findings were reviewed PRIOR to the patients arrival and with the patient today. Previous patient encounter was reviewed. Encounters from the patients other medical providers were reviewed and noted. Time was spent educating the patient and their families/caregivers on proper care of the feet and ankles. All the above diagnosis were addressed at todays visit and all questions were answered. A total of 30 minutes was spent with this patients encounter which included charting after the patients visit      Follow up: 2 week.       Electronically signed by Luly Vaughn DPM on 9/15/2022 at 2:30 PM  9/15/2022

## 2022-09-18 LAB
CULTURE: ABNORMAL
CULTURE: ABNORMAL
CULTURE: NORMAL
DIRECT EXAM: ABNORMAL
DIRECT EXAM: ABNORMAL
Lab: NORMAL
SPECIMEN DESCRIPTION: ABNORMAL
SPECIMEN DESCRIPTION: NORMAL

## 2022-10-04 ENCOUNTER — OFFICE VISIT (OUTPATIENT)
Dept: PODIATRY | Age: 67
End: 2022-10-04
Payer: MEDICARE

## 2022-10-04 ENCOUNTER — HOSPITAL ENCOUNTER (INPATIENT)
Age: 67
LOS: 14 days | Discharge: NURSING, REHAB OR LONG-TERM CARE | DRG: 982 | End: 2022-10-18
Attending: PODIATRIST | Admitting: STUDENT IN AN ORGANIZED HEALTH CARE EDUCATION/TRAINING PROGRAM
Payer: MEDICARE

## 2022-10-04 VITALS — BODY MASS INDEX: 33.74 KG/M2 | HEIGHT: 71 IN | WEIGHT: 241 LBS

## 2022-10-04 DIAGNOSIS — G89.18 ACUTE POST-OPERATIVE PAIN: Primary | ICD-10-CM

## 2022-10-04 DIAGNOSIS — E11.610 TYPE 2 DIABETES MELLITUS WITH CHARCOT'S JOINT OF RIGHT FOOT (HCC): ICD-10-CM

## 2022-10-04 DIAGNOSIS — L08.9 DIABETIC FOOT INFECTION (HCC): ICD-10-CM

## 2022-10-04 DIAGNOSIS — E11.51 TYPE II DIABETES MELLITUS WITH PERIPHERAL CIRCULATORY DISORDER (HCC): ICD-10-CM

## 2022-10-04 DIAGNOSIS — E11.628 DIABETIC FOOT INFECTION (HCC): ICD-10-CM

## 2022-10-04 DIAGNOSIS — M25.571 ACUTE RIGHT ANKLE PAIN: ICD-10-CM

## 2022-10-04 DIAGNOSIS — M14.671 CHARCOT'S JOINT OF ANKLE, RIGHT: ICD-10-CM

## 2022-10-04 DIAGNOSIS — M25.471 EDEMA OF RIGHT ANKLE: Primary | ICD-10-CM

## 2022-10-04 DIAGNOSIS — L97.513 RIGHT FOOT ULCER, WITH NECROSIS OF MUSCLE (HCC): ICD-10-CM

## 2022-10-04 PROBLEM — E11.621 TYPE 2 DIABETES MELLITUS WITH RIGHT DIABETIC FOOT ULCER (HCC): Status: ACTIVE | Noted: 2022-10-04

## 2022-10-04 PROBLEM — L97.519 TYPE 2 DIABETES MELLITUS WITH RIGHT DIABETIC FOOT ULCER (HCC): Status: ACTIVE | Noted: 2022-10-04

## 2022-10-04 LAB
C-REACTIVE PROTEIN: 30.1 MG/L (ref 0–5)
GLUCOSE BLD-MCNC: 335 MG/DL (ref 75–110)

## 2022-10-04 PROCEDURE — 3044F HG A1C LEVEL LT 7.0%: CPT | Performed by: PODIATRIST

## 2022-10-04 PROCEDURE — 6370000000 HC RX 637 (ALT 250 FOR IP): Performed by: PODIATRIST

## 2022-10-04 PROCEDURE — 99214 OFFICE O/P EST MOD 30 MIN: CPT | Performed by: PODIATRIST

## 2022-10-04 PROCEDURE — 2580000003 HC RX 258: Performed by: PODIATRIST

## 2022-10-04 PROCEDURE — G8484 FLU IMMUNIZE NO ADMIN: HCPCS | Performed by: PODIATRIST

## 2022-10-04 PROCEDURE — 4004F PT TOBACCO SCREEN RCVD TLK: CPT | Performed by: PODIATRIST

## 2022-10-04 PROCEDURE — G8427 DOCREV CUR MEDS BY ELIG CLIN: HCPCS | Performed by: PODIATRIST

## 2022-10-04 PROCEDURE — 1123F ACP DISCUSS/DSCN MKR DOCD: CPT | Performed by: PODIATRIST

## 2022-10-04 PROCEDURE — 1200000000 HC SEMI PRIVATE

## 2022-10-04 PROCEDURE — 2022F DILAT RTA XM EVC RTNOPTHY: CPT | Performed by: PODIATRIST

## 2022-10-04 PROCEDURE — 82947 ASSAY GLUCOSE BLOOD QUANT: CPT

## 2022-10-04 PROCEDURE — G8417 CALC BMI ABV UP PARAM F/U: HCPCS | Performed by: PODIATRIST

## 2022-10-04 PROCEDURE — 85652 RBC SED RATE AUTOMATED: CPT

## 2022-10-04 PROCEDURE — 3017F COLORECTAL CA SCREEN DOC REV: CPT | Performed by: PODIATRIST

## 2022-10-04 PROCEDURE — 36415 COLL VENOUS BLD VENIPUNCTURE: CPT

## 2022-10-04 PROCEDURE — 86140 C-REACTIVE PROTEIN: CPT

## 2022-10-04 PROCEDURE — 11043 DBRDMT MUSC&/FSCA 1ST 20/<: CPT | Performed by: PODIATRIST

## 2022-10-04 RX ORDER — AMLODIPINE BESYLATE 5 MG/1
5 TABLET ORAL DAILY
Status: DISCONTINUED | OUTPATIENT
Start: 2022-10-05 | End: 2022-10-18 | Stop reason: HOSPADM

## 2022-10-04 RX ORDER — ALBUTEROL SULFATE 90 UG/1
2 AEROSOL, METERED RESPIRATORY (INHALATION) EVERY 6 HOURS PRN
Status: DISCONTINUED | OUTPATIENT
Start: 2022-10-04 | End: 2022-10-04

## 2022-10-04 RX ORDER — SODIUM CHLORIDE 9 MG/ML
INJECTION, SOLUTION INTRAVENOUS PRN
Status: DISCONTINUED | OUTPATIENT
Start: 2022-10-04 | End: 2022-10-18 | Stop reason: HOSPADM

## 2022-10-04 RX ORDER — ASPIRIN 81 MG/1
81 TABLET ORAL DAILY
Status: DISCONTINUED | OUTPATIENT
Start: 2022-10-05 | End: 2022-10-18 | Stop reason: HOSPADM

## 2022-10-04 RX ORDER — ACETAMINOPHEN 650 MG/1
650 SUPPOSITORY RECTAL EVERY 6 HOURS PRN
Status: DISCONTINUED | OUTPATIENT
Start: 2022-10-04 | End: 2022-10-18 | Stop reason: HOSPADM

## 2022-10-04 RX ORDER — GABAPENTIN 300 MG/1
900 CAPSULE ORAL 3 TIMES DAILY
Status: DISCONTINUED | OUTPATIENT
Start: 2022-10-04 | End: 2022-10-18 | Stop reason: HOSPADM

## 2022-10-04 RX ORDER — ONDANSETRON 4 MG/1
4 TABLET, ORALLY DISINTEGRATING ORAL EVERY 8 HOURS PRN
Status: DISCONTINUED | OUTPATIENT
Start: 2022-10-04 | End: 2022-10-18 | Stop reason: HOSPADM

## 2022-10-04 RX ORDER — LISINOPRIL 10 MG/1
10 TABLET ORAL DAILY
Status: DISCONTINUED | OUTPATIENT
Start: 2022-10-05 | End: 2022-10-05 | Stop reason: CLARIF

## 2022-10-04 RX ORDER — ALBUTEROL SULFATE 90 UG/1
2 AEROSOL, METERED RESPIRATORY (INHALATION) EVERY 4 HOURS PRN
Status: DISCONTINUED | OUTPATIENT
Start: 2022-10-04 | End: 2022-10-18 | Stop reason: HOSPADM

## 2022-10-04 RX ORDER — ONDANSETRON 2 MG/ML
4 INJECTION INTRAMUSCULAR; INTRAVENOUS EVERY 6 HOURS PRN
Status: DISCONTINUED | OUTPATIENT
Start: 2022-10-04 | End: 2022-10-18 | Stop reason: HOSPADM

## 2022-10-04 RX ORDER — ACETAMINOPHEN 325 MG/1
650 TABLET ORAL EVERY 6 HOURS PRN
Status: DISCONTINUED | OUTPATIENT
Start: 2022-10-04 | End: 2022-10-18 | Stop reason: HOSPADM

## 2022-10-04 RX ORDER — HYDROCODONE BITARTRATE AND ACETAMINOPHEN 5; 325 MG/1; MG/1
1 TABLET ORAL EVERY 6 HOURS PRN
Status: DISCONTINUED | OUTPATIENT
Start: 2022-10-04 | End: 2022-10-05

## 2022-10-04 RX ORDER — INSULIN LISPRO 100 [IU]/ML
0-8 INJECTION, SOLUTION INTRAVENOUS; SUBCUTANEOUS
Status: DISCONTINUED | OUTPATIENT
Start: 2022-10-05 | End: 2022-10-18 | Stop reason: HOSPADM

## 2022-10-04 RX ORDER — SODIUM CHLORIDE 0.9 % (FLUSH) 0.9 %
5-40 SYRINGE (ML) INJECTION PRN
Status: DISCONTINUED | OUTPATIENT
Start: 2022-10-04 | End: 2022-10-18 | Stop reason: HOSPADM

## 2022-10-04 RX ORDER — SODIUM CHLORIDE 0.9 % (FLUSH) 0.9 %
5-40 SYRINGE (ML) INJECTION EVERY 12 HOURS SCHEDULED
Status: DISCONTINUED | OUTPATIENT
Start: 2022-10-04 | End: 2022-10-18 | Stop reason: HOSPADM

## 2022-10-04 RX ORDER — DEXTROSE MONOHYDRATE 100 MG/ML
INJECTION, SOLUTION INTRAVENOUS CONTINUOUS PRN
Status: DISCONTINUED | OUTPATIENT
Start: 2022-10-04 | End: 2022-10-18 | Stop reason: HOSPADM

## 2022-10-04 RX ORDER — CIPROFLOXACIN 500 MG/1
500 TABLET, FILM COATED ORAL 2 TIMES DAILY
COMMUNITY
Start: 2022-09-30

## 2022-10-04 RX ORDER — INSULIN LISPRO 100 [IU]/ML
0-4 INJECTION, SOLUTION INTRAVENOUS; SUBCUTANEOUS NIGHTLY
Status: DISCONTINUED | OUTPATIENT
Start: 2022-10-04 | End: 2022-10-18 | Stop reason: HOSPADM

## 2022-10-04 RX ORDER — POLYETHYLENE GLYCOL 3350 17 G/17G
17 POWDER, FOR SOLUTION ORAL DAILY PRN
Status: DISCONTINUED | OUTPATIENT
Start: 2022-10-04 | End: 2022-10-18 | Stop reason: HOSPADM

## 2022-10-04 RX ORDER — SIMVASTATIN 20 MG
40 TABLET ORAL NIGHTLY
Status: DISCONTINUED | OUTPATIENT
Start: 2022-10-04 | End: 2022-10-04 | Stop reason: CLARIF

## 2022-10-04 RX ORDER — ATORVASTATIN CALCIUM 20 MG/1
20 TABLET, FILM COATED ORAL NIGHTLY
Status: DISCONTINUED | OUTPATIENT
Start: 2022-10-04 | End: 2022-10-18 | Stop reason: HOSPADM

## 2022-10-04 RX ADMIN — INSULIN LISPRO 4 UNITS: 100 INJECTION, SOLUTION INTRAVENOUS; SUBCUTANEOUS at 23:15

## 2022-10-04 RX ADMIN — ATORVASTATIN CALCIUM 20 MG: 20 TABLET, FILM COATED ORAL at 23:15

## 2022-10-04 RX ADMIN — GABAPENTIN 900 MG: 300 CAPSULE ORAL at 23:15

## 2022-10-04 RX ADMIN — SODIUM CHLORIDE, PRESERVATIVE FREE 10 ML: 5 INJECTION INTRAVENOUS at 23:16

## 2022-10-04 NOTE — PROGRESS NOTES
[address] [facility] PODIATRY [facility]  [address] [facility] [address]  Dept: [phone]  Dept Fax: [phone]    RETURN WOUND CARE PROGRESS NOTE  Date of patient's visit: 10/4/2022  Patient's Name:  Yaritza Willett YOB: 1955            Patient Care Team:  Janet Kurtz MD as PCP - General (Family Medicine)  Pamela Kwon MD as Consulting Physician (Infectious Diseases)  Jc Steve DPM as Physician (Podiatry)  Jc Steve DPM as Physician (Podiatry)  Maxim Mcmanus MD as Consulting Physician (Infectious Diseases)      Chief Complaint   Patient presents with    Wound Check     Right plantar wound and new wound in between 1st and 2nd toe     Diabetes     Type 2 diabetic       Yaritza Willett  AGE: 78 y.o. GENDER: male  : 1955  TODAY'S DATE:  10/4/2022  Pt's primary care physician is Janet Kurtz MD last seen 2022  Subjective:       Yaritza Willett is a 78 y.o. male presents to the office today for a new wound to the right foot between 1st and 2nd toes. Denies F/C/N/V.   Wound Type:diabetic  Wound Location:right foot   Modifying factors:edema, venous stasis, diabetes, and chronic pressure            Lab Results   Component Value Date    LABA1C 8.7 (H) 2021       ALLERGIES    Allergies   Allergen Reactions    Morphine Itching    Other Other (See Comments)     Other reaction(s): Unknown    Percocet [Oxycodone-Acetaminophen]      Facial swelling    Dye [Iodides] Rash     Rash and swelling       Medications:    Current Outpatient Medications:     ciprofloxacin (CIPRO) 500 MG tablet, , Disp: , Rfl:     HYDROcodone-acetaminophen (NORCO)  MG per tablet, TAKE 1 TABLET BY MOUTH FOUR TIMES DAILY AS NEEDED, Disp: , Rfl:     isosorbide dinitrate (ISORDIL) 30 MG tablet, Take 30 mg by mouth daily, Disp: , Rfl:     pantoprazole (PROTONIX) 40 MG tablet, Take 40 mg by mouth daily, Disp: , Rfl:     TRUEPLUS PEN NEEDLES 31G X 8 MM MISC, , Disp: , Rfl:     ondansetron (ZOFRAN-ODT) 4 MG disintegrating tablet, Take 1 tablet by mouth 3 times daily as needed for Nausea or Vomiting, Disp: 21 tablet, Rfl: 0    mupirocin (BACTROBAN) 2 % ointment, , Disp: , Rfl:     amLODIPine (NORVASC) 5 MG tablet, TAKE 1 TABLET BY MOUTH EVERY DAY, Disp: , Rfl:     ceFEPIme (MAXIPIME) 2 g injection, , Disp: , Rfl:     LANTUS SOLOSTAR 100 UNIT/ML injection pen, Inject 15 Units into the skin nightly, Disp: 5 pen, Rfl: 3    silver sulfADIAZINE (SILVADENE) 1 % cream, Apply topically daily. , Disp: 50 g, Rfl: 1    meloxicam (MOBIC) 15 MG tablet, Take 15 mg by mouth nightly , Disp: , Rfl:     albuterol sulfate  (90 Base) MCG/ACT inhaler, Inhale 2 puffs into the lungs every 6 hours as needed , Disp: , Rfl:     JANUVIA 100 MG tablet, Take 100 mg by mouth daily , Disp: , Rfl:     Misc. Devices MISC, 1 PAIR OF DIABETIC SHOES (1 LEFT/ 1 RIGHT) 1-3 PAIRS OF INSERTS (LEFT/ RIGHT), Disp: 2 each, Rfl: 0    cetirizine (ZYRTEC) 10 MG tablet, Take 10 mg by mouth nightly , Disp: , Rfl:     lisinopril (PRINIVIL;ZESTRIL) 10 MG tablet, Take 10 mg by mouth daily, Disp: , Rfl:     simvastatin (ZOCOR) 40 MG tablet, Take 40 mg by mouth nightly , Disp: , Rfl:     glipiZIDE (GLUCOTROL) 10 MG tablet, Take 20 mg by mouth 2 times daily (before meals) Takes 2 tabs (=20mg) BID, Disp: , Rfl:     aspirin 81 MG tablet, Take 81 mg by mouth daily, Disp: , Rfl:     gabapentin (NEURONTIN) 300 MG capsule, Take 900 mg by mouth 3 times daily. Take 3 caps (=900mg) 3 times a day, Disp: , Rfl:     Review of Systems  Review of Systems - History obtained from chart review and the patient  General ROS: negative for - chills, fatigue, fever, night sweats or weight gain  Constitutional: Negative for chills, diaphoresis, fatigue, fever and unexpected weight change. Musculoskeletal: Positive for arthralgias, gait problem and joint swelling.   Neurological ROS: negative for - behavioral changes, confusion, headaches or seizures. Negative for weakness and numbness. Dermatological ROS: negative for - mole changes, rash  Cardiovascular: Negative for leg swelling. Gastrointestinal: Negative for constipation, diarrhea, nausea and vomiting. Objective:       Physical Exam:  Ht 5' 11\" (1.803 m)   Wt 241 lb (109.3 kg)   BMI 33.61 kg/m²     NV status remains unchanged since last visit. Wound #:   1    diabetic foot ulcer   right foot    Wound measurements:  #1  5 mm by 9 mm  by   3 mm         Wound Depth: muscle. Drainage:    minimal, clear, serosanguinous Type: minimal    Wound Bed status:   Granulation Tissue: 80%   Necrotic 20%  Type:   Epithelialization 0%   Hypergranular 0%   Periwound hyperkeratosis:  absent  Erythema absent  Odor:no  Maceration:yes  Undermining/tract/tunneling:no  Culture taken:   yes             Right ankle x rays :  edema throughtout the ankle with no breakdown of the ankle mortise. Cointinued charcot midfoot deformity     Assessment:     Assessment: Patient is a 78 y.o. male with:   Diagnosis Orders   1. Edema of right ankle  XR ANKLE RIGHT (MIN 3 VIEWS)      2. Acute right ankle pain  XR ANKLE RIGHT (MIN 3 VIEWS)      3. Diabetic foot infection ([address]              Overall Wound Assessment  Wound is has worsened. Size has increased  Appearance has new      Plan:     Pt examined and evaluated. Current condition and treatment options discussed in detail. Ryan Lowery Age:  78 y.o. Gender:  male   :  1955  Today's Date:  10/4/2022      Procedure: With the patient in supine position, Lidocaine soaked gauze was applied per physician order at beginning of wound evaluation. It was subsequently removed.     Using a #15 blade scalpel and Pick-up, the wound was debrided down to and included the removal of the following tissue:     [] epidermis, [] dermis, []  subcutaneous tissue,  [x] muscle/fascia tissue,   and/or  [] bone     Also debrided was all  [] fibrin, [x] biofilm, [x] slough,  [x] necrotic,  [] hypergranulation tissue, and/or  [] hyperkeratotic tissue. Wound was copiously irrigated with normal saline solution. Bleeding:  Minimal.  Hemostasis:  pressure     100%  of wound debrided. Patient tolerated procedure well. Pain 0 / 10 during procedure and pain 0 / 10 after procedure. Discussed appropriate home care of this wound. Wound redressed. Patient instructions were given. With the new infection I am going to direct admit the patient for a diabetic foot infection. Pt will need IV antibiotics. Discusssed the possibility of charcot foot recon with external fixation and possible I and D . We will need to get an MRI stat. All labs were reviewed and all imagining including the above findings were reviewed PRIOR to the patients arrival and with the patient today. Previous patient encounter was reviewed. Encounters from the patients other medical providers were reviewed and noted. Time was spent educating the patient and their families/caregivers on proper care of the feet and ankles. All the above diagnosis were addressed at todays visit and all questions were answered.   A total of 30 minutes was spent with this patients encounter which included charting after the patients visit        Electronically signed by Frankey Alf, DPM on 10/4/2022 at 1:37 PM  10/4/2022

## 2022-10-05 ENCOUNTER — APPOINTMENT (OUTPATIENT)
Dept: GENERAL RADIOLOGY | Age: 67
DRG: 982 | End: 2022-10-05
Attending: PODIATRIST
Payer: MEDICARE

## 2022-10-05 ENCOUNTER — APPOINTMENT (OUTPATIENT)
Dept: MRI IMAGING | Age: 67
DRG: 982 | End: 2022-10-05
Attending: PODIATRIST
Payer: MEDICARE

## 2022-10-05 PROBLEM — N18.32 STAGE 3B CHRONIC KIDNEY DISEASE (HCC): Chronic | Status: ACTIVE | Noted: 2022-10-05

## 2022-10-05 LAB
ABSOLUTE EOS #: 0.41 K/UL (ref 0–0.44)
ABSOLUTE IMMATURE GRANULOCYTE: 0.09 K/UL (ref 0–0.3)
ABSOLUTE LYMPH #: 2.36 K/UL (ref 1.1–3.7)
ABSOLUTE MONO #: 0.69 K/UL (ref 0.1–1.2)
ANION GAP SERPL CALCULATED.3IONS-SCNC: 10 MMOL/L (ref 9–17)
BASOPHILS # BLD: 1 % (ref 0–2)
BASOPHILS ABSOLUTE: 0.07 K/UL (ref 0–0.2)
BUN BLDV-MCNC: 18 MG/DL (ref 8–23)
BUN/CREAT BLD: 9 (ref 9–20)
CALCIUM SERPL-MCNC: 8.8 MG/DL (ref 8.6–10.4)
CHLORIDE BLD-SCNC: 104 MMOL/L (ref 98–107)
CO2: 24 MMOL/L (ref 20–31)
CREAT SERPL-MCNC: 1.99 MG/DL (ref 0.7–1.2)
EOSINOPHILS RELATIVE PERCENT: 5 % (ref 1–4)
GFR SERPL CREATININE-BSD FRML MDRD: 36 ML/MIN/1.73M2
GLUCOSE BLD-MCNC: 131 MG/DL (ref 75–110)
GLUCOSE BLD-MCNC: 182 MG/DL (ref 75–110)
GLUCOSE BLD-MCNC: 183 MG/DL (ref 75–110)
GLUCOSE BLD-MCNC: 184 MG/DL (ref 70–99)
GLUCOSE BLD-MCNC: 215 MG/DL (ref 75–110)
HCT VFR BLD CALC: 30.8 % (ref 40.7–50.3)
HEMOGLOBIN: 10.2 G/DL (ref 13–17)
IMMATURE GRANULOCYTES: 1 %
LYMPHOCYTES # BLD: 27 % (ref 24–43)
MCH RBC QN AUTO: 29.3 PG (ref 25.2–33.5)
MCHC RBC AUTO-ENTMCNC: 33.1 G/DL (ref 28.4–34.8)
MCV RBC AUTO: 88.5 FL (ref 82.6–102.9)
MONOCYTES # BLD: 8 % (ref 3–12)
NRBC AUTOMATED: 0 PER 100 WBC
PDW BLD-RTO: 13.2 % (ref 11.8–14.4)
PLATELET # BLD: 276 K/UL (ref 138–453)
PMV BLD AUTO: 8.8 FL (ref 8.1–13.5)
POTASSIUM SERPL-SCNC: 4.4 MMOL/L (ref 3.7–5.3)
RBC # BLD: 3.48 M/UL (ref 4.21–5.77)
SEDIMENTATION RATE, ERYTHROCYTE: 64 MM/HR (ref 0–20)
SEG NEUTROPHILS: 58 % (ref 36–65)
SEGMENTED NEUTROPHILS ABSOLUTE COUNT: 5.21 K/UL (ref 1.5–8.1)
SODIUM BLD-SCNC: 138 MMOL/L (ref 135–144)
WBC # BLD: 8.8 K/UL (ref 3.5–11.3)

## 2022-10-05 PROCEDURE — 97530 THERAPEUTIC ACTIVITIES: CPT

## 2022-10-05 PROCEDURE — 87075 CULTR BACTERIA EXCEPT BLOOD: CPT

## 2022-10-05 PROCEDURE — 87070 CULTURE OTHR SPECIMN AEROBIC: CPT

## 2022-10-05 PROCEDURE — 73718 MRI LOWER EXTREMITY W/O DYE: CPT

## 2022-10-05 PROCEDURE — 87185 SC STD ENZYME DETCJ PER NZM: CPT

## 2022-10-05 PROCEDURE — 6370000000 HC RX 637 (ALT 250 FOR IP): Performed by: PODIATRIST

## 2022-10-05 PROCEDURE — 73630 X-RAY EXAM OF FOOT: CPT

## 2022-10-05 PROCEDURE — 2580000003 HC RX 258: Performed by: PODIATRIST

## 2022-10-05 PROCEDURE — 99222 1ST HOSP IP/OBS MODERATE 55: CPT | Performed by: INTERNAL MEDICINE

## 2022-10-05 PROCEDURE — 1200000000 HC SEMI PRIVATE

## 2022-10-05 PROCEDURE — 36415 COLL VENOUS BLD VENIPUNCTURE: CPT

## 2022-10-05 PROCEDURE — 82947 ASSAY GLUCOSE BLOOD QUANT: CPT

## 2022-10-05 PROCEDURE — 73721 MRI JNT OF LWR EXTRE W/O DYE: CPT

## 2022-10-05 PROCEDURE — 80048 BASIC METABOLIC PNL TOTAL CA: CPT

## 2022-10-05 PROCEDURE — 97163 PT EVAL HIGH COMPLEX 45 MIN: CPT

## 2022-10-05 PROCEDURE — 97165 OT EVAL LOW COMPLEX 30 MIN: CPT

## 2022-10-05 PROCEDURE — 6370000000 HC RX 637 (ALT 250 FOR IP): Performed by: NURSE PRACTITIONER

## 2022-10-05 PROCEDURE — 99221 1ST HOSP IP/OBS SF/LOW 40: CPT | Performed by: NURSE PRACTITIONER

## 2022-10-05 PROCEDURE — 6360000002 HC RX W HCPCS: Performed by: PODIATRIST

## 2022-10-05 PROCEDURE — 93971 EXTREMITY STUDY: CPT

## 2022-10-05 PROCEDURE — 83036 HEMOGLOBIN GLYCOSYLATED A1C: CPT

## 2022-10-05 PROCEDURE — 87076 CULTURE ANAEROBE IDENT EACH: CPT

## 2022-10-05 PROCEDURE — 97116 GAIT TRAINING THERAPY: CPT

## 2022-10-05 PROCEDURE — 87205 SMEAR GRAM STAIN: CPT

## 2022-10-05 PROCEDURE — 85025 COMPLETE CBC W/AUTO DIFF WBC: CPT

## 2022-10-05 RX ORDER — OXYCODONE HYDROCHLORIDE 5 MG/1
10 TABLET ORAL EVERY 4 HOURS PRN
Status: DISCONTINUED | OUTPATIENT
Start: 2022-10-05 | End: 2022-10-05

## 2022-10-05 RX ORDER — DIPHENHYDRAMINE HCL 25 MG
25 TABLET ORAL EVERY 6 HOURS PRN
Status: DISCONTINUED | OUTPATIENT
Start: 2022-10-05 | End: 2022-10-18 | Stop reason: HOSPADM

## 2022-10-05 RX ORDER — NICOTINE 21 MG/24HR
1 PATCH, TRANSDERMAL 24 HOURS TRANSDERMAL DAILY
Status: DISCONTINUED | OUTPATIENT
Start: 2022-10-05 | End: 2022-10-18 | Stop reason: HOSPADM

## 2022-10-05 RX ORDER — HYDROCODONE BITARTRATE AND ACETAMINOPHEN 5; 325 MG/1; MG/1
1 TABLET ORAL EVERY 6 HOURS PRN
Status: DISCONTINUED | OUTPATIENT
Start: 2022-10-05 | End: 2022-10-18 | Stop reason: HOSPADM

## 2022-10-05 RX ORDER — GLIPIZIDE 10 MG/1
20 TABLET ORAL
Status: DISCONTINUED | OUTPATIENT
Start: 2022-10-05 | End: 2022-10-18 | Stop reason: HOSPADM

## 2022-10-05 RX ORDER — CETIRIZINE HYDROCHLORIDE 10 MG/1
10 TABLET ORAL NIGHTLY
Status: DISCONTINUED | OUTPATIENT
Start: 2022-10-05 | End: 2022-10-18 | Stop reason: HOSPADM

## 2022-10-05 RX ORDER — HYDROCODONE BITARTRATE AND ACETAMINOPHEN 5; 325 MG/1; MG/1
2 TABLET ORAL EVERY 6 HOURS PRN
Status: DISCONTINUED | OUTPATIENT
Start: 2022-10-05 | End: 2022-10-18 | Stop reason: HOSPADM

## 2022-10-05 RX ORDER — OXYCODONE HYDROCHLORIDE 5 MG/1
5 TABLET ORAL EVERY 4 HOURS PRN
Status: DISCONTINUED | OUTPATIENT
Start: 2022-10-05 | End: 2022-10-05

## 2022-10-05 RX ORDER — ENOXAPARIN SODIUM 100 MG/ML
30 INJECTION SUBCUTANEOUS 2 TIMES DAILY
Status: DISCONTINUED | OUTPATIENT
Start: 2022-10-05 | End: 2022-10-18 | Stop reason: HOSPADM

## 2022-10-05 RX ORDER — INSULIN GLARGINE 100 [IU]/ML
10 INJECTION, SOLUTION SUBCUTANEOUS 2 TIMES DAILY
Status: DISCONTINUED | OUTPATIENT
Start: 2022-10-05 | End: 2022-10-18 | Stop reason: HOSPADM

## 2022-10-05 RX ADMIN — DIPHENHYDRAMINE HYDROCHLORIDE 25 MG: 25 TABLET ORAL at 19:49

## 2022-10-05 RX ADMIN — AMLODIPINE BESYLATE 5 MG: 5 TABLET ORAL at 08:20

## 2022-10-05 RX ADMIN — GABAPENTIN 900 MG: 300 CAPSULE ORAL at 08:20

## 2022-10-05 RX ADMIN — OXYCODONE 10 MG: 5 TABLET ORAL at 17:16

## 2022-10-05 RX ADMIN — INSULIN GLARGINE 10 UNITS: 100 INJECTION, SOLUTION SUBCUTANEOUS at 22:44

## 2022-10-05 RX ADMIN — VANCOMYCIN HYDROCHLORIDE 2500 MG: 5 INJECTION, POWDER, LYOPHILIZED, FOR SOLUTION INTRAVENOUS at 02:46

## 2022-10-05 RX ADMIN — ASPIRIN 81 MG: 81 TABLET, COATED ORAL at 08:20

## 2022-10-05 RX ADMIN — INSULIN LISPRO 2 UNITS: 100 INJECTION, SOLUTION INTRAVENOUS; SUBCUTANEOUS at 17:17

## 2022-10-05 RX ADMIN — GABAPENTIN 900 MG: 300 CAPSULE ORAL at 19:50

## 2022-10-05 RX ADMIN — CEFEPIME 2000 MG: 2 INJECTION, POWDER, FOR SOLUTION INTRAVENOUS at 01:21

## 2022-10-05 RX ADMIN — ENOXAPARIN SODIUM 30 MG: 100 INJECTION SUBCUTANEOUS at 19:50

## 2022-10-05 RX ADMIN — ENOXAPARIN SODIUM 30 MG: 100 INJECTION SUBCUTANEOUS at 10:19

## 2022-10-05 RX ADMIN — INSULIN GLARGINE 10 UNITS: 100 INJECTION, SOLUTION SUBCUTANEOUS at 12:51

## 2022-10-05 RX ADMIN — GLIPIZIDE 20 MG: 10 TABLET ORAL at 17:21

## 2022-10-05 RX ADMIN — SODIUM CHLORIDE, PRESERVATIVE FREE 10 ML: 5 INJECTION INTRAVENOUS at 19:51

## 2022-10-05 RX ADMIN — SODIUM CHLORIDE, PRESERVATIVE FREE 10 ML: 5 INJECTION INTRAVENOUS at 08:21

## 2022-10-05 RX ADMIN — SODIUM CHLORIDE 25 ML: 9 INJECTION, SOLUTION INTRAVENOUS at 12:56

## 2022-10-05 RX ADMIN — CETIRIZINE HYDROCHLORIDE 10 MG: 10 TABLET ORAL at 19:50

## 2022-10-05 RX ADMIN — GABAPENTIN 900 MG: 300 CAPSULE ORAL at 13:55

## 2022-10-05 RX ADMIN — ATORVASTATIN CALCIUM 20 MG: 20 TABLET, FILM COATED ORAL at 19:50

## 2022-10-05 RX ADMIN — CEFEPIME 2000 MG: 2 INJECTION, POWDER, FOR SOLUTION INTRAVENOUS at 12:57

## 2022-10-05 RX ADMIN — HYDROCODONE BITARTRATE AND ACETAMINOPHEN 1 TABLET: 5; 325 TABLET ORAL at 12:59

## 2022-10-05 RX ADMIN — SODIUM CHLORIDE: 9 INJECTION, SOLUTION INTRAVENOUS at 01:19

## 2022-10-05 ASSESSMENT — PAIN - FUNCTIONAL ASSESSMENT
PAIN_FUNCTIONAL_ASSESSMENT: PREVENTS OR INTERFERES SOME ACTIVE ACTIVITIES AND ADLS

## 2022-10-05 ASSESSMENT — PAIN DESCRIPTION - DESCRIPTORS
DESCRIPTORS: ACHING;THROBBING
DESCRIPTORS: THROBBING
DESCRIPTORS: ACHING;THROBBING
DESCRIPTORS: THROBBING

## 2022-10-05 ASSESSMENT — ENCOUNTER SYMPTOMS
CHEST TIGHTNESS: 0
NAUSEA: 0
FACIAL SWELLING: 0
SHORTNESS OF BREATH: 0
VOMITING: 0
PHOTOPHOBIA: 0
ABDOMINAL PAIN: 0
DIARRHEA: 0
SORE THROAT: 0

## 2022-10-05 ASSESSMENT — PAIN DESCRIPTION - ORIENTATION
ORIENTATION: RIGHT

## 2022-10-05 ASSESSMENT — PAIN SCALES - GENERAL
PAINLEVEL_OUTOF10: 8
PAINLEVEL_OUTOF10: 9
PAINLEVEL_OUTOF10: 7
PAINLEVEL_OUTOF10: 7
PAINLEVEL_OUTOF10: 8

## 2022-10-05 ASSESSMENT — PAIN DESCRIPTION - LOCATION
LOCATION: FOOT

## 2022-10-05 NOTE — PROGRESS NOTES
Patient arrived to [facility] [address] in stable condition. Patient alert and oriented. Patient oriented to room, call light, and bed mechanics. Perfect serve sent to admitting/attending to notify of patients arrival and get admitting orders. Standard safety measures taken.

## 2022-10-05 NOTE — RT PROTOCOL NOTE
RT Inhaler-Nebulizer Bronchodilator Protocol Note    There is a bronchodilator order in the chart from a provider indicating to follow the RT Bronchodilator Protocol and there is an Initiate RT Inhaler-Nebulizer Bronchodilator Protocol order as well (see protocol at bottom of note). CXR Findings:  No results found. The findings from the last RT Protocol Assessment were as follows:   History Pulmonary Disease: Smoker 15 pack years or more  Respiratory Pattern: Regular pattern and RR 12-20 bpm  Breath Sounds: Slightly diminished and/or crackles  Cough: Strong, spontaneous, non-productive  Indication for Bronchodilator Therapy: Decreased or absent breath sounds  Bronchodilator Assessment Score: 3    Aerosolized bronchodilator medication orders have been revised according to the RT Inhaler-Nebulizer Bronchodilator Protocol below. Respiratory Therapist to perform RT Therapy Protocol Assessment initially then follow the protocol. Repeat RT Therapy Protocol Assessment PRN for score 0-3 or on second treatment, BID, and PRN for scores above 3. No Indications - adjust the frequency to every 6 hours PRN wheezing or bronchospasm, if no treatments needed after 48 hours then discontinue using Per Protocol order mode. If indication present, adjust the RT bronchodilator orders based on the Bronchodilator Assessment Score as indicated below. Use Inhaler orders unless patient has one or more of the following: on home nebulizer, not able to hold breath for 10 seconds, is not alert and oriented, cannot activate and use MDI correctly, or respiratory rate 25 breaths per minute or more, then use the equivalent nebulizer order(s) with same Frequency and PRN reasons based on the score. If a patient is on this medication at home then do not decrease Frequency below that used at home.     0-3 - enter or revise RT bronchodilator order(s) to equivalent RT Bronchodilator order with Frequency of every 4 hours PRN for wheezing or increased work of breathing using Per Protocol order mode. 4-6 - enter or revise RT Bronchodilator order(s) to two equivalent RT bronchodilator orders with one order with BID Frequency and one order with Frequency of every 4 hours PRN wheezing or increased work of breathing using Per Protocol order mode. 7-10 - enter or revise RT Bronchodilator order(s) to two equivalent RT bronchodilator orders with one order with TID Frequency and one order with Frequency of every 4 hours PRN wheezing or increased work of breathing using Per Protocol order mode. 11-13 - enter or revise RT Bronchodilator order(s) to one equivalent RT bronchodilator order with QID Frequency and an Albuterol order with Frequency of every 4 hours PRN wheezing or increased work of breathing using Per Protocol order mode. Greater than 13 - enter or revise RT Bronchodilator order(s) to one equivalent RT bronchodilator order with every 4 hours Frequency and an Albuterol order with Frequency of every 2 hours PRN wheezing or increased work of breathing using Per Protocol order mode. RT to enter RT Home Evaluation for COPD & MDI Assessment order using Per Protocol order mode.     Electronically signed by Meaghan Chen RCP on 10/4/2022 at 10:23 PM

## 2022-10-05 NOTE — PROGRESS NOTES
Physician Progress Note      PATIENTSeabron Dates  CSN #:                  [id number]  :                       1955  ADMIT DATE:       10/4/2022 9:10 PM  [address] [place] DATE:  Jeri Shepard  PROVIDER #:        Toro Mccullough DPM          QUERY TEXT:    Pt admitted with right foot cellulitis. Pt noted to have DM2. If possible,   please document in progress notes and discharge summary the relationship, if   any, between cellulitis and DM. The medical record reflects the following:  Risk Factors: DM2  Clinical Indicators: BS of [id number], noted establish history of right wound   infection, erythema, gross deformity  Treatment: Podiatry consult, IV Vanco, Insulin, MRI ordered    Thank you,  Diana Carlisle RN  Options provided:  -- Right foot cellulitis associated with Diabetes  -- Right foot cellulitis unrelated to Diabetes  -- Other - I will add my own diagnosis  -- Disagree - Not applicable / Not valid  -- Disagree - Clinically unable to determine / Unknown  -- Refer to Clinical Documentation Reviewer    PROVIDER RESPONSE TEXT:    Right foot cellulitis associated with Diabetes. Query created by:  Khoa Rojo on 10/5/2022 12:07 PM      Electronically signed by:  Toro Mccullough DPM 10/5/2022 2:05 PM

## 2022-10-05 NOTE — CARE COORDINATION
Case Management Initial Discharge Plan  Lionel Sifuentes             Met with:patient or spouse/SO to discuss discharge plans. Information verified: address, contacts, phone number, , insurance Yes  PCP: Amarilis Puentes MD  Date of last visit: 10-3-2022    Insurance Provider: [facility] Medicare    Discharge Planning    Living Arrangements:  Spouse/Significant Other   Support Systems:  Spouse/Significant Other, Friends/Neighbors    Home has 1 stories  3 stairs to climb to get into front door, 0 stairs to climb to reach second floor  Location of bedroom/bathroom in home  - 3 steps down to bedroom and bathroom on main floor    Patient able to perform ADL's:Independent    Current Services (outpatient & in home) none  DME equipment: walker, cane, glucometer  DME provider: N/A    Pharmacy: [facility]    Potential Assistance Needed:  Home Care    Patient agreeable to home care: Yes  Freedom of choice provided:  yes    Prior SNF/Rehab Placement and Facility: No  Agreeable to SNF/Rehab: No  [place] of choice provided: n/a   Evaluation: n/a    Expected Discharge date:     Patient expects to be discharged to:   home  Follow Up Appointment: Best Day/ Time:      Transportation provider: wife  Transportation arrangements needed for discharge: No    Readmission Risk              Risk of Unplanned Readmission:  11             Does patient have a readmission risk score greater than 14?: No  If yes, follow-up appointment must be made within 7 days of discharge. Goal of Care:       Discharge Plan: Met with patient and wife at bedside. Lives with wife, independent and drives. Uses cane as needed and does have walker. Admitted for rt foot wound. Pt has had Charcot deformity to rt foot for years and has had multiple hospital admissions and IV ATB at home in the past.  Has been following with Dr. Linda Bee. MRI rt foot and ankle done today. Currently on IV Vanco and Cefepime. ID consulted.     Dr. Faustina Mckeon [address] [facility] following for possible Charcot reconstruction. Pt agreeable to [facility] if needed and referral called to [facility] and they can accept. Continue to follow podiatry and ID plan.                  Electronically signed by Pam Adams RN on 10/5/22 at 2:53 PM EDT

## 2022-10-05 NOTE — CONSULTS
[facility]  Initial Consult Note  Date: 10/5/2022    Hospital day :1     Impression:   Right Charcot foot with longstanding history of infections/abscesses has undergone multiple I&D procedure  Chronic surgical wounds on the plantar aspect of the foot  Recurrent deep midfoot soft tissue abscess with thin fluid-filled sinus tract noted in the medial plantar aspect of the foot and there is retained metallic foreign body. Recommendations   The patient has been started on empiric antimicrobial therapy with vancomycin and cefepime  The plan is for continued intravenous antimicrobial therapy while a surgical plan is being formulated  There has been discussion of a spanning external fixator device  We will await the plans for the podiatry service    Chief complaint/reason for consultation:   Diabetic foot infection    History of Present Illness:   Yaritza Willett is a 78y.o.-year-old male who was initially admitted on 10/4/2022. Caron Villatoro has a history of a right sided Charcot foot deformity and has had a longstanding history with infections in the right foot. He did have an abscess for which he underwent I&D in June 2020 with MSSA isolated and the patient has had a residual wound which secondarily got infected and caused an abscess and in September 2021 he underwent an I&D of the abscess with MSSA, Enterobacter, Morganella isolated and he was discharged with IV antimicrobial therapy which he took for 6 weeks through October 22 and 2021. The patient did continue to have issues with wound dehiscence and had an open wound on the plantar aspect of the foot which was clean. Seeing the patient in close to a year now and he has followed with the podiatrist and has had continued wound care and the wound has been improving with time. Patient has had a plantar foot midfoot ulceration.     Patient did subsequently start to develop soft tissue swelling redness in his foot and the swelling started to extend into his leg and the patient did see Dr. Savannah Cisneros who recommended that he come into the emergency room for evaluation. The patient has been admitted for an abscess in the right foot and chronic ulcerations on the plantar aspect of the foot and the patient is undergoing further work-up with MRI and consideration for an external fixator device. Was asked to evaluate and help with antibiotic choice. I have personally reviewed the past medical history, past surgical history, medications, social history, and family history, and I have updated the database accordingly. Past Medical History:     Past Medical History:   Diagnosis Date    Abscess of right foot 8/4/2018    Acquired hammer toe deformity of lesser toe of right foot     ALEJANDRO (acute kidney injury) ([address] 8/5/2018    Cellulitis 4/24/2018    Cellulitis of left foot 4/24/2018    Cellulitis of right foot     and abscess    Charcot foot due to diabetes mellitus ([address] 2014    Right foot     Chest pain at rest 8/4/2018    Chronic multifocal osteomyelitis of right foot ([address]     Diabetic polyneuropathy associated with type 2 diabetes mellitus ([address]     Essential hypertension     Fractures, multiple 07/21/2014    Right foot fractures     Hyperlipidemia     Hypertension     Leukocytosis     Neuropathy     diabetic with charcot affecting the right foot    Pain in right foot     redness and swelling    Pneumonia 2009    Right foot infection     Right foot pain     Tobacco abuse 4/24/2018    Type II or unspecified type diabetes mellitus without mention of complication, not stated as uncontrolled     Vertigo     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 stepped on sharp object. Covered by dressing.     Wound, open     Right posterior -Diabetic Ulcer     Past Surgical  History:     Past Surgical History:   Procedure Laterality Date    APPENDECTOMY      at age 24    ARTHROPLASTY Right 12/11/2020    RIGHT HALLUX EXOSTECTOMY AND 3RD DIGIT EXTENSIOR TENOTOMY performed by Steve Dove DPM at [address] Left 04/24/2018    I&D foreign body removal    FOOT DEBRIDEMENT Right 3/20/2020    RIGHT  FOOT   INCISION AND DRAINAGE WITH BONE BIOPSY performed by Steve Dove DPM at [facility] [address] Right 6/8/2021    FOOT DEBRIDEMENT INCISION AND DRAINAGE performed by Steve Dove DPM at [facility] [address] Right 9/16/2021    RIGHT FOOT  INCISION AND DRAINAGE   WITH PULSE LAVAGE performed by Steve Dove DPM at [facility] Right 6/11/2021    RIGHT FOOT FLAP CLOSURE performed by Steve Dove DPM at [facility] ARTHROSCOPY Right 1989    KNEE ARTHROSCOPY Left 1990    KNEE SURGERY Bilateral 1983    arthroscopy    [place] Left 4/24/2018    LEFT FOOT MULTIPLE  INCISIONS  AND DRAINAGE AND REMOVAL FOREIGN BODY  IRENE performed by Steve Dove DPM at [facility] OR     Medications:      cefepime  2,000 mg IntraVENous Q12H    vancomycin (VANCOCIN) intermittent dosing (placeholder)   Other RX Placeholder    enoxaparin  30 mg SubCUTAneous BID    [START ON 10/6/2022] vancomycin  1,250 mg IntraVENous Q24H    insulin glargine  10 Units SubCUTAneous BID    glipiZIDE  20 mg Oral BID AC    cetirizine  10 mg Oral Nightly    nicotine  1 patch TransDERmal Daily    sodium chloride flush  5-40 mL IntraVENous 2 times per day    insulin lispro  0-8 Units SubCUTAneous TID WC    insulin lispro  0-4 Units SubCUTAneous Nightly    gabapentin  900 mg Oral TID    aspirin  81 mg Oral Daily    amLODIPine  5 mg Oral Daily    atorvastatin  20 mg Oral Nightly     Social History:     Social History     Socioeconomic History    Marital status:      Spouse name: Not on file    Number of children: Not on file    Years of education: Not on file    Highest education level: Not on file   Occupational History    Not on file   Tobacco Use    Smoking status: Every Day     Packs/day: 1.00     Types: Cigarettes    Smokeless tobacco: Never   Vaping Use    Vaping Use: Never used   Substance and Sexual Activity    Alcohol use: No    Drug use: Not Currently    Sexual activity: Not Currently   Other Topics Concern    Not on file   Social History Narrative    Not on file     Social Determinants of Health     Financial Resource Strain: Not on file   Food Insecurity: Not on file   Transportation Needs: Not on file   Physical Activity: Not on file   Stress: Not on file   Social Connections: Not on file   Intimate Partner Violence: Not on file   Housing Stability: Not on file     Family History:     Family History   Problem Relation Age of Onset    Diabetes Mother     Cancer Father     Hypertension Maternal Grandmother       Allergies:   Morphine, Other, and Percocet [oxycodone-acetaminophen]     Review of Systems:   General: No fevers or chills. Eyes: No double vision or blurry vision. ENT: No sore throat or runny nose. Cardiovascular: No chest pain or palpitations. Lung: No shortness of breath or cough. Abdomen: No nausea, vomiting, diarrhea, or abdominal pain. Genitourinary: No increased urinary frequency, or dysuria. Musculoskeletal:  The patient has right foot swelling, redness, swelling into the right leg. Hematologic: No bleeding or bruising. Neurologic: No headache, weakness, numbness, or tingling.     Physical Examination :   /71   Pulse 63   Temp 97.8 °F (36.6 °C) (Oral)   Resp 16   Ht 5' 11\" (1.803 m)   Wt 234 lb (106.1 kg)   SpO2 97%   BMI 32.64 kg/m²     Temperature Range: Temp: 97.8 °F (36.6 °C) Temp  Av °F (36.7 °C)  Min: 97.5 °F (36.4 °C)  Max: 98.6 °F (37 °C)  General Appearance: Awake, alert, and in no apparent distress  Head: Normocephalic, without obvious abnormality, atraumatic  Eyes: Pupils equal, round, reactive, to light and accommodation; extraocular movements intact; sclera anicteric; conjunctivae pink  ENT: Oropharynx clear, without erythema, exudate, or thrush. Neck: Supple, without lymphadenopathy. Pulmonary/Chest: Clear to auscultation, without wheezes, rales, or rhonchi  Cardiovascular: Regular rate and rhythm without murmurs, rubs, or gallops. Abdomen: Soft, nontender, nondistended. Extremities: The patient has a right plantar foot ulceration in the midfoot by the area where the plantar arch should be and there is also a ulceration in the webspace between the first and second toe. Neurologic: No gross sensory or motor deficits. Skin: Warm and dry with no rash. Medical Decision Making:   I have independently reviewed/ordered the following labs:  CBC with Differential:   Recent Labs     10/05/22  0601   WBC 8.8   HGB 10.2*   HCT 30.8*      LYMPHOPCT 27   MONOPCT 8     BMP:   Recent Labs     10/05/22  0601      K 4.4      CO2 24   BUN 18   CREATININE 1.99*     Hepatic Function Panel: No results for input(s): PROT, LABALBU, BILIDIR, IBILI, BILITOT, ALKPHOS, ALT, AST in the last 72 hours. No results found for: PROCAL    Lab Results   Component Value Date/Time    CRP 30.1 10/04/2022 10:18 PM    .3 09/13/2022 06:41 PM    .8 09/13/2021 11:04 AM     No results found for: FERRITIN  No results found for: FIBRINOGEN  No results found for: DDIMER  No results found for: LDH    Lab Results   Component Value Date    SEDRATE 64 (H) 10/04/2022       Lab Results   Component Value Date/Time    COVID19 DETECTED 05/26/2021 12:06 PM    COVID19 Not Detected 12/07/2020 03:10 PM    COVID19 Not Detected 08/08/2020 10:02 PM     No results found for requested labs within last 30 days. Imaging Studies:   XR ANKLE RIGHT (MIN 3 VIEWS)    Result Date: 10/4/2022  Radiology exam is complete. No Radiologist dictation. Please follow up with ordering provider.      XR FOOT RIGHT (MIN 3 VIEWS)    Result Date: 10/5/2022  EXAMINATION: THREE X-RAY VIEWS OF THE RIGHT FOOT 10/5/2022 12:00 pm COMPARISON: Radiographs 09/13/2022 HISTORY: ORDERING SYSTEM PROVIDED HISTORY: Charcot, infection TECHNOLOGIST PROVIDED HISTORY: Charcot, infection Reason for Exam: infection FINDINGS: Bony changes are seen at the TMT joints compatible with a neuropathic arthropathy. No acute fracture is seen. No evidence of dislocation. No obvious new bony erosions are seen. There is air in the plantar soft tissues of the medial midfoot. Chronic bony changes are seen at the 1st IP joint. Mild 1st MTP joint degenerative changes. Prior partial amputation of the 3rd toe. There is a plantar calcaneal enthesophyte. Air is seen in the plantar soft tissues of the medial midfoot. Chronic bony changes at the TMT joints compatible with a neuropathic arthropathy. No obvious new bony erosions are seen. MRI ANKLE RIGHT WO CONTRAST    Result Date: 10/5/2022  EXAMINATION: MRI OF THE RIGHT ANKLE WITHOUT CONTRAST; MRI OF THE RIGHT FOOT WITHOUT CONTRAST, 10/5/2022 11:03 am TECHNIQUE: Multiplanar multisequence MRI of the right ankle was performed without the administration of intravenous contrast.; Multiplanar multisequence MRI of the right foot was performed without the administration of intravenous contrast. COMPARISON: None. HISTORY: ORDERING SYSTEM PROVIDED HISTORY: 1st interspace wound. Medial arch wound. History of Charcot foot TECHNOLOGIST PROVIDED HISTORY: 1st interspace wound. Medial arch wound. History of Charcot foot Reason for Exam: Stepped on a nail with his rt foot a year and a half ago and has been having complications since. Large wound to posterior medial aspect of rt foot. Approx 1cm hole in center, unknown depth. Pt denies pain- foot is numb, pt is a diabetic. Rt foot swollen, red, warm to touch. Pedal pulse present, 2+. Calloused, thick, white skin around wound opening. Clear drainage with foul odor.  FINDINGS: There is a soft tissue defect at the medial plantar aspect of the midfoot. There is a thin fluid-filled sinus tract extending into the deep soft tissues of the midfoot, measuring up to 2.0 x 0.9 cm on the coronal sequence. There are adjacent foci of air in the soft tissues. No marrow signal abnormality is seen in the adjacent bones to suggest acute osteomyelitis. There is thickening and T2 hyperintense signal involving the skin adjacent to the soft tissue defect. Susceptibility artifact within the soft tissues near the soft tissue defect could represent sequela of prior intervention or retained metallic foreign bodies. Chronic bony changes are seen at the TMT joints with fragmentation, compatible with a neuropathic arthropathy. Sequela of prior injury to the AITFL. Mild-to-moderate distal insertional posterior tibial tendinosis. Moderate degenerative changes are seen at the tibiotalar joint with prominent cystic changes at the medial talar dome and at the base of the medial malleolus. There appears to be a full-thickness cartilage defect at the medial talar dome. Thickening and increased intrasubstance signal involving the central band of plantar fascia compatible with mild-to-moderate plantar fasciopathy. There is nonspecific Kager fat pad edema along with subcutaneous edema about the ankle. Mild-to-moderate 1st MTP joint degenerative changes. Severe degenerative changes are seen at the 1st IP joint. Soft tissue defect of the medial plantar aspect of the midfoot. Thin fluid-filled sinus tract extending into the deep soft tissues of the midfoot, measuring up to 2.0 x 0.9 cm on the coronal sequence, raising suspicion for a phlegmon/abscess. There is air in the adjacent soft tissues. Susceptibility artifact in the soft tissues adjacent to the soft tissue defect could represent sequela of prior intervention or retained metallic foreign body. No MR evidence of acute osteomyelitis. Chronic bony changes at the TMT joints compatible with a neuropathic arthropathy. Additional chronic findings as described above. MRI FOOT RIGHT WO CONTRAST    Result Date: 10/5/2022  EXAMINATION: MRI OF THE RIGHT ANKLE WITHOUT CONTRAST; MRI OF THE RIGHT FOOT WITHOUT CONTRAST, 10/5/2022 11:03 am TECHNIQUE: Multiplanar multisequence MRI of the right ankle was performed without the administration of intravenous contrast.; Multiplanar multisequence MRI of the right foot was performed without the administration of intravenous contrast. COMPARISON: None. HISTORY: ORDERING SYSTEM PROVIDED HISTORY: 1st interspace wound. Medial arch wound. History of Charcot foot TECHNOLOGIST PROVIDED HISTORY: 1st interspace wound. Medial arch wound. History of Charcot foot Reason for Exam: Stepped on a nail with his rt foot a year and a half ago and has been having complications since. Large wound to posterior medial aspect of rt foot. Approx 1cm hole in center, unknown depth. Pt denies pain- foot is numb, pt is a diabetic. Rt foot swollen, red, warm to touch. Pedal pulse present, 2+. Calloused, thick, white skin around wound opening. Clear drainage with foul odor. FINDINGS: There is a soft tissue defect at the medial plantar aspect of the midfoot. There is a thin fluid-filled sinus tract extending into the deep soft tissues of the midfoot, measuring up to 2.0 x 0.9 cm on the coronal sequence. There are adjacent foci of air in the soft tissues. No marrow signal abnormality is seen in the adjacent bones to suggest acute osteomyelitis. There is thickening and T2 hyperintense signal involving the skin adjacent to the soft tissue defect. Susceptibility artifact within the soft tissues near the soft tissue defect could represent sequela of prior intervention or retained metallic foreign bodies. Chronic bony changes are seen at the TMT joints with fragmentation, compatible with a neuropathic arthropathy. Sequela of prior injury to the AITFL. Mild-to-moderate distal insertional posterior tibial tendinosis.  Moderate degenerative changes are seen at the tibiotalar joint with prominent cystic changes at the medial talar dome and at the base of the medial malleolus. There appears to be a full-thickness cartilage defect at the medial talar dome. Thickening and increased intrasubstance signal involving the central band of plantar fascia compatible with mild-to-moderate plantar fasciopathy. There is nonspecific Kager fat pad edema along with subcutaneous edema about the ankle. Mild-to-moderate 1st MTP joint degenerative changes. Severe degenerative changes are seen at the 1st IP joint. Soft tissue defect of the medial plantar aspect of the midfoot. Thin fluid-filled sinus tract extending into the deep soft tissues of the midfoot, measuring up to 2.0 x 0.9 cm on the coronal sequence, raising suspicion for a phlegmon/abscess. There is air in the adjacent soft tissues. Susceptibility artifact in the soft tissues adjacent to the soft tissue defect could represent sequela of prior intervention or retained metallic foreign body. No MR evidence of acute osteomyelitis. Chronic bony changes at the TMT joints compatible with a neuropathic arthropathy. Additional chronic findings as described above. Cultures:     Culture, Anaerobic and Aerobic [[id number]] (Abnormal) Collected: 10/05/22 0845   Order Status: Completed Specimen: Wound Updated: 10/05/22 1354    Specimen Description . WOUND . FOOT    Direct Exam RARE NEUTROPHILS Abnormal      MODERATE GRAM POSITIVE RODS Abnormal      FEW GRAM POSITIVE COCCI IN PAIRS Abnormal      FEW GRAM NEGATIVE COCCOBACILLI Abnormal     Culture PENDING         Thank you for allowing us to participate in the care of this patient. Please call with questions.     Electronically signed by Melissa Houser MD on 10/5/2022 at 5:56 PM      [facility]  Melissa Houser MD  [employer] messaging  OFFICE: [phone]      This note is created with the assistance of a speech recognition program.  While intending to generate a document that actually reflects the content of the visit, the document can still have some errors including those of syntax and sound a like substitutions which may escape proof reading. In such instances, actual meaning can be extrapolated by contextual diversion.

## 2022-10-05 NOTE — PLAN OF CARE
Problem: Discharge Planning  Goal: Discharge to home or other facility with appropriate resources  Outcome: Progressing  Flowsheets (Taken 10/4/2022 2130)  Discharge to home or other facility with appropriate resources:   Identify barriers to discharge with patient and caregiver   Arrange for needed discharge resources and transportation as appropriate   Identify discharge learning needs (meds, wound care, etc)     Problem: Chronic Conditions and Co-morbidities  Goal: Patient's chronic conditions and co-morbidity symptoms are monitored and maintained or improved  Outcome: Progressing  Flowsheets (Taken 10/4/2022 2130)  Care Plan - Patient's Chronic Conditions and Co-Morbidity Symptoms are Monitored and Maintained or Improved:   Monitor and assess patient's chronic conditions and comorbid symptoms for stability, deterioration, or improvement   Collaborate with multidisciplinary team to address chronic and comorbid conditions and prevent exacerbation or deterioration     Problem: Safety - Adult  Goal: Free from fall injury  Outcome: Progressing  Flowsheets (Taken 10/4/2022 2353)  Free From Fall Injury: Instruct family/caregiver on patient safety     Problem: ABCDS Injury Assessment  Goal: Absence of physical injury  Outcome: Progressing  Flowsheets (Taken 10/4/2022 2353)  Absence of Physical Injury: Implement safety measures based on patient assessment     Problem: Respiratory - Adult  Goal: Achieves optimal ventilation and oxygenation  Outcome: Progressing  Flowsheets (Taken 10/4/2022 2353)  Achieves optimal ventilation and oxygenation:   Assess for changes in respiratory status   Assess for changes in mentation and behavior   Position to facilitate oxygenation and minimize respiratory effort   Oxygen supplementation based on oxygen saturation or arterial blood gases   Initiate smoking cessation protocol as indicated   Assess and instruct to report shortness of breath or any respiratory difficulty   Respiratory therapy support as indicated     Problem: Skin/Tissue Integrity - Adult  Goal: Skin integrity remains intact  Outcome: Progressing  Flowsheets (Taken 10/4/2022 2353)  Skin Integrity Remains Intact:   Monitor for areas of redness and/or skin breakdown   Assess vascular access sites hourly     Problem: Skin/Tissue Integrity - Adult  Goal: Incisions, wounds, or drain sites healing without S/S of infection  Outcome: Progressing  Flowsheets (Taken 10/4/2022 2353)  Incisions, Wounds, or Drain Sites Healing Without Sign and Symptoms of Infection: TWICE DAILY: Assess and document dressing/incision, wound bed, drain sites and surrounding tissue     Problem: Musculoskeletal - Adult  Goal: Return mobility to safest level of function  Outcome: Progressing  Flowsheets (Taken 10/4/2022 2353)  Return Mobility to Safest Level of Function:   Assess patient stability and activity tolerance for standing, transferring and ambulating with or without assistive devices   Assist with transfers and ambulation using safe patient handling equipment as needed   Ensure adequate protection for wounds/incisions during mobilization   Obtain physical therapy/occupational therapy consults as needed   Instruct patient/family in ordered activity level     Problem: Musculoskeletal - Adult  Goal: Return ADL status to a safe level of function  Outcome: Progressing  Flowsheets (Taken 10/4/2022 2353)  Return ADL Status to a Safe Level of Function:   Administer medication as ordered   Assess activities of daily living deficits and provide assistive devices as needed   Obtain physical therapy/occupational therapy consults as needed   Assist and instruct patient to increase activity and self care as tolerated     Problem: Infection - Adult  Goal: Absence of infection at discharge  Outcome: Progressing  Flowsheets (Taken 10/4/2022 2353)  Absence of infection at discharge:   Assess and monitor for signs and symptoms of infection   Monitor lab/diagnostic results   Monitor all insertion sites i.e., indwelling lines, tubes and drains   Administer medications as ordered   Instruct and encourage patient and family to use good hand hygiene technique   Identify and instruct in appropriate isolation precautions for identified infection/condition     Problem: Infection - Adult  Goal: Absence of infection during hospitalization  Outcome: Progressing  Flowsheets (Taken 10/4/2022 2353)  Absence of infection during hospitalization:   Assess and monitor for signs and symptoms of infection   Monitor lab/diagnostic results   Monitor all insertion sites i.e., indwelling lines, tubes and drains   Administer medications as ordered   Instruct and encourage patient and family to use good hand hygiene technique   Identify and instruct in appropriate isolation precautions for identified infection/condition     Problem: Metabolic/Fluid and Electrolytes - Adult  Goal: Electrolytes maintained within normal limits  Outcome: Progressing  Flowsheets (Taken 10/4/2022 2353)  Electrolytes maintained within normal limits:   Monitor labs and assess patient for signs and symptoms of electrolyte imbalances   Administer electrolyte replacement as ordered   Monitor response to electrolyte replacements, including repeat lab results as appropriate     Problem: Metabolic/Fluid and Electrolytes - Adult  Goal: Hemodynamic stability and optimal renal function maintained  Outcome: Progressing  Flowsheets (Taken 10/4/2022 2353)  Hemodynamic stability and optimal renal function maintained:   Monitor labs and assess for signs and symptoms of volume excess or deficit   Monitor intake, output and patient weight   Monitor urine specific gravity, serum osmolarity and serum sodium as indicated or ordered   Monitor response to interventions for patient's volume status, including labs, urine output, blood pressure (other measures as available)     Problem: Metabolic/Fluid and Electrolytes - Adult  Goal: Glucose maintained within prescribed range  Outcome: Progressing  Flowsheets (Taken 10/4/2022 7862)  Glucose maintained within prescribed range:   Monitor blood glucose as ordered   Assess for signs and symptoms of hyperglycemia and hypoglycemia   Administer ordered medications to maintain glucose within target range

## 2022-10-05 NOTE — H&P
Admission History and Physical  Podiatric Medicine and Surgery     Subjective     Chief Complaint: right foot wound    HPI:  Caryn Mills is a 78 y.o. male seen at [facility] for right foot wound. Patient is known to [address] [place] [address]. Patient has had Charcot deformity to the right foot for a long time. Patient had multiple admission due to right foot infection in the past.  Patient visited Dr. Simran Wade 10//2022 and was told to report to the ED to get admitted immediately due to concern of cellulitis and possible osteomyelitis of the right foot.  would like to transfer care to [facility] for possible Charcot reconstruction. Patient denies other pedal complaint. PCP is Rosales Bush MD    ROS:   Review of Systems   Constitutional:  Negative for activity change, chills and fever. HENT:  Negative for facial swelling and sore throat. Eyes:  Negative for photophobia. Respiratory:  Negative for chest tightness and shortness of breath. Cardiovascular:  Negative for chest pain, palpitations and leg swelling. Gastrointestinal:  Negative for abdominal pain, diarrhea, nausea and vomiting. Musculoskeletal:  Negative for arthralgias, gait problem and joint swelling. Skin:  Positive for wound (right foot). Neurological:  Negative for weakness and headaches. Psychiatric/Behavioral:  Negative for agitation and behavioral problems.       Past Medical History   has a past medical history of Abscess of right foot, Acquired hammer toe deformity of lesser toe of right foot, ALEJANDRO (acute kidney injury) ([address] Cellulitis, Cellulitis of left foot, Cellulitis of right foot, Charcot foot due to diabetes mellitus ([address] Chest pain at rest, Chronic multifocal osteomyelitis of right foot ([address] Diabetic polyneuropathy associated with type 2 diabetes mellitus ([address] Essential hypertension, Fractures, multiple, Hyperlipidemia, Hypertension, Leukocytosis, Neuropathy, Pain in right foot, Pneumonia, Right foot infection, Right foot pain, Tobacco abuse, Type II or unspecified type diabetes mellitus without mention of complication, not stated as uncontrolled, Vertigo, Well controlled type 2 diabetes mellitus with neurological manifestations ([facility] Wound dehiscence, Wound, open, and Wound, open. Past Surgical History   has a past surgical history that includes Neck surgery (1987); Appendectomy; knee surgery (Bilateral, 1983); Knee arthroscopy (Right, 1989); Knee arthroscopy (Left, 1990); Foot Debridement (Left, 04/24/2018); pr deep dissec foot infec,1 bursa (Left, 4/24/2018); Colonoscopy; Foot Debridement (Right, 3/20/2020); arthroplasty (Right, 12/11/2020); Foot Debridement (Right, 6/8/2021); Foot surgery (Right, 6/11/2021); and Foot Debridement (Right, 9/16/2021). Medications  Prior to Admission medications    Medication Sig Start Date End Date Taking?  Authorizing Provider   ciprofloxacin (CIPRO) 500 MG tablet  9/30/22   Historical Provider, MD   HYDROcodone-acetaminophen ([facility])  MG per tablet TAKE 1 TABLET BY MOUTH FOUR TIMES DAILY AS NEEDED 2/18/22   Historical Provider, MD   isosorbide dinitrate (ISORDIL) 30 MG tablet Take 30 mg by mouth daily  Patient not taking: Reported on 10/4/2022    Historical Provider, MD   pantoprazole (PROTONIX) 40 MG tablet Take 40 mg by mouth daily  Patient not taking: Reported on 10/4/2022 12/17/21   Historical Provider, MD   TRUEPLUS PEN NEEDLES 31G X 8 MM [facility]  11/29/21   Historical Provider, MD   ondansetron (ZOFRAN-ODT) 4 MG disintegrating tablet Take 1 tablet by mouth 3 times daily as needed for Nausea or Vomiting  Patient not taking: Reported on 76/1/3014 45/8/18   Damaso Lopez MD   mupirocin Clauidne Evelyn) 2 % ointment  11/8/21   Historical Provider, MD   amLODIPine (NORVASC) 5 MG tablet TAKE 1 TABLET BY MOUTH EVERY DAY 10/20/21   Historical Provider, MD   ceFEPIme (MAXIPIME) 2 g injection  9/22/21   Historical Provider, MD   LANTUS SOLOSTAR 100 UNIT/ML injection pen Inject 15 Units into the skin nightly  Patient taking differently: Inject 10 Units into the skin 2 times daily 9/17/21   Aleck Factor, APRN - NP   silver sulfADIAZINE (SILVADENE) 1 % cream Apply topically daily. 6/12/21   Manda Lee DO   meloxicam (MOBIC) 15 MG tablet Take 15 mg by mouth nightly   Patient not taking: Reported on 10/4/2022 5/5/21   Historical Provider, MD   albuterol sulfate  (90 Base) MCG/ACT inhaler Inhale 2 puffs into the lungs every 6 hours as needed   Patient not taking: Reported on 10/4/2022 4/1/21   Historical Provider, MD   JANUVIA 100 MG tablet Take 100 mg by mouth daily  11/13/20   Historical Provider, MD Guerrac. Devices MISC 1 PAIR OF DIABETIC SHOES (1 LEFT/ 1 RIGHT)  1-3 PAIRS OF INSERTS (LEFT/ RIGHT) 3/5/20   Jc Parents, DPM   cetirizine (ZYRTEC) 10 MG tablet Take 10 mg by mouth nightly  10/21/19   Historical Provider, MD   lisinopril (PRINIVIL;ZESTRIL) 10 MG tablet Take 10 mg by mouth daily  Patient not taking: Reported on 10/4/2022 11/15/18   Historical Provider, MD   simvastatin (ZOCOR) 40 MG tablet Take 40 mg by mouth nightly  11/13/18   Historical Provider, MD   glipiZIDE (GLUCOTROL) 10 MG tablet Take 20 mg by mouth 2 times daily (before meals) Takes 2 tabs (=20mg) BID    Historical Provider, MD   aspirin 81 MG tablet Take 81 mg by mouth daily    Historical Provider, MD   gabapentin (NEURONTIN) 300 MG capsule Take 900 mg by mouth 3 times daily.  Take 3 caps (=900mg) 3 times a day    Historical Provider, MD    Scheduled Meds:   cefepime  2,000 mg IntraVENous Q12H    vancomycin (VANCOCIN) intermittent dosing (placeholder)   Other RX Placeholder    sodium chloride flush  5-40 mL IntraVENous 2 times per day    insulin lispro  0-8 Units SubCUTAneous TID WC    insulin lispro  0-4 Units SubCUTAneous Nightly    lisinopril  10 mg Oral Daily    gabapentin  900 mg Oral TID    aspirin  81 mg Oral Daily    amLODIPine  5 mg Oral Daily    atorvastatin  20 mg Oral Nightly Continuous Infusions:   sodium chloride Stopped (10/05/22 0246)    dextrose       PRN Meds:.sodium chloride flush, sodium chloride, ondansetron **OR** ondansetron, polyethylene glycol, acetaminophen **OR** acetaminophen, glucose, dextrose bolus **OR** dextrose bolus, glucagon (rDNA), dextrose, albuterol sulfate HFA, HYDROcodone-acetaminophen    Allergies  is allergic to morphine, other, and percocet [oxycodone-acetaminophen]. Family History  family history includes Cancer in his father; Diabetes in his mother; Hypertension in his maternal grandmother. Social History   reports that he has been smoking cigarettes. He has been smoking an average of 1 pack per day. He has never used smokeless tobacco.   reports no history of alcohol use. reports that he does not currently use drugs. Objective     Vitals:  Patient Vitals for the past 8 hrs:   BP Temp Temp src Pulse Resp SpO2 Weight   10/05/22 0656 (!) 125/52 97.5 °F (36.4 °C) Oral 70 16 93 % --   10/05/22 0447 -- -- -- -- -- -- 234 lb (106.1 kg)     Average, Min, and Max for last 24 hours Vitals:  TEMPERATURE:  Temp  Av.1 °F (36.7 °C)  Min: 97.5 °F (36.4 °C)  Max: 98.6 °F (37 °C)    RESPIRATIONS RANGE: Resp  Av  Min: 16  Max: 16    PULSE RANGE: Pulse  Av  Min: 70  Max: 88    BLOOD PRESSURE RANGE:  Systolic (76HBJ), WVF:511 , Min:125 , QOE:454   ; Diastolic (95NWI), GGX:67, Min:52, Max:69      PULSE OXIMETRY RANGE: SpO2  Av.5 %  Min: 93 %  Max: 98 %  I&O:  I/O last 3 completed shifts: In: 558.9 [I.V.:11.8; IV Piggyback:547.1]  Out: 1000 [Urine:1000]    CBC:  Recent Labs     10/04/22  2218 10/05/22  0601   WBC  --  8.8   HGB  --  10.2*   HCT  --  30.8*   PLT  --  276   CRP 30.1*  --         BMP:  Recent Labs     10/05/22  0601      K 4.4      CO2 24   BUN 18   CREATININE 1.99*   GLUCOSE 184*   CALCIUM 8.8        Coags:  No results for input(s): APTT, PROT, INR in the last 72 hours.     Lab Results   Component Value Date LABA1C 8.7 (H) 09/13/2021     Lab Results   Component Value Date    SEDRATE 64 (H) 10/04/2022      Lab Results   Component Value Date    CRP 30.1 (H) 10/04/2022        Physical Exam:    General: A&Ox3, NAD  Heart: Regular rate and rhythm. Lungs: Equal air entry. No increased effort. Abdomen: Soft, non-tender to palpation. Not distended. Lower Extremity Physical Exam:  Vascular: DP and PT pulses are palpable +2/4. CFT <3 seconds to all digits. Hair growth is diminished to the level of the digits. Diffuse loss to nonpitting edema noted to the right lower extremity. Neuro: Saph/sural/SP/DP/plantar sensation diminished to light touch. Musculoskeletal: Muscle strength is 5/5 to all lower extremity muscle groups. Gross deformity is Charcot deformity to the right foot    Dermatologic: Full thickness ulcer #1 located right first interspace and measures approximately 1 cm x 1 cm x 3 cm. Base is fibrotic. Periwound skin is macerated. Purulent drainage noted with no associated mal odor. Erythema noted to periwound with moderate associated increase in warmth. Does not probe to bone probe deep. Does not sinus track, or undermine. No fluctuance, crepitus, or induration. Full thickness ulcer #2 located to the medial plantar arch of the right foot. It is measured as 1.5 x 1.2 x 0.2 cm. Base is fibrotic. Periwound skin is hyperkeratotic. There is some serous drainage with the no odor. No erythema noted. Does not probe to bone, sinus tract, or undermine. No fluctuance, crepitus, or induration.     Clinical Images:            Imaging:   MRI FOOT RIGHT WO CONTRAST    (Results Pending)   MRI ANKLE RIGHT WO CONTRAST    (Results Pending)   XR FOOT RIGHT (MIN 3 VIEWS)    (Results Pending)   US DUP LOWER EXTREMITY RIGHT KATELYN    (Results Pending)         Assessment     Francesca Mix is a 78 y.o. male with   Cellulitis, right foot  Abscess, right foot  Type II diabetic foot ulcer down to subcutaneous layer, right foot  Charcot deformity, right foot  Type 2 diabetes with peripheral neuropathy  Hypertension    Principal Problem:    Type 2 diabetes mellitus with right diabetic foot ulcer ([facility]  Active Problems:    Charcot's joint of right foot    Stage 3b chronic kidney disease ([facility] [address]    Essential hypertension  Resolved Problems:    * No resolved hospital problems. *       Plan     Patient examined and evaluated at bedside with Dr. Gilma Urias  Treatment options discussed in detail with the patient  X-ray from 10/4/2022 was reviewed: There is no soft tissue emphysema or acute osseous deformity noted. Significant periosteal changes noted to the midfoot indicating midfoot Charcot deformity. New x-rays ordered  MRI of the foot and ankle ordered  Culture obtained  IV antibiotic: Vancomycin/cefepime. ID recommendation appreciated. Medical management per medicine. Appreciate recommendation. Venous duplex ordered to rule out DVT.   Dressing applied to Right foot: Wet-to-dry  Betadine, DSD and Ace bandage  Seen and discussed with Dr. Gilma Urias    DVT ppx: lovenox  and aspirin   Diet: carb control   Activity: Weightbearing as tolerated to Right lower extremity  Pain Control: Jacques Salinas DPM   Podiatric Medicine & Surgery   10/5/2022 at 9:03 AM

## 2022-10-05 NOTE — PROGRESS NOTES
Occupational Therapy  Facility/Department: [facility] SURG  Occupational Therapy Initial Assessment    Name: Donna Condon  : 1955  MRN: [id number]  Date of Service: 10/5/2022    CHRISTY Gonsalves reports patient is medically stable for therapy treatment this date. Chart reviewed prior to treatment and patient is agreeable for therapy. All lines intact and patient positioned comfortably at end of treatment. All patient needs addressed prior to ending therapy session. Discharge Recommendations:   (Home with assist)  OT Equipment Recommendations  Equipment Needed:  (CTA)       Patient Diagnosis(es): There were no encounter diagnoses. Past Medical History:  has a past medical history of Abscess of right foot, Acquired hammer toe deformity of lesser toe of right foot, ALEJANDRO (acute kidney injury) ([address] Cellulitis, Cellulitis of left foot, Cellulitis of right foot, Charcot foot due to diabetes mellitus ([address] Chest pain at rest, Chronic multifocal osteomyelitis of right foot ([address] Diabetic polyneuropathy associated with type 2 diabetes mellitus ([address] Essential hypertension, Fractures, multiple, Hyperlipidemia, Hypertension, Leukocytosis, Neuropathy, Pain in right foot, Pneumonia, Right foot infection, Right foot pain, Tobacco abuse, Type II or unspecified type diabetes mellitus without mention of complication, not stated as uncontrolled, Vertigo, 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 Past Surgical History:  has a past surgical history that includes Neck surgery (); Appendectomy; knee surgery (Bilateral, ); Knee arthroscopy (Right, ); Knee arthroscopy (Left, ); Foot Debridement (Left, 2018); pr deep dissec foot infec,1 bursa (Left, 2018); Colonoscopy; Foot Debridement (Right, 3/20/2020); arthroplasty (Right, 2020); Foot Debridement (Right, 2021);  Foot surgery (Right, 2021); and Foot Debridement (Right, 9/16/2021). Per HPI: David Hair is a 60-year-old male with a unification of medical history of CKD 3, type 2 diabetes and hypertension who follows with Dr. Kehinde Dickerson, who presents for concern of right foot cellulitis and possible osteomyelitis. Patient has a known history of Charcot deformity and has had multiple admissions for right foot infection in the past.  Care has been transferred to Dr. Gilma Urias (podiatry) for possible Charcot reconstruction. We have been consulted for assistance with medical management      Assessment   Performance deficits / Impairments: Decreased functional mobility ; Decreased ADL status; Decreased balance;Decreased endurance;Decreased sensation  Assessment: Pt presents with mild deficits in funcitonal mobility and ADLs status secondary to decreased functional activity tolerance, decreased balance/unsteadiness, and pain/edema in R foot. Skilled OT services are warranted at this time to maximize this pt's safety/IND with self care and to facilitate this pt's ability to return PLOF/home.   Prognosis: Good  Decision Making: Low Complexity  REQUIRES OT FOLLOW-UP: Yes  Activity Tolerance  Activity Tolerance: Patient Tolerated treatment well;Patient limited by fatigue;Patient limited by pain        Plan   Occupational Therapy Plan  Times Per Week: 3-4x/week 1x/day as tolerated     Restrictions  Restrictions/Precautions  Restrictions/Precautions: General Precautions, Weight Bearing  Lower Extremity Weight Bearing Restrictions  Right Lower Extremity Weight Bearing: Weight Bearing As Tolerated  Left Lower Extremity Weight Bearing: Weight Bearing As Tolerated  Position Activity Restriction  Other position/activity restrictions: Up as tolerated, contact isolation, WBAT to RLE, RUE IV    Subjective   General  Chart Reviewed: Yes  Patient assessed for rehabilitation services?: Yes  Family / Caregiver Present: Yes  Subjective  Subjective: Pt seated at EOB upon arrival, agreeable to therapy evaluation. Pt rating pain in R foot at a 9/10 at this time.   9/10 R foot  Social/Functional History  Social/Functional History  Lives With: Spouse  Type of Home: House  Home Layout: One level (3 Step to walk down to get to master bedroom with no handrails)  Home Access: Stairs to enter with rails  Entrance Stairs - Number of Steps: 3  Entrance Stairs - Rails: Both  Bathroom Shower/Tub: Tub/Shower unit  Bathroom Toilet: Standard  Bathroom Equipment: Grab bars in shower  Home Equipment: Fazal Sciara, rolling, Fazal Sciara, 4 wheeled, [place], Grab bars (Knee cart)  Has the patient had two or more falls in the past year or any fall with injury in the past year?: Yes (\"I tripped down my steps a couple times\" - pt able to self correct LOB)  ADL Assistance: Independent  Homemaking Assistance: Independent  Homemaking Responsibilities: Yes  Ambulation Assistance: Independent  Transfer Assistance: Independent  Active : Yes  Occupation: Retired       Objective           Observation/Palpation  Posture: Good (Seated at EOB)  Observation: Pain in R foot 9/10 pain, numbness in RLE, R foot ACE wrapped and bandaged  Edema: RLE  Safety Devices  Type of Devices: Gait belt;Left in bed;Call light within reach;Nurse notified  Restraints  Restraints Initially in Place: No    Bed Mobility Training  Bed Mobility Training: No (Pt seated at EOB upon arrival and wanted to remain seated at EOB upon session end.)  Balance  Sitting:  (MOD I while seated at EOB, pt tolerated ~25 mins)  Standing:  (CGA with no AD)  Transfer Training  Transfer Training: Yes  Overall Level of Assistance: Stand-by assistance  Interventions: Safety awareness training;Verbal cues (Min VCs for upright posture, weightbearing through RLE/precautions, and pursed lip breathing tech to increase safety.)  Sit to Stand: Stand-by assistance  Stand to Sit: Stand-by assistance  Functional Mobility  Overall Level of Assistance: Contact-guard assistance (Pt completed in room functional mobility with CGA and no AD, EOB>window>door>EOB. No LOBs noted and Good safety.)  Interventions: Safety awareness training;Verbal cues (Min VCs for pacing and line awareness all to increase safety/reduce risk of falls)  Assistive Device: Gait belt     AROM: Within functional limits  PROM: Within functional limits  Strength: Within functional limits (BUE ~5/5)  Coordination: Generally decreased, functional (Generally decreased, pt report fingers get \"stuck\" in flexed position on R hand)  Tone: Normal  Sensation: Impaired    ADL  Feeding: Independent  Grooming: Independent  UE Bathing: Stand by assistance;Setup  LE Bathing: Stand by assistance;Setup  UE Dressing: Setup  LE Dressing: Stand by assistance  Toileting: Stand by assistance  Additional Comments: Pt's participation in ADLs limited d/t pain in R foot and decreased functional activity tolerance. Vision  Vision: Impaired  Vision Exceptions: Wears glasses for reading  Hearing  Hearing: Within functional limits  Cognition  Overall Cognitive Status: WFL  Orientation  Overall Orientation Status: Within Functional Limits  Orientation Level: Oriented X4                  Education Given To: Patient; Family  Education Provided: Role of Therapy;Transfer Training;Plan of Care;Energy Conservation; Fall Prevention Strategies;Precautions  Education Method: Verbal  Barriers to Learning: None  Education Outcome: Verbalized understanding           AM-PAC Score        AM-[facility] Inpatient Daily Activity Raw Score: 20 (10/05/22 1657)  AM-PAC Inpatient ADL T-Scale Score : 42.03 (10/05/22 1657)  ADL Inpatient CMS 0-100% Score: 38.32 (10/05/22 1657)  ADL Inpatient CMS G-Code Modifier : CJ (10/05/22 1657)      Goals  Short Term Goals  Time Frame for Short Term Goals: By discharge, pt to demo:  Short Term Goal 1: ADL transfer and functional mobility with MOD I/IND and Good safety with AD as needed.   Short Term Goal 2: Total body ADLs with MOD I/IND and use of AD/AE/Compensatory strategies as needed. Short Term Goal 3: Increased standing tolerance >10 mins to promote functional activity tolerance/balance required for safety/IND with self care tasks. Short Term Goal 4: IND with BUE HEP with use of handout to maintain current strength required for safety/IND with self care tasks. Short Term Goal 5: Pt to be IND with fall prevention strategies, EC/WS tech, home safety strategies, WB precautions, and discharge/equipment recs with use of handouts as needed. Patient Goals   Patient goals : To go home!        Therapy Time   Individual Concurrent Group Co-treatment   Time In 1314         Time Out 1355         Minutes 41          Tx Time: 18 minutes       Margo Person, OT

## 2022-10-05 NOTE — PLAN OF CARE
Problem: Discharge Planning  Goal: Discharge to home or other facility with appropriate resources  Outcome: Progressing  Flowsheets (Taken 10/5/2022 0821)  Discharge to home or other facility with appropriate resources:   Identify barriers to discharge with patient and caregiver   Arrange for needed discharge resources and transportation as appropriate   Identify discharge learning needs (meds, wound care, etc)   Refer to discharge planning if patient needs post-hospital services based on physician order or complex needs related to functional status, cognitive ability or social support system     Problem: Chronic Conditions and Co-morbidities  Goal: Patient's chronic conditions and co-morbidity symptoms are monitored and maintained or improved  Outcome: Progressing  Flowsheets (Taken 10/5/2022 0821)  Care Plan - Patient's Chronic Conditions and Co-Morbidity Symptoms are Monitored and Maintained or Improved:   Collaborate with multidisciplinary team to address chronic and comorbid conditions and prevent exacerbation or deterioration   Update acute care plan with appropriate goals if chronic or comorbid symptoms are exacerbated and prevent overall improvement and discharge   Monitor and assess patient's chronic conditions and comorbid symptoms for stability, deterioration, or improvement     Problem: Safety - Adult  Goal: Free from fall injury  Outcome: Progressing     Problem: ABCDS Injury Assessment  Goal: Absence of physical injury  Outcome: Progressing  Flowsheets (Taken 10/5/2022 1943)  Absence of Physical Injury: Implement safety measures based on patient assessment     Problem: Respiratory - Adult  Goal: Achieves optimal ventilation and oxygenation  Outcome: Progressing     Problem: Skin/Tissue Integrity - Adult  Goal: Skin integrity remains intact  Outcome: Progressing  Flowsheets (Taken 10/5/2022 1637)  Skin Integrity Remains Intact:   Monitor for areas of redness and/or skin breakdown   Assess vascular access sites hourly  Goal: Incisions, wounds, or drain sites healing without S/S of infection  Outcome: Progressing  Flowsheets (Taken 10/5/2022 1637)  Incisions, Wounds, or Drain Sites Healing Without Sign and Symptoms of Infection:   TWICE DAILY: Assess and document skin integrity   TWICE DAILY: Assess and document dressing/incision, wound bed, drain sites and surrounding tissue   Initiate isolation precautions as appropriate     Problem: Musculoskeletal - Adult  Goal: Return mobility to safest level of function  Outcome: Progressing  Flowsheets (Taken 10/5/2022 0821)  Return Mobility to Safest Level of Function:   Assess patient stability and activity tolerance for standing, transferring and ambulating with or without assistive devices   Assist with transfers and ambulation using safe patient handling equipment as needed   Ensure adequate protection for wounds/incisions during mobilization  Goal: Return ADL status to a safe level of function  Outcome: Progressing  Flowsheets (Taken 10/5/2022 0821)  Return ADL Status to a Safe Level of Function:   Administer medication as ordered   Assess activities of daily living deficits and provide assistive devices as needed     Problem: Infection - Adult  Goal: Absence of infection at discharge  Outcome: Progressing  Flowsheets (Taken 10/5/2022 0821)  Absence of infection at discharge:   Assess and monitor for signs and symptoms of infection   Monitor lab/diagnostic results   Monitor all insertion sites i.e., indwelling lines, tubes and drains   Administer medications as ordered   Instruct and encourage patient and family to use good hand hygiene technique   Identify and instruct in appropriate isolation precautions for identified infection/condition  Goal: Absence of infection during hospitalization  Outcome: Progressing  Flowsheets (Taken 10/5/2022 7097)  Absence of infection during hospitalization:   Assess and monitor for signs and symptoms of infection   Monitor lab/diagnostic results   Monitor all insertion sites i.e., indwelling lines, tubes and drains   Administer medications as ordered   Instruct and encourage patient and family to use good hand hygiene technique   Identify and instruct in appropriate isolation precautions for identified infection/condition     Problem: Metabolic/Fluid and Electrolytes - Adult  Goal: Electrolytes maintained within normal limits  Outcome: Progressing  Flowsheets (Taken 10/5/2022 0821)  Electrolytes maintained within normal limits: Monitor labs and assess patient for signs and symptoms of electrolyte imbalances  Goal: Hemodynamic stability and optimal renal function maintained  Outcome: Progressing  Flowsheets (Taken 10/5/2022 0821)  Hemodynamic stability and optimal renal function maintained:   Monitor labs and assess for signs and symptoms of volume excess or deficit   Monitor intake, output and patient weight  Goal: Glucose maintained within prescribed range  Outcome: Progressing  Flowsheets (Taken 10/5/2022 0821)  Glucose maintained within prescribed range:   Monitor blood glucose as ordered   Assess for signs and symptoms of hyperglycemia and hypoglycemia   Administer ordered medications to maintain glucose within target range   Instruct patient on self management of diabetes and initiate consult as needed   Assess barriers to adequate nutritional intake and initiate nutrition consult as needed     Problem: Pain  Goal: Verbalizes/displays adequate comfort level or baseline comfort level  Outcome: Progressing  Flowsheets (Taken 10/5/2022 1943)  Verbalizes/displays adequate comfort level or baseline comfort level:   Encourage patient to monitor pain and request assistance   Assess pain using appropriate pain scale   Administer analgesics based on type and severity of pain and evaluate response   Implement non-pharmacological measures as appropriate and evaluate response   Consider cultural and social influences on pain and pain management   Notify Licensed Independent Practitioner if interventions unsuccessful or patient reports new pain  Note: Pain level assessment complete.    Patient educated on pain scale and control interventions  PRN pain medication given per patient request  Patient instructed to call out with new onset of pain or unrelieved pain     Problem: Neurosensory - Adult  Goal: Achieves stable or improved neurological status  Outcome: Progressing

## 2022-10-05 NOTE — CONSULTS
[facility] Pharmacokinetic Monitoring Service - Vancomycin     Apurva Capellan is a 78 y.o. male starting on vancomycin therapy for SSTI. Pharmacy consulted by Hector Whitney DPM for monitoring and adjustment. Target Concentration: Goal trough of 10-15 mg/L and AUC/DIMA <500 mg*hr/L    Additional Antimicrobials: cefepime    Pertinent Laboratory Values: Wt Readings from Last 1 Encounters:   10/05/22 234 lb (106.1 kg)     Temp Readings from Last 1 Encounters:   10/05/22 97.5 °F (36.4 °C) (Oral)     Estimated Creatinine Clearance: 45 mL/min (A) (based on SCr of 1.99 mg/dL (H)). Recent Labs     10/05/22  0601   CREATININE 1.99*   WBC 8.8     Procalcitonin: --    Pertinent Cultures:  Culture Date Source Results   10/5 wound Pending   MRSA Nasal Swab: N/A. Non-respiratory infection.     Plan:  Dosing recommendations based on Bayesian software  Start vancomycin 2500 mg times one dose, followed by 1250 mg q24h  Anticipated AUC of 485 and trough concentration of 15.7 at steady state  Renal labs as indicated   Vancomycin concentration ordered for 10/6/22 @ 0600   Pharmacy will continue to monitor patient and adjust therapy as indicated    Thank you for the consult,  CARLOS Peguero [facility]  10/5/2022 10:23 AM

## 2022-10-05 NOTE — PROGRESS NOTES
Transitions of Care Pharmacy Service   Medication Review    The patient's list of current home medications has been reviewed. Source(s) of information: Patient/ Surescripts    Based on information provided by the above source(s), I have updated the patient's home med list as described below. Please review the ACTION REQUESTED section of this note below for any discrepancies on current hospital orders. I changed or updated the following medications on the patient's home medication list:  Removed Isordil 30mg PO daily  Protonix 40mg Po daily  Zofran ODT 4 mg  Maxipime 2 Gm IV  Silvadene cream - see Bactroban  Mobic 15mg  Lisinopril 10mg - see Norvasc 5mg  Albuterol inhaler     Added none     Adjusted   none   Other Notes none         PROVIDER ACTION REQUESTED  Medications that need to be addressed by a physician/nurse practitioner:    Medication Action Requested   Lisinopril 10mg   Please DC, pt now takes norvasc 5mg for BP. Please feel free to call me with any questions about this encounter. Thank you. Guillermo Cintron [facility]   Transitions of Care Pharmacy Service  Phone:  [phone]  Fax: [phone]      Electronically signed by Guillermo Cintron [facility] on 10/5/2022 at 1:34 PM           Medications Prior to Admission: ciprofloxacin (CIPRO) 500 MG tablet, Take 500 mg by mouth 2 times daily 9/30 x 14 days  HYDROcodone-acetaminophen (NORCO)  MG per tablet, TAKE 1 TABLET BY MOUTH FOUR TIMES DAILY AS NEEDED  mupirocin (BACTROBAN) 2 % ointment, Apply topically 2 times daily TO FOOT WOUND.  amLODIPine (NORVASC) 5 MG tablet, Take 5 mg by mouth daily  LANTUS SOLOSTAR 100 UNIT/ML injection pen, Inject 15 Units into the skin nightly (Patient taking differently: Inject 10 Units into the skin 2 times daily)  JANUVIA 100 MG tablet, Take 100 mg by mouth daily   Misc.  Devices MISC, 1 PAIR OF DIABETIC SHOES (1 LEFT/ 1 RIGHT) 1-3 PAIRS OF INSERTS (LEFT/ RIGHT)  cetirizine (ZYRTEC) 10 MG tablet, Take 10 mg by mouth nightly   simvastatin (ZOCOR) 40 MG tablet, Take 40 mg by mouth nightly   glipiZIDE (GLUCOTROL) 10 MG tablet, Take 20 mg by mouth 2 times daily (before meals) Takes 2 tabs (=20mg) BID  aspirin 81 MG tablet, Take 81 mg by mouth daily  gabapentin (NEURONTIN) 300 MG capsule, Take 900 mg by mouth 3 times daily.  Take 3 caps (=900mg) 3 times a day

## 2022-10-05 NOTE — CONSULTS
[facility]  Office: [address], DO, Arie Moffett, DO, Gabriel Maldonado, DO, Checo Cruz Blood, DO, Erin Joe MD, Rocio Messina MD, Derrek Higuera MD, Quita Acevedo MD,  Marcella Winn MD, Cornelius Ferris MD, Genny Do, DO, Codi Ramires MD,  Catrachita Foote MD, Heather Manrique MD, Diana Quiroz, DO, Susie Baxter MD, Dolores Carl MD, Sharon Molina MD, Pauline Castillo MD, Faraz Delgado MD, Hansa Ryan MD, Amada Higgins, DO, Kendal Eller MD, Jitendra Quintero MD, Lilli Dove, CNP,  Brody Gould, CNP, Nathalie St, CNP, Stephanie Ortega, CNP,  Cody Armas, DNP, Genevieve Fields, CNP, Samson Ball, CNP, Stefanie Bella, CNP, Tulio Pisano, CNP, Gerardo Rey, CNP, Martha Vinson PA-C, Sohail Godwin, CNS, Christopher Bhat, DNP, Scarlett Rivera, CNP, Marianne Woods, CNP, Bill Das, [facility] / HISTORY AND PHYSICAL EXAMINATION            Date:   10/5/2022  Patient name:  Donna Condon  Date of admission:  10/4/2022  9:10 PM  MRN:   [id number]  Account:  [de-identified]  YOB: 1955  PCP:    Ajay Neal MD  Room:     Code Status:    Full Code    Physician Requesting Consult: Christopher Lugo DPM    Reason for Consult: medical management of type 2 diabetes and hypertension    Chief Complaint:     Right foot wound and pain  History Obtained From:     patient    History of Present Illness:   Katelin Guardado is a 56-year-old male with a unification of medical history of CKD 3, type 2 diabetes and hypertension who follows with Dr. Mat Evans, who presents for concern of right foot cellulitis and possible osteomyelitis. Patient has a known history of Charcot deformity and has had multiple admissions for right foot infection in the past.  Care has been transferred to Dr. Aldo Nielsen (podiatry) for possible Charcot reconstruction.   We have been consulted for assistance with medical management    Past Medical History:     Past Medical History:   Diagnosis Date    Abscess of right foot 8/4/2018    Acquired hammer toe deformity of lesser toe of right foot     ALEJANDRO (acute kidney injury) ([address] 8/5/2018    Cellulitis 4/24/2018    Cellulitis of left foot 4/24/2018    Cellulitis of right foot     and abscess    Charcot foot due to diabetes mellitus ([address] 2014    Right foot     Chest pain at rest 8/4/2018    Chronic multifocal osteomyelitis of right foot ([address]     Diabetic polyneuropathy associated with type 2 diabetes mellitus ([address]     Essential hypertension     Fractures, multiple 07/21/2014    Right foot fractures     Hyperlipidemia     Hypertension     Leukocytosis     Neuropathy     diabetic with charcot affecting the right foot    Pain in right foot     redness and swelling    Pneumonia 2009    Right foot infection     Right foot pain     Tobacco abuse 4/24/2018    Type II or unspecified type diabetes mellitus without mention of complication, not stated as uncontrolled     Vertigo     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 stepped on sharp object. Covered by dressing.     Wound, open     Right posterior -Diabetic Ulcer        Past Surgical History:     Past Surgical History:   Procedure Laterality Date    APPENDECTOMY      at age 24    ARTHROPLASTY Right 12/11/2020    RIGHT HALLUX EXOSTECTOMY AND 3RD DIGIT EXTENSIOR TENOTOMY performed by Sahra Edwards DPM at [address] [place] [address] Left 04/24/2018    I&D foreign body removal    FOOT DEBRIDEMENT Right 3/20/2020    RIGHT  FOOT   INCISION AND DRAINAGE WITH BONE BIOPSY performed by Sahra Edwards DPM at [address] [place] [address] Road Right 6/8/2021    FOOT DEBRIDEMENT INCISION AND DRAINAGE performed by Sahra Edwards DPM at [address] [place] [address] Right 9/16/2021    RIGHT FOOT INCISION AND DRAINAGE   WITH PULSE LAVAGE performed by Larry Maxwell DPM at [address] Right 6/11/2021    RIGHT FOOT FLAP CLOSURE performed by Larry Maxwlel DPM at [address] ARTHROSCOPY Right 1989    KNEE ARTHROSCOPY Left 1990    KNEE SURGERY Bilateral 1983    arthroscopy    [address] INFEC,1 [place] Left 4/24/2018    LEFT FOOT MULTIPLE  INCISIONS  AND DRAINAGE AND REMOVAL FOREIGN BODY  IRENE performed by Larry Maxwell DPM at [facility]        Medications Prior to Admission:     Prior to Admission medications    Medication Sig Start Date End Date Taking? Authorizing Provider   ciprofloxacin (CIPRO) 500 MG tablet Take 500 mg by mouth 2 times daily 9/30 x 14 days 9/30/22   Historical Provider, MD   HYDROcodone-acetaminophen ([facility])  MG per tablet TAKE 1 TABLET BY MOUTH FOUR TIMES DAILY AS NEEDED 2/18/22   Historical Provider, MD   TRUEPLUS PEN NEEDLES 31G X 8 MM MISC  11/29/21   Historical Provider, MD   mupirocin (BACTROBAN) 2 % ointment  11/8/21   Historical Provider, MD   amLODIPine (NORVASC) 5 MG tablet Take 5 mg by mouth daily 10/20/21   Historical Provider, MD   LANTUS SOLOSTAR 100 UNIT/ML injection pen Inject 15 Units into the skin nightly  Patient taking differently: Inject 10 Units into the skin 2 times daily 9/17/21   ROCHELLE Vazquez NP   albuterol sulfate  (90 Base) MCG/ACT inhaler Inhale 2 puffs into the lungs every 6 hours as needed   Patient not taking: Reported on 10/4/2022 4/1/21   Historical Provider, MD CHAKRABORTY 100 MG tablet Take 100 mg by mouth daily  11/13/20   Historical Provider, MD   Misc.  Devices MISC 1 PAIR OF DIABETIC SHOES (1 LEFT/ 1 RIGHT)  1-3 PAIRS OF INSERTS (LEFT/ RIGHT) 3/5/20   Larry Maxwell DPM   cetirizine (ZYRTEC) 10 MG tablet Take 10 mg by mouth nightly  10/21/19   Historical Provider, MD   simvastatin (ZOCOR) 40 MG tablet Take 40 mg by mouth nightly  11/13/18   Historical Provider, MD glipiZIDE (GLUCOTROL) 10 MG tablet Take 20 mg by mouth 2 times daily (before meals) Takes 2 tabs (=20mg) BID    Historical Provider, MD   aspirin 81 MG tablet Take 81 mg by mouth daily    Historical Provider, MD   gabapentin (NEURONTIN) 300 MG capsule Take 900 mg by mouth 3 times daily. Take 3 caps (=900mg) 3 times a day    Historical Provider, MD        Allergies:     Morphine, Other, and Percocet [oxycodone-acetaminophen]    Social History:     Tobacco:    reports that he has been smoking cigarettes. He has been smoking an average of 1 pack per day. He has never used smokeless tobacco.  Alcohol:      reports no history of alcohol use. Drug Use:  reports that he does not currently use drugs. Family History:     Family History   Problem Relation Age of Onset    Diabetes Mother     Cancer Father     Hypertension Maternal Grandmother        Review of Systems:     Positive and Negative as described in HPI. CONSTITUTIONAL:  negative for fevers, chills, sweats, fatigue, weight loss  HEENT:  negative for vision, hearing changes, runny nose, throat pain  RESPIRATORY:  negative for shortness of breath, cough, congestion, wheezing. CARDIOVASCULAR:  negative for chest pain, palpitations.   GASTROINTESTINAL:  negative for nausea, vomiting, diarrhea, constipation, change in bowel habits, abdominal pain   GENITOURINARY:  negative for difficulty of urination, burning with urination, frequency   INTEGUMENT: Positive for wound  HEMATOLOGIC/LYMPHATIC:  negative for swelling/edema   ALLERGIC/IMMUNOLOGIC:  negative for urticaria , itching  ENDOCRINE:  negative increase in drinking, increase in urination, hot or cold intolerance  MUSCULOSKELETAL: Other for arthralgias, myalgias and gait problem  NEUROLOGICAL:  negative for headaches, dizziness, lightheadedness, numbness, pain, tingling extremities  BEHAVIOR/PSYCH:  negative for depression, anxiety    Physical Exam:     BP (!) 125/52   Pulse 70   Temp 97.5 °F (36.4 °C) (Oral) Resp 16   Ht 5' 11\" (1.803 m)   Wt 234 lb (106.1 kg)   SpO2 93%   BMI 32.64 kg/m²   Temp (24hrs), Av.1 °F (36.7 °C), Min:97.5 °F (36.4 °C), Max:98.6 °F (37 °C)    Recent Labs     10/04/22  2200 10/05/22  0621   POCGLU 335* 182*       Intake/Output Summary (Last 24 hours) at 10/5/2022 1122  Last data filed at 10/5/2022 0609  Gross per 24 hour   Intake 558.88 ml   Output 1000 ml   Net -441.12 ml       General Appearance:  alert, well appearing, and in no acute distress  Mental status: oriented to person, place, and time with normal affect  Head:  normocephalic, atraumatic. Eye: no icterus, redness, pupils equal and reactive, extraocular eye movements intact, conjunctiva clear  Ear: normal external ear, no discharge, hearing intact  Nose:  no drainage noted  Mouth: mucous membranes moist  Neck: supple, no carotid bruits, thyroid not palpable  Lungs: Bilateral equal air entry, slightly diminished to ausculation, no wheezing, rales or rhonchi, normal effort  Cardiovascular: normal rate, regular rhythm, no murmur, gallop, rub.   Abdomen: Soft, nontender, nondistended, normal bowel sounds, no hepatomegaly or splenomegaly  Neurologic: There are no new focal motor or sensory deficits, normal muscle tone and bulk, no abnormal sensation, normal speech, cranial nerves II through XII grossly intact  Skin: Right foot ulcers to plantar surface and between great first toe, see podiatry notes/images below   Extremities:  peripheral pulses palpable, no calf pain with palpation  Psych: normal affect           Investigations:      Laboratory Testing:  Recent Results (from the past 24 hour(s))   POC Glucose Fingerstick    Collection Time: 10/04/22 10:00 PM   Result Value Ref Range    POC Glucose 335 (H) 75 - 110 mg/dL   Sedimentation Rate    Collection Time: 10/04/22 10:18 PM   Result Value Ref Range    Sed Rate 64 (H) 0 - 20 mm/Hr   C-Reactive Protein    Collection Time: 10/04/22 10:18 PM   Result Value Ref Range    CRP 30.1 (H) 0.0 - 5.0 mg/L   Basic Metabolic Panel w/ Reflex to MG    Collection Time: 10/05/22  6:01 AM   Result Value Ref Range    Glucose 184 (H) 70 - 99 mg/dL    BUN 18 8 - 23 mg/dL    Creatinine 1.99 (H) 0.70 - 1.20 mg/dL    Bun/Cre Ratio 9 9 - 20    Calcium 8.8 8.6 - 10.4 mg/dL    Sodium 138 135 - 144 mmol/L    Potassium 4.4 3.7 - 5.3 mmol/L    Chloride 104 98 - 107 mmol/L    CO2 24 20 - 31 mmol/L    Anion Gap 10 9 - 17 mmol/L    Est, Glom Filt Rate 36 (L) >60 mL/min/1.73m2   CBC with Auto Differential    Collection Time: 10/05/22  6:01 AM   Result Value Ref Range    WBC 8.8 3.5 - 11.3 k/uL    RBC 3.48 (L) 4.21 - 5.77 m/uL    Hemoglobin 10.2 (L) 13.0 - 17.0 g/dL    Hematocrit 30.8 (L) 40.7 - 50.3 %    MCV 88.5 82.6 - 102.9 fL    MCH 29.3 25.2 - 33.5 pg    MCHC 33.1 28.4 - 34.8 g/dL    RDW 13.2 11.8 - 14.4 %    Platelets 179 526 - 056 k/uL    MPV 8.8 8.1 - 13.5 fL    NRBC Automated 0.0 0.0 per 100 WBC    Seg Neutrophils 58 36 - 65 %    Lymphocytes 27 24 - 43 %    Monocytes 8 3 - 12 %    Eosinophils % 5 (H) 1 - 4 %    Basophils 1 0 - 2 %    Immature Granulocytes 1 (H) 0 %    Segs Absolute 5.21 1.50 - 8.10 k/uL    Absolute Lymph # 2.36 1.10 - 3.70 k/uL    Absolute Mono # 0.69 0.10 - 1.20 k/uL    Absolute Eos # 0.41 0.00 - 0.44 k/uL    Basophils Absolute 0.07 0.00 - 0.20 k/uL    Absolute Immature Granulocyte 0.09 0.00 - 0.30 k/uL   POC Glucose Fingerstick    Collection Time: 10/05/22  6:21 AM   Result Value Ref Range    POC Glucose 182 (H) 75 - 110 mg/dL       Imaging/Diagonstics:  No results found.     Assessment :      Hospital Problems             Last Modified POA    * (Principal) Type 2 diabetes mellitus with right diabetic foot ulcer ([facility] 10/4/2022 Yes    Charcot's joint of right foot 10/4/2022 Yes    Stage 3b chronic kidney disease ([facility] (Chronic) 10/5/2022 Yes    Essential hypertension (Chronic) 10/5/2022 Yes       Plan:     Resume select home meds  Glycemic control-Home dose of Lantus resumed, oral antihypoglycemics and continue Accu-Cheks, diet, and insulin sliding scale  Continue IV antibiotics and wound care per podiatry/primary, ID consulted  Monitor labs, replace electrolytes as needed  Monitor and control blood pressure  MRI today  Continue DVT prophylaxis  Discussed with patient and staff    Consultations:   IP CONSULT TO HOSPITALIST  IP CONSULT TO INFECTIOUS DISEASES  IP CONSULT TO CASE MANAGEMENT  PHARMACY TO [address] [place], [place] [address]ROCHELLE - NP  10/5/2022  11:22 AM    Copy sent to Dr. Guzman Phoenix MD

## 2022-10-05 NOTE — PROGRESS NOTES
Physical Therapy  Facility/Department: [facility] SURG  Physical Therapy Initial Assessment    Name: Le Garcia  : 1955  MRN: [id number]  Date of Service: 10/5/2022    Discharge Recommendations:  Patient would benefit from continued therapy after discharge          Patient Diagnosis(es): There were no encounter diagnoses. Past Medical History:  has a past medical history of Abscess of right foot, Acquired hammer toe deformity of lesser toe of right foot, ALEJANDRO (acute kidney injury) ([address] Cellulitis, Cellulitis of left foot, Cellulitis of right foot, Charcot foot due to diabetes mellitus ([address] Chest pain at rest, Chronic multifocal osteomyelitis of right foot ([address] Diabetic polyneuropathy associated with type 2 diabetes mellitus ([address] Essential hypertension, Fractures, multiple, Hyperlipidemia, Hypertension, Leukocytosis, Neuropathy, Pain in right foot, Pneumonia, Right foot infection, Right foot pain, Tobacco abuse, Type II or unspecified type diabetes mellitus without mention of complication, not stated as uncontrolled, Vertigo, 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 Past Surgical History:  has a past surgical history that includes Neck surgery (); Appendectomy; knee surgery (Bilateral, ); Knee arthroscopy (Right, ); Knee arthroscopy (Left, ); Foot Debridement (Left, 2018); pr deep dissec foot infec,1 bursa (Left, 2018); Colonoscopy; Foot Debridement (Right, 3/20/2020); arthroplasty (Right, 2020); Foot Debridement (Right, 2021); Foot surgery (Right, 2021); and Foot Debridement (Right, 2021).     Assessment   Body Structures, Functions, Activity Limitations Requiring Skilled Therapeutic Intervention: Decreased sensation  Assessment: Pt realizes PT will be ordered but states he does not feel it will be necessary after d/c from [facility]  Therapy Prognosis: Good  Requires PT Follow-Up: Yes  Activity Tolerance  Activity Tolerance: Patient tolerated treatment well     Plan   Physcial Therapy Plan  General Plan: 5-7 times per week  Current Treatment Recommendations: Gait training, Stair training  Safety Devices  Type of Devices: Gait belt, Left in bed, Call light within reach, Nurse notified  Restraints  Restraints Initially in Place: No     Restrictions  Restrictions/Precautions  Restrictions/Precautions: General Precautions, Weight Bearing  Lower Extremity Weight Bearing Restrictions  Left Lower Extremity Weight Bearing: Weight Bearing As Tolerated     Subjective   Pain: 9/10 R foot  General  Chart Reviewed: Yes  Patient assessed for rehabilitation services?: Yes  Response To Previous Treatment: Not applicable  Family / Caregiver Present: Yes (Wife)  Follows Commands: Within Functional Limits  General Comment  Comments: OK for PT per Almaz Sportsman RN         Social/Functional History  Social/Functional History  Lives With: Spouse  Type of Home: [address] [place] [address],[address]: One level (3 Step to walk down to get to master bedroom with no handrails)  Home Access: Stairs to enter with rails  Entrance Stairs - Number of Steps: 3  Entrance Stairs - Rails: Both  Bathroom Shower/Tub: Tub/Shower unit  Bathroom Toilet: Standard  Bathroom Equipment: Grab bars in [address]: Blanche Ricardo, rolling, Blanche Ricardo, 4 wheeled, [place], Grab bars (Knee cart)  Has the patient had two or more falls in the past year or any fall with injury in the past year?: Yes (\"I tripped down my steps a couple times\" - pt able to self correct LOB)  ADL Assistance: Independent  Homemaking Assistance: Independent  Homemaking Responsibilities: Yes  Ambulation Assistance: Independent  Transfer Assistance: Independent  Active : Yes  Occupation: Retired  Vision/Hearing       Cognition   Orientation  Overall Orientation Status: Within Normal Limits  Orientation Level: Oriented X4  Cognition  Overall Cognitive Status: WNL     Objective Heart Rate: 70  Heart Rate Source: Monitor  BP: (!) 125/52  BP Location: Right Arm  MAP (Calculated): 76.33  Resp: 16  SpO2: 93 %  O2 Device: None (Room air)        Gross Assessment  AROM: Within functional limits (R ankle NA)  Strength: Within functional limits (R ankle NA)  Coordination: Within functional limits  Tone: Normal  Sensation: Impaired (Paresthesia R foot)        Strength RLE  Strength RLE: WFL  Comment: 5/5 B LE's  Strength LLE  Strength LLE: WFL  Strength RUE  Comment: See OT ang for B UE MMT        Balance  Sitting: Intact  Standing: Intact  Bed mobility  Bed Mobility Comments: Pt sitting EOB when PT arrived  Transfers  Sit to Stand: Stand by assistance  Stand to Sit: Stand by assistance  Stand Pivot Transfers: Stand by assistance  Ambulation  Surface: Level tile  Device: No Device  Assistance: Stand by assistance  Gait Deviations: Slow Kaela  Distance: 50' x 1  Comments: Back to EOB     Balance  Posture: Good  Sitting - Static: Good  Sitting - Dynamic: Good  Standing - Static: Good  Standing - Dynamic: Good           OutComes Score  Balance Score: 13 (10/05/22 1531)  Gait Score: 12 (10/05/22 1531)        Tinetti Total Score: 25 (10/05/22 1531)                                   AM-PAC Score  AM-PAC Inpatient Mobility Raw Score : 22 (10/05/22 1530)  AM-PAC Inpatient T-Scale Score : 53.28 (10/05/22 1530)  Mobility Inpatient CMS 0-100% Score: 20.91 (10/05/22 1530)  Mobility Inpatient CMS G-Code Modifier : CJ (10/05/22 1530)          Tinneti Score  Balance Score: 13 (10/05/22 1531)  Gait Score: 12 (10/05/22 1531)  Tinetti Total Score: 25 (10/05/22 1531)  Tinetti Disability Index: 1-19% (10/05/22 1531)    Goals  Short Term Goals  Time Frame for Short Term Goals: 8 treatments  Short Term Goal 1: Independent ambulation 200' x 1  Short Term Goal 2:  Independently ascend/descend 6 steps w/ B HR  Patient Goals   Patient Goals : Find out the treatment plan regarding his R foot       Education  Patient Education  Education Given To: Patient; Family  Education Provided: Role of Therapy;Plan of Care;Energy Conservation  Education Provided Comments: Energy conservation handout  Education Method: Verbal;Printed Information/Hand-outs  Barriers to Learning: None  Education Outcome: Verbalized understanding      Therapy Time   Individual Concurrent Group Co-treatment   Time In [phone] (Treatment time 23 minutes)         Time Out 1335         Minutes 4405 [address]

## 2022-10-06 LAB
BUN BLDV-MCNC: 18 MG/DL (ref 8–23)
CREAT SERPL-MCNC: 1.95 MG/DL (ref 0.7–1.2)
GFR SERPL CREATININE-BSD FRML MDRD: 37 ML/MIN/1.73M2
GLUCOSE BLD-MCNC: 111 MG/DL (ref 75–110)
GLUCOSE BLD-MCNC: 155 MG/DL (ref 75–110)
GLUCOSE BLD-MCNC: 183 MG/DL (ref 75–110)
GLUCOSE BLD-MCNC: 98 MG/DL (ref 75–110)
VANCOMYCIN RANDOM: 22.1 UG/ML

## 2022-10-06 PROCEDURE — 82947 ASSAY GLUCOSE BLOOD QUANT: CPT

## 2022-10-06 PROCEDURE — 6370000000 HC RX 637 (ALT 250 FOR IP): Performed by: NURSE PRACTITIONER

## 2022-10-06 PROCEDURE — 36415 COLL VENOUS BLD VENIPUNCTURE: CPT

## 2022-10-06 PROCEDURE — 6360000002 HC RX W HCPCS: Performed by: PODIATRIST

## 2022-10-06 PROCEDURE — 6370000000 HC RX 637 (ALT 250 FOR IP): Performed by: PODIATRIST

## 2022-10-06 PROCEDURE — 2580000003 HC RX 258: Performed by: PODIATRIST

## 2022-10-06 PROCEDURE — 99232 SBSQ HOSP IP/OBS MODERATE 35: CPT | Performed by: NURSE PRACTITIONER

## 2022-10-06 PROCEDURE — 80202 ASSAY OF VANCOMYCIN: CPT

## 2022-10-06 PROCEDURE — 82565 ASSAY OF CREATININE: CPT

## 2022-10-06 PROCEDURE — 84520 ASSAY OF UREA NITROGEN: CPT

## 2022-10-06 PROCEDURE — 1200000000 HC SEMI PRIVATE

## 2022-10-06 RX ADMIN — SODIUM CHLORIDE, PRESERVATIVE FREE 10 ML: 5 INJECTION INTRAVENOUS at 15:20

## 2022-10-06 RX ADMIN — GLIPIZIDE 20 MG: 10 TABLET ORAL at 17:12

## 2022-10-06 RX ADMIN — CEFEPIME 2000 MG: 2 INJECTION, POWDER, FOR SOLUTION INTRAVENOUS at 15:22

## 2022-10-06 RX ADMIN — ENOXAPARIN SODIUM 30 MG: 100 INJECTION SUBCUTANEOUS at 21:59

## 2022-10-06 RX ADMIN — INSULIN GLARGINE 10 UNITS: 100 INJECTION, SOLUTION SUBCUTANEOUS at 09:19

## 2022-10-06 RX ADMIN — GABAPENTIN 900 MG: 300 CAPSULE ORAL at 09:19

## 2022-10-06 RX ADMIN — CEFEPIME 2000 MG: 2 INJECTION, POWDER, FOR SOLUTION INTRAVENOUS at 03:11

## 2022-10-06 RX ADMIN — CETIRIZINE HYDROCHLORIDE 10 MG: 10 TABLET ORAL at 21:59

## 2022-10-06 RX ADMIN — VANCOMYCIN HYDROCHLORIDE 1250 MG: 5 INJECTION, POWDER, LYOPHILIZED, FOR SOLUTION INTRAVENOUS at 01:14

## 2022-10-06 RX ADMIN — ASPIRIN 81 MG: 81 TABLET, COATED ORAL at 09:19

## 2022-10-06 RX ADMIN — HYDROCODONE BITARTRATE AND ACETAMINOPHEN 2 TABLET: 5; 325 TABLET ORAL at 01:16

## 2022-10-06 RX ADMIN — ATORVASTATIN CALCIUM 20 MG: 20 TABLET, FILM COATED ORAL at 21:59

## 2022-10-06 RX ADMIN — GABAPENTIN 900 MG: 300 CAPSULE ORAL at 21:58

## 2022-10-06 RX ADMIN — ENOXAPARIN SODIUM 30 MG: 100 INJECTION SUBCUTANEOUS at 09:19

## 2022-10-06 RX ADMIN — AMLODIPINE BESYLATE 5 MG: 5 TABLET ORAL at 09:19

## 2022-10-06 RX ADMIN — SODIUM CHLORIDE 25 ML: 9 INJECTION, SOLUTION INTRAVENOUS at 15:21

## 2022-10-06 RX ADMIN — GLIPIZIDE 20 MG: 10 TABLET ORAL at 06:54

## 2022-10-06 RX ADMIN — GABAPENTIN 900 MG: 300 CAPSULE ORAL at 14:21

## 2022-10-06 RX ADMIN — INSULIN GLARGINE 10 UNITS: 100 INJECTION, SOLUTION SUBCUTANEOUS at 21:59

## 2022-10-06 ASSESSMENT — PAIN DESCRIPTION - DESCRIPTORS: DESCRIPTORS: PINS AND NEEDLES

## 2022-10-06 ASSESSMENT — ENCOUNTER SYMPTOMS
RESPIRATORY NEGATIVE: 1
GASTROINTESTINAL NEGATIVE: 1

## 2022-10-06 ASSESSMENT — PAIN DESCRIPTION - ORIENTATION: ORIENTATION: RIGHT

## 2022-10-06 ASSESSMENT — PAIN SCALES - GENERAL
PAINLEVEL_OUTOF10: 7
PAINLEVEL_OUTOF10: 9

## 2022-10-06 ASSESSMENT — PAIN DESCRIPTION - FREQUENCY: FREQUENCY: CONTINUOUS

## 2022-10-06 ASSESSMENT — PAIN DESCRIPTION - PAIN TYPE: TYPE: ACUTE PAIN

## 2022-10-06 ASSESSMENT — PAIN DESCRIPTION - LOCATION: LOCATION: FOOT

## 2022-10-06 NOTE — PROGRESS NOTES
Physical Therapy    DATE: 10/6/2022    NAME: Caryn Mills  MRN: [id number]   : 1955    Patient not seen this date for Physical Therapy due to:      [] Cancel by RN or physician due to:    [] Hemodialysis    [] Critical Lab Value Level     [] Blood transfusion in progress    [] Acute or unstable cardiovascular status   _MAP < 55 or more than >115  _HR < 40 or > 130    [] Acute or unstable pulmonary status   -FiO2 > 60%   _RR < 5 or >40    _O2 sats < 85%    [] Strict Bedrest    [] Off Unit for surgery or procedure    [] Off Unit for testing       [] Pending imaging to R/O fracture    [x] Refusal by Patient: Pt EOB upon entry this AM, adamantly refusing PT services at this time despite encouragement. Will cont to follow. [] Other      [] PT being discontinued at this time. Patient independent. No further needs. [] PT being discontinued at this time as the patient has been transferred to hospice care. No further needs.       Ronnie Chris, PTA

## 2022-10-06 NOTE — PROGRESS NOTES
[facility]  Office: [address], DO, Day Valentine, DO, Gaetano Thomas, DO, Oscar Paredess Blood, DO, Shiv Madrigal MD, Karli Urban MD, Asha Samuels MD, Lili Lowry MD,  Korey Geiger MD, Wes Dimas MD, Dixie Cruz, DO, Jose Lombardo MD,  Keysha Kolb MD, Armand Mcgovern MD, Jolly Williamson DO, Brit Tolentino MD, Leydi Back MD, Luly Painter MD, Reji Mazariegos MD, Marleen Harada, MD, Alfonso German MD, Johnny Holstein, DO, Kevan Merlos MD, David Munoz MD, Heber Martinez CNP,  Dusty Meza, CNP, Jamila Marrero, CNP, Yina Rose, CNP,  Druscilla Severe, [facility], Sherry Mei, CNP, Gonzalez Recinos, [facility], Aliyah Hernandez, CNP, Lanita Cogan, CNP, Anabella Mixon, CNP, Arturo Disla PA-C, Nieves Alfaro, CNS, Fuentes Oliveira, [facility], Jackie Jain, CNP, Meera Hooper, CNP, Heydi Randle, [facility]    Progress Note    10/6/2022    10:09 AM    Name:   Yane Funez  MRN:     [id number]     Acct:      [de-identified]   Room:   2020/2020-01  IP Day:  2  Admit Date:  10/4/2022  9:10 PM    PCP:   Myles Mc MD  Code Status:  Full Code    Subjective:     C/C: Right foot wound and pain    Interval History Status: improved. Patient reports he is feeling okay, no issues overnight, pain is well controlled. Vital signs stable    Brief History:     MR. Melisa Saeed is a 81-year-old male with a unification of medical history of CKD 3, type 2 diabetes and hypertension who follows with Dr. Madelin Padron, who presents for concern of right foot cellulitis and possible osteomyelitis. Patient has a known history of Charcot deformity and has had multiple admissions for right foot infection in the past.  Care has been transferred to Dr. Rui Bill (podiatry) for possible Charcot reconstruction.   We have been consulted for assistance with medical management  Review of Systems:     Constitutional:  negative for chills, fevers, sweats  Respiratory:  negative for cough, dyspnea on exertion, shortness of breath, wheezing  Cardiovascular:  negative for chest pain, chest pressure/discomfort, lower extremity edema, palpitations  Gastrointestinal:  negative for abdominal pain, constipation, diarrhea, nausea, vomiting  Neurological:  negative for dizziness, headache  Positive for wound  Medications: Allergies:     Allergies   Allergen Reactions    Morphine Itching    Other Other (See Comments)     Other reaction(s): Unknown    Percocet [Oxycodone-Acetaminophen]      Facial swelling       Current Meds:   Scheduled Meds:    cefepime  2,000 mg IntraVENous Q12H    vancomycin (VANCOCIN) intermittent dosing (placeholder)   Other RX Placeholder    enoxaparin  30 mg SubCUTAneous BID    vancomycin  1,250 mg IntraVENous Q24H    insulin glargine  10 Units SubCUTAneous BID    glipiZIDE  20 mg Oral BID AC    cetirizine  10 mg Oral Nightly    nicotine  1 patch TransDERmal Daily    sodium chloride flush  5-40 mL IntraVENous 2 times per day    insulin lispro  0-8 Units SubCUTAneous TID WC    insulin lispro  0-4 Units SubCUTAneous Nightly    gabapentin  900 mg Oral TID    aspirin  81 mg Oral Daily    amLODIPine  5 mg Oral Daily    atorvastatin  20 mg Oral Nightly     Continuous Infusions:    sodium chloride Stopped (10/05/22 1809)    dextrose       PRN Meds: HYDROcodone 5 mg - acetaminophen **OR** HYDROcodone 5 mg - acetaminophen, diphenhydrAMINE, sodium chloride flush, sodium chloride, ondansetron **OR** ondansetron, polyethylene glycol, acetaminophen **OR** acetaminophen, glucose, dextrose bolus **OR** dextrose bolus, glucagon (rDNA), dextrose, albuterol sulfate HFA    Data:     Past Medical History:   has a past medical history of Abscess of right foot, Acquired hammer toe deformity of lesser toe of right foot, ALEJANDRO (acute kidney injury) ([facility] Cellulitis, Cellulitis of left foot, Cellulitis of right foot, Charcot foot due to diabetes mellitus ([facility] Chest pain at rest, Chronic multifocal osteomyelitis of right foot ([facility] Diabetic polyneuropathy associated with type 2 diabetes mellitus ([facility] Essential hypertension, Fractures, multiple, Hyperlipidemia, Hypertension, Leukocytosis, Neuropathy, Pain in right foot, Pneumonia, Right foot infection, Right foot pain, Tobacco abuse, Type II or unspecified type diabetes mellitus without mention of complication, not stated as uncontrolled, Vertigo, 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 Social History:   reports that he has been smoking cigarettes. He has been smoking an average of 1 pack per day. He has never used smokeless tobacco. He reports that he does not currently use drugs. He reports that he does not drink alcohol. Family History:   Family History   Problem Relation Age of Onset    Diabetes Mother     Cancer Father     Hypertension Maternal Grandmother        Vitals:  BP (!) 120/57   Pulse 64   Temp 97.9 °F (36.6 °C) (Oral)   Resp 17   Ht 5' 11\" (1.803 m)   Wt 234 lb (106.1 kg)   SpO2 97%   BMI 32.64 kg/m²   Temp (24hrs), Av.7 °F (36.5 °C), Min:97.3 °F (36.3 °C), Max:97.9 °F (36.6 °C)    Recent Labs     10/05/22  1213 10/05/22  1546 10/05/22  2035 10/06/22  0646   POCGLU 183* 215* 131* 111*       I/O (24Hr):     Intake/Output Summary (Last 24 hours) at 10/6/2022 1009  Last data filed at 10/5/2022 1942  Gross per 24 hour   Intake 52.19 ml   Output --   Net 52.19 ml       Labs:  Hematology:  Recent Labs     10/04/22  2218 10/05/22  0601   WBC  --  8.8   RBC  --  3.48*   HGB  --  10.2*   HCT  --  30.8*   MCV  --  88.5   MCH  --  29.3   MCHC  --  33.1   RDW  --  13.2   PLT  --  276   MPV  --  8.8   SEDRATE 64*  --    CRP 30.1*  --      Chemistry:  Recent Labs     10/05/22  0601 10/06/22  0644     --    K 4.4  --      --    CO2 24  --    GLUCOSE 184*  --    BUN 18 18   CREATININE 1.99* 1.95*   ANIONGAP 10  --    LABGLOM 36* 37*   CALCIUM 8.8  --      Recent Labs     10/04/22  2200 10/05/22  0621 10/05/22  1213 10/05/22  1546 10/05/22  2035 10/06/22  0646   POCGLU 335* 182* 183* 215* 131* 111*     ABG:No results found for: POCPH, PHART, PH, POCPCO2, NNC1LNP, PCO2, POCPO2, PO2ART, PO2, POCHCO3, AKA3YYY, HCO3, NBEA, PBEA, BEART, BE, THGBART, THB, LWJ6WZC, JBXS6ULV, E3HLQBSH, O2SAT, FIO2  Lab Results   Component Value Date/Time    SPECIAL 10ML 09/13/2022 06:41 PM     Lab Results   Component Value Date/Time    CULTURE PENDING 10/05/2022 08:45 AM       Radiology:  XR FOOT RIGHT (MIN 3 VIEWS)    Result Date: 10/6/2022  Air is seen in the plantar soft tissues of the medial midfoot. Chronic bony changes at the TMT joints compatible with a neuropathic arthropathy. No obvious new bony erosions are seen. MRI ANKLE RIGHT WO CONTRAST    Result Date: 10/6/2022  Soft tissue defect of the medial plantar aspect of the midfoot. Thin fluid-filled sinus tract extending into the deep soft tissues of the midfoot, measuring up to 2.0 x 0.9 cm on the coronal sequence, raising suspicion for a phlegmon/abscess. There is air in the adjacent soft tissues. Susceptibility artifact in the soft tissues adjacent to the soft tissue defect could represent sequela of prior intervention or retained metallic foreign body. No MR evidence of acute osteomyelitis. Chronic bony changes at the TMT joints compatible with a neuropathic arthropathy. Additional chronic findings as described above. MRI FOOT RIGHT WO CONTRAST    Result Date: 10/6/2022  Soft tissue defect of the medial plantar aspect of the midfoot. Thin fluid-filled sinus tract extending into the deep soft tissues of the midfoot, measuring up to 2.0 x 0.9 cm on the coronal sequence, raising suspicion for a phlegmon/abscess. There is air in the adjacent soft tissues.  Susceptibility artifact in the soft tissues adjacent to the soft tissue defect could represent sequela of prior intervention or retained metallic foreign body. No MR evidence of acute osteomyelitis. Chronic bony changes at the TMT joints compatible with a neuropathic arthropathy. Additional chronic findings as described above.        Physical Examination:        General appearance:  alert, cooperative and no distress  Mental Status:  oriented to person, place and time and normal affect  Lungs: Slightly diminished to auscultation bilaterally, normal effort  Heart:  regular rate and rhythm, no murmur  Abdomen:  soft, nontender, nondistended, normal bowel sounds, no masses, hepatomegaly, splenomegaly  Extremities:  no edema, redness, tenderness in the calves  Skin:  Right foot ulcers to plantar surface and between great first toe, dressing in place,see podiatry notes assessment details    Assessment:        Hospital Problems             Last Modified POA    * (Principal) Type 2 diabetes mellitus with right diabetic foot ulcer ([address] 10/4/2022 Yes    Charcot's joint of right foot 10/4/2022 Yes    Stage 3b chronic kidney disease ([address] (Chronic) 10/5/2022 Yes    Essential hypertension (Chronic) 10/5/2022 Yes       Plan:        Glycemic control-currently well controlled, continue Lantus and insulin sliding scale as ordered  Continue IV antibiotics and wound care per podiatry/primary, ID consulted  Monitor labs, replace electrolytes as needed  Monitor and control blood pressure  MRI consistent with abscess, no osteomyelitis identified  Continue DVT prophylaxis  Await further plan per podiatry  Discussed with patient and staff    ROCHELLE Aguilar NP  10/6/2022  10:09 AM

## 2022-10-06 NOTE — PROGRESS NOTES
Progress Note  Podiatric Medicine and Surgery     Subjective     CC: right foot wound    Patient seen and examined at bedside. Plan OR on Saturday AM.     HPI :  Yina Marley is a 78 y.o. male seen at [facility] for right foot wound. Patient is known to [address] [place] [address]. Patient has had Charcot deformity to the right foot for a long time. Patient had multiple admission due to right foot infection in the past.  Patient visited Dr. Brent Nieves 10//2022 and was told to report to the ED to get admitted immediately due to concern of cellulitis and possible osteomyelitis of the right foot.  would like to transfer care to [facility] for possible Charcot reconstruction. Patient denies other pedal complaint. PCP is Sakshi Ward MD    ROS: Denies N/V/F/C/SOB/CP. Otherwise negative except at stated in the HPI.      Medications:  Scheduled Meds:   cefepime  2,000 mg IntraVENous Q12H    vancomycin (VANCOCIN) intermittent dosing (placeholder)   Other RX Placeholder    enoxaparin  30 mg SubCUTAneous BID    vancomycin  1,250 mg IntraVENous Q24H    insulin glargine  10 Units SubCUTAneous BID    glipiZIDE  20 mg Oral BID AC    cetirizine  10 mg Oral Nightly    nicotine  1 patch TransDERmal Daily    sodium chloride flush  5-40 mL IntraVENous 2 times per day    insulin lispro  0-8 Units SubCUTAneous TID WC    insulin lispro  0-4 Units SubCUTAneous Nightly    gabapentin  900 mg Oral TID    aspirin  81 mg Oral Daily    amLODIPine  5 mg Oral Daily    atorvastatin  20 mg Oral Nightly       Continuous Infusions:   sodium chloride Stopped (10/05/22 1809)    dextrose         PRN Meds:HYDROcodone 5 mg - acetaminophen **OR** HYDROcodone 5 mg - acetaminophen, diphenhydrAMINE, sodium chloride flush, sodium chloride, ondansetron **OR** ondansetron, polyethylene glycol, acetaminophen **OR** acetaminophen, glucose, dextrose bolus **OR** dextrose bolus, glucagon (rDNA), dextrose, albuterol sulfate HFA    Objective     Vitals:  Patient Vitals for the past 8 hrs:   BP Temp Temp src Pulse Resp SpO2   10/06/22 0733 (!) 120/57 97.9 °F (36.6 °C) Oral 64 17 97 %     Average, Min, and Max for last 24 hours Vitals:  TEMPERATURE:  Temp  Av.7 °F (36.5 °C)  Min: 97.3 °F (36.3 °C)  Max: 97.9 °F (36.6 °C)    RESPIRATIONS RANGE: Resp  Av.3  Min: 16  Max: 17    PULSE RANGE: Pulse  Av.7  Min: 63  Max: 73    BLOOD PRESSURE RANGE:  Systolic (85FBP), FWE:542 , Min:120 , RPX:850   ; Diastolic (75IHM), VNR:77, Min:57, Max:76      PULSE OXIMETRY RANGE: SpO2  Av.3 %  Min: 97 %  Max: 98 %    I/O last 3 completed shifts: In: 611.1 [I.V.:20.3; IV Piggyback:590.8]  Out: 1000 [Urine:1000]    CBC:  Recent Labs     10/04/22  2218 10/05/22  0601   WBC  --  8.8   HGB  --  10.2*   HCT  --  30.8*   PLT  --  276   CRP 30.1*  --         BMP:  Recent Labs     10/05/22  0601 10/06/22  0644     --    K 4.4  --      --    CO2 24  --    BUN 18 18   CREATININE 1.99* 1.95*   GLUCOSE 184*  --    CALCIUM 8.8  --         Coags:  No results for input(s): APTT, PROT, INR in the last 72 hours. Lab Results   Component Value Date    RKFLPNO 01 (H) 10/04/2022     Recent Labs     10/04/22  2218   CRP 30.1*       Lower Extremity Physical Exam:  General: A&Ox3, NAD  Heart: Regular rate and rhythm. Lungs: Equal air entry. No increased effort. Abdomen: Soft, non-tender to palpation. Not distended. Lower Extremity Physical Exam:  Vascular: DP and PT pulses are palpable +2/4. CFT <3 seconds to all digits. Hair growth is diminished to the level of the digits. Diffuse loss to nonpitting edema noted to the right lower extremity. Neuro: Saph/sural/SP/DP/plantar sensation diminished to light touch. Musculoskeletal: Muscle strength is 5/5 to all lower extremity muscle groups. Gross deformity is Charcot deformity to the right foot     Dermatologic: Full thickness ulcer #1 located right first interspace and measures approximately 1 cm x 1 cm x 3 cm.  Base is fibrotic. Periwound skin is macerated. Purulent drainage noted with no associated mal odor. Erythema noted to periwound with moderate associated increase in warmth. Does not probe to bone probe deep. Does not sinus track, or undermine. No fluctuance, crepitus, or induration. Full thickness ulcer #2 located to the medial plantar arch of the right foot. It is measured as 1.5 x 1.2 x 0.2 cm. Base is fibrotic. Periwound skin is hyperkeratotic. There is some serous drainage with the no odor. No erythema noted. Does not probe to bone, sinus tract, or undermine. No fluctuance, crepitus, or induration. Clinical Images:            Imaging:   VL Lower Extremity Venous Right   Final Result      XR FOOT RIGHT (MIN 3 VIEWS)   Final Result   Air is seen in the plantar soft tissues of the medial midfoot. Chronic bony changes at the TMT joints compatible with a neuropathic   arthropathy. No obvious new bony erosions are seen. MRI ANKLE RIGHT WO CONTRAST   Final Result   Soft tissue defect of the medial plantar aspect of the midfoot. Thin   fluid-filled sinus tract extending into the deep soft tissues of the midfoot,   measuring up to 2.0 x 0.9 cm on the coronal sequence, raising suspicion for a   phlegmon/abscess. There is air in the adjacent soft tissues. Susceptibility artifact in the soft tissues adjacent to the soft tissue   defect could represent sequela of prior intervention or retained metallic   foreign body. No MR evidence of acute osteomyelitis. Chronic bony changes at the TMT joints compatible with a neuropathic   arthropathy. Additional chronic findings as described above. MRI FOOT RIGHT WO CONTRAST   Final Result   Soft tissue defect of the medial plantar aspect of the midfoot.   Thin   fluid-filled sinus tract extending into the deep soft tissues of the midfoot,   measuring up to 2.0 x 0.9 cm on the coronal sequence, raising suspicion for a phlegmon/abscess. There is air in the adjacent soft tissues. Susceptibility artifact in the soft tissues adjacent to the soft tissue   defect could represent sequela of prior intervention or retained metallic   foreign body. No MR evidence of acute osteomyelitis. Chronic bony changes at the TMT joints compatible with a neuropathic   arthropathy. Additional chronic findings as described above. Cultures: moderate GPR, few GPC, few gram - coccobacilli      Assessment   Abad Sloan is a 78 y.o. male with   Cellulitis, right foot-IMPROVING  Abscess, right foot  Type II diabetic foot ulcer down to subcutaneous layer, right foot  Charcot deformity, right foot  Type 2 diabetes with peripheral neuropathy  Hypertension    Principal Problem:    Type 2 diabetes mellitus with right diabetic foot ulcer (HCC)  Active Problems:    Charcot's joint of right foot    Stage 3b chronic kidney disease ([address]    Essential hypertension  Resolved Problems:    * No resolved hospital problems. *       Plan     Patient examined and evaluated at bedside with Dr. Tc Diallo  Treatment options discussed in detail with the patient  X-ray from 10/4/2022 was reviewed: There is no soft tissue emphysema or acute osseous deformity noted. Significant periosteal changes noted to the midfoot indicating midfoot Charcot deformity. New x-rays reviewed: no acute osseous deformity or soft tissue emphysema. MRI of the foot and ankle : abscess noted to the midfoot. No OM. IV antibiotic: Vancomycin/cefepime. ID recommendation appreciated. Medical management per medicine. Appreciate recommendation. Venous duplex: no DVT  We will proceed stage I of the procedure which is incision and drainage of the right foot. Schedule for OR on Saturday morning with Dr. Marga Perea. State 2 of the procedure which is charcot recon will be performed by Dr. Tc Diallo  Consent signed.    Dressing applied to Right foot: Wet-to-dry Betadine, DSD and Ace bandage  Discussed with Dr. William Sanz     DVT ppx: lovenox  and aspirin   Diet: carb control   Activity: Weightbearing as tolerated to Right lower extremity  Pain Control: [place] panel    Shakir Horn DPM   Podiatric Medicine & Surgery   10/6/2022 at 11:22 AM

## 2022-10-06 NOTE — PLAN OF CARE
Problem: Discharge Planning  Goal: Discharge to home or other facility with appropriate resources  Outcome: Progressing  Flowsheets (Taken 10/6/2022 0919)  Discharge to home or other facility with appropriate resources:   Identify barriers to discharge with patient and caregiver   Arrange for needed discharge resources and transportation as appropriate   Identify discharge learning needs (meds, wound care, etc)   Refer to discharge planning if patient needs post-hospital services based on physician order or complex needs related to functional status, cognitive ability or social support system     Problem: Chronic Conditions and Co-morbidities  Goal: Patient's chronic conditions and co-morbidity symptoms are monitored and maintained or improved  Outcome: Progressing  Flowsheets (Taken 10/6/2022 0919)  Care Plan - Patient's Chronic Conditions and Co-Morbidity Symptoms are Monitored and Maintained or Improved:   Monitor and assess patient's chronic conditions and comorbid symptoms for stability, deterioration, or improvement   Collaborate with multidisciplinary team to address chronic and comorbid conditions and prevent exacerbation or deterioration   Update acute care plan with appropriate goals if chronic or comorbid symptoms are exacerbated and prevent overall improvement and discharge     Problem: Safety - Adult  Goal: Free from fall injury  Outcome: Progressing     Problem: ABCDS Injury Assessment  Goal: Absence of physical injury  Outcome: Progressing     Problem: Respiratory - Adult  Goal: Achieves optimal ventilation and oxygenation  Outcome: Progressing  Flowsheets (Taken 10/6/2022 0919)  Achieves optimal ventilation and oxygenation:   Assess for changes in respiratory status   Assess for changes in mentation and behavior   Position to facilitate oxygenation and minimize respiratory effort   Oxygen supplementation based on oxygen saturation or arterial blood gases   Encourage broncho-pulmonary hygiene including cough, deep breathe, incentive spirometry   Assess the need for suctioning and aspirate as needed     Problem: Skin/Tissue Integrity - Adult  Goal: Skin integrity remains intact  Outcome: Progressing  Flowsheets (Taken 10/6/2022 1351)  Skin Integrity Remains Intact: Monitor for areas of redness and/or skin breakdown  Goal: Incisions, wounds, or drain sites healing without S/S of infection  Outcome: Progressing  Flowsheets (Taken 10/6/2022 1351)  Incisions, Wounds, or Drain Sites Healing Without Sign and Symptoms of Infection:   TWICE DAILY: Assess and document skin integrity   TWICE DAILY: Assess and document dressing/incision, wound bed, drain sites and surrounding tissue   Initiate isolation precautions as appropriate     Problem: Musculoskeletal - Adult  Goal: Return mobility to safest level of function  Outcome: Progressing  Flowsheets (Taken 10/6/2022 0919)  Return Mobility to Safest Level of Function:   Assess patient stability and activity tolerance for standing, transferring and ambulating with or without assistive devices   Assist with transfers and ambulation using safe patient handling equipment as needed   Ensure adequate protection for wounds/incisions during mobilization   Instruct patient/family in ordered activity level  Goal: Return ADL status to a safe level of function  Outcome: Progressing  Flowsheets (Taken 10/6/2022 0919)  Return ADL Status to a Safe Level of Function:   Administer medication as ordered   Assess activities of daily living deficits and provide assistive devices as needed   Assist and instruct patient to increase activity and self care as tolerated     Problem: Infection - Adult  Goal: Absence of infection at discharge  Outcome: Progressing  Goal: Absence of infection during hospitalization  Outcome: Progressing     Problem: Metabolic/Fluid and Electrolytes - Adult  Goal: Electrolytes maintained within normal limits  Outcome: Progressing  Goal: Hemodynamic stability and optimal renal function maintained  Outcome: Progressing  Goal: Glucose maintained within prescribed range  Outcome: Progressing  Flowsheets (Taken 10/6/2022 0919)  Glucose maintained within prescribed range:   Monitor blood glucose as ordered   Assess for signs and symptoms of hyperglycemia and hypoglycemia   Administer ordered medications to maintain glucose within target range   Assess barriers to adequate nutritional intake and initiate nutrition consult as needed   Instruct patient on self management of diabetes and initiate consult as needed     Problem: Pain  Goal: Verbalizes/displays adequate comfort level or baseline comfort level  Outcome: Progressing     Problem: Neurosensory - Adult  Goal: Achieves stable or improved neurological status  Outcome: Progressing

## 2022-10-06 NOTE — PROGRESS NOTES
[address] [facility] Pharmacokinetic Monitoring Service - Vancomycin    Consulting Provider: Matt Combs DPM   Indication: SSTI  Target Concentration: Goal trough of 10-15 mg/L and AUC/DIMA <500 mg*hr/L  Day of Therapy: 2  Additional Antimicrobials: cefepime     Pertinent Laboratory Values: Wt Readings from Last 1 Encounters:   10/05/22 234 lb (106.1 kg)     Temp Readings from Last 1 Encounters:   10/06/22 97.9 °F (36.6 °C) (Oral)     Estimated Creatinine Clearance: 46 mL/min (A) (based on SCr of 1.95 mg/dL (H)). Recent Labs     10/05/22  0601 10/06/22  0644   CREATININE 1.99* 1.95*   WBC 8.8  --      Procalcitonin: none     Pertinent Cultures:  Culture Date Source Results   10/5 Foot wound Pending    MRSA Nasal Swab: N/A. Non-respiratory infection.     Recent vancomycin administrations                     vancomycin (VANCOCIN) 1,250 mg in dextrose 5 % 250 mL IVPB (mg) 1,250 mg New Bag 10/06/22 0114    vancomycin (VANCOCIN) 2,500 mg in dextrose 5 % 500 mL IVPB (mg) 2,500 mg New Bag 10/05/22 0246                    Assessment:  Date/Time Current Dose Concentration Timing of Concentration (h) AUC   10/6 1250mg q24h 22.1 5h30m 508    Note: Serum concentrations collected for AUC dosing may appear elevated if collected in close proximity to the dose administered, this is not necessarily an indication of toxicity    Plan:  Current dosing regimen is therapeutic  Continue current dose    Pharmacy will continue to monitor patient and adjust therapy as indicated    Thank you for the consult,  Karin Riedel, [facility]  10/6/2022 8:12 AM

## 2022-10-06 NOTE — PROGRESS NOTES
DATE: 10/6/2022    NAME: Lnidy Ron  MRN: [id number]   : 1955    Patient not seen this date for Occupational Therapy due to:      [] Cancel by RN or physician due to:    [] Hemodialysis    [] Critical Lab Value Level     [] Blood transfusion in progress    [] Acute or unstable cardiovascular status   _MAP < 55 or more than >115  _HR < 40 or > 130    [] Acute or unstable pulmonary status   -FiO2 > 60%   _RR < 5 or >40    _O2 sats < 85%    [] Strict Bedrest    [] Off Unit for surgery or procedure    [] Off Unit for testing       [] Pending imaging to R/O fracture    [] Refusal by Patient : Pt. Declined treatment. Pt. Educated on reasons for treatment and cont. To decline session. OT will cont. To follow. [] Other      [] OT being discontinued at this time. Patient independent. No further needs. [] OT being discontinued at this time as the patient has been transferred to hospice care. No further needs.       Gwen Barber JOVANY

## 2022-10-06 NOTE — PROGRESS NOTES
[facility]  Progress Note    Lindy Ron  MRN: [id number]  Date: 10/6/2022  LOS: 2     Reason for F/U :   Right Charcot foot/infection    Impression :   Right Charcot foot with lower extremity history of infections/abscesses and has undergone multiple I&D procedures  Chronic surgical wounds on the plantar aspect of the foot  Recurrent deep midfoot soft tissue abscess with fluid-filled sinus tract noted on the medial plantar aspect of the foot and there is a retained metallic foreign body    Recommendations:   Patient continues on IV vancomycin and cefepime for now  Awaiting plan per the surgical team  We will continue to follow clinical progress and await plans per the podiatry service    Infection Control Recommendations:   Universal precautions    Discharge Planning:   Estimated Length of IV antimicrobials: To be determined  Patient will need Midline Catheter Insertion/ PICC line Insertion: No  Patient will need: Home IV , [place],  SNF,  LTAC: Undetermined  Patient willneed outpatient wound care: No    Medical Decision making / Summary of Stay:   Lindy Ron is a 78y.o.-year-old male who was initially admitted on 10/4/2022. Paloma Diaz has a history of a right sided Charcot foot deformity and has had a longstanding history with infections in the right foot. He did have an abscess for which he underwent I&D in June 2020 with MSSA isolated and the patient has had a residual wound which secondarily got infected and caused an abscess and in September 2021 he underwent an I&D of the abscess with MSSA, Enterobacter, Morganella isolated and he was discharged with IV antimicrobial therapy which he took for 6 weeks through October 22 and 2021. The patient did continue to have issues with wound dehiscence and had an open wound on the plantar aspect of the foot which was clean.      Seeing the patient in close to a year now and he has followed with the podiatrist and has had continued wound care and the wound has been improving with time. Patient has had a plantar foot midfoot ulceration. Patient did subsequently start to develop soft tissue swelling redness in his foot and the swelling started to extend into his leg and the patient did see Dr. Diana Bull who recommended that he come into the emergency room for evaluation. The patient has been admitted for an abscess in the right foot and chronic ulcerations on the plantar aspect of the foot and the patient is undergoing further work-up with MRI and consideration for an external fixator device. Was asked to evaluate and help with antibiotic choice. Current evaluation:10/6/2022    /76   Pulse 73   Temp 97.3 °F (36.3 °C) (Oral)   Resp 16   Ht 5' 11\" (1.803 m)   Wt 234 lb (106.1 kg)   SpO2 98%   BMI 32.64 kg/m²     Temperature Range: Temp: 97.3 °F (36.3 °C) Temp  Av.5 °F (36.4 °C)  Min: 97.3 °F (36.3 °C)  Max: 97.8 °F (36.6 °C)    The patient was seen and evaluated sitting up at the bedside, he does tell me that he continues to have pain in the right foot but it is tolerable, he has continued to have swelling in the right lower extremity  He has been having some nausea but no vomiting    Review of Systems   Constitutional: Negative. HENT: Negative. Respiratory: Negative. Cardiovascular: Negative. Gastrointestinal: Negative. Genitourinary: Negative. Musculoskeletal:         Right foot pain   Skin: Negative. Neurological: Negative. Psychiatric/Behavioral: Negative. Physical Examination :     Physical Exam  Constitutional:       Appearance: Normal appearance. He is normal weight. HENT:      Head: Normocephalic and atraumatic. Pulmonary:      Effort: Pulmonary effort is normal. No respiratory distress. Abdominal:      General: Abdomen is flat. There is no distension. Palpations: Abdomen is soft. Musculoskeletal:      Right lower leg: Edema present.       Comments: Right foot dressing in place, not removed   Skin:     General: Skin is warm and dry. Neurological:      General: No focal deficit present. Mental Status: He is alert and oriented to person, place, and time. Mental status is at baseline. Psychiatric:         Mood and Affect: Mood normal.         Behavior: Behavior normal.         Thought Content: Thought content normal.       Laboratory data:   I have independently reviewed the followinglabs:  CBC with Differential:   Recent Labs     10/05/22  0601   WBC 8.8   HGB 10.2*   HCT 30.8*      LYMPHOPCT 27   MONOPCT 8     BMP:   Recent Labs     10/05/22  0601      K 4.4      CO2 24   BUN 18   CREATININE 1.99*     Hepatic Function Panel: No results for input(s): PROT, LABALBU, BILIDIR, IBILI, BILITOT, ALKPHOS, ALT, AST in the last 72 hours. No results found for: PROCAL  Lab Results   Component Value Date/Time    CRP 30.1 10/04/2022 10:18 PM    .3 09/13/2022 06:41 PM    .8 09/13/2021 11:04 AM     Lab Results   Component Value Date    SEDRATE 64 (H) 10/04/2022         No results found for: DDIMER  No results found for: FERRITIN  No results found for: LDH  No results found for: FIBRINOGEN    No results found for requested labs within last 30 days. Lab Results   Component Value Date/Time    COVID19 DETECTED 05/26/2021 12:06 PM    COVID19 Not Detected 12/07/2020 03:10 PM    COVID19 Not Detected 08/08/2020 10:02 PM       No results for input(s): [facility] in the last 72 hours. Imaging Studies:   MRI right foot/ankle  Impression:        Soft tissue defect of the medial plantar aspect of the midfoot. Thin   fluid-filled sinus tract extending into the deep soft tissues of the midfoot,   measuring up to 2.0 x 0.9 cm on the coronal sequence, raising suspicion for a   phlegmon/abscess. There is air in the adjacent soft tissues.      Susceptibility artifact in the soft tissues adjacent to the soft tissue   defect could represent sequela of prior intervention or retained metallic   foreign body. No MR evidence of acute osteomyelitis. Chronic bony changes at the TMT joints compatible with a neuropathic   arthropathy. Additional chronic findings as described above. Cultures:     Culture, Anaerobic and Aerobic [[id number]] (Abnormal) Collected: 10/05/22 0845   Order Status: Completed Specimen: Wound Updated: 10/05/22 0954    Specimen Description . WOUND . FOOT    Direct Exam RARE NEUTROPHILS Abnormal      MODERATE GRAM POSITIVE RODS Abnormal      FEW GRAM POSITIVE COCCI IN PAIRS Abnormal      FEW GRAM NEGATIVE COCCOBACILLI Abnormal     Culture PENDING       Medications:      cefepime  2,000 mg IntraVENous Q12H    vancomycin (VANCOCIN) intermittent dosing (placeholder)   Other RX Placeholder    enoxaparin  30 mg SubCUTAneous BID    vancomycin  1,250 mg IntraVENous Q24H    insulin glargine  10 Units SubCUTAneous BID    glipiZIDE  20 mg Oral BID AC    cetirizine  10 mg Oral Nightly    nicotine  1 patch TransDERmal Daily    sodium chloride flush  5-40 mL IntraVENous 2 times per day    insulin lispro  0-8 Units SubCUTAneous TID WC    insulin lispro  0-4 Units SubCUTAneous Nightly    gabapentin  900 mg Oral TID    aspirin  81 mg Oral Daily    amLODIPine  5 mg Oral Daily    atorvastatin  20 mg Oral Nightly           [facility]  [address] [place] [address], APRN - CNP  Perfect Serve messaging  OFFICE: [phone]      Electronically signed by [address] [place] [address], APRN - CNP on 10/6/2022 at 5:51 AM  Thank you for allowing us to participate in the care of this patient. Please call with questions. This note iscreated with the assistance of a speech recognition program.  While intending to generate a document that actually reflects the content of the visit, the document can still have some errors including those of syntax andsound a like substitutions which may escape proof reading.   In such instances, actual meaning can be extrapolated by contextual diversion.

## 2022-10-06 NOTE — PLAN OF CARE
Problem: Discharge Planning  Goal: Discharge to home or other facility with appropriate resources  10/6/2022 0231 by Juwan Morrell, RN  Outcome: Progressing  Flowsheets (Taken 10/5/2022 2000)  Discharge to home or other facility with appropriate resources:   Identify barriers to discharge with patient and caregiver   Arrange for needed discharge resources and transportation as appropriate   Identify discharge learning needs (meds, wound care, etc)   Refer to discharge planning if patient needs post-hospital services based on physician order or complex needs related to functional status, cognitive ability or social support system     Problem: Chronic Conditions and Co-morbidities  Goal: Patient's chronic conditions and co-morbidity symptoms are monitored and maintained or improved  10/6/2022 0231 by Juwan Morrell, RN  Outcome: Progressing  Flowsheets (Taken 10/5/2022 2000)  Care Plan - Patient's Chronic Conditions and Co-Morbidity Symptoms are Monitored and Maintained or Improved:   Monitor and assess patient's chronic conditions and comorbid symptoms for stability, deterioration, or improvement   Collaborate with multidisciplinary team to address chronic and comorbid conditions and prevent exacerbation or deterioration   Update acute care plan with appropriate goals if chronic or comorbid symptoms are exacerbated and prevent overall improvement and discharge     Problem: Safety - Adult  Goal: Free from fall injury  10/6/2022 0231 by Juwan Morrell RN  Outcome: Progressing  [facility] (Taken 10/5/2022 2000)  Free From Fall Injury:   Instruct family/caregiver on patient safety   Based on caregiver fall risk screen, instruct family/caregiver to ask for assistance with transferring infant if caregiver noted to have fall risk factors     Problem: ABCDS Injury Assessment  Goal: Absence of physical injury  10/6/2022 0231 by Juwan Morrell, RN  Outcome: Progressing  Flowsheets (Taken 10/5/2022 2000)  Absence of Physical Injury: Implement safety measures based on patient assessment     Problem: Respiratory - Adult  Goal: Achieves optimal ventilation and oxygenation  10/6/2022 0231 by Kiana Clarke RN  Outcome: Progressing  Flowsheets (Taken 10/5/2022 2000)  Achieves optimal ventilation and oxygenation:   Assess for changes in respiratory status   Assess for changes in mentation and behavior   Assess and instruct to report shortness of breath or any respiratory difficulty   Respiratory therapy support as indicated     Problem: Skin/Tissue Integrity - Adult  Goal: Skin integrity remains intact  10/6/2022 0231 by Kiana Clarke RN  Outcome: Progressing  Flowsheets (Taken 10/5/2022 2000)  Skin Integrity Remains Intact: Monitor for areas of redness and/or skin breakdown     Problem: Skin/Tissue Integrity - Adult  Goal: Incisions, wounds, or drain sites healing without S/S of infection  10/6/2022 0231 by Kiana Clarke RN  Outcome: Progressing  Flowsheets (Taken 10/5/2022 2000)  Incisions, Wounds, or Drain Sites Healing Without Sign and Symptoms of Infection:   TWICE DAILY: Assess and document skin integrity   Implement wound care per orders     Problem: Musculoskeletal - Adult  Goal: Return mobility to safest level of function  10/6/2022 0231 by Kiana Clarke RN  Outcome: Progressing  Flowsheets (Taken 10/5/2022 2000)  Return Mobility to Safest Level of Function:   Assess patient stability and activity tolerance for standing, transferring and ambulating with or without assistive devices   Ensure adequate protection for wounds/incisions during mobilization   Instruct patient/family in ordered activity level     Problem: Musculoskeletal - Adult  Goal: Return ADL status to a safe level of function  10/6/2022 0231 by Kiana Clarke RN  Outcome: Progressing  Flowsheets (Taken 10/5/2022 2000)  Return ADL Status to a Safe Level of Function:   Administer medication as ordered   Assist and instruct patient to increase activity and self care as tolerated Problem: Infection - Adult  Goal: Absence of infection at discharge  10/6/2022 0231 by Lamar Quiroz RN  Outcome: Progressing  Flowsheets (Taken 10/5/2022 2000)  Absence of infection at discharge:   Assess and monitor for signs and symptoms of infection   Monitor lab/diagnostic results     Problem: Infection - Adult  Goal: Absence of infection during hospitalization  10/6/2022 0231 by Lamar Quiroz RN  Outcome: Progressing  Flowsheets (Taken 10/5/2022 2000)  Absence of infection during hospitalization:   Assess and monitor for signs and symptoms of infection   Monitor lab/diagnostic results     Problem: Metabolic/Fluid and Electrolytes - Adult  Goal: Electrolytes maintained within normal limits  10/6/2022 0231 by Lamar Quiroz RN  Outcome: Progressing  Flowsheets (Taken 10/5/2022 2000)  Electrolytes maintained within normal limits:   Monitor labs and assess patient for signs and symptoms of electrolyte imbalances   Administer electrolyte replacement as ordered     Problem: Metabolic/Fluid and Electrolytes - Adult  Goal: Hemodynamic stability and optimal renal function maintained  10/6/2022 0231 by Lamar Quiroz RN  Outcome: Progressing  Flowsheets (Taken 10/5/2022 2000)  Hemodynamic stability and optimal renal function maintained: Monitor labs and assess for signs and symptoms of volume excess or deficit     Problem: Metabolic/Fluid and Electrolytes - Adult  Goal: Glucose maintained within prescribed range  10/6/2022 0231 by Lamar Quiroz RN  Outcome: Progressing  Flowsheets (Taken 10/5/2022 2000)  Glucose maintained within prescribed range:   Monitor blood glucose as ordered   Assess for signs and symptoms of hyperglycemia and hypoglycemia   Administer ordered medications to maintain glucose within target range   Assess barriers to adequate nutritional intake and initiate nutrition consult as needed   Instruct patient on self management of diabetes and initiate consult as needed     Problem: Neurosensory - Adult  Goal: Achieves stable or improved neurological status  10/6/2022 0231 by Juan Echevarria RN  Outcome: Progressing  Flowsheets (Taken 10/5/2022 2000)  Achieves stable or improved neurological status: Assess for and report changes in neurological status     Problem: Pain  Goal: Verbalizes/displays adequate comfort level or baseline comfort level  10/6/2022 0231 by Juan Echevarria RN  Outcome: Progressing

## 2022-10-07 LAB
BUN BLDV-MCNC: 17 MG/DL (ref 8–23)
CREAT SERPL-MCNC: 1.94 MG/DL (ref 0.7–1.2)
ESTIMATED AVERAGE GLUCOSE: 151 MG/DL
GFR SERPL CREATININE-BSD FRML MDRD: 37 ML/MIN/1.73M2
GLUCOSE BLD-MCNC: 173 MG/DL (ref 75–110)
GLUCOSE BLD-MCNC: 199 MG/DL (ref 75–110)
GLUCOSE BLD-MCNC: 200 MG/DL (ref 75–110)
GLUCOSE BLD-MCNC: 97 MG/DL (ref 75–110)
HBA1C MFR BLD: 6.9 % (ref 4–6)

## 2022-10-07 PROCEDURE — 82947 ASSAY GLUCOSE BLOOD QUANT: CPT

## 2022-10-07 PROCEDURE — 6370000000 HC RX 637 (ALT 250 FOR IP): Performed by: NURSE PRACTITIONER

## 2022-10-07 PROCEDURE — 36415 COLL VENOUS BLD VENIPUNCTURE: CPT

## 2022-10-07 PROCEDURE — 2580000003 HC RX 258: Performed by: PODIATRIST

## 2022-10-07 PROCEDURE — 6370000000 HC RX 637 (ALT 250 FOR IP): Performed by: PODIATRIST

## 2022-10-07 PROCEDURE — 82565 ASSAY OF CREATININE: CPT

## 2022-10-07 PROCEDURE — 99232 SBSQ HOSP IP/OBS MODERATE 35: CPT | Performed by: INTERNAL MEDICINE

## 2022-10-07 PROCEDURE — 6360000002 HC RX W HCPCS: Performed by: PODIATRIST

## 2022-10-07 PROCEDURE — 1200000000 HC SEMI PRIVATE

## 2022-10-07 PROCEDURE — 84520 ASSAY OF UREA NITROGEN: CPT

## 2022-10-07 PROCEDURE — 36573 INSJ PICC RS&I 5 YR+: CPT

## 2022-10-07 RX ADMIN — SODIUM CHLORIDE, PRESERVATIVE FREE 10 ML: 5 INJECTION INTRAVENOUS at 23:23

## 2022-10-07 RX ADMIN — CETIRIZINE HYDROCHLORIDE 10 MG: 10 TABLET ORAL at 21:36

## 2022-10-07 RX ADMIN — INSULIN LISPRO 2 UNITS: 100 INJECTION, SOLUTION INTRAVENOUS; SUBCUTANEOUS at 17:15

## 2022-10-07 RX ADMIN — ASPIRIN 81 MG: 81 TABLET, COATED ORAL at 09:53

## 2022-10-07 RX ADMIN — SODIUM CHLORIDE 25 ML: 9 INJECTION, SOLUTION INTRAVENOUS at 14:41

## 2022-10-07 RX ADMIN — ENOXAPARIN SODIUM 30 MG: 100 INJECTION SUBCUTANEOUS at 09:52

## 2022-10-07 RX ADMIN — AMLODIPINE BESYLATE 5 MG: 5 TABLET ORAL at 09:53

## 2022-10-07 RX ADMIN — HYDROCODONE BITARTRATE AND ACETAMINOPHEN 1 TABLET: 5; 325 TABLET ORAL at 02:01

## 2022-10-07 RX ADMIN — VANCOMYCIN HYDROCHLORIDE 1250 MG: 5 INJECTION, POWDER, LYOPHILIZED, FOR SOLUTION INTRAVENOUS at 01:58

## 2022-10-07 RX ADMIN — INSULIN GLARGINE 10 UNITS: 100 INJECTION, SOLUTION SUBCUTANEOUS at 09:52

## 2022-10-07 RX ADMIN — GABAPENTIN 900 MG: 300 CAPSULE ORAL at 21:36

## 2022-10-07 RX ADMIN — SODIUM CHLORIDE, PRESERVATIVE FREE 10 ML: 5 INJECTION INTRAVENOUS at 10:03

## 2022-10-07 RX ADMIN — HYDROCODONE BITARTRATE AND ACETAMINOPHEN 1 TABLET: 5; 325 TABLET ORAL at 22:27

## 2022-10-07 RX ADMIN — SODIUM CHLORIDE: 9 INJECTION, SOLUTION INTRAVENOUS at 03:32

## 2022-10-07 RX ADMIN — SODIUM CHLORIDE, PRESERVATIVE FREE 10 ML: 5 INJECTION INTRAVENOUS at 01:48

## 2022-10-07 RX ADMIN — GABAPENTIN 900 MG: 300 CAPSULE ORAL at 09:53

## 2022-10-07 RX ADMIN — INSULIN GLARGINE 10 UNITS: 100 INJECTION, SOLUTION SUBCUTANEOUS at 21:37

## 2022-10-07 RX ADMIN — CEFEPIME 2000 MG: 2 INJECTION, POWDER, FOR SOLUTION INTRAVENOUS at 14:46

## 2022-10-07 RX ADMIN — GABAPENTIN 900 MG: 300 CAPSULE ORAL at 14:40

## 2022-10-07 RX ADMIN — ATORVASTATIN CALCIUM 20 MG: 20 TABLET, FILM COATED ORAL at 21:36

## 2022-10-07 RX ADMIN — CEFEPIME 2000 MG: 2 INJECTION, POWDER, FOR SOLUTION INTRAVENOUS at 03:33

## 2022-10-07 RX ADMIN — ENOXAPARIN SODIUM 30 MG: 100 INJECTION SUBCUTANEOUS at 21:36

## 2022-10-07 RX ADMIN — GLIPIZIDE 20 MG: 10 TABLET ORAL at 17:15

## 2022-10-07 RX ADMIN — GLIPIZIDE 20 MG: 10 TABLET ORAL at 07:01

## 2022-10-07 ASSESSMENT — PAIN DESCRIPTION - FREQUENCY
FREQUENCY: CONTINUOUS
FREQUENCY: CONTINUOUS

## 2022-10-07 ASSESSMENT — PAIN DESCRIPTION - ORIENTATION
ORIENTATION: RIGHT

## 2022-10-07 ASSESSMENT — PAIN DESCRIPTION - DESCRIPTORS
DESCRIPTORS: SHARP
DESCRIPTORS: ACHING
DESCRIPTORS: SHARP;DISCOMFORT

## 2022-10-07 ASSESSMENT — PAIN SCALES - GENERAL
PAINLEVEL_OUTOF10: 9
PAINLEVEL_OUTOF10: 7
PAINLEVEL_OUTOF10: 9
PAINLEVEL_OUTOF10: 9
PAINLEVEL_OUTOF10: 6
PAINLEVEL_OUTOF10: 9

## 2022-10-07 ASSESSMENT — PAIN DESCRIPTION - PAIN TYPE
TYPE: ACUTE PAIN
TYPE: ACUTE PAIN

## 2022-10-07 ASSESSMENT — PAIN - FUNCTIONAL ASSESSMENT: PAIN_FUNCTIONAL_ASSESSMENT: PREVENTS OR INTERFERES SOME ACTIVE ACTIVITIES AND ADLS

## 2022-10-07 ASSESSMENT — PAIN DESCRIPTION - LOCATION
LOCATION: FOOT

## 2022-10-07 ASSESSMENT — PAIN DESCRIPTION - ONSET
ONSET: ON-GOING
ONSET: ON-GOING

## 2022-10-07 ASSESSMENT — ENCOUNTER SYMPTOMS
RESPIRATORY NEGATIVE: 1
GASTROINTESTINAL NEGATIVE: 1

## 2022-10-07 NOTE — PLAN OF CARE
Patient is stable for discharge from podiatry's standpoint. Appreciate ID recommendations for discharge on antibiotics. He is to keep his dressing dry, clean and intact until his outpatient surgery scheduled for next Wednesday, 10/12/22. He  is to remain weight bearing as tolerated CROW boot only. Please contact on call resident with any questions or concerns.     Speedy Lance DPM  Podiatric Medicine & Surgery   10/07/22, 11:45 AM

## 2022-10-07 NOTE — CARE COORDINATION
Discharge Planning. Received phone call from Christina at [facility] and cost for pt for IV antibiotics is $232/week until pt meets his out of pocket maximum of $5500.00 then cost is $51.71/wk. Informed pt and nurse. Waiting for Infectious Disease to round. Will need script for antibiotics and medications won't be available until late 10-10.     Addendum at 1640  Script for Cefepime and Vancomycin through 11-18 faxed to [facility],

## 2022-10-07 NOTE — PLAN OF CARE
Problem: Discharge Planning  Goal: Discharge to home or other facility with appropriate resources  10/7/2022 0021 by Lisbet Harvey RN  Outcome: Progressing  Flowsheets (Taken 10/6/2022 2000)  Discharge to home or other facility with appropriate resources:   Identify barriers to discharge with patient and caregiver   Arrange for needed discharge resources and transportation as appropriate   Identify discharge learning needs (meds, wound care, etc)   Refer to discharge planning if patient needs post-hospital services based on physician order or complex needs related to functional status, cognitive ability or social support system     Problem: Chronic Conditions and Co-morbidities  Goal: Patient's chronic conditions and co-morbidity symptoms are monitored and maintained or improved  10/7/2022 0021 by Lisbet Harvey RN  Outcome: Progressing  Flowsheets (Taken 10/6/2022 2000)  Care Plan - Patient's Chronic Conditions and Co-Morbidity Symptoms are Monitored and Maintained or Improved:   Monitor and assess patient's chronic conditions and comorbid symptoms for stability, deterioration, or improvement   Collaborate with multidisciplinary team to address chronic and comorbid conditions and prevent exacerbation or deterioration   Update acute care plan with appropriate goals if chronic or comorbid symptoms are exacerbated and prevent overall improvement and discharge     Problem: Safety - Adult  Goal: Free from fall injury  10/7/2022 0021 by Lsibet Harvey RN  Outcome: Progressing  Flowsheets (Taken 10/6/2022 2330)  Free From Fall Injury:   Based on caregiver fall risk screen, instruct family/caregiver to ask for assistance with transferring infant if caregiver noted to have fall risk factors   Instruct family/caregiver on patient safety     Problem: ABCDS Injury Assessment  Goal: Absence of physical injury  10/7/2022 0021 by Lisbet Harvey RN  Outcome: Progressing  Flowsheets (Taken 10/6/2022 2330)  Absence of Physical Injury: Implement safety measures based on patient assessment     Problem: Respiratory - Adult  Goal: Achieves optimal ventilation and oxygenation  10/7/2022 0021 by Yecenia Toth RN  Outcome: Progressing  Flowsheets (Taken 10/6/2022 2000)  Achieves optimal ventilation and oxygenation:   Assess for changes in respiratory status   Assess and instruct to report shortness of breath or any respiratory difficulty     Problem: Skin/Tissue Integrity - Adult  Goal: Skin integrity remains intact  10/7/2022 0021 by Yecenia Toth RN  Outcome: Progressing  Flowsheets  Taken 10/6/2022 2330  Skin Integrity Remains Intact: Monitor for areas of redness and/or skin breakdown    Problem: Musculoskeletal - Adult  Goal: Return mobility to safest level of function  10/7/2022 0021 by Yecenia Toth RN  Outcome: Progressing  Flowsheets (Taken 10/6/2022 2000)  Return Mobility to Safest Level of Function:   Assess patient stability and activity tolerance for standing, transferring and ambulating with or without assistive devices   Ensure adequate protection for wounds/incisions during mobilization   Instruct patient/family in ordered activity level     Problem: Musculoskeletal - Adult  Goal: Return ADL status to a safe level of function  10/7/2022 0021 by Yecenia Toth RN  Outcome: Progressing  Flowsheets (Taken 10/6/2022 2000)  Return ADL Status to a Safe Level of Function:   Administer medication as ordered   Assess activities of daily living deficits and provide assistive devices as needed   Assist and instruct patient to increase activity and self care as tolerated     Problem: Infection - Adult  Goal: Absence of infection during hospitalization  10/7/2022 0021 by Yecenia Toth RN  Outcome: Progressing  Flowsheets (Taken 10/6/2022 2000)  Absence of infection during hospitalization:   Assess and monitor for signs and symptoms of infection   Monitor lab/diagnostic results     Problem: Metabolic/Fluid and Electrolytes - Adult  Goal: Hemodynamic stability and optimal renal function maintained  10/7/2022 0021 by Judy Molina RN  Outcome: Progressing  Flowsheets (Taken 10/6/2022 2000)  Hemodynamic stability and optimal renal function maintained:   Monitor labs and assess for signs and symptoms of volume excess or deficit   Monitor response to interventions for patient's volume status, including labs, urine output, blood pressure (other measures as available)     Problem: Neurosensory - Adult  Goal: Achieves stable or improved neurological status  10/7/2022 0021 by Judy Molina RN  Outcome: Progressing  Flowsheets (Taken 10/6/2022 2000)  Achieves stable or improved neurological status: Assess for and report changes in neurological status     Problem: Pain  Goal: Verbalizes/displays adequate comfort level or baseline comfort level  10/7/2022 0021 by Judy Molina RN  Outcome: Progressing   Problem: Skin/Tissue Integrity - Adult  Goal: Incisions, wounds, or drain sites healing without S/S of infection  10/7/2022 0021 by Judy Molina RN  Outcome: Progressing  Flowsheets  Taken 10/6/2022 2330  Incisions, Wounds, or Drain Sites Healing Without Sign and Symptoms of Infection:   TWICE DAILY: Assess and document skin integrity   Implement wound care per orders

## 2022-10-07 NOTE — DISCHARGE INSTR - COC
Continuity of Care Form    Patient Name: Reji Ralph   :  1955  MRN:  [id number]    Admit date:  10/4/2022  Discharge date:  10/18/22    Code Status Order: Full Code   Advance Directives:     Admitting Physician:  Eliud Alvarado DPM  PCP: Eloy Alfaro MD    Discharging Nurse:  [facility] Unit/Room#:   Discharging Unit Phone Number: [phone]    Emergency Contact:   Extended Emergency Contact Information  Primary Emergency Contact: Hawa Alvarado  Address: [place] [address], [address] Phone: [phone]  Work Phone: [phone]  Mobile Phone: [phone]  Relation: Spouse    Past Surgical History:  Past Surgical History:   Procedure Laterality Date    APPENDECTOMY      at age 24    ARTHROPLASTY Right 2020    RIGHT HALLUX EXOSTECTOMY AND 3RD DIGIT EXTENSIOR TENOTOMY performed by Jeyson Pastrana DPM at [address] [facility] Left 2018    I&D foreign body removal    FOOT DEBRIDEMENT Right 3/20/2020    RIGHT  FOOT   INCISION AND DRAINAGE WITH BONE BIOPSY performed by Jeyson Pastrana DPM at [facility] Right 2021    FOOT DEBRIDEMENT INCISION AND DRAINAGE performed by Jeyson Pastrana DPM at [address] [place] [address] Right 2021    RIGHT FOOT  INCISION AND DRAINAGE   WITH PULSE LAVAGE performed by Jeyson Pastrana DPM at [place] [address] Right 2021    RIGHT FOOT FLAP CLOSURE performed by Jeyson Pastrana DPM at [facility] ARTHROSCOPY Left     KNEE SURGERY Bilateral     arthroscopy    [address] INFEC,1 [place] Left 2018    LEFT FOOT MULTIPLE  INCISIONS  AND DRAINAGE AND REMOVAL FOREIGN BODY  IRENE performed by Jeyson Pastrana DPM at [address] [place] [address]       Immunization History:   Immunization History   Administered Date(s) Administered    COVID-19, MODERNA BLUE border, Primary or Immunocompromised, (age 12y+), IM, 100 mcg/0.5mL 05/05/2021, 06/16/2021    Influenza, Intradermal, Preservative free 09/30/2014    Pneumococcal Polysaccharide (Tdaooiazl09) 02/03/2017    Tdap (Boostrix, Adacel) 04/24/2018       Active Problems:  Patient Active Problem List   Diagnosis Code    Essential hypertension I10    Type II or unspecified type diabetes mellitus without mention of complication, not stated as uncontrolled E11.9    Neuropathy G62.9    Vertigo R42    Charcot foot due to diabetes mellitus ([address] E11.610    Hyperlipidemia E78.5    Cellulitis L03.90    Cellulitis of left foot L03. 116    Right foot infection L08.9    Diabetic polyneuropathy associated with type 2 diabetes mellitus ([address] E11.42    Foreign body (FB) in soft tissue M79.5    Cellulitis of left leg L03.116    Abscess of right foot L02.611    Chest pain at rest R07.9    Tobacco abuse Z72.0    Type 2 diabetes mellitus treated with insulin (HCC) E11.9, Z79.4    ALEJANDRO (acute kidney injury) ([address] N17.9    Bandemia D72.825    Chronic multifocal osteomyelitis of right foot ([facility]) M86.371    Diabetic infection of right foot ([facility]) E11.628, L08.9    Acquired hammer toe deformity of lesser toe of right foot M20.41    Post-op pain G89.18    Right foot pain M79.671    Hallux hammertoe, right M20.31    Osteomyelitis ([facility]) M86.9    Wound dehiscence T81.30XA    Diabetic foot ([address] E11.8    Hyperglycemia due to diabetes mellitus ([address] E11.65    Foot ulcer ([address] L97.509    Cellulitis of right foot L03.115    Type 2 diabetes mellitus with right diabetic foot ulcer ([address] E11.621, L97.519    Charcot's joint of right foot M14.671    Stage 3b chronic kidney disease ([address] N18.32       Isolation/Infection:   Isolation            Contact          Patient Infection Status       Infection Onset Added Last Indicated Last Indicated By Review Planned Expiration Resolved Resolved By    MRSA  08/08/18 08/08/18 Yolanda Kebede RN        Right Foot - 8/2018 Resolved    COVID-19 21 COVID-19, Rapid   21     Dr. Tiffany Moss d/c'd [place] isolation    COVID-19 (Rule Out) 21 COVID-19, Rapid (Ordered)   21 Rule-Out Test Resulted    COVID-19 (Rule Out) 20 COVID-19 (Ordered)   20 Rule-Out Test Resulted    COVID-19 (Rule Out) 20 COVID-19 Ambulatory (Ordered)   08/10/20 Rule-Out Test Resulted            Nurse Assessment:  Last Vital Signs: BP (!) 128/56   Pulse 79   Temp 98.6 °F (37 °C) (Oral)   Resp 16   Ht 5' 11\" (1.803 m)   Wt 234 lb (106.1 kg)   SpO2 96%   BMI 32.64 kg/m²     Last documented pain score (0-10 scale): Pain Level: 9  Last Weight:   Wt Readings from Last 1 Encounters:   10/05/22 234 lb (106.1 kg)     Mental Status:  oriented and alert    IV Access:  - PICC - site  L Basilic, insertion date: 10/8/22    Nursing Mobility/ADLs:  Walking   Assisted  Transfer  Assisted  Bathing  Assisted  Dressing  Assisted  Toileting  Assisted  Feeding  Independent  Med Admin  Independent  Med Delivery   whole    Wound Care Documentation and Therapy:  Wound 10/04/22 Foot Plantar;Right (Active)   Wound Etiology Diabetic 10/06/22 2000   Dressing Status Clean;Dry; Intact 10/06/22 2000   Wound Cleansed Betadine/povidone iodine 10/04/22 2230   Dressing/Treatment Betadine swabs/povidone iodine;Dry dressing;Roll gauze; Ace wrap 10/06/22 2000   Offloading for Diabetic Foot Ulcers Post op shoe 10/06/22 2000   Wound Assessment Other (Comment) 10/06/22 2000   Drainage Amount None 10/06/22 2000   Odor None 10/06/22 2000   Clary-wound Assessment Other (Comment) 10/06/22 2000   Margins Other (Comment) 10/06/22 2000   Wound Thickness Description not for Pressure Injury Full thickness 10/04/22 2230   Number of days: 2       Wound 10/04/22 Toe (Comment  which one) Anterior;Right (Active)   Wound Etiology Diabetic 10/06/22 2000   Dressing Status Clean;Dry; Intact 10/06/22 2000   Wound Cleansed Betadine/povidone iodine 10/04/22 2230   Dressing/Treatment Betadine swabs/povidone iodine;Dry dressing;Roll gauze; Ace wrap 10/06/22 2000   Offloading for Diabetic Foot Ulcers Post op shoe 10/06/22 2000   Wound Assessment Other (Comment) 10/06/22 2000   Drainage Amount None 10/06/22 2000   Drainage Description Serosanguinous 10/04/22 2230   Odor None 10/06/22 2000   Clary-wound Assessment Other (Comment) 10/06/22 2000   Margins Other (Comment) 10/06/22 2000   Wound Thickness Description not for Pressure Injury Partial thickness 10/04/22 2230   Number of days: 2        Elimination:  Continence: Bowel: Yes  Bladder: Yes  Urinary Catheter: None   Colostomy/Ileostomy/Ileal Conduit: No       Date of Last BM: 10/17/22    Intake/Output Summary (Last 24 hours) at 10/7/2022 1443  Last data filed at 10/7/2022 0622  Gross per 24 hour   Intake 1168.19 ml   Output 1825 ml   Net -656.81 ml     I/O last 3 completed shifts: In: 1220.4 [P.O.:480; I.V.:72; IV Piggyback:668.4]  Out: 1825 [Urine:1825]    Safety Concerns: At Risk for Falls    Impairments/Disabilities:      None    Nutrition Therapy:  Current Nutrition Therapy:   - Oral Diet:  Carb Control 4 carbs/meal (1800kcals/day)    Routes of Feeding: Oral  Liquids: No Restrictions  Daily Fluid Restriction: no  Last Modified Barium Swallow with Video (Video Swallowing Test): not done    Treatments at the Time of Hospital Discharge:   Respiratory Treatments: ***  Oxygen Therapy:  is not on home oxygen therapy. Ventilator:    - No ventilator support    Rehab Therapies: Physical Therapy and Occupational Therapy  Weight Bearing Status/Restrictions: Non-weight bearing on right leg  Other Medical Equipment (for information only, NOT a DME order):  wheelchair and walker  Other Treatments: ***    Patient's personal belongings (please select all that are sent with patient):  Clothing, glasses, cell phone.  toiletries    RN SIGNATURE:  Electronically signed by Kolby Helton RN on 10/18/22 at 10:59 AM EDT    CASE MANAGEMENT/SOCIAL WORK SECTION    Inpatient Status Date: ***    Readmission Risk Assessment Score:  Readmission Risk              Risk of Unplanned Readmission:  11           Discharging to Facility/ Agency   Name: [facility]  Address:  Phone:[phone]  RHO:[phone] of fax [phone]    Dialysis Facility (if applicable)   Name:  Address:  Dialysis Schedule:  Phone:  Fax:    / signature: Electronically signed by GABY Lazo on 10/14/22 at 11:37 AM EDT    PHYSICIAN SECTION    Prognosis: Good    Condition at Discharge: Stable    Rehab Potential (if transferring to Rehab): Good    Recommended Labs or Other Treatments After Discharge:   Keep dressing clean, dry, intact until follow-up 1 week Mon or Tues Non weight bearing RLE. Okay to weight bearing for transportation and PT only. Check CBC, BMP, CRP and Vancomycin trough on Mondays  BUN, Creatinine, vanco trough on Thursdays  Schedule for follow-up visit in 4 weeks  Fax results to Carlyn Tinoco MD FAX: [phone]      Physician Certification: I certify the above information and transfer of Apurva Capellan  is necessary for the continuing treatment of the diagnosis listed and that he requires SNF/Rehab for less 30 days.      Update Admission H&P: No change in H&P    PHYSICIAN SIGNATURE:  Electronically signed by Rene Lang DPM on 10/7/22 at 2:38 PM EDT

## 2022-10-07 NOTE — PROGRESS NOTES
[facility]  Progress Note    Yina Marley  MRN: [id number]  Date: 10/7/2022  LOS: 3     Reason for F/U :   Diabetic foot infection/abscess    Impression :   Right Charcot foot with longstanding history of infections/abscesses has undergone multiple I&D procedures in the past  Chronic surgical wounds on the plantar aspect of the foot  Recurrent deep midfoot soft tissue abscess with thin fluid-filled sinus tract noted in the medial plantar aspect of the foot and there is retained metallic foreign body. Recommendations: The concern at this point in time is that the patient has residual infection/abscess in the foot and there is concern for osteomyelitis as well  The patient has a known Charcot foot deformity  Plan will be to place a PICC line and discharge him on IV antimicrobial therapy with cefepime and vancomycin for now  If the patient is to stay hospitalized    Infection Control Recommendations:   Universal precautions    Discharge Planning:   Estimated Length of IV antimicrobials: 6 weeks  Patient will need Midline PICC line Insertion  Patient will need: Home IV , [place],  SNF,  LTAC: Undetermined  Patient willneed outpatient wound care: No    Medical Decision making / Summary of Stay:   Yina Marley is a 78y.o.-year-old male who was initially admitted on 10/4/2022. Nader Hankins has a history of a right sided Charcot foot deformity and has had a longstanding history with infections in the right foot. He did have an abscess for which he underwent I&D in June 2020 with MSSA isolated and the patient has had a residual wound which secondarily got infected and caused an abscess and in September 2021 he underwent an I&D of the abscess with MSSA, Enterobacter, Morganella isolated and he was discharged with IV antimicrobial therapy which he took for 6 weeks through October 22 and 2021.      The patient did continue to have issues with wound dehiscence and had an open wound on the plantar aspect of the foot which was clean. Seeing the patient in close to a year now and he has followed with the podiatrist and has had continued wound care and the wound has been improving with time. Patient has had a plantar foot midfoot ulceration. Patient did subsequently start to develop soft tissue swelling redness in his foot and the swelling started to extend into his leg and the patient did see Dr. Adelina Castro who recommended that he come into the emergency room for evaluation. The patient has been admitted for an abscess in the right foot and chronic ulcerations on the plantar aspect of the foot and the patient is undergoing further work-up with MRI and consideration for an external fixator device. Was asked to evaluate and help with antibiotic choice. Current evaluation:10/7/2022    BP (!) 128/56   Pulse 79   Temp 98.6 °F (37 °C) (Oral)   Resp 16   Ht 5' 11\" (1.803 m)   Wt 234 lb (106.1 kg)   SpO2 96%   BMI 32.64 kg/m²     Temperature Range: Temp: 98.6 °F (37 °C) Temp  Av.5 °F (36.9 °C)  Min: 98.1 °F (36.7 °C)  Max: 98.6 °F (37 °C)  The patient is seen and evaluated at bedside and he is awake and alert in no acute distress. No subjective fevers or chills. No abdominal pain nausea vomiting or diarrhea. The care was discussed with his podiatrist Dr. Edwina De La O and the plans at this point in time is for him to undergo surgery next week. The plans are for potential discharge and either outpatient or readmission for surgery next week    Review of Systems   Constitutional: Negative. HENT: Negative. Respiratory: Negative. Cardiovascular: Negative. Gastrointestinal: Negative. Genitourinary: Negative. Musculoskeletal: Negative. Skin:  Positive for wound. Neurological: Negative. Psychiatric/Behavioral: Negative. Physical Examination :     Physical Exam  Constitutional:       Appearance: He is well-developed. HENT:      Head: Normocephalic and atraumatic. hours.    Imaging Studies:     MRI OF THE RIGHT ANKLE WITHOUT CONTRAST; MRI OF THE RIGHT FOOT WITHOUT   CONTRAST, 10/5/2022 11:03 am     Impression   Soft tissue defect of the medial plantar aspect of the midfoot. Thin   fluid-filled sinus tract extending into the deep soft tissues of the midfoot,   measuring up to 2.0 x 0.9 cm on the coronal sequence, raising suspicion for a   phlegmon/abscess. There is air in the adjacent soft tissues. Susceptibility artifact in the soft tissues adjacent to the soft tissue   defect could represent sequela of prior intervention or retained metallic   foreign body. No MR evidence of acute osteomyelitis. Chronic bony changes at the TMT joints compatible with a neuropathic   arthropathy. Additional chronic findings as described above. Cultures:     Culture, Anaerobic and Aerobic [[id number]] (Abnormal) Collected: 10/05/22 0845   Order Status: Completed Specimen: Wound Updated: 10/07/22 8937    Specimen Description . WOUND . FOOT    Direct Exam RARE NEUTROPHILS Abnormal      MODERATE GRAM POSITIVE RODS Abnormal      FEW GRAM POSITIVE COCCI IN PAIRS Abnormal      FEW GRAM NEGATIVE COCCOBACILLI Abnormal     Culture PENDING       Medications:      cefepime  2,000 mg IntraVENous Q12H    vancomycin (VANCOCIN) intermittent dosing (placeholder)   Other RX Placeholder    enoxaparin  30 mg SubCUTAneous BID    vancomycin  1,250 mg IntraVENous Q24H    insulin glargine  10 Units SubCUTAneous BID    glipiZIDE  20 mg Oral BID AC    cetirizine  10 mg Oral Nightly    nicotine  1 patch TransDERmal Daily    sodium chloride flush  5-40 mL IntraVENous 2 times per day    insulin lispro  0-8 Units SubCUTAneous TID WC    insulin lispro  0-4 Units SubCUTAneous Nightly    gabapentin  900 mg Oral TID    aspirin  81 mg Oral Daily    amLODIPine  5 mg Oral Daily    atorvastatin  20 mg Oral Nightly           [facility]  Davey Silva MD  [employer] messaging  OFFICE: [phone]      Electronically signed by Melissa Houser MD on 10/7/2022 at 3:19 PM  Thank you for allowing us to participate in the care of this patient. Please call with questions. This note iscreated with the assistance of a speech recognition program.  While intending to generate a document that actually reflects the content of the visit, the document can still have some errors including those of syntax andsound a like substitutions which may escape proof reading. In such instances, actual meaning can be extrapolated by contextual diversion.

## 2022-10-07 NOTE — PROGRESS NOTES
Physical Therapy  DATE: 10/7/2022    NAME: Giorgi Ramsey  MRN: [id number]   : 1955    Patient not seen this date for Physical Therapy due to:      [] Cancel by RN or physician due to:    [] Hemodialysis    [] Critical Lab Value Level     [] Blood transfusion in progress    [] Acute or unstable cardiovascular status   _MAP < 55 or more than >115  _HR < 40 or > 130    [] Acute or unstable pulmonary status   -FiO2 > 60%   _RR < 5 or >40    _O2 sats < 85%    [] Strict Bedrest    [] Off Unit for surgery or procedure    [] Off Unit for testing       [] Pending imaging to R/O fracture    [x] Refusal by Patient  States therapist can check back later if time allows. States he can do everything and he \"does not need therapy\"    [] Other      [] PT being discontinued at this time. Patient independent. No further needs. [] PT being discontinued at this time as the patient has been transferred to hospice care. No further needs.       Sonal Orozco, PTA

## 2022-10-07 NOTE — PLAN OF CARE
Problem: Discharge Planning  Goal: Discharge to home or other facility with appropriate resources  Outcome: Progressing  Flowsheets (Taken 10/7/2022 0956)  Discharge to home or other facility with appropriate resources:   Identify barriers to discharge with patient and caregiver   Arrange for needed discharge resources and transportation as appropriate   Identify discharge learning needs (meds, wound care, etc)   Refer to discharge planning if patient needs post-hospital services based on physician order or complex needs related to functional status, cognitive ability or social support system     Problem: Chronic Conditions and Co-morbidities  Goal: Patient's chronic conditions and co-morbidity symptoms are monitored and maintained or improved  Outcome: Progressing  Flowsheets (Taken 10/7/2022 0956)  Care Plan - Patient's Chronic Conditions and Co-Morbidity Symptoms are Monitored and Maintained or Improved:   Monitor and assess patient's chronic conditions and comorbid symptoms for stability, deterioration, or improvement   Collaborate with multidisciplinary team to address chronic and comorbid conditions and prevent exacerbation or deterioration   Update acute care plan with appropriate goals if chronic or comorbid symptoms are exacerbated and prevent overall improvement and discharge     Problem: Safety - Adult  Goal: Free from fall injury  Outcome: Progressing     Problem: ABCDS Injury Assessment  Goal: Absence of physical injury  Outcome: Progressing  Flowsheets (Taken 10/7/2022 1938)  Absence of Physical Injury: Implement safety measures based on patient assessment  Note: Siderails up x 2  Hourly rounding  Call light in reach  Remains free from falls and accidental injury at this time   Floor free from obstacles  Bed is locked and in lowest position  Adequate lighting provided  Patient independent. Gait steady.      Problem: Respiratory - Adult  Goal: Achieves optimal ventilation and oxygenation  Outcome: Progressing  Flowsheets (Taken 10/7/2022 0956)  Achieves optimal ventilation and oxygenation:   Assess for changes in respiratory status   Assess for changes in mentation and behavior   Assess the need for suctioning and aspirate as needed   Assess and instruct to report shortness of breath or any respiratory difficulty     Problem: Skin/Tissue Integrity - Adult  Goal: Skin integrity remains intact  Outcome: Progressing  Flowsheets (Taken 10/7/2022 1654)  Skin Integrity Remains Intact: Monitor for areas of redness and/or skin breakdown  Goal: Incisions, wounds, or drain sites healing without S/S of infection  Outcome: Progressing  Flowsheets (Taken 10/7/2022 1654)  Incisions, Wounds, or Drain Sites Healing Without Sign and Symptoms of Infection:   TWICE DAILY: Assess and document skin integrity   TWICE DAILY: Assess and document dressing/incision, wound bed, drain sites and surrounding tissue   Initiate isolation precautions as appropriate     Problem: Musculoskeletal - Adult  Goal: Return mobility to safest level of function  Outcome: Progressing  Flowsheets (Taken 10/7/2022 0956)  Return Mobility to Safest Level of Function:   Assess patient stability and activity tolerance for standing, transferring and ambulating with or without assistive devices   Assist with transfers and ambulation using safe patient handling equipment as needed   Ensure adequate protection for wounds/incisions during mobilization   Instruct patient/family in ordered activity level  Goal: Return ADL status to a safe level of function  Outcome: Progressing  Flowsheets (Taken 10/7/2022 0956)  Return ADL Status to a Safe Level of Function:   Administer medication as ordered   Assess activities of daily living deficits and provide assistive devices as needed   Assist and instruct patient to increase activity and self care as tolerated     Problem: Infection - Adult  Goal: Absence of infection at discharge  Outcome: Progressing  Flowsheets (Taken 10/7/2022 0956)  Absence of infection at discharge:   Assess and monitor for signs and symptoms of infection   Monitor lab/diagnostic results   Monitor all insertion sites i.e., indwelling lines, tubes and drains   Administer medications as ordered   Instruct and encourage patient and family to use good hand hygiene technique   Identify and instruct in appropriate isolation precautions for identified infection/condition  Goal: Absence of infection during hospitalization  Outcome: Progressing  Flowsheets (Taken 10/7/2022 0956)  Absence of infection during hospitalization:   Assess and monitor for signs and symptoms of infection   Monitor lab/diagnostic results   Monitor all insertion sites i.e., indwelling lines, tubes and drains   Administer medications as ordered   Instruct and encourage patient and family to use good hand hygiene technique   Identify and instruct in appropriate isolation precautions for identified infection/condition     Problem: Metabolic/Fluid and Electrolytes - Adult  Goal: Electrolytes maintained within normal limits  Outcome: Progressing  Flowsheets (Taken 10/7/2022 0956)  Electrolytes maintained within normal limits: Monitor labs and assess patient for signs and symptoms of electrolyte imbalances  Goal: Hemodynamic stability and optimal renal function maintained  Outcome: Progressing  Flowsheets (Taken 10/7/2022 0956)  Hemodynamic stability and optimal renal function maintained:   Monitor labs and assess for signs and symptoms of volume excess or deficit   Monitor response to interventions for patient's volume status, including labs, urine output, blood pressure (other measures as available)  Goal: Glucose maintained within prescribed range  Outcome: Progressing  Flowsheets (Taken 10/7/2022 0956)  Glucose maintained within prescribed range:   Monitor blood glucose as ordered   Assess for signs and symptoms of hyperglycemia and hypoglycemia   Administer ordered medications to maintain glucose within target range   Instruct patient on self management of diabetes and initiate consult as needed   Assess barriers to adequate nutritional intake and initiate nutrition consult as needed     Problem: Pain  Goal: Verbalizes/displays adequate comfort level or baseline comfort level  Outcome: Progressing  Flowsheets (Taken 10/7/2022 1938)  Verbalizes/displays adequate comfort level or baseline comfort level:   Encourage patient to monitor pain and request assistance   Assess pain using appropriate pain scale   Administer analgesics based on type and severity of pain and evaluate response   Implement non-pharmacological measures as appropriate and evaluate response   Consider cultural and social influences on pain and pain management   Notify Licensed Independent Practitioner if interventions unsuccessful or patient reports new pain  Note: Pain level assessment complete. Patient educated on pain scale and control interventions  PRN pain medication given per patient request  Patient instructed to call out with new onset of pain or unrelieved pain  Patient denies the need for any pain medication at this time.      Problem: Neurosensory - Adult  Goal: Achieves stable or improved neurological status  Outcome: Progressing     Problem: Nutrition Deficit:  Goal: Optimize nutritional status  Outcome: Progressing

## 2022-10-07 NOTE — PROGRESS NOTES
Comprehensive Nutrition Assessment    Type and Reason for Visit:  Wound    Nutrition Recommendations/Plan:   Continue ADULT DIET; Regular; 4 carb choices (60 gm/meal)  Start Ensure High Protein 2x/day  Monitor p.o intakes, wound status and labs     Malnutrition Assessment:  Malnutrition Status:  No malnutrition (10/07/22 1647)        Nutrition Assessment:    Patient admission is relaed to Charcot's joint of right foot and type 2 diabetes. Patient has right plantar foot wound and right toe diabetic wound. Patient is scheduled to have incision and drainage of the right foot on Saturday morning (10/8). Patient is eating fine at meals. Will start Ensure High Protein 2x/day. Monitor p.o intakes, wound status and labs. Nutrition Related Findings:    Edema: +1 non-pitting RLE. Charcot's joint of right foot Wound Type: Diabetic Ulcer       Current Nutrition Intake & Therapies:    Average Meal Intake: %     ADULT DIET; Regular; 4 carb choices (60 gm/meal)  Diet NPO Exceptions are: Sips of Water with Meds  ADULT ORAL NUTRITION SUPPLEMENT; Breakfast, Dinner; Low Calorie/High Protein Oral Supplement    Anthropometric Measures:  Height: 5' 11\" (180.3 cm)  Ideal Body Weight (IBW): 172 lbs (78 kg)       Current Body Weight: 234 lb (106.1 kg), 136 % IBW. Weight Source: Bed Scale  Current BMI (kg/m2): 32.7                          BMI Categories: Obese Class 1 (BMI 30.0-34. 9)    Estimated Daily Nutrient Needs:  Energy Requirements Based On: Kcal/kg  Weight Used for Energy Requirements: Current  Energy (kcal/day): 2373-7733 kcal (15-18 kcal/kg)  Weight Used for Protein Requirements: Ideal  Protein (g/day): 101-109 gm of protein (1.3-1.4 gm/kg)      Nutrition Diagnosis:   Increased nutrient needs related to increase demand for energy/nutrients as evidenced by wounds    Nutrition Interventions:   Food and/or Nutrient Delivery: Continue Current Diet, Start Oral Nutrition Supplement  Nutrition Education/Counseling: Education not indicated  Coordination of Nutrition Care: Continue to monitor while inpatient       Goals:     Goals: PO intake 75% or greater       Nutrition Monitoring and Evaluation:      Food/Nutrient Intake Outcomes: Food and Nutrient Intake, Supplement Intake  Physical Signs/Symptoms Outcomes: Biochemical Data, Fluid Status or Edema, Skin, Weight    Discharge Planning:    Continue current diet, Continue Oral Nutrition Supplement             Shalom DUONGN, RDN, LDN  Lead Clinical Dietitian  RD Office Phone [phone]

## 2022-10-07 NOTE — PROGRESS NOTES
Progress Note  Podiatric Medicine and Surgery     Subjective     CC: right foot wound    Patient seen and examined at bedside. Plan for incision and drainage of right foot, to OR on Saturday AM.     HPI :  Radha Benítez is a 78 y.o. male seen at [facility] for right foot wound. Patient is known to [address] [place] [address]. Patient has had Charcot deformity to the right foot for a long time. Patient had multiple admission due to right foot infection in the past.  Patient visited Dr. Manolo Sexton 10//2022 and was told to report to the ED to get admitted immediately due to concern of cellulitis and possible osteomyelitis of the right foot.  would like to transfer care to [facility] for possible Charcot reconstruction. Patient denies other pedal complaint. PCP is Idris Melton MD    ROS: Denies N/V/F/C/SOB/CP. Otherwise negative except at stated in the HPI.      Medications:  Scheduled Meds:   cefepime  2,000 mg IntraVENous Q12H    vancomycin (VANCOCIN) intermittent dosing (placeholder)   Other RX Placeholder    enoxaparin  30 mg SubCUTAneous BID    vancomycin  1,250 mg IntraVENous Q24H    insulin glargine  10 Units SubCUTAneous BID    glipiZIDE  20 mg Oral BID AC    cetirizine  10 mg Oral Nightly    nicotine  1 patch TransDERmal Daily    sodium chloride flush  5-40 mL IntraVENous 2 times per day    insulin lispro  0-8 Units SubCUTAneous TID WC    insulin lispro  0-4 Units SubCUTAneous Nightly    gabapentin  900 mg Oral TID    aspirin  81 mg Oral Daily    amLODIPine  5 mg Oral Daily    atorvastatin  20 mg Oral Nightly       Continuous Infusions:   sodium chloride Stopped (10/07/22 0333)    dextrose         PRN Meds:HYDROcodone 5 mg - acetaminophen **OR** HYDROcodone 5 mg - acetaminophen, diphenhydrAMINE, sodium chloride flush, sodium chloride, ondansetron **OR** ondansetron, polyethylene glycol, acetaminophen **OR** acetaminophen, glucose, dextrose bolus **OR** dextrose bolus, glucagon (rDNA), dextrose, albuterol sulfate HFA    Objective     Vitals:  No data found. Average, Min, and Max for last 24 hours Vitals:  TEMPERATURE:  Temp  Av.3 °F (36.8 °C)  Min: 97.9 °F (36.6 °C)  Max: 98.6 °F (37 °C)    RESPIRATIONS RANGE: Resp  Av.5  Min: 16  Max: 20    PULSE RANGE: Pulse  Av  Min: 64  Max: 76    BLOOD PRESSURE RANGE:  Systolic (86SUA), PSJ:691 , Min:120 , DKA:125   ; Diastolic (52KPL), JOHNNY:98, Min:57, Max:70      PULSE OXIMETRY RANGE: SpO2  Av.5 %  Min: 93 %  Max: 97 %    I/O last 3 completed shifts: In: 1220.4 [P.O.:480; I.V.:72; IV Piggyback:668.4]  Out: 1825 [TVNAS:2172]    CBC:  Recent Labs     10/04/22  2218 10/05/22  0601   WBC  --  8.8   HGB  --  10.2*   HCT  --  30.8*   PLT  --  276   CRP 30.1*  --         BMP:  Recent Labs     10/05/22  0601 10/06/22  0644 10/07/22  0612     --   --    K 4.4  --   --      --   --    CO2 24  --   --    BUN 18 18 17   CREATININE 1.99* 1.95* 1.94*   GLUCOSE 184*  --   --    CALCIUM 8.8  --   --         Coags:  No results for input(s): APTT, PROT, INR in the last 72 hours. Lab Results   Component Value Date    WYGULJG  (H) 10/04/2022     Recent Labs     10/04/22  2218   CRP 30.1*       Lower Extremity Physical Exam:  General: A&Ox3, NAD  Heart: Regular rate and rhythm. Lungs: Equal air entry. No increased effort. Abdomen: Soft, non-tender to palpation. Not distended. Lower Extremity Physical Exam:  Vascular: DP and PT pulses are palpable +2/4. CFT <3 seconds to all digits. Hair growth is diminished to the level of the digits. Diffuse loss to nonpitting edema noted to the right lower extremity. Neuro: Saph/sural/SP/DP/plantar sensation diminished to light touch. Musculoskeletal: Muscle strength is 5/5 to all lower extremity muscle groups. Gross deformity is Charcot deformity to the right foot     Dermatologic: Full thickness ulcer #1 located right first interspace and measures approximately 1 cm x 1 cm x 3 cm. Base is fibrotic. Periwound skin is macerated. Purulent drainage noted with no associated mal odor. Erythema noted to periwound with moderate associated increase in warmth. Does not probe to bone probe deep. Does not sinus track, or undermine. No fluctuance, crepitus, or induration. Full thickness ulcer #2 located to the medial plantar arch of the right foot. It is measured as 1.5 x 1.2 x 0.2 cm. Base is fibrotic. Periwound skin is hyperkeratotic. There is some serous drainage with the no odor. No erythema noted. Does not probe to bone, sinus tract, or undermine. No fluctuance, crepitus, or induration. Clinical Images:            Imaging:   VL Lower Extremity Venous Right   Final Result      XR FOOT RIGHT (MIN 3 VIEWS)   Final Result   Air is seen in the plantar soft tissues of the medial midfoot. Chronic bony changes at the TMT joints compatible with a neuropathic   arthropathy. No obvious new bony erosions are seen. MRI ANKLE RIGHT WO CONTRAST   Final Result   Soft tissue defect of the medial plantar aspect of the midfoot. Thin   fluid-filled sinus tract extending into the deep soft tissues of the midfoot,   measuring up to 2.0 x 0.9 cm on the coronal sequence, raising suspicion for a   phlegmon/abscess. There is air in the adjacent soft tissues. Susceptibility artifact in the soft tissues adjacent to the soft tissue   defect could represent sequela of prior intervention or retained metallic   foreign body. No MR evidence of acute osteomyelitis. Chronic bony changes at the TMT joints compatible with a neuropathic   arthropathy. Additional chronic findings as described above. MRI FOOT RIGHT WO CONTRAST   Final Result   Soft tissue defect of the medial plantar aspect of the midfoot. Thin   fluid-filled sinus tract extending into the deep soft tissues of the midfoot,   measuring up to 2.0 x 0.9 cm on the coronal sequence, raising suspicion for a   phlegmon/abscess. There is air in the adjacent soft tissues. Susceptibility artifact in the soft tissues adjacent to the soft tissue   defect could represent sequela of prior intervention or retained metallic   foreign body. No MR evidence of acute osteomyelitis. Chronic bony changes at the TMT joints compatible with a neuropathic   arthropathy. Additional chronic findings as described above. Cultures: moderate GPR, few GPC, few gram - coccobacilli      Assessment   Bonita Valdes is a 78 y.o. male with   Cellulitis, right foot-IMPROVING  Abscess, right foot  Type II diabetic foot ulcer down to subcutaneous layer, right foot  Charcot deformity, right foot  Type 2 diabetes with peripheral neuropathy  Hypertension    Principal Problem:    Type 2 diabetes mellitus with right diabetic foot ulcer (HCC)  Active Problems:    Charcot's joint of right foot    Stage 3b chronic kidney disease ([address]    Essential hypertension  Resolved Problems:    * No resolved hospital problems. *       Plan     Patient examined and evaluated at bedside with Dr. Luanne Menendez  Treatment options discussed in detail with the patient  X-ray from 10/4/2022 was reviewed: There is no soft tissue emphysema or acute osseous deformity noted. Significant periosteal changes noted to the midfoot indicating midfoot Charcot deformity. MRI of the foot and ankle : abscess noted to the midfoot. No OM. IV antibiotic: Vancomycin/cefepime. ID recommendation appreciated. Medical management per medicine. Appreciate recommendation. We will proceed stage I of the procedure which is incision and drainage of the right foot. Schedule for OR on Saturday morning with Dr. Jovan Santiago. Stage 2 of the procedure which is charcot recon will be performed by Dr. Luanne Menendez  Consent signed.    Dressing applied to Right foot: Wet-to-dry Betadine, DSD and Ace bandage  Discussed with Dr. Luanne Menendez    DVT ppx: lovenox  and aspirin   Diet: carb control Activity: Weightbearing as tolerated to Right lower extremity  Pain Control: Shital Chavez DPM   Podiatric Medicine & Surgery   10/7/2022 at 7:21 AM

## 2022-10-08 LAB
BUN BLDV-MCNC: 21 MG/DL (ref 8–23)
CREAT SERPL-MCNC: 1.94 MG/DL (ref 0.7–1.2)
CULTURE: ABNORMAL
CULTURE: ABNORMAL
DIRECT EXAM: ABNORMAL
GFR SERPL CREATININE-BSD FRML MDRD: 37 ML/MIN/1.73M2
GLUCOSE BLD-MCNC: 116 MG/DL (ref 75–110)
GLUCOSE BLD-MCNC: 150 MG/DL (ref 75–110)
GLUCOSE BLD-MCNC: 162 MG/DL (ref 75–110)
GLUCOSE BLD-MCNC: 242 MG/DL (ref 75–110)
SPECIMEN DESCRIPTION: ABNORMAL

## 2022-10-08 PROCEDURE — 6370000000 HC RX 637 (ALT 250 FOR IP): Performed by: NURSE PRACTITIONER

## 2022-10-08 PROCEDURE — 2580000003 HC RX 258: Performed by: PODIATRIST

## 2022-10-08 PROCEDURE — 82565 ASSAY OF CREATININE: CPT

## 2022-10-08 PROCEDURE — 36415 COLL VENOUS BLD VENIPUNCTURE: CPT

## 2022-10-08 PROCEDURE — 6360000002 HC RX W HCPCS: Performed by: PODIATRIST

## 2022-10-08 PROCEDURE — 1200000000 HC SEMI PRIVATE

## 2022-10-08 PROCEDURE — 82947 ASSAY GLUCOSE BLOOD QUANT: CPT

## 2022-10-08 PROCEDURE — 84520 ASSAY OF UREA NITROGEN: CPT

## 2022-10-08 PROCEDURE — 99231 SBSQ HOSP IP/OBS SF/LOW 25: CPT | Performed by: INTERNAL MEDICINE

## 2022-10-08 PROCEDURE — 6370000000 HC RX 637 (ALT 250 FOR IP): Performed by: PODIATRIST

## 2022-10-08 RX ADMIN — ATORVASTATIN CALCIUM 20 MG: 20 TABLET, FILM COATED ORAL at 20:48

## 2022-10-08 RX ADMIN — SODIUM CHLORIDE: 9 INJECTION, SOLUTION INTRAVENOUS at 03:40

## 2022-10-08 RX ADMIN — CETIRIZINE HYDROCHLORIDE 10 MG: 10 TABLET ORAL at 20:48

## 2022-10-08 RX ADMIN — CEFEPIME 2000 MG: 2 INJECTION, POWDER, FOR SOLUTION INTRAVENOUS at 03:43

## 2022-10-08 RX ADMIN — ENOXAPARIN SODIUM 30 MG: 100 INJECTION SUBCUTANEOUS at 20:48

## 2022-10-08 RX ADMIN — GABAPENTIN 900 MG: 300 CAPSULE ORAL at 13:53

## 2022-10-08 RX ADMIN — HYDROCODONE BITARTRATE AND ACETAMINOPHEN 1 TABLET: 5; 325 TABLET ORAL at 22:31

## 2022-10-08 RX ADMIN — GLIPIZIDE 20 MG: 10 TABLET ORAL at 17:47

## 2022-10-08 RX ADMIN — GABAPENTIN 900 MG: 300 CAPSULE ORAL at 20:48

## 2022-10-08 RX ADMIN — INSULIN GLARGINE 10 UNITS: 100 INJECTION, SOLUTION SUBCUTANEOUS at 09:16

## 2022-10-08 RX ADMIN — GABAPENTIN 900 MG: 300 CAPSULE ORAL at 09:16

## 2022-10-08 RX ADMIN — AMLODIPINE BESYLATE 5 MG: 5 TABLET ORAL at 09:16

## 2022-10-08 RX ADMIN — INSULIN GLARGINE 10 UNITS: 100 INJECTION, SOLUTION SUBCUTANEOUS at 20:48

## 2022-10-08 RX ADMIN — CEFEPIME 2000 MG: 2 INJECTION, POWDER, FOR SOLUTION INTRAVENOUS at 15:47

## 2022-10-08 RX ADMIN — ASPIRIN 81 MG: 81 TABLET, COATED ORAL at 09:16

## 2022-10-08 RX ADMIN — VANCOMYCIN HYDROCHLORIDE 1250 MG: 5 INJECTION, POWDER, LYOPHILIZED, FOR SOLUTION INTRAVENOUS at 01:52

## 2022-10-08 RX ADMIN — GLIPIZIDE 20 MG: 10 TABLET ORAL at 06:44

## 2022-10-08 RX ADMIN — ENOXAPARIN SODIUM 30 MG: 100 INJECTION SUBCUTANEOUS at 09:17

## 2022-10-08 RX ADMIN — SODIUM CHLORIDE, PRESERVATIVE FREE 10 ML: 5 INJECTION INTRAVENOUS at 20:53

## 2022-10-08 ASSESSMENT — PAIN SCALES - GENERAL
PAINLEVEL_OUTOF10: 0
PAINLEVEL_OUTOF10: 9

## 2022-10-08 NOTE — PROGRESS NOTES
(rDNA), dextrose, albuterol sulfate HFA    Objective     Vitals:  Patient Vitals for the past 8 hrs:   BP Temp Pulse Resp SpO2   10/08/22 0702 (!) 142/70 98.8 °F (37.1 °C) 65 16 96 %       Average, Min, and Max for last 24 hours Vitals:  TEMPERATURE:  Temp  Av.4 °F (36.9 °C)  Min: 97.7 °F (36.5 °C)  Max: 98.8 °F (37.1 °C)    RESPIRATIONS RANGE: Resp  Av.5  Min: 16  Max: 20    PULSE RANGE: Pulse  Av  Min: 65  Max: 79    BLOOD PRESSURE RANGE:  Systolic (03HFA), KVR:496 , Min:128 , XSM:634   ; Diastolic (35RCB), ACT:00, Min:56, Max:70      PULSE OXIMETRY RANGE: SpO2  Av %  Min: 95 %  Max: 97 %    I/O last 3 completed shifts: In: 1179 [P.O.:960; I.V.:164.9; IV Piggyback:656.2]  Out: 2300 [Urine:2300]    CBC:  No results for input(s): WBC, HGB, HCT, PLT, CRP in the last 72 hours. Invalid input(s):  ESR       BMP:  Recent Labs     10/06/22  0644 10/07/22  0612 10/08/22  0603   BUN 18 17 21   CREATININE 1.95* 1.94* 1.94*        Coags:  No results for input(s): APTT, PROT, INR in the last 72 hours. Lab Results   Component Value Date    SEDRATE 64 (H) 10/04/2022     No results for input(s): CRP in the last 72 hours. Lower Extremity Physical Exam:  General: A&Ox3, NAD  Heart: Regular rate and rhythm. Lungs: Equal air entry. No increased effort. Abdomen: Soft, non-tender to palpation. Not distended. Lower Extremity Physical Exam:  Vascular: DP and PT pulses are palpable +2/4. CFT <3 seconds to all digits. Hair growth is diminished to the level of the digits. Diffuse loss to nonpitting edema noted to the right lower extremity. Neuro: Saph/sural/SP/DP/plantar sensation diminished to light touch. Musculoskeletal: Muscle strength is 5/5 to all lower extremity muscle groups. Gross deformity is Charcot deformity to the right foot     Dermatologic: Full thickness ulcer #1 located right first interspace and measures approximately 1 cm x 1 cm x 3 cm. Base is fibrotic.  Periwound skin is macerated. Purulent drainage noted with no associated mal odor. Erythema noted to periwound with moderate associated increase in warmth. Does not probe to bone probe deep. Does not sinus track, or undermine. No fluctuance, crepitus, or induration. Full thickness ulcer #2 located to the medial plantar arch of the right foot. It is measured as 1.5 x 1.2 x 0.2 cm. Base is fibrotic. Periwound skin is hyperkeratotic. There is some serous drainage with the no odor. No erythema noted. Does not probe to bone, sinus tract, or undermine. No fluctuance, crepitus, or induration. Clinical Images:            Imaging:   VL Lower Extremity Venous Right   Final Result      XR FOOT RIGHT (MIN 3 VIEWS)   Final Result   Air is seen in the plantar soft tissues of the medial midfoot. Chronic bony changes at the TMT joints compatible with a neuropathic   arthropathy. No obvious new bony erosions are seen. MRI ANKLE RIGHT WO CONTRAST   Final Result   Soft tissue defect of the medial plantar aspect of the midfoot. Thin   fluid-filled sinus tract extending into the deep soft tissues of the midfoot,   measuring up to 2.0 x 0.9 cm on the coronal sequence, raising suspicion for a   phlegmon/abscess. There is air in the adjacent soft tissues. Susceptibility artifact in the soft tissues adjacent to the soft tissue   defect could represent sequela of prior intervention or retained metallic   foreign body. No MR evidence of acute osteomyelitis. Chronic bony changes at the TMT joints compatible with a neuropathic   arthropathy. Additional chronic findings as described above. MRI FOOT RIGHT WO CONTRAST   Final Result   Soft tissue defect of the medial plantar aspect of the midfoot. Thin   fluid-filled sinus tract extending into the deep soft tissues of the midfoot,   measuring up to 2.0 x 0.9 cm on the coronal sequence, raising suspicion for a   phlegmon/abscess.   There is air in the adjacent soft tissues. Susceptibility artifact in the soft tissues adjacent to the soft tissue   defect could represent sequela of prior intervention or retained metallic   foreign body. No MR evidence of acute osteomyelitis. Chronic bony changes at the TMT joints compatible with a neuropathic   arthropathy. Additional chronic findings as described above. IR INSERT PICC VAD W SQ PORT >5 YEARS    (Results Pending)       Cultures: moderate GPR, few GPC, few gram - coccobacilli      Assessment   Lindy Ron is a 78 y.o. male with   Cellulitis, right foot-IMPROVING  Abscess, right foot  Type II diabetic foot ulcer down to subcutaneous layer, right foot  Charcot deformity, right foot  Type 2 diabetes with peripheral neuropathy  Hypertension    Principal Problem:    Type 2 diabetes mellitus with right diabetic foot ulcer (HCC)  Active Problems:    Charcot's joint of right foot    Stage 3b chronic kidney disease ([address]    Essential hypertension  Resolved Problems:    * No resolved hospital problems. *       Plan     Patient examined and evaluated at bedside with Dr. Samuel Pan  Treatment options discussed in detail with the patient  X-ray from 10/4/2022 was reviewed: There is no soft tissue emphysema or acute osseous deformity noted. Significant periosteal changes noted to the midfoot indicating midfoot Charcot deformity. MRI of the foot and ankle : abscess noted to the midfoot. No OM. IV antibiotic: Vancomycin/cefepime. ID recommendation appreciated. Medical management per medicine. Appreciate recommendation. Due to improvement in clinical sign of infection, we will proceed with Charcot reconstruction stage consisting of midfoot osteotomy, tendoAchilles lengthening and application of external fixator with Dr. Samuel Pan.  Surgery planned for 10/12/22  Will continue to monitor wound for clinical signs of infection  Dressing applied to Right foot: Wet-to-dry Betadine, DSD and Ace bandage  Discussed with Dr. Yadi Farrell    DVT ppx: lovenox  and aspirin   Diet: carb control   Activity: Weightbearing as tolerated to Right lower extremity  Pain Control: Shital Chu DPM   Podiatric Medicine & Surgery   10/8/2022 at 10:11 AM

## 2022-10-08 NOTE — PLAN OF CARE
Problem: Discharge Planning  Goal: Discharge to home or other facility with appropriate resources  10/8/2022 0153 by Frederick Palma RN  Outcome: Progressing  Flowsheets (Taken 10/7/2022 2000)  Discharge to home or other facility with appropriate resources:   Arrange for needed discharge resources and transportation as appropriate   Identify barriers to discharge with patient and caregiver   Identify discharge learning needs (meds, wound care, etc)   Refer to discharge planning if patient needs post-hospital services based on physician order or complex needs related to functional status, cognitive ability or social support system     Problem: Chronic Conditions and Co-morbidities  Goal: Patient's chronic conditions and co-morbidity symptoms are monitored and maintained or improved  10/8/2022 0153 by Frederick Palma RN  Outcome: Progressing  Flowsheets (Taken 10/7/2022 2000)  Care Plan - Patient's Chronic Conditions and Co-Morbidity Symptoms are Monitored and Maintained or Improved:   Monitor and assess patient's chronic conditions and comorbid symptoms for stability, deterioration, or improvement   Collaborate with multidisciplinary team to address chronic and comorbid conditions and prevent exacerbation or deterioration   Update acute care plan with appropriate goals if chronic or comorbid symptoms are exacerbated and prevent overall improvement and discharge     Problem: Safety - Adult  Goal: Free from fall injury  10/8/2022 0153 by Frederick Palma RN  Outcome: Progressing  Flowsheets (Taken 10/7/2022 2309)  Free From Fall Injury:   Instruct family/caregiver on patient safety   Based on caregiver fall risk screen, instruct family/caregiver to ask for assistance with transferring infant if caregiver noted to have fall risk factors     Problem: ABCDS Injury Assessment  Goal: Absence of physical injury  10/8/2022 0153 by Frederick Palma RN  Outcome: Progressing  Flowsheets (Taken 10/7/2022 2309)  Absence of Physical Injury: Implement safety measures based on patient assessment     Problem: Respiratory - Adult  Goal: Achieves optimal ventilation and oxygenation  10/8/2022 0153 by Tyler Alvarado RN  Outcome: Progressing     Problem: Skin/Tissue Integrity - Adult  Goal: Skin integrity remains intact  10/8/2022 0153 by Tyler Alvarado RN  Outcome: Progressing  Flowsheets  Taken 10/7/2022 2309  Skin Integrity Remains Intact: Monitor for areas of redness and/or skin breakdown  Taken 10/7/2022 2000  Skin Integrity Remains Intact: Monitor for areas of redness and/or skin breakdown     Problem: Skin/Tissue Integrity - Adult  Goal: Incisions, wounds, or drain sites healing without S/S of infection  10/8/2022 0153 by Tyler Alvarado RN  Outcome: Progressing  Flowsheets  Taken 10/7/2022 2309  Incisions, Wounds, or Drain Sites Healing Without Sign and Symptoms of Infection:   TWICE DAILY: Assess and document skin integrity   Implement wound care per orders    Problem: Infection - Adult  Goal: Absence of infection at discharge  10/8/2022 0153 by Tyler Alvarado RN  Outcome: Progressing  Flowsheets (Taken 10/7/2022 2000)  Absence of infection at discharge:   Assess and monitor for signs and symptoms of infection   Monitor lab/diagnostic results    Problem: Infection - Adult  Goal: Absence of infection during hospitalization  10/8/2022 0153 by Tyler Alvarado RN  Outcome: Progressing  Flowsheets (Taken 10/7/2022 2000)  Absence of infection during hospitalization:   Assess and monitor for signs and symptoms of infection   Monitor lab/diagnostic results     Problem: Metabolic/Fluid and Electrolytes - Adult  Goal: Electrolytes maintained within normal limits  10/8/2022 0153 by Tyler Alvarado RN  Outcome: Progressing  Flowsheets (Taken 10/7/2022 2000)  Electrolytes maintained within normal limits:   Monitor labs and assess patient for signs and symptoms of electrolyte imbalances   Administer electrolyte replacement as ordered     Problem: Metabolic/Fluid and Electrolytes - Adult  Goal: Hemodynamic stability and optimal renal function maintained  10/8/2022 0153 by Arabella Leiva RN  Outcome: Progressing  Flowsheets (Taken 10/7/2022 2000)  Hemodynamic stability and optimal renal function maintained: Monitor intake, output and patient weight     Problem: Metabolic/Fluid and Electrolytes - Adult  Goal: Glucose maintained within prescribed range  10/8/2022 0153 by Arabella Leiva RN  Outcome: Progressing  Flowsheets (Taken 10/7/2022 2000)  Glucose maintained within prescribed range:   Monitor blood glucose as ordered   Assess for signs and symptoms of hyperglycemia and hypoglycemia   Administer ordered medications to maintain glucose within target range   Assess barriers to adequate nutritional intake and initiate nutrition consult as needed   Instruct patient on self management of diabetes and initiate consult as needed     Problem: Neurosensory - Adult  Goal: Achieves stable or improved neurological status  10/8/2022 0153 by Arabella Leiva RN  Outcome: Progressing  Flowsheets (Taken 10/7/2022 2000)  Achieves stable or improved neurological status: Assess for and report changes in neurological status     Problem: Pain  Goal: Verbalizes/displays adequate comfort level or baseline comfort level  10/8/2022 0153 by Arabella Leiva RN  Outcome: Progressing     Problem: Nutrition Deficit:  Goal: Optimize nutritional status  10/8/2022 0153 by Arabella Leiva RN  Outcome: Progressing      Problem: Musculoskeletal - Adult  Goal: Return mobility to safest level of function  10/8/2022 0153 by Arabella Leiva RN  Outcome: Progressing  Flowsheets (Taken 10/7/2022 2000)  Return Mobility to Safest Level of Function:   Assess patient stability and activity tolerance for standing, transferring and ambulating with or without assistive devices   Assist with transfers and ambulation using safe patient handling equipment as needed   Ensure adequate protection for wounds/incisions during mobilization   Instruct patient/family in ordered activity level

## 2022-10-08 NOTE — PLAN OF CARE
Problem: Discharge Planning  Goal: Discharge to home or other facility with appropriate resources  10/8/2022 1508 by Maria M Traore RN  Outcome: Progressing  Flowsheets (Taken 10/8/2022 6685)  Discharge to home or other facility with appropriate resources: Identify barriers to discharge with patient and caregiver  10/8/2022 0153 by Clair Ross RN  Outcome: Progressing  Flowsheets (Taken 10/7/2022 2000)  Discharge to home or other facility with appropriate resources:   Arrange for needed discharge resources and transportation as appropriate   Identify barriers to discharge with patient and caregiver   Identify discharge learning needs (meds, wound care, etc)   Refer to discharge planning if patient needs post-hospital services based on physician order or complex needs related to functional status, cognitive ability or social support system     Problem: Chronic Conditions and Co-morbidities  Goal: Patient's chronic conditions and co-morbidity symptoms are monitored and maintained or improved  10/8/2022 1508 by Maria M Traore RN  Outcome: Progressing  Flowsheets (Taken 10/8/2022 0916)  Care Plan - Patient's Chronic Conditions and Co-Morbidity Symptoms are Monitored and Maintained or Improved: Monitor and assess patient's chronic conditions and comorbid symptoms for stability, deterioration, or improvement  10/8/2022 0153 by Clair Ross RN  Outcome: Progressing  Flowsheets (Taken 10/7/2022 2000)  Care Plan - Patient's Chronic Conditions and Co-Morbidity Symptoms are Monitored and Maintained or Improved:   Monitor and assess patient's chronic conditions and comorbid symptoms for stability, deterioration, or improvement   Collaborate with multidisciplinary team to address chronic and comorbid conditions and prevent exacerbation or deterioration   Update acute care plan with appropriate goals if chronic or comorbid symptoms are exacerbated and prevent overall improvement and discharge     Problem: Safety - Adult  Goal: Free from fall injury  10/8/2022 1508 by Sherwin Das RN  Outcome: Progressing  10/8/2022 0153 by Basil Carrera RN  Outcome: Progressing  Flowsheets (Taken 10/7/2022 2309)  Free From Fall Injury:   Instruct family/caregiver on patient safety   Based on caregiver fall risk screen, instruct family/caregiver to ask for assistance with transferring infant if caregiver noted to have fall risk factors     Problem: ABCDS Injury Assessment  Goal: Absence of physical injury  10/8/2022 1508 by Sherwin Das RN  Outcome: Progressing  10/8/2022 0153 by Basil Carrera RN  Outcome: Progressing  Flowsheets (Taken 10/7/2022 2309)  Absence of Physical Injury: Implement safety measures based on patient assessment     Problem: Respiratory - Adult  Goal: Achieves optimal ventilation and oxygenation  10/8/2022 1508 by Sherwin Das RN  Outcome: Progressing  10/8/2022 0153 by Basil Carrera RN  Outcome: Progressing     Problem: Skin/Tissue Integrity - Adult  Goal: Skin integrity remains intact  Recent Flowsheet Documentation  Taken 10/8/2022 0916 by Sherwin Das RN  Skin Integrity Remains Intact: Monitor for areas of redness and/or skin breakdown  10/8/2022 0153 by Basil Carrera RN  Outcome: Progressing  Flowsheets  Taken 10/7/2022 2309  Skin Integrity Remains Intact: Monitor for areas of redness and/or skin breakdown  Taken 10/7/2022 2000  Skin Integrity Remains Intact: Monitor for areas of redness and/or skin breakdown  Goal: Incisions, wounds, or drain sites healing without S/S of infection  10/8/2022 1508 by Sherwin Das RN  Outcome: Progressing  Flowsheets (Taken 10/8/2022 0916)  Incisions, Wounds, or Drain Sites Healing Without Sign and Symptoms of Infection: TWICE DAILY: Assess and document skin integrity  10/8/2022 0153 by Basil Carrera RN  Outcome: Progressing  Flowsheets  Taken 10/7/2022 2309  Incisions, Wounds, or Drain Sites Healing Without Sign and Symptoms of Infection:   TWICE DAILY: Assess and document skin integrity Implement wound care per orders  Taken 10/7/2022 2000  Incisions, Wounds, or Drain Sites Healing Without Sign and Symptoms of Infection:   TWICE DAILY: Assess and document skin integrity   Implement wound care per orders     Problem: Musculoskeletal - Adult  Goal: Return mobility to safest level of function  10/8/2022 1508 by Stalin Spring RN  Outcome: Progressing  Flowsheets (Taken 10/8/2022 0916)  Return Mobility to Safest Level of Function: Assess patient stability and activity tolerance for standing, transferring and ambulating with or without assistive devices  10/8/2022 0153 by Corbin Isaacs RN  Outcome: Progressing  Flowsheets (Taken 10/7/2022 2000)  Return Mobility to Safest Level of Function:   Assess patient stability and activity tolerance for standing, transferring and ambulating with or without assistive devices   Assist with transfers and ambulation using safe patient handling equipment as needed   Ensure adequate protection for wounds/incisions during mobilization   Instruct patient/family in ordered activity level  Goal: Return ADL status to a safe level of function  10/8/2022 1508 by Stalin Spring RN  Outcome: Progressing  Flowsheets (Taken 10/8/2022 0916)  Return ADL Status to a Safe Level of Function: Administer medication as ordered  10/8/2022 0153 by Corbin Isaacs RN  Outcome: Progressing     Problem: Infection - Adult  Goal: Absence of infection at discharge  10/8/2022 1508 by Stalin Spring RN  Outcome: Progressing  10/8/2022 0153 by Corbin Isaacs RN  Outcome: Progressing  Flowsheets (Taken 10/7/2022 2000)  Absence of infection at discharge:   Assess and monitor for signs and symptoms of infection   Monitor lab/diagnostic results  Goal: Absence of infection during hospitalization  10/8/2022 1508 by Stalin Spring RN  Outcome: Progressing  Flowsheets (Taken 10/8/2022 0916)  Absence of infection during hospitalization: Assess and monitor for signs and symptoms of infection  10/8/2022 0153 by Joseph Sol Philip French RN  Outcome: Progressing  Flowsheets (Taken 10/7/2022 2000)  Absence of infection during hospitalization:   Assess and monitor for signs and symptoms of infection   Monitor lab/diagnostic results     Problem: Metabolic/Fluid and Electrolytes - Adult  Goal: Electrolytes maintained within normal limits  10/8/2022 1508 by Nicole Carlson RN  Outcome: Progressing  Flowsheets (Taken 10/8/2022 3184)  Electrolytes maintained within normal limits: Monitor labs and assess patient for signs and symptoms of electrolyte imbalances  10/8/2022 0153 by Kiana Clarke RN  Outcome: Progressing  Flowsheets (Taken 10/7/2022 2000)  Electrolytes maintained within normal limits:   Monitor labs and assess patient for signs and symptoms of electrolyte imbalances   Administer electrolyte replacement as ordered  Goal: Hemodynamic stability and optimal renal function maintained  10/8/2022 1508 by Nicole Carlson RN  Outcome: Progressing  Flowsheets (Taken 10/8/2022 3360)  Hemodynamic stability and optimal renal function maintained: Monitor labs and assess for signs and symptoms of volume excess or deficit  10/8/2022 0153 by Kiana Clarke RN  Outcome: Progressing  Flowsheets (Taken 10/7/2022 2000)  Hemodynamic stability and optimal renal function maintained: Monitor intake, output and patient weight  Goal: Glucose maintained within prescribed range  10/8/2022 1508 by Nicole Carlson RN  Outcome: Progressing  Flowsheets (Taken 10/8/2022 0916)  Glucose maintained within prescribed range: Monitor blood glucose as ordered  10/8/2022 0153 by Kiana Clarke RN  Outcome: Progressing  Flowsheets (Taken 10/7/2022 2000)  Glucose maintained within prescribed range:   Monitor blood glucose as ordered   Assess for signs and symptoms of hyperglycemia and hypoglycemia   Administer ordered medications to maintain glucose within target range   Assess barriers to adequate nutritional intake and initiate nutrition consult as needed   Instruct patient on self management of diabetes and initiate consult as needed     Problem: Neurosensory - Adult  Goal: Achieves stable or improved neurological status  10/8/2022 1508 by Nicole Carlson RN  Outcome: Progressing  Flowsheets (Taken 10/8/2022 0916)  Achieves stable or improved neurological status: Assess for and report changes in neurological status  10/8/2022 0153 by Kiana Clarke RN  Outcome: Progressing  Flowsheets (Taken 10/7/2022 2000)  Achieves stable or improved neurological status: Assess for and report changes in neurological status     Problem: Pain  Goal: Verbalizes/displays adequate comfort level or baseline comfort level  10/8/2022 1508 by Nicole Carlson RN  Outcome: Progressing  10/8/2022 0153 by Kiana Clarke RN  Outcome: Progressing     Problem: Nutrition Deficit:  Goal: Optimize nutritional status  10/8/2022 1508 by Nicole Carlson RN  Outcome: Progressing  10/8/2022 0153 by Kiana Clarke RN  Outcome: Progressing

## 2022-10-08 NOTE — PROGRESS NOTES
[facility]  Office: [address], DO, Peggyjones Aguirre, DO, Alexandre Yoanna, DO, Nat Solis, DO, Guzman Casillas MD, Nidia Walters MD, Maria L Keyes MD, Taj Benjamin MD,  Madisyn Metgzer MD, Ankit Sahu MD, Isidro Salazar, DO, Itzel Zavala MD,  Henok Benito MD, Mariella Hernandez MD, Tadoe Fonseca DO, Chiara Burgess MD, Mike Tejada MD, Donna King MD, Baljeet Segura MD, Keith Washington MD, Joe Barnhart MD, Yair Gallardo DO, Vaishali Vargas MD, Shilpi Hickey MD, Romayne Muscat, CNP,  Alisha Hyman, CNP, Shaka Umanzor, CNP, Chris White, CNP,  Fransico Molina, DNP, Dayday Mckay, CNP, Vamsi Starr, CNP, Andrzej Gamboa, CNP, Hina Fields, CNP, Karena Mancilla, CNP, Shon Daugherty PA-C, Tere Mccall, JUS, Lili Mckay, ISELA, Bernie Mai, KENNEDY, Germania Butler, [facility], Kandi Mercer [facility]    Progress Note    10/8/2022    12:40 PM    Name:   Roel Ash  MRN:     [id number]     [place]:      [de-identified]   Room:   2020/2020-01  IP Day:  4  Admit Date:  10/4/2022  9:10 PM    PCP:   Karen Bull MD  Code Status:  Full Code    Subjective:     C/C: foot infection  Interval History Status: not changed. Feels better today  Denies cp/sob/n/v  Foot feels ok    Brief History:     Per my CANDY:  \"Amol is a 70-year-old male with a unification of medical history of CKD 3, type 2 diabetes and hypertension who follows with Dr. Bruno Triana, who presents for concern of right foot cellulitis and possible osteomyelitis. Patient has a known history of Charcot deformity and has had multiple admissions for right foot infection in the past.  Care has been transferred to Dr. Yadi Farrell (podiatry) for possible Charcot reconstruction.   We have been consulted for assistance with medical management\"    Review of Systems:     Constitutional:  negative for chills, fevers, sweats  Respiratory:  negative for cough, dyspnea on exertion, shortness of breath, wheezing  Cardiovascular:  negative for chest pain, chest pressure/discomfort, palpitations  Gastrointestinal:  negative for abdominal pain, constipation, diarrhea, nausea, vomiting  Neurological:  negative for dizziness, headache    Medications: Allergies:     Allergies   Allergen Reactions    Morphine Itching    Other Other (See Comments)     Other reaction(s): Unknown    Percocet [Oxycodone-Acetaminophen]      Facial swelling       Current Meds:   Scheduled Meds:    cefepime  2,000 mg IntraVENous Q12H    vancomycin (VANCOCIN) intermittent dosing (placeholder)   Other RX Placeholder    enoxaparin  30 mg SubCUTAneous BID    vancomycin  1,250 mg IntraVENous Q24H    insulin glargine  10 Units SubCUTAneous BID    glipiZIDE  20 mg Oral BID AC    cetirizine  10 mg Oral Nightly    nicotine  1 patch TransDERmal Daily    sodium chloride flush  5-40 mL IntraVENous 2 times per day    insulin lispro  0-8 Units SubCUTAneous TID WC    insulin lispro  0-4 Units SubCUTAneous Nightly    gabapentin  900 mg Oral TID    aspirin  81 mg Oral Daily    amLODIPine  5 mg Oral Daily    atorvastatin  20 mg Oral Nightly     Continuous Infusions:    sodium chloride 25 mL/hr at 10/08/22 0812    dextrose       PRN Meds: HYDROcodone 5 mg - acetaminophen **OR** HYDROcodone 5 mg - acetaminophen, diphenhydrAMINE, sodium chloride flush, sodium chloride, ondansetron **OR** ondansetron, polyethylene glycol, acetaminophen **OR** acetaminophen, glucose, dextrose bolus **OR** dextrose bolus, glucagon (rDNA), dextrose, albuterol sulfate HFA    Data:     Past Medical History:   has a past medical history of Abscess of right foot, Acquired hammer toe deformity of lesser toe of right foot, ALEJANDRO (acute kidney injury) ([facility] [address] Cellulitis, Cellulitis of left foot, Cellulitis of right foot, Charcot foot due to diabetes mellitus ([facility] [address] Chest pain at rest, Chronic multifocal osteomyelitis of right foot ([facility] [address] Diabetic polyneuropathy associated with type 2 diabetes mellitus ([facility] [address] Essential hypertension, Fractures, multiple, Hyperlipidemia, Hypertension, Leukocytosis, Neuropathy, Pain in right foot, Pneumonia, Right foot infection, Right foot pain, Tobacco abuse, Type II or unspecified type diabetes mellitus without mention of complication, not stated as uncontrolled, Vertigo, Well controlled type 2 diabetes mellitus with neurological manifestations ([facility] [address] Wound dehiscence, Wound, open, and Wound, open. Social History:   reports that he has been smoking cigarettes. He has been smoking an average of 1 pack per day. He has never used smokeless tobacco. He reports that he does not currently use drugs. He reports that he does not drink alcohol. Family History:   Family History   Problem Relation Age of Onset    Diabetes Mother     Cancer Father     Hypertension Maternal Grandmother        Vitals:  /81   Pulse 66   Temp 99 °F (37.2 °C) (Oral)   Resp 16   Ht 5' 11\" (1.803 m)   Wt 234 lb (106.1 kg)   SpO2 98%   BMI 32.64 kg/m²   Temp (24hrs), Av.5 °F (36.9 °C), Min:97.7 °F (36.5 °C), Max:99 °F (37.2 °C)    Recent Labs     10/07/22  1648 10/07/22  2024 10/08/22  0613 10/08/22  1056   POCGLU 200* 199* 116* 162*       I/O (24Hr): Intake/Output Summary (Last 24 hours) at 10/8/2022 1240  Last data filed at 10/8/2022 0930  Gross per 24 hour   Intake 1043.5 ml   Output 1800 ml   Net -756.5 ml       Labs:  Hematology:  No results for input(s): WBC, RBC, HGB, HCT, MCV, MCH, MCHC, RDW, PLT, MPV, SEDRATE, CRP, INR, DDIMER, [id number], LABABSO in the last 72 hours.     Invalid input(s): PT    Chemistry:  Recent Labs     10/06/22  0644 10/07/22  0612 10/08/22  0603   BUN 18 17 21   CREATININE 1.95* 1.94* 1.94*   LABGLOM 37* 37* 37*     Recent Labs     10/07/22  0615 10/07/22  1112 10/07/22  1648 10/07/22  2024 10/08/22  0613 10/08/22  1056   POCGLU 97 173* 200* 199* 116* 162*     ABG:No results found for: POCPH, PHART, PH, POCPCO2, ATX5CHU, PCO2, POCPO2, PO2ART, PO2, POCHCO3, ZGT4VIS, HCO3, NBEA, PBEA, BEART, BE, THGBART, THB, XGA0JUZ, PPLN9FPT, D1RFGSFY, O2SAT, FIO2  Lab Results   Component Value Date/Time    SPECIAL 10ML 09/13/2022 06:41 PM     Lab Results   Component Value Date/Time    CULTURE PENDING 10/05/2022 08:45 AM       Radiology:  XR FOOT RIGHT (MIN 3 VIEWS)    Result Date: 10/6/2022  Air is seen in the plantar soft tissues of the medial midfoot. Chronic bony changes at the TMT joints compatible with a neuropathic arthropathy. No obvious new bony erosions are seen. MRI ANKLE RIGHT WO CONTRAST    Result Date: 10/6/2022  Soft tissue defect of the medial plantar aspect of the midfoot. Thin fluid-filled sinus tract extending into the deep soft tissues of the midfoot, measuring up to 2.0 x 0.9 cm on the coronal sequence, raising suspicion for a phlegmon/abscess. There is air in the adjacent soft tissues. Susceptibility artifact in the soft tissues adjacent to the soft tissue defect could represent sequela of prior intervention or retained metallic foreign body. No MR evidence of acute osteomyelitis. Chronic bony changes at the TMT joints compatible with a neuropathic arthropathy. Additional chronic findings as described above. MRI FOOT RIGHT WO CONTRAST    Result Date: 10/6/2022  Soft tissue defect of the medial plantar aspect of the midfoot. Thin fluid-filled sinus tract extending into the deep soft tissues of the midfoot, measuring up to 2.0 x 0.9 cm on the coronal sequence, raising suspicion for a phlegmon/abscess. There is air in the adjacent soft tissues. Susceptibility artifact in the soft tissues adjacent to the soft tissue defect could represent sequela of prior intervention or retained metallic foreign body. No MR evidence of acute osteomyelitis. Chronic bony changes at the TMT joints compatible with a neuropathic arthropathy. Additional chronic findings as described above.        Physical Examination:        General appearance:  alert, cooperative and no distress  Mental Status:  oriented to person, place and time and normal affect  Lungs:  clear to auscultation bilaterally, normal effort  Heart:  regular rate and rhythm, no murmur  Abdomen:  soft, nontender, nondistended, normal bowel sounds, no masses, hepatomegaly, splenomegaly  Extremities:  no edema, redness, tenderness in the calves  Skin:  foot bandaged    Assessment:        Hospital Problems             Last Modified POA    * (Principal) Type 2 diabetes mellitus with right diabetic foot ulcer ([facility] [address] 10/4/2022 Yes    Charcot's joint of right foot 10/4/2022 Yes    Stage 3b chronic kidney disease ([facility] [address] (Chronic) 10/5/2022 Yes    Essential hypertension (Chronic) 10/5/2022 Yes       Plan:        [address] Ena Joya to discharge per IM  A1c controlled  Available as needed, will re-eval when requested    Alton Villalpando Blood, DO  10/8/2022  12:40 PM

## 2022-10-08 NOTE — PROGRESS NOTES
[facility] Pharmacokinetic Monitoring Service - Vancomycin    Consulting Provider: Checo Guthrie   Indication: SSTI  Target Concentration: Goal trough of 10-15 mg/L and AUC/DMIA <500 mg*hr/L  Day of Therapy: 4  Additional Antimicrobials: Cefepime    Pertinent Laboratory Values: Wt Readings from Last 1 Encounters:   10/05/22 234 lb (106.1 kg)     Temp Readings from Last 1 Encounters:   10/08/22 99 °F (37.2 °C) (Oral)     Estimated Creatinine Clearance: 46 mL/min (A) (based on SCr of 1.94 mg/dL (H)). Recent Labs     10/07/22  0612 10/08/22  0603   CREATININE 1.94* 1.94*     Pertinent Cultures:  Culture, Anaerobic and Aerobic [[id number]] (Abnormal)    Collected: 10/05/22 0845    Updated: 10/08/22 1301    Specimen Source: Wound     Specimen Description . WOUND . FOOT    Direct Exam RARE NEUTROPHILS Abnormal      MODERATE GRAM POSITIVE RODS Abnormal      FEW GRAM POSITIVE COCCI IN PAIRS Abnormal      FEW GRAM NEGATIVE COCCOBACILLI Abnormal     Culture PREVOTELLA (BACTEROIDES BIVIUS) BIVIA BETA LACTAMASE POSITIVE MODERATE GROWTH Abnormal      NORMAL SKIN ARABELLA     MRSA Nasal Swab: N/A. Non-respiratory infection.     Recent vancomycin administrations                     vancomycin (VANCOCIN) 1,250 mg in dextrose 5 % 250 mL IVPB ()  Restarted 10/08/22 0330     1,250 mg New Bag  0152     1,250 mg New Bag 10/07/22 0158     1,250 mg New Bag 10/06/22 0114                  Plan:  Current dosing regimen is therapeutic  Continue current dose  Pharmacy will continue to monitor patient and adjust therapy as indicated    Thank you for the consult,  CARLOS Guo [facility]  10/8/2022 2:46 PM

## 2022-10-09 LAB
BUN BLDV-MCNC: 23 MG/DL (ref 8–23)
CREAT SERPL-MCNC: 1.93 MG/DL (ref 0.7–1.2)
GFR SERPL CREATININE-BSD FRML MDRD: 38 ML/MIN/1.73M2
GLUCOSE BLD-MCNC: 129 MG/DL (ref 75–110)
GLUCOSE BLD-MCNC: 259 MG/DL (ref 75–110)
GLUCOSE BLD-MCNC: 345 MG/DL (ref 75–110)
GLUCOSE BLD-MCNC: 94 MG/DL (ref 75–110)

## 2022-10-09 PROCEDURE — 82565 ASSAY OF CREATININE: CPT

## 2022-10-09 PROCEDURE — 2580000003 HC RX 258: Performed by: PODIATRIST

## 2022-10-09 PROCEDURE — 84520 ASSAY OF UREA NITROGEN: CPT

## 2022-10-09 PROCEDURE — 6370000000 HC RX 637 (ALT 250 FOR IP): Performed by: INTERNAL MEDICINE

## 2022-10-09 PROCEDURE — 99232 SBSQ HOSP IP/OBS MODERATE 35: CPT | Performed by: INTERNAL MEDICINE

## 2022-10-09 PROCEDURE — 1200000000 HC SEMI PRIVATE

## 2022-10-09 PROCEDURE — 36415 COLL VENOUS BLD VENIPUNCTURE: CPT

## 2022-10-09 PROCEDURE — 6370000000 HC RX 637 (ALT 250 FOR IP): Performed by: NURSE PRACTITIONER

## 2022-10-09 PROCEDURE — 82947 ASSAY GLUCOSE BLOOD QUANT: CPT

## 2022-10-09 PROCEDURE — 6360000002 HC RX W HCPCS: Performed by: PODIATRIST

## 2022-10-09 PROCEDURE — 6370000000 HC RX 637 (ALT 250 FOR IP): Performed by: PODIATRIST

## 2022-10-09 RX ORDER — METRONIDAZOLE 500 MG/1
500 TABLET ORAL EVERY 8 HOURS SCHEDULED
Status: DISCONTINUED | OUTPATIENT
Start: 2022-10-09 | End: 2022-10-18 | Stop reason: HOSPADM

## 2022-10-09 RX ADMIN — HYDROCODONE BITARTRATE AND ACETAMINOPHEN 1 TABLET: 5; 325 TABLET ORAL at 14:37

## 2022-10-09 RX ADMIN — INSULIN LISPRO 4 UNITS: 100 INJECTION, SOLUTION INTRAVENOUS; SUBCUTANEOUS at 12:16

## 2022-10-09 RX ADMIN — INSULIN GLARGINE 10 UNITS: 100 INJECTION, SOLUTION SUBCUTANEOUS at 08:20

## 2022-10-09 RX ADMIN — GABAPENTIN 900 MG: 300 CAPSULE ORAL at 08:20

## 2022-10-09 RX ADMIN — VANCOMYCIN HYDROCHLORIDE 1250 MG: 5 INJECTION, POWDER, LYOPHILIZED, FOR SOLUTION INTRAVENOUS at 02:53

## 2022-10-09 RX ADMIN — GLIPIZIDE 20 MG: 10 TABLET ORAL at 18:01

## 2022-10-09 RX ADMIN — ASPIRIN 81 MG: 81 TABLET, COATED ORAL at 08:20

## 2022-10-09 RX ADMIN — AMLODIPINE BESYLATE 5 MG: 5 TABLET ORAL at 08:21

## 2022-10-09 RX ADMIN — GABAPENTIN 900 MG: 300 CAPSULE ORAL at 21:47

## 2022-10-09 RX ADMIN — INSULIN LISPRO 4 UNITS: 100 INJECTION, SOLUTION INTRAVENOUS; SUBCUTANEOUS at 21:48

## 2022-10-09 RX ADMIN — CETIRIZINE HYDROCHLORIDE 10 MG: 10 TABLET ORAL at 21:47

## 2022-10-09 RX ADMIN — ENOXAPARIN SODIUM 30 MG: 100 INJECTION SUBCUTANEOUS at 08:20

## 2022-10-09 RX ADMIN — SODIUM CHLORIDE, PRESERVATIVE FREE 10 ML: 5 INJECTION INTRAVENOUS at 14:39

## 2022-10-09 RX ADMIN — CEFEPIME 2000 MG: 2 INJECTION, POWDER, FOR SOLUTION INTRAVENOUS at 14:47

## 2022-10-09 RX ADMIN — METRONIDAZOLE 500 MG: 500 TABLET ORAL at 21:47

## 2022-10-09 RX ADMIN — ATORVASTATIN CALCIUM 20 MG: 20 TABLET, FILM COATED ORAL at 21:47

## 2022-10-09 RX ADMIN — HYDROCODONE BITARTRATE AND ACETAMINOPHEN 1 TABLET: 5; 325 TABLET ORAL at 21:48

## 2022-10-09 RX ADMIN — SODIUM CHLORIDE, PRESERVATIVE FREE 10 ML: 5 INJECTION INTRAVENOUS at 08:22

## 2022-10-09 RX ADMIN — GLIPIZIDE 20 MG: 10 TABLET ORAL at 08:27

## 2022-10-09 RX ADMIN — INSULIN GLARGINE 10 UNITS: 100 INJECTION, SOLUTION SUBCUTANEOUS at 21:48

## 2022-10-09 RX ADMIN — GABAPENTIN 900 MG: 300 CAPSULE ORAL at 14:36

## 2022-10-09 RX ADMIN — METRONIDAZOLE 500 MG: 500 TABLET ORAL at 14:36

## 2022-10-09 RX ADMIN — CEFEPIME 2000 MG: 2 INJECTION, POWDER, FOR SOLUTION INTRAVENOUS at 04:27

## 2022-10-09 RX ADMIN — ENOXAPARIN SODIUM 30 MG: 100 INJECTION SUBCUTANEOUS at 21:48

## 2022-10-09 RX ADMIN — SODIUM CHLORIDE 25 ML: 9 INJECTION, SOLUTION INTRAVENOUS at 14:46

## 2022-10-09 ASSESSMENT — ENCOUNTER SYMPTOMS
RESPIRATORY NEGATIVE: 1
GASTROINTESTINAL NEGATIVE: 1

## 2022-10-09 ASSESSMENT — PAIN DESCRIPTION - ORIENTATION
ORIENTATION: RIGHT
ORIENTATION: RIGHT

## 2022-10-09 ASSESSMENT — PAIN DESCRIPTION - LOCATION
LOCATION: FOOT
LOCATION: FOOT

## 2022-10-09 ASSESSMENT — PAIN - FUNCTIONAL ASSESSMENT: PAIN_FUNCTIONAL_ASSESSMENT: PREVENTS OR INTERFERES SOME ACTIVE ACTIVITIES AND ADLS

## 2022-10-09 ASSESSMENT — PAIN DESCRIPTION - DESCRIPTORS
DESCRIPTORS: DISCOMFORT
DESCRIPTORS: SHARP

## 2022-10-09 ASSESSMENT — PAIN SCALES - GENERAL
PAINLEVEL_OUTOF10: 8
PAINLEVEL_OUTOF10: 9

## 2022-10-09 ASSESSMENT — PAIN DESCRIPTION - PAIN TYPE: TYPE: ACUTE PAIN

## 2022-10-09 NOTE — PLAN OF CARE
Problem: Discharge Planning  Goal: Discharge to home or other facility with appropriate resources  10/9/2022 0329 by Izzy Anthony RN  Outcome: Progressing     Problem: Chronic Conditions and Co-morbidities  Goal: Patient's chronic conditions and co-morbidity symptoms are monitored and maintained or improved  10/9/2022 0329 by Izzy Anthony RN  Outcome: Progressing     Problem: Safety - Adult  Goal: Free from fall injury  10/9/2022 0329 by Izzy Anthony RN  Outcome: Progressing     Problem: ABCDS Injury Assessment  Goal: Absence of physical injury  10/9/2022 0329 by Izzy Anthony RN  Outcome: Progressing

## 2022-10-09 NOTE — PROGRESS NOTES
Progress Note  Podiatric Medicine and Surgery     Subjective     CC: right foot wound    Patient seen and examined at bedside. Resting comfortably  Anticipating surgery later this week. HPI :  Luciano Hernandes is a 78 y.o. male seen at [facility] for right foot wound. Patient is known to [address] [place] [address]. Patient has had Charcot deformity to the right foot for a long time. Patient had multiple admission due to right foot infection in the past.  Patient visited Dr. Owen Alarcon 10//2022 and was told to report to the ED to get admitted immediately due to concern of cellulitis and possible osteomyelitis of the right foot.  would like to transfer care to [facility] for possible Charcot reconstruction. Patient denies other pedal complaint. PCP is Connie Mcginnis MD    ROS: Denies N/V/F/C/SOB/CP. Otherwise negative except at stated in the HPI.      Medications:  Scheduled Meds:   metroNIDAZOLE  500 mg Oral 3 times per day    cefepime  2,000 mg IntraVENous Q12H    vancomycin (VANCOCIN) intermittent dosing (placeholder)   Other RX Placeholder    enoxaparin  30 mg SubCUTAneous BID    insulin glargine  10 Units SubCUTAneous BID    glipiZIDE  20 mg Oral BID AC    cetirizine  10 mg Oral Nightly    nicotine  1 patch TransDERmal Daily    sodium chloride flush  5-40 mL IntraVENous 2 times per day    insulin lispro  0-8 Units SubCUTAneous TID WC    insulin lispro  0-4 Units SubCUTAneous Nightly    gabapentin  900 mg Oral TID    aspirin  81 mg Oral Daily    amLODIPine  5 mg Oral Daily    atorvastatin  20 mg Oral Nightly       Continuous Infusions:   sodium chloride Stopped (10/08/22 0920)    dextrose         PRN Meds:HYDROcodone 5 mg - acetaminophen **OR** HYDROcodone 5 mg - acetaminophen, diphenhydrAMINE, sodium chloride flush, sodium chloride, ondansetron **OR** ondansetron, polyethylene glycol, acetaminophen **OR** acetaminophen, glucose, dextrose bolus **OR** dextrose bolus, glucagon (rDNA), dextrose, albuterol sulfate HFA    Objective     Vitals:  Patient Vitals for the past 8 hrs:   BP Temp Temp src Pulse Resp SpO2   10/09/22 0704 -- -- Oral -- -- --   10/09/22 0657 118/77 97.5 °F (36.4 °C) Oral 66 16 94 %       Average, Min, and Max for last 24 hours Vitals:  TEMPERATURE:  Temp  Av °F (36.7 °C)  Min: 97.5 °F (36.4 °C)  Max: 98.4 °F (36.9 °C)    RESPIRATIONS RANGE: Resp  Avg: 15.3  Min: 14  Max: 16    PULSE RANGE: Pulse  Av.7  Min: 66  Max: 72    BLOOD PRESSURE RANGE:  Systolic (38QNG), VINH:232 , Min:118 , GL   ; Diastolic (47BAX), HKH:58, Min:52, Max:77      PULSE OXIMETRY RANGE: SpO2  Av.7 %  Min: 94 %  Max: 96 %    I/O last 3 completed shifts: In: 974.7 [P.O.:480; I.V.:123.2; IV Piggyback:371.5]  Out:  [Urine:2000]    CBC:  No results for input(s): WBC, HGB, HCT, PLT, CRP in the last 72 hours. Invalid input(s):  ESR       BMP:  Recent Labs     10/07/22  0612 10/08/22  0603 10/09/22  0546   BUN 17 21 23   CREATININE 1.94* 1.94* 1.93*        Coags:  No results for input(s): APTT, PROT, INR in the last 72 hours. Lab Results   Component Value Date    SEDRATE 64 (H) 10/04/2022     No results for input(s): CRP in the last 72 hours. Lower Extremity Physical Exam:  General: A&Ox3, NAD  Heart: Regular rate and rhythm. Lungs: Equal air entry. No increased effort. Abdomen: Soft, non-tender to palpation. Not distended. Lower Extremity Physical Exam:  Vascular: DP and PT pulses are palpable +2/4. CFT <3 seconds to all digits. Hair growth is diminished to the level of the digits. Diffuse loss to nonpitting edema noted to the right lower extremity. Neuro: Saph/sural/SP/DP/plantar sensation diminished to light touch. Musculoskeletal: Muscle strength is 5/5 to all lower extremity muscle groups. Gross deformity is Charcot deformity to the right foot     Dermatologic: Full thickness ulcer #1 located right first interspace and measures approximately 1 cm x 1 cm x 3 cm. Base is fibrotic. Periwound skin is macerated. Purulent drainage noted with no associated mal odor. Erythema noted to periwound with moderate associated increase in warmth. Does not probe to bone probe deep. Does not sinus track, or undermine. No fluctuance, crepitus, or induration. Full thickness ulcer #2 located to the medial plantar arch of the right foot. It is measured as 1.5 x 1.2 x 0.2 cm. Base is fibrotic. Periwound skin is hyperkeratotic.  o drainage with no odor. No erythema noted. Does not probe to bone, sinus tract, or undermine. No fluctuance, crepitus, or induration. Clinical Images:            Imaging:   VL Lower Extremity Venous Right   Final Result      XR FOOT RIGHT (MIN 3 VIEWS)   Final Result   Air is seen in the plantar soft tissues of the medial midfoot. Chronic bony changes at the TMT joints compatible with a neuropathic   arthropathy. No obvious new bony erosions are seen. MRI ANKLE RIGHT WO CONTRAST   Final Result   Soft tissue defect of the medial plantar aspect of the midfoot. Thin   fluid-filled sinus tract extending into the deep soft tissues of the midfoot,   measuring up to 2.0 x 0.9 cm on the coronal sequence, raising suspicion for a   phlegmon/abscess. There is air in the adjacent soft tissues. Susceptibility artifact in the soft tissues adjacent to the soft tissue   defect could represent sequela of prior intervention or retained metallic   foreign body. No MR evidence of acute osteomyelitis. Chronic bony changes at the TMT joints compatible with a neuropathic   arthropathy. Additional chronic findings as described above. MRI FOOT RIGHT WO CONTRAST   Final Result   Soft tissue defect of the medial plantar aspect of the midfoot. Thin   fluid-filled sinus tract extending into the deep soft tissues of the midfoot,   measuring up to 2.0 x 0.9 cm on the coronal sequence, raising suspicion for a   phlegmon/abscess.   There is air in the adjacent soft tissues. Susceptibility artifact in the soft tissues adjacent to the soft tissue   defect could represent sequela of prior intervention or retained metallic   foreign body. No MR evidence of acute osteomyelitis. Chronic bony changes at the TMT joints compatible with a neuropathic   arthropathy. Additional chronic findings as described above. IR INSERT PICC VAD W SQ PORT >5 YEARS    (Results Pending)       Cultures: moderate GPR, few GPC, few gram - coccobacilli      Assessment   Bhavna Campos is a 78 y.o. male with   Cellulitis, right foot-IMPROVING  Abscess, right foot  Type II diabetic foot ulcer down to subcutaneous layer, right foot  Charcot deformity, right foot  Type 2 diabetes with peripheral neuropathy  Hypertension    Principal Problem:    Type 2 diabetes mellitus with right diabetic foot ulcer (HCC)  Active Problems:    Charcot's joint of right foot    Stage 3b chronic kidney disease ([facility] [address]    Essential hypertension  Resolved Problems:    * No resolved hospital problems. *       Plan     Patient examined and evaluated at bedside with Dr. Lucia Mancilla  Treatment options discussed in detail with the patient  X-ray from 10/4/2022 was reviewed: There is no soft tissue emphysema or acute osseous deformity noted. Significant periosteal changes noted to the midfoot indicating midfoot Charcot deformity. MRI of the foot and ankle : abscess noted to the midfoot. No OM. IV antibiotic: Vancomycin/cefepime. ID recommendation appreciated. Medical management per medicine. Appreciate recommendation. Due to improvement in clinical sign of infection, we will proceed with Charcot reconstruction stage consisting of midfoot osteotomy, tendoAchilles lengthening and application of external fixator with Dr. Lucia Mancilla.  Surgery planned for 10/12/22  Will continue to monitor wound for clinical signs of infection  Dressing applied to Right foot: Wet-to-dry Betadine, DSD and Ace bandage  Discussed with Dr. Rui Bill    DVT ppx: lovenox  and aspirin   Diet: carb control   Activity: Weightbearing as tolerated to Right lower extremity  Pain Control: Norco panel    Gustavo Lynne DPM   Podiatric Medicine & Surgery   10/9/2022 at 2:40 PM

## 2022-10-09 NOTE — PLAN OF CARE
Problem: Discharge Planning  Goal: Discharge to home or other facility with appropriate resources  Outcome: Progressing  Flowsheets (Taken 10/9/2022 0825)  Discharge to home or other facility with appropriate resources:   Identify barriers to discharge with patient and caregiver   Arrange for needed discharge resources and transportation as appropriate   Identify discharge learning needs (meds, wound care, etc)   Refer to discharge planning if patient needs post-hospital services based on physician order or complex needs related to functional status, cognitive ability or social support system     Problem: Chronic Conditions and Co-morbidities  Goal: Patient's chronic conditions and co-morbidity symptoms are monitored and maintained or improved  Outcome: Progressing  Flowsheets (Taken 10/9/2022 0825)  Care Plan - Patient's Chronic Conditions and Co-Morbidity Symptoms are Monitored and Maintained or Improved:   Collaborate with multidisciplinary team to address chronic and comorbid conditions and prevent exacerbation or deterioration   Update acute care plan with appropriate goals if chronic or comorbid symptoms are exacerbated and prevent overall improvement and discharge   Monitor and assess patient's chronic conditions and comorbid symptoms for stability, deterioration, or improvement     Problem: Safety - Adult  Goal: Free from fall injury  Outcome: Progressing     Problem: Respiratory - Adult  Goal: Achieves optimal ventilation and oxygenation  Outcome: Progressing  Flowsheets (Taken 10/9/2022 0825)  Achieves optimal ventilation and oxygenation:   Assess for changes in respiratory status   Assess for changes in mentation and behavior   Assess the need for suctioning and aspirate as needed   Assess and instruct to report shortness of breath or any respiratory difficulty     Problem: Skin/Tissue Integrity - Adult  Goal: Skin integrity remains intact  Outcome: Progressing  Flowsheets (Taken 10/9/2022 1911)  Skin Integrity Remains Intact: Monitor for areas of redness and/or skin breakdown  Goal: Incisions, wounds, or drain sites healing without S/S of infection  Outcome: Progressing  Flowsheets (Taken 10/9/2022 1911)  Incisions, Wounds, or Drain Sites Healing Without Sign and Symptoms of Infection:   TWICE DAILY: Assess and document skin integrity   TWICE DAILY: Assess and document dressing/incision, wound bed, drain sites and surrounding tissue   Initiate isolation precautions as appropriate     Problem: Musculoskeletal - Adult  Goal: Return mobility to safest level of function  Outcome: Progressing  Flowsheets (Taken 10/9/2022 0825)  Return Mobility to Safest Level of Function:   Assess patient stability and activity tolerance for standing, transferring and ambulating with or without assistive devices   Assist with transfers and ambulation using safe patient handling equipment as needed   Ensure adequate protection for wounds/incisions during mobilization   Instruct patient/family in ordered activity level  Goal: Return ADL status to a safe level of function  Outcome: Progressing  Flowsheets (Taken 10/9/2022 0825)  Return ADL Status to a Safe Level of Function:   Administer medication as ordered   Assess activities of daily living deficits and provide assistive devices as needed   Assist and instruct patient to increase activity and self care as tolerated     Problem: Infection - Adult  Goal: Absence of infection at discharge  Outcome: Progressing  Flowsheets (Taken 10/9/2022 0825)  Absence of infection at discharge:   Assess and monitor for signs and symptoms of infection   Monitor lab/diagnostic results   Monitor all insertion sites i.e., indwelling lines, tubes and drains   Administer medications as ordered   Instruct and encourage patient and family to use good hand hygiene technique   Identify and instruct in appropriate isolation precautions for identified infection/condition  Goal: Absence of infection during hospitalization  Outcome: Progressing  Flowsheets (Taken 10/9/2022 0825)  Absence of infection during hospitalization:   Assess and monitor for signs and symptoms of infection   Monitor lab/diagnostic results   Monitor all insertion sites i.e., indwelling lines, tubes and drains   Administer medications as ordered   Instruct and encourage patient and family to use good hand hygiene technique   Identify and instruct in appropriate isolation precautions for identified infection/condition     Problem: Metabolic/Fluid and Electrolytes - Adult  Goal: Electrolytes maintained within normal limits  Outcome: Progressing  Flowsheets (Taken 10/9/2022 0825)  Electrolytes maintained within normal limits: Monitor labs and assess patient for signs and symptoms of electrolyte imbalances  Goal: Hemodynamic stability and optimal renal function maintained  Outcome: Progressing  Flowsheets (Taken 10/9/2022 0825)  Hemodynamic stability and optimal renal function maintained:   Monitor labs and assess for signs and symptoms of volume excess or deficit   Monitor intake, output and patient weight   Monitor urine specific gravity, serum osmolarity and serum sodium as indicated or ordered  Goal: Glucose maintained within prescribed range  Outcome: Progressing  Flowsheets (Taken 10/9/2022 0825)  Glucose maintained within prescribed range:   Monitor blood glucose as ordered   Assess for signs and symptoms of hyperglycemia and hypoglycemia   Administer ordered medications to maintain glucose within target range   Assess barriers to adequate nutritional intake and initiate nutrition consult as needed   Instruct patient on self management of diabetes and initiate consult as needed     Problem: Pain  Goal: Verbalizes/displays adequate comfort level or baseline comfort level  Outcome: Progressing  Flowsheets (Taken 10/9/2022 1944)  Verbalizes/displays adequate comfort level or baseline comfort level:   Assess pain using appropriate pain scale Administer analgesics based on type and severity of pain and evaluate response   Encourage patient to monitor pain and request assistance   Implement non-pharmacological measures as appropriate and evaluate response   Consider cultural and social influences on pain and pain management   Notify Licensed Independent Practitioner if interventions unsuccessful or patient reports new pain  Note: Pain level assessment complete. Patient educated on pain scale and control interventions  PRN pain medication given per patient request-Norco  Patient instructed to call out with new onset of pain or unrelieved pain     Problem: Neurosensory - Adult  Goal: Achieves stable or improved neurological status  Outcome: Progressing  Flowsheets (Taken 10/9/2022 1456)  Achieves stable or improved neurological status:   Assess for and report changes in neurological status   Monitor temperature, glucose, and sodium.  Initiate appropriate interventions as ordered     Problem: Nutrition Deficit:  Goal: Optimize nutritional status  Outcome: Progressing

## 2022-10-09 NOTE — PROGRESS NOTES
[facility]  Progress Note    Radha Benítez  MRN: [id number]  Date: 10/9/2022  LOS: 5     Reason for F/U :   Diabetic foot infection/abscess    Impression :   Right Charcot foot with longstanding history of infections/abscesses has undergone multiple I&D procedures in the past  Chronic surgical wounds on the plantar aspect of the foot  Recurrent deep midfoot soft tissue abscess with thin fluid-filled sinus tract noted in the medial plantar aspect of the foot and there is retained metallic foreign body. Recommendations: The concern at this point in time is that the patient has residual infection/abscess in the foot and there is concern for osteomyelitis as well  Continue intravenous antibiotic therapy with cefepime and vancomycin for now  Add metronidazole for the anaerobe isolated in the culture today  The plan is for the patient to stay hospitalized until surgery next week on Wednesday    Infection Control Recommendations:   Universal precautions    Discharge Planning:   Estimated Length of IV antimicrobials: 6 weeks  Patient will need Midline PICC line Insertion  Patient will need: Home IV , [place],  SNF,  LTAC: Undetermined  Patient willneed outpatient wound care: No    Medical Decision making / Summary of Stay:   Radha Benítez is a 78y.o.-year-old male who was initially admitted on 10/4/2022. Abel Doss has a history of a right sided Charcot foot deformity and has had a longstanding history with infections in the right foot. He did have an abscess for which he underwent I&D in June 2020 with MSSA isolated and the patient has had a residual wound which secondarily got infected and caused an abscess and in September 2021 he underwent an I&D of the abscess with MSSA, Enterobacter, Morganella isolated and he was discharged with IV antimicrobial therapy which he took for 6 weeks through October 22 and 2021.      The patient did continue to have issues with wound dehiscence and had an open wound on the plantar aspect of the foot which was clean. Seeing the patient in close to a year now and he has followed with the podiatrist and has had continued wound care and the wound has been improving with time. Patient has had a plantar foot midfoot ulceration. Patient did subsequently start to develop soft tissue swelling redness in his foot and the swelling started to extend into his leg and the patient did see Dr. Isatu Lagunas who recommended that he come into the emergency room for evaluation. The patient has been admitted for an abscess in the right foot and chronic ulcerations on the plantar aspect of the foot and the patient is undergoing further work-up with MRI and consideration for an external fixator device. Was asked to evaluate and help with antibiotic choice. Current evaluation:10/9/2022    /77   Pulse 66   Temp 97.5 °F (36.4 °C) (Oral)   Resp 16   Ht 5' 11\" (1.803 m)   Wt 234 lb (106.1 kg)   SpO2 94%   BMI 32.64 kg/m²     Temperature Range: Temp: 97.5 °F (36.4 °C) Temp  Av.2 °F (36.8 °C)  Min: 97.5 °F (36.4 °C)  Max: 99 °F (37.2 °C)  The patient is seen and evaluated at bedside and he is awake and alert in no acute distress. Is sitting up at the bedside does not report any acute issues or concerns today. He continues to tolerate antimicrobial therapy well. Review of Systems   Constitutional: Negative. HENT: Negative. Respiratory: Negative. Cardiovascular: Negative. Gastrointestinal: Negative. Genitourinary: Negative. Musculoskeletal: Negative. Skin:  Positive for wound. Neurological: Negative. Psychiatric/Behavioral: Negative. Physical Examination :     Physical Exam  Constitutional:       Appearance: He is well-developed. HENT:      Head: Normocephalic and atraumatic. Cardiovascular:      Rate and Rhythm: Normal rate. Heart sounds: Normal heart sounds. No friction rub. No gallop.    Pulmonary:      Effort: Pulmonary effort is normal.      Breath sounds: Normal breath sounds. No wheezing. Abdominal:      General: Bowel sounds are normal.      Palpations: Abdomen is soft. There is no mass. Tenderness: There is no abdominal tenderness. Musculoskeletal:         General: Normal range of motion. Cervical back: Normal range of motion and neck supple. Lymphadenopathy:      Cervical: No cervical adenopathy. Skin:     General: Skin is warm and dry. Comments: The foot dressing was not removed. Neurological:      Mental Status: He is alert and oriented to person, place, and time. Laboratory data:   I have independently reviewed the followinglabs:  CBC with Differential:   No results for input(s): WBC, HGB, HCT, PLT, SEGSPCT, BANDSPCT, LYMPHOPCT, MONOPCT, EOSPCT in the last 72 hours. BMP:   Recent Labs     10/08/22  0603 10/09/22  0546   BUN 21 23   CREATININE 1.94* 1.93*       Hepatic Function Panel: No results for input(s): PROT, LABALBU, BILIDIR, IBILI, BILITOT, ALKPHOS, ALT, AST in the last 72 hours. No results found for: PROCAL  Lab Results   Component Value Date/Time    CRP 30.1 10/04/2022 10:18 PM    .3 09/13/2022 06:41 PM    .8 09/13/2021 11:04 AM     Lab Results   Component Value Date    SEDRATE 64 (H) 10/04/2022         No results found for: DDIMER  No results found for: FERRITIN  No results found for: LDH  No results found for: FIBRINOGEN    No results found for requested labs within last 30 days. Lab Results   Component Value Date/Time    COVID19 DETECTED 05/26/2021 12:06 PM    COVID19 Not Detected 12/07/2020 03:10 PM    COVID19 Not Detected 08/08/2020 10:02 PM       No results for input(s): VANCOTROUGH in the last 72 hours. Imaging Studies:     MRI OF THE RIGHT ANKLE WITHOUT CONTRAST; MRI OF THE RIGHT FOOT WITHOUT   CONTRAST, 10/5/2022 11:03 am     Impression   Soft tissue defect of the medial plantar aspect of the midfoot.   Thin   fluid-filled sinus tract extending into the deep soft tissues of the midfoot,   measuring up to 2.0 x 0.9 cm on the coronal sequence, raising suspicion for a   phlegmon/abscess. There is air in the adjacent soft tissues. Susceptibility artifact in the soft tissues adjacent to the soft tissue   defect could represent sequela of prior intervention or retained metallic   foreign body. No MR evidence of acute osteomyelitis. Chronic bony changes at the TMT joints compatible with a neuropathic   arthropathy. Additional chronic findings as described above. Cultures:     Culture, Anaerobic and Aerobic [[id number]] (Abnormal) Collected: 10/05/22 0802   Order Status: Completed Specimen: Wound Updated: 10/08/22 1301    Specimen Description . WOUND . FOOT    Direct Exam RARE NEUTROPHILS Abnormal      MODERATE GRAM POSITIVE RODS Abnormal      FEW GRAM POSITIVE COCCI IN PAIRS Abnormal      FEW GRAM NEGATIVE COCCOBACILLI Abnormal     Culture PREVOTELLA (BACTEROIDES BIVIUS) BIVIA BETA LACTAMASE POSITIVE MODERATE GROWTH Abnormal      NORMAL SKIN ARABELLA     Medications:      cefepime  2,000 mg IntraVENous Q12H    vancomycin (VANCOCIN) intermittent dosing (placeholder)   Other RX Placeholder    enoxaparin  30 mg SubCUTAneous BID    insulin glargine  10 Units SubCUTAneous BID    glipiZIDE  20 mg Oral BID AC    cetirizine  10 mg Oral Nightly    nicotine  1 patch TransDERmal Daily    sodium chloride flush  5-40 mL IntraVENous 2 times per day    insulin lispro  0-8 Units SubCUTAneous TID WC    insulin lispro  0-4 Units SubCUTAneous Nightly    gabapentin  900 mg Oral TID    aspirin  81 mg Oral Daily    amLODIPine  5 mg Oral Daily    atorvastatin  20 mg Oral Nightly           [facility]  Bebe Logan MD  [employer] messaging  OFFICE: [phone]      Electronically signed by Bebe Logan MD on 10/9/2022 at 11:13 AM  Thank you for allowing us to participate in the care of this patient.  Please call with questions. This note iscreated with the assistance of a speech recognition program.  While intending to generate a document that actually reflects the content of the visit, the document can still have some errors including those of syntax andsound a like substitutions which may escape proof reading. In such instances, actual meaning can be extrapolated by contextual diversion.

## 2022-10-10 ENCOUNTER — APPOINTMENT (OUTPATIENT)
Dept: INTERVENTIONAL RADIOLOGY/VASCULAR | Age: 67
DRG: 982 | End: 2022-10-10
Attending: PODIATRIST
Payer: MEDICARE

## 2022-10-10 LAB
BUN BLDV-MCNC: 26 MG/DL (ref 8–23)
CREAT SERPL-MCNC: 1.97 MG/DL (ref 0.7–1.2)
GFR SERPL CREATININE-BSD FRML MDRD: 37 ML/MIN/1.73M2
GLUCOSE BLD-MCNC: 108 MG/DL (ref 75–110)
GLUCOSE BLD-MCNC: 119 MG/DL (ref 75–110)
GLUCOSE BLD-MCNC: 216 MG/DL (ref 75–110)
GLUCOSE BLD-MCNC: 294 MG/DL (ref 75–110)

## 2022-10-10 PROCEDURE — 6370000000 HC RX 637 (ALT 250 FOR IP): Performed by: PODIATRIST

## 2022-10-10 PROCEDURE — 82947 ASSAY GLUCOSE BLOOD QUANT: CPT

## 2022-10-10 PROCEDURE — 6360000002 HC RX W HCPCS: Performed by: INTERNAL MEDICINE

## 2022-10-10 PROCEDURE — 6370000000 HC RX 637 (ALT 250 FOR IP): Performed by: NURSE PRACTITIONER

## 2022-10-10 PROCEDURE — 2580000003 HC RX 258: Performed by: PODIATRIST

## 2022-10-10 PROCEDURE — C1751 CATH, INF, PER/CENT/MIDLINE: HCPCS

## 2022-10-10 PROCEDURE — 1200000000 HC SEMI PRIVATE

## 2022-10-10 PROCEDURE — 6360000002 HC RX W HCPCS: Performed by: PODIATRIST

## 2022-10-10 PROCEDURE — 84520 ASSAY OF UREA NITROGEN: CPT

## 2022-10-10 PROCEDURE — 2580000003 HC RX 258: Performed by: INTERNAL MEDICINE

## 2022-10-10 PROCEDURE — 82565 ASSAY OF CREATININE: CPT

## 2022-10-10 PROCEDURE — 6370000000 HC RX 637 (ALT 250 FOR IP): Performed by: INTERNAL MEDICINE

## 2022-10-10 PROCEDURE — 36415 COLL VENOUS BLD VENIPUNCTURE: CPT

## 2022-10-10 RX ADMIN — GLIPIZIDE 20 MG: 10 TABLET ORAL at 06:31

## 2022-10-10 RX ADMIN — CETIRIZINE HYDROCHLORIDE 10 MG: 10 TABLET ORAL at 21:48

## 2022-10-10 RX ADMIN — GABAPENTIN 900 MG: 300 CAPSULE ORAL at 21:47

## 2022-10-10 RX ADMIN — INSULIN LISPRO 2 UNITS: 100 INJECTION, SOLUTION INTRAVENOUS; SUBCUTANEOUS at 12:52

## 2022-10-10 RX ADMIN — GABAPENTIN 900 MG: 300 CAPSULE ORAL at 08:46

## 2022-10-10 RX ADMIN — ATORVASTATIN CALCIUM 20 MG: 20 TABLET, FILM COATED ORAL at 21:48

## 2022-10-10 RX ADMIN — VANCOMYCIN HYDROCHLORIDE 1250 MG: 5 INJECTION, POWDER, LYOPHILIZED, FOR SOLUTION INTRAVENOUS at 12:57

## 2022-10-10 RX ADMIN — GLIPIZIDE 20 MG: 10 TABLET ORAL at 17:13

## 2022-10-10 RX ADMIN — SODIUM CHLORIDE, PRESERVATIVE FREE 10 ML: 5 INJECTION INTRAVENOUS at 08:46

## 2022-10-10 RX ADMIN — CEFEPIME 2000 MG: 2 INJECTION, POWDER, FOR SOLUTION INTRAVENOUS at 18:26

## 2022-10-10 RX ADMIN — INSULIN GLARGINE 10 UNITS: 100 INJECTION, SOLUTION SUBCUTANEOUS at 21:48

## 2022-10-10 RX ADMIN — HYDROCODONE BITARTRATE AND ACETAMINOPHEN 1 TABLET: 5; 325 TABLET ORAL at 11:43

## 2022-10-10 RX ADMIN — METRONIDAZOLE 500 MG: 500 TABLET ORAL at 14:22

## 2022-10-10 RX ADMIN — ASPIRIN 81 MG: 81 TABLET, COATED ORAL at 08:46

## 2022-10-10 RX ADMIN — ENOXAPARIN SODIUM 30 MG: 100 INJECTION SUBCUTANEOUS at 08:45

## 2022-10-10 RX ADMIN — SODIUM CHLORIDE, PRESERVATIVE FREE 10 ML: 5 INJECTION INTRAVENOUS at 12:53

## 2022-10-10 RX ADMIN — ENOXAPARIN SODIUM 30 MG: 100 INJECTION SUBCUTANEOUS at 21:48

## 2022-10-10 RX ADMIN — METRONIDAZOLE 500 MG: 500 TABLET ORAL at 21:46

## 2022-10-10 RX ADMIN — INSULIN GLARGINE 10 UNITS: 100 INJECTION, SOLUTION SUBCUTANEOUS at 08:45

## 2022-10-10 RX ADMIN — SODIUM CHLORIDE 25 ML: 9 INJECTION, SOLUTION INTRAVENOUS at 12:55

## 2022-10-10 RX ADMIN — CEFEPIME 2000 MG: 2 INJECTION, POWDER, FOR SOLUTION INTRAVENOUS at 05:38

## 2022-10-10 RX ADMIN — GABAPENTIN 900 MG: 300 CAPSULE ORAL at 14:22

## 2022-10-10 RX ADMIN — AMLODIPINE BESYLATE 5 MG: 5 TABLET ORAL at 08:46

## 2022-10-10 RX ADMIN — HYDROCODONE BITARTRATE AND ACETAMINOPHEN 1 TABLET: 5; 325 TABLET ORAL at 21:47

## 2022-10-10 RX ADMIN — METRONIDAZOLE 500 MG: 500 TABLET ORAL at 05:37

## 2022-10-10 RX ADMIN — SODIUM CHLORIDE 25 ML: 9 INJECTION, SOLUTION INTRAVENOUS at 18:25

## 2022-10-10 ASSESSMENT — PAIN SCALES - GENERAL
PAINLEVEL_OUTOF10: 8
PAINLEVEL_OUTOF10: 9
PAINLEVEL_OUTOF10: 9

## 2022-10-10 ASSESSMENT — PAIN DESCRIPTION - LOCATION: LOCATION: FOOT

## 2022-10-10 ASSESSMENT — PAIN - FUNCTIONAL ASSESSMENT: PAIN_FUNCTIONAL_ASSESSMENT: PREVENTS OR INTERFERES SOME ACTIVE ACTIVITIES AND ADLS

## 2022-10-10 ASSESSMENT — PAIN DESCRIPTION - ORIENTATION: ORIENTATION: RIGHT

## 2022-10-10 ASSESSMENT — PAIN DESCRIPTION - DESCRIPTORS: DESCRIPTORS: PATIENT UNABLE TO DESCRIBE

## 2022-10-10 NOTE — PROGRESS NOTES
Physical Therapy  DATE: 10/10/2022    NAME: Roel Ash  MRN: [id number]   : 1955    Patient not seen this date for Physical Therapy due to:      [] Cancel by RN or physician due to:    [] Hemodialysis    [] Critical Lab Value Level     [] Blood transfusion in progress    [] Acute or unstable cardiovascular status   _MAP < 55 or more than >115  _HR < 40 or > 130    [] Acute or unstable pulmonary status   -FiO2 > 60%   _RR < 5 or >40    _O2 sats < 85%    [] Strict Bedrest    [] Off Unit for surgery or procedure    [] Off Unit for testing       [] Pending imaging to R/O fracture    [x] Refusal by Patient  Pt reports he has no interest in diabetic activity/ exercise education or on improved healing (ie. He will continue to smoke, he will continue to bear weight, he will not exercise, he will not change his diet). Will discontinue PT at this time. [] Other      [] PT being discontinued at this time. Patient independent. No further needs. [] PT being discontinued at this time as the patient has been transferred to hospice care. No further needs.       Medardo Love, PT

## 2022-10-10 NOTE — PROGRESS NOTES
DATE: 10/10/2022    NAME: Josephine Gay  MRN: [id number]   : 1955    Patient not seen this date for Occupational Therapy due to:      [] Cancel by RN or physician due to:    [] Hemodialysis    [] Critical Lab Value Level     [] Blood transfusion in progress    [] Acute or unstable cardiovascular status   _MAP < 55 or more than >115  _HR < 40 or > 130    [] Acute or unstable pulmonary status   -FiO2 > 60%   _RR < 5 or >40    _O2 sats < 85%    [] Strict Bedrest    [] Off Unit for surgery or procedure    [] Off Unit for testing       [] Pending imaging to R/O fracture    [x] Refusal by Patient : Pt. Declined treatment. \"I don't need therapy\". D/C OT at this time, pt. Independent. [] Other      [x] OT being discontinued at this time. Patient independent. No further needs. [] OT being discontinued at this time as the patient has been transferred to hospice care. No further needs.       Dami Echevarria JOVANY

## 2022-10-10 NOTE — PLAN OF CARE
Problem: Discharge Planning  Goal: Discharge to home or other facility with appropriate resources  10/10/2022 0451 by Nelson Cantu RN  Outcome: Progressing     Problem: Chronic Conditions and Co-morbidities  Goal: Patient's chronic conditions and co-morbidity symptoms are monitored and maintained or improved  10/10/2022 0451 by Nelson Cantu RN  Outcome: Progressing     Problem: Safety - Adult  Goal: Free from fall injury  10/10/2022 0451 by Nelson Cantu RN  Outcome: Progressing     Problem: ABCDS Injury Assessment  Goal: Absence of physical injury  10/10/2022 0451 by Nelson Cantu RN  Outcome: Progressing

## 2022-10-10 NOTE — PROGRESS NOTES
[address] [facility] Pharmacokinetic Monitoring Service - Vancomycin    Consulting Provider: Jazz Keating   Indication: SSTI  Target Concentration: Goal trough of 10-15 mg/L and AUC/DIMA <500 mg*hr/L  Day of Therapy: 6  Additional Antimicrobials: Cefepime    Pertinent Laboratory Values: Wt Readings from Last 1 Encounters:   10/05/22 234 lb (106.1 kg)     Temp Readings from Last 1 Encounters:   10/09/22 97.5 °F (36.4 °C) (Oral)     Estimated Creatinine Clearance: 47 mL/min (A) (based on SCr of 1.93 mg/dL (H)). Recent Labs     10/09/22  0546 10/10/22  0625   CREATININE 1.93* 1.97*     Pertinent Cultures:        Culture, Anaerobic and Aerobic [[id number]] (Abnormal)     Collected: 10/05/22 0845     Updated: 10/08/22 1301     Specimen Source: Wound       Specimen Description . WOUND . FOOT     Direct Exam RARE NEUTROPHILS Abnormal        MODERATE GRAM POSITIVE RODS Abnormal        FEW GRAM POSITIVE COCCI IN PAIRS Abnormal        FEW GRAM NEGATIVE COCCOBACILLI Abnormal      Culture PREVOTELLA (BACTEROIDES BIVIUS) BIVIA BETA LACTAMASE POSITIVE MODERATE GROWTH Abnormal        NORMAL SKIN ARABELLA      MRSA Nasal Swab: N/A. Non-respiratory infection.       Recent vancomycin administrations                     vancomycin (VANCOCIN) 1,250 mg in dextrose 5 % 250 mL IVPB (mg) 1,250 mg New Bag 10/09/22 0253      Restarted 10/08/22 0330     1,250 mg New Bag  0152     1,250 mg New Bag 10/07/22 0158                  Plan:  Current dosing regimen is therapeutic  Continue current dose  Pharmacy will continue to monitor patient and adjust therapy as indicated    Thank you for the consult,  Angela Felder, [facility]  10/10/2022 11:56 AM

## 2022-10-11 ENCOUNTER — ANESTHESIA EVENT (OUTPATIENT)
Dept: OPERATING ROOM | Age: 67
DRG: 982 | End: 2022-10-11
Payer: MEDICARE

## 2022-10-11 LAB
GLUCOSE BLD-MCNC: 155 MG/DL (ref 75–110)
GLUCOSE BLD-MCNC: 212 MG/DL (ref 75–110)
GLUCOSE BLD-MCNC: 214 MG/DL (ref 75–110)
GLUCOSE BLD-MCNC: 216 MG/DL (ref 75–110)

## 2022-10-11 PROCEDURE — 6370000000 HC RX 637 (ALT 250 FOR IP): Performed by: INTERNAL MEDICINE

## 2022-10-11 PROCEDURE — 2580000003 HC RX 258: Performed by: INTERNAL MEDICINE

## 2022-10-11 PROCEDURE — 1200000000 HC SEMI PRIVATE

## 2022-10-11 PROCEDURE — 6370000000 HC RX 637 (ALT 250 FOR IP): Performed by: NURSE PRACTITIONER

## 2022-10-11 PROCEDURE — 6370000000 HC RX 637 (ALT 250 FOR IP): Performed by: PODIATRIST

## 2022-10-11 PROCEDURE — 6360000002 HC RX W HCPCS: Performed by: INTERNAL MEDICINE

## 2022-10-11 PROCEDURE — 99232 SBSQ HOSP IP/OBS MODERATE 35: CPT | Performed by: INTERNAL MEDICINE

## 2022-10-11 PROCEDURE — 6360000002 HC RX W HCPCS: Performed by: PODIATRIST

## 2022-10-11 PROCEDURE — 2580000003 HC RX 258: Performed by: PODIATRIST

## 2022-10-11 PROCEDURE — 82947 ASSAY GLUCOSE BLOOD QUANT: CPT

## 2022-10-11 RX ADMIN — CEFEPIME 2000 MG: 2 INJECTION, POWDER, FOR SOLUTION INTRAVENOUS at 05:32

## 2022-10-11 RX ADMIN — GABAPENTIN 900 MG: 300 CAPSULE ORAL at 23:03

## 2022-10-11 RX ADMIN — VANCOMYCIN HYDROCHLORIDE 1250 MG: 5 INJECTION, POWDER, LYOPHILIZED, FOR SOLUTION INTRAVENOUS at 13:20

## 2022-10-11 RX ADMIN — HYDROCODONE BITARTRATE AND ACETAMINOPHEN 1 TABLET: 5; 325 TABLET ORAL at 09:34

## 2022-10-11 RX ADMIN — INSULIN LISPRO 2 UNITS: 100 INJECTION, SOLUTION INTRAVENOUS; SUBCUTANEOUS at 17:32

## 2022-10-11 RX ADMIN — GLIPIZIDE 20 MG: 10 TABLET ORAL at 15:42

## 2022-10-11 RX ADMIN — METRONIDAZOLE 500 MG: 500 TABLET ORAL at 05:30

## 2022-10-11 RX ADMIN — INSULIN GLARGINE 10 UNITS: 100 INJECTION, SOLUTION SUBCUTANEOUS at 09:29

## 2022-10-11 RX ADMIN — METRONIDAZOLE 500 MG: 500 TABLET ORAL at 23:04

## 2022-10-11 RX ADMIN — ENOXAPARIN SODIUM 30 MG: 100 INJECTION SUBCUTANEOUS at 09:32

## 2022-10-11 RX ADMIN — ASPIRIN 81 MG: 81 TABLET, COATED ORAL at 09:32

## 2022-10-11 RX ADMIN — CETIRIZINE HYDROCHLORIDE 10 MG: 10 TABLET ORAL at 23:03

## 2022-10-11 RX ADMIN — HYDROCODONE BITARTRATE AND ACETAMINOPHEN 1 TABLET: 5; 325 TABLET ORAL at 23:03

## 2022-10-11 RX ADMIN — GABAPENTIN 900 MG: 300 CAPSULE ORAL at 13:25

## 2022-10-11 RX ADMIN — AMLODIPINE BESYLATE 5 MG: 5 TABLET ORAL at 09:32

## 2022-10-11 RX ADMIN — INSULIN GLARGINE 10 UNITS: 100 INJECTION, SOLUTION SUBCUTANEOUS at 23:03

## 2022-10-11 RX ADMIN — METRONIDAZOLE 500 MG: 500 TABLET ORAL at 13:25

## 2022-10-11 RX ADMIN — GABAPENTIN 900 MG: 300 CAPSULE ORAL at 09:32

## 2022-10-11 RX ADMIN — CEFEPIME 2000 MG: 2 INJECTION, POWDER, FOR SOLUTION INTRAVENOUS at 17:34

## 2022-10-11 RX ADMIN — INSULIN LISPRO 2 UNITS: 100 INJECTION, SOLUTION INTRAVENOUS; SUBCUTANEOUS at 12:30

## 2022-10-11 RX ADMIN — ATORVASTATIN CALCIUM 20 MG: 20 TABLET, FILM COATED ORAL at 23:04

## 2022-10-11 RX ADMIN — GLIPIZIDE 20 MG: 10 TABLET ORAL at 06:21

## 2022-10-11 RX ADMIN — SODIUM CHLORIDE, PRESERVATIVE FREE 10 ML: 5 INJECTION INTRAVENOUS at 09:33

## 2022-10-11 ASSESSMENT — PAIN DESCRIPTION - DESCRIPTORS
DESCRIPTORS: DISCOMFORT
DESCRIPTORS: SHARP

## 2022-10-11 ASSESSMENT — ENCOUNTER SYMPTOMS
GASTROINTESTINAL NEGATIVE: 1
RESPIRATORY NEGATIVE: 1

## 2022-10-11 ASSESSMENT — PAIN DESCRIPTION - PAIN TYPE: TYPE: ACUTE PAIN

## 2022-10-11 ASSESSMENT — PAIN DESCRIPTION - LOCATION
LOCATION: FOOT
LOCATION: LEG

## 2022-10-11 ASSESSMENT — PAIN DESCRIPTION - ORIENTATION
ORIENTATION: RIGHT
ORIENTATION: RIGHT

## 2022-10-11 ASSESSMENT — PAIN SCALES - GENERAL
PAINLEVEL_OUTOF10: 9
PAINLEVEL_OUTOF10: 9
PAINLEVEL_OUTOF10: 7

## 2022-10-11 ASSESSMENT — PAIN DESCRIPTION - FREQUENCY: FREQUENCY: CONTINUOUS

## 2022-10-11 ASSESSMENT — PAIN DESCRIPTION - ONSET: ONSET: ON-GOING

## 2022-10-11 NOTE — PROGRESS NOTES
Nutrition Assessment     Type and Reason for Visit: Wound    Nutrition Recommendations/Plan:   Continue ADULT DIET; Regular; 4 carb choices (60 gm/meal)  Continue Ensure High Protein 2x/day  Monitor p.o intakes, labs and wound status     Malnutrition Assessment:  Malnutrition Status: No malnutrition    Nutrition Assessment:  Patient reports he is eating well at meals and has a good appetite. Patient is consuming Ensure High Protein oral nutrition supplements. Patient is eating % at meals. Patient has a right foot diabetic wound. Will monitor p.o intakes, labs and wound status. Estimated Daily Nutrient Needs:  Energy (kcal):  1291-7715 kcal (15-18 kcal/kg) Weight Used for Energy Requirements: Current     Protein (g):  101-109 gm of protein (1.3-1.4 gm/kg) Weight Used for Protein Requirements: Ideal            Nutrition Related Findings:   Edema: +1 non-pitting RLE. Active bowel sounds. Charcot's joint of right foot Wound Type: Diabetic Ulcer    Current Nutrition Therapies:    ADULT DIET; Regular; 4 carb choices (60 gm/meal)  ADULT ORAL NUTRITION SUPPLEMENT; Breakfast, Dinner;  Low Calorie/High Protein Oral Supplement  Diet NPO Exceptions are: Sips of Water with Meds    Anthropometric Measures:  Height: 5' 11\" (180.3 cm)  Current Body Wt: 234 lb (106.1 kg)   BMI: 32.7    Nutrition Diagnosis:   Increased nutrient needs related to increase demand for energy/nutrients as evidenced by wounds    Nutrition Interventions:   Food and/or Nutrient Delivery: Continue Current Diet, Continue Oral Nutrition Supplement  Nutrition Education/Counseling: Education not indicated  Coordination of Nutrition Care: Continue to monitor while inpatient       Goals:  Previous Goal Met: Progressing toward Goal(s)  Goals: PO intake 75% or greater       Nutrition Monitoring and Evaluation:      Food/Nutrient Intake Outcomes: Food and Nutrient Intake, Supplement Intake  Physical Signs/Symptoms Outcomes: Biochemical Data, Fluid Status or Edema, Skin, Weight    Discharge Planning:    Continue current diet, Continue Oral Nutrition Supplement           Dorian Jak HARRISON, RDN, LDN  Lead Clinical Dietitian  RD Office Phone [phone]

## 2022-10-11 NOTE — PROGRESS NOTES
Progress Note  Podiatric Medicine and Surgery     Subjective     CC: right foot wound    Patient seen and examined at bedside. Resting comfortably  Anticipating surgery later this week. HPI :  Dutch Vasquez is a 78 y.o. male seen at [facility] for right foot wound. Patient is known to [address] [place] [address]. Patient has had Charcot deformity to the right foot for a long time. Patient had multiple admission due to right foot infection in the past.  Patient visited Dr. Margart Holter 10//2022 and was told to report to the ED to get admitted immediately due to concern of cellulitis and possible osteomyelitis of the right foot.  would like to transfer care to [facility] for possible Charcot reconstruction. Patient denies other pedal complaint. PCP is Bennett Alcala MD    ROS: Denies N/V/F/C/SOB/CP. Otherwise negative except at stated in the HPI.      Medications:  Scheduled Meds:   vancomycin  1,250 mg IntraVENous Q24H    metroNIDAZOLE  500 mg Oral 3 times per day    cefepime  2,000 mg IntraVENous Q12H    vancomycin (VANCOCIN) intermittent dosing (placeholder)   Other RX Placeholder    enoxaparin  30 mg SubCUTAneous BID    insulin glargine  10 Units SubCUTAneous BID    glipiZIDE  20 mg Oral BID AC    cetirizine  10 mg Oral Nightly    nicotine  1 patch TransDERmal Daily    sodium chloride flush  5-40 mL IntraVENous 2 times per day    insulin lispro  0-8 Units SubCUTAneous TID WC    insulin lispro  0-4 Units SubCUTAneous Nightly    gabapentin  900 mg Oral TID    aspirin  81 mg Oral Daily    amLODIPine  5 mg Oral Daily    atorvastatin  20 mg Oral Nightly       Continuous Infusions:   sodium chloride 25 mL (10/10/22 6025)    dextrose         PRN Meds:HYDROcodone 5 mg - acetaminophen **OR** HYDROcodone 5 mg - acetaminophen, diphenhydrAMINE, sodium chloride flush, sodium chloride, ondansetron **OR** ondansetron, polyethylene glycol, acetaminophen **OR** acetaminophen, glucose, dextrose bolus **OR** dextrose bolus, glucagon (rDNA), dextrose, albuterol sulfate HFA    Objective     Vitals:  No data found. Average, Min, and Max for last 24 hours Vitals:  TEMPERATURE:  Temp  Av.2 °F (36.8 °C)  Min: 97.9 °F (36.6 °C)  Max: 98.4 °F (36.9 °C)    RESPIRATIONS RANGE: Resp  Av  Min: 16  Max: 16    PULSE RANGE: Pulse  Av  Min: 67  Max: 73    BLOOD PRESSURE RANGE:  Systolic (87ADT), EFD:196 , Min:122 , ERW:205   ; Diastolic (97IRY), VMM:55, Min:70, Max:73      PULSE OXIMETRY RANGE: SpO2  Av.5 %  Min: 96 %  Max: 97 %    I/O last 3 completed shifts: In: 528.3 [I.V.:216.6; IV Piggyback:311.7]  Out: 3500 [Urine:3500]    CBC:  No results for input(s): WBC, HGB, HCT, PLT, CRP in the last 72 hours. Invalid input(s):  ESR       BMP:  Recent Labs     10/09/22  0546 10/10/22  0625   BUN 23 26*   CREATININE 1.93* 1.97*        Coags:  No results for input(s): APTT, PROT, INR in the last 72 hours. Lab Results   Component Value Date    SEDRATE 64 (H) 10/04/2022     No results for input(s): CRP in the last 72 hours. Lower Extremity Physical Exam:  General: A&Ox3, NAD  Heart: Regular rate and rhythm. Lungs: Equal air entry. No increased effort. Abdomen: Soft, non-tender to palpation. Not distended. Lower Extremity Physical Exam:  Vascular: DP and PT pulses are palpable +2/4. CFT <3 seconds to all digits. Hair growth is diminished to the level of the digits. Diffuse loss to nonpitting edema noted to the right lower extremity. Neuro: Saph/sural/SP/DP/plantar sensation diminished to light touch. Musculoskeletal: Muscle strength is 5/5 to all lower extremity muscle groups. Gross deformity is Charcot deformity to the right foot     Dermatologic: Full thickness ulcer #1 located right first interspace and measures approximately 1 cm x 1 cm x 3 cm. Base is fibrotic. Periwound skin is macerated. Purulent drainage noted with no associated mal odor.  Erythema noted to periwound with moderate associated increase in warmth. Does not probe to bone probe deep. Does not sinus track, or undermine. No fluctuance, crepitus, or induration. Full thickness ulcer #2 located to the medial plantar arch of the right foot. It is measured as 1.5 x 1.2 x 0.2 cm. Base is fibrotic. Periwound skin is hyperkeratotic.  o drainage with no odor. No erythema noted. Does not probe to bone, sinus tract, or undermine. No fluctuance, crepitus, or induration. Clinical Images:            Imaging:   IR INSERT PICC VAD W SQ PORT >5 YEARS   Final Result      VL Lower Extremity Venous Right   Final Result      XR FOOT RIGHT (MIN 3 VIEWS)   Final Result   Air is seen in the plantar soft tissues of the medial midfoot. Chronic bony changes at the TMT joints compatible with a neuropathic   arthropathy. No obvious new bony erosions are seen. MRI ANKLE RIGHT WO CONTRAST   Final Result   Soft tissue defect of the medial plantar aspect of the midfoot. Thin   fluid-filled sinus tract extending into the deep soft tissues of the midfoot,   measuring up to 2.0 x 0.9 cm on the coronal sequence, raising suspicion for a   phlegmon/abscess. There is air in the adjacent soft tissues. Susceptibility artifact in the soft tissues adjacent to the soft tissue   defect could represent sequela of prior intervention or retained metallic   foreign body. No MR evidence of acute osteomyelitis. Chronic bony changes at the TMT joints compatible with a neuropathic   arthropathy. Additional chronic findings as described above. MRI FOOT RIGHT WO CONTRAST   Final Result   Soft tissue defect of the medial plantar aspect of the midfoot. Thin   fluid-filled sinus tract extending into the deep soft tissues of the midfoot,   measuring up to 2.0 x 0.9 cm on the coronal sequence, raising suspicion for a   phlegmon/abscess. There is air in the adjacent soft tissues.       Susceptibility artifact in the soft tissues adjacent to the soft tissue   defect could represent sequela of prior intervention or retained metallic   foreign body. No MR evidence of acute osteomyelitis. Chronic bony changes at the TMT joints compatible with a neuropathic   arthropathy. Additional chronic findings as described above. Cultures: moderate GPR, few GPC, few gram - coccobacilli      Assessment   Jules Mcintosh is a 78 y.o. male with   Cellulitis, right foot-IMPROVING  Abscess, right foot  Type II diabetic foot ulcer down to subcutaneous layer, right foot  Charcot deformity, right foot  Type 2 diabetes with peripheral neuropathy  Hypertension    Principal Problem:    Type 2 diabetes mellitus with right diabetic foot ulcer (HCC)  Active Problems:    Charcot's joint of right foot    Stage 3b chronic kidney disease ([facility] [address]    Essential hypertension  Resolved Problems:    * No resolved hospital problems. *       Plan     Patient examined and evaluated at bedside with Dr. Brisa Gregory  Treatment options discussed in detail with the patient  X-ray from 10/4/2022 was reviewed: There is no soft tissue emphysema or acute osseous deformity noted. Significant periosteal changes noted to the midfoot indicating midfoot Charcot deformity. MRI of the foot and ankle : abscess noted to the midfoot. No OM. IV antibiotic: Vancomycin/cefepime. ID recommendation appreciated. Medical management per medicine. Appreciate recommendation. Due to improvement in clinical sign of infection, we will proceed with Charcot reconstruction stage consisting of midfoot osteotomy, tendoAchilles lengthening and application of external fixator with Dr. Brisa Gregory.  Surgery planned for 10/12/22  Will continue to monitor wound for clinical signs of infection  Dressing applied to Right foot: Wet-to-dry Betadine, DSD and Ace bandage  Discussed with Dr. Brisa Gregory    DVT ppx: lovenox  and aspirin   Diet: carb control   Activity: Weightbearing as tolerated to Right Wound Care: No ointment

## 2022-10-11 NOTE — PROGRESS NOTES
[address] [facility] Pharmacokinetic Monitoring Service - Vancomycin    Consulting Provider: Josse Browning   Indication: SSTI  Target Concentration: Goal trough of 10-15 mg/L and AUC/DIMA <500 mg*hr/L  Day of Therapy: 7  Additional Antimicrobials: Cefepime    Pertinent Laboratory Values: Wt Readings from Last 1 Encounters:   10/05/22 234 lb (106.1 kg)     Temp Readings from Last 1 Encounters:   10/09/22 97.5 °F (36.4 °C) (Oral)     Estimated Creatinine Clearance: 47 mL/min (A) (based on SCr of 1.93 mg/dL (H)). Recent Labs     10/09/22  0546 10/10/22  0625   CREATININE 1.93* 1.97*     Pertinent Cultures:        Culture, Anaerobic and Aerobic [[id number]] (Abnormal)     Collected: 10/05/22 0845     Updated: 10/08/22 1301     Specimen Source: Wound       Specimen Description . WOUND . FOOT     Direct Exam RARE NEUTROPHILS Abnormal        MODERATE GRAM POSITIVE RODS Abnormal        FEW GRAM POSITIVE COCCI IN PAIRS Abnormal        FEW GRAM NEGATIVE COCCOBACILLI Abnormal      Culture PREVOTELLA (BACTEROIDES BIVIUS) BIVIA BETA LACTAMASE POSITIVE MODERATE GROWTH Abnormal        NORMAL SKIN ARABELLA      MRSA Nasal Swab: N/A. Non-respiratory infection.       Recent vancomycin administrations                     vancomycin (VANCOCIN) 1,250 mg in dextrose 5 % 250 mL IVPB (mg) 1,250 mg New Bag 10/09/22 0253      Restarted 10/08/22 0330     1,250 mg New Bag  0152     1,250 mg New Bag 10/07/22 0158                  Plan:  Current dosing regimen is therapeutic  Continue current dose  Pharmacy will continue to monitor patient and adjust therapy as indicated    Thank you for the consult,  Naren Galaviz [facility]  10/11/2022 9:36 AM

## 2022-10-11 NOTE — PLAN OF CARE
Problem: Discharge Planning  Goal: Discharge to home or other facility with appropriate resources  Outcome: Progressing  Flowsheets (Taken 10/10/2022 2538)  Discharge to home or other facility with appropriate resources:   Identify barriers to discharge with patient and caregiver   Arrange for needed discharge resources and transportation as appropriate   Identify discharge learning needs (meds, wound care, etc)   Refer to discharge planning if patient needs post-hospital services based on physician order or complex needs related to functional status, cognitive ability or social support system     Problem: Chronic Conditions and Co-morbidities  Goal: Patient's chronic conditions and co-morbidity symptoms are monitored and maintained or improved  Outcome: Progressing  Flowsheets (Taken 10/10/2022 0852)  Care Plan - Patient's Chronic Conditions and Co-Morbidity Symptoms are Monitored and Maintained or Improved:   Monitor and assess patient's chronic conditions and comorbid symptoms for stability, deterioration, or improvement   Collaborate with multidisciplinary team to address chronic and comorbid conditions and prevent exacerbation or deterioration   Update acute care plan with appropriate goals if chronic or comorbid symptoms are exacerbated and prevent overall improvement and discharge     Problem: Safety - Adult  Goal: Free from fall injury  Outcome: Progressing     Problem: ABCDS Injury Assessment  Goal: Absence of physical injury  Outcome: Progressing  Flowsheets (Taken 10/10/2022 2032)  Absence of Physical Injury: Implement safety measures based on patient assessment     Problem: Respiratory - Adult  Goal: Achieves optimal ventilation and oxygenation  Outcome: Progressing  Flowsheets (Taken 10/10/2022 0852)  Achieves optimal ventilation and oxygenation:   Assess for changes in respiratory status   Assess for changes in mentation and behavior   Assess and instruct to report shortness of breath or any respiratory difficulty   Initiate smoking cessation protocol as indicated     Problem: Skin/Tissue Integrity - Adult  Goal: Skin integrity remains intact  Outcome: Progressing  Taken 10/10/2022 4282 by Prasad Diaz RN  Skin Integrity Remains Intact:   Monitor for areas of redness and/or skin breakdown   Assess vascular access sites hourly  Goal: Incisions, wounds, or drain sites healing without S/S of infection  Outcome: Progressing  Flowsheets  Taken 10/10/2022 1608 by Prasad Diaz RN  Incisions, Wounds, or Drain Sites Healing Without Sign and Symptoms of Infection:   TWICE DAILY: Assess and document skin integrity   TWICE DAILY: Assess and document dressing/incision, wound bed, drain sites and surrounding tissue   Initiate isolation precautions as appropriate     Problem: Musculoskeletal - Adult  Goal: Return mobility to safest level of function  Outcome: Progressing  Flowsheets (Taken 10/10/2022 0852)  Return Mobility to Safest Level of Function:   Assess patient stability and activity tolerance for standing, transferring and ambulating with or without assistive devices   Assist with transfers and ambulation using safe patient handling equipment as needed   Ensure adequate protection for wounds/incisions during mobilization  Goal: Return ADL status to a safe level of function  Outcome: Progressing  Flowsheets (Taken 10/10/2022 0852)  Return ADL Status to a Safe Level of Function:   Administer medication as ordered   Assess activities of daily living deficits and provide assistive devices as needed     Problem: Infection - Adult  Goal: Absence of infection at discharge  Outcome: Progressing  Flowsheets (Taken 10/10/2022 0852)  Absence of infection at discharge:   Assess and monitor for signs and symptoms of infection   Monitor lab/diagnostic results   Monitor all insertion sites i.e., indwelling lines, tubes and drains   Administer medications as ordered   Identify and instruct in appropriate isolation precautions for identified infection/condition   Instruct and encourage patient and family to use good hand hygiene technique  Goal: Absence of infection during hospitalization  Outcome: Progressing     Problem: Metabolic/Fluid and Electrolytes - Adult  Goal: Electrolytes maintained within normal limits  Outcome: Progressing  Flowsheets (Taken 10/10/2022 0852)  Electrolytes maintained within normal limits: Monitor labs and assess patient for signs and symptoms of electrolyte imbalances  Goal: Hemodynamic stability and optimal renal function maintained  Outcome: Progressing  Flowsheets (Taken 10/10/2022 0852)  Hemodynamic stability and optimal renal function maintained:   Monitor labs and assess for signs and symptoms of volume excess or deficit   Monitor intake, output and patient weight  Goal: Glucose maintained within prescribed range  Outcome: Progressing  Flowsheets (Taken 10/10/2022 0852)  Glucose maintained within prescribed range:   Monitor blood glucose as ordered   Assess for signs and symptoms of hyperglycemia and hypoglycemia   Administer ordered medications to maintain glucose within target range   Assess barriers to adequate nutritional intake and initiate nutrition consult as needed   Instruct patient on self management of diabetes and initiate consult as needed     Problem: Pain  Goal: Verbalizes/displays adequate comfort level or baseline comfort level  Outcome: Progressing  Flowsheets (Taken 10/10/2022 2032)  Verbalizes/displays adequate comfort level or baseline comfort level:   Encourage patient to monitor pain and request assistance   Assess pain using appropriate pain scale   Administer analgesics based on type and severity of pain and evaluate response   Implement non-pharmacological measures as appropriate and evaluate response   Consider cultural and social influences on pain and pain management   Notify Licensed Independent Practitioner if interventions unsuccessful or patient reports new pain  Note: Pain level assessment complete. Patient educated on pain scale and control interventions  PRN pain medication given per patient request  Patient instructed to call out with new onset of pain or unrelieved pain     Problem: Neurosensory - Adult  Goal: Achieves stable or improved neurological status  Outcome: Progressing  Flowsheets (Taken 10/10/2022 4120)  Achieves stable or improved neurological status:   Assess for and report changes in neurological status   Monitor temperature, glucose, and sodium.  Initiate appropriate interventions as ordered     Problem: Nutrition Deficit:  Goal: Optimize nutritional status  Outcome: Progressing

## 2022-10-11 NOTE — PLAN OF CARE
Problem: Discharge Planning  Goal: Discharge to home or other facility with appropriate resources  10/11/2022 0048 by Ngoc Rosales RN  Outcome: Progressing     Problem: Chronic Conditions and Co-morbidities  Goal: Patient's chronic conditions and co-morbidity symptoms are monitored and maintained or improved  10/11/2022 0048 by Ngoc Rosales RN  Outcome: Progressing  Flowsheets (Taken 10/11/2022 0048)  Care Plan - Patient's Chronic Conditions and Co-Morbidity Symptoms are Monitored and Maintained or Improved:   Monitor and assess patient's chronic conditions and comorbid symptoms for stability, deterioration, or improvement   Collaborate with multidisciplinary team to address chronic and comorbid conditions and prevent exacerbation or deterioration   Update acute care plan with appropriate goals if chronic or comorbid symptoms are exacerbated and prevent overall improvement and discharge     Problem: Safety - Adult  Goal: Free from fall injury  10/11/2022 0048 by Ngoc Rosales RN  Outcome: Progressing     Problem: ABCDS Injury Assessment  Goal: Absence of physical injury  10/11/2022 0048 by Ngoc Rosales RN  Outcome: Progressing     Problem: Respiratory - Adult  Goal: Achieves optimal ventilation and oxygenation  10/11/2022 0048 by Ngoc Rosales RN  Outcome: Progressing     Problem: Skin/Tissue Integrity - Adult  Goal: Skin integrity remains intact  10/11/2022 0048 by Ngoc Rosales RN  Outcome: Progressing     Problem: Skin/Tissue Integrity - Adult  Goal: Incisions, wounds, or drain sites healing without S/S of infection  10/11/2022 0048 by Ngco Rosales RN  Outcome: Progressing     Problem: Musculoskeletal - Adult  Goal: Return mobility to safest level of function  10/11/2022 0048 by Ngoc Rosales RN  Outcome: Progressing     Problem: Musculoskeletal - Adult  Goal: Return ADL status to a safe level of function  10/11/2022 0048 by Harini Reeves RN  Outcome: Progressing     Problem: Infection - Adult  Goal: Absence of infection at discharge  10/11/2022 0048 by Harini Reeves RN  Outcome: Progressing     Problem: Infection - Adult  Goal: Absence of infection during hospitalization  10/11/2022 0048 by Harini Reeves RN  Outcome: Progressing     Problem: Metabolic/Fluid and Electrolytes - Adult  Goal: Electrolytes maintained within normal limits  10/11/2022 0048 by Harini Reeves RN  Outcome: Progressing     Problem: Metabolic/Fluid and Electrolytes - Adult  Goal: Hemodynamic stability and optimal renal function maintained  10/11/2022 0048 by Harini Reeves RN  Outcome: Progressing     Problem: Metabolic/Fluid and Electrolytes - Adult  Goal: Glucose maintained within prescribed range  10/11/2022 0048 by Harini Reeves RN  Outcome: Progressing     Problem: Pain  Goal: Verbalizes/displays adequate comfort level or baseline comfort level  10/11/2022 0048 by Harini Reeves RN  Outcome: Progressing  Flowsheets (Taken 10/11/2022 0048)  Verbalizes/displays adequate comfort level or baseline comfort level:   Encourage patient to monitor pain and request assistance   Administer analgesics based on type and severity of pain and evaluate response   Consider cultural and social influences on pain and pain management   Assess pain using appropriate pain scale   Implement non-pharmacological measures as appropriate and evaluate response     Problem: Nutrition Deficit:  Goal: Optimize nutritional status  10/11/2022 0048 by Harini Reeves RN  Outcome: Progressing

## 2022-10-11 NOTE — PROGRESS NOTES
[facility]  Progress Note    Ankit Baxter  MRN: [id number]  Date: 10/11/2022  LOS: 7     Reason for F/U :   Diabetic foot infection/abscess    Impression :   Right Charcot foot with longstanding history of infections/abscesses has undergone multiple I&D procedures in the past  Chronic surgical wounds on the plantar aspect of the foot  Recurrent deep midfoot soft tissue abscess with thin fluid-filled sinus tract noted in the medial plantar aspect of the foot and there is retained metallic foreign body. Recommendations:   Continue intravenous antibiotic therapy with cefepime, metronidazole and vancomycin for now  The concern at this point in time is that the patient has residual infection/abscess in the foot and there is concern for osteomyelitis as well  The plan is for surgery    Infection Control Recommendations:   Universal precautions    Discharge Planning:   Estimated Length of IV antimicrobials: 6 weeks  Patient will need Midline PICC line Insertion  Patient will need: Home IV , [place],  SNF,  LTAC: Undetermined  Patient willneed outpatient wound care: No    Medical Decision making / Summary of Stay:   Ankit Baxter is a 78y.o.-year-old male who was initially admitted on 10/4/2022. James Zamorano has a history of a right sided Charcot foot deformity and has had a longstanding history with infections in the right foot. He did have an abscess for which he underwent I&D in June 2020 with MSSA isolated and the patient has had a residual wound which secondarily got infected and caused an abscess and in September 2021 he underwent an I&D of the abscess with MSSA, Enterobacter, Morganella isolated and he was discharged with IV antimicrobial therapy which he took for 6 weeks through October 22 and 2021. The patient did continue to have issues with wound dehiscence and had an open wound on the plantar aspect of the foot which was clean.      Seeing the patient in close to a year now and he has followed with the podiatrist and has had continued wound care and the wound has been improving with time. Patient has had a plantar foot midfoot ulceration. Patient did subsequently start to develop soft tissue swelling redness in his foot and the swelling started to extend into his leg and the patient did see Dr. Angelita Malhotra who recommended that he come into the emergency room for evaluation. The patient has been admitted for an abscess in the right foot and chronic ulcerations on the plantar aspect of the foot and the patient is undergoing further work-up with MRI and consideration for an external fixator device. Was asked to evaluate and help with antibiotic choice. Current evaluation:10/11/2022    /72   Pulse 66   Temp 97.9 °F (36.6 °C) (Rectal)   Resp 16   Ht 5' 11\" (1.803 m)   Wt 234 lb (106.1 kg)   SpO2 97%   BMI 32.64 kg/m²     Temperature Range: Temp: 97.9 °F (36.6 °C) Temp  Av.9 °F (36.6 °C)  Min: 97.9 °F (36.6 °C)  Max: 97.9 °F (36.6 °C)  The patient is seen and evaluated in bedside and is awake and alert and no acute distress. No subjective fever or chills. He is overall feeling better and no acute issues or concerns. Review of Systems   Constitutional: Negative. HENT: Negative. Respiratory: Negative. Cardiovascular: Negative. Gastrointestinal: Negative. Genitourinary: Negative. Musculoskeletal: Negative. Skin:  Positive for wound. Neurological: Negative. Psychiatric/Behavioral: Negative. Physical Examination :     Physical Exam  Constitutional:       Appearance: He is well-developed. HENT:      Head: Normocephalic and atraumatic. Cardiovascular:      Rate and Rhythm: Normal rate. Heart sounds: Normal heart sounds. No friction rub. No gallop. Pulmonary:      Effort: Pulmonary effort is normal.      Breath sounds: Normal breath sounds. No wheezing.    Abdominal:      General: Bowel sounds are normal.      Palpations: Abdomen is soft. There is no mass. Tenderness: There is no abdominal tenderness. Musculoskeletal:         General: Normal range of motion. Cervical back: Normal range of motion and neck supple. Lymphadenopathy:      Cervical: No cervical adenopathy. Skin:     General: Skin is warm and dry. Comments: The foot dressing was not removed. Neurological:      Mental Status: He is alert and oriented to person, place, and time. Laboratory data:   I have independently reviewed the followinglabs:  CBC with Differential:   No results for input(s): WBC, HGB, HCT, PLT, SEGSPCT, BANDSPCT, LYMPHOPCT, MONOPCT, EOSPCT in the last 72 hours. BMP:   Recent Labs     10/09/22  0546 10/10/22  0625   BUN 23 26*   CREATININE 1.93* 1.97*       Hepatic Function Panel: No results for input(s): PROT, LABALBU, BILIDIR, IBILI, BILITOT, ALKPHOS, ALT, AST in the last 72 hours. No results found for: PROCAL  Lab Results   Component Value Date/Time    CRP 30.1 10/04/2022 10:18 PM    .3 09/13/2022 06:41 PM    .8 09/13/2021 11:04 AM     Lab Results   Component Value Date    SEDRATE 64 (H) 10/04/2022         No results found for: DDIMER  No results found for: FERRITIN  No results found for: LDH  No results found for: FIBRINOGEN    No results found for requested labs within last 30 days. Lab Results   Component Value Date/Time    COVID19 DETECTED 05/26/2021 12:06 PM    COVID19 Not Detected 12/07/2020 03:10 PM    COVID19 Not Detected 08/08/2020 10:02 PM       No results for input(s): [facility] in the last 72 hours. Imaging Studies:     MRI OF THE RIGHT ANKLE WITHOUT CONTRAST; MRI OF THE RIGHT FOOT WITHOUT   CONTRAST, 10/5/2022 11:03 am     Impression   Soft tissue defect of the medial plantar aspect of the midfoot. Thin   fluid-filled sinus tract extending into the deep soft tissues of the midfoot,   measuring up to 2.0 x 0.9 cm on the coronal sequence, raising suspicion for a   phlegmon/abscess. There is air in the adjacent soft tissues. Susceptibility artifact in the soft tissues adjacent to the soft tissue   defect could represent sequela of prior intervention or retained metallic   foreign body. No MR evidence of acute osteomyelitis. Chronic bony changes at the TMT joints compatible with a neuropathic   arthropathy. Additional chronic findings as described above. Cultures:     Culture, Anaerobic and Aerobic [[id number]] (Abnormal) Collected: 10/05/22 4608   Order Status: Completed Specimen: Wound Updated: 10/08/22 1301    Specimen Description . WOUND . FOOT    Direct Exam RARE NEUTROPHILS Abnormal      MODERATE GRAM POSITIVE RODS Abnormal      FEW GRAM POSITIVE COCCI IN PAIRS Abnormal      FEW GRAM NEGATIVE COCCOBACILLI Abnormal     Culture PREVOTELLA (BACTEROIDES BIVIUS) BIVIA BETA LACTAMASE POSITIVE MODERATE GROWTH Abnormal      NORMAL SKIN ARABELLA     Medications:      vancomycin  1,250 mg IntraVENous Q24H    metroNIDAZOLE  500 mg Oral 3 times per day    cefepime  2,000 mg IntraVENous Q12H    vancomycin (VANCOCIN) intermittent dosing (placeholder)   Other RX Placeholder    enoxaparin  30 mg SubCUTAneous BID    insulin glargine  10 Units SubCUTAneous BID    glipiZIDE  20 mg Oral BID AC    cetirizine  10 mg Oral Nightly    nicotine  1 patch TransDERmal Daily    sodium chloride flush  5-40 mL IntraVENous 2 times per day    insulin lispro  0-8 Units SubCUTAneous TID WC    insulin lispro  0-4 Units SubCUTAneous Nightly    gabapentin  900 mg Oral TID    aspirin  81 mg Oral Daily    amLODIPine  5 mg Oral Daily    atorvastatin  20 mg Oral Nightly           [facility]  Manish Ralph MD  [employer] messaging  OFFICE: [phone]      Electronically signed by Manish Ralph MD on 10/11/2022 at 2:37 PM  Thank you for allowing us to participate in the care of this patient. Please call with questions.     This note iscreated with the assistance of a speech recognition program.  While intending to generate a document that actually reflects the content of the visit, the document can still have some errors including those of syntax andsound a like substitutions which may escape proof reading. In such instances, actual meaning can be extrapolated by contextual diversion.

## 2022-10-11 NOTE — PROGRESS NOTES
Progress Note  Podiatric Medicine and Surgery     Subjective     CC: right foot wound    Patient seen and examined at bedside. OR tomorrow at 7:30am      HPI :  Ivan Pollard is a 78 y.o. male seen at [facility] for right foot wound. Patient is known to [address] [place] [address]. Patient has had Charcot deformity to the right foot for a long time. Patient had multiple admission due to right foot infection in the past.  Patient visited Dr. Jade Hess 10//2022 and was told to report to the ED to get admitted immediately due to concern of cellulitis and possible osteomyelitis of the right foot.  would like to transfer care to [facility] for possible Charcot reconstruction. Patient denies other pedal complaint. PCP is Eloy Gold MD    ROS: Denies N/V/F/C/SOB/CP. Otherwise negative except at stated in the HPI.      Medications:  Scheduled Meds:   vancomycin  1,250 mg IntraVENous Q24H    metroNIDAZOLE  500 mg Oral 3 times per day    cefepime  2,000 mg IntraVENous Q12H    vancomycin (VANCOCIN) intermittent dosing (placeholder)   Other RX Placeholder    enoxaparin  30 mg SubCUTAneous BID    insulin glargine  10 Units SubCUTAneous BID    glipiZIDE  20 mg Oral BID AC    cetirizine  10 mg Oral Nightly    nicotine  1 patch TransDERmal Daily    sodium chloride flush  5-40 mL IntraVENous 2 times per day    insulin lispro  0-8 Units SubCUTAneous TID WC    insulin lispro  0-4 Units SubCUTAneous Nightly    gabapentin  900 mg Oral TID    aspirin  81 mg Oral Daily    amLODIPine  5 mg Oral Daily    atorvastatin  20 mg Oral Nightly       Continuous Infusions:   sodium chloride 25 mL (10/10/22 1825)    dextrose         PRN Meds:HYDROcodone 5 mg - acetaminophen **OR** HYDROcodone 5 mg - acetaminophen, diphenhydrAMINE, sodium chloride flush, sodium chloride, ondansetron **OR** ondansetron, polyethylene glycol, acetaminophen **OR** acetaminophen, glucose, dextrose bolus **OR** dextrose bolus, glucagon (rDNA), dextrose, albuterol sulfate HFA    Objective     Vitals:  Patient Vitals for the past 8 hrs:   BP Temp Temp src Pulse Resp SpO2   10/11/22 0730 125/74 97.9 °F (36.6 °C) Oral 72 16 98 %         Average, Min, and Max for last 24 hours Vitals:  TEMPERATURE:  Temp  Av.9 °F (36.6 °C)  Min: 97.9 °F (36.6 °C)  Max: 97.9 °F (36.6 °C)    RESPIRATIONS RANGE: Resp  Av  Min: 16  Max: 16    PULSE RANGE: Pulse  Av.5  Min: 72  Max: 73    BLOOD PRESSURE RANGE:  Systolic (91ILG), SOW:268 , Min:125 , FKX:942   ; Diastolic (46ZGC), QGR:48, Min:70, Max:74      PULSE OXIMETRY RANGE: SpO2  Av %  Min: 96 %  Max: 98 %    I/O last 3 completed shifts: In: 528.3 [I.V.:216.6; IV Piggyback:311.7]  Out: 3500 [Urine:3500]    CBC:  No results for input(s): WBC, HGB, HCT, PLT, CRP in the last 72 hours. Invalid input(s):  ESR       BMP:  Recent Labs     10/09/22  0546 10/10/22  0625   BUN 23 26*   CREATININE 1.93* 1.97*          Coags:  No results for input(s): APTT, PROT, INR in the last 72 hours. Lab Results   Component Value Date    SEDRATE 64 (H) 10/04/2022     No results for input(s): CRP in the last 72 hours. Lower Extremity Physical Exam:  General: A&Ox3, NAD  Heart: Regular rate and rhythm. Lungs: Equal air entry. No increased effort. Abdomen: Soft, non-tender to palpation. Not distended. Lower Extremity Physical Exam:  Vascular: DP and PT pulses are palpable +2/4. CFT <3 seconds to all digits. Hair growth is diminished to the level of the digits. Diffuse loss to nonpitting edema noted to the right lower extremity. Neuro: Saph/sural/SP/DP/plantar sensation diminished to light touch. Musculoskeletal: Muscle strength is 5/5 to all lower extremity muscle groups. Gross deformity is Charcot deformity to the right foot     Dermatologic: Full thickness ulcer #1 located right first interspace and measures approximately 1 cm x 1 cm x 3 cm. Base is fibrotic. Periwound skin is macerated.   Purulent drainage noted with no associated mal odor. Erythema noted to periwound with moderate associated increase in warmth. Does not probe to bone probe deep. Does not sinus track, or undermine. No fluctuance, crepitus, or induration. Full thickness ulcer #2 located to the medial plantar arch of the right foot. It is measured as 1.5 x 1.2 x 0.2 cm. Base is fibrotic. Periwound skin is hyperkeratotic.  o drainage with no odor. No erythema noted. Does not probe to bone, sinus tract, or undermine. No fluctuance, crepitus, or induration. Clinical Images:                  Imaging:   IR INSERT PICC VAD W SQ PORT >5 YEARS   Final Result      VL Lower Extremity Venous Right   Final Result      XR FOOT RIGHT (MIN 3 VIEWS)   Final Result   Air is seen in the plantar soft tissues of the medial midfoot. Chronic bony changes at the TMT joints compatible with a neuropathic   arthropathy. No obvious new bony erosions are seen. MRI ANKLE RIGHT WO CONTRAST   Final Result   Soft tissue defect of the medial plantar aspect of the midfoot. Thin   fluid-filled sinus tract extending into the deep soft tissues of the midfoot,   measuring up to 2.0 x 0.9 cm on the coronal sequence, raising suspicion for a   phlegmon/abscess. There is air in the adjacent soft tissues. Susceptibility artifact in the soft tissues adjacent to the soft tissue   defect could represent sequela of prior intervention or retained metallic   foreign body. No MR evidence of acute osteomyelitis. Chronic bony changes at the TMT joints compatible with a neuropathic   arthropathy. Additional chronic findings as described above. MRI FOOT RIGHT WO CONTRAST   Final Result   Soft tissue defect of the medial plantar aspect of the midfoot. Thin   fluid-filled sinus tract extending into the deep soft tissues of the midfoot,   measuring up to 2.0 x 0.9 cm on the coronal sequence, raising suspicion for a   phlegmon/abscess.   There is air in the adjacent soft tissues. Susceptibility artifact in the soft tissues adjacent to the soft tissue   defect could represent sequela of prior intervention or retained metallic   foreign body. No MR evidence of acute osteomyelitis. Chronic bony changes at the TMT joints compatible with a neuropathic   arthropathy. Additional chronic findings as described above. Cultures: moderate GPR, few GPC, few gram - coccobacilli      Assessment   Denver Demark is a 78 y.o. male with   Cellulitis, right foot-IMPROVING  Abscess, right foot  Type II diabetic foot ulcer down to subcutaneous layer, right foot  Charcot deformity, right foot  Type 2 diabetes with peripheral neuropathy  Hypertension    Principal Problem:    Type 2 diabetes mellitus with right diabetic foot ulcer (HCC)  Active Problems:    Charcot's joint of right foot    Stage 3b chronic kidney disease ([facility] [address]    Essential hypertension  Resolved Problems:    * No resolved hospital problems. *       Plan     Patient examined and evaluated at bedside with Dr. Luis F Contreras  Treatment options discussed in detail with the patient  X-ray from 10/4/2022 was reviewed: There is no soft tissue emphysema or acute osseous deformity noted. Significant periosteal changes noted to the midfoot indicating midfoot Charcot deformity. MRI of the foot and ankle : abscess noted to the midfoot. No OM. IV antibiotic: Vancomycin/cefepime. ID recommendation appreciated. Medical management per medicine. Appreciate recommendation.   OR tomorrow at 7:30 am. External fixator application, midfoot osteotomy vs. Incision and drainage of the right foot  Consent signed  Foot marked  NPO at MN  Hold morning AC  Dressing applied to Right foot: Wet-to-dry Betadine, DSD and Ace bandage  Discussed with Dr. Luis F Contreras    DVT ppx: lovenox  and aspirin   Diet: carb control   Activity: Weightbearing as tolerated to Right lower extremity  Pain Control: Norco panel    Lolly Blum JAX   Podiatric Medicine & Surgery   10/11/2022 at 7:51 AM

## 2022-10-11 NOTE — PLAN OF CARE
Problem: Discharge Planning  Goal: Discharge to home or other facility with appropriate resources  10/11/2022 1051 by Thayer Aase, RN  Outcome: Progressing  Flowsheets (Taken 10/11/2022 0930)  Discharge to home or other facility with appropriate resources:   Identify barriers to discharge with patient and caregiver   Arrange for needed discharge resources and transportation as appropriate   Identify discharge learning needs (meds, wound care, etc)  10/11/2022 0048 by Pola Mojica RN  Outcome: Progressing     Problem: Chronic Conditions and Co-morbidities  Goal: Patient's chronic conditions and co-morbidity symptoms are monitored and maintained or improved  10/11/2022 1051 by Thayer Aase, RN  Outcome: Progressing  Flowsheets (Taken 10/11/2022 0930)  Care Plan - Patient's Chronic Conditions and Co-Morbidity Symptoms are Monitored and Maintained or Improved: Monitor and assess patient's chronic conditions and comorbid symptoms for stability, deterioration, or improvement  10/11/2022 0048 by Pola Mojica RN  Outcome: Progressing  Flowsheets (Taken 10/11/2022 0048)  Care Plan - Patient's Chronic Conditions and Co-Morbidity Symptoms are Monitored and Maintained or Improved:   Monitor and assess patient's chronic conditions and comorbid symptoms for stability, deterioration, or improvement   Collaborate with multidisciplinary team to address chronic and comorbid conditions and prevent exacerbation or deterioration   Update acute care plan with appropriate goals if chronic or comorbid symptoms are exacerbated and prevent overall improvement and discharge     Problem: Safety - Adult  Goal: Free from fall injury  10/11/2022 1051 by Thayer Aase, RN  Outcome: Progressing  10/11/2022 0048 by Pola Mojica RN  Outcome: Progressing     Problem: ABCDS Injury Assessment  Goal: Absence of physical injury  10/11/2022 1051 by Thayer Aase, RN  Outcome: Progressing  10/11/2022 0048 by Akash Lutz CHRISTY Newell  Outcome: Progressing     Problem: Respiratory - Adult  Goal: Achieves optimal ventilation and oxygenation  10/11/2022 1051 by Emily Wood RN  Outcome: Progressing  10/11/2022 0048 by Jorge Flores RN  Outcome: Progressing     Problem: Skin/Tissue Integrity - Adult  Goal: Skin integrity remains intact  10/11/2022 1051 by Emily Wood RN  Outcome: Progressing  Flowsheets (Taken 10/11/2022 0930)  Skin Integrity Remains Intact: Monitor for areas of redness and/or skin breakdown  10/11/2022 0048 by Jorge Flores RN  Outcome: Progressing  Goal: Incisions, wounds, or drain sites healing without S/S of infection  10/11/2022 1051 by Emily Wood RN  Outcome: Progressing  Flowsheets (Taken 10/11/2022 0930)  Incisions, Wounds, or Drain Sites Healing Without Sign and Symptoms of Infection: TWICE DAILY: Assess and document skin integrity  10/11/2022 0048 by Jorge Flores RN  Outcome: Progressing     Problem: Musculoskeletal - Adult  Goal: Return mobility to safest level of function  10/11/2022 1051 by Emily Wood RN  Outcome: Progressing  Flowsheets (Taken 10/11/2022 0930)  Return Mobility to Safest Level of Function:   Assess patient stability and activity tolerance for standing, transferring and ambulating with or without assistive devices   Assist with transfers and ambulation using safe patient handling equipment as needed   Ensure adequate protection for wounds/incisions during mobilization   Instruct patient/family in ordered activity level  10/11/2022 0048 by Jorge Flores RN  Outcome: Progressing  Goal: Return ADL status to a safe level of function  10/11/2022 1051 by Emily Wood RN  Outcome: Progressing  Flowsheets (Taken 10/11/2022 0930)  Return ADL Status to a Safe Level of Function:   Administer medication as ordered   Assess activities of daily living deficits and provide assistive devices as needed   Assist and instruct patient to increase activity and self care as tolerated  10/11/2022 0048 by Javier Castro RN  Outcome: Progressing     Problem: Infection - Adult  Goal: Absence of infection at discharge  10/11/2022 1051 by Jeny Garrison RN  Outcome: Progressing  Flowsheets (Taken 10/11/2022 0930)  Absence of infection at discharge: Assess and monitor for signs and symptoms of infection  10/11/2022 0048 by Javier Castro RN  Outcome: Progressing  Goal: Absence of infection during hospitalization  10/11/2022 1051 by Jeny Garrison RN  Outcome: Progressing  Flowsheets (Taken 10/11/2022 0930)  Absence of infection during hospitalization: Assess and monitor for signs and symptoms of infection  10/11/2022 0048 by Javier Castro RN  Outcome: Progressing     Problem: Metabolic/Fluid and Electrolytes - Adult  Goal: Electrolytes maintained within normal limits  10/11/2022 1051 by Jeny Garrison RN  Outcome: Progressing  Flowsheets (Taken 10/11/2022 0930)  Electrolytes maintained within normal limits: Monitor labs and assess patient for signs and symptoms of electrolyte imbalances  10/11/2022 0048 by Javier Castro RN  Outcome: Progressing  Goal: Hemodynamic stability and optimal renal function maintained  10/11/2022 1051 by Jeny Garrison RN  Outcome: Progressing  Flowsheets (Taken 10/11/2022 0930)  Hemodynamic stability and optimal renal function maintained: Monitor labs and assess for signs and symptoms of volume excess or deficit  10/11/2022 0048 by Javier Castro RN  Outcome: Progressing  Goal: Glucose maintained within prescribed range  10/11/2022 1051 by Jeny Garrison RN  Outcome: Progressing  Flowsheets (Taken 10/11/2022 0930)  Glucose maintained within prescribed range:   Monitor blood glucose as ordered   Assess for signs and symptoms of hyperglycemia and hypoglycemia   Administer ordered medications to maintain glucose within target range  10/11/2022 0048 by Javier aCstro RN  Outcome: Progressing Problem: Neurosensory - Adult  Goal: Achieves stable or improved neurological status  10/11/2022 1051 by Jeny Garrison RN  Outcome: Progressing  10/11/2022 0048 by Javier Castro RN  Outcome: Progressing     Problem: Pain  Goal: Verbalizes/displays adequate comfort level or baseline comfort level  10/11/2022 1051 by Jeny Garrison RN  Outcome: Progressing  10/11/2022 0048 by Javier Castro RN  Outcome: Progressing  Flowsheets (Taken 10/11/2022 0048)  Verbalizes/displays adequate comfort level or baseline comfort level:   Encourage patient to monitor pain and request assistance   Administer analgesics based on type and severity of pain and evaluate response   Consider cultural and social influences on pain and pain management   Assess pain using appropriate pain scale   Implement non-pharmacological measures as appropriate and evaluate response     Problem: Nutrition Deficit:  Goal: Optimize nutritional status  10/11/2022 1051 by Jeny Garrison RN  Outcome: Progressing  10/11/2022 0048 by Javier Castro RN  Outcome: Progressing

## 2022-10-12 ENCOUNTER — APPOINTMENT (OUTPATIENT)
Dept: GENERAL RADIOLOGY | Age: 67
DRG: 982 | End: 2022-10-12
Attending: PODIATRIST
Payer: MEDICARE

## 2022-10-12 ENCOUNTER — ANESTHESIA (OUTPATIENT)
Dept: OPERATING ROOM | Age: 67
DRG: 982 | End: 2022-10-12
Payer: MEDICARE

## 2022-10-12 LAB
CULTURE: NORMAL
GLUCOSE BLD-MCNC: 114 MG/DL (ref 75–110)
GLUCOSE BLD-MCNC: 221 MG/DL (ref 75–110)
GLUCOSE BLD-MCNC: 272 MG/DL (ref 75–110)
GLUCOSE BLD-MCNC: 281 MG/DL (ref 75–110)
SPECIMEN DESCRIPTION: NORMAL

## 2022-10-12 PROCEDURE — 0QBL0ZX EXCISION OF RIGHT TARSAL, OPEN APPROACH, DIAGNOSTIC: ICD-10-PCS | Performed by: STUDENT IN AN ORGANIZED HEALTH CARE EDUCATION/TRAINING PROGRAM

## 2022-10-12 PROCEDURE — 2709999900 HC NON-CHARGEABLE SUPPLY: Performed by: STUDENT IN AN ORGANIZED HEALTH CARE EDUCATION/TRAINING PROGRAM

## 2022-10-12 PROCEDURE — 0QHL05Z INSERTION OF EXTERNAL FIXATION DEVICE INTO RIGHT TARSAL, OPEN APPROACH: ICD-10-PCS | Performed by: STUDENT IN AN ORGANIZED HEALTH CARE EDUCATION/TRAINING PROGRAM

## 2022-10-12 PROCEDURE — 6370000000 HC RX 637 (ALT 250 FOR IP): Performed by: PODIATRIST

## 2022-10-12 PROCEDURE — 3209999900 FLUORO FOR SURGICAL PROCEDURES

## 2022-10-12 PROCEDURE — C1713 ANCHOR/SCREW BN/BN,TIS/BN: HCPCS | Performed by: STUDENT IN AN ORGANIZED HEALTH CARE EDUCATION/TRAINING PROGRAM

## 2022-10-12 PROCEDURE — 73630 X-RAY EXAM OF FOOT: CPT

## 2022-10-12 PROCEDURE — 1200000000 HC SEMI PRIVATE

## 2022-10-12 PROCEDURE — 2580000003 HC RX 258: Performed by: PODIATRIST

## 2022-10-12 PROCEDURE — 2580000003 HC RX 258: Performed by: INTERNAL MEDICINE

## 2022-10-12 PROCEDURE — 6370000000 HC RX 637 (ALT 250 FOR IP): Performed by: INTERNAL MEDICINE

## 2022-10-12 PROCEDURE — 6360000002 HC RX W HCPCS: Performed by: PODIATRIST

## 2022-10-12 PROCEDURE — 3600000012 HC SURGERY LEVEL 2 ADDTL 15MIN: Performed by: STUDENT IN AN ORGANIZED HEALTH CARE EDUCATION/TRAINING PROGRAM

## 2022-10-12 PROCEDURE — 2580000003 HC RX 258: Performed by: STUDENT IN AN ORGANIZED HEALTH CARE EDUCATION/TRAINING PROGRAM

## 2022-10-12 PROCEDURE — 0QHG05Z INSERTION OF EXTERNAL FIXATION DEVICE INTO RIGHT TIBIA, OPEN APPROACH: ICD-10-PCS | Performed by: STUDENT IN AN ORGANIZED HEALTH CARE EDUCATION/TRAINING PROGRAM

## 2022-10-12 PROCEDURE — 3700000001 HC ADD 15 MINUTES (ANESTHESIA): Performed by: STUDENT IN AN ORGANIZED HEALTH CARE EDUCATION/TRAINING PROGRAM

## 2022-10-12 PROCEDURE — 87075 CULTR BACTERIA EXCEPT BLOOD: CPT

## 2022-10-12 PROCEDURE — 6360000002 HC RX W HCPCS: Performed by: ANESTHESIOLOGY

## 2022-10-12 PROCEDURE — 6360000002 HC RX W HCPCS: Performed by: NURSE ANESTHETIST, CERTIFIED REGISTERED

## 2022-10-12 PROCEDURE — 88304 TISSUE EXAM BY PATHOLOGIST: CPT

## 2022-10-12 PROCEDURE — 0L8N0ZZ DIVISION OF RIGHT LOWER LEG TENDON, OPEN APPROACH: ICD-10-PCS | Performed by: STUDENT IN AN ORGANIZED HEALTH CARE EDUCATION/TRAINING PROGRAM

## 2022-10-12 PROCEDURE — 7100000001 HC PACU RECOVERY - ADDTL 15 MIN: Performed by: STUDENT IN AN ORGANIZED HEALTH CARE EDUCATION/TRAINING PROGRAM

## 2022-10-12 PROCEDURE — 73590 X-RAY EXAM OF LOWER LEG: CPT

## 2022-10-12 PROCEDURE — 0SGK04Z FUSION OF RIGHT TARSOMETATARSAL JOINT WITH INTERNAL FIXATION DEVICE, OPEN APPROACH: ICD-10-PCS | Performed by: STUDENT IN AN ORGANIZED HEALTH CARE EDUCATION/TRAINING PROGRAM

## 2022-10-12 PROCEDURE — 88311 DECALCIFY TISSUE: CPT

## 2022-10-12 PROCEDURE — 6370000000 HC RX 637 (ALT 250 FOR IP): Performed by: STUDENT IN AN ORGANIZED HEALTH CARE EDUCATION/TRAINING PROGRAM

## 2022-10-12 PROCEDURE — 2580000003 HC RX 258: Performed by: NURSE ANESTHETIST, CERTIFIED REGISTERED

## 2022-10-12 PROCEDURE — 87070 CULTURE OTHR SPECIMN AEROBIC: CPT

## 2022-10-12 PROCEDURE — 3600000002 HC SURGERY LEVEL 2 BASE: Performed by: STUDENT IN AN ORGANIZED HEALTH CARE EDUCATION/TRAINING PROGRAM

## 2022-10-12 PROCEDURE — 7100000000 HC PACU RECOVERY - FIRST 15 MIN: Performed by: STUDENT IN AN ORGANIZED HEALTH CARE EDUCATION/TRAINING PROGRAM

## 2022-10-12 PROCEDURE — 3700000000 HC ANESTHESIA ATTENDED CARE: Performed by: STUDENT IN AN ORGANIZED HEALTH CARE EDUCATION/TRAINING PROGRAM

## 2022-10-12 PROCEDURE — 87205 SMEAR GRAM STAIN: CPT

## 2022-10-12 PROCEDURE — 73610 X-RAY EXAM OF ANKLE: CPT

## 2022-10-12 PROCEDURE — 2500000003 HC RX 250 WO HCPCS: Performed by: NURSE ANESTHETIST, CERTIFIED REGISTERED

## 2022-10-12 PROCEDURE — 99232 SBSQ HOSP IP/OBS MODERATE 35: CPT | Performed by: NURSE PRACTITIONER

## 2022-10-12 PROCEDURE — 2500000003 HC RX 250 WO HCPCS: Performed by: STUDENT IN AN ORGANIZED HEALTH CARE EDUCATION/TRAINING PROGRAM

## 2022-10-12 PROCEDURE — 6360000002 HC RX W HCPCS: Performed by: INTERNAL MEDICINE

## 2022-10-12 PROCEDURE — 87176 TISSUE HOMOGENIZATION CULTR: CPT

## 2022-10-12 PROCEDURE — 82947 ASSAY GLUCOSE BLOOD QUANT: CPT

## 2022-10-12 PROCEDURE — 2720000010 HC SURG SUPPLY STERILE: Performed by: STUDENT IN AN ORGANIZED HEALTH CARE EDUCATION/TRAINING PROGRAM

## 2022-10-12 DEVICE — IMPLANTABLE DEVICE: Type: IMPLANTABLE DEVICE | Site: FOOT | Status: FUNCTIONAL

## 2022-10-12 RX ORDER — FENTANYL CITRATE 50 UG/ML
INJECTION, SOLUTION INTRAMUSCULAR; INTRAVENOUS PRN
Status: DISCONTINUED | OUTPATIENT
Start: 2022-10-12 | End: 2022-10-12 | Stop reason: SDUPTHER

## 2022-10-12 RX ORDER — MIDAZOLAM HYDROCHLORIDE 1 MG/ML
INJECTION INTRAMUSCULAR; INTRAVENOUS PRN
Status: DISCONTINUED | OUTPATIENT
Start: 2022-10-12 | End: 2022-10-12 | Stop reason: SDUPTHER

## 2022-10-12 RX ORDER — ESMOLOL HYDROCHLORIDE 10 MG/ML
INJECTION INTRAVENOUS PRN
Status: DISCONTINUED | OUTPATIENT
Start: 2022-10-12 | End: 2022-10-12 | Stop reason: SDUPTHER

## 2022-10-12 RX ORDER — ONDANSETRON 2 MG/ML
INJECTION INTRAMUSCULAR; INTRAVENOUS PRN
Status: DISCONTINUED | OUTPATIENT
Start: 2022-10-12 | End: 2022-10-12 | Stop reason: SDUPTHER

## 2022-10-12 RX ORDER — SODIUM CHLORIDE 9 MG/ML
INJECTION, SOLUTION INTRAVENOUS CONTINUOUS PRN
Status: DISCONTINUED | OUTPATIENT
Start: 2022-10-12 | End: 2022-10-12 | Stop reason: SDUPTHER

## 2022-10-12 RX ORDER — BUPIVACAINE HYDROCHLORIDE AND EPINEPHRINE 5; 5 MG/ML; UG/ML
INJECTION, SOLUTION EPIDURAL; INTRACAUDAL; PERINEURAL PRN
Status: DISCONTINUED | OUTPATIENT
Start: 2022-10-12 | End: 2022-10-12 | Stop reason: ALTCHOICE

## 2022-10-12 RX ORDER — PROPOFOL 10 MG/ML
INJECTION, EMULSION INTRAVENOUS PRN
Status: DISCONTINUED | OUTPATIENT
Start: 2022-10-12 | End: 2022-10-12 | Stop reason: SDUPTHER

## 2022-10-12 RX ORDER — HYDROMORPHONE HYDROCHLORIDE 1 MG/ML
0.25 INJECTION, SOLUTION INTRAMUSCULAR; INTRAVENOUS; SUBCUTANEOUS EVERY 5 MIN PRN
Status: DISCONTINUED | OUTPATIENT
Start: 2022-10-12 | End: 2022-10-12 | Stop reason: HOSPADM

## 2022-10-12 RX ORDER — DIPHENHYDRAMINE HYDROCHLORIDE 50 MG/ML
12.5 INJECTION INTRAMUSCULAR; INTRAVENOUS
Status: DISCONTINUED | OUTPATIENT
Start: 2022-10-12 | End: 2022-10-12 | Stop reason: HOSPADM

## 2022-10-12 RX ORDER — EPHEDRINE SULFATE/0.9% NACL/PF 50 MG/5 ML
SYRINGE (ML) INTRAVENOUS PRN
Status: DISCONTINUED | OUTPATIENT
Start: 2022-10-12 | End: 2022-10-12 | Stop reason: SDUPTHER

## 2022-10-12 RX ORDER — LIDOCAINE HYDROCHLORIDE 20 MG/ML
INJECTION, SOLUTION EPIDURAL; INFILTRATION; INTRACAUDAL; PERINEURAL PRN
Status: DISCONTINUED | OUTPATIENT
Start: 2022-10-12 | End: 2022-10-12 | Stop reason: SDUPTHER

## 2022-10-12 RX ORDER — ONDANSETRON 2 MG/ML
4 INJECTION INTRAMUSCULAR; INTRAVENOUS
Status: DISCONTINUED | OUTPATIENT
Start: 2022-10-12 | End: 2022-10-12 | Stop reason: HOSPADM

## 2022-10-12 RX ORDER — DEXAMETHASONE SODIUM PHOSPHATE 10 MG/ML
INJECTION, SOLUTION INTRAMUSCULAR; INTRAVENOUS PRN
Status: DISCONTINUED | OUTPATIENT
Start: 2022-10-12 | End: 2022-10-12 | Stop reason: SDUPTHER

## 2022-10-12 RX ORDER — MAGNESIUM HYDROXIDE 1200 MG/15ML
LIQUID ORAL CONTINUOUS PRN
Status: COMPLETED | OUTPATIENT
Start: 2022-10-12 | End: 2022-10-12

## 2022-10-12 RX ADMIN — INSULIN LISPRO 4 UNITS: 100 INJECTION, SOLUTION INTRAVENOUS; SUBCUTANEOUS at 18:13

## 2022-10-12 RX ADMIN — Medication 10 MG: at 08:05

## 2022-10-12 RX ADMIN — ALBUTEROL SULFATE 4 PUFF: 90 AEROSOL, METERED RESPIRATORY (INHALATION) at 08:12

## 2022-10-12 RX ADMIN — FENTANYL CITRATE 50 MCG: 50 INJECTION, SOLUTION INTRAMUSCULAR; INTRAVENOUS at 09:06

## 2022-10-12 RX ADMIN — METRONIDAZOLE 500 MG: 500 TABLET ORAL at 21:44

## 2022-10-12 RX ADMIN — DEXAMETHASONE SODIUM PHOSPHATE 10 MG: 10 INJECTION, SOLUTION INTRAMUSCULAR; INTRAVENOUS at 07:38

## 2022-10-12 RX ADMIN — ESMOLOL HYDROCHLORIDE 25 MG: 10 INJECTION, SOLUTION INTRAVENOUS at 12:32

## 2022-10-12 RX ADMIN — INSULIN GLARGINE 10 UNITS: 100 INJECTION, SOLUTION SUBCUTANEOUS at 21:44

## 2022-10-12 RX ADMIN — FENTANYL CITRATE 50 MCG: 50 INJECTION, SOLUTION INTRAMUSCULAR; INTRAVENOUS at 12:08

## 2022-10-12 RX ADMIN — SODIUM CHLORIDE: 9 INJECTION, SOLUTION INTRAVENOUS at 07:24

## 2022-10-12 RX ADMIN — MIDAZOLAM 1 MG: 1 INJECTION INTRAMUSCULAR; INTRAVENOUS at 07:24

## 2022-10-12 RX ADMIN — CETIRIZINE HYDROCHLORIDE 10 MG: 10 TABLET ORAL at 20:00

## 2022-10-12 RX ADMIN — GLIPIZIDE 20 MG: 10 TABLET ORAL at 18:13

## 2022-10-12 RX ADMIN — FENTANYL CITRATE 100 MCG: 50 INJECTION, SOLUTION INTRAMUSCULAR; INTRAVENOUS at 07:32

## 2022-10-12 RX ADMIN — ENOXAPARIN SODIUM 30 MG: 100 INJECTION SUBCUTANEOUS at 22:03

## 2022-10-12 RX ADMIN — ATORVASTATIN CALCIUM 20 MG: 20 TABLET, FILM COATED ORAL at 19:59

## 2022-10-12 RX ADMIN — LIDOCAINE HYDROCHLORIDE 80 MG: 20 INJECTION, SOLUTION EPIDURAL; INFILTRATION; INTRACAUDAL; PERINEURAL at 07:32

## 2022-10-12 RX ADMIN — FENTANYL CITRATE 50 MCG: 50 INJECTION, SOLUTION INTRAMUSCULAR; INTRAVENOUS at 09:38

## 2022-10-12 RX ADMIN — METRONIDAZOLE 500 MG: 500 TABLET ORAL at 05:25

## 2022-10-12 RX ADMIN — SODIUM CHLORIDE, PRESERVATIVE FREE 10 ML: 5 INJECTION INTRAVENOUS at 05:26

## 2022-10-12 RX ADMIN — Medication 10 MG: at 07:55

## 2022-10-12 RX ADMIN — PROPOFOL 150 MG: 10 INJECTION, EMULSION INTRAVENOUS at 07:32

## 2022-10-12 RX ADMIN — GABAPENTIN 900 MG: 300 CAPSULE ORAL at 19:59

## 2022-10-12 RX ADMIN — FENTANYL CITRATE 50 MCG: 50 INJECTION, SOLUTION INTRAMUSCULAR; INTRAVENOUS at 10:44

## 2022-10-12 RX ADMIN — HYDROMORPHONE HYDROCHLORIDE 0.25 MG: 1 INJECTION, SOLUTION INTRAMUSCULAR; INTRAVENOUS; SUBCUTANEOUS at 13:30

## 2022-10-12 RX ADMIN — CEFEPIME 2000 MG: 2 INJECTION, POWDER, FOR SOLUTION INTRAVENOUS at 18:16

## 2022-10-12 RX ADMIN — HYDROCODONE BITARTRATE AND ACETAMINOPHEN 2 TABLET: 5; 325 TABLET ORAL at 19:59

## 2022-10-12 RX ADMIN — ONDANSETRON 4 MG: 2 INJECTION INTRAMUSCULAR; INTRAVENOUS at 11:06

## 2022-10-12 RX ADMIN — FENTANYL CITRATE 50 MCG: 50 INJECTION, SOLUTION INTRAMUSCULAR; INTRAVENOUS at 07:43

## 2022-10-12 RX ADMIN — CEFEPIME 2000 MG: 2 INJECTION, POWDER, FOR SOLUTION INTRAVENOUS at 05:29

## 2022-10-12 ASSESSMENT — PAIN DESCRIPTION - LOCATION
LOCATION: FOOT
LOCATION: FOOT

## 2022-10-12 ASSESSMENT — PAIN SCALES - GENERAL
PAINLEVEL_OUTOF10: 10
PAINLEVEL_OUTOF10: 2
PAINLEVEL_OUTOF10: 2
PAINLEVEL_OUTOF10: 5

## 2022-10-12 ASSESSMENT — ENCOUNTER SYMPTOMS
RESPIRATORY NEGATIVE: 1
GASTROINTESTINAL NEGATIVE: 1

## 2022-10-12 ASSESSMENT — PAIN DESCRIPTION - ORIENTATION
ORIENTATION: RIGHT
ORIENTATION: RIGHT

## 2022-10-12 ASSESSMENT — PAIN DESCRIPTION - DESCRIPTORS: DESCRIPTORS: ACHING

## 2022-10-12 NOTE — DISCHARGE INSTRUCTIONS
Podiatric Post Operative Instructions: You have had a surgical procedure on your right foot. Fluids and Diet:  Begin with clear liquids, broth, dry toast, and crackers. If not nauseated then resume your regular pre-operative diet when you are ready    Medications: Take your prescriptions as directed  You are receiving new prescriptions for percocet. If your pain is not severe then you may take the non-prescription medication that you normally take for aches and pains  You may resume your regularly scheduled medications (unless otherwise directed)  If any side effects or adverse reactions occur, discontinue the medication and contact your doctor. Review the patient drug information that is provided before you take any medication    Ambulation and Activity:  You are advised to go directly home from the hospital  Use crutches, walker, or wheelchair as needed  You may put weight on the operated foot for [address]. You should keep dressing intact at all times when awake. Avoid stairs if possible. Do not lift or move heavy objects  Do not drive until cleared by your physician    Bandage and Wound Care Instructions:  Keep bandage clean and dry  Do not shower or bathe the operative extremity  Do not remove the bandage (unless otherwise directed)   Do not attempt to put anything between the fixator and your skin, some itching is normal.    Ice and Elevation:  Elevate operative extremity as much as possible to reduce swelling and discomfort. Elevate with 2 pillows at or above the level of the heart for the first 72 hours. Ice:  [facility] dispensed insulated ice bag over the bandage 20 minutes of every hour while awake for the first 72 hours. You may ice behind the knee as well. Special Instructions: Call your doctor immediately if you develop any of the following.   Fever over 100.4 degrees Fahrenheit by mouth - take your temperature daily until your first follow up visit. Pain not relieved by medication ordered  Swelling, increased redness, warmth, or hardness around operative area. Numb, tingling or cold toes. Toe(s) become white or bluish  Bandage becomes wet, soiled, or blood soaked (small amount of bleeding may be normal)  Increased or progressive drainage from surgical area. Follow up instructions: You will need to follow up with Dr. Diamond Rutherford DPM.  Call when you get home to schedule or confirm your appointment. Call your Podiatrist office if you have any questions or concerns.

## 2022-10-12 NOTE — ANESTHESIA PRE PROCEDURE
Department of Anesthesiology  Preprocedure Note       Name:  Lindy Ron   Age:  78 y.o.  :  1955                                          MRN:  [id number]         Date:  10/12/2022      Surgeon: Maynor Rodriguez):  Hilary Espinoza DPM    Procedure: Procedure(s):  APPLICATION EXTERNAL FIXATOR RIGHT FOOT - SERA    Medications prior to admission:   Prior to Admission medications    Medication Sig Start Date End Date Taking? Authorizing Provider   cefepime (MAXIPIME) infusion Infuse 2,000 mg intravenously in the morning and 2,000 mg in the evening. Through 2022 Compound per protocol. 10/7/22 11/18/22 Yes Petty Schneider MD   vancomycin (VANCOCIN) infusion Infuse 1,500 mg intravenously every 24 hours Through 2022 Compound per protocol. 10/7/22 11/18/22 Yes Petty Schneider MD   ciprofloxacin (CIPRO) 500 MG tablet Take 500 mg by mouth 2 times daily 9/30 x 14 days 22   Historical Provider, MD   TRUEPLUS PEN NEEDLES 31G X 8 MM MISC  21   Historical Provider, MD   mupirocin (BACTROBAN) 2 % ointment Apply topically 2 times daily TO FOOT WOUND. 21   Historical Provider, MD   amLODIPine (NORVASC) 5 MG tablet Take 5 mg by mouth daily 10/20/21   Historical Provider, MD   LANTUS SOLOSTAR 100 UNIT/ML injection pen Inject 15 Units into the skin nightly  Patient taking differently: Inject 10 Units into the skin 2 times daily 21   ROCHELLE Chavira - MAGDY   JANUVIA 100 MG tablet Take 100 mg by mouth daily  20   Historical Provider, MD   Misc.  Devices MISC 1 PAIR OF DIABETIC SHOES (1 LEFT/ 1 RIGHT)  1-3 PAIRS OF INSERTS (LEFT/ RIGHT) 3/5/20   Clarissa Jenkins DPM   cetirizine (ZYRTEC) 10 MG tablet Take 10 mg by mouth nightly  10/21/19   Historical Provider, MD   simvastatin (ZOCOR) 40 MG tablet Take 40 mg by mouth nightly  18   Historical Provider, MD   glipiZIDE (GLUCOTROL) 10 MG tablet Take 20 mg by mouth 2 times daily (before meals) Takes 2 tabs (=20mg) BID Historical Provider, MD   aspirin 81 MG tablet Take 81 mg by mouth daily    Historical Provider, MD   gabapentin (NEURONTIN) 300 MG capsule Take 900 mg by mouth 3 times daily.  Take 3 caps (=900mg) 3 times a day    Historical Provider, MD       Current medications:    Current Facility-Administered Medications   Medication Dose Route Frequency Provider Last Rate Last Admin    vancomycin (VANCOCIN) 1,250 mg in dextrose 5 % 250 mL IVPB  1,250 mg IntraVENous Q24H Namon Jason, DPM   Stopped at 10/11/22 1450    metroNIDAZOLE (FLAGYL) tablet 500 mg  500 mg Oral 3 times per day Stacy Rainey MD   500 mg at 10/12/22 0525    cefepime (MAXIPIME) 2000 mg IVPB minibag  2,000 mg IntraVENous Q12H Stacy Rainey MD 12.5 mL/hr at 10/12/22 0529 2,000 mg at 10/12/22 0529    vancomycin (VANCOCIN) intermittent dosing (placeholder)   Other RX Placeholder Namon Jason, DPM        enoxaparin Sodium (LOVENOX) injection 30 mg  30 mg SubCUTAneous BID Vera Gomez, DPM   30 mg at 10/11/22 0932    insulin glargine (LANTUS) injection vial 10 Units  10 Units SubCUTAneous BID Ronnie oRmero APRN - NP   10 Units at 10/11/22 2303    glipiZIDE (GLUCOTROL) tablet 20 mg  20 mg Oral BID AC Ronnie Romero APRN - NP   20 mg at 10/11/22 1542    cetirizine (ZYRTEC) tablet 10 mg  10 mg Oral Nightly Ronnie Romero APRN - NP   10 mg at 10/11/22 2303    nicotine (NICODERM CQ) 21 MG/24HR 1 patch  1 patch TransDERmal Daily Ronnie Romero APRN - NP   1 patch at 10/11/22 0931    HYDROcodone-acetaminophen (NORCO) 5-325 MG per tablet 1 tablet  1 tablet Oral Q6H PRN Terry Searing, APRN - CNP   1 tablet at 10/11/22 2303    Or    HYDROcodone-acetaminophen (NORCO) 5-325 MG per tablet 2 tablet  2 tablet Oral Q6H PRN Terry Searing, APRN - CNP   2 tablet at 10/06/22 0116    diphenhydrAMINE (BENADRYL) tablet 25 mg  25 mg Oral Q6H PRN Terry Searing, APRN - CNP   25 mg at 10/05/22 1949    sodium chloride flush 0.9 % injection 5-40 mL 5-40 mL IntraVENous 2 times per day Ammy Tran DPM   10 mL at 10/11/22 0933    sodium chloride flush 0.9 % injection 5-40 mL  5-40 mL IntraVENous PRN MIA SpenceM   10 mL at 10/12/22 0526    0.9 % sodium chloride infusion   IntraVENous PRN Ammy Tran DPM   Stopped at 10/11/22 1106    ondansetron (ZOFRAN-ODT) disintegrating tablet 4 mg  4 mg Oral Q8H PRN Ammy Tran DPM        Or    ondansetron (ZOFRAN) injection 4 mg  4 mg IntraVENous Q6H PRN Ammy Tran DPM        polyethylene glycol (GLYCOLAX) packet 17 g  17 g Oral Daily PRN Ammy Tran DPM        acetaminophen (TYLENOL) tablet 650 mg  650 mg Oral Q6H PRN Ammy Tran DPM        Or    acetaminophen (TYLENOL) suppository 650 mg  650 mg Rectal Q6H PRN Ammy Tran DPM        insulin lispro (HUMALOG) injection vial 0-8 Units  0-8 Units SubCUTAneous TID WC Ammy Trna DPM   2 Units at 10/11/22 1732    insulin lispro (HUMALOG) injection vial 0-4 Units  0-4 Units SubCUTAneous Nightly Ammy Tran DPM   4 Units at 10/09/22 2148    glucose chewable tablet 16 g  4 tablet Oral PRN Ammy Tran DPM        dextrose bolus 10% 125 mL  125 mL IntraVENous PRN MIA Spence        Or    dextrose bolus 10% 250 mL  250 mL IntraVENous PRN Ammy Tran DPM        glucagon (rDNA) injection 1 mg  1 mg SubCUTAneous PRN MIA Spence        dextrose 10 % infusion   IntraVENous Continuous PRN Ammy Tran DPM        gabapentin (NEURONTIN) capsule 900 mg  900 mg Oral TID MIA SpenceM   900 mg at 10/11/22 2303    aspirin EC tablet 81 mg  81 mg Oral Daily MIA SpenceM   81 mg at 10/11/22 0932    amLODIPine (NORVASC) tablet 5 mg  5 mg Oral Daily Ammy Tran DPM   5 mg at 10/11/22 0932    atorvastatin (LIPITOR) tablet 20 mg  20 mg Oral Nightly Ammy Tran DPM   20 mg at 10/11/22 2304    albuterol sulfate HFA (PROVENTIL;VENTOLIN;PROAIR) 108 (90 Base) MCG/ACT inhaler 2 puff  2 puff Inhalation Q4H PRN Phyllis Owens DPM           Allergies:     Allergies   Allergen Reactions    Morphine Itching    Other Other (See Comments)     Other reaction(s): Unknown    Percocet [Oxycodone-Acetaminophen]      Facial swelling       Problem List:    Patient Active Problem List   Diagnosis Code    Essential hypertension I10    Type II or unspecified type diabetes mellitus without mention of complication, not stated as uncontrolled E11.9    Neuropathy G62.9    Vertigo R44    Charcot foot due to diabetes mellitus ([facility] E11.610    Hyperlipidemia E78.5    Cellulitis L03.90    Cellulitis of left foot L03. 80    Right foot infection L08.9    Diabetic polyneuropathy associated with type 2 diabetes mellitus ([facility] E11.42    Foreign body (FB) in soft tissue M79.5    Cellulitis of left leg L03. 116    Abscess of right foot L02.611    Chest pain at rest R07.9    Tobacco abuse Z72.0    Type 2 diabetes mellitus treated with insulin ([facility]) E11.9, Z79.4    ALEJANDRO (acute kidney injury) ([facility] N17.9    Bandemia D72.825    Chronic multifocal osteomyelitis of right foot ([facility]) M86.371    Diabetic infection of right foot ([facility]) E11.628, L08.9    Acquired hammer toe deformity of lesser toe of right foot M20.41    Post-op pain G89.18    Right foot pain M79.671    Hallux hammertoe, right M20.31    Osteomyelitis ([facility]) M86.9    Wound dehiscence T81.30XA    Diabetic foot ([facility]) E11.8    Hyperglycemia due to diabetes mellitus ([facility]) E11.65    Foot ulcer ([facility]) L97.509    Cellulitis of right foot L03.115    Type 2 diabetes mellitus with right diabetic foot ulcer ([facility]) E11.621, L97.519    Charcot's joint of right foot M14.671    Stage 3b chronic kidney disease ([facility]) N18.32       Past Medical History:        Diagnosis Date    Abscess of right foot 8/4/2018    Acquired hammer toe deformity of lesser toe of right foot     ALEJANDRO (acute kidney injury) ([facility] 8/5/2018    Cellulitis 4/24/2018    Cellulitis of left foot 4/24/2018    Cellulitis of right foot     and abscess    Charcot foot due to diabetes mellitus [facility]) 2014    Right foot     Chest pain at rest 8/4/2018    Chronic multifocal osteomyelitis of right foot ([facility]     Diabetic polyneuropathy associated with type 2 diabetes mellitus ([facility]     Essential hypertension     Fractures, multiple 07/21/2014    Right foot fractures     Hyperlipidemia     Hypertension     Leukocytosis     Neuropathy     diabetic with charcot affecting the right foot    Pain in right foot     redness and swelling    Pneumonia 2009    Right foot infection     Right foot pain     Tobacco abuse 4/24/2018    Type II or unspecified type diabetes mellitus without mention of complication, not stated as uncontrolled     Vertigo     Well controlled type 2 diabetes mellitus with neurological manifestations ([facility] 8/4/2018    Wound dehiscence     Wound, open     Left Ball of  foot, pt. stepped on sharp object. Covered by dressing.     Wound, open     Right posterior -Diabetic Ulcer       Past Surgical History:        Procedure Laterality Date    APPENDECTOMY      at age 24    ARTHROPLASTY Right 12/11/2020    RIGHT HALLUX EXOSTECTOMY AND 3RD DIGIT EXTENSIOR TENOTOMY performed by Clifford Mcclendon DPM at [address] [facility] Left 04/24/2018    I&D foreign body removal    FOOT DEBRIDEMENT Right 3/20/2020    RIGHT  FOOT   INCISION AND DRAINAGE WITH BONE BIOPSY performed by Clifford Mcclendon DPM at [facility] Right 6/8/2021    FOOT DEBRIDEMENT INCISION AND DRAINAGE performed by Clifford Mcclendon DPM at [facility] Right 9/16/2021    RIGHT FOOT  INCISION AND DRAINAGE   WITH PULSE LAVAGE performed by Clifford Mcclendon DPM at [place] [address] Right 6/11/2021    RIGHT FOOT FLAP CLOSURE performed by Clifford Mcclendon DPM at [facility] IR INS PICC VAD W SQ PORT GREATER THAN 5  10/7/2022    IR INS PICC VAD W SQ PORT GREATER THAN 5 10/7/2022 [facility] SPECIAL PROCEDURES    KNEE ARTHROSCOPY Right 1989    KNEE ARTHROSCOPY Left 1990    KNEE SURGERY Bilateral 1983    arthroscopy    NECK SURGERY  1987    AK DEEP [address] FOOT INFEC,1 BURSA Left 4/24/2018    LEFT FOOT MULTIPLE  INCISIONS  AND DRAINAGE AND REMOVAL FOREIGN BODY  IRENE performed by Tyrone Woodson DPM at [place] [address] History:    Social History     Tobacco Use    Smoking status: Every Day     Packs/day: 1.00     Types: Cigarettes    Smokeless tobacco: Never   Substance Use Topics    Alcohol use: No                                Ready to quit: Not Answered  Counseling given: Not Answered      Vital Signs (Current):   Vitals:    10/11/22 2333 10/12/22 0427 10/12/22 0558 10/12/22 0651   BP:  133/77  124/66   Pulse:  65  62   Resp: 16   16   Temp:  98.4 °F (36.9 °C)  97.5 °F (36.4 °C)   TempSrc:  Oral  Oral   SpO2:  94%  97%   Weight:   234 lb (106.1 kg)    Height:                                                  BP Readings from Last 3 Encounters:   10/12/22 124/66   09/13/22 (!) 151/70   12/03/21 132/68       NPO Status:                                                                                 BMI:   Wt Readings from Last 3 Encounters:   10/12/22 234 lb (106.1 kg)   10/05/22 234 lb (106.1 kg)   10/04/22 241 lb (109.3 kg)     Body mass index is 32.64 kg/m².     CBC:   Lab Results   Component Value Date/Time    WBC 8.8 10/05/2022 06:01 AM    RBC 3.48 10/05/2022 06:01 AM    HGB 10.2 10/05/2022 06:01 AM    HCT 30.8 10/05/2022 06:01 AM    MCV 88.5 10/05/2022 06:01 AM    RDW 13.2 10/05/2022 06:01 AM     10/05/2022 06:01 AM       CMP:   Lab Results   Component Value Date/Time     10/05/2022 06:01 AM    K 4.4 10/05/2022 06:01 AM     10/05/2022 06:01 AM    CO2 24 10/05/2022 06:01 AM    BUN 26 10/10/2022 06:25 AM    CREATININE 1.97 10/10/2022 06:25 AM    GFRAA 38 09/13/2022 06:41 PM    LABGLOM 37 10/10/2022 06:25 AM    GLUCOSE 184 10/05/2022 06:01 AM    PROT 7.2 12/03/2021 02:30 PM    CALCIUM 8.8 10/05/2022 06:01 AM    BILITOT 0.35 12/03/2021 02:30 PM ALKPHOS 113 12/03/2021 02:30 PM    AST 13 12/03/2021 02:30 PM    ALT 14 12/03/2021 02:30 PM       POC Tests:   Recent Labs     10/12/22  0627   POCGLU 114*       Coags: No results found for: PROTIME, INR, APTT    HCG (If Applicable): No results found for: PREGTESTUR, PREGSERUM, HCG, HCGQUANT     ABGs: No results found for: PHART, PO2ART, QXU5UXU, VOK9PZQ, BEART, Y1INUDSX     Type & Screen (If Applicable):  No results found for: LABABO, LABRH    Drug/Infectious Status (If Applicable):  No results found for: HIV, HEPCAB    COVID-19 Screening (If Applicable):   Lab Results   Component Value Date/Time    COVID19 DETECTED 05/26/2021 12:06 PM    COVID19 Not Detected 12/07/2020 03:10 PM    COVID19 Not Detected 08/08/2020 10:02 PM           Anesthesia Evaluation    Airway: Mallampati: I  TM distance: >3 FB   Neck ROM: full  Mouth opening: > = 3 FB   Dental:          Pulmonary:                              Cardiovascular:    (+) hypertension:,                   Neuro/Psych:               GI/Hepatic/Renal:   (+) renal disease: CRI,           Endo/Other:    (+) Diabetes, . Abdominal:             Vascular:           Other Findings:           Anesthesia Plan      general     ASA 3                                   Didi Shell MD   10/12/2022

## 2022-10-12 NOTE — PROGRESS NOTES
[facility] NOTE    Room # 2020/2020-01   Name: Radha Benítez            Sikh: non Episcopalian     Reason for visit: Routine    I visited the patient. Admit Date & Time: 10/4/2022  9:10 PM    Assessment:  Radha Benítez is a 78 y.o. male in the hospital because \"type 2 diabetes\". Upon entering the room pt was lying in bed. Pt appeared to be very tired      Intervention:  I introduced myself and my title as  I offered space for pt  to express feelings, needs, and concerns and provided a ministry presence. Pt did not express any feelings, needs, or concerns to this writer. Outcome:  Pt expressed gratitude for visit    Plan:  Chaplains will remain available to offer spiritual and emotional support as needed.     Electronically signed by Vivian Morrison on 10/12/2022 at 7:54 PM.  Natalia       10/12/22 1953   Encounter Summary   Service Provided For: Patient   Referral/Consult From: Benita   Last Encounter  10/12/22   Complexity of Encounter Low   Begin Time 1930   End Time  1935   Total Time Calculated 5 min   Encounter    Type Initial Screen/Assessment   Assessment/Intervention/Outcome   Assessment Calm   Intervention Active listening;Sustaining Presence/Ministry of presence   Outcome Engaged in conversation;Expressed Gratitude

## 2022-10-12 NOTE — PLAN OF CARE
Problem: Discharge Planning  Goal: Discharge to home or other facility with appropriate resources  10/12/2022 0015 by Adrien Cole RN  Outcome: Progressing     Problem: Chronic Conditions and Co-morbidities  Goal: Patient's chronic conditions and co-morbidity symptoms are monitored and maintained or improved  10/12/2022 0015 by Adrien Cole RN  Outcome: Progressing  Flowsheets (Taken 10/12/2022 0015)  Care Plan - Patient's Chronic Conditions and Co-Morbidity Symptoms are Monitored and Maintained or Improved:   Monitor and assess patient's chronic conditions and comorbid symptoms for stability, deterioration, or improvement   Collaborate with multidisciplinary team to address chronic and comorbid conditions and prevent exacerbation or deterioration   Update acute care plan with appropriate goals if chronic or comorbid symptoms are exacerbated and prevent overall improvement and discharge     Problem: Safety - Adult  Goal: Free from fall injury  10/12/2022 0015 by Adrien Cole RN  Outcome: Progressing     Problem: ABCDS Injury Assessment  Goal: Absence of physical injury  10/12/2022 0015 by Adrien Cole RN  Outcome: Progressing     Problem: Respiratory - Adult  Goal: Achieves optimal ventilation and oxygenation  10/12/2022 0015 by Adrien Cole RN  Outcome: Progressing     Problem: Skin/Tissue Integrity - Adult  Goal: Skin integrity remains intact  10/12/2022 0015 by Adrien Cole RN  Outcome: Progressing  Flowsheets  Taken 10/11/2022 1948 by Adrien Cole RN  Skin Integrity Remains Intact: Monitor for areas of redness and/or skin breakdown    Problem: Musculoskeletal - Adult  Goal: Return mobility to safest level of function  10/12/2022 0015 by Adrien Cole RN  Outcome: Progressing     Problem: Musculoskeletal - Adult  Goal: Return ADL status to a safe level of function  10/12/2022 0015 by Adrien Cole RN  Outcome: Progressing     Problem: Infection - Adult  Goal: Absence of infection at discharge  10/12/2022 0015 by Destini Howell RN  Outcome: Progressing     Problem: Infection - Adult  Goal: Absence of infection during hospitalization  10/12/2022 0015 by Destini Howell RN  Outcome: Progressing     Problem: Metabolic/Fluid and Electrolytes - Adult  Goal: Electrolytes maintained within normal limits  10/12/2022 0015 by Destini Howell RN  Outcome: Progressing     Problem: Metabolic/Fluid and Electrolytes - Adult  Goal: Hemodynamic stability and optimal renal function maintained  10/12/2022 0015 by Destini Howell RN  Outcome: Progressing     Problem: Metabolic/Fluid and Electrolytes - Adult  Goal: Glucose maintained within prescribed range  10/12/2022 0015 by Destini Howell RN  Outcome: Progressing     Problem: Pain  Goal: Verbalizes/displays adequate comfort level or baseline comfort level  10/12/2022 0015 by Destini Howell RN  Outcome: Progressing  Flowsheets (Taken 10/12/2022 0015)  Verbalizes/displays adequate comfort level or baseline comfort level:   Encourage patient to monitor pain and request assistance   Administer analgesics based on type and severity of pain and evaluate response   Consider cultural and social influences on pain and pain management   Assess pain using appropriate pain scale   Implement non-pharmacological measures as appropriate and evaluate response

## 2022-10-12 NOTE — BRIEF OP NOTE
PODIATRY OP NOTE    PATIENT NAME: Lila Landers  YOB: 1955  -  78 y.o. male  MRN: [id number]  DATE: 10/12/2022  BILLING #: [id number]    Surgeon(s):  Christiana Sifuentes DPM     ASSISTANTS: Ana Larsen DPM PGY-2    PRE-OP DIAGNOSIS:   Charcot deformity, right foot  Cellulitis, right foot  Type II diabetic foot ulcer down to subcutaneous layer, right foot  Type 2 diabetes with peripheral neuropathy  Hypertension    POST-OP DIAGNOSIS: Same as above. PROCEDURE:   Midfoot osteotomy, right foot  External fixator application, right lower extremity  Excision of wound, right foot  Skin flap closure, right foot    ANESTHESIA: general    HEMOSTASIS: Pneumatic thigh tourniquet @ 300 mmHg for 120 minutes. ESTIMATED BLOOD LOSS: Less than 20cc. MATERIALS:   Implant Name Type Inv. Item Serial No.  Lot No. LRB No. Used Action   PIN FIX BLNT 2.4X300 MM [employer] - [id number]  PIN FIX BLNT 2.4X300 MM [employer]  Right 3 Implanted   PIN FIX BLNT 2.4X300 MM [employer] - [id number]  PIN FIX BLNT 2.4X300 MM [employer]  Right 1 Implanted and Explanted       INJECTABLES: 10cc 0.5% marcaine with epi    SPECIMEN:   ID Type Source Tests Collected by Time Destination   1 : RIGHT FOOT TISSUE Tissue Foot CULTURE, TISSUE Christiana Sifuentes DPM 10/12/2022 0804    2 : MEDIAL CUNEIFORM BONE RIGHT FOOT Bone Foot CULTURE, ANAEROBIC AND AEROBIC Christiana Sifuentes DPM 10/12/2022 1012    A : MEDIAL CUNEIFORM BONE RIGHT FOOT Bone Foot SURGICAL PATHOLOGY Christiana Sifuentes DPM 10/12/2022 0005        COMPLICATIONS: none    FINDINGS: Increased calcaneal inclination angle and decreased talar declination angle noted after procedure. Medial arch was created after procedure. The patient was counseled at length about the risks of sheela Covid-19 during their perioperative period and any recovery window from their procedure.   The patient was made aware that sheela Covid-19  may worsen their prognosis for recovering from their procedure  and lend to a higher morbidity and/or mortality risk. All material risks, benefits, and reasonable alternatives including postponing the procedure were discussed. The patient does wish to proceed with the procedure at this time.     Gokul Boss DPM   Podiatric Medicine & Surgery   10/12/2022 at 12:44 PM

## 2022-10-12 NOTE — PROGRESS NOTES
Progress Note  Podiatric Medicine and Surgery     Subjective     CC: right foot wound    Patient seen and examined at bedside. OR today. NPO MN    HPI :  Donna Condon is a 78 y.o. male seen at [facility] for right foot wound. Patient is known to [address] [place] [address]. Patient has had Charcot deformity to the right foot for a long time. Patient had multiple admission due to right foot infection in the past.  Patient visited Dr. Amber Kwong 10//2022 and was told to report to the ED to get admitted immediately due to concern of cellulitis and possible osteomyelitis of the right foot.  would like to transfer care to [facility] for possible Charcot reconstruction. Patient denies other pedal complaint. PCP is Ajay Neal MD    ROS: Denies N/V/F/C/SOB/CP. Otherwise negative except at stated in the HPI.      Medications:  Scheduled Meds:   vancomycin  1,250 mg IntraVENous Q24H    metroNIDAZOLE  500 mg Oral 3 times per day    cefepime  2,000 mg IntraVENous Q12H    vancomycin (VANCOCIN) intermittent dosing (placeholder)   Other RX Placeholder    enoxaparin  30 mg SubCUTAneous BID    insulin glargine  10 Units SubCUTAneous BID    glipiZIDE  20 mg Oral BID AC    cetirizine  10 mg Oral Nightly    nicotine  1 patch TransDERmal Daily    sodium chloride flush  5-40 mL IntraVENous 2 times per day    insulin lispro  0-8 Units SubCUTAneous TID WC    insulin lispro  0-4 Units SubCUTAneous Nightly    gabapentin  900 mg Oral TID    aspirin  81 mg Oral Daily    amLODIPine  5 mg Oral Daily    atorvastatin  20 mg Oral Nightly       Continuous Infusions:   sodium chloride Stopped (10/11/22 1106)    dextrose         PRN Meds:HYDROcodone 5 mg - acetaminophen **OR** HYDROcodone 5 mg - acetaminophen, diphenhydrAMINE, sodium chloride flush, sodium chloride, ondansetron **OR** ondansetron, polyethylene glycol, acetaminophen **OR** acetaminophen, glucose, dextrose bolus **OR** dextrose bolus, glucagon (rDNA), dextrose, albuterol sulfate HFA    Objective     Vitals:  Patient Vitals for the past 8 hrs:   BP Temp Temp src Pulse Resp SpO2 Weight   10/12/22 0651 124/66 97.5 °F (36.4 °C) Oral 62 16 97 % --   10/12/22 0558 -- -- -- -- -- -- 234 lb (106.1 kg)   10/12/22 0427 133/77 98.4 °F (36.9 °C) Oral 65 -- 94 % --   10/11/22 2333 -- -- -- -- 16 -- --           Average, Min, and Max for last 24 hours Vitals:  TEMPERATURE:  Temp  Av °F (36.7 °C)  Min: 97.5 °F (36.4 °C)  Max: 98.4 °F (36.9 °C)    RESPIRATIONS RANGE: Resp  Av  Min: 16  Max: 16    PULSE RANGE: Pulse  Av.7  Min: 62  Max: 73    BLOOD PRESSURE RANGE:  Systolic (20YMG), FWY:485 , Min:122 , ZHC:311   ; Diastolic (19FCY), WYS:94, Min:65, Max:78      PULSE OXIMETRY RANGE: SpO2  Av %  Min: 94 %  Max: 98 %    I/O last 3 completed shifts: In: 478.4 [I.V.:100.1; IV Piggyback:378.4]  Out: 4775 [Urine:4775]    CBC:  No results for input(s): WBC, HGB, HCT, PLT, CRP in the last 72 hours. Invalid input(s):  ESR       BMP:  Recent Labs     10/10/22  0625   BUN 26*   CREATININE 1.97*          Coags:  No results for input(s): APTT, PROT, INR in the last 72 hours. Lab Results   Component Value Date    SEDRATE 64 (H) 10/04/2022     No results for input(s): CRP in the last 72 hours. Lower Extremity Physical Exam:  General: A&Ox3, NAD  Heart: Regular rate and rhythm. Lungs: Equal air entry. No increased effort. Abdomen: Soft, non-tender to palpation. Not distended. Lower Extremity Physical Exam: dressing left intact due to surgery  Vascular: DP and PT pulses are palpable +2/4. CFT <3 seconds to all digits. Hair growth is diminished to the level of the digits. Diffuse loss to nonpitting edema noted to the right lower extremity. Neuro: Saph/sural/SP/DP/plantar sensation diminished to light touch. Musculoskeletal: Muscle strength is 5/5 to all lower extremity muscle groups.  Gross deformity is Charcot deformity to the right foot     Dermatologic: Full thickness ulcer #1 located right first interspace and measures approximately 1 cm x 1 cm x 3 cm. Base is fibrotic. Periwound skin is macerated. Purulent drainage noted with no associated mal odor. Erythema noted to periwound with moderate associated increase in warmth. Does not probe to bone probe deep. Does not sinus track, or undermine. No fluctuance, crepitus, or induration. Full thickness ulcer #2 located to the medial plantar arch of the right foot. It is measured as 1.5 x 1.2 x 0.2 cm. Base is fibrotic. Periwound skin is hyperkeratotic.  o drainage with no odor. No erythema noted. Does not probe to bone, sinus tract, or undermine. No fluctuance, crepitus, or induration. Clinical Images:                  Imaging:   IR INSERT PICC VAD W SQ PORT >5 YEARS   Final Result      VL Lower Extremity Venous Right   Final Result      XR FOOT RIGHT (MIN 3 VIEWS)   Final Result   Air is seen in the plantar soft tissues of the medial midfoot. Chronic bony changes at the TMT joints compatible with a neuropathic   arthropathy. No obvious new bony erosions are seen. MRI ANKLE RIGHT WO CONTRAST   Final Result   Soft tissue defect of the medial plantar aspect of the midfoot. Thin   fluid-filled sinus tract extending into the deep soft tissues of the midfoot,   measuring up to 2.0 x 0.9 cm on the coronal sequence, raising suspicion for a   phlegmon/abscess. There is air in the adjacent soft tissues. Susceptibility artifact in the soft tissues adjacent to the soft tissue   defect could represent sequela of prior intervention or retained metallic   foreign body. No MR evidence of acute osteomyelitis. Chronic bony changes at the TMT joints compatible with a neuropathic   arthropathy. Additional chronic findings as described above. MRI FOOT RIGHT WO CONTRAST   Final Result   Soft tissue defect of the medial plantar aspect of the midfoot.   Thin   fluid-filled sinus tract extending into the deep soft tissues of the midfoot,   measuring up to 2.0 x 0.9 cm on the coronal sequence, raising suspicion for a   phlegmon/abscess. There is air in the adjacent soft tissues. Susceptibility artifact in the soft tissues adjacent to the soft tissue   defect could represent sequela of prior intervention or retained metallic   foreign body. No MR evidence of acute osteomyelitis. Chronic bony changes at the TMT joints compatible with a neuropathic   arthropathy. Additional chronic findings as described above. Cultures: moderate GPR, few GPC, few gram - coccobacilli      Naeem Garcia is a 78 y.o. male with   Cellulitis, right foot-IMPROVING  Abscess, right foot  Type II diabetic foot ulcer down to subcutaneous layer, right foot  Charcot deformity, right foot  Type 2 diabetes with peripheral neuropathy  Hypertension    Principal Problem:    Type 2 diabetes mellitus with right diabetic foot ulcer (HCC)  Active Problems:    Charcot's joint of right foot    Stage 3b chronic kidney disease ([facility] [address]    Essential hypertension  Resolved Problems:    * No resolved hospital problems. *       Plan     Patient examined and evaluated at bedside with Dr. Ander Garsia  Treatment options discussed in detail with the patient  X-ray from 10/4/2022 was reviewed: There is no soft tissue emphysema or acute osseous deformity noted. Significant periosteal changes noted to the midfoot indicating midfoot Charcot deformity. MRI of the foot and ankle : abscess noted to the midfoot. No OM. IV antibiotic: Vancomycin/cefepime. ID recommendation appreciated. Medical management per medicine. Appreciate recommendation.   OR today at 7:30 am. External fixator application, midfoot osteotomy vs. Incision and drainage of the right foot  Consent signed  Foot marked  NPO at MN  Hold morning AC  Dressing applied to Right foot: Wet-to-dry Betadine, DSD and Ace bandage  Discussed with Dr. Ally Darden Luke    DVT ppx: lovenox  and aspirin   Diet: carb control   Activity: Weightbearing as tolerated to Right lower extremity  Pain Control: [place] panel    [place] Sample, DPM   Podiatric Medicine & Surgery   10/12/2022 at 7:20 AM

## 2022-10-12 NOTE — PROGRESS NOTES
[facility]  Progress Note    Reji Ralph  MRN: [id number]  Date: 10/12/2022  LOS: 8     Reason for F/U :   Diabetic foot infection/abscess    Impression :   Right Charcot foot with longstanding history of infection/abscesses and has undergone multiple I&D procedures in the past  Chronic surgical wound on the plantar aspect of the foot  Recurrent deep midfoot soft tissue abscess within fluid-filled sinus tract noted in the medial plantar aspect of the foot and there is retained metallic foreign body    Recommendations: The patient continues on IV antimicrobial therapy with cefepime, metronidazole, vancomycin  The patient is scheduled to go to the operating room with the podiatry team this morning    Infection Control Recommendations:   Universal precautions    Discharge Planning:   Estimated Length of IV antimicrobials: 6 weeks  Patient will need Midline Catheter Insertion/ PICC line Insertion: No  Patient will need: Home IV , [place],  SNF,  LTAC: Undetermined  Patient willneed outpatient wound care: No    Medical Decision making / Summary of Stay:   Reji Ralph is a 78y.o.-year-old male who was initially admitted on 10/4/2022. Carlo Laboy has a history of a right sided Charcot foot deformity and has had a longstanding history with infections in the right foot. He did have an abscess for which he underwent I&D in June 2020 with MSSA isolated and the patient has had a residual wound which secondarily got infected and caused an abscess and in September 2021 he underwent an I&D of the abscess with MSSA, Enterobacter, Morganella isolated and he was discharged with IV antimicrobial therapy which he took for 6 weeks through October 22 and 2021. The patient did continue to have issues with wound dehiscence and had an open wound on the plantar aspect of the foot which was clean.      Seeing the patient in close to a year now and he has followed with the podiatrist and has had continued wound care and the wound has been improving with time. Patient has had a plantar foot midfoot ulceration. Patient did subsequently start to develop soft tissue swelling redness in his foot and the swelling started to extend into his leg and the patient did see Dr. Abrams who recommended that he come into the emergency room for evaluation. The patient has been admitted for an abscess in the right foot and chronic ulcerations on the plantar aspect of the foot and the patient is undergoing further work-up with MRI and consideration for an external fixator device. Was asked to evaluate and help with antibiotic choice. Current evaluation:10/12/2022    /77   Pulse 65   Temp 98.4 °F (36.9 °C) (Oral)   Resp 16   Ht 5' 11\" (1.803 m)   Wt 234 lb (106.1 kg)   SpO2 94%   BMI 32.64 kg/m²     Temperature Range: Temp: 98.4 °F (36.9 °C) Temp  Av °F (36.7 °C)  Min: 97.9 °F (36.6 °C)  Max: 98.4 °F (36.9 °C)    Seen and evaluated lying in bed, gently woken from sleep  He is scheduled for surgery this morning  He states he feels well. Denies pain, Fevers, chills, needs    Review of Systems   Constitutional: Negative. HENT: Negative. Respiratory: Negative. Cardiovascular: Negative. Gastrointestinal: Negative. Genitourinary: Negative. Musculoskeletal: Negative. Skin: Negative. Neurological: Negative. Psychiatric/Behavioral: Negative. Physical Examination :     Physical Exam  Constitutional:       Appearance: Normal appearance. He is normal weight. HENT:      Head: Normocephalic and atraumatic. Pulmonary:      Effort: Pulmonary effort is normal. No respiratory distress. Abdominal:      General: Abdomen is flat. There is no distension. Palpations: Abdomen is soft. Musculoskeletal:         General: Normal range of motion. Skin:     General: Skin is warm and dry. Neurological:      General: No focal deficit present.       Mental Status: He is alert and oriented to person, place, and time. Mental status is at baseline. Psychiatric:         Mood and Affect: Mood normal.         Behavior: Behavior normal.         Thought Content: Thought content normal.       Laboratory data:   I have independently reviewed the followinglabs:  CBC with Differential: No results for input(s): WBC, HGB, HCT, PLT, SEGSPCT, BANDSPCT, LYMPHOPCT, MONOPCT, EOSPCT in the last 72 hours. BMP:   Recent Labs     10/10/22  0625   BUN 26*   CREATININE 1.97*     Hepatic Function Panel: No results for input(s): PROT, LABALBU, BILIDIR, IBILI, BILITOT, ALKPHOS, ALT, AST in the last 72 hours. No results found for: PROCAL  Lab Results   Component Value Date/Time    CRP 30.1 10/04/2022 10:18 PM    .3 09/13/2022 06:41 PM    .8 09/13/2021 11:04 AM     Lab Results   Component Value Date    SEDRATE 64 (H) 10/04/2022         No results found for: DDIMER  No results found for: FERRITIN  No results found for: LDH  No results found for: FIBRINOGEN    No results found for requested labs within last 30 days. Lab Results   Component Value Date/Time    COVID19 DETECTED 05/26/2021 12:06 PM    COVID19 Not Detected 12/07/2020 03:10 PM    COVID19 Not Detected 08/08/2020 10:02 PM       No results for input(s): VANCOTROUGH in the last 72 hours. Imaging Studies:   No new imaging    Cultures:     Culture, Anaerobic and Aerobic [[id number]] (Abnormal) Collected: 10/05/22 0845   Order Status: Completed Specimen: Wound Updated: 10/10/22 0747    Specimen Description . WOUND . FOOT    Direct Exam RARE NEUTROPHILS Abnormal      MODERATE GRAM POSITIVE RODS Abnormal      FEW GRAM POSITIVE COCCI IN PAIRS Abnormal      FEW GRAM NEGATIVE COCCOBACILLI Abnormal     Culture PREVOTELLA (BACTEROIDES BIVIUS) BIVIA BETA LACTAMASE POSITIVE MODERATE GROWTH Abnormal      NORMAL SKIN ARABELLA       Medications:      vancomycin  1,250 mg IntraVENous Q24H    metroNIDAZOLE  500 mg Oral 3 times per day    cefepime  2,000 mg IntraVENous Q12H    vancomycin (VANCOCIN) intermittent dosing (placeholder)   Other RX Placeholder    enoxaparin  30 mg SubCUTAneous BID    insulin glargine  10 Units SubCUTAneous BID    glipiZIDE  20 mg Oral BID AC    cetirizine  10 mg Oral Nightly    nicotine  1 patch TransDERmal Daily    sodium chloride flush  5-40 mL IntraVENous 2 times per day    insulin lispro  0-8 Units SubCUTAneous TID WC    insulin lispro  0-4 Units SubCUTAneous Nightly    gabapentin  900 mg Oral TID    aspirin  81 mg Oral Daily    amLODIPine  5 mg Oral Daily    atorvastatin  20 mg Oral Nightly           [facility]  ROCHELLE Shepherd CNP  [employer] messaging  OFFICE: [phone]      Electronically signed by ROCHELLE Shepherd CNP on 10/12/2022 at 6:48 AM  Thank you for allowing us to participate in the care of this patient. Please call with questions. This note iscreated with the assistance of a speech recognition program.  While intending to generate a document that actually reflects the content of the visit, the document can still have some errors including those of syntax andsound a like substitutions which may escape proof reading. In such instances, actual meaning can be extrapolated by contextual diversion.

## 2022-10-12 NOTE — PLAN OF CARE
Problem: Discharge Planning  Goal: Discharge to home or other facility with appropriate resources  Outcome: Progressing  Flowsheets (Taken 10/12/2022 1617)  Discharge to home or other facility with appropriate resources: Identify barriers to discharge with patient and caregiver     Problem: Chronic Conditions and Co-morbidities  Goal: Patient's chronic conditions and co-morbidity symptoms are monitored and maintained or improved  Outcome: Progressing  Flowsheets (Taken 10/12/2022 1617)  Care Plan - Patient's Chronic Conditions and Co-Morbidity Symptoms are Monitored and Maintained or Improved: Monitor and assess patient's chronic conditions and comorbid symptoms for stability, deterioration, or improvement     Problem: Safety - Adult  Goal: Free from fall injury  Outcome: Progressing  Flowsheets (Taken 10/7/2022 2309 by Zack Lozano RN)  Free From Fall Injury:   Instruct family/caregiver on patient safety   Based on caregiver fall risk screen, instruct family/caregiver to ask for assistance with transferring infant if caregiver noted to have fall risk factors     Problem: ABCDS Injury Assessment  Goal: Absence of physical injury  Outcome: Progressing  Flowsheets (Taken 10/10/2022 2032 by Marco Libman, RN)  Absence of Physical Injury: Implement safety measures based on patient assessment     Problem: Respiratory - Adult  Goal: Achieves optimal ventilation and oxygenation  Outcome: Progressing  Flowsheets (Taken 10/12/2022 1420)  Achieves optimal ventilation and oxygenation: Assess for changes in respiratory status     Problem: Skin/Tissue Integrity - Adult  Goal: Skin integrity remains intact  Outcome: Progressing  Flowsheets  Taken 10/12/2022 1626  Skin Integrity Remains Intact: Monitor for areas of redness and/or skin breakdown  Taken 10/12/2022 1420  Skin Integrity Remains Intact: Monitor for areas of redness and/or skin breakdown  Goal: Incisions, wounds, or drain sites healing without S/S of infection  Outcome: Progressing  Flowsheets (Taken 10/12/2022 1420)  Incisions, Wounds, or Drain Sites Healing Without Sign and Symptoms of Infection: TWICE DAILY: Assess and document dressing/incision, wound bed, drain sites and surrounding tissue     Problem: Musculoskeletal - Adult  Goal: Return mobility to safest level of function  Outcome: Progressing  Flowsheets (Taken 10/12/2022 1420)  Return Mobility to Safest Level of Function: Assess patient stability and activity tolerance for standing, transferring and ambulating with or without assistive devices  Goal: Return ADL status to a safe level of function  Outcome: Progressing  Flowsheets (Taken 10/12/2022 1420)  Return ADL Status to a Safe Level of Function: Administer medication as ordered     Problem: Infection - Adult  Goal: Absence of infection at discharge  Outcome: Progressing  Flowsheets (Taken 10/12/2022 1617)  Absence of infection at discharge: Assess and monitor for signs and symptoms of infection  Goal: Absence of infection during hospitalization  Outcome: Progressing  Flowsheets (Taken 10/12/2022 1617)  Absence of infection during hospitalization: Assess and monitor for signs and symptoms of infection     Problem: Metabolic/Fluid and Electrolytes - Adult  Goal: Electrolytes maintained within normal limits  Outcome: Progressing  Flowsheets (Taken 10/12/2022 1617)  Electrolytes maintained within normal limits: Monitor labs and assess patient for signs and symptoms of electrolyte imbalances  Goal: Hemodynamic stability and optimal renal function maintained  Outcome: Progressing  Flowsheets (Taken 10/12/2022 1617)  Hemodynamic stability and optimal renal function maintained: Monitor labs and assess for signs and symptoms of volume excess or deficit  Goal: Glucose maintained within prescribed range  Outcome: Progressing  Flowsheets (Taken 10/12/2022 1617)  Glucose maintained within prescribed range: Monitor blood glucose as ordered     Problem: Pain  Goal: Verbalizes/displays adequate comfort level or baseline comfort level  Outcome: Progressing  Flowsheets (Taken 10/12/2022 0015 by Adrien Cole RN)  Verbalizes/displays adequate comfort level or baseline comfort level:   Encourage patient to monitor pain and request assistance   Administer analgesics based on type and severity of pain and evaluate response   Consider cultural and social influences on pain and pain management   Assess pain using appropriate pain scale   Implement non-pharmacological measures as appropriate and evaluate response     Problem: Neurosensory - Adult  Goal: Achieves stable or improved neurological status  Outcome: Progressing  Flowsheets (Taken 10/12/2022 1420)  Achieves stable or improved neurological status:   Assess for and report changes in neurological status   Initiate measures to prevent increased intracranial pressure   Maintain blood pressure and fluid volume within ordered parameters to optimize cerebral perfusion and minimize risk of hemorrhage   Monitor temperature, glucose, and sodium.  Initiate appropriate interventions as ordered     Problem: Nutrition Deficit:  Goal: Optimize nutritional status  Outcome: Progressing  Flowsheets (Taken 10/12/2022 1626)  Nutrient intake appropriate for improving, restoring, or maintaining nutritional needs: Assess nutritional status and recommend course of action

## 2022-10-12 NOTE — ANESTHESIA POSTPROCEDURE EVALUATION
Department of Anesthesiology  Postprocedure Note    Patient: Luciano Hernandes  MRN: [id number]  YOB: 1955  Date of evaluation: 10/12/2022      Procedure Summary     Date: 10/12/22 Room / Location: [facility] OR  / [facility] - INPATIENT    Anesthesia Start: 2969 Anesthesia Stop: 6443    Procedure: RIGHT MID-FOOT OSTEOTOMY WITH OF APPLICATION EXTERNAL FIXATOR SERA (Right: Foot) Diagnosis:       Charcot's joint of ankle, right      (Charcot's joint of ankle, right [F31.596])    Surgeons: Belle Romero DPM Responsible Provider: Terrilyn Habermann, MD    Anesthesia Type: general ASA Status: 3          Anesthesia Type: No value filed.     Amy Phase I:      Amy Phase II:        Anesthesia Post Evaluation    Complications: no

## 2022-10-13 LAB
BUN BLDV-MCNC: 40 MG/DL (ref 8–23)
CREAT SERPL-MCNC: 2.33 MG/DL (ref 0.7–1.2)
GFR SERPL CREATININE-BSD FRML MDRD: 30 ML/MIN/1.73M2
GLUCOSE BLD-MCNC: 212 MG/DL (ref 75–110)
GLUCOSE BLD-MCNC: 215 MG/DL (ref 75–110)
GLUCOSE BLD-MCNC: 221 MG/DL (ref 75–110)
GLUCOSE BLD-MCNC: 227 MG/DL (ref 75–110)
VANCOMYCIN RANDOM: 15.3 UG/ML

## 2022-10-13 PROCEDURE — 80202 ASSAY OF VANCOMYCIN: CPT

## 2022-10-13 PROCEDURE — 84520 ASSAY OF UREA NITROGEN: CPT

## 2022-10-13 PROCEDURE — 97530 THERAPEUTIC ACTIVITIES: CPT

## 2022-10-13 PROCEDURE — 82565 ASSAY OF CREATININE: CPT

## 2022-10-13 PROCEDURE — 6370000000 HC RX 637 (ALT 250 FOR IP): Performed by: PODIATRIST

## 2022-10-13 PROCEDURE — 36415 COLL VENOUS BLD VENIPUNCTURE: CPT

## 2022-10-13 PROCEDURE — 82947 ASSAY GLUCOSE BLOOD QUANT: CPT

## 2022-10-13 PROCEDURE — 6360000002 HC RX W HCPCS: Performed by: PODIATRIST

## 2022-10-13 PROCEDURE — 2580000003 HC RX 258: Performed by: PODIATRIST

## 2022-10-13 PROCEDURE — 99232 SBSQ HOSP IP/OBS MODERATE 35: CPT | Performed by: NURSE PRACTITIONER

## 2022-10-13 PROCEDURE — 97164 PT RE-EVAL EST PLAN CARE: CPT

## 2022-10-13 PROCEDURE — 1200000000 HC SEMI PRIVATE

## 2022-10-13 PROCEDURE — 97168 OT RE-EVAL EST PLAN CARE: CPT

## 2022-10-13 PROCEDURE — 97535 SELF CARE MNGMENT TRAINING: CPT

## 2022-10-13 PROCEDURE — 97116 GAIT TRAINING THERAPY: CPT

## 2022-10-13 RX ADMIN — HYDROCODONE BITARTRATE AND ACETAMINOPHEN 1 TABLET: 5; 325 TABLET ORAL at 20:44

## 2022-10-13 RX ADMIN — ASPIRIN 81 MG: 81 TABLET, COATED ORAL at 10:37

## 2022-10-13 RX ADMIN — HYDROCODONE BITARTRATE AND ACETAMINOPHEN 2 TABLET: 5; 325 TABLET ORAL at 07:15

## 2022-10-13 RX ADMIN — GABAPENTIN 900 MG: 300 CAPSULE ORAL at 20:45

## 2022-10-13 RX ADMIN — METRONIDAZOLE 500 MG: 500 TABLET ORAL at 20:44

## 2022-10-13 RX ADMIN — GABAPENTIN 900 MG: 300 CAPSULE ORAL at 10:37

## 2022-10-13 RX ADMIN — INSULIN LISPRO 2 UNITS: 100 INJECTION, SOLUTION INTRAVENOUS; SUBCUTANEOUS at 14:03

## 2022-10-13 RX ADMIN — CEFEPIME 2000 MG: 2 INJECTION, POWDER, FOR SOLUTION INTRAVENOUS at 07:19

## 2022-10-13 RX ADMIN — ATORVASTATIN CALCIUM 20 MG: 20 TABLET, FILM COATED ORAL at 20:45

## 2022-10-13 RX ADMIN — ENOXAPARIN SODIUM 30 MG: 100 INJECTION SUBCUTANEOUS at 10:38

## 2022-10-13 RX ADMIN — GLIPIZIDE 20 MG: 10 TABLET ORAL at 18:19

## 2022-10-13 RX ADMIN — GABAPENTIN 900 MG: 300 CAPSULE ORAL at 14:03

## 2022-10-13 RX ADMIN — INSULIN GLARGINE 10 UNITS: 100 INJECTION, SOLUTION SUBCUTANEOUS at 10:38

## 2022-10-13 RX ADMIN — SODIUM CHLORIDE, PRESERVATIVE FREE 10 ML: 5 INJECTION INTRAVENOUS at 07:16

## 2022-10-13 RX ADMIN — ENOXAPARIN SODIUM 30 MG: 100 INJECTION SUBCUTANEOUS at 20:44

## 2022-10-13 RX ADMIN — SODIUM CHLORIDE, PRESERVATIVE FREE 10 ML: 5 INJECTION INTRAVENOUS at 14:11

## 2022-10-13 RX ADMIN — INSULIN LISPRO 2 UNITS: 100 INJECTION, SOLUTION INTRAVENOUS; SUBCUTANEOUS at 18:19

## 2022-10-13 RX ADMIN — INSULIN GLARGINE 10 UNITS: 100 INJECTION, SOLUTION SUBCUTANEOUS at 20:44

## 2022-10-13 RX ADMIN — AMLODIPINE BESYLATE 5 MG: 5 TABLET ORAL at 10:38

## 2022-10-13 RX ADMIN — CETIRIZINE HYDROCHLORIDE 10 MG: 10 TABLET ORAL at 20:44

## 2022-10-13 RX ADMIN — GLIPIZIDE 20 MG: 10 TABLET ORAL at 07:16

## 2022-10-13 RX ADMIN — VANCOMYCIN HYDROCHLORIDE 1250 MG: 5 INJECTION, POWDER, LYOPHILIZED, FOR SOLUTION INTRAVENOUS at 14:10

## 2022-10-13 RX ADMIN — METRONIDAZOLE 500 MG: 500 TABLET ORAL at 14:03

## 2022-10-13 RX ADMIN — METRONIDAZOLE 500 MG: 500 TABLET ORAL at 07:16

## 2022-10-13 RX ADMIN — CEFEPIME 2000 MG: 2 INJECTION, POWDER, FOR SOLUTION INTRAVENOUS at 18:25

## 2022-10-13 RX ADMIN — INSULIN LISPRO 2 UNITS: 100 INJECTION, SOLUTION INTRAVENOUS; SUBCUTANEOUS at 10:39

## 2022-10-13 ASSESSMENT — PAIN DESCRIPTION - DESCRIPTORS: DESCRIPTORS: SORE

## 2022-10-13 ASSESSMENT — ENCOUNTER SYMPTOMS
GASTROINTESTINAL NEGATIVE: 1
RESPIRATORY NEGATIVE: 1

## 2022-10-13 ASSESSMENT — PAIN SCALES - GENERAL: PAINLEVEL_OUTOF10: 6

## 2022-10-13 ASSESSMENT — PAIN DESCRIPTION - LOCATION: LOCATION: FOOT

## 2022-10-13 NOTE — OP NOTE
during their perioperative period and any recovery window from their procedure. The patient was made aware that sheela Covid-19  may worsen their prognosis for recovering from their procedure  and lend to a higher morbidity and/or mortality risk. All material risks, benefits, and reasonable alternatives including postponing the procedure were discussed. The patient does wish to proceed with the procedure at this time. 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 The patient's history is significant for type 2 diabetes mellitus with associated peripheral neuropathy, history of a traumatic amputation, history of Charcot arthropathy. The patient has failed several other treatment modalities including offloading, casting, lifestyle modifications, antibiotics, serial debridements. The patient has known history of Charcot arthropathy which has been slowly progressing over the past several years but significantly worsened over the past several weeks. He was admitted to the hospital due to worsening Charcot event with associated cellulitis and concerns for abscess formation to the right lower extremity. His infection improved over a short course of IV antimicrobial therapy. Preoperative radiographs and MRI of the right foot were obtained which demonstrated soft tissue emphysema in the plantar midfoot, possible abscess of the plantar midfoot, collapse of the medial longitudinal arch with rocker-bottom deformity, Charcot arthropathy throughout the midfoot at the level of the tarsometatarsal joints. We thoroughly educated the patient on the details of his condition and the surgical plan. We discussed incision and drainage, midfoot osteotomy, application multiplanar external fixator, tendo Achilles lengthening of the right lower extremity including all the risk, benefits, alternatives.  Surgical intervention is necessary at this time to offload the ulceration,  augment wound healing and allow for wound closure/coverage, restore anatomic alignment, reduce deformity, limb salvage in a limb that is at high risk for amputation. We recommend moving forward with a staged surgical approach in hopes of limb salvage of the right lower extremity. This will be stage I of our staged treatment protocol. Discussed with the patient that given the history of non-healing ulceration, history of infections, history of peripheral vascular disease, history of diabetes they are at high risk of more proximal amputation and or limb loss. All questions answered to patient's  appartent satisfaction. Patient verbalized and demonstrated understanding. No guarantees were given or implied. The patient provided informed written consent which was signed and placed in the chart. PROCEDURE IN DETAIL: Under mild sedation the patient was transported from pre-op to the operating room and placed on the operating table in the supine position with a safety strap across the lap. A well-padded pneumatic tourniquet was applied to the right thigh. After adequate sedation by anesthesia a the right lower extremity was then prepped, scrubbed, and draped in the usual aseptic fashion. A timeout was performed confirming correct patient, correct surgical site, correct procedure, antibiotics, everyone in the room was in agreement. Then the Esmarch bandage was used to exsanguinate the right lower extremity. The pneumatic tourniquet was inflated to 300 mmHg and wound remained inflated for approximately 120 minutes throughout the procedure. Attention was directed to the plantar aspect of the midfoot, right foot. There is noted to be rocker-bottom deformity with palpable osseous prominence and correlating chronic nonhealing ulceration with exposed subcutaneous tissue. The ulcer was incised and tissue planes were dilated. No abscess formation or purulent drainage was found.   There were no signs of clinical infection. Next we moved forward with lengthening of the Achilles tendon. Attention was directed to the posterior right Achilles tendon. 3 incisions were made spaced 2cm apart from distal to proximal. 2 medial, 1 lateral incision. The incisions were deepened to the level of tendon. The foot was maintained in a dorsiflexed position. A #15 blade was inserted into each incision to transected approximately 50% of the width of the Achilles tendon to allow for lengthening at multiple sites of the tendon. Tension on the tendon was noted to be reduced with each maneuver. Following the tendo-achilles lengthening there was noted to be increased dorsiflexion of the ankle joint. Resistance to end-range dorsiflexion of the ankle joint was noted to be reduced and the Achilles tendon prominence was palpated to ensure that a complete rupture had not occurred. Each  incision was irrigated with copious amounts of sterile saline and closed utilizing 3-0 Prolene. Next we proceeded with multiple midfoot fusion with osteotomy of the right foot. Incision was made at the medial aspect of the tarsometatarsal joint utilizing #15 blade. Incision was deepened to level of bone utilizing commendation of sharp and blunt dissection. Care was taken to protect and retract all neurovascular structures. Hemostasis was controlled utilizing Bovie electrocautery throughout the dissection. A tunnel was created along the plantar aspect of the midtarsal joint to ensure all plantar structures were protected and to allow adequate exposure of our planned osteotomy. At this time an arthrodesis osteotomy was performed from medial to lateral across the tarsometatarsal joints including the medial cuneiform, intermediate cuneiform, lateral cuneiform, cuboid, bases of metatarsals 1 through 5. The osteotomy was made utilizing oscillating power bur. Plantar wedge of bone was resected.   The bone was morselized with use of the oscillating power bur. The morselized bone was removed through exsanguination and irrigation of the wound. A temporary Schanz pin was placed to the calcaneus to allow for manipulation of rectus hindfoot alignment. The forefoot was then reduced and plantarflexed on the hindfoot allowing for correction of the midtarsal joint deformity. We based our reduction on the forefoot relationship to the hindfoot which was also planned preoperatively. Anatomic alignment was achieved. We then placed 3 Steinmann pins to hold our reduction and arthrodesis osteotomy site. There was noted to be improved alignment and adequate compression across the osteotomy site. Next, we proceeded with application of  multiplanar external fixator to further stabilize our arthrodesis osteotomy and correct the charcot deformity. This external fixator was designed in a manner to allow postoperative weightbearing therefore struts were utilized to improve the stability of our construct. The external fixator comprised of 2 tibial half rings making up the proximal tibial blocks spanned to a footplate. Each proximal tibial ring was fixated with(2) 1.8 mm smooth wires tensioned to 130 kg. The calcaneus was fixated to the footplate with opposing olive wires and tensioned to approximately 90 kg. Distal forefoot 5 mm half pin was placed for additional stability. The footplate was then connected to the proximal tibial block with use of 4 dynamic struts to improve our stability construct to maintain hindfoot position as well to allow early postoperative weightbearing. The dynamic struts were then locked in place at this time. The foot was noted to be in a more appropriate alignment relative to the lower leg than during the preoperative evaluation. All provisional k-wires were removed at this time.   Final intraoperative images were obtained confirming appropriate anatomic alignment as well as safe hardware placement in AP, oblique, lateral views.    The surgical sites were irrigated with copious amounts of sterile saline. Tissue cultures were obtained at this time from the plantar ulceration. Bone cultures were also obtained from the medial cuneiform. The surgical sites were closed in layers utilizing 3-0 Monocryl for deep and subcutaneous closure, 3-0 Prolene and staples for skin closure. A drain was placed to allow for additional drainage. A postoperative injection was given consisting of 10 cc of 0.5% Marcaine with epinephrine. Dressings were applied consisting of Xeroform, gauze 4 x 4's, ABD, Kerlix, Ace. The patient tolerated the above procedure and anesthesia well with vital signs stable and neurovascular status intact to the right lower extremity. Postoperative plan: No further surgical intervention planned during this admission. The drain in place will be pulled within 24 hours. The patient will be placed on antibiotics at the discretion of the infectious disease team.  He can be weightbearing to the right lower extremity to work with physical therapy and allow for transfers. Otherwise he is to be strict nonweightbearing to the right lower extremity. The patient will require staged reconstruction during his global period. He will follow-up in our clinic within 1 week of discharge. Jann Lenz DPM on 10/13/2022 at 7:22 PM  The [address] [place] [address] [facility], [employer]    This note was generated with the assistance of a speech recognition program. While intending to generate a timely document that accurately reflects the content of the visit, no guarantee can be provided that every grammatical or spelling mistake has been or will be identified or corrected. In such instances, actual meaning can be extrapolated by context. Thank you for your understanding.

## 2022-10-13 NOTE — PROGRESS NOTES
[facility]  Progress Note    Lionel Sifuentes  MRN: [id number]  Date: 10/13/2022  LOS: 9     Reason for F/U :   Diabetic foot infection/abscess    Impression :   Right Charcot foot with longstanding history of infection/abscesses and has undergone multiple I&D procedures in the past  Chronic surgical wound on the plantar aspect of the foot  Recurrent deep midfoot soft tissue abscess within fluid-filled sinus tract noted in the medial plantar aspect of the foot and there is retained metallic foreign body    Recommendations: The patient continues on IV antimicrobial therapy with cefepime, metronidazole, vancomycin  The patient right foot midfoot osteotomy, excision of wound with skin flap closure and external fixator application of the right lower extremity 10/12/2022  We will continue to follow surgical report, culture and adjust therapy accordingly    Infection Control Recommendations:   Universal precautions    Discharge Planning:   Estimated Length of IV antimicrobials: 6 weeks  Patient will need Midline Catheter Insertion/ PICC line Insertion: No  Patient will need: Home IV , [place],  SNF,  LTAC: Undetermined  Patient willneed outpatient wound care: No    Medical Decision making / Summary of Stay:   Lionel Sifuentes is a 78y.o.-year-old male who was initially admitted on 10/4/2022. Shreya Yan has a history of a right sided Charcot foot deformity and has had a longstanding history with infections in the right foot. He did have an abscess for which he underwent I&D in June 2020 with MSSA isolated and the patient has had a residual wound which secondarily got infected and caused an abscess and in September 2021 he underwent an I&D of the abscess with MSSA, Enterobacter, Morganella isolated and he was discharged with IV antimicrobial therapy which he took for 6 weeks through October 22 and 2021.      The patient did continue to have issues with wound dehiscence and had an open wound on the plantar aspect of the foot which was clean. Seeing the patient in close to a year now and he has followed with the podiatrist and has had continued wound care and the wound has been improving with time. Patient has had a plantar foot midfoot ulceration. Patient did subsequently start to develop soft tissue swelling redness in his foot and the swelling started to extend into his leg and the patient did see Dr. Abrams who recommended that he come into the emergency room for evaluation. The patient has been admitted for an abscess in the right foot and chronic ulcerations on the plantar aspect of the foot and the patient is undergoing further work-up with MRI and consideration for an external fixator device. Was asked to evaluate and help with antibiotic choice. Current evaluation:10/13/2022    BP (!) 120/58   Pulse 74   Temp 97.7 °F (36.5 °C) (Oral)   Resp 16   Ht 5' 11\" (1.803 m)   Wt 234 lb (106.1 kg)   SpO2 97%   BMI 32.64 kg/m²     Temperature Range: Temp: 97.7 °F (36.5 °C) Temp  Av.4 °F (36.3 °C)  Min: 96.8 °F (36 °C)  Max: 97.7 °F (36.5 °C)    The patient was seen and evaluated lying in bed  States he feels well aside from pain in the right lower extremity  Denies nausea    Review of Systems   Constitutional: Negative. HENT: Negative. Respiratory: Negative. Cardiovascular: Negative. Gastrointestinal: Negative. Genitourinary: Negative. Musculoskeletal: Negative. Skin: Negative. Neurological: Negative. Psychiatric/Behavioral: Negative. Physical Examination :     Physical Exam  Constitutional:       Appearance: Normal appearance. He is normal weight. HENT:      Head: Normocephalic and atraumatic. Pulmonary:      Effort: Pulmonary effort is normal. No respiratory distress. Abdominal:      General: Abdomen is flat. There is no distension. Palpations: Abdomen is soft. Musculoskeletal:         General: Normal range of motion.       Comments: Right foot with surgical dressing and external fixator in place   Skin:     General: Skin is warm and dry. Neurological:      General: No focal deficit present. Mental Status: He is alert and oriented to person, place, and time. Mental status is at baseline. Psychiatric:         Mood and Affect: Mood normal.         Behavior: Behavior normal.         Thought Content: Thought content normal.       Laboratory data:   I have independently reviewed the followinglabs:  CBC with Differential: No results for input(s): WBC, HGB, HCT, PLT, SEGSPCT, BANDSPCT, LYMPHOPCT, MONOPCT, EOSPCT in the last 72 hours. BMP:   No results for input(s): NA, K, CL, CO2, BUN, CREATININE, CA, MG in the last 72 hours. Hepatic Function Panel: No results for input(s): PROT, LABALBU, BILIDIR, IBILI, BILITOT, ALKPHOS, ALT, AST in the last 72 hours. No results found for: PROCAL  Lab Results   Component Value Date/Time    CRP 30.1 10/04/2022 10:18 PM    .3 09/13/2022 06:41 PM    .8 09/13/2021 11:04 AM     Lab Results   Component Value Date    SEDRATE 64 (H) 10/04/2022         No results found for: DDIMER  No results found for: FERRITIN  No results found for: LDH  No results found for: FIBRINOGEN    No results found for requested labs within last 30 days. Lab Results   Component Value Date/Time    COVID19 DETECTED 05/26/2021 12:06 PM    COVID19 Not Detected 12/07/2020 03:10 PM    COVID19 Not Detected 08/08/2020 10:02 PM       No results for input(s): DAMIANOUGH in the last 72 hours. Imaging Studies:   Right foot/ankle xray   Impression:       Changes related to external fixator device placement and extensive fusion of   intertarsal and tarsometatarsal joints as described.      Oblique oriented fracture is noted at the base of 5th metatarsal.     Severe degenerative disease of tarsometatarsal joints        Cultures:     Culture, Anaerobic and Aerobic [[id number]] Collected: 10/12/22 1012   Order Status: Completed Specimen: Bone from Foot Updated: 10/12/22 1419    Specimen Description . FOOT RIGHT    Direct Exam NO NEUTROPHILS SEEN     NO BACTERIA SEEN    Culture PENDING   Culture, Tissue [[id number]] (Abnormal) Collected: 10/12/22 0804   Order Status: Completed Specimen: Tissue from Foot Updated: 10/12/22 1415    Specimen Description . FOOT RT    Direct Exam RARE NEUTROPHILS Abnormal      NO BACTERIA SEEN    Culture PENDING   Culture, Anaerobic and Aerobic [[id number]] (Abnormal) Collected: 10/05/22 0845   Order Status: Completed Specimen: Wound Updated: 10/10/22 0747    Specimen Description . WOUND . FOOT    Direct Exam RARE NEUTROPHILS Abnormal      MODERATE GRAM POSITIVE RODS Abnormal      FEW GRAM POSITIVE COCCI IN PAIRS Abnormal      FEW GRAM NEGATIVE COCCOBACILLI Abnormal     Culture PREVOTELLA (BACTEROIDES BIVIUS) BIVIA BETA LACTAMASE POSITIVE MODERATE GROWTH Abnormal      NORMAL SKIN ARABELLA       Medications:      vancomycin  1,250 mg IntraVENous Q24H    metroNIDAZOLE  500 mg Oral 3 times per day    cefepime  2,000 mg IntraVENous Q12H    vancomycin (VANCOCIN) intermittent dosing (placeholder)   Other RX Placeholder    enoxaparin  30 mg SubCUTAneous BID    insulin glargine  10 Units SubCUTAneous BID    glipiZIDE  20 mg Oral BID AC    cetirizine  10 mg Oral Nightly    nicotine  1 patch TransDERmal Daily    sodium chloride flush  5-40 mL IntraVENous 2 times per day    insulin lispro  0-8 Units SubCUTAneous TID WC    insulin lispro  0-4 Units SubCUTAneous Nightly    gabapentin  900 mg Oral TID    aspirin  81 mg Oral Daily    amLODIPine  5 mg Oral Daily    atorvastatin  20 mg Oral Nightly           [facility]  [address] [place] [address], APRN - CNP  Perfect [employer] messaging  OFFICE: [phone]      Electronically signed by [address] [place], [place] [address]ROCHELLE CNP on 10/13/2022 at 6:52 AM  Thank you for allowing us to participate in the care of this patient. Please call with questions.     This note iscreated with the assistance of a speech recognition program.  While intending to generate a document that actually reflects the content of the visit, the document can still have some errors including those of syntax andsound a like substitutions which may escape proof reading. In such instances, actual meaning can be extrapolated by contextual diversion.

## 2022-10-13 NOTE — PROGRESS NOTES
Progress Note  Podiatric Medicine and Surgery     Subjective     CC: S/p midfoot osteotomy & external fixation; right foot (DOS: 10/12/22)    Patient seen and examined at bedside. Pt is a little drowsy per wife  No pain at this time    HPI :  Lila Lnaders is a 78 y.o. male seen at [facility] for right foot wound. Patient is known to [address] [place] [address]. Patient has had Charcot deformity to the right foot for a long time. Patient had multiple admission due to right foot infection in the past.  Patient visited Dr. Huertas Dose 10//2022 and was told to report to the ED to get admitted immediately due to concern of cellulitis and possible osteomyelitis of the right foot.  would like to transfer care to [facility] for possible Charcot reconstruction. Patient denies other pedal complaint. PCP is Rudy Ortiz MD    ROS: Denies N/V/F/C/SOB/CP. Otherwise negative except at stated in the HPI.      Medications:  Scheduled Meds:   vancomycin  1,250 mg IntraVENous Q24H    metroNIDAZOLE  500 mg Oral 3 times per day    cefepime  2,000 mg IntraVENous Q12H    vancomycin (VANCOCIN) intermittent dosing (placeholder)   Other RX Placeholder    enoxaparin  30 mg SubCUTAneous BID    insulin glargine  10 Units SubCUTAneous BID    glipiZIDE  20 mg Oral BID AC    cetirizine  10 mg Oral Nightly    nicotine  1 patch TransDERmal Daily    sodium chloride flush  5-40 mL IntraVENous 2 times per day    insulin lispro  0-8 Units SubCUTAneous TID WC    insulin lispro  0-4 Units SubCUTAneous Nightly    gabapentin  900 mg Oral TID    aspirin  81 mg Oral Daily    amLODIPine  5 mg Oral Daily    atorvastatin  20 mg Oral Nightly       Continuous Infusions:   sodium chloride Stopped (10/11/22 1106)    dextrose         PRN Meds:HYDROcodone 5 mg - acetaminophen **OR** HYDROcodone 5 mg - acetaminophen, diphenhydrAMINE, sodium chloride flush, sodium chloride, ondansetron **OR** ondansetron, polyethylene glycol, acetaminophen **OR** acetaminophen, glucose, dextrose bolus **OR** dextrose bolus, glucagon (rDNA), dextrose, albuterol sulfate HFA    Objective     Vitals:  Patient Vitals for the past 8 hrs:   BP Temp Temp src Pulse Resp SpO2   10/13/22 1148 131/69 98.2 °F (36.8 °C) Oral 79 18 98 %   10/13/22 0647 (!) 120/58 97.7 °F (36.5 °C) Oral 74 16 97 %         Average, Min, and Max for last 24 hours Vitals:  TEMPERATURE:  Temp  Av.8 °F (36.6 °C)  Min: 97.5 °F (36.4 °C)  Max: 98.2 °F (36.8 °C)    RESPIRATIONS RANGE: Resp  Av  Min: 15  Max: 19    PULSE RANGE: Pulse  Av.6  Min: 69  Max: 112    BLOOD PRESSURE RANGE:  Systolic (53TIH), TWH:550 , Min:88 , VNY:086   ; Diastolic (10OOP), QPR:93, Min:58, Max:84      PULSE OXIMETRY RANGE: SpO2  Av.5 %  Min: 93 %  Max: 98 %    I/O last 3 completed shifts: In: 797.6 [P.O.:35; I.V.:700; IV Piggyback:62.6]  Out: 1250 [Urine:1200; Blood:50]    CBC:  No results for input(s): WBC, HGB, HCT, PLT, CRP in the last 72 hours. Invalid input(s):  ESR       BMP:  Recent Labs     10/13/22  0631   BUN 40*   CREATININE 2.33*        Coags:  No results for input(s): APTT, PROT, INR in the last 72 hours. Lab Results   Component Value Date    SEDRATE 64 (H) 10/04/2022     No results for input(s): CRP in the last 72 hours. Lower Extremity Physical Exam:  General: A&Ox3, NAD  Heart: Regular rate and rhythm. Lungs: Equal air entry. No increased effort. Abdomen: Soft, non-tender to palpation. Not distended. Lower Extremity Physical Exam: dressing left intact due to surgery  Vascular: DP and PT pulses are palpable +2/4. CFT <3 seconds to all digits. Hair growth is diminished to the level of the digits. Diffuse loss to nonpitting edema noted to the right lower extremity. Neuro: Saph/sural/SP/DP/plantar sensation diminished to light touch. Musculoskeletal: Muscle strength is 5/5 to all lower extremity muscle groups.  Gross deformity is Charcot deformity to the right foot     Dermatologic: Full thickness ulcer #1 located right first interspace and measures approximately 1 cm x 1 cm x 3 cm. Base is fibrotic. Periwound skin is macerated. Purulent drainage noted with no associated mal odor. Erythema noted to periwound with moderate associated increase in warmth. Does not probe to bone probe deep. Does not sinus track, or undermine. No fluctuance, crepitus, or induration. Full thickness ulcer #2 located to the medial plantar arch of the right foot. It is measured as 1.5 x 1.2 x 0.2 cm. Base is fibrotic. Periwound skin is hyperkeratotic.  o drainage with no odor. No erythema noted. Does not probe to bone, sinus tract, or undermine. No fluctuance, crepitus, or induration. Clinical Images:  See media panel        Imaging:   XR ANKLE RIGHT (MIN 3 VIEWS)   Final Result   Changes related to external fixator device placement and extensive fusion of   intertarsal and tarsometatarsal joints as described. Oblique oriented fracture is noted at the base of 5th metatarsal.      Severe degenerative disease of tarsometatarsal joints         XR TIBIA FIBULA RIGHT (2 VIEWS)   Final Result   Changes related to external fixator device placement and extensive fusion of   intertarsal and tarsometatarsal joints as described. Oblique oriented fracture is noted at the base of 5th metatarsal.      Severe degenerative disease of tarsometatarsal joints         XR FOOT RIGHT (MIN 3 VIEWS)   Final Result   Changes related to external fixator device placement and extensive fusion of   intertarsal and tarsometatarsal joints as described.       Oblique oriented fracture is noted at the base of 5th metatarsal.      Severe degenerative disease of tarsometatarsal joints         FLUORO FOR SURGICAL PROCEDURES   Final Result      IR INSERT PICC VAD W SQ PORT >5 YEARS   Final Result      VL Lower Extremity Venous Right   Final Result      XR FOOT RIGHT (MIN 3 VIEWS)   Final Result   Air is seen in the plantar soft tissues of the medial midfoot. Chronic bony changes at the TMT joints compatible with a neuropathic   arthropathy. No obvious new bony erosions are seen. MRI ANKLE RIGHT WO CONTRAST   Final Result   Soft tissue defect of the medial plantar aspect of the midfoot. Thin   fluid-filled sinus tract extending into the deep soft tissues of the midfoot,   measuring up to 2.0 x 0.9 cm on the coronal sequence, raising suspicion for a   phlegmon/abscess. There is air in the adjacent soft tissues. Susceptibility artifact in the soft tissues adjacent to the soft tissue   defect could represent sequela of prior intervention or retained metallic   foreign body. No MR evidence of acute osteomyelitis. Chronic bony changes at the TMT joints compatible with a neuropathic   arthropathy. Additional chronic findings as described above. MRI FOOT RIGHT WO CONTRAST   Final Result   Soft tissue defect of the medial plantar aspect of the midfoot. Thin   fluid-filled sinus tract extending into the deep soft tissues of the midfoot,   measuring up to 2.0 x 0.9 cm on the coronal sequence, raising suspicion for a   phlegmon/abscess. There is air in the adjacent soft tissues. Susceptibility artifact in the soft tissues adjacent to the soft tissue   defect could represent sequela of prior intervention or retained metallic   foreign body. No MR evidence of acute osteomyelitis. Chronic bony changes at the TMT joints compatible with a neuropathic   arthropathy. Additional chronic findings as described above.              Cultures: moderate GPR, few GPC, few gram - coccobacilli      Assessment   Ru Koenig is a 78 y.o. male with   S/p midfoot osteotomy; right foot (DOS: 10/12/22)  S/p external fixation application; right LE (DOS: 10/12/22)  Cellulitis, right foot-IMPROVING  Abscess, right foot  Type II diabetic foot ulcer down to subcutaneous layer, right foot  Charcot deformity, right foot  Type 2 diabetes with peripheral neuropathy  Hypertension    Principal Problem:    Type 2 diabetes mellitus with right diabetic foot ulcer (HCC)  Active Problems:    Charcot's joint of right foot    Stage 3b chronic kidney disease ([address]    Essential hypertension  Resolved Problems:    * No resolved hospital problems. *       Plan     Patient examined and evaluated at bedside with Dr. Letitia Candelario  Treatment options discussed in detail with the patient  X-ray from 10/4/2022 was reviewed: There is no soft tissue emphysema or acute osseous deformity noted. Significant periosteal changes noted to the midfoot indicating midfoot Charcot deformity. IV antibiotic: Vancomycin/cefepime. ID recommendation appreciated. Medical management per medicine. Appreciate recommendation. Pt underwent sx yesterday. Pt is a little drowsy. Use pain meds sparingly and to be only given when pt needs it  PT/OT order placed  Non weight bearing right LE  Dressings left intact. Drain removed.   Discussed with Dr. Letitia Candelario    DVT ppx: lovenox  and aspirin   Diet: carb control   Activity: Non weight bearing right LE  Pain Control: [address] [facility] panel    Faith Sales DPM   Podiatric Medicine & Surgery   10/13/2022 at 1:14 PM

## 2022-10-13 NOTE — PROGRESS NOTES
Physical Therapy  Facility/Department: [facility] MED SURG  Physical Therapy Re-Assessment    Name: Brad Boucher  : 1955  MRN: [id number]  Date of Service: 10/13/2022  CHRISTY Sewell reports patient is medically stable for therapy treatment this date. Chart reviewed prior to treatment and patient is agreeable for therapy. All lines intact and patient positioned comfortably at end of treatment. All patient needs addressed prior to ending therapy session. Discharge Recommendations:  Pt currently functioning below baseline. Recommend daily inpatient skilled therapy at time of discharge to maximize long term outcomes and prevent re-admission. Please refer to AM-PAC score for current level of function. Patient would benefit from continued therapy after discharge     PT Equipment Recommendations  Equipment Needed: No (Pt reports owning a RW)    Per H&P:Samuel Torres is a 78 y.o. male seen at [facility] for right foot wound. Patient is known to [address] [place] [address]. Patient has had Charcot deformity to the right foot for a long time. Patient had multiple admission due to right foot infection in the past.  Patient visited Dr. Sadie Avilez 10//2022 and was told to report to the ED to get admitted immediately due to concern of cellulitis and possible osteomyelitis of the right foot.  would like to transfer care to [facility] for possible Charcot reconstruction. Patient denies other pedal complaint. PROCEDURE 10/12/2022: Midfoot osteotomy, right foot  External fixator application, right lower extremity  Excision of wound, right foot  Skin flap closure, right foot      Patient Diagnosis(es): The encounter diagnosis was Charcot's joint of ankle, right.   Past Medical History:  has a past medical history of Abscess of right foot, Acquired hammer toe deformity of lesser toe of right foot, ALEJANDRO (acute kidney injury) ([facility] [address] Cellulitis, Cellulitis of left foot, Cellulitis of right foot, Charcot foot due to diabetes mellitus ([address] Chest pain at rest, Chronic multifocal osteomyelitis of right foot ([address] Diabetic polyneuropathy associated with type 2 diabetes mellitus ([address] Essential hypertension, Fractures, multiple, Hyperlipidemia, Hypertension, Leukocytosis, Neuropathy, Pain in right foot, Pneumonia, Right foot infection, Right foot pain, Tobacco abuse, Type II or unspecified type diabetes mellitus without mention of complication, not stated as uncontrolled, Vertigo, 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 Past Surgical History:  has a past surgical history that includes Neck surgery (1987); Appendectomy; knee surgery (Bilateral, 1983); Knee arthroscopy (Right, 1989); Knee arthroscopy (Left, 1990); Foot Debridement (Left, 04/24/2018); pr deep dissec foot infec,1 bursa (Left, 04/24/2018); Colonoscopy; Foot Debridement (Right, 03/20/2020); arthroplasty (Right, 12/11/2020); Foot Debridement (Right, 06/08/2021); Foot surgery (Right, 06/11/2021); Foot Debridement (Right, 09/16/2021); IR INSERT PICC VAD W SQ PORT >5 YEARS (10/07/2022); Foot surgery (Right, 10/12/2022); and Ankle surgery (Right, 10/12/2022). Assessment   Body Structures, Functions, Activity Limitations Requiring Skilled Therapeutic Intervention: Decreased functional mobility ; Decreased ADL status; Decreased ROM; Decreased balance;Decreased sensation;Decreased endurance;Decreased safe awareness;Decreased strength;Decreased high-level IADLs; Increased pain  Assessment: Re-eval performed this date to address mobility s/p application of R foot external fixator w/ change in WB status and to adjust goals appropriately. Pt currently demonstrating poor steadiness throughout transfers & minimal ambulation. Pt presenting w/ deficits in strength, balance, gait, and endurance post-op.   At this time, pt is a HIGH fall risk and requires skilled 2 person assist for safe functional mobility. Pt would benefit from continued skilled PT to address deficits in order to maximize independence w/ functional mobility and return to PLOF as able. Therapy Prognosis: Good  Decision Making: Medium Complexity  Requires PT Follow-Up: Yes  Activity Tolerance  Activity Tolerance: Patient limited by endurance; Patient limited by fatigue  Activity Tolerance Comments: Pt fatiguing quickly throughout ambulation attempts this date. Plan   Physcial Therapy Plan  General Plan: 6-7 times per week  Current Treatment Recommendations: Strengthening, Balance training, Functional mobility training, Transfer training, Neuromuscular re-education, Stair training, Gait training, Endurance training, Pain management, Home exercise program, Safety education & training, Patient/Caregiver education & training, Equipment evaluation, education, & procurement, Therapeutic activities  Safety Devices  Type of Devices: Bed alarm in place, Call light within reach, Left in bed, Gait belt, Nurse notified (Pt's wife present in room w/ pt upon writer's exit)  Restraints  Restraints Initially in Place: No     Restrictions  Restrictions/Precautions  Restrictions/Precautions: General Precautions, Fall Risk, Weight Bearing, Contact Precautions, Up as Tolerated  Required Braces or Orthoses?: No  Lower Extremity Weight Bearing Restrictions  Right Lower Extremity Weight Bearing: Non Weight Bearing  Position Activity Restriction  Other position/activity restrictions: LUE IV, telemetry, pulse ox, RLE external fixator     Subjective   General  Patient assessed for rehabilitation services?: Yes  Response To Previous Treatment: Patient with no complaints from previous session. Family / Caregiver Present: Yes (Pt's wife present at bedside throughout session.)  Follows Commands: Within Functional Limits  General Comment  Comments: RN and pt agreeable to therapy. Writer spoke w/ podiatrist prior to entering room who requests RLE NWB at this time.   Pt seated at EOB upon arrival.  Pt cooperative throughout. Subjective  Subjective: Pt reporting 7/10 pain in R foot at time of PT eval.         Social/Functional History  Social/Functional History  Lives With: Spouse  Type of Home: House  Home Layout: One level (3 Step to walk down to get to master bedroom with no handrails)  Home Access: Stairs to enter with rails  Entrance Stairs - Number of Steps: 3  Entrance Stairs - Rails: Both  Bathroom Shower/Tub: Tub/Shower unit  Bathroom Toilet: Standard  Bathroom Equipment: Grab bars in shower  Home Equipment: Eleonore Fail, rolling, Walker, 4 wheeled, [place], Grab bars (Knee cart)  Has the patient had two or more falls in the past year or any fall with injury in the past year?: Yes (\"I tripped down my steps a couple times\" - pt able to self correct LOB)  ADL Assistance: Independent  Homemaking Assistance: Independent  Homemaking Responsibilities: Yes  Ambulation Assistance: Independent  Transfer Assistance: Independent  Active : Yes  Occupation: Retired  Vision/Hearing  Vision  Vision: Impaired  Vision Exceptions: Wears glasses for reading  Hearing  Hearing: Within functional limits    Cognition   Orientation  Overall Orientation Status: Within Functional Limits  Cognition  Overall Cognitive Status: Exceptions  Arousal/Alertness: Appropriate responses to stimuli  Following Commands: Follows multistep commands with repitition; Follows multistep commands with increased time  Attention Span: Appears intact; Attends with cues to redirect  Memory: Appears intact  Safety Judgement: Decreased awareness of need for assistance;Decreased awareness of need for safety  Problem Solving: Decreased awareness of errors;Assistance required to identify errors made;Assistance required to correct errors made  Insights: Decreased awareness of deficits  Initiation: Does not require cues  Sequencing: Requires cues for some     Objective   Observation/Palpation  Posture: Fair  Observation: ace wrap covering RLE ex fix  Gross Assessment  Sensation: Impaired (Pt reports chronic numbness/tingling in R foot)     AROM RLE (degrees)  RLE AROM: WFL  RLE General AROM: WFL at hip & knee, ankle not assessed. RLE external fixator in place  AROM LLE (degrees)  LLE AROM : WFL  AROM RUE (degrees)  RUE AROM : WFL  AROM LUE (degrees)  LUE AROM : WFL  Strength RLE  Comment: MMT not formally assessed post-op d/t RLE external fixator; Movement against gravity noted at hip & knee throughout session  Strength LLE  Strength LLE: WFL  Comment: Grossly 4+/5  Strength RUE  Comment: See OT assessment for detail  Strength LUE  Comment: See OT assessment for detail          Bed mobility  Bed Mobility Comments: Not assessed this date. Pt seated at EOB upon arrival and retired sitting at EOB upon writer's exit. Transfers  Sit to Stand: Moderate Assistance;Maximum Assistance;2 Person Assistance  Stand to Sit: Moderate Assistance;Maximum Assistance;2 Person Assistance  Comment: Pt performed 2 STS transfers throughout session this date demonstrating poor steadiness throughout. Pt initially standing from raised bed height requiring ModA x 2. On second attempt, bed height was lowered and pt requiring MaxA x 2 throughout. Throughout all attempts, pt requiring max verbal cueing for proper hand placement throughout transfers w/ RW w/ poor return demo. From lowered bed height, pt requiring education on use of rocking motion to initiate transfers w/ fair return demo. Pt w/ moderate difficulty to maintain RLE NWB throughout transfers however upon standing able to maintain RLE off floor w/ heavy reliance on RW for UE support throughout. Pt also demonstrating poor eccentric quad control throughout stand>sit transfers. Ambulation  WB Status: RLE NWB  Ambulation  Surface: Level tile  Device: Rolling Walker  Assistance:  Moderate assistance;2 Person assistance  Quality of Gait: unsteady, decreased step-length & height, hop-to pattern, heavy reliance on RW for UE support  Gait Deviations: Slow Kaela;Decreased step length;Decreased step height;Staggers; Shuffles  Distance: 6ft forward/retro x 2  Comments: Pt ambulating short distance in room demonstrating unsteadiness throughout. Pt requiring assist for progression of RW throughout & mod verbal cueing to maintain ARACELI within RW w/ fair return demo. Pt able to maintain RLE NWB status throughout ambulation attempts w/ min verbal cueing. More Ambulation?: No  Stairs/Curb  Stairs?: No       Balance  Posture: Fair  Sitting - Static: Good  Sitting - Dynamic: Good  Standing - Static: Fair;-  Standing - Dynamic: Poor;+  Comments: Standing balance assessed w/ RW           AM-PAC Score  AM-PAC Inpatient Mobility Raw Score : 13 (10/13/22 1552)  AM-PAC Inpatient T-Scale Score : 36.74 (10/13/22 1552)  Mobility Inpatient CMS 0-100% Score: 64.91 (10/13/22 1552)  Mobility Inpatient CMS G-Code Modifier : CL (10/13/22 1552)          Goals  Short Term Goals  Time Frame for Short Term Goals: 12 visits  Short Term Goal 1: Pt to demonstrate bed mobility SBA  Short Term Goal 2: Pt to perform STS transfers w/ RW SBA while maintaining NWB status RLE  Short Term Goal 3: Pt to ambulate at least 50ft w/ RW Leigha while maintaining NWB status RLE  Short Term Goal 4: Pt to ascend/descend 5 stairs w/ handrails ModA while maintaining NWB status RLE  Short Term Goal 5: Pt to actively participate in at least 30 minutes of physical therapy for ther act, ther ex, balance, gait, transfer, stair, and endurance training  Patient Goals   Patient Goals : To heal, to be mobile       Education  Patient Education  Education Given To: Patient  Education Provided: Role of Therapy;Plan of Care;Precautions; Energy Conservation;Transfer Training;Equipment; Fall Prevention Strategies  Education Provided Comments: Pt educated on: purpose of acute PT eval & treat, importance of continued mobility throughout admission, general safety awareness, fall risk prevention, safe transfers & ambulation w/ RW, how to safely maintain RLE NWB throughout mobility, pursed lip breathing, benefits of continued therapy, paced breathing for pain control, and PT POC. Pt w/ fair return demo. Pt would benefit from continued reinforcement of education. Education Method: Demonstration;Verbal  Education Outcome: Verbalized understanding;Continued education needed      Therapy Time   Individual Concurrent Group Co-treatment   Time In 26         Time Out 1440         Minutes 45         Treatment time: 30 minutes     Co-treatment with OT warranted secondary to decreased safety and independence requiring 2 skilled therapy professionals to address individual discipline's goals. PT addressing transfer training.       Saul Ellis, PT

## 2022-10-13 NOTE — CARE COORDINATION
Social Work-Met with patient to discuss dc options. He would like short term SNF. Provided [facility] SNF list. His first choice is [facility].  Sent referral. Clarice Silver

## 2022-10-13 NOTE — PROGRESS NOTES
[facility] [facility] Pharmacokinetic Monitoring Service - Vancomycin    Consulting Provider: Josse Browning   Indication: SSTI  Target Concentration: Goal trough of 10-15 mg/L and AUC/DIMA <500 mg*hr/L  Day of Therapy: 8  Additional Antimicrobials: Cefepime    Pertinent Laboratory Values: Wt Readings from Last 1 Encounters:   10/12/22 234 lb (106.1 kg)     Temp Readings from Last 1 Encounters:   10/12/22 96.8 °F (36 °C) (Temporal)     Estimated Creatinine Clearance: 46 mL/min (A) (based on SCr of 1.97 mg/dL (H)). Recent Labs     10/13/22  0631   CREATININE 2.33*        MRSA Nasal Swab: N/A. Non-respiratory infection. Recent vancomycin administrations                    vancomycin (VANCOCIN) 1,250 mg in dextrose 5 % 250 mL IVPB (mg) 1,250 mg New Bag 10/11/22 1320     1,250 mg New Bag 10/10/22 1257              Plan:  Current dosing regimen is vancomycin 1250mg ivpb q24h. No dose was given yesterday. Scr was increased this morning   Continue current dose. Repeat level after 2 more doses given. Continue to monitor scr.    Pharmacy will continue to monitor patient and adjust therapy as indicated    Thank you for the consult,  CARLOS [facility]  10/13/2022 9:21 AM

## 2022-10-13 NOTE — CARE COORDINATION
Therapy recommending SNF and pt and wife agreeable. SW informed. Christina with [facility] and Maribel with [facility] informed and will follow.

## 2022-10-13 NOTE — PROGRESS NOTES
Occupational Therapy  Facility/Department: [address] [place] [address]  Occupational Therapy Re-Assessment    Name: José Farrar  : 1955  MRN: [id number]  Date of Service: 10/13/2022    CHRISTY Barraza reports patient is medically stable for therapy treatment this date. Chart reviewed prior to treatment and patient is agreeable for therapy. All lines intact and patient positioned comfortably at end of treatment. All patient needs addressed prior to ending therapy session. Pt currently functioning below baseline. Recommend daily inpatient skilled therapy at time of discharge to maximize long term outcomes and prevent re-admission. Please refer to AM-PAC score for current level of function. Discharge Recommendations:  Patient would benefit from continued therapy after discharge  OT Equipment Recommendations  Equipment Needed:  (CTA)       Patient Diagnosis(es): The encounter diagnosis was Charcot's joint of ankle, right. Past Medical History:  has a past medical history of Abscess of right foot, Acquired hammer toe deformity of lesser toe of right foot, ALEJANDRO (acute kidney injury) ([address] Cellulitis, Cellulitis of left foot, Cellulitis of right foot, Charcot foot due to diabetes mellitus ([address] Chest pain at rest, Chronic multifocal osteomyelitis of right foot ([address] Diabetic polyneuropathy associated with type 2 diabetes mellitus ([address] Essential hypertension, Fractures, multiple, Hyperlipidemia, Hypertension, Leukocytosis, Neuropathy, Pain in right foot, Pneumonia, Right foot infection, Right foot pain, Tobacco abuse, Type II or unspecified type diabetes mellitus without mention of complication, not stated as uncontrolled, Vertigo, 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 Past Surgical History:  has a past surgical history that includes Neck surgery (); Appendectomy; knee surgery (Bilateral, );  Knee arthroscopy (Right, 1989); Knee arthroscopy (Left, 1990); Foot Debridement (Left, 04/24/2018); pr deep dissec foot infec,1 bursa (Left, 04/24/2018); Colonoscopy; Foot Debridement (Right, 03/20/2020); arthroplasty (Right, 12/11/2020); Foot Debridement (Right, 06/08/2021); Foot surgery (Right, 06/11/2021); Foot Debridement (Right, 09/16/2021); IR INSERT PICC VAD W SQ PORT >5 YEARS (10/07/2022); Foot surgery (Right, 10/12/2022); and Ankle surgery (Right, 10/12/2022). Per HPI:  Bhavna Campos is a 78 y.o. male seen at [facility] for right foot wound. Patient is known to [address] [place] [address]. Patient has had Charcot deformity to the right foot for a long time. Patient had multiple admission due to right foot infection in the past.  Patient visited Dr. Adelina Castro 10//2022 and was told to report to the ED to get admitted immediately due to concern of cellulitis and possible osteomyelitis of the right foot.  [facility]. Patient denies other pedal complaint. 10/10/2022: OT discontinued this date, pt declining therapy services d/t IND with self care/mobility. 10/12/2022: Pt underwent R midfoot osteotomy & external fixation procedure. OT re-ordered at this time to address new deficits in ADLs and functional mobility. Assessment   Performance deficits / Impairments: Decreased functional mobility ; Decreased ADL status; Decreased balance;Decreased endurance;Decreased sensation;Decreased safe awareness  Assessment: OT re-evaluation completed this date s/p R midfoot osteotomy & external fixation on 10/12/22. Pt present with new NWB precautions to RLE, impacting pt's ability to safety and IND complete funcitonal mobility and ADLs. All goals on OT POC updated appropriated. Pt requires 2 skilled staff assist at this time to safely progress mobility and engage in ADLs to ensure adherence to NWB precautions.  Skilled OT services are warranted at this time to maximize this pt's safety and IND with self care and to facilitate this pt's ability to return to PLOF as able. Prognosis: Good  Decision Making: Medium Complexity  REQUIRES OT FOLLOW-UP: Yes  Activity Tolerance  Activity Tolerance: Patient Tolerated treatment well;Patient limited by fatigue;Patient limited by pain        Plan   Occupational Therapy Plan  Times Per Week: 4-5x/week 1-2x/day as tolerated  Current Treatment Recommendations: Strengthening, Balance training, Functional mobility training, Endurance training, Safety education & training, Patient/Caregiver education & training, Self-Care / ADL, Equipment evaluation, education, & procurement, Positioning, Coordination training, Home management training     Restrictions  Restrictions/Precautions  Restrictions/Precautions: General Precautions, Fall Risk, Weight Bearing, Contact Precautions, Up as Tolerated  Required Braces or Orthoses?: No  Lower Extremity Weight Bearing Restrictions  Right Lower Extremity Weight Bearing: Non Weight Bearing  Left Lower Extremity Weight Bearing: Weight Bearing As Tolerated  Required Braces or Orthoses  Right Lower Extremity Brace:  (External fixator)  Position Activity Restriction  Other position/activity restrictions: LUE IV, telemetry, pulse ox, RLE external fixator    Subjective   General  Chart Reviewed: Yes  Patient assessed for rehabilitation services?: Yes  Family / Caregiver Present: Yes (Pt's spouse present throughout treatment)  Subjective  Subjective: Pt seated at EOB upon arrival, spouse present in room. Agreeable to therapy evaluation. Pt rating pain in RLE at 7.5/10 at this time.      Social/Functional History  Social/Functional History  Lives With: Spouse  Type of Home: House  Home Layout: One level (3 Step to walk down to get to master bedroom with no handrails)  Home Access: Stairs to enter with rails  Entrance Stairs - Number of Steps: 3  Entrance Stairs - Rails: Both  Bathroom Shower/Tub: Tub/Shower unit  Bathroom Toilet: Standard  Bathroom Equipment: Grab bars in shower  Home Equipment: Corinna Herb, rolling, Walker, 4 wheeled, [place], Grab bars (Knee cart)  Has the patient had two or more falls in the past year or any fall with injury in the past year?: Yes (\"I tripped down my steps a couple times\" - pt able to self correct LOB)  ADL Assistance: Independent  Homemaking Assistance: Independent  Homemaking Responsibilities: Yes  Ambulation Assistance: Independent  Transfer Assistance: Independent  Active : Yes  Occupation: Retired       Objective  Observation/Palpation  Posture: Fair  Observation: ace wrap covering RLE ex fix  Safety Devices  Type of Devices: Gait belt;Left in bed;Call light within reach;Nurse notified (Left seated at EOB; Spouse present with pt in room)    Bed Mobility Training  Bed Mobility Training: No (Not observed, pt seated at EOB upon entry and retired to EOB at session end. Declined assistance with returning to supine. Spouse present in room throughout treatment)    Balance  Sitting:  (SUP while seated at EOB; cues provided to maintain NWB status of RLE)  Standing:  (Min-ModAx2 with RW once standing)    Transfer Training  Transfer Training: Yes  Overall Level of Assistance: Moderate assistance;Maximum assistance;Assist X2 (ModAx2 to stand with bed elevated; MaxAx2 to stand with bed in a lower position)  Interventions: Safety awareness training;Verbal cues; Visual cues;Demonstration (Visual demo provided for STS transfer tech with NWB LE. Mod verbal/visual cues provided for hand placement, LLE placement, adherence to NWB precautions, RW safety, weightshifting/nose over toes, and upright posture all to increase safety/reduce fall risk)  Sit to Stand: Moderate assistance;Maximum assistance;Assist X2  Stand to Sit: Moderate assistance;Maximum assistance;Assist X2    Functional Mobility  Overall Level of Assistance: Minimum assistance; Moderate assistance;Assist X2 (Pt completed 2 trials of functional mobility this date. Pt able to complete forward/retro steps with Min-ModAx2 with RW. Demonstration provided on sequencing of steps/RW mgnt with NWB RLE, pt with FAIR+ return demo.)  Interventions: Demonstration; Safety awareness training;Verbal cues (Mod verbal cues for RW safety and mgnt, maintaining ARACELI within RW, progression of RW/LLE, and adherence to NWB precautions all to increase safety/reduce the risk of falls.)  Assistive Device: Gait belt;Walker, rolling     AROM: Within functional limits  PROM: Within functional limits  Strength: Within functional limits (BUE ~5/5)  Coordination: Generally decreased, functional  Tone: Normal  Sensation: Impaired    ADL  Feeding: Independent  Grooming: Setup;Supervision (Seated)  UE Bathing: Stand by assistance;Setup  LE Bathing: Moderate assistance;Setup  UE Dressing: Minimal assistance  LE Dressing: Maximum assistance  Toileting: Maximum assistance  Additional Comments: Pt's participation in ADLs limited by new NWB precautions to RLE, RLE external fixator limited mobility, pain, decreased functional activity tolerance, and decreased balance. Vision  Vision: Impaired  Vision Exceptions: Wears glasses for reading  Hearing  Hearing: Within functional limits    Cognition  Overall Cognitive Status: Exceptions  Arousal/Alertness: Appropriate responses to stimuli  Following Commands: Follows multistep commands with repitition; Follows multistep commands with increased time  Attention Span: Appears intact; Attends with cues to redirect  Memory: Appears intact  Safety Judgement: Decreased awareness of need for assistance;Decreased awareness of need for safety  Problem Solving: Decreased awareness of errors;Assistance required to identify errors made;Assistance required to correct errors made  Insights: Decreased awareness of deficits  Initiation: Does not require cues  Sequencing: Requires cues for some  Orientation  Overall Orientation Status: Within Functional Limits  Orientation Level: Oriented X4                  Education Given To: Patient; Family  Education Provided: Role of Therapy;Transfer Training;Plan of Care;Energy Conservation; Fall Prevention Strategies;Precautions; Equipment;ADL Adaptive Strategies (+new NWB precautions, RW safety/mgnt, and discharge recommendations)  Education Method: Verbal  Barriers to Learning: None  Education Outcome: Verbalized understanding;Continued education needed;Demonstrated understanding       AM-PAC Score        AM-PAC Inpatient Daily Activity Raw Score: 16 (10/13/22 1550)  AM-PAC Inpatient ADL T-Scale Score : 35.96 (10/13/22 1550)  ADL Inpatient CMS 0-100% Score: 53.32 (10/13/22 1550)  ADL Inpatient CMS G-Code Modifier : CK (10/13/22 1550)      Goals  Short Term Goals  Time Frame for Short Term Goals: By discharge, pt to demo:  Short Term Goal 1: ADL transfer and functional mobility with MinAx1 and Good adherence to WB precautions with AD as needed. (Modified by Deanna Carranza, OTR/L on 10/13/2022)  Short Term Goal 2: UB ADLs to MOD I/IND and LB ADLs to MinAx1 with Good adherence to WB precautions and use of AE/compensatory strategies/AD as needed. (Modified by Deanna Carranza, OTR/L on 10/13/2022)  Short Term Goal 3: toileting tasks with MinAx1 and Good adherence to WB precautions with use of AD/grab bars/BSC as needed. (Modified by Deanna Carranza, OTR/L on 10/13/2022)  Short Term Goal 4: IND with BUE HEP with use of handout to maintain current strength required for safety/IND with self care tasks. Short Term Goal 5: Pt to be IND with fall prevention strategies, EC/WS tech, home safety strategies, WB precautions, and discharge/equipment recs with use of handouts as needed. Patient Goals   Patient goals : To go home!        Therapy Time   Individual Concurrent Group Co-treatment   Time In 1405 (+10 mins for chart review & RN coordination)         Time Out 6520         Minutes 28+10=38         Tx Time: 25 minutes    Co-treatment with PT warranted secondary to decreased safety and independence requiring 2 skilled therapy professionals to address individual discipline's goals. OT addressing preparation for ADL transfer, sitting balance for increased ADL performance, functional reaching, environmental safety/scanning, fall prevention, functional mobility for ADL transfers, and functional UE strength.        Kamala Gonzalez, OT

## 2022-10-14 LAB
BUN BLDV-MCNC: 37 MG/DL (ref 8–23)
CREAT SERPL-MCNC: 2.26 MG/DL (ref 0.7–1.2)
GFR SERPL CREATININE-BSD FRML MDRD: 31 ML/MIN/1.73M2
GLUCOSE BLD-MCNC: 110 MG/DL (ref 75–110)
GLUCOSE BLD-MCNC: 149 MG/DL (ref 75–110)
GLUCOSE BLD-MCNC: 180 MG/DL (ref 75–110)
GLUCOSE BLD-MCNC: 205 MG/DL (ref 75–110)
SURGICAL PATHOLOGY REPORT: NORMAL

## 2022-10-14 PROCEDURE — 82947 ASSAY GLUCOSE BLOOD QUANT: CPT

## 2022-10-14 PROCEDURE — 1200000000 HC SEMI PRIVATE

## 2022-10-14 PROCEDURE — 2580000003 HC RX 258: Performed by: PODIATRIST

## 2022-10-14 PROCEDURE — 6360000002 HC RX W HCPCS: Performed by: PODIATRIST

## 2022-10-14 PROCEDURE — 82565 ASSAY OF CREATININE: CPT

## 2022-10-14 PROCEDURE — 6370000000 HC RX 637 (ALT 250 FOR IP): Performed by: PODIATRIST

## 2022-10-14 PROCEDURE — 97535 SELF CARE MNGMENT TRAINING: CPT

## 2022-10-14 PROCEDURE — 97530 THERAPEUTIC ACTIVITIES: CPT

## 2022-10-14 PROCEDURE — 84520 ASSAY OF UREA NITROGEN: CPT

## 2022-10-14 PROCEDURE — 99232 SBSQ HOSP IP/OBS MODERATE 35: CPT | Performed by: INTERNAL MEDICINE

## 2022-10-14 PROCEDURE — 36415 COLL VENOUS BLD VENIPUNCTURE: CPT

## 2022-10-14 RX ORDER — KETOROLAC TROMETHAMINE 10 MG/1
10 TABLET, FILM COATED ORAL EVERY 6 HOURS PRN
Qty: 28 TABLET | Refills: 0 | Status: SHIPPED | OUTPATIENT
Start: 2022-10-14 | End: 2022-10-21

## 2022-10-14 RX ORDER — DOCUSATE SODIUM 100 MG/1
100 CAPSULE, LIQUID FILLED ORAL DAILY PRN
Qty: 30 CAPSULE | Refills: 0 | Status: SHIPPED | OUTPATIENT
Start: 2022-10-14

## 2022-10-14 RX ORDER — CYCLOBENZAPRINE HCL 10 MG
10 TABLET ORAL 3 TIMES DAILY PRN
Qty: 21 TABLET | Refills: 0 | Status: SHIPPED | OUTPATIENT
Start: 2022-10-14 | End: 2022-10-24

## 2022-10-14 RX ORDER — METRONIDAZOLE 500 MG/1
500 TABLET ORAL EVERY 8 HOURS SCHEDULED
Qty: 42 TABLET | Refills: 0 | Status: SHIPPED | OUTPATIENT
Start: 2022-10-14 | End: 2022-10-28

## 2022-10-14 RX ORDER — OXYCODONE HYDROCHLORIDE AND ACETAMINOPHEN 5; 325 MG/1; MG/1
1 TABLET ORAL EVERY 6 HOURS PRN
Qty: 28 TABLET | Refills: 0 | Status: SHIPPED | OUTPATIENT
Start: 2022-10-14 | End: 2022-10-21

## 2022-10-14 RX ADMIN — GLIPIZIDE 20 MG: 10 TABLET ORAL at 17:40

## 2022-10-14 RX ADMIN — DIPHENHYDRAMINE HYDROCHLORIDE 25 MG: 25 TABLET ORAL at 20:55

## 2022-10-14 RX ADMIN — INSULIN GLARGINE 10 UNITS: 100 INJECTION, SOLUTION SUBCUTANEOUS at 20:55

## 2022-10-14 RX ADMIN — AMLODIPINE BESYLATE 5 MG: 5 TABLET ORAL at 09:37

## 2022-10-14 RX ADMIN — GLIPIZIDE 20 MG: 10 TABLET ORAL at 05:53

## 2022-10-14 RX ADMIN — GABAPENTIN 900 MG: 300 CAPSULE ORAL at 13:14

## 2022-10-14 RX ADMIN — HYDROCODONE BITARTRATE AND ACETAMINOPHEN 2 TABLET: 5; 325 TABLET ORAL at 04:16

## 2022-10-14 RX ADMIN — CETIRIZINE HYDROCHLORIDE 10 MG: 10 TABLET ORAL at 20:55

## 2022-10-14 RX ADMIN — METRONIDAZOLE 500 MG: 500 TABLET ORAL at 20:54

## 2022-10-14 RX ADMIN — ASPIRIN 81 MG: 81 TABLET, COATED ORAL at 09:37

## 2022-10-14 RX ADMIN — CEFEPIME 2000 MG: 2 INJECTION, POWDER, FOR SOLUTION INTRAVENOUS at 17:46

## 2022-10-14 RX ADMIN — INSULIN LISPRO 2 UNITS: 100 INJECTION, SOLUTION INTRAVENOUS; SUBCUTANEOUS at 17:39

## 2022-10-14 RX ADMIN — METRONIDAZOLE 500 MG: 500 TABLET ORAL at 13:14

## 2022-10-14 RX ADMIN — INSULIN GLARGINE 10 UNITS: 100 INJECTION, SOLUTION SUBCUTANEOUS at 09:38

## 2022-10-14 RX ADMIN — ENOXAPARIN SODIUM 30 MG: 100 INJECTION SUBCUTANEOUS at 09:37

## 2022-10-14 RX ADMIN — GABAPENTIN 900 MG: 300 CAPSULE ORAL at 09:37

## 2022-10-14 RX ADMIN — HYDROCODONE BITARTRATE AND ACETAMINOPHEN 2 TABLET: 5; 325 TABLET ORAL at 20:55

## 2022-10-14 RX ADMIN — CEFEPIME 2000 MG: 2 INJECTION, POWDER, FOR SOLUTION INTRAVENOUS at 05:56

## 2022-10-14 RX ADMIN — ENOXAPARIN SODIUM 30 MG: 100 INJECTION SUBCUTANEOUS at 20:55

## 2022-10-14 RX ADMIN — HYDROCODONE BITARTRATE AND ACETAMINOPHEN 2 TABLET: 5; 325 TABLET ORAL at 13:15

## 2022-10-14 RX ADMIN — VANCOMYCIN HYDROCHLORIDE 1250 MG: 5 INJECTION, POWDER, LYOPHILIZED, FOR SOLUTION INTRAVENOUS at 13:19

## 2022-10-14 RX ADMIN — METRONIDAZOLE 500 MG: 500 TABLET ORAL at 05:53

## 2022-10-14 RX ADMIN — ATORVASTATIN CALCIUM 20 MG: 20 TABLET, FILM COATED ORAL at 20:55

## 2022-10-14 RX ADMIN — GABAPENTIN 900 MG: 300 CAPSULE ORAL at 20:55

## 2022-10-14 ASSESSMENT — PAIN SCALES - GENERAL
PAINLEVEL_OUTOF10: 7
PAINLEVEL_OUTOF10: 9
PAINLEVEL_OUTOF10: 10
PAINLEVEL_OUTOF10: 10

## 2022-10-14 ASSESSMENT — ENCOUNTER SYMPTOMS
RESPIRATORY NEGATIVE: 1
GASTROINTESTINAL NEGATIVE: 1

## 2022-10-14 ASSESSMENT — PAIN DESCRIPTION - ORIENTATION
ORIENTATION: LEFT
ORIENTATION: RIGHT
ORIENTATION: RIGHT

## 2022-10-14 ASSESSMENT — PAIN DESCRIPTION - LOCATION
LOCATION: FOOT

## 2022-10-14 ASSESSMENT — PAIN DESCRIPTION - DESCRIPTORS
DESCRIPTORS: THROBBING
DESCRIPTORS: ACHING;THROBBING
DESCRIPTORS: THROBBING;TENDER

## 2022-10-14 ASSESSMENT — PAIN - FUNCTIONAL ASSESSMENT: PAIN_FUNCTIONAL_ASSESSMENT: PREVENTS OR INTERFERES WITH MANY ACTIVE NOT PASSIVE ACTIVITIES

## 2022-10-14 NOTE — PROGRESS NOTES
Physical Therapy  Facility/Department: [facility] MED SURG  Daily Treatment Note  NAME: Le Garcia  : 1955  MRN: [id number]    Date of Service: 10/14/2022    Discharge Recommendations:  Patient would benefit from continued therapy after discharge Pt currently functioning below baseline. Recommend daily inpatient skilled therapy at time of discharge to maximize long term outcomes and prevent re-admission. Please refer to AM-PAC score for current level of function. Am pac     Patient Diagnosis(es): The primary encounter diagnosis was Acute post-operative pain. A diagnosis of Charcot's joint of ankle, right was also pertinent to this visit. Assessment   Assessment: Pt presents with significant difficulty with sit to stand and ability to maintain NWB Rt LE. Lt quad weakness noted. Will continue to work towards functional independence while hospitalized. Activity Tolerance: Patient tolerated treatment well     Plan    Physcial Therapy Plan  General Plan: 6-7 times per week  Specific Instructions for Next Treatment: w/c mobility; transfers, pre gait  Current Treatment Recommendations: Strengthening;Balance training;Functional mobility training;Transfer training;Neuromuscular re-education;Stair training;Gait training; Endurance training;Pain management;Home exercise program;Safety education & training;Patient/Caregiver education & training;Equipment evaluation, education, & procurement; Therapeutic activities     Restrictions  Restrictions/Precautions  Restrictions/Precautions: General Precautions, Fall Risk, Weight Bearing, Contact Precautions, Up as Tolerated  Required Braces or Orthoses?: No  Lower Extremity Weight Bearing Restrictions  Right Lower Extremity Weight Bearing: Non Weight Bearing  Left Lower Extremity Weight Bearing: Weight Bearing As Tolerated  Required Braces or Orthoses  Right Lower Extremity Brace:  (External fixator)  Position Activity Restriction  Other position/activity restrictions: CHAVA LANDEROS, telemetry, pulse ox, RLE external fixator - NWB RLE     Subjective    Subjective  Subjective: Pt agreeable to therapy this date and verbalized he understands if his foot is to heal that he will need to keep weight off of it per  request.  Orientation  Overall Orientation Status: Within Functional Limits  Orientation Level: Oriented X4  Cognition  Overall Cognitive Status: Exceptions  Arousal/Alertness: Appropriate responses to stimuli  Following Commands: Follows multistep commands with repitition; Follows multistep commands with increased time  Attention Span: Appears intact; Attends with cues to redirect  Memory: Appears intact  Safety Judgement: Decreased awareness of need for assistance;Decreased awareness of need for safety  Problem Solving: Decreased awareness of errors;Assistance required to identify errors made;Assistance required to correct errors made  Insights: Decreased awareness of deficits  Initiation: Does not require cues  Sequencing: Requires cues for some     Objective     Bed Mobility Training  Bed Mobility Training: Yes  Overall Level of Assistance: Modified independent  Rolling: Modified independent  Supine to Sit: mod independent   Balance  Sitting: Without support  Standing: With support  Transfer Training  Transfer Training: Yes  Overall Level of Assistance: Moderate assistance;Assist X2  Interventions: Safety awareness training;Verbal cues; Visual cues;Demonstration  Sit to Stand: Moderate assistance;Assist X2  Stand to Sit: Min assist x 2  Stand Pivot Transfers: Moderate assistance;Assist X2  Gait Training  Gait Training: Yes  Gait- pt unable to maintain NWB during sit to stand but able to maintain it once fully upright. Overall Level of Assistance: Minimum assistance;Assist X2  Interventions: Demonstration; Safety awareness training;Verbal cues  Distance (ft): 1 Feet  Assistive Device: Gait belt;Walker, rolling     Other Specialty Interventions  Other Treatments/Modalities: Reviewed option of using w/c in the home and getting a commode  - will work towards safe transfers and short distance gait to maximize healing and safety. Safety Devices  Type of Devices: Bed alarm in place;Call light within reach; Left in bed;Gait belt;Nurse notified     Goals  Short Term Goals  Time Frame for Short Term Goals: 12 visits  Short Term Goal 1: Pt to demonstrate bed mobility SBA  Short Term Goal 2: Pt to perform STS transfers w/ RW SBA while maintaining NWB status RLE  Short Term Goal 3: Pt to ambulate at least 50ft w/ RW Leigha while maintaining NWB status RLE  Short Term Goal 4: Pt to ascend/descend 5 stairs w/ handrails ModA while maintaining NWB status RLE  Short Term Goal 5: Pt to actively participate in at least 30 minutes of physical therapy for ther act, ther ex, balance, gait, transfer, stair, and endurance training  Patient Goals   Patient Goals : To heal, to be mobile    Education  Patient Education  Education Given To: Patient  Education Provided: Transfer Training; Fall Prevention Strategies  Education Provided Comments: Pt education on LE positioning with External fixator and safety; NWB  Education Method: Demonstration;Verbal  Education Outcome: Verbalized understanding;Continued education needed    Therapy Time   Individual   Time In  1243   Time Out 26   Minutes  34      Co-treatment with OT warranted secondary to decreased safety and independence requiring 2 skilled therapy professionals to address individual discipline's goals. PT addressing pre gait trunk strengthening and transfer training.     Nia Villatoro, PT

## 2022-10-14 NOTE — PLAN OF CARE
Problem: Discharge Planning  Goal: Discharge to home or other facility with appropriate resources  10/14/2022 1341 by Ria Adair RN  Outcome: Progressing  10/14/2022 0125 by Paola Padilla RN  Outcome: Progressing  Flowsheets (Taken 10/14/2022 0125)  Discharge to home or other facility with appropriate resources:   Identify barriers to discharge with patient and caregiver   Arrange for needed discharge resources and transportation as appropriate     Problem: Chronic Conditions and Co-morbidities  Goal: Patient's chronic conditions and co-morbidity symptoms are monitored and maintained or improved  10/14/2022 1341 by Ria Adair RN  Outcome: Progressing  10/14/2022 0125 by Paola Padilla RN  Outcome: Progressing     Problem: Safety - Adult  Goal: Free from fall injury  10/14/2022 1341 by Ria Adair RN  Outcome: Progressing  10/14/2022 0125 by Paola Padilla RN  Outcome: Progressing     Problem: ABCDS Injury Assessment  Goal: Absence of physical injury  Outcome: Progressing     Problem: Respiratory - Adult  Goal: Achieves optimal ventilation and oxygenation  Outcome: Progressing     Problem: Skin/Tissue Integrity - Adult  Goal: Skin integrity remains intact  10/14/2022 1341 by Ria Adair RN  Outcome: Progressing  Flowsheets (Taken 10/14/2022 0127 by Paola Padilla RN)  Skin Integrity Remains Intact:   Monitor for areas of redness and/or skin breakdown   Assess vascular access sites hourly  10/14/2022 0125 by Paola Padilla RN  Outcome: Progressing  Flowsheets (Taken 10/14/2022 0125)  Skin Integrity Remains Intact:   Monitor for areas of redness and/or skin breakdown   Assess vascular access sites hourly  Goal: Incisions, wounds, or drain sites healing without S/S of infection  Outcome: Progressing     Problem: Musculoskeletal - Adult  Goal: Return mobility to safest level of function  Outcome: Progressing  Goal: Return ADL status to a safe level of function  Outcome: Progressing     Problem: Infection - Adult  Goal: Absence of infection at discharge  Outcome: Progressing  Goal: Absence of infection during hospitalization  Outcome: Progressing     Problem: Metabolic/Fluid and Electrolytes - Adult  Goal: Electrolytes maintained within normal limits  Outcome: Progressing  Goal: Hemodynamic stability and optimal renal function maintained  Outcome: Progressing  Goal: Glucose maintained within prescribed range  Outcome: Progressing     Problem: Pain  Goal: Verbalizes/displays adequate comfort level or baseline comfort level  Outcome: Progressing     Problem: Neurosensory - Adult  Goal: Achieves stable or improved neurological status  Outcome: Progressing  Flowsheets (Taken 10/14/2022 0937)  Achieves stable or improved neurological status: Assess for and report changes in neurological status     Problem: Nutrition Deficit:  Goal: Optimize nutritional status  Outcome: Progressing

## 2022-10-14 NOTE — PROGRESS NOTES
[facility]  Progress Note    Lindy Ron  MRN: [id number]  Date: 10/14/2022  LOS: 8     Reason for F/U :   Diabetic foot infection/abscess    Impression :   Right Charcot foot with longstanding history of infections/abscesses has undergone multiple I&D procedures in the past  Chronic surgical wounds on the plantar aspect of the foot  Recurrent deep midfoot soft tissue abscess with thin fluid-filled sinus tract noted in the medial plantar aspect of the foot and there is retained metallic foreign body. Status post multiple midfoot fusion with midfoot osteotomy with application of a multiplanar external fixator in the right lower extremity on 10/12/2022    Recommendations:   Continue intravenous antibiotic therapy with cefepime, metronidazole and vancomycin for now  Plan is for discharge on the above antibiotics to complete a 6-week course of antibiotic therapy through 11/25/2022  From an infectious disease standpoint the patient is okay for discharge    Infection Control Recommendations:   Universal precautions    Discharge Planning:   Estimated Length of IV antimicrobials: 6 weeks  Patient will need Midline PICC line Insertion  [facility]  [facility],  LTAC: Undetermined  Patient willneed outpatient wound care: No    Medical Decision making / Summary of Stay:   Lindy Ron is a 78y.o.-year-old male who was initially admitted on 10/4/2022. Paloma Diaz has a history of a right sided Charcot foot deformity and has had a longstanding history with infections in the right foot. He did have an abscess for which he underwent I&D in June 2020 with MSSA isolated and the patient has had a residual wound which secondarily got infected and caused an abscess and in September 2021 he underwent an I&D of the abscess with MSSA, Enterobacter, Morganella isolated and he was discharged with IV antimicrobial therapy which he took for 6 weeks through October 22 and 2021.      The patient did continue to have issues with wound dehiscence and had an open wound on the plantar aspect of the foot which was clean. Seeing the patient in close to a year now and he has followed with the podiatrist and has had continued wound care and the wound has been improving with time. Patient has had a plantar foot midfoot ulceration. Patient did subsequently start to develop soft tissue swelling redness in his foot and the swelling started to extend into his leg and the patient did see Dr. Isatu Lagunas who recommended that he come into the emergency room for evaluation. The patient has been admitted for an abscess in the right foot and chronic ulcerations on the plantar aspect of the foot and the patient is undergoing further work-up with MRI and consideration for an external fixator device. Was asked to evaluate and help with antibiotic choice. Patient was taken to the operating room 10/12/2022 and had a midfoot osteotomy, external fixator application and excision of wound in the right foot with skin flap closure of the right foot. Current evaluation:10/14/2022    /68   Pulse 72   Temp 98.4 °F (36.9 °C) (Temporal)   Resp 18   Ht 5' 11\" (1.803 m)   Wt 234 lb (106.1 kg)   SpO2 (!) 89%   BMI 32.64 kg/m²     Temperature Range: Temp: 98.4 °F (36.9 °C) Temp  Av.1 °F (36.7 °C)  Min: 97.9 °F (36.6 °C)  Max: 98.4 °F (36.9 °C)  The patient is seen and evaluated in bedside and is awake and alert and no acute distress. He is awake and alert in no acute distress. He did have some concerns about his weightbearing status. He does not have any subjective fevers, chills or sweats. No abdominal pain nausea vomiting or diarrhea. Review of Systems   Constitutional: Negative. HENT: Negative. Respiratory: Negative. Cardiovascular: Negative. Gastrointestinal: Negative. Genitourinary: Negative. Musculoskeletal: Negative. Skin:  Positive for wound. Neurological: Negative. Psychiatric/Behavioral: Negative. Physical Examination :     Physical Exam  Constitutional:       Appearance: He is well-developed. HENT:      Head: Normocephalic and atraumatic. Cardiovascular:      Rate and Rhythm: Normal rate. Heart sounds: Normal heart sounds. No friction rub. No gallop. Pulmonary:      Effort: Pulmonary effort is normal.      Breath sounds: Normal breath sounds. No wheezing. Abdominal:      General: Bowel sounds are normal.      Palpations: Abdomen is soft. There is no mass. Tenderness: There is no abdominal tenderness. Musculoskeletal:         General: Normal range of motion. Cervical back: Normal range of motion and neck supple. Lymphadenopathy:      Cervical: No cervical adenopathy. Skin:     General: Skin is warm and dry. Comments: The foot dressing was not removed. Neurological:      Mental Status: He is alert and oriented to person, place, and time. Laboratory data:   I have independently reviewed the followinglabs:  CBC with Differential:   No results for input(s): WBC, HGB, HCT, PLT, SEGSPCT, BANDSPCT, LYMPHOPCT, MONOPCT, EOSPCT in the last 72 hours. BMP:   Recent Labs     10/13/22  0631 10/14/22  0643   BUN 40* 37*   CREATININE 2.33* 2.26*       Hepatic Function Panel: No results for input(s): PROT, LABALBU, BILIDIR, IBILI, BILITOT, ALKPHOS, ALT, AST in the last 72 hours. No results found for: PROCAL  Lab Results   Component Value Date/Time    CRP 30.1 10/04/2022 10:18 PM    .3 09/13/2022 06:41 PM    .8 09/13/2021 11:04 AM     Lab Results   Component Value Date    SEDRATE 64 (H) 10/04/2022         No results found for: DDIMER  No results found for: FERRITIN  No results found for: LDH  No results found for: FIBRINOGEN    No results found for requested labs within last 30 days.      Lab Results   Component Value Date/Time    COVID19 DETECTED 05/26/2021 12:06 PM    COVID19 Not Detected 12/07/2020 03:10 PM COVID19 Not Detected 08/08/2020 10:02 PM       No results for input(s): LUPILLO in the last 72 hours. Imaging Studies:     MRI OF THE RIGHT ANKLE WITHOUT CONTRAST; MRI OF THE RIGHT FOOT WITHOUT   CONTRAST, 10/5/2022 11:03 am     Impression   Soft tissue defect of the medial plantar aspect of the midfoot. Thin   fluid-filled sinus tract extending into the deep soft tissues of the midfoot,   measuring up to 2.0 x 0.9 cm on the coronal sequence, raising suspicion for a   phlegmon/abscess. There is air in the adjacent soft tissues. Susceptibility artifact in the soft tissues adjacent to the soft tissue   defect could represent sequela of prior intervention or retained metallic   foreign body. No MR evidence of acute osteomyelitis. Chronic bony changes at the TMT joints compatible with a neuropathic   arthropathy. Additional chronic findings as described above. Cultures:     Culture, Tissue [[id number]] (Abnormal) Collected: 10/12/22 0804   Order Status: Completed Specimen: Tissue from Foot Updated: 10/14/22 1014    Specimen Description . FOOT RT    Direct Exam RARE NEUTROPHILS Abnormal      NO BACTERIA SEEN    Culture CULTURE IN PROGRESS     No anaerobic organisms isolated at 2 days. Culture, Anaerobic and Aerobic [[id number]] Collected: 10/12/22 1012   Order Status: Completed Specimen: Bone from Foot Updated: 10/14/22 1008    Specimen Description . FOOT RIGHT    Direct Exam NO NEUTROPHILS SEEN     NO BACTERIA SEEN    Culture NO GROWTH 2 DAYS     Culture, Anaerobic and Aerobic [[id number]] (Abnormal) Collected: 10/05/22 0845   Order Status: Completed Specimen: Wound Updated: 10/08/22 1301    Specimen Description . WOUND . FOOT    Direct Exam RARE NEUTROPHILS Abnormal      MODERATE GRAM POSITIVE RODS Abnormal      FEW GRAM POSITIVE COCCI IN PAIRS Abnormal      FEW GRAM NEGATIVE COCCOBACILLI Abnormal     Culture PREVOTELLA (BACTEROIDES BIVIUS) BIVIA BETA LACTAMASE POSITIVE MODERATE GROWTH Abnormal      NORMAL SKIN ARABELLA     Medications:      vancomycin  1,250 mg IntraVENous Q24H    metroNIDAZOLE  500 mg Oral 3 times per day    cefepime  2,000 mg IntraVENous Q12H    vancomycin (VANCOCIN) intermittent dosing (placeholder)   Other RX Placeholder    enoxaparin  30 mg SubCUTAneous BID    insulin glargine  10 Units SubCUTAneous BID    glipiZIDE  20 mg Oral BID AC    cetirizine  10 mg Oral Nightly    nicotine  1 patch TransDERmal Daily    sodium chloride flush  5-40 mL IntraVENous 2 times per day    insulin lispro  0-8 Units SubCUTAneous TID WC    insulin lispro  0-4 Units SubCUTAneous Nightly    gabapentin  900 mg Oral TID    aspirin  81 mg Oral Daily    amLODIPine  5 mg Oral Daily    atorvastatin  20 mg Oral Nightly           [facility]  Davey Silva MD  Perfect Serve messaging  OFFICE: [phone]      Electronically signed by Davey Silva MD on 10/14/2022 at 11:31 AM  Thank you for allowing us to participate in the care of this patient. Please call with questions. This note iscreated with the assistance of a speech recognition program.  While intending to generate a document that actually reflects the content of the visit, the document can still have some errors including those of syntax andsound a like substitutions which may escape proof reading. In such instances, actual meaning can be extrapolated by contextual diversion.

## 2022-10-14 NOTE — PROGRESS NOTES
[address] [facility] Pharmacokinetic Monitoring Service - Vancomycin    Consulting Provider: Terry Flores   Indication: SSTI  Target Concentration: Goal trough of 10-15 mg/L and AUC/DIMA <500 mg*hr/L  Day of Therapy: 9  Additional Antimicrobials: Cefepime    Pertinent Laboratory Values: Wt Readings from Last 1 Encounters:   10/12/22 234 lb (106.1 kg)     Temp Readings from Last 1 Encounters:   10/12/22 96.8 °F (36 °C) (Temporal)     Estimated Creatinine Clearance: 46 mL/min (A) (based on SCr of 1.97 mg/dL (H)). Recent Labs     10/13/22  0631 10/14/22  0643   CREATININE 2.33* 2.26*        MRSA Nasal Swab: N/A. Non-respiratory infection. Recent vancomycin administrations                    vancomycin (VANCOCIN) 1,250 mg in dextrose 5 % 250 mL IVPB (mg) 1,250 mg New Bag 10/11/22 1320     1,250 mg New Bag 10/10/22 1257              Plan:  Current dosing regimen is vancomycin 1250mg ivpb q24h. No dose was given yesterday. Scr was increased this morning   Continue current dose. Repeat level after 2 more doses given. Continue to monitor scr. Level 10/15 0600.   Pharmacy will continue to monitor patient and adjust therapy as indicated    Thank you for the consult,  CARLOS Barrienots [facility]  10/14/2022 10:46 AM

## 2022-10-14 NOTE — PLAN OF CARE
Problem: Discharge Planning  Goal: Discharge to home or other facility with appropriate resources  Outcome: Progressing  Flowsheets (Taken 10/14/2022 0125)  Discharge to home or other facility with appropriate resources:   Identify barriers to discharge with patient and caregiver   Arrange for needed discharge resources and transportation as appropriate     Problem: Chronic Conditions and Co-morbidities  Goal: Patient's chronic conditions and co-morbidity symptoms are monitored and maintained or improved  Outcome: Progressing     Problem: Safety - Adult  Goal: Free from fall injury  Outcome: Progressing     Problem: Skin/Tissue Integrity - Adult  Goal: Skin integrity remains intact  Outcome: Progressing  Flowsheets (Taken 10/14/2022 0125)  Skin Integrity Remains Intact:   Monitor for areas of redness and/or skin breakdown   Assess vascular access sites hourly

## 2022-10-14 NOTE — PROGRESS NOTES
Progress Note  Podiatric Medicine and Surgery     Subjective     CC: S/p midfoot osteotomy & external fixation; right foot (DOS: 10/12/22)    Patient seen and examined at bedside. Complain moderate amount of pain. Concern about weight bearing status. HPI :  Naman Wilder is a 78 y.o. male seen at [facility] for right foot wound. Patient is known to [address] [place] [address]. Patient has had Charcot deformity to the right foot for a long time. Patient had multiple admission due to right foot infection in the past.  Patient visited Dr. Daria Dunbar 10//2022 and was told to report to the ED to get admitted immediately due to concern of cellulitis and possible osteomyelitis of the right foot.  would like to transfer care to [facility] for possible Charcot reconstruction. Patient denies other pedal complaint. PCP is Andre Dugan MD    ROS: Denies N/V/F/C/SOB/CP. Otherwise negative except at stated in the HPI.      Medications:  Scheduled Meds:   vancomycin  1,250 mg IntraVENous Q24H    metroNIDAZOLE  500 mg Oral 3 times per day    cefepime  2,000 mg IntraVENous Q12H    vancomycin (VANCOCIN) intermittent dosing (placeholder)   Other RX Placeholder    enoxaparin  30 mg SubCUTAneous BID    insulin glargine  10 Units SubCUTAneous BID    glipiZIDE  20 mg Oral BID AC    cetirizine  10 mg Oral Nightly    nicotine  1 patch TransDERmal Daily    sodium chloride flush  5-40 mL IntraVENous 2 times per day    insulin lispro  0-8 Units SubCUTAneous TID WC    insulin lispro  0-4 Units SubCUTAneous Nightly    gabapentin  900 mg Oral TID    aspirin  81 mg Oral Daily    amLODIPine  5 mg Oral Daily    atorvastatin  20 mg Oral Nightly       Continuous Infusions:   sodium chloride Stopped (10/11/22 1106)    dextrose         PRN Meds:HYDROcodone 5 mg - acetaminophen **OR** HYDROcodone 5 mg - acetaminophen, diphenhydrAMINE, sodium chloride flush, sodium chloride, ondansetron **OR** ondansetron, polyethylene glycol, acetaminophen **OR** acetaminophen, glucose, dextrose bolus **OR** dextrose bolus, glucagon (rDNA), dextrose, albuterol sulfate HFA    Objective     Vitals:  Patient Vitals for the past 8 hrs:   BP Temp Temp src Pulse SpO2   10/14/22 0937 120/68 -- -- -- --   10/14/22 0712 124/65 98.4 °F (36.9 °C) Temporal 72 (!) 89 %           Average, Min, and Max for last 24 hours Vitals:  TEMPERATURE:  Temp  Av.1 °F (36.7 °C)  Min: 97.9 °F (36.6 °C)  Max: 98.4 °F (36.9 °C)    RESPIRATIONS RANGE: Resp  Av.7  Min: 17  Max: 18    PULSE RANGE: Pulse  Av.3  Min: 68  Max: 79    BLOOD PRESSURE RANGE:  Systolic (77OHE), AIA:797 , Min:120 , KENDALL:010   ; Diastolic (31KBC), FMT:73, Min:59, Max:69      PULSE OXIMETRY RANGE: SpO2  Av %  Min: 89 %  Max: 98 %    I/O last 3 completed shifts:  In: -   Out: 300 [Urine:300]    CBC:  No results for input(s): WBC, HGB, HCT, PLT, CRP in the last 72 hours. Invalid input(s):  ESR       BMP:  Recent Labs     10/13/22  0631 10/14/22  0643   BUN 40* 37*   CREATININE 2.33* 2.26*          Coags:  No results for input(s): APTT, PROT, INR in the last 72 hours. Lab Results   Component Value Date    SEDRATE 64 (H) 10/04/2022     No results for input(s): CRP in the last 72 hours. Lower Extremity Physical Exam:  General: A&Ox3, NAD  Heart: Regular rate and rhythm. Lungs: Equal air entry. No increased effort. Abdomen: Soft, non-tender to palpation. Not distended. Lower Extremity Physical Exam: dressing left intact due to surgery  Vascular: DP and PT pulses are palpable +2/4. CFT <3 seconds to all digits. Hair growth is diminished to the level of the digits. Diffuse loss to nonpitting edema noted to the right lower extremity. Neuro: Saph/sural/SP/DP/plantar sensation diminished to light touch. Musculoskeletal: Muscle strength is 5/5 to all lower extremity muscle groups.  Gross deformity is Charcot deformity to the right foot     Dermatologic: Full thickness ulcer #1 located right first interspace and measures approximately 1 cm x 1 cm x 3 cm. Base is fibrotic. Periwound skin is macerated. Purulent drainage noted with no associated mal odor. Erythema noted to periwound with moderate associated increase in warmth. Does not probe to bone probe deep. Does not sinus track, or undermine. No fluctuance, crepitus, or induration. Full thickness ulcer #2 located to the medial plantar arch of the right foot. It is measured as 1.5 x 1.2 x 0.2 cm. Base is fibrotic. Periwound skin is hyperkeratotic.  o drainage with no odor. No erythema noted. Does not probe to bone, sinus tract, or undermine. No fluctuance, crepitus, or induration. Clinical Images:  See media panel        Imaging:   XR ANKLE RIGHT (MIN 3 VIEWS)   Final Result   Changes related to external fixator device placement and extensive fusion of   intertarsal and tarsometatarsal joints as described. Oblique oriented fracture is noted at the base of 5th metatarsal.      Severe degenerative disease of tarsometatarsal joints         XR TIBIA FIBULA RIGHT (2 VIEWS)   Final Result   Changes related to external fixator device placement and extensive fusion of   intertarsal and tarsometatarsal joints as described. Oblique oriented fracture is noted at the base of 5th metatarsal.      Severe degenerative disease of tarsometatarsal joints         XR FOOT RIGHT (MIN 3 VIEWS)   Final Result   Changes related to external fixator device placement and extensive fusion of   intertarsal and tarsometatarsal joints as described. Oblique oriented fracture is noted at the base of 5th metatarsal.      Severe degenerative disease of tarsometatarsal joints         FLUORO FOR SURGICAL PROCEDURES   Final Result      IR INSERT PICC VAD W SQ PORT >5 YEARS   Final Result      VL Lower Extremity Venous Right   Final Result      XR FOOT RIGHT (MIN 3 VIEWS)   Final Result   Air is seen in the plantar soft tissues of the medial midfoot. Chronic bony changes at the TMT joints compatible with a neuropathic   arthropathy. No obvious new bony erosions are seen. MRI ANKLE RIGHT WO CONTRAST   Final Result   Soft tissue defect of the medial plantar aspect of the midfoot. Thin   fluid-filled sinus tract extending into the deep soft tissues of the midfoot,   measuring up to 2.0 x 0.9 cm on the coronal sequence, raising suspicion for a   phlegmon/abscess. There is air in the adjacent soft tissues. Susceptibility artifact in the soft tissues adjacent to the soft tissue   defect could represent sequela of prior intervention or retained metallic   foreign body. No MR evidence of acute osteomyelitis. Chronic bony changes at the TMT joints compatible with a neuropathic   arthropathy. Additional chronic findings as described above. MRI FOOT RIGHT WO CONTRAST   Final Result   Soft tissue defect of the medial plantar aspect of the midfoot. Thin   fluid-filled sinus tract extending into the deep soft tissues of the midfoot,   measuring up to 2.0 x 0.9 cm on the coronal sequence, raising suspicion for a   phlegmon/abscess. There is air in the adjacent soft tissues. Susceptibility artifact in the soft tissues adjacent to the soft tissue   defect could represent sequela of prior intervention or retained metallic   foreign body. No MR evidence of acute osteomyelitis. Chronic bony changes at the TMT joints compatible with a neuropathic   arthropathy. Additional chronic findings as described above.              Cultures: moderate GPR, few GPC, few gram - coccobacilli      Assessment   Roel Ash is a 78 y.o. male with   S/p midfoot osteotomy; right foot (DOS: 10/12/22)  S/p external fixation application; right LE (DOS: 10/12/22)  Cellulitis, right foot-IMPROVING  Abscess, right foot  Type II diabetic foot ulcer down to subcutaneous layer, right foot  Charcot deformity, right foot  Type 2 diabetes with peripheral neuropathy  Hypertension    Principal Problem:    Type 2 diabetes mellitus with right diabetic foot ulcer (HCC)  Active Problems:    Charcot's joint of right foot    Stage 3b chronic kidney disease ([address]    Essential hypertension  Resolved Problems:    * No resolved hospital problems. *       Plan     Patient examined and evaluated at bedside with Dr. Luanne Menendez  Treatment options discussed in detail with the patient  X-ray from 10/4/2022 was reviewed: There is no soft tissue emphysema or acute osseous deformity noted. Significant periosteal changes noted to the midfoot indicating midfoot Charcot deformity. IV antibiotic: Vancomycin/cefepime. ID recommendation appreciated. Medical management per medicine. Appreciate recommendation. PT/OT order placed  Non weight bearing RLE. Okay to weight bearing for transportation and PT only. Patient is okay to be discharged from podiatry standpoint to facility. No dressing change needed. JERED complete. Multimodal pain script prescribed. Dressings left intact.    Discussed with Dr. Luanne Menendez    DVT ppx: lovenox  and aspirin   Diet: carb control   Activity: Non weight bearing right LE  Pain Control: Vania Rubins panel    Wan Lamb DPM   [facility] Medicine [facility] Surgery   10/14/2022 at 11:22 AM

## 2022-10-14 NOTE — PROGRESS NOTES
Occupational Therapy  Facility/Department: [facility]  Rehabilitation Occupational Therapy Daily Treatment Note    Date: 10/14/22  Patient Name: Laura Parham       Room: 2513/0862-90  MRN: [id number]  Account: [de-identified]   : 1955  (78 y.o.) Gender: male        Pt currently functioning below baseline. Recommend daily inpatient skilled therapy at time of discharge to maximize long term outcomes and prevent re-admission. Please refer to AM-PAC score for current level of function. Past Medical History:  has a past medical history of Abscess of right foot, Acquired hammer toe deformity of lesser toe of right foot, ALEJANDRO (acute kidney injury) ([address] Cellulitis, Cellulitis of left foot, Cellulitis of right foot, Charcot foot due to diabetes mellitus ([address] Chest pain at rest, Chronic multifocal osteomyelitis of right foot ([address] Diabetic polyneuropathy associated with type 2 diabetes mellitus ([address] Essential hypertension, Fractures, multiple, Hyperlipidemia, Hypertension, Leukocytosis, Neuropathy, Pain in right foot, Pneumonia, Right foot infection, Right foot pain, Tobacco abuse, Type II or unspecified type diabetes mellitus without mention of complication, not stated as uncontrolled, Vertigo, 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 Past Surgical History:   has a past surgical history that includes Neck surgery (); Appendectomy; knee surgery (Bilateral, ); Knee arthroscopy (Right, ); Knee arthroscopy (Left, ); Foot Debridement (Left, 2018); pr deep dissec foot infec,1 bursa (Left, 2018); Colonoscopy; Foot Debridement (Right, 2020); arthroplasty (Right, 2020); Foot Debridement (Right, 2021); Foot surgery (Right, 2021); Foot Debridement (Right, 2021); IR INSERT PICC VAD W SQ PORT >5 YEARS (10/07/2022);  Foot surgery (Right, 10/12/2022); and Ankle surgery (Right, 10/12/2022). Restrictions  Restrictions/Precautions: General Precautions; Fall Risk;Weight Bearing;Contact Precautions; Up as Tolerated  Other position/activity restrictions: LUE IV, telemetry, pulse ox, RLE external fixator - NWB RLE  Right Lower Extremity Weight Bearing: Non Weight Bearing  Required Braces or Orthoses  Right Lower Extremity Brace:  (External fixator)  Required Braces or Orthoses?: No    Subjective  Subjective: \"Can I put weight on my leg now? \"  Restrictions/Precautions: General Precautions; Fall Risk;Weight Bearing;Contact Precautions; Up as Tolerated     Objective     Cognition  Overall Cognitive Status: Exceptions  Arousal/Alertness: Appropriate responses to stimuli  Following Commands: Follows multistep commands with repitition; Follows multistep commands with increased time  Attention Span: Appears intact; Attends with cues to redirect  Memory: Appears intact  Safety Judgement: Decreased awareness of need for assistance;Decreased awareness of need for safety  Problem Solving: Decreased awareness of errors;Assistance required to identify errors made;Assistance required to correct errors made  Insights: Decreased awareness of deficits  Initiation: Does not require cues  Sequencing: Requires cues for some  Orientation  Overall Orientation Status: Within Functional Limits  Orientation Level: Oriented X4       Spent time obtaining a recliner, discussing NWB restrictions with patient and therapy staff. Functional Mobility  Device: Rolling walker  Assistance Level: Minimal assistance; Requires x 2 assistance  Skilled Clinical Factors: Patient able to use hopping method with RW to get from the bed to the chair with good adherence to NWB restriction.  Min A x2 required for steadiness, line mgmt, and RW navigation/mgmt  Bed Mobility  Overall Assistance Level: Modified Independent  Supine to Sit  Assistance Level: Modified independent  Scooting  Assistance Level: Modified independent  Sit to Stand  Assistance Level: Moderate assistance; Requires x 2 assistance  Skilled Clinical Factors: Patient UNABLE to maintain NWB during sit>stand, however once standing, patient is able to maintain NWB restriction  Stand to Sit  Assistance Level: Moderate assistance; Requires x 2 assistance  Skilled Clinical Factors: Mod verbal cues for sequencing/technique, handplacements, and eccentric control         Assessment  Assessment  Activity Tolerance: Patient limited by pain  Discharge Recommendations: Patient would benefit from continued therapy after discharge  Safety Devices  Safety Devices in place: Yes  Type of devices: All fall risk precautions in place; Left in chair;Call light within reach;Nurse notified; Chair alarm in place; Patient at risk for falls    Patient Education  Education  Education Given To: Patient  Education Provided: Role of Therapy; Mobility Training; Fall Prevention Strategies; Plan of Care;Transfer Training;Energy Conservation;Precautions; Safety; Family Education;Equipment;ADL Function;IADL Function;DME/Home Modifications  Education Provided Comments: NWB restrictions  Education Method: Verbal  Barriers to Learning: None  Education Outcome: Continued education needed    Plan  Occupational Therapy Plan  Times Per Week: 4-5x/week 1-2x/day as tolerated  Current Treatment Recommendations: Strengthening;Balance training;Functional mobility training; Endurance training; Safety education & training;Patient/Caregiver education & training;Self-Care / ADL;Equipment evaluation, education, & procurement;Positioning;Coordination training;Home management training    Goals  Patient Goals   Patient goals : To go home! Short Term Goals  Time Frame for Short Term Goals: By discharge, pt to demo:  Short Term Goal 1: ADL transfer and functional mobility with MinAx1 and Good adherence to WB precautions with AD as needed.   Short Term Goal 2: UB ADLs to MOD I/IND and LB ADLs to MinAx1 with Good adherence to WB precautions and use of AE/compensatory strategies/AD as needed. Short Term Goal 3: toileting tasks with MinAx1 and Good adherence to WB precautions with use of AD/grab bars/BSC as needed. Short Term Goal 4: IND with BUE HEP with use of handout to maintain current strength required for safety/IND with self care tasks. Short Term Goal 5: Pt to be IND with fall prevention strategies, EC/WS tech, home safety strategies, WB precautions, and discharge/equipment recs with use of handouts as needed. AM-PAC Score        AM-[facility] Inpatient Daily Activity Raw Score: 16 (10/14/22 1424)  AM-PAC Inpatient ADL T-Scale Score : 35.96 (10/14/22 1424)  ADL Inpatient CMS 0-100% Score: 53.32 (10/14/22 1424)  ADL Inpatient CMS G-Code Modifier : CK (10/14/22 1424)      Therapy Time   Individual Concurrent Group Co-treatment   Time In       1230   Time Out       26   Minutes       43      Co-treatment with PT warranted secondary to decreased safety and independence requiring 2 skilled therapy professionals to address individual discipline's goals. OT addressing preparation for ADL transfer, sitting balance for increased ADL performance, sitting/activity tolerance, functional reaching, environmental safety/scanning, fall prevention, functional mobility for ADL transfers, bed mobility tech, and functional UE strength. Upon writer exit, call light within reach, pt retired to chair. All lines intact and patient positioned comfortably. All patient needs addressed prior to ending therapy session. Chart reviewed prior to treatment and patient is agreeable for therapy. RN reports patient is medically stable for therapy treatment this date.      ALY Rosario/LENNY

## 2022-10-14 NOTE — CARE COORDINATION
[facility] and began precert.  Marie Torres

## 2022-10-15 LAB
BUN BLDV-MCNC: 32 MG/DL (ref 8–23)
CREAT SERPL-MCNC: 2.06 MG/DL (ref 0.7–1.2)
GFR SERPL CREATININE-BSD FRML MDRD: 35 ML/MIN/1.73M2
GLUCOSE BLD-MCNC: 133 MG/DL (ref 75–110)
GLUCOSE BLD-MCNC: 174 MG/DL (ref 75–110)
GLUCOSE BLD-MCNC: 176 MG/DL (ref 75–110)
GLUCOSE BLD-MCNC: 255 MG/DL (ref 75–110)
VANCOMYCIN RANDOM: 19.8 UG/ML

## 2022-10-15 PROCEDURE — 82947 ASSAY GLUCOSE BLOOD QUANT: CPT

## 2022-10-15 PROCEDURE — 2580000003 HC RX 258: Performed by: INTERNAL MEDICINE

## 2022-10-15 PROCEDURE — 6360000002 HC RX W HCPCS: Performed by: PODIATRIST

## 2022-10-15 PROCEDURE — 1200000000 HC SEMI PRIVATE

## 2022-10-15 PROCEDURE — 97535 SELF CARE MNGMENT TRAINING: CPT

## 2022-10-15 PROCEDURE — 6370000000 HC RX 637 (ALT 250 FOR IP)

## 2022-10-15 PROCEDURE — 97116 GAIT TRAINING THERAPY: CPT

## 2022-10-15 PROCEDURE — 6370000000 HC RX 637 (ALT 250 FOR IP): Performed by: PODIATRIST

## 2022-10-15 PROCEDURE — 6360000002 HC RX W HCPCS: Performed by: INTERNAL MEDICINE

## 2022-10-15 PROCEDURE — 97530 THERAPEUTIC ACTIVITIES: CPT

## 2022-10-15 PROCEDURE — 84520 ASSAY OF UREA NITROGEN: CPT

## 2022-10-15 PROCEDURE — 2580000003 HC RX 258: Performed by: PODIATRIST

## 2022-10-15 PROCEDURE — 82565 ASSAY OF CREATININE: CPT

## 2022-10-15 PROCEDURE — 99232 SBSQ HOSP IP/OBS MODERATE 35: CPT | Performed by: INTERNAL MEDICINE

## 2022-10-15 PROCEDURE — 97110 THERAPEUTIC EXERCISES: CPT

## 2022-10-15 PROCEDURE — 80202 ASSAY OF VANCOMYCIN: CPT

## 2022-10-15 PROCEDURE — 36415 COLL VENOUS BLD VENIPUNCTURE: CPT

## 2022-10-15 RX ORDER — LANOLIN ALCOHOL/MO/W.PET/CERES
3 CREAM (GRAM) TOPICAL NIGHTLY PRN
Status: DISCONTINUED | OUTPATIENT
Start: 2022-10-15 | End: 2022-10-18 | Stop reason: HOSPADM

## 2022-10-15 RX ADMIN — INSULIN GLARGINE 10 UNITS: 100 INJECTION, SOLUTION SUBCUTANEOUS at 21:44

## 2022-10-15 RX ADMIN — METRONIDAZOLE 500 MG: 500 TABLET ORAL at 13:51

## 2022-10-15 RX ADMIN — CEFEPIME 2000 MG: 2 INJECTION, POWDER, FOR SOLUTION INTRAVENOUS at 21:41

## 2022-10-15 RX ADMIN — ENOXAPARIN SODIUM 30 MG: 100 INJECTION SUBCUTANEOUS at 08:50

## 2022-10-15 RX ADMIN — Medication 3 MG: at 21:54

## 2022-10-15 RX ADMIN — GABAPENTIN 900 MG: 300 CAPSULE ORAL at 08:51

## 2022-10-15 RX ADMIN — HYDROCODONE BITARTRATE AND ACETAMINOPHEN 2 TABLET: 5; 325 TABLET ORAL at 11:55

## 2022-10-15 RX ADMIN — GLIPIZIDE 20 MG: 10 TABLET ORAL at 06:24

## 2022-10-15 RX ADMIN — METRONIDAZOLE 500 MG: 500 TABLET ORAL at 06:24

## 2022-10-15 RX ADMIN — VANCOMYCIN HYDROCHLORIDE 750 MG: 750 INJECTION, POWDER, LYOPHILIZED, FOR SOLUTION INTRAVENOUS at 13:11

## 2022-10-15 RX ADMIN — ENOXAPARIN SODIUM 30 MG: 100 INJECTION SUBCUTANEOUS at 21:46

## 2022-10-15 RX ADMIN — METRONIDAZOLE 500 MG: 500 TABLET ORAL at 21:43

## 2022-10-15 RX ADMIN — SODIUM CHLORIDE, PRESERVATIVE FREE 10 ML: 5 INJECTION INTRAVENOUS at 08:51

## 2022-10-15 RX ADMIN — INSULIN GLARGINE 10 UNITS: 100 INJECTION, SOLUTION SUBCUTANEOUS at 08:50

## 2022-10-15 RX ADMIN — GABAPENTIN 900 MG: 300 CAPSULE ORAL at 21:43

## 2022-10-15 RX ADMIN — CETIRIZINE HYDROCHLORIDE 10 MG: 10 TABLET ORAL at 21:43

## 2022-10-15 RX ADMIN — AMLODIPINE BESYLATE 5 MG: 5 TABLET ORAL at 08:51

## 2022-10-15 RX ADMIN — GABAPENTIN 900 MG: 300 CAPSULE ORAL at 13:51

## 2022-10-15 RX ADMIN — ATORVASTATIN CALCIUM 20 MG: 20 TABLET, FILM COATED ORAL at 21:43

## 2022-10-15 RX ADMIN — ASPIRIN 81 MG: 81 TABLET, COATED ORAL at 08:51

## 2022-10-15 RX ADMIN — HYDROCODONE BITARTRATE AND ACETAMINOPHEN 2 TABLET: 5; 325 TABLET ORAL at 21:42

## 2022-10-15 RX ADMIN — CEFEPIME 2000 MG: 2 INJECTION, POWDER, FOR SOLUTION INTRAVENOUS at 08:56

## 2022-10-15 RX ADMIN — GLIPIZIDE 20 MG: 10 TABLET ORAL at 17:18

## 2022-10-15 RX ADMIN — INSULIN LISPRO 4 UNITS: 100 INJECTION, SOLUTION INTRAVENOUS; SUBCUTANEOUS at 17:18

## 2022-10-15 RX ADMIN — SODIUM CHLORIDE, PRESERVATIVE FREE 10 ML: 5 INJECTION INTRAVENOUS at 21:43

## 2022-10-15 ASSESSMENT — ENCOUNTER SYMPTOMS
GASTROINTESTINAL NEGATIVE: 1
RESPIRATORY NEGATIVE: 1

## 2022-10-15 ASSESSMENT — PAIN DESCRIPTION - LOCATION
LOCATION: FOOT
LOCATION: FOOT

## 2022-10-15 ASSESSMENT — PAIN DESCRIPTION - ONSET: ONSET: ON-GOING

## 2022-10-15 ASSESSMENT — PAIN SCALES - GENERAL
PAINLEVEL_OUTOF10: 9

## 2022-10-15 ASSESSMENT — PAIN DESCRIPTION - DESCRIPTORS
DESCRIPTORS: ACHING;THROBBING
DESCRIPTORS: SHARP

## 2022-10-15 ASSESSMENT — PAIN DESCRIPTION - ORIENTATION
ORIENTATION: RIGHT
ORIENTATION: RIGHT

## 2022-10-15 ASSESSMENT — PAIN DESCRIPTION - FREQUENCY: FREQUENCY: CONTINUOUS

## 2022-10-15 NOTE — PROGRESS NOTES
Physical Therapy  Facility/Department: [facility] MED SURG  Rehabilitation Physical Therapy Treatment Note    NAME: Kristopher Wasserman  : 1955 (16 y.o.)  MRN: [id number]  CODE STATUS: Full Code    Date of Service: 10/15/22   Pt currently functioning below baseline. Recommend daily inpatient skilled therapy at time of discharge to maximize long term outcomes and prevent re-admission. Please refer to AM-PAC score for current level of function. Restrictions:  Restrictions/Precautions: General Precautions; Fall Risk;Weight Bearing;Contact Precautions; Up as Tolerated  Lower Extremity Weight Bearing Restrictions  Right Lower Extremity Weight Bearing: Non Weight Bearing  Left Lower Extremity Weight Bearing: Weight Bearing As Tolerated  Position Activity Restriction  Other position/activity restrictions: LUE IV, telemetry, pulse ox, RLE external fixator - NWB RLE     SUBJECTIVE  Subjective  Subjective: Patient up in bed upon therapists arrival. Patient agreeable to PT treatment this date. Patient with good understanding of WB restrictions this date. RN states patient is appropriate for PT treatment this date. OBJECTIVE  Cognition  Overall Cognitive Status: Exceptions  Arousal/Alertness: Appropriate responses to stimuli  Following Commands: Follows multistep commands with repitition; Follows multistep commands with increased time  Attention Span: Appears intact; Attends with cues to redirect  Memory: Appears intact  Safety Judgement: Decreased awareness of need for assistance;Decreased awareness of need for safety  Problem Solving: Decreased awareness of errors;Assistance required to identify errors made;Assistance required to correct errors made  Insights: Decreased awareness of deficits  Initiation: Does not require cues  Sequencing: Requires cues for some  Orientation  Overall Orientation Status: Within Functional Limits  Orientation Level: Oriented X4    Functional Mobility  Bed Mobility  Overall Assistance Level: Contact Guard Assist  Additional Factors: Set-up; Verbal cues; Increased time to complete; Head of bed raised; With handrails  Roll Left  Assistance Level: Contact guard assist  Sit to Supine  Assistance Level: Contact guard assist  Supine to Sit  Assistance Level: Contact guard assist  Scooting  Assistance Level: Contact guard assist  Skilled Clinical Factors: Patient with good technique and hand placement to seated EOB posture this date. Patient requires vc's to scoot all the way out and place L foot flat on the floor for increased support and stability. Patient very good with not placing RLE on the floor this date. Balance  Sitting Balance: Modified independent   Standing Balance: Moderate assistance  Standing Balance  Time: 5 + minutes  Activity: Seated EOB working on YULIET LE exercises no R ankle pumps  Comments: Patient with good balance and able to self support throughout seated treatment this date. Transfers  Surface: To chair with arms;From bed  Additional Factors: Set-up; Verbal cues; Hand placement cues; Increased time to complete; Mat raised; With handrails  Device: Walker  Sit to Stand  Assistance Level: Moderate assistance; Requires x 2 assistance  Skilled Clinical Factors: Patient requries increased support and stability along with motion initiation and momentum to successfully reach stance this date. Patient upon initail stance with posterior lean reqruies a MOD x2 with RW for support and correction  Stand to Sit  Assistance Level: Moderate assistance; Requires x 2 assistance  Skilled Clinical Factors: Patient with poor eccentric quad control in descent. Requires a MOD x2 for support and stability back into seated posture. Bed To/From Chair  Technique: Stand step  Assistance Level: Moderate assistance; Requires x 2 assistance    Patient seated in recliner working across body to promote core control and stability in mobility tasks this date.   Patient able to self support and maintain seated balance with no LOB this date. Environmental Mobility  Ambulation  Surface: Level surface  Device: Rolling walker  Distance: 10 feet  Activity: Within Room  Activity Comments: Patient hops to device and requires a MOD x2 for support and stability. Patient requires increased time and frequent restbreaks during progression to recliner. Additional Factors: Set-up; Verbal cues; Hand placement cues; Increased time to complete  Assistance Level: Moderate assistance; Requires x 2 assistance  Gait Deviations: Unsteady gait; Slow sangita      Neuromuscular Education  NDT Treatment: Gait ;Lower extremity; Sitting;Standing      PT Exercises  A/AROM Exercises: Patient performs seated YULIET LE exercises 1 set x10 reps this date. Circulation/Endurance Exercises: Patient performs AP on LLE only this date  Pressure Relief Exercises: Patient performs seated lateral WS to promote skin integrity      ASSESSMENT/PROGRESS TOWARDS GOALS  AM-[facility] Inpatient Mobility Raw Score  13   AM-[facility] Inpatient T-Scale Score  36.74   Mobility Inpatient CMS 0-100% Score 64.91   Mobility Inpatient CMS G-Code Modifier  CL     Vital Signs  BP Location: Right Arm  O2 Device: None (Room air)    Assessment  Assessment: Patient sp RLE surgery and in NWB on RLE. Patient with difficulty maintaining WB status and requires increased time and a MOD x2 for support and stability in all mobility tasks this date. Patient would benefit from continued skilled PT treatment to maximize return to PLOF  Activity Tolerance: Patient tolerated treatment well  Discharge Recommendations: Patient would benefit from continued therapy after discharge    Goals  Patient Goals   Patient Goals :  To heal, to be mobile  Short Term Goals  Time Frame for Short Term Goals: 12 visits  Short Term Goal 1: Pt to demonstrate bed mobility SBA  Short Term Goal 2: Pt to perform STS transfers w/ RW SBA while maintaining NWB status RLE  Short Term Goal 3: Pt to ambulate at least 50ft w/ RW Leigha while maintaining NWB status RLE  Short Term Goal 4: Pt to ascend/descend 5 stairs w/ handrails ModA while maintaining NWB status RLE  Short Term Goal 5: Pt to actively participate in at least 30 minutes of physical therapy for ther act, ther ex, balance, gait, transfer, stair, and endurance training    PLAN OF CARE/SAFETY  Physcial Therapy Plan  General Plan: 6-7 times per week  Specific Instructions for Next Treatment: w/c mobility; transfers, pre gait  Current Treatment Recommendations: Strengthening;Balance training;Functional mobility training;Transfer training;Neuromuscular re-education;Stair training;Gait training; Endurance training;Pain management;Home exercise program;Safety education & training;Patient/Caregiver education & training;Equipment evaluation, education, & procurement; Therapeutic activities  Safety Devices  Type of Devices: Call light within reach;Gait belt;Nurse notified; Chair alarm in place; Left in chair    EDUCATION  Education  Education Given To: Patient  Education Provided: Energy Conservation;Transfer Training;Mobility Training  Education Method: Demonstration;Verbal  Barriers to Learning: None;Cognition  Education Outcome: Verbalized understanding;Continued education needed        Therapy Time   Individual Concurrent Group Co-treatment   Time In       6820   Time Out       1202   Minutes       45     Co-treatment with OT warranted secondary to decreased safety and independence requiring 2 skilled therapy professionals to address individual discipline's goals. PT addressing pre gait trunk strengthening, transfer training, and postural control in sitting.      Byron Cooper PTA, 10/15/22 at 1:11 PM

## 2022-10-15 NOTE — PROGRESS NOTES
[facility]  Progress Note    José Farrar  MRN: [id number]  Date: 10/15/2022  LOS: 6     Reason for F/U :   Diabetic foot infection/abscess    Impression :   Right Charcot foot with longstanding history of infections/abscesses has undergone multiple I&D procedures in the past  Chronic surgical wounds on the plantar aspect of the foot  Recurrent deep midfoot soft tissue abscess with thin fluid-filled sinus tract noted in the medial plantar aspect of the foot and there is retained metallic foreign body. Status post multiple midfoot fusion with midfoot osteotomy with application of a multiplanar external fixator in the right lower extremity on 10/12/2022    Recommendations:   Continue intravenous antibiotic therapy with cefepime, metronidazole and vancomycin for now  Plan is for discharge on the above antibiotics to complete a 6-week course of antibiotic therapy through 11/25/2022  Await discharge    Infection Control Recommendations:   Universal precautions    Discharge Planning:   Estimated Length of IV antimicrobials: 6 weeks  Patient will need Midline PICC line Insertion  Patient will need: Home IV , [place],  SNF,  LTAC: Undetermined  Patient willneed outpatient wound care: No    Medical Decision making / Summary of Stay:   José Farrar is a 78y.o.-year-old male who was initially admitted on 10/4/2022. Douglas Banks has a history of a right sided Charcot foot deformity and has had a longstanding history with infections in the right foot. He did have an abscess for which he underwent I&D in June 2020 with MSSA isolated and the patient has had a residual wound which secondarily got infected and caused an abscess and in September 2021 he underwent an I&D of the abscess with MSSA, Enterobacter, Morganella isolated and he was discharged with IV antimicrobial therapy which he took for 6 weeks through October 22 and 2021.      The patient did continue to have issues with wound dehiscence and had an open wound on the plantar aspect of the foot which was clean. Seeing the patient in close to a year now and he has followed with the podiatrist and has had continued wound care and the wound has been improving with time. Patient has had a plantar foot midfoot ulceration. Patient did subsequently start to develop soft tissue swelling redness in his foot and the swelling started to extend into his leg and the patient did see Dr. Demario Barth who recommended that he come into the emergency room for evaluation. The patient has been admitted for an abscess in the right foot and chronic ulcerations on the plantar aspect of the foot and the patient is undergoing further work-up with MRI and consideration for an external fixator device. Was asked to evaluate and help with antibiotic choice. Patient was taken to the operating room 10/12/2022 and had a midfoot osteotomy, external fixator application and excision of wound in the right foot with skin flap closure of the right foot. Current evaluation:10/15/2022    /76   Pulse 83   Temp 97.9 °F (36.6 °C) (Temporal)   Resp 20   Ht 5' 11\" (1.803 m)   Wt 234 lb (106.1 kg)   SpO2 98%   BMI 32.64 kg/m²     Temperature Range: Temp: 97.9 °F (36.6 °C) Temp  Av.1 °F (36.7 °C)  Min: 97.9 °F (36.6 °C)  Max: 98.6 °F (37 °C)  The patient is seen and evaluated in bedside and is awake and alert and no acute distress. He is complaining of foot pain, denied fever or chills, denied nausea or vomiting, no other complaints. Foot wound culture from 10/5/2022 grew Bacteroides. 10/12/22 foot tissue grew diphtheroids, no growth on bone culture. Review of Systems   Constitutional: Negative. HENT: Negative. Respiratory: Negative. Cardiovascular: Negative. Gastrointestinal: Negative. Genitourinary: Negative. Musculoskeletal: Negative. Skin:  Positive for wound. Neurological: Negative. Psychiatric/Behavioral: Negative.        Physical Examination :     Physical Exam  Constitutional:       Appearance: He is well-developed. HENT:      Head: Normocephalic and atraumatic. Cardiovascular:      Rate and Rhythm: Normal rate. Heart sounds: Normal heart sounds. No friction rub. No gallop. Pulmonary:      Effort: Pulmonary effort is normal.      Breath sounds: Normal breath sounds. No wheezing. Abdominal:      General: Bowel sounds are normal.      Palpations: Abdomen is soft. There is no mass. Tenderness: There is no abdominal tenderness. Musculoskeletal:         General: Normal range of motion. Cervical back: Normal range of motion and neck supple. Lymphadenopathy:      Cervical: No cervical adenopathy. Skin:     General: Skin is warm and dry. Comments: The foot dressing was not removed. Neurological:      Mental Status: He is alert and oriented to person, place, and time. Laboratory data:   I have independently reviewed the followinglabs:  CBC with Differential:   No results for input(s): WBC, HGB, HCT, PLT, SEGSPCT, BANDSPCT, LYMPHOPCT, MONOPCT, EOSPCT in the last 72 hours. BMP:   Recent Labs     10/14/22  0643 10/15/22  0557   BUN 37* 32*   CREATININE 2.26* 2.06*       Hepatic Function Panel: No results for input(s): PROT, LABALBU, BILIDIR, IBILI, BILITOT, ALKPHOS, ALT, AST in the last 72 hours. No results found for: PROCAL  Lab Results   Component Value Date/Time    CRP 30.1 10/04/2022 10:18 PM    .3 09/13/2022 06:41 PM    .8 09/13/2021 11:04 AM     Lab Results   Component Value Date    SEDRATE 64 (H) 10/04/2022         No results found for: DDIMER  No results found for: FERRITIN  No results found for: LDH  No results found for: FIBRINOGEN    No results found for requested labs within last 30 days.      Lab Results   Component Value Date/Time    COVID19 DETECTED 05/26/2021 12:06 PM    COVID19 Not Detected 12/07/2020 03:10 PM    COVID19 Not Detected 08/08/2020 10:02 PM       No results for input(s): [address] in the last 72 hours. Imaging Studies:     MRI OF THE RIGHT ANKLE WITHOUT CONTRAST; MRI OF THE RIGHT FOOT WITHOUT   CONTRAST, 10/5/2022 11:03 am     Impression   Soft tissue defect of the medial plantar aspect of the midfoot. Thin   fluid-filled sinus tract extending into the deep soft tissues of the midfoot,   measuring up to 2.0 x 0.9 cm on the coronal sequence, raising suspicion for a   phlegmon/abscess. There is air in the adjacent soft tissues. Susceptibility artifact in the soft tissues adjacent to the soft tissue   defect could represent sequela of prior intervention or retained metallic   foreign body. No MR evidence of acute osteomyelitis. Chronic bony changes at the TMT joints compatible with a neuropathic   arthropathy. Additional chronic findings as described above. Cultures:     Culture, Tissue [[id number]] (Abnormal) Collected: 10/12/22 0804   Order Status: Completed Specimen: Tissue from Foot Updated: 10/14/22 1014    Specimen Description . FOOT RT    Direct Exam RARE NEUTROPHILS Abnormal      NO BACTERIA SEEN    Culture CULTURE IN PROGRESS     No anaerobic organisms isolated at 2 days. Culture, Anaerobic and Aerobic [[id number]] Collected: 10/12/22 1012   Order Status: Completed Specimen: Bone from Foot Updated: 10/14/22 1008    Specimen Description . FOOT RIGHT    Direct Exam NO NEUTROPHILS SEEN     NO BACTERIA SEEN    Culture NO GROWTH 2 DAYS     Culture, Anaerobic and Aerobic [[id number]] (Abnormal) Collected: 10/05/22 0845   Order Status: Completed Specimen: Wound Updated: 10/08/22 1301    Specimen Description . WOUND . FOOT    Direct Exam RARE NEUTROPHILS Abnormal      MODERATE GRAM POSITIVE RODS Abnormal      FEW GRAM POSITIVE COCCI IN PAIRS Abnormal      FEW GRAM NEGATIVE COCCOBACILLI Abnormal     Culture PREVOTELLA (BACTEROIDES BIVIUS) BIVIA BETA LACTAMASE POSITIVE MODERATE GROWTH Abnormal      NORMAL SKIN ARABELLA     Medications: cefepime  2,000 mg IntraVENous Q12H    vancomycin  750 mg IntraVENous Q24H    vancomycin  1,250 mg IntraVENous Q24H    metroNIDAZOLE  500 mg Oral 3 times per day    vancomycin (VANCOCIN) intermittent dosing (placeholder)   Other RX Placeholder    enoxaparin  30 mg SubCUTAneous BID    insulin glargine  10 Units SubCUTAneous BID    glipiZIDE  20 mg Oral BID AC    cetirizine  10 mg Oral Nightly    nicotine  1 patch TransDERmal Daily    sodium chloride flush  5-40 mL IntraVENous 2 times per day    insulin lispro  0-8 Units SubCUTAneous TID WC    insulin lispro  0-4 Units SubCUTAneous Nightly    gabapentin  900 mg Oral TID    aspirin  81 mg Oral Daily    amLODIPine  5 mg Oral Daily    atorvastatin  20 mg Oral Nightly           [facility]  Cristobal Nicolas MD  Perfect [employer] messaging  OFFICE: [phone]      Electronically signed by Cristobal Nicolas MD on 10/15/2022 at 11:51 AM  Thank you for allowing us to participate in the care of this patient. Please call with questions. This note iscreated with the assistance of a speech recognition program.  While intending to generate a document that actually reflects the content of the visit, the document can still have some errors including those of syntax andsound a like substitutions which may escape proof reading. In such instances, actual meaning can be extrapolated by contextual diversion.

## 2022-10-15 NOTE — PROGRESS NOTES
[facility] Pharmacokinetic Monitoring Service - Vancomycin    Consulting Provider: Josse Browning   Indication: diabetic foot infection  Target Concentration: Goal trough of 10-15 mg/L and AUC/DIMA <500 mg*hr/L  Day of Therapy: 10  Additional Antimicrobials: Cefepime/Flagyl PO    Pertinent Laboratory Values: Wt Readings from Last 1 Encounters:   10/12/22 234 lb (106.1 kg)     Temp Readings from Last 1 Encounters:   10/14/22 97.9 °F (36.6 °C) (Oral)     Estimated Creatinine Clearance: 44 mL/min (A) (based on SCr of 2.06 mg/dL (H)). Recent Labs     10/14/22  0643 10/15/22  0557   CREATININE 2.26* 2.06*         MRSA Nasal Swab: N/A. Non-respiratory infection.     Recent vancomycin administrations                     vancomycin (VANCOCIN) 1,250 mg in dextrose 5 % 250 mL IVPB (mg) 1,250 mg New Bag 10/14/22 1319     1,250 mg New Bag 10/13/22 1410                    Assessment:  Date/Time Current Dose Concentration Timing of Concentration (h) AUC   10/15 1250mg IV q 24  19.8 05:57 721   Note: Serum concentrations collected for AUC dosing may appear elevated if collected in close proximity to the dose administered, this is not necessarily an indication of toxicity    Plan:  Current dosing regimen is supra-therapeutic  \"Decrease dose to 750mg IV q 24 hours  Repeat vancomycin concentration ordered for 10/17 @ 0700   Pharmacy will continue to monitor patient and adjust therapy as indicated    Thank you for the consult,  Risa Barnes [facility]  10/15/2022 7:04 AM

## 2022-10-15 NOTE — PROGRESS NOTES
Occupational Therapy  Facility/Department: [facility]  Rehabilitation Occupational Therapy Daily Treatment Note    Date: 10/15/22  Patient Name: Dutch Vasquez       Room:   MRN: [id number]  Account: [de-identified]   : 1955  (69 y.o.) Gender: male       Past Medical History:  has a past medical history of Abscess of right foot, Acquired hammer toe deformity of lesser toe of right foot, ALEJANDRO (acute kidney injury) ([address] Cellulitis, Cellulitis of left foot, Cellulitis of right foot, Charcot foot due to diabetes mellitus ([address] Chest pain at rest, Chronic multifocal osteomyelitis of right foot ([address] Diabetic polyneuropathy associated with type 2 diabetes mellitus ([address] Essential hypertension, Fractures, multiple, Hyperlipidemia, Hypertension, Leukocytosis, Neuropathy, Pain in right foot, Pneumonia, Right foot infection, Right foot pain, Tobacco abuse, Type II or unspecified type diabetes mellitus without mention of complication, not stated as uncontrolled, Vertigo, 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 Past Surgical History:   has a past surgical history that includes Neck surgery (); Appendectomy; knee surgery (Bilateral, ); Knee arthroscopy (Right, ); Knee arthroscopy (Left, ); Foot Debridement (Left, 2018); pr deep dissec foot infec,1 bursa (Left, 2018); Colonoscopy; Foot Debridement (Right, 2020); arthroplasty (Right, 2020); Foot Debridement (Right, 2021); Foot surgery (Right, 2021); Foot Debridement (Right, 2021); IR INSERT PICC VAD W SQ PORT >5 YEARS (10/07/2022); Foot surgery (Right, 10/12/2022); and Ankle surgery (Right, 10/12/2022). Restrictions  Restrictions/Precautions: General Precautions; Fall Risk;Weight Bearing;Contact Precautions; Up as Tolerated  Other position/activity restrictions: LUE IV, telemetry, pulse ox, RLE external fixator - NWB RLE  Right Lower Extremity Weight Bearing: Non Weight Bearing  Left Lower Extremity Weight Bearing: Weight Bearing As Tolerated  Required Braces or Orthoses?: Yes    Subjective  Subjective: \"I am doing OK but I can't wait for this foot to heal!\"  Restrictions/Precautions: General Precautions; Fall Risk;Weight Bearing;Contact Precautions; Up as Tolerated      Objective     Cognition  Overall Cognitive Status: Exceptions  Arousal/Alertness: Appropriate responses to stimuli  Following Commands: Follows multistep commands with repitition; Follows multistep commands with increased time  Attention Span: Appears intact; Attends with cues to redirect  Memory: Appears intact  Safety Judgement: Decreased awareness of need for assistance;Decreased awareness of need for safety  Problem Solving: Decreased awareness of errors;Assistance required to identify errors made;Assistance required to correct errors made  Insights: Decreased awareness of deficits  Initiation: Does not require cues  Sequencing: Requires cues for some  Cognition Comment: Pt. pleasant and cooperative with treatment. Orientation  Overall Orientation Status: Within Functional Limits  Orientation Level: Oriented X4         ADL  Grooming/Oral Hygiene  Assistance Level: Set-up; Supervision  Skilled Clinical Factors: Pt. set up at bedside and completed washing face with supervision  Upper Extremity Bathing  Assistance Level: Set-up; Stand by assist  Skilled Clinical Factors: While sitting unsupported at bedside  Lower Extremity Bathing  Assistance Level: Stand by assist  Skilled Clinical Factors: while sitting with set up at bedside  Upper Extremity Dressing  Assistance Level: Stand by assist  Skilled Clinical Factors: with gown management  Putting On/Taking Off Footwear  Assistance Level: Maximum assistance  Skilled Clinical Factors: to jeannette sock  Toileting  Skilled Clinical Factors: no need at this time          Functional Mobility  Device: Rolling walker  Activity:  (from EOB to bedside chair.)  Assistance Level: Requires x 2 assistance; Moderate assistance  Skilled Clinical Factors: Pt. displayed increased fatigue and required mod assit x2 with use of RW  Bed Mobility  Overall Assistance Level: Stand By Assist  Bridging  Assistance Level: Stand by assist  Roll Right  Assistance Level: Stand by assist  Supine to Sit  Assistance Level: Contact guard assist  Skilled Clinical Factors: CGA for supine to sit  Scooting  Assistance Level: Contact guard assist  Skilled Clinical Factors: Scooting to EOB  Transfers  Surface: From bed; To chair with arms  Additional Factors: Set-up; Verbal cues; Increased time to complete; With handrails  Device: Walker  Sit to Stand  Assistance Level: Moderate assistance; Requires x 2 assistance  Skilled Clinical Factors: Mod assist x2 with use of RW to provide proper safety and NWB to right LE  Stand to Sit  Assistance Level: Moderate assistance; Requires x 2 assistance  Skilled Clinical Factors: Mod verbal cues for sequencing and proper tech to ensure safety. Assessment  Assessment  Assessment: Pt. completed full ADLS while sitting bedside and completed ADL transfer from EOB to bedside chair with mod assist x2 with use of walker. Multiple verbal cues provided for safety and proper tech with posture control and positioning. Skilled OT warranted to promote I/safety to return pt to prior living arrangement with assist as needed. Activity Tolerance: Patient tolerated treatment well;Patient limited by fatigue;Patient limited by endurance  Discharge Recommendations: Patient would benefit from continued therapy after discharge  OT Equipment Recommendations  Equipment Needed: Yes  Mobility Devices: Bienvenido Moser: Lilib[address] in place: Yes  Type of devices: All fall risk precautions in place; Left in chair;Call light within reach;Nurse notified; Chair alarm in place; Patient at risk for falls    Patient Education  Education  Education Given To: Patient  Education Provided: Role of Therapy; Mobility Training; Fall Prevention Strategies; Plan of Care;Transfer Training;Energy Conservation;Precautions; Safety; Family Education;Equipment;ADL Function;IADL Function;DME/Home Modifications  Education Provided Comments: Pt. educated on proper  with use of RW and positioning to provide comfort. Education Method: Verbal;Demonstration  Barriers to Learning: None  Education Outcome: Continued education needed    Plan  Occupational Therapy Plan  Times Per Week: 4-5x/week 1-2x/day as tolerated  Times Per Day: Once a day  Current Treatment Recommendations: Strengthening;Balance training;Functional mobility training; Endurance training; Safety education & training;Patient/Caregiver education & training;Self-Care / ADL;Equipment evaluation, education, & procurement;Positioning;Coordination training;Home management training  Additional Comments: Cont with POC    Goals  Patient Goals   Patient goals : To go home! Short Term Goals  Time Frame for Short Term Goals: By discharge, pt to demo:  Short Term Goal 1: ADL transfer and functional mobility with MinAx1 and Good adherence to WB precautions with AD as needed. Short Term Goal 2: UB ADLs to MOD I/IND and LB ADLs to MinAx1 with Good adherence to WB precautions and use of AE/compensatory strategies/AD as needed. Short Term Goal 3: toileting tasks with MinAx1 and Good adherence to WB precautions with use of AD/grab bars/BSC as needed. Short Term Goal 4: IND with BUE HEP with use of handout to maintain current strength required for safety/IND with self care tasks. Short Term Goal 5: Pt to be IND with fall prevention strategies, EC/WS tech, home safety strategies, WB precautions, and discharge/equipment recs with use of handouts as needed.     AM-PAC Score        AM-[facility] Inpatient Daily Activity Raw Score: 16 (10/15/22 8498)  AM-PAC Inpatient ADL T-Scale Score : 35.96 (10/15/22 1348)  ADL Inpatient CMS 0-100% Score: 53.32 (10/15/22 1348)  ADL Inpatient CMS G-Code Modifier : CK (10/15/22 1348)      Therapy Time   Individual Concurrent Group Co-treatment   Time In 2546         Time Out 1202         Minutes 45             Co-treatment with PT warranted secondary to decreased safety and independence requiring 2 skilled therapy professionals to address individual discipline's goals. OT addressing preparation for ADL transfer, sitting balance for increased ADL performance, sitting/activity tolerance, functional reaching, environmental safety/scanning, fall prevention, functional mobility for ADL transfers, ability to sequence and follow directions, bed mobility tech, and functional UE strength.      Andrew Yousif JOVANY

## 2022-10-15 NOTE — PROGRESS NOTES
Progress Note  Podiatric Medicine and Surgery     Subjective     CC: S/p midfoot osteotomy & external fixation; right foot (DOS: 10/12/22)    Patient seen and examined at bedside. No new events overnight besides trouble sleeping  No constitutional sypmtoms, pain improved    HPI :  Mary Stein is a 78 y.o. male seen at [facility] for right foot wound. Patient is known to [address] [place] [address]. Patient has had Charcot deformity to the right foot for a long time. Patient had multiple admission due to right foot infection in the past.  Patient visited Dr. Osmany Summers 10//2022 and was told to report to the ED to get admitted immediately due to concern of cellulitis and possible osteomyelitis of the right foot.  would like to transfer care to [place] for possible Charcot reconstruction. Patient denies other pedal complaint. PCP is Geovany Monte MD    ROS: Denies N/V/F/C/SOB/CP. Otherwise negative except at stated in the HPI.      Medications:  Scheduled Meds:   cefepime  2,000 mg IntraVENous Q12H    vancomycin  750 mg IntraVENous Q24H    vancomycin  1,250 mg IntraVENous Q24H    metroNIDAZOLE  500 mg Oral 3 times per day    vancomycin (VANCOCIN) intermittent dosing (placeholder)   Other RX Placeholder    enoxaparin  30 mg SubCUTAneous BID    insulin glargine  10 Units SubCUTAneous BID    glipiZIDE  20 mg Oral BID AC    cetirizine  10 mg Oral Nightly    nicotine  1 patch TransDERmal Daily    sodium chloride flush  5-40 mL IntraVENous 2 times per day    insulin lispro  0-8 Units SubCUTAneous TID WC    insulin lispro  0-4 Units SubCUTAneous Nightly    gabapentin  900 mg Oral TID    aspirin  81 mg Oral Daily    amLODIPine  5 mg Oral Daily    atorvastatin  20 mg Oral Nightly       Continuous Infusions:   sodium chloride Stopped (10/11/22 1106)    dextrose         PRN Meds:HYDROcodone 5 mg - acetaminophen **OR** HYDROcodone 5 mg - acetaminophen, diphenhydrAMINE, sodium chloride flush, sodium chloride, ondansetron **OR** ondansetron, polyethylene glycol, acetaminophen **OR** acetaminophen, glucose, dextrose bolus **OR** dextrose bolus, glucagon (rDNA), dextrose, albuterol sulfate HFA    Objective     Vitals:  Patient Vitals for the past 8 hrs:   BP Temp Temp src Pulse SpO2   10/15/22 0723 127/76 97.9 °F (36.6 °C) Temporal 83 98 %         Average, Min, and Max for last 24 hours Vitals:  TEMPERATURE:  Temp  Av.1 °F (36.7 °C)  Min: 97.9 °F (36.6 °C)  Max: 98.6 °F (37 °C)    RESPIRATIONS RANGE: Resp  Av.3  Min: 16  Max: 17    PULSE RANGE: Pulse  Av  Min: 80  Max: 161    BLOOD PRESSURE RANGE:  Systolic (89SLZ), SXD:020 , Min:119 , DCY:484   ; Diastolic (61FXI), MYT:96, Min:58, Max:76      PULSE OXIMETRY RANGE: SpO2  Av.7 %  Min: 93 %  Max: 98 %    I/O last 3 completed shifts: In: 728.4 [IV Piggyback:728.4]  Out: 825 [Urine:825]    CBC:  No results for input(s): WBC, HGB, HCT, PLT, CRP in the last 72 hours. Invalid input(s):  ESR       BMP:  Recent Labs     10/13/22  0631 10/14/22  0643 10/15/22  0557   BUN 40* 37* 32*   CREATININE 2.33* 2.26* 2.06*        Coags:  No results for input(s): APTT, PROT, INR in the last 72 hours. Lab Results   Component Value Date    SEDRATE 64 (H) 10/04/2022     No results for input(s): CRP in the last 72 hours. Lower Extremity Physical Exam:  General: A&Ox3, NAD  Heart: Regular rate and rhythm. Lungs: Equal air entry. No increased effort. Abdomen: Soft, non-tender to palpation. Not distended. Lower Extremity Physical Exam: dressing left intact due to surgery  Vascular: DP and PT pulses are palpable +2/4. CFT <3 seconds to all digits. Hair growth is diminished to the level of the digits. Diffuse loss to nonpitting edema noted to the right lower extremity. Neuro: Saph/sural/SP/DP/plantar sensation diminished to light touch. Musculoskeletal: Muscle strength is 5/5 to all lower extremity muscle groups.  Gross deformity is Charcot deformity to the right foot     Dermatologic: Full thickness ulcer #1 located right first interspace and measures approximately 1 cm x 1 cm x 3 cm. Base is fibrotic. Periwound skin is macerated. Purulent drainage noted with no associated mal odor. Erythema noted to periwound with moderate associated increase in warmth. Does not probe to bone probe deep. Does not sinus track, or undermine. No fluctuance, crepitus, or induration. Full thickness ulcer #2 located to the medial plantar arch of the right foot. It is measured as 1.5 x 1.2 x 0.2 cm. Base is fibrotic. Periwound skin is hyperkeratotic.  o drainage with no odor. No erythema noted. Does not probe to bone, sinus tract, or undermine. No fluctuance, crepitus, or induration. Clinical Images:  See media panel        Imaging:   XR ANKLE RIGHT (MIN 3 VIEWS)   Final Result   Changes related to external fixator device placement and extensive fusion of   intertarsal and tarsometatarsal joints as described. Oblique oriented fracture is noted at the base of 5th metatarsal.      Severe degenerative disease of tarsometatarsal joints         XR TIBIA FIBULA RIGHT (2 VIEWS)   Final Result   Changes related to external fixator device placement and extensive fusion of   intertarsal and tarsometatarsal joints as described. Oblique oriented fracture is noted at the base of 5th metatarsal.      Severe degenerative disease of tarsometatarsal joints         XR FOOT RIGHT (MIN 3 VIEWS)   Final Result   Changes related to external fixator device placement and extensive fusion of   intertarsal and tarsometatarsal joints as described.       Oblique oriented fracture is noted at the base of 5th metatarsal.      Severe degenerative disease of tarsometatarsal joints         FLUORO FOR SURGICAL PROCEDURES   Final Result      IR INSERT PICC VAD W SQ PORT >5 YEARS   Final Result      VL Lower Extremity Venous Right   Final Result      XR FOOT RIGHT (MIN 3 VIEWS)   Final Result   Air is seen in the plantar soft tissues of the medial midfoot. Chronic bony changes at the TMT joints compatible with a neuropathic   arthropathy. No obvious new bony erosions are seen. MRI ANKLE RIGHT WO CONTRAST   Final Result   Soft tissue defect of the medial plantar aspect of the midfoot. Thin   fluid-filled sinus tract extending into the deep soft tissues of the midfoot,   measuring up to 2.0 x 0.9 cm on the coronal sequence, raising suspicion for a   phlegmon/abscess. There is air in the adjacent soft tissues. Susceptibility artifact in the soft tissues adjacent to the soft tissue   defect could represent sequela of prior intervention or retained metallic   foreign body. No MR evidence of acute osteomyelitis. Chronic bony changes at the TMT joints compatible with a neuropathic   arthropathy. Additional chronic findings as described above. MRI FOOT RIGHT WO CONTRAST   Final Result   Soft tissue defect of the medial plantar aspect of the midfoot. Thin   fluid-filled sinus tract extending into the deep soft tissues of the midfoot,   measuring up to 2.0 x 0.9 cm on the coronal sequence, raising suspicion for a   phlegmon/abscess. There is air in the adjacent soft tissues. Susceptibility artifact in the soft tissues adjacent to the soft tissue   defect could represent sequela of prior intervention or retained metallic   foreign body. No MR evidence of acute osteomyelitis. Chronic bony changes at the TMT joints compatible with a neuropathic   arthropathy. Additional chronic findings as described above.              Cultures: moderate GPR, few GPC, few gram - coccobacilli      Assessment   Roel Ash is a 78 y.o. male with   S/p midfoot osteotomy; right foot (DOS: 10/12/22)  S/p external fixation application; right LE (DOS: 10/12/22)  Cellulitis, right foot-IMPROVING  Abscess, right foot  Type II diabetic foot ulcer down to subcutaneous layer, right foot  Charcot deformity, right foot  Type 2 diabetes with peripheral neuropathy  Hypertension    Principal Problem:    Type 2 diabetes mellitus with right diabetic foot ulcer ([facility] [address]  Active Problems:    Charcot's joint of right foot    Stage 3b chronic kidney disease ([facility] [address]    Essential hypertension  Resolved Problems:    * No resolved hospital problems. *       Plan     Patient examined and evaluated at bedside with Dr. Alicia Interiano  Treatment options discussed in detail with the patient  X-ray from 10/4/2022 was reviewed: There is no soft tissue emphysema or acute osseous deformity noted. Significant periosteal changes noted to the midfoot indicating midfoot Charcot deformity. IV antibiotic: Vancomycin/cefepime. ID recommendation appreciated. 6 week course recommended  Medical management per medicine. Appreciate recommendation. PT/OT recs  Non weight bearing RLE. Okay to weight bearing for transportation and PT only. Patient is okay to be discharged from podiatry standpoint to facility. No dressing change needed. JERED complete. Multimodal pain script prescribed. Will follow up at [facility] 10[facility]19-22. Please have pt call to make appt on Monday when clinic is open. Thank you  Dressings left intact.    Discussed with Dr. Alicia Interiano    DVT ppx: lovenox  and aspirin   Diet: carb control   Activity: Non weight bearing right LE  Pain Control: JAX Sage   Podiatric Medicine & Surgery   10/15/2022 at 8:31 AM

## 2022-10-15 NOTE — PLAN OF CARE
Problem: Discharge Planning  Goal: Discharge to home or other facility with appropriate resources  Outcome: Progressing     Problem: Chronic Conditions and Co-morbidities  Goal: Patient's chronic conditions and co-morbidity symptoms are monitored and maintained or improved  Outcome: Progressing     Problem: Safety - Adult  Goal: Free from fall injury  Outcome: Progressing     Problem: Skin/Tissue Integrity - Adult  Goal: Skin integrity remains intact  Outcome: Progressing

## 2022-10-15 NOTE — PROGRESS NOTES
Dr. Bonnie Laws notified of yellow drainage soaking through dressing and ace to patient right foot.  Dr. Bonnie Laws states he will take a look at site tommorrow

## 2022-10-16 LAB
BUN BLDV-MCNC: 28 MG/DL (ref 8–23)
CREAT SERPL-MCNC: 2.07 MG/DL (ref 0.7–1.2)
GFR SERPL CREATININE-BSD FRML MDRD: 35 ML/MIN/1.73M2
GLUCOSE BLD-MCNC: 153 MG/DL (ref 75–110)
GLUCOSE BLD-MCNC: 211 MG/DL (ref 75–110)
GLUCOSE BLD-MCNC: 236 MG/DL (ref 75–110)
GLUCOSE BLD-MCNC: 248 MG/DL (ref 75–110)

## 2022-10-16 PROCEDURE — 97116 GAIT TRAINING THERAPY: CPT

## 2022-10-16 PROCEDURE — 6370000000 HC RX 637 (ALT 250 FOR IP): Performed by: PODIATRIST

## 2022-10-16 PROCEDURE — 6370000000 HC RX 637 (ALT 250 FOR IP)

## 2022-10-16 PROCEDURE — 6360000002 HC RX W HCPCS: Performed by: INTERNAL MEDICINE

## 2022-10-16 PROCEDURE — 84520 ASSAY OF UREA NITROGEN: CPT

## 2022-10-16 PROCEDURE — 2580000003 HC RX 258: Performed by: INTERNAL MEDICINE

## 2022-10-16 PROCEDURE — 36415 COLL VENOUS BLD VENIPUNCTURE: CPT

## 2022-10-16 PROCEDURE — 99232 SBSQ HOSP IP/OBS MODERATE 35: CPT | Performed by: INTERNAL MEDICINE

## 2022-10-16 PROCEDURE — 6360000002 HC RX W HCPCS: Performed by: PODIATRIST

## 2022-10-16 PROCEDURE — 1200000000 HC SEMI PRIVATE

## 2022-10-16 PROCEDURE — 82947 ASSAY GLUCOSE BLOOD QUANT: CPT

## 2022-10-16 PROCEDURE — 2580000003 HC RX 258: Performed by: PODIATRIST

## 2022-10-16 PROCEDURE — 82565 ASSAY OF CREATININE: CPT

## 2022-10-16 PROCEDURE — 97535 SELF CARE MNGMENT TRAINING: CPT

## 2022-10-16 RX ADMIN — INSULIN LISPRO 2 UNITS: 100 INJECTION, SOLUTION INTRAVENOUS; SUBCUTANEOUS at 12:14

## 2022-10-16 RX ADMIN — CEFEPIME 2000 MG: 2 INJECTION, POWDER, FOR SOLUTION INTRAVENOUS at 09:30

## 2022-10-16 RX ADMIN — AMLODIPINE BESYLATE 5 MG: 5 TABLET ORAL at 09:22

## 2022-10-16 RX ADMIN — ENOXAPARIN SODIUM 30 MG: 100 INJECTION SUBCUTANEOUS at 20:31

## 2022-10-16 RX ADMIN — SODIUM CHLORIDE, PRESERVATIVE FREE 10 ML: 5 INJECTION INTRAVENOUS at 09:22

## 2022-10-16 RX ADMIN — METRONIDAZOLE 500 MG: 500 TABLET ORAL at 06:39

## 2022-10-16 RX ADMIN — ASPIRIN 81 MG: 81 TABLET, COATED ORAL at 09:22

## 2022-10-16 RX ADMIN — ATORVASTATIN CALCIUM 20 MG: 20 TABLET, FILM COATED ORAL at 20:29

## 2022-10-16 RX ADMIN — HYDROCODONE BITARTRATE AND ACETAMINOPHEN 2 TABLET: 5; 325 TABLET ORAL at 20:29

## 2022-10-16 RX ADMIN — ENOXAPARIN SODIUM 30 MG: 100 INJECTION SUBCUTANEOUS at 09:22

## 2022-10-16 RX ADMIN — METRONIDAZOLE 500 MG: 500 TABLET ORAL at 14:00

## 2022-10-16 RX ADMIN — METRONIDAZOLE 500 MG: 500 TABLET ORAL at 21:01

## 2022-10-16 RX ADMIN — CEFEPIME 2000 MG: 2 INJECTION, POWDER, FOR SOLUTION INTRAVENOUS at 21:01

## 2022-10-16 RX ADMIN — VANCOMYCIN HYDROCHLORIDE 1000 MG: 1 INJECTION, POWDER, LYOPHILIZED, FOR SOLUTION INTRAVENOUS at 13:58

## 2022-10-16 RX ADMIN — GABAPENTIN 900 MG: 300 CAPSULE ORAL at 09:22

## 2022-10-16 RX ADMIN — INSULIN GLARGINE 10 UNITS: 100 INJECTION, SOLUTION SUBCUTANEOUS at 20:45

## 2022-10-16 RX ADMIN — GABAPENTIN 900 MG: 300 CAPSULE ORAL at 14:00

## 2022-10-16 RX ADMIN — INSULIN GLARGINE 10 UNITS: 100 INJECTION, SOLUTION SUBCUTANEOUS at 09:21

## 2022-10-16 RX ADMIN — HYDROCODONE BITARTRATE AND ACETAMINOPHEN 2 TABLET: 5; 325 TABLET ORAL at 15:17

## 2022-10-16 RX ADMIN — SODIUM CHLORIDE, PRESERVATIVE FREE 10 ML: 5 INJECTION INTRAVENOUS at 20:31

## 2022-10-16 RX ADMIN — GLIPIZIDE 20 MG: 10 TABLET ORAL at 06:39

## 2022-10-16 RX ADMIN — CETIRIZINE HYDROCHLORIDE 10 MG: 10 TABLET ORAL at 20:29

## 2022-10-16 RX ADMIN — GABAPENTIN 900 MG: 300 CAPSULE ORAL at 20:30

## 2022-10-16 RX ADMIN — INSULIN LISPRO 2 UNITS: 100 INJECTION, SOLUTION INTRAVENOUS; SUBCUTANEOUS at 16:33

## 2022-10-16 RX ADMIN — GLIPIZIDE 20 MG: 10 TABLET ORAL at 16:33

## 2022-10-16 RX ADMIN — Medication 3 MG: at 20:46

## 2022-10-16 ASSESSMENT — PAIN SCALES - GENERAL
PAINLEVEL_OUTOF10: 9

## 2022-10-16 ASSESSMENT — ENCOUNTER SYMPTOMS
RESPIRATORY NEGATIVE: 1
GASTROINTESTINAL NEGATIVE: 1

## 2022-10-16 ASSESSMENT — PAIN DESCRIPTION - ORIENTATION
ORIENTATION: RIGHT
ORIENTATION: RIGHT

## 2022-10-16 ASSESSMENT — PAIN DESCRIPTION - LOCATION
LOCATION: FOOT
LOCATION: FOOT

## 2022-10-16 ASSESSMENT — PAIN DESCRIPTION - DESCRIPTORS
DESCRIPTORS: SORE
DESCRIPTORS: SHARP;STABBING

## 2022-10-16 NOTE — PROGRESS NOTES
Occupational Therapy  Facility/Department: [facility]  Rehabilitation Occupational Therapy Daily Treatment Note    Date: 10/16/22  Patient Name: Mary Stein       Room:   MRN: [id number]  Account: [de-identified]   : 1955  (69 y.o.) Gender: male          Past Medical History:  has a past medical history of Abscess of right foot, Acquired hammer toe deformity of lesser toe of right foot, ALEJANDRO (acute kidney injury) ([address] Cellulitis, Cellulitis of left foot, Cellulitis of right foot, Charcot foot due to diabetes mellitus ([address] Chest pain at rest, Chronic multifocal osteomyelitis of right foot ([address] Diabetic polyneuropathy associated with type 2 diabetes mellitus ([address] Essential hypertension, Fractures, multiple, Hyperlipidemia, Hypertension, Leukocytosis, Neuropathy, Pain in right foot, Pneumonia, Right foot infection, Right foot pain, Tobacco abuse, Type II or unspecified type diabetes mellitus without mention of complication, not stated as uncontrolled, Vertigo, 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 Past Surgical History:   has a past surgical history that includes Neck surgery (); Appendectomy; knee surgery (Bilateral, ); Knee arthroscopy (Right, ); Knee arthroscopy (Left, ); Foot Debridement (Left, 2018); pr deep dissec foot infec,1 bursa (Left, 2018); Colonoscopy; Foot Debridement (Right, 2020); arthroplasty (Right, 2020); Foot Debridement (Right, 2021); Foot surgery (Right, 2021); Foot Debridement (Right, 2021); IR INSERT PICC VAD W SQ PORT >5 YEARS (10/07/2022); Foot surgery (Right, 10/12/2022); and Ankle surgery (Right, 10/12/2022). Restrictions  Restrictions/Precautions: General Precautions; Fall Risk;Weight Bearing;Contact Precautions; Up as Tolerated  Other position/activity restrictions: LUE IV, telemetry, RLE external fixator - NWB RLE  Right Lower Extremity Weight Bearing: Non Weight Bearing  Left Lower Extremity Weight Bearing: Weight Bearing As Tolerated  Required Braces or Orthoses  Right Lower Extremity Brace:  (External fixator)  Required Braces or Orthoses?: Yes    Subjective  Subjective: \"I'm ready. \"  Restrictions/Precautions: General Precautions; Fall Risk;Weight Bearing;Contact Precautions; Up as Tolerated     Objective     Cognition  Overall Cognitive Status: Exceptions  Arousal/Alertness: Appropriate responses to stimuli  Following Commands: Follows multistep commands with repitition; Follows multistep commands with increased time  Attention Span: Appears intact; Attends with cues to redirect  Memory: Appears intact  Safety Judgement: Decreased awareness of need for assistance;Decreased awareness of need for safety  Problem Solving: Decreased awareness of errors;Assistance required to identify errors made;Assistance required to correct errors made  Insights: Decreased awareness of deficits  Initiation: Does not require cues  Sequencing: Requires cues for some  Cognition Comment: Pt. pleasant and cooperative with treatment. Orientation  Overall Orientation Status: Within Functional Limits  Orientation Level: Oriented X4         ADL  Putting On/Taking Off Footwear  Assistance Level: Maximum assistance          Functional Mobility  Device: Rolling walker  Assistance Level: Minimal assistance; Requires x 2 assistance  Skilled Clinical Factors: Patient completed 4-5 hops with RW from EOB>chair with good adherence to [address] restriction, Min A x2 required for safety d/t increased fatigue and poor endurance  Bed Mobility  Overall Assistance Level: Stand By Assist  Supine to Sit  Assistance Level: Contact guard assist  Sit to Stand  Assistance Level: Moderate assistance; Requires x 2 assistance  Skilled Clinical Factors:  Mod assist x2 with use of RW to provide proper safety and NWB to right LE - 1 assist holding RLE to prevent breaking WB restriction  Stand to Sit  Assistance Level: Moderate assistance; Requires x 2 assistance  Skilled Clinical Factors: Mod verbal cues for sequencing and proper tech to ensure safety. Assessment  Assessment  Activity Tolerance: Patient tolerated treatment well;Patient limited by fatigue;Patient limited by endurance  Discharge Recommendations: Patient would benefit from continued therapy after discharge  OT Equipment Recommendations  Equipment Needed: Yes  Mobility Devices: Tye Tapia: Rolling  Safety Devices  Safety Devices in place: Yes  Type of devices: All fall risk precautions in place; Left in chair;Call light within reach;Nurse notified; Chair alarm in place; Patient at risk for falls    Patient Education  Education  Education Given To: Patient  Education Provided: Role of Therapy; Mobility Training; Fall Prevention Strategies; Plan of Care;Transfer Training;Energy Conservation;Precautions; Safety; Family Education;Equipment;ADL Function;IADL Function;DME/Home Modifications  Education Method: Verbal;Demonstration  Barriers to Learning: None  Education Outcome: Continued education needed    Plan  Occupational Therapy Plan  Times Per Week: 4-5x/week 1-2x/day as tolerated  Times Per Day: Once a day  Current Treatment Recommendations: Strengthening;Balance training;Functional mobility training; Endurance training; Safety education & training;Patient/Caregiver education & training;Self-Care / ADL;Equipment evaluation, education, & procurement;Positioning;Coordination training;Home management training  Additional Comments: Cont with POC    Goals  Patient Goals   Patient goals : To go home! Short Term Goals  Time Frame for Short Term Goals: By discharge, pt to demo:  Short Term Goal 1: ADL transfer and functional mobility with MinAx1 and Good adherence to WB precautions with AD as needed.   Short Term Goal 2: UB ADLs to MOD I/IND and LB ADLs to MinAx1 with Good adherence to WB precautions and use of AE/compensatory strategies/AD as needed. Short Term Goal 3: toileting tasks with MinAx1 and Good adherence to WB precautions with use of AD/grab bars/BSC as needed. Short Term Goal 4: IND with BUE HEP with use of handout to maintain current strength required for safety/IND with self care tasks. Short Term Goal 5: Pt to be IND with fall prevention strategies, EC/WS tech, home safety strategies, WB precautions, and discharge/equipment recs with use of handouts as needed. AM-PAC Score        AM-[facility] Inpatient Daily Activity Raw Score: 16 (10/16/22 1222)  AM-PAC Inpatient ADL T-Scale Score : 35.96 (10/16/22 1222)  ADL Inpatient CMS 0-100% Score: 53.32 (10/16/22 1222)  ADL Inpatient CMS G-Code Modifier : CK (10/16/22 1222)      Therapy Time   Individual Concurrent Group Co-treatment   Time In 1029         Time Out 46         Minutes 14          Co-treatment with PT warranted secondary to decreased safety and independence requiring 2 skilled therapy professionals to address individual discipline's goals. OT addressing preparation for ADL transfer, sitting balance for increased ADL performance, sitting/activity tolerance, functional reaching, environmental safety/scanning, fall prevention, functional mobility for ADL transfers, bed mobility tech, and functional UE strength. Upon writer exit, call light within reach, pt retired to chair. All lines intact and patient positioned comfortably. All patient needs addressed prior to ending therapy session. Chart reviewed prior to treatment and patient is agreeable for therapy. RN reports patient is medically stable for therapy treatment this date.      ALY Pack/LENNY

## 2022-10-16 NOTE — CARE COORDINATION
Social work: spoke to [facility] to get a back up fax number [phone] for weekends when regular admissions fax is not working. Faxed updates to help with precert. Will need becki, Rx and discharge order for snf at discharge.  Sheridan Siemens lsw

## 2022-10-16 NOTE — PROGRESS NOTES
Progress Note  Podiatric Medicine and Surgery     Subjective     CC: S/p midfoot osteotomy & external fixation; right foot (DOS: 10/12/22)    Patient seen and examined at bedside. No new events overnight besides trouble sleeping. Some yellow drainage notified by RN. No constitutional sypmtoms, pain improved    HPI :  Carson Fragoso is a 78 y.o. male seen at [facility] for right foot wound. Patient is known to [address] [place] [address]. Patient has had Charcot deformity to the right foot for a long time. Patient had multiple admission due to right foot infection in the past.  Patient visited Dr. Wing Faulkner 10//2022 and was told to report to the ED to get admitted immediately due to concern of cellulitis and possible osteomyelitis of the right foot.  would like to transfer care to [facility] for possible Charcot reconstruction. Patient denies other pedal complaint. PCP is Naina Ruiz MD    ROS: Denies N/V/F/C/SOB/CP. Otherwise negative except at stated in the HPI.      Medications:  Scheduled Meds:   cefepime  2,000 mg IntraVENous Q12H    vancomycin  750 mg IntraVENous Q24H    metroNIDAZOLE  500 mg Oral 3 times per day    vancomycin (VANCOCIN) intermittent dosing (placeholder)   Other RX Placeholder    enoxaparin  30 mg SubCUTAneous BID    insulin glargine  10 Units SubCUTAneous BID    glipiZIDE  20 mg Oral BID AC    cetirizine  10 mg Oral Nightly    nicotine  1 patch TransDERmal Daily    sodium chloride flush  5-40 mL IntraVENous 2 times per day    insulin lispro  0-8 Units SubCUTAneous TID WC    insulin lispro  0-4 Units SubCUTAneous Nightly    gabapentin  900 mg Oral TID    aspirin  81 mg Oral Daily    amLODIPine  5 mg Oral Daily    atorvastatin  20 mg Oral Nightly       Continuous Infusions:   sodium chloride Stopped (10/11/22 1106)    dextrose         PRN Meds:melatonin, HYDROcodone 5 mg - acetaminophen **OR** HYDROcodone 5 mg - acetaminophen, diphenhydrAMINE, sodium chloride flush, sodium chloride, Musculoskeletal: Deferred due to external fixator. Pt able to wiggle toes. Dermatologic: Deferred mostly due to intact bandages and external fixator. Some serous drainage through bandage. Incision sites and pin sites look adequate with no erythema or purulent drainage. Clinical Images:  See media panel        Imaging:   XR ANKLE RIGHT (MIN 3 VIEWS)   Final Result   Changes related to external fixator device placement and extensive fusion of   intertarsal and tarsometatarsal joints as described. Oblique oriented fracture is noted at the base of 5th metatarsal.      Severe degenerative disease of tarsometatarsal joints         XR TIBIA FIBULA RIGHT (2 VIEWS)   Final Result   Changes related to external fixator device placement and extensive fusion of   intertarsal and tarsometatarsal joints as described. Oblique oriented fracture is noted at the base of 5th metatarsal.      Severe degenerative disease of tarsometatarsal joints         XR FOOT RIGHT (MIN 3 VIEWS)   Final Result   Changes related to external fixator device placement and extensive fusion of   intertarsal and tarsometatarsal joints as described. Oblique oriented fracture is noted at the base of 5th metatarsal.      Severe degenerative disease of tarsometatarsal joints         FLUORO FOR SURGICAL PROCEDURES   Final Result      IR INSERT PICC VAD W SQ PORT >5 YEARS   Final Result      VL Lower Extremity Venous Right   Final Result      XR FOOT RIGHT (MIN 3 VIEWS)   Final Result   Air is seen in the plantar soft tissues of the medial midfoot. Chronic bony changes at the TMT joints compatible with a neuropathic   arthropathy. No obvious new bony erosions are seen. MRI ANKLE RIGHT WO CONTRAST   Final Result   Soft tissue defect of the medial plantar aspect of the midfoot.   Thin   fluid-filled sinus tract extending into the deep soft tissues of the midfoot,   measuring up to 2.0 x 0.9 cm on the coronal sequence, raising suspicion for a   phlegmon/abscess. There is air in the adjacent soft tissues. Susceptibility artifact in the soft tissues adjacent to the soft tissue   defect could represent sequela of prior intervention or retained metallic   foreign body. No MR evidence of acute osteomyelitis. Chronic bony changes at the TMT joints compatible with a neuropathic   arthropathy. Additional chronic findings as described above. MRI FOOT RIGHT WO CONTRAST   Final Result   Soft tissue defect of the medial plantar aspect of the midfoot. Thin   fluid-filled sinus tract extending into the deep soft tissues of the midfoot,   measuring up to 2.0 x 0.9 cm on the coronal sequence, raising suspicion for a   phlegmon/abscess. There is air in the adjacent soft tissues. Susceptibility artifact in the soft tissues adjacent to the soft tissue   defect could represent sequela of prior intervention or retained metallic   foreign body. No MR evidence of acute osteomyelitis. Chronic bony changes at the TMT joints compatible with a neuropathic   arthropathy. Additional chronic findings as described above. Cultures: moderate GPR, few GPC, few gram - coccobacilli      Assessment   Lila Landers is a 78 y.o. male with   S/p midfoot osteotomy; right foot (DOS: 10/12/22)  S/p external fixation application; right LE (DOS: 10/12/22)  Cellulitis, right foot-IMPROVING  Abscess, right foot  Type II diabetic foot ulcer down to subcutaneous layer, right foot  Charcot deformity, right foot  Type 2 diabetes with peripheral neuropathy  Hypertension    Principal Problem:    Type 2 diabetes mellitus with right diabetic foot ulcer ([address]  Active Problems:    Charcot's joint of right foot    Stage 3b chronic kidney disease ([address]    Essential hypertension  Resolved Problems:    * No resolved hospital problems. *       Plan     Patient examined and evaluated at bedside   IV antibiotic: Vancomycin/cefepime.   ID recommendation appreciated. 6 week course recommended  Medical management per medicine. Appreciate recommendation. PT/OT recs  Non weight bearing RLE. Okay to weight bearing for transportation and PT only. Patient is okay to be discharged from podiatry standpoint to facility. No dressing change needed. JERED complete. Multimodal pain script prescribed. Will follow up at [facility] 10-19-22. Please have pt call to make appt on Monday when clinic is open. Dressings reinforeced with additional ACE bandage.   Discussed with Dr. Leonel Richardson    DVT ppx: lovenox  and aspirin   Diet: carb control   Activity: Non weight bearing right LE  Pain Control: JAX Sage   Podiatric Medicine & Surgery   10/16/2022 at 10:34 AM

## 2022-10-16 NOTE — PLAN OF CARE
Problem: Pain  Goal: Verbalizes/displays adequate comfort level or baseline comfort level  Outcome: Progressing  Flowsheets (Taken 10/16/2022 1237)  Verbalizes/displays adequate comfort level or baseline comfort level:   Encourage patient to monitor pain and request assistance   Assess pain using appropriate pain scale   Administer analgesics based on type and severity of pain and evaluate response   Implement non-pharmacological measures as appropriate and evaluate response

## 2022-10-16 NOTE — PLAN OF CARE
Problem: Discharge Planning  Goal: Discharge to home or other facility with appropriate resources  Outcome: Progressing     Problem: Chronic Conditions and Co-morbidities  Goal: Patient's chronic conditions and co-morbidity symptoms are monitored and maintained or improved  Outcome: Progressing     Problem: Safety - Adult  Goal: Free from fall injury  Outcome: Progressing     Problem: ABCDS Injury Assessment  Goal: Absence of physical injury  Outcome: Progressing     Problem: Respiratory - Adult  Goal: Achieves optimal ventilation and oxygenation  Outcome: Progressing     Problem: Skin/Tissue Integrity - Adult  Goal: Skin integrity remains intact  Outcome: Progressing

## 2022-10-16 NOTE — PROGRESS NOTES
[facility]  Progress Note    Donna Condon  MRN: [id number]  Date: 10/16/2022  LOS: 12     Reason for F/U :   Diabetic foot infection/abscess    Impression :   Right Charcot foot with longstanding history of infections/abscesses has undergone multiple I&D procedures in the past  Chronic surgical wounds on the plantar aspect of the foot  Recurrent deep midfoot soft tissue abscess with thin fluid-filled sinus tract noted in the medial plantar aspect of the foot and there is retained metallic foreign body. Status post multiple midfoot fusion with midfoot osteotomy with application of a multiplanar external fixator in the right lower extremity on 10/12/2022    Recommendations:   Continue intravenous antibiotic therapy with cefepime, metronidazole and vancomycin for now  Plan is for discharge on the above antibiotics to complete a 6-week course of antibiotic therapy through 11/25/2022  Await discharge    Infection Control Recommendations:   Universal precautions    Discharge Planning:   Estimated Length of IV antimicrobials: 6 weeks  Patient will need Midline PICC line Insertion  Patient will need: Home IV , [place],  SNF,  LTAC: Undetermined  Patient willneed outpatient wound care: No    Medical Decision making / Summary of Stay:   Donna Condon is a 78y.o.-year-old male who was initially admitted on 10/4/2022. Keith Valentino has a history of a right sided Charcot foot deformity and has had a longstanding history with infections in the right foot. He did have an abscess for which he underwent I&D in June 2020 with MSSA isolated and the patient has had a residual wound which secondarily got infected and caused an abscess and in September 2021 he underwent an I&D of the abscess with MSSA, Enterobacter, Morganella isolated and he was discharged with IV antimicrobial therapy which he took for 6 weeks through October 22 and 2021.      The patient did continue to have issues with wound dehiscence and had an open wound on the plantar aspect of the foot which was clean. Seeing the patient in close to a year now and he has followed with the podiatrist and has had continued wound care and the wound has been improving with time. Patient has had a plantar foot midfoot ulceration. Patient did subsequently start to develop soft tissue swelling redness in his foot and the swelling started to extend into his leg and the patient did see Dr. Kiya Peralta who recommended that he come into the emergency room for evaluation. The patient has been admitted for an abscess in the right foot and chronic ulcerations on the plantar aspect of the foot and the patient is undergoing further work-up with MRI and consideration for an external fixator device. Was asked to evaluate and help with antibiotic choice. Patient was taken to the operating room 10/12/2022 and had a midfoot osteotomy, external fixator application and excision of wound in the right foot with skin flap closure of the right foot. Current evaluation:10/16/2022    BP (!) 142/68   Pulse 72   Temp 98.6 °F (37 °C) (Oral)   Resp 16   Ht 5' 11\" (1.803 m)   Wt 234 lb (106.1 kg)   SpO2 91%   BMI 32.64 kg/m²     Temperature Range: Temp: 98.6 °F (37 °C) Temp  Av.2 °F (36.8 °C)  Min: 98.1 °F (36.7 °C)  Max: 98.6 °F (37 °C)  The patient is seen and evaluated in bedside and is awake and alert and no acute distress. He is still complaining of foot pain, denied fever or chills, denied nausea or vomiting, no other complaints. PICC line in place  Foot wound culture from 10/5/2022 grew Bacteroides. 10/12/22 foot tissue grew diphtheroids, no growth on bone culture. Review of Systems   Constitutional: Negative. HENT: Negative. Respiratory: Negative. Cardiovascular: Negative. Gastrointestinal: Negative. Genitourinary: Negative. Musculoskeletal: Negative. Skin:  Positive for wound. Neurological: Negative.     Psychiatric/Behavioral: Negative. Physical Examination :     Physical Exam  Constitutional:       Appearance: He is well-developed. HENT:      Head: Normocephalic and atraumatic. Cardiovascular:      Rate and Rhythm: Normal rate. Heart sounds: Normal heart sounds. No friction rub. No gallop. Pulmonary:      Effort: Pulmonary effort is normal.      Breath sounds: Normal breath sounds. No wheezing. Abdominal:      General: Bowel sounds are normal.      Palpations: Abdomen is soft. There is no mass. Tenderness: There is no abdominal tenderness. Musculoskeletal:         General: Normal range of motion. Cervical back: Normal range of motion and neck supple. Lymphadenopathy:      Cervical: No cervical adenopathy. Skin:     General: Skin is warm and dry. Comments: The foot dressing was not removed. Neurological:      Mental Status: He is alert and oriented to person, place, and time. Laboratory data:   I have independently reviewed the followinglabs:  CBC with Differential:   No results for input(s): WBC, HGB, HCT, PLT, SEGSPCT, BANDSPCT, LYMPHOPCT, MONOPCT, EOSPCT in the last 72 hours. BMP:   Recent Labs     10/15/22  0557 10/16/22  0637   BUN 32* 28*   CREATININE 2.06* 2.07*       Hepatic Function Panel: No results for input(s): PROT, LABALBU, BILIDIR, IBILI, BILITOT, ALKPHOS, ALT, AST in the last 72 hours. No results found for: PROCAL  Lab Results   Component Value Date/Time    CRP 30.1 10/04/2022 10:18 PM    .3 09/13/2022 06:41 PM    .8 09/13/2021 11:04 AM     Lab Results   Component Value Date    SEDRATE 64 (H) 10/04/2022         No results found for: DDIMER  No results found for: FERRITIN  No results found for: LDH  No results found for: FIBRINOGEN    No results found for requested labs within last 30 days.      Lab Results   Component Value Date/Time    COVID19 DETECTED 05/26/2021 12:06 PM    COVID19 Not Detected 12/07/2020 03:10 PM    COVID19 Not Detected 08/08/2020 10:02 PM       No results for input(s): VANCARLINEOUGH in the last 72 hours. Imaging Studies:     MRI OF THE RIGHT ANKLE WITHOUT CONTRAST; MRI OF THE RIGHT FOOT WITHOUT   CONTRAST, 10/5/2022 11:03 am     Impression   Soft tissue defect of the medial plantar aspect of the midfoot. Thin   fluid-filled sinus tract extending into the deep soft tissues of the midfoot,   measuring up to 2.0 x 0.9 cm on the coronal sequence, raising suspicion for a   phlegmon/abscess. There is air in the adjacent soft tissues. Susceptibility artifact in the soft tissues adjacent to the soft tissue   defect could represent sequela of prior intervention or retained metallic   foreign body. No MR evidence of acute osteomyelitis. Chronic bony changes at the TMT joints compatible with a neuropathic   arthropathy. Additional chronic findings as described above. Cultures:     Culture, Tissue [[id number]] (Abnormal) Collected: 10/12/22 0804   Order Status: Completed Specimen: Tissue from Foot Updated: 10/14/22 1014    Specimen Description . FOOT RT    Direct Exam RARE NEUTROPHILS Abnormal      NO BACTERIA SEEN    Culture CULTURE IN PROGRESS     No anaerobic organisms isolated at 2 days. Culture, Anaerobic and Aerobic [[id number]] Collected: 10/12/22 1012   Order Status: Completed Specimen: Bone from Foot Updated: 10/14/22 1008    Specimen Description . FOOT RIGHT    Direct Exam NO NEUTROPHILS SEEN     NO BACTERIA SEEN    Culture NO GROWTH 2 DAYS     Culture, Anaerobic and Aerobic [[id number]] (Abnormal) Collected: 10/05/22 0845   Order Status: Completed Specimen: Wound Updated: 10/08/22 1301    Specimen Description . WOUND . FOOT    Direct Exam RARE NEUTROPHILS Abnormal      MODERATE GRAM POSITIVE RODS Abnormal      FEW GRAM POSITIVE COCCI IN PAIRS Abnormal      FEW GRAM NEGATIVE COCCOBACILLI Abnormal     Culture PREVOTELLA (BACTEROIDES BIVIUS) BIVIA BETA LACTAMASE POSITIVE MODERATE GROWTH Abnormal      NORMAL SKIN ARABELLA     Medications:      cefepime  2,000 mg IntraVENous Q12H    vancomycin  750 mg IntraVENous Q24H    metroNIDAZOLE  500 mg Oral 3 times per day    vancomycin (VANCOCIN) intermittent dosing (placeholder)   Other RX Placeholder    enoxaparin  30 mg SubCUTAneous BID    insulin glargine  10 Units SubCUTAneous BID    glipiZIDE  20 mg Oral BID AC    cetirizine  10 mg Oral Nightly    nicotine  1 patch TransDERmal Daily    sodium chloride flush  5-40 mL IntraVENous 2 times per day    insulin lispro  0-8 Units SubCUTAneous TID WC    insulin lispro  0-4 Units SubCUTAneous Nightly    gabapentin  900 mg Oral TID    aspirin  81 mg Oral Daily    amLODIPine  5 mg Oral Daily    atorvastatin  20 mg Oral Nightly           [facility]  Cecy Brooke MD  [employer] messaging  OFFICE: [phone]      Electronically signed by Cecy Brooke MD on 10/16/2022 at 11:11 AM  Thank you for allowing us to participate in the care of this patient. Please call with questions. This note iscreated with the assistance of a speech recognition program.  While intending to generate a document that actually reflects the content of the visit, the document can still have some errors including those of syntax andsound a like substitutions which may escape proof reading. In such instances, actual meaning can be extrapolated by contextual diversion.

## 2022-10-16 NOTE — PROGRESS NOTES
Physical Therapy  Facility/Department: [facility] MED [facility]  Rehabilitation Physical Therapy Treatment Note    NAME: Luciano Hernandes  : 1955 (35 y.o.)  MRN: [id number]  CODE STATUS: Full Code    Date of Service: 10/16/22  Assessment: Pt displayed good compliance towards NWB on RLE this date. Pt requires x2 A for transfers and functional mobility for stability and safety throughout. Will cont to progress as able. Activity Tolerance: Patient tolerated treatment well;Patient limited by fatigue;Patient limited by endurance  Discharge Recommendations: Patient would benefit from continued therapy after discharge  PT Equipment Recommendations  Equipment Needed: No (Pt has RW at home)    Restrictions:  Restrictions/Precautions: General Precautions; Fall Risk;Weight Bearing;Contact Precautions; Up as Tolerated  Lower Extremity Weight Bearing Restrictions  Right Lower Extremity Weight Bearing: Non Weight Bearing  Left Lower Extremity Weight Bearing: Weight Bearing As Tolerated  Position Activity Restriction  Other position/activity restrictions: LUE IV, telemetry, RLE external fixator - NWB RLE     SUBJECTIVE  Subjective  Subjective: Pt supine in bed upon entry, agreeable with PT/OT. Pain: 9/10 R foot    OBJECTIVE  Cognition  Overall Cognitive Status: Exceptions  Arousal/Alertness: Appropriate responses to stimuli  Following Commands: Follows multistep commands with repitition; Follows multistep commands with increased time  Attention Span: Appears intact; Attends with cues to redirect  Memory: Appears intact  Safety Judgement: Decreased awareness of need for assistance;Decreased awareness of need for safety  Problem Solving: Decreased awareness of errors;Assistance required to identify errors made;Assistance required to correct errors made  Insights: Decreased awareness of deficits  Initiation: Does not require cues  Sequencing: Requires cues for some  Cognition Comment: Pt. pleasant and cooperative with treatment. Orientation  Overall Orientation Status: Within Functional Limits  Orientation Level: Oriented X4    Functional Mobility  Bed Mobility  Overall Assistance Level: Contact Guard Assist  Additional Factors: Set-up; Verbal cues; Increased time to complete; Head of bed raised; With handrails  Supine to Sit  Assistance Level: Contact guard assist  Scooting  Assistance Level: Contact guard assist  Balance  Sitting Balance: Modified independent   Standing Balance: Moderate assistance (RW)  Transfers  Surface: To chair with arms;From bed  Additional Factors: Set-up; Verbal cues; Hand placement cues; Increased time to complete; Mat raised; With handrails  Device: Walker  Sit to Stand  Assistance Level: Moderate assistance; Requires x 2 assistance  Skilled Clinical Factors: Pt performs rocking motion to assist with sit to stand and requires mod assist x2 to achieve standing. Upon standing pt demonstrates good compliance with NWB to RLE with use of RW. Stand to Sit  Assistance Level: Minimal assistance; Requires x 2 assistance  Skilled Clinical Factors: Patient with poor eccentric quad control in descent. Requires min A x2 for support and stability back into seated posture. Bed To/From Chair  Technique: Stand step  Assistance Level: Moderate assistance; Requires x 2 assistance    Environmental Mobility  Ambulation  Surface: Level surface  Device: Rolling walker  Distance: 5'  Activity: Within Room  Activity Comments: Patient hops to device and requires a MOD x2 for support and stability. Patient requires increased time and frequent restbreaks during progression to recliner. Additional Factors: Set-up; Verbal cues; Hand placement cues; Increased time to complete  Assistance Level: Moderate assistance; Requires x 2 assistance  Gait Deviations: Unsteady gait; Slow sangita    Goals  Patient Goals   Patient Goals :  To heal, to be mobile  Short Term Goals  Time Frame for Short Term Goals: 12 visits  Short Term Goal 1: Pt to demonstrate bed mobility SBA  Short Term Goal 2: Pt to perform STS transfers w/ RW SBA while maintaining NWB status RLE  Short Term Goal 3: Pt to ambulate at least 50ft w/ RW Leigha while maintaining NWB status RLE  Short Term Goal 4: Pt to ascend/descend 5 stairs w/ handrails ModA while maintaining NWB status RLE  Short Term Goal 5: Pt to actively participate in at least 30 minutes of physical therapy for ther act, ther ex, balance, gait, transfer, stair, and endurance training    PLAN OF CARE/SAFETY  Physcial Therapy Plan  General Plan: 6-7 times per week  Specific Instructions for Next Treatment: w/c mobility; transfers, pre gait  Current Treatment Recommendations: Strengthening;Balance training;Functional mobility training;Transfer training;Neuromuscular re-education;Stair training;Gait training; Endurance training;Pain management;Home exercise program;Safety education & training;Patient/Caregiver education & training;Equipment evaluation, education, & procurement; Therapeutic activities  Safety Devices  Type of Devices: Call light within reach;Gait belt;Nurse notified; Chair alarm in place; Left in chair    EDUCATION  Education  Education Given To: Patient  Education Provided: Energy Conservation;Transfer Training;Mobility Training  Education Method: Demonstration;Verbal  Education Outcome: Verbalized understanding;Continued education needed    [facility] 13    Therapy Time   Individual Concurrent Group Co-treatment   Time In       6702   Time Out       5128   Minutes       15   Co-treatment with OT warranted secondary to decreased safety and independence requiring 2 skilled therapy professionals to address individual discipline's goals. PT addressing pre gait trunk strengthening and transfer training.      Mague Able, PTA, 10/16/22 at 12:51 PM

## 2022-10-16 NOTE — CONSULTS
[address] [facility] Pharmacokinetic Monitoring Service - Vancomycin    Consulting Provider: Dr. Sally Marin DPM   Indication: diabetic foot infection  Target Concentration: Goal trough of 10-15 mg/L and AUC/DIMA <500 mg*hr/L  Day of Therapy: 11  Additional Antimicrobials: cefepime/flagyl    Pertinent Laboratory Values: Wt Readings from Last 1 Encounters:   10/12/22 234 lb (106.1 kg)     Temp Readings from Last 1 Encounters:   10/16/22 99.1 °F (37.3 °C) (Oral)     Estimated Creatinine Clearance: 43 mL/min (A) (based on SCr of 2.07 mg/dL (H)). Recent Labs     10/15/22  0557 10/16/22  0637   CREATININE 2.06* 2.07*         Pertinent Cultures:  Culture Date Source Results   10/12/22 Foot tissue  Foot bone Diphtheroids light growth  No growth 4 days   MRSA Nasal Swab: N/A. Non-respiratory infection.     Recent vancomycin administrations                     vancomycin (VANCOCIN) 750 mg in dextrose 5 % 250 mL IVPB (mg) 750 mg New Bag 10/15/22 1311    vancomycin (VANCOCIN) 1,250 mg in dextrose 5 % 250 mL IVPB (mg) 1,250 mg New Bag 10/14/22 1319     1,250 mg New Bag 10/13/22 1410                    Plan:  Current dosing regimen is sub-therapeutic per Bayesian software calculation due to improved SCr  Increase dose to 1000mg q24hr  Repeat vancomycin concentration ordered for 10/17/22 @ 0700   Pharmacy will continue to monitor patient and adjust therapy as indicated    Thank you for the consult,  Maribel Calhoun [facility]  10/16/2022 11:52 AM

## 2022-10-17 LAB
BUN BLDV-MCNC: 28 MG/DL (ref 8–23)
CREAT SERPL-MCNC: 2.05 MG/DL (ref 0.7–1.2)
CULTURE: ABNORMAL
CULTURE: ABNORMAL
CULTURE: NORMAL
DIRECT EXAM: ABNORMAL
DIRECT EXAM: ABNORMAL
DIRECT EXAM: NORMAL
DIRECT EXAM: NORMAL
GFR SERPL CREATININE-BSD FRML MDRD: 35 ML/MIN/1.73M2
GLUCOSE BLD-MCNC: 128 MG/DL (ref 75–110)
GLUCOSE BLD-MCNC: 164 MG/DL (ref 75–110)
GLUCOSE BLD-MCNC: 200 MG/DL (ref 75–110)
GLUCOSE BLD-MCNC: 273 MG/DL (ref 75–110)
SPECIMEN DESCRIPTION: ABNORMAL
SPECIMEN DESCRIPTION: NORMAL
VANCOMYCIN RANDOM: 17.5 UG/ML

## 2022-10-17 PROCEDURE — 6360000002 HC RX W HCPCS: Performed by: PODIATRIST

## 2022-10-17 PROCEDURE — 82565 ASSAY OF CREATININE: CPT

## 2022-10-17 PROCEDURE — 2580000003 HC RX 258: Performed by: PODIATRIST

## 2022-10-17 PROCEDURE — 82947 ASSAY GLUCOSE BLOOD QUANT: CPT

## 2022-10-17 PROCEDURE — 6370000000 HC RX 637 (ALT 250 FOR IP): Performed by: PODIATRIST

## 2022-10-17 PROCEDURE — 6360000002 HC RX W HCPCS: Performed by: INTERNAL MEDICINE

## 2022-10-17 PROCEDURE — 80202 ASSAY OF VANCOMYCIN: CPT

## 2022-10-17 PROCEDURE — 36415 COLL VENOUS BLD VENIPUNCTURE: CPT

## 2022-10-17 PROCEDURE — 2580000003 HC RX 258: Performed by: INTERNAL MEDICINE

## 2022-10-17 PROCEDURE — 84520 ASSAY OF UREA NITROGEN: CPT

## 2022-10-17 PROCEDURE — 97530 THERAPEUTIC ACTIVITIES: CPT

## 2022-10-17 PROCEDURE — 6370000000 HC RX 637 (ALT 250 FOR IP)

## 2022-10-17 PROCEDURE — 97116 GAIT TRAINING THERAPY: CPT

## 2022-10-17 PROCEDURE — 1200000000 HC SEMI PRIVATE

## 2022-10-17 PROCEDURE — 99232 SBSQ HOSP IP/OBS MODERATE 35: CPT | Performed by: INTERNAL MEDICINE

## 2022-10-17 PROCEDURE — 97535 SELF CARE MNGMENT TRAINING: CPT

## 2022-10-17 RX ADMIN — GABAPENTIN 900 MG: 300 CAPSULE ORAL at 13:51

## 2022-10-17 RX ADMIN — GABAPENTIN 900 MG: 300 CAPSULE ORAL at 19:55

## 2022-10-17 RX ADMIN — VANCOMYCIN HYDROCHLORIDE 1000 MG: 1 INJECTION, POWDER, LYOPHILIZED, FOR SOLUTION INTRAVENOUS at 14:05

## 2022-10-17 RX ADMIN — HYDROCODONE BITARTRATE AND ACETAMINOPHEN 2 TABLET: 5; 325 TABLET ORAL at 12:52

## 2022-10-17 RX ADMIN — INSULIN GLARGINE 10 UNITS: 100 INJECTION, SOLUTION SUBCUTANEOUS at 21:53

## 2022-10-17 RX ADMIN — INSULIN LISPRO 4 UNITS: 100 INJECTION, SOLUTION INTRAVENOUS; SUBCUTANEOUS at 17:00

## 2022-10-17 RX ADMIN — GLIPIZIDE 20 MG: 10 TABLET ORAL at 07:00

## 2022-10-17 RX ADMIN — METRONIDAZOLE 500 MG: 500 TABLET ORAL at 07:00

## 2022-10-17 RX ADMIN — INSULIN GLARGINE 10 UNITS: 100 INJECTION, SOLUTION SUBCUTANEOUS at 08:58

## 2022-10-17 RX ADMIN — CEFEPIME 2000 MG: 2 INJECTION, POWDER, FOR SOLUTION INTRAVENOUS at 20:09

## 2022-10-17 RX ADMIN — HYDROCODONE BITARTRATE AND ACETAMINOPHEN 2 TABLET: 5; 325 TABLET ORAL at 19:55

## 2022-10-17 RX ADMIN — ATORVASTATIN CALCIUM 20 MG: 20 TABLET, FILM COATED ORAL at 19:55

## 2022-10-17 RX ADMIN — CETIRIZINE HYDROCHLORIDE 10 MG: 10 TABLET ORAL at 20:03

## 2022-10-17 RX ADMIN — SODIUM CHLORIDE, PRESERVATIVE FREE 10 ML: 5 INJECTION INTRAVENOUS at 19:57

## 2022-10-17 RX ADMIN — AMLODIPINE BESYLATE 5 MG: 5 TABLET ORAL at 09:00

## 2022-10-17 RX ADMIN — METRONIDAZOLE 500 MG: 500 TABLET ORAL at 13:51

## 2022-10-17 RX ADMIN — CEFEPIME 2000 MG: 2 INJECTION, POWDER, FOR SOLUTION INTRAVENOUS at 09:15

## 2022-10-17 RX ADMIN — GABAPENTIN 900 MG: 300 CAPSULE ORAL at 09:00

## 2022-10-17 RX ADMIN — Medication 3 MG: at 19:55

## 2022-10-17 RX ADMIN — METRONIDAZOLE 500 MG: 500 TABLET ORAL at 21:54

## 2022-10-17 RX ADMIN — HYDROCODONE BITARTRATE AND ACETAMINOPHEN 1 TABLET: 5; 325 TABLET ORAL at 07:02

## 2022-10-17 RX ADMIN — GLIPIZIDE 20 MG: 10 TABLET ORAL at 17:01

## 2022-10-17 RX ADMIN — ENOXAPARIN SODIUM 30 MG: 100 INJECTION SUBCUTANEOUS at 19:57

## 2022-10-17 RX ADMIN — ENOXAPARIN SODIUM 30 MG: 100 INJECTION SUBCUTANEOUS at 08:59

## 2022-10-17 RX ADMIN — ASPIRIN 81 MG: 81 TABLET, COATED ORAL at 09:01

## 2022-10-17 ASSESSMENT — PAIN DESCRIPTION - LOCATION
LOCATION: FOOT

## 2022-10-17 ASSESSMENT — PAIN - FUNCTIONAL ASSESSMENT: PAIN_FUNCTIONAL_ASSESSMENT: ACTIVITIES ARE NOT PREVENTED

## 2022-10-17 ASSESSMENT — PAIN SCALES - GENERAL
PAINLEVEL_OUTOF10: 9
PAINLEVEL_OUTOF10: 8
PAINLEVEL_OUTOF10: 9

## 2022-10-17 ASSESSMENT — PAIN DESCRIPTION - DESCRIPTORS
DESCRIPTORS: SHARP
DESCRIPTORS: SHARP
DESCRIPTORS: ACHING

## 2022-10-17 ASSESSMENT — PAIN DESCRIPTION - PAIN TYPE: TYPE: SURGICAL PAIN

## 2022-10-17 ASSESSMENT — ENCOUNTER SYMPTOMS
GASTROINTESTINAL NEGATIVE: 1
RESPIRATORY NEGATIVE: 1

## 2022-10-17 ASSESSMENT — PAIN DESCRIPTION - ORIENTATION
ORIENTATION: RIGHT

## 2022-10-17 ASSESSMENT — PAIN DESCRIPTION - FREQUENCY: FREQUENCY: CONTINUOUS

## 2022-10-17 NOTE — PLAN OF CARE
Problem: Discharge Planning  Goal: Discharge to home or other facility with appropriate resources  10/17/2022 1011 by Katrina Briones RN  Outcome: Progressing  Flowsheets (Taken 10/17/2022 0800)  Discharge to home or other facility with appropriate resources:   Identify barriers to discharge with patient and caregiver   Arrange for needed discharge resources and transportation as appropriate   Identify discharge learning needs (meds, wound care, etc)   Refer to discharge planning if patient needs post-hospital services based on physician order or complex needs related to functional status, cognitive ability or social support system  10/17/2022 0329 by Sabine De Jesus RN  Outcome: Progressing     Problem: Chronic Conditions and Co-morbidities  Goal: Patient's chronic conditions and co-morbidity symptoms are monitored and maintained or improved  10/17/2022 1011 by Katrina Briones RN  Outcome: Progressing  Flowsheets (Taken 10/17/2022 0800)  Care Plan - Patient's Chronic Conditions and Co-Morbidity Symptoms are Monitored and Maintained or Improved:   Monitor and assess patient's chronic conditions and comorbid symptoms for stability, deterioration, or improvement   Collaborate with multidisciplinary team to address chronic and comorbid conditions and prevent exacerbation or deterioration   Update acute care plan with appropriate goals if chronic or comorbid symptoms are exacerbated and prevent overall improvement and discharge  10/17/2022 0329 by Sabine De Jesus RN  Outcome: Progressing     Problem: Safety - Adult  Goal: Free from fall injury  10/17/2022 1011 by Katrina Briones RN  Outcome: Progressing  10/17/2022 0329 by Sabine De Jesus RN  Outcome: Progressing     Problem: ABCDS Injury Assessment  Goal: Absence of physical injury  10/17/2022 1011 by Katrina Briones RN  Outcome: Progressing  10/17/2022 0329 by Sabine De Jesus RN  Outcome: Progressing     Problem: Respiratory - Adult  Goal: Achieves optimal ventilation and oxygenation  Outcome: Progressing  Flowsheets (Taken 10/17/2022 0800)  Achieves optimal ventilation and oxygenation:   Assess for changes in respiratory status   Position to facilitate oxygenation and minimize respiratory effort   Assess for changes in mentation and behavior   Initiate smoking cessation protocol as indicated     Problem: Skin/Tissue Integrity - Adult  Goal: Skin integrity remains intact  10/17/2022 1011 by Vasile Venegas RN  Outcome: Progressing  Flowsheets (Taken 10/17/2022 0800)  Skin Integrity Remains Intact:   Monitor for areas of redness and/or skin breakdown   Assess vascular access sites hourly  10/17/2022 0329 by Azalea Rubinstein, RN  Outcome: Progressing  Flowsheets (Taken 10/16/2022 2130)  Skin Integrity Remains Intact: Monitor for areas of redness and/or skin breakdown  Goal: Incisions, wounds, or drain sites healing without S/S of infection  Outcome: Progressing  Flowsheets (Taken 10/17/2022 0800)  Incisions, Wounds, or Drain Sites Healing Without Sign and Symptoms of Infection: Implement wound care per orders     Problem: Musculoskeletal - Adult  Goal: Return mobility to safest level of function  Outcome: Progressing  Flowsheets (Taken 10/17/2022 0800)  Return Mobility to Safest Level of Function:   Assess patient stability and activity tolerance for standing, transferring and ambulating with or without assistive devices   Assist with transfers and ambulation using safe patient handling equipment as needed   Ensure adequate protection for wounds/incisions during mobilization   Obtain physical therapy/occupational therapy consults as needed   Apply continuous passive motion per provider or physical therapy orders to increase flexion toward goal  Goal: Return ADL status to a safe level of function  Outcome: Progressing  Flowsheets (Taken 10/17/2022 0800)  Return ADL Status to a Safe Level of Function:   Administer medication as ordered   Assess activities of daily living deficits and provide assistive devices as needed   Obtain physical therapy/occupational therapy consults as needed   Assist and instruct patient to increase activity and self care as tolerated     Problem: Infection - Adult  Goal: Absence of infection at discharge  10/17/2022 1011 by Tucker Kelsey RN  Outcome: Progressing  Flowsheets (Taken 10/17/2022 0800)  Absence of infection at discharge:   Assess and monitor for signs and symptoms of infection   Monitor lab/diagnostic results   Monitor all insertion sites i.e., indwelling lines, tubes and drains  10/17/2022 0329 by Brook Valdes RN  Outcome: Progressing  Goal: Absence of infection during hospitalization  Outcome: Progressing  Flowsheets (Taken 10/17/2022 0800)  Absence of infection during hospitalization:   Assess and monitor for signs and symptoms of infection   Monitor lab/diagnostic results   Monitor all insertion sites i.e., indwelling lines, tubes and drains     Problem: Metabolic/Fluid and Electrolytes - Adult  Goal: Electrolytes maintained within normal limits  Outcome: Progressing  Flowsheets (Taken 10/17/2022 0800)  Electrolytes maintained within normal limits:   Monitor labs and assess patient for signs and symptoms of electrolyte imbalances   Administer electrolyte replacement as ordered   Monitor response to electrolyte replacements, including repeat lab results as appropriate  Goal: Hemodynamic stability and optimal renal function maintained  Outcome: Progressing  Flowsheets (Taken 10/17/2022 0800)  Hemodynamic stability and optimal renal function maintained:   Monitor intake, output and patient weight   Monitor labs and assess for signs and symptoms of volume excess or deficit   Monitor urine specific gravity, serum osmolarity and serum sodium as indicated or ordered   Monitor response to interventions for patient's volume status, including labs, urine output, blood pressure (other measures as available)  Goal: Glucose maintained within prescribed range  Outcome: Progressing  Flowsheets (Taken 10/17/2022 0800)  Glucose maintained within prescribed range:   Monitor blood glucose as ordered   Assess for signs and symptoms of hyperglycemia and hypoglycemia   Administer ordered medications to maintain glucose within target range   Assess barriers to adequate nutritional intake and initiate nutrition consult as needed   Instruct patient on self management of diabetes and initiate consult as needed     Problem: Pain  Goal: Verbalizes/displays adequate comfort level or baseline comfort level  Outcome: Progressing     Problem: Neurosensory - Adult  Goal: Achieves stable or improved neurological status  Outcome: Progressing  Flowsheets (Taken 10/17/2022 0800)  Achieves stable or improved neurological status:   Assess for and report changes in neurological status   Initiate measures to prevent increased intracranial pressure     Problem: Nutrition Deficit:  Goal: Optimize nutritional status  Outcome: Progressing

## 2022-10-17 NOTE — PROGRESS NOTES
[facility]  Progress Note    Yina Marley  MRN: [id number]  Date: 10/17/2022  LOS: 15     Reason for F/U :   Diabetic foot infection/abscess    Impression :   Right Charcot foot with longstanding history of infections/abscesses has undergone multiple I&D procedures in the past  Chronic surgical wounds on the plantar aspect of the foot  Recurrent deep midfoot soft tissue abscess with thin fluid-filled sinus tract noted in the medial plantar aspect of the foot and there is retained metallic foreign body. Status post multiple midfoot fusion with midfoot osteotomy with application of a multiplanar external fixator in the right lower extremity on 10/12/2022    Recommendations:   Continue intravenous antibiotic therapy with cefepime, metronidazole and vancomycin through 11/25/2022  From an infectious disease standpoint the patient is okay for discharge  Plans are for placement and the patient is awaiting precertification  The patient does report some issues with the PICC line and I have asked for this to be evaluated by the PICC nurse    Infection Control Recommendations:   Universal precautions    Discharge Planning:   Estimated Length of IV antimicrobials: 6 weeks  Patient will need Midline PICC line Insertion  Patient will need: Home IV , [place],  SNF,  LTAC: Undetermined  Patient willneed outpatient wound care: No    Medical Decision making / Summary of Stay:   Yina Marley is a 78y.o.-year-old male who was initially admitted on 10/4/2022. Nader Hankins has a history of a right sided Charcot foot deformity and has had a longstanding history with infections in the right foot.   He did have an abscess for which he underwent I&D in June 2020 with MSSA isolated and the patient has had a residual wound which secondarily got infected and caused an abscess and in September 2021 he underwent an I&D of the abscess with MSSA, Enterobacter, Morganella isolated and he was discharged with IV antimicrobial therapy which he took for 6 weeks through  and . The patient did continue to have issues with wound dehiscence and had an open wound on the plantar aspect of the foot which was clean. Seeing the patient in close to a year now and he has followed with the podiatrist and has had continued wound care and the wound has been improving with time. Patient has had a plantar foot midfoot ulceration. Patient did subsequently start to develop soft tissue swelling redness in his foot and the swelling started to extend into his leg and the patient did see Dr. Darrel Mancia who recommended that he come into the emergency room for evaluation. The patient has been admitted for an abscess in the right foot and chronic ulcerations on the plantar aspect of the foot and the patient is undergoing further work-up with MRI and consideration for an external fixator device. Was asked to evaluate and help with antibiotic choice. Patient was taken to the operating room 10/12/2022 and had a midfoot osteotomy, external fixator application and excision of wound in the right foot with skin flap closure of the right foot. Current evaluation:10/17/2022    BP (!) 130/57   Pulse 74   Temp 98.1 °F (36.7 °C) (Oral)   Resp 17   Ht 5' 11\" (1.803 m)   Wt 234 lb (106.1 kg)   SpO2 94%   BMI 32.64 kg/m²     Temperature Range: Temp: 98.1 °F (36.7 °C) Temp  Av °F (36.7 °C)  Min: 97.3 °F (36.3 °C)  Max: 98.6 °F (37 °C)  The patient is seen and evaluated at bedside and is awake and alert in no acute distress. No subjective fever or chills. No abdominal pain nausea vomiting or diarrhea. He is doing okay does not report any new or acute issues. He does report that his PICC line is not quite functioning well as one of the ports is sluggish. Review of Systems   Constitutional: Negative. HENT: Negative. Respiratory: Negative. Cardiovascular: Negative. Gastrointestinal: Negative. Genitourinary: Negative. Musculoskeletal: Negative. Skin:  Positive for wound. Neurological: Negative. Psychiatric/Behavioral: Negative. Physical Examination :     Physical Exam  Constitutional:       Appearance: He is well-developed. HENT:      Head: Normocephalic and atraumatic. Cardiovascular:      Rate and Rhythm: Normal rate. Heart sounds: Normal heart sounds. No friction rub. No gallop. Pulmonary:      Effort: Pulmonary effort is normal.      Breath sounds: Normal breath sounds. No wheezing. Abdominal:      General: Bowel sounds are normal.      Palpations: Abdomen is soft. There is no mass. Tenderness: There is no abdominal tenderness. Musculoskeletal:         General: Normal range of motion. Cervical back: Normal range of motion and neck supple. Lymphadenopathy:      Cervical: No cervical adenopathy. Skin:     General: Skin is warm and dry. Comments: The foot dressing was not removed. Neurological:      Mental Status: He is alert and oriented to person, place, and time. Laboratory data:   I have independently reviewed the followinglabs:  CBC with Differential:   No results for input(s): WBC, HGB, HCT, PLT, SEGSPCT, BANDSPCT, LYMPHOPCT, MONOPCT, EOSPCT in the last 72 hours. BMP:   Recent Labs     10/16/22  0637 10/17/22  0704   BUN 28* 28*   CREATININE 2.07* 2.05*       Hepatic Function Panel: No results for input(s): PROT, LABALBU, BILIDIR, IBILI, BILITOT, ALKPHOS, ALT, AST in the last 72 hours. No results found for: PROCAL  Lab Results   Component Value Date/Time    CRP 30.1 10/04/2022 10:18 PM    .3 09/13/2022 06:41 PM    .8 09/13/2021 11:04 AM     Lab Results   Component Value Date    SEDRATE 64 (H) 10/04/2022         No results found for: DDIMER  No results found for: FERRITIN  No results found for: LDH  No results found for: FIBRINOGEN    No results found for requested labs within last 30 days.      Lab Results Component Value Date/Time    COVID19 DETECTED 05/26/2021 12:06 PM    COVID19 Not Detected 12/07/2020 03:10 PM    COVID19 Not Detected 08/08/2020 10:02 PM       No results for input(s): VANCNNEKA in the last 72 hours. Imaging Studies:     MRI OF THE RIGHT ANKLE WITHOUT CONTRAST; MRI OF THE RIGHT FOOT WITHOUT   CONTRAST, 10/5/2022 11:03 am     Impression   Soft tissue defect of the medial plantar aspect of the midfoot. Thin   fluid-filled sinus tract extending into the deep soft tissues of the midfoot,   measuring up to 2.0 x 0.9 cm on the coronal sequence, raising suspicion for a   phlegmon/abscess. There is air in the adjacent soft tissues. Susceptibility artifact in the soft tissues adjacent to the soft tissue   defect could represent sequela of prior intervention or retained metallic   foreign body. No MR evidence of acute osteomyelitis. Chronic bony changes at the TMT joints compatible with a neuropathic   arthropathy. Additional chronic findings as described above. Cultures:     Culture, Tissue [[id number]] (Abnormal) Collected: 10/12/22 0804   Order Status: Completed Specimen: Tissue from Foot Updated: 10/14/22 1014    Specimen Description . FOOT RT    Direct Exam RARE NEUTROPHILS Abnormal      NO BACTERIA SEEN    Culture CULTURE IN PROGRESS     No anaerobic organisms isolated at 2 days. Culture, Anaerobic and Aerobic [[id number]] Collected: 10/12/22 1012   Order Status: Completed Specimen: Bone from Foot Updated: 10/14/22 1008    Specimen Description . FOOT RIGHT    Direct Exam NO NEUTROPHILS SEEN     NO BACTERIA SEEN    Culture NO GROWTH 2 DAYS     Culture, Anaerobic and Aerobic [[id number]] (Abnormal) Collected: 10/05/22 0845   Order Status: Completed Specimen: Wound Updated: 10/08/22 1301    Specimen Description . WOUND . FOOT    Direct Exam RARE NEUTROPHILS Abnormal      MODERATE GRAM POSITIVE RODS Abnormal      FEW GRAM POSITIVE COCCI IN PAIRS Abnormal      FEW GRAM NEGATIVE COCCOBACILLI Abnormal     Culture PREVOTELLA (BACTEROIDES BIVIUS) BIVIA BETA LACTAMASE POSITIVE MODERATE GROWTH Abnormal      NORMAL SKIN ARABELLA     Medications:      vancomycin  1,000 mg IntraVENous Q24H    cefepime  2,000 mg IntraVENous Q12H    metroNIDAZOLE  500 mg Oral 3 times per day    vancomycin (VANCOCIN) intermittent dosing (placeholder)   Other RX Placeholder    enoxaparin  30 mg SubCUTAneous BID    insulin glargine  10 Units SubCUTAneous BID    glipiZIDE  20 mg Oral BID AC    cetirizine  10 mg Oral Nightly    nicotine  1 patch TransDERmal Daily    sodium chloride flush  5-40 mL IntraVENous 2 times per day    insulin lispro  0-8 Units SubCUTAneous TID WC    insulin lispro  0-4 Units SubCUTAneous Nightly    gabapentin  900 mg Oral TID    aspirin  81 mg Oral Daily    amLODIPine  5 mg Oral Daily    atorvastatin  20 mg Oral Nightly           [facility]  Dorys Bose MD  [employer]  OFFICE: [phone]      Electronically signed by Dorys Bose MD on 10/17/2022 at 4:58 PM  Thank you for allowing us to participate in the care of this patient. Please call with questions. This note iscreated with the assistance of a speech recognition program.  While intending to generate a document that actually reflects the content of the visit, the document can still have some errors including those of syntax andsound a like substitutions which may escape proof reading. In such instances, actual meaning can be extrapolated by contextual diversion.

## 2022-10-17 NOTE — PLAN OF CARE
Problem: Discharge Planning  Goal: Discharge to home or other facility with appropriate resources  Outcome: Progressing     Problem: Chronic Conditions and Co-morbidities  Goal: Patient's chronic conditions and co-morbidity symptoms are monitored and maintained or improved  Outcome: Progressing     Problem: Safety - Adult  Goal: Free from fall injury  Outcome: Progressing     Problem: ABCDS Injury Assessment  Goal: Absence of physical injury  Outcome: Progressing     Problem: Skin/Tissue Integrity - Adult  Goal: Skin integrity remains intact  Outcome: Progressing  Flowsheets (Taken 10/16/2022 2130)  Skin Integrity Remains Intact: Monitor for areas of redness and/or skin breakdown     Problem: Infection - Adult  Goal: Absence of infection at discharge  Outcome: Progressing

## 2022-10-17 NOTE — PROGRESS NOTES
Occupational Therapy  Facility/Department: [facility]  Rehabilitation Occupational Therapy Daily Treatment Note    Date: 10/17/22  Patient Name: Denver Demark       Room: 0778/6652-95  MRN: [id number]  Account: [de-identified]   : 1955  (78 y.o.) Gender: male        Pt currently functioning below baseline. Recommend daily inpatient skilled therapy at time of discharge to maximize long term outcomes and prevent re-admission. Please refer to AM-PAC score for current level of function. Past Medical History:  has a past medical history of Abscess of right foot, Acquired hammer toe deformity of lesser toe of right foot, ALEJANDRO (acute kidney injury) ([address] Cellulitis, Cellulitis of left foot, Cellulitis of right foot, Charcot foot due to diabetes mellitus ([address] Chest pain at rest, Chronic multifocal osteomyelitis of right foot ([address] Diabetic polyneuropathy associated with type 2 diabetes mellitus ([address] Essential hypertension, Fractures, multiple, Hyperlipidemia, Hypertension, Leukocytosis, Neuropathy, Pain in right foot, Pneumonia, Right foot infection, Right foot pain, Tobacco abuse, Type II or unspecified type diabetes mellitus without mention of complication, not stated as uncontrolled, Vertigo, 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 Past Surgical History:   has a past surgical history that includes Neck surgery (); Appendectomy; knee surgery (Bilateral, ); Knee arthroscopy (Right, ); Knee arthroscopy (Left, ); Foot Debridement (Left, 2018); pr deep dissec foot infec,1 bursa (Left, 2018); Colonoscopy; Foot Debridement (Right, 2020); arthroplasty (Right, 2020); Foot Debridement (Right, 2021); Foot surgery (Right, 2021); Foot Debridement (Right, 2021); IR INSERT PICC VAD W SQ PORT >5 YEARS (10/07/2022);  Foot surgery (Right, 10/12/2022); and Ankle surgery (Right, 10/12/2022). Restrictions  Restrictions/Precautions: General Precautions; Fall Risk;Weight Bearing;Contact Precautions; Up as Tolerated  Right Lower Extremity Weight Bearing: Non Weight Bearing  Left Lower Extremity Weight Bearing: Weight Bearing As Tolerated  Required Braces or Orthoses  Right Lower Extremity Brace:  (external fixator)  Required Braces or Orthoses?: Yes    Subjective  Subjective: \"Yeah, I think I'll do that. \"  Restrictions/Precautions: General Precautions; Fall Risk;Weight Bearing;Contact Precautions; Up as Tolerated     Objective     Cognition  Overall Cognitive Status: Exceptions  Arousal/Alertness: Appropriate responses to stimuli  Following Commands: Follows multistep commands with repitition; Follows multistep commands with increased time  Attention Span: Appears intact; Attends with cues to redirect  Memory: Appears intact  Safety Judgement: Decreased awareness of need for assistance;Decreased awareness of need for safety  Problem Solving: Decreased awareness of errors;Assistance required to identify errors made;Assistance required to correct errors made  Insights: Decreased awareness of deficits  Initiation: Does not require cues  Sequencing: Requires cues for some  Cognition Comment: Pt. pleasant and cooperative with treatment. Orientation  Overall Orientation Status: Within Functional Limits  Orientation Level: Oriented X4         ADL  Grooming/Oral Hygiene  Assistance Level: Set-up; Supervision  Skilled Clinical Factors: Pt. set up at bedside and completed washing face with supervision  Upper Extremity Bathing  Assistance Level: Set-up; Supervision  Skilled Clinical Factors: seated  Upper Extremity Dressing  Assistance Level: Independent  Skilled Clinical Factors: to change gown  Toileting  Assistance Level: Moderate assistance  Skilled Clinical Factors: MOD A for standing balance while patient completed posterior hygiene.  Patient MOD IND with front brian hygiene seated in recliner Functional Mobility  Device: Rolling walker  Assistance Level: Minimal assistance; Requires x 2 assistance  Skilled Clinical Factors: Patient completed short functional mobility from EOB>just past recliner>turned around to go to recliner with RW and Min A x2 d/t unsteadiness, poor endurance, and pain in RLE. Bed Mobility  Overall Assistance Level: Modified Independent  Roll Left  Assistance Level: Modified independent  Roll Right  Assistance Level: Modified independent  Sit to Supine  Assistance Level: Modified independent  Supine to Sit  Assistance Level: Modified independent  Scooting  Assistance Level: Modified independent  Transfers  Surface: From bed; To chair with arms  Additional Factors: Set-up; Verbal cues; Increased time to complete; With handrails  Device: Walker  Sit to Stand  Assistance Level: Moderate assistance; Requires x 2 assistance  Skilled Clinical Factors: Mod assist x2 with use of RW to provide proper safety and NWB to right LE - 1 assist holding RLE to prevent breaking WB restriction  Stand to Sit  Assistance Level: Moderate assistance; Requires x 2 assistance  Skilled Clinical Factors: Mod verbal cues for sequencing and proper tech to ensure safety. Assessment: Patient is progressing towards OT goals. Patient has difficulty with functional transfers/mobility, endurance, strengthening, and safety. Patient would benefit from continued skilled OT to address deficits listed above to improve safety and IND with ADL/IADL completion to return to [facility] as able. Assessment  Activity Tolerance: Patient limited by endurance; Patient limited by pain  Discharge Recommendations: Patient would benefit from continued therapy after discharge  OT Equipment Recommendations  Equipment Needed: Yes  Mobility Devices: Cordella Jackson: Rolling  Safety Devices  Safety Devices in place: Yes  Type of devices: All fall risk precautions in place; Left in chair;Call light within reach;Nurse notified; Chair alarm in place; Patient at risk for falls    Patient Education  Education  Education Given To: Patient  Education Provided: Role of Therapy; Mobility Training; Fall Prevention Strategies; Plan of Care;Transfer Training;Energy Conservation;Precautions; Safety; Family Education;Equipment;ADL Function;IADL Function;DME/Home Modifications  Education Method: Verbal;Demonstration  Barriers to Learning: None  Education Outcome: Continued education needed    Plan  Occupational Therapy Plan  Times Per Week: 4-5x/week 1-2x/day as tolerated  Times Per Day: Once a day  Current Treatment Recommendations: Strengthening;Balance training;Functional mobility training; Endurance training; Safety education & training;Patient/Caregiver education & training;Self-Care / ADL;Equipment evaluation, education, & procurement;Positioning;Coordination training;Home management training  Additional Comments: Cont with POC    Goals  Patient Goals   Patient goals : To go home! Short Term Goals  Time Frame for Short Term Goals: By discharge, pt to demo:  Short Term Goal 1: ADL transfer and functional mobility with MinAx1 and Good adherence to WB precautions with AD as needed. Short Term Goal 2: UB ADLs to MOD I/IND and LB ADLs to MinAx1 with Good adherence to WB precautions and use of AE/compensatory strategies/AD as needed. Short Term Goal 3: toileting tasks with MinAx1 and Good adherence to WB precautions with use of AD/grab bars/BSC as needed. Short Term Goal 4: IND with BUE HEP with use of handout to maintain current strength required for safety/IND with self care tasks. Short Term Goal 5: Pt to be IND with fall prevention strategies, EC/WS tech, home safety strategies, WB precautions, and discharge/equipment recs with use of handouts as needed.     AM-PAC Score        AM-[facility] Inpatient Daily Activity Raw Score: 16 (10/17/22 0951)  AM-PAC Inpatient ADL T-Scale Score : 35.96 (10/17/22 0951)  ADL Inpatient CMS 0-100% Score: 53.32 (10/17/22 0853)  ADL Inpatient CMS G-Code Modifier : CK (10/17/22 0951)      Therapy Time   Individual Concurrent Group Co-treatment   Time In       0854   Time Out       0923   Minutes       34    Co-treatment with PT warranted secondary to decreased safety and independence requiring 2 skilled therapy professionals to address individual discipline's goals. OT addressing preparation for ADL transfer, functional reaching, environmental safety/scanning, fall prevention, functional mobility for ADL transfers, and functional UE strength. Upon writer exit, call light within reach, pt retired to chair. All lines intact and patient positioned comfortably. All patient needs addressed prior to ending therapy session. Chart reviewed prior to treatment and patient is agreeable for therapy. RN reports patient is medically stable for therapy treatment this date.      INESSA Bernal

## 2022-10-17 NOTE — PROGRESS NOTES
Physical Therapy  Facility/Department: [facility]  Daily Treatment Note  NAME: Roel Ash  : 1955  MRN: [id number]    Date of Service: 10/17/2022    Discharge Recommendations:  Patient would benefit from continued therapy after discharge        Patient Diagnosis(es): The primary encounter diagnosis was Acute post-operative pain. A diagnosis of Charcot's joint of ankle, right was also pertinent to this visit. Assessment   Assessment: Good NWB R LE maintainance when standing, lacks activity tolerance  Activity Tolerance: Patient limited by endurance; Patient limited by pain     Plan    Physcial Therapy Plan  General Plan: 6-7 times per week  Current Treatment Recommendations: Balance training;Transfer training;Gait training;Stair training; Safety education & training; Endurance training;Strengthening; Therapeutic activities     Restrictions  Restrictions/Precautions  Restrictions/Precautions: General Precautions, Fall Risk, Weight Bearing, Contact Precautions, Up as Tolerated  Required Braces or Orthoses?: Yes  Lower Extremity Weight Bearing Restrictions  Right Lower Extremity Weight Bearing: Non Weight Bearing  Left Lower Extremity Weight Bearing: Weight Bearing As Tolerated  Required Braces or Orthoses  Right Lower Extremity Brace:  (external fixator)  Position Activity Restriction  Other position/activity restrictions: LUE IV, telemetry, RLE external fixator - NWB RLE     Subjective    Subjective  Subjective: Pt amenable to PT this AM  Pain: R foot 9/10 pain  Orientation  Overall Orientation Status: Within Normal Limits  Orientation Level: Oriented X4  Cognition  Overall Cognitive Status: WNL  Arousal/Alertness: Appropriate responses to stimuli  Following Commands: Follows multistep commands with repitition; Follows multistep commands with increased time  Attention Span: Appears intact; Attends with cues to redirect  Memory: Appears intact  Safety Judgement: Decreased awareness of need for assistance;Decreased awareness of need for safety  Problem Solving: Decreased awareness of errors;Assistance required to identify errors made;Assistance required to correct errors made  Insights: Decreased awareness of deficits  Initiation: Does not require cues  Sequencing: Requires cues for some  Cognition Comment: Pt. pleasant and cooperative with treatment. Objective   Vitals     Bed Mobility Training  Rolling: Modified independent  Supine to Sit: Modified independent  Scooting: Modified independent  Balance  Sitting: Intact  Standing: With support  Transfer Training  Transfer Training: Yes  Overall Level of Assistance: Minimum assistance;Assist X2  Interventions: Tactile cues  Sit to Stand: Minimum assistance;Assist X2  Stand to Sit: Minimum assistance;Assist X2  Stand Pivot Transfers: Minimum assistance;Assist X2  Gait Training  Gait Training: Yes  Right Side Weight Bearing: Non-weight bearing  Left Side Weight Bearing: Full  Gait  Overall Level of Assistance: Minimum assistance;Assist X2  Interventions: Tactile cues; Verbal cues  Gait Abnormalities:  (Maintains NWB R LE well throughout standing activities)  Assistive Device: Gait belt;Walker, rolling  Neuromuscular Education  NDT Treatment: Gait ;Sitting;Standing        Safety Devices  Type of Devices: Left in chair;Chair alarm in place;Call light within reach;Gait belt;Nurse notified  Restraints  Restraints Initially in Place: No       Goals  Short Term Goals  Time Frame for Short Term Goals: 12 visits  Short Term Goal 1: Pt to demonstrate bed mobility SBA  Short Term Goal 2: Pt to perform STS transfers w/ RW SBA while maintaining NWB status RLE  Short Term Goal 3: Pt to ambulate at least 50ft w/ RW Leigha while maintaining NWB status RLE  Short Term Goal 4: Pt to ascend/descend 5 stairs w/ handrails ModA while maintaining NWB status RLE  Short Term Goal 5: Pt to actively participate in at least 30 minutes of physical therapy for ther act, ther ex, balance, gait, transfer, stair, and endurance training  Patient Goals   Patient Goals :  To heal, to be mobile    Education  Patient Education  Education Given To: Patient  Education Provided: Transfer Training;Plan of Care  Education Provided Comments: ESPINOZA SAWANT ambulation w/ RW handout  Education Method: Verbal;Printed Information/Hand-outs  Barriers to Learning: None  Education Outcome: Verbalized understanding;Demonstrated understanding    Therapy Time   Individual Concurrent Group Co-treatment   Time In [address]         Time Out 0922         Minutes [address] [place] [address], [address] [facility] [address]

## 2022-10-17 NOTE — PROGRESS NOTES
[address] [facility] Pharmacokinetic Monitoring Service - Vancomycin    Consulting Provider: Jacqueline Zarco   Indication: diabetic foot infection  Target Concentration: Goal trough of 10-15 mg/L and AUC/DIMA <500 mg*hr/L  Day of Therapy: 12  Additional Antimicrobials: cefepime/flagyl    Pertinent Laboratory Values: Wt Readings from Last 1 Encounters:   10/12/22 234 lb (106.1 kg)     Temp Readings from Last 1 Encounters:   10/17/22 98.1 °F (36.7 °C) (Oral)     Estimated Creatinine Clearance: 44 mL/min (A) (based on SCr of 2.05 mg/dL (H)). Recent Labs     10/16/22  0637 10/17/22  0704   CREATININE 2.07* 2.05*     Procalcitonin: none     Pertinent Cultures:  Culture Date Source Results   10/12 foot No growth x 5 days    MRSA Nasal Swab: N/A. Non-respiratory infection. Recent vancomycin administrations                     vancomycin 1000 mg IVPB in 250 mL D5W addavial (mg) 1,000 mg New Bag 10/16/22 1358    vancomycin (VANCOCIN) 750 mg in dextrose 5 % 250 mL IVPB (mg) 750 mg New Bag 10/15/22 1311    vancomycin (VANCOCIN) 1,250 mg in dextrose 5 % 250 mL IVPB (mg) 1,250 mg New Bag 10/14/22 1319                    Assessment:  Date/Time Current Dose Concentration Timing of Concentration (h) AUC   10/17 1000mg q24h  17.5 17h6m 511   Note: Serum concentrations collected for AUC dosing may appear elevated if collected in close proximity to the dose administered, this is not necessarily an indication of toxicity    Plan:  Current dosing regimen is therapeutic. AUC = 511. Trough 17.9  Continue current dose.  Will repeat trough in 2 days do to renal function and potential for drug accumulation.        (10/19/2022 0700 )  Pharmacy will continue to monitor patient and adjust therapy as indicated    Thank you for the consult,  CARLOS Ta [facility]  10/17/2022 10:57 AM

## 2022-10-17 NOTE — PROGRESS NOTES
**OR** ondansetron, polyethylene glycol, acetaminophen **OR** acetaminophen, glucose, dextrose bolus **OR** dextrose bolus, glucagon (rDNA), dextrose, albuterol sulfate HFA    Objective     Vitals:  Patient Vitals for the past 8 hrs:   BP Temp Temp src Pulse Resp SpO2   10/17/22 0900 136/69 -- -- -- -- --   10/17/22 0732 -- -- -- -- 16 --   10/17/22 0715 136/69 98.1 °F (36.7 °C) Oral 72 18 94 %   10/17/22 0421 136/69 97.9 °F (36.6 °C) Oral 68 16 92 %         Average, Min, and Max for last 24 hours Vitals:  TEMPERATURE:  Temp  Av.3 °F (36.8 °C)  Min: 97.3 °F (36.3 °C)  Max: 99.1 °F (37.3 °C)    RESPIRATIONS RANGE: Resp  Av.9  Min: 16  Max: 18    PULSE RANGE: Pulse  Av.5  Min: 68  Max: 84    BLOOD PRESSURE RANGE:  Systolic (11WSZ), RDS:968 , Min:134 , OEA:313   ; Diastolic (63LBA), UHJ:94, Min:57, Max:72      PULSE OXIMETRY RANGE: SpO2  Av.3 %  Min: 92 %  Max: 94 %    I/O last 3 completed shifts: In: 600 [P.O.:600]  Out: 2350 [Urine:2350]    CBC:  No results for input(s): WBC, HGB, HCT, PLT, CRP in the last 72 hours. Invalid input(s):  ESR       BMP:  Recent Labs     10/15/22  0557 10/16/22  0637 10/17/22  0704   BUN 32* 28* 28*   CREATININE 2.06* 2.07* 2.05*        Coags:  No results for input(s): APTT, PROT, INR in the last 72 hours. Lab Results   Component Value Date    SEDRATE 64 (H) 10/04/2022     No results for input(s): CRP in the last 72 hours. Lower Extremity Physical Exam:  General: A&Ox3, NAD  Heart: Regular rate and rhythm. Lungs: Equal air entry. No increased effort. Abdomen: Soft, non-tender to palpation. Not distended. Lower Extremity Physical Exam:   Vascular: DP and PT pulses are palpable +2/4. CFT <3 seconds to all digits. Hair growth is diminished to the level of the digits. Diffuse nonpitting edema noted to the right lower extremity consisten with surgery. Neuro: Saph/sural/SP/DP/plantar sensation diminished to light touch.      Musculoskeletal: Deferred due to external fixator. Pt able to wiggle toes. Dermatologic: Deferred mostly due to intact bandages and external fixator. Some serous drainage through bandage. Incision sites and pin sites look adequate with no erythema or purulent drainage. Clinical Images:  See media panel        Imaging:   XR ANKLE RIGHT (MIN 3 VIEWS)   Final Result   Changes related to external fixator device placement and extensive fusion of   intertarsal and tarsometatarsal joints as described. Oblique oriented fracture is noted at the base of 5th metatarsal.      Severe degenerative disease of tarsometatarsal joints         XR TIBIA FIBULA RIGHT (2 VIEWS)   Final Result   Changes related to external fixator device placement and extensive fusion of   intertarsal and tarsometatarsal joints as described. Oblique oriented fracture is noted at the base of 5th metatarsal.      Severe degenerative disease of tarsometatarsal joints         XR FOOT RIGHT (MIN 3 VIEWS)   Final Result   Changes related to external fixator device placement and extensive fusion of   intertarsal and tarsometatarsal joints as described. Oblique oriented fracture is noted at the base of 5th metatarsal.      Severe degenerative disease of tarsometatarsal joints         FLUORO FOR SURGICAL PROCEDURES   Final Result      IR INSERT PICC VAD W SQ PORT >5 YEARS   Final Result      VL Lower Extremity Venous Right   Final Result      XR FOOT RIGHT (MIN 3 VIEWS)   Final Result   Air is seen in the plantar soft tissues of the medial midfoot. Chronic bony changes at the TMT joints compatible with a neuropathic   arthropathy. No obvious new bony erosions are seen. MRI ANKLE RIGHT WO CONTRAST   Final Result   Soft tissue defect of the medial plantar aspect of the midfoot.   Thin   fluid-filled sinus tract extending into the deep soft tissues of the midfoot,   measuring up to 2.0 x 0.9 cm on the coronal sequence, raising suspicion for a phlegmon/abscess. There is air in the adjacent soft tissues. Susceptibility artifact in the soft tissues adjacent to the soft tissue   defect could represent sequela of prior intervention or retained metallic   foreign body. No MR evidence of acute osteomyelitis. Chronic bony changes at the TMT joints compatible with a neuropathic   arthropathy. Additional chronic findings as described above. MRI FOOT RIGHT WO CONTRAST   Final Result   Soft tissue defect of the medial plantar aspect of the midfoot. Thin   fluid-filled sinus tract extending into the deep soft tissues of the midfoot,   measuring up to 2.0 x 0.9 cm on the coronal sequence, raising suspicion for a   phlegmon/abscess. There is air in the adjacent soft tissues. Susceptibility artifact in the soft tissues adjacent to the soft tissue   defect could represent sequela of prior intervention or retained metallic   foreign body. No MR evidence of acute osteomyelitis. Chronic bony changes at the TMT joints compatible with a neuropathic   arthropathy. Additional chronic findings as described above. Cultures: moderate GPR, few GPC, few gram - coccobacilli      Assessment   Doroteo Foot is a 78 y.o. male with   S/p midfoot osteotomy; right foot (DOS: 10/12/22)  S/p external fixation application; right LE (DOS: 10/12/22)  Cellulitis, right foot-IMPROVING  Abscess, right foot  Type II diabetic foot ulcer down to subcutaneous layer, right foot  Charcot deformity, right foot  Type 2 diabetes with peripheral neuropathy  Hypertension    Principal Problem:    Type 2 diabetes mellitus with right diabetic foot ulcer ([address]  Active Problems:    Charcot's joint of right foot    Stage 3b chronic kidney disease ([address]    Essential hypertension  Resolved Problems:    * No resolved hospital problems. *       Plan     Patient examined and evaluated at bedside   IV antibiotic: Vancomycin/cefepime.   ID recommendation appreciated. 6 week course recommended  Medical management per medicine. Appreciate recommendation. PT/OT recs  Non weight bearing RLE. Okay to weight bearing for transportation and PT only. Patient is okay to be discharged from podiatry standpoint to facility. No dressing change needed. JERED complete. Multimodal pain script prescribed. Will follow up at [facility] if d.c. If not d/c will change dresssing tomorrow. Please have pt call to make appt with Dr. Suleiman Combs at [facility] if not d/c by 10/17  Dressings reinforeced with additional ACE bandage and Kerlix.   Discussed with Dr. Suleiman Combs    DVT ppx: lovenox  and aspirin   Diet: carb control   Activity: Non weight bearing right LE  Pain Control: JAX Sage   Podiatric Medicine & Surgery   10/17/2022 at 11:18 AM

## 2022-10-18 VITALS
TEMPERATURE: 98.1 F | HEART RATE: 73 BPM | HEIGHT: 71 IN | WEIGHT: 234 LBS | DIASTOLIC BLOOD PRESSURE: 85 MMHG | BODY MASS INDEX: 32.76 KG/M2 | RESPIRATION RATE: 18 BRPM | OXYGEN SATURATION: 94 % | SYSTOLIC BLOOD PRESSURE: 123 MMHG

## 2022-10-18 LAB
BUN BLDV-MCNC: 30 MG/DL (ref 8–23)
CREAT SERPL-MCNC: 2.22 MG/DL (ref 0.7–1.2)
GFR SERPL CREATININE-BSD FRML MDRD: 32 ML/MIN/1.73M2
GLUCOSE BLD-MCNC: 106 MG/DL (ref 75–110)
GLUCOSE BLD-MCNC: 208 MG/DL (ref 75–110)

## 2022-10-18 PROCEDURE — 6370000000 HC RX 637 (ALT 250 FOR IP): Performed by: PODIATRIST

## 2022-10-18 PROCEDURE — 99232 SBSQ HOSP IP/OBS MODERATE 35: CPT | Performed by: INTERNAL MEDICINE

## 2022-10-18 PROCEDURE — 97110 THERAPEUTIC EXERCISES: CPT

## 2022-10-18 PROCEDURE — 6360000002 HC RX W HCPCS: Performed by: PODIATRIST

## 2022-10-18 PROCEDURE — 6360000002 HC RX W HCPCS: Performed by: INTERNAL MEDICINE

## 2022-10-18 PROCEDURE — 82565 ASSAY OF CREATININE: CPT

## 2022-10-18 PROCEDURE — 2580000003 HC RX 258: Performed by: PODIATRIST

## 2022-10-18 PROCEDURE — 36415 COLL VENOUS BLD VENIPUNCTURE: CPT

## 2022-10-18 PROCEDURE — 84520 ASSAY OF UREA NITROGEN: CPT

## 2022-10-18 PROCEDURE — 2580000003 HC RX 258: Performed by: INTERNAL MEDICINE

## 2022-10-18 PROCEDURE — 97530 THERAPEUTIC ACTIVITIES: CPT

## 2022-10-18 PROCEDURE — 82947 ASSAY GLUCOSE BLOOD QUANT: CPT

## 2022-10-18 PROCEDURE — 97116 GAIT TRAINING THERAPY: CPT

## 2022-10-18 RX ADMIN — GLIPIZIDE 20 MG: 10 TABLET ORAL at 06:35

## 2022-10-18 RX ADMIN — ASPIRIN 81 MG: 81 TABLET, COATED ORAL at 09:40

## 2022-10-18 RX ADMIN — METRONIDAZOLE 500 MG: 500 TABLET ORAL at 06:33

## 2022-10-18 RX ADMIN — GABAPENTIN 900 MG: 300 CAPSULE ORAL at 09:40

## 2022-10-18 RX ADMIN — HYDROCODONE BITARTRATE AND ACETAMINOPHEN 2 TABLET: 5; 325 TABLET ORAL at 06:33

## 2022-10-18 RX ADMIN — AMLODIPINE BESYLATE 5 MG: 5 TABLET ORAL at 09:40

## 2022-10-18 RX ADMIN — GABAPENTIN 900 MG: 300 CAPSULE ORAL at 14:04

## 2022-10-18 RX ADMIN — SODIUM CHLORIDE, PRESERVATIVE FREE 10 ML: 5 INJECTION INTRAVENOUS at 08:13

## 2022-10-18 RX ADMIN — VANCOMYCIN HYDROCHLORIDE 750 MG: 750 INJECTION, POWDER, LYOPHILIZED, FOR SOLUTION INTRAVENOUS at 12:52

## 2022-10-18 RX ADMIN — CEFEPIME 2000 MG: 2 INJECTION, POWDER, FOR SOLUTION INTRAVENOUS at 08:19

## 2022-10-18 RX ADMIN — HYDROCODONE BITARTRATE AND ACETAMINOPHEN 2 TABLET: 5; 325 TABLET ORAL at 12:35

## 2022-10-18 RX ADMIN — METRONIDAZOLE 500 MG: 500 TABLET ORAL at 14:04

## 2022-10-18 RX ADMIN — INSULIN GLARGINE 10 UNITS: 100 INJECTION, SOLUTION SUBCUTANEOUS at 09:39

## 2022-10-18 RX ADMIN — INSULIN LISPRO 2 UNITS: 100 INJECTION, SOLUTION INTRAVENOUS; SUBCUTANEOUS at 11:50

## 2022-10-18 RX ADMIN — ENOXAPARIN SODIUM 30 MG: 100 INJECTION SUBCUTANEOUS at 09:40

## 2022-10-18 ASSESSMENT — PAIN SCALES - GENERAL
PAINLEVEL_OUTOF10: 9

## 2022-10-18 ASSESSMENT — PAIN DESCRIPTION - LOCATION
LOCATION: FOOT
LOCATION: FOOT

## 2022-10-18 ASSESSMENT — PAIN DESCRIPTION - ORIENTATION
ORIENTATION: RIGHT
ORIENTATION: RIGHT

## 2022-10-18 ASSESSMENT — ENCOUNTER SYMPTOMS
RESPIRATORY NEGATIVE: 1
GASTROINTESTINAL NEGATIVE: 1

## 2022-10-18 ASSESSMENT — PAIN DESCRIPTION - DESCRIPTORS
DESCRIPTORS: SHARP
DESCRIPTORS: SHARP

## 2022-10-18 NOTE — PROGRESS NOTES
[facility]  Progress Note    José Farrar  MRN: [id number]  Date: 10/18/2022  LOS: 15     Reason for F/U :   Diabetic foot infection/abscess    Impression :   Right Charcot foot with longstanding history of infections/abscesses has undergone multiple I&D procedures in the past  Chronic surgical wounds on the plantar aspect of the foot  Recurrent deep midfoot soft tissue abscess with thin fluid-filled sinus tract noted in the medial plantar aspect of the foot and there is retained metallic foreign body. Status post multiple midfoot fusion with midfoot osteotomy with application of a multiplanar external fixator in the right lower extremity on 10/12/2022    Recommendations:   Continue intravenous antibiotic therapy with cefepime, metronidazole and vancomycin through 11/25/2022  The patient was seen during the dressing change of the right foot by the podiatry service and there was some dorsal foot swelling noted. The plan is for discharge today and the patient will need to follow-up with me in 4 weeks to assess his progress    Infection Control Recommendations:   Universal precautions    Discharge Planning:   Estimated Length of IV antimicrobials: 6 weeks  Patient will need Midline PICC line Insertion  Patient will need: Home IV , [place],  SNF,  LTAC: Undetermined  Patient willneed outpatient wound care: No    Medical Decision making / Summary of Stay:   José Farrar is a 78y.o.-year-old male who was initially admitted on 10/4/2022. Douglas Banks has a history of a right sided Charcot foot deformity and has had a longstanding history with infections in the right foot.   He did have an abscess for which he underwent I&D in June 2020 with MSSA isolated and the patient has had a residual wound which secondarily got infected and caused an abscess and in September 2021 he underwent an I&D of the abscess with MSSA, Enterobacter, Morganella isolated and he was discharged with IV antimicrobial Psychiatric/Behavioral: Negative. Physical Examination :     Physical Exam  Constitutional:       Appearance: He is well-developed. HENT:      Head: Normocephalic and atraumatic. Cardiovascular:      Rate and Rhythm: Normal rate. Heart sounds: Normal heart sounds. No friction rub. No gallop. Pulmonary:      Effort: Pulmonary effort is normal.      Breath sounds: Normal breath sounds. No wheezing. Abdominal:      General: Bowel sounds are normal.      Palpations: Abdomen is soft. There is no mass. Tenderness: There is no abdominal tenderness. Musculoskeletal:         General: Normal range of motion. Cervical back: Normal range of motion and neck supple. Lymphadenopathy:      Cervical: No cervical adenopathy. Skin:     General: Skin is warm and dry. Comments: The patient has a a spanning external fixator over the right foot and the multiple pins in place. The dorsal foot does have significant soft tissue swelling and there is some mild erythema. There are multiple pins also on the dorsal aspect of the foot. Neurological:      Mental Status: He is alert and oriented to person, place, and time. Laboratory data:   I have independently reviewed the followinglabs:  CBC with Differential:   No results for input(s): WBC, HGB, HCT, PLT, SEGSPCT, BANDSPCT, LYMPHOPCT, MONOPCT, EOSPCT in the last 72 hours. BMP:   Recent Labs     10/17/22  0704 10/18/22  0552   BUN 28* 30*   CREATININE 2.05* 2.22*       Hepatic Function Panel: No results for input(s): PROT, LABALBU, BILIDIR, IBILI, BILITOT, ALKPHOS, ALT, AST in the last 72 hours.       No results found for: PROCAL  Lab Results   Component Value Date/Time    CRP 30.1 10/04/2022 10:18 PM    .3 09/13/2022 06:41 PM    .8 09/13/2021 11:04 AM     Lab Results   Component Value Date    SEDRATE 64 (H) 10/04/2022         No results found for: DDIMER  No results found for: FERRITIN  No results found for: LDH  No results found for: FIBRINOGEN    No results found for requested labs within last 30 days. Lab Results   Component Value Date/Time    COVID19 DETECTED 05/26/2021 12:06 PM    COVID19 Not Detected 12/07/2020 03:10 PM    COVID19 Not Detected 08/08/2020 10:02 PM       No results for input(s): LUPILLO in the last 72 hours. Imaging Studies:     MRI OF THE RIGHT ANKLE WITHOUT CONTRAST; MRI OF THE RIGHT FOOT WITHOUT   CONTRAST, 10/5/2022 11:03 am     Impression   Soft tissue defect of the medial plantar aspect of the midfoot. Thin   fluid-filled sinus tract extending into the deep soft tissues of the midfoot,   measuring up to 2.0 x 0.9 cm on the coronal sequence, raising suspicion for a   phlegmon/abscess. There is air in the adjacent soft tissues. Susceptibility artifact in the soft tissues adjacent to the soft tissue   defect could represent sequela of prior intervention or retained metallic   foreign body. No MR evidence of acute osteomyelitis. Chronic bony changes at the TMT joints compatible with a neuropathic   arthropathy. Additional chronic findings as described above. Cultures:     Culture, Tissue [[id number]] (Abnormal) Collected: 10/12/22 0804   Order Status: Completed Specimen: Tissue from Foot Updated: 10/14/22 1014    Specimen Description . FOOT RT    Direct Exam RARE NEUTROPHILS Abnormal      NO BACTERIA SEEN    Culture CULTURE IN PROGRESS     No anaerobic organisms isolated at 2 days. Culture, Anaerobic and Aerobic [[id number]] Collected: 10/12/22 1012   Order Status: Completed Specimen: Bone from Foot Updated: 10/14/22 1008    Specimen Description . FOOT RIGHT    Direct Exam NO NEUTROPHILS SEEN     NO BACTERIA SEEN    Culture NO GROWTH 2 DAYS     Culture, Anaerobic and Aerobic [[id number]] (Abnormal) Collected: 10/05/22 0845   Order Status: Completed Specimen: Wound Updated: 10/08/22 1301    Specimen Description . WOUND . FOOT    Direct Exam RARE NEUTROPHILS Abnormal MODERATE GRAM POSITIVE RODS Abnormal      FEW GRAM POSITIVE COCCI IN PAIRS Abnormal      FEW GRAM NEGATIVE COCCOBACILLI Abnormal     Culture PREVOTELLA (BACTEROIDES BIVIUS) BIVIA BETA LACTAMASE POSITIVE MODERATE GROWTH Abnormal      NORMAL SKIN ARABELLA     Medications:      vancomycin  750 mg IntraVENous Q24H    cefepime  2,000 mg IntraVENous Q12H    metroNIDAZOLE  500 mg Oral 3 times per day    vancomycin (VANCOCIN) intermittent dosing (placeholder)   Other RX Placeholder    enoxaparin  30 mg SubCUTAneous BID    insulin glargine  10 Units SubCUTAneous BID    glipiZIDE  20 mg Oral BID AC    cetirizine  10 mg Oral Nightly    nicotine  1 patch TransDERmal Daily    sodium chloride flush  5-40 mL IntraVENous 2 times per day    insulin lispro  0-8 Units SubCUTAneous TID WC    insulin lispro  0-4 Units SubCUTAneous Nightly    gabapentin  900 mg Oral TID    aspirin  81 mg Oral Daily    amLODIPine  5 mg Oral Daily    atorvastatin  20 mg Oral Nightly           [facility]  Maxim Mcmanus MD  [employer] messaging  OFFICE: [phone]      Electronically signed by Maxim Mcmanus MD on 10/18/2022 at 11:24 AM  Thank you for allowing us to participate in the care of this patient. Please call with questions. This note iscreated with the assistance of a speech recognition program.  While intending to generate a document that actually reflects the content of the visit, the document can still have some errors including those of syntax andsound a like substitutions which may escape proof reading. In such instances, actual meaning can be extrapolated by contextual diversion.

## 2022-10-18 NOTE — PLAN OF CARE
Problem: Pain  Goal: Verbalizes/displays adequate comfort level or baseline comfort level  10/18/2022 1420 by Juan Or, RN  Outcome: Progressing  Flowsheets (Taken 10/18/2022 1420)  Verbalizes/displays adequate comfort level or baseline comfort level:   Encourage patient to monitor pain and request assistance   Assess pain using appropriate pain scale   Administer analgesics based on type and severity of pain and evaluate response   Implement non-pharmacological measures as appropriate and evaluate response  10/18/2022 0335 by Ivania Mitchell, RN  Outcome: Progressing

## 2022-10-18 NOTE — PLAN OF CARE
Problem: Discharge Planning  Goal: Discharge to home or other facility with appropriate resources  Outcome: Progressing     Problem: Chronic Conditions and Co-morbidities  Goal: Patient's chronic conditions and co-morbidity symptoms are monitored and maintained or improved  Outcome: Progressing     Problem: Safety - Adult  Goal: Free from fall injury  Outcome: Progressing     Problem: ABCDS Injury Assessment  Goal: Absence of physical injury  Outcome: Progressing     Problem: Respiratory - Adult  Goal: Achieves optimal ventilation and oxygenation  Outcome: Progressing     Problem: Skin/Tissue Integrity - Adult  Goal: Skin integrity remains intact  Outcome: Progressing     Problem: Pain  Goal: Verbalizes/displays adequate comfort level or baseline comfort level  Outcome: Progressing

## 2022-10-18 NOTE — PROGRESS NOTES
Patient transferred to [facility] via [place] via stretcher with all belongings, [address] , and script for Gabapentin, Vanco, Norco, Flexeril

## 2022-10-18 NOTE — CARE COORDINATION
[facility]. [facility] will transport at 130. Orders faxed. Nurse to call report to [phone]. Patient and wife are agreeable with dc plans. Requested an ambulette, [facility] will send an ambulance and charge patient for ambulette. Wife is updated and agreeable to the cost of the ambulette.  Demian Augustine

## 2022-10-18 NOTE — PROGRESS NOTES
Physical Therapy  Facility/Department: [facility] MED SURG  Rehabilitation Physical Therapy Treatment Note    NAME: Denver Demark  : 1955 (13 y.o.)  MRN: [id number]  CODE STATUS: Full Code    Date of Service: 10/18/22  Assessment: Pt displays decreased endurance and difficulty maintaining NWB status with gait this session. Discussed D/C concerns with wife and pt prior to exiting room. Pt is set for D/C to [facility] this PM.  Activity Tolerance: Patient limited by endurance; Patient limited by fatigue  Discharge Recommendations: Patient would benefit from continued therapy after discharge  PT Equipment Recommendations  Equipment Needed: Yes  Mobility Devices: Wheelchair  Wheelchair: Standard  Other: Pt has RW at home. Restrictions:  Restrictions/Precautions: General Precautions; Fall Risk;Weight Bearing;Contact Precautions; Up as Tolerated  Lower Extremity Weight Bearing Restrictions  Right Lower Extremity Weight Bearing: Non Weight Bearing  Left Lower Extremity Weight Bearing: Weight Bearing As Tolerated  Required Braces or Orthoses  Right Lower Extremity Brace:  (external fixator)     SUBJECTIVE  Subjective  Subjective: Pt EOB upon entry and agreeable with PT. Wife present at bedside. Pain: 9/10 R foot pain    OBJECTIVE  Cognition  Overall Cognitive Status: WNL  Arousal/Alertness: Appropriate responses to stimuli  Following Commands: Follows all commands without difficulty  Attention Span: Appears intact; Attends with cues to redirect  Memory: Appears intact  Safety Judgement: Decreased awareness of need for assistance;Decreased awareness of need for safety  Problem Solving: Decreased awareness of errors;Assistance required to identify errors made;Assistance required to correct errors made  Insights: Decreased awareness of deficits  Initiation: Does not require cues  Sequencing: Requires cues for some  Cognition Comment: Pt. pleasant and cooperative with treatment.   Orientation  Overall Orientation Status: Within Normal Limits  Orientation Level: Oriented X4    Functional Mobility  Balance  Sitting Balance: Independent  Standing Balance: Moderate assistance (RW)  Transfers  Surface: To chair with arms;From bed  Additional Factors: Set-up; Verbal cues; Hand placement cues; Increased time to complete; Mat raised; With handrails  Device: Walker  Sit to Stand  Assistance Level: Minimal assistance; Moderate assistance; Requires x 2 assistance  Skilled Clinical Factors: Pt performs rocking motion to assist with sit to stand and requires min-mod assist x2 to achieve standing, Pt requires increased time/efforts and cues for safety throughout. Stand to Sit  Assistance Level: Minimal assistance; Requires x 2 assistance  Skilled Clinical Factors: Cues to reach back for chair and to allow for slow, controlled descent. Environmental Mobility  Ambulation  Surface: Level surface  Distance: 10'  Activity: Within Room  Activity Comments: Pt requested using crutches instead of RW, unable to at this time d/t picc line in medial L arm. Pt also educated on decreased stability with use of crutches as compared to RW. Agreeable with continuing use of RW. Additional Factors: Set-up; Verbal cues; Hand placement cues; Increased time to complete  Assistance Level: Minimal assistance; Requires x 2 assistance  Gait Deviations: Unsteady gait; Slow sangita  Skilled Clinical Factors: Pt demos increased difficulty maintaining NWB on RLE during gait this date, requiring several cues to correct. Verbal cues for safety awareness and RW proximity throughout. PT Exercises  A/AROM Exercises: Seated AROM BLE LAQ, marches, ankle pumps (L foot), chair push ups x12, chair push up and hold 10 seconds x1 rep    Goals  Patient Goals   Patient Goals :  To heal, to be mobile  Short Term Goals  Time Frame for Short Term Goals: 12 visits  Short Term Goal 1: Pt to demonstrate bed mobility SBA  Short Term Goal 2: Pt to perform STS transfers w/ RW SBA while maintaining NWB status RLE  Short Term Goal 3: Pt to ambulate at least 50ft w/ RW Leigha while maintaining NWB status RLE  Short Term Goal 4: Pt to ascend/descend 5 stairs w/ handrails ModA while maintaining NWB status RLE  Short Term Goal 5: Pt to actively participate in at least 30 minutes of physical therapy for ther act, ther ex, balance, gait, transfer, stair, and endurance training    PLAN OF CARE/SAFETY  Physcial Therapy Plan  General Plan: 6-7 times per week  Current Treatment Recommendations: Balance training;Transfer training;Gait training;Stair training; Safety education & training; Endurance training;Strengthening; Therapeutic activities  Safety Devices  Type of Devices: Call light within reach; Left in chair;Nurse notified;Gait belt; Heels elevated for pressure relief  Restraints  Restraints Initially in Place: No    EDUCATION  Education  Education Given To: Patient  Education Provided: Energy Conservation;Transfer Training;Mobility Training  Education Provided Comments: PT Danae discussed with pt and wife use of W/C to promote independence upon D/C and importance of elevating RLE. Discussed with pt importance of compliance with NWB and cleared up any confusion regarding WB status.   Education Method: Demonstration;Verbal  Education Outcome: Verbalized understanding;Continued education needed    [facility] 19    Therapy Time   Individual Concurrent Group Co-treatment   Time In 1099         Time Out 1010         Minutes [address] [place], [place], 10/18/22 at 12:11 PM

## 2022-10-18 NOTE — PROGRESS NOTES
[address] [facility] Pharmacokinetic Monitoring Service - Vancomycin    Consulting Provider: Kodak Jain   Indication: diabetic foot infection  Target Concentration: Goal trough of 10-15 mg/L and AUC/DIMA <500 mg*hr/L  Day of Therapy: 13  Additional Antimicrobials: cefepime/flagyl    Pertinent Laboratory Values: Wt Readings from Last 1 Encounters:   10/12/22 234 lb (106.1 kg)     Temp Readings from Last 1 Encounters:   10/17/22 98.1 °F (36.7 °C) (Oral)     Estimated Creatinine Clearance: 44 mL/min (A) (based on SCr of 2.05 mg/dL (H)). Recent Labs     10/17/22  0704 10/18/22  0552   CREATININE 2.05* 2.22*     Procalcitonin: none     Pertinent Cultures:  Culture Date Source Results   10/12 foot No growth x 5 days    MRSA Nasal Swab: N/A. Non-respiratory infection. Recent vancomycin administrations                     vancomycin 1000 mg IVPB in 250 mL D5W addavial (mg) 1,000 mg New Bag 10/16/22 1358    vancomycin (VANCOCIN) 750 mg in dextrose 5 % 250 mL IVPB (mg) 750 mg New Bag 10/15/22 1311    vancomycin (VANCOCIN) 1,250 mg in dextrose 5 % 250 mL IVPB (mg) 1,250 mg New Bag 10/14/22 1319                    Assessment:  Date/Time Current Dose Concentration Timing of Concentration (h) AUC   10/17 1000mg q24h  17.5 17h6m 511   Note: Serum concentrations collected for AUC dosing may appear elevated if collected in close proximity to the dose administered, this is not necessarily an indication of toxicity    Plan:  Current dosing regimen is therapeutic. Calc AUC = 544. Trough 19.3 . Calculated AUC now supratherapeutic. Will lower dose to 750mg q24h.    Will repeat trough  do to renal function and potential for drug accumulation.        (10/19/2022 0700 )  Pharmacy will continue to monitor patient and adjust therapy as indicated    Thank you for the consult,  CARLOS Krishnan [facility]  10/18/2022 8:10 AM

## 2022-10-19 NOTE — DISCHARGE SUMMARY
Discharge Summary  Podiatric Medicine and Surgery    Patient Identification     Roel Ash is a 78 y.o. male  :  1955  MRN: [id number]  Acct: [de-identified]     Admit date: 10/4/2022  Discharge date and time: 10/18/2022  2:00 PM     Admitting Physician: Rosmery Buenrostro DPM   Discharge Physician: Kenroy Quintanilla DPM     Admission Diagnoses: Type 2 diabetes mellitus with right diabetic foot ulcer ([address] [C59.178, L97.519]  Charcot's joint of right foot [M14.671]  Discharge Diagnoses:   Patient Active Problem List   Diagnosis    Essential hypertension    Type II or unspecified type diabetes mellitus without mention of complication, not stated as uncontrolled    Neuropathy    Vertigo    Charcot foot due to diabetes mellitus ([address]    Hyperlipidemia    Cellulitis    Cellulitis of left foot    Right foot infection    Diabetic polyneuropathy associated with type 2 diabetes mellitus ([address]    Foreign body (FB) in soft tissue    Cellulitis of left leg    Abscess of right foot    Chest pain at rest    Tobacco abuse    Type 2 diabetes mellitus treated with insulin (HCC)    ALEJANDRO (acute kidney injury) ([address]    Bandemia    Chronic multifocal osteomyelitis of right foot ([address]    Diabetic infection of right foot (HCC)    Acquired hammer toe deformity of lesser toe of right foot    Post-op pain    Right foot pain    Hallux hammertoe, right    Osteomyelitis (HCC)    Wound dehiscence    Diabetic foot ([address]    Hyperglycemia due to diabetes mellitus ([address]    Foot ulcer ([address]    Cellulitis of right foot    Type 2 diabetes mellitus with right diabetic foot ulcer ([address]    Charcot's joint of right foot    Stage 3b chronic kidney disease ([address]       Admission Condition: fair. Discharged Condition: good. Hospital Course     Brief Inpatient Course: Admitted on 10/4/2022  9:10 PM for Type 2 diabetes mellitus with right diabetic foot ulcer ([address] [J05.305, L97.519]  Charcot's joint of right foot [M14.671].       Patient arrived to the [facility] 10/5/2022 after being sent to the hospital from podiatrist office for diabetic foot infection. Podiatrist sent patient with possibility of Charcot foot recon external fixation and possible I&D. During this time patient received intravenous antibiotics and infectious disease was consulted. During stay at the hospital patient underwent Midfoot osteotomy, external fixator application, excision of wound, and skin flap closure on right foot on 10/12/2022. Patient stayed in the hospital for continuous infusion of intravenous antibiotics and during this time underwent dressing changes. Patient will follow up outpatient with Dr. Gabe Massey and was discharged to a facility for continual care. Consults: ID    Patient Instructions     Medications:      Medication List        START taking these medications      cefepime  infusion  Commonly known as: MAXIPIME  Infuse 2,000 mg intravenously in the morning and 2,000 mg in the evening. Through 11/18/2022 Compound per protocol. cyclobenzaprine 10 MG tablet  Commonly known as: FLEXERIL  Take 1 tablet by mouth 3 times daily as needed for Muscle spasms     docusate sodium 100 MG capsule  Commonly known as: COLACE  Take 1 capsule by mouth daily as needed for Constipation     ketorolac 10 MG tablet  Commonly known as: TORADOL  Take 1 tablet by mouth every 6 hours as needed for Pain     metroNIDAZOLE 500 MG tablet  Commonly known as: FLAGYL  Take 1 tablet by mouth every 8 hours for 14 days     oxyCODONE-acetaminophen 5-325 MG per tablet  Commonly known as: Percocet  Take 1 tablet by mouth every 6 hours as needed for Pain for up to 7 days. Intended supply: 7 days. Take lowest dose possible to manage pain     vancomycin  infusion  Commonly known as: VANCOCIN  Infuse 1,500 mg intravenously every 24 hours Through 11/18/2022 Compound per protocol.             CONTINUE taking these medications      amLODIPine 5 MG tablet  Commonly known as: NORVASC     aspirin 81 MG tablet cetirizine 10 MG tablet  Commonly known as: ZYRTEC     ciprofloxacin 500 MG tablet  Commonly known as: CIPRO     gabapentin 300 MG capsule  Commonly known as: NEURONTIN     glipiZIDE 10 MG tablet  Commonly known as: GLUCOTROL     Januvia 100 MG tablet  Generic drug: SITagliptin     Misc. Devices Misc  1 PAIR OF DIABETIC SHOES (1 LEFT/ 1 RIGHT)  1-3 PAIRS OF INSERTS (LEFT/ RIGHT)     mupirocin 2 % ointment  Commonly known as: BACTROBAN     simvastatin 40 MG tablet  Commonly known as: ZOCOR     TRUEplus Pen Needles 31G X 8 MM Misc  Generic drug: Insulin Pen Needle            STOP taking these medications      albuterol sulfate  (90 Base) MCG/ACT inhaler  Commonly known as: PROVENTIL;VENTOLIN;PROAIR     HYDROcodone-acetaminophen  MG per tablet  Commonly known as: NORCO     silver sulfADIAZINE 1 % cream  Commonly known as: SILVADENE            ASK your doctor about these medications      Lantus SoloStar 100 UNIT/ML injection pen  Generic drug: insulin glargine  Inject 15 Units into the skin nightly               Where to Get Your Medications        These medications were sent to [address] [place], [place] [address]Aidan Crespo [phone] - F [phone]  [address] [place] [address]      Phone: [phone]   metroNIDAZOLE 500 MG tablet       You can get these medications from any pharmacy    Bring a paper prescription for each of these medications  cefepime  infusion  cyclobenzaprine 10 MG tablet  docusate sodium 100 MG capsule  ketorolac 10 MG tablet  oxyCODONE-acetaminophen 5-325 MG per tablet  vancomycin  infusion         Activity:  NWB to R lower extremity WBAT to L. Diet: The patient is asked to make an attempt to improve diet and exercise patterns to aid in medical management of this problem. Disposition: SNF. Wound Care: Keep bandage intact until follow up appointment with Dr. Diamond Rutherford.     Follow-up: Follow up with  within 1 week of discharge, call the office for an appointment. Greater than 40 minutes was spent reviewing pertinent details of patient notes and summarization of the patient's inpatient stay.     Electronically signed by Tamera Locke DPM on 10/19/2022 at 9:39 AM

## 2022-10-27 ENCOUNTER — TELEPHONE (OUTPATIENT)
Dept: INFECTIOUS DISEASES | Age: 67
End: 2022-10-27

## 2022-10-27 NOTE — TELEPHONE ENCOUNTER
RECEIVED A CALL FROM Faisal Jensen. Pt is being D/C'd from [facility] and they are taking over care. She would like to know if there is a Vanco trough therapeutic level  P/O Dr. Brigette Amaro is it to be 13.  Lili Choi informed of this.

## 2022-10-31 ENCOUNTER — TELEPHONE (OUTPATIENT)
Dept: INFECTIOUS DISEASES | Age: 67
End: 2022-10-31

## 2022-10-31 NOTE — TELEPHONE ENCOUNTER
Pt held Vanco x 2 days - order was to repeat labs, wait for results then continue as directed. RN called to report Vanco was administered prior to final results.

## 2022-11-01 NOTE — TELEPHONE ENCOUNTER
Sebastian Og / EDILMA's - Calling office, today pt is refusing dose because wife is not there to administer - home care offered to go to pts home  Pt still refusing today's dose.

## 2022-11-13 ENCOUNTER — APPOINTMENT (OUTPATIENT)
Dept: GENERAL RADIOLOGY | Age: 67
End: 2022-11-13
Payer: MEDICARE

## 2022-11-13 ENCOUNTER — APPOINTMENT (OUTPATIENT)
Dept: CT IMAGING | Age: 67
End: 2022-11-13
Payer: MEDICARE

## 2022-11-13 ENCOUNTER — HOSPITAL ENCOUNTER (EMERGENCY)
Age: 67
Discharge: HOME | End: 2022-11-13
Attending: EMERGENCY MEDICINE
Payer: MEDICARE

## 2022-11-13 VITALS
BODY MASS INDEX: 32.48 KG/M2 | DIASTOLIC BLOOD PRESSURE: 56 MMHG | HEART RATE: 94 BPM | WEIGHT: 232 LBS | SYSTOLIC BLOOD PRESSURE: 135 MMHG | RESPIRATION RATE: 20 BRPM | OXYGEN SATURATION: 98 % | HEIGHT: 71 IN | TEMPERATURE: 98.2 F

## 2022-11-13 DIAGNOSIS — M79.671 RIGHT FOOT PAIN: Primary | ICD-10-CM

## 2022-11-13 LAB
ABSOLUTE EOS #: 0.37 K/UL (ref 0–0.44)
ABSOLUTE IMMATURE GRANULOCYTE: 0.04 K/UL (ref 0–0.3)
ABSOLUTE LYMPH #: 1.97 K/UL (ref 1.1–3.7)
ABSOLUTE MONO #: 0.79 K/UL (ref 0.1–1.2)
ANION GAP SERPL CALCULATED.3IONS-SCNC: 9 MMOL/L (ref 9–17)
BASOPHILS # BLD: 1 % (ref 0–2)
BASOPHILS ABSOLUTE: 0.05 K/UL (ref 0–0.2)
BUN BLDV-MCNC: 26 MG/DL (ref 8–23)
BUN/CREAT BLD: 12 (ref 9–20)
C-REACTIVE PROTEIN: 18.5 MG/L (ref 0–5)
CALCIUM SERPL-MCNC: 10.3 MG/DL (ref 8.6–10.4)
CHLORIDE BLD-SCNC: 99 MMOL/L (ref 98–107)
CO2: 27 MMOL/L (ref 20–31)
CREAT SERPL-MCNC: 2.22 MG/DL (ref 0.7–1.2)
EOSINOPHILS RELATIVE PERCENT: 4 % (ref 1–4)
GFR SERPL CREATININE-BSD FRML MDRD: 32 ML/MIN/1.73M2
GLUCOSE BLD-MCNC: 120 MG/DL (ref 70–99)
HCT VFR BLD CALC: 32.4 % (ref 40.7–50.3)
HEMOGLOBIN: 10.7 G/DL (ref 13–17)
IMMATURE GRANULOCYTES: 1 %
LYMPHOCYTES # BLD: 23 % (ref 24–43)
MCH RBC QN AUTO: 29.2 PG (ref 25.2–33.5)
MCHC RBC AUTO-ENTMCNC: 33 G/DL (ref 28.4–34.8)
MCV RBC AUTO: 88.3 FL (ref 82.6–102.9)
MONOCYTES # BLD: 9 % (ref 3–12)
NRBC AUTOMATED: 0 PER 100 WBC
PDW BLD-RTO: 14.3 % (ref 11.8–14.4)
PLATELET # BLD: 171 K/UL (ref 138–453)
PMV BLD AUTO: 8.9 FL (ref 8.1–13.5)
POTASSIUM SERPL-SCNC: 4.5 MMOL/L (ref 3.7–5.3)
RBC # BLD: 3.67 M/UL (ref 4.21–5.77)
SEDIMENTATION RATE, ERYTHROCYTE: 43 MM/HR (ref 0–20)
SEG NEUTROPHILS: 62 % (ref 36–65)
SEGMENTED NEUTROPHILS ABSOLUTE COUNT: 5.43 K/UL (ref 1.5–8.1)
SODIUM BLD-SCNC: 135 MMOL/L (ref 135–144)
WBC # BLD: 8.7 K/UL (ref 3.5–11.3)

## 2022-11-13 PROCEDURE — 96374 THER/PROPH/DIAG INJ IV PUSH: CPT

## 2022-11-13 PROCEDURE — 85025 COMPLETE CBC W/AUTO DIFF WBC: CPT

## 2022-11-13 PROCEDURE — 86140 C-REACTIVE PROTEIN: CPT

## 2022-11-13 PROCEDURE — 85652 RBC SED RATE AUTOMATED: CPT

## 2022-11-13 PROCEDURE — 6360000002 HC RX W HCPCS: Performed by: EMERGENCY MEDICINE

## 2022-11-13 PROCEDURE — 80048 BASIC METABOLIC PNL TOTAL CA: CPT

## 2022-11-13 PROCEDURE — 73700 CT LOWER EXTREMITY W/O DYE: CPT

## 2022-11-13 PROCEDURE — 99284 EMERGENCY DEPT VISIT MOD MDM: CPT

## 2022-11-13 PROCEDURE — 73590 X-RAY EXAM OF LOWER LEG: CPT

## 2022-11-13 PROCEDURE — 6370000000 HC RX 637 (ALT 250 FOR IP): Performed by: EMERGENCY MEDICINE

## 2022-11-13 PROCEDURE — 73630 X-RAY EXAM OF FOOT: CPT

## 2022-11-13 RX ORDER — OXYCODONE HYDROCHLORIDE AND ACETAMINOPHEN 5; 325 MG/1; MG/1
1 TABLET ORAL ONCE
Status: COMPLETED | OUTPATIENT
Start: 2022-11-13 | End: 2022-11-13

## 2022-11-13 RX ORDER — ONDANSETRON 2 MG/ML
4 INJECTION INTRAMUSCULAR; INTRAVENOUS ONCE
Status: COMPLETED | OUTPATIENT
Start: 2022-11-13 | End: 2022-11-13

## 2022-11-13 RX ADMIN — OXYCODONE AND ACETAMINOPHEN 1 TABLET: 325; 5 TABLET ORAL at 15:43

## 2022-11-13 RX ADMIN — ONDANSETRON 4 MG: 2 INJECTION INTRAMUSCULAR; INTRAVENOUS at 15:43

## 2022-11-13 ASSESSMENT — ENCOUNTER SYMPTOMS
VOMITING: 1
RESPIRATORY NEGATIVE: 1
EYE DISCHARGE: 0
CONSTIPATION: 0
NAUSEA: 1
DIARRHEA: 0
COUGH: 0
EYE REDNESS: 0
ABDOMINAL PAIN: 0
EYES NEGATIVE: 1
FACIAL SWELLING: 0
COLOR CHANGE: 0
SHORTNESS OF BREATH: 0

## 2022-11-13 ASSESSMENT — PAIN - FUNCTIONAL ASSESSMENT: PAIN_FUNCTIONAL_ASSESSMENT: 0-10

## 2022-11-13 ASSESSMENT — PAIN SCALES - GENERAL
PAINLEVEL_OUTOF10: 9
PAINLEVEL_OUTOF10: 9

## 2022-11-13 ASSESSMENT — PAIN DESCRIPTION - ORIENTATION: ORIENTATION: RIGHT

## 2022-11-13 ASSESSMENT — PAIN DESCRIPTION - LOCATION: LOCATION: FOOT

## 2022-11-13 NOTE — DISCHARGE INSTRUCTIONS
Please keep dressings dry clean and intact until your follow-up outpatient visit with Dr. Yoselin Aiken.   Please call Dr. Sandy Butler's office tomorrow to try and schedule an appointment  Please remain nonweightbearing to the right lower extremity to avoid any more hardware failure

## 2022-11-13 NOTE — CONSULTS
Consultation Note  Podiatric Medicine and Surgery     Subjective     Chief Complaint: Right lower extremity pain due to hardware failure    HPI:  Mary Stein is a 78 y.o. male seen at [facility] ED with a chief complaint of right lower extremity pain due to hardware failure. Patient underwent a midfoot osteotomy with external fixation application to his right lower extremity on 10/12/22. Since that time he has been following up outpatient with Dr. Renata Salas. He was recently seen last week and was told that the external fixator and surgical site looked to be doing well. Since his last outpatient visit the patient admits to being more ambulatory and reports that on Thursday he took a step off his porch and heard a \"Crack\". He says that on Friday he did the same thing and heard a similar sound. He has been having pain since Friday and believes that some of the pins have broken/backed out. He explains that the pain has been so bad that it is caused him to vomit. . Patient denies any other pedal complaints at this time. PCP is Geovany Monte MD    ROS:   Review of Systems   Constitutional: Negative. HENT: Negative. Eyes: Negative. Respiratory: Negative. Cardiovascular: Negative. Gastrointestinal:  Positive for vomiting (Due to pain). Musculoskeletal:  Positive for gait problem, joint swelling and myalgias. Skin:  Positive for wound.      Past Medical History   has a past medical history of Abscess of right foot, Acquired hammer toe deformity of lesser toe of right foot, ALEJANDRO (acute kidney injury) ([address] Cellulitis, Cellulitis of left foot, Cellulitis of right foot, Charcot foot due to diabetes mellitus ([address] Chest pain at rest, Chronic multifocal osteomyelitis of right foot ([address] Diabetic polyneuropathy associated with type 2 diabetes mellitus ([address] Essential hypertension, Fractures, multiple, Hyperlipidemia, Hypertension, Leukocytosis, Neuropathy, Pain in right foot, Pneumonia, Right foot infection, Right foot pain, Tobacco abuse, Type II or unspecified type diabetes mellitus without mention of complication, not stated as uncontrolled, Vertigo, Well controlled type 2 diabetes mellitus with neurological manifestations ([facility] [address] [facility] Wound dehiscence, Wound, open, and Wound, open. Past Surgical History   has a past surgical history that includes Neck surgery (1987); Appendectomy; knee surgery (Bilateral, 1983); Knee arthroscopy (Right, 1989); Knee arthroscopy (Left, 1990); Foot Debridement (Left, 04/24/2018); pr deep dissec foot infec,1 bursa (Left, 04/24/2018); Colonoscopy; Foot Debridement (Right, 03/20/2020); arthroplasty (Right, 12/11/2020); Foot Debridement (Right, 06/08/2021); Foot surgery (Right, 06/11/2021); Foot Debridement (Right, 09/16/2021); IR INSERT PICC VAD W SQ PORT >5 YEARS (10/07/2022); Foot surgery (Right, 10/12/2022); and Ankle surgery (Right, 10/12/2022). Medications  Prior to Admission medications    Medication Sig Start Date End Date Taking? Authorizing Provider   ketorolac (TORADOL) 10 MG tablet Take 1 tablet by mouth every 6 hours as needed for Pain 10/14/22 10/21/22  Shakir Horn DPM   docusate sodium (COLACE) 100 MG capsule Take 1 capsule by mouth daily as needed for Constipation 10/14/22   Shakir Horn DPM   cefepime (MAXIPIME) infusion Infuse 2,000 mg intravenously in the morning and 2,000 mg in the evening. Through 11/18/2022 Compound per protocol. 10/7/22 11/18/22  Tim Min MD   vancomycin (VANCOCIN) infusion Infuse 1,500 mg intravenously every 24 hours Through 11/18/2022 Compound per protocol. 10/7/22 11/18/22  Tim Min MD   ciprofloxacin (CIPRO) 500 MG tablet Take 500 mg by mouth 2 times daily 9/30 x 14 days 9/30/22   Historical Provider, MD   TRUEPLUS PEN NEEDLES 31G X 8 MM MISC  11/29/21   Historical Provider, MD   mupirocin (BACTROBAN) 2 % ointment Apply topically 2 times daily TO FOOT WOUND.  11/8/21   Historical Provider, MD   amLODIPine (NORVASC) 5 MG tablet Take 5 mg by mouth daily 10/20/21   Historical Provider, MD   LANTUS SOLOSTAR 100 UNIT/ML injection pen Inject 15 Units into the skin nightly  Patient taking differently: Inject 10 Units into the skin 2 times daily 21   ROCHELLE Blakely NP   JANUVIA 100 MG tablet Take 100 mg by mouth daily  20   Historical Provider, MD   Misc. Devices MISC 1 PAIR OF DIABETIC SHOES (1 LEFT/ 1 RIGHT)  1-3 PAIRS OF INSERTS (LEFT/ RIGHT) 3/5/20   Clifford Mcclendon DPM   cetirizine (ZYRTEC) 10 MG tablet Take 10 mg by mouth nightly  10/21/19   Historical Provider, MD   simvastatin (ZOCOR) 40 MG tablet Take 40 mg by mouth nightly  18   Historical Provider, MD   glipiZIDE (GLUCOTROL) 10 MG tablet Take 20 mg by mouth 2 times daily (before meals) Takes 2 tabs (=20mg) BID    Historical Provider, MD   aspirin 81 MG tablet Take 81 mg by mouth daily    Historical Provider, MD   gabapentin (NEURONTIN) 300 MG capsule Take 900 mg by mouth 3 times daily. Take 3 caps (=900mg) 3 times a day    Historical Provider, MD    Scheduled Meds:  Continuous Infusions:  PRN Meds:. Allergies  is allergic to morphine, other, and percocet [oxycodone-acetaminophen]. Family History  family history includes Cancer in his father; Diabetes in his mother; Hypertension in his maternal grandmother. Social History   reports that he has been smoking cigarettes. He has been smoking an average of 1 pack per day. He has never used smokeless tobacco.   reports no history of alcohol use. reports that he does not currently use drugs.     Objective     Vitals:  Patient Vitals for the past 8 hrs:   BP Temp Temp src Pulse Resp SpO2 Height Weight   22 1501 -- 98.2 °F (36.8 °C) Oral -- -- -- -- --   22 1457 (!) 135/56 -- -- 94 20 98 % 5' 11\" (1.803 m) 232 lb (105.2 kg)     Average, Min, and Max for last 24 hours Vitals:  TEMPERATURE:  Temp  Av.2 °F (36.8 °C)  Min: 98.2 °F (36.8 °C)  Max: 98.2 °F (36.8 °C)    RESPIRATIONS RANGE: Resp  Av  Min: 20  Max: 20    PULSE RANGE: Pulse  Av  Min: 94  Max: 94    BLOOD PRESSURE RANGE:  Systolic (22OFW), XOO:826 , Min:135 , ZIY:793   ; Diastolic (11DCG), IAE:74, Min:56, Max:56      PULSE OXIMETRY RANGE: SpO2  Av %  Min: 98 %  Max: 98 %  I&O:  No intake/output data recorded. CBC:  Recent Labs     22  1536   WBC 8.7   HGB 10.7*   HCT 32.4*      CRP 18.5*        BMP:  Recent Labs     22  1536      K 4.5   CL 99   CO2 27   BUN 26*   CREATININE 2.22*   GLUCOSE 120*   CALCIUM 10.3        Coags:  No results for input(s): APTT, PROT, INR in the last 72 hours. Lab Results   Component Value Date    LABA1C 6.9 (H) 10/05/2022     Lab Results   Component Value Date    SEDRATE 43 (H) 2022     Lab Results   Component Value Date    CRP 18.5 (H) 2022         Right lower Extremity Physical Exam:  Vascular: DP and PT pulses are palpable +2/4. CFT <3 seconds to all digits. Hair growth is diminished to the level of the digits. Diffuse nonpitting edema noted to the right lower extremity. Neuro: Saph/sural/SP/DP/plantar sensation diminished to light touch. Musculoskeletal: Deferred due to external fixator. Pt able to wiggle toes. Dermatologic:   External fixator with multiple pin fixation noted to be in place to the right lower extremity. Pin sites look to be slightly irritated with associated erythema. No purulent drainage noted. 3 separate pins located to the proximal 2 rings noted to be broken. All other pins intact at this time. Full thickness ulcer #1 located to first interspace. Base is fibrotic. Periwound skin is slightly macerated. No drainage noted with no associated mal odor. No erythema noted with no associated increase in warmth. Does not probe to bone, sinus track, or undermine. No fluctuance, crepitus, or induration.       Clinical:                 Imaging:   CT FOOT RIGHT WO CONTRAST   Final Result   1. Extensive subcutaneous fat stranding throughout the right leg and foot   compatible with bland edema or cellulitis. No well-defined drainable fluid   collection or soft tissue gas identified. 2. Status post external fixation. A medial midfoot percutaneous locking   screw traverses the 1st and 2nd tarsometatarsal joint spaces and is not   seated within a bone. 3. Remaining fixation hardware as above. 4. Severe midfoot degenerative changes compatible with neuropathic   arthropathy. 5. Chronic appearing nondisplaced ununited 5th metatarsal base fracture. 6. Severe degenerative changes of the right knee with a small effusion. 7. Mild tibiotalar and calcaneocuboid degenerative changes. 8. Mild 1st metatarsophalangeal degenerative changes. Severe 1st   interphalangeal degenerative changes. CT TIBIA FIBULA RIGHT WO CONTRAST   Final Result   1. Extensive subcutaneous fat stranding throughout the right leg and foot   compatible with bland edema or cellulitis. No well-defined drainable fluid   collection or soft tissue gas identified. 2. Status post external fixation. A medial midfoot percutaneous locking   screw traverses the 1st and 2nd tarsometatarsal joint spaces and is not   seated within a bone. 3. Remaining fixation hardware as above. 4. Severe midfoot degenerative changes compatible with neuropathic   arthropathy. 5. Chronic appearing nondisplaced ununited 5th metatarsal base fracture. 6. Severe degenerative changes of the right knee with a small effusion. 7. Mild tibiotalar and calcaneocuboid degenerative changes. 8. Mild 1st metatarsophalangeal degenerative changes. Severe 1st   interphalangeal degenerative changes. XR FOOT RIGHT (MIN 3 VIEWS)   Final Result   1. Subacute healing tarsal fractures status post external fixation. XR TIBIA FIBULA RIGHT (2 VIEWS)   Final Result   1. Subacute healing tarsal fractures status post external fixation. Assessment     Apurva Capellan is a 78 y.o. male with   Hardware failure  Right lower extremity pain  S/p midfoot osteotomy; right foot (DOS: 10/12/22)  S/p external fixation application; right LE (DOS: 10/12/22)  Type II diabetic foot ulcer down to subcutaneous layer, right foot  Charcot deformity, right foot  Type 2 diabetes with peripheral neuropathy    Active Problems:    * No active hospital problems. *  Resolved Problems:    * No resolved hospital problems. *        Plan     Patient examined and evaluated at bedside   Treatment options discussed in detail with the patient  Radiographs and CT images reviewed and discussed in detail with patient. Adequate positioning of external fixation noted. Significant degenerative changes seen throughout joints of the right foot consistent with Charcot  Dressings around external fixation device removed during today's ED visit to further evaluate the right lower extremity. Findings discussed above. Pin sites were painted with Betadine and dressings reapplied consisting of: Xeroform, 4 x 4's, ABDs, Kerlix and Ace. It was explained to the patient that he had broken some of the pins to his external fixation device. After discussing the clinical and radiographical findings with the attending, patient is okay to be discharged from the ED. He is to call the attending's office tomorrow to schedule an outpatient follow-up visit. Patient will need surgical correction of broken hardware at a later date this week.   Patient is to remain nonweightbearing to Right lower extremity  Patient is to continue with his IV antibiotic therapy outpatient  Discussed with Dr. Shadi Saavedra [facility]   Podiatric Medicine & Surgery   11/13/2022 at 5:33 PM

## 2022-11-13 NOTE — ED PROVIDER NOTES
[address] [place] [address] ED  EMERGENCY DEPARTMENT ENCOUNTER      Pt Name: James Ahumada  MRN: [id number]  [place] 1955  Date of evaluation: 11/13/2022  Provider: Catalina Small MD    [address] [place] [address]       Chief Complaint   Patient presents with    Post-op Problem     R ankle/foot pins were placed here on the 10/12/22, feels like a pin is loose. Started feeling unwell last night, worse today. Emesis x3 today, 9-9.5/10 pain; no medication at all today d/t pain and emesis. HISTORY OF PRESENT ILLNESS  (Location/Symptom, Timing/Onset, Context/Setting, Quality, Duration, Modifying Factors, Severity.)   James Ahumada is a 78 y.o. male who presents to the emergency department for pain to his right foot. About a month ago he had reconstructive surgery and still has external fixator on for Charcot foot. He has been nauseous from the pain and vomited. No trauma. He thinks one of the pins may have moved. He rated the pain as a 10. Nursing Notes were reviewed. ALLERGIES     Morphine, Other, and Percocet [oxycodone-acetaminophen]    CURRENT MEDICATIONS       Previous Medications    AMLODIPINE (NORVASC) 5 MG TABLET    Take 5 mg by mouth daily    ASPIRIN 81 MG TABLET    Take 81 mg by mouth daily    CEFEPIME (MAXIPIME) INFUSION    Infuse 2,000 mg intravenously in the morning and 2,000 mg in the evening. Through 11/18/2022 Compound per protocol. CETIRIZINE (ZYRTEC) 10 MG TABLET    Take 10 mg by mouth nightly     CIPROFLOXACIN (CIPRO) 500 MG TABLET    Take 500 mg by mouth 2 times daily 9/30 x 14 days    DOCUSATE SODIUM (COLACE) 100 MG CAPSULE    Take 1 capsule by mouth daily as needed for Constipation    GABAPENTIN (NEURONTIN) 300 MG CAPSULE    Take 900 mg by mouth 3 times daily.  Take 3 caps (=900mg) 3 times a day    GLIPIZIDE (GLUCOTROL) 10 MG TABLET    Take 20 mg by mouth 2 times daily (before meals) Takes 2 tabs (=20mg) BID    JANUVIA 100 MG TABLET    Take 100 mg by mouth daily     KETOROLAC (TORADOL) 10 MG TABLET    Take 1 tablet by mouth every 6 hours as needed for Pain    LANTUS SOLOSTAR 100 UNIT/ML INJECTION PEN    Inject 15 Units into the skin nightly    MISC. DEVICES MISC    1 PAIR OF DIABETIC SHOES (1 LEFT/ 1 RIGHT)  1-3 PAIRS OF INSERTS (LEFT/ RIGHT)    MUPIROCIN (BACTROBAN) 2 % OINTMENT    Apply topically 2 times daily TO FOOT WOUND. SIMVASTATIN (ZOCOR) 40 MG TABLET    Take 40 mg by mouth nightly     TRUEPLUS PEN NEEDLES 31G X 8 MM MISC        VANCOMYCIN (VANCOCIN) INFUSION    Infuse 1,500 mg intravenously every 24 hours Through 11/18/2022 Compound per protocol. PAST MEDICAL HISTORY         Diagnosis Date    Abscess of right foot 8/4/2018    Acquired hammer toe deformity of lesser toe of right foot     ALEJANDRO (acute kidney injury) ([address] 8/5/2018    Cellulitis 4/24/2018    Cellulitis of left foot 4/24/2018    Cellulitis of right foot     and abscess    Charcot foot due to diabetes mellitus ([address] 2014    Right foot     Chest pain at rest 8/4/2018    Chronic multifocal osteomyelitis of right foot ([address]     Diabetic polyneuropathy associated with type 2 diabetes mellitus ([address]     Essential hypertension     Fractures, multiple 07/21/2014    Right foot fractures     Hyperlipidemia     Hypertension     Leukocytosis     Neuropathy     diabetic with charcot affecting the right foot    Pain in right foot     redness and swelling    Pneumonia 2009    Right foot infection     Right foot pain     Tobacco abuse 4/24/2018    Type II or unspecified type diabetes mellitus without mention of complication, not stated as uncontrolled     Vertigo     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 stepped on sharp object. Covered by dressing.     Wound, open     Right posterior -Diabetic Ulcer       SURGICAL HISTORY           Procedure Laterality Date    ANKLE SURGERY Right 10/12/2022    RIGHT MID-FOOT OSTEOTOMY WITH OF APPLICATION EXTERNAL FIXATOR SERA performed by Estevan Riojas DPM at [facility]      at age 24    ARTHROPLASTY Right 2020    RIGHT HALLUX EXOSTECTOMY AND 3RD DIGIT EXTENSIOR TENOTOMY performed by Henrry Lopez DPM at [facility] Left 2018    I&D foreign body removal    FOOT DEBRIDEMENT Right 2020    RIGHT  FOOT   INCISION AND DRAINAGE WITH BONE BIOPSY performed by Henrry Lopez DPM at [facility] Right 2021    FOOT DEBRIDEMENT INCISION AND DRAINAGE performed by Henrry Lopez DPM at [facility] Right 2021    RIGHT FOOT  INCISION AND DRAINAGE   WITH PULSE LAVAGE performed by Henrry Lopez DPM at [facility] Right 2021    RIGHT FOOT FLAP CLOSURE performed by Henrry Lopez DPM at [address] [place] [address] Right     APPLICATION EXTERNAL FIXATOR RIGHT FOOT - SERA - Right    IR INS PICC VAD W SQ PORT GREATER THAN 5  10/07/2022    IR INS PICC VAD W SQ PORT GREATER THAN 5 10/7/2022 [facility]    KNEE ARTHROSCOPY Right     KNEE ARTHROSCOPY Left     KNEE SURGERY Bilateral     arthroscopy    NECK SURGERY      [place] DEEP [address] FOOT INFEC,1 BURSA Left 2018    LEFT FOOT MULTIPLE  INCISIONS  AND DRAINAGE AND REMOVAL FOREIGN BODY  IRENE performed by Henrry Lopez DPM at [address]           Problem Relation Age of Onset    Diabetes Mother     Cancer Father     Hypertension Maternal Grandmother      Family Status   Relation Name Status    Mother  Alive    Father      MGM  (Not Specified)        SOCIAL HISTORY      reports that he has been smoking cigarettes. He has been smoking an average of 1 pack per day. He has never used smokeless tobacco. He reports that he does not currently use drugs. He reports that he does not drink alcohol.     REVIEW OF SYSTEMS    (2-9 systems for level 4, 10 or more for level 5)     Review of Systems   Constitutional:  Negative for chills, fatigue and fever. HENT:  Negative for congestion, ear discharge and facial swelling. Eyes:  Negative for discharge and redness. Respiratory:  Negative for cough and shortness of breath. Cardiovascular:  Negative for chest pain. Gastrointestinal:  Positive for nausea and vomiting. Negative for abdominal pain, constipation and diarrhea. Genitourinary:  Negative for dysuria and hematuria. Musculoskeletal:  Negative for arthralgias. Skin:  Negative for color change and rash. Neurological:  Negative for syncope, numbness and headaches. Hematological:  Negative for adenopathy. Psychiatric/Behavioral:  Negative for confusion. The patient is not nervous/anxious. Except as noted above the remainder of the review of systems was reviewed and negative. PHYSICAL EXAM    (up to 7 for level 4, 8 or more for level 5)     Vitals:    11/13/22 1457 11/13/22 1501   BP: (!) 135/56    Pulse: 94    Resp: 20    Temp:  98.2 °F (36.8 °C)   TempSrc:  Oral   SpO2: 98%    Weight: 232 lb (105.2 kg)    Height: 5' 11\" (1.803 m)        Physical Exam  Vitals reviewed. Constitutional:       General: He is not in acute distress. Appearance: He is well-developed. He is not diaphoretic. HENT:      Head: Normocephalic and atraumatic. Eyes:      General: No scleral icterus. Right eye: No discharge. Left eye: No discharge. Cardiovascular:      Rate and Rhythm: Normal rate and regular rhythm. Pulmonary:      Effort: Pulmonary effort is normal. No respiratory distress. Breath sounds: Normal breath sounds. No stridor. No wheezing or rales. Abdominal:      General: There is no distension. Palpations: Abdomen is soft. Tenderness: There is no abdominal tenderness. Musculoskeletal:      Cervical back: Neck supple. Comments: External fixator device is present on the right foot.    Lymphadenopathy: Cervical: No cervical adenopathy. Skin:     General: Skin is warm and dry. Findings: No erythema or rash. Neurological:      Mental Status: He is alert and oriented to person, place, and time. Psychiatric:         Behavior: Behavior normal.           DIAGNOSTIC RESULTS     EKG: All EKG's are interpreted by the Emergency Department Physician who either signs or Co-signs this chart in the absence of a cardiologist.    RADIOLOGY:   Non-plain film images such as CT, Ultrasound and MRI are read by the radiologist. Plain radiographic images are visualized and preliminarily interpreted by the emergency physician with the below findings:    Interpretation per the Radiologist below, if available at the time of this note:    XR TIBIA FIBULA RIGHT (2 VIEWS)    Result Date: 11/13/2022  EXAMINATION: THREE XRAY VIEWS OF THE RIGHT FOOT;   XRAY VIEWS OF THE RIGHT TIBIA AND FIBULA 11/13/2022 4:00 pm COMPARISON: 10/12/2022 HISTORY: ORDERING SYSTEM PROVIDED HISTORY: Right lower extremity pain TECHNOLOGIST PROVIDED HISTORY: Right lower extremity pain Reason for Exam: Post-op Problem Additional signs and symptoms: Post-op Problem, Right lower extremity pain; ORDERING SYSTEM PROVIDED HISTORY: Right lower extremity pain TECHNOLOGIST PROVIDED HISTORY: Right lower extremity pain Reason for Exam: Post-op Problem, Right lower extremity pain Additional signs and symptoms: Post-op Problem, Right lower extremity pain FINDINGS: The external hardware fixation significantly degrades image quality. The hardware is not significantly changed. The bones are demineralized. Status post partial hypertension of the 3rd phalanges with subacute healing of the tarsal fractures. There is an old healed proximal fibular fracture. The ankle mortise is intact. Degenerative changes involve the knee. Vascular calcifications are noted. 1. Subacute healing tarsal fractures status post external fixation.      XR FOOT RIGHT (MIN 3 VIEWS)    Result Date: 11/13/2022  EXAMINATION: THREE XRAY VIEWS OF THE RIGHT FOOT;   XRAY VIEWS OF THE RIGHT TIBIA AND FIBULA 11/13/2022 4:00 pm COMPARISON: 10/12/2022 HISTORY: ORDERING SYSTEM PROVIDED HISTORY: Right lower extremity pain TECHNOLOGIST PROVIDED HISTORY: Right lower extremity pain Reason for Exam: Post-op Problem Additional signs and symptoms: Post-op Problem, Right lower extremity pain; ORDERING SYSTEM PROVIDED HISTORY: Right lower extremity pain TECHNOLOGIST PROVIDED HISTORY: Right lower extremity pain Reason for Exam: Post-op Problem, Right lower extremity pain Additional signs and symptoms: Post-op Problem, Right lower extremity pain FINDINGS: The external hardware fixation significantly degrades image quality. The hardware is not significantly changed. The bones are demineralized. Status post partial hypertension of the 3rd phalanges with subacute healing of the tarsal fractures. There is an old healed proximal fibular fracture. The ankle mortise is intact. Degenerative changes involve the knee. Vascular calcifications are noted. 1. Subacute healing tarsal fractures status post external fixation. CT TIBIA FIBULA RIGHT WO CONTRAST    Result Date: 11/13/2022  EXAMINATION: CT OF THE RIGHT TIBIA AND FIBULA WITHOUT CONTRAST; CT OF THE RIGHT FOOT WITHOUT CONTRAST 11/13/2022 3:55 pm TECHNIQUE: CT of the right tibia and fibula was performed without the administration of intravenous contrast.  Multiplanar reformatted images are provided for review. Automated exposure control, iterative reconstruction, and/or weight based adjustment of the mA/kV was utilized to reduce the radiation dose to as low as reasonably achievable.; CT of the right foot was performed without the administration of intravenous contrast.  Multiplanar reformatted images are provided for review.  Automated exposure control, iterative reconstruction, and/or weight based adjustment of the mA/kV was utilized to reduce the radiation dose to as low as reasonably achievable. COMPARISON: Right tibia/fibula and foot radiographs 11/13/2022. MRI right ankle and foot 10/05/2022. HISTORY ORDERING SYSTEM PROVIDED HISTORY: Right lower extremity pain TECHNOLOGIST PROVIDED HISTORY: Right lower extremity pain Decision Support Exception - unselect if not a suspected or confirmed emergency medical condition->Emergency Medical Condition (MA) Reason for Exam: Pt had surgery one month ago for Charcot foot. Increased pain today to superior pin areas of right tib/fib. ; ORDERING SYSTEM PROVIDED HISTORY: Right lower extremity pain TECHNOLOGIST PROVIDED HISTORY: Right lower extremity pain Reason for Exam: Pt had surgery one month ago for Charcot foot. Increased pain today to superior pin areas of right tib/fib. FINDINGS: Small right knee effusion. Moderate to severe medial and lateral and severe patellofemoral compartment degenerative changes. Degenerative subchondral cysts in the medial tibial plafond and talar dome. The ankle mortise is intact. No syndesmotic widening. The subtalar joint is unremarkable. The talonavicular joint is unremarkable. Mild calcaneocuboid degenerative changes. The naviculocuneiform articulations are intact. An external fixation device is in place with multiple percutaneous locking pins traversing the mid tibial and fibular shafts and calcaneus. There is a medial locking screw located within the 1st and 2nd tarsometatarsal joint spaces. Extensive severe tarsometatarsal degenerative changes and articular surface fragmentation. A chronic appearing nondisplaced ununited 5th metatarsal base fracture is seen. Screw tracks are seen in the 3rd and 4th metatarsal shafts. There is a percutaneous pin traversing the 1st metatarsal base, medial cuneiform, and plantar aspect of the naviculocuneiform articulation terminating anterior to the sustentaculum talus.   A percutaneous pin is also seen traversing the 2nd metatarsal base and medial cuneiform and terminating in the posterior calcaneal facet. A percutaneous pin is seen extending between the 3rd and 4th metatarsal bases, through the cuboid and terminating in the posterior calcaneus. No abnormal interface widening about the hardware identified. Normal midfoot alignment is grossly maintained. Mild 1st metatarsophalangeal degenerative changes. The lesser metatarsophalangeal joints are intact. Severe degenerative changes of the 1st interphalangeal joint. Status post partial amputation of the 3rd toe. No acute fracture identified. No osseous erosion. Circumferential subcutaneous fat stranding throughout the leg extending along the dorsum of the mid and forefoot. Evaluation of the soft tissues is limited by beam hardening artifact. No drainable fluid collection identified. No soft tissue gas. The visualized tendons are grossly intact. 1. Extensive subcutaneous fat stranding throughout the right leg and foot compatible with bland edema or cellulitis. No well-defined drainable fluid collection or soft tissue gas identified. 2. Status post external fixation. A medial midfoot percutaneous locking screw traverses the 1st and 2nd tarsometatarsal joint spaces and is not seated within a bone. 3. Remaining fixation hardware as above. 4. 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 5. Chronic appearing nondisplaced ununited 5th metatarsal base fracture. 6. Severe degenerative changes of the right knee with a small effusion. 7. Mild tibiotalar and calcaneocuboid degenerative changes. 8. Mild 1st metatarsophalangeal degenerative changes. Severe 1st interphalangeal degenerative changes.      CT FOOT RIGHT WO CONTRAST    Result Date: 11/13/2022  EXAMINATION: CT OF THE RIGHT TIBIA AND FIBULA WITHOUT CONTRAST; CT OF THE RIGHT FOOT WITHOUT CONTRAST 11/13/2022 3:55 pm TECHNIQUE: CT of the right tibia and fibula was performed without the administration of intravenous contrast. Multiplanar reformatted images are provided for review. Automated exposure control, iterative reconstruction, and/or weight based adjustment of the mA/kV was utilized to reduce the radiation dose to as low as reasonably achievable.; CT of the right foot was performed without the administration of intravenous contrast.  Multiplanar reformatted images are provided for review. Automated exposure control, iterative reconstruction, and/or weight based adjustment of the mA/kV was utilized to reduce the radiation dose to as low as reasonably achievable. COMPARISON: Right tibia/fibula and foot radiographs 11/13/2022. MRI right ankle and foot 10/05/2022. HISTORY ORDERING SYSTEM PROVIDED HISTORY: Right lower extremity pain TECHNOLOGIST PROVIDED HISTORY: Right lower extremity pain Decision Support Exception - unselect if not a suspected or confirmed emergency medical condition->Emergency Medical Condition (MA) Reason for Exam: Pt had surgery one month ago for Charcot foot. Increased pain today to superior pin areas of right tib/fib. ; ORDERING SYSTEM PROVIDED HISTORY: Right lower extremity pain TECHNOLOGIST PROVIDED HISTORY: Right lower extremity pain Reason for Exam: Pt had surgery one month ago for Charcot foot. Increased pain today to superior pin areas of right tib/fib. FINDINGS: Small right knee effusion. Moderate to severe medial and lateral and severe patellofemoral compartment degenerative changes. Degenerative subchondral cysts in the medial tibial plafond and talar dome. The ankle mortise is intact. No syndesmotic widening. The subtalar joint is unremarkable. The talonavicular joint is unremarkable. Mild calcaneocuboid degenerative changes. The naviculocuneiform articulations are intact. An external fixation device is in place with multiple percutaneous locking pins traversing the mid tibial and fibular shafts and calcaneus.   There is a medial locking screw located within the 1st and 2nd tarsometatarsal joint spaces. Extensive severe tarsometatarsal degenerative changes and articular surface fragmentation. A chronic appearing nondisplaced ununited 5th metatarsal base fracture is seen. Screw tracks are seen in the 3rd and 4th metatarsal shafts. There is a percutaneous pin traversing the 1st metatarsal base, medial cuneiform, and plantar aspect of the naviculocuneiform articulation terminating anterior to the sustentaculum talus. A percutaneous pin is also seen traversing the 2nd metatarsal base and medial cuneiform and terminating in the posterior calcaneal facet. A percutaneous pin is seen extending between the 3rd and 4th metatarsal bases, through the cuboid and terminating in the posterior calcaneus. No abnormal interface widening about the hardware identified. Normal midfoot alignment is grossly maintained. Mild 1st metatarsophalangeal degenerative changes. The lesser metatarsophalangeal joints are intact. Severe degenerative changes of the 1st interphalangeal joint. Status post partial amputation of the 3rd toe. No acute fracture identified. No osseous erosion. Circumferential subcutaneous fat stranding throughout the leg extending along the dorsum of the mid and forefoot. Evaluation of the soft tissues is limited by beam hardening artifact. No drainable fluid collection identified. No soft tissue gas. The visualized tendons are grossly intact. 1. Extensive subcutaneous fat stranding throughout the right leg and foot compatible with bland edema or cellulitis. No well-defined drainable fluid collection or soft tissue gas identified. 2. Status post external fixation. A medial midfoot percutaneous locking screw traverses the 1st and 2nd tarsometatarsal joint spaces and is not seated within a bone. 3. Remaining fixation hardware as above. 4. 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 5. Chronic appearing nondisplaced ununited 5th metatarsal base fracture.  6. Severe degenerative changes of the right knee with a small effusion. 7. Mild tibiotalar and calcaneocuboid degenerative changes. 8. Mild 1st metatarsophalangeal degenerative changes. Severe 1st interphalangeal degenerative changes. LABS:  Labs Reviewed   CBC WITH AUTO DIFFERENTIAL - Abnormal; Notable for the following components:       Result Value    RBC 3.67 (*)     Hemoglobin 10.7 (*)     Hematocrit 32.4 (*)     Lymphocytes 23 (*)     Immature Granulocytes 1 (*)     All other components within normal limits   BASIC METABOLIC PANEL - Abnormal; Notable for the following components:    Glucose 120 (*)     BUN 26 (*)     Creatinine 2.22 (*)     Est, Glom Filt Rate 32 (*)     All other components within normal limits   C-REACTIVE PROTEIN - Abnormal; Notable for the following components:    CRP 18.5 (*)     All other components within normal limits   SEDIMENTATION RATE - Abnormal; Notable for the following components:    Sed Rate 43 (*)     All other components within normal limits       All other labs were within normal range or not returned as of this dictation. EMERGENCY DEPARTMENT COURSE and DIFFERENTIAL DIAGNOSIS/MDM:   Vitals:    Vitals:    11/13/22 1457 11/13/22 1501   BP: (!) 135/56    Pulse: 94    Resp: 20    Temp:  98.2 °F (36.8 °C)   TempSrc:  Oral   SpO2: 98%    Weight: 232 lb (105.2 kg)    Height: 5' 11\" (1.803 m)        Orders Placed This Encounter   Medications    ondansetron (ZOFRAN) injection 4 mg    oxyCODONE-acetaminophen (PERCOCET) 5-325 MG per tablet 1 tablet         Medical Decision Making: The patient has been seen by podiatry and they have identified breakage of some pins. She is going to be discharged home today and be brought back tomorrow for operative repair. CONSULTS:  None    PROCEDURES:  None    FINAL IMPRESSION      1.  Right foot pain          DISPOSITION/PLAN   DISPOSITION Decision To Discharge 11/13/2022 05:08:33 PM      PATIENT REFERRED TO:   Stefan Gaspar Dr Allie Campos  [address] [address] [phone]      As needed    [facility] ED  [address] [facility]  [phone]    If symptoms worsen    DISCHARGE MEDICATIONS:     New Prescriptions    No medications on file       The care is provided during an unprecedented national emergency due to the novel coronavirus, COVID-19.     (Please note that portions of this note were completed with a voice recognition program.  Efforts were made to edit the dictations but occasionally words are mis-transcribed.)    Crista Ruiz MD  Attending Emergency Physician           Crista Ruiz MD  11/13/22 8012

## 2022-11-14 RX ORDER — HYDROCODONE BITARTRATE AND ACETAMINOPHEN 10; 325 MG/1; MG/1
1 TABLET ORAL EVERY 6 HOURS PRN
COMMUNITY

## 2022-11-14 NOTE — PROGRESS NOTES
DAY OF SURGERY/PROCEDURE  GUIDELINES    As a patient at the [facility], you can expect quality medical and nursing care that is centered on your individual needs. It is our goal to make your surgical experience as comfortable and excellent as possible.  ________________________________________________________________________    The following instructions are general guidelines, if any information on this sheet is different from what your doctor has instructed you to do, please follow your doctor's instructions. Please arrive on 11/15 @1200      Enter through entrance C. Check in at registration     Upon arrival you will be taken to the pre-operative area to get ready for surgery, your family will stay in the waiting room and visit with you once you are ready for surgery. Due to special limitations please limit visitation to 1-2 members of your family at a time. When it is time for surgery your family will return to the waiting room. Nothing to eat, drink, smoke, suck or chew after midnight (no water, gum, mints, cigarettes, cigars, pipes, snuff, chewing tobacco, etc.) or your surgery may be canceled. Take a shower or bath on the morning of your surgery/procedure (Hibiclens if directed)    Brush your teeth, but do not swallow any water    IN CASE OF ILLNESS - If you have a cold or flu symptoms (high fever, runny nose, sore throat, cough, etc.) rash, nausea, vomiting, loose stools, and/or recent contact with someone who has a contagious disease (chick pox, measles, etc.) please call your doctor before coming to the surgery center    Take a small sip of water with heart, blood pressure, and/or seizure medication the morning of surgery.  amlodipine    If applicable bring your:  Inhaler (s)  Eyeglasses and Case (If you wear contacts they have to be removed before surgery, bring case and solution)    DO NOT take anticoagulants (blood thinners, aspirin or aspirin-containing products) as instructed by your physician. DO NOT take any diabetic pills or insulin morning of your surgery. Wear loose, comfortable clothing that is easy to put on and take off. They will remain in post-op with the nurse. If you will be returning home the same day as your surgery, you will need to have a responsible adult (25years of age or older) present to drive you home. You will need someone stay with you at home for the first 24 hours following your surgery. This is due to the anesthesia and the medication given to you during surgery and recovery.

## 2022-11-15 ENCOUNTER — ANESTHESIA (OUTPATIENT)
Dept: OPERATING ROOM | Age: 67
End: 2022-11-15
Payer: MEDICARE

## 2022-11-15 ENCOUNTER — APPOINTMENT (OUTPATIENT)
Dept: GENERAL RADIOLOGY | Age: 67
End: 2022-11-15
Attending: STUDENT IN AN ORGANIZED HEALTH CARE EDUCATION/TRAINING PROGRAM
Payer: MEDICARE

## 2022-11-15 ENCOUNTER — ANESTHESIA EVENT (OUTPATIENT)
Dept: OPERATING ROOM | Age: 67
End: 2022-11-15
Payer: MEDICARE

## 2022-11-15 ENCOUNTER — HOSPITAL ENCOUNTER (OUTPATIENT)
Age: 67
Setting detail: OUTPATIENT SURGERY
Discharge: HOME | End: 2022-11-15
Attending: STUDENT IN AN ORGANIZED HEALTH CARE EDUCATION/TRAINING PROGRAM | Admitting: STUDENT IN AN ORGANIZED HEALTH CARE EDUCATION/TRAINING PROGRAM
Payer: MEDICARE

## 2022-11-15 VITALS
SYSTOLIC BLOOD PRESSURE: 135 MMHG | RESPIRATION RATE: 17 BRPM | DIASTOLIC BLOOD PRESSURE: 75 MMHG | OXYGEN SATURATION: 95 % | HEART RATE: 78 BPM | HEIGHT: 71 IN | BODY MASS INDEX: 32.48 KG/M2 | TEMPERATURE: 96.9 F | WEIGHT: 232 LBS

## 2022-11-15 DIAGNOSIS — M79.671 RIGHT FOOT PAIN: ICD-10-CM

## 2022-11-15 DIAGNOSIS — G89.18 POST-OP PAIN: Primary | ICD-10-CM

## 2022-11-15 DIAGNOSIS — T85.848A PAIN FROM IMPLANTED HARDWARE, INITIAL ENCOUNTER: ICD-10-CM

## 2022-11-15 DIAGNOSIS — M14.671 CHARCOT'S JOINT OF RIGHT FOOT: ICD-10-CM

## 2022-11-15 DIAGNOSIS — M24.571 CONTRACTURE OF RIGHT ANKLE: ICD-10-CM

## 2022-11-15 DIAGNOSIS — M14.671 CHARCOT'S JOINT OF ANKLE, RIGHT: ICD-10-CM

## 2022-11-15 LAB
EKG ATRIAL RATE: 74 BPM
EKG P AXIS: -11 DEGREES
EKG P-R INTERVAL: 220 MS
EKG Q-T INTERVAL: 368 MS
EKG QRS DURATION: 78 MS
EKG QTC CALCULATION (BAZETT): 408 MS
EKG R AXIS: 19 DEGREES
EKG T AXIS: 49 DEGREES
EKG VENTRICULAR RATE: 74 BPM
GLUCOSE BLD-MCNC: 95 MG/DL (ref 75–110)
GLUCOSE BLD-MCNC: 96 MG/DL (ref 75–110)

## 2022-11-15 PROCEDURE — 6360000002 HC RX W HCPCS: Performed by: STUDENT IN AN ORGANIZED HEALTH CARE EDUCATION/TRAINING PROGRAM

## 2022-11-15 PROCEDURE — 82947 ASSAY GLUCOSE BLOOD QUANT: CPT

## 2022-11-15 PROCEDURE — 7100000000 HC PACU RECOVERY - FIRST 15 MIN: Performed by: STUDENT IN AN ORGANIZED HEALTH CARE EDUCATION/TRAINING PROGRAM

## 2022-11-15 PROCEDURE — 7100000011 HC PHASE II RECOVERY - ADDTL 15 MIN: Performed by: STUDENT IN AN ORGANIZED HEALTH CARE EDUCATION/TRAINING PROGRAM

## 2022-11-15 PROCEDURE — 6370000000 HC RX 637 (ALT 250 FOR IP)

## 2022-11-15 PROCEDURE — 3600000003 HC SURGERY LEVEL 3 BASE: Performed by: STUDENT IN AN ORGANIZED HEALTH CARE EDUCATION/TRAINING PROGRAM

## 2022-11-15 PROCEDURE — 87070 CULTURE OTHR SPECIMN AEROBIC: CPT

## 2022-11-15 PROCEDURE — 2580000003 HC RX 258

## 2022-11-15 PROCEDURE — 6360000002 HC RX W HCPCS

## 2022-11-15 PROCEDURE — 3600000013 HC SURGERY LEVEL 3 ADDTL 15MIN: Performed by: STUDENT IN AN ORGANIZED HEALTH CARE EDUCATION/TRAINING PROGRAM

## 2022-11-15 PROCEDURE — 93005 ELECTROCARDIOGRAM TRACING: CPT

## 2022-11-15 PROCEDURE — 2500000003 HC RX 250 WO HCPCS: Performed by: ANESTHESIOLOGY

## 2022-11-15 PROCEDURE — 2500000003 HC RX 250 WO HCPCS

## 2022-11-15 PROCEDURE — 6360000002 HC RX W HCPCS: Performed by: ANESTHESIOLOGY

## 2022-11-15 PROCEDURE — 3700000001 HC ADD 15 MINUTES (ANESTHESIA): Performed by: STUDENT IN AN ORGANIZED HEALTH CARE EDUCATION/TRAINING PROGRAM

## 2022-11-15 PROCEDURE — 3209999900 FLUORO FOR SURGICAL PROCEDURES

## 2022-11-15 PROCEDURE — 2720000010 HC SURG SUPPLY STERILE: Performed by: STUDENT IN AN ORGANIZED HEALTH CARE EDUCATION/TRAINING PROGRAM

## 2022-11-15 PROCEDURE — 2709999900 HC NON-CHARGEABLE SUPPLY: Performed by: STUDENT IN AN ORGANIZED HEALTH CARE EDUCATION/TRAINING PROGRAM

## 2022-11-15 PROCEDURE — 2500000003 HC RX 250 WO HCPCS: Performed by: STUDENT IN AN ORGANIZED HEALTH CARE EDUCATION/TRAINING PROGRAM

## 2022-11-15 PROCEDURE — 87205 SMEAR GRAM STAIN: CPT

## 2022-11-15 PROCEDURE — 76942 ECHO GUIDE FOR BIOPSY: CPT | Performed by: ANESTHESIOLOGY

## 2022-11-15 PROCEDURE — 7100000010 HC PHASE II RECOVERY - FIRST 15 MIN: Performed by: STUDENT IN AN ORGANIZED HEALTH CARE EDUCATION/TRAINING PROGRAM

## 2022-11-15 PROCEDURE — C1713 ANCHOR/SCREW BN/BN,TIS/BN: HCPCS | Performed by: STUDENT IN AN ORGANIZED HEALTH CARE EDUCATION/TRAINING PROGRAM

## 2022-11-15 PROCEDURE — 7100000001 HC PACU RECOVERY - ADDTL 15 MIN: Performed by: STUDENT IN AN ORGANIZED HEALTH CARE EDUCATION/TRAINING PROGRAM

## 2022-11-15 PROCEDURE — 3700000000 HC ANESTHESIA ATTENDED CARE: Performed by: STUDENT IN AN ORGANIZED HEALTH CARE EDUCATION/TRAINING PROGRAM

## 2022-11-15 PROCEDURE — 87075 CULTR BACTERIA EXCEPT BLOOD: CPT

## 2022-11-15 RX ORDER — ONDANSETRON 2 MG/ML
4 INJECTION INTRAMUSCULAR; INTRAVENOUS
Status: DISCONTINUED | OUTPATIENT
Start: 2022-11-15 | End: 2022-11-15 | Stop reason: HOSPADM

## 2022-11-15 RX ORDER — ONDANSETRON 2 MG/ML
INJECTION INTRAMUSCULAR; INTRAVENOUS PRN
Status: DISCONTINUED | OUTPATIENT
Start: 2022-11-15 | End: 2022-11-15 | Stop reason: SDUPTHER

## 2022-11-15 RX ORDER — HYDROCODONE BITARTRATE AND ACETAMINOPHEN 5; 325 MG/1; MG/1
TABLET ORAL
Status: COMPLETED
Start: 2022-11-15 | End: 2022-11-15

## 2022-11-15 RX ORDER — PROPOFOL 10 MG/ML
INJECTION, EMULSION INTRAVENOUS PRN
Status: DISCONTINUED | OUTPATIENT
Start: 2022-11-15 | End: 2022-11-15 | Stop reason: SDUPTHER

## 2022-11-15 RX ORDER — LIDOCAINE HYDROCHLORIDE 10 MG/ML
1 INJECTION, SOLUTION EPIDURAL; INFILTRATION; INTRACAUDAL; PERINEURAL
Status: DISCONTINUED | OUTPATIENT
Start: 2022-11-15 | End: 2022-11-15 | Stop reason: HOSPADM

## 2022-11-15 RX ORDER — SODIUM CHLORIDE 9 MG/ML
25 INJECTION, SOLUTION INTRAVENOUS PRN
Status: DISCONTINUED | OUTPATIENT
Start: 2022-11-15 | End: 2022-11-15 | Stop reason: HOSPADM

## 2022-11-15 RX ORDER — SODIUM CHLORIDE 9 MG/ML
INJECTION, SOLUTION INTRAVENOUS PRN
Status: DISCONTINUED | OUTPATIENT
Start: 2022-11-15 | End: 2022-11-15 | Stop reason: HOSPADM

## 2022-11-15 RX ORDER — NEOSTIGMINE METHYLSULFATE 5 MG/5 ML
SYRINGE (ML) INTRAVENOUS PRN
Status: DISCONTINUED | OUTPATIENT
Start: 2022-11-15 | End: 2022-11-15 | Stop reason: SDUPTHER

## 2022-11-15 RX ORDER — FENTANYL CITRATE 50 UG/ML
INJECTION, SOLUTION INTRAMUSCULAR; INTRAVENOUS PRN
Status: DISCONTINUED | OUTPATIENT
Start: 2022-11-15 | End: 2022-11-15 | Stop reason: SDUPTHER

## 2022-11-15 RX ORDER — VANCOMYCIN HYDROCHLORIDE 1 G/20ML
INJECTION, POWDER, LYOPHILIZED, FOR SOLUTION INTRAVENOUS PRN
Status: DISCONTINUED | OUTPATIENT
Start: 2022-11-15 | End: 2022-11-15 | Stop reason: ALTCHOICE

## 2022-11-15 RX ORDER — DIPHENHYDRAMINE HYDROCHLORIDE 50 MG/ML
12.5 INJECTION INTRAMUSCULAR; INTRAVENOUS
Status: DISCONTINUED | OUTPATIENT
Start: 2022-11-15 | End: 2022-11-15 | Stop reason: HOSPADM

## 2022-11-15 RX ORDER — SODIUM CHLORIDE 0.9 % (FLUSH) 0.9 %
5-40 SYRINGE (ML) INJECTION EVERY 12 HOURS SCHEDULED
Status: DISCONTINUED | OUTPATIENT
Start: 2022-11-15 | End: 2022-11-15 | Stop reason: HOSPADM

## 2022-11-15 RX ORDER — SODIUM CHLORIDE 0.9 % (FLUSH) 0.9 %
5-40 SYRINGE (ML) INJECTION PRN
Status: DISCONTINUED | OUTPATIENT
Start: 2022-11-15 | End: 2022-11-15 | Stop reason: HOSPADM

## 2022-11-15 RX ORDER — LIDOCAINE HYDROCHLORIDE 10 MG/ML
INJECTION, SOLUTION INFILTRATION; PERINEURAL PRN
Status: DISCONTINUED | OUTPATIENT
Start: 2022-11-15 | End: 2022-11-15 | Stop reason: SDUPTHER

## 2022-11-15 RX ORDER — ROCURONIUM BROMIDE 10 MG/ML
INJECTION, SOLUTION INTRAVENOUS PRN
Status: DISCONTINUED | OUTPATIENT
Start: 2022-11-15 | End: 2022-11-15 | Stop reason: SDUPTHER

## 2022-11-15 RX ORDER — LIDOCAINE HYDROCHLORIDE 10 MG/ML
INJECTION, SOLUTION EPIDURAL; INFILTRATION; INTRACAUDAL; PERINEURAL
Status: DISCONTINUED
Start: 2022-11-15 | End: 2022-11-15 | Stop reason: HOSPADM

## 2022-11-15 RX ORDER — BUPIVACAINE HYDROCHLORIDE 2.5 MG/ML
INJECTION, SOLUTION EPIDURAL; INFILTRATION; INTRACAUDAL
Status: COMPLETED | OUTPATIENT
Start: 2022-11-15 | End: 2022-11-15

## 2022-11-15 RX ORDER — SODIUM CHLORIDE, SODIUM LACTATE, POTASSIUM CHLORIDE, CALCIUM CHLORIDE 600; 310; 30; 20 MG/100ML; MG/100ML; MG/100ML; MG/100ML
INJECTION, SOLUTION INTRAVENOUS CONTINUOUS
Status: DISCONTINUED | OUTPATIENT
Start: 2022-11-15 | End: 2022-11-15 | Stop reason: HOSPADM

## 2022-11-15 RX ORDER — BUPIVACAINE HYDROCHLORIDE AND EPINEPHRINE 2.5; 5 MG/ML; UG/ML
INJECTION, SOLUTION INFILTRATION; PERINEURAL PRN
Status: DISCONTINUED | OUTPATIENT
Start: 2022-11-15 | End: 2022-11-15 | Stop reason: ALTCHOICE

## 2022-11-15 RX ORDER — CYCLOBENZAPRINE HCL 10 MG
10 TABLET ORAL 3 TIMES DAILY PRN
Qty: 21 TABLET | Refills: 0 | Status: SHIPPED | OUTPATIENT
Start: 2022-11-15 | End: 2022-11-25

## 2022-11-15 RX ORDER — OXYCODONE HYDROCHLORIDE AND ACETAMINOPHEN 5; 325 MG/1; MG/1
1 TABLET ORAL EVERY 6 HOURS PRN
Qty: 28 TABLET | Refills: 0 | Status: SHIPPED | OUTPATIENT
Start: 2022-11-15 | End: 2022-11-22

## 2022-11-15 RX ORDER — DEXAMETHASONE SODIUM PHOSPHATE 10 MG/ML
INJECTION, SOLUTION INTRAMUSCULAR; INTRAVENOUS PRN
Status: DISCONTINUED | OUTPATIENT
Start: 2022-11-15 | End: 2022-11-15 | Stop reason: SDUPTHER

## 2022-11-15 RX ORDER — CEFAZOLIN SODIUM 2 G/50ML
SOLUTION INTRAVENOUS PRN
Status: DISCONTINUED | OUTPATIENT
Start: 2022-11-15 | End: 2022-11-15 | Stop reason: SDUPTHER

## 2022-11-15 RX ORDER — GLYCOPYRROLATE 0.2 MG/ML
INJECTION INTRAMUSCULAR; INTRAVENOUS PRN
Status: DISCONTINUED | OUTPATIENT
Start: 2022-11-15 | End: 2022-11-15 | Stop reason: SDUPTHER

## 2022-11-15 RX ORDER — VANCOMYCIN HYDROCHLORIDE 1 G/20ML
INJECTION, POWDER, LYOPHILIZED, FOR SOLUTION INTRAVENOUS
Status: DISCONTINUED
Start: 2022-11-15 | End: 2022-11-15 | Stop reason: HOSPADM

## 2022-11-15 RX ORDER — SODIUM CHLORIDE 9 MG/ML
INJECTION, SOLUTION INTRAVENOUS CONTINUOUS PRN
Status: DISCONTINUED | OUTPATIENT
Start: 2022-11-15 | End: 2022-11-15 | Stop reason: SDUPTHER

## 2022-11-15 RX ORDER — HYDROCODONE BITARTRATE AND ACETAMINOPHEN 5; 325 MG/1; MG/1
1 TABLET ORAL ONCE
Status: COMPLETED | OUTPATIENT
Start: 2022-11-15 | End: 2022-11-15

## 2022-11-15 RX ADMIN — FENTANYL CITRATE 100 MCG: 50 INJECTION, SOLUTION INTRAMUSCULAR; INTRAVENOUS at 14:36

## 2022-11-15 RX ADMIN — PROPOFOL 150 MG: 10 INJECTION, EMULSION INTRAVENOUS at 14:36

## 2022-11-15 RX ADMIN — HYDROMORPHONE HYDROCHLORIDE 0.5 MG: 1 INJECTION, SOLUTION INTRAMUSCULAR; INTRAVENOUS; SUBCUTANEOUS at 16:52

## 2022-11-15 RX ADMIN — ROCURONIUM BROMIDE 40 MG: 10 INJECTION, SOLUTION INTRAVENOUS at 14:36

## 2022-11-15 RX ADMIN — HYDROMORPHONE HYDROCHLORIDE 0.5 MG: 1 INJECTION, SOLUTION INTRAMUSCULAR; INTRAVENOUS; SUBCUTANEOUS at 17:06

## 2022-11-15 RX ADMIN — Medication 3 MG: at 16:14

## 2022-11-15 RX ADMIN — ONDANSETRON 4 MG: 2 INJECTION INTRAMUSCULAR; INTRAVENOUS at 16:12

## 2022-11-15 RX ADMIN — Medication 0.5 MG: at 16:52

## 2022-11-15 RX ADMIN — Medication 0.5 MG: at 17:06

## 2022-11-15 RX ADMIN — CEFAZOLIN SODIUM 2000 MG: 2 SOLUTION INTRAVENOUS at 14:49

## 2022-11-15 RX ADMIN — GLYCOPYRROLATE 0.6 MG: 0.2 INJECTION INTRAMUSCULAR; INTRAVENOUS at 16:12

## 2022-11-15 RX ADMIN — HYDROCODONE BITARTRATE AND ACETAMINOPHEN 1 TABLET: 5; 325 TABLET ORAL at 17:32

## 2022-11-15 RX ADMIN — SODIUM CHLORIDE: 9 INJECTION, SOLUTION INTRAVENOUS at 14:34

## 2022-11-15 RX ADMIN — SODIUM CHLORIDE: 9 INJECTION, SOLUTION INTRAVENOUS at 15:40

## 2022-11-15 RX ADMIN — BUPIVACAINE HYDROCHLORIDE 30 ML: 2.5 INJECTION, SOLUTION EPIDURAL; INFILTRATION; INTRACAUDAL; PERINEURAL at 13:42

## 2022-11-15 RX ADMIN — DEXAMETHASONE SODIUM PHOSPHATE 8 MG: 10 INJECTION, SOLUTION INTRAMUSCULAR; INTRAVENOUS at 14:50

## 2022-11-15 RX ADMIN — LIDOCAINE HYDROCHLORIDE 40 MG: 10 INJECTION, SOLUTION INFILTRATION; PERINEURAL at 14:36

## 2022-11-15 ASSESSMENT — PAIN DESCRIPTION - ORIENTATION
ORIENTATION: LEFT
ORIENTATION: RIGHT

## 2022-11-15 ASSESSMENT — PAIN DESCRIPTION - PAIN TYPE
TYPE: SURGICAL PAIN;ACUTE PAIN
TYPE: SURGICAL PAIN;CHRONIC PAIN
TYPE: SURGICAL PAIN;ACUTE PAIN
TYPE: SURGICAL PAIN

## 2022-11-15 ASSESSMENT — PAIN SCALES - GENERAL
PAINLEVEL_OUTOF10: 6
PAINLEVEL_OUTOF10: 0
PAINLEVEL_OUTOF10: 6
PAINLEVEL_OUTOF10: 6
PAINLEVEL_OUTOF10: 8
PAINLEVEL_OUTOF10: 4
PAINLEVEL_OUTOF10: 8

## 2022-11-15 ASSESSMENT — LIFESTYLE VARIABLES: SMOKING_STATUS: 1

## 2022-11-15 ASSESSMENT — PAIN DESCRIPTION - LOCATION
LOCATION: ANKLE

## 2022-11-15 ASSESSMENT — PAIN - FUNCTIONAL ASSESSMENT: PAIN_FUNCTIONAL_ASSESSMENT: 0-10

## 2022-11-15 ASSESSMENT — PAIN DESCRIPTION - DESCRIPTORS
DESCRIPTORS: ACHING
DESCRIPTORS: ACHING

## 2022-11-15 NOTE — ANESTHESIA PRE PROCEDURE
Department of Anesthesiology  Preprocedure Note       Name:  Francesca Mix   Age:  78 y.o.  :  1955                                          MRN:  [id number]         Date:  11/15/2022      Surgeon: Nina Mortensen):  Brittany Arrington DPM    Procedure: Procedure(s):  RIGHT ANKLE EXTERNAL FIXATOR REVISION WITH SERA vs FUSION OF MID FOOT    Medications prior to admission:   Prior to Admission medications    Medication Sig Start Date End Date Taking? Authorizing Provider   HYDROcodone-acetaminophen (NORCO)  MG per tablet Take 1 tablet by mouth every 6 hours as needed for Pain. Yes Historical Provider, MD   cefepime (MAXIPIME) infusion Infuse 2,000 mg intravenously in the morning and 2,000 mg in the evening. Through 2022 Compound per protocol. 10/7/22 11/18/22  Esthela Foster MD   vancomycin (VANCOCIN) infusion Infuse 1,500 mg intravenously every 24 hours Through 2022 Compound per protocol. 10/7/22 11/18/22  Esthela Foster MD   ciprofloxacin (CIPRO) 500 MG tablet Take 500 mg by mouth 2 times daily 9/30 x 14 days 22   Historical Provider, MD   TRUEPLUS PEN NEEDLES 31G X 8 MM MISC  21   Historical Provider, MD   mupirocin (BACTROBAN) 2 % ointment Apply topically 2 times daily TO FOOT WOUND. 21   Historical Provider, MD   amLODIPine (NORVASC) 5 MG tablet Take 5 mg by mouth daily 10/20/21   Historical Provider, MD   LANTUS SOLOSTAR 100 UNIT/ML injection pen Inject 15 Units into the skin nightly  Patient taking differently: Inject 10 Units into the skin 2 times daily 21   ROCHELLE Medrano - NP   JANUVIA 100 MG tablet Take 100 mg by mouth daily  20   Historical Provider, MD   Misc.  Devices MISC 1 PAIR OF DIABETIC SHOES (1 LEFT/ 1 RIGHT)  1-3 PAIRS OF INSERTS (LEFT/ RIGHT) 3/5/20   María Mejias DPM   cetirizine (ZYRTEC) 10 MG tablet Take 10 mg by mouth nightly  10/21/19   Historical Provider, MD   simvastatin (ZOCOR) 40 MG tablet Take 40 mg by mouth nightly  11/13/18   Historical Provider, MD   glipiZIDE (GLUCOTROL) 10 MG tablet Take 20 mg by mouth 2 times daily (before meals) Takes 2 tabs (=20mg) BID    Historical Provider, MD   aspirin 81 MG tablet Take 81 mg by mouth daily    Historical Provider, MD   gabapentin (NEURONTIN) 300 MG capsule Take 900 mg by mouth 3 times daily. Take 3 caps (=900mg) 3 times a day    Historical Provider, MD       Current medications:    Current Facility-Administered Medications   Medication Dose Route Frequency Provider Last Rate Last Admin    lidocaine PF 1 % injection 1 mL  1 mL IntraDERmal Once PRN Denny Ramirez MD        lactated ringers infusion   IntraVENous Continuous Denny Ramirez MD        sodium chloride flush 0.9 % injection 5-40 mL  5-40 mL IntraVENous 2 times per day Denny Ramirez MD        sodium chloride flush 0.9 % injection 5-40 mL  5-40 mL IntraVENous PRN Denny Ramirez MD        0.9 % sodium chloride infusion   IntraVENous PRN Denny Ramirez MD           Allergies: Allergies   Allergen Reactions    Morphine Itching    Other Other (See Comments)     Other reaction(s): Unknown    Percocet [Oxycodone-Acetaminophen]      Facial swelling       Problem List:    Patient Active Problem List   Diagnosis Code    Essential hypertension I10    Type II or unspecified type diabetes mellitus without mention of complication, not stated as uncontrolled E11.9    Neuropathy G62.9    Vertigo R44    Charcot foot due to diabetes mellitus ([facility] E11.610    Hyperlipidemia E78.5    Cellulitis L03.90    Cellulitis of left foot L03. 80    Right foot infection L08.9    Diabetic polyneuropathy associated with type 2 diabetes mellitus ([facility] [address] E11.42    Foreign body (FB) in soft tissue M79.5    Cellulitis of left leg L03. 116    Abscess of right foot L02.611    Chest pain at rest R07.9    Tobacco abuse Z72.0    Type 2 diabetes mellitus treated with insulin (HCC) E11.9, Z79.4    ALEJANDRO (acute kidney injury) ([facility] [address] N17.9    Bandemia D72.825    Chronic multifocal osteomyelitis of right foot ([facility]) M86.371    Diabetic infection of right foot ([facility]) E11.628, L08.9    Acquired hammer toe deformity of lesser toe of right foot M20.41    Post-op pain G89.18    Right foot pain M79.671    Hallux hammertoe, right M20.31    Osteomyelitis ([facility]) M86.9    Wound dehiscence T81.30XA    Diabetic foot ([facility]) E11.8    Hyperglycemia due to diabetes mellitus ([facility]) E11.65    Foot ulcer ([facility]) L97.509    Cellulitis of right foot L03.115    Type 2 diabetes mellitus with right diabetic foot ulcer ([facility]) E11.621, L97.519    Charcot's joint of right foot M14.671    Stage 3b chronic kidney disease ([facility]) N18.32       Past Medical History:        Diagnosis Date    Abscess of right foot 8/4/2018    Acquired hammer toe deformity of lesser toe of right foot     ALEJANDRO (acute kidney injury) ([facility] [address] 8/5/2018    Cellulitis 4/24/2018    Cellulitis of left foot 4/24/2018    Cellulitis of right foot     and abscess    Charcot foot due to diabetes mellitus ([address] 2014    Right foot     Chest pain at rest 8/4/2018    Chronic multifocal osteomyelitis of right foot ([address]     Diabetic polyneuropathy associated with type 2 diabetes mellitus ([address]     Essential hypertension     Fractures, multiple 07/21/2014    Right foot fractures     Hyperlipidemia     Hypertension     Leukocytosis     Neuropathy     diabetic with charcot affecting the right foot    Pain in right foot     redness and swelling    Pneumonia 2009    Right foot infection     Right foot pain     Tobacco abuse 4/24/2018    Type II or unspecified type diabetes mellitus without mention of complication, not stated as uncontrolled     Vertigo     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 stepped on sharp object. Covered by dressing.     Wound, open Right posterior -Diabetic Ulcer       Past Surgical History:        Procedure Laterality Date    ANKLE SURGERY Right 10/12/2022    RIGHT MID-FOOT OSTEOTOMY WITH OF APPLICATION EXTERNAL FIXATOR SERA performed by Erin Escalante DPM at [facility]      at age 24   Trg Jeanine 4 Right 12/11/2020    RIGHT HALLUX EXOSTECTOMY AND 3RD DIGIT EXTENSIOR TENOTOMY performed by Dalia Garcia DPM at [address] Left 04/24/2018    I&D foreign body removal    FOOT DEBRIDEMENT Right 03/20/2020    RIGHT  FOOT   INCISION AND DRAINAGE WITH BONE BIOPSY performed by Dalia Garcia DPM at [facility] [address] Right 06/08/2021    FOOT DEBRIDEMENT INCISION AND DRAINAGE performed by Dalia Garcia DPM at [facility]  Right 09/16/2021    RIGHT FOOT  INCISION AND DRAINAGE   WITH PULSE LAVAGE performed by Dalia Garcia DPM at [facility] Right 06/11/2021    RIGHT FOOT FLAP CLOSURE performed by Dalia Garcia DPM at [facility] Right 56/37/4359    APPLICATION EXTERNAL FIXATOR RIGHT FOOT - SERA - Right    IR INS PICC VAD W SQ PORT GREATER THAN 5  10/07/2022    IR INS PICC VAD W SQ PORT GREATER THAN 5 10/7/2022 [facility]    KNEE ARTHROSCOPY Right 1989    KNEE ARTHROSCOPY Left 1990    KNEE SURGERY Bilateral 1983    arthroscopy    NECK SURGERY  1987    RI DEEP [address] FOOT INFEC,1 BURSA Left 04/24/2018    LEFT FOOT MULTIPLE  INCISIONS  AND DRAINAGE AND REMOVAL FOREIGN BODY  IRENE performed by Dalia Garcia DPM at [address] History:    Social History     Tobacco Use    Smoking status: Every Day     Packs/day: 1.00     Types: Cigarettes    Smokeless tobacco: Never   Substance Use Topics    Alcohol use:  No                                Ready to quit: Not Answered  Counseling given: Not Answered      Vital Signs (Current):   Vitals:    11/14/22 1712 11/15/22 1153   BP:  (!) 159/77 Pulse:  79   Resp:  17   Temp:  98 °F (36.7 °C)   SpO2:  97%   Weight: 232 lb (105.2 kg) 232 lb (105.2 kg)   Height: 5' 11\" (1.803 m) 5' 11\" (1.803 m)                                              BP Readings from Last 3 Encounters:   11/15/22 (!) 159/77   11/13/22 (!) 135/56   10/18/22 123/85       NPO Status:                                                                                 BMI:   Wt Readings from Last 3 Encounters:   11/15/22 232 lb (105.2 kg)   11/13/22 232 lb (105.2 kg)   10/12/22 234 lb (106.1 kg)     Body mass index is 32.36 kg/m². CBC:   Lab Results   Component Value Date/Time    WBC 8.7 11/13/2022 03:36 PM    RBC 3.67 11/13/2022 03:36 PM    HGB 10.7 11/13/2022 03:36 PM    HCT 32.4 11/13/2022 03:36 PM    MCV 88.3 11/13/2022 03:36 PM    RDW 14.3 11/13/2022 03:36 PM     11/13/2022 03:36 PM       CMP:   Lab Results   Component Value Date/Time     11/13/2022 03:36 PM    K 4.5 11/13/2022 03:36 PM    CL 99 11/13/2022 03:36 PM    CO2 27 11/13/2022 03:36 PM    BUN 26 11/13/2022 03:36 PM    CREATININE 2.22 11/13/2022 03:36 PM    GFRAA 38 09/13/2022 06:41 PM    LABGLOM 32 11/13/2022 03:36 PM    GLUCOSE 120 11/13/2022 03:36 PM    PROT 7.2 12/03/2021 02:30 PM    CALCIUM 10.3 11/13/2022 03:36 PM    BILITOT 0.35 12/03/2021 02:30 PM    ALKPHOS 113 12/03/2021 02:30 PM    AST 13 12/03/2021 02:30 PM    ALT 14 12/03/2021 02:30 PM       POC Tests: No results for input(s): POCGLU, POCNA, POCK, POCCL, POCBUN, POCHEMO, POCHCT in the last 72 hours.     Coags: No results found for: PROTIME, INR, APTT    HCG (If Applicable): No results found for: PREGTESTUR, PREGSERUM, HCG, HCGQUANT     ABGs: No results found for: PHART, PO2ART, DOC6ZVK, WPX9HVK, BEART, B3BCKJCB     Type & Screen (If Applicable):  No results found for: LABABO, LABRH    Drug/Infectious Status (If Applicable):  No results found for: HIV, HEPCAB    COVID-19 Screening (If Applicable):   Lab Results   Component Value Date/Time    COVID19 DETECTED 05/26/2021 12:06 PM    COVID19 Not Detected 12/07/2020 03:10 PM    COVID19 Not Detected 08/08/2020 10:02 PM           Anesthesia Evaluation  Patient summary reviewed and Nursing notes reviewed no history of anesthetic complications:   Airway: Mallampati: II  TM distance: >3 FB   Neck ROM: full  Mouth opening: > = 3 FB   Dental:          Pulmonary:normal exam  breath sounds clear to auscultation  (+) pneumonia: resolved,  current smoker                           Cardiovascular:  Exercise tolerance: no interval change,   (+) hypertension: no interval change, dysrhythmias: PVC, hyperlipidemia      ECG reviewed  Rhythm: irregular  Rate: normal                    Neuro/Psych:   (+) neuromuscular disease:,              ROS comment: Diabetic polyneuropathy GI/Hepatic/Renal:   (+) renal disease: CRI and no interval change,           Endo/Other:    (+) DiabetesType II DM, no interval change, using insulin, . ROS comment: Chronic multifocal osteomyelitis of right foot (HCC)  Diabetic infection of right foot      Abdominal:       Abdomen: soft. Vascular: negative vascular ROS. Other Findings:           Anesthesia Plan      general     ASA 3     (GA, possible popliteal nerve block)  Induction: intravenous. Anesthetic plan and risks discussed with patient.                         Marzena Javed MD   11/15/2022

## 2022-11-15 NOTE — DISCHARGE INSTRUCTIONS
Podiatric Post Operative Instructions: You are being discharged following operative intervention on your right lower extremity. Fluids and Diet:  Lean proteins and vegetables should be the backbone of your diet to aid in the healing process  Reduce salt intake to assist with decreased fluid retention which reduces the swelling to your affected extremity     Medications: Take your prescriptions as directed  You are receiving new prescriptions for pain control. Continue antibiotics per ID. If your pain is not severe then you may take the non-prescription medication that you normally take for aches and pains  You may resume your regularly scheduled medications (unless otherwise directed)  If any side effects or adverse reactions occur, discontinue the medication and contact your doctor. Review the patient drug information that is provided before you take any medication    Ambulation and Activity:  You are advised to go directly home from the hospital  Use assistive devices to ambulate at all times (for walking, moving around - be very careful to avoid weight on your injured extremity). You may not put weight on the operated foot. Avoid stairs if possible. Do not lift or move heavy objects  Do not drive until cleared by your physician    Bandage and Wound Care Instructions:  Keep bandage clean and dry  Do not shower or bathe the operative extremity  Do not remove the bandage  Do not attempt to put anything between the dressing and your skin, some itching is normal.    Ice and Elevation:  Elevate operative extremity as much as possible to reduce swelling and discomfort. Elevate with 2 pillows at or above the level of the heart for the first 72 hours and whenever possible to reduce swelling. Ice:  Apply ice to the back of the knee of the affected extremity for 20 minutes of every 2 hours while awake for the first 72 hours. You may ice the bandage as well.     Special Instructions: Call your doctor immediately if you develop any of the following. Fever over 100.4 degrees Fahrenheit by mouth - take your temperature daily until your first follow up visit. Pain not relieved by medication ordered  Swelling, increased redness, warmth, or hardness around operative area. Numb, tingling or cold toes. Toe(s) become white or bluish  Bandage becomes wet, soiled, or blood soaked (small amount of bleeding may be normal)  Increased or progressive drainage from surgical area. Follow up instructions: You will need to follow up with Dr. Brittany Arrington DPM within one week. Call when you get home to schedule or confirm your appointment. Call Brittany Arrington, [address] office if you have any questions or concerns.

## 2022-11-15 NOTE — BRIEF OP NOTE
Brief Postoperative Note      Patient: Ankit Baxter  YOB: 1955  MRN: [id number]    Date of Procedure: 11/15/2022    Pre-Op Diagnosis:   Hardware failure  Right lower extremity pain  S/p midfoot osteotomy; right foot (DOS: 10/12/22)  S/p external fixation application; right LE (DOS: 10/12/22)  Type II diabetic foot ulcer down to subcutaneous layer, right foot  Charcot deformity, right foot  Type 2 diabetes with peripheral neuropathy    Post-Op Diagnosis: Same    Procedures:  Adjustment of external fixation device, right foot x 2    Surgeon(s):  Leonel Lazo DPM    Assistant:  Javan Aguilera DPM    Anesthesia: General with popliteal block and 10 ml of 0.5% marcaine with epi    Estimated Blood Loss (mL): Minimal    Complications: None    Specimens:   ID Type Source Tests Collected by Time Destination   1 : RIGHT LEG Swab Foot CULTURE, ANAEROBIC AND AEROBIC Leonel Lazo DPM 11/15/2022 1601        Implants:  * No implants in log *      Drains:   [REMOVED] Open Drain Right Foot (Removed)   Site Description Unable to view 10/18/22 0923   Dressing Status Clean, dry & intact 10/18/22 0923   Drainage Appearance None 10/17/22 2000       Findings: Failed hardware right foot and leg - broken wires and displaced half pin in forefoot required re-application of external fixation device    Electronically signed by Dilia Salmon DPM on 11/15/2022 at 4:36 PM

## 2022-11-15 NOTE — H&P
History and Physical  [place] [id number] [facility]        PATIENT NAME: James Ahumada   YOB: 1955  GENDER: male     Procedure Date: 11/15/2022 11:19 AM     Attending Provider: Moreno Tanner DPM    Chief Complaint: Right foot charcot with external fixation hardware failure    Procedure Scheduled: Removal of external fixation, right foot; possible re-application of external fixation vs multiple sites of arthrodesis with beam application(s) vs ORIF plate and screws and all other necessary soft tissue and osseous procedures with possible bone biopsy    History of present Illness: The patient is a 78 y.o. male  who presents to pre-op area prior to scheduled for above mentioned procedure. Pt recommended to undergo above mentioned procedures at earliest possible convenience. Pt denies changes to his medical history since he was last seen in office. Denies current nausea, vomiting, chest pain, SOB, fever, and chills. Consent form signed and reviewed. Any/all additional questions/concerns were addressed and consent form updated. Pt elected to pursue the above mentioned procedures as scheduled for today.      Past Medical History:  has a past medical history of Abscess of right foot, Acquired hammer toe deformity of lesser toe of right foot, ALEJANDRO (acute kidney injury) ([address] Cellulitis, Cellulitis of left foot, Cellulitis of right foot, Charcot foot due to diabetes mellitus ([address] Chest pain at rest, Chronic multifocal osteomyelitis of right foot ([address] Diabetic polyneuropathy associated with type 2 diabetes mellitus ([address] Essential hypertension, Fractures, multiple, Hyperlipidemia, Hypertension, Leukocytosis, Neuropathy, Pain in right foot, Pneumonia, Right foot infection, Right foot pain, Tobacco abuse, Type II or unspecified type diabetes mellitus without mention of complication, not stated as uncontrolled, Vertigo, 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 Past Surgical History:   Past Surgical History:   Procedure Laterality Date    ANKLE SURGERY Right 10/12/2022    RIGHT MID-FOOT OSTEOTOMY WITH OF APPLICATION EXTERNAL FIXATOR SERA performed by Nadir Bejarano DPM at [place]      at age 24    ARTHROPLASTY Right 12/11/2020    RIGHT HALLUX EXOSTECTOMY AND 3RD DIGIT EXTENSIOR TENOTOMY performed by Kelsie Howard DPM at [address] [place] [address] Left 04/24/2018    I&D foreign body removal    FOOT DEBRIDEMENT Right 03/20/2020    RIGHT  FOOT   INCISION AND DRAINAGE WITH BONE BIOPSY performed by Kelsie Howard DPM at [facility] Right 06/08/2021    FOOT DEBRIDEMENT INCISION AND DRAINAGE performed by Kelsie Howard DPM at [facility] Right 09/16/2021    RIGHT FOOT  INCISION AND DRAINAGE   WITH PULSE LAVAGE performed by Kelsie Howard DPM at [address] [place] [address] Right 06/11/2021    RIGHT FOOT FLAP CLOSURE performed by Kelsie Howard DPM at [address] [place] [address] Right 25/93/4928    APPLICATION EXTERNAL FIXATOR RIGHT FOOT - SERA - Right    IR INS PICC VAD W SQ PORT GREATER THAN 5  10/07/2022    IR INS PICC VAD W SQ PORT GREATER THAN 5 10/7/2022 [facility]    KNEE ARTHROSCOPY Right 1989    KNEE ARTHROSCOPY Left 1990    KNEE SURGERY Bilateral 1983    arthroscopy    NECK SURGERY  1987    [place] DEEP [address] FOOT INFEC,1 BURSA Left 04/24/2018    LEFT FOOT MULTIPLE  INCISIONS  AND DRAINAGE AND REMOVAL FOREIGN BODY  IRENE performed by Kelsie Howard DPM at [place] [address] History:  reports that he has been smoking cigarettes. He has been smoking an average of 1 pack per day. He has never used smokeless tobacco. He reports that he does not currently use drugs. He reports that he does not drink alcohol.     Family History: family history includes Cancer in his father; Diabetes in his mother; Hypertension in his maternal grandmother.     Review of Systems:   Negative except as listed above    Allergies: Morphine, Other, and Percocet [oxycodone-acetaminophen]    Current Meds:  Current Facility-Administered Medications:     lidocaine PF 1 % injection 1 mL, 1 mL, IntraDERmal, Once PRN, Mitch Barillas MD    lactated ringers infusion, , IntraVENous, Continuous, Marcello Chandler MD    sodium chloride flush 0.9 % injection 5-40 mL, 5-40 mL, IntraVENous, 2 times per day, Mitch Barillas MD    sodium chloride flush 0.9 % injection 5-40 mL, 5-40 mL, IntraVENous, PRN, Mitch Barillas MD    0.9 % sodium chloride infusion, , IntraVENous, PRN, Mitch Barillas MD    Vital Signs:  Vitals:    11/15/22 1153   BP: (!) 159/77   Pulse: 79   Resp: 17   Temp: 98 °F (36.7 °C)   SpO2: 97%       Physical Exam:  Vital signs and Nurse's note reviewed  Gen:  A&Ox3, NAD  HEENT: NCAT, PERRLA, EOMI, no scleral icterus, oral mucosa moist  Neck: Supple, trachea midline  Chest: Symmetric rise with inhalation, no evidence of trauma  CVS: Regular rate and rhythm, no murmurs, no rubs or gallops   Resp: Good bilateral air entry, clear to auscultation b/l, no wheeze or rhonchi  Abd: soft, non-tender, non-distended  Ext: No clubbing, cyanosis, edema, peripheral pulses 2+ Rad/Fem/DP/PT  CNS: Moves all extremities, no gross focal motor deficits  Skin: No erythema or ulcerations     Labs:   Lab Results   Component Value Date/Time    WBC 8.7 11/13/2022 03:36 PM    HGB 10.7 11/13/2022 03:36 PM    HCT 32.4 11/13/2022 03:36 PM    MCV 88.3 11/13/2022 03:36 PM     11/13/2022 03:36 PM     Lab Results   Component Value Date/Time     11/13/2022 03:36 PM    K 4.5 11/13/2022 03:36 PM    CL 99 11/13/2022 03:36 PM    CO2 27 11/13/2022 03:36 PM    BUN 26 11/13/2022 03:36 PM    CREATININE 2.22 11/13/2022 03:36 PM    GLUCOSE 120 11/13/2022 03:36 PM    CALCIUM 10.3 11/13/2022 03:36 PM     Lab Results   Component Value Date/Time    ALKPHOS 113 12/03/2021 02:30 PM    ALT 14 12/03/2021 02:30 PM    AST 13 12/03/2021 02:30 PM    PROT 7.2 12/03/2021 02:30 PM    BILITOT 0.35 12/03/2021 02:30 PM    LABALBU 3.8 12/03/2021 02:30 PM     Lab Results   Component Value Date/Time    LACTA 0.9 06/06/2021 11:05 AM     No results found for: AMYLASE  Lab Results   Component Value Date/Time    LIPASE 403 12/03/2021 02:30 PM     No results found for: INR      Radiology:   CT R tib/fib/ankle/foot:  1. Extensive subcutaneous fat stranding throughout the right leg and foot  compatible with bland edema or cellulitis. No well-defined drainable fluid  collection or soft tissue gas identified. 2. Status post external fixation. A medial midfoot percutaneous locking  screw traverses the 1st and 2nd tarsometatarsal joint spaces and is not  seated within a bone. 3. Remaining fixation hardware as above. 4. Severe midfoot degenerative changes compatible with neuropathic  arthropathy. 5. Chronic appearing nondisplaced ununited 5th metatarsal base fracture. 6. Severe degenerative changes of the right knee with a small effusion. 7. Mild tibiotalar and calcaneocuboid degenerative changes. 8. Mild 1st metatarsophalangeal degenerative changes. Severe 1st  interphalangeal degenerative changes.           Assessment:  Hardware failure  Right lower extremity pain  S/p midfoot osteotomy; right foot (DOS: 10/12/22)  S/p external fixation application; right LE (DOS: 10/12/22)  Type II diabetic foot ulcer down to subcutaneous layer, right foot  Charcot deformity, right foot  Type 2 diabetes with peripheral neuropathy      Plan:  Proceed w/ above mentioned procedures as scheduled for today  Pt to be DC'd home post procedure pending procedural choice   Pain management with multi-modal therapy: gabapentin, percocet, toradol, flexeril  NWB RLE  Follow up with Sarita Murillo DPM    Electronically signed by David Lees DPM  on 11/15/2022 at 1:25 PM

## 2022-11-15 NOTE — ANESTHESIA POSTPROCEDURE EVALUATION
POST- ANESTHESIA EVALUATION       Pt Name: José Farrar  MRN: [id number]  [place]: 1955  Date of evaluation: 11/15/2022  Time:  4:46 PM      /83   Pulse 99   Temp 96.9 °F (36.1 °C) (Infrared)   Resp 12   Ht 5' 11\" (1.803 m)   Wt 232 lb (105.2 kg)   SpO2 95%   BMI 32.36 kg/m²      Consciousness Level  Awake  Cardiopulmonary Status  Stable  Pain Adequately Treated YES  Nausea / Vomiting  NO  Adequate Hydration  YES  Anesthesia Related Complications NONE      Electronically signed by Sinai Stovall MD on 11/15/2022 at 4:46 PM       Department of Anesthesiology  Postprocedure Note    Patient: José Farrar  MRN: [id number]  [place]: 1955  Date of evaluation: 11/15/2022      Procedure Summary     Date: 11/15/22 Room / Location: [address]) [facility]    Anesthesia Start: 3838 Anesthesia Stop: 1985    Procedure: right lower extremity  adjustment of exfix (Right: Ankle) Diagnosis:       Pain from implanted hardware, initial encounter      Charcot's joint of ankle, right      Contracture of right ankle      (RIGHT ANKLE/MIDFOOT BROKEN/FAILED HARDWARE)    Surgeons: Erin Escalante DPM Responsible Provider: Sinai Stovall MD    Anesthesia Type: general ASA Status: 3          Anesthesia Type: No value filed.     Amy Phase I: Amy Score: 9    Amy Phase II:        Anesthesia Post Evaluation

## 2022-11-16 NOTE — OP NOTE
OP NOTE    PATIENT NAME: Denver Demark  YOB: 1955  -  78 y.o. male  MRN: [id number]  DATE: 11/16/2022  BILLING #: [id number]    Surgeon(s):  Jann Lenz DPM     ASSISTANTS: Barbara Díaz PGY3    PRE-OP DIAGNOSIS:   Painful failed orthopedic hardware, right leg  Painful failed orthopedic hardware, right foot  Cellulitis, right lower extremity  Charcot arthropathy, right foot    POST-OP DIAGNOSIS: Same as above. PROCEDURE:   Adjustment of external fixator, right lower extremity (CPT 20693 x 2, 1 adjustment at the level of the tibial diaphysis and 1 adjustment at the level of the medial cuneiform)    Procedures billed with 78 modifier as unplanned return to the OR during a global period    ANESTHESIA: General, regional popliteal block to right lower extremity    HEMOSTASIS: Direct pressure    ESTIMATED BLOOD LOSS: Roughly 10 cc. MATERIALS: Olive wire x 4, 5mm half pins x 3      INJECTABLES: 10 cc 0.5% Marcaine with epinephrine    SPECIMEN:   ID Type Source Tests Collected by Time Destination   1 : RIGHT LEG Swab Foot CULTURE, ANAEROBIC AND AEROBIC Jann Lenz DPM 11/15/2022 5434        COMPLICATIONS: None    FINDINGS: Failed hardware to the right foot and leg, broken wires in the leg with loosened and displaced half pin in the forefoot. Please see op note for full detail. The patient was counseled at length about the risks of sheela Covid-19 during their perioperative period and any recovery window from their procedure. The patient was made aware that sheela Covid-19  may worsen their prognosis for recovering from their procedure  and lend to a higher morbidity and/or mortality risk. All material risks, benefits, and reasonable alternatives including postponing the procedure were discussed. The patient does wish to proceed with the procedure at this time.     INDICATIONS FOR PROCEDURE: The patient is a 56-year-old diabetic male who presented to the emergency department approximately 2 days ago due to severe pain with failing hardware to the right lower extremity. He was seen and evaluated in my clinic approximately 24 hours ago where he was found to have several pin site infections with broken, loosening, failing, painful orthopedic hardware to the external fixator to the right lower extremity. He underwent Charcot reconstruction with application of the external fixator approximately 4 weeks ago. Unfortunately the patient has been noncompliant with weightbearing protocols leading to early failure of hardware. He requires surgical intervention for adjustment to the external fixator, pain relief, limb salvage of the right lower extremity. We discussed surgical intervention including adjustment of the external fixator of the right lower extremity including all the risks, benefits, alternatives. All questions were answered to the patient's satisfaction. he verbalizes and demonstrates understanding. No guarantees were implied or given. he provided written informed consent which was signed and placed in the chart. Covid testing is negative. NPO status confirmed. A preoperative regional block was given to the right lower extremity by anesthesia team.  Please refer to their documentation for details. PROCEDURE IN DETAIL: Under mild sedation the patient was transported from pre-op to the operating room and placed on the operating table in the supine position with a safety strap across the lap. A well-padded pneumatic tourniquet was applied to the right thigh. The right lower extremity and external fixator was then prepped, scrubbed, and draped in the usual aseptic fashion. A timeout was performed confirming correct patient, correct surgical site, correct procedure, antibiotics, everyone in the room was in agreement. Tourniquet was not inflated. Attention was directed to the middle one third of the leg at the level of the tibial diaphysis.   There was noted to be 3 broken wires within the proximal tibial block. The external fixator was displaced medially. Attention was also directed to the level of the medial forefoot near the first metatarsal base. There is noted to be displacement and loosening of the half pin in the forefoot with medial displacement of the fixator at the level of the forefoot. There was noted to be erythema with some associated purulent drainage at the failing hardware sites. Next we proceeded with adjustment of external fixator at the level of the tibial diaphysis. The 3 broken wires from the proximal tibial block were removed. The associated wire fixation bolts were also removed. The fixator was adjusted in appropriate alignment. Fixation was then achieved in the proximal tibial blocks with use of 2 5.0 millimeters half pins and 2 opposing olive wires at each ring. Next we proceeded with adjustment of external fixator at the level of the medial forefoot. The previously noted half pin and wire fixation bolt was removed. The fixator and the distal foot was adjusted in appropriate alignment. Fixation was then achieved and the distal footplate with use of a 5 mm half pin which was inserted from medial to lateral across the cuneiforms. Throughout the procedure fluoroscopic imaging was obtained to ensure safe and appropriate hardware placement. Unfortunately final intraoperative images were not able to be obtained due to power outage during the procedure. The previous failing hardware sites were debrided with use of curette. The sites were then irrigated with Irrisept and vancomycin powder was placed in each site. A postoperative injection consisting of 10 cc of 0.5% Marcaine with epinephrine was given. Postoperative dressings were applied consisting of Xeroform, gauze 4 x 4's, ABD, Kerlix, Ace. The patient tolerated the above procedure and anesthesia well without complication.   He was transported to PACU with vital signs stable and neurovascular status intact to the right lower extremity. Postoperative plan: The patient will require additional surgical intervention during his global period. We will plan for repeat CT of the right foot and ankle in approximately 4 to 6 weeks. Once consolidation of Charcot through the midfoot is noted we will proceed with fusion of multiple midtarsal joints and subtalar joint for definitive fixation and correction of the Charcot arthropathy deformity. Patient can be weightbearing strictly for essential functions and transfers up to 5 steps otherwise he is to be strict nonweightbearing to the right lower extremity. Follow-up in my office within 2 weeks. Moreno Tanner DPM on 11/16/2022 at 5:30 PM  The [address] [place] [address] [facility], [address] [facility]    This note was generated with the assistance of a speech recognition program. While intending to generate a timely document that accurately reflects the content of the visit, no guarantee can be provided that every grammatical or spelling mistake has been or will be identified or corrected. In such instances, actual meaning can be extrapolated by context. Thank you for your understanding.

## 2022-11-21 LAB
CULTURE: NORMAL
DIRECT EXAM: NORMAL
DIRECT EXAM: NORMAL
SPECIMEN DESCRIPTION: NORMAL

## 2022-11-23 ENCOUNTER — OFFICE VISIT (OUTPATIENT)
Dept: INFECTIOUS DISEASES | Age: 67
End: 2022-11-23
Payer: MEDICARE

## 2022-11-23 VITALS
SYSTOLIC BLOOD PRESSURE: 140 MMHG | HEART RATE: 84 BPM | BODY MASS INDEX: 32.48 KG/M2 | DIASTOLIC BLOOD PRESSURE: 78 MMHG | HEIGHT: 71 IN | TEMPERATURE: 98.1 F | WEIGHT: 232 LBS

## 2022-11-23 DIAGNOSIS — M86.371 CHRONIC MULTIFOCAL OSTEOMYELITIS OF RIGHT FOOT (HCC): Primary | ICD-10-CM

## 2022-11-23 DIAGNOSIS — Z96.7 FIXATION HARDWARE IN LOWER EXTREMITY: ICD-10-CM

## 2022-11-23 DIAGNOSIS — M14.671 CHARCOT'S JOINT OF RIGHT FOOT: ICD-10-CM

## 2022-11-23 PROCEDURE — 3074F SYST BP LT 130 MM HG: CPT | Performed by: INTERNAL MEDICINE

## 2022-11-23 PROCEDURE — 1123F ACP DISCUSS/DSCN MKR DOCD: CPT | Performed by: INTERNAL MEDICINE

## 2022-11-23 PROCEDURE — 99214 OFFICE O/P EST MOD 30 MIN: CPT | Performed by: INTERNAL MEDICINE

## 2022-11-23 PROCEDURE — 3078F DIAST BP <80 MM HG: CPT | Performed by: INTERNAL MEDICINE

## 2022-11-23 PROCEDURE — G8427 DOCREV CUR MEDS BY ELIG CLIN: HCPCS | Performed by: INTERNAL MEDICINE

## 2022-11-23 PROCEDURE — 4004F PT TOBACCO SCREEN RCVD TLK: CPT | Performed by: INTERNAL MEDICINE

## 2022-11-23 PROCEDURE — 3017F COLORECTAL CA SCREEN DOC REV: CPT | Performed by: INTERNAL MEDICINE

## 2022-11-23 PROCEDURE — G8417 CALC BMI ABV UP PARAM F/U: HCPCS | Performed by: INTERNAL MEDICINE

## 2022-11-23 PROCEDURE — G8484 FLU IMMUNIZE NO ADMIN: HCPCS | Performed by: INTERNAL MEDICINE

## 2022-11-23 NOTE — PROGRESS NOTES
[facility]  Office Progress Note  Today's Date and Time: 11/23/2022, 9:41 AM    Impression:     1. Chronic multifocal osteomyelitis of right foot (HCC)    2. Charcot's joint of right foot    3. Fixation hardware in lower extremity         Recommendations   The patient has a few days left to complete intravenous antimicrobial therapy with cefepime, vancomycin, and metronidazole. He did undergo recent surgery 11/15/2022 and at that point in time. No findings to suggest any residual infection. The plan will be to complete antibiotic therapy and monitor his clinical progress    I have ordered the following medications/ labs:  No orders of the defined types were placed in this encounter. No orders of the defined types were placed in this encounter. Chief complaint/reason for consultation:     Chief Complaint   Patient presents with    Wound Infection     PICC Line Removed 11/18        History of Present Illness:   Lila Landers is a 78y.o.-year-old male who has a history of a right sided Charcot foot deformity and has had a longstanding history with infections in the right foot. He did have an abscess for which he underwent I&D in June 2020 with MSSA isolated and the patient has had a residual wound which secondarily got infected and caused an abscess and in September 2021 he underwent an I&D of the abscess with MSSA, Enterobacter, Morganella isolated and he was discharged with IV antimicrobial therapy which he took for 6 weeks through October 22 and 2021. The patient did continue to have issues with wound dehiscence and had an open wound on the plantar aspect of the foot which was clean. Seeing the patient in close to a year now and he has followed with the podiatrist and has had continued wound care and the wound has been improving with time. Patient has had a plantar foot midfoot ulceration.      Patient did subsequently start to develop soft tissue swelling redness in his foot and the swelling started to extend into his leg and he was hospitalized and work-up did suggest infection/abscess and the patient underwent multiple midfoot fusion with osteotomy and application of a multiplanar external fixator device of the right lower extremity 10/12/2022    He has since been on intravenous antimicrobial therapy with cefepime, vancomycin, and metronidazole and this is scheduled to complete 11/25/2022. He was taken to the operating room and underwent adjustment of external fixator, right lower extremity. The patient is otherwise doing okay does not report any acute issues at this time. He is tolerating antibiotic therapy well. I have personally reviewedthe past medical history, medications, social history, and I have updated the database accordingly.   Past Medical History:     Past Medical History:   Diagnosis Date    Abscess of right foot 8/4/2018    Acquired hammer toe deformity of lesser toe of right foot     ALEJANDRO (acute kidney injury) ([address] 8/5/2018    Cellulitis 4/24/2018    Cellulitis of left foot 4/24/2018    Cellulitis of right foot     and abscess    Charcot foot due to diabetes mellitus ([address] 2014    Right foot     Chest pain at rest 8/4/2018    Chronic multifocal osteomyelitis of right foot ([address]     Diabetic polyneuropathy associated with type 2 diabetes mellitus ([address]     Essential hypertension     Fractures, multiple 07/21/2014    Right foot fractures     Hyperlipidemia     Hypertension     Leukocytosis     Neuropathy     diabetic with charcot affecting the right foot    Pain in right foot     redness and swelling    Pneumonia 2009    Right foot infection     Right foot pain     Tobacco abuse 4/24/2018    Type II or unspecified type diabetes mellitus without mention of complication, not stated as uncontrolled     Vertigo     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 stepped on sharp object. Covered by dressing. Wound, open     Right posterior -Diabetic Ulcer     Medications:     Current Outpatient Medications   Medication Sig Dispense Refill    cyclobenzaprine (FLEXERIL) 10 MG tablet Take 1 tablet by mouth 3 times daily as needed for Muscle spasms 21 tablet 0    HYDROcodone-acetaminophen (NORCO)  MG per tablet Take 1 tablet by mouth every 6 hours as needed for Pain. ciprofloxacin (CIPRO) 500 MG tablet Take 500 mg by mouth 2 times daily 9/30 x 14 days      TRUEPLUS PEN NEEDLES 31G X 8 MM MISC       mupirocin (BACTROBAN) 2 % ointment Apply topically 2 times daily TO FOOT WOUND.      amLODIPine (NORVASC) 5 MG tablet Take 5 mg by mouth daily      LANTUS SOLOSTAR 100 UNIT/ML injection pen Inject 15 Units into the skin nightly (Patient taking differently: Inject 10 Units into the skin 2 times daily) 5 pen 3    JANUVIA 100 MG tablet Take 100 mg by mouth daily       Misc. Devices MISC 1 PAIR OF DIABETIC SHOES (1 LEFT/ 1 RIGHT)  1-3 PAIRS OF INSERTS (LEFT/ RIGHT) 2 each 0    cetirizine (ZYRTEC) 10 MG tablet Take 10 mg by mouth nightly       simvastatin (ZOCOR) 40 MG tablet Take 40 mg by mouth nightly       glipiZIDE (GLUCOTROL) 10 MG tablet Take 20 mg by mouth 2 times daily (before meals) Takes 2 tabs (=20mg) BID      aspirin 81 MG tablet Take 81 mg by mouth daily      gabapentin (NEURONTIN) 300 MG capsule Take 900 mg by mouth 3 times daily. Take 3 caps (=900mg) 3 times a day       No current facility-administered medications for this visit. Allergies:   Morphine, Other, and Percocet [oxycodone-acetaminophen]     Review of Systems:   Review of Systems   Constitutional: Negative. HENT: Negative. Respiratory: Negative. Cardiovascular: Negative. Gastrointestinal: Negative. Genitourinary: Negative. Musculoskeletal: Negative. There is an external fixator in place   Neurological: Negative. Psychiatric/Behavioral: Negative.         Physical Examination :   BP (!) 140/78 (Site: Right Upper Arm, Position: Sitting, Cuff Size: Large Adult)   Pulse 84   Temp 98.1 °F (36.7 °C)   Ht 5' 11\" (1.803 m)   Wt 232 lb (105.2 kg)   BMI 32.36 kg/m²     Physical Exam  Constitutional:       Appearance: He is well-developed. HENT:      Head: Normocephalic and atraumatic. Cardiovascular:      Rate and Rhythm: Normal rate. Heart sounds: Normal heart sounds. No friction rub. No gallop. Pulmonary:      Effort: Pulmonary effort is normal.      Breath sounds: Normal breath sounds. No wheezing. Abdominal:      General: Bowel sounds are normal.      Palpations: Abdomen is soft. There is no mass. Tenderness: There is no abdominal tenderness. Musculoskeletal:         General: Normal range of motion. Cervical back: Normal range of motion and neck supple. Comments: There is an external fixator in place and the dressing was not removed. Lymphadenopathy:      Cervical: No cervical adenopathy. Skin:     General: Skin is warm and dry. Neurological:      Mental Status: He is alert and oriented to person, place, and time.        Laboratory studies :  Medical Decision Making:   I have independently reviewed the following labs:  CBC with Differential:  Lab Results   Component Value Date/Time    WBC 8.7 11/13/2022 03:36 PM    WBC 8.8 10/05/2022 06:01 AM    HGB 10.7 11/13/2022 03:36 PM    HGB 10.2 10/05/2022 06:01 AM    HCT 32.4 11/13/2022 03:36 PM    HCT 30.8 10/05/2022 06:01 AM     11/13/2022 03:36 PM     10/05/2022 06:01 AM    LYMPHOPCT 23 11/13/2022 03:36 PM    LYMPHOPCT 27 10/05/2022 06:01 AM    MONOPCT 9 11/13/2022 03:36 PM    MONOPCT 8 10/05/2022 06:01 AM       BMP:  Lab Results   Component Value Date/Time     11/13/2022 03:36 PM     10/05/2022 06:01 AM    K 4.5 11/13/2022 03:36 PM    K 4.4 10/05/2022 06:01 AM    CL 99 11/13/2022 03:36 PM     10/05/2022 06:01 AM    CO2 27 11/13/2022 03:36 PM    CO2 24 10/05/2022 06:01 AM    BUN 26 11/13/2022 03:36 PM    BUN 30 10/18/2022 05:52 AM    CREATININE 2.22 11/13/2022 03:36 PM    CREATININE 2.22 10/18/2022 05:52 AM       Hepatic Function Panel:   Lab Results   Component Value Date/Time    PROT 7.2 12/03/2021 02:30 PM    PROT 7.5 01/09/2017 09:32 AM    LABALBU 3.8 12/03/2021 02:30 PM    LABALBU 4.5 01/09/2017 09:32 AM    BILITOT 0.35 12/03/2021 02:30 PM    BILITOT 0.39 01/09/2017 09:32 AM    ALKPHOS 113 12/03/2021 02:30 PM    ALKPHOS 89 01/09/2017 09:32 AM    ALT 14 12/03/2021 02:30 PM    ALT 15 01/09/2017 09:32 AM    AST 13 12/03/2021 02:30 PM    AST 16 01/09/2017 09:32 AM       Lab Results   Component Value Date/Time    CRP 18.5 11/13/2022 03:36 PM    CRP 30.1 10/04/2022 10:18 PM     Lab Results   Component Value Date/Time    SEDRATE 43 11/13/2022 03:36 PM    SEDRATE 64 10/04/2022 10:18 PM         Imaging Studies:     CT OF THE RIGHT TIBIA AND FIBULA WITHOUT CONTRAST; CT OF THE RIGHT FOOT   WITHOUT CONTRAST 11/13/2022 3:55 pm     Impression   1. Extensive subcutaneous fat stranding throughout the right leg and foot   compatible with bland edema or cellulitis. No well-defined drainable fluid   collection or soft tissue gas identified. 2. Status post external fixation. A medial midfoot percutaneous locking   screw traverses the 1st and 2nd tarsometatarsal joint spaces and is not   seated within a bone. 3. Remaining fixation hardware as above. 4. Severe midfoot degenerative changes compatible with neuropathic   arthropathy. 5. Chronic appearing nondisplaced ununited 5th metatarsal base fracture. 6. Severe degenerative changes of the right knee with a small effusion. 7. Mild tibiotalar and calcaneocuboid degenerative changes. 8. Mild 1st metatarsophalangeal degenerative changes. Severe 1st   interphalangeal degenerative changes. Cultures:     Component 11/15/22 2220    Specimen Description . FOOT    Direct Exam NO BACTERIA SEEN    Direct Exam NO NEUTROPHILS SEEN    Culture NO GROWTH José Miguel              Specimen Collected: 11/15/22 22:20 EST Last Resulted: 11/21/22 08:46 EST                Thank you for allowing us to participate in the care of this patient. Pleasecall with questions. Thalia Ham MD  [employer] messaging: [phone]    This note is created with the assistance of a speech recognition program.  While intending to generate a document that actually reflects the content ofthe visit, the document can still have some errors including those of syntax and sound a like substitutions which may escape proof reading. It such instances, actual meaning can be extrapolated by contextual diversion.

## 2022-12-19 ASSESSMENT — ENCOUNTER SYMPTOMS
RESPIRATORY NEGATIVE: 1
GASTROINTESTINAL NEGATIVE: 1

## 2022-12-22 ENCOUNTER — ANESTHESIA EVENT (OUTPATIENT)
Dept: OPERATING ROOM | Age: 67
End: 2022-12-22
Payer: MEDICARE

## 2022-12-27 ENCOUNTER — ANESTHESIA (OUTPATIENT)
Dept: OPERATING ROOM | Age: 67
End: 2022-12-27
Payer: MEDICARE

## 2022-12-27 ENCOUNTER — HOSPITAL ENCOUNTER (OUTPATIENT)
Age: 67
Setting detail: OUTPATIENT SURGERY
Discharge: HOME | End: 2022-12-27
Attending: STUDENT IN AN ORGANIZED HEALTH CARE EDUCATION/TRAINING PROGRAM | Admitting: STUDENT IN AN ORGANIZED HEALTH CARE EDUCATION/TRAINING PROGRAM
Payer: MEDICARE

## 2022-12-27 ENCOUNTER — APPOINTMENT (OUTPATIENT)
Dept: GENERAL RADIOLOGY | Age: 67
End: 2022-12-27
Attending: STUDENT IN AN ORGANIZED HEALTH CARE EDUCATION/TRAINING PROGRAM
Payer: MEDICARE

## 2022-12-27 VITALS
WEIGHT: 244 LBS | TEMPERATURE: 96.9 F | HEART RATE: 78 BPM | OXYGEN SATURATION: 91 % | SYSTOLIC BLOOD PRESSURE: 143 MMHG | RESPIRATION RATE: 19 BRPM | BODY MASS INDEX: 34.93 KG/M2 | DIASTOLIC BLOOD PRESSURE: 80 MMHG | HEIGHT: 70 IN

## 2022-12-27 DIAGNOSIS — M14.671 CHARCOT'S JOINT OF RIGHT FOOT: Primary | ICD-10-CM

## 2022-12-27 DIAGNOSIS — G89.18 POST-OP PAIN: ICD-10-CM

## 2022-12-27 LAB
GLUCOSE BLD-MCNC: 173 MG/DL (ref 75–110)
GLUCOSE BLD-MCNC: 176 MG/DL (ref 75–110)

## 2022-12-27 PROCEDURE — C1713 ANCHOR/SCREW BN/BN,TIS/BN: HCPCS | Performed by: STUDENT IN AN ORGANIZED HEALTH CARE EDUCATION/TRAINING PROGRAM

## 2022-12-27 PROCEDURE — 6360000002 HC RX W HCPCS: Performed by: STUDENT IN AN ORGANIZED HEALTH CARE EDUCATION/TRAINING PROGRAM

## 2022-12-27 PROCEDURE — 2500000003 HC RX 250 WO HCPCS: Performed by: NURSE ANESTHETIST, CERTIFIED REGISTERED

## 2022-12-27 PROCEDURE — 6360000002 HC RX W HCPCS

## 2022-12-27 PROCEDURE — 6360000002 HC RX W HCPCS: Performed by: ANESTHESIOLOGY

## 2022-12-27 PROCEDURE — 6360000002 HC RX W HCPCS: Performed by: NURSE ANESTHETIST, CERTIFIED REGISTERED

## 2022-12-27 PROCEDURE — 6370000000 HC RX 637 (ALT 250 FOR IP)

## 2022-12-27 PROCEDURE — 3600000014 HC SURGERY LEVEL 4 ADDTL 15MIN: Performed by: STUDENT IN AN ORGANIZED HEALTH CARE EDUCATION/TRAINING PROGRAM

## 2022-12-27 PROCEDURE — 6370000000 HC RX 637 (ALT 250 FOR IP): Performed by: NURSE ANESTHETIST, CERTIFIED REGISTERED

## 2022-12-27 PROCEDURE — 3700000001 HC ADD 15 MINUTES (ANESTHESIA): Performed by: STUDENT IN AN ORGANIZED HEALTH CARE EDUCATION/TRAINING PROGRAM

## 2022-12-27 PROCEDURE — 3209999900 FLUORO FOR SURGICAL PROCEDURES

## 2022-12-27 PROCEDURE — 7100000011 HC PHASE II RECOVERY - ADDTL 15 MIN: Performed by: STUDENT IN AN ORGANIZED HEALTH CARE EDUCATION/TRAINING PROGRAM

## 2022-12-27 PROCEDURE — C9290 INJ, BUPIVACAINE LIPOSOME: HCPCS | Performed by: ANESTHESIOLOGY

## 2022-12-27 PROCEDURE — 3700000000 HC ANESTHESIA ATTENDED CARE: Performed by: STUDENT IN AN ORGANIZED HEALTH CARE EDUCATION/TRAINING PROGRAM

## 2022-12-27 PROCEDURE — 2500000003 HC RX 250 WO HCPCS: Performed by: ANESTHESIOLOGY

## 2022-12-27 PROCEDURE — 64447 NJX AA&/STRD FEMORAL NRV IMG: CPT | Performed by: ANESTHESIOLOGY

## 2022-12-27 PROCEDURE — 6360000002 HC RX W HCPCS: Performed by: PODIATRIST

## 2022-12-27 PROCEDURE — 2500000003 HC RX 250 WO HCPCS: Performed by: STUDENT IN AN ORGANIZED HEALTH CARE EDUCATION/TRAINING PROGRAM

## 2022-12-27 PROCEDURE — 3600000004 HC SURGERY LEVEL 4 BASE: Performed by: STUDENT IN AN ORGANIZED HEALTH CARE EDUCATION/TRAINING PROGRAM

## 2022-12-27 PROCEDURE — 7100000001 HC PACU RECOVERY - ADDTL 15 MIN: Performed by: STUDENT IN AN ORGANIZED HEALTH CARE EDUCATION/TRAINING PROGRAM

## 2022-12-27 PROCEDURE — 7100000000 HC PACU RECOVERY - FIRST 15 MIN: Performed by: STUDENT IN AN ORGANIZED HEALTH CARE EDUCATION/TRAINING PROGRAM

## 2022-12-27 PROCEDURE — 64445 NJX AA&/STRD SCIATIC NRV IMG: CPT | Performed by: ANESTHESIOLOGY

## 2022-12-27 PROCEDURE — 82947 ASSAY GLUCOSE BLOOD QUANT: CPT

## 2022-12-27 PROCEDURE — 2580000003 HC RX 258: Performed by: ANESTHESIOLOGY

## 2022-12-27 PROCEDURE — 2709999900 HC NON-CHARGEABLE SUPPLY: Performed by: STUDENT IN AN ORGANIZED HEALTH CARE EDUCATION/TRAINING PROGRAM

## 2022-12-27 PROCEDURE — 7100000010 HC PHASE II RECOVERY - FIRST 15 MIN: Performed by: STUDENT IN AN ORGANIZED HEALTH CARE EDUCATION/TRAINING PROGRAM

## 2022-12-27 PROCEDURE — 2580000003 HC RX 258: Performed by: PODIATRIST

## 2022-12-27 PROCEDURE — 2720000010 HC SURG SUPPLY STERILE: Performed by: STUDENT IN AN ORGANIZED HEALTH CARE EDUCATION/TRAINING PROGRAM

## 2022-12-27 DEVICE — IMPLANTABLE DEVICE
Type: IMPLANTABLE DEVICE | Site: FOOT | Status: FUNCTIONAL
Brand: SALVATION

## 2022-12-27 DEVICE — K WIRE FIX L6IN DIA0.062IN 1600662] MICROAIRE SURGICAL INSTRUMENTS INC]: Type: IMPLANTABLE DEVICE | Site: FOOT | Status: FUNCTIONAL

## 2022-12-27 DEVICE — STEINMANN PIN
Type: IMPLANTABLE DEVICE | Site: FOOT | Status: FUNCTIONAL
Brand: INBONE

## 2022-12-27 DEVICE — BOLT
Type: IMPLANTABLE DEVICE | Site: FOOT | Status: FUNCTIONAL
Brand: SALVATION

## 2022-12-27 DEVICE — JONES K-WIRE
Type: IMPLANTABLE DEVICE | Site: FOOT | Status: FUNCTIONAL
Brand: CHARLOTTE

## 2022-12-27 RX ORDER — OXYCODONE HYDROCHLORIDE AND ACETAMINOPHEN 5; 325 MG/1; MG/1
2 TABLET ORAL
Status: DISCONTINUED | OUTPATIENT
Start: 2022-12-27 | End: 2022-12-27 | Stop reason: HOSPADM

## 2022-12-27 RX ORDER — FENTANYL CITRATE 50 UG/ML
INJECTION, SOLUTION INTRAMUSCULAR; INTRAVENOUS
Status: COMPLETED
Start: 2022-12-27 | End: 2022-12-27

## 2022-12-27 RX ORDER — NEOSTIGMINE METHYLSULFATE 5 MG/5 ML
SYRINGE (ML) INTRAVENOUS PRN
Status: DISCONTINUED | OUTPATIENT
Start: 2022-12-27 | End: 2022-12-27 | Stop reason: SDUPTHER

## 2022-12-27 RX ORDER — GLYCOPYRROLATE 0.2 MG/ML
INJECTION INTRAMUSCULAR; INTRAVENOUS PRN
Status: DISCONTINUED | OUTPATIENT
Start: 2022-12-27 | End: 2022-12-27 | Stop reason: SDUPTHER

## 2022-12-27 RX ORDER — BUPIVACAINE HYDROCHLORIDE 5 MG/ML
INJECTION, SOLUTION EPIDURAL; INTRACAUDAL
Status: COMPLETED | OUTPATIENT
Start: 2022-12-27 | End: 2022-12-27

## 2022-12-27 RX ORDER — ROCURONIUM BROMIDE 10 MG/ML
INJECTION, SOLUTION INTRAVENOUS PRN
Status: DISCONTINUED | OUTPATIENT
Start: 2022-12-27 | End: 2022-12-27

## 2022-12-27 RX ORDER — VANCOMYCIN HYDROCHLORIDE 1 G/20ML
INJECTION, POWDER, LYOPHILIZED, FOR SOLUTION INTRAVENOUS
Status: DISCONTINUED
Start: 2022-12-27 | End: 2022-12-27 | Stop reason: HOSPADM

## 2022-12-27 RX ORDER — IPRATROPIUM BROMIDE AND ALBUTEROL SULFATE 2.5; .5 MG/3ML; MG/3ML
1 SOLUTION RESPIRATORY (INHALATION)
Status: DISCONTINUED | OUTPATIENT
Start: 2022-12-27 | End: 2022-12-27 | Stop reason: HOSPADM

## 2022-12-27 RX ORDER — HYDROCODONE BITARTRATE AND ACETAMINOPHEN 5; 325 MG/1; MG/1
1 TABLET ORAL EVERY 4 HOURS PRN
Qty: 18 TABLET | Refills: 0 | Status: SHIPPED | OUTPATIENT
Start: 2022-12-27 | End: 2023-01-03

## 2022-12-27 RX ORDER — OXYCODONE HYDROCHLORIDE AND ACETAMINOPHEN 5; 325 MG/1; MG/1
1 TABLET ORAL
Status: DISCONTINUED | OUTPATIENT
Start: 2022-12-27 | End: 2022-12-27 | Stop reason: HOSPADM

## 2022-12-27 RX ORDER — SODIUM CHLORIDE 0.9 % (FLUSH) 0.9 %
5-40 SYRINGE (ML) INJECTION EVERY 12 HOURS SCHEDULED
Status: DISCONTINUED | OUTPATIENT
Start: 2022-12-27 | End: 2022-12-27 | Stop reason: HOSPADM

## 2022-12-27 RX ORDER — LIDOCAINE HYDROCHLORIDE 10 MG/ML
INJECTION, SOLUTION EPIDURAL; INFILTRATION; INTRACAUDAL; PERINEURAL
Status: DISCONTINUED
Start: 2022-12-27 | End: 2022-12-27 | Stop reason: WASHOUT

## 2022-12-27 RX ORDER — PROPOFOL 10 MG/ML
INJECTION, EMULSION INTRAVENOUS PRN
Status: DISCONTINUED | OUTPATIENT
Start: 2022-12-27 | End: 2022-12-27 | Stop reason: SDUPTHER

## 2022-12-27 RX ORDER — SODIUM CHLORIDE 0.9 % (FLUSH) 0.9 %
5-40 SYRINGE (ML) INJECTION PRN
Status: DISCONTINUED | OUTPATIENT
Start: 2022-12-27 | End: 2022-12-27 | Stop reason: HOSPADM

## 2022-12-27 RX ORDER — KETOROLAC TROMETHAMINE 10 MG/1
10 TABLET, FILM COATED ORAL EVERY 6 HOURS PRN
Qty: 20 TABLET | Refills: 0 | Status: SHIPPED | OUTPATIENT
Start: 2022-12-27 | End: 2023-01-01

## 2022-12-27 RX ORDER — GLYCOPYRROLATE 0.2 MG/ML
0.4 INJECTION INTRAMUSCULAR; INTRAVENOUS
Status: DISCONTINUED | OUTPATIENT
Start: 2022-12-27 | End: 2022-12-27 | Stop reason: HOSPADM

## 2022-12-27 RX ORDER — IPRATROPIUM BROMIDE AND ALBUTEROL SULFATE 2.5; .5 MG/3ML; MG/3ML
1 SOLUTION RESPIRATORY (INHALATION) ONCE
Status: COMPLETED | OUTPATIENT
Start: 2022-12-27 | End: 2022-12-27

## 2022-12-27 RX ORDER — BUPIVACAINE HYDROCHLORIDE AND EPINEPHRINE 5; 5 MG/ML; UG/ML
INJECTION, SOLUTION PERINEURAL PRN
Status: DISCONTINUED | OUTPATIENT
Start: 2022-12-27 | End: 2022-12-27 | Stop reason: ALTCHOICE

## 2022-12-27 RX ORDER — CEFAZOLIN 2 G/1
INJECTION, POWDER, FOR SOLUTION INTRAMUSCULAR; INTRAVENOUS
Status: DISCONTINUED
Start: 2022-12-27 | End: 2022-12-27 | Stop reason: HOSPADM

## 2022-12-27 RX ORDER — MIDAZOLAM HYDROCHLORIDE 2 MG/2ML
2 INJECTION, SOLUTION INTRAMUSCULAR; INTRAVENOUS
Status: DISCONTINUED | OUTPATIENT
Start: 2022-12-27 | End: 2022-12-27 | Stop reason: HOSPADM

## 2022-12-27 RX ORDER — BUPIVACAINE HYDROCHLORIDE 5 MG/ML
INJECTION, SOLUTION EPIDURAL; INTRACAUDAL
Status: DISCONTINUED
Start: 2022-12-27 | End: 2022-12-27 | Stop reason: WASHOUT

## 2022-12-27 RX ORDER — ALBUTEROL SULFATE 90 UG/1
AEROSOL, METERED RESPIRATORY (INHALATION) PRN
Status: DISCONTINUED | OUTPATIENT
Start: 2022-12-27 | End: 2022-12-27 | Stop reason: SDUPTHER

## 2022-12-27 RX ORDER — BUPIVACAINE HYDROCHLORIDE 2.5 MG/ML
INJECTION, SOLUTION EPIDURAL; INFILTRATION; INTRACAUDAL
Status: COMPLETED | OUTPATIENT
Start: 2022-12-27 | End: 2022-12-27

## 2022-12-27 RX ORDER — PROMETHAZINE HYDROCHLORIDE 25 MG/ML
6.25 INJECTION, SOLUTION INTRAMUSCULAR; INTRAVENOUS EVERY 5 MIN PRN
Status: DISCONTINUED | OUTPATIENT
Start: 2022-12-27 | End: 2022-12-27 | Stop reason: HOSPADM

## 2022-12-27 RX ORDER — LABETALOL HYDROCHLORIDE 5 MG/ML
10 INJECTION, SOLUTION INTRAVENOUS
Status: DISCONTINUED | OUTPATIENT
Start: 2022-12-27 | End: 2022-12-27 | Stop reason: HOSPADM

## 2022-12-27 RX ORDER — IPRATROPIUM BROMIDE AND ALBUTEROL SULFATE 2.5; .5 MG/3ML; MG/3ML
SOLUTION RESPIRATORY (INHALATION)
Status: COMPLETED
Start: 2022-12-27 | End: 2022-12-27

## 2022-12-27 RX ORDER — LIDOCAINE HYDROCHLORIDE 10 MG/ML
1 INJECTION, SOLUTION EPIDURAL; INFILTRATION; INTRACAUDAL; PERINEURAL
Status: DISCONTINUED | OUTPATIENT
Start: 2022-12-27 | End: 2022-12-27 | Stop reason: HOSPADM

## 2022-12-27 RX ORDER — CYCLOBENZAPRINE HCL 10 MG
10 TABLET ORAL 3 TIMES DAILY PRN
Qty: 21 TABLET | Refills: 0 | Status: SHIPPED | OUTPATIENT
Start: 2022-12-27 | End: 2023-01-06

## 2022-12-27 RX ORDER — MIDAZOLAM HYDROCHLORIDE 2 MG/2ML
2 INJECTION, SOLUTION INTRAMUSCULAR; INTRAVENOUS ONCE
Status: COMPLETED | OUTPATIENT
Start: 2022-12-27 | End: 2022-12-27

## 2022-12-27 RX ORDER — ROCURONIUM BROMIDE 10 MG/ML
INJECTION, SOLUTION INTRAVENOUS PRN
Status: DISCONTINUED | OUTPATIENT
Start: 2022-12-27 | End: 2022-12-27 | Stop reason: SDUPTHER

## 2022-12-27 RX ORDER — VANCOMYCIN HYDROCHLORIDE 1 G/20ML
INJECTION, POWDER, LYOPHILIZED, FOR SOLUTION INTRAVENOUS PRN
Status: DISCONTINUED | OUTPATIENT
Start: 2022-12-27 | End: 2022-12-27 | Stop reason: ALTCHOICE

## 2022-12-27 RX ORDER — DIPHENHYDRAMINE HYDROCHLORIDE 50 MG/ML
12.5 INJECTION INTRAMUSCULAR; INTRAVENOUS
Status: DISCONTINUED | OUTPATIENT
Start: 2022-12-27 | End: 2022-12-27 | Stop reason: HOSPADM

## 2022-12-27 RX ORDER — SODIUM CHLORIDE 9 MG/ML
25 INJECTION, SOLUTION INTRAVENOUS PRN
Status: DISCONTINUED | OUTPATIENT
Start: 2022-12-27 | End: 2022-12-27 | Stop reason: HOSPADM

## 2022-12-27 RX ORDER — DOXYCYCLINE HYCLATE 100 MG
100 TABLET ORAL 2 TIMES DAILY
Qty: 28 TABLET | Refills: 0 | Status: SHIPPED | OUTPATIENT
Start: 2022-12-27 | End: 2023-01-10

## 2022-12-27 RX ORDER — OXYCODONE HYDROCHLORIDE AND ACETAMINOPHEN 5; 325 MG/1; MG/1
1 TABLET ORAL EVERY 6 HOURS PRN
Qty: 28 TABLET | Refills: 0 | Status: SHIPPED | OUTPATIENT
Start: 2022-12-27 | End: 2023-01-03

## 2022-12-27 RX ORDER — BUPIVACAINE HYDROCHLORIDE AND EPINEPHRINE 5; 5 MG/ML; UG/ML
INJECTION, SOLUTION EPIDURAL; INTRACAUDAL; PERINEURAL
Status: DISCONTINUED
Start: 2022-12-27 | End: 2022-12-27 | Stop reason: HOSPADM

## 2022-12-27 RX ORDER — KETOROLAC TROMETHAMINE 10 MG/1
10 TABLET, FILM COATED ORAL EVERY 6 HOURS PRN
Qty: 20 TABLET | Refills: 0 | Status: SHIPPED | OUTPATIENT
Start: 2022-12-27 | End: 2023-12-27

## 2022-12-27 RX ORDER — MIDAZOLAM HYDROCHLORIDE 1 MG/ML
INJECTION INTRAMUSCULAR; INTRAVENOUS
Status: COMPLETED
Start: 2022-12-27 | End: 2022-12-27

## 2022-12-27 RX ORDER — FENTANYL CITRATE 50 UG/ML
INJECTION, SOLUTION INTRAMUSCULAR; INTRAVENOUS PRN
Status: DISCONTINUED | OUTPATIENT
Start: 2022-12-27 | End: 2022-12-27 | Stop reason: SDUPTHER

## 2022-12-27 RX ORDER — FENTANYL CITRATE 50 UG/ML
100 INJECTION, SOLUTION INTRAMUSCULAR; INTRAVENOUS ONCE
Status: COMPLETED | OUTPATIENT
Start: 2022-12-27 | End: 2022-12-27

## 2022-12-27 RX ORDER — ONDANSETRON 2 MG/ML
INJECTION INTRAMUSCULAR; INTRAVENOUS PRN
Status: DISCONTINUED | OUTPATIENT
Start: 2022-12-27 | End: 2022-12-27 | Stop reason: SDUPTHER

## 2022-12-27 RX ORDER — ONDANSETRON 2 MG/ML
4 INJECTION INTRAMUSCULAR; INTRAVENOUS
Status: DISCONTINUED | OUTPATIENT
Start: 2022-12-27 | End: 2022-12-27 | Stop reason: HOSPADM

## 2022-12-27 RX ORDER — MEPERIDINE HYDROCHLORIDE 50 MG/ML
12.5 INJECTION INTRAMUSCULAR; INTRAVENOUS; SUBCUTANEOUS EVERY 5 MIN PRN
Status: DISCONTINUED | OUTPATIENT
Start: 2022-12-27 | End: 2022-12-27 | Stop reason: HOSPADM

## 2022-12-27 RX ORDER — PHENYLEPHRINE HCL IN 0.9% NACL 1 MG/10 ML
SYRINGE (ML) INTRAVENOUS PRN
Status: DISCONTINUED | OUTPATIENT
Start: 2022-12-27 | End: 2022-12-27 | Stop reason: SDUPTHER

## 2022-12-27 RX ORDER — BUPIVACAINE HYDROCHLORIDE AND EPINEPHRINE 2.5; 5 MG/ML; UG/ML
INJECTION, SOLUTION INFILTRATION; PERINEURAL
Status: DISCONTINUED
Start: 2022-12-27 | End: 2022-12-27 | Stop reason: WASHOUT

## 2022-12-27 RX ORDER — LIDOCAINE HYDROCHLORIDE 10 MG/ML
INJECTION, SOLUTION INFILTRATION; PERINEURAL PRN
Status: DISCONTINUED | OUTPATIENT
Start: 2022-12-27 | End: 2022-12-27 | Stop reason: SDUPTHER

## 2022-12-27 RX ORDER — SODIUM CHLORIDE 9 MG/ML
INJECTION, SOLUTION INTRAVENOUS PRN
Status: DISCONTINUED | OUTPATIENT
Start: 2022-12-27 | End: 2022-12-27 | Stop reason: HOSPADM

## 2022-12-27 RX ADMIN — IPRATROPIUM BROMIDE AND ALBUTEROL SULFATE 1 AMPULE: 2.5; .5 SOLUTION RESPIRATORY (INHALATION) at 07:41

## 2022-12-27 RX ADMIN — ONDANSETRON 4 MG: 2 INJECTION INTRAMUSCULAR; INTRAVENOUS at 10:50

## 2022-12-27 RX ADMIN — GLYCOPYRROLATE 0.4 MG: 0.2 INJECTION INTRAMUSCULAR; INTRAVENOUS at 10:51

## 2022-12-27 RX ADMIN — FENTANYL CITRATE 25 MCG: 50 INJECTION, SOLUTION INTRAMUSCULAR; INTRAVENOUS at 08:40

## 2022-12-27 RX ADMIN — BUPIVACAINE HYDROCHLORIDE 10 ML: 5 INJECTION, SOLUTION EPIDURAL; INTRACAUDAL; PERINEURAL at 08:02

## 2022-12-27 RX ADMIN — LIDOCAINE HYDROCHLORIDE 50 MG: 10 INJECTION, SOLUTION INFILTRATION; PERINEURAL at 08:20

## 2022-12-27 RX ADMIN — ALBUTEROL SULFATE 6 PUFF: 90 AEROSOL, METERED RESPIRATORY (INHALATION) at 11:04

## 2022-12-27 RX ADMIN — CEFAZOLIN 2000 MG: 2 INJECTION, POWDER, FOR SOLUTION INTRAMUSCULAR; INTRAVENOUS at 08:30

## 2022-12-27 RX ADMIN — PROPOFOL 200 MG: 10 INJECTION, EMULSION INTRAVENOUS at 08:23

## 2022-12-27 RX ADMIN — BUPIVACAINE HYDROCHLORIDE 20 ML: 2.5 INJECTION, SOLUTION EPIDURAL; INFILTRATION; INTRACAUDAL; PERINEURAL at 08:12

## 2022-12-27 RX ADMIN — FENTANYL CITRATE 100 MCG: 50 INJECTION, SOLUTION INTRAMUSCULAR; INTRAVENOUS at 08:01

## 2022-12-27 RX ADMIN — PROPOFOL 30 MG: 10 INJECTION, EMULSION INTRAVENOUS at 08:45

## 2022-12-27 RX ADMIN — FENTANYL CITRATE 75 MCG: 50 INJECTION, SOLUTION INTRAMUSCULAR; INTRAVENOUS at 11:09

## 2022-12-27 RX ADMIN — FENTANYL CITRATE 75 MCG: 50 INJECTION, SOLUTION INTRAMUSCULAR; INTRAVENOUS at 08:21

## 2022-12-27 RX ADMIN — FENTANYL CITRATE 25 MCG: 50 INJECTION, SOLUTION INTRAMUSCULAR; INTRAVENOUS at 10:54

## 2022-12-27 RX ADMIN — Medication 50 MCG: at 09:32

## 2022-12-27 RX ADMIN — SODIUM CHLORIDE: 9 INJECTION, SOLUTION INTRAVENOUS at 08:17

## 2022-12-27 RX ADMIN — ROCURONIUM BROMIDE 50 MG: 10 INJECTION, SOLUTION INTRAVENOUS at 08:24

## 2022-12-27 RX ADMIN — Medication 3 MG: at 10:51

## 2022-12-27 RX ADMIN — PROPOFOL 35 MG: 10 INJECTION, EMULSION INTRAVENOUS at 08:24

## 2022-12-27 RX ADMIN — MIDAZOLAM HYDROCHLORIDE 2 MG: 2 INJECTION, SOLUTION INTRAMUSCULAR; INTRAVENOUS at 08:01

## 2022-12-27 RX ADMIN — MIDAZOLAM 2 MG: 1 INJECTION INTRAMUSCULAR; INTRAVENOUS at 08:01

## 2022-12-27 RX ADMIN — BUPIVACAINE 10 ML: 13.3 INJECTION, SUSPENSION, LIPOSOMAL INFILTRATION at 08:02

## 2022-12-27 RX ADMIN — ROCURONIUM BROMIDE 15 MG: 10 INJECTION, SOLUTION INTRAVENOUS at 08:45

## 2022-12-27 ASSESSMENT — PAIN SCALES - GENERAL
PAINLEVEL_OUTOF10: 0
PAINLEVEL_OUTOF10: 0

## 2022-12-27 ASSESSMENT — PAIN - FUNCTIONAL ASSESSMENT
PAIN_FUNCTIONAL_ASSESSMENT: 0-10
PAIN_FUNCTIONAL_ASSESSMENT: PREVENTS OR INTERFERES SOME ACTIVE ACTIVITIES AND ADLS

## 2022-12-27 ASSESSMENT — PAIN DESCRIPTION - DESCRIPTORS: DESCRIPTORS: DISCOMFORT;ITCHING

## 2022-12-27 ASSESSMENT — LIFESTYLE VARIABLES: SMOKING_STATUS: 1

## 2022-12-27 NOTE — ANESTHESIA PRE PROCEDURE
Department of Anesthesiology  Preprocedure Note       Name:  Yina Marley   Age:  78 y.o.  :  1955                                          MRN:  [id number]         Date:  2022      Surgeon: Niels Salcido):  Estevan Riojas DPM    Procedure: Procedure(s):  RIGHT SUBTALAR JOINT AND MULTIPLE MID FOOT JOINT FUSIONS WITH SERA AND PRE-OP POP BLOCK  RIGHT FOOT/ANKLE EXTERNAL FIXATOR  REMOVAL    Medications prior to admission:   Prior to Admission medications    Medication Sig Start Date End Date Taking? Authorizing Provider   HYDROcodone-acetaminophen (NORCO)  MG per tablet Take 1 tablet by mouth every 6 hours as needed for Pain. Historical Provider, MD   TRUEPLUS PEN NEEDLES 31G X 8 MM MISC  21   Historical Provider, MD   mupirocin (BACTROBAN) 2 % ointment Apply topically 2 times daily TO FOOT WOUND. Patient not taking: No sig reported 21   Historical Provider, MD   amLODIPine (NORVASC) 5 MG tablet Take 5 mg by mouth daily 10/20/21   Historical Provider, MD   LANTUS SOLOSTAR 100 UNIT/ML injection pen Inject 15 Units into the skin nightly  Patient taking differently: Inject 10 Units into the skin 2 times daily 21   ROCHELLE Portillo - NP   JANUVIA 100 MG tablet Take 100 mg by mouth daily  20   Historical Provider, MD   Misc. Devices MISC 1 PAIR OF DIABETIC SHOES (1 LEFT/ 1 RIGHT)  1-3 PAIRS OF INSERTS (LEFT/ RIGHT) 3/5/20   Henrry Lopez DPM   cetirizine (ZYRTEC) 10 MG tablet Take 10 mg by mouth nightly  10/21/19   Historical Provider, MD   simvastatin (ZOCOR) 40 MG tablet Take 40 mg by mouth nightly  18   Historical Provider, MD   glipiZIDE (GLUCOTROL) 10 MG tablet Take 20 mg by mouth 2 times daily (before meals) Takes 2 tabs (=20mg) BID    Historical Provider, MD   aspirin 81 MG tablet Take 81 mg by mouth daily    Historical Provider, MD   gabapentin (NEURONTIN) 300 MG capsule Take 900 mg by mouth 3 times daily.  Take 3 caps (=900mg) 3 times a day Historical Provider, MD       Current medications:    Current Facility-Administered Medications   Medication Dose Route Frequency Provider Last Rate Last Admin    lidocaine PF 1 % injection 1 mL  1 mL IntraDERmal Once PRN Temitope Rutherford MD        sodium chloride flush 0.9 % injection 5-40 mL  5-40 mL IntraVENous 2 times per day Temitope Rutherford MD        sodium chloride flush 0.9 % injection 5-40 mL  5-40 mL IntraVENous PRN Temitope Rutherford MD        0.9 % sodium chloride infusion   IntraVENous PRN Temitope Rutherford MD        ceFAZolin (ANCEF) 2,000 mg in sodium chloride 0.9 % 50 mL IVPB (mini-bag)  2,000 mg IntraVENous NOW Lamberto Mchugh DPM        bupivacaine (PF) (MARCAINE) 0.5 % injection             bupivacaine-EPINEPHrine (MARCAINE-w/EPINEPHRINE) 0.25% -1:[id number] injection             lidocaine PF 1 % injection             bupivacaine-EPINEPHrine PF (MARCAINE-w/EPINEPHRINE) 0.5% -1:066647 injection             ceFAZolin (ANCEF) 2 g injection             fentaNYL (SUBLIMAZE) 100 MCG/2ML injection             midazolam (VERSED) 2 MG/2ML injection             bupivacaine liposome (EXPAREL) 1.3 % injection                Allergies: Allergies   Allergen Reactions    Morphine Itching    Other Other (See Comments)     Other reaction(s): Unknown    Percocet [Oxycodone-Acetaminophen]      Facial swelling       Problem List:    Patient Active Problem List   Diagnosis Code    Essential hypertension I10    Type II or unspecified type diabetes mellitus without mention of complication, not stated as uncontrolled E11.9    Neuropathy G62.9    Vertigo R44    Charcot foot due to diabetes mellitus ([facility] E11.610    Hyperlipidemia E78.5    Cellulitis L03.90    Cellulitis of left foot L03. 80    Right foot infection L08.9    Diabetic polyneuropathy associated with type 2 diabetes mellitus ([facility] E11.42    Foreign body (FB) in soft tissue M79.5    Cellulitis of left leg L03. 116    Abscess of right foot L02.611    Chest pain at rest R07.9    Tobacco abuse Z72.0    Type 2 diabetes mellitus treated with insulin ([facility]) E11.9, Z79.4    ALEJANDRO (acute kidney injury) ([facility] N17.9    Bandemia D72.825    Chronic multifocal osteomyelitis of right foot ([facility]) M86.371    Diabetic infection of right foot ([facility]) E11.628, L08.9    Acquired hammer toe deformity of lesser toe of right foot M20.41    Post-op pain G89.18    Right foot pain M79.671    Hallux hammertoe, right M20.31    Osteomyelitis ([facility]) M86.9    Wound dehiscence T81.30XA    Diabetic foot ([facility]) E11.8    Hyperglycemia due to diabetes mellitus ([facility]) E11.65    Foot ulcer ([facility]) L97.509    Cellulitis of right foot L03.115    Type 2 diabetes mellitus with right diabetic foot ulcer ([facility]) E11.621, L97.519    Charcot's joint of right foot M14.671    Stage 3b chronic kidney disease ([facility]) N18.32       Past Medical History:        Diagnosis Date    Abscess of right foot 08/04/2018    Acquired hammer toe deformity of lesser toe of right foot     ALEJANDRO (acute kidney injury) ([address] 08/05/2018    Cellulitis 04/24/2018    Cellulitis of left foot 04/24/2018    Cellulitis of right foot     and abscess    Charcot foot due to diabetes mellitus ([address] 2014    Right foot     Chest pain at rest 08/04/2018    Chronic multifocal osteomyelitis of right foot ([address]     CKD (chronic kidney disease)     Diabetic polyneuropathy associated with type 2 diabetes mellitus ([address]     Essential hypertension     Fractures, multiple 07/21/2014    Right foot fractures     Hyperlipidemia     Hypertension     Leukocytosis     MRSA (methicillin resistant staph aureus) culture positive 2018    rt foot    Neuropathy     diabetic with charcot affecting the right foot    Pain in right foot     redness and swelling    Pneumonia 2009    Right foot infection     Right foot pain     Tobacco abuse 04/24/2018    Type II or unspecified type diabetes mellitus without mention of complication, not stated as uncontrolled     Vertigo     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 stepped on sharp object. Covered by dressing.     Wound, open     Right posterior -Diabetic Ulcer       Past Surgical History:        Procedure Laterality Date    ANKLE SURGERY Right 10/12/2022    RIGHT MID-FOOT OSTEOTOMY WITH OF APPLICATION EXTERNAL FIXATOR SERA performed by Diamond Rutherford DPM at [address] Right 11/15/2022    right lower extremity  adjustment of exfix performed by Diamond Rutherford DPM at [facility] [address]      at age 24    ARTHROPLASTY Right 12/11/2020    RIGHT HALLUX EXOSTECTOMY AND 3RD DIGIT EXTENSIOR TENOTOMY performed by Meliton Dunlap DPM at [address] [facility] Left 04/24/2018    I&D foreign body removal    FOOT DEBRIDEMENT Right 03/20/2020    RIGHT  FOOT   INCISION AND DRAINAGE WITH BONE BIOPSY performed by Meliton Dunlap DPM at [address] Right 06/08/2021    FOOT DEBRIDEMENT INCISION AND DRAINAGE performed by Meliton Dunlap DPM at [address] Right 09/16/2021    RIGHT FOOT  INCISION AND DRAINAGE   WITH PULSE LAVAGE [facility] Meliton Dunlap DPM [facility] Right 06/11/2021    RIGHT FOOT FLAP CLOSURE performed by Meliton Dunlap DPM at [facility] Right 37/57/2458    APPLICATION EXTERNAL FIXATOR RIGHT FOOT - SERA - Right    IR INS PICC VAD W SQ PORT GREATER THAN 5  10/07/2022    IR INS PICC VAD W SQ PORT GREATER THAN 5 10/7/2022 [facility]    KNEE ARTHROSCOPY Right 1989    KNEE ARTHROSCOPY Left 1990    KNEE SURGERY Bilateral 1983    arthroscopy    NECK SURGERY  1987    MS DEEP [address] INF[facility],[address] [place] Left 04/24/2018    LEFT FOOT MULTIPLE  INCISIONS  AND DRAINAGE AND REMOVAL FOREIGN BODY  IRENE performed 12/27/22  0635   [address]*       Coags: No results found for: PROTIME, INR, APTT    HCG (If Applicable): No results found for: PREGTESTUR, PREGSERUM, HCG, HCGQUANT     ABGs: No results found for: PHART, PO2ART, MTE9RKL, EHL7FSW, BEART, X8WFEEET     Type & Screen (If Applicable):  No results found for: LABABO, LABRH    Drug/Infectious Status (If Applicable):  No results found for: HIV, HEPCAB    COVID-19 Screening (If Applicable):   Lab Results   Component Value Date/Time    COVID19 DETECTED 05/26/2021 12:06 PM    COVID19 Not Detected 12/07/2020 03:10 PM    COVID19 Not Detected 08/08/2020 10:02 PM           Anesthesia Evaluation  Patient summary reviewed and Nursing notes reviewed  Airway: Mallampati: III  TM distance: >3 FB   Neck ROM: full  Mouth opening: > = 3 FB   Dental:      Comment: -MISSING SOME UPPER AND LOWER TEETH    Pulmonary:normal exam    (+) COPD:  current smoker                          ROS comment: -SMOKES 1 PPD FOR 51 YEARS    Cardiovascular:    (+) hypertension:,       ECG reviewed                     ROS comment: -EKG - SR @ 74, FIRST DEGREE AV BLOCK     Neuro/Psych:                ROS comment: -OSTEOMYELITIS GI/Hepatic/Renal:   (+) morbid obesity         ROS comment: -RENAL INSUFFICIENCY - MAKES URINE; SEES UROLOGY. Endo/Other:    (+) Diabetes, . ROS comment: -NPO AFTER MIDNIGHT  -ALLERGIES - MORPHINE, PERCOCET Abdominal:             Vascular: negative vascular ROS. Other Findings:           Anesthesia Plan      general and regional     ASA 3     (GETA, POPLITEAL AND ADDUCTOR CANAL BLOCKS)  Induction: intravenous. MIPS: Postoperative opioids intended and Prophylactic antiemetics administered. Anesthetic plan and risks discussed with patient. Plan discussed with CRNA.     Attending anesthesiologist reviewed and agrees with Brittany Bernard MD   12/27/2022

## 2022-12-27 NOTE — H&P
Podiatry History and Physical  [facility]        PATIENT NAME: Dangelo Bailey OF BIRTH: 1955  GENDER: male     Procedure Date: 12/27/2022  5:58 AM     Attending Provider: Ketih Smith DPM  Assisting Provider: Franck Ashton DPM    Chief Complaint: Charcot arthropathy, right foot s/p application of external fixation RLE and midfoot osteotomy (DOS: 10/4/22 & 11/15/22)    Procedure Scheduled:   Right foot subtalar joint arthrodesis with multiple midfoot fusions using beam application and all other necessary soft tissue and osseous procedures  Removal of hardware (external fixation device)    History of present Illness: The patient is a 78 y.o. male  who presents to pre-op area prior to scheduled for above mentioned procedure. Pt recommended to undergo above mentioned procedures at earliest possible convenience. Pt denies changes to his medical history since he was last seen in office. Denies current nausea, vomiting, chest pain, SOB, fever, and chills. Consent form signed and reviewed. Any/all additional questions/concerns were addressed and consent form updated. Pt elected to pursue the above mentioned procedures as scheduled for today.      Past Medical History:  has a past medical history of Abscess of right foot, Acquired hammer toe deformity of lesser toe of right foot, ALEJANDRO (acute kidney injury) ([address] Cellulitis, Cellulitis of left foot, Cellulitis of right foot, Charcot foot due to diabetes mellitus ([address] Chest pain at rest, Chronic multifocal osteomyelitis of right foot ([address] CKD (chronic kidney disease), Diabetic polyneuropathy associated with type 2 diabetes mellitus ([address] Essential hypertension, Fractures, multiple, Hyperlipidemia, Hypertension, Leukocytosis, MRSA (methicillin resistant staph aureus) culture positive, Neuropathy, Pain in right foot, Pneumonia, Right foot infection, Right foot pain, Tobacco abuse, Type II or unspecified type diabetes mellitus without mention of complication, not stated as uncontrolled, Vertigo, 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 Past Surgical History:   Past Surgical History:   Procedure Laterality Date    ANKLE SURGERY Right 10/12/2022    RIGHT MID-FOOT OSTEOTOMY WITH OF APPLICATION EXTERNAL FIXATOR SERA performed by Estevan Riojas DPM at [address] [place] [address] Right 11/15/2022    right lower extremity  adjustment of exfix performed by Estevan Riojas DPM at [facility]      at age 24    ARTHROPLASTY Right 12/11/2020    RIGHT HALLUX EXOSTECTOMY AND 3RD DIGIT EXTENSIOR TENOTOMY performed by Henrry Lopez DPM at [facility] Left 04/24/2018    I&D foreign body removal    FOOT DEBRIDEMENT Right 03/20/2020    RIGHT  FOOT   INCISION AND DRAINAGE WITH BONE BIOPSY performed by Henrry Lopez DPM at [address] Right 06/08/2021    FOOT DEBRIDEMENT INCISION AND DRAINAGE performed by Henrry Lopez DPM at [address] Right 09/16/2021    RIGHT FOOT  INCISION AND DRAINAGE   WITH PULSE LAVAGE performed by Henrry Lopez DPM at [place] [address] Right 06/11/2021    RIGHT FOOT FLAP CLOSURE performed by Henrry Lopez DPM at [place] [address] Right 52/61/8775    APPLICATION EXTERNAL FIXATOR RIGHT FOOT - SERA - Right    IR INS PICC VAD W SQ PORT GREATER THAN 5  10/07/2022    IR INS PICC VAD W SQ PORT GREATER THAN 5 10/7/2022 [facility] PROCED[facility]    KNEE ARTHROSCOPY Right 1989    KNEE ARTHROSCOPY Left 1990    KNEE SURGERY Bilateral 1983    arthroscopy    NECK SURGERY  1987    ME DEEP [address] FOOT INFEC,1 BURSA Left 04/24/2018    LEFT FOOT MULTIPLE  INCISIONS  AND DRAINAGE AND REMOVAL FOREIGN BODY  IRENE performed by Henrry Lopez DPM at [place] [address] History:  reports that he has been smoking cigarettes. He has been smoking an average of .5 packs per day. He has never used smokeless tobacco. He reports that he does not currently use drugs. He reports that he does not drink alcohol. Family History: family history includes Cancer in his father; Diabetes in his mother; Hypertension in his maternal grandmother.     Review of Systems:   Negative except as listed above    Allergies: Morphine, Other, and Percocet [oxycodone-acetaminophen]    Current Meds:  Current Facility-Administered Medications:     lidocaine PF 1 % injection 1 mL, 1 mL, IntraDERmal, Once PRN, Zac Gunn MD    sodium chloride flush 0.9 % injection 5-40 mL, 5-40 mL, IntraVENous, 2 times per day, Zac Gunn MD    sodium chloride flush 0.9 % injection 5-40 mL, 5-40 mL, IntraVENous, PRN, Zac Gunn MD    0.9 % sodium chloride infusion, , IntraVENous, PRN, Zac Gunn MD    Vital Signs:  Vitals:    12/27/22 0628   BP: (!) 159/81   Pulse: 84   Resp: 14   Temp: (!) 96.7 °F (35.9 °C)   SpO2: 97%       Physical Exam:  Vital signs and Nurse's note reviewed  Gen:  A&Ox3, NAD  HEENT: NCAT, PERRLA, EOMI, no scleral icterus, oral mucosa moist  Neck: Supple, trachea midline  Chest: Symmetric rise with inhalation, no evidence of trauma  CVS: Regular rate and rhythm, no murmurs, no rubs or gallops   Resp: Good bilateral air entry, clear to auscultation b/l, no wheeze or rhonchi  Abd: soft, non-tender, non-distended  Ext: No clubbing, cyanosis, edema, peripheral pulses 2+ Rad/Fem/DP/PT  CNS: Moves all extremities, no gross focal motor deficits  Skin: External fixation right lower extremity without erythema at pin sites    Labs:   Lab Results   Component Value Date/Time    WBC 8.7 11/13/2022 03:36 PM    HGB 10.7 11/13/2022 03:36 PM    HCT 32.4 11/13/2022 03:36 PM    MCV 88.3 11/13/2022 03:36 PM     11/13/2022 03:36 PM     Lab Results   Component Value Date/Time     11/13/2022 03:36 PM    K 4.5 11/13/2022 03:36 PM    CL 99 11/13/2022 03:36 PM    CO2 27 11/13/2022 03:36 PM    BUN 26 11/13/2022 03:36 PM    CREATININE 2.22 11/13/2022 03:36 PM    GLUCOSE 120 11/13/2022 03:36 PM    CALCIUM 10.3 11/13/2022 03:36 PM     Lab Results   Component Value Date/Time    ALKPHOS 113 12/03/2021 02:30 PM    ALT 14 12/03/2021 02:30 PM    AST 13 12/03/2021 02:30 PM    PROT 7.2 12/03/2021 02:30 PM    BILITOT 0.35 12/03/2021 02:30 PM    LABALBU 3.8 12/03/2021 02:30 PM     Lab Results   Component Value Date/Time    LACTA 0.9 06/06/2021 11:05 AM     No results found for: AMYLASE  Lab Results   Component Value Date/Time    LIPASE 403 12/03/2021 02:30 PM     No results found for: INR      Radiology:   CT R tib/fib/ankle/foot:  1. Extensive subcutaneous fat stranding throughout the right leg and foot  compatible with bland edema or cellulitis. No well-defined drainable fluid  collection or soft tissue gas identified. 2. Status post external fixation. A medial midfoot percutaneous locking  screw traverses the 1st and 2nd tarsometatarsal joint spaces and is not  seated within a bone. 3. Remaining fixation hardware as above. 4. Severe midfoot degenerative changes compatible with neuropathic  arthropathy. 5. Chronic appearing nondisplaced ununited 5th metatarsal base fracture. 6. Severe degenerative changes of the right knee with a small effusion. 7. Mild tibiotalar and calcaneocuboid degenerative changes. 8. Mild 1st metatarsophalangeal degenerative changes. Severe 1st  interphalangeal degenerative changes.         Assessment:  Diabetic charcot arthropathy, right foot  S/P external fixation application, RLE (DOS 44/6/08 & 11/15/22)  S/P midfoot osteotomy (DOS 10/12/22)  DMII with peripheral neuropathy      Plan:  Proceed w/ above mentioned procedures as scheduled for today  Pt to be [address] home post procedure pending procedural choice   Pain management with multi-modal therapy: gabapentin, percocet, toradol, flexeril  NWB RLE  Follow up with [facility] [place] [place]    Electronically signed by Alma Fontaine DPM  on 12/27/2022 at 6:59 AM

## 2022-12-27 NOTE — DISCHARGE INSTRUCTIONS
Podiatric Post Operative Instructions: You are being discharged following Charcot Reconstruction on your right lower extremity. Fluids and Diet:  Lean proteins and vegetables should be the backbone of your diet to aid in the healing process  Reduce salt intake to assist with decreased fluid retention which reduces the swelling to your affected extremity     Medications: Take your prescriptions as directed  You are receiving new prescriptions for pain, muscle relaxation, antibiotics. If your pain is not severe then you may take the non-prescription medication that you normally take for aches and pains  You may resume your regularly scheduled medications (unless otherwise directed)  If any side effects or adverse reactions occur, discontinue the medication and contact your doctor. Review the patient drug information that is provided before you take any medication    Ambulation and Activity:  You are advised to go directly home from the hospital  Use assistive devices to ambulate at all times (for walking, moving around - be very careful to avoid weight on your injured extremity). You may not put weight on the operated foot. Avoid stairs if possible. Do not lift or move heavy objects  Do not drive until cleared by your physician    Bandage and Wound Care Instructions:  Keep bandage clean and dry  Do not shower or bathe the operative extremity  Do not remove the bandage  Do not attempt to put anything between the dressing and your skin, some itching is normal.    Ice and Elevation:  Elevate operative extremity as much as possible to reduce swelling and discomfort. Elevate with 2 pillows at or above the level of the heart for the first 72 hours and whenever possible to reduce swelling. Ice:  Apply ice to the back of the knee of the affected extremity for 20 minutes of every 2 hours while awake for the first 72 hours. You may ice the bandage as well.     Special Instructions: Call your doctor immediately

## 2022-12-27 NOTE — ANESTHESIA PROCEDURE NOTES
Peripheral Block    Patient location during procedure: pre-op  Reason for block: post-op pain management and at surgeon's request  Start time: 12/27/2022 8:09 AM  End time: 12/27/2022 8:12 AM  Staffing  Performed: anesthesiologist   Anesthesiologist: Jeny Ham MD  Preanesthetic Checklist  Completed: patient identified, IV checked, site marked, risks and benefits discussed, surgical/procedural consents, equipment checked, pre-op evaluation, timeout performed, anesthesia consent given, oxygen available, monitors applied/VS acknowledged, fire risk safety assessment completed and verbalized and blood product R/B/A discussed and consented  Peripheral Block   Patient position: left lateral decubitus  Prep: ChloraPrep  Provider prep: mask and sterile gloves  Patient monitoring: cardiac monitor, continuous pulse ox and frequent blood pressure checks  Block type: Sciatic  Popliteal  Laterality: right  Injection technique: single-shot  Guidance: nerve stimulator and ultrasound guided    Needle   Needle type: insulated echogenic nerve stimulator needle   Needle gauge: 20 G  Needle localization: anatomical landmarks, nerve stimulator and ultrasound guidance  Needle length: 4 IN.   Assessment   Injection assessment: negative aspiration for heme, no paresthesia on injection, local visualized surrounding nerve on ultrasound and no intravascular symptoms  Paresthesia pain: none  Slow fractionated injection: yes  Hemodynamics: stableno  Outcomes: patient tolerated procedure well    Additional Notes  (+) RIGHT CALF TWITCH FROM 1.5MA DOWN TO 0.7MA  Medications Administered  bupivacaine liposome 1.3 % - Perineural   10 mL - 12/27/2022 8:12:00 AM  bupivacaine (PF) 0.25 % - Perineural   20 mL - 12/27/2022 8:12:00 AM

## 2022-12-27 NOTE — ANESTHESIA PROCEDURE NOTES
Peripheral Block    Patient location during procedure: pre-op  Reason for block: post-op pain management and at surgeon's request  Start time: 12/27/2022 8:02 AM  End time: 12/27/2022 8:03 AM  Staffing  Performed: anesthesiologist   Anesthesiologist: Jeny Ham MD  Preanesthetic Checklist  Completed: patient identified, IV checked, site marked, risks and benefits discussed, surgical/procedural consents, equipment checked, pre-op evaluation, timeout performed, anesthesia consent given, oxygen available, monitors applied/VS acknowledged, fire risk safety assessment completed and verbalized and blood product R/B/A discussed and consented  Peripheral Block   Patient position: supine  Prep: ChloraPrep  Provider prep: mask and sterile gloves  Patient monitoring: cardiac monitor, continuous pulse ox and frequent blood pressure checks  Block type: Femoral  Adductor canal  Laterality: right  Injection technique: single-shot  Guidance: ultrasound guided    Needle   Needle type: insulated echogenic nerve stimulator needle   Needle gauge: 20 G  Needle localization: anatomical landmarks and ultrasound guidance  Needle length: 4 IN.   Assessment   Injection assessment: negative aspiration for heme, no paresthesia on injection, local visualized surrounding nerve on ultrasound and no intravascular symptoms  Paresthesia pain: none  Slow fractionated injection: yes  Hemodynamics: stableno  Outcomes: patient tolerated procedure well    Medications Administered  bupivacaine liposome 1.3 % - Perineural   10 mL - 12/27/2022 8:02:00 AM  bupivacaine (PF) 0.5 % - Perineural   10 mL - 12/27/2022 8:02:00 AM

## 2022-12-27 NOTE — OP NOTE
OP NOTE    PATIENT NAME: Kristopher Wasserman  YOB: 1955  -  78 y.o. male  MRN: [id number]  DATE: 12/27/2022  BILLING #: [id number]    Surgeon(s):  Sarita Murillo DPM     ASSISTANTS: Emily Cassidy PGY3, Rich Wyman PGY2    PRE-OP DIAGNOSIS:   Charcot arthropathy, right foot  Dislocation of tarsometatarsal joint, right foot  Secondary degenerative arthritis, right foot  Chronic nonpressure ulceration to level of subcutaneous tissue, right foot  Diabetes mellitus type 2 with associated peripheral neuropathy  History of amputation, right foot    POST-OP DIAGNOSIS: Same as above. PROCEDURE:   Midtarsal joint arthrodesis, multiple joints, right foot (CPT 23859)  Subtalar joint arthrodesis, right foot (CPT 67349)  Removal of external fixator under anesthesia, right lower extremity (CPT 09791)    All procedures billed with 58 modifier as part of planned staged limb salvage reconstruction    ANESTHESIA: General, regional popliteal block to the right lower extremity, 10 cc of 0.5% Marcaine with epinephrine    HEMOSTASIS: Direct pressure, Bovie electrocautery, epinephrine    ESTIMATED BLOOD LOSS: Roughly 100 cc. MATERIALS:   Implant Name Type Inv.  Item Serial No.  Lot No. LRB No. Used Action   K WIRE FIX L6IN DIA0.062IN 9949148] [employer]] - [id number]  K WIRE FIX L6IN DIA0.062IN 8970979] [employer]]  [facility] [employer]UMENTS [employer]-WD [id number] Right 1 Implanted   BOLT L85MM [id number] PROX L10MM DST L24MM FUS SALVATION - [id number]  BOLT L85MM [id number] PROX L10MM DST L24MM FUS SALVATION  [employer] [facility]  Right 1 Implanted   BOLT BNE FIX L130MM [id number] FOR SALVATION SYS - [id number]  BOLT BNE FIX L130MM [id number] FOR SALVATION SYS  [employer] [facility]  Right 1 Implanted   BOLT BNE FIX L95MM DIA5MM PROX L10MM DST L22MM MIDFOOT FUS - [id number]  BOLT BNE FIX L95MM DIA5MM PROX L10MM DST L22MM MIDFOOT FUS  [employer] [facility]  Right 1 Implanted   BOLT BNE FIX L105MM DIA5MM PROX L10MM DST L22MM MIDFOOT FUS - [id number]  BOLT BNE FIX L105MM DIA5MM PROX L10MM DST L22MM MIDFOOT FUS  [employer] [facility]  Right 1 Implanted   K WIRE FIX L228MM DIA2MM S STL SMOOTH DBL END DBL SHRP TIP - [id number]  K WIRE FIX L228MM DIA2MM S STL SMOOTH DBL END DBL SHRP TIP  [employer] [facility]  Right 2 Implanted   PIN STEINMANN 2.4MM - [id number]  PIN STEINMANN 2.4MM  [facility]  Right 5 Implanted   SALVATION  DRILL BIT 6.0MM TAPERED - [id number]  SALVATION  DRILL BIT 6.0MM TAPERED  [facility]  Right 1 Implanted   SALVATION  DRILL BIT 4.5MM TAPERED      Right 1 Implanted       INJECTABLES: None    SPECIMEN: None  * No specimens in log *    COMPLICATIONS: None    FINDINGS: Consolidation of Charcot arthropathy, with appropriate osseous density. No signs of infection. Anatomic alignment of extremity restored. Please see op note for full detail. The patient was counseled at length about the risks of sheela Covid-19 during their perioperative period and any recovery window from their procedure. The patient was made aware that sheela Covid-19  may worsen their prognosis for recovering from their procedure  and lend to a higher morbidity and/or mortality risk. All material risks, benefits, and reasonable alternatives including postponing the procedure were discussed. The patient does wish to proceed with the procedure at this time. INDICATIONS FOR PROCEDURE: The patient is a 20-year-old male with a chronic nonhealing ulcerations to the right foot. The patient's history is significant for type 2 diabetes mellitus with associated peripheral neuropathy, history of a traumatic amputation, history of Charcot arthropathy. The patient has failed several other treatment modalities including offloading, casting, lifestyle modifications, antibiotics, serial debridements. The patient has known history of Charcot arthropathy which has been slowly progressing over the past several years but significantly worsened and 10/2022. He was admitted to the hospital at that time due to worsening Charcot event with superimposed cellulitis and concerns for infection to the right foot. He underwent subsequent midfoot arthrodesis osteotomy and application of external fixator on 10/12/2022. During his postoperative period he did require return to the OR for adjustment of the external fixator due to hardware failure and pin site infection. Since adjustment of his external fixator he has been recovering well. Preoperative radiographs and CT of the right lower extremity were obtained which demonstrated osseous consolidation of the Charcot event, osseous trabeculation across the previous midfoot osteotomy site with approximately 70 to 80% bridging fusion. His alignment has been maintained in external fixator. We thoroughly educated the patient on the details of his condition and the surgical plan. We discussed fusion of multiple midtarsal joints, fusion of the subtalar joint, removal of the external fixator of the right lower extremity including all the risk, benefits, alternatives. Surgical intervention is necessary at this time to restore anatomic alignment, reduce deformity, improve function of the extremity, and limb salvage in a limb that is at high risk for amputation. We recommend moving forward with a staged surgical approach in hopes of limb salvage of the right lower extremity. This will be stage 3 of our staged treatment protocol. Discussed with the patient that given the history of non-healing ulceration, history of infections, history of peripheral vascular disease, history of diabetes they are at high risk of more proximal amputation and or limb loss. All questions answered to patient's  appartent satisfaction. Patient verbalized and demonstrated understanding.   No guarantees were given or implied. The patient provided informed written consent which was signed and placed in the chart. A regional popliteal block was given to the right lower extremity by anesthesia team.  Please refer to their documentation for details. PROCEDURE IN DETAIL: Under mild sedation the patient was transported from pre-op to the operating room and placed on the operating table in the supine position with a safety strap across the lap. A well-padded pneumatic tourniquet was applied to the right thigh. After adequate sedation by anesthesia a the right lower extremity was then prepped, scrubbed, and draped in the usual aseptic fashion. A timeout was performed confirming correct patient, correct surgical site, correct procedure, antibiotics, everyone in the room was in agreement. The tourniquet was not inflated at this time. At this time we proceeded with removal of the external fixator. The external fixator was disassembled and removed from the right lower extremity in a sequential manner from proximal to distal.  All smooth wires were cut and removed with use of pliers. Half pins in the proximal tibia and medial forefoot were also removed in a sequential manner. Hemostasis was well controlled. All hardware was noted to be intact. The previously placed Steinmann pins were also removed at this time. The extremity was once again prepped before moving forward with the procedure. Next we proceeded with arthrodesis of the subtalar joint. A incision utilizing a #15 blade was made at the lateral aspect of the hindfoot just inferior to the distal aspect of the fibula. The incision was deepened to the level of bone utilizing a combination of sharp and blunt dissection. All neurovascular structures were protected and retracted on the surgical field. Hemostasis was controlled throughout the dissection process utilizing Bovie electrocautery.   The CFL ligament was identified and transected to allow for full visualization of the subtalar joint. At this time we move forward with preparation of the subtalar joint with of Palmyra joint prep bur. The subtalar joint was prepped until all cartilage and intervening soft tissue was removed. We then performed a subchondral fenestration to further promote recruitment of osteogenic cells to the area. The arthrodesis site was then reduced utilizing tactile manipulation and temporarily fixated with provisional K wires. Intraoperative fluoroscopy was utilized at this time to confirm appropriate alignment and reduction of the arthrodesis site. Next we inserted a 6.5mm  partially threaded [place] beam in a posterior to anterior fashion through the subtalar joint. There was excellent compression across the arthrodesis site as well as purchase. Next we moved forward with arthrodesis of multiple midtarsal joints. Incisions were made medially at the level of the 1st tarsometatarsal joint and navicular cuneiform joints, central dorsal at the second tarsometatarsal joint, laterally at the 4th tarsometatarsal joint utilizing a #15 blade. The incisions were deepened to the level of bone utilizing a combination of sharp and blunt dissection. Care was taken to protect and retract all neurovascular structures. Hemostasis was controlled utilizing Bovie electrocautery throughout the dissection. The joints were then prepped for arthrodesis and all soft tissue was removed. Cartilage was not present due to longstanding midfoot arthritis and Charcot arthropathy. With attention direction toward the medial column a 6.5mm partially threaded [place]myra beam was inserted from distal to proximal across the 1st tarsometatarsal, the medial naviculocuneiform, and talonavicular joints following manufactures instructions. There was excellent alignment and compression across all joints.  With attention then directed toward the lateral column a 5.0mm  partially threaded [place]myra beam was inserted from distal to proximal across the 4th tarsometatarsal joint and the calcaneocuboid joints. Once again there was appropriate alignment and compression across all joints. With attention directed to the second ray a 5.0 mm partially-threaded Gagandeep beam was inserted from distal to proximal across the second tarsometatarsal joint, intermediate navicular cuneiform joint, and into the calcaneus to provide a truss to our construct. Our fixation construct was designed in a delta fashion with an underlying truss to maximize stability and maintain rectus alignment of the forefoot in relationship to the hindfoot. This also further stabilized the tarsometatarsal joint dislocation from the Charcot arthropathy and provided definitive fixation across our previous osteotomy site. Intraoperative fluoroscopic imaging was obtained in multiple views to confirm adequate reduction, alignment, and safe appropriate hardware placement. The surgical sites were irrigated with copious amounts of sterile saline and Irrisept. Vancomycin powder was placed throughout the surgical sites. The surgical sites were then closed utilizing 3-0 Prolene. Dressings were then applied consisting of Xeroform, 4 x 4s, Kerlix, ACE. A well-padded short leg posterior splint was applied in the neutral dorsiflexed position to the right lower extremity. The patient tolerated the above procedure and anesthesia well without complications. The patient was transported from the operating room to the PACU with vital signs stable and vascular status intact to the right lower extremity. Postoperative plan: There is no further surgical intervention planned at this time. The patient will be discharged on our multimodal pain medication and antibiotics per our postoperative protocols. He is to continue strict nonweightbearing to the right lower extremity for the next 4 to 6 weeks. He will receive serial radiographs postoperatively.   He is to follow-up in my clinic within 1 week of discharge. Leonel Lazo DPM on 12/27/2022 at 4:46 PM  The [address] [place] [address] Surgery  Associate, [employer]    This note was generated with the assistance of a speech recognition program. While intending to generate a timely document that accurately reflects the content of the visit, no guarantee can be provided that every grammatical or spelling mistake has been or will be identified or corrected. In such instances, actual meaning can be extrapolated by context. Thank you for your understanding.

## 2022-12-27 NOTE — BRIEF OP NOTE
Brief Postoperative Note      Patient: Doroteo Roberson  YOB: 1955  MRN: [id number]    Date of Procedure: 12/27/2022    Pre-Op Diagnosis:   Painful orthopaedic hardware [facility]) [T84.84XA]  Ulcer of right foot, unspecified ulcer stage ([facility] [address] [L97.519]  Charcot's joint of ankle, right [M14.671]    Post-Op Diagnosis: Same    Procedures:  Removal of external fixation device, right  Arthrodesis of STJ, right  Application beam, right medial 1st met-cuneiform-navicular, talus  Application of beam right 2nd met to calcaneus  Application of beam, right lateral column 4th/5th met bases, cuboid, calc  Application of posterior spling       Procedure(s):  RIGHT SUBTALAR JOINT AND MULTIPLE MID FOOT JOINT FUSIONS WITH SERA AND PRE-OP POP BLOCK  RIGHT FOOT/ANKLE EXTERNAL FIXATOR  REMOVAL    Surgeon(s):  Kaley Dolan DPM    Assistant:  Resident: Manohar Marvin DPM; Resident: Lily Serrano DPM; Jennifer Pulido DPM     Anesthesia: General with popliteal block    Estimated Blood Loss (mL): less than 677     Complications: None    Specimens:   * No specimens in log *    Implants:  Implant Name Type Inv.  Item Serial No.  Lot No. LRB No. Used Action   K WIRE FIX L6IN DIA0.062IN [id number]] [facility] [employer]] - [id number]  K WIRE FIX L6IN DIA0.062IN [id number]] [employer]]  [facility] [employer]-WD [id number] Right 1 Implanted   BOLT L85MM [id number] PROX L10MM DST L24MM FUS SALVATION - [id number]  BOLT L85MM [id number] PROX L10MM DST L24MM FUS SALVATION  [employer] [facility]  Right 1 Implanted   BOLT BNE FIX L130MM [id number] FOR SALVATION SYS - [id number]  BOLT BNE FIX L130MM [id number] FOR SALVATION SYS  [employer] [facility]  Right 1 Implanted   BOLT BNE FIX L95MM DIA5MM PROX L10MM DST L22MM MIDFOOT FUS - [id number]  BOLT BNE FIX L95MM DIA5MM PROX L10MM DST L22MM MIDFOOT FUS  [employer]  Right 1 Implanted   BOLT BNE FIX L105MM DIA5MM PROX L10MM DST L22MM MIDFOOT FUS - [id number]  BOLT BNE FIX L105MM DIA5MM PROX L10MM DST L22MM MIDFOOT FUS  [employer] [facility]  Right 1 Implanted   K WIRE FIX L228MM DIA2MM S STL SMOOTH DBL END DBL SHRP TIP - [id number]  K WIRE FIX L228MM DIA2MM S STL SMOOTH DBL END DBL SHRP TIP  [employer] [facility]  Right 2 Implanted   PIN STEINMANN 2.4MM - [id number]  PIN STEINMANN 2.4MM  [employer] [facility]  Right 5 Implanted   [employer]  DRILL BIT 6.0MM TAPERED - [id number]  S[employer]VATION  DRILL BIT 6.0MM TAPERED  [employer] [facility]  Right 1 Implanted         Drains: * No LDAs found *    Findings: STJ arthrodesis performed MIS with mary and confirmed fluoroscopically; Right Medial column stabilized with beam; lateral column same through the bases of the 4th and 5th metatarsals; additional beam 2nd met base to calc. STJ compression w/ headless AO tech.     Electronically signed by Mendel Race, DPM on 12/27/2022 at 11:16 AM

## 2022-12-27 NOTE — ANESTHESIA POSTPROCEDURE EVALUATION
Department of Anesthesiology  Postprocedure Note    Patient: Le Garcia  MRN: [id number]  YOB: 1955  Date of evaluation: 12/27/2022      Procedure Summary     Date: 12/27/22 Room / Location: [facility] OR 02 / [facility]) [facility]    Anesthesia Start: 4411 Anesthesia Stop: 2334    Procedures:       RIGHT SUBTALAR JOINT AND MULTIPLE MID FOOT JOINT FUSIONS WITH SERA AND PRE-OP POP BLOCK (Right: Foot)      RIGHT FOOT/ANKLE EXTERNAL FIXATOR  REMOVAL (Right: Foot) Diagnosis:       Painful orthopaedic hardware ([address]      Ulcer of right foot, unspecified ulcer stage ([address]      Charcot's joint of ankle, right      (Painful orthopaedic hardware ([address] [T84.84XA])      (Ulcer of right foot, unspecified ulcer stage ([address] [L97.519])      (Charcot's joint of ankle, right [C38.266])    Surgeons: Keith Smith DPM Responsible Provider: Tony Begum MD    Anesthesia Type: general, regional ASA Status: 3          Anesthesia Type: No value filed.     Amy Phase I: Amy Score: 8    Amy Phase II:        Anesthesia Post Evaluation    Patient location during evaluation: PACU  Patient participation: complete - patient participated  Level of consciousness: awake and alert  Airway patency: patent  Nausea & Vomiting: no nausea and no vomiting  Complications: no  Cardiovascular status: hemodynamically stable  Respiratory status: room air and spontaneous ventilation  Hydration status: euvolemic  Multimodal analgesia pain management approach

## 2023-01-10 NOTE — ACP (ADVANCE CARE PLANNING)
Advance Care Planning     Advance Care Planning Activator (Inpatient)  Conversation Note      Date of ACP Conversation: 10/5/2022     Conversation Conducted with: Patient with Decision Making Capacity    ACP Activator: Kat Pinon RN    {When Decision Maker makes decisions on behalf of the incapacitated patient: Decision Maker is asked to consider and make decisions based on patient values, known preferences, or best interests. Health Care Decision Maker:     Current Designated Health Care Decision Maker:     Primary Decision Maker: Carla Hernandez - [phone]  Click here to complete Healthcare Decision Makers including section of the Healthcare Decision Maker Relationship (ie \"Primary\")      Care Preferences    Ventilation: \"If you were in your present state of health and suddenly became very ill and were unable to breathe on your own, what would your preference be about the use of a ventilator (breathing machine) if it were available to you? \"      Would the patient desire the use of ventilator (breathing machine)?: yes    \"If your health worsens and it becomes clear that your chance of recovery is unlikely, what would your preference be about the use of a ventilator (breathing machine) if it were available to you? \"     Would the patient desire the use of ventilator (breathing machine)?: No      Resuscitation  \"CPR works best to restart the heart when there is a sudden event, like a heart attack, in someone who is otherwise healthy. Unfortunately, CPR does not typically restart the heart for people who have serious health conditions or who are very sick. \"    \"In the event your heart stopped as a result of an underlying serious health condition, would you want attempts to be made to restart your heart (answer \"yes\" for attempt to resuscitate) or would you prefer a natural death (answer \"no\" for do not attempt to resuscitate)? \" yes       [] Yes   [x] No   Educated Patient / Rosaura Alpers regarding differences between Advance Directives and portable DNR orders.     Length of ACP Conversation in minutes:  5    Conversation Outcomes:  [x] ACP discussion completed  [] Existing advance directive reviewed with patient; no changes to patient's previously recorded wishes  [] New Advance Directive completed  [] Portable Do Not Rescitate prepared for Provider review and signature  [] POLST/POST/MOLST/MOST prepared for Provider review and signature      Follow-up plan:    [] Schedule follow-up conversation to continue planning  [] Referred individual to Provider for additional questions/concerns   [] Advised patient/agent/surrogate to review completed ACP document and update if needed with changes in condition, patient preferences or care setting    [] This note routed to one or more involved healthcare providers Enbrel Pregnancy And Lactation Text: This medication is Pregnancy Category B and is considered safe during pregnancy. It is unknown if this medication is excreted in breast milk.

## 2023-01-18 RX ORDER — CYCLOBENZAPRINE HCL 10 MG
TABLET ORAL
Qty: 21 TABLET | Refills: 0 | OUTPATIENT
Start: 2023-01-18

## 2023-02-24 ENCOUNTER — TELEPHONE (OUTPATIENT)
Dept: PODIATRY | Age: 68
End: 2023-02-24

## 2023-02-24 NOTE — TELEPHONE ENCOUNTER
Patient is requesting a call and would not disclose the reason why. He can be reached at [phone].

## 2023-02-24 NOTE — TELEPHONE ENCOUNTER
Returned patient call. Patient states he needs an appointment to have toenails trimmed. He will return call to office to schedule appointment. Dick Mcnair.

## 2023-03-01 ENCOUNTER — OFFICE VISIT (OUTPATIENT)
Dept: INFECTIOUS DISEASES | Age: 68
End: 2023-03-01
Payer: MEDICARE

## 2023-03-01 VITALS
DIASTOLIC BLOOD PRESSURE: 80 MMHG | BODY MASS INDEX: 33.5 KG/M2 | TEMPERATURE: 98.1 F | WEIGHT: 234 LBS | HEART RATE: 107 BPM | SYSTOLIC BLOOD PRESSURE: 140 MMHG | HEIGHT: 70 IN

## 2023-03-01 DIAGNOSIS — T81.89XA DRAINING POSTOPERATIVE WOUND, INITIAL ENCOUNTER: Primary | ICD-10-CM

## 2023-03-01 PROCEDURE — 3074F SYST BP LT 130 MM HG: CPT | Performed by: INTERNAL MEDICINE

## 2023-03-01 PROCEDURE — 4004F PT TOBACCO SCREEN RCVD TLK: CPT | Performed by: INTERNAL MEDICINE

## 2023-03-01 PROCEDURE — 3078F DIAST BP <80 MM HG: CPT | Performed by: INTERNAL MEDICINE

## 2023-03-01 PROCEDURE — 3017F COLORECTAL CA SCREEN DOC REV: CPT | Performed by: INTERNAL MEDICINE

## 2023-03-01 PROCEDURE — G8484 FLU IMMUNIZE NO ADMIN: HCPCS | Performed by: INTERNAL MEDICINE

## 2023-03-01 PROCEDURE — G8417 CALC BMI ABV UP PARAM F/U: HCPCS | Performed by: INTERNAL MEDICINE

## 2023-03-01 PROCEDURE — 1123F ACP DISCUSS/DSCN MKR DOCD: CPT | Performed by: INTERNAL MEDICINE

## 2023-03-01 PROCEDURE — G8427 DOCREV CUR MEDS BY ELIG CLIN: HCPCS | Performed by: INTERNAL MEDICINE

## 2023-03-01 PROCEDURE — 99214 OFFICE O/P EST MOD 30 MIN: CPT | Performed by: INTERNAL MEDICINE

## 2023-03-01 ASSESSMENT — ENCOUNTER SYMPTOMS
RESPIRATORY NEGATIVE: 1
GASTROINTESTINAL NEGATIVE: 1

## 2023-03-03 ENCOUNTER — HOSPITAL ENCOUNTER (INPATIENT)
Age: 68
LOS: 11 days | Discharge: HOME HEALTH | DRG: 981 | End: 2023-03-14
Attending: EMERGENCY MEDICINE | Admitting: INTERNAL MEDICINE
Payer: MEDICARE

## 2023-03-03 DIAGNOSIS — E11.610 CHARCOT FOOT DUE TO DIABETES MELLITUS (HCC): ICD-10-CM

## 2023-03-03 DIAGNOSIS — E11.8 DIABETIC FOOT (HCC): ICD-10-CM

## 2023-03-03 DIAGNOSIS — L08.9 DIABETIC FOOT INFECTION (HCC): Primary | ICD-10-CM

## 2023-03-03 DIAGNOSIS — L08.9 DIABETIC FOOT INFECTION (HCC): ICD-10-CM

## 2023-03-03 DIAGNOSIS — M86.071 ACUTE HEMATOGENOUS OSTEOMYELITIS OF RIGHT FOOT (HCC): ICD-10-CM

## 2023-03-03 DIAGNOSIS — E11.628 DIABETIC FOOT INFECTION (HCC): ICD-10-CM

## 2023-03-03 DIAGNOSIS — E11.628 DIABETIC FOOT INFECTION (HCC): Primary | ICD-10-CM

## 2023-03-03 PROBLEM — N18.9 ACUTE KIDNEY INJURY SUPERIMPOSED ON CHRONIC KIDNEY DISEASE (HCC): Status: ACTIVE | Noted: 2018-08-05

## 2023-03-03 PROBLEM — D50.9 MICROCYTIC ANEMIA: Status: ACTIVE | Noted: 2023-03-03

## 2023-03-03 PROBLEM — E87.1 HYPONATREMIA: Status: ACTIVE | Noted: 2023-03-03

## 2023-03-03 LAB
ABSOLUTE EOS #: 0.4 K/UL (ref 0–0.44)
ABSOLUTE IMMATURE GRANULOCYTE: 0.09 K/UL (ref 0–0.3)
ABSOLUTE LYMPH #: 2.99 K/UL (ref 1.1–3.7)
ABSOLUTE MONO #: 1.03 K/UL (ref 0.1–1.2)
ANION GAP SERPL CALCULATED.3IONS-SCNC: 10 MMOL/L (ref 9–17)
BASOPHILS # BLD: 1 % (ref 0–2)
BASOPHILS ABSOLUTE: 0.09 K/UL (ref 0–0.2)
BUN SERPL-MCNC: 28 MG/DL (ref 8–23)
BUN/CREAT BLD: 10 (ref 9–20)
CALCIUM SERPL-MCNC: 9.8 MG/DL (ref 8.6–10.4)
CHLORIDE SERPL-SCNC: 97 MMOL/L (ref 98–107)
CO2 SERPL-SCNC: 24 MMOL/L (ref 20–31)
CREAT SERPL-MCNC: 2.89 MG/DL (ref 0.7–1.2)
CRP SERPL HS-MCNC: 136.4 MG/L (ref 0–5)
EOSINOPHILS RELATIVE PERCENT: 4 % (ref 1–4)
ERYTHROCYTE [SEDIMENTATION RATE] IN BLOOD BY WESTERGREN METHOD: 53 MM/HR (ref 0–20)
GFR SERPL CREATININE-BSD FRML MDRD: 23 ML/MIN/1.73M2
GLUCOSE SERPL-MCNC: 223 MG/DL (ref 70–99)
HCT VFR BLD AUTO: 29.1 % (ref 40.7–50.3)
HGB BLD-MCNC: 9.4 G/DL (ref 13–17)
IMMATURE GRANULOCYTES: 1 %
LYMPHOCYTES # BLD: 28 % (ref 24–43)
MCH RBC QN AUTO: 27.6 PG (ref 25.2–33.5)
MCHC RBC AUTO-ENTMCNC: 32.3 G/DL (ref 28.4–34.8)
MCV RBC AUTO: 85.3 FL (ref 82.6–102.9)
MONOCYTES # BLD: 10 % (ref 3–12)
NRBC AUTOMATED: 0 PER 100 WBC
PDW BLD-RTO: 16 % (ref 11.8–14.4)
PLATELET # BLD AUTO: 259 K/UL (ref 138–453)
PMV BLD AUTO: 8.8 FL (ref 8.1–13.5)
POTASSIUM SERPL-SCNC: 4.5 MMOL/L (ref 3.7–5.3)
RBC # BLD: 3.41 M/UL (ref 4.21–5.77)
RBC # BLD: ABNORMAL 10*6/UL
SEG NEUTROPHILS: 56 % (ref 36–65)
SEGMENTED NEUTROPHILS ABSOLUTE COUNT: 6.02 K/UL (ref 1.5–8.1)
SODIUM SERPL-SCNC: 131 MMOL/L (ref 135–144)
WBC # BLD AUTO: 10.6 K/UL (ref 3.5–11.3)

## 2023-03-03 PROCEDURE — 6360000002 HC RX W HCPCS: Performed by: NURSE PRACTITIONER

## 2023-03-03 PROCEDURE — 2580000003 HC RX 258: Performed by: NURSE PRACTITIONER

## 2023-03-03 PROCEDURE — 85652 RBC SED RATE AUTOMATED: CPT

## 2023-03-03 PROCEDURE — 2580000003 HC RX 258: Performed by: EMERGENCY MEDICINE

## 2023-03-03 PROCEDURE — 86140 C-REACTIVE PROTEIN: CPT

## 2023-03-03 PROCEDURE — 6360000002 HC RX W HCPCS: Performed by: EMERGENCY MEDICINE

## 2023-03-03 PROCEDURE — 1200000000 HC SEMI PRIVATE

## 2023-03-03 PROCEDURE — 99222 1ST HOSP IP/OBS MODERATE 55: CPT | Performed by: NURSE PRACTITIONER

## 2023-03-03 PROCEDURE — 80048 BASIC METABOLIC PNL TOTAL CA: CPT

## 2023-03-03 PROCEDURE — 96375 TX/PRO/DX INJ NEW DRUG ADDON: CPT

## 2023-03-03 PROCEDURE — 85025 COMPLETE CBC W/AUTO DIFF WBC: CPT

## 2023-03-03 PROCEDURE — 6370000000 HC RX 637 (ALT 250 FOR IP): Performed by: NURSE PRACTITIONER

## 2023-03-03 PROCEDURE — 96374 THER/PROPH/DIAG INJ IV PUSH: CPT

## 2023-03-03 PROCEDURE — 99285 EMERGENCY DEPT VISIT HI MDM: CPT

## 2023-03-03 RX ORDER — GLIPIZIDE 10 MG/1
20 TABLET ORAL
Status: DISCONTINUED | OUTPATIENT
Start: 2023-03-04 | End: 2023-03-14 | Stop reason: HOSPADM

## 2023-03-03 RX ORDER — HEPARIN SODIUM 5000 [USP'U]/ML
5000 INJECTION, SOLUTION INTRAVENOUS; SUBCUTANEOUS EVERY 8 HOURS SCHEDULED
Status: DISCONTINUED | OUTPATIENT
Start: 2023-03-03 | End: 2023-03-14 | Stop reason: HOSPADM

## 2023-03-03 RX ORDER — INSULIN GLARGINE 100 [IU]/ML
10 INJECTION, SOLUTION SUBCUTANEOUS 2 TIMES DAILY
Status: DISCONTINUED | OUTPATIENT
Start: 2023-03-03 | End: 2023-03-14 | Stop reason: HOSPADM

## 2023-03-03 RX ORDER — SODIUM CHLORIDE 9 MG/ML
INJECTION, SOLUTION INTRAVENOUS PRN
Status: DISCONTINUED | OUTPATIENT
Start: 2023-03-03 | End: 2023-03-14 | Stop reason: HOSPADM

## 2023-03-03 RX ORDER — HYDROCODONE BITARTRATE AND ACETAMINOPHEN 10; 325 MG/1; MG/1
1 TABLET ORAL EVERY 6 HOURS PRN
Status: DISCONTINUED | OUTPATIENT
Start: 2023-03-03 | End: 2023-03-12

## 2023-03-03 RX ORDER — INSULIN LISPRO 100 [IU]/ML
0-4 INJECTION, SOLUTION INTRAVENOUS; SUBCUTANEOUS NIGHTLY
Status: DISCONTINUED | OUTPATIENT
Start: 2023-03-03 | End: 2023-03-14 | Stop reason: HOSPADM

## 2023-03-03 RX ORDER — SODIUM CHLORIDE 0.9 % (FLUSH) 0.9 %
5-40 SYRINGE (ML) INJECTION EVERY 12 HOURS SCHEDULED
Status: DISCONTINUED | OUTPATIENT
Start: 2023-03-03 | End: 2023-03-14 | Stop reason: HOSPADM

## 2023-03-03 RX ORDER — INSULIN LISPRO 100 [IU]/ML
0-8 INJECTION, SOLUTION INTRAVENOUS; SUBCUTANEOUS
Status: DISCONTINUED | OUTPATIENT
Start: 2023-03-04 | End: 2023-03-14 | Stop reason: HOSPADM

## 2023-03-03 RX ORDER — GABAPENTIN 300 MG/1
900 CAPSULE ORAL 3 TIMES DAILY
Status: DISCONTINUED | OUTPATIENT
Start: 2023-03-03 | End: 2023-03-08

## 2023-03-03 RX ORDER — CETIRIZINE HYDROCHLORIDE 10 MG/1
10 TABLET ORAL NIGHTLY
Status: DISCONTINUED | OUTPATIENT
Start: 2023-03-03 | End: 2023-03-14 | Stop reason: HOSPADM

## 2023-03-03 RX ORDER — ATORVASTATIN CALCIUM 20 MG/1
20 TABLET, FILM COATED ORAL NIGHTLY
Status: DISCONTINUED | OUTPATIENT
Start: 2023-03-03 | End: 2023-03-14 | Stop reason: HOSPADM

## 2023-03-03 RX ORDER — ONDANSETRON 4 MG/1
4 TABLET, ORALLY DISINTEGRATING ORAL EVERY 8 HOURS PRN
Status: DISCONTINUED | OUTPATIENT
Start: 2023-03-03 | End: 2023-03-14 | Stop reason: HOSPADM

## 2023-03-03 RX ORDER — ONDANSETRON 2 MG/ML
4 INJECTION INTRAMUSCULAR; INTRAVENOUS EVERY 6 HOURS PRN
Status: DISCONTINUED | OUTPATIENT
Start: 2023-03-03 | End: 2023-03-14 | Stop reason: HOSPADM

## 2023-03-03 RX ORDER — POLYETHYLENE GLYCOL 3350 17 G/17G
17 POWDER, FOR SOLUTION ORAL DAILY PRN
Status: DISCONTINUED | OUTPATIENT
Start: 2023-03-03 | End: 2023-03-14 | Stop reason: HOSPADM

## 2023-03-03 RX ORDER — ALOGLIPTIN 12.5 MG/1
12.5 TABLET, FILM COATED ORAL DAILY
Status: DISCONTINUED | OUTPATIENT
Start: 2023-03-03 | End: 2023-03-14 | Stop reason: HOSPADM

## 2023-03-03 RX ORDER — HYDROMORPHONE HYDROCHLORIDE 1 MG/ML
1 INJECTION, SOLUTION INTRAMUSCULAR; INTRAVENOUS; SUBCUTANEOUS ONCE
Status: COMPLETED | OUTPATIENT
Start: 2023-03-03 | End: 2023-03-03

## 2023-03-03 RX ORDER — ASPIRIN 81 MG/1
81 TABLET ORAL DAILY
Status: DISCONTINUED | OUTPATIENT
Start: 2023-03-04 | End: 2023-03-14 | Stop reason: HOSPADM

## 2023-03-03 RX ORDER — AMLODIPINE BESYLATE 5 MG/1
5 TABLET ORAL DAILY
Status: DISCONTINUED | OUTPATIENT
Start: 2023-03-04 | End: 2023-03-14 | Stop reason: HOSPADM

## 2023-03-03 RX ORDER — SODIUM CHLORIDE 0.9 % (FLUSH) 0.9 %
10 SYRINGE (ML) INJECTION PRN
Status: DISCONTINUED | OUTPATIENT
Start: 2023-03-03 | End: 2023-03-14 | Stop reason: HOSPADM

## 2023-03-03 RX ORDER — SODIUM CHLORIDE 9 MG/ML
INJECTION, SOLUTION INTRAVENOUS CONTINUOUS
Status: DISCONTINUED | OUTPATIENT
Start: 2023-03-03 | End: 2023-03-07

## 2023-03-03 RX ORDER — DEXTROSE MONOHYDRATE 100 MG/ML
INJECTION, SOLUTION INTRAVENOUS CONTINUOUS PRN
Status: DISCONTINUED | OUTPATIENT
Start: 2023-03-03 | End: 2023-03-14 | Stop reason: HOSPADM

## 2023-03-03 RX ADMIN — ATORVASTATIN CALCIUM 20 MG: 20 TABLET, FILM COATED ORAL at 21:20

## 2023-03-03 RX ADMIN — HEPARIN SODIUM 5000 UNITS: 5000 INJECTION INTRAVENOUS; SUBCUTANEOUS at 21:20

## 2023-03-03 RX ADMIN — HYDROCODONE BITARTRATE AND ACETAMINOPHEN 1 TABLET: 10; 325 TABLET ORAL at 21:19

## 2023-03-03 RX ADMIN — GABAPENTIN 900 MG: 300 CAPSULE ORAL at 21:19

## 2023-03-03 RX ADMIN — VANCOMYCIN HYDROCHLORIDE 2500 MG: 5 INJECTION, POWDER, LYOPHILIZED, FOR SOLUTION INTRAVENOUS at 18:40

## 2023-03-03 RX ADMIN — CETIRIZINE HYDROCHLORIDE 10 MG: 10 TABLET, FILM COATED ORAL at 21:19

## 2023-03-03 RX ADMIN — CEFEPIME 2000 MG: 2 INJECTION, POWDER, FOR SOLUTION INTRAVENOUS at 18:08

## 2023-03-03 RX ADMIN — SODIUM CHLORIDE: 9 INJECTION, SOLUTION INTRAVENOUS at 21:27

## 2023-03-03 RX ADMIN — SODIUM CHLORIDE, PRESERVATIVE FREE 10 ML: 5 INJECTION INTRAVENOUS at 21:19

## 2023-03-03 RX ADMIN — HYDROMORPHONE HYDROCHLORIDE 1 MG: 1 INJECTION, SOLUTION INTRAMUSCULAR; INTRAVENOUS; SUBCUTANEOUS at 16:46

## 2023-03-03 ASSESSMENT — PAIN DESCRIPTION - DESCRIPTORS: DESCRIPTORS: SHARP;STABBING;THROBBING

## 2023-03-03 ASSESSMENT — ENCOUNTER SYMPTOMS
SHORTNESS OF BREATH: 1
CHEST TIGHTNESS: 0
ABDOMINAL PAIN: 0
RHINORRHEA: 0
SORE THROAT: 0
VOMITING: 0
COLOR CHANGE: 0
DIARRHEA: 0
NAUSEA: 0
CONSTIPATION: 0
EYE DISCHARGE: 0
EYE REDNESS: 0
COUGH: 0
SHORTNESS OF BREATH: 0

## 2023-03-03 ASSESSMENT — PAIN DESCRIPTION - FREQUENCY: FREQUENCY: CONTINUOUS

## 2023-03-03 ASSESSMENT — PAIN - FUNCTIONAL ASSESSMENT: PAIN_FUNCTIONAL_ASSESSMENT: 0-10

## 2023-03-03 ASSESSMENT — PAIN DESCRIPTION - ORIENTATION: ORIENTATION: RIGHT

## 2023-03-03 ASSESSMENT — PAIN SCALES - GENERAL
PAINLEVEL_OUTOF10: 6
PAINLEVEL_OUTOF10: 10

## 2023-03-03 ASSESSMENT — PAIN DESCRIPTION - LOCATION: LOCATION: FOOT

## 2023-03-03 NOTE — ED PROVIDER NOTES
EMERGENCY DEPARTMENT ENCOUNTER    Pt Name: Luciano Hernandes  MRN: [id number]  [place] 1955  Date of evaluation: 3/3/23  CHIEF COMPLAINT       Chief Complaint   Patient presents with    Wound Infection     Right foot     HISTORY OF PRESENT ILLNESS   This is a 79-year-old male who presents with complaints of increasing pain and discomfort in his right foot and ankle. Patient has a history of Charcot foot and ankle, multiple previous surgeries with recurrent infections. Patient was seen a few days ago had a CT scan as an outpatient, he had increasing pain and discomfort and was told to come to the ER. Patient denies fevers or chills, no chest pain or shortness of breath. He describes his symptoms as severe. REVIEW OF SYSTEMS     Review of Systems   Constitutional:  Negative for chills and fever. HENT:  Negative for rhinorrhea and sore throat. Eyes:  Negative for discharge, redness and visual disturbance. Respiratory:  Negative for cough and shortness of breath. Cardiovascular:  Negative for chest pain, palpitations and leg swelling. Gastrointestinal:  Negative for diarrhea, nausea and vomiting. Genitourinary:  Negative for dysuria and hematuria. Musculoskeletal:  Negative for arthralgias, myalgias and neck pain. Ankle and foot pain and swelling   Skin:  Negative for color change and rash. Neurological:  Negative for seizures, weakness and headaches. Psychiatric/Behavioral:  Negative for hallucinations, self-injury and suicidal ideas.     PASTMEDICAL HISTORY     Past Medical History:   Diagnosis Date    Abscess of right foot 08/04/2018    Acquired hammer toe deformity of lesser toe of right foot     ALEJANDRO (acute kidney injury) ([facility] [address] 08/05/2018    Cellulitis 04/24/2018    Cellulitis of left foot 04/24/2018    Cellulitis of right foot     and abscess    Charcot foot due to diabetes mellitus ([facility] [address] 2014    Right foot     Chest pain at rest 08/04/2018    Chronic multifocal osteomyelitis of right foot ([facility])     CKD (chronic kidney disease)     Diabetic polyneuropathy associated with type 2 diabetes mellitus ([facility])     Essential hypertension     Fractures, multiple 07/21/2014    Right foot fractures     Hyperlipidemia     Hypertension     Leukocytosis     MRSA (methicillin resistant staph aureus) culture positive 2018    rt foot    Neuropathy     diabetic with charcot affecting the right foot    Pain in right foot     redness and swelling    Pneumonia 2009    Right foot infection     Right foot pain     Tobacco abuse 04/24/2018    Type II or unspecified type diabetes mellitus without mention of complication, not stated as uncontrolled     Vertigo     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 stepped on sharp object. Covered by dressing.    Wound, open     Right posterior -Diabetic Ulcer     Past Problem List  Patient Active Problem List   Diagnosis Code    Essential hypertension I10    Type II or unspecified type diabetes mellitus without mention of complication, not stated as uncontrolled E11.9    Neuropathy G62.9    Vertigo R42    Charcot foot due to diabetes mellitus ([facility]) E11.610    Hyperlipidemia E78.5    Cellulitis L03.90    Cellulitis of left foot L03.116    Right foot infection L08.9    Diabetic polyneuropathy associated with type 2 diabetes mellitus ([facility]) E11.42    Foreign body (FB) in soft tissue M79.5    Cellulitis of left leg L03.116    Abscess of right foot L02.611    Chest pain at rest R07.9    Tobacco abuse Z72.0    Type 2 diabetes mellitus treated with insulin ([facility]) E11.9, Z79.4    ALEJANDRO (acute kidney injury) ([facility]) N17.9    Bandemia D72.825    Chronic multifocal osteomyelitis of right foot ([facility]) M86.371    Diabetic infection of right foot ([facility]) E11.628, L08.9    Acquired hammer toe deformity of lesser toe of right foot M20.41    Post-op pain G89.18    Right foot  pain M79.671    Hallux hammertoe, right M20.31    Osteomyelitis ([facility]) M86.9    Wound dehiscence T81.30XA    Diabetic foot ([facility] [address] E11.8    Hyperglycemia due to diabetes mellitus ([facility]) E11.65    Foot ulcer ([facility] [address] L97.509    Cellulitis of right foot L03.115    Type 2 diabetes mellitus with right diabetic foot ulcer ([facility] [address] E11.621, L97.519    Charcot's joint of right foot M14.671    Stage 3b chronic kidney disease ([facility] [address] N18.32     SURGICAL HISTORY       Past Surgical History:   Procedure Laterality Date    ANKLE SURGERY Right 10/12/2022    RIGHT MID-FOOT OSTEOTOMY WITH OF APPLICATION EXTERNAL FIXATOR SERA performed by Rosmery Buenrostro DPM at [address] [place] [address] Right 11/15/2022    right lower extremity  adjustment of exfix performed by Rosmery Buenrostro DPM at [facility]      at age 24    ARTHRODESIS Right 12/27/2022    RIGHT SUBTALAR JOINT AND MULTIPLE MID FOOT JOINT FUSIONS WITH SERA AND PRE-OP POP BLOCK performed by Rosmery Buenrostro DPM at [address] Right 12/11/2020    RIGHT HALLUX EXOSTECTOMY AND 3RD DIGIT EXTENSIOR TENOTOMY performed by Dustin De Anda DPM at [address] [place] [address] Left 04/24/2018    I&D foreign body removal    FOOT DEBRIDEMENT Right 03/20/2020    RIGHT  FOOT   INCISION AND DRAINAGE WITH BONE BIOPSY performed by Dustin De Anda DPM at [facility] [address] Right 06/08/2021    FOOT DEBRIDEMENT INCISION AND DRAINAGE performed by Dustin De Anda DPM at [facility] [address] Right 09/16/2021    RIGHT FOOT  INCISION AND DRAINAGE   WITH PULSE LAVAGE performed by Dustin De Anda DPM at [facility] Right 06/11/2021    RIGHT FOOT FLAP CLOSURE performed by Dustin De Anda DPM at [place] [address] Right 04/17/8044    APPLICATION EXTERNAL FIXATOR RIGHT FOOT - SERA - Right    FOOT SURGERY Right 12/27/2022    RIGHT SUBTALAR JOINT AND MULTIPLE MID FOOT JOINT FUSIONS WITH SERA AND PRE-OP POP BLOCK (Right: Foot)    FOOT SURGERY Right 12/27/2022    RIGHT FOOT/ANKLE EXTERNAL FIXATOR  REMOVAL performed by Jagruit Bains DPM at [facility] [address]    IR INS PICC VAD W SQ PORT GREATER THAN 5  10/07/2022    IR INS PICC VAD W SQ PORT GREATER THAN 5 10/7/2022 [facility]    KNEE ARTHROSCOPY Right 1989    KNEE ARTHROSCOPY Left 1990    KNEE SURGERY Bilateral 1983    arthroscopy    NECK SURGERY  1987    [place] I&D BELOW FASCIA FOOT 1 BURSAL SPACE Left 04/24/2018    LEFT FOOT MULTIPLE  INCISIONS  AND DRAINAGE AND REMOVAL FOREIGN BODY  IRENE performed by Robert Henson DPM at [address] [facility]       Previous Medications    AMLODIPINE (NORVASC) 5 MG TABLET    Take 5 mg by mouth daily    ASPIRIN 81 MG TABLET    Take 81 mg by mouth daily    CETIRIZINE (ZYRTEC) 10 MG TABLET    Take 10 mg by mouth nightly     GABAPENTIN (NEURONTIN) 300 MG CAPSULE    Take 900 mg by mouth 3 times daily. Take 3 caps (=900mg) 3 times a day    GLIPIZIDE (GLUCOTROL) 10 MG TABLET    Take 20 mg by mouth 2 times daily (before meals) Takes 2 tabs (=20mg) BID    HYDROCODONE-ACETAMINOPHEN (NORCO)  MG PER TABLET    Take 1 tablet by mouth every 6 hours as needed for Pain. JANUVIA 100 MG TABLET    Take 100 mg by mouth daily     KETOROLAC (TORADOL) 10 MG TABLET    Take 1 tablet by mouth every 6 hours as needed for Pain    KETOROLAC (TORADOL) 10 MG TABLET    Take 1 tablet by mouth every 6 hours as needed for Pain    LANTUS SOLOSTAR 100 UNIT/ML INJECTION PEN    Inject 15 Units into the skin nightly    MISC. DEVICES MISC    1 PAIR OF DIABETIC SHOES (1 LEFT/ 1 RIGHT)  1-3 PAIRS OF INSERTS (LEFT/ RIGHT)    MUPIROCIN (BACTROBAN) 2 % OINTMENT    Apply topically 2 times daily TO FOOT WOUND. SIMVASTATIN (ZOCOR) 40 MG TABLET    Take 40 mg by mouth nightly     TRUEPLUS PEN NEEDLES 31G X 8 MM MISC         ALLERGIES     is allergic to morphine, other, and percocet [oxycodone-acetaminophen].   FAMILY HISTORY     He indicated that his mother is alive. He indicated that his father is . He indicated that the status of his maternal grandmother is unknown. SOCIAL HISTORY       Social History     Tobacco Use    Smoking status: Every Day     Packs/day: 0.50     Types: Cigarettes    Smokeless tobacco: Never   Vaping Use    Vaping Use: Never used   Substance Use Topics    Alcohol use: No    Drug use: Not Currently     PHYSICAL EXAM     INITIAL VITALS: BP (!) 148/70   Pulse (!) 103   Temp 99.8 °F (37.7 °C) (Oral)   Resp 18   SpO2 97%    Physical Exam  Constitutional:       Appearance: Normal appearance. He is well-developed. He is not ill-appearing or toxic-appearing. HENT:      Head: Normocephalic and atraumatic. Eyes:      Conjunctiva/sclera: Conjunctivae normal.      Pupils: Pupils are equal, round, and reactive to light. Neck:      Trachea: Trachea normal.   Cardiovascular:      Rate and Rhythm: Normal rate and regular rhythm. Heart sounds: S1 normal and S2 normal. No murmur heard. Pulmonary:      Effort: Pulmonary effort is normal. No accessory muscle usage or respiratory distress. Breath sounds: Normal breath sounds. Chest:      Chest wall: No deformity or tenderness. Abdominal:      General: Bowel sounds are normal. There is no distension or abdominal bruit. Palpations: Abdomen is not rigid. Tenderness: There is no abdominal tenderness. There is no guarding or rebound. Musculoskeletal:      Cervical back: Normal range of motion and neck supple. Legs:    Skin:     General: Skin is warm. Findings: No rash. Neurological:      Mental Status: He is alert and oriented to person, place, and time. GCS: GCS eye subscore is 4. GCS verbal subscore is 5. GCS motor subscore is 6.    Psychiatric:         Speech: Speech normal.       MEDICAL DECISION MAKING / ED COURSE:   Summary of Patient Presentation:    64-year-old male, diabetic foot infection, plan is basic labs get the CT scan results from the other facility consult podiatry. 1)  Number and Complexity of Problems  Problem List This Visit: Diabetic foot infection    Differential Diagnosis: Hardware failure, fracture, infection    Diagnoses Considered but Do Not Suspect:      Pertinent Comorbid Conditions: Diabetes    2)  Data Reviewed    External documents reviewed include an outpatient CT scan which shows evidence of linear lucencies near the screws concerning for infection and loosening    Imaging that is independently reviewed and interpreted by me as:      See more data below for the lab and radiology tests and orders. 3)  Treatment and Disposition  Patient was discussed with the podiatry service who recommends cefepime vancomycin and admission for further evaluation. Patient was updated, the hospitalist service was called who agrees with admission. Shared Decision Making: The patient was involved in his/her plan of care through shared decision making. The testing that was ordered was discussed with the patient. Any medications that may have been ordered were discussed with the patient    Code Status Discussion:      \"ED Course\" Notes From Epic Narrator:         CRITICAL CARE:       PROCEDURES:  Procedures      DATA FOR LAB AND RADIOLOGY TESTS ORDERED BELOW ARE REVIEWED BY THE ED CLINICIAN:    RADIOLOGY: All x-rays, CT, MRI, and formal ultrasound images (except ED bedside ultrasound) are read by the radiologist, see reports below, unless otherwise noted in MDM or here. Reports below are reviewed by myself. No orders to display       LABS: Lab orders shown below, the results are reviewed by myself, and all abnormals are listed below.   Labs Reviewed   CBC WITH AUTO DIFFERENTIAL - Abnormal; Notable for the following components:       Result Value    RBC 3.41 (*)     Hemoglobin 9.4 (*)     Hematocrit 29.1 (*)     RDW 16.0 (*)     Immature Granulocytes 1 (*)     All other components within normal limits   BASIC METABOLIC PANEL - Abnormal; Notable for the following components:    Glucose 223 (*)     BUN 28 (*)     Creatinine 2.89 (*)     Est, Glom Filt Rate 23 (*)     Sodium 131 (*)     Chloride 97 (*)     All other components within normal limits   C-REACTIVE PROTEIN - Abnormal; Notable for the following components:    .4 (*)     All other components within normal limits   SEDIMENTATION RATE - Abnormal; Notable for the following components:    Sed Rate 53 (*)     All other components within normal limits       Vitals Reviewed:    Vitals:    03/03/23 1432   BP: (!) 148/70   Pulse: (!) 103   Resp: 18   Temp: 99.8 °F (37.7 °C)   TempSrc: Oral   SpO2: 97%     MEDICATIONS GIVEN TO PATIENT THIS ENCOUNTER:  Orders Placed This Encounter   Medications    cefepime (MAXIPIME) 2,000 mg in sodium chloride 0.9 % 50 mL IVPB (mini-bag)     Order Specific Question:   Antimicrobial Indications     Answer:   Skin and Soft Tissue Infection    vancomycin (VANCOCIN) 2,500 mg in sodium chloride 0.9 % 500 mL IVPB     Order Specific Question:   Antimicrobial Indications     Answer:   Skin and Soft Tissue Infection    HYDROmorphone HCl PF (DILAUDID) injection 1 mg     DISCHARGE PRESCRIPTIONS:  New Prescriptions    No medications on file     PHYSICIAN CONSULTS ORDERED THIS ENCOUNTER:  IP CONSULT TO PODIATRY  IP CONSULT TO HOSPITALIST  PHARMACY TO DOSE VANCOMYCIN  FINAL IMPRESSION      1. Diabetic foot infection ([facility] [address]    2. Charcot foot due to diabetes mellitus ([facility] [address]          DISPOSITION/PLAN   DISPOSITION Decision To Admit 03/03/2023 04:29:12 PM      OUTPATIENT FOLLOW UP THE PATIENT:  No follow-up provider specified.     MD Bianca Traylor MD  03/03/23 [id number]

## 2023-03-03 NOTE — H&P
[facility]  Office: [address], DO, Ryan Sandra, DO, Yanna Settle, DO, Cony Wu Blood, DO, Katelin Mcnair MD, Johann Riedel, MD, Rik Padron MD, Melodie Alvarez MD,  Pineda Eldridge MD, Sharmila Carver MD, Mumtaz Hernandez, DO, Leno Hunter MD,  Lance Luis MD, June Rock MD, Aldo Cummings, DO, Nate Mcleod MD, Kieran Freeman MD, Thais Carrasquillo, DO, Kristina López MD, Ariane Helton MD, Barbra Hogan MD, Maxim Maxwell MD, Adele Garcia, DO, Whitney Nichols MD, Dana Lawrence MD, Oksana Groves, [facility],  Alee Sloan, CNP, Niki Rios, CNP, Ra Call, CNP,  Melba Ren, [facility], Renetta Dennis, CNP, Mayco Scott, CNP, Swetha Smith, CNP, Wiley Peraza, CNP, Jeyson Garza, CNP, Chris Mclean PAFANNY, Bonnie Shah, CNS, Harper Walter, CNP, Celestino Schultz, [facility]    HISTORY AND PHYSICAL EXAMINATION            Date:   3/3/2023  Patient name:  Laura Parham  Date of admission:  3/3/2023  2:54 PM  MRN:   [id number]  Account:  [de-identified]  YOB: 1955  PCP:    Hortencia Wasserman MD  Room:   [place] [address]  Code Status:    Full    Chief Complaint:     Chief Complaint   Patient presents with    Wound Infection     Right foot     History Obtained From:     patient, electronic medical record    History of Present Illness:     Patient presents to the emergency room today with complaints of pain and swelling in his right foot. Patient had a CT outpatient of his right foot. Patient was advised by infectious disease to come to the emergency room for further evaluation and treatment. Patient has a significant past medical history of diabetes, CKD, hypertension, hyperlipidemia and Charcot foot. Patient has been evaluated and treated by Dr. Giovana Escalante outpatient as well. Patient also complaining of fevers, chills and intermittent shortness of breath.     Throughout the emergency room evaluation it was noted that his sodium levels 131. Chloride 97. BUN 28. Creatinine 2.89. GFR 23. Glucose 223. .4. Hemoglobin 9.4. Sed rate 53. Patient is being admitted for a diabetic foot infection. Infectious disease and podiatry are consulted. IV antibiotics initiated. Past Medical History:     Past Medical History:   Diagnosis Date    Abscess of right foot 08/04/2018    Acquired hammer toe deformity of lesser toe of right foot     ALEJANDRO (acute kidney injury) ([address] 08/05/2018    Cellulitis 04/24/2018    Cellulitis of left foot 04/24/2018    Cellulitis of right foot     and abscess    Charcot foot due to diabetes mellitus ([address] 2014    Right foot     Chest pain at rest 08/04/2018    Chronic multifocal osteomyelitis of right foot ([address]     CKD (chronic kidney disease)     Diabetic polyneuropathy associated with type 2 diabetes mellitus ([address]     Essential hypertension     Fractures, multiple 07/21/2014    Right foot fractures     Hyperlipidemia     Hypertension     Leukocytosis     MRSA (methicillin resistant staph aureus) culture positive 2018    rt foot    Neuropathy     diabetic with charcot affecting the right foot    Pain in right foot     redness and swelling    Pneumonia 2009    Right foot infection     Right foot pain     Tobacco abuse 04/24/2018    Type II or unspecified type diabetes mellitus without mention of complication, not stated as uncontrolled     Vertigo     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 stepped on sharp object. Covered by dressing.     Wound, open     Right posterior -Diabetic Ulcer        Past Surgical History:     Past Surgical History:   Procedure Laterality Date    ANKLE SURGERY Right 10/12/2022    RIGHT MID-FOOT OSTEOTOMY WITH OF APPLICATION EXTERNAL FIXATOR SERA performed by Diamond Rutherford DPM at [facility] Right 11/15/2022 right lower extremity  adjustment of exfix performed by Christopher Lugo DPM at [facility] [place]      at age 24    ARTHRODESIS Right 12/27/2022    RIGHT SUBTALAR JOINT AND MULTIPLE MID FOOT JOINT FUSIONS WITH SERA AND PRE-OP POP BLOCK performed by Christopher Lugo DPM at [facility] [place] [facility] Right 12/11/2020    RIGHT HALLUX EXOSTECTOMY AND 3RD DIGIT EXTENSIOR TENOTOMY performed by Clifford Mcclendon DPM at [facility] Left 04/24/2018    I&D foreign body removal    FOOT DEBRIDEMENT Right 03/20/2020    RIGHT  FOOT   INCISION AND DRAINAGE WITH BONE BIOPSY performed by Clifford Mcclendon DPM at [address] [place] [address] Right 06/08/2021    FOOT DEBRIDEMENT INCISION AND DRAINAGE performed by Clifford Mcclendon DPM at [address] [place] [address] Right 09/16/2021    RIGHT FOOT  INCISION AND DRAINAGE   WITH PULSE LAVAGE performed by Clifford Mcclendon DPM at [place] [address] Right 06/11/2021    RIGHT FOOT FLAP CLOSURE performed by Clifford Mcclendon DPM at [place] [address] Right 37/87/4889    APPLICATION EXTERNAL FIXATOR RIGHT FOOT - SERA - Right    FOOT SURGERY Right 12/27/2022    RIGHT SUBTALAR JOINT AND MULTIPLE MID FOOT JOINT FUSIONS WITH SERA AND PRE-OP POP BLOCK (Right: Foot)    FOOT SURGERY Right 12/27/2022    RIGHT FOOT/ANKLE EXTERNAL FIXATOR  REMOVAL performed by Christopher Luog DPM at [facility] [address]    IR INS PICC VAD W SQ PORT GREATER THAN 5  10/07/2022    IR INS PICC VAD W SQ PORT GREATER THAN 5 10/7/2022 [facility] PROCEDURES    KNEE ARTHROSCOPY Right 1989    KNEE ARTHROSCOPY Left 1990    KNEE SURGERY Bilateral 1983    arthroscopy    NECK SURGERY  1987    OR I&D BELOW FASCIA FOOT 1 BURSAL SPACE Left 04/24/2018    LEFT FOOT MULTIPLE  INCISIONS  AND DRAINAGE AND REMOVAL FOREIGN BODY  IRENE performed by Clifford Mcclendon DPM at [address] [place] [address]        Medications Prior to Admission:     Prior to Admission medications Medication Sig Start Date End Date Taking? Authorizing Provider   ketorolac (TORADOL) 10 MG tablet Take 1 tablet by mouth every 6 hours as needed for Pain 12/27/22 1/1/23  Jeannette Humphrey JAX   ketorolac (TORADOL) 10 MG tablet Take 1 tablet by mouth every 6 hours as needed for Pain 12/27/22 12/27/23  Jeannette KamMIALIYAH   HYDROcodone-acetaminophen (NORCO)  MG per tablet Take 1 tablet by mouth every 6 hours as needed for Pain. Historical Provider, MD   TRUEPLUS PEN NEEDLES 31G X 8 MM MISC  11/29/21   Historical Provider, MD   mupirocin (BACTROBAN) 2 % ointment Apply topically 2 times daily TO FOOT WOUND. 11/8/21   Historical Provider, MD   amLODIPine (NORVASC) 5 MG tablet Take 5 mg by mouth daily 10/20/21   Historical Provider, MD   LANTUS SOLOSTAR 100 UNIT/ML injection pen Inject 15 Units into the skin nightly  Patient taking differently: Inject 10 Units into the skin 2 times daily 9/17/21   ROCHELLE Garcia - NP   JANUVIA 100 MG tablet Take 100 mg by mouth daily  11/13/20   Historical Provider, MD   Misc. Devices MISC 1 PAIR OF DIABETIC SHOES (1 LEFT/ 1 RIGHT)  1-3 PAIRS OF INSERTS (LEFT/ RIGHT) 3/5/20   Tino Balbuena DPM   cetirizine (ZYRTEC) 10 MG tablet Take 10 mg by mouth nightly  10/21/19   Historical Provider, MD   simvastatin (ZOCOR) 40 MG tablet Take 40 mg by mouth nightly  11/13/18   Historical Provider, MD   glipiZIDE (GLUCOTROL) 10 MG tablet Take 20 mg by mouth 2 times daily (before meals) Takes 2 tabs (=20mg) BID    Historical Provider, MD   aspirin 81 MG tablet Take 81 mg by mouth daily    Historical Provider, MD   gabapentin (NEURONTIN) 300 MG capsule Take 900 mg by mouth 3 times daily. Take 3 caps (=900mg) 3 times a day    Historical Provider, MD        Allergies:     Morphine, Other, and Percocet [oxycodone-acetaminophen]    Social History:     Tobacco:    reports that he has been smoking cigarettes. He has been smoking an average of .5 packs per day.  He has never used smokeless tobacco.  Alcohol:      reports no history of alcohol use. Drug Use:  reports that he does not currently use drugs. Family History:     Family History   Problem Relation Age of Onset    Diabetes Mother     Cancer Father     Hypertension Maternal Grandmother        Review of Systems:     Positive and Negative as described in HPI. Review of Systems   Constitutional:  Positive for chills, fatigue and fever. Negative for activity change. HENT:  Negative for congestion. Respiratory:  Positive for shortness of breath (intermittent). Negative for cough and chest tightness. Cardiovascular: Negative. Gastrointestinal:  Negative for abdominal pain, constipation, diarrhea, nausea and vomiting. Endocrine: Negative for cold intolerance and polyuria. Genitourinary:  Negative for difficulty urinating. Musculoskeletal:  Positive for arthralgias and gait problem. Skin:  Negative for wound. Neurological:  Negative for dizziness and numbness. Psychiatric/Behavioral:  Negative for confusion. Physical Exam:   BP (!) 148/70   Pulse (!) 103   Temp 99.8 °F (37.7 °C) (Oral)   Resp 18   SpO2 97%   Temp (24hrs), Av.8 °F (37.7 °C), Min:99.8 °F (37.7 °C), Max:99.8 °F (37.7 °C)    No results for input(s): POCGLU in the last 72 hours. No intake or output data in the 24 hours ending 23 9422    Physical Exam  Vitals and nursing note reviewed. Constitutional:       General: He is not in acute distress. Appearance: Normal appearance. He is obese. He is ill-appearing. HENT:      Head: Normocephalic. Mouth/Throat:      Mouth: Mucous membranes are moist.   Eyes:      Pupils: Pupils are equal, round, and reactive to light. Cardiovascular:      Rate and Rhythm: Regular rhythm. Tachycardia present. Pulses: Normal pulses. Heart sounds: Normal heart sounds. No murmur heard. No friction rub. No gallop.    Pulmonary:      Effort: Pulmonary effort is normal. No respiratory distress. Breath sounds: No wheezing, rhonchi or rales. Abdominal:      General: Bowel sounds are normal. There is no distension. Palpations: Abdomen is soft. Tenderness: There is no abdominal tenderness. There is no guarding. Musculoskeletal:         General: Swelling (right foot) and deformity (right foot) present. Cervical back: Normal range of motion. Skin:     General: Skin is warm and dry. Capillary Refill: Capillary refill takes less than 2 seconds. Neurological:      General: No focal deficit present. Mental Status: He is alert and oriented to person, place, and time. Psychiatric:         Mood and Affect: Mood normal.         Behavior: Behavior normal.         Thought Content:  Thought content normal.         Judgment: Judgment normal.     Investigations:      Laboratory Testing:  Recent Results (from the past 24 hour(s))   CBC with Auto Differential    Collection Time: 03/03/23  3:32 PM   Result Value Ref Range    WBC 10.6 3.5 - 11.3 k/uL    RBC 3.41 (L) 4.21 - 5.77 m/uL    Hemoglobin 9.4 (L) 13.0 - 17.0 g/dL    Hematocrit 29.1 (L) 40.7 - 50.3 %    MCV 85.3 82.6 - 102.9 fL    MCH 27.6 25.2 - 33.5 pg    MCHC 32.3 28.4 - 34.8 g/dL    RDW 16.0 (H) 11.8 - 14.4 %    Platelets 887 414 - 112 k/uL    MPV 8.8 8.1 - 13.5 fL    NRBC Automated 0.0 0.0 per 100 WBC    Seg Neutrophils 56 36 - 65 %    Lymphocytes 28 24 - 43 %    Monocytes 10 3 - 12 %    Eosinophils % 4 1 - 4 %    Basophils 1 0 - 2 %    Immature Granulocytes 1 (H) 0 %    Segs Absolute 6.02 1.50 - 8.10 k/uL    Absolute Lymph # 2.99 1.10 - 3.70 k/uL    Absolute Mono # 1.03 0.10 - 1.20 k/uL    Absolute Eos # 0.40 0.00 - 0.44 k/uL    Basophils Absolute 0.09 0.00 - 0.20 k/uL    Absolute Immature Granulocyte 0.09 0.00 - 0.30 k/uL    RBC Morphology ANISOCYTOSIS PRESENT    Basic Metabolic Panel    Collection Time: 03/03/23  3:32 PM   Result Value Ref Range    Glucose 223 (H) 70 - 99 mg/dL    BUN 28 (H) 8 - 23 mg/dL Creatinine 2.89 (H) 0.70 - 1.20 mg/dL    Est, Glom Filt Rate 23 (L) >60 mL/min/1.73m2    Bun/Cre Ratio 10 9 - 20    Calcium 9.8 8.6 - 10.4 mg/dL    Sodium 131 (L) 135 - 144 mmol/L    Potassium 4.5 3.7 - 5.3 mmol/L    Chloride 97 (L) 98 - 107 mmol/L    CO2 24 20 - 31 mmol/L    Anion Gap 10 9 - 17 mmol/L   C-Reactive Protein    Collection Time: 03/03/23  3:32 PM   Result Value Ref Range    .4 (H) 0.0 - 5.0 mg/L   Sedimentation Rate    Collection Time: 03/03/23  3:32 PM   Result Value Ref Range    Sed Rate 53 (H) 0 - 20 mm/Hr       Imaging/Diagnostics:  No results found.     Assessment :      Hospital Problems             Last Modified POA    * (Principal) Diabetic foot infection ([address] 3/3/2023 Yes    Type 2 diabetes mellitus with right diabetic foot ulcer ([address] 3/3/2023 Yes    Charcot's joint of right foot 3/3/2023 Yes    Stage 3b chronic kidney disease ([address] (Chronic) 3/3/2023 Yes    Hyponatremia 3/3/2023 Yes    Microcytic anemia 3/3/2023 Yes    Essential hypertension (Chronic) 3/3/2023 Yes    Neuropathy 3/3/2023 Yes    Charcot foot due to diabetes mellitus ([address] 3/3/2023 Yes    Hyperlipidemia 3/3/2023 Yes    Acute kidney injury superimposed on chronic kidney disease ([address] 3/3/2023 Yes       Plan:     Patient status inpatient in the  Med/Surge    Diabetic foot infection  Infectious disease consulted  Podiatry consulted  Pharmacy to dose vancomycin  IV antibiotics  Diabetes  Continue selected home medications  Monitor blood sugar levels ACHS with sliding scale coverage  ALEJANDRO superimposed on CKD  IV hydration  Bladder scan  Monitor urine studies  Monitor labs  Renal ultrasound  Hyponatremia  IV hydration  Anemia  Continue to monitor  Hypertension  Continue home medications with holding parameters  Adult diet  Monitor a.m. labs  PT/OT    Consultations:   IP CONSULT TO PODIATRY  IP CONSULT TO HOSPITALIST  PHARMACY TO DOSE VANCOMYCIN  IP CONSULT TO INFECTIOUS DISEASES  PHARMACY TO DOSE VANCOMYCIN    Patient is admitted as inpatient status because of co-morbidities listed above, severity of signs and symptoms as outlined, requirement for current medical therapies and most importantly because of direct risk to patient if care not provided in a hospital setting. Expected length of stay > 48 hours. On this date 3/3/2023 I have spent 27 minutes reviewing previous notes, test results and face to face with the patient discussing the diagnosis and importance of compliance with the treatment plan as well as documenting on the day of the visit. At least 50% of the time documented was spent with the patient to provide counseling and/or coordination of care.       ROCHELLE Carson - CNP  3/3/2023  6:55 PM    Copy sent to Dr. Eloy Gold MD

## 2023-03-03 NOTE — ED NOTES
ED to inpatient nurses report     Chief Complaint   Patient presents with    Wound Infection     Right foot      Present to ED from home.  Pt was at the ID doctor and was informed to come into the ED  LOC: alert and orientated to name, place, date  Vital signs   Vitals:    03/03/23 1432   BP: (!) 148/70   Pulse: (!) 103   Resp: 18   Temp: 99.8 °F (37.7 °C)   TempSrc: Oral   SpO2: 97%      Oxygen Baseline room air    Current needs required n/a   SEPSIS: no  [no] Lactate X 2 ordered (Yes or No)  [no] Antibiotics given (Yes or No)  [no] IV Fluids ordered (Yes or No)             [no] IV completed (Yes or No)  [no] Hourly Vital Signs (Validated)  [no] Outstanding Orders:     LDAs:    Mobility: Requires assistance * 1  Fall Risk:    Pending ED orders: abx  Present condition: stable  Code Status: full  Consults: IP CONSULT TO PODIATRY  IP CONSULT TO HOSPITALIST  PHARMACY TO DOSE VANCOMYCIN  [x]  Hospitalist  Completed  [x] yes [] no Who: [facility]  []  Medicine  Completed  [] yes [] No Who:   []  Cardiology  Completed  [] yes [] No Who:   []  GI   Completed  [] yes [] No Who:   []  Neurology  Completed  [] yes [] No Who:   []  Nephrology Completed  [] yes [] No Who:    []  Vascular  Completed  [] yes [] No Who:   []  Ortho  Completed  [] yes [] No Who:     []  Surgery  Completed  [] yes [] No Who:    []  Urology  Completed  [] yes [] No Who:    []  CT Surgery Completed  [] yes [] No Who:   [x]  Podiatry  Completed  [x] yes [] No Who:    []  Other    Completed  [] yes [] No Who:  Interventions: IV, labs, xray  Important Events: None        Electronically signed by Delfino Palacios RN on 3/3/2023 at 4:33 PM       Delfino Palacios RN  03/03/23 9564

## 2023-03-04 ENCOUNTER — APPOINTMENT (OUTPATIENT)
Dept: ULTRASOUND IMAGING | Age: 68
DRG: 981 | End: 2023-03-04
Payer: MEDICARE

## 2023-03-04 PROBLEM — A49.8 INFECTION DUE TO VANCOMYCIN RESISTANT ENTEROCOCCUS FAECIUM: Status: ACTIVE | Noted: 2023-03-04

## 2023-03-04 PROBLEM — Z16.21 INFECTION DUE TO VANCOMYCIN RESISTANT ENTEROCOCCUS FAECIUM: Status: ACTIVE | Noted: 2023-03-04

## 2023-03-04 LAB
ABSOLUTE EOS #: 0.39 K/UL (ref 0–0.44)
ABSOLUTE IMMATURE GRANULOCYTE: 0.07 K/UL (ref 0–0.3)
ABSOLUTE LYMPH #: 2.2 K/UL (ref 1.1–3.7)
ABSOLUTE MONO #: 0.75 K/UL (ref 0.1–1.2)
ANION GAP SERPL CALCULATED.3IONS-SCNC: 9 MMOL/L (ref 9–17)
BASOPHILS # BLD: 1 % (ref 0–2)
BASOPHILS ABSOLUTE: 0.07 K/UL (ref 0–0.2)
BUN SERPL-MCNC: 26 MG/DL (ref 8–23)
BUN/CREAT BLD: 9 (ref 9–20)
CALCIUM SERPL-MCNC: 9.7 MG/DL (ref 8.6–10.4)
CHLORIDE SERPL-SCNC: 102 MMOL/L (ref 98–107)
CO2 SERPL-SCNC: 23 MMOL/L (ref 20–31)
CREAT SERPL-MCNC: 2.74 MG/DL (ref 0.7–1.2)
CREATININE URINE: 68.9 MG/DL (ref 39–259)
EOSINOPHILS RELATIVE PERCENT: 5 % (ref 1–4)
GFR SERPL CREATININE-BSD FRML MDRD: 25 ML/MIN/1.73M2
GLUCOSE BLD-MCNC: 182 MG/DL (ref 75–110)
GLUCOSE BLD-MCNC: 218 MG/DL (ref 75–110)
GLUCOSE SERPL-MCNC: 124 MG/DL (ref 70–99)
HCT VFR BLD AUTO: 26.9 % (ref 40.7–50.3)
HGB BLD-MCNC: 8.8 G/DL (ref 13–17)
IMMATURE GRANULOCYTES: 1 %
LYMPHOCYTES # BLD: 25 % (ref 24–43)
MCH RBC QN AUTO: 27.5 PG (ref 25.2–33.5)
MCHC RBC AUTO-ENTMCNC: 32.7 G/DL (ref 28.4–34.8)
MCV RBC AUTO: 84.1 FL (ref 82.6–102.9)
MONOCYTES # BLD: 9 % (ref 3–12)
NRBC AUTOMATED: 0 PER 100 WBC
OSMOLALITY URINE: 354 MOSM/KG (ref 80–1300)
PDW BLD-RTO: 15.8 % (ref 11.8–14.4)
PLATELET # BLD AUTO: 263 K/UL (ref 138–453)
PMV BLD AUTO: 8.9 FL (ref 8.1–13.5)
POTASSIUM SERPL-SCNC: 4.6 MMOL/L (ref 3.7–5.3)
RBC # BLD: 3.2 M/UL (ref 4.21–5.77)
RBC # BLD: ABNORMAL 10*6/UL
SEG NEUTROPHILS: 59 % (ref 36–65)
SEGMENTED NEUTROPHILS ABSOLUTE COUNT: 5.19 K/UL (ref 1.5–8.1)
SODIUM SERPL-SCNC: 134 MMOL/L (ref 135–144)
SODIUM,UR: 72 MMOL/L
WBC # BLD AUTO: 8.7 K/UL (ref 3.5–11.3)

## 2023-03-04 PROCEDURE — 76770 US EXAM ABDO BACK WALL COMP: CPT

## 2023-03-04 PROCEDURE — 84166 PROTEIN E-PHORESIS/URINE/CSF: CPT

## 2023-03-04 PROCEDURE — 6370000000 HC RX 637 (ALT 250 FOR IP): Performed by: INTERNAL MEDICINE

## 2023-03-04 PROCEDURE — 82570 ASSAY OF URINE CREATININE: CPT

## 2023-03-04 PROCEDURE — 84300 ASSAY OF URINE SODIUM: CPT

## 2023-03-04 PROCEDURE — 82947 ASSAY GLUCOSE BLOOD QUANT: CPT

## 2023-03-04 PROCEDURE — 2580000003 HC RX 258: Performed by: NURSE PRACTITIONER

## 2023-03-04 PROCEDURE — 84156 ASSAY OF PROTEIN URINE: CPT

## 2023-03-04 PROCEDURE — 36415 COLL VENOUS BLD VENIPUNCTURE: CPT

## 2023-03-04 PROCEDURE — 1200000000 HC SEMI PRIVATE

## 2023-03-04 PROCEDURE — 2580000003 HC RX 258: Performed by: INTERNAL MEDICINE

## 2023-03-04 PROCEDURE — 6370000000 HC RX 637 (ALT 250 FOR IP): Performed by: NURSE PRACTITIONER

## 2023-03-04 PROCEDURE — 6360000002 HC RX W HCPCS: Performed by: INTERNAL MEDICINE

## 2023-03-04 PROCEDURE — 99222 1ST HOSP IP/OBS MODERATE 55: CPT | Performed by: INTERNAL MEDICINE

## 2023-03-04 PROCEDURE — 83935 ASSAY OF URINE OSMOLALITY: CPT

## 2023-03-04 PROCEDURE — 99232 SBSQ HOSP IP/OBS MODERATE 35: CPT | Performed by: INTERNAL MEDICINE

## 2023-03-04 PROCEDURE — 80048 BASIC METABOLIC PNL TOTAL CA: CPT

## 2023-03-04 PROCEDURE — 6360000002 HC RX W HCPCS: Performed by: NURSE PRACTITIONER

## 2023-03-04 PROCEDURE — 85025 COMPLETE CBC W/AUTO DIFF WBC: CPT

## 2023-03-04 RX ORDER — LINEZOLID 600 MG/1
600 TABLET, FILM COATED ORAL EVERY 12 HOURS SCHEDULED
Status: DISCONTINUED | OUTPATIENT
Start: 2023-03-04 | End: 2023-03-07

## 2023-03-04 RX ORDER — FENTANYL CITRATE 0.05 MG/ML
25 INJECTION, SOLUTION INTRAMUSCULAR; INTRAVENOUS
Status: DISCONTINUED | OUTPATIENT
Start: 2023-03-04 | End: 2023-03-12 | Stop reason: ALTCHOICE

## 2023-03-04 RX ORDER — LINEZOLID 2 MG/ML
600 INJECTION, SOLUTION INTRAVENOUS EVERY 12 HOURS
Status: DISCONTINUED | OUTPATIENT
Start: 2023-03-04 | End: 2023-03-04

## 2023-03-04 RX ADMIN — GABAPENTIN 900 MG: 300 CAPSULE ORAL at 13:57

## 2023-03-04 RX ADMIN — SODIUM CHLORIDE: 9 INJECTION, SOLUTION INTRAVENOUS at 23:40

## 2023-03-04 RX ADMIN — ASPIRIN 81 MG: 81 TABLET, COATED ORAL at 07:42

## 2023-03-04 RX ADMIN — GABAPENTIN 900 MG: 300 CAPSULE ORAL at 07:42

## 2023-03-04 RX ADMIN — HEPARIN SODIUM 5000 UNITS: 5000 INJECTION INTRAVENOUS; SUBCUTANEOUS at 05:40

## 2023-03-04 RX ADMIN — HYDROCODONE BITARTRATE AND ACETAMINOPHEN 1 TABLET: 10; 325 TABLET ORAL at 07:42

## 2023-03-04 RX ADMIN — CETIRIZINE HYDROCHLORIDE 10 MG: 10 TABLET, FILM COATED ORAL at 20:28

## 2023-03-04 RX ADMIN — CEFEPIME 2000 MG: 2 INJECTION, POWDER, FOR SOLUTION INTRAVENOUS at 05:39

## 2023-03-04 RX ADMIN — FENTANYL CITRATE 25 MCG: 0.05 INJECTION, SOLUTION INTRAMUSCULAR; INTRAVENOUS at 21:28

## 2023-03-04 RX ADMIN — INSULIN GLARGINE 10 UNITS: 100 INJECTION, SOLUTION SUBCUTANEOUS at 23:38

## 2023-03-04 RX ADMIN — INSULIN GLARGINE 10 UNITS: 100 INJECTION, SOLUTION SUBCUTANEOUS at 07:43

## 2023-03-04 RX ADMIN — CEFEPIME 2000 MG: 2 INJECTION, POWDER, FOR SOLUTION INTRAVENOUS at 16:36

## 2023-03-04 RX ADMIN — HYDROCODONE BITARTRATE AND ACETAMINOPHEN 1 TABLET: 10; 325 TABLET ORAL at 13:57

## 2023-03-04 RX ADMIN — HYDROCODONE BITARTRATE AND ACETAMINOPHEN 1 TABLET: 10; 325 TABLET ORAL at 20:28

## 2023-03-04 RX ADMIN — MUPIROCIN: 20 OINTMENT TOPICAL at 09:43

## 2023-03-04 RX ADMIN — SODIUM CHLORIDE, PRESERVATIVE FREE 10 ML: 5 INJECTION INTRAVENOUS at 09:44

## 2023-03-04 RX ADMIN — AMLODIPINE BESYLATE 5 MG: 5 TABLET ORAL at 07:42

## 2023-03-04 RX ADMIN — SODIUM CHLORIDE, PRESERVATIVE FREE 10 ML: 5 INJECTION INTRAVENOUS at 19:40

## 2023-03-04 RX ADMIN — HEPARIN SODIUM 5000 UNITS: 5000 INJECTION INTRAVENOUS; SUBCUTANEOUS at 20:28

## 2023-03-04 RX ADMIN — ATORVASTATIN CALCIUM 20 MG: 20 TABLET, FILM COATED ORAL at 20:28

## 2023-03-04 RX ADMIN — LINEZOLID 600 MG: 600 TABLET, FILM COATED ORAL at 23:38

## 2023-03-04 RX ADMIN — GABAPENTIN 900 MG: 300 CAPSULE ORAL at 20:28

## 2023-03-04 RX ADMIN — HEPARIN SODIUM 5000 UNITS: 5000 INJECTION INTRAVENOUS; SUBCUTANEOUS at 13:58

## 2023-03-04 RX ADMIN — LINEZOLID 600 MG: 600 INJECTION, SOLUTION INTRAVENOUS at 13:20

## 2023-03-04 RX ADMIN — FENTANYL CITRATE 25 MCG: 0.05 INJECTION, SOLUTION INTRAMUSCULAR; INTRAVENOUS at 17:47

## 2023-03-04 ASSESSMENT — ENCOUNTER SYMPTOMS
ABDOMINAL PAIN: 0
CONSTIPATION: 0
COLOR CHANGE: 1
DIARRHEA: 0
CHEST TIGHTNESS: 0
VOMITING: 0
NAUSEA: 1
SHORTNESS OF BREATH: 0

## 2023-03-04 ASSESSMENT — PAIN SCALES - GENERAL
PAINLEVEL_OUTOF10: 9
PAINLEVEL_OUTOF10: 9
PAINLEVEL_OUTOF10: 8
PAINLEVEL_OUTOF10: 8

## 2023-03-04 ASSESSMENT — PAIN DESCRIPTION - ORIENTATION
ORIENTATION: RIGHT
ORIENTATION: RIGHT

## 2023-03-04 ASSESSMENT — PAIN DESCRIPTION - DESCRIPTORS
DESCRIPTORS: CRAMPING
DESCRIPTORS: ACHING

## 2023-03-04 ASSESSMENT — PAIN DESCRIPTION - LOCATION
LOCATION: FOOT;KNEE
LOCATION: LEG

## 2023-03-04 NOTE — CARE COORDINATION
Case Management Assessment  Initial Evaluation    Date/Time of Evaluation: 3/4/2023 1:03 PM  Assessment Completed by: Castillo Belle RN    If patient is discharged prior to next notation, then this note serves as note for discharge by case management. Patient Name: Laura Parham                   YOB: 1955  Diagnosis: Charcot foot due to diabetes mellitus ([facility] [E11.610]  Diabetic foot infection ([facility] [E11.628, L08.9]                   Date / Time: 3/3/2023  2:54 PM    Patient Admission Status: Inpatient   Readmission Risk (Low < 19, Mod (19-27), High > 27): Readmission Risk Score: 15.2    Current PCP: Hortencia Wasserman MD  PCP verified by CM? Yes (Dr Francisco Baig last week)    Chart Reviewed: Yes      History Provided by: Patient  Patient Orientation: Alert and Oriented    Patient Cognition: Alert    Hospitalization in the last 30 days (Readmission):  no     If yes, Readmission Assessment in CM Navigator will be completed. Advance Directives:      Code Status: Full Code   Patient's Primary Decision Maker is: Legal Next of Kin    Primary Decision MakerPrenbrooke Davis Spouse - [phone]    Discharge Planning:    Patient lives with: Spouse/Significant Other Type of Home: House  Primary Care Giver: Self  Patient Support Systems include: Spouse/Significant Other   Current Financial resources:    Current community resources:    Current services prior to admission: Durable Medical Equipment            Current DME: Honora Sables, Wheelchair, Cane            Type of Home Care services:  Nursing Services    ADLS  Prior functional level: Independent in ADLs/IADLs  Current functional level: Independent in ADLs/IADLs    PT AM-PAC:   /24  OT AM-PAC:   /24    Family can provide assistance at DC: Yes  Would you like Case Management to discuss the discharge plan with any other family members/significant others, and if so, who?     Plans to Return to Present Housing: Yes  Other Identified Issues/Barriers to RETURNING to current housing:  may need iv atb , surgery   Potential Assistance needed at discharge: Home Care            Potential DME:    Patient expects to discharge to: [address] [place] [address] for transportation at discharge:      Financial    Payor: [employer] / Plan: [place] / Product Type: *No Product type* /     Does insurance require precert for SNF: Yes    Potential assistance Purchasing Medications: No  Meds-to-Beds request:        [address] Mail Aidan Roman [phone] - f [phone]  [address] [facility] [address]  Phone: [phone] Fax: [phone]    [address] [phone] - f [phone]  [address] [place] [address] [address] [address]  Phone: [phone] Fax: [phone]    [address] [facility], [address] [place] [address] Road [place] [address] [phone] - f [phone]  [address] [place] [address] [address] [address]  Phone: [phone] Fax: [phone]      Notes:    Factors facilitating achievement of predicted outcomes: Family support, Cooperative, Pleasant, Sense of humor, and Good insight into deficits    Barriers to discharge: Decreased sensation and Medication managment    Additional Case Management Notes:   Patient lives with wife. Independent in all ADL;s. Uses his transport wc if needed. Has surgical shoe when up walking and uses RW as needed. He has v infusions in the past at home thru [facility].   He is current with [facility]. Notified carlos of [facility]. .They changes his dressing 3 x week. Notified of admission. He has been to [facility] [place]. Patient admitted with diabetic foot infection. Podiatry and ID are on. Sent face sheet and podiatry note to [facility] care. Podiatry planning on hardware removal and I&D of foot wound early next week. JERED in [facility] and will follow.      The Plan for Transition of Care is related to the following treatment goals of Charcot foot due to diabetes mellitus ([facility] [E11.610]  Diabetic foot infection ([facility] [J60.536, B49.4]    IF APPLICABLE: The Patient and/or patient representative Kota French and his family were provided with a choice of provider and agrees with the discharge plan. Freedom of choice list with basic dialogue that supports the patient's individualized plan of care/goals and shares the quality data associated with the providers was provided to:     Patient Representative Name:       The Patient and/or Patient Representative Agree with the Discharge Plan?       Aurora Moreno RN  Case Management Department  Ph:  Fax: [phone]

## 2023-03-04 NOTE — CONSULTS
Consultation Note  Podiatric Medicine and Surgery     Subjective     Chief Complaint: right foot wound, s/p right foot midtarsal and subtalar join arthrodesis (12/27/22)    HPI:  Kristopher Wasserman is a 78 y.o. male seen at [facility] [address] for right foot pain and swelling. Patient is well known to podiatry service and has been following outpatient with Dr. Letitia Candelario since surgery on 12/27/22 to right foot. Patient states he has been compliant with wound care and walking in CAM boot, however noticed worsening pain and redness to right foot, prompting him to get CT at outside facility and presenting to [facility] ED. Patient states he has not been on IV antibiotics for a while, does follow Infectious disease who saw him on 3/1/23. Patient denies any constitutional symptoms at this time. Patient is type 2 diabetic, ALEJANDRO on CKD, and has history of Charcot foot. No further pedal complaints at this time. PCP is Jose Kent MD    ROS:   Review of Systems   Constitutional:  Negative for chills and fever. Respiratory:  Negative for chest tightness and shortness of breath. Cardiovascular:  Negative for chest pain and leg swelling. Gastrointestinal:  Positive for nausea. Negative for abdominal pain, constipation, diarrhea and vomiting. Musculoskeletal:  Positive for arthralgias, joint swelling and myalgias. Negative for gait problem. Skin:  Positive for color change and wound. Neurological:  Negative for weakness and numbness.      Past Medical History   has a past medical history of Abscess of right foot, Acquired hammer toe deformity of lesser toe of right foot, ALEJANDRO (acute kidney injury) ([address] Cellulitis, Cellulitis of left foot, Cellulitis of right foot, Charcot foot due to diabetes mellitus ([address] Chest pain at rest, Chronic multifocal osteomyelitis of right foot ([address] CKD (chronic kidney disease), Diabetic polyneuropathy associated with type 2 diabetes mellitus ([address] Essential hypertension, Fractures, multiple, Hyperlipidemia, Hypertension, Leukocytosis, MRSA (methicillin resistant staph aureus) culture positive, Neuropathy, Pain in right foot, Pneumonia, Right foot infection, Right foot pain, Tobacco abuse, Type II or unspecified type diabetes mellitus without mention of complication, not stated as uncontrolled, Vertigo, Well controlled type 2 diabetes mellitus with neurological manifestations ([facility] Wound dehiscence, Wound, open, and Wound, open. Past Surgical History   has a past surgical history that includes Neck surgery (1987); Appendectomy; knee surgery (Bilateral, 1983); Knee arthroscopy (Right, 1989); Knee arthroscopy (Left, 1990); Foot Debridement (Left, 04/24/2018); pr i&d below fascia foot 1 bursal space (Left, 04/24/2018); Colonoscopy; Foot Debridement (Right, 03/20/2020); arthroplasty (Right, 12/11/2020); Foot Debridement (Right, 06/08/2021); Foot surgery (Right, 06/11/2021); Foot Debridement (Right, 09/16/2021); IR INSERT PICC VAD W SQ PORT >5 YEARS (10/07/2022); Foot surgery (Right, 10/12/2022); Ankle surgery (Right, 10/12/2022); Ankle surgery (Right, 11/15/2022); Foot surgery (Right, 12/27/2022); arthrodesis (Right, 12/27/2022); and Foot surgery (Right, 12/27/2022). Medications  Prior to Admission medications    Medication Sig Start Date End Date Taking? Authorizing Provider   ketorolac (TORADOL) 10 MG tablet Take 1 tablet by mouth every 6 hours as needed for Pain 12/27/22 1/1/23  Dilia Chamber, DPM   ketorolac (TORADOL) 10 MG tablet Take 1 tablet by mouth every 6 hours as needed for Pain 12/27/22 12/27/23  Dilia Chamber, DPM   HYDROcodone-acetaminophen (NORCO)  MG per tablet Take 1 tablet by mouth every 6 hours as needed for Pain. Historical Provider, MD   TRUEPLUS PEN NEEDLES 31G X 8 MM MISC  11/29/21   Historical Provider, MD   mupirocin (BACTROBAN) 2 % ointment Apply topically 2 times daily TO FOOT WOUND.  11/8/21   Historical Provider, MD   amLODIPine (NORVASC) 5 MG tablet Take 5 mg by mouth daily 10/20/21   Historical Provider, MD   LANTUS SOLOSTAR 100 UNIT/ML injection pen Inject 15 Units into the skin nightly  Patient taking differently: Inject 10 Units into the skin 2 times daily 9/17/21   ROCHELLE Woodward - MAGDY   JANUVIA 100 MG tablet Take 100 mg by mouth daily  11/13/20   Historical Provider, MD   Misc. Devices MISC 1 PAIR OF DIABETIC SHOES (1 LEFT/ 1 RIGHT)  1-3 PAIRS OF INSERTS (LEFT/ RIGHT) 3/5/20   Bernadette Greenberg DPM   cetirizine (ZYRTEC) 10 MG tablet Take 10 mg by mouth nightly  10/21/19   Historical Provider, MD   simvastatin (ZOCOR) 40 MG tablet Take 40 mg by mouth nightly  11/13/18   Historical Provider, MD   glipiZIDE (GLUCOTROL) 10 MG tablet Take 20 mg by mouth 2 times daily (before meals) Takes 2 tabs (=20mg) BID    Historical Provider, MD   aspirin 81 MG tablet Take 81 mg by mouth daily    Historical Provider, MD   gabapentin (NEURONTIN) 300 MG capsule Take 900 mg by mouth 3 times daily. Take 3 caps (=900mg) 3 times a day    Historical Provider, MD    Scheduled Meds:   amLODIPine  5 mg Oral Daily    aspirin  81 mg Oral Daily    cetirizine  10 mg Oral Nightly    gabapentin  900 mg Oral TID    [Held by provider] glipiZIDE  20 mg Oral BID AC    [Held by provider] alogliptin  12.5 mg Oral Daily    insulin glargine  10 Units SubCUTAneous BID    mupirocin   Topical BID    atorvastatin  20 mg Oral Nightly    sodium chloride flush  5-40 mL IntraVENous 2 times per day    heparin (porcine)  5,000 Units SubCUTAneous 3 times per day    insulin lispro  0-8 Units SubCUTAneous TID WC    insulin lispro  0-4 Units SubCUTAneous Nightly    cefepime  2,000 mg IntraVENous Q12H    vancomycin (VANCOCIN) intermittent dosing (placeholder)   Other RX Placeholder     Continuous Infusions:   dextrose      sodium chloride Stopped (03/04/23 0539)    sodium chloride       PRN Meds:. HYDROcodone-acetaminophen, glucose, dextrose bolus **OR** dextrose bolus, glucagon (rDNA), dextrose, sodium chloride flush, sodium chloride, ondansetron **OR** ondansetron, polyethylene glycol    Allergies  is allergic to morphine, other, and percocet [oxycodone-acetaminophen]. Family History  family history includes Cancer in his father; Diabetes in his mother; Hypertension in his maternal grandmother. Social History   reports that he has been smoking cigarettes. He has been smoking an average of .5 packs per day. He has never used smokeless tobacco.   reports no history of alcohol use. reports that he does not currently use drugs. Objective     Vitals:  Patient Vitals for the past 8 hrs:   BP Temp Temp src Pulse Resp SpO2 Weight   23 0839 (!) 159/76 98.8 °F (37.1 °C) Oral 99 16 95 % --   23 0742 (!) 156/86 -- -- -- -- -- --   23 0553 -- -- -- -- -- -- 232 lb (105.2 kg)     Average, Min, and Max for last 24 hours Vitals:  TEMPERATURE:  Temp  Av.1 °F (37.3 °C)  Min: 98.6 °F (37 °C)  Max: 99.8 °F (37.7 °C)    RESPIRATIONS RANGE: Resp  Av  Min: 16  Max: 18    PULSE RANGE: Pulse  Av  Min: 99  Max: 103    BLOOD PRESSURE RANGE:  Systolic (33YZJ), RGI:259 , Min:148 , [address]   ; Diastolic (07CGO), [place]:82, Min:70, Max:86      PULSE OXIMETRY RANGE: SpO2  Av %  Min: 95 %  Max: 97 %  I&O:  I/O last 3 completed shifts: In: 943.6 [I.V.:393.8; IV Piggyback:549.7]  Out: -     CBC:  Recent Labs     23  15323  0539   WBC 10.6 8.7   HGB 9.4* 8.8*   HCT 29.1* 26.9*    263   .4*  --         BMP:  Recent Labs     23  15323  0539   * 134*   K 4.5 4.6   CL 97* 102   CO2 24 23   BUN 28* 26*   CREATININE 2.89* 2.74*   GLUCOSE 223* 124*   CALCIUM 9.8 9.7        Coags:  No results for input(s): APTT, PROT, INR in the last 72 hours.     Lab Results   Component Value Date    LABA1C 6.9 (H) 10/05/2022     Lab Results   Component Value Date    SEDRATE 53 (H) 2023     Lab Results   Component Value Date    .4 (H) 2023         Lower Extremity Physical Exam:  Vascular: DP and PT pulses are palpable, bilaterally. CFT <4 seconds to all digits. Hair growth is absent to the level of the digits. Nonpitting edema to lateral ankle right foot. Neuro: Saph/sural/SP/DP/plantar sensation intact to light touch. Musculoskeletal: Muscle strength is 4/5, decreased ROM , adequate strength to all lower extremity muscle groups. Gross deformity is absent. Dermatologic: Full thickness ulcer #1 located lateral aspect of right ankle and measures approximately 0.5cm x 0.3cm x 0.5 cm. Base is fibrogranular. Periwound skin is hyperkeratotic. No drainage noted with no associated mal odor. Erythema noted to right ankle with associated increase in warmth. Does probe deep, sinus tracks proximal. No fluctuance, crepitus, or induration. Interdigital maceration absent. Clinical Images:        Imaging:   US RETROPERITONEAL COMPLETE   Final Result   Mildly echogenic renal parenchyma suggestive of intrinsic renal parenchymal   disease. No hydronephrosis. Assessment     Donna Condon is a 78 y.o. male with   Cellulitis, right lower extremity  S/p right midtarsal and subtalar foot arthrodesis  Right ankle pain    Principal Problem:    Diabetic foot infection ([address]  Active Problems:    Type 2 diabetes mellitus with right diabetic foot ulcer ([address]    Charcot's joint of right foot    Stage 3b chronic kidney disease (HCC)    Hyponatremia    Microcytic anemia    Essential hypertension    Neuropathy    Charcot foot due to diabetes mellitus ([address]    Hyperlipidemia    Acute kidney injury superimposed on chronic kidney disease ([address]  Resolved Problems:    * No resolved hospital problems. *        Plan     Patient examined and evaluated at bedside   Treatment options discussed in detail with the patient  CT results from outside facility discussed with patient.  Findings consistent with possible hardware loosening vs infection to hardware  Patient admitted for IV antibiotics and further management of right foot wound infection  Discussed with patient that due to clinical concern for possible infection, he will benefit from IV antibiotics for a few days. Plan for hardware removal and incision and drainage of wound early next week after receiving dose of IV antibiotics. Patient relays understanding  IV abx: Vancomycin, cefepime. Appreciate ID recommendations   Dressing applied to Right foot: 1/2 packing, DSD  WBAT to Right lower extremity  Will discuss with Dr. Santhosh Handley DPM   Podiatric Medicine & Surgery   3/4/2023 at 9:45 AM

## 2023-03-04 NOTE — CONSULTS
[facility] -   Infectious diseases evaluation  admission date 3/3/2023    reason for consultation:   Right foot infection    Impression :   Current:  Right foot/ankle cellulitis with suspected infected hardware. Diabetes mellitus  Acute on chronic renal failure  Hypertension  Anemia  History of MRSA      Recommendations   Continue IV cefepime  Discontinue IV Zyvox  P.o. Zyvox  Surgical intervention for hardware removal planned on 3/7/2023. Follow CBC and renal function    Infection Control Recommendations   [place] Precautions  Contact Isolation       Antimicrobial Stewardship Recommendations   Simplification of therapy  Targeted therapy      History of Present Illness:   Initial history:  Denver Demark is a 78y.o.-year-old male presented to the hospital with worsening right foot pain and swelling for several weeks associated with open ulcer on the lateral aspect of the hand foot that probes to bone. Symptoms moderate to severe, worse with movement, no alleviating factors. He also complaining of subjective fever and chills, mild shortness of breath. CT of the right foot was done at [facility] on 2/27/2023 showed lucency around hardware within the calcaneus. The patient had recent surgery done by Dr. Saba Cortes on 12/27/2022 with hardware in place. History of Charcot foot, chronic renal failure and diabetes mellitus. Initial WBC normal, creatinine 2.89, sedimentation rate 53, C-reactive protein 136    Interval changes  3/4/2023   Patient Vitals for the past 8 hrs:   BP Temp Temp src Pulse Resp SpO2   03/04/23 1508 (!) 147/70 98.1 °F (36.7 °C) Oral 88 16 95 %             I have personally reviewed the past medical history, past surgical history, medications, social history, and family history, and I haveupdated the database accordingly.       Allergies:   Morphine, Other, and Percocet [oxycodone-acetaminophen]     Review of Systems:     Review of Systems  As per history present illness, other than above 12 system review was negative  Physical Examination :       Physical Exam  Constitutional:       General: He is not in acute distress. HENT:      Head: Normocephalic and atraumatic. Right Ear: External ear normal.      Left Ear: External ear normal.   Eyes:      General: No scleral icterus. Conjunctiva/sclera: Conjunctivae normal.   Cardiovascular:      Rate and Rhythm: Normal rate and regular rhythm. Heart sounds: No murmur heard. Pulmonary:      Effort: Pulmonary effort is normal.      Breath sounds: Normal breath sounds. Abdominal:      General: There is no distension. Palpations: Abdomen is soft. Tenderness: There is no abdominal tenderness. Musculoskeletal:         General: Swelling and tenderness present. Cervical back: Neck supple. No rigidity. Skin:     Comments: Right lateral ankle ulcer with surrounding erythema and warmth, probe to bone. Neurological:      General: No focal deficit present. Mental Status: He is alert and oriented to person, place, and time.    Psychiatric:         Mood and Affect: Mood normal.         Behavior: Behavior normal.       Past Medical History:     Past Medical History:   Diagnosis Date    Abscess of right foot 08/04/2018    Acquired hammer toe deformity of lesser toe of right foot     ALEJANDRO (acute kidney injury) ([facility] [address] 08/05/2018    Cellulitis 04/24/2018    Cellulitis of left foot 04/24/2018    Cellulitis of right foot     and abscess    Charcot foot due to diabetes mellitus ([address] 2014    Right foot     Chest pain at rest 08/04/2018    Chronic multifocal osteomyelitis of right foot (HCC)     CKD (chronic kidney disease)     Diabetic polyneuropathy associated with type 2 diabetes mellitus ([address]     Essential hypertension     Fractures, multiple 07/21/2014    Right foot fractures     Hyperlipidemia     Hypertension     Leukocytosis     MRSA (methicillin resistant staph aureus) culture positive 2018    rt foot    Neuropathy     diabetic with charcot affecting the right foot    Pain in right foot     redness and swelling    Pneumonia 2009    Right foot infection     Right foot pain     Tobacco abuse 04/24/2018    Type II or unspecified type diabetes mellitus without mention of complication, not stated as uncontrolled     Vertigo     Well controlled type 2 diabetes mellitus with neurological manifestations ([facility] [address] 08/04/2018    Wound dehiscence     Wound, open     Left Ball of  foot, pt. stepped on sharp object. Covered by dressing.     Wound, open     Right posterior -Diabetic Ulcer       Past Surgical  History:     Past Surgical History:   Procedure Laterality Date    ANKLE SURGERY Right 10/12/2022    RIGHT MID-FOOT OSTEOTOMY WITH OF APPLICATION EXTERNAL FIXATOR SERA performed by Moreno Tanner DPM at [address] [facility] Right 11/15/2022    right lower extremity  adjustment of exfix performed by Moreno Tanner DPM at [facility]      at age 24    ARTHRODESIS Right 12/27/2022    RIGHT SUBTALAR JOINT AND MULTIPLE MID FOOT JOINT FUSIONS WITH SERA AND PRE-OP POP BLOCK performed by Moreno Tanner DPM at [facility] [address] [facility] Right 12/11/2020    RIGHT HALLUX EXOSTECTOMY AND 3RD DIGIT EXTENSIOR TENOTOMY performed by Claudia Bojorquez DPM at [address] [facility] Left 04/24/2018    I&D foreign body removal    FOOT DEBRIDEMENT Right 03/20/2020    RIGHT  FOOT   INCISION AND DRAINAGE WITH BONE BIOPSY performed by Claudia Bojorquez DPM at [facility] Right 06/08/2021    FOOT DEBRIDEMENT INCISION AND DRAINAGE performed by Claudia Bojorquez DPM at [facility] Right 09/16/2021    RIGHT FOOT  INCISION AND DRAINAGE   WITH PULSE LAVAGE performed by Claudia Bojorquez DPM at [address] [place] [address] Right 06/11/2021    RIGHT FOOT FLAP CLOSURE performed by Claudia Bojorquez DPM at [address] [place] [address] 26/84/2018    APPLICATION EXTERNAL FIXATOR RIGHT FOOT - SERA - Right    FOOT SURGERY Right 12/27/2022    RIGHT SUBTALAR JOINT AND MULTIPLE MID FOOT JOINT FUSIONS WITH SERA AND PRE-OP POP BLOCK (Right: Foot)    FOOT SURGERY Right 12/27/2022    RIGHT FOOT/ANKLE EXTERNAL FIXATOR  REMOVAL performed by Reena Rios DPM at [facility] [address]    IR INS PICC VAD W SQ PORT GREATER THAN 5  10/07/2022    IR INS PICC VAD W SQ PORT GREATER THAN 5 10/7/2022 [facility] SPECIAL PROCEDURES    KNEE ARTHROSCOPY Right 1989    KNEE ARTHROSCOPY Left 1990    KNEE SURGERY Bilateral 1983    arthroscopy    NECK SURGERY  1987    SC I&D BELOW FASCIA FOOT 1 BURSAL SPACE Left 04/24/2018    LEFT FOOT MULTIPLE  INCISIONS  AND DRAINAGE AND REMOVAL FOREIGN BODY  IRENE performed by Adriana Delarosa DPM at [facility] OR       Medications:      linezolid  600 mg IntraVENous Q12H    amLODIPine  5 mg Oral Daily    aspirin  81 mg Oral Daily    cetirizine  10 mg Oral Nightly    gabapentin  900 mg Oral TID    [Held by provider] glipiZIDE  20 mg Oral BID AC    [Held by provider] alogliptin  12.5 mg Oral Daily    insulin glargine  10 Units SubCUTAneous BID    mupirocin   Topical BID    atorvastatin  20 mg Oral Nightly    sodium chloride flush  5-40 mL IntraVENous 2 times per day    heparin (porcine)  5,000 Units SubCUTAneous 3 times per day    insulin lispro  0-8 Units SubCUTAneous TID WC    insulin lispro  0-4 Units SubCUTAneous Nightly    cefepime  2,000 mg IntraVENous Q12H       Social History:     Social History     Socioeconomic History    Marital status:      Spouse name: Not on file    Number of children: Not on file    Years of education: Not on file    Highest education level: Not on file   Occupational History    Not on file   Tobacco Use    Smoking status: Every Day     Packs/day: 0.50     Types: Cigarettes    Smokeless tobacco: Never   Vaping Use    Vaping Use: Never used   Substance and Sexual Activity    Alcohol use: No    Drug use: Not Currently    Sexual activity: Not Currently   Other Topics Concern    Not on file   Social History Narrative    Not on file     Social Determinants of Health     Financial Resource Strain: Not on file   Food Insecurity: Not on file   Transportation Needs: Not on file   Physical Activity: Not on file   Stress: Not on file   Social Connections: Not on file   Intimate Partner Violence: Not on file   Housing Stability: Not on file       Family History:     Family History   Problem Relation Age of Onset    Diabetes Mother     Cancer Father     Hypertension Maternal Grandmother       Medical Decision Making:   I have independently reviewed/ordered the following labs:    CBC with Differential:   Recent Labs     03/03/23  1532 03/04/23  0539   WBC 10.6 8.7   HGB 9.4* 8.8*   HCT 29.1* 26.9*    263   LYMPHOPCT 28 25   MONOPCT 10 9     BMP:  Recent Labs     03/03/23  1532 03/04/23  0539   * 134*   K 4.5 4.6   CL 97* 102   CO2 24 23   BUN 28* 26*   CREATININE 2.89* 2.74*     Hepatic Function Panel: No results for input(s): PROT, LABALBU, BILIDIR, IBILI, BILITOT, ALKPHOS, ALT, AST in the last 72 hours. No results for input(s): RPR in the last 72 hours. No results for input(s): HIV in the last 72 hours. No results for input(s): BC in the last 72 hours. Lab Results   Component Value Date/Time    CREATININE 2.74 03/04/2023 05:39 AM    GLUCOSE 124 03/04/2023 05:39 AM       Detailed results: Thank you for allowing us to participate in the care of this patient. Please call with questions. This note is created with the assistance of a speech recognition program.  While intending to generate adocument that actually reflects the content of the visit, the document can still have some errors including those of syntax and sound a like substitutions which may escape proof reading. It such instances, actual meaningcan be extrapolated by contextual diversion.     Jacob Mckeon MD  Office: [phone]  Perfect serve / office [phone]

## 2023-03-04 NOTE — ACP (ADVANCE CARE PLANNING)
Advance Care Planning     Advance Care Planning Activator (Inpatient)  Conversation Note      Date of ACP Conversation: 3/4/2023     Conversation Conducted with: Patient with Decision Making Capacity    ACP Activator: Jl Lee RN        Health Care Decision Maker:     Current Designated Health Care Decision Maker:     Primary Decision Maker: [facility] [phone]  Click here to complete Healthcare Decision Makers including section of the Healthcare Decision Maker Relationship (ie \"Primary\")  Today we documented Decision Maker(s) consistent with Legal Next of Kin hierarchy. Care Preferences    Ventilation: \"If you were in your present state of health and suddenly became very ill and were unable to breathe on your own, what would your preference be about the use of a ventilator (breathing machine) if it were available to you? \"      Would the patient desire the use of ventilator (breathing machine)?: yes    \"If your health worsens and it becomes clear that your chance of recovery is unlikely, what would your preference be about the use of a ventilator (breathing machine) if it were available to you? \"     Would the patient desire the use of ventilator (breathing machine)?: No      Resuscitation  \"CPR works best to restart the heart when there is a sudden event, like a heart attack, in someone who is otherwise healthy. Unfortunately, CPR does not typically restart the heart for people who have serious health conditions or who are very sick. \"    \"In the event your heart stopped as a result of an underlying serious health condition, would you want attempts to be made to restart your heart (answer \"yes\" for attempt to resuscitate) or would you prefer a natural death (answer \"no\" for do not attempt to resuscitate)? \" yes       [x] Yes   [] No   Educated Patient / [place] Grandchild regarding differences between Advance Directives and portable DNR orders.     Length of ACP Conversation in minutes: Conversation Outcomes:  ACP discussion completed    Follow-up plan:    [] Schedule follow-up conversation to continue planning  [] Referred individual to Provider for additional questions/concerns   [] Advised patient/agent/surrogate to review completed ACP document and update if needed with changes in condition, patient preferences or care setting    [] This note routed to one or more involved healthcare providers

## 2023-03-04 NOTE — CONSULTS
[address] [facility] Pharmacokinetic Monitoring Service - Vancomycin     Roel Ash is a 78 y.o. male starting on vancomycin therapy for SSTI - Diabetic foot infection. Pharmacy consulted by LEANDRO Mohan for monitoring and adjustment. Target Concentration: Goal AUC/DIMA 400-600 mg*hr/L    Additional Antimicrobials: Cefepime    Pertinent Laboratory Values: Wt Readings from Last 1 Encounters:   03/03/23 232 lb (105.2 kg)     Temp Readings from Last 1 Encounters:   03/03/23 98.6 °F (37 °C)     Estimated Creatinine Clearance: 31 mL/min (A) (based on SCr of 2.89 mg/dL (H)). Recent Labs     03/03/23  1532   CREATININE 2.89*   WBC 10.6     Procalcitonin: --    Pertinent Cultures:  Culture Date Source Results   No culture information avialable at this time N/A N/A   MRSA Nasal Swab: N/A. Non-respiratory infection. Plan:  Concentration-guided dosing due to renal impairment/insufficiency  Start vancomycin 2500 mg times one dose. Draw level in 24 hr and adjust dosing based on level and renal clearance at that time.   Renal labs as indicated   Vancomycin concentration ordered for 3/4 @ 18:00   Pharmacy will continue to monitor patient and adjust therapy as indicated    Thank you for the consult,  Gertrude See, [facility]  3/3/2023 9:46 PM

## 2023-03-04 NOTE — PLAN OF CARE
Problem: Discharge Planning  Goal: Discharge to home or other facility with appropriate resources  Outcome: Progressing     Problem: Pain  Goal: Verbalizes/displays adequate comfort level or baseline comfort level  Outcome: Progressing     Problem: Safety - Adult  Goal: Free from fall injury  Outcome: Progressing     Problem: ABCDS Injury Assessment  Goal: Absence of physical injury  Outcome: Progressing     Problem: Neurosensory - Adult  Goal: Achieves maximal functionality and self care  Outcome: Progressing     Problem: Respiratory - Adult  Goal: Achieves optimal ventilation and oxygenation  Outcome: Progressing  Flowsheets (Taken 3/4/2023 0800)  Achieves optimal ventilation and oxygenation: Assess for changes in respiratory status     Problem: Cardiovascular - Adult  Goal: Maintains optimal cardiac output and hemodynamic stability  Outcome: Progressing  Goal: Absence of cardiac dysrhythmias or at baseline  Outcome: Progressing     Problem: Skin/Tissue Integrity - Adult  Goal: Skin integrity remains intact  Outcome: Progressing  Goal: Incisions, wounds, or drain sites healing without S/S of infection  Outcome: Progressing  Goal: Oral mucous membranes remain intact  Outcome: Progressing     Problem: Musculoskeletal - Adult  Goal: Return mobility to safest level of function  Outcome: Progressing  Goal: Maintain proper alignment of affected body part  Outcome: Progressing  Goal: Return ADL status to a safe level of function  Outcome: Progressing     Problem: Gastrointestinal - Adult  Goal: Minimal or absence of nausea and vomiting  Outcome: Progressing  Flowsheets (Taken 3/4/2023 0800)  Minimal or absence of nausea and vomiting: Administer IV fluids as ordered to ensure adequate hydration  Goal: Maintains or returns to baseline bowel function  Outcome: Progressing  Flowsheets (Taken 3/4/2023 0800)  Maintains or returns to baseline bowel function: Assess bowel function  Goal: Maintains adequate nutritional intake  Outcome: Progressing     Problem: Genitourinary - Adult  Goal: Absence of urinary retention  Outcome: Progressing  Flowsheets (Taken 3/4/2023 0800)  Absence of urinary retention: Assess patients ability to void and empty bladder     Problem: Infection - Adult  Goal: Absence of infection at discharge  Outcome: Progressing  Goal: Absence of infection during hospitalization  Outcome: Progressing  Goal: Absence of fever/infection during anticipated neutropenic period  Outcome: Progressing     Problem: Metabolic/Fluid and Electrolytes - Adult  Goal: Electrolytes maintained within normal limits  Outcome: Progressing  Goal: Hemodynamic stability and optimal renal function maintained  Outcome: Progressing  Goal: Glucose maintained within prescribed range  Outcome: Progressing     Problem: Hematologic - Adult  Goal: Maintains hematologic stability  Outcome: Progressing     Problem: Chronic Conditions and Co-morbidities  Goal: Patient's chronic conditions and co-morbidity symptoms are monitored and maintained or improved  Outcome: Progressing

## 2023-03-04 NOTE — PROGRESS NOTES
[facility]  Office: [address], DO, Leonvalente Beaulieu, DO, Leonel Aubrey, DO, Clarita Rainey Iram, DO, Rachelle Alarcon MD, Rian Pereyra MD, Angel Mcelroy MD, Min Desouza MD,  Smith Brooks MD, Wilmer Bay MD, Arun Lara, DO, Sheridan Kim MD,  Caesar Boo MD, Amilcar Mathis MD, Jennifer Dowell, DO, Vanessa Elkins MD, Chloe Casas MD, Denita Rivera DO, Frank Chou MD, Laurie Gonzalez MD, Stefan Marin MD, Rolanda Tafoya MD, Donnie Terrell DO, Breana Weinberg MD, Rhonda Hargrove MD, Renita Charles, CNP,  Althea Perez, CNP, Rhiannon Huang, CNP, Bob Montalvo, CNP,  Dangelo Prado, [facility], Kulwinder Purvis, CNP, Abdias Paris, CNP, William Owens, CNP, Leatha Friedman, CNP, Zachariah Chaves, [facility], Jackei Wing, PA-C, Chucky Caballero, CNS, Nicolás Peterss, CNP, Rubén Worship, [facility]    Progress Note    3/4/2023    12:42 PM    Name:   Bhavna Campos  MRN:     [id number]     [place]:      [de-identified]   Room:   2017/2017-02  IP Day:  1  Admit Date:  3/3/2023  2:54 PM    PCP:   Be Bhatia MD  Code Status:  Full Code    Subjective:     C/C:   Chief Complaint   Patient presents with    Wound Infection     Right foot     Interval History Status: not changed. Patient is resting comfortably in progress. Denies any complaints of chest pain, shortness of breath, nausea vomiting, fevers or chills. Brief History: This is a 71-year-old male that presents with right foot wound with concerns for infection. Recent outpatient culture with vancomycin-resistant Enterococcus faecium. He presented to the emergency room with worsening drainage and developed fevers and chills. Overnight he was started on Maxipime. Recent outpatient cultures were obtained with evidence of VRE with antibiotics will be adjusted accordingly.     Review of Systems:     Constitutional:  negative for chills, fevers, sweats  Respiratory:  negative for cough, dyspnea on exertion, shortness of breath, wheezing  Cardiovascular:  negative for chest pain, chest pressure/discomfort, lower extremity edema, palpitations  Gastrointestinal:  negative for abdominal pain, constipation, diarrhea, nausea, vomiting  Neurological:  negative for dizziness, headache    Medications: Allergies:     Allergies   Allergen Reactions    Morphine Itching    Other Other (See Comments)     Other reaction(s): Unknown    Percocet [Oxycodone-Acetaminophen]      Facial swelling       Current Meds:   Scheduled Meds:    linezolid  600 mg IntraVENous Q12H    amLODIPine  5 mg Oral Daily    aspirin  81 mg Oral Daily    cetirizine  10 mg Oral Nightly    gabapentin  900 mg Oral TID    [Held by provider] glipiZIDE  20 mg Oral BID AC    [Held by provider] alogliptin  12.5 mg Oral Daily    insulin glargine  10 Units SubCUTAneous BID    mupirocin   Topical BID    atorvastatin  20 mg Oral Nightly    sodium chloride flush  5-40 mL IntraVENous 2 times per day    heparin (porcine)  5,000 Units SubCUTAneous 3 times per day    insulin lispro  0-8 Units SubCUTAneous TID WC    insulin lispro  0-4 Units SubCUTAneous Nightly    cefepime  2,000 mg IntraVENous Q12H    vancomycin (VANCOCIN) intermittent dosing (placeholder)   Other RX Placeholder     Continuous Infusions:    dextrose      sodium chloride Stopped (03/04/23 0539)    sodium chloride       PRN Meds: HYDROcodone-acetaminophen, glucose, dextrose bolus **OR** dextrose bolus, glucagon (rDNA), dextrose, sodium chloride flush, sodium chloride, ondansetron **OR** ondansetron, polyethylene glycol    Data:     Past Medical History:   has a past medical history of Abscess of right foot, Acquired hammer toe deformity of lesser toe of right foot, ALEJANDRO (acute kidney injury) ([facility] [address] Cellulitis, Cellulitis of left foot, Cellulitis of right foot, Charcot foot due to diabetes mellitus ([facility] [address] Chest pain at rest, Chronic multifocal osteomyelitis of right foot ([facility] CKD (chronic kidney disease), Diabetic polyneuropathy associated with type 2 diabetes mellitus ([facility] Essential hypertension, Fractures, multiple, Hyperlipidemia, Hypertension, Leukocytosis, MRSA (methicillin resistant staph aureus) culture positive, Neuropathy, Pain in right foot, Pneumonia, Right foot infection, Right foot pain, Tobacco abuse, Type II or unspecified type diabetes mellitus without mention of complication, not stated as uncontrolled, Vertigo, 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 Social History:   reports that he has been smoking cigarettes. He has been smoking an average of .5 packs per day. He has never used smokeless tobacco. He reports that he does not currently use drugs. He reports that he does not drink alcohol. Family History:   Family History   Problem Relation Age of Onset    Diabetes Mother     Cancer Father     Hypertension Maternal Grandmother        Vitals:  BP (!) 159/76   Pulse 99   Temp 98.8 °F (37.1 °C) (Oral)   Resp 16   Ht 5' 10\" (1.778 m)   Wt 232 lb (105.2 kg)   SpO2 95%   BMI 33.29 kg/m²   Temp (24hrs), Av.1 °F (37.3 °C), Min:98.6 °F (37 °C), Max:99.8 °F (37.7 °C)    No results for input(s): POCGLU in the last 72 hours. I/O (24Hr):     Intake/Output Summary (Last 24 hours) at 3/4/2023 1242  Last data filed at 3/4/2023 5290  Gross per 24 hour   Intake 943.57 ml   Output --   Net 943.57 ml       Labs:  Hematology:  Recent Labs     23  1532 23  0539   WBC 10.6 8.7   RBC 3.41* 3.20*   HGB 9.4* 8.8*   HCT 29.1* 26.9*   MCV 85.3 84.1   MCH 27.6 27.5   MCHC 32.3 32.7   RDW 16.0* 15.8*    263   MPV 8.8 8.9   SEDRATE 53*  --    .4*  --      Chemistry:  Recent Labs     23  1532 23  0539   * 134*   K 4.5 4.6   CL 97* 102   CO2 24 23   GLUCOSE 223* 124*   BUN 28* 26*   CREATININE 2.89* 2.74*   ANIONGAP 10 9   LABGLOM 23* 25*   CALCIUM 9.8 9.7   No results for input(s): PROT, LABALBU, LABA1C, T0GSTAP, Y4TSEDE, FT4, TSH, AST, ALT, LDH, GGT, ALKPHOS, LABGGT, BILITOT, BILIDIR, AMMONIA, AMYLASE, LIPASE, LACTATE, CHOL, HDL, LDLCHOLESTEROL, CHOLHDLRATIO, TRIG, VLDL, PTV17JB, PHENYTOIN, PHENYF, URICACID, POCGLU in the last 72 hours. ABG:No results found for: POCPH, PHART, PH, POCPCO2, VIF4PYD, PCO2, POCPO2, PO2ART, PO2, POCHCO3, NBX5OSK, HCO3, NBEA, PBEA, BEART, BE, THGBART, THB, AQP1IUL, BMHN8FGK, P5SDHQPS, O2SAT, FIO2  Lab Results   Component Value Date/Time    SPECIAL 10ML 09/13/2022 06:41 PM     Lab Results   Component Value Date/Time    CULTURE NO GROWTH 5 DAYS 11/15/2022 10:20 PM       Radiology:  No results found.     Physical Examination:        General appearance:  alert, cooperative and no distress  Mental Status:  oriented to person, place and time and normal affect  Lungs:  clear to auscultation bilaterally, normal effort  Heart:  regular rate and rhythm, no murmur  Abdomen:  soft, nontender, nondistended, normal bowel sounds, no masses, hepatomegaly, splenomegaly  Extremities:  no edema, redness, tenderness in the calves  Skin:  no gross lesions, rashes, induration    Assessment:        Hospital Problems             Last Modified POA    * (Principal) Diabetic foot infection ([facility] [address] with VRE 3/4/2023 Yes    Type 2 diabetes mellitus with right diabetic foot ulcer ([facility] [address] 3/3/2023 Yes    Charcot's joint of right foot 3/3/2023 Yes    Stage 3b chronic kidney disease ([address] (Chronic) 3/3/2023 Yes    Hyponatremia 3/3/2023 Yes    Microcytic anemia 3/3/2023 Yes    Infection due to vancomycin resistant Enterococcus faecium 3/4/2023 Yes    Essential hypertension (Chronic) 3/3/2023 Yes    Neuropathy 3/3/2023 Yes    Charcot foot due to diabetes mellitus ([facility] [address] 3/3/2023 Yes    Hyperlipidemia 3/3/2023 Yes    Acute kidney injury superimposed on chronic kidney disease ([facility] [address] 3/3/2023 Yes       Plan:        Start Zyvox pending ID input.  Discontinue vancomycin.   Insulin scale for glycemic control  Monitor renal function, avoid nephrotoxic agents  Continue home antihypertensives  GI and DVT prophylaxis  IV hydration  Renal ultrasound reviewed  Trend labs, correct electrolytes as needed    Wendy Chapman DO  3/4/2023  12:42 PM

## 2023-03-05 LAB
ABSOLUTE EOS #: 0.32 K/UL (ref 0–0.44)
ABSOLUTE IMMATURE GRANULOCYTE: 0.04 K/UL (ref 0–0.3)
ABSOLUTE LYMPH #: 1.92 K/UL (ref 1.1–3.7)
ABSOLUTE MONO #: 0.62 K/UL (ref 0.1–1.2)
ANION GAP SERPL CALCULATED.3IONS-SCNC: 7 MMOL/L (ref 9–17)
BASOPHILS # BLD: 1 % (ref 0–2)
BASOPHILS ABSOLUTE: 0.04 K/UL (ref 0–0.2)
BUN SERPL-MCNC: 27 MG/DL (ref 8–23)
BUN/CREAT BLD: 10 (ref 9–20)
CALCIUM SERPL-MCNC: 9.5 MG/DL (ref 8.6–10.4)
CHLORIDE SERPL-SCNC: 107 MMOL/L (ref 98–107)
CO2 SERPL-SCNC: 24 MMOL/L (ref 20–31)
CREAT SERPL-MCNC: 2.76 MG/DL (ref 0.7–1.2)
CRP SERPL HS-MCNC: 105.7 MG/L (ref 0–5)
EOSINOPHILS RELATIVE PERCENT: 4 % (ref 1–4)
GFR SERPL CREATININE-BSD FRML MDRD: 25 ML/MIN/1.73M2
GLUCOSE BLD-MCNC: 111 MG/DL (ref 75–110)
GLUCOSE BLD-MCNC: 114 MG/DL (ref 75–110)
GLUCOSE BLD-MCNC: 136 MG/DL (ref 75–110)
GLUCOSE BLD-MCNC: 199 MG/DL (ref 75–110)
GLUCOSE SERPL-MCNC: 124 MG/DL (ref 70–99)
HCT VFR BLD AUTO: 25.4 % (ref 40.7–50.3)
HGB BLD-MCNC: 8.1 G/DL (ref 13–17)
IMMATURE GRANULOCYTES: 1 %
LYMPHOCYTES # BLD: 25 % (ref 24–43)
MCH RBC QN AUTO: 27.2 PG (ref 25.2–33.5)
MCHC RBC AUTO-ENTMCNC: 31.9 G/DL (ref 28.4–34.8)
MCV RBC AUTO: 85.2 FL (ref 82.6–102.9)
MONOCYTES # BLD: 8 % (ref 3–12)
NRBC AUTOMATED: 0 PER 100 WBC
PDW BLD-RTO: 15.9 % (ref 11.8–14.4)
PLATELET # BLD AUTO: 255 K/UL (ref 138–453)
PMV BLD AUTO: 8.5 FL (ref 8.1–13.5)
POTASSIUM SERPL-SCNC: 4.3 MMOL/L (ref 3.7–5.3)
RBC # BLD: 2.98 M/UL (ref 4.21–5.77)
RBC # BLD: ABNORMAL 10*6/UL
SEG NEUTROPHILS: 61 % (ref 36–65)
SEGMENTED NEUTROPHILS ABSOLUTE COUNT: 4.67 K/UL (ref 1.5–8.1)
SODIUM SERPL-SCNC: 138 MMOL/L (ref 135–144)
WBC # BLD AUTO: 7.6 K/UL (ref 3.5–11.3)

## 2023-03-05 PROCEDURE — 97166 OT EVAL MOD COMPLEX 45 MIN: CPT

## 2023-03-05 PROCEDURE — 99232 SBSQ HOSP IP/OBS MODERATE 35: CPT | Performed by: INTERNAL MEDICINE

## 2023-03-05 PROCEDURE — 6360000002 HC RX W HCPCS: Performed by: NURSE PRACTITIONER

## 2023-03-05 PROCEDURE — 2580000003 HC RX 258: Performed by: NURSE PRACTITIONER

## 2023-03-05 PROCEDURE — 97162 PT EVAL MOD COMPLEX 30 MIN: CPT

## 2023-03-05 PROCEDURE — 36415 COLL VENOUS BLD VENIPUNCTURE: CPT

## 2023-03-05 PROCEDURE — 97535 SELF CARE MNGMENT TRAINING: CPT

## 2023-03-05 PROCEDURE — 85025 COMPLETE CBC W/AUTO DIFF WBC: CPT

## 2023-03-05 PROCEDURE — 97530 THERAPEUTIC ACTIVITIES: CPT

## 2023-03-05 PROCEDURE — 2580000003 HC RX 258: Performed by: INTERNAL MEDICINE

## 2023-03-05 PROCEDURE — 86140 C-REACTIVE PROTEIN: CPT

## 2023-03-05 PROCEDURE — 1200000000 HC SEMI PRIVATE

## 2023-03-05 PROCEDURE — 6370000000 HC RX 637 (ALT 250 FOR IP): Performed by: NURSE PRACTITIONER

## 2023-03-05 PROCEDURE — 80048 BASIC METABOLIC PNL TOTAL CA: CPT

## 2023-03-05 PROCEDURE — 82947 ASSAY GLUCOSE BLOOD QUANT: CPT

## 2023-03-05 PROCEDURE — 6370000000 HC RX 637 (ALT 250 FOR IP): Performed by: INTERNAL MEDICINE

## 2023-03-05 RX ADMIN — INSULIN GLARGINE 10 UNITS: 100 INJECTION, SOLUTION SUBCUTANEOUS at 09:16

## 2023-03-05 RX ADMIN — GABAPENTIN 900 MG: 300 CAPSULE ORAL at 13:16

## 2023-03-05 RX ADMIN — HYDROCODONE BITARTRATE AND ACETAMINOPHEN 1 TABLET: 10; 325 TABLET ORAL at 23:43

## 2023-03-05 RX ADMIN — AMLODIPINE BESYLATE 5 MG: 5 TABLET ORAL at 09:16

## 2023-03-05 RX ADMIN — SODIUM CHLORIDE: 9 INJECTION, SOLUTION INTRAVENOUS at 13:13

## 2023-03-05 RX ADMIN — HYDROCODONE BITARTRATE AND ACETAMINOPHEN 1 TABLET: 10; 325 TABLET ORAL at 10:54

## 2023-03-05 RX ADMIN — ATORVASTATIN CALCIUM 20 MG: 20 TABLET, FILM COATED ORAL at 19:39

## 2023-03-05 RX ADMIN — GABAPENTIN 900 MG: 300 CAPSULE ORAL at 19:39

## 2023-03-05 RX ADMIN — HEPARIN SODIUM 5000 UNITS: 5000 INJECTION INTRAVENOUS; SUBCUTANEOUS at 06:33

## 2023-03-05 RX ADMIN — HEPARIN SODIUM 5000 UNITS: 5000 INJECTION INTRAVENOUS; SUBCUTANEOUS at 22:57

## 2023-03-05 RX ADMIN — HYDROCODONE BITARTRATE AND ACETAMINOPHEN 1 TABLET: 10; 325 TABLET ORAL at 17:37

## 2023-03-05 RX ADMIN — SODIUM CHLORIDE: 9 INJECTION, SOLUTION INTRAVENOUS at 06:31

## 2023-03-05 RX ADMIN — CEFEPIME 2000 MG: 2 INJECTION, POWDER, FOR SOLUTION INTRAVENOUS at 06:32

## 2023-03-05 RX ADMIN — GABAPENTIN 900 MG: 300 CAPSULE ORAL at 09:16

## 2023-03-05 RX ADMIN — CEFEPIME 2000 MG: 2 INJECTION, POWDER, FOR SOLUTION INTRAVENOUS at 17:41

## 2023-03-05 RX ADMIN — SODIUM CHLORIDE, PRESERVATIVE FREE 10 ML: 5 INJECTION INTRAVENOUS at 09:16

## 2023-03-05 RX ADMIN — ASPIRIN 81 MG: 81 TABLET, COATED ORAL at 09:16

## 2023-03-05 RX ADMIN — LINEZOLID 600 MG: 600 TABLET, FILM COATED ORAL at 22:57

## 2023-03-05 RX ADMIN — HEPARIN SODIUM 5000 UNITS: 5000 INJECTION INTRAVENOUS; SUBCUTANEOUS at 13:16

## 2023-03-05 RX ADMIN — HYDROCODONE BITARTRATE AND ACETAMINOPHEN 1 TABLET: 10; 325 TABLET ORAL at 04:44

## 2023-03-05 RX ADMIN — CETIRIZINE HYDROCHLORIDE 10 MG: 10 TABLET, FILM COATED ORAL at 19:39

## 2023-03-05 RX ADMIN — LINEZOLID 600 MG: 600 TABLET, FILM COATED ORAL at 10:54

## 2023-03-05 RX ADMIN — INSULIN GLARGINE 10 UNITS: 100 INJECTION, SOLUTION SUBCUTANEOUS at 23:51

## 2023-03-05 ASSESSMENT — ENCOUNTER SYMPTOMS
SHORTNESS OF BREATH: 0
NAUSEA: 1
CONSTIPATION: 0
VOMITING: 0
ABDOMINAL PAIN: 0
CHEST TIGHTNESS: 0
COLOR CHANGE: 1
DIARRHEA: 0

## 2023-03-05 ASSESSMENT — PAIN DESCRIPTION - ONSET
ONSET: ON-GOING
ONSET: ON-GOING

## 2023-03-05 ASSESSMENT — PAIN DESCRIPTION - ORIENTATION
ORIENTATION: RIGHT

## 2023-03-05 ASSESSMENT — PAIN SCALES - GENERAL
PAINLEVEL_OUTOF10: 9
PAINLEVEL_OUTOF10: 10
PAINLEVEL_OUTOF10: 8
PAINLEVEL_OUTOF10: 9
PAINLEVEL_OUTOF10: 4

## 2023-03-05 ASSESSMENT — PAIN DESCRIPTION - FREQUENCY
FREQUENCY: INTERMITTENT
FREQUENCY: CONTINUOUS
FREQUENCY: CONTINUOUS

## 2023-03-05 ASSESSMENT — PAIN DESCRIPTION - PAIN TYPE
TYPE: ACUTE PAIN
TYPE: ACUTE PAIN

## 2023-03-05 ASSESSMENT — PAIN DESCRIPTION - LOCATION
LOCATION: FOOT
LOCATION: LEG
LOCATION: FOOT

## 2023-03-05 ASSESSMENT — PAIN DESCRIPTION - DESCRIPTORS
DESCRIPTORS: ACHING
DESCRIPTORS: ACHING;SHARP
DESCRIPTORS: SHARP

## 2023-03-05 NOTE — PLAN OF CARE
Problem: Discharge Planning  Goal: Discharge to home or other facility with appropriate resources  3/5/2023 0957 by Selena Sanchez RN  Outcome: Progressing  3/5/2023 0955 by Selena Sanchez RN  Outcome: Progressing  Flowsheets (Taken 3/5/2023 0935)  Discharge to home or other facility with appropriate resources:   Identify barriers to discharge with patient and caregiver   Arrange for needed discharge resources and transportation as appropriate   Identify discharge learning needs (meds, wound care, etc)  3/5/2023 0406 by Geovanna Martinez RN  Outcome: Progressing  Flowsheets (Taken 3/4/2023 1935)  Discharge to home or other facility with appropriate resources:   Identify barriers to discharge with patient and caregiver   Arrange for needed discharge resources and transportation as appropriate   Identify discharge learning needs (meds, wound care, etc)   Refer to discharge planning if patient needs post-hospital services based on physician order or complex needs related to functional status, cognitive ability or social support system     Problem: Pain  Goal: Verbalizes/displays adequate comfort level or baseline comfort level  3/5/2023 0957 by Selena Sanchez RN  Outcome: Progressing  3/5/2023 0955 by Selena Sanchez RN  Outcome: Progressing  3/5/2023 0406 by Geovanna Martinez RN  Outcome: Progressing     Problem: Safety - Adult  Goal: Free from fall injury  3/5/2023 0957 by eSlena Sanchez RN  Outcome: Progressing  3/5/2023 0955 by Selena Sanchez RN  Outcome: Progressing  3/5/2023 0406 by Geovanna Martinez RN  Outcome: Progressing     Problem: ABCDS Injury Assessment  Goal: Absence of physical injury  3/5/2023 0957 by Selena Sanchez RN  Outcome: Progressing  3/5/2023 0955 by Selena Sanchez RN  Outcome: Progressing  3/5/2023 0406 by Geovanna Martinez RN  Outcome: Progressing     Problem: Neurosensory - Adult  Goal: Achieves maximal functionality and self care  Recent Flowsheet Documentation  Taken 3/5/2023 0935 by Lali Su RN  Achieves maximal functionality and self care: Encourage and assist patient to increase activity and self care with guidance from physical therapy/occupational therapy  3/5/2023 0406 by Hiro Conner RN  Outcome: Progressing     Problem: Infection - Adult  Goal: Absence of infection at discharge  Recent Flowsheet Documentation  Taken 3/5/2023 0935 by Lali Su RN  Absence of infection at discharge:   Assess and monitor for signs and symptoms of infection   Monitor lab/diagnostic results   Monitor all insertion sites i.e., indwelling lines, tubes and drains   Instruct and encourage patient and family to use good hand hygiene technique   Identify and instruct in appropriate isolation precautions for identified infection/condition   Administer medications as ordered  3/5/2023 0406 by Hiro Conner RN  Outcome: Progressing  Flowsheets (Taken 3/4/2023 1935)  Absence of infection at discharge:   Instruct and encourage patient and family to use good hand hygiene technique   Monitor all insertion sites i.e., indwelling lines, tubes and drains   Assess and monitor for signs and symptoms of infection     Problem: Chronic Conditions and Co-morbidities  Goal: Patient's chronic conditions and co-morbidity symptoms are monitored and maintained or improved  3/5/2023 0957 by Lali Su RN  Outcome: Progressing  3/5/2023 0955 by Lali Su RN  Outcome: Progressing  Flowsheets (Taken 3/5/2023 0935)  Care Plan - Patient's Chronic Conditions and Co-Morbidity Symptoms are Monitored and Maintained or Improved:   Monitor and assess patient's chronic conditions and comorbid symptoms for stability, deterioration, or improvement   Collaborate with multidisciplinary team to address chronic and comorbid conditions and prevent exacerbation or deterioration   Update acute care plan with appropriate goals if chronic or comorbid symptoms are exacerbated and prevent overall improvement and discharge  3/5/2023 200 by Huma Xiao RN  Outcome: Progressing  Flowsheets (Taken 3/4/2023 1935)  Care Plan - Patient's Chronic Conditions and Co-Morbidity Symptoms are Monitored and Maintained or Improved:   Monitor and assess patient's chronic conditions and comorbid symptoms for stability, deterioration, or improvement   Collaborate with multidisciplinary team to address chronic and comorbid conditions and prevent exacerbation or deterioration   Update acute care plan with appropriate goals if chronic or comorbid symptoms are exacerbated and prevent overall improvement and discharge

## 2023-03-05 NOTE — PROGRESS NOTES
Physical Therapy  Facility/Department: [facility] SURG  Physical Therapy Initial Assessment    Name: Abad Sloan  : 1955  MRN: [id number]  Date of Service: 3/5/2023  CHRISTY Lawrence reports patient is medically stable for therapy treatment this date. Chart reviewed prior to treatment and patient is agreeable for therapy. All lines intact and patient positioned comfortably at end of treatment. All patient needs addressed prior to ending therapy session. Per H&P:Patient presents to the emergency room today with complaints of pain and swelling in his right foot. Patient had a CT outpatient of his right foot. Patient was advised by infectious disease to come to the emergency room for further evaluation and treatment. Patient has a significant past medical history of diabetes, CKD, hypertension, hyperlipidemia and Charcot foot. Patient has been evaluated and treated by Dr. Marga Perea outpatient as well. Patient also complaining of fevers, chills and intermittent shortness of breath. Patient Diagnosis(es): The primary encounter diagnosis was Diabetic foot infection ([address] A diagnosis of Charcot foot due to diabetes mellitus ([address] was also pertinent to this visit.   Past Medical History:  has a past medical history of Abscess of right foot, Acquired hammer toe deformity of lesser toe of right foot, ALEJANDRO (acute kidney injury) ([address] Cellulitis, Cellulitis of left foot, Cellulitis of right foot, Charcot foot due to diabetes mellitus ([address] Chest pain at rest, Chronic multifocal osteomyelitis of right foot ([address] CKD (chronic kidney disease), Diabetic polyneuropathy associated with type 2 diabetes mellitus ([address] Essential hypertension, Fractures, multiple, Hyperlipidemia, Hypertension, Leukocytosis, MRSA (methicillin resistant staph aureus) culture positive, Neuropathy, Pain in right foot, Pneumonia, Right foot infection, Right foot pain, Tobacco abuse, Type II or unspecified type diabetes mellitus without mention of complication, not stated as uncontrolled, Vertigo, 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 Past Surgical History:  has a past surgical history that includes Neck surgery (1987); Appendectomy; knee surgery (Bilateral, 1983); Knee arthroscopy (Right, 1989); Knee arthroscopy (Left, 1990); Foot Debridement (Left, 04/24/2018); pr i&d below fascia foot 1 bursal space (Left, 04/24/2018); Colonoscopy; Foot Debridement (Right, 03/20/2020); arthroplasty (Right, 12/11/2020); Foot Debridement (Right, 06/08/2021); Foot surgery (Right, 06/11/2021); Foot Debridement (Right, 09/16/2021); IR INSERT PICC VAD W SQ PORT >5 YEARS (10/07/2022); Foot surgery (Right, 10/12/2022); Ankle surgery (Right, 10/12/2022); Ankle surgery (Right, 11/15/2022); Foot surgery (Right, 12/27/2022); arthrodesis (Right, 12/27/2022); and Foot surgery (Right, 12/27/2022). Assessment   Body Structures, Functions, Activity Limitations Requiring Skilled Therapeutic Intervention: Decreased functional mobility ; Decreased balance;Decreased endurance;Decreased safe awareness;Decreased posture  Assessment: Pt tolerated PT eval well. Pt currently presenting w/ mild deficits in gait, balance, and endurance this date. Recommending continued use of RW for all ambulation at this time. Pt would benefit from continued skilled PT to address deficits in order to maximize independence w/ functional mobility and return to PLOF as able.   Therapy Prognosis: Excellent  Decision Making: Medium Complexity  Requires PT Follow-Up: Yes  Activity Tolerance  Activity Tolerance: Patient tolerated evaluation without incident     Plan   Physcial Therapy Plan  General Plan: 5-7 times per week  Current Treatment Recommendations: Balance training, Functional mobility training, Transfer training, Neuromuscular re-education, Stair training, Gait training, Endurance training, Home exercise program, Equipment evaluation, education, & procurement, Patient/Caregiver education & training, Safety education & training, Therapeutic activities  Safety Devices  Type of Devices: Bed alarm in place, Call light within reach, Left in bed, Gait belt, Nurse notified  Restraints  Restraints Initially in Place: No     Restrictions  Restrictions/Precautions  Restrictions/Precautions: General Precautions, Fall Risk, Weight Bearing  Required Braces or Orthoses?: Yes  Lower Extremity Weight Bearing Restrictions  Right Lower Extremity Weight Bearing: Weight Bearing As Tolerated  Required Braces or Orthoses  Right Lower Extremity Brace: Boot  RLE Brace Type: CAM Boot  Position Activity Restriction  Other position/activity restrictions: Up w/ assist     Subjective   General  Patient assessed for rehabilitation services?: Yes  Response To Previous Treatment: Not applicable  Family / Caregiver Present: No  Follows Commands: Within Functional Limits  General Comment  Comments: RN and pt agreeable to therapy.  Pt supine in bed upon arrival.  Pt cooperative throughout.  Subjective  Subjective: Pt reporting feeling \"alright\" at time of PT eval.         Social/Functional History  Social/Functional History  Lives With: Spouse  Type of Home: House  Home Layout: One level  Home Access: Stairs to enter with rails  Entrance Stairs - Number of Steps: 3  Bathroom Shower/Tub: Tub/Shower unit  Bathroom Toilet: Standard  Bathroom Equipment: Grab bars in shower, Toilet raiser  Home Equipment: Cane, Wheelchair-manual, Rollator, Walker, rolling (multiple surgical shoes)  Has the patient had two or more falls in the past year or any fall with injury in the past year?: No  Receives Help From: Family  ADL Assistance: Independent  Homemaking Assistance: Needs assistance (spouse does the majority of household chores)  Homemaking Responsibilities: Yes  Ambulation Assistance: Independent (uses rollator at all times)  Transfer Assistance:  Independent  Active : Yes (has ability, spouse has driven since previous surgery)  Occupation: Retired  Type of Occupation: \"pulled scrap doubles\"  Leisure & Hobbies: working on trucks, dogs  Vision/Hearing  Vision  Vision: Impaired (Pt denies any acute visual changes)  Vision Exceptions: Wears glasses at all times  Hearing  Hearing: Within functional limits    Cognition   Orientation  Overall Orientation Status: Within Functional Limits  Cognition  Overall Cognitive Status: Exceptions  Arousal/Alertness: Appropriate responses to stimuli  Following Commands: Follows multistep commands with repitition  Attention Span: Attends with cues to redirect  Memory: Appears intact  Safety Judgement: Decreased awareness of need for safety  Problem Solving: Decreased awareness of errors;Assistance required to identify errors made;Assistance required to correct errors made  Insights: Decreased awareness of deficits  Initiation: Does not require cues  Sequencing: Does not require cues     Objective   Observation/Palpation  Posture: Fair  Observation: CAM Boot donned prior to OOB mobility  Gross Assessment  Sensation: Impaired (Pt reports intermittent numbness/tingling in R hand)     AROM RLE (degrees)  RLE AROM: WFL  AROM LLE (degrees)  LLE AROM : WFL  AROM RUE (degrees)  RUE AROM : WFL  AROM LUE (degrees)  LUE AROM : WFL  Strength RLE  Strength RLE: WFL  Comment: Grossly 4/5  Strength LLE  Strength LLE: WFL  Comment: Grossly 4/5  Strength RUE  Comment: See OT assessment for detail  Strength LUE  Comment: See OT assessment for detail          Bed mobility  Supine to Sit: Stand by assistance  Sit to Supine: Stand by assistance  Scooting: Stand by assistance  Bed Mobility Comments: Pt w/o difficulty throughout bed mobility this date.  Pt requiring min verbal cueing for pacing and safety throughout w/ fair return demo.  Pt denying any dizziness/lightheadedness throughout position changes.    Transfers  Sit to Stand: Contact  guard assistance  Stand to Sit: Contact guard assistance  Comment: Pt demonstrating fair steadiness throughout STS transfers this date. Pt denying any dizziness/lightheadedness throughout position changes. While standing at EOB, pt performed pre-gait lateral weight shifting and standing marches to assess stability prior to initiating ambulation w/ fair steadiness noted throughout. Pt also requiring min verbal cueing for proper hand placement throughout STS transfers w/ RW w/ poor return demo. Pt also demonstrating fair eccentric quad conrol throughout stand>sit transfers w/ mod verbal cueing. Ambulation  Surface: Level tile  Device: Rolling Walker  Assistance: Contact guard assistance  Quality of Gait: fair steadiness, slow pace  Gait Deviations: Slow Kaela;Decreased step length;Decreased step height;Decreased head and trunk rotation  Distance: 20ft  Comments: Pt demonstrating fair steadiness throughout ambulation w/ RW. Pt requiring min verbal cueing to maintain ARACELI within RW w/ fair return demo. More Ambulation?: Yes  Ambulation 2  Surface - 2: level tile  Device 2: No device  Assistance 2: Minimal assistance  Quality of Gait 2: fair steadiness, mild lateral trunk sway, slow pace  Gait Deviations: Slow Kaela;Decreased step length;Decreased step height  Distance: 20ft x 2  Comments: Pt demonstrating fair steadiness throughout ambulation w/o AD. Pt w/ increased lateral trunk sway noted w/o AD as compared to ambulation w/ RW.   Stairs/Curb  Stairs?: No       Balance  Posture: Fair  Sitting - Static: Good  Sitting - Dynamic: Good;-  Standing - Static: Good;-  Standing - Dynamic: Fair;+  Comments: Standing balance assessed w/ RW           AM-PAC Score  AM-PAC Inpatient Mobility Raw Score : 18 (03/05/23 1317)  AM-PAC Inpatient T-Scale Score : 43.63 (03/05/23 1317)  Mobility Inpatient CMS 0-100% Score: 46.58 (03/05/23 1317)  Mobility Inpatient CMS G-Code Modifier : CK (03/05/23 1317)          Functional Outcome Measure-   Single Leg Stance Test:  0 sec. (<5 sec.= fall risk)      Goals  Short Term Goals  Time Frame for Short Term Goals: 10 visits  Short Term Goal 1: Pt to demonstrate bed mobility independently  Short Term Goal 2: Pt to perform STS transfers w/ least restrictive AD independently  Short Term Goal 3: Pt to ambulate at least 100ft w/ least restrictive AD independently  Short Term Goal 4: Pt to ascend/descend 3 stairs w/ handrails SBA  Short Term Goal 5: Pt to actively participate in at least 30 minutes of physical therapy for ther act, ther ex, balance, gait, and endurance training  Patient Goals   Patient Goals : To go home       Education  Patient Education  Education Given To: Patient  Education Provided: Role of Therapy;Plan of Care;Home Exercise Program;Fall Prevention Strategies;Precautions;Transfer Training  Education Provided Comments: Pt educated on: purpose of acute PT eval, importance of continued mobility throughout admission, general safety awareness, fall risk prevention, importance of wearing CAM boot, safe transfers & ambulation w/ RW, and PT POC. Pt w/ fair return demo.   Education Method: Verbal;Demonstration  Education Outcome: Verbalized understanding      Therapy Time   Individual Concurrent Group Co-treatment   Time In 1472         Time Out 0908         Minutes 19+10 = 29 total          Additional 10 minutes added for chart review and RN communication   Treatment time: 9 minutes       Valdemar Galdamez PT

## 2023-03-05 NOTE — PROGRESS NOTES
Occupational Therapy  Facility/Department: [facility] SURG  Occupational Therapy Initial Assessment    Name: Yaritza Willett  : 1955  MRN: [id number]  Date of Service: 3/5/2023    CHRISTY Triana reports patient is medically stable for therapy treatment this date. Chart reviewed prior to treatment and patient is agreeable for therapy. All lines intact and patient positioned comfortably at end of treatment. All patient needs addressed prior to ending therapy session. Patient Diagnosis(es): The primary encounter diagnosis was Diabetic foot infection ([address] A diagnosis of Charcot foot due to diabetes mellitus ([address] was also pertinent to this visit. Past Medical History:  has a past medical history of Abscess of right foot, Acquired hammer toe deformity of lesser toe of right foot, ALEJANDRO (acute kidney injury) ([address] Cellulitis, Cellulitis of left foot, Cellulitis of right foot, Charcot foot due to diabetes mellitus ([address] Chest pain at rest, Chronic multifocal osteomyelitis of right foot ([address] CKD (chronic kidney disease), Diabetic polyneuropathy associated with type 2 diabetes mellitus ([address] Essential hypertension, Fractures, multiple, Hyperlipidemia, Hypertension, Leukocytosis, MRSA (methicillin resistant staph aureus) culture positive, Neuropathy, Pain in right foot, Pneumonia, Right foot infection, Right foot pain, Tobacco abuse, Type II or unspecified type diabetes mellitus without mention of complication, not stated as uncontrolled, Vertigo, 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 Past Surgical History:  has a past surgical history that includes Neck surgery (); Appendectomy; knee surgery (Bilateral, ); Knee arthroscopy (Right, ); Knee arthroscopy (Left, ); Foot Debridement (Left, 2018); pr i&d below fascia foot 1 bursal space (Left, 2018); Colonoscopy;  Foot Debridement (Right, 03/20/2020); arthroplasty (Right, 12/11/2020); Foot Debridement (Right, 06/08/2021); Foot surgery (Right, 06/11/2021); Foot Debridement (Right, 09/16/2021); IR INSERT PICC VAD W SQ PORT >5 YEARS (10/07/2022); Foot surgery (Right, 10/12/2022); Ankle surgery (Right, 10/12/2022); Ankle surgery (Right, 11/15/2022); Foot surgery (Right, 12/27/2022); arthrodesis (Right, 12/27/2022); and Foot surgery (Right, 12/27/2022). PER H&P: This is a 67-year-old male who presents with complaints of increasing pain and discomfort in his right foot and ankle. Patient has a history of Charcot foot and ankle, multiple previous surgeries with recurrent infections. Patient was seen a few days ago had a CT scan as an outpatient, he had increasing pain and discomfort and was told to come to the ER. Patient denies fevers or chills, no chest pain or shortness of breath. He describes his symptoms as severe. Assessment   Performance deficits / Impairments: Decreased functional mobility ; Decreased ADL status; Decreased safe awareness;Decreased balance;Decreased cognition;Decreased posture;Decreased endurance;Decreased high-level IADLs  Assessment: Pt would benefit from continued skilled OT services are indicated to increase I and safety during functional tasks to return home at prior level of function as able.   Prognosis: Good  Decision Making: Medium Complexity  Activity Tolerance  Activity Tolerance: Patient Tolerated treatment well  Activity Tolerance Comments: fair        Plan   Occupational Therapy Plan  Times Per Week: 4-5x/wk 1x/day as venkata  Current Treatment Recommendations: Strengthening, Balance training, Functional mobility training, Endurance training, Safety education & training, Patient/Caregiver education & training, Equipment evaluation, education, & procurement, Self-Care / ADL, Home management training     Restrictions  Restrictions/Precautions  Restrictions/Precautions: General Precautions, Fall Risk, Weight Bearing  Required Braces or Orthoses?: Yes  Lower Extremity Weight Bearing Restrictions  Right Lower Extremity Weight Bearing: Weight Bearing As Tolerated  Required Braces or Orthoses  Right Lower Extremity Brace: Boot  RLE Brace Type: CAM Boot  Position Activity Restriction  Other position/activity restrictions: Up w/ assist    Subjective   General  Chart Reviewed: Yes  Patient assessed for rehabilitation services?: Yes  Family / Caregiver Present: No  Subjective  Subjective: Pt resting in bed, pleasant and agreeable to OT eval. Pt reporting feeling \"better\" than yesterday. Social/Functional History  Social/Functional History  Lives With: Spouse  Type of Home: House  Home Layout: One level  Home Access: Stairs to enter with rails  Entrance Stairs - Number of Steps: 3  Bathroom Shower/Tub: Tub/Shower unit  Bathroom Toilet: Standard  Bathroom Equipment: Grab bars in shower, Toilet raiser  Home Equipment: Charles Fryer, Rollator, Walker, rolling (multiple surgical shoes)  Has the patient had two or more falls in the past year or any fall with injury in the past year?: No  Receives Help From: Family  ADL Assistance: Independent  Homemaking Assistance: Needs assistance (spouse does the [de-identified] of household chores)  Homemaking Responsibilities: Yes  Ambulation Assistance: Independent (uses rollator at all times)  Transfer Assistance: Independent  Active : Yes (has ability, spouse has driven since previous surgery)  Occupation: Retired  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, dogs       Objective   Observation/Palpation  Posture: Fair  Observation: CAM Boot donned prior to OOB mobility  Safety Devices  Type of Devices: Bed alarm in place;Call light within reach; Left in bed;Patient at risk for falls;Nurse notified;Gait belt      Balance  Sitting:  (SBA)  Standing:  (CGA)  Functional Mobility   Overall Level of Assistance: Contact-guard assistance (Pt completed functional mobility from bed to door and back with RW, bed to door and back without RW.)  Interventions: Verbal cues; Tactile cues; Safety awareness training (Pt given Min verbal cues for pacing self, pursed lip breathing, upright posture, RW safety, and awareness/assist with lines all to increase safety.)       AROM: Within functional limits  Strength: Generally decreased, functional (BUE ~ 4/5)  Coordination: Within functional limits  Tone: Normal  Sensation: Impaired (Intermittent N/T in R fingers)    ADL  Feeding: Setup  Grooming: Setup;Stand by assistance  UE Bathing: Setup;Stand by assistance  LE Bathing: Setup;Stand by assistance  UE Dressing: Setup;Minimal assistance (to tie/adjust hosp gown sitting on EOB)  LE Dressing: Setup;Minimal assistance (for donning l shoe and R surgical shoe while seated on EOB. Pt requirined increased time/effort to complete d/t SOB with bending)  Toileting: Stand by assistance  Additional Comments: Pt is limited fatigue and generalized weakness. Activity Tolerance  Activity Tolerance: Patient tolerated evaluation without incident      Bed mobility  Supine to Sit: Stand by assistance  Sit to Supine: Stand by assistance  Scooting: Stand by assistance  Bed Mobility Comments: Pt without difficulites throughout bed mobility this date. Pt given Min verbal cues for pacing self and slowing down all to increase safety. Pt denied any dizziness/lightheadedness once seated on EOB. Transfers  Sit to stand: Contact guard assistance  Stand to sit: Contact guard assistance  Transfer Comments: Pt given Min verbal cues for pacing self, pursed lip breathing, controlled stand to sit and slowing down all to increase safety.       Vision  Vision: Impaired (Pt denies any acute visual changes)  Vision Exceptions: Wears glasses at all times  Hearing  Hearing: Within functional limits  Cognition  Overall Cognitive Status: Exceptions  Arousal/Alertness: Appropriate responses to stimuli  Following Commands: Follows multistep commands with repitition  Attention Span: Attends with cues to redirect  Memory: Appears intact  Safety Judgement: Decreased awareness of need for safety  Problem Solving: Decreased awareness of errors;Assistance required to identify errors made;Assistance required to correct errors made  Insights: Decreased awareness of deficits  Initiation: Does not require cues  Sequencing: Does not require cues  Orientation  Overall Orientation Status: Within Functional Limits                  Education Given To: Patient  Education Provided: Role of Therapy;Transfer Training;Equipment;Plan of Care;Energy Conservation; ADL Adaptive Strategies; Fall Prevention Strategies  Education Provided Comments: safety in function, fall prevention/call light use, OT POC, role of OT in acute care, pursed lip breathing, recommendations for contniued therapy  Education Method: Verbal  Barriers to Learning: None  Education Outcome: Verbalized understanding;Demonstrated understanding                      AM-PAC Score        AM-PAC Inpatient Daily Activity Raw Score: 18 (03/05/23 1338)  AM-PAC Inpatient ADL T-Scale Score : 38.66 (03/05/23 1338)  ADL Inpatient CMS 0-100% Score: 46.65 (03/05/23 1338)  ADL Inpatient CMS G-Code Modifier : CK (03/05/23 1338)         Goals  Short Term Goals  Time Frame for Short Term Goals: By discharge, pt to demo  Short Term Goal 1: ADL transfer and functional mobility to Mod I with use of AD as needed. Short Term Goal 2: increased B UE strength by 1/2 grade to assist/I with B UE HEP with use of handouts as needed. Short Term Goal 3: UB/LB ADL to Mod I with use of handouts as needed. Short Term Goal 4: bed mobility to Mod I with use of bedrails as needed. Short Term Goal 5: I with fall prevention education, EC/WS tech, recommendations for discharge/AE, disease specific education with use of handouts. Patient Goals   Patient goals : To go home!        Therapy Time   Individual Concurrent Group Co-treatment   Time In 0835 (Plus 10 min for chart review/RN communication)         Time Out 0857         Minutes 22+ 10 = 32 min           Tx time: JR Parker

## 2023-03-05 NOTE — PLAN OF CARE
Problem: Discharge Planning  Goal: Discharge to home or other facility with appropriate resources  3/5/2023 0406 by Emmy Pollard RN  Outcome: Progressing  Flowsheets (Taken 3/4/2023 1935)  Discharge to home or other facility with appropriate resources:   Identify barriers to discharge with patient and caregiver   Arrange for needed discharge resources and transportation as appropriate   Identify discharge learning needs (meds, wound care, etc)   Refer to discharge planning if patient needs post-hospital services based on physician order or complex needs related to functional status, cognitive ability or social support system  3/4/2023 1656 by Rosio Londono RN  Outcome: Progressing     Problem: Pain  Goal: Verbalizes/displays adequate comfort level or baseline comfort level  3/5/2023 0406 by Emmy Pollard RN  Outcome: Progressing  3/4/2023 1656 by Rosio Londono RN  Outcome: Progressing     Problem: Safety - Adult  Goal: Free from fall injury  3/5/2023 0406 by Emmy Pollard RN  Outcome: Progressing  3/4/2023 1656 by Rosio Londono RN  Outcome: Progressing     Problem: ABCDS Injury Assessment  Goal: Absence of physical injury  3/5/2023 0406 by Emmy Pollard RN  Outcome: Progressing  3/4/2023 1656 by Rosio Londono RN  Outcome: Progressing     Problem: Respiratory - Adult  Goal: Achieves optimal ventilation and oxygenation  3/5/2023 0406 by Emmy Pollard RN  Outcome: Progressing  3/4/2023 1656 by Rosio Londono RN  Outcome: Progressing  Flowsheets (Taken 3/4/2023 0800)  Achieves optimal ventilation and oxygenation: Assess for changes in respiratory status     Problem: Cardiovascular - Adult  Goal: Maintains optimal cardiac output and hemodynamic stability  3/5/2023 0406 by Emmy Pollard RN  Outcome: Progressing  3/4/2023 1656 by Rosio Londono RN  Outcome: Progressing  Goal: Absence of cardiac dysrhythmias or at baseline  3/5/2023 0406 by Emmy Pollard RN  Outcome: Progressing  3/4/2023 1656 by August Escalante CHRISTY Husain  Outcome: Progressing     Problem: Skin/Tissue Integrity - Adult  Goal: Skin integrity remains intact  3/5/2023 0406 by Unique Little RN  Outcome: Progressing  Flowsheets  Taken 3/4/2023 1935 by Unique Little RN  Skin Integrity Remains Intact: Monitor for areas of redness and/or skin breakdown  Taken 3/4/2023 1657 by Darryl Collins RN  Skin Integrity Remains Intact: Monitor for areas of redness and/or skin breakdown  3/4/2023 1656 by Darryl Collins RN  Outcome: Progressing  Goal: Incisions, wounds, or drain sites healing without S/S of infection  3/5/2023 0406 by Unique Little RN  Outcome: Progressing  Flowsheets (Taken 3/4/2023 1935)  Incisions, Wounds, or Drain Sites Healing Without Sign and Symptoms of Infection: TWICE DAILY: Assess and document skin integrity  3/4/2023 1656 by Darryl Collins RN  Outcome: Progressing  Goal: Oral mucous membranes remain intact  3/5/2023 0406 by Unique Little RN  Outcome: Progressing  3/4/2023 1656 by Darryl Collins RN  Outcome: Progressing     Problem: Musculoskeletal - Adult  Goal: Return mobility to safest level of function  3/5/2023 0406 by Unique Little RN  Outcome: Progressing  Flowsheets (Taken 3/4/2023 1935)  Return Mobility to Safest Level of Function:   Assess patient stability and activity tolerance for standing, transferring and ambulating with or without assistive devices   Assist with transfers and ambulation using safe patient handling equipment as needed   Ensure adequate protection for wounds/incisions during mobilization   Obtain physical therapy/occupational therapy consults as needed   Instruct patient/family in ordered activity level  3/4/2023 1656 by Darryl Collins RN  Outcome: Progressing  Goal: Maintain proper alignment of affected body part  3/5/2023 0406 by Unique Little RN  Outcome: Progressing  3/4/2023 1656 by Darryl Collins RN  Outcome: Progressing  Goal: Return ADL status to a safe level of function  3/5/2023 0406 by Unique Little RN  Outcome: Progressing  Flowsheets (Taken 3/4/2023 1935)  Return ADL Status to a Safe Level of Function:   Administer medication as ordered   Assess activities of daily living deficits and provide assistive devices as needed   Obtain physical therapy/occupational therapy consults as needed   Assist and instruct patient to increase activity and self care as tolerated  3/4/2023 1656 by Katherine Meyer RN  Outcome: Progressing     Problem: Genitourinary - Adult  Goal: Absence of urinary retention  3/5/2023 0406 by Sarina Lowery RN  Outcome: Progressing  3/4/2023 1656 by Katherine Meyer RN  Outcome: Progressing  Flowsheets (Taken 3/4/2023 0800)  Absence of urinary retention: Assess patients ability to void and empty bladder     Problem: Gastrointestinal - Adult  Goal: Minimal or absence of nausea and vomiting  3/5/2023 0406 by Sarina Lowery RN  Outcome: Progressing  3/4/2023 1656 by Katherine Meyer RN  Outcome: Progressing  Flowsheets (Taken 3/4/2023 0800)  Minimal or absence of nausea and vomiting: Administer IV fluids as ordered to ensure adequate hydration  Goal: Maintains or returns to baseline bowel function  3/5/2023 0406 by Sarina Lowery RN  Outcome: Progressing  3/4/2023 1656 by Katherine Meyer RN  Outcome: Progressing  Flowsheets (Taken 3/4/2023 0800)  Maintains or returns to baseline bowel function: Assess bowel function  Goal: Maintains adequate nutritional intake  3/5/2023 0406 by Sarina Lowery RN  Outcome: Progressing  3/4/2023 1656 by Katherine Meyer RN  Outcome: Progressing     Problem: Infection - Adult  Goal: Absence of infection at discharge  3/5/2023 0406 by Sarina Lowery RN  Outcome: Progressing  Flowsheets (Taken 3/4/2023 1935)  Absence of infection at discharge:   Instruct and encourage patient and family to use good hand hygiene technique   Monitor all insertion sites i.e., indwelling lines, tubes and drains   Assess and monitor for signs and symptoms of infection  3/4/2023 1656 by Jason Byrd CHRISTY Husain  Outcome: Progressing  Goal: Absence of infection during hospitalization  3/5/2023 0406 by Geovany Salazar RN  Outcome: Progressing  3/4/2023 1656 by Akanksha Jaramillo RN  Outcome: Progressing  Goal: Absence of fever/infection during anticipated neutropenic period  3/5/2023 0406 by Geovany Salazar RN  Outcome: Progressing  3/4/2023 1656 by Akanksha Jaramillo RN  Outcome: Progressing     Problem: Metabolic/Fluid and Electrolytes - Adult  Goal: Electrolytes maintained within normal limits  3/5/2023 0406 by Geovany Salazar RN  Outcome: Progressing  3/4/2023 1656 by Akanksha Jaramillo RN  Outcome: Progressing  Goal: Hemodynamic stability and optimal renal function maintained  3/5/2023 0406 by Geovany Salazar RN  Outcome: Progressing  3/4/2023 1656 by Akanksha Jaramillo RN  Outcome: Progressing  Goal: Glucose maintained within prescribed range  3/5/2023 0406 by Geovany Salazar RN  Outcome: Progressing  3/4/2023 1656 by Akanksha Jaramillo RN  Outcome: Progressing     Problem: Chronic Conditions and Co-morbidities  Goal: Patient's chronic conditions and co-morbidity symptoms are monitored and maintained or improved  3/5/2023 0406 by Geovany Salazar RN  Outcome: Progressing  Flowsheets (Taken 3/4/2023 1935)  Care Plan - Patient's Chronic Conditions and Co-Morbidity Symptoms are Monitored and Maintained or Improved:   Monitor and assess patient's chronic conditions and comorbid symptoms for stability, deterioration, or improvement   Collaborate with multidisciplinary team to address chronic and comorbid conditions and prevent exacerbation or deterioration   Update acute care plan with appropriate goals if chronic or comorbid symptoms are exacerbated and prevent overall improvement and discharge  3/4/2023 1656 by Akanksha Jaramillo RN  Outcome: Progressing     Problem: Hematologic - Adult  Goal: Maintains hematologic stability  3/5/2023 0406 by Geovany Salazar RN  Outcome: Progressing  Flowsheets (Taken 3/4/2023 1935)  Maintains hematologic stability: Assess for signs and symptoms of bleeding or hemorrhage  3/4/2023 1656 by Paola Ast, RN  Outcome: Progressing

## 2023-03-05 NOTE — PROGRESS NOTES
[facility] -   Infectious diseases evaluation  admission date 3/3/2023    reason for consultation:   Right foot infection    Impression :   Current:  Right foot/ankle cellulitis with suspected infected hardware. Diabetes mellitus  Acute on chronic renal failure  Hypertension  Anemia  History of MRSA      Recommendations   Continue IV cefepime  P.o. Zyvox  Surgical intervention for hardware removal planned on 3/7/2023. Follow CBC and renal function    Infection Control Recommendations   [place] Precautions  Contact Isolation       Antimicrobial Stewardship Recommendations   Simplification of therapy  Targeted therapy      History of Present Illness:   Initial history:  Dutch Vasquez is a 78y.o.-year-old male presented to the hospital with worsening right foot pain and swelling for several weeks associated with open ulcer on the lateral aspect of the  foot that probes to bone. Symptoms moderate to severe, worse with movement, no alleviating factors. He also complaining of subjective fever and chills, mild shortness of breath. CT of the right foot was done at [facility] on 2/27/2023 showed lucency around hardware within the calcaneus. The patient had recent surgery done by Dr. Jesse Cannon on 12/27/2022 with hardware in place. History of Charcot foot, chronic renal failure and diabetes mellitus. Initial WBC normal, creatinine 2.89, sedimentation rate 53, C-reactive protein 136    Interval changes  3/5/2023   He is afebrile, still complaining of foot pain, denied fever or chills, denied nausea or vomiting, no other complaints.   WBC 7.6, creatinine 2.76  Patient Vitals for the past 8 hrs:   BP Temp Temp src Pulse Resp SpO2   03/05/23 1117 (!) 164/88 98.6 °F (37 °C) Oral 83 18 94 %   03/05/23 0729 132/64 98.6 °F (37 °C) Oral 80 16 94 %               I have personally reviewed the past medical history, past surgical history, medications, social history, and family history, and I haveupdated the database accordingly. Allergies:   Morphine, Other, and Percocet [oxycodone-acetaminophen]     Review of Systems:     Review of Systems  As per history present illness, other than above 12 system review was negative  Physical Examination :       Physical Exam  Constitutional:       General: He is not in acute distress. HENT:      Head: Normocephalic and atraumatic. Right Ear: External ear normal.      Left Ear: External ear normal.   Eyes:      General: No scleral icterus. Conjunctiva/sclera: Conjunctivae normal.   Cardiovascular:      Rate and Rhythm: Normal rate and regular rhythm. Heart sounds: No murmur heard. Pulmonary:      Effort: Pulmonary effort is normal.      Breath sounds: Normal breath sounds. Abdominal:      General: There is no distension. Palpations: Abdomen is soft. Tenderness: There is no abdominal tenderness. Musculoskeletal:         General: Swelling and tenderness present. Cervical back: Neck supple. No rigidity. Skin:     Comments: Right lateral ankle ulcer with surrounding erythema and warmth, probe to bone. Neurological:      General: No focal deficit present. Mental Status: He is alert and oriented to person, place, and time.    Psychiatric:         Mood and Affect: Mood normal.         Behavior: Behavior normal.       Past Medical History:     Past Medical History:   Diagnosis Date    Abscess of right foot 08/04/2018    Acquired hammer toe deformity of lesser toe of right foot     ALEJANDRO (acute kidney injury) ([facility] [address] 08/05/2018    Cellulitis 04/24/2018    Cellulitis of left foot 04/24/2018    Cellulitis of right foot     and abscess    Charcot foot due to diabetes mellitus ([address] 2014    Right foot     Chest pain at rest 08/04/2018    Chronic multifocal osteomyelitis of right foot (HCC)     CKD (chronic kidney disease)     Diabetic polyneuropathy associated with type 2 diabetes mellitus ([address]     Essential hypertension Fractures, multiple 07/21/2014    Right foot fractures     Hyperlipidemia     Hypertension     Leukocytosis     MRSA (methicillin resistant staph aureus) culture positive 2018    rt foot    Neuropathy     diabetic with charcot affecting the right foot    Pain in right foot     redness and swelling    Pneumonia 2009    Right foot infection     Right foot pain     Tobacco abuse 04/24/2018    Type II or unspecified type diabetes mellitus without mention of complication, not stated as uncontrolled     Vertigo     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 stepped on sharp object. Covered by dressing.     Wound, open     Right posterior -Diabetic Ulcer       Past Surgical  History:     Past Surgical History:   Procedure Laterality Date    ANKLE SURGERY Right 10/12/2022    RIGHT MID-FOOT OSTEOTOMY WITH OF APPLICATION EXTERNAL FIXATOR SERA performed by Kwan Hamilton DPM at [address] [place] [address] Right 11/15/2022    right lower extremity  adjustment of exfix performed by Kwan Hamilton DPM at [facility]      at age 24    ARTHRODESIS Right 12/27/2022    RIGHT SUBTALAR JOINT AND MULTIPLE MID FOOT JOINT FUSIONS WITH SERA AND PRE-OP POP BLOCK performed by Kwan Hamilton DPM at [facility] [address] [facility] Right 12/11/2020    RIGHT HALLUX EXOSTECTOMY AND 3RD DIGIT EXTENSIOR TENOTOMY performed by Meghann Aguilar DPM at [address] [facility] Left 04/24/2018    I&D foreign body removal    FOOT DEBRIDEMENT Right 03/20/2020    RIGHT  FOOT   INCISION AND DRAINAGE WITH BONE BIOPSY performed by Meghann Aguilar DPM at [address] [place] [address] Right 06/08/2021    FOOT DEBRIDEMENT INCISION AND DRAINAGE performed by Meghann Aguilar DPM at [address] [place] [address] Right 09/16/2021    RIGHT FOOT  INCISION AND DRAINAGE   WITH PULSE LAVAGE performed by María Mejias DPM at [place] [address] Right 06/11/2021    RIGHT FOOT FLAP CLOSURE performed by María Mejias DPM at [facility] Right 88/86/2323    APPLICATION EXTERNAL FIXATOR RIGHT FOOT - SERA - Right    FOOT SURGERY Right 12/27/2022    RIGHT SUBTALAR JOINT AND MULTIPLE MID FOOT JOINT FUSIONS WITH SERA AND PRE-OP POP BLOCK (Right: Foot)    FOOT SURGERY Right 12/27/2022    RIGHT FOOT/ANKLE EXTERNAL FIXATOR  REMOVAL performed by Brittany Arrington DPM at [facility] [address] [facility] [address]    IR INS PICC VAD W SQ PORT GREATER THAN 5  10/07/2022    IR INS PICC VAD W SQ PORT GREATER THAN 5 10/7/2022 [facility] SPECIAL PROCEDURES    KNEE ARTHROSCOPY Right 1989    KNEE ARTHROSCOPY Left 1990    KNEE SURGERY Bilateral 1983    arthroscopy    NECK SURGERY  1987    VT I&D BELOW FASCIA FOOT 1 BURSAL SPACE Left 04/24/2018    LEFT FOOT MULTIPLE  INCISIONS  AND DRAINAGE AND REMOVAL FOREIGN BODY  IRENE performed by María Mejias DPM at [facility] OR       Medications:      linezolid  600 mg Oral 2 times per day    amLODIPine  5 mg Oral Daily    aspirin  81 mg Oral Daily    cetirizine  10 mg Oral Nightly    gabapentin  900 mg Oral TID    [Held by provider] glipiZIDE  20 mg Oral BID AC    [Held by provider] alogliptin  12.5 mg Oral Daily    insulin glargine  10 Units SubCUTAneous BID    atorvastatin  20 mg Oral Nightly    sodium chloride flush  5-40 mL IntraVENous 2 times per day    heparin (porcine)  5,000 Units SubCUTAneous 3 times per day    insulin lispro  0-8 Units SubCUTAneous TID WC    insulin lispro  0-4 Units SubCUTAneous Nightly    cefepime  2,000 mg IntraVENous Q12H       Social History:     Social History     Socioeconomic History    Marital status:      Spouse name: Not on file    Number of children: Not on file    Years of education: Not on file    Highest education level: Not on file   Occupational History    Not on file   Tobacco Use    Smoking status: Every Day     Packs/day: 0.50 Types: Cigarettes    Smokeless tobacco: Never   Vaping Use    Vaping Use: Never used   Substance and Sexual Activity    Alcohol use: No    Drug use: Not Currently    Sexual activity: Not Currently   Other Topics Concern    Not on file   Social History Narrative    Not on file     Social Determinants of Health     Financial Resource Strain: Not on file   Food Insecurity: Not on file   Transportation Needs: Not on file   Physical Activity: Not on file   Stress: Not on file   Social Connections: Not on file   Intimate Partner Violence: Not on file   Housing Stability: Not on file       Family History:     Family History   Problem Relation Age of Onset    Diabetes Mother     Cancer Father     Hypertension Maternal Grandmother       Medical Decision Making:   I have independently reviewed/ordered the following labs:    CBC with Differential:   Recent Labs     03/04/23  0539 03/05/23  0557   WBC 8.7 7.6   HGB 8.8* 8.1*   HCT 26.9* 25.4*    255   LYMPHOPCT 25 25   MONOPCT 9 8       BMP:  Recent Labs     03/04/23  0539 03/05/23  0557   * 138   K 4.6 4.3    107   CO2 23 24   BUN 26* 27*   CREATININE 2.74* 2.76*       Hepatic Function Panel: No results for input(s): PROT, LABALBU, BILIDIR, IBILI, BILITOT, ALKPHOS, ALT, AST in the last 72 hours.  No results for input(s): RPR in the last 72 hours.  No results for input(s): HIV in the last 72 hours.  No results for input(s): BC in the last 72 hours.  Lab Results   Component Value Date/Time    CREATININE 2.76 03/05/2023 05:57 AM    GLUCOSE 124 03/05/2023 05:57 AM       Detailed results:        Thank you for allowing us to participate in the care of this patient.Please call with questions.    This note is created with the assistance of a speech recognition program.  While intending to generate adocument that actually reflects the content of the visit, the document can still have some errors including those of syntax and sound a like substitutions which may  escape proof reading. It such instances, actual meaningcan be extrapolated by contextual diversion.     Stevenson Monsivais MD  Office: [phone]  Perfect serve / office [phone]

## 2023-03-05 NOTE — PROGRESS NOTES
Progress Note  Podiatric Medicine and Surgery     Subjective     Chief Complaint: right foot wound, s/p right foot midtarsal and subtalar join arthrodesis (12/27/22)    Interval history:  -Patient seen and examined at bedside.  -Patient reports pain deep within the ankle. -RN states patient has been up and walking on foot constantly. Patient denies.  -VSS. No new complaints. Plans for OR on Tuesday. -Dressings changed to right ankle    HPI:  Laura Parham is a 78 y.o. male seen at [facility] [address] for right foot pain and swelling. Patient is well known to podiatry service and has been following outpatient with Dr. Tu Nolasco since surgery on 12/27/22 to right foot. Patient states he has been compliant with wound care and walking in CAM boot, however noticed worsening pain and redness to right foot, prompting him to get CT at outside facility and presenting to [facility]'s ED. Patient states he has not been on IV antibiotics for a while, does follow Infectious disease who saw him on 3/1/23. Patient denies any constitutional symptoms at this time. Patient is type 2 diabetic, ALEJANDRO on CKD, and has history of Charcot foot. No further pedal complaints at this time. PCP is Hortencia Wasserman MD    ROS:   Review of Systems   Constitutional:  Negative for chills and fever. Respiratory:  Negative for chest tightness and shortness of breath. Cardiovascular:  Negative for chest pain and leg swelling. Gastrointestinal:  Positive for nausea. Negative for abdominal pain, constipation, diarrhea and vomiting. Musculoskeletal:  Positive for arthralgias, joint swelling and myalgias. Negative for gait problem. Skin:  Positive for color change and wound. Neurological:  Negative for weakness and numbness.      Past Medical History   has a past medical history of Abscess of right foot, Acquired hammer toe deformity of lesser toe of right foot, ALEJANDRO (acute kidney injury) ([facility] [address] Cellulitis, Cellulitis of left foot, Cellulitis of right foot, Charcot foot due to diabetes mellitus ([address] Chest pain at rest, Chronic multifocal osteomyelitis of right foot ([address] CKD (chronic kidney disease), Diabetic polyneuropathy associated with type 2 diabetes mellitus ([address] Essential hypertension, Fractures, multiple, Hyperlipidemia, Hypertension, Leukocytosis, MRSA (methicillin resistant staph aureus) culture positive, Neuropathy, Pain in right foot, Pneumonia, Right foot infection, Right foot pain, Tobacco abuse, Type II or unspecified type diabetes mellitus without mention of complication, not stated as uncontrolled, Vertigo, 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 Past Surgical History   has a past surgical history that includes Neck surgery (1987); Appendectomy; knee surgery (Bilateral, 1983); Knee arthroscopy (Right, 1989); Knee arthroscopy (Left, 1990); Foot Debridement (Left, 04/24/2018); pr i&d below fascia foot 1 bursal space (Left, 04/24/2018); Colonoscopy; Foot Debridement (Right, 03/20/2020); arthroplasty (Right, 12/11/2020); Foot Debridement (Right, 06/08/2021); Foot surgery (Right, 06/11/2021); Foot Debridement (Right, 09/16/2021); IR INSERT PICC VAD W SQ PORT >5 YEARS (10/07/2022); Foot surgery (Right, 10/12/2022); Ankle surgery (Right, 10/12/2022); Ankle surgery (Right, 11/15/2022); Foot surgery (Right, 12/27/2022); arthrodesis (Right, 12/27/2022); and Foot surgery (Right, 12/27/2022). Medications  Prior to Admission medications    Medication Sig Start Date End Date Taking?  Authorizing Provider   ketorolac (TORADOL) 10 MG tablet Take 1 tablet by mouth every 6 hours as needed for Pain 12/27/22 1/1/23  David Lees DPM   ketorolac (TORADOL) 10 MG tablet Take 1 tablet by mouth every 6 hours as needed for Pain 12/27/22 12/27/23  David Lees DPM   HYDROcodone-acetaminophen (NORCO)  MG per tablet Take 1 tablet by mouth every 6 hours as needed for Pain. Historical Provider, MD   TRUEPLUS PEN NEEDLES 31G X 8 MM MISC  11/29/21   Historical Provider, MD   mupirocin (BACTROBAN) 2 % ointment Apply topically 2 times daily TO FOOT WOUND. 11/8/21   Historical Provider, MD   amLODIPine (NORVASC) 5 MG tablet Take 5 mg by mouth daily 10/20/21   Historical Provider, MD   LANTUS SOLOSTAR 100 UNIT/ML injection pen Inject 15 Units into the skin nightly  Patient taking differently: Inject 10 Units into the skin 2 times daily 9/17/21   Allegra Bath, APRN - NP   JANUVIA 100 MG tablet Take 100 mg by mouth daily  11/13/20   Historical Provider, MD   Misc. Devices MISC 1 PAIR OF DIABETIC SHOES (1 LEFT/ 1 RIGHT)  1-3 PAIRS OF INSERTS (LEFT/ RIGHT) 3/5/20   Meliton Dunlap DPM   cetirizine (ZYRTEC) 10 MG tablet Take 10 mg by mouth nightly  10/21/19   Historical Provider, MD   simvastatin (ZOCOR) 40 MG tablet Take 40 mg by mouth nightly  11/13/18   Historical Provider, MD   glipiZIDE (GLUCOTROL) 10 MG tablet Take 20 mg by mouth 2 times daily (before meals) Takes 2 tabs (=20mg) BID    Historical Provider, MD   aspirin 81 MG tablet Take 81 mg by mouth daily    Historical Provider, MD   gabapentin (NEURONTIN) 300 MG capsule Take 900 mg by mouth 3 times daily.  Take 3 caps (=900mg) 3 times a day    Historical Provider, MD    Scheduled Meds:   linezolid  600 mg Oral 2 times per day    amLODIPine  5 mg Oral Daily    aspirin  81 mg Oral Daily    cetirizine  10 mg Oral Nightly    gabapentin  900 mg Oral TID    [Held by provider] glipiZIDE  20 mg Oral BID AC    [Held by provider] alogliptin  12.5 mg Oral Daily    insulin glargine  10 Units SubCUTAneous BID    atorvastatin  20 mg Oral Nightly    sodium chloride flush  5-40 mL IntraVENous 2 times per day    heparin (porcine)  5,000 Units SubCUTAneous 3 times per day    insulin lispro  0-8 Units SubCUTAneous TID WC    insulin lispro  0-4 Units SubCUTAneous Nightly    cefepime  2,000 mg IntraVENous Q12H     Continuous Infusions:   dextrose      sodium chloride 75 mL/hr at 23 1146    sodium chloride 12.5 mL/hr at 23 1050     PRN Meds:.fentanNYL, HYDROcodone-acetaminophen, glucose, dextrose bolus **OR** dextrose bolus, glucagon (rDNA), dextrose, sodium chloride flush, sodium chloride, ondansetron **OR** ondansetron, polyethylene glycol    Allergies  is allergic to morphine, other, and percocet [oxycodone-acetaminophen]. Family History  family history includes Cancer in his father; Diabetes in his mother; Hypertension in his maternal grandmother. Social History   reports that he has been smoking cigarettes. He has been smoking an average of .5 packs per day. He has never used smokeless tobacco.   reports no history of alcohol use. reports that he does not currently use drugs. Objective     Vitals:  Patient Vitals for the past 8 hrs:   BP Temp Temp src Pulse Resp SpO2 Weight   23 1117 (!) 164/88 98.6 °F (37 °C) Oral 83 18 94 % --   23 0729 132/64 98.6 °F (37 °C) Oral 80 16 94 % --   23 0511 -- -- -- -- -- -- 234 lb (106.1 kg)       Average, Min, and Max for last 24 hours Vitals:  TEMPERATURE:  Temp  Av.3 °F (36.8 °C)  Min: 97.5 °F (36.4 °C)  Max: 98.6 °F (37 °C)    RESPIRATIONS RANGE: Resp  Av.3  Min: 16  Max: 18    PULSE RANGE: Pulse  Av.5  Min: 80  Max: 92    BLOOD PRESSURE RANGE:  Systolic (35OWX), JVZ:154 , Min:132 , MARIA A:712   ; Diastolic (52WEJ), SRJ:86, Min:64, Max:91      PULSE OXIMETRY RANGE: SpO2  Av.5 %  Min: 91 %  Max: 99 %  I&O:  I/O last 3 completed shifts:   In: 943.6 [I.V.:393.8; IV Piggyback:549.7]  Out: 675 [Urine:675]    CBC:  Recent Labs     23  1532 23  0539 23  0557   WBC 10.6 8.7 7.6   HGB 9.4* 8.8* 8.1*   HCT 29.1* 26.9* 25.4*    263 255   .4*  --  105.7*          BMP:  Recent Labs     23  1532 23  0539 23  0557   * 134* 138   K 4.5 4.6 4.3   CL 97* 102 107   CO2 24 23 24   BUN 28* 26* 27* CREATININE 2.89* 2.74* 2.76*   GLUCOSE 223* 124* 124*   CALCIUM 9.8 9.7 9.5          Coags:  No results for input(s): APTT, PROT, INR in the last 72 hours. Lab Results   Component Value Date    LABA1C 6.9 (H) 10/05/2022     Lab Results   Component Value Date    SEDRATE 53 (H) 03/03/2023     Lab Results   Component Value Date    .7 (H) 03/05/2023         Lower Extremity Physical Exam:  Vascular: DP and PT pulses are palpable, bilaterally. CFT <4 seconds to all digits. Hair growth is absent to the level of the digits. Nonpitting edema to lateral ankle right foot. Neuro: Saph/sural/SP/DP/plantar sensation intact to light touch. Musculoskeletal: Muscle strength is 4/5, decreased ROM , adequate strength to all lower extremity muscle groups. Gross deformity is absent. Dermatologic: Full thickness ulcer #1 located lateral aspect of right ankle and measures approximately 0.5cm x 0.3cm x 0.5 cm. Base is fibrogranular. Periwound skin is hyperkeratotic. Heavy serous drainage with no associated mal odor. Erythema noted to right ankle with associated increase in warmth. Does probe deep, sinus tracks proximal. No fluctuance, crepitus, or induration. Interdigital maceration absent. Clinical Images:          Imaging:   US RETROPERITONEAL COMPLETE   Final Result   Mildly echogenic renal parenchyma suggestive of intrinsic renal parenchymal   disease. No hydronephrosis.              Assessment     Ryan Lowery is a 78 y.o. male with   Cellulitis, right lower extremity  S/p right midtarsal and subtalar foot arthrodesis  Charcot neuroarthropathy, right ankle  Right ankle pain    Principal Problem:    Diabetic foot infection ([address] with VRE  Active Problems:    Type 2 diabetes mellitus with right diabetic foot ulcer ([facility] [address]    Charcot's joint of right foot    Stage 3b chronic kidney disease ([facility] [address]    Hyponatremia    Microcytic anemia    Infection due to vancomycin resistant Enterococcus faecium    Essential hypertension    Neuropathy    Charcot foot due to diabetes mellitus ([facility] [address]    Hyperlipidemia    Acute kidney injury superimposed on chronic kidney disease ([facility] [address]  Resolved Problems:    * No resolved hospital problems. *        Plan     Patient examined and evaluated at bedside   Treatment options discussed in detail with the patient  CT results from outside facility discussed with patient. Findings consistent with possible hardware loosening vs infection to hardware  Patient admitted for IV antibiotics and further management of right foot wound infection  Discussed with patient that due to clinical concern for possible infection, he will benefit from IV antibiotics for a few days. Plan for hardware removal and incision and drainage of wound on Tuesday, 3/6/2023 time TBD, after receiving dose of IV antibiotics. Patient relays understanding  IV abx: P.o. linezolid, cefepime.  Appreciate ID recommendations   Dressing applied to Right foot: 1/2 packing, Betadine, DSD, Ace  WBAT to Right lower extremity  Will discuss with Dr. Wei Castro, [facility]   Podiatric Medicine & Surgery   3/5/2023 at 12:53 PM

## 2023-03-05 NOTE — PROGRESS NOTES
[facility]  Office: [address], DO, Estefaniwayne Brown, DO, Heydi Spicer, DO, Armaananshu Macdonaldtyree Solis, DO, Alvarez Yun MD, Aliya Shi MD, Tatianna Begum MD, Sydnee Ly MD,  Manuel Alexander MD, Sudha Flores MD, Mode Edwards, DO, Nan Funk MD,  Christine Hart MD, Joaquina Whitaker MD, Silvia Spivey DO, Milvia Wong MD, Crista Murillo MD, Ge Hansen DO, Kiet Parra MD, Jeff Corona MD, Alexsander Stewart MD, Paz Giordano MD, Daniel Kang DO, Em Alves MD, Shelley Marie MD, Cristo Dahl, [facility],  Bubba Treviño, CNP, Tiki Preston, CNP, Perley Gilford, CNP,  Meghana Ontiveros, [facility], Nga Rodriguez, CNP, Shelley López, CNP, Lisa Kimble, CNP, Rae Dobson, CNP, Lawyer Fraser, CNP, Giana Devine PA-C, Mert Singh, CNS, Cletis Hashimoto, CNP, Shauna Granda, [facility]    Progress Note    3/5/2023    8:35 AM    Name:   Josephine Gay  MRN:     [id number]     [place]:      [de-identified]   Room:   2017/2017-02   Day:  2  Admit Date:  3/3/2023  2:54 PM    PCP:   Polo Mccain MD  Code Status:  Full Code    Subjective:     C/C:   Chief Complaint   Patient presents with    Wound Infection     Right foot     Interval History Status: not changed. Patient is resting, sitting up in chair eating breakfast.  Denies any chest pain, shortness of breath, nausea or vomiting, fevers or chills or acute complaints. Podiatry planning on hardware removal earlier this week    Brief History: This is a 60-year-old male that presents with right foot wound with concerns for infection. Recent outpatient culture with vancomycin-resistant Enterococcus faecium. He presented to the emergency room with worsening drainage and developed fevers and chills. Overnight he was started on Maxipime.   Recent outpatient cultures were obtained with evidence of VRE with antibiotics will be adjusted accordingly. Review of Systems:     Constitutional:  negative for chills, fevers, sweats  Respiratory:  negative for cough, dyspnea on exertion, shortness of breath, wheezing  Cardiovascular:  negative for chest pain, chest pressure/discomfort, lower extremity edema, palpitations  Gastrointestinal:  negative for abdominal pain, constipation, diarrhea, nausea, vomiting  Neurological:  negative for dizziness, headache    Medications: Allergies:     Allergies   Allergen Reactions    Morphine Itching    Other Other (See Comments)     Other reaction(s): Unknown    Percocet [Oxycodone-Acetaminophen]      Facial swelling       Current Meds:   Scheduled Meds:    linezolid  600 mg Oral 2 times per day    amLODIPine  5 mg Oral Daily    aspirin  81 mg Oral Daily    cetirizine  10 mg Oral Nightly    gabapentin  900 mg Oral TID    [Held by provider] glipiZIDE  20 mg Oral BID AC    [Held by provider] alogliptin  12.5 mg Oral Daily    insulin glargine  10 Units SubCUTAneous BID    mupirocin   Topical BID    atorvastatin  20 mg Oral Nightly    sodium chloride flush  5-40 mL IntraVENous 2 times per day    heparin (porcine)  5,000 Units SubCUTAneous 3 times per day    insulin lispro  0-8 Units SubCUTAneous TID WC    insulin lispro  0-4 Units SubCUTAneous Nightly    cefepime  2,000 mg IntraVENous Q12H     Continuous Infusions:    dextrose      sodium chloride 75 mL/hr at 03/05/23 0758    sodium chloride Stopped (03/05/23 0632)     PRN Meds: fentanNYL, HYDROcodone-acetaminophen, glucose, dextrose bolus **OR** dextrose bolus, glucagon (rDNA), dextrose, sodium chloride flush, sodium chloride, ondansetron **OR** ondansetron, polyethylene glycol    Data:     Past Medical History:   has a past medical history of Abscess of right foot, Acquired hammer toe deformity of lesser toe of right foot, ALEJANDRO (acute kidney injury) ([facility] [address] Cellulitis, Cellulitis of left foot, Cellulitis of right foot, Charcot foot due to diabetes mellitus ([facility] [address] Chest pain at rest, Chronic multifocal osteomyelitis of right foot ([facility] CKD (chronic kidney disease), Diabetic polyneuropathy associated with type 2 diabetes mellitus ([facility] Essential hypertension, Fractures, multiple, Hyperlipidemia, Hypertension, Leukocytosis, MRSA (methicillin resistant staph aureus) culture positive, Neuropathy, Pain in right foot, Pneumonia, Right foot infection, Right foot pain, Tobacco abuse, Type II or unspecified type diabetes mellitus without mention of complication, not stated as uncontrolled, Vertigo, Well controlled type 2 diabetes mellitus with neurological manifestations ([facility] Wound dehiscence, Wound, open, and Wound, open. Social History:   reports that he has been smoking cigarettes. He has been smoking an average of .5 packs per day. He has never used smokeless tobacco. He reports that he does not currently use drugs. He reports that he does not drink alcohol. Family History:   Family History   Problem Relation Age of Onset    Diabetes Mother     Cancer Father     Hypertension Maternal Grandmother        Vitals:  /64   Pulse 80   Temp 98.6 °F (37 °C) (Oral)   Resp 16   Ht 5' 10\" (1.778 m)   Wt 234 lb (106.1 kg)   SpO2 94%   BMI 33.58 kg/m²   Temp (24hrs), Av.3 °F (36.8 °C), Min:97.5 °F (36.4 °C), Max:98.8 °F (37.1 °C)    Recent Labs     23  1633 23  2040 23  0621   POCGLU 182* 218* 111*       I/O (24Hr):     Intake/Output Summary (Last 24 hours) at 3/5/2023 0835  Last data filed at 3/5/2023 0758  Gross per 24 hour   Intake 1503.12 ml   Output 675 ml   Net 828.12 ml       Labs:  Hematology:  Recent Labs     23  1532 23  0539 23  0557   WBC 10.6 8.7 7.6   RBC 3.41* 3.20* 2.98*   HGB 9.4* 8.8* 8.1*   HCT 29.1* 26.9* 25.4*   MCV 85.3 84.1 85.2   MCH 27.6 27.5 27.2   MCHC 32.3 32.7 31.9   RDW 16.0* 15.8* 15.9*    263 255   MPV 8.8 8.9 8.5   SEDRATE 53*  --   --    .4*  --   --      Chemistry:  Recent Labs 03/03/23  1532 03/04/23  0539 03/05/23  0557   * 134* 138   K 4.5 4.6 4.3   CL 97* 102 107   CO2 24 23 24   GLUCOSE 223* 124* 124*   BUN 28* 26* 27*   CREATININE 2.89* 2.74* 2.76*   ANIONGAP 10 9 7*   LABGLOM 23* 25* 25*   CALCIUM 9.8 9.7 9.5     Recent Labs     03/04/23  1633 03/04/23  2040 03/05/23  0621   POCGLU 182* 218* 111*     ABG:No results found for: POCPH, PHART, PH, POCPCO2, AGP4AWL, PCO2, POCPO2, PO2ART, PO2, POCHCO3, OOD5GXB, HCO3, NBEA, PBEA, BEART, BE, THGBART, THB, XRD8UHS, TODP5SVB, S2XAQOXI, O2SAT, FIO2  Lab Results   Component Value Date/Time    SPECIAL 10ML 09/13/2022 06:41 PM     Lab Results   Component Value Date/Time    CULTURE NO GROWTH 5 DAYS 11/15/2022 10:20 PM       Radiology:  US RETROPERITONEAL COMPLETE    Result Date: 3/4/2023  Mildly echogenic renal parenchyma suggestive of intrinsic renal parenchymal disease. No hydronephrosis.        Physical Examination:        General appearance:  alert, cooperative and no distress  Mental Status:  oriented to person, place and time and normal affect  Lungs:  clear to auscultation bilaterally, normal effort  Heart:  regular rate and rhythm, no murmur  Abdomen:  soft, nontender, nondistended, normal bowel sounds, no masses, hepatomegaly, splenomegaly  Extremities:  no edema, redness, tenderness in the calves, right foot dressing in place  Skin:  no gross lesions, rashes, induration    Assessment:        Hospital Problems             Last Modified POA    * (Principal) Diabetic foot infection ([facility] [address] with VRE 3/4/2023 Yes    Type 2 diabetes mellitus with right diabetic foot ulcer ([facility] [address] 3/3/2023 Yes    Charcot's joint of right foot 3/3/2023 Yes    Stage 3b chronic kidney disease ([facility] [address] (Chronic) 3/3/2023 Yes    Hyponatremia 3/3/2023 Yes    Microcytic anemia 3/3/2023 Yes    Infection due to vancomycin resistant Enterococcus faecium 3/4/2023 Yes    Essential hypertension (Chronic) 3/3/2023 Yes    Neuropathy 3/3/2023 Yes    Charcot foot due to diabetes mellitus ([facility] 3/3/2023 Yes    Hyperlipidemia 3/3/2023 Yes    Acute kidney injury superimposed on chronic kidney disease [facility] 3/3/2023 Yes       Plan:        Maxipime and Zyvox as ordered  Insulin scale for glycemic control  Trend labs, correct electrolytes as needed  Avoid nephrotoxic agents  IV hydration  Monitor and control blood pressure  GI and DVT prophylaxis  PT and OT    Corky Metzger,   3/5/2023  8:35 AM

## 2023-03-06 ENCOUNTER — APPOINTMENT (OUTPATIENT)
Dept: CT IMAGING | Age: 68
DRG: 981 | End: 2023-03-06
Payer: MEDICARE

## 2023-03-06 LAB
ABSOLUTE EOS #: 0.4 K/UL (ref 0–0.44)
ABSOLUTE IMMATURE GRANULOCYTE: 0.03 K/UL (ref 0–0.3)
ABSOLUTE LYMPH #: 2.22 K/UL (ref 1.1–3.7)
ABSOLUTE MONO #: 0.67 K/UL (ref 0.1–1.2)
ALBUMIN SERPL-MCNC: 2.7 G/DL (ref 3.5–5.2)
ALP SERPL-CCNC: 124 U/L (ref 40–129)
ALT SERPL-CCNC: 16 U/L (ref 5–41)
AMMONIA PLAS-SCNC: <10 UMOL/L (ref 16–60)
ANION GAP SERPL CALCULATED.3IONS-SCNC: 7 MMOL/L (ref 9–17)
AST SERPL-CCNC: 19 U/L
BASOPHILS # BLD: 1 % (ref 0–2)
BASOPHILS ABSOLUTE: 0.06 K/UL (ref 0–0.2)
BILIRUB DIRECT SERPL-MCNC: <0.1 MG/DL
BILIRUB INDIRECT SERPL-MCNC: ABNORMAL MG/DL (ref 0–1)
BILIRUB SERPL-MCNC: 0.2 MG/DL (ref 0.3–1.2)
BUN SERPL-MCNC: 21 MG/DL (ref 8–23)
BUN/CREAT BLD: 8 (ref 9–20)
CALCIUM SERPL-MCNC: 9.8 MG/DL (ref 8.6–10.4)
CHLORIDE SERPL-SCNC: 105 MMOL/L (ref 98–107)
CO2 SERPL-SCNC: 25 MMOL/L (ref 20–31)
CREAT SERPL-MCNC: 2.5 MG/DL (ref 0.7–1.2)
EOSINOPHILS RELATIVE PERCENT: 5 % (ref 1–4)
FIO2: 21
GFR SERPL CREATININE-BSD FRML MDRD: 28 ML/MIN/1.73M2
GLUCOSE BLD-MCNC: 114 MG/DL (ref 75–110)
GLUCOSE BLD-MCNC: 124 MG/DL (ref 75–110)
GLUCOSE BLD-MCNC: 74 MG/DL (ref 75–110)
GLUCOSE BLD-MCNC: 85 MG/DL (ref 75–110)
GLUCOSE BLD-MCNC: 85 MG/DL (ref 75–110)
GLUCOSE BLD-MCNC: 91 MG/DL (ref 75–110)
GLUCOSE SERPL-MCNC: 93 MG/DL (ref 70–99)
HCT VFR BLD AUTO: 29 % (ref 40.7–50.3)
HGB BLD-MCNC: 9.1 G/DL (ref 13–17)
IMMATURE GRANULOCYTES: 0 %
LYMPHOCYTES # BLD: 29 % (ref 24–43)
MCH RBC QN AUTO: 27.9 PG (ref 25.2–33.5)
MCHC RBC AUTO-ENTMCNC: 31.4 G/DL (ref 28.4–34.8)
MCV RBC AUTO: 89 FL (ref 82.6–102.9)
MONOCYTES # BLD: 9 % (ref 3–12)
NEGATIVE BASE EXCESS, ART: 2 (ref 0–2)
NRBC AUTOMATED: 0 PER 100 WBC
PATIENT TEMP: 37
PDW BLD-RTO: 16.6 % (ref 11.8–14.4)
PLATELET # BLD AUTO: 331 K/UL (ref 138–453)
PMV BLD AUTO: 9.1 FL (ref 8.1–13.5)
POC HCO3: 22.4 MMOL/L (ref 21–28)
POC O2 SATURATION: 94 % (ref 94–98)
POC PCO2: 36.8 MM HG (ref 35–48)
POC PH: 7.39 (ref 7.35–7.45)
POC PO2: 73 MM HG (ref 83–108)
POC TCO2: 22 MMOL/L (ref 22–30)
POTASSIUM SERPL-SCNC: 4.9 MMOL/L (ref 3.7–5.3)
PROT SERPL-MCNC: 7.1 G/DL (ref 6.4–8.3)
RBC # BLD: 3.26 M/UL (ref 4.21–5.77)
RBC # BLD: ABNORMAL 10*6/UL
SEG NEUTROPHILS: 56 % (ref 36–65)
SEGMENTED NEUTROPHILS ABSOLUTE COUNT: 4.23 K/UL (ref 1.5–8.1)
SODIUM SERPL-SCNC: 137 MMOL/L (ref 135–144)
TSH SERPL-ACNC: 3.63 UIU/ML (ref 0.3–5)
WBC # BLD AUTO: 7.6 K/UL (ref 3.5–11.3)

## 2023-03-06 PROCEDURE — 70450 CT HEAD/BRAIN W/O DYE: CPT

## 2023-03-06 PROCEDURE — 36415 COLL VENOUS BLD VENIPUNCTURE: CPT

## 2023-03-06 PROCEDURE — 80048 BASIC METABOLIC PNL TOTAL CA: CPT

## 2023-03-06 PROCEDURE — 82803 BLOOD GASES ANY COMBINATION: CPT

## 2023-03-06 PROCEDURE — 82374 ASSAY BLOOD CARBON DIOXIDE: CPT

## 2023-03-06 PROCEDURE — 6370000000 HC RX 637 (ALT 250 FOR IP): Performed by: NURSE PRACTITIONER

## 2023-03-06 PROCEDURE — 85025 COMPLETE CBC W/AUTO DIFF WBC: CPT

## 2023-03-06 PROCEDURE — 2580000003 HC RX 258: Performed by: INTERNAL MEDICINE

## 2023-03-06 PROCEDURE — 6370000000 HC RX 637 (ALT 250 FOR IP): Performed by: INTERNAL MEDICINE

## 2023-03-06 PROCEDURE — 99232 SBSQ HOSP IP/OBS MODERATE 35: CPT | Performed by: NURSE PRACTITIONER

## 2023-03-06 PROCEDURE — 99232 SBSQ HOSP IP/OBS MODERATE 35: CPT | Performed by: INTERNAL MEDICINE

## 2023-03-06 PROCEDURE — 36600 WITHDRAWAL OF ARTERIAL BLOOD: CPT

## 2023-03-06 PROCEDURE — 2580000003 HC RX 258: Performed by: NURSE PRACTITIONER

## 2023-03-06 PROCEDURE — 86140 C-REACTIVE PROTEIN: CPT

## 2023-03-06 PROCEDURE — 6360000002 HC RX W HCPCS: Performed by: NURSE PRACTITIONER

## 2023-03-06 PROCEDURE — 1200000000 HC SEMI PRIVATE

## 2023-03-06 PROCEDURE — 84443 ASSAY THYROID STIM HORMONE: CPT

## 2023-03-06 PROCEDURE — 80076 HEPATIC FUNCTION PANEL: CPT

## 2023-03-06 PROCEDURE — 82140 ASSAY OF AMMONIA: CPT

## 2023-03-06 PROCEDURE — 82947 ASSAY GLUCOSE BLOOD QUANT: CPT

## 2023-03-06 RX ADMIN — HEPARIN SODIUM 5000 UNITS: 5000 INJECTION INTRAVENOUS; SUBCUTANEOUS at 22:13

## 2023-03-06 RX ADMIN — HEPARIN SODIUM 5000 UNITS: 5000 INJECTION INTRAVENOUS; SUBCUTANEOUS at 06:32

## 2023-03-06 RX ADMIN — CEFEPIME 2000 MG: 2 INJECTION, POWDER, FOR SOLUTION INTRAVENOUS at 06:34

## 2023-03-06 RX ADMIN — INSULIN GLARGINE 10 UNITS: 100 INJECTION, SOLUTION SUBCUTANEOUS at 08:46

## 2023-03-06 RX ADMIN — LINEZOLID 600 MG: 600 TABLET, FILM COATED ORAL at 22:16

## 2023-03-06 RX ADMIN — SODIUM CHLORIDE: 9 INJECTION, SOLUTION INTRAVENOUS at 17:57

## 2023-03-06 RX ADMIN — CEFEPIME 2000 MG: 2 INJECTION, POWDER, FOR SOLUTION INTRAVENOUS at 17:56

## 2023-03-06 RX ADMIN — ASPIRIN 81 MG: 81 TABLET, COATED ORAL at 08:45

## 2023-03-06 RX ADMIN — LINEZOLID 600 MG: 600 TABLET, FILM COATED ORAL at 12:12

## 2023-03-06 RX ADMIN — AMLODIPINE BESYLATE 5 MG: 5 TABLET ORAL at 08:46

## 2023-03-06 RX ADMIN — GABAPENTIN 900 MG: 300 CAPSULE ORAL at 14:47

## 2023-03-06 RX ADMIN — HYDROCODONE BITARTRATE AND ACETAMINOPHEN 1 TABLET: 10; 325 TABLET ORAL at 06:32

## 2023-03-06 RX ADMIN — HEPARIN SODIUM 5000 UNITS: 5000 INJECTION INTRAVENOUS; SUBCUTANEOUS at 14:46

## 2023-03-06 RX ADMIN — SODIUM CHLORIDE: 9 INJECTION, SOLUTION INTRAVENOUS at 01:49

## 2023-03-06 RX ADMIN — GABAPENTIN 900 MG: 300 CAPSULE ORAL at 08:45

## 2023-03-06 ASSESSMENT — ENCOUNTER SYMPTOMS
COLOR CHANGE: 1
CHEST TIGHTNESS: 0
VOMITING: 0
RESPIRATORY NEGATIVE: 1
GASTROINTESTINAL NEGATIVE: 1
DIARRHEA: 0
NAUSEA: 1
CONSTIPATION: 0
SHORTNESS OF BREATH: 0
ABDOMINAL PAIN: 0

## 2023-03-06 NOTE — PROGRESS NOTES
Patient sitting on side of bed, disoriented to place and time and found patient urinated on the floor next to side of bed. Assessed patient, called in-house NP to assess patient.

## 2023-03-06 NOTE — PROGRESS NOTES
Physical Therapy  DATE: 3/6/2023    NAME: Ru Koenig  MRN: [id number]   : 1955    Patient not seen this date for Physical Therapy due to:      [] Cancel by RN or physician due to:    [] Hemodialysis    [] Critical Lab Value Level     [] Blood transfusion in progress    [] Acute or unstable cardiovascular status   _MAP < 55 or more than >115  _HR < 40 or > 130    [] Acute or unstable pulmonary status   -FiO2 > 60%   _RR < 5 or >40    _O2 sats < 85%    [] Strict Bedrest    [] Off Unit for surgery or procedure    [] Off Unit for testing       [] Pending imaging to R/O fracture    [x] Refusal by Patient Pt just back from CT, refusing all activity at this time       [] Other      [] PT being discontinued at this time. Patient independent. No further needs. [] PT being discontinued at this time as the patient has been transferred to hospice care. No further needs.       MILLIE CAMARGO, PTA

## 2023-03-06 NOTE — PROGRESS NOTES
DATE: 3/6/2023    NAME: Ivan Pollard  MRN: [id number]   : 1955    Patient not seen this date for Occupational  Therapy due to:      [] Cancel by RN or physician due to:    [] Hemodialysis    [] Critical Lab Value Level     [] Blood transfusion in progress    [] Acute or unstable cardiovascular status   _MAP < 55 or more than >115  _HR < 40 or > 130    [] Acute or unstable pulmonary status   -FiO2 > 60%   _RR < 5 or >40    _O2 sats < 85%    [] Strict Bedrest    [] Off Unit for surgery or procedure    [] Off Unit for testing       [] Pending imaging to R/O fracture    [x] Refusal by Patient : Pt. Declined treatment stating \"No I don't need that! \" Pt. Educated on reasons for OT and pt. Cont. To decline treatment. OT will cont to follow. [] Other      [] OT being discontinued at this time. Patient independent. No further needs. [] OT being discontinued at this time as the patient has been transferred to hospice care. No further needs.       JOVANY Acosta

## 2023-03-06 NOTE — PROGRESS NOTES
When  and Nixon Farrell RN walked into the room, the patient was found sitting on the side of the bed with his IV pump beeping. Upon investigation, found the IV line removed and the whole IV line placed in the trash bin. Patient didn't have any clue on what he did. When asked questions, he kept answering \"I don't know. \" Patient was taken down for CT scan and then brought back to his room. Since patient was not acting himself, writer left the patient with a bed alarm for safety and with all his belongings closer to him. Patient refused therapy and did not eat his lunch. [employer], NP was notified about the patient being disoriented and charge nurse was also notified.

## 2023-03-06 NOTE — PROGRESS NOTES
[facility]  Office: [address], DO, Lenka Galaviz, DO, Jason Grey, DO, Silvia Solis, DO, Enrique Helm MD, Cirilo Sheikh MD, Ginny Salcedo MD, Luvenia Duverney, MD,  Chad Duggan MD, Diego Whitlock MD, Gary Wu, DO, Tino Jenkins MD,  Nato Mckenna MD, Teresa Kurtz MD, Maribel Armas, DO, Trina Arciniega MD, Bola Barney MD, Alexia Bermudez, DO, Dimitry Pugh MD, Jennifer Jung MD, Alva Richards MD, Alonso Guevara MD, Leida Farrell, DO, Taisha Edwards MD, Naomie Bull MD, Vitaliy Prajapati, CNP,  Hansa Cornejo, CNP, Ronnie Romero, CNP, Alma Saenz, CNP,  Mode Hunt, [facility], Leo David, CNP, Kiley Jurado, CNP, Angela Rand, CNP, Anuja Huerta, CNP, Lili Tolbert, CNP, Unruly Rdz PA-C, Marcos Desir, [facility], Rusty Paiz, CNP, Long Diaz    Progress Note    3/6/2023    9:40 AM    Name:   Ivan Pollard  MRN:     [id number]     [place]:      [de-identified]   Room:   2017/2017-02   Day:  3  Admit Date:  3/3/2023  2:54 PM    PCP:   Eloy Gold MD  Code Status:  Full Code    Subjective:     C/C:   Chief Complaint   Patient presents with    Wound Infection     Right foot     Interval History Status: not changed. Patient is sitting up in bed. Denies any current complaints podiatry planning on hardware removal sometime tomorrow  Brief History:   Per my partner   This is a 72-year-old male that presents with right foot wound with concerns for infection. Recent outpatient culture with vancomycin-resistant Enterococcus faecium. He presented to the emergency room with worsening drainage and developed fevers and chills. Overnight he was started on Maxipime. Recent outpatient cultures were obtained with evidence of VRE with antibiotics will be adjusted accordingly.     Review of Systems:     Constitutional:  negative for chills, fevers, sweats  Respiratory:  negative for cough, dyspnea on exertion, shortness of breath, wheezing  Cardiovascular:  negative for chest pain, chest pressure/discomfort, lower extremity edema, palpitations  Gastrointestinal:  negative for abdominal pain, constipation, diarrhea, nausea, vomiting  Neurological:  negative for dizziness, headache    Medications: Allergies:     Allergies   Allergen Reactions    Morphine Itching    Other Other (See Comments)     Other reaction(s): Unknown    Percocet [Oxycodone-Acetaminophen]      Facial swelling       Current Meds:   Scheduled Meds:    linezolid  600 mg Oral 2 times per day    amLODIPine  5 mg Oral Daily    aspirin  81 mg Oral Daily    cetirizine  10 mg Oral Nightly    gabapentin  900 mg Oral TID    [Held by provider] glipiZIDE  20 mg Oral BID AC    [Held by provider] alogliptin  12.5 mg Oral Daily    insulin glargine  10 Units SubCUTAneous BID    atorvastatin  20 mg Oral Nightly    sodium chloride flush  5-40 mL IntraVENous 2 times per day    heparin (porcine)  5,000 Units SubCUTAneous 3 times per day    insulin lispro  0-8 Units SubCUTAneous TID WC    insulin lispro  0-4 Units SubCUTAneous Nightly    cefepime  2,000 mg IntraVENous Q12H     Continuous Infusions:    dextrose      sodium chloride 75 mL/hr at 03/06/23 1011    sodium chloride Stopped (03/05/23 1313)     PRN Meds: fentanNYL, HYDROcodone-acetaminophen, glucose, dextrose bolus **OR** dextrose bolus, glucagon (rDNA), dextrose, sodium chloride flush, sodium chloride, ondansetron **OR** ondansetron, polyethylene glycol    Data:     Past Medical History:   has a past medical history of Abscess of right foot, Acquired hammer toe deformity of lesser toe of right foot, ALEJANDRO (acute kidney injury) ([facility] [address] Cellulitis, Cellulitis of left foot, Cellulitis of right foot, Charcot foot due to diabetes mellitus ([address] Chest pain at rest, Chronic multifocal osteomyelitis of right foot ([address] CKD (chronic kidney disease), Diabetic polyneuropathy associated with type 2 diabetes mellitus ([facility] [address] Essential hypertension, Fractures, multiple, Hyperlipidemia, Hypertension, Leukocytosis, MRSA (methicillin resistant staph aureus) culture positive, Neuropathy, Pain in right foot, Pneumonia, Right foot infection, Right foot pain, Tobacco abuse, Type II or unspecified type diabetes mellitus without mention of complication, not stated as uncontrolled, Vertigo, 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 Social History:   reports that he has been smoking cigarettes. He has been smoking an average of .5 packs per day. He has never used smokeless tobacco. He reports that he does not currently use drugs. He reports that he does not drink alcohol. Family History:   Family History   Problem Relation Age of Onset    Diabetes Mother     Cancer Father     Hypertension Maternal Grandmother        Vitals:  BP (!) 141/68   Pulse 78   Temp 98.2 °F (36.8 °C) (Oral)   Resp 17   Ht 5' 10\" (1.778 m)   Wt 236 lb (107 kg)   SpO2 95%   BMI 33.86 kg/m²   Temp (24hrs), Av.4 °F (36.9 °C), Min:98.1 °F (36.7 °C), Max:98.6 °F (37 °C)    Recent Labs     23  1639 23  2006 23  0002 23  0613   POCGLU 136* 114* 124* 85       I/O (24Hr):     Intake/Output Summary (Last 24 hours) at 3/6/2023 1011  Last data filed at 3/6/2023 1011  Gross per 24 hour   Intake 2307.82 ml   Output --   Net 2307.82 ml       Labs:  Hematology:  Recent Labs     23  1532 23  0539 23  0557   WBC 10.6 8.7 7.6   RBC 3.41* 3.20* 2.98*   HGB 9.4* 8.8* 8.1*   HCT 29.1* 26.9* 25.4*   MCV 85.3 84.1 85.2   MCH 27.6 27.5 27.2   MCHC 32.3 32.7 31.9   RDW 16.0* 15.8* 15.9*    263 255   MPV 8.8 8.9 8.5   SEDRATE 53*  --   --    .4*  --  105.7*     Chemistry:  Recent Labs     23  0539 23  0557 23  0556   * 138 137   K 4.6 4.3 4.9    107 105 CO2 23 24 25   GLUCOSE 124* 124* 93   BUN 26* 27* 21   CREATININE 2.74* 2.76* 2.50*   ANIONGAP 9 7* 7*   LABGLOM 25* 25* 28*   CALCIUM 9.7 9.5 9.8     Recent Labs     03/05/23  0621 03/05/23  1114 03/05/23  1639 03/05/23  2006 03/06/23  0002 03/06/23  0613   POCGLU 111* 199* 136* 114* 124* 85     ABG:No results found for: POCPH, PHART, PH, POCPCO2, NMV5QXJ, PCO2, POCPO2, PO2ART, PO2, POCHCO3, ZYP7WQN, HCO3, NBEA, PBEA, BEART, BE, THGBART, THB, BEW3AGD, KUKB0RQC, Z4HWPBUA, O2SAT, FIO2  Lab Results   Component Value Date/Time    SPECIAL 10ML 09/13/2022 06:41 PM     Lab Results   Component Value Date/Time    CULTURE NO GROWTH 5 DAYS 11/15/2022 10:20 PM       Radiology:  US RETROPERITONEAL COMPLETE    Result Date: 3/4/2023  Mildly echogenic renal parenchyma suggestive of intrinsic renal parenchymal disease. No hydronephrosis.        Physical Examination:        General appearance:  alert, cooperative and no distress  Mental Status:  oriented to person, place and time and normal affect  Lungs:  clear to auscultation bilaterally, normal effort  Heart:  regular rate and rhythm, no murmur  Abdomen:  soft, nontender, nondistended, normal bowel sounds, no masses, hepatomegaly, splenomegaly  Extremities:  no edema, redness, tenderness in the calves, right foot dressing in place  Skin:  no gross lesions, rashes, induration    Assessment:        Hospital Problems             Last Modified POA    * (Principal) Diabetic foot infection ([facility] [address] with VRE 3/4/2023 Yes    Type 2 diabetes mellitus with right diabetic foot ulcer ([facility] [address] 3/3/2023 Yes    Charcot's joint of right foot 3/3/2023 Yes    Stage 3b chronic kidney disease ([facility] [address] (Chronic) 3/3/2023 Yes    Hyponatremia 3/3/2023 Yes    Microcytic anemia 3/3/2023 Yes    Infection due to vancomycin resistant Enterococcus faecium 3/4/2023 Yes    Essential hypertension (Chronic) 3/3/2023 Yes    Neuropathy 3/3/2023 Yes    Charcot foot due to diabetes mellitus ([facility] 3/3/2023 Yes Hyperlipidemia 3/3/2023 Yes    Acute kidney injury superimposed on chronic kidney disease ([facility] [address] 3/3/2023 Yes       Plan:        Continue Maxipime and Zyvox as ordered  Continue Insulin scale for glycemic control  Monitor labs, correct electrolytes as needed  Avoid nephrotoxic agents  Continue IV hydration  Monitor and control blood pressure  GI and DVT prophylaxis  PT and OT  Continue wound care per podiatry  Plans for surgery tomorrow, time to be determined  Plan discussed with patient and staff    ROCHELLE Harman - NP  3/6/2023  10:11 AM

## 2023-03-06 NOTE — PLAN OF CARE
Problem: Discharge Planning  Goal: Discharge to home or other facility with appropriate resources  3/6/2023 1026 by Sara Flor RN  Outcome: Progressing  3/6/2023 0520 by Fawn Rios RN  Outcome: Progressing     Problem: Pain  Goal: Verbalizes/displays adequate comfort level or baseline comfort level  3/6/2023 1026 by Sara Flor RN  Outcome: Progressing  3/6/2023 0520 by Fawn Rios RN  Outcome: Progressing     Problem: Safety - Adult  Goal: Free from fall injury  3/6/2023 1026 by Sara Flor RN  Outcome: Progressing  3/6/2023 0520 by Fawn Rios RN  Outcome: Progressing     Problem: ABCDS Injury Assessment  Goal: Absence of physical injury  3/6/2023 1026 by Sara Flor RN  Outcome: Progressing  3/6/2023 0520 by Fawn Rios RN  Outcome: Progressing     Problem: Neurosensory - Adult  Goal: Achieves maximal functionality and self care  3/6/2023 1026 by Sara Flor RN  Outcome: Progressing  3/6/2023 0520 by Fawn Rios RN  Outcome: Progressing     Problem: Respiratory - Adult  Goal: Achieves optimal ventilation and oxygenation  3/6/2023 1026 by Sara Flor RN  Outcome: Progressing  3/6/2023 0520 by Fawn Rios RN  Outcome: Progressing     Problem: Cardiovascular - Adult  Goal: Maintains optimal cardiac output and hemodynamic stability  3/6/2023 1026 by Sara Flor RN  Outcome: Progressing  3/6/2023 0520 by Fawn Rios RN  Outcome: Progressing  Goal: Absence of cardiac dysrhythmias or at baseline  3/6/2023 1026 by Sara Flor RN  Outcome: Progressing  3/6/2023 0520 by Fawn Rios RN  Outcome: Progressing     Problem: Skin/Tissue Integrity - Adult  Goal: Skin integrity remains intact  3/6/2023 1026 by Sara Flor RN  Outcome: Progressing  3/6/2023 0520 by Fawn Rios RN  Outcome: Progressing  Goal: Incisions, wounds, or drain sites healing without S/S of infection  3/6/2023 1026 by Sara Flor RN  Outcome: Progressing  3/6/2023 0520 by Charlie Gonzales Natalie Godwin RN  Outcome: Progressing  Goal: Oral mucous membranes remain intact  3/6/2023 1026 by Lali Su RN  Outcome: Progressing  3/6/2023 0520 by Hiro Conner RN  Outcome: Progressing     Problem: Musculoskeletal - Adult  Goal: Return mobility to safest level of function  3/6/2023 1026 by Lali Su RN  Outcome: Progressing  3/6/2023 0520 by Hiro Conner RN  Outcome: Progressing  Goal: Maintain proper alignment of affected body part  3/6/2023 1026 by Lali Su RN  Outcome: Progressing  3/6/2023 0520 by Hiro Conner RN  Outcome: Progressing  Goal: Return ADL status to a safe level of function  3/6/2023 1026 by Lali Su RN  Outcome: Progressing  3/6/2023 0520 by Hiro Conner RN  Outcome: Progressing     Problem: Gastrointestinal - Adult  Goal: Minimal or absence of nausea and vomiting  3/6/2023 1026 by Lali Su RN  Outcome: Progressing  3/6/2023 0520 by Hiro Conner RN  Outcome: Progressing  Goal: Maintains or returns to baseline bowel function  3/6/2023 1026 by Lali Su RN  Outcome: Progressing  3/6/2023 0520 by Hiro Conner RN  Outcome: Progressing  Goal: Maintains adequate nutritional intake  3/6/2023 1026 by Lali Su RN  Outcome: Progressing  3/6/2023 0520 by Hiro Conner RN  Outcome: Progressing     Problem: Genitourinary - Adult  Goal: Absence of urinary retention  3/6/2023 1026 by Lali Su RN  Outcome: Progressing  3/6/2023 0520 by Hiro Conner RN  Outcome: Progressing     Problem: Infection - Adult  Goal: Absence of infection at discharge  3/6/2023 1026 by Lali Su RN  Outcome: Progressing  3/6/2023 0520 by Hiro Conner RN  Outcome: Progressing  Goal: Absence of infection during hospitalization  3/6/2023 1026 by Lali Su RN  Outcome: Progressing  3/6/2023 0520 by Hiro Conner RN  Outcome: Progressing  Goal: Absence of fever/infection during anticipated neutropenic period  3/6/2023 1026 by Lali Su RN  Outcome: Progressing  3/6/2023 0520 by Melissa Doshi RN  Outcome: Progressing     Problem: Metabolic/Fluid and Electrolytes - Adult  Goal: Electrolytes maintained within normal limits  3/6/2023 1026 by Alta Lund RN  Outcome: Progressing  3/6/2023 0520 by Melissa Doshi RN  Outcome: Progressing  Goal: Hemodynamic stability and optimal renal function maintained  3/6/2023 1026 by Alta Lund RN  Outcome: Progressing  3/6/2023 0520 by Melissa Doshi RN  Outcome: Progressing  Goal: Glucose maintained within prescribed range  3/6/2023 1026 by Alta Lund RN  Outcome: Progressing  3/6/2023 0520 by Melissa Doshi RN  Outcome: Progressing     Problem: Hematologic - Adult  Goal: Maintains hematologic stability  3/6/2023 1026 by Alta Lund RN  Outcome: Progressing  3/6/2023 0520 by Melissa Doshi RN  Outcome: Progressing     Problem: Chronic Conditions and Co-morbidities  Goal: Patient's chronic conditions and co-morbidity symptoms are monitored and maintained or improved  3/6/2023 1026 by Alta Lund RN  Outcome: Progressing  3/6/2023 0520 by Melissa Doshi RN  Outcome: Progressing

## 2023-03-06 NOTE — PLAN OF CARE
Problem: Discharge Planning  Goal: Discharge to home or other facility with appropriate resources  Outcome: Progressing     Problem: Pain  Goal: Verbalizes/displays adequate comfort level or baseline comfort level  Outcome: Progressing     Problem: Safety - Adult  Goal: Free from fall injury  Outcome: Progressing     Problem: ABCDS Injury Assessment  Goal: Absence of physical injury  Outcome: Progressing     Problem: Neurosensory - Adult  Goal: Achieves maximal functionality and self care  Outcome: Progressing     Problem: Respiratory - Adult  Goal: Achieves optimal ventilation and oxygenation  Outcome: Progressing     Problem: Cardiovascular - Adult  Goal: Maintains optimal cardiac output and hemodynamic stability  Outcome: Progressing  Goal: Absence of cardiac dysrhythmias or at baseline  Outcome: Progressing     Problem: Skin/Tissue Integrity - Adult  Goal: Skin integrity remains intact  Outcome: Progressing  Goal: Incisions, wounds, or drain sites healing without S/S of infection  Outcome: Progressing  Goal: Oral mucous membranes remain intact  Outcome: Progressing     Problem: Musculoskeletal - Adult  Goal: Return mobility to safest level of function  Outcome: Progressing  Goal: Maintain proper alignment of affected body part  Outcome: Progressing  Goal: Return ADL status to a safe level of function  Outcome: Progressing     Problem: Gastrointestinal - Adult  Goal: Minimal or absence of nausea and vomiting  Outcome: Progressing  Goal: Maintains or returns to baseline bowel function  Outcome: Progressing  Goal: Maintains adequate nutritional intake  Outcome: Progressing     Problem: Genitourinary - Adult  Goal: Absence of urinary retention  Outcome: Progressing     Problem: Infection - Adult  Goal: Absence of infection at discharge  Outcome: Progressing  Goal: Absence of infection during hospitalization  Outcome: Progressing  Goal: Absence of fever/infection during anticipated neutropenic period  Outcome: Progressing     Problem: Metabolic/Fluid and Electrolytes - Adult  Goal: Electrolytes maintained within normal limits  Outcome: Progressing  Goal: Hemodynamic stability and optimal renal function maintained  Outcome: Progressing  Goal: Glucose maintained within prescribed range  Outcome: Progressing     Problem: Hematologic - Adult  Goal: Maintains hematologic stability  Outcome: Progressing     Problem: Chronic Conditions and Co-morbidities  Goal: Patient's chronic conditions and co-morbidity symptoms are monitored and maintained or improved  Outcome: Progressing

## 2023-03-06 NOTE — PROGRESS NOTES
[facility]  Progress Note    Abad Sloan  MRN: [id number]  Date: 3/6/2023  LOS: 3     Reason for F/U :   Right foot infection    Impression :   Charcot foot deformity with history of right midtarsal and subtalar foot arthrodesis  History of osteomyelitis status post antimicrobial therapy in late 2022  Draining wounds right lower extremity with imaging suggesting potential infection involving hardware  Diabetes mellitus type 2 with associated chronic kidney disease  Episodes of confusion/delirium    Recommendations: The patient is on antimicrobial therapy with cefepime and linezolid  The plan is for incision and drainage of the right foot and hardware removal  The patient seems to be having some confusion and I have discussed this with the nurse to relay to the primary team  I will follow his progress and adjust therapy accordingly    Infection Control Recommendations:   Universal precautions    Discharge Planning:   Estimated Length of IV antimicrobials: To be determined  Patient will need Midline Catheter Insertion/ PICC line Insertion: No  Patient will need: Home IV , [place],  SNF,  LTAC: Undetermined  Patient willneed outpatient wound care: No    Medical Decision making / Summary of Stay:   Abad Sloan is a 78y.o.-year-old male presented to the hospital with worsening right foot pain and swelling for several weeks associated with open ulcer on the lateral aspect of the hand foot that probes to bone. Symptoms moderate to severe, worse with movement, no alleviating factors. He also complaining of subjective fever and chills, mild shortness of breath. CT of the right foot was done at [facility] on 2/27/2023 showed lucency around hardware within the calcaneus. The patient had recent surgery done by Dr. Manohar Cornelius on 12/27/2022 with hardware in place. History of Charcot foot, chronic renal failure and diabetes mellitus.   Initial WBC normal, creatinine 2.89, sedimentation rate 53, C-reactive protein 136    Current evaluation:3/6/2023    BP (!) 141/68   Pulse 78   Temp 98.2 °F (36.8 °C) (Oral)   Resp 17   Ht 5' 10\" (1.778 m)   Wt 236 lb (107 kg)   SpO2 95%   BMI 33.86 kg/m²     Temperature Range: Temp: 98.2 °F (36.8 °C) Temp  Av.4 °F (36.9 °C)  Min: 98.1 °F (36.7 °C)  Max: 98.6 °F (37 °C)  The patient is seen and evaluated at bedside he is awake and alert sitting up in the bed. The patient seems somewhat confused as when I asked him what brought him into the hospital he was not really able to tell me. The patient also did have an episode overnight when he woke up and urinated over the floors was disoriented but subsequently became oriented. Review of Systems   Constitutional: Negative. HENT: Negative. Respiratory: Negative. Cardiovascular: Negative. Gastrointestinal: Negative. Genitourinary: Negative. Musculoskeletal: Negative. Skin:  Positive for wound. Neurological: Negative. Psychiatric/Behavioral: Negative. Physical Examination :     Physical Exam  Constitutional:       Appearance: He is well-developed. HENT:      Head: Normocephalic and atraumatic. Cardiovascular:      Rate and Rhythm: Normal rate. Heart sounds: Normal heart sounds. No friction rub. No gallop. Pulmonary:      Effort: Pulmonary effort is normal.      Breath sounds: Normal breath sounds. No wheezing. Abdominal:      General: Bowel sounds are normal.      Palpations: Abdomen is soft. There is no mass. Tenderness: There is no abdominal tenderness. Musculoskeletal:         General: Normal range of motion. Cervical back: Normal range of motion and neck supple. Lymphadenopathy:      Cervical: No cervical adenopathy. Skin:     General: Skin is warm and dry. Comments: Lower extremity ankle/foot in a dressing   Neurological:      Mental Status: He is alert and oriented to person, place, and time.        Laboratory data:   I have independently reviewed the followinglabs:  CBC with Differential:   Recent Labs     03/04/23  0539 03/05/23  0557   WBC 8.7 7.6   HGB 8.8* 8.1*   HCT 26.9* 25.4*    255   LYMPHOPCT 25 25   MONOPCT 9 8     BMP:   Recent Labs     03/05/23  0557 03/06/23  0556    137   K 4.3 4.9    105   CO2 24 25   BUN 27* 21   CREATININE 2.76* 2.50*     Hepatic Function Panel: No results for input(s): PROT, LABALBU, BILIDIR, IBILI, BILITOT, ALKPHOS, ALT, AST in the last 72 hours. No results found for: PROCAL  Lab Results   Component Value Date/Time    .7 03/05/2023 05:57 AM    .4 03/03/2023 03:32 PM    CRP 18.5 11/13/2022 03:36 PM     Lab Results   Component Value Date    SEDRATE 53 (H) 03/03/2023         No results found for: DDIMER  No results found for: FERRITIN  No results found for: LDH  No results found for: FIBRINOGEN    No results found for requested labs within last 30 days. Lab Results   Component Value Date/Time    COVID19 DETECTED 05/26/2021 12:06 PM    COVID19 Not Detected 12/07/2020 03:10 PM    COVID19 Not Detected 08/08/2020 10:02 PM       No results for input(s): [facility] in the last 72 hours.     Imaging Studies:             Cultures:   None    Medications:      linezolid  600 mg Oral 2 times per day    amLODIPine  5 mg Oral Daily    aspirin  81 mg Oral Daily    cetirizine  10 mg Oral Nightly    gabapentin  900 mg Oral TID    [Held by provider] glipiZIDE  20 mg Oral BID AC    [Held by provider] alogliptin  12.5 mg Oral Daily    insulin glargine  10 Units SubCUTAneous BID    atorvastatin  20 mg Oral Nightly    sodium chloride flush  5-40 mL IntraVENous 2 times per day    heparin (porcine)  5,000 Units SubCUTAneous 3 times per day    insulin lispro  0-8 Units SubCUTAneous TID WC    insulin lispro  0-4 Units SubCUTAneous Nightly    cefepime  2,000 mg IntraVENous Q12H       Electronically signed by Tim Davis MD on 3/6/2023 at 11:43 AM      [facility] [address] [place] [address] [employer]  [employer] messaging  OFFICE: [phone]    Thank you for allowing us to participate in the care of this patient. Please call with questions. This note is created with the assistance of a speech recognition program.  While intending to generate a document that actually reflects the content of the visit, the document can still have some errors including those of syntax and sound a like substitutions which may escape proof reading. In such instances, actual meaning can be extrapolated by contextual diversion.

## 2023-03-06 NOTE — PROGRESS NOTES
Progress Note  Podiatric Medicine and Surgery     Subjective     CC: right foot wound, s/p right foot midtarsal and subtalar join arthrodesis (12/27/22)    Interval history:  -Patient seen and examined at bedside.  -Patient continues to relay pain to right foot  -Increased drainage noted to dressings  -RN states patient was up and walking overnight, urinated over floors, was disoriented at the time. Per RN, he is AAOx4 today.  -VSS. No new complaints. HPI:  Radha Noel is a 78 y.o. male seen at [facility] [address] for right foot pain and swelling. Patient is well known to podiatry service and has been following outpatient with Dr. William Sanz since surgery on 12/27/22 to right foot. Patient states he has been compliant with wound care and walking in CAM boot, however noticed worsening pain and redness to right foot, prompting him to get CT at outside facility and presenting to [facility] ED. Patient states he has not been on IV antibiotics for a while, does follow Infectious disease who saw him on 3/1/23. Patient denies any constitutional symptoms at this time. Patient is type 2 diabetic, ALEJANDRO on CKD, and has history of Charcot foot. No further pedal complaints at this time. PCP is Jennifer Donahue MD    ROS:   Review of Systems   Constitutional:  Negative for chills and fever. Respiratory:  Negative for chest tightness and shortness of breath. Cardiovascular:  Negative for chest pain and leg swelling. Gastrointestinal:  Positive for nausea. Negative for abdominal pain, constipation, diarrhea and vomiting. Musculoskeletal:  Positive for arthralgias, joint swelling and myalgias. Negative for gait problem. Skin:  Positive for color change and wound. Neurological:  Negative for weakness and numbness.        Objective     Vitals:  Patient Vitals for the past 8 hrs:   BP Temp Temp src Pulse Resp SpO2 Weight   03/06/23 0403 (!) 141/68 98.6 °F (37 °C) -- 80 18 95 % --   03/06/23 0329 -- -- -- -- -- -- 236 lb (107 kg)   23 0005 (!) 153/87 98.1 °F (36.7 °C) Oral 100 18 96 % --       Average, Min, and Max for last 24 hours Vitals:  TEMPERATURE:  Temp  Av.5 °F (36.9 °C)  Min: 98.1 °F (36.7 °C)  Max: 98.6 °F (37 °C)    RESPIRATIONS RANGE: Resp  Av.3  Min: 16  Max: 18    PULSE RANGE: Pulse  Av.7  Min: 80  Max: 100    BLOOD PRESSURE RANGE:  Systolic (65ONN), QED:160 , Min:122 , WLM:835   ; Diastolic (83UKN), IKJ:93, Min:64, Max:88      PULSE OXIMETRY RANGE: SpO2  Av %  Min: 94 %  Max: 96 %  I&O:  I/O last 3 completed shifts: In: 2728.4 [I.V.:2267.7; IV Piggyback:460.7]  Out: 400 [Urine:400]    CBC:  Recent Labs     23  1532 23  0539 23  0557   WBC 10.6 8.7 7.6   HGB 9.4* 8.8* 8.1*   HCT 29.1* 26.9* 25.4*    263 255   .4*  --  105.7*          BMP:  Recent Labs     23  0539 23  0557 23  0556   * 138 137   K 4.6 4.3 4.9    107 105   CO2 23 24 25   BUN 26* 27* 21   CREATININE 2.74* 2.76* 2.50*   GLUCOSE 124* 124* 93   CALCIUM 9.7 9.5 9.8          Coags:  No results for input(s): APTT, PROT, INR in the last 72 hours. Lab Results   Component Value Date    LABA1C 6.9 (H) 10/05/2022     Lab Results   Component Value Date    SEDRATE 53 (H) 2023     Lab Results   Component Value Date    .7 (H) 2023         Lower Extremity Physical Exam:  Vascular: DP and PT pulses are palpable, bilaterally. CFT <4 seconds to all digits. Hair growth is absent to the level of the digits. Nonpitting edema to lateral ankle right foot. Neuro: Saph/sural/SP/DP/plantar sensation intact to light touch. Musculoskeletal: Muscle strength is 4/5, decreased ROM , adequate strength to all lower extremity muscle groups. Gross deformity is absent. Dermatologic: Full thickness ulcer #1 located lateral aspect of right ankle and measures approximately 0.5cm x 0.3cm x 0.5 cm. Base is fibrogranular. Periwound skin is hyperkeratotic.   Minimal serous drainage with no associated mal odor. Erythema noted to right ankle with associated increase in warmth. Does probe deep, sinus tracks proximal. No fluctuance, crepitus, or induration. Interdigital maceration absent. Clinical Images:  See media panel        Imaging:   US RETROPERITONEAL COMPLETE   Final Result   Mildly echogenic renal parenchyma suggestive of intrinsic renal parenchymal   disease. No hydronephrosis. Assessment     Caryn Mills is a 78 y.o. male with   Cellulitis, right lower extremity  S/p right midtarsal and subtalar foot arthrodesis  Charcot neuroarthropathy, right ankle  Right ankle pain    Principal Problem:    Diabetic foot infection ([address] with VRE  Active Problems:    Type 2 diabetes mellitus with right diabetic foot ulcer ([address]    Charcot's joint of right foot    Stage 3b chronic kidney disease ([address]    Hyponatremia    Microcytic anemia    Infection due to vancomycin resistant Enterococcus faecium    Essential hypertension    Neuropathy    Charcot foot due to diabetes mellitus ([address]    Hyperlipidemia    Acute kidney injury superimposed on chronic kidney disease ([address]  Resolved Problems:    * No resolved hospital problems. *        Plan     Patient examined and evaluated at bedside   Treatment options discussed in detail with the patient  CT results from outside facility discussed with patient. Findings consistent with possible hardware loosening vs infection to hardware  Patient admitted for IV antibiotics and further management of right foot wound infection  Discussed with patient that due to clinical concern for possible infection, he will benefit from IV antibiotics for a few days. Plan for hardware removal and incision and drainage of wound tentatively Tuesday, 3/6/2023 time TBD. Patient relays understanding  Consent signed, witnessed, placed in chart  IV abx: P.o. linezolid, cefepime.  Appreciate ID recommendations   Dressing applied to Right foot: 1/2 packing, Betadine, DSD, Ace  WBAT to Right lower extremity  Will discuss with Dr. Leeroy Vinson, DPM   Podiatric Medicine & Surgery   3/6/2023 at 7:06 AM

## 2023-03-07 LAB
ABSOLUTE EOS #: 0.25 K/UL (ref 0–0.44)
ABSOLUTE IMMATURE GRANULOCYTE: 0.02 K/UL (ref 0–0.3)
ABSOLUTE LYMPH #: 1.91 K/UL (ref 1.1–3.7)
ABSOLUTE MONO #: 0.67 K/UL (ref 0.1–1.2)
ALBUMIN SERPL-MCNC: 2.8 G/DL (ref 3.5–5.2)
ALP SERPL-CCNC: 123 U/L (ref 40–129)
ALT SERPL-CCNC: 18 U/L (ref 5–41)
ANION GAP SERPL CALCULATED.3IONS-SCNC: 10 MMOL/L (ref 9–17)
AST SERPL-CCNC: 25 U/L
BACTERIA: ABNORMAL
BASOPHILS # BLD: 1 % (ref 0–2)
BASOPHILS ABSOLUTE: 0.04 K/UL (ref 0–0.2)
BILIRUB SERPL-MCNC: 0.2 MG/DL (ref 0.3–1.2)
BILIRUBIN URINE: NEGATIVE
BUN SERPL-MCNC: 19 MG/DL (ref 8–23)
BUN/CREAT BLD: 9 (ref 9–20)
CALCIUM SERPL-MCNC: 9.5 MG/DL (ref 8.6–10.4)
CHLORIDE SERPL-SCNC: 107 MMOL/L (ref 98–107)
CO2 SERPL-SCNC: 19 MMOL/L (ref 20–31)
COLOR: YELLOW
CREAT SERPL-MCNC: 2.23 MG/DL (ref 0.7–1.2)
CRP SERPL HS-MCNC: 75.8 MG/L (ref 0–5)
EOSINOPHILS RELATIVE PERCENT: 3 % (ref 1–4)
EPITHELIAL CELLS UA: ABNORMAL /HPF (ref 0–5)
GFR SERPL CREATININE-BSD FRML MDRD: 32 ML/MIN/1.73M2
GLUCOSE BLD-MCNC: 107 MG/DL (ref 75–110)
GLUCOSE BLD-MCNC: 122 MG/DL (ref 75–110)
GLUCOSE BLD-MCNC: 135 MG/DL (ref 75–110)
GLUCOSE BLD-MCNC: 170 MG/DL (ref 75–110)
GLUCOSE BLD-MCNC: 63 MG/DL (ref 75–110)
GLUCOSE BLD-MCNC: 84 MG/DL (ref 75–110)
GLUCOSE SERPL-MCNC: 98 MG/DL (ref 70–99)
GLUCOSE UR STRIP.AUTO-MCNC: ABNORMAL MG/DL
HCT VFR BLD AUTO: 24.8 % (ref 40.7–50.3)
HGB BLD-MCNC: 8.4 G/DL (ref 13–17)
IMMATURE GRANULOCYTES: 0 %
KETONES UR STRIP.AUTO-MCNC: NEGATIVE MG/DL
LEUKOCYTE ESTERASE UR QL STRIP.AUTO: NEGATIVE
LYMPHOCYTES # BLD: 26 % (ref 24–43)
MCH RBC QN AUTO: 28.1 PG (ref 25.2–33.5)
MCHC RBC AUTO-ENTMCNC: 33.9 G/DL (ref 28.4–34.8)
MCV RBC AUTO: 82.9 FL (ref 82.6–102.9)
MONOCYTES # BLD: 9 % (ref 3–12)
NITRITE UR QL STRIP.AUTO: NEGATIVE
NRBC AUTOMATED: 0 PER 100 WBC
P E INTERPRETATION, U: NORMAL
PATHOLOGIST: NORMAL
PDW BLD-RTO: 15.7 % (ref 11.8–14.4)
PLATELET # BLD AUTO: 282 K/UL (ref 138–453)
PMV BLD AUTO: 8.7 FL (ref 8.1–13.5)
POTASSIUM SERPL-SCNC: 4.1 MMOL/L (ref 3.7–5.3)
PROT SERPL-MCNC: 7 G/DL (ref 6.4–8.3)
PROT UR STRIP.AUTO-MCNC: 6 MG/DL (ref 5–8)
PROT UR STRIP.AUTO-MCNC: ABNORMAL MG/DL
RBC # BLD: 2.99 M/UL (ref 4.21–5.77)
RBC # BLD: ABNORMAL 10*6/UL
RBC CLUMPS #/AREA URNS AUTO: ABNORMAL /HPF (ref 0–2)
SEG NEUTROPHILS: 61 % (ref 36–65)
SEGMENTED NEUTROPHILS ABSOLUTE COUNT: 4.55 K/UL (ref 1.5–8.1)
SODIUM SERPL-SCNC: 136 MMOL/L (ref 135–144)
SPECIFIC GRAVITY UA: 1.02 (ref 1–1.03)
SPECIMEN TYPE: NORMAL
TURBIDITY: CLEAR
URINE HGB: ABNORMAL
URINE TOTAL PROTEIN: 384 MG/DL
UROBILINOGEN, URINE: NORMAL
WBC # BLD AUTO: 7.4 K/UL (ref 3.5–11.3)
WBC UA: ABNORMAL /HPF (ref 0–5)

## 2023-03-07 PROCEDURE — 2580000003 HC RX 258: Performed by: NURSE PRACTITIONER

## 2023-03-07 PROCEDURE — 51798 US URINE CAPACITY MEASURE: CPT

## 2023-03-07 PROCEDURE — 99222 1ST HOSP IP/OBS MODERATE 55: CPT | Performed by: PSYCHIATRY & NEUROLOGY

## 2023-03-07 PROCEDURE — 99233 SBSQ HOSP IP/OBS HIGH 50: CPT | Performed by: INTERNAL MEDICINE

## 2023-03-07 PROCEDURE — 2500000003 HC RX 250 WO HCPCS: Performed by: NURSE PRACTITIONER

## 2023-03-07 PROCEDURE — 6370000000 HC RX 637 (ALT 250 FOR IP): Performed by: NURSE PRACTITIONER

## 2023-03-07 PROCEDURE — 87040 BLOOD CULTURE FOR BACTERIA: CPT

## 2023-03-07 PROCEDURE — 1200000000 HC SEMI PRIVATE

## 2023-03-07 PROCEDURE — 6360000002 HC RX W HCPCS: Performed by: NURSE PRACTITIONER

## 2023-03-07 PROCEDURE — 51701 INSERT BLADDER CATHETER: CPT

## 2023-03-07 PROCEDURE — 36415 COLL VENOUS BLD VENIPUNCTURE: CPT

## 2023-03-07 PROCEDURE — 85025 COMPLETE CBC W/AUTO DIFF WBC: CPT

## 2023-03-07 PROCEDURE — 80053 COMPREHEN METABOLIC PANEL: CPT

## 2023-03-07 PROCEDURE — 82947 ASSAY GLUCOSE BLOOD QUANT: CPT

## 2023-03-07 PROCEDURE — 99232 SBSQ HOSP IP/OBS MODERATE 35: CPT | Performed by: INTERNAL MEDICINE

## 2023-03-07 PROCEDURE — 81001 URINALYSIS AUTO W/SCOPE: CPT

## 2023-03-07 RX ORDER — LORAZEPAM 2 MG/ML
1 INJECTION INTRAMUSCULAR ONCE
Status: COMPLETED | OUTPATIENT
Start: 2023-03-07 | End: 2023-03-07

## 2023-03-07 RX ORDER — DEXTROSE AND SODIUM CHLORIDE 5; .9 G/100ML; G/100ML
INJECTION, SOLUTION INTRAVENOUS CONTINUOUS
Status: DISCONTINUED | OUTPATIENT
Start: 2023-03-07 | End: 2023-03-09

## 2023-03-07 RX ADMIN — CETIRIZINE HYDROCHLORIDE 10 MG: 10 TABLET, FILM COATED ORAL at 20:22

## 2023-03-07 RX ADMIN — CEFEPIME 2000 MG: 2 INJECTION, POWDER, FOR SOLUTION INTRAVENOUS at 06:47

## 2023-03-07 RX ADMIN — GABAPENTIN 900 MG: 300 CAPSULE ORAL at 20:22

## 2023-03-07 RX ADMIN — HEPARIN SODIUM 5000 UNITS: 5000 INJECTION INTRAVENOUS; SUBCUTANEOUS at 14:35

## 2023-03-07 RX ADMIN — LORAZEPAM 1 MG: 2 INJECTION INTRAMUSCULAR at 18:16

## 2023-03-07 RX ADMIN — AMLODIPINE BESYLATE 5 MG: 5 TABLET ORAL at 09:20

## 2023-03-07 RX ADMIN — GABAPENTIN 900 MG: 300 CAPSULE ORAL at 09:20

## 2023-03-07 RX ADMIN — SODIUM CHLORIDE, PRESERVATIVE FREE 10 ML: 5 INJECTION INTRAVENOUS at 18:15

## 2023-03-07 RX ADMIN — ATORVASTATIN CALCIUM 20 MG: 20 TABLET, FILM COATED ORAL at 20:23

## 2023-03-07 RX ADMIN — DEXTROSE AND SODIUM CHLORIDE: 5; 900 INJECTION, SOLUTION INTRAVENOUS at 01:16

## 2023-03-07 RX ADMIN — HEPARIN SODIUM 5000 UNITS: 5000 INJECTION INTRAVENOUS; SUBCUTANEOUS at 20:23

## 2023-03-07 RX ADMIN — GLUCAGON HYDROCHLORIDE 1 MG: KIT at 01:13

## 2023-03-07 RX ADMIN — DEXTROSE AND SODIUM CHLORIDE: 5; 900 INJECTION, SOLUTION INTRAVENOUS at 14:36

## 2023-03-07 ASSESSMENT — ENCOUNTER SYMPTOMS
VOMITING: 0
ABDOMINAL PAIN: 0
CHEST TIGHTNESS: 0
COLOR CHANGE: 1
DIARRHEA: 0
CONSTIPATION: 0
SHORTNESS OF BREATH: 0
NAUSEA: 1

## 2023-03-07 NOTE — PROGRESS NOTES
Patient continues to be confused and refusing to eat any food. Hasn't eaten anything since lunch time yesterday. MRI to be done tomorrow as machine is down.

## 2023-03-07 NOTE — PROGRESS NOTES
Physical Therapy  DATE: 3/7/2023    NAME: Lionel Sifuentes  MRN: [id number]   : 1955    Patient not seen this date for Physical Therapy due to:      [] Cancel by RN or physician due to:    [] Hemodialysis    [] Critical Lab Value Level     [] Blood transfusion in progress    [] Acute or unstable cardiovascular status   _MAP < 55 or more than >115  _HR < 40 or > 130    [] Acute or unstable pulmonary status   -FiO2 > 60%   _RR < 5 or >40    _O2 sats < 85%    [] Strict Bedrest    [] Off Unit for surgery or procedure    [] Off Unit for testing       [] Pending imaging to R/O fracture    [] Refusal by Patient      [x] Other Pt confused not appropriate for PT at this time will attempt when mentation is better      [] PT being discontinued at this time. Patient independent. No further needs. [] PT being discontinued at this time as the patient has been transferred to hospice care. No further needs.       MILLIE CAMARGO, PTA

## 2023-03-07 NOTE — PLAN OF CARE
Problem: Discharge Planning  Goal: Discharge to home or other facility with appropriate resources  Outcome: Progressing  Flowsheets (Taken 3/7/2023 0750)  Discharge to home or other facility with appropriate resources:   Identify barriers to discharge with patient and caregiver   Arrange for needed discharge resources and transportation as appropriate   Identify discharge learning needs (meds, wound care, etc)     Problem: Pain  Goal: Verbalizes/displays adequate comfort level or baseline comfort level  Outcome: Progressing     Problem: Safety - Adult  Goal: Free from fall injury  Outcome: Progressing     Problem: ABCDS Injury Assessment  Goal: Absence of physical injury  Outcome: Progressing     Problem: Neurosensory - Adult  Goal: Achieves maximal functionality and self care  Outcome: Progressing  Flowsheets (Taken 3/7/2023 0750)  Achieves maximal functionality and self care:   Monitor swallowing and airway patency with patient fatigue and changes in neurological status   Encourage and assist patient to increase activity and self care with guidance from physical therapy/occupational therapy     Problem: Respiratory - Adult  Goal: Achieves optimal ventilation and oxygenation  Outcome: Progressing     Problem: Cardiovascular - Adult  Goal: Maintains optimal cardiac output and hemodynamic stability  Outcome: Progressing  Flowsheets (Taken 3/7/2023 0750)  Maintains optimal cardiac output and hemodynamic stability:   Monitor blood pressure and heart rate   Monitor urine output and notify Licensed Independent Practitioner for values outside of normal range   Assess for signs of decreased cardiac output  Goal: Absence of cardiac dysrhythmias or at baseline  Outcome: Progressing  Flowsheets (Taken 3/7/2023 0750)  Absence of cardiac dysrhythmias or at baseline: Monitor cardiac rate and rhythm     Problem: Skin/Tissue Integrity - Adult  Goal: Skin integrity remains intact  Outcome: Progressing  Flowsheets (Taken 3/7/2023 3456)  Skin Integrity Remains Intact:   Monitor for areas of redness and/or skin breakdown   Assess vascular access sites hourly  Goal: Incisions, wounds, or drain sites healing without S/S of infection  Outcome: Progressing  Flowsheets (Taken 3/7/2023 0750)  Incisions, Wounds, or Drain Sites Healing Without Sign and Symptoms of Infection:   ADMISSION and DAILY: Assess and document risk factors for pressure ulcer development   Implement wound care per orders   Initiate isolation precautions as appropriate  Goal: Oral mucous membranes remain intact  Outcome: Progressing  Flowsheets (Taken 3/7/2023 0750)  Oral Mucous Membranes Remain Intact:   Assess oral mucosa and hygiene practices   Implement preventative oral hygiene regimen     Problem: Musculoskeletal - Adult  Goal: Return mobility to safest level of function  Outcome: Progressing  Goal: Maintain proper alignment of affected body part  Outcome: Progressing  Goal: Return ADL status to a safe level of function  Outcome: Progressing     Problem: Gastrointestinal - Adult  Goal: Minimal or absence of nausea and vomiting  Outcome: Progressing  Flowsheets (Taken 3/7/2023 0750)  Minimal or absence of nausea and vomiting:   Administer IV fluids as ordered to ensure adequate hydration   Provide nonpharmacologic comfort measures as appropriate  Goal: Maintains or returns to baseline bowel function  Outcome: Progressing  Flowsheets (Taken 3/7/2023 0750)  Maintains or returns to baseline bowel function:   Assess bowel function   Encourage oral fluids to ensure adequate hydration   Administer IV fluids as ordered to ensure adequate hydration  Goal: Maintains adequate nutritional intake  Outcome: Progressing  Flowsheets (Taken 3/7/2023 0750)  Maintains adequate nutritional intake:   Monitor percentage of each meal consumed   Identify factors contributing to decreased intake, treat as appropriate     Problem: Genitourinary - Adult  Goal: Absence of urinary retention  Outcome: Progressing  Flowsheets (Taken 3/7/2023 0750)  Absence of urinary retention:   Assess patient’s ability to void and empty bladder   Monitor intake/output and perform bladder scan as needed     Problem: Infection - Adult  Goal: Absence of infection at discharge  Outcome: Progressing  Flowsheets (Taken 3/7/2023 0750)  Absence of infection at discharge:   Assess and monitor for signs and symptoms of infection   Monitor lab/diagnostic results   Monitor all insertion sites i.e., indwelling lines, tubes and drains   Administer medications as ordered   Instruct and encourage patient and family to use good hand hygiene technique  Goal: Absence of infection during hospitalization  Outcome: Progressing  Flowsheets (Taken 3/7/2023 0750)  Absence of infection during hospitalization:   Assess and monitor for signs and symptoms of infection   Monitor lab/diagnostic results   Monitor all insertion sites i.e., indwelling lines, tubes and drains   Instruct and encourage patient and family to use good hand hygiene technique  Goal: Absence of fever/infection during anticipated neutropenic period  Outcome: Progressing  Flowsheets (Taken 3/7/2023 0750)  Absence of fever/infection during anticipated neutropenic period: Monitor white blood cell count     Problem: Metabolic/Fluid and Electrolytes - Adult  Goal: Electrolytes maintained within normal limits  Outcome: Progressing  Flowsheets (Taken 3/7/2023 0750)  Electrolytes maintained within normal limits:   Monitor labs and assess patient for signs and symptoms of electrolyte imbalances   Instruct patient on fluid and nutrition restrictions as appropriate  Goal: Hemodynamic stability and optimal renal function maintained  Outcome: Progressing  Flowsheets (Taken 3/7/2023 0750)  Hemodynamic stability and optimal renal function maintained:   Monitor labs and assess for signs and symptoms of volume excess or deficit   Monitor intake, output and patient weight   Monitor urine specific gravity,  serum osmolarity and serum sodium as indicated or ordered   Encourage oral intake as appropriate   Monitor response to interventions for patient's volume status, including labs, urine output, blood pressure (other measures as available)  Goal: Glucose maintained within prescribed range  Outcome: Progressing     Problem: Hematologic - Adult  Goal: Maintains hematologic stability  Outcome: Progressing  Flowsheets (Taken 3/7/2023 0750)  Maintains hematologic stability: Assess for signs and symptoms of bleeding or hemorrhage     Problem: Chronic Conditions and Co-morbidities  Goal: Patient's chronic conditions and co-morbidity symptoms are monitored and maintained or improved  Outcome: Progressing  Flowsheets (Taken 3/7/2023 0750)  Care Plan - Patient's Chronic Conditions and Co-Morbidity Symptoms are Monitored and Maintained or Improved:   Monitor and assess patient's chronic conditions and comorbid symptoms for stability, deterioration, or improvement   Collaborate with multidisciplinary team to address chronic and comorbid conditions and prevent exacerbation or deterioration   Update acute care plan with appropriate goals if chronic or comorbid symptoms are exacerbated and prevent overall improvement and discharge

## 2023-03-07 NOTE — PLAN OF CARE
Problem: Discharge Planning  Goal: Discharge to home or other facility with appropriate resources  3/7/2023 1536 by Melo Albert RN  Outcome: Progressing  3/7/2023 1105 by Reji Ridley RN  Outcome: Progressing  Flowsheets (Taken 3/7/2023 0750 by Melo Albert RN)  Discharge to home or other facility with appropriate resources:   Identify barriers to discharge with patient and caregiver   Arrange for needed discharge resources and transportation as appropriate   Identify discharge learning needs (meds, wound care, etc)   Refer to discharge planning if patient needs post-hospital services based on physician order or complex needs related to functional status, cognitive ability or social support system     Problem: Pain  Goal: Verbalizes/displays adequate comfort level or baseline comfort level  3/7/2023 1536 by Melo Albert RN  Outcome: Progressing  3/7/2023 1105 by Reji Ridley RN  Outcome: Progressing     Problem: Safety - Adult  Goal: Free from fall injury  3/7/2023 1536 by Melo Albert RN  Outcome: Progressing  3/7/2023 1105 by Reji Ridley RN  Outcome: Progressing     Problem: ABCDS Injury Assessment  Goal: Absence of physical injury  3/7/2023 1536 by Melo Albert RN  Outcome: Progressing  3/7/2023 1105 by Reji Ridley RN  Outcome: Progressing     Problem: Neurosensory - Adult  Goal: Achieves maximal functionality and self care  3/7/2023 1536 by Melo Albert RN  Outcome: Progressing  3/7/2023 1105 by Reji Ridley RN  Outcome: Progressing  Flowsheets (Taken 3/7/2023 0750)  Achieves maximal functionality and self care:   Monitor swallowing and airway patency with patient fatigue and changes in neurological status   Encourage and assist patient to increase activity and self care with guidance from physical therapy/occupational therapy     Problem: Respiratory - Adult  Goal: Achieves optimal ventilation and oxygenation  3/7/2023 1536 by Melo Albert RN  Outcome: Progressing  3/7/2023 1105 by Wei Montes RN  Outcome: Progressing     Problem: Cardiovascular - Adult  Goal: Maintains optimal cardiac output and hemodynamic stability  3/7/2023 1536 by Carey Menjivar RN  Outcome: Progressing  3/7/2023 1105 by Wei Montes RN  Outcome: Progressing  Flowsheets (Taken 3/7/2023 0750)  Maintains optimal cardiac output and hemodynamic stability:   Monitor blood pressure and heart rate   Monitor urine output and notify Licensed Independent Practitioner for values outside of normal range   Assess for signs of decreased cardiac output  Goal: Absence of cardiac dysrhythmias or at baseline  3/7/2023 1536 by Carey Menjivar RN  Outcome: Progressing  3/7/2023 1105 by Wei Montes RN  Outcome: Progressing  Flowsheets (Taken 3/7/2023 0750)  Absence of cardiac dysrhythmias or at baseline: Monitor cardiac rate and rhythm     Problem: Skin/Tissue Integrity - Adult  Goal: Skin integrity remains intact  3/7/2023 1536 by Carey Menjivar RN  Outcome: Progressing  Flowsheets (Taken 3/7/2023 1107 by Wei Montes RN)  Skin Integrity Remains Intact: Monitor for areas of redness and/or skin breakdown  3/7/2023 1105 by Wei Montes RN  Outcome: Progressing  Flowsheets (Taken 3/7/2023 0750)  Skin Integrity Remains Intact:   Monitor for areas of redness and/or skin breakdown   Assess vascular access sites hourly  Goal: Incisions, wounds, or drain sites healing without S/S of infection  3/7/2023 1536 by Carey Menjivar RN  Outcome: Progressing  Flowsheets (Taken 3/7/2023 1107 by Wei Montes RN)  Incisions, Wounds, or Drain Sites Healing Without Sign and Symptoms of Infection:   ADMISSION and DAILY: Assess and document risk factors for pressure ulcer development   TWICE DAILY: Assess and document dressing/incision, wound bed, drain sites and surrounding tissue   Implement wound care per orders  3/7/2023 1105 by Wei Montes RN  Outcome: Progressing  Flowsheets (Taken 3/7/2023 0750 by Carey Menjivar RN)  Incisions, Wounds, or Drain Sites Healing Without Sign and Symptoms of Infection:   ADMISSION and DAILY: Assess and document risk factors for pressure ulcer development   Implement wound care per orders   Initiate isolation precautions as appropriate   TWICE DAILY: Assess and document skin integrity  Goal: Oral mucous membranes remain intact  3/7/2023 1536 by Eleuterio Mcfarland RN  Outcome: Progressing  3/7/2023 1105 by Vilma Snyder RN  Outcome: Progressing  Flowsheets (Taken 3/7/2023 0750)  Oral Mucous Membranes Remain Intact:   Assess oral mucosa and hygiene practices   Implement preventative oral hygiene regimen     Problem: Musculoskeletal - Adult  Goal: Return mobility to safest level of function  3/7/2023 1536 by Eleuterio Mcfarland RN  Outcome: Progressing  3/7/2023 1105 by Vilma Snyder RN  Outcome: Progressing  Flowsheets (Taken 3/7/2023 0750 by Eleuetrio Mcfarland RN)  Return Mobility to Safest Level of Function:   Assess patient stability and activity tolerance for standing, transferring and ambulating with or without assistive devices   Assist with transfers and ambulation using safe patient handling equipment as needed   Ensure adequate protection for wounds/incisions during mobilization   Instruct patient/family in ordered activity level  Goal: Maintain proper alignment of affected body part  3/7/2023 1536 by Eleuterio Mcfarland RN  Outcome: Progressing  3/7/2023 1105 by Vilma Snyder RN  Outcome: Progressing  Flowsheets (Taken 3/7/2023 0750 by Eleuterio Mcfarland RN)  Maintain proper alignment of affected body part:   Support and protect limb and body alignment per provider's orders   Instruct and reinforce with patient and family use of appropriate assistive device and precautions (e.g. spinal or hip dislocation precautions)  Goal: Return ADL status to a safe level of function  3/7/2023 1536 by Eleuterio Mcfarland RN  Outcome: Progressing  3/7/2023 1105 by Vilma Snyder RN  Outcome: Progressing  Flowsheets (Taken 3/7/2023 0750 by Eleuterio Mcfarland RN)  Return ADL Status to a Safe Level of Function:   Administer medication as ordered   Assess activities of daily living deficits and provide assistive devices as needed   Assist and instruct patient to increase activity and self care as tolerated     Problem: Gastrointestinal - Adult  Goal: Minimal or absence of nausea and vomiting  3/7/2023 1536 by Rosa Gutierrez RN  Outcome: Progressing  3/7/2023 1105 by Delia Fair RN  Outcome: Progressing  Flowsheets (Taken 3/7/2023 0750)  Minimal or absence of nausea and vomiting:   Administer IV fluids as ordered to ensure adequate hydration   Provide nonpharmacologic comfort measures as appropriate  Goal: Maintains or returns to baseline bowel function  3/7/2023 1536 by Rosa Gutierrez RN  Outcome: Progressing  3/7/2023 1105 by Delia Fair RN  Outcome: Progressing  Flowsheets (Taken 3/7/2023 0750)  Maintains or returns to baseline bowel function:   Assess bowel function   Encourage oral fluids to ensure adequate hydration   Administer IV fluids as ordered to ensure adequate hydration  Goal: Maintains adequate nutritional intake  3/7/2023 1536 by Rosa Gutierrez RN  Outcome: Progressing  3/7/2023 1105 by Delia Fair RN  Outcome: Progressing  Flowsheets (Taken 3/7/2023 0750)  Maintains adequate nutritional intake:   Monitor percentage of each meal consumed   Identify factors contributing to decreased intake, treat as appropriate     Problem: Genitourinary - Adult  Goal: Absence of urinary retention  3/7/2023 1536 by Rosa Gutierrez RN  Outcome: Progressing  3/7/2023 1105 by Delia Fair RN  Outcome: Progressing  Flowsheets (Taken 3/7/2023 0750)  Absence of urinary retention:   Assess patients ability to void and empty bladder   Monitor intake/output and perform bladder scan as needed     Problem: Infection - Adult  Goal: Absence of infection at discharge  3/7/2023 1536 by Rosa Gutierrez RN  Outcome: Progressing  3/7/2023 1105 by Delia Fair RN  Outcome: Progressing  Flowsheets (Taken 3/7/2023 0750)  Absence of infection at discharge:   Assess and monitor for signs and symptoms of infection   Monitor lab/diagnostic results   Monitor all insertion sites i.e., indwelling lines, tubes and drains   Administer medications as ordered   Instruct and encourage patient and family to use good hand hygiene technique  Goal: Absence of infection during hospitalization  3/7/2023 1536 by Pamela Murrell RN  Outcome: Progressing  3/7/2023 1105 by Raimundo Bueno RN  Outcome: Progressing  Flowsheets (Taken 3/7/2023 0750)  Absence of infection during hospitalization:   Assess and monitor for signs and symptoms of infection   Monitor lab/diagnostic results   Monitor all insertion sites i.e., indwelling lines, tubes and drains   Instruct and encourage patient and family to use good hand hygiene technique  Goal: Absence of fever/infection during anticipated neutropenic period  3/7/2023 1536 by Pamela Murrell RN  Outcome: Progressing  3/7/2023 1105 by Raimundo Bueno RN  Outcome: Progressing  Flowsheets (Taken 3/7/2023 0750)  Absence of fever/infection during anticipated neutropenic period: Monitor white blood cell count     Problem: Metabolic/Fluid and Electrolytes - Adult  Goal: Electrolytes maintained within normal limits  3/7/2023 1536 by Pamela Murrell RN  Outcome: Progressing  3/7/2023 1105 by Raimundo Bueno RN  Outcome: Progressing  Flowsheets (Taken 3/7/2023 0750)  Electrolytes maintained within normal limits:   Monitor labs and assess patient for signs and symptoms of electrolyte imbalances   Instruct patient on fluid and nutrition restrictions as appropriate  Goal: Hemodynamic stability and optimal renal function maintained  3/7/2023 1536 by Pamela Murrell RN  Outcome: Progressing  3/7/2023 1105 by Raimundo Bueno RN  Outcome: Progressing  Flowsheets (Taken 3/7/2023 0750)  Hemodynamic stability and optimal renal function maintained:   Monitor labs and assess for signs and symptoms of volume excess or deficit   Monitor intake, output and patient weight   Monitor urine specific gravity, serum osmolarity and serum sodium as indicated or ordered   Encourage oral intake as appropriate   Monitor response to interventions for patient's volume status, including labs, urine output, blood pressure (other measures as available)  Goal: Glucose maintained within prescribed range  3/7/2023 1536 by Manas Osborn RN  Outcome: Progressing  3/7/2023 1105 by Michael Collins RN  Outcome: Progressing  Flowsheets (Taken 3/7/2023 0750 by Manas Osborn RN)  Glucose maintained within prescribed range:   Monitor blood glucose as ordered   Assess for signs and symptoms of hyperglycemia and hypoglycemia   Administer ordered medications to maintain glucose within target range   Assess barriers to adequate nutritional intake and initiate nutrition consult as needed     Problem: Hematologic - Adult  Goal: Maintains hematologic stability  3/7/2023 1536 by Manas Osborn RN  Outcome: Progressing  3/7/2023 1105 by Michael Collins RN  Outcome: Progressing  Flowsheets (Taken 3/7/2023 0750)  Maintains hematologic stability: Assess for signs and symptoms of bleeding or hemorrhage     Problem: Chronic Conditions and Co-morbidities  Goal: Patient's chronic conditions and co-morbidity symptoms are monitored and maintained or improved  3/7/2023 1536 by Manas Osborn RN  Outcome: Progressing  3/7/2023 1105 by Michael Collins RN  Outcome: Progressing  Flowsheets (Taken 3/7/2023 0750)  Care Plan - Patient's Chronic Conditions and Co-Morbidity Symptoms are Monitored and Maintained or Improved:   Monitor and assess patient's chronic conditions and comorbid symptoms for stability, deterioration, or improvement   Collaborate with multidisciplinary team to address chronic and comorbid conditions and prevent exacerbation or deterioration   Update acute care plan with appropriate goals if chronic or comorbid symptoms are exacerbated and prevent overall improvement and discharge     Problem: Skin/Tissue Integrity  Goal: Absence of new skin breakdown  Description: 1.  Monitor for areas of redness and/or skin breakdown  2.  Assess vascular access sites hourly  3.  Every 4-6 hours minimum:  Change oxygen saturation probe site  4.  Every 4-6 hours:  If on nasal continuous positive airway pressure, respiratory therapy assess nares and determine need for appliance change or resting period.  Outcome: Progressing

## 2023-03-07 NOTE — PROGRESS NOTES
[address]     Stroke/STEMI/Arrest Alert Note    PATIENT NAME: Lakesha Staff    Room # 1598/8822-11   Name: Lakesha Pappas            Age: 78 y.o. Gender: male          [place]: Non-Restoration   Place of Buddhism: Unknown    Alert type:  Code Violet    Admit Date & Time: 3/3/2023  2:54 PM    ADVANCE DIRECTIVES IN CHART? No    NAME OF DECISION MAKER: unknown - spouse next of kin    RELATIONSHIP OF DECISION MAKER TO PATIENT: Unknown    PATIENT/EVENT DESCRIPTION:  Lakesha Pappas is a 78 y.o. male who is being treated for diabetic foot infection was found wandering the [place] with his IV bags in tow. He was walking but unresponsive and confused. Pt to be admitted to 2017/2017-02. SPIRITUAL ASSESSMENT/INTERVENTION:  Code called over PA system and many hospital staff members were in attendance. Patient was back in his room and appeared to be telling staff that he needed to use the restroom. Staff members were encouraging patient and reminding him to use his walker for safety.  offered ministry of presence and emotional support for staff  at bedside. Patient calm but confused. PATIENT BELONGINGS:  No belongings noted    ANY BELONGINGS OF SIGNIFICANT VALUE NOTED:  None    REGISTRATION STAFF NOTIFIED? No      WHAT IS YOUR SPIRITUAL CARE PLAN FOR THIS PATIENT?:   Provide spiritual support for patient and family during stay at [address] [place] [address]     Electronically signed by Enola Libman, on 3/7/2023 at 6:51 PM.  Natalia  [phone]      03/07/23 1848   Encounter Summary   Service Provided For: Patient   Referral/Consult From: Multi-disciplinary team   Support System Spouse   Last Encounter  03/07/23   Complexity of Encounter High   Begin Time 1805   End Time  1825   Total Time Calculated 20 min   Encounter    Type Initial Screen/Assessment   Crisis   Type Code (comment)  (Violet) Spiritual/Emotional needs   Type Emotional Distress   Assessment/Intervention/Outcome   Assessment Anxious; Compromised coping; Impaired resilience; Impaired social interaction;Stress overload   Intervention Active listening;Nurtured Hope;Sustaining Presence/Ministry of presence   Outcome Deescalated

## 2023-03-07 NOTE — DISCHARGE INSTR - COC
Continuity of Care Form    Patient Name: Josephine Gay   :  1955  MRN:  [id number]    6 [facility] date:  3/3/2023  Discharge date:  2023    Code Status Order: Full Code   Advance Directives:     Admitting Physician:  Favio Rodgers DO  PCP: Polo Mccain MD    Discharging Nurse: [facility] Unit/Room#:   Discharging Unit Phone Number: [phone]    Emergency Contact:   Extended Emergency Contact Information  Primary Emergency Contact: Gabriela Marie  Address: [facility] 0, [address] Phone: [phone]  Work Phone: [phone]  Mobile Phone: [phone]  Relation: Spouse    Past Surgical History:  Past Surgical History:   Procedure Laterality Date    ANKLE SURGERY Right 10/12/2022    RIGHT MID-FOOT OSTEOTOMY WITH OF APPLICATION EXTERNAL FIXATOR SERA performed by Cherelle House DPM at [facility] Right 11/15/2022    right lower extremity  adjustment of exfix performed [facility]      at age 24    ARTHRODESIS Right 2022    RIGHT SUBTALAR JOINT AND MULTIPLE MID FOOT JOINT FUSIONS WITH SERA AND PRE-OP POP BLOCK performed by Cherelle House DPM at [address] Right 2020    RIGHT HALLUX EXOSTECTOMY AND 3RD DIGIT EXTENSIOR TENOTOMY performed by Joanne Donahue DPM at [address] [place] [address] Left 2018    I&D foreign body removal    FOOT DEBRIDEMENT Right 2020    RIGHT  FOOT   INCISION AND DRAINAGE WITH BONE BIOPSY performed by Joanne Donahue DPM at [address] [place] [address] Right 2021    FOOT DEBRIDEMENT INCISION AND DRAINAGE performed by Joanne Donahue DPM at [address] [place] [address] Right 2021    RIGHT FOOT  INCISION AND DRAINAGE   WITH PULSE LAVAGE performed by Joanne Donahue DPM at [place] [address] Right 2021    RIGHT FOOT FLAP CLOSURE performed by Magan Bal DPM at [address] Right 41/04/5446    APPLICATION EXTERNAL FIXATOR RIGHT FOOT - SERA - Right    FOOT SURGERY Right 12/27/2022    RIGHT SUBTALAR JOINT AND MULTIPLE MID FOOT JOINT FUSIONS WITH SERA AND PRE-OP POP BLOCK (Right: Foot)    FOOT SURGERY Right 12/27/2022    RIGHT FOOT/ANKLE EXTERNAL FIXATOR  REMOVAL performed by Michael Sol DPM at [facility] [address] Right 5/64/3091    APPLICATION OF MONORAIL, TISSUE TRANSFER, SUBTALAR JOINT FUSION, HARDWARE REMOVAL, BONE BIOPSY, ANTIBIOTIC SPACER performed by Michael Sol DPM at [address] [place] [address]    IR INS PICC VAD W SQ PORT GREATER THAN 5  10/07/2022    IR INS PICC VAD W SQ PORT GREATER THAN 5 10/7/2022 [facility]    IR TUNNELED CATHETER PLACEMENT GREATER THAN 5 YEARS  3/13/2023    IR TUNNELED CATHETER PLACEMENT GREATER THAN 5 YEARS 3/13/2023 [facility]    KNEE ARTHROSCOPY Right 1989    KNEE ARTHROSCOPY Left 1990    KNEE SURGERY Bilateral 1983    arthroscopy    NECK SURGERY  1987    FL I&D BELOW FASCIA FOOT 1 BURSAL SPACE Left 04/24/2018    LEFT FOOT MULTIPLE  INCISIONS  AND DRAINAGE AND REMOVAL FOREIGN BODY  IRENE performed by Magan Bal DPM at [address] [place] [address]       Immunization History:   Immunization History   Administered Date(s) Administered    COVID-19, MODERNA BLUE border, Primary or Immunocompromised, (age 12y+), IM, 100 mcg/0.5mL 05/05/2021, 06/16/2021, 12/06/2021    Influenza, Intradermal, Preservative free 09/30/2014    Pneumococcal Polysaccharide ([id number]) 02/03/2017    Tdap (Boostrix, Adacel) 04/24/2018       Active Problems:  Patient Active Problem List   Diagnosis Code    Essential hypertension I10    Type II or unspecified type diabetes mellitus without mention of complication, not stated as uncontrolled E11.9    Neuropathy G62.9    Vertigo R42    Charcot foot due to diabetes mellitus ([facility] E11.610    Hyperlipidemia E78.5    Cellulitis L03.90 Cellulitis of left foot L03. 116    Right foot infection L08.9    Diabetic polyneuropathy associated with type 2 diabetes mellitus ([address] E11.42    Foreign body (FB) in soft tissue M79.5    Cellulitis of left leg L03.116    Abscess of right foot L02.611    Chest pain at rest R07.9    Tobacco abuse Z72.0    Type 2 diabetes mellitus treated with insulin ([facility]) E11.9, Z79.4    Bandemia D72.825    Chronic multifocal osteomyelitis of right foot ([address] M86.371    Diabetic infection of right foot ([facility]) E11.628, L08.9    Acquired hammer toe deformity of lesser toe of right foot M20.41    Post-op pain G89.18    Right foot pain M79.671    Hallux hammertoe, right M20.31    Osteomyelitis ([facility]) M86.9    Wound dehiscence T81.30XA    Diabetic foot ([address] E11.8    Hyperglycemia due to diabetes mellitus ([address] E11.65    Foot ulcer ([address] L97.509    Cellulitis of right foot L03.115    Type 2 diabetes mellitus with right diabetic foot ulcer ([address] E11.621, L97.519    Charcot's joint of right foot M14.671    Stage 3b chronic kidney disease ([address] N18.32    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 Dinh Huang d/c'maite [place] isolation    COVID-19 (Rule Out) 21 COVID-19, Rapid (Ordered)   21 Rule-Out Test Resulted    COVID-19 (Rule Out) 20 COVID-19 (Ordered)   20 Rule-Out Test Resulted    COVID-19 (Rule Out) 20 COVID-19 Ambulatory (Ordered)   08/10/20 Rule-Out Test Resulted            Nurse Assessment:  Last Vital Signs: BP (!) 150/71   Pulse 84   Temp 99.5 °F (37.5 °C) (Oral)   Resp 17   Ht 5' 10\" (1.778 m)   Wt 223 lb (101.2 kg)   SpO2 94%   BMI 32.00 kg/m²     Last documented pain score (0-10 scale): Pain Level: 7  Last Weight:   Wt Readings from Last 1 Encounters:   03/14/23 223 lb (101.2 kg)     Mental Status:  alert    IV Access:  - Central Venous Catheter  - site  right and external jugular, insertion date: 03/13/2023    Nursing Mobility/ADLs:  Walking   Assisted  Transfer  Assisted  Bathing  Assisted  Dressing  Assisted  Toileting  Assisted  Feeding  Independent  Med Admin  Assisted  Med Delivery   whole    Wound Care Documentation and Therapy:  Wound 11/15/22 Pedal Anterior; Left (Active)   Number of days: 119       Incision 03/12/23 Foot Right (Active)   Dressing Status Clean;Dry; Intact 03/14/23 1759   Dressing/Treatment Ace wrap;Xeroform;Roll gauze;ABD pad; Other (comment) 03/14/23 1759   Closure Other (Comment) 03/14/23 1759   Incision Assessment Other (Comment) 03/14/23 1759   Drainage Amount None 03/14/23 1759   Drainage Description Serosanguinous 03/14/23 1004   Odor None 03/14/23 1004   Clary-incision Assessment Other (Comment) 03/14/23 1759   Number of days: 2        Elimination:  Continence: Bowel: No  Bladder: No  Urinary Catheter: None   Colostomy/Ileostomy/Ileal Conduit: No       Date of Last BM: 03/12/2023    Intake/Output Summary (Last 24 hours) at 3/14/2023 1856  Last data filed at 3/14/2023 1854  Gross per 24 hour   Intake 340.75 ml   Output 1550 ml   Net -1209.25 ml     I/O last 3 completed shifts: In: 249.2 [IV Piggyback:249.2]  Out: 1150 [Urine:1150]    Safety Concerns:      At Risk for Falls    Impairments/Disabilities:      None    Nutrition Therapy:  Current Nutrition Therapy:   - Oral Diet:  General and Carb Control 4 carbs/meal (1800kcals/day)    Routes of Feeding: Oral  Liquids: No Restrictions  Daily Fluid Restriction: no  Last Modified Barium Swallow with Video (Video Swallowing Test): not done    Treatments at the Time of Hospital Discharge:   Respiratory Treatments:   Oxygen Therapy:  is not on home oxygen therapy. Ventilator:    - No ventilator support    Rehab Therapies: Physical Therapy and Occupational Therapy  Weight Bearing Status/Restrictions: Non-weight bearing on right leg  Other Medical Equipment (for information only, NOT a DME order):  walker  Other Treatments:   Skilled nurse assessment  Medication teaching and compliance  IV Merrem 1000mg in the evening through 4-23-23  IV Dapto 500mg every 48 hours through 4-23-23  Change dressing every 2-3 days with Xeroform around the pin sites and over incisions, 4x4 gauze, ABD pads, kerlix and ACE wrap. Patient's personal belongings (please select all that are sent with patient):  Bridger    RN SIGNATURE:  Electronically signed by Tony Parham RN on 3/14/23 at 11:50 AM EDT    CASE MANAGEMENT/SOCIAL WORK SECTION    Inpatient Status Date: ***    Readmission Risk Assessment Score:  Readmission Risk              Risk of Unplanned Readmission:  24           Discharging to Facility/ Agency   Name: Bessie Brasher  Address:  ETTBL:[phone]  Fax:[phone]      / signature: Electronically signed by Ashlie Preciado on 3/7/23 at 9:16 AM EST    PHYSICIAN SECTION    Prognosis: Good    Condition at Discharge: Stable    Rehab Potential (if transferring to Rehab): Good    Recommended Labs or Other Treatments After Discharge:   Check CBC, BMP, CRP and CPK on Mondays  Schedule for follow-up visit in 4 weeks  Fax results to Carolyn Whitaker RN FAX: [phone]      Physician Certification: I certify the above information and transfer of [place]  is necessary for the continuing treatment of the diagnosis listed and that he requires Home Care for greater 30 days. Dressings changed today with Xeroform around pin sites and over incisions, 4x4 gauze, ABD pads, kerlix and ACE wrap.   Plan for HHC to change dressings every 2-3 days as above.     Update Admission H&P: No change in H&P    PHYSICIAN SIGNATURE:  Electronically signed by Tim Min MD on 3/13/23 at 12:05 PM EDT

## 2023-03-07 NOTE — PROGRESS NOTES
[facility]  Office: [address], DO, Benji Burton, DO, Tameka Key, DO, Billy Lowery Blood, DO, Dionne Yanez MD, Ginny Malave MD, Ishaan Guzmán MD, Darylene Sparks, MD,  Maury Biswas MD, Vlad Carr MD, Casimiro Khan, DO, Britton Menezes MD,  Murray Catherine MD, Romeo Lucia MD, Alba Rubin, DO, Zoya Calzada MD, Hai Oliveira MD, Andrew Doty, DO, Stark Mcardle, MD, Ivan Reilly MD, Britney Quinonez MD, Hillis Kocher, MD, Beto Sosa, DO, Josefa Damon MD, Irma Gamez MD, Edwin Andrews, CNP,  Adela Winston, CNP, Melisa Salgado, CNP, Rubin Willett, CNP,  Joann Lenz, [facility], Delmi Marcos, CNP, Mario Stiles, CNP, Candee Dakin, CNP, Clifton Alejandro, CNP, Celia Sanchez, CNP, LETY TrejoC, Marco A Jennings, CNS, Kimo Walker, CNP, Ady Browne, [facility]    Progress Note    3/7/2023    10:14 AM    Name:   Jules Mcintosh  MRN:     [id number]     [place]:      [de-identified]   Room:   2017/2017-02  IP Day:  4  Admit Date:  3/3/2023  2:54 PM    PCP:   Janette Page MD  Code Status:  Full Code    Subjective:     C/C:   Chief Complaint   Patient presents with    Wound Infection     Right foot     Interval History Status: worsened. More confused today per rn  This am would not leave gown on and has not been eating last 24h  Has not been agitated, just restless and confused    Brief History:     Per my partner   This is a 52-year-old male that presents with right foot wound with concerns for infection. Recent outpatient culture with vancomycin-resistant Enterococcus faecium. He presented to the emergency room with worsening drainage and developed fevers and chills. Overnight he was started on Maxipime. Recent outpatient cultures were obtained with evidence of VRE with antibiotics will be adjusted accordingly.     Review of Systems:     unobtainable      Medications: Allergies:     Allergies   Allergen Reactions    Morphine Itching    Other Other (See Comments)     Other reaction(s): Unknown    Percocet [Oxycodone-Acetaminophen]      Facial swelling       Current Meds:   Scheduled Meds:    linezolid  600 mg Oral 2 times per day    amLODIPine  5 mg Oral Daily    aspirin  81 mg Oral Daily    cetirizine  10 mg Oral Nightly    gabapentin  900 mg Oral TID    [Held by provider] glipiZIDE  20 mg Oral BID AC    [Held by provider] alogliptin  12.5 mg Oral Daily    insulin glargine  10 Units SubCUTAneous BID    atorvastatin  20 mg Oral Nightly    sodium chloride flush  5-40 mL IntraVENous 2 times per day    heparin (porcine)  5,000 Units SubCUTAneous 3 times per day    insulin lispro  0-8 Units SubCUTAneous TID WC    insulin lispro  0-4 Units SubCUTAneous Nightly    cefepime  2,000 mg IntraVENous Q12H     Continuous Infusions:    dextrose 5 % and 0.9 % NaCl 75 mL/hr at 03/07/23 0116    dextrose      sodium chloride Stopped (03/05/23 1313)     PRN Meds: fentanNYL, HYDROcodone-acetaminophen, glucose, dextrose bolus **OR** dextrose bolus, glucagon (rDNA), dextrose, sodium chloride flush, sodium chloride, ondansetron **OR** ondansetron, polyethylene glycol    Data:     Past Medical History:   has a past medical history of Abscess of right foot, Acquired hammer toe deformity of lesser toe of right foot, ALEJANDRO (acute kidney injury) ([facility] Cellulitis, Cellulitis of left foot, Cellulitis of right foot, Charcot foot due to diabetes mellitus ([facility] Chest pain at rest, Chronic multifocal osteomyelitis of right foot ([facility] CKD (chronic kidney disease), Diabetic polyneuropathy associated with type 2 diabetes mellitus ([facility] Essential hypertension, Fractures, multiple, Hyperlipidemia, Hypertension, Leukocytosis, MRSA (methicillin resistant staph aureus) culture positive, Neuropathy, Pain in right foot, Pneumonia, Right foot infection, Right foot pain, Tobacco abuse, Type II or unspecified type diabetes mellitus without mention of complication, not stated as uncontrolled, Vertigo, 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 Social History:   reports that he has been smoking cigarettes. He has been smoking an average of .5 packs per day. He has never used smokeless tobacco. He reports that he does not currently use drugs. He reports that he does not drink alcohol. Family History:   Family History   Problem Relation Age of Onset    Diabetes Mother     Cancer Father     Hypertension Maternal Grandmother        Vitals:  /86   Pulse 86   Temp 97.9 °F (36.6 °C)   Resp 16   Ht 5' 10\" (1.778 m)   Wt 226 lb 14.4 oz (102.9 kg)   SpO2 94%   BMI 32.56 kg/m²   Temp (24hrs), Av °F (36.7 °C), Min:97.7 °F (36.5 °C), Max:98.6 °F (37 °C)    Recent Labs     23  1607 23  0035 23  0233   POCGLU 85 74* 63* 170*       I/O (24Hr):     Intake/Output Summary (Last 24 hours) at 3/7/2023 1014  Last data filed at 3/6/2023 1916  Gross per 24 hour   Intake 425.16 ml   Output --   Net 425.16 ml       Labs:  Hematology:  Recent Labs     23  0557 23  0556 23  1622   WBC 7.6 7.6  --    RBC 2.98* 3.26*  --    HGB 8.1* 9.1*  --    HCT 25.4* 29.0*  --    MCV 85.2 89.0  --    MCH 27.2 27.9  --    MCHC 31.9 31.4  --    RDW 15.9* 16.6*  --     331  --    MPV 8.5 9.1  --    .7*  --  75.8*     Chemistry:  Recent Labs     23  0523  0556    137   K 4.3 4.9    105   CO2 24 25   GLUCOSE 124* 93   BUN 27* 21   CREATININE 2.76* 2.50*   ANIONGAP 7* 7*   LABGLOM 25* 28*   CALCIUM 9.5 9.8     Recent Labs     23  0613 23  1128 23  1607 23  1622 23  0035 23  0233   PROT  --   --   --  7.1  --   --   --    LABALBU  --   --   --  2.7*  --   --   --    TSH  --   --   --  3.63  --   --   --    AST  --   --   --  19 --   --   --    ALT  --   --   --  16  --   --   --    ALKPHOS  --   --   --  124  --   --   --    BILITOT  --   --   --  0.2*  --   --   --    BILIDIR  --   --   --  <0.1  --   --   --    AMMONIA  --   --   --  <10*  --   --   --    POCGLU 85 91 85  --  74* 63* 170*     ABG:  Lab Results   Component Value Date/Time    POCPH 7.393 03/06/2023 02:39 PM    POCPCO2 36.8 03/06/2023 02:39 PM    POCPO2 73.0 03/06/2023 02:39 PM    POCHCO3 22.4 03/06/2023 02:39 PM    NBEA 2 03/06/2023 02:39 PM    MBAQ4OXI 94 03/06/2023 02:39 PM    FIO2 21.0 03/06/2023 02:39 PM     Lab Results   Component Value Date/Time    SPECIAL 10ML 09/13/2022 06:41 PM     Lab Results   Component Value Date/Time    CULTURE NO GROWTH 5 DAYS 11/15/2022 10:20 PM       Radiology:  CT HEAD WO CONTRAST    Result Date: 3/6/2023  No acute intracranial abnormality. US RETROPERITONEAL COMPLETE    Result Date: 3/4/2023  Mildly echogenic renal parenchyma suggestive of intrinsic renal parenchymal disease. No hydronephrosis.        Physical Examination:        General appearance:  alert, cooperative and no distress  Mental Status:  oriented to person only, and flat affect  Lungs:  clear to auscultation bilaterally, normal effort  Heart:  regular rate and rhythm, no murmur  Abdomen:  soft, nontender, nondistended, normal bowel sounds, no masses, hepatomegaly, splenomegaly  Extremities:  no edema, redness, tenderness in the calves  Skin:  foot bandaged    Assessment:        Hospital Problems             Last Modified POA    * (Principal) Diabetic foot infection ([facility] with VRE 3/4/2023 Yes    Type 2 diabetes mellitus with right diabetic foot ulcer ([facility] 3/3/2023 Yes    Charcot's joint of right foot 3/3/2023 Yes    Stage 3b chronic kidney disease ([facility] (Chronic) 3/3/2023 Yes    Hyponatremia 3/3/2023 Yes    Microcytic anemia 3/3/2023 Yes    Infection due to vancomycin resistant Enterococcus faecium 3/4/2023 Yes    Essential hypertension (Chronic) 3/3/2023 Yes Neuropathy 3/3/2023 Yes    Charcot foot due to diabetes mellitus [facility] 3/3/2023 Yes    Hyperlipidemia 3/3/2023 Yes    Acute kidney injury superimposed on chronic kidney disease ([facility] 3/3/2023 Yes       Plan:        Was to go to OR today for diabetic foot; will notify podiatry of mental status changes  Neuro consult for ams  Ct head done yesterday, negative  May need mri brain  D/w jakob Adams Blood, DO  3/7/2023  10:14 AM

## 2023-03-07 NOTE — DISCHARGE INSTR - DIET

## 2023-03-07 NOTE — PROGRESS NOTES
Progress Note  Podiatric Medicine and Surgery     Subjective     CC: right foot wound, s/p right foot midtarsal and subtalar join arthrodesis (12/27/22)    Interval history:  -Patient seen and examined at bedside.  -Patient has altered mental status today started yesterday afternoon, including urinating on ground and removing gown,per RN. -Patient confused, would not answer questions when asked. Wife present in room. -Patient was scheduled for I&D and removal of hardware and right lower extremity today, surgery canceled until mentation stabilizes. HPI:  Donna Condon is a 78 y.o. male seen at [address] for right foot pain and swelling. Patient is well known to podiatry service and has been following outpatient with Dr. Aldo Nielsen since surgery on 12/27/22 to right foot. Patient states he has been compliant with wound care and walking in CAM boot, however noticed worsening pain and redness to right foot, prompting him to get CT at outside facility and presenting to [facility] ED. Patient states he has not been on IV antibiotics for a while, does follow Infectious disease who saw him on 3/1/23. Patient denies any constitutional symptoms at this time. Patient is type 2 diabetic, ALEJANDRO on CKD, and has history of Charcot foot. No further pedal complaints at this time. PCP is Ajay Neal MD    ROS:   Review of Systems   Constitutional:  Negative for chills and fever. Respiratory:  Negative for chest tightness and shortness of breath. Cardiovascular:  Negative for chest pain and leg swelling. Gastrointestinal:  Positive for nausea. Negative for abdominal pain, constipation, diarrhea and vomiting. Musculoskeletal:  Positive for arthralgias, joint swelling and myalgias. Negative for gait problem. Skin:  Positive for color change and wound. Neurological:  Negative for weakness and numbness.        Objective     Vitals:  Patient Vitals for the past 8 hrs:   BP Temp Temp src Pulse Resp SpO2 23 1146 (!) 150/93 98.1 °F (36.7 °C) Oral 89 18 96 %   23 0735 138/86 97.9 °F (36.6 °C) -- 86 16 94 %       Average, Min, and Max for last 24 hours Vitals:  TEMPERATURE:  Temp  Av.1 °F (36.7 °C)  Min: 97.7 °F (36.5 °C)  Max: 98.6 °F (37 °C)    RESPIRATIONS RANGE: Resp  Av.8  Min: 16  Max: 18    PULSE RANGE: Pulse  Av.5  Min: 80  Max: 89    BLOOD PRESSURE RANGE:  Systolic (73JIL), SLB:697 , Min:138 , OIQ:750   ; Diastolic (82KIM), KUR:24, Min:70, Max:93      PULSE OXIMETRY RANGE: SpO2  Av.8 %  Min: 94 %  Max: 98 %  I&O:  I/O last 3 completed shifts: In: 1652.2 [I.V.:1544.8; IV Piggyback:107.4]  Out: -     CBC:  Recent Labs     23  0557 23  0556 23  1622 23  1247   WBC 7.6 7.6  --  7.4   HGB 8.1* 9.1*  --  8.4*   HCT 25.4* 29.0*  --  24.8*    331  --  282   .7*  --  75.8*  --           BMP:  Recent Labs     23  0557 23  0556    137   K 4.3 4.9    105   CO2 24 25   BUN 27* 21   CREATININE 2.76* 2.50*   GLUCOSE 124* 93   CALCIUM 9.5 9.8          Coags:  Recent Labs     23  1622   PROT 7.1       Lab Results   Component Value Date    LABA1C 6.9 (H) 10/05/2022     Lab Results   Component Value Date    SEDRATE 53 (H) 2023     Lab Results   Component Value Date    CRP 75.8 (H) 2023         Lower Extremity Physical Exam:  Vascular: DP and PT pulses are palpable, bilaterally. CFT <4 seconds to all digits. Hair growth is absent to the level of the digits. Nonpitting edema to lateral ankle right foot. Neuro: Saph/sural/SP/DP/plantar sensation intact to light touch. Musculoskeletal: Muscle strength is 4/5, decreased ROM , adequate strength to all lower extremity muscle groups. Gross deformity is absent. Dermatologic: Full thickness ulcer #1 located lateral aspect of right ankle and measures approximately 0.5cm x 0.3cm x 0.5 cm. Base is fibrogranular. Periwound skin is hyperkeratotic.   Minimal serous drainage with no associated mal odor. Erythema noted to right ankle with associated increase in warmth. Does probe deep, sinus tracks proximal. No fluctuance, crepitus, or induration. Interdigital maceration absent. Clinical Images:  See media panel        Imaging:   CT HEAD WO CONTRAST   Final Result   No acute intracranial abnormality. US RETROPERITONEAL COMPLETE   Final Result   Mildly echogenic renal parenchyma suggestive of intrinsic renal parenchymal   disease. No hydronephrosis. MRI BRAIN WO CONTRAST    (Results Pending)       Assessment     Carson Fragoso is a 78 y.o. male with   Cellulitis, right lower extremity  S/p right midtarsal and subtalar foot arthrodesis  Charcot neuroarthropathy, right ankle  Right ankle pain    Principal Problem:    Diabetic foot infection ([address] with VRE  Active Problems:    Type 2 diabetes mellitus with right diabetic foot ulcer ([address]    Charcot's joint of right foot    Stage 3b chronic kidney disease ([address]    Hyponatremia    Microcytic anemia    Infection due to vancomycin resistant Enterococcus faecium    Essential hypertension    Neuropathy    Charcot foot due to diabetes mellitus ([address]    Hyperlipidemia    Acute kidney injury superimposed on chronic kidney disease ([address]  Resolved Problems:    * No resolved hospital problems. *        Plan     Patient examined and evaluated at bedside   Treatment options discussed in detail with the patient  CT results from outside facility discussed with patient. Findings consistent with possible hardware loosening vs infection to hardware  Patient admitted for IV antibiotics and further management of right foot wound infection  Patient has been n.p.o. since midnight but has chosen not to eat since lunch yesterday  Psych consulted. Appreciate recommendations   neurology on board, ordering labs for encephalopathy  MRI brain without contrast ordered  Surgery canceled for today until mentation improves.   Plan for hardware removal and incision and drainage of wound at a later date  At this time is unclear if altered mentation is a product of foot infection. IV abx: Cefepime and linezolid held, monitoring off antibiotics.    Appreciate ID recommendations   Dressing applied to Right foot: 1/2 packing, Betadine, DSD, Ace  WBAT to Right lower extremity  Will discuss with Dr. Carlene Pennington, DPM   Podiatric Medicine & Surgery   3/7/2023 at 1:10 PM

## 2023-03-07 NOTE — CONSULTS
[facility] Neurology   IN-PATIENT SERVICE      NEUROLOGY CONSULT  NOTE            Date:   3/7/2023  Patient name:  Bhavna Campos  Date of admission:  3/3/2023  YOB: 1955      Chief Complaint:     Chief Complaint   Patient presents with    Wound Infection     Right foot       Reason for Consult:      AMS    History of Present Illness: The patient is a 78 y.o. male who presents with Wound Infection (Right foot)  . The patient was seen and examined and the chart was reviewed. This is a 79-year-old male who presented to the ED with worsening right foot pain and swelling for several weeks with associated open ulcer that goes to bone. I discussed with his wife at bedside at length. She states for the past 5 months he has had several procedures/surgeries on this foot and has been initially home for the past 5 months. She states at home he sometimes has bouts of confusion, worsened for the past several weeks. Has a history of Charcot foot, diabetes and chronic renal failure. HCT showed NAP. MRI and EEG pending.      Past Medical History:     Past Medical History:   Diagnosis Date    Abscess of right foot 08/04/2018    Acquired hammer toe deformity of lesser toe of right foot     ALEJANDRO (acute kidney injury) ([address] 08/05/2018    Cellulitis 04/24/2018    Cellulitis of left foot 04/24/2018    Cellulitis of right foot     and abscess    Charcot foot due to diabetes mellitus ([address] 2014    Right foot     Chest pain at rest 08/04/2018    Chronic multifocal osteomyelitis of right foot (HCC)     CKD (chronic kidney disease)     Diabetic polyneuropathy associated with type 2 diabetes mellitus ([address]     Essential hypertension     Fractures, multiple 07/21/2014    Right foot fractures     Hyperlipidemia     Hypertension     Leukocytosis     MRSA (methicillin resistant staph aureus) culture positive 2018    rt foot    Neuropathy     diabetic with charcot affecting the right foot    Pain in right foot     redness and swelling    Pneumonia 2009    Right foot infection     Right foot pain     Tobacco abuse 04/24/2018    Type II or unspecified type diabetes mellitus without mention of complication, not stated as uncontrolled     Vertigo     Well controlled type 2 diabetes mellitus with neurological manifestations [facility] 08/04/2018    Wound dehiscence     Wound, open     Left Ball of  foot, pt. stepped on sharp object. Covered by dressing.     Wound, open     Right posterior -Diabetic Ulcer        Past Surgical History:     Past Surgical History:   Procedure Laterality Date    ANKLE SURGERY Right 10/12/2022    RIGHT MID-FOOT OSTEOTOMY WITH OF APPLICATION EXTERNAL FIXATOR SERA performed by Anabella Pittman DPM at [facility] Right 11/15/2022    right lower extremity  adjustment of exfix performed by Anabella Pittman DPM at [facility]      at age 24    ARTHRODESIS Right 12/27/2022    RIGHT SUBTALAR JOINT AND MULTIPLE MID FOOT JOINT FUSIONS WITH SERA AND PRE-OP POP BLOCK performed by Anabella Pittman DPM at [address] Right 12/11/2020    RIGHT HALLUX EXOSTECTOMY AND 3RD DIGIT EXTENSIOR TENOTOMY performed by Larry Maxwell DPM at [address] [place] [address] Left 04/24/2018    I&D foreign body removal    FOOT DEBRIDEMENT Right 03/20/2020    RIGHT  FOOT   INCISION AND DRAINAGE WITH BONE BIOPSY performed by Larry Maxwell DPM at [facility] Right 06/08/2021    FOOT DEBRIDEMENT INCISION AND DRAINAGE performed by Larry Maxwell DPM at [facility] Right 09/16/2021    RIGHT FOOT  INCISION AND DRAINAGE   WITH PULSE LAVAGE performed by Larry Maxwell DPM at [place] [address] Right 06/11/2021    RIGHT FOOT FLAP CLOSURE performed by Larry Maxwell DPM at [place] [address] Right 29/75/9383    APPLICATION EXTERNAL FIXATOR RIGHT FOOT - SERA - Right    FOOT SURGERY Right 12/27/2022    RIGHT SUBTALAR JOINT AND MULTIPLE MID FOOT JOINT FUSIONS WITH SERA AND PRE-OP POP BLOCK (Right: Foot)    FOOT SURGERY Right 12/27/2022    RIGHT FOOT/ANKLE EXTERNAL FIXATOR  REMOVAL performed by Kaleigh Adhikari DPM at [facility] [address] [facility] [address]    IR INS PICC VAD W SQ PORT GREATER THAN 5  10/07/2022    IR INS PICC VAD W SQ PORT GREATER THAN 5 10/7/2022 [facility]    KNEE ARTHROSCOPY Right 1989    KNEE ARTHROSCOPY Left 1990    KNEE SURGERY Bilateral 1983    arthroscopy    NECK SURGERY  1987    MA I&D BELOW FASCIA FOOT 1 BURSAL SPACE Left 04/24/2018    LEFT FOOT MULTIPLE  INCISIONS  AND DRAINAGE AND REMOVAL FOREIGN BODY  IRENE performed by Yaritza Wall DPM at [facility]        Medications Prior to Admission:     Prior to Admission medications    Medication Sig Start Date End Date Taking? Authorizing Provider   ketorolac (TORADOL) 10 MG tablet Take 1 tablet by mouth every 6 hours as needed for Pain 12/27/22 1/1/23  Luis Cueva DPM   ketorolac (TORADOL) 10 MG tablet Take 1 tablet by mouth every 6 hours as needed for Pain 12/27/22 12/27/23  Luis Cueva DPM   HYDROcodone-acetaminophen (NORCO)  MG per tablet Take 1 tablet by mouth every 6 hours as needed for Pain. Historical Provider, MD   TRUEPLUS PEN NEEDLES 31G X 8 MM MISC  11/29/21   Historical Provider, MD   mupirocin (BACTROBAN) 2 % ointment Apply topically 2 times daily TO FOOT WOUND. 11/8/21   Historical Provider, MD   amLODIPine (NORVASC) 5 MG tablet Take 5 mg by mouth daily 10/20/21   Historical Provider, MD   LANTUS SOLOSTAR 100 UNIT/ML injection pen Inject 15 Units into the skin nightly  Patient taking differently: Inject 10 Units into the skin 2 times daily 9/17/21   ROCHELLE Woods NP   JANUVIA 100 MG tablet Take 100 mg by mouth daily  11/13/20   Historical Provider, MD   Misc.  Devices MISC 1 PAIR OF DIABETIC SHOES (1 LEFT/ 1 RIGHT)  1-3 PAIRS OF INSERTS (LEFT/ RIGHT) 3/5/20   Yaritza Wall DPM   cetirizine (ZYRTEC) 10 MG tablet Take 10 mg by mouth nightly  10/21/19   Historical Provider, MD   simvastatin (ZOCOR) 40 MG tablet Take 40 mg by mouth nightly  11/13/18   Historical Provider, MD   glipiZIDE (GLUCOTROL) 10 MG tablet Take 20 mg by mouth 2 times daily (before meals) Takes 2 tabs (=20mg) BID    Historical Provider, MD   aspirin 81 MG tablet Take 81 mg by mouth daily    Historical Provider, MD   gabapentin (NEURONTIN) 300 MG capsule Take 900 mg by mouth 3 times daily. Take 3 caps (=900mg) 3 times a day    Historical Provider, MD        Allergies:     Morphine, Other, and Percocet [oxycodone-acetaminophen]    Social History:     Tobacco:    reports that he has been smoking cigarettes. He has been smoking an average of .5 packs per day. He has never used smokeless tobacco.  Alcohol:      reports no history of alcohol use. Drug Use:  reports that he does not currently use drugs. Family History:     Family History   Problem Relation Age of Onset    Diabetes Mother     Cancer Father     Hypertension Maternal Grandmother        Review of Systems:       Constitutional Negative for fever and chills   HEENT Negative for ear discharge, ear pain, nosebleed. Negative for photophobia, headache. Musculoskeletal Negative for joint pain, negative for myalgia   Respiratory Negative for cough, dyspnea. Negative for hemoptysis and sputum. Cardiovascular Negative for palpitations, chest pain. Negative for orthopnea, claudication. Gastrointestinal Negative for nausea, vomiting. Negative for abdominal pain, diarrhea, blood in stool   Genitourinary  Negative for dysuria, hematuria. Negative for suprapubic pain. Negative for bladder incontinence. Skin Positive for foot wound   Hematology Negative for ecchymosis, anemia   Psychiatric Negative for anxiety, depression.  Negative for suicidal ideation, hallucinations         Physical Exam:   BP (!) 150/93   Pulse 89   Temp 98.1 °F (36.7 °C) (Oral)   Resp 18   Ht 5' 10\" (1.778 m)   Wt 226 lb 14.4 oz (102.9 kg)   SpO2 96%   BMI 32.56 kg/m²   Temp (24hrs), Av.1 °F (36.7 °C), Min:97.7 °F (36.5 °C), Max:98.6 °F (37 °C)        General examination:      General Appearance:  awake, in no acute distress  HEENT: Normocephalic, atraumatic, moist mucus membranes  Neck: supple, no carotid bruits, (-) nuchal rigidity  Lungs:  Respirations unlabored, chest wall no deformity, BS normal  Cardiovascular: normal rate, regular rhythm  Abdomen: Soft, nontender, nondistended, normal bowel sounds  Skin: Foot ulcer  Extremities:  peripheral pulses palpable, no cyanosis, clubbing or edema  Psych: normal affect      Neurological examination:    Mental status   Alert and oriented x 0; does not follow commands, keeps stating \" I don't know\" and states his name is \" Faraz oconnor\" At times it appears he is volitionally not answering questions      Cranial nerves   II - visual fields intact to confrontation; pupils reactive  III, IV, VI - extraocular muscles intact; no JOSE; no nystagmus; no ptosis   V - normal facial sensation                                                               VII - normal facial symmetry                                                             VIII - intact hearing                                                                             IX, X - symmetrical palate elevation                                               XI - symmetrical shoulder shrug                                                       XII - midline tongue without atrophy or fasciculation     Motor function  Strength: 5/5 RUE, 5/5 RLE, 5/5 LUE, 5/5  LLE  Normal bulk and tone. No tremors                      Sensory function Intact to touch, pin, vibration, proprioception throughout     Cerebellar Intact finger-nose-finger testing. Intact heel-shin testing. No dysdiadochokinesia present. Reflex function 2/4 symmetric throughout . Downgoing plantar response bilaterally.  (-)Morrell's sign bilaterally    Gait Normal station and gait             Diagnostics:      Laboratory Testing:  CBC:   Recent Labs     03/05/23  0557 03/06/23  0556   WBC 7.6 7.6   HGB 8.1* 9.1*    331     BMP:    Recent Labs     03/05/23  0557 03/06/23  0556    137   K 4.3 4.9    105   CO2 24 25   BUN 27* 21   CREATININE 2.76* 2.50*   GLUCOSE 124* 93         Lab Results   Component Value Date    CHOL 132 01/09/2017    LDLCHOLESTEROL 69 01/09/2017    HDL 37 (L) 01/09/2017    TRIG 128 01/09/2017    ALT 16 03/06/2023    AST 19 03/06/2023    TSH 3.63 03/06/2023    LABA1C 6.9 (H) 10/05/2022    LABMICR 29 (H) 01/09/2017       No results found for: PHENYTOIN, PHENYTOIN, VALPROATE, CBMZ      Imaging/Diagnostics:    CT HEAD WO CONTRAST    Narrative  EXAMINATION:  CT OF THE HEAD WITHOUT CONTRAST  3/6/2023 1:45 pm    TECHNIQUE:  CT of the head was performed without the administration of intravenous  contrast. Automated exposure control, iterative reconstruction, and/or weight  based adjustment of the mA/kV was utilized to reduce the radiation dose to as  low as reasonably achievable. COMPARISON:  None. HISTORY:  ORDERING SYSTEM PROVIDED HISTORY: disorientation  TECHNOLOGIST PROVIDED HISTORY:  disorientation    Reason for Exam: Disoriented, AMS, dizzy    FINDINGS:  BRAIN/VENTRICLES: There is no acute intracranial hemorrhage, mass effect or  midline shift. No abnormal extra-axial fluid collection. The gray-white  differentiation is maintained without evidence of an acute infarct. There is  no evidence of hydrocephalus. ORBITS: The visualized portion of the orbits demonstrate no acute abnormality. SINUSES: The visualized paranasal sinuses and mastoid air cells demonstrate  no acute abnormality. SOFT TISSUES/SKULL:  No acute abnormality of the visualized skull or soft  tissues. Impression  No acute intracranial abnormality.       CT head: NAP    CTA head and neck:     MRI brain:     2D echo:      I personally reviewed all of the above medications, clinical laboratory, imaging and other diagnostic tests. Impression: This is a 79-year-old male who presented to the ED due to worsening right foot pain and swelling. He has been having issues with chronic right foot ulcer for several months and has had several procedures done for this. He is currently being treated for infection/ulcer. Neurology asked to consult due to episodes of confusion. This with discussed with wife and ID at bedside. At this time symptoms are likely toxic metabolic in etiology from current infection. Discussed with ID-hold on cefepime as this is known to cause altered mental status. EEG and MRI brain pending. Plan:     Primary team to manage comorbidities and metabolic derangements  EEG  MRI brain  Hold cefepime  LIMIT ALL SEDATING meds including pain meds as this could be contributing to patient mentation  Labs for encephalopathy ( B12, folate, TSH, thiamine)  Further recs to follow pending the above        Thank you for this very interesting consultation.       Electronically signed by Genny Catherine DO on 3/7/2023 at 12:12 PM      Genny Catherine [address] [place] [address]  Neurology

## 2023-03-07 NOTE — PROGRESS NOTES
[facility]  Progress Note    Reji Ralph  MRN: [id number]  Date: 3/7/2023  LOS: 4     Reason for F/U :   Right foot infection    Impression :   Charcot foot deformity with history of right midtarsal and subtalar foot arthrodesis  History of osteomyelitis status post antimicrobial therapy in late 2022  Draining wounds right lower extremity with imaging suggesting potential infection involving hardware  Diabetes mellitus type 2 with associated chronic kidney disease  Altered mental status    Recommendations: The patient did have CT imaging of the brain that did not show any acute changes. The patient continues to have some confusion and I have discussed the care with the neurologist who was consulted today and plans are for an MRI and EEG  Cefepime has been known to cause some CNS toxicities therefore I will hold this at this time  I will also discontinue the linezolid and monitor off antibiotics for now  I will follow his progress and adjust therapy accordingly    Infection Control Recommendations:   Universal precautions    Discharge Planning:   Estimated Length of IV antimicrobials: To be determined  Patient will need Midline Catheter Insertion/ PICC line Insertion: No  Patient will need: Home IV , [place],  SNF,  LTAC: Undetermined  Patient willneed outpatient wound care: No    Medical Decision making / Summary of Stay:   Reji Ralph is a 78y.o.-year-old male presented to the hospital with worsening right foot pain and swelling for several weeks associated with open ulcer on the lateral aspect of the hand foot that probes to bone. Symptoms moderate to severe, worse with movement, no alleviating factors. He also complaining of subjective fever and chills, mild shortness of breath. CT of the right foot was done at [facility] on 2/27/2023 showed lucency around hardware within the calcaneus.   The patient had recent surgery done by Dr. Tr Ortiz on 12/27/2022 with hardware in place.  History of Charcot foot, chronic renal failure and diabetes mellitus. Initial WBC normal, creatinine 2.89, sedimentation rate 53, C-reactive protein 136    Current evaluation:3/7/2023    /86   Pulse 86   Temp 97.9 °F (36.6 °C)   Resp 16   Ht 5' 10\" (1.778 m)   Wt 226 lb 14.4 oz (102.9 kg)   SpO2 94%   BMI 32.56 kg/m²     Temperature Range: Temp: 97.9 °F (36.6 °C) Temp  Av °F (36.7 °C)  Min: 97.7 °F (36.5 °C)  Max: 98.6 °F (37 °C)  The patient is seen and evaluated at bedside he is awake and alert continues to have a change from his baseline mentation. The wife is at bedside and she also reports episodes of confusion and is very concerned. She did raise that she was worried about the fentanyl that the patient received on 3/3/2023. The patient answers \"I do not know\" to almost all of the questions. Review of Systems   Unable to perform ROS: Mental status change     Physical Examination :     Physical Exam  Constitutional:       Appearance: He is well-developed. HENT:      Head: Normocephalic and atraumatic. Cardiovascular:      Rate and Rhythm: Normal rate. Heart sounds: Normal heart sounds. No friction rub. No gallop. Pulmonary:      Effort: Pulmonary effort is normal.      Breath sounds: Normal breath sounds. No wheezing. Abdominal:      General: Bowel sounds are normal.      Palpations: Abdomen is soft. There is no mass. Tenderness: There is no abdominal tenderness. Musculoskeletal:         General: Normal range of motion. Cervical back: Normal range of motion and neck supple. Lymphadenopathy:      Cervical: No cervical adenopathy. Skin:     General: Skin is warm and dry. Comments: Lower extremity ankle/foot in a dressing   Neurological:      Mental Status: He is alert and oriented to person, place, and time.        Laboratory data:   I have independently reviewed the followinglabs:  CBC with Differential:   Recent Labs     23  0554 03/06/23  0556   WBC 7.6 7.6   HGB 8.1* 9.1*   HCT 25.4* 29.0*    331   LYMPHOPCT 25 29   MONOPCT 8 9       BMP:   Recent Labs     03/05/23  0557 03/06/23  0556    137   K 4.3 4.9    105   CO2 24 25   BUN 27* 21   CREATININE 2.76* 2.50*       Hepatic Function Panel:   Recent Labs     03/06/23  1622   PROT 7.1   LABALBU 2.7*   BILIDIR <0.1   IBILI Can not be calculated   BILITOT 0.2*   ALKPHOS 124   ALT 16   AST 19         No results found for: PROCAL  Lab Results   Component Value Date/Time    CRP 75.8 03/06/2023 04:22 PM    .7 03/05/2023 05:57 AM    .4 03/03/2023 03:32 PM     Lab Results   Component Value Date    SEDRATE 53 (H) 03/03/2023         No results found for: DDIMER  No results found for: FERRITIN  No results found for: LDH  No results found for: FIBRINOGEN    No results found for requested labs within last 30 days. Lab Results   Component Value Date/Time    COVID19 DETECTED 05/26/2021 12:06 PM    COVID19 Not Detected 12/07/2020 03:10 PM    COVID19 Not Detected 08/08/2020 10:02 PM       No results for input(s): [facility] in the last 72 hours.     Imaging Studies:             Cultures:   None    Medications:      linezolid  600 mg Oral 2 times per day    amLODIPine  5 mg Oral Daily    aspirin  81 mg Oral Daily    cetirizine  10 mg Oral Nightly    gabapentin  900 mg Oral TID    [Held by provider] glipiZIDE  20 mg Oral BID AC    [Held by provider] alogliptin  12.5 mg Oral Daily    insulin glargine  10 Units SubCUTAneous BID    atorvastatin  20 mg Oral Nightly    sodium chloride flush  5-40 mL IntraVENous 2 times per day    heparin (porcine)  5,000 Units SubCUTAneous 3 times per day    insulin lispro  0-8 Units SubCUTAneous TID WC    insulin lispro  0-4 Units SubCUTAneous Nightly    cefepime  2,000 mg IntraVENous Q12H       Electronically signed by Lisa Carmen MD on 3/7/2023 at 11:20 AM      [facility] Martin Keys MD  [employer]  OFFICE: [phone]    Thank you for allowing us to participate in the care of this patient. Please call with questions. This note is created with the assistance of a speech recognition program.  While intending to generate a document that actually reflects the content of the visit, the document can still have some errors including those of syntax and sound a like substitutions which may escape proof reading. In such instances, actual meaning can be extrapolated by contextual diversion.

## 2023-03-07 NOTE — PROGRESS NOTES
Pt is confused, unsteady and got out of bed. He had a large episode of stool incontinence. Alysia Lujan was called in because he was very agitated and tried to get out of the room. He was medicated with lorazepam 1mg IV , assisted back to bed , continuous pulse oxi monitoring placed, 1:1 supervision initiated.

## 2023-03-08 ENCOUNTER — APPOINTMENT (OUTPATIENT)
Dept: MRI IMAGING | Age: 68
DRG: 981 | End: 2023-03-08
Payer: MEDICARE

## 2023-03-08 PROBLEM — G93.41 ACUTE METABOLIC ENCEPHALOPATHY: Status: ACTIVE | Noted: 2023-03-08

## 2023-03-08 LAB
ANION GAP SERPL CALCULATED.3IONS-SCNC: 10 MMOL/L (ref 9–17)
BUN SERPL-MCNC: 17 MG/DL (ref 8–23)
BUN/CREAT BLD: 7 (ref 9–20)
CALCIUM SERPL-MCNC: 9.7 MG/DL (ref 8.6–10.4)
CHLORIDE SERPL-SCNC: 107 MMOL/L (ref 98–107)
CO2 SERPL-SCNC: 20 MMOL/L (ref 20–31)
CREAT SERPL-MCNC: 2.33 MG/DL (ref 0.7–1.2)
GFR SERPL CREATININE-BSD FRML MDRD: 30 ML/MIN/1.73M2
GLUCOSE BLD-MCNC: 148 MG/DL (ref 75–110)
GLUCOSE BLD-MCNC: 193 MG/DL (ref 75–110)
GLUCOSE BLD-MCNC: 212 MG/DL (ref 75–110)
GLUCOSE SERPL-MCNC: 152 MG/DL (ref 70–99)
POTASSIUM SERPL-SCNC: 4.1 MMOL/L (ref 3.7–5.3)
SODIUM SERPL-SCNC: 137 MMOL/L (ref 135–144)

## 2023-03-08 PROCEDURE — 1200000000 HC SEMI PRIVATE

## 2023-03-08 PROCEDURE — 6360000002 HC RX W HCPCS: Performed by: NURSE PRACTITIONER

## 2023-03-08 PROCEDURE — 6370000000 HC RX 637 (ALT 250 FOR IP): Performed by: NURSE PRACTITIONER

## 2023-03-08 PROCEDURE — 95816 EEG AWAKE AND DROWSY: CPT | Performed by: PSYCHIATRY & NEUROLOGY

## 2023-03-08 PROCEDURE — 95816 EEG AWAKE AND DROWSY: CPT

## 2023-03-08 PROCEDURE — 82947 ASSAY GLUCOSE BLOOD QUANT: CPT

## 2023-03-08 PROCEDURE — 70551 MRI BRAIN STEM W/O DYE: CPT

## 2023-03-08 PROCEDURE — 90792 PSYCH DIAG EVAL W/MED SRVCS: CPT | Performed by: PSYCHIATRY & NEUROLOGY

## 2023-03-08 PROCEDURE — 80048 BASIC METABOLIC PNL TOTAL CA: CPT

## 2023-03-08 PROCEDURE — 36415 COLL VENOUS BLD VENIPUNCTURE: CPT

## 2023-03-08 PROCEDURE — 99232 SBSQ HOSP IP/OBS MODERATE 35: CPT | Performed by: INTERNAL MEDICINE

## 2023-03-08 PROCEDURE — 6370000000 HC RX 637 (ALT 250 FOR IP): Performed by: INTERNAL MEDICINE

## 2023-03-08 PROCEDURE — 99232 SBSQ HOSP IP/OBS MODERATE 35: CPT | Performed by: PSYCHIATRY & NEUROLOGY

## 2023-03-08 PROCEDURE — 99232 SBSQ HOSP IP/OBS MODERATE 35: CPT | Performed by: NURSE PRACTITIONER

## 2023-03-08 RX ORDER — CYCLOBENZAPRINE HCL 10 MG
10 TABLET ORAL NIGHTLY
Status: ON HOLD | COMMUNITY
End: 2023-03-14 | Stop reason: HOSPADM

## 2023-03-08 RX ORDER — GABAPENTIN 300 MG/1
300 CAPSULE ORAL 3 TIMES DAILY
Status: DISCONTINUED | OUTPATIENT
Start: 2023-03-08 | End: 2023-03-13

## 2023-03-08 RX ADMIN — CETIRIZINE HYDROCHLORIDE 10 MG: 10 TABLET, FILM COATED ORAL at 21:41

## 2023-03-08 RX ADMIN — ATORVASTATIN CALCIUM 20 MG: 20 TABLET, FILM COATED ORAL at 21:41

## 2023-03-08 RX ADMIN — GABAPENTIN 900 MG: 300 CAPSULE ORAL at 08:27

## 2023-03-08 RX ADMIN — INSULIN GLARGINE 10 UNITS: 100 INJECTION, SOLUTION SUBCUTANEOUS at 08:28

## 2023-03-08 RX ADMIN — INSULIN GLARGINE 10 UNITS: 100 INJECTION, SOLUTION SUBCUTANEOUS at 21:41

## 2023-03-08 RX ADMIN — ASPIRIN 81 MG: 81 TABLET, COATED ORAL at 08:27

## 2023-03-08 RX ADMIN — HEPARIN SODIUM 5000 UNITS: 5000 INJECTION INTRAVENOUS; SUBCUTANEOUS at 21:40

## 2023-03-08 RX ADMIN — GABAPENTIN 300 MG: 300 CAPSULE ORAL at 16:12

## 2023-03-08 RX ADMIN — HEPARIN SODIUM 5000 UNITS: 5000 INJECTION INTRAVENOUS; SUBCUTANEOUS at 17:14

## 2023-03-08 RX ADMIN — AMLODIPINE BESYLATE 5 MG: 5 TABLET ORAL at 08:27

## 2023-03-08 RX ADMIN — GABAPENTIN 300 MG: 300 CAPSULE ORAL at 21:40

## 2023-03-08 ASSESSMENT — ENCOUNTER SYMPTOMS
VOMITING: 0
ABDOMINAL PAIN: 0
COLOR CHANGE: 1
CONSTIPATION: 0
CHEST TIGHTNESS: 0
GASTROINTESTINAL NEGATIVE: 1
RESPIRATORY NEGATIVE: 1
NAUSEA: 1
DIARRHEA: 0
SHORTNESS OF BREATH: 0

## 2023-03-08 NOTE — PROGRESS NOTES
Transitions of Care Pharmacy Service   Medication Review    The patient's list of current home medications has been reviewed. Source(s) of information: Patient/spouse/Surescripts/EPIC    Based on information provided by the above source(s), I have updated the patient's home med list as described below. Please review the ACTION REQUESTED section of this note below for any discrepancies on current hospital orders. I changed or updated the following medications on the patient's home medication list:  Removed Toradol 10mg q6hrs prn     Added Flexeril 10mg PO qhs     Adjusted   none   Other Notes none         PROVIDER ACTION REQUESTED  Medications that need to be addressed by a physician/nurse practitioner:    Medication Action Requested        none         Please feel free to call me with any questions about this encounter. Thank you. Iveth Orozco [address] [place] [address]   Transitions of Care Pharmacy Service  Phone:  [phone]  Fax: [phone]      Electronically signed by Iveth Orozco [address] [place] [address] on 3/8/2023 at 2:56 PM         Medications Prior to Admission: ketorolac (TORADOL) 10 MG tablet, Take 1 tablet by mouth every 6 hours as needed for Pain  HYDROcodone-acetaminophen (NORCO)  MG per tablet, Take 1 tablet by mouth every 6 hours as needed for Pain. TRUEPLUS PEN NEEDLES 31G X 8 MM MISC,   amLODIPine (NORVASC) 5 MG tablet, Take 5 mg by mouth daily  LANTUS SOLOSTAR 100 UNIT/ML injection pen, Inject 15 Units into the skin nightly (Patient taking differently: Inject 10 Units into the skin 2 times daily)  JANUVIA 100 MG tablet, Take 100 mg by mouth daily   Misc.  Devices MISC, 1 PAIR OF DIABETIC SHOES (1 LEFT/ 1 RIGHT) 1-3 PAIRS OF INSERTS (LEFT/ RIGHT)  cetirizine (ZYRTEC) 10 MG tablet, Take 10 mg by mouth nightly   simvastatin (ZOCOR) 40 MG tablet, Take 40 mg by mouth nightly   glipiZIDE (GLUCOTROL) 10 MG tablet, Take 20 mg by mouth 2 times daily (before meals) Takes 2 tabs (=20mg) BID  aspirin 81 MG tablet, Take 81 mg by mouth daily  gabapentin (NEURONTIN) 300 MG capsule, Take 900 mg by mouth 3 times daily.  Take 3 caps (=900mg) 3 times a day

## 2023-03-08 NOTE — PROGRESS NOTES
Physical Therapy  DATE: 3/8/2023    NAME: Lionel Sifuentes  MRN: [id number]   : 1955    Patient not seen this date for Physical Therapy due to:      [] Cancel by RN or physician due to:    [] Hemodialysis    [] Critical Lab Value Level     [] Blood transfusion in progress    [] Acute or unstable cardiovascular status   _MAP < 55 or more than >115  _HR < 40 or > 130    [] Acute or unstable pulmonary status   -FiO2 > 60%   _RR < 5 or >40    _O2 sats < 85%    [] Strict Bedrest    [] Off Unit for surgery or procedure    [] Off Unit for testing       [] Pending imaging to R/O fracture    [x] Refusal by Patient  \"Patient wants his mind to rest one more day\"   RN aware      [] Other      [] PT being discontinued at this time. Patient independent. No further needs. [] PT being discontinued at this time as the patient has been transferred to hospice care. No further needs.       MILLIE CAMARGO, PTA

## 2023-03-08 NOTE — PROGRESS NOTES
[facility]) Neurology Specialist  [address]  [facility], [address] [facility] [place]:  [phone] or [phone]  FAX:  [phone]            Brief history: Carson Fragoso is a 78 y.o. old male admitted on 3/3/2023 with       Subjective: No new neurological events overnight. Wife at bedside. States he is more awake today. Objective: /71   Pulse 79   Temp 98.2 °F (36.8 °C) (Oral)   Resp 16   Ht 5' 10\" (1.778 m)   Wt 222 lb (100.7 kg)   SpO2 95%   BMI 31.85 kg/m²       Medications:    gabapentin  300 mg Oral TID    amLODIPine  5 mg Oral Daily    aspirin  81 mg Oral Daily    cetirizine  10 mg Oral Nightly    [Held by provider] glipiZIDE  20 mg Oral BID AC    [Held by provider] alogliptin  12.5 mg Oral Daily    insulin glargine  10 Units SubCUTAneous BID    atorvastatin  20 mg Oral Nightly    sodium chloride flush  5-40 mL IntraVENous 2 times per day    heparin (porcine)  5,000 Units SubCUTAneous 3 times per day    insulin lispro  0-8 Units SubCUTAneous TID WC    insulin lispro  0-4 Units SubCUTAneous Nightly                This note is created with the assistance of a speech-recognition program. While intending to generate a document that actually reflects the content of the visit, the document can still have some errors including those of syntax and sound a- like substitutions which may escape proofreading. In such instances, actual meaning can be extrapolated by contextual derivation. Mental status   Alert and oriented x 1-2; does not follow commands, keeps stating \" I don't know\".       Cranial nerves   II - visual fields intact to confrontation; pupils reactive  III, IV, VI - extraocular muscles intact; no JOSE; no nystagmus; no ptosis   V - normal facial sensation                                                               VII - normal facial symmetry                                                             VIII - intact hearing IX, X - symmetrical palate elevation                                               XI - symmetrical shoulder shrug                                                       XII - midline tongue without atrophy or fasciculation     Motor function  Strength: 5/5 RUE, 5/5 RLE, 5/5 LUE, 5/5  LLE  Normal bulk and tone. No tremors                      Sensory function Intact to touch, pin, vibration, proprioception throughout     Cerebellar Intact finger-nose-finger testing. Intact heel-shin testing. No dysdiadochokinesia present. Reflex function 2/4 symmetric throughout . Downgoing plantar response bilaterally. (-)Morrell's sign bilaterally    Gait                  Normal station and gait             Diagnostics:      Laboratory Testing:  CBC:   Recent Labs     03/06/23  0556 03/07/23  1247   WBC 7.6 7.4   HGB 9.1* 8.4*    282       BMP:    Recent Labs     03/06/23  0556 03/07/23  1247 03/08/23  0637    136 137   K 4.9 4.1 4.1    107 107   CO2 25 19* 20   BUN 21 19 17   CREATININE 2.50* 2.23* 2.33*   GLUCOSE 93 98 152*           Lab Results   Component Value Date    CHOL 132 01/09/2017    LDLCHOLESTEROL 69 01/09/2017    HDL 37 (L) 01/09/2017    TRIG 128 01/09/2017    ALT 18 03/07/2023    AST 25 03/07/2023    TSH 3.63 03/06/2023    LABA1C 6.9 (H) 10/05/2022    LABMICR 29 (H) 01/09/2017       No results found for: PHENYTOIN, PHENYTOIN, VALPROATE, CBMZ      Imaging/Diagnostics:    CT HEAD WO CONTRAST    Narrative  EXAMINATION:  CT OF THE HEAD WITHOUT CONTRAST  3/6/2023 1:45 pm    TECHNIQUE:  CT of the head was performed without the administration of intravenous  contrast. Automated exposure control, iterative reconstruction, and/or weight  based adjustment of the mA/kV was utilized to reduce the radiation dose to as  low as reasonably achievable. COMPARISON:  None.     HISTORY:  ORDERING SYSTEM PROVIDED HISTORY: disorientation  TECHNOLOGIST PROVIDED HISTORY:  disorientation    Reason for Exam: Disoriented, AMS, dizzy    FINDINGS:  BRAIN/VENTRICLES: There is no acute intracranial hemorrhage, mass effect or  midline shift. No abnormal extra-axial fluid collection. The gray-white  differentiation is maintained without evidence of an acute infarct. There is  no evidence of hydrocephalus. ORBITS: The visualized portion of the orbits demonstrate no acute abnormality. SINUSES: The visualized paranasal sinuses and mastoid air cells demonstrate  no acute abnormality. SOFT TISSUES/SKULL:  No acute abnormality of the visualized skull or soft  tissues. Impression  No acute intracranial abnormality. CT head: NAP    CTA head and neck:     MRI brain: NAP, motion limited    2D echo:      I personally reviewed all of the above medications, clinical laboratory, imaging and other diagnostic tests. Impression: This is a 61-year-old male who presented to the ED due to worsening right foot pain and swelling. He has been having issues with chronic right foot ulcer for several months and has had several procedures done for this. He is currently being treated for infection/ulcer. Neurology asked to consult due to episodes of confusion. This with discussed with wife and ID at bedside. At this time symptoms are likely toxic metabolic in etiology from current infection/ ALEJANDRO/ medication effect. Discussed with ID-hold on cefepime as this is known to cause altered mental status. EEG and MRI brain reviewed,  Plan:     Primary team to manage comorbidities and metabolic derangements  EEG showed encephalopathy   MRI brain- reassuring  Hold cefepime  LIMIT ALL SEDATING meds including pain meds as this could be contributing to patient mentation  Labs for encephalopathy ( B12, folate, TSH, thiamine)         Will sign off as patient MRI is reassuring and slowly improving. Would be happy to re-evaluate as needed.        Electronically signed by Zoya Gibson DO on 3/8/2023 at 12:02 PM      Xander Ledesma, [facility]  Neurology

## 2023-03-08 NOTE — CARE COORDINATION
DC Planning    Pt mentation improved. Will need I+D and hardware removal-date TBD. Will need to watch for ID recs-currently off abx for now. Spoke with Kierra Chris will cont to follow.

## 2023-03-08 NOTE — PROGRESS NOTES
Progress Note  Podiatric Medicine and Surgery     Subjective     CC: right foot wound, s/p right foot midtarsal and subtalar join arthrodesis (12/27/22)    Interval history:  -Patient seen and examined at bedside. Patient sleeping during visit.  -Patient has altered mental status yesterday which is improving today.   -Patient remains off antibiotics. -EEG consistent with encephalopathy.  -Plan is still for I&D and removal of hardware of right lower extremity once mentation stabilizes. Date and time TBD. HPI:  Luciano Hernandes is a 78 y.o. male seen at [address] for right foot pain and swelling. Patient is well known to podiatry service and has been following outpatient with Dr. Audra Piedra since surgery on 12/27/22 to right foot. Patient states he has been compliant with wound care and walking in CAM boot, however noticed worsening pain and redness to right foot, prompting him to get CT at outside facility and presenting to [facility]'s ED. Patient states he has not been on IV antibiotics for a while, does follow Infectious disease who saw him on 3/1/23. Patient denies any constitutional symptoms at this time. Patient is type 2 diabetic, ALEJANDRO on CKD, and has history of Charcot foot. No further pedal complaints at this time. PCP is Connie Mcginnis MD    ROS:   Review of Systems   Constitutional:  Negative for chills and fever. Respiratory:  Negative for chest tightness and shortness of breath. Cardiovascular:  Negative for chest pain and leg swelling. Gastrointestinal:  Positive for nausea. Negative for abdominal pain, constipation, diarrhea and vomiting. Musculoskeletal:  Positive for arthralgias, joint swelling and myalgias. Negative for gait problem. Skin:  Positive for color change and wound. Neurological:  Negative for weakness and numbness.        Objective     Vitals:  Patient Vitals for the past 8 hrs:   BP Temp Temp src Pulse Resp SpO2   03/08/23 1117 137/71 98.2 °F (36.8 °C) Oral 79 16 95 %       Average, Min, and Max for last 24 hours Vitals:  TEMPERATURE:  Temp  Av.4 °F (36.9 °C)  Min: 98.2 °F (36.8 °C)  Max: 98.8 °F (37.1 °C)    RESPIRATIONS RANGE: Resp  Av.5  Min: 16  Max: 24    PULSE RANGE: Pulse  Av.8  Min: 65  Max: 100    BLOOD PRESSURE RANGE:  Systolic (32SUY), EUS:919 , Min:137 , MDX:236   ; Diastolic (21KHW), OTY:00, Min:71, Max:115      PULSE OXIMETRY RANGE: SpO2  Av.8 %  Min: 95 %  Max: 98 %  I&O:  I/O last 3 completed shifts: In: 2241.2 [I.V.:2146; IV Piggyback:95.3]  Out: 1270 [Urine:1270]    CBC:  Recent Labs     23  0556 23  1622 23  1247   WBC 7.6  --  7.4   HGB 9.1*  --  8.4*   HCT 29.0*  --  24.8*     --  282   CRP  --  75.8*  --           BMP:  Recent Labs     23  0556 23  1247 23  0637    136 137   K 4.9 4.1 4.1    107 107   CO2 25 19* 20   BUN 21 19 17   CREATININE 2.50* 2.23* 2.33*   GLUCOSE 93 98 152*   CALCIUM 9.8 9.5 9.7          Coags:  Recent Labs     23  1622 23  1247   PROT 7.1 7.0         Lab Results   Component Value Date    LABA1C 6.9 (H) 10/05/2022     Lab Results   Component Value Date    SEDRATE 53 (H) 2023     Lab Results   Component Value Date    CRP 75.8 (H) 2023         Lower Extremity Physical Exam:  Vascular: DP and PT pulses are palpable, bilaterally. CFT <4 seconds to all digits. Hair growth is absent to the level of the digits. Nonpitting edema to lateral ankle right foot. Neuro: Saph/sural/SP/DP/plantar sensation intact to light touch. Musculoskeletal: Muscle strength is 4/5, decreased ROM , adequate strength to all lower extremity muscle groups. Gross deformity is absent. Dermatologic: Full thickness ulcer #1 located lateral aspect of right ankle and measures approximately 0.5cm x 0.3cm x 0.5 cm. Base is fibrogranular. Periwound skin is hyperkeratotic. Minimal serous drainage with no associated mal odor.  Erythema noted to right ankle with associated increase in warmth. Does probe deep, sinus tracks proximal. No fluctuance, crepitus, or induration. Interdigital maceration absent. Clinical Images:            Imaging:   MRI BRAIN WO CONTRAST   Final Result   Motion artifact limits evaluation. No acute intracranial abnormality. CT HEAD WO CONTRAST   Final Result   No acute intracranial abnormality. US RETROPERITONEAL COMPLETE   Final Result   Mildly echogenic renal parenchyma suggestive of intrinsic renal parenchymal   disease. No hydronephrosis. Assessment     Luciano Hernandes is a 78 y.o. male with   Cellulitis, right lower extremity  S/p right midtarsal and subtalar foot arthrodesis  Charcot neuroarthropathy, right ankle  Right ankle pain  Encephalopathy of unknown etiology    Principal Problem:    Diabetic foot infection ([address] with VRE  Active Problems:    Type 2 diabetes mellitus with right diabetic foot ulcer ([address]    Charcot's joint of right foot    Stage 3b chronic kidney disease ([address]    Hyponatremia    Microcytic anemia    Infection due to vancomycin resistant Enterococcus faecium    Essential hypertension    Neuropathy    Charcot foot due to diabetes mellitus ([address]    Hyperlipidemia    Acute kidney injury superimposed on chronic kidney disease ([address]  Resolved Problems:    * No resolved hospital problems. *        Plan     Patient examined and evaluated at bedside   Treatment options discussed in detail with the patient  CT results from outside facility discussed with patient.  Findings consistent with possible hardware loosening vs infection to hardware  Patient admitted for IV antibiotics and further management of right foot wound infection  Patient has been n.p.o. since midnight but has chosen not to eat since lunch yesterday  Neurology on board:  MRI brain without contrast: Results limited, negative for intracranial abnormality  EEG consistent with encephalopathy  Plan is still for I&D and removal of hardware of right lower extremity once mentation stabilizes. Date and time TBD. At this time is unclear if altered mentation is a product of foot infection. IV abx: Cefepime and linezolid held, monitoring off antibiotics.    Appreciate ID recommendations   Dressing applied to Right foot: 1/2 packing, Betadine, DSD, Ace  WBAT to Right lower extremity  Will discuss with Dr. Bridger Ortiz [facility]   Podiatric Medicine & Surgery   3/8/2023 at 1:53 PM

## 2023-03-08 NOTE — PLAN OF CARE
Problem: Discharge Planning  Goal: Discharge to home or other facility with appropriate resources  Outcome: Progressing     Problem: Pain  Goal: Verbalizes/displays adequate comfort level or baseline comfort level  Outcome: Progressing     Problem: Safety - Adult  Goal: Free from fall injury  Outcome: Progressing     Problem: ABCDS Injury Assessment  Goal: Absence of physical injury  Outcome: Progressing     Problem: Neurosensory - Adult  Goal: Achieves maximal functionality and self care  Outcome: Progressing     Problem: Respiratory - Adult  Goal: Achieves optimal ventilation and oxygenation  Outcome: Progressing     Problem: Cardiovascular - Adult  Goal: Maintains optimal cardiac output and hemodynamic stability  Outcome: Progressing  Goal: Absence of cardiac dysrhythmias or at baseline  Outcome: Progressing     Problem: Skin/Tissue Integrity - Adult  Goal: Skin integrity remains intact  Outcome: Progressing  Goal: Incisions, wounds, or drain sites healing without S/S of infection  Outcome: Progressing  Goal: Oral mucous membranes remain intact  Outcome: Progressing     Problem: Musculoskeletal - Adult  Goal: Return mobility to safest level of function  Outcome: Progressing  Goal: Maintain proper alignment of affected body part  Outcome: Progressing  Goal: Return ADL status to a safe level of function  Outcome: Progressing     Problem: Gastrointestinal - Adult  Goal: Minimal or absence of nausea and vomiting  Outcome: Progressing  Goal: Maintains or returns to baseline bowel function  Outcome: Progressing  Goal: Maintains adequate nutritional intake  Outcome: Progressing     Problem: Genitourinary - Adult  Goal: Absence of urinary retention  Outcome: Progressing     Problem: Infection - Adult  Goal: Absence of infection at discharge  Outcome: Progressing  Goal: Absence of infection during hospitalization  Outcome: Progressing  Goal: Absence of fever/infection during anticipated neutropenic period  Outcome: Progressing     Problem: Metabolic/Fluid and Electrolytes - Adult  Goal: Electrolytes maintained within normal limits  Outcome: Progressing  Goal: Hemodynamic stability and optimal renal function maintained  Outcome: Progressing  Goal: Glucose maintained within prescribed range  Outcome: Progressing     Problem: Hematologic - Adult  Goal: Maintains hematologic stability  Outcome: Progressing     Problem: Chronic Conditions and Co-morbidities  Goal: Patient's chronic conditions and co-morbidity symptoms are monitored and maintained or improved  Outcome: Progressing     Problem: Skin/Tissue Integrity  Goal: Absence of new skin breakdown  Description: 1. Monitor for areas of redness and/or skin breakdown  2. Assess vascular access sites hourly  3. Every 4-6 hours minimum:  Change oxygen saturation probe site  4. Every 4-6 hours:  If on nasal continuous positive airway pressure, respiratory therapy assess nares and determine need for appliance change or resting period.   Outcome: Progressing

## 2023-03-08 NOTE — PROGRESS NOTES
[facility]  Progress Note    Doroteo Roberson  MRN: [id number]  Date: 3/8/2023  LOS: 5     Reason for F/U :   Charcot foot deformity, osteomyelitis    Impression :   Charcot foot deformity with history of right metatarsal and subtalar foot arthrodesis  History of osteomyelitis, s/p antimicrobial therapy in late 2022  Right lower extremity draining wounds with imaging suggesting potential infection involving the hardware  Diabetes mellitus type 2 with associated chronic kidney disease  Altered mental status, improved, thought secondary to cefepime    Recommendations: The patient did receive cefepime starting 3/3/2023 and linezolid starting 3/4/2023, these were both discontinued 3/7/2023  The cefepime was discontinued due to concern for inducing the confusion  The patient can continue off antimicrobial therapy for now  When he goes to the OR, the patient should receive meropenem and daptomycin perioperatively  The patient is being seen by the neurology service due to his mental status changes; however, he is back to baseline today  We will continue to follow his clinical progress and adjust therapy accordingly    Infection Control Recommendations:   Universal precautions    Discharge Planning:   Estimated Length of IV antimicrobials:   Patient will need Midline Catheter Insertion/ PICC line Insertion: No  Patient will need: Home IV , [place],  SNF,  LTAC: Undetermined  Patient willneed outpatient wound care: No    Medical Decision making / Summary of Stay:   Doroteo Roberson is a 78y.o.-year-old male presented to the hospital with worsening right foot pain and swelling for several weeks associated with open ulcer on the lateral aspect of the hand foot that probes to bone. Symptoms moderate to severe, worse with movement, no alleviating factors. He also complaining of subjective fever and chills, mild shortness of breath.   CT of the right foot was done at [facility] on 2/27/2023 showed lucency around hardware within the calcaneus. The patient had recent surgery done by Dr. Wilbert Sofia on 2022 with hardware in place. History of Charcot foot, chronic renal failure and diabetes mellitus. Initial WBC normal, creatinine 2.89, sedimentation rate 53, C-reactive protein 136    Current evaluation:3/8/2023    BP (!) 145/92   Pulse 65   Temp 98.2 °F (36.8 °C) (Axillary)   Resp 24   Ht 5' 10\" (1.778 m)   Wt 222 lb (100.7 kg)   SpO2 98%   BMI 31.85 kg/m²     Temperature Range: Temp: 98.2 °F (36.8 °C) Temp  Av.4 °F (36.9 °C)  Min: 98.1 °F (36.7 °C)  Max: 98.8 °F (37.1 °C)    The patient was seen and evaluated at bedside  This experiencing confusion yesterday, but is essentially back to baseline today  Denies pain  Needs at this time  Right foot wound is draining    Review of Systems   Constitutional: Negative. HENT: Negative. Respiratory: Negative. Cardiovascular: Negative. Gastrointestinal: Negative. Genitourinary: Negative. Musculoskeletal: Negative. Skin: Negative. Neurological: Negative. Psychiatric/Behavioral: Negative. Physical Examination :     Physical Exam  Constitutional:       Appearance: Normal appearance. He is normal weight. HENT:      Head: Normocephalic and atraumatic. Pulmonary:      Effort: Pulmonary effort is normal. No respiratory distress. Musculoskeletal:      Comments: Right ankle lateral aspect open wound with serosanguineous drainage, site is warm   Skin:     General: Skin is warm and dry. Neurological:      General: No focal deficit present. Mental Status: He is alert and oriented to person, place, and time. Mental status is at baseline. Psychiatric:         Mood and Affect: Mood normal.         Behavior: Behavior normal.         Thought Content:  Thought content normal.       Laboratory data:   I have independently reviewed the followinglabs:  CBC with Differential:   Recent Labs     23  0556 23  1247   WBC 7.6 7.4   HGB 9.1* 8.4*   HCT 29.0* 24.8*    282   LYMPHOPCT 29 26   MONOPCT 9 9     BMP:   Recent Labs     03/07/23  1247 03/08/23  0637    137   K 4.1 4.1    107   CO2 19* 20   BUN 19 17   CREATININE 2.23* 2.33*     Hepatic Function Panel:   Recent Labs     03/06/23  1622 03/07/23  1247   PROT 7.1 7.0   LABALBU 2.7* 2.8*   BILIDIR <0.1  --    IBILI Can not be calculated  --    BILITOT 0.2* 0.2*   ALKPHOS 124 123   ALT 16 18   AST 19 25         No results found for: PROCAL  Lab Results   Component Value Date/Time    CRP 75.8 03/06/2023 04:22 PM    .7 03/05/2023 05:57 AM    .4 03/03/2023 03:32 PM     Lab Results   Component Value Date    SEDRATE 53 (H) 03/03/2023         No results found for: DDIMER  No results found for: FERRITIN  No results found for: LDH  No results found for: FIBRINOGEN    No results found for requested labs within last 30 days.     Lab Results   Component Value Date/Time    COVID19 DETECTED 05/26/2021 12:06 PM    COVID19 Not Detected 12/07/2020 03:10 PM    COVID19 Not Detected 08/08/2020 10:02 PM       No results for input(s): VANCOTROUGH in the last 72 hours.    Imaging Studies:   MRI brain  Impression:        Motion artifact limits evaluation.     No acute intracranial abnormality.      Cultures:     Culture, Blood 1 [[id number]] Collected: 03/07/23 1238   Order Status: Completed Specimen: Blood Updated: 03/08/23 0211    Specimen Description .BLOOD    Special Requests LT HAND,6ML    Culture NO GROWTH 12 HOURS   Culture, Blood 1 [[id number]] Collected: 03/07/23 1246   Order Status: Completed Specimen: Blood Updated: 03/08/23 0210    Specimen Description .BLOOD    Special Requests 10ML,RTFA    Culture NO GROWTH 12 HOURS       Medications:      amLODIPine  5 mg Oral Daily    aspirin  81 mg Oral Daily    cetirizine  10 mg Oral Nightly    gabapentin  900 mg Oral TID    [Held by provider] glipiZIDE  20 mg Oral BID AC    [Held by provider] alogliptin  12.5 mg Oral  Daily    insulin glargine  10 Units SubCUTAneous BID    atorvastatin  20 mg Oral Nightly    sodium chloride flush  5-40 mL IntraVENous 2 times per day    heparin (porcine)  5,000 Units SubCUTAneous 3 times per day    insulin lispro  0-8 Units SubCUTAneous TID WC    insulin lispro  0-4 Units SubCUTAneous Nightly       Electronically signed by ROCHELLE Ballard CNP on 3/8/2023 at 9:37 AM      [facility]  ROCHELLE Ballard CNP  [employer] messaging  OFFICE: [phone]    Thank you for allowing us to participate in the care of this patient. Please call with questions. This note is created with the assistance of a speech recognition program.  While intending to generate a document that actually reflects the content of the visit, the document can still have some errors including those of syntax and sound a like substitutions which may escape proof reading. In such instances, actual meaning can be extrapolated by contextual diversion.

## 2023-03-08 NOTE — PROGRESS NOTES
Occupational [address]  Occupational Therapy Not Seen Note    Patient not available for Occupational Therapy due to:    [] Testing:    [] Hemodialysis    [] Cancelled by RN:    [x]Refusal by Patient: Pt declining therapy this date, reporting he would like to rest. Attempted to educate pt, pt continued to decline. Will continue to follow.     [] Surgery:     [] Intubation:     [] Pain Medication:    [] Sedation:     [] Spine Precautions :    [] Medical Instability:    [] Other:      [place], OT

## 2023-03-08 NOTE — PROGRESS NOTES
[facility]  Office: [phone]  Aaron Canales DO, Sree Thomas DO, Adam Lee DO, Steve Solis DO, Andrea Macias MD, Betsy Ovalles MD, Marycruz Ellis MD, Michelle Dexter MD,  Mike Burnett MD, Marcin Armstrong MD, Brandon Matson DO, Mey Bentley MD,  Jesus Wright DO, Shannon Quintero MD, Qasim Perez MD, Chalo Canales DO, Maggie Hernandez MD, Chicho Lombardi MD, Fabian Jennings DO, Harika Thompson MD, Flori Nye MD, Meena Suarez MD, Marilin Miranda MD, Nato Orellana DO, Grazyna William MD, Stormy Aleman MD, Shirley Waterhouse, CNP,  Lakesha Bermudez, CNP, Lauren Archibald, CNP, Jaspal Fletcher, CNP,  Patrica Ashley, [facility], Lo Mcallister, CNP, Lana Main, CNP, Mala Clarke, CNP, Hanna Aly, CNP, Angeli Hargrove, CNP, Dino Odom PA-C, Bertha Leroy, CNS, Deb Charles, CNP, Vandana Hinojosa, CNP         [facility]   IN-PATIENT SERVICE   [facility]    Progress Note    3/8/2023    11:24 AM    Name:   Samuel Mix  MRN:     [id number]     Acct:      [id number]   Room:   2017/2017-02  IP Day:  5  Admit Date:  3/3/2023  2:54 PM    PCP:   Alex Pickard MD  Code Status:  Full Code    Subjective:     C/C:   Chief Complaint   Patient presents with    Wound Infection     Right foot     Interval History Status: improved.     Considerably better today    He doesn't really remember yesterday and doesn't know why he acted like that      Wife at bedside and says he is much better    Brief History:     Per my partner   This is a 67-year-old male that presents with right foot wound with concerns for infection.  Recent outpatient culture with vancomycin-resistant Enterococcus faecium.  He presented to the emergency room with worsening drainage and developed fevers and chills.  Overnight he was started on Maxipime.  Recent outpatient cultures were obtained with evidence of VRE with antibiotics will be adjusted accordingly.    Review of Systems:     Constitutional:  negative  for chills, fevers, sweats  Respiratory:  negative for cough, wheezing, shortness of breath  Cardiovascular:  negative for chest pain, chest pressure/discomfort, palpitations  Gastrointestinal:  negative for abdominal pain, constipation, diarrhea, nausea, vomiting  Neurological:  negative for dizziness, headache      Medications:     Allergies:    Allergies   Allergen Reactions    Morphine Itching    Other Other (See Comments)     Other reaction(s): Unknown    Percocet [Oxycodone-Acetaminophen]      Facial swelling       Current Meds:   Scheduled Meds:    amLODIPine  5 mg Oral Daily    aspirin  81 mg Oral Daily    cetirizine  10 mg Oral Nightly    gabapentin  900 mg Oral TID    [Held by provider] glipiZIDE  20 mg Oral BID AC    [Held by provider] alogliptin  12.5 mg Oral Daily    insulin glargine  10 Units SubCUTAneous BID    atorvastatin  20 mg Oral Nightly    sodium chloride flush  5-40 mL IntraVENous 2 times per day    heparin (porcine)  5,000 Units SubCUTAneous 3 times per day    insulin lispro  0-8 Units SubCUTAneous TID WC    insulin lispro  0-4 Units SubCUTAneous Nightly     Continuous Infusions:    dextrose 5 % and 0.9 % NaCl 75 mL/hr at 03/08/23 0557    dextrose      sodium chloride Stopped (03/05/23 1313)     PRN Meds: fentanNYL, HYDROcodone-acetaminophen, glucose, dextrose bolus **OR** dextrose bolus, glucagon (rDNA), dextrose, sodium chloride flush, sodium chloride, ondansetron **OR** ondansetron, polyethylene glycol    Data:     Past Medical History:   has a past medical history of Abscess of right foot, Acquired hammer toe deformity of lesser toe of right foot, ALEJANDRO (acute kidney injury) ([facility]), Cellulitis, Cellulitis of left foot, Cellulitis of right foot, Charcot foot due to diabetes mellitus ([facility]), Chest pain at rest, Chronic multifocal osteomyelitis of right foot ([facility]), CKD (chronic kidney disease), Diabetic polyneuropathy associated with type 2 diabetes mellitus ([facility]), Essential hypertension,  Fractures, multiple, Hyperlipidemia, Hypertension, Leukocytosis, MRSA (methicillin resistant staph aureus) culture positive, Neuropathy, Pain in right foot, Pneumonia, Right foot infection, Right foot pain, Tobacco abuse, Type II or unspecified type diabetes mellitus without mention of complication, not stated as uncontrolled, Vertigo, Well controlled type 2 diabetes mellitus with neurological manifestations ([facility] [address] Wound dehiscence, Wound, open, and Wound, open. Social History:   reports that he has been smoking cigarettes. He has been smoking an average of .5 packs per day. He has never used smokeless tobacco. He reports that he does not currently use drugs. He reports that he does not drink alcohol. Family History:   Family History   Problem Relation Age of Onset    Diabetes Mother     Cancer Father     Hypertension Maternal Grandmother        Vitals:  /71   Pulse 79   Temp 98.2 °F (36.8 °C) (Oral)   Resp 16   Ht 5' 10\" (1.778 m)   Wt 222 lb (100.7 kg)   SpO2 95%   BMI 31.85 kg/m²   Temp (24hrs), Av.3 °F (36.8 °C), Min:98.1 °F (36.7 °C), Max:98.8 °F (37.1 °C)    Recent Labs     23  1134 23  1638 23  0635   POCGLU 84 107 122* 148*       I/O (24Hr):     Intake/Output Summary (Last 24 hours) at 3/8/2023 1124  Last data filed at 3/8/2023 0557  Gross per 24 hour   Intake 2158.09 ml   Output 1270 ml   Net 888.09 ml       Labs:  Hematology:  Recent Labs     23  0556 23  1622 23  1247   WBC 7.6  --  7.4   RBC 3.26*  --  2.99*   HGB 9.1*  --  8.4*   HCT 29.0*  --  24.8*   MCV 89.0  --  82.9   MCH 27.9  --  28.1   MCHC 31.4  --  33.9   RDW 16.6*  --  15.7*     --  282   MPV 9.1  --  8.7   CRP  --  75.8*  --      Chemistry:  Recent Labs     23  0556 23  1247 23  0637    136 137   K 4.9 4.1 4.1    107 107   CO2 25 19* 20   GLUCOSE 93 98 152*   BUN 21 19 17   CREATININE 2.50* 2.23* 2.33*   ANIONGAP 7* 10 10   LABGLOM 28* 32* 30*   CALCIUM 9.8 9.5 9.7     Recent Labs     03/06/23  1622 03/06/23  2005 03/07/23  0233 03/07/23  0556 03/07/23  1134 03/07/23  1247 03/07/23  1638 03/07/23 2011 03/08/23  0635   PROT 7.1  --   --   --   --  7.0  --   --   --    LABALBU 2.7*  --   --   --   --  2.8*  --   --   --    TSH 3.63  --   --   --   --   --   --   --   --    AST 19  --   --   --   --  25  --   --   --    ALT 16  --   --   --   --  18  --   --   --    ALKPHOS 124  --   --   --   --  123  --   --   --    BILITOT 0.2*  --   --   --   --  0.2*  --   --   --    BILIDIR <0.1  --   --   --   --   --   --   --   --    AMMONIA <10*  --   --   --   --   --   --   --   --    POCGLU  --    < > 170* 135* 84  --  107 122* 148*    < > = values in this interval not displayed. ABG:  Lab Results   Component Value Date/Time    POCPH 7.393 03/06/2023 02:39 PM    POCPCO2 36.8 03/06/2023 02:39 PM    POCPO2 73.0 03/06/2023 02:39 PM    POCHCO3 22.4 03/06/2023 02:39 PM    NBEA 2 03/06/2023 02:39 PM    DYZH6ZUI 94 03/06/2023 02:39 PM    FIO2 21.0 03/06/2023 02:39 PM     Lab Results   Component Value Date/Time    SPECIAL 10ML,RTFA 03/07/2023 12:46 PM     Lab Results   Component Value Date/Time    CULTURE NO GROWTH 12 HOURS 03/07/2023 12:46 PM       Radiology:  CT HEAD WO CONTRAST    Result Date: 3/6/2023  No acute intracranial abnormality. US RETROPERITONEAL COMPLETE    Result Date: 3/4/2023  Mildly echogenic renal parenchyma suggestive of intrinsic renal parenchymal disease. No hydronephrosis.        Physical Examination:        General appearance:  alert, cooperative and no distress  Mental Status:  oriented to person, place and year but not day or month, and normal affect  Lungs:  clear to auscultation bilaterally, normal effort  Heart:  regular rate and rhythm, no murmur  Abdomen:  soft, nontender, nondistended, normal bowel sounds, no masses, hepatomegaly, splenomegaly  Extremities:  no edema, redness, tenderness in the calves  Mentation much improved today    Assessment:        Hospital Problems             Last Modified POA    * (Principal) Diabetic foot infection ([address] with VRE 3/4/2023 Yes    Type 2 diabetes mellitus with right diabetic foot ulcer ([address] 3/3/2023 Yes    Charcot's joint of right foot 3/3/2023 Yes    Stage 3b chronic kidney disease ([address] (Chronic) 3/3/2023 Yes    Hyponatremia 3/3/2023 Yes    Microcytic anemia 3/3/2023 Yes    Infection due to vancomycin resistant Enterococcus faecium 3/4/2023 Yes    Essential hypertension (Chronic) 3/3/2023 Yes    Neuropathy 3/3/2023 Yes    Charcot foot due to diabetes mellitus ([address] 3/3/2023 Yes    Hyperlipidemia 3/3/2023 Yes    Acute kidney injury superimposed on chronic kidney disease ([address] 3/3/2023 Yes       Plan:        Unclear what caused his ams yesterday but today much better; cefepime stopped in case that was cause.   Will also lower neurontin dose as his dose is too high for his level of renal function  mri head done , negative  May be rescheduled for surgery  D/w wife    Yeyo Perry Solis, DO  3/8/2023  11:24 AM

## 2023-03-08 NOTE — PROCEDURES
PATIENT:   Denver Demark READING PHYSICIAN:  Analia Shah    TECHNIQUE:  22 channels of EEG and 1 channel of EKG were recorded utilizing the International 10/20 System. CLINICAL HISTORY: This is a 78 y.o. male with The primary encounter diagnosis was Diabetic foot infection ([address] A diagnosis of Charcot foot due to diabetes mellitus ([facility] [address] was also pertinent to this visit. .  EEG is being performed to evaluate for epileptiform activity. Medications: Scheduled Meds:   gabapentin  300 mg Oral TID    amLODIPine  5 mg Oral Daily    aspirin  81 mg Oral Daily    cetirizine  10 mg Oral Nightly    [Held by provider] glipiZIDE  20 mg Oral BID AC    [Held by provider] alogliptin  12.5 mg Oral Daily    insulin glargine  10 Units SubCUTAneous BID    atorvastatin  20 mg Oral Nightly    sodium chloride flush  5-40 mL IntraVENous 2 times per day    heparin (porcine)  5,000 Units SubCUTAneous 3 times per day    insulin lispro  0-8 Units SubCUTAneous TID WC    insulin lispro  0-4 Units SubCUTAneous Nightly           EEG FINDINGS:   The background activity consisted of 5-6 Hz of theta activity. This activity did not evolve during the recording. Frontal and central leads bilaterally were dominated by low amplitude fast featureless beta activity. Intermittent eye blink and movement artifact were seen during the tracing. Sleep was not obtained. EKG lead showed normal sinus rhythm. ACTIVATION: Hyperventilation: Not done. Photic stimulation: No photic stimulation performed. IMPRESSION:  This is an abnormal EEG due to absence of alpha activity suggestive of encephalopathy. Differential diagnosis for encephalopathy includes toxic, metabolic or other etiologies. No clinical or electrographic seizures were recorded during the study. Clinical correlation is recommended.  I hope you find this information helpful

## 2023-03-09 PROBLEM — F05 DELIRIUM DUE TO ANOTHER MEDICAL CONDITION: Status: ACTIVE | Noted: 2023-03-09

## 2023-03-09 LAB
ANION GAP SERPL CALCULATED.3IONS-SCNC: 8 MMOL/L (ref 9–17)
BUN SERPL-MCNC: 16 MG/DL (ref 8–23)
BUN/CREAT BLD: 7 (ref 9–20)
CALCIUM SERPL-MCNC: 9.2 MG/DL (ref 8.6–10.4)
CHLORIDE SERPL-SCNC: 105 MMOL/L (ref 98–107)
CO2 SERPL-SCNC: 23 MMOL/L (ref 20–31)
CREAT SERPL-MCNC: 2.28 MG/DL (ref 0.7–1.2)
CRP SERPL HS-MCNC: 34.8 MG/L (ref 0–5)
GFR SERPL CREATININE-BSD FRML MDRD: 31 ML/MIN/1.73M2
GLUCOSE BLD-MCNC: 119 MG/DL (ref 75–110)
GLUCOSE BLD-MCNC: 181 MG/DL (ref 75–110)
GLUCOSE BLD-MCNC: 184 MG/DL (ref 75–110)
GLUCOSE BLD-MCNC: 228 MG/DL (ref 75–110)
GLUCOSE BLD-MCNC: 233 MG/DL (ref 75–110)
GLUCOSE SERPL-MCNC: 113 MG/DL (ref 70–99)
POTASSIUM SERPL-SCNC: 4 MMOL/L (ref 3.7–5.3)
SODIUM SERPL-SCNC: 136 MMOL/L (ref 135–144)

## 2023-03-09 PROCEDURE — 99232 SBSQ HOSP IP/OBS MODERATE 35: CPT | Performed by: NURSE PRACTITIONER

## 2023-03-09 PROCEDURE — 6370000000 HC RX 637 (ALT 250 FOR IP): Performed by: INTERNAL MEDICINE

## 2023-03-09 PROCEDURE — 97530 THERAPEUTIC ACTIVITIES: CPT

## 2023-03-09 PROCEDURE — 82947 ASSAY GLUCOSE BLOOD QUANT: CPT

## 2023-03-09 PROCEDURE — 99231 SBSQ HOSP IP/OBS SF/LOW 25: CPT | Performed by: INTERNAL MEDICINE

## 2023-03-09 PROCEDURE — 97116 GAIT TRAINING THERAPY: CPT

## 2023-03-09 PROCEDURE — 86140 C-REACTIVE PROTEIN: CPT

## 2023-03-09 PROCEDURE — 2580000003 HC RX 258: Performed by: NURSE PRACTITIONER

## 2023-03-09 PROCEDURE — 97535 SELF CARE MNGMENT TRAINING: CPT

## 2023-03-09 PROCEDURE — 97110 THERAPEUTIC EXERCISES: CPT

## 2023-03-09 PROCEDURE — 6360000002 HC RX W HCPCS: Performed by: NURSE PRACTITIONER

## 2023-03-09 PROCEDURE — 6370000000 HC RX 637 (ALT 250 FOR IP): Performed by: NURSE PRACTITIONER

## 2023-03-09 PROCEDURE — 1200000000 HC SEMI PRIVATE

## 2023-03-09 PROCEDURE — 36415 COLL VENOUS BLD VENIPUNCTURE: CPT

## 2023-03-09 PROCEDURE — 80048 BASIC METABOLIC PNL TOTAL CA: CPT

## 2023-03-09 RX ORDER — ACETAMINOPHEN 325 MG/1
650 TABLET ORAL EVERY 4 HOURS PRN
Status: DISCONTINUED | OUTPATIENT
Start: 2023-03-09 | End: 2023-03-14 | Stop reason: HOSPADM

## 2023-03-09 RX ADMIN — HEPARIN SODIUM 5000 UNITS: 5000 INJECTION INTRAVENOUS; SUBCUTANEOUS at 05:54

## 2023-03-09 RX ADMIN — GABAPENTIN 300 MG: 300 CAPSULE ORAL at 16:07

## 2023-03-09 RX ADMIN — CETIRIZINE HYDROCHLORIDE 10 MG: 10 TABLET, FILM COATED ORAL at 20:45

## 2023-03-09 RX ADMIN — HEPARIN SODIUM 5000 UNITS: 5000 INJECTION INTRAVENOUS; SUBCUTANEOUS at 16:08

## 2023-03-09 RX ADMIN — ATORVASTATIN CALCIUM 20 MG: 20 TABLET, FILM COATED ORAL at 20:45

## 2023-03-09 RX ADMIN — HEPARIN SODIUM 5000 UNITS: 5000 INJECTION INTRAVENOUS; SUBCUTANEOUS at 20:45

## 2023-03-09 RX ADMIN — INSULIN GLARGINE 10 UNITS: 100 INJECTION, SOLUTION SUBCUTANEOUS at 20:45

## 2023-03-09 RX ADMIN — ASPIRIN 81 MG: 81 TABLET, COATED ORAL at 08:49

## 2023-03-09 RX ADMIN — INSULIN LISPRO 2 UNITS: 100 INJECTION, SOLUTION INTRAVENOUS; SUBCUTANEOUS at 17:36

## 2023-03-09 RX ADMIN — DEXTROSE AND SODIUM CHLORIDE: 5; 900 INJECTION, SOLUTION INTRAVENOUS at 05:55

## 2023-03-09 RX ADMIN — AMLODIPINE BESYLATE 5 MG: 5 TABLET ORAL at 08:49

## 2023-03-09 RX ADMIN — HYDROCODONE BITARTRATE AND ACETAMINOPHEN 1 TABLET: 10; 325 TABLET ORAL at 17:36

## 2023-03-09 RX ADMIN — GABAPENTIN 300 MG: 300 CAPSULE ORAL at 08:49

## 2023-03-09 RX ADMIN — GABAPENTIN 300 MG: 300 CAPSULE ORAL at 20:45

## 2023-03-09 RX ADMIN — INSULIN GLARGINE 10 UNITS: 100 INJECTION, SOLUTION SUBCUTANEOUS at 08:49

## 2023-03-09 ASSESSMENT — ENCOUNTER SYMPTOMS
DIARRHEA: 0
NAUSEA: 1
CHEST TIGHTNESS: 0
SHORTNESS OF BREATH: 0
RESPIRATORY NEGATIVE: 1
ABDOMINAL PAIN: 0
CONSTIPATION: 0
VOMITING: 0
COLOR CHANGE: 1
GASTROINTESTINAL NEGATIVE: 1

## 2023-03-09 ASSESSMENT — PAIN SCALES - GENERAL: PAINLEVEL_OUTOF10: 8

## 2023-03-09 ASSESSMENT — PAIN DESCRIPTION - ORIENTATION: ORIENTATION: RIGHT

## 2023-03-09 ASSESSMENT — PAIN DESCRIPTION - DESCRIPTORS: DESCRIPTORS: ACHING;THROBBING

## 2023-03-09 ASSESSMENT — PAIN DESCRIPTION - LOCATION: LOCATION: FOOT

## 2023-03-09 NOTE — PROGRESS NOTES
Physician Progress Note      Angela Benítez  CSN #:                  [id number]  :                       1955  ADMIT DATE:       3/3/2023 2:54 PM  [address] [place] DATE:  RESPONDING  PROVIDER #:        Carlitos Fall Blood DO          QUERY TEXT:    Pt admitted with right foot wound and has encephalopathy documented. If   possible, please document in progress notes and discharge summary further   specificity regarding the type of encephalopathy:      The medical record reflects the following:  Risk Factors: Elderly, ALEJANDRO  Clinical Indicators: documented-This is an abnormal EEG due to absence of   alpha activity suggestive of encephalopathy. Differential diagnosis for   encephalopathy includes toxic, metabolic or other etiologies, patient with   current foot infection. Treatment: Neuro consult, lab monitoring, psych consult, IVF, ID Consult    Thank You! Josh Farrar RN, CRCR  RN Clinical Documentation Integrity  (E) [phone] (D) [phone]  Options provided:  -- Metabolic encephalopathy  -- Toxic encephalopathy  -- Toxic metabolic encephalopathy  -- Other - I will add my own diagnosis  -- Disagree - Not applicable / Not valid  -- Disagree - Clinically unable to determine / Unknown  -- Refer to Clinical Documentation Reviewer    PROVIDER RESPONSE TEXT:    This patient has toxic metabolic encephalopathy.     Query created by: Joseline Perales on 3/9/2023 10:43 AM      Electronically signed by:  [employer] Blood DO 3/9/2023 12:04 PM

## 2023-03-09 NOTE — PROGRESS NOTES
[facility]  Office: [address], DO, Smita Treviño, DO, Millicent Danuta, DO, Mp Jones Blood, DO, Winsome Ching MD, Moustapha Miles MD, Tyree Brown MD, Marcelle Vázquez MD,  Colton Jaimes MD, Julianne Laughlin MD, Herman Solares DO, Romelia Nava MD,  Kendal Allen MD, Libby Bledsoe MD, Ulysses Huddleston DO, Stephen Schreiber MD, Ml Colon MD, Mildred Renteria DO, Cam Moss MD, Quincy Isaac MD, Denton Brito MD, Mag Toussaint MD, Rosalba Renee DO, Ivy Ramirez MD, Debora Gutiérrez MD, Kirk Sarmiento, CNP,  Chary Conrad, CNP, Negra Rivas, CNP, Damaso Perez, CNP,  Izabel Soto, [facility], Sumi Foster, CNP, Denny Spears, CNP, Donis Yousif, CNP, Estrada Canales, CNP, Roxy Oro, CNP, Teresa Donahue PA-C, Felisha Hampton, CNS, Marco Nicolas, [facility], [facility]    Progress Note    3/9/2023    11:02 AM    Name:   Ru Koenig  MRN:     [id number]     [place]:      [de-identified]   Room:   2017/2017-02   Day:  6  Admit Date:  3/3/2023  2:54 PM    PCP:   Chuyita Iglesias MD  Code Status:  Full Code    Subjective:     C/C:   Chief Complaint   Patient presents with    Wound Infection     Right foot     Interval History Status: improved. Considerably better today    He doesn't really remember yesterday and doesn't know why he acted like that    Denies cp/sob/n/v    Brief History:     Per my partner   This is a 81-year-old male that presents with right foot wound with concerns for infection. Recent outpatient culture with vancomycin-resistant Enterococcus faecium. He presented to the emergency room with worsening drainage and developed fevers and chills. Overnight he was started on Maxipime. Recent outpatient cultures were obtained with evidence of VRE with antibiotics will be adjusted accordingly.     Review of Systems:     Constitutional:  negative for chills, fevers, sweats  Respiratory:  negative for cough, wheezing, shortness of breath  Cardiovascular:  negative for chest pain, chest pressure/discomfort, palpitations  Gastrointestinal:  negative for abdominal pain, constipation, diarrhea, nausea, vomiting  Neurological:  negative for dizziness, headache      Medications: Allergies:     Allergies   Allergen Reactions    Morphine Itching    Other Other (See Comments)     Other reaction(s): Unknown    Percocet [Oxycodone-Acetaminophen]      Facial swelling       Current Meds:   Scheduled Meds:    gabapentin  300 mg Oral TID    amLODIPine  5 mg Oral Daily    aspirin  81 mg Oral Daily    cetirizine  10 mg Oral Nightly    [Held by provider] glipiZIDE  20 mg Oral BID AC    [Held by provider] alogliptin  12.5 mg Oral Daily    insulin glargine  10 Units SubCUTAneous BID    atorvastatin  20 mg Oral Nightly    sodium chloride flush  5-40 mL IntraVENous 2 times per day    heparin (porcine)  5,000 Units SubCUTAneous 3 times per day    insulin lispro  0-8 Units SubCUTAneous TID WC    insulin lispro  0-4 Units SubCUTAneous Nightly     Continuous Infusions:    dextrose 5 % and 0.9 % NaCl 75 mL/hr at 03/09/23 0556    dextrose      sodium chloride Stopped (03/05/23 1313)     PRN Meds: fentanNYL, HYDROcodone-acetaminophen, glucose, dextrose bolus **OR** dextrose bolus, glucagon (rDNA), dextrose, sodium chloride flush, sodium chloride, ondansetron **OR** ondansetron, polyethylene glycol    Data:     Past Medical History:   has a past medical history of Abscess of right foot, Acquired hammer toe deformity of lesser toe of right foot, ALEJANDRO (acute kidney injury) ([facility] [address] Cellulitis, Cellulitis of left foot, Cellulitis of right foot, Charcot foot due to diabetes mellitus ([address] Chest pain at rest, Chronic multifocal osteomyelitis of right foot ([address] CKD (chronic kidney disease), Diabetic polyneuropathy associated with type 2 diabetes mellitus ([address] Essential hypertension, Fractures, multiple, Hyperlipidemia, Hypertension, Leukocytosis, MRSA (methicillin resistant staph aureus) culture positive, Neuropathy, Pain in right foot, Pneumonia, Right foot infection, Right foot pain, Tobacco abuse, Type II or unspecified type diabetes mellitus without mention of complication, not stated as uncontrolled, Vertigo, Well controlled type 2 diabetes mellitus with neurological manifestations ([facility] [address] Wound dehiscence, Wound, open, and Wound, open. Social History:   reports that he has been smoking cigarettes. He has been smoking an average of .5 packs per day. He has never used smokeless tobacco. He reports that he does not currently use drugs. He reports that he does not drink alcohol. Family History:   Family History   Problem Relation Age of Onset    Diabetes Mother     Cancer Father     Hypertension Maternal Grandmother        Vitals:  BP (!) 155/74   Pulse 66   Temp 98.1 °F (36.7 °C)   Resp 16   Ht 5' 10\" (1.778 m)   Wt 224 lb 12.8 oz (102 kg)   SpO2 99%   BMI 32.26 kg/m²   Temp (24hrs), Av.2 °F (36.8 °C), Min:97.9 °F (36.6 °C), Max:98.5 °F (36.9 °C)    Recent Labs     23  1110 23  1556 23  1908 23  0600   POCGLU 212* 193* 233* 119*       I/O (24Hr):     Intake/Output Summary (Last 24 hours) at 3/9/2023 1102  Last data filed at 3/9/2023 0646  Gross per 24 hour   Intake 1668.65 ml   Output 1750 ml   Net -81.35 ml       Labs:  Hematology:  Recent Labs     23  1622 23  1247 23  0614   WBC  --  7.4  --    RBC  --  2.99*  --    HGB  --  8.4*  --    HCT  --  24.8*  --    MCV  --  82.9  --    MCH  --  28.1  --    MCHC  --  33.9  --    RDW  --  15.7*  --    PLT  --  282  --    MPV  --  8.7  --    CRP 75.8*  --  34.8*     Chemistry:  Recent Labs     23  1247 23  0637 23  0614    137 136   K 4.1 4.1 4.0    107 105   CO2 19* 20 23   GLUCOSE 98 152* 113*   BUN 19 17 16   CREATININE 2.23* 2.33* 2.28*   ANIONGAP 10 10 8*   LABGLOM 32* 30* 31*   CALCIUM 9.5 9.7 9.2     Recent Labs     03/06/23  1622 03/06/23 2005 03/07/23  1247 03/07/23  1638 03/07/23 2011 03/08/23  0635 03/08/23  1110 03/08/23  1556 03/08/23  1908 03/09/23  0600   PROT 7.1  --  7.0  --   --   --   --   --   --   --    LABALBU 2.7*  --  2.8*  --   --   --   --   --   --   --    TSH 3.63  --   --   --   --   --   --   --   --   --    AST 19  --  25  --   --   --   --   --   --   --    ALT 16  --  18  --   --   --   --   --   --   --    ALKPHOS 124  --  123  --   --   --   --   --   --   --    BILITOT 0.2*  --  0.2*  --   --   --   --   --   --   --    BILIDIR <0.1  --   --   --   --   --   --   --   --   --    AMMONIA <10*  --   --   --   --   --   --   --   --   --    POCGLU  --    < >  --    < > 122* 148* 212* 193* 233* 119*    < > = values in this interval not displayed.     ABG:  Lab Results   Component Value Date/Time    POCPH 7.393 03/06/2023 02:39 PM    POCPCO2 36.8 03/06/2023 02:39 PM    POCPO2 73.0 03/06/2023 02:39 PM    POCHCO3 22.4 03/06/2023 02:39 PM    NBEA 2 03/06/2023 02:39 PM    ZSEA7SKE 94 03/06/2023 02:39 PM    FIO2 21.0 03/06/2023 02:39 PM     Lab Results   Component Value Date/Time    SPECIAL 10ML,RTFA 03/07/2023 12:46 PM     Lab Results   Component Value Date/Time    CULTURE NO GROWTH 1 DAY 03/07/2023 12:46 PM       Radiology:  CT HEAD WO CONTRAST    Result Date: 3/6/2023  No acute intracranial abnormality.     US RETROPERITONEAL COMPLETE    Result Date: 3/4/2023  Mildly echogenic renal parenchyma suggestive of intrinsic renal parenchymal disease.  No hydronephrosis.       Physical Examination:        General appearance:  alert, cooperative and no distress  Mental Status:  oriented to person, place and year but not day or month, and normal affect  Lungs:  clear to auscultation bilaterally, normal effort  Heart:  regular rate and rhythm, no murmur  Abdomen:  soft, nontender, nondistended, normal bowel sounds, no masses, hepatomegaly, splenomegaly  Extremities:  no  edema, redness, tenderness in the calves  Mentation back to normal    Assessment:        Hospital Problems             Last Modified POA    * (Principal) Diabetic foot infection ([address] with VRE 3/4/2023 Yes    Type 2 diabetes mellitus with right diabetic foot ulcer ([address] 3/3/2023 Yes    Charcot's joint of right foot 3/3/2023 Yes    Stage 3b chronic kidney disease ([address] (Chronic) 3/3/2023 Yes    Hyponatremia 3/3/2023 Yes    Microcytic anemia 3/3/2023 Yes    Infection due to vancomycin resistant Enterococcus faecium 3/4/2023 Yes    Acute metabolic encephalopathy 7/8/8372 Yes    Essential hypertension (Chronic) 3/3/2023 Yes    Neuropathy 3/3/2023 Yes    Charcot foot due to diabetes mellitus ([address] 3/3/2023 Yes    Hyperlipidemia 3/3/2023 Yes    Acute kidney injury superimposed on chronic kidney disease ([address] 3/3/2023 Yes       Plan:        Unclear what caused his ams 3/7 but today much better; cefepime stopped in case that was cause.   also lowered neurontin dose as his dose is too high for his level of renal function  mri head done , negative  May be rescheduled for surgery anytime    Liliane Solis,   3/9/2023  11:02 AM

## 2023-03-09 NOTE — PROGRESS NOTES
[facility]  Progress Note    Reji Ralph  MRN: [id number]  Date: 3/9/2023  LOS: 6     Reason for F/U :   Charcot foot deformity, osteomyelitis    Impression :   Charcot foot deformity with history of right metatarsal and subtalar foot arthrodesis  History of osteomyelitis, s/p antimicrobial therapy in late 2022  Right lower extremity draining wounds with imaging suggesting potential infection involving the hardware  Diabetes mellitus type 2 with associated chronic kidney disease  Altered mental status, improved, thought secondary to cefepime    Recommendations: The patient did receive cefepime starting 3/3/2023 and linezolid starting 3/4/2023, these were both discontinued 3/7/2023  The cefepime was discontinued due to concern for inducing the confusion  The patient can continue off antimicrobial therapy for now  When his surgical date is scheduled, the patient should receive meropenem and daptomycin perioperatively  We will continue to follow his clinical progress and adjust therapy accordingly    Infection Control Recommendations:   Universal precautions    Discharge Planning:   Estimated Length of IV antimicrobials:   Patient will need Midline Catheter Insertion/ PICC line Insertion: No  [facility]  [facility],  LTAC: Undetermined  Patient willneed outpatient wound care: No    Medical Decision making / Summary of Stay:   Reji Ralph is a 78y.o.-year-old male presented to the hospital with worsening right foot pain and swelling for several weeks associated with open ulcer on the lateral aspect of the hand foot that probes to bone. Symptoms moderate to severe, worse with movement, no alleviating factors. He also complaining of subjective fever and chills, mild shortness of breath. CT of the right foot was done at [facility] on 2/27/2023 showed lucency around hardware within the calcaneus.   The patient had recent surgery done by Dr. Tr Ortiz on 12/27/2022 with hardware in place. History of Charcot foot, chronic renal failure and diabetes mellitus. Initial WBC normal, creatinine 2.89, sedimentation rate 53, C-reactive protein 136    Current evaluation:3/9/2023    BP (!) 155/74   Pulse 66   Temp 98.1 °F (36.7 °C)   Resp 16   Ht 5' 10\" (1.778 m)   Wt 224 lb 12.8 oz (102 kg)   SpO2 99%   BMI 32.26 kg/m²     Temperature Range: Temp: 98.1 °F (36.7 °C) Temp  Av.2 °F (36.8 °C)  Min: 97.9 °F (36.6 °C)  Max: 98.5 °F (36.9 °C)    The patient was seen and evaluated sitting up in the chair today  States he feels well, he is very concerned about his mental status changes the other day, he feels well now  He does not have any set plans for an OR date yet    Review of Systems   Constitutional: Negative. HENT: Negative. Respiratory: Negative. Cardiovascular: Negative. Gastrointestinal: Negative. Genitourinary: Negative. Musculoskeletal: Negative. Skin: Negative. Neurological: Negative. Psychiatric/Behavioral: Negative. Physical Examination :     Physical Exam  Constitutional:       Appearance: Normal appearance. He is normal weight. HENT:      Head: Normocephalic and atraumatic. Pulmonary:      Effort: Pulmonary effort is normal. No respiratory distress. Musculoskeletal:      Comments: Right ankle dressing in place, not removed today   Skin:     General: Skin is warm and dry. Neurological:      General: No focal deficit present. Mental Status: He is alert and oriented to person, place, and time. Mental status is at baseline. Psychiatric:         Mood and Affect: Mood normal.         Behavior: Behavior normal.         Thought Content:  Thought content normal.       Laboratory data:   I have independently reviewed the followinglabs:  CBC with Differential:   Recent Labs     23  1247   WBC 7.4   HGB 8.4*   HCT 24.8*      LYMPHOPCT 26   MONOPCT 9       BMP:   Recent Labs     23  0637 23  0614    136   K 4.1 4.0    105   CO2 20 23   BUN 17 16   CREATININE 2.33* 2.28*       Hepatic Function Panel:   Recent Labs     03/06/23  1622 03/07/23  1247   PROT 7.1 7.0   LABALBU 2.7* 2.8*   BILIDIR <0.1  --    IBILI Can not be calculated  --    BILITOT 0.2* 0.2*   ALKPHOS 124 123   ALT 16 18   AST 19 25           No results found for: PROCAL  Lab Results   Component Value Date/Time    CRP 34.8 03/09/2023 06:14 AM    CRP 75.8 03/06/2023 04:22 PM    .7 03/05/2023 05:57 AM     Lab Results   Component Value Date    SEDRATE 53 (H) 03/03/2023         No results found for: DDIMER  No results found for: FERRITIN  No results found for: LDH  No results found for: FIBRINOGEN    No results found for requested labs within last 30 days. Lab Results   Component Value Date/Time    COVID19 DETECTED 05/26/2021 12:06 PM    COVID19 Not Detected 12/07/2020 03:10 PM    COVID19 Not Detected 08/08/2020 10:02 PM       No results for input(s): VANCOTROUGH in the last 72 hours. Imaging Studies:   MRI brain  Impression:        Motion artifact limits evaluation. No acute intracranial abnormality. Cultures:     Culture, Blood 1 [[id number]] Collected: 03/07/23 1238   Order Status: Completed Specimen: Blood Updated: 03/08/23 1411    Specimen Description . BLOOD    Special Requests LT HAND,6ML    Culture NO GROWTH 1 DAY   Culture, Blood 1 [[id number]] Collected: 03/07/23 1246   Order Status: Completed Specimen: Blood Updated: 03/08/23 1410    Specimen Description . BLOOD    Special Requests 10ML,RTFA    Culture NO GROWTH 1 DAY       Medications:      gabapentin  300 mg Oral TID    amLODIPine  5 mg Oral Daily    aspirin  81 mg Oral Daily    cetirizine  10 mg Oral Nightly    [Held by provider] glipiZIDE  20 mg Oral BID AC    [Held by provider] alogliptin  12.5 mg Oral Daily    insulin glargine  10 Units SubCUTAneous BID    atorvastatin  20 mg Oral Nightly    sodium chloride flush  5-40 mL IntraVENous 2 times per day heparin (porcine)  5,000 Units SubCUTAneous 3 times per day    insulin lispro  0-8 Units SubCUTAneous TID WC    insulin lispro  0-4 Units SubCUTAneous Nightly       Electronically signed by ROCHELLE Morales CNP on 3/9/2023 at 8:45 AM      [facility]  [place], [address] messaging  OFFICE: [phone]    Thank you for allowing us to participate in the care of this patient. Please call with questions. This note is created with the assistance of a speech recognition program.  While intending to generate a document that actually reflects the content of the visit, the document can still have some errors including those of syntax and sound a like substitutions which may escape proof reading. In such instances, actual meaning can be extrapolated by contextual diversion.

## 2023-03-09 NOTE — PROGRESS NOTES
Occupational Therapy  Facility/Department: [facility]  Rehabilitation Occupational Therapy Daily Treatment Note    Date: 3/9/23  Patient Name: Ankit Baxter       Room:   MRN: [id number]  Account: [de-identified]   : 1955  (69 y.o.) Gender: male       Past Medical History:  has a past medical history of Abscess of right foot, Acquired hammer toe deformity of lesser toe of right foot, ALEJANDRO (acute kidney injury) ([address] Cellulitis, Cellulitis of left foot, Cellulitis of right foot, Charcot foot due to diabetes mellitus ([address] Chest pain at rest, Chronic multifocal osteomyelitis of right foot ([address] CKD (chronic kidney disease), Diabetic polyneuropathy associated with type 2 diabetes mellitus ([address] Essential hypertension, Fractures, multiple, Hyperlipidemia, Hypertension, Leukocytosis, MRSA (methicillin resistant staph aureus) culture positive, Neuropathy, Pain in right foot, Pneumonia, Right foot infection, Right foot pain, Tobacco abuse, Type II or unspecified type diabetes mellitus without mention of complication, not stated as uncontrolled, Vertigo, 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 Past Surgical History:   has a past surgical history that includes Neck surgery (); Appendectomy; knee surgery (Bilateral, ); Knee arthroscopy (Right, ); Knee arthroscopy (Left, ); Foot Debridement (Left, 2018); pr i&d below fascia foot 1 bursal space (Left, 2018); Colonoscopy; Foot Debridement (Right, 2020); arthroplasty (Right, 2020); Foot Debridement (Right, 2021); Foot surgery (Right, 2021); Foot Debridement (Right, 2021); IR INSERT PICC VAD W SQ PORT >5 YEARS (10/07/2022); Foot surgery (Right, 10/12/2022); Ankle surgery (Right, 10/12/2022); Ankle surgery (Right, 11/15/2022);  Foot surgery (Right, 2022); arthrodesis (Right, 2022); and Foot surgery (Right, 12/27/2022). Restrictions  Restrictions/Precautions: General Precautions, Fall Risk, Weight Bearing  Other position/activity restrictions: Up w/ assist  Right Lower Extremity Weight Bearing: Weight Bearing As Tolerated  Required Braces or Orthoses  Right Lower Extremity Brace: Boot  RLE Brace Type: CAM Boot  Required Braces or Orthoses?: Yes    Subjective  Subjective: Pt. sitting uright in bedside chair and agreed to complete exercises. Restrictions/Precautions: General Precautions; Fall Risk;Weight Bearing      Objective     Cognition  Overall Cognitive Status: Exceptions  Arousal/Alertness: Appropriate responses to stimuli  Following Commands: Follows multistep commands with repitition  Attention Span: Attends with cues to redirect  Memory: Appears intact  Safety Judgement: Decreased awareness of need for safety  Problem Solving: Decreased awareness of errors;Assistance required to identify errors made;Assistance required to correct errors made  Initiation: Does not require cues  Sequencing: Does not require cues  Cognition Comment: Pt. pleasant and cooperative with treatment today. Orientation  Overall Orientation Status: Within Functional Limits         ADL  Feeding  Skilled Clinical Factors: ADLS not completed today d/t just completed showering task with nursing present. Toileting  Skilled Clinical Factors: No need at this time. Functional Mobility  Activity:  (Pt. completed B UE exercise while sitting upright in bedside chair and ADL transfer to return to bed for nursing to dress rt foot.)  Assistance Level: Stand by assist  Skilled Clinical Factors: SBA for transfer from bedside chair to bed with use of RW and management of IV pole. Bed Mobility  Overall Assistance Level: Independent  Bridging  Assistance Level: Independent  Roll Left  Assistance Level: Independent  Roll Right  Assistance Level: Independent  Sit to Supine  Assistance Level:  Independent  Skilled Clinical Factors: Pt. independent with repositioning self in bed for comfort. Scooting  Assistance Level: Independent  Sit to Stand  Assistance Level: Supervision  Skilled Clinical Factors: Supervised from bedside chair with use of RW to EOB  Stand to Sit  Assistance Level: Supervision  Skilled Clinical Factors: Supervised to sit EOB with management of IV pole. OT Exercises  Exercise Treatment: Pt. completed [address] [place] [address] program with graded theraband to promote functional strength and endurance to return home. Pt. completed 4 sets of exercises with success and good form with theraband. Pt. provided with illustrated handout for reference at home to contigue with program after d/c. Assessment  Assessment  Assessment: Pt. completed B UE exercises to promote strength. Pt. provided with graded theraband and illustrated handout for reference after D/C. Pt. completed functional transfer with use of RW with SBA to ensure safety. Skilled OT warranted to promote I/safety to return pt to prior living arrangement with assist as needed. Activity Tolerance: Patient limited by endurance; Patient tolerated treatment well  Discharge Recommendations: Home with assist PRN  OT Equipment Recommendations  Equipment Needed: Yes  Mobility Devices: Harpal Mabry: Rolling  Safety Devices  Safety Devices in place: Yes  Type of devices: All fall risk precautions in place;Call light within reach;Nurse notified; Bed alarm in place; Left in bed;Gait belt  Restraints  Initially in place: No    Patient Education  Education  Education Given To: Patient  Education Provided: Role of Therapy;Home Exercise Program;Plan of Care;Safety;Transfer Training;Mobility Training;ADL Function  Education Provided Comments: Pt. educated on [address] [place] [address] program and safety awareness with ADL transfer. Pt. attentive and receptive to information. Plan  Occupational Therapy Plan  Times Per Week: 4-5x/wk 1x/day as venkata  Times Per Day:  Once a day  Current Treatment Recommendations: Strengthening;Balance training;Functional mobility training; Endurance training; Safety education & training;Patient/Caregiver education & training;Equipment evaluation, education, & procurement;Self-Care / ADL; Home management training  Additional Comments: Cont with stated POC    Goals  Patient Goals   Patient goals : To go home! Short Term Goals  Time Frame for Short Term Goals: By discharge, pt to demo  Short Term Goal 1: ADL transfer and functional mobility to Mod I with use of AD as needed. Short Term Goal 2: increased B UE strength by 1/2 grade to assist/I with B UE HEP with use of handouts as needed. Short Term Goal 3: UB/LB ADL to Mod I with use of handouts as needed. Short Term Goal 4: bed mobility to Mod I with use of bedrails as needed. Short Term Goal 5: I with fall prevention education, EC/WS tech, recommendations for discharge/AE, disease specific education with use of handouts. AM-PAC Score        AM-[facility] Inpatient Daily Activity Raw Score: 18 (03/09/23 1224)  AM-PAC Inpatient ADL T-Scale Score : 38.66 (03/09/23 1224)  ADL Inpatient CMS 0-100% Score: 46.65 (03/09/23 1224)  ADL Inpatient CMS G-Code Modifier : CK (03/09/23 1224)      Therapy Time   Individual Concurrent Group Co-treatment   Time In 1028         Time Out 1         Minutes 25             RN reports patient is medically stable for therapy treatment this date. Chart reviewed prior to treatment and patient is agreeable for therapy. All lines intact and patient positioned comfortably at end of treatment. All patient needs addressed prior to ending therapy session.        Helena Canavan, OTA

## 2023-03-09 NOTE — PLAN OF CARE
Problem: Discharge Planning  Goal: Discharge to home or other facility with appropriate resources  3/9/2023 0042 by Adrien Cole RN  Outcome: Progressing     Problem: Pain  Goal: Verbalizes/displays adequate comfort level or baseline comfort level  3/9/2023 0042 by Adrien Cole RN  Outcome: Progressing  Flowsheets (Taken 3/9/2023 0042)  Verbalizes/displays adequate comfort level or baseline comfort level:   Encourage patient to monitor pain and request assistance   Administer analgesics based on type and severity of pain and evaluate response   Consider cultural and social influences on pain and pain management   Assess pain using appropriate pain scale   Implement non-pharmacological measures as appropriate and evaluate response     Problem: Safety - Adult  Goal: Free from fall injury  3/9/2023 0042 by Adrien Cole RN  Outcome: Progressing  Flowsheets (Taken 3/9/2023 0042)  Free From Fall Injury:   Instruct family/caregiver on patient safety   Based on caregiver fall risk screen, instruct family/caregiver to ask for assistance with transferring infant if caregiver noted to have fall risk factors     Problem: ABCDS Injury Assessment  Goal: Absence of physical injury  3/9/2023 0042 by Adrien Cole RN  Outcome: Progressing     Problem: Neurosensory - Adult  Goal: Achieves maximal functionality and self care  3/9/2023 0042 by Adrien Cole RN  Outcome: Progressing     Problem: Respiratory - Adult  Goal: Achieves optimal ventilation and oxygenation  3/9/2023 0042 by Adrien Cole RN  Outcome: Progressing     Problem: Cardiovascular - Adult  Goal: Maintains optimal cardiac output and hemodynamic stability  3/9/2023 0042 by Adrien Cole RN  Outcome: Progressing     Problem: Cardiovascular - Adult  Goal: Absence of cardiac dysrhythmias or at baseline  3/9/2023 0042 by Adrien Cole RN  Outcome: Progressing     Problem: Skin/Tissue Integrity - Adult  Goal: Skin integrity remains intact  3/9/2023 0042 by Aleksander De Anda RN  Outcome: Progressing  Flowsheets (Taken 3/8/2023 1904)  Skin Integrity Remains Intact: Monitor for areas of redness and/or skin breakdown     Problem: Skin/Tissue Integrity - Adult  Goal: Incisions, wounds, or drain sites healing without S/S of infection  3/9/2023 0042 by Aleksander De Anda RN  Outcome: Progressing  Flowsheets (Taken 3/8/2023 1904)  Incisions, Wounds, or Drain Sites Healing Without Sign and Symptoms of Infection: TWICE DAILY: Assess and document skin integrity     Problem: Musculoskeletal - Adult  Goal: Return mobility to safest level of function  3/9/2023 0042 by Aleksander De Anda RN  Outcome: Progressing     Problem: Musculoskeletal - Adult  Goal: Maintain proper alignment of affected body part  3/9/2023 0042 by Aleksander DeA nda RN  Outcome: Progressing     Problem: Musculoskeletal - Adult  Goal: Return ADL status to a safe level of function  3/9/2023 0042 by Aleksander De Anda RN  Outcome: Progressing     Problem: Gastrointestinal - Adult  Goal: Minimal or absence of nausea and vomiting  3/9/2023 0042 by Aleksander De Anda RN  Outcome: Progressing     Problem: Gastrointestinal - Adult  Goal: Maintains or returns to baseline bowel function  3/9/2023 0042 by Aleksander De Anda RN  Outcome: Progressing     Problem: Gastrointestinal - Adult  Goal: Maintains adequate nutritional intake  3/9/2023 0042 by Aleksander De Anda RN  Outcome: Progressing     Problem: Infection - Adult  Goal: Absence of infection at discharge  3/9/2023 0042 by Aleksander De Anda RN  Outcome: Progressing     Problem: Infection - Adult  Goal: Absence of infection during hospitalization  3/9/2023 0042 by Aleksander De Anda RN  Outcome: Progressing     Problem: Infection - Adult  Goal: Absence of fever/infection during anticipated neutropenic period  3/9/2023 0042 by Juan David Ho CHRISTY Newell  Outcome: Progressing     Problem: Metabolic/Fluid and Electrolytes - Adult  Goal: Electrolytes maintained within normal limits  3/9/2023 0042 by Aleksander De Anda RN  Outcome: Progressing     Problem: Metabolic/Fluid and Electrolytes - Adult  Goal: Hemodynamic stability and optimal renal function maintained  3/9/2023 0042 by Aleksander De Anda RN  Outcome: Progressing     Problem: Metabolic/Fluid and Electrolytes - Adult  Goal: Glucose maintained within prescribed range  3/9/2023 0042 by Aleksander De Anda RN  Outcome: Progressing     Problem: Hematologic - Adult  Goal: Maintains hematologic stability  3/9/2023 0042 by Aleksander De Anda RN  Outcome: Progressing     Problem: Chronic Conditions and Co-morbidities  Goal: Patient's chronic conditions and co-morbidity symptoms are monitored and maintained or improved  3/9/2023 0042 by Aleksander De Anda RN  Outcome: Progressing  Flowsheets (Taken 3/9/2023 0042)  Care Plan - Patient's Chronic Conditions and Co-Morbidity Symptoms are Monitored and Maintained or Improved:   Monitor and assess patient's chronic conditions and comorbid symptoms for stability, deterioration, or improvement   Collaborate with multidisciplinary team to address chronic and comorbid conditions and prevent exacerbation or deterioration   Update acute care plan with appropriate goals if chronic or comorbid symptoms are exacerbated and prevent overall improvement and discharge

## 2023-03-09 NOTE — PROGRESS NOTES
Physical Therapy  Facility/Department: [facility] MED SURG  Rehabilitation Physical Therapy Treatment Note    NAME: Bonita Valdes  : 1955 (78 y.o.)  MRN: [id number]  CODE STATUS: Full Code    Date of Service: 3/9/23       Restrictions:  Restrictions/Precautions: General Precautions, Fall Risk, Weight Bearing  Lower Extremity Weight Bearing Restrictions  Right Lower Extremity Weight Bearing: Weight Bearing As Tolerated  Required Braces or Orthoses  Right Lower Extremity Brace: Boot  RLE Brace Type: CAM Boot  Position Activity Restriction  Other position/activity restrictions: Up w/ assist     SUBJECTIVE  Subjective  Subjective: pt in bed upon arrival agreeable to PT RN ok's PT               OBJECTIVE  Cognition  Overall Cognitive Status: Exceptions  Arousal/Alertness: Appropriate responses to stimuli  Following Commands: Follows multistep commands with repitition  Attention Span: Attends with cues to redirect  Memory: Appears intact  Safety Judgement: Decreased awareness of need for safety  Problem Solving: Decreased awareness of errors;Assistance required to identify errors made;Assistance required to correct errors made  Insights: Decreased awareness of deficits  Initiation: Requires cues for some  Sequencing: Requires cues for some  Cognition Comment: Pt. pleasant and cooperative with treatment today. Orientation  Overall Orientation Status: Within Functional Limits    Functional Mobility  Bed Mobility  Overall Assistance Level: Stand By Assist  Roll Left  Assistance Level: Stand by assist  Roll Right  Assistance Level: Stand by assist  Supine to Sit  Assistance Level: Stand by assist  Scooting  Assistance Level: Stand by assist  Balance  Sitting Balance: Stand by assistance  Standing Balance: Stand by assistance  Transfers  Surface: From bed; To chair with arms  Additional Factors: Verbal cues; Hand placement cues  Device: Walker  Sit to Stand  Assistance Level: Minimal assistance  Stand to [employer] Level: Minimal assistance  Bed To/From Chair  Assistance Level: Contact guard assist;Minimal assistance  Stand Pivot  Assistance Level: Contact guard assist;Minimal assistance      Environmental Mobility  Ambulation  Surface: Level surface  Device: Rolling walker  Distance: 35 ft  Activity: Within Room  Additional Factors: Verbal cues  Assistance Level: Contact guard assist;Minimal assistance  Gait Deviations: Decreased step length bilateral;Unsteady gait;Narrow base of support  Skilled Clinical Factors: pt with scissoring gait verbal cues to widen ARACELI             PT Exercises  Exercise Treatment: seated LE AROM issued pt HEP reviewed all ex with pt  A/AROM Exercises: Seated AROM BLE LAQ, marches, ankle pumps (L foot), chair push ups x12, chair push up and hold 10 seconds x1 rep    Pressure Relief Exercises: Patient performs seated lateral WS to promote skin integrity      AM-PAC Score    AM-PAC Inpatient Mobility Raw Score : 17  AM-PAC Inpatient T-Scale Score : 42.13  Mobility Inpatient CMS 0-100% Score: 50.57  Mobility Inpatient CMS G-Code Modifier:CK        ASSESSMENT/PROGRESS TOWARDS GOALS       Assessment  Assessment: pt progressing toward goals  Activity Tolerance: Patient limited by endurance;Patient tolerated treatment well  Discharge Recommendations: Patient would benefit from continued therapy after discharge    Goals  Patient Goals   Patient Goals : To go home  Short Term Goals  Time Frame for Short Term Goals: 10 visits  Short Term Goal 1: Pt to demonstrate bed mobility independently  Short Term Goal 2: Pt to perform STS transfers w/ least restrictive AD independently  Short Term Goal 3: Pt to ambulate at least 100ft w/ least restrictive AD independently  Short Term Goal 4: Pt to ascend/descend 3 stairs w/ handrails SBA  Short Term Goal 5: Pt to actively participate in at least 30 minutes of physical therapy for ther act, ther ex, balance, gait, and endurance training    PLAN OF CARE/SAFETY  Physcial  Therapy Plan  General Plan: 5-7 times per week  Current Treatment Recommendations: Balance training;Functional mobility training;Transfer training;Neuromuscular re-education;Stair training;Gait training; Endurance training;Home exercise program;Equipment evaluation, education, & procurement;Patient/Caregiver education & training; Safety education & training; Therapeutic activities  Safety Devices  Type of Devices: Bed alarm in place;Call light within reach; Left in bed;Gait belt;Nurse notified    EDUCATION  Education  Education Given To: Patient  Education Provided: Role of Therapy; ADL Function;Plan of Care;IADL Function;Home Exercise Program;Mobility Training;Precautions;Transfer Training; Safety; [employer]  Education Method: Verbal  Education Outcome: Verbalized understanding        Therapy Time   Individual Concurrent Group Co-treatment   Time In 5511         Time Out 2549         Minutes [phone], PTA, 03/09/23 at 12:43 PM

## 2023-03-09 NOTE — CONSULTS
Department of Psychiatry  Behavioral Health Consult    REASON FOR CONSULT: SI and disoriented    CONSULTING PHYSICIAN: Gustavo Hung    History obtained from: Patient, wife and chart    HISTORY OF PRESENT ILLNESS:    The patient is a 78 y.o. male with no significant past psych history who is admitted for pain and swelling for right foot. I have noted that the patient was hyponatremic at the time of admission. His creatinine was 2.89. Hemoglobin was 9.4. Infectious disease has been involved. The patient had reported suicidal ideation to his podiatrist.  The patient was unable to exchange his usual banter when speaking to his doctor and appeared to be disoriented as per his wife. The patient was seen face-to-face. He is pleasant on approach. He has difficulty recollecting the circumstances of his admission and the course of his admission. He says he has been watching the news but could not tell me about any current events. He was able to correctly name the current and previous president of [place]. He was oriented to place and situation but not to time. The patient denies any depressive symptoms or suicidal ideation. He was able to make eye contact and had affective reactivity. His wife agrees that he has improved from yesterday when he was not like his usual self. Patient denies any auditory or visual hallucinations. He appears to have no psychotic phenomena. Discussed that the patient seems to be improving in his delirium and does not need medications at this time. We will continue to follow        The patient is not currently receiving care for the above psychiatric illness.       Psychiatric Review of Systems           Obsessions and Compulsions: Denies       Viktoria or Hypomania: Denies     Hallucinations: Denies     Panic Attacks:  Denies     Delusions:  Denies     Phobias:  Denies     Trauma: Denies      Substance Abuse History:  Denies    Past Psychiatric History:  No Suicide attempts, admissions to psychiatry or previous treatment for mental health      Social History: . Retired . Past Medical History:        Diagnosis Date    Abscess of right foot 08/04/2018    Acquired hammer toe deformity of lesser toe of right foot     ALEJANDRO (acute kidney injury) ([address] 08/05/2018    Cellulitis 04/24/2018    Cellulitis of left foot 04/24/2018    Cellulitis of right foot     and abscess    Charcot foot due to diabetes mellitus ([address] 2014    Right foot     Chest pain at rest 08/04/2018    Chronic multifocal osteomyelitis of right foot ([address]     CKD (chronic kidney disease)     Diabetic polyneuropathy associated with type 2 diabetes mellitus ([address]     Essential hypertension     Fractures, multiple 07/21/2014    Right foot fractures     Hyperlipidemia     Hypertension     Leukocytosis     MRSA (methicillin resistant staph aureus) culture positive 2018    rt foot    Neuropathy     diabetic with charcot affecting the right foot    Pain in right foot     redness and swelling    Pneumonia 2009    Right foot infection     Right foot pain     Tobacco abuse 04/24/2018    Type II or unspecified type diabetes mellitus without mention of complication, not stated as uncontrolled     Vertigo     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 stepped on sharp object. Covered by dressing.     Wound, open     Right posterior -Diabetic Ulcer       Past Surgical History:        Procedure Laterality Date    ANKLE SURGERY Right 10/12/2022    RIGHT MID-FOOT OSTEOTOMY WITH OF APPLICATION EXTERNAL FIXATOR SERA performed by Reena Rios DPM at [address] [facility] Right 11/15/2022    right lower extremity  adjustment of exfix performed by Reena Rios DPM at [facility]      at age 24    ARTHRODESIS Right 12/27/2022    RIGHT SUBTALAR JOINT AND MULTIPLE MID FOOT JOINT FUSIONS WITH SERA AND PRE-OP POP BLOCK performed by Ronel Thomas DPM at [facility] [address] [facility] Right 12/11/2020    RIGHT HALLUX EXOSTECTOMY AND 3RD DIGIT EXTENSIOR TENOTOMY performed by Igor Monroe DPM at [facility] Left 04/24/2018    I&D foreign body removal    FOOT DEBRIDEMENT Right 03/20/2020    RIGHT  FOOT   INCISION AND DRAINAGE WITH BONE BIOPSY performed by Igor Monroe DPM at [address] [place] [address] Right 06/08/2021    FOOT DEBRIDEMENT INCISION AND DRAINAGE performed by Igor Monroe DPM at [address] [place] [address] Right 09/16/2021    RIGHT FOOT  INCISION AND DRAINAGE   WITH PULSE LAVAGE performed by Igor Monroe DPM at [address] [place] [address] Right 06/11/2021    RIGHT FOOT FLAP CLOSURE performed by Igor Monroe DPM at [address] [place] [address] Right 06/88/8392    APPLICATION EXTERNAL FIXATOR RIGHT FOOT - SERA - Right    FOOT SURGERY Right 12/27/2022    RIGHT SUBTALAR JOINT AND MULTIPLE MID FOOT JOINT FUSIONS WITH SERA AND PRE-OP POP BLOCK (Right: Foot)    FOOT SURGERY Right 12/27/2022    RIGHT FOOT/ANKLE EXTERNAL FIXATOR  REMOVAL performed by Ronel Thomas DPM at [facility] [address]    IR INS PICC VAD W SQ PORT GREATER THAN 5  10/07/2022    IR INS PICC VAD W SQ PORT GREATER THAN 5 10/7/2022 [facility]    KNEE ARTHROSCOPY Right 1989    KNEE ARTHROSCOPY Left 1990    KNEE SURGERY Bilateral 1983    arthroscopy    NECK SURGERY  1987    [place] I&D BELOW FASCIA FOOT 1 BURSAL SPACE Left 04/24/2018    LEFT FOOT MULTIPLE  INCISIONS  AND DRAINAGE AND REMOVAL FOREIGN BODY  IRENE performed by Igor Monroe DPM at [address] [place] [address]         Medications Prior to Admission:   Medications Prior to Admission: cyclobenzaprine (FLEXERIL) 10 MG tablet, Take 10 mg by mouth at bedtime  ketorolac (TORADOL) 10 MG tablet, Take 1 tablet by mouth every 6 hours as needed for Pain  HYDROcodone-acetaminophen (NORCO)  MG per tablet, Take 1 tablet by mouth every 6 hours as needed for Pain. TRUEPLUS PEN NEEDLES 31G X 8 MM MISC,   amLODIPine (NORVASC) 5 MG tablet, Take 5 mg by mouth daily  LANTUS SOLOSTAR 100 UNIT/ML injection pen, Inject 15 Units into the skin nightly (Patient taking differently: Inject 10 Units into the skin 2 times daily)  JANUVIA 100 MG tablet, Take 100 mg by mouth daily   Misc. Devices MISC, 1 PAIR OF DIABETIC SHOES (1 LEFT/ 1 RIGHT) 1-3 PAIRS OF INSERTS (LEFT/ RIGHT)  cetirizine (ZYRTEC) 10 MG tablet, Take 10 mg by mouth nightly   simvastatin (ZOCOR) 40 MG tablet, Take 40 mg by mouth nightly   glipiZIDE (GLUCOTROL) 10 MG tablet, Take 20 mg by mouth 2 times daily (before meals) Takes 2 tabs (=20mg) BID  aspirin 81 MG tablet, Take 81 mg by mouth daily  gabapentin (NEURONTIN) 300 MG capsule, Take 900 mg by mouth 3 times daily.  Take 3 caps (=900mg) 3 times a day    Allergies:  Morphine, Other, and Percocet [oxycodone-acetaminophen]    FAMILY/SOCIAL HISTORY:  Family History   Problem Relation Age of Onset    Diabetes Mother     Cancer Father     Hypertension Maternal Grandmother      Social History     Socioeconomic History    Marital status:      Spouse name: Not on file    Number of children: Not on file    Years of education: Not on file    Highest education level: Not on file   Occupational History    Not on file   Tobacco Use    Smoking status: Every Day     Packs/day: 0.50     Types: Cigarettes    Smokeless tobacco: Never   Vaping Use    Vaping Use: Never used   Substance and Sexual Activity    Alcohol use: No    Drug use: Not Currently    Sexual activity: Not Currently   Other Topics Concern    Not on file   Social History Narrative    Not on file     Social Determinants of Health     Financial Resource Strain: Not on file   Food Insecurity: Not on file   Transportation Needs: Not on file   Physical Activity: Not on file   Stress: Not on file   Social Connections: Not on file   Intimate Partner Violence: Not on file   Housing Stability: Not on file       REVIEW OF SYSTEMS    Constitutional: [] fever  [] chills  [] weight loss  []weakness [] Other:  Eyes:  [] photophobia  [] discharge [] acuity change   [] Diplopia   [] Other:  HENT:  [] sore throat  [] ear pain [] Tinnitus   [] Other  Respiratory:  [] Cough  [] Shortness of breath   [] Sputum   [] Other:   Cardiac: []Chest pain   []Palpitations []Edema  []PND  [] Other:  GI:  []Abdominal pain   []Nausea  []Vomiting  []Diarrhea  [] Other:  :  [] Dysuria   []Frequency  []Hematuria  []Discharge  [] Other:  Possible Pregnancy: []Yes   []No   LMP:   Musculoskeletal:  []Back pain  []Neck pain  []Recent Injury   Skin:  []Rash  [] Itching  [] Other:  Neurologic:  [] Headache  [] Focal weakness  [] Sensory changes []Other:  Endocrine:  [] Polyuria  [] Polydipsia  [] Hair Loss  [] Other:  Lymphatic:   [] Swollen glands   Psychiatric:  As per HPI      All other systems negative except as marked or mentioned/indicated in the HPI. Nora Posada PHYSICAL EXAM:  Vitals:  /88   Pulse 70   Temp 98.2 °F (36.8 °C) (Oral)   Resp 16   Ht 5' 10\" (1.778 m)   Wt 222 lb (100.7 kg)   SpO2 99%   BMI 31.85 kg/m²      Neuro Exam:      Involuntary Movements: No    Mental Status Examination:    Level of consciousness:  within normal limits   Appearance:  hospital attire  Behavior/Motor:  no abnormalities noted  Attitude toward examiner:  cooperative and attentive  Speech:  spontaneous, normal rate, and normal volume   Mood: anxious  Affect:  mood congruent  Thought processes:  linear, goal directed, and coherent   Thought content:  Suicidal Ideation:  denies suicidal ideation  Delusions:  no evidence of delusions  Perceptual Disturbance:  denies any perceptual disturbance  Cognition:  oriented to person, place, and time   Concentration intact  Memory impaired recent memory.    Insight fair   Judgement good   Fund of Knowledge- Limited        LABS: REVIEWED TODAY:  Recent Labs 03/06/23  0556 03/07/23  1247   WBC 7.6 7.4   HGB 9.1* 8.4*    282     Recent Labs     03/06/23  0556 03/07/23  1247 03/08/23  0637    136 137   K 4.9 4.1 4.1    107 107   CO2 25 19* 20   BUN 21 19 17   CREATININE 2.50* 2.23* 2.33*   GLUCOSE 93 98 152*     Recent Labs     03/06/23  1622 03/07/23  1247   BILITOT 0.2* 0.2*   ALKPHOS 124 123   AST 19 25   ALT 16 18     No results found for: LABAMPH, BARBSCNU, LABBENZ, CANNAB, COCAINESCRN, LABMETH, OPIATESCREENURINE, PHENCYCLIDINESCREENURINE, PPXUR, ETOH  Lab Results   Component Value Date/Time    TSH 3.63 03/06/2023 04:22 PM     No results found for: LITHIUM  No results found for: VALPROATE, CBMZ  No results found for: LITHIUM, VALPROATE    FURTHER LABS ORDERED :      Radiology   CT HEAD WO CONTRAST    Result Date: 3/6/2023  EXAMINATION: CT OF THE HEAD WITHOUT CONTRAST  3/6/2023 1:45 pm TECHNIQUE: CT of the head was performed without the administration of intravenous contrast. Automated exposure control, iterative reconstruction, and/or weight based adjustment of the mA/kV was utilized to reduce the radiation dose to as low as reasonably achievable. COMPARISON: None. HISTORY: ORDERING SYSTEM PROVIDED HISTORY: disorientation TECHNOLOGIST PROVIDED HISTORY: disorientation Reason for Exam: Disoriented, AMS, dizzy FINDINGS: BRAIN/VENTRICLES: There is no acute intracranial hemorrhage, mass effect or midline shift. No abnormal extra-axial fluid collection. The gray-white differentiation is maintained without evidence of an acute infarct. There is no evidence of hydrocephalus. ORBITS: The visualized portion of the orbits demonstrate no acute abnormality. SINUSES: The visualized paranasal sinuses and mastoid air cells demonstrate no acute abnormality. SOFT TISSUES/SKULL:  No acute abnormality of the visualized skull or soft tissues. No acute intracranial abnormality.      US RETROPERITONEAL COMPLETE    Result Date: 3/4/2023  EXAMINATION: RETROPERITONEAL ULTRASOUND OF THE KIDNEYS AND URINARY BLADDER 3/4/2023 COMPARISON: CT abdomen and pelvis 12/03/2021. Renal ultrasound 08/07/2018 HISTORY: ORDERING SYSTEM PROVIDED HISTORY: ALEJANDRO TECHNOLOGIST PROVIDED HISTORY: ALEJANDRO FINDINGS: Kidneys: The right kidney measures 11.3 cm in length and the left kidney measures 10.9 cm in length. Kidneys demonstrate mildly increased cortical echogenicity. No evidence of hydronephrosis or intrarenal stones. Exophytic 1.1 x 1.1 x 1.0 cm lesion arising from the inferior right kidney is consistent with a cyst, grossly unchanged compared to more recent CT imaging. A cleft of fat or AML measuring 1.4 cm in the interpolar right kidney is noted, unchanged from prior ultrasound. Bladder: Prevoid volume of 502 cc. The patient did not void for this exam.  Ureteral jets are not visualized. Mildly echogenic renal parenchyma suggestive of intrinsic renal parenchymal disease. No hydronephrosis. MRI BRAIN WO CONTRAST    Result Date: 3/8/2023  EXAMINATION: MRI OF THE BRAIN WITHOUT CONTRAST  3/8/2023 7:46 am TECHNIQUE: Multiplanar multisequence MRI of the brain was performed without the administration of intravenous contrast. COMPARISON: CT head 03/06/2023 HISTORY: ORDERING SYSTEM PROVIDED HISTORY: AMS TECHNOLOGIST PROVIDED HISTORY: AMS Reason for Exam: AMS FINDINGS: Motion artifact limits evaluation. INTRACRANIAL STRUCTURES/VENTRICLES: There is no acute infarct. No mass effect or midline shift. No evidence of an acute intracranial hemorrhage. The ventricles and sulci are normal in size and configuration. The sellar/suprasellar regions appear unremarkable. The normal signal voids within the major intracranial vessels appear maintained. Prominent ventricles and sulci, consistent with mild parenchymal volume loss. ORBITS: The visualized portion of the orbits demonstrate no acute abnormality. SINUSES: The visualized mastoid air cells demonstrate no acute abnormality.  Ethmoid mucosal thickening. BONES/SOFT TISSUES: The bone marrow signal intensity appears normal. The soft tissues demonstrate no acute abnormality. Motion artifact limits evaluation. No acute intracranial abnormality. DIAGNOSIS:  Diabetic foot infection  Delirium due to medical condition        RISK ASSESSMENT: low risk of suicide or harm to others      RECOMMENDATIONS  Disposition: No indication for psych admission  Risk Management:  routine:  no special precautions necessary    Medications:  No psychotropics indicated. Discussed with the treating physician/ team about the patient and treatment plan  Reviewed the chart    Discussed with the patient risk, benefit, alternative and common side effects for the  proposed medication treatment. Patient is consenting to the treatment. Thanks for the consult. Please call me if needed. Electronically signed by Ivone Cordoba MD on 3/8/2023 at 7:17 PM    Please note that this chart was generated using voice recognition Dragon dictation software. Although every effort was made to ensure the accuracy of this automated transcription, some errors in transcription may have occurred.

## 2023-03-09 NOTE — PROGRESS NOTES
Progress Note  Podiatric Medicine and Surgery     Subjective     CC: right foot wound, s/p right foot midtarsal and subtalar join arthrodesis (12/27/22)    Interval history:  -Patient seen and examined at bedside.    -Mentation greatly improved today. Patient does not recall speaking with writer the last 2 days.    -Patient asking when he can get surgery so he can get out of the hospital  -Patient remains off antibiotics  -Plan is still for I&D and removal of hardware of right lower extremity. Date and time TBD, possibly this weekend. HPI:  Yane Funez is a 78 y.o. male seen at [facility] [address] for right foot pain and swelling. Patient is well known to podiatry service and has been following outpatient with Dr. Rui Bill since surgery on 12/27/22 to right foot. Patient states he has been compliant with wound care and walking in CAM boot, however noticed worsening pain and redness to right foot, prompting him to get CT at outside facility and presenting to [facility]'s ED. Patient states he has not been on IV antibiotics for a while, does follow Infectious disease who saw him on 3/1/23. Patient denies any constitutional symptoms at this time. Patient is type 2 diabetic, ALEJANDRO on CKD, and has history of Charcot foot. No further pedal complaints at this time. PCP is Myles Mc MD    ROS:   Review of Systems   Constitutional:  Negative for chills and fever. Respiratory:  Negative for chest tightness and shortness of breath. Cardiovascular:  Negative for chest pain and leg swelling. Gastrointestinal:  Positive for nausea. Negative for abdominal pain, constipation, diarrhea and vomiting. Endocrine: Negative for polyuria. Musculoskeletal:  Positive for arthralgias, joint swelling and myalgias. Negative for gait problem. Skin:  Positive for color change and wound. Neurological:  Negative for weakness and numbness.        Objective     Vitals:  Patient Vitals for the past 8 hrs:   BP Temp Pulse Resp SpO2   23 1551 (!) 149/71 98.4 °F (36.9 °C) 79 16 97 %   23 1112 (!) 144/76 98.4 °F (36.9 °C) 81 16 98 %       Average, Min, and Max for last 24 hours Vitals:  TEMPERATURE:  Temp  Av.3 °F (36.8 °C)  Min: 97.9 °F (36.6 °C)  Max: 98.5 °F (36.9 °C)    RESPIRATIONS RANGE: Resp  Av.8  Min: 16  Max: 18    PULSE RANGE: Pulse  Av.2  Min: 66  Max: 90    BLOOD PRESSURE RANGE:  Systolic (35TZA), ETZ:482 , Min:144 , IRJ:806   ; Diastolic (61KRT), TOX:56, Min:71, Max:80      PULSE OXIMETRY RANGE: SpO2  Av.6 %  Min: 97 %  Max: 99 %  I&O:  I/O last 3 completed shifts: In: 2566.5 [I.V.:2566.5]  Out: 2450 [Urine:2450]    CBC:  Recent Labs     23  1622 23  1247 23  0614   WBC  --  7.4  --    HGB  --  8.4*  --    HCT  --  24.8*  --    PLT  --  282  --    CRP 75.8*  --  34.8*          BMP:  Recent Labs     23  1247 23  0637 23  0614    137 136   K 4.1 4.1 4.0    107 105   CO2 19* 20 23   BUN 19 17 16   CREATININE 2.23* 2.33* 2.28*   GLUCOSE 98 152* 113*   CALCIUM 9.5 9.7 9.2          Coags:  Recent Labs     23  1622 23  1247   PROT 7.1 7.0         Lab Results   Component Value Date    LABA1C 6.9 (H) 10/05/2022     Lab Results   Component Value Date    SEDRATE 53 (H) 2023     Lab Results   Component Value Date    CRP 34.8 (H) 2023         Lower Extremity Physical Exam:  Vascular: DP and PT pulses are palpable, bilaterally. CFT <4 seconds to all digits. Hair growth is absent to the level of the digits. Nonpitting edema to lateral ankle right foot. Neuro: Saph/sural/SP/DP/plantar sensation intact to light touch. Musculoskeletal: Muscle strength is 4/5, decreased ROM , adequate strength to all lower extremity muscle groups. Gross deformity is absent. Dermatologic: Full thickness ulcer #1 located lateral aspect of right ankle and measures approximately 0.5cm x 0.3cm x 0.5 cm. Base is fibrogranular.  Periwound skin is hyperkeratotic. Minimal serous drainage with no associated mal odor. Erythema noted to right ankle with associated increase in warmth. Does probe deep, sinus tracks proximal. No fluctuance, crepitus, or induration. Interdigital maceration absent. Clinical Images:            Imaging:   MRI BRAIN WO CONTRAST   Final Result   Motion artifact limits evaluation. No acute intracranial abnormality. CT HEAD WO CONTRAST   Final Result   No acute intracranial abnormality. US RETROPERITONEAL COMPLETE   Final Result   Mildly echogenic renal parenchyma suggestive of intrinsic renal parenchymal   disease. No hydronephrosis. Assessment     Giorgi Ramsey is a 78 y.o. male with   Cellulitis, right lower extremity  S/p right midtarsal and subtalar foot arthrodesis  Charcot neuroarthropathy, right ankle  Right ankle pain  Encephalopathy of unknown etiology    Principal Problem:    Diabetic foot infection ([address] with VRE  Active Problems:    Type 2 diabetes mellitus with right diabetic foot ulcer ([address]    Charcot's joint of right foot    Stage 3b chronic kidney disease ([address]    Hyponatremia    Microcytic anemia    Infection due to vancomycin resistant Enterococcus faecium    Acute metabolic encephalopathy    Essential hypertension    Neuropathy    Charcot foot due to diabetes mellitus ([address]    Hyperlipidemia    Acute kidney injury superimposed on chronic kidney disease ([address]  Resolved Problems:    * No resolved hospital problems. *        Plan     Patient examined and evaluated at bedside   Treatment options discussed in detail with the patient  CT results from outside facility discussed with patient. Findings consistent with possible hardware loosening vs infection to hardware  Neurology on board:  MRI brain without contrast: Results limited, negative for intracranial abnormality  EEG consistent with encephalopathy  -Plan is still for I&D and removal of hardware of right lower extremity. Date and time TBD, possibly this weekend. IV abx: Cefepime and linezolid held, monitoring off antibiotics.   Appreciate ID recommendations   Dressing applied to Right foot: 1/2 packing, Betadine, DSD, Ace  WBAT to Right lower extremity  Will discuss with Dr. Berny Alvarado, [facility]   Podiatric Medicine & Surgery   3/9/2023 at 4:05 PM

## 2023-03-10 ENCOUNTER — ANESTHESIA EVENT (OUTPATIENT)
Dept: OPERATING ROOM | Age: 68
End: 2023-03-10
Payer: MEDICARE

## 2023-03-10 PROBLEM — R41.82 ALTERED MENTAL STATUS: Status: ACTIVE | Noted: 2023-03-10

## 2023-03-10 LAB
GLUCOSE BLD-MCNC: 145 MG/DL (ref 75–110)
GLUCOSE BLD-MCNC: 156 MG/DL (ref 75–110)
GLUCOSE BLD-MCNC: 160 MG/DL (ref 75–110)
GLUCOSE BLD-MCNC: 76 MG/DL (ref 75–110)

## 2023-03-10 PROCEDURE — 2580000003 HC RX 258: Performed by: NURSE PRACTITIONER

## 2023-03-10 PROCEDURE — 6370000000 HC RX 637 (ALT 250 FOR IP): Performed by: INTERNAL MEDICINE

## 2023-03-10 PROCEDURE — 6370000000 HC RX 637 (ALT 250 FOR IP): Performed by: NURSE PRACTITIONER

## 2023-03-10 PROCEDURE — 99231 SBSQ HOSP IP/OBS SF/LOW 25: CPT | Performed by: INTERNAL MEDICINE

## 2023-03-10 PROCEDURE — 99232 SBSQ HOSP IP/OBS MODERATE 35: CPT | Performed by: INTERNAL MEDICINE

## 2023-03-10 PROCEDURE — 82947 ASSAY GLUCOSE BLOOD QUANT: CPT

## 2023-03-10 PROCEDURE — 1200000000 HC SEMI PRIVATE

## 2023-03-10 PROCEDURE — 99232 SBSQ HOSP IP/OBS MODERATE 35: CPT | Performed by: PSYCHIATRY & NEUROLOGY

## 2023-03-10 PROCEDURE — 6360000002 HC RX W HCPCS: Performed by: NURSE PRACTITIONER

## 2023-03-10 RX ADMIN — ASPIRIN 81 MG: 81 TABLET, COATED ORAL at 08:53

## 2023-03-10 RX ADMIN — CETIRIZINE HYDROCHLORIDE 10 MG: 10 TABLET, FILM COATED ORAL at 21:46

## 2023-03-10 RX ADMIN — AMLODIPINE BESYLATE 5 MG: 5 TABLET ORAL at 08:53

## 2023-03-10 RX ADMIN — HEPARIN SODIUM 5000 UNITS: 5000 INJECTION INTRAVENOUS; SUBCUTANEOUS at 21:46

## 2023-03-10 RX ADMIN — SODIUM CHLORIDE, PRESERVATIVE FREE 10 ML: 5 INJECTION INTRAVENOUS at 08:56

## 2023-03-10 RX ADMIN — GABAPENTIN 300 MG: 300 CAPSULE ORAL at 21:46

## 2023-03-10 RX ADMIN — SODIUM CHLORIDE, PRESERVATIVE FREE 10 ML: 5 INJECTION INTRAVENOUS at 21:46

## 2023-03-10 RX ADMIN — ATORVASTATIN CALCIUM 20 MG: 20 TABLET, FILM COATED ORAL at 21:46

## 2023-03-10 RX ADMIN — HEPARIN SODIUM 5000 UNITS: 5000 INJECTION INTRAVENOUS; SUBCUTANEOUS at 15:52

## 2023-03-10 RX ADMIN — HEPARIN SODIUM 5000 UNITS: 5000 INJECTION INTRAVENOUS; SUBCUTANEOUS at 05:40

## 2023-03-10 RX ADMIN — HYDROCODONE BITARTRATE AND ACETAMINOPHEN 1 TABLET: 10; 325 TABLET ORAL at 08:54

## 2023-03-10 RX ADMIN — GABAPENTIN 300 MG: 300 CAPSULE ORAL at 15:52

## 2023-03-10 RX ADMIN — GABAPENTIN 300 MG: 300 CAPSULE ORAL at 08:54

## 2023-03-10 RX ADMIN — HYDROCODONE BITARTRATE AND ACETAMINOPHEN 1 TABLET: 10; 325 TABLET ORAL at 21:46

## 2023-03-10 ASSESSMENT — PAIN SCALES - GENERAL: PAINLEVEL_OUTOF10: 9

## 2023-03-10 ASSESSMENT — PAIN DESCRIPTION - ORIENTATION: ORIENTATION: LEFT

## 2023-03-10 ASSESSMENT — PAIN DESCRIPTION - LOCATION: LOCATION: FOOT

## 2023-03-10 ASSESSMENT — ENCOUNTER SYMPTOMS
GASTROINTESTINAL NEGATIVE: 1
RESPIRATORY NEGATIVE: 1

## 2023-03-10 ASSESSMENT — PAIN DESCRIPTION - DESCRIPTORS: DESCRIPTORS: SQUEEZING

## 2023-03-10 NOTE — PLAN OF CARE
Problem: Discharge Planning  Goal: Discharge to home or other facility with appropriate resources  3/10/2023 1049 by Andrea Ortega RN  Outcome: Progressing     Problem: Pain  Goal: Verbalizes/displays adequate comfort level or baseline comfort level  3/10/2023 1049 by Andrea Ortega RN  Outcome: Progressing     Problem: Safety - Adult  Goal: Free from fall injury  3/10/2023 1049 by Andrea Ortega RN  Outcome: Progressing     Problem: ABCDS Injury Assessment  Goal: Absence of physical injury  3/10/2023 1049 by Andrea Ortega RN  Outcome: Progressing     Problem: Neurosensory - Adult  Goal: Achieves maximal functionality and self care  3/10/2023 1049 by Andrea Ortega RN  Outcome: Progressing     Problem: Respiratory - Adult  Goal: Achieves optimal ventilation and oxygenation  3/10/2023 1049 by Andrea Ortega RN  Outcome: Progressing     Problem: Cardiovascular - Adult  Goal: Maintains optimal cardiac output and hemodynamic stability  3/10/2023 1049 by Andrea Ortega RN  Outcome: Progressing     Problem: Cardiovascular - Adult  Goal: Absence of cardiac dysrhythmias or at baseline  3/10/2023 1049 by Andrea Ortega RN  Outcome: Progressing     Problem: Skin/Tissue Integrity - Adult  Goal: Skin integrity remains intact  3/10/2023 1049 by Andrea Ortega RN  Outcome: Progressing     Problem: Skin/Tissue Integrity - Adult  Goal: Incisions, wounds, or drain sites healing without S/S of infection  3/10/2023 1049 by Andrea Ortega RN  Outcome: Progressing     Problem: Skin/Tissue Integrity - Adult  Goal: Oral mucous membranes remain intact  3/10/2023 1049 by Andrea Ortega RN  Outcome: Progressing     Problem: Musculoskeletal - Adult  Goal: Return mobility to safest level of function  3/10/2023 1049 by Andrea Ortega RN  Outcome: Progressing     Problem: Musculoskeletal - Adult  Goal: Maintain proper alignment of affected body part  3/10/2023 1049 by Andrea Ortega RN  Outcome: Progressing Problem: Musculoskeletal - Adult  Goal: Return ADL status to a safe level of function  3/10/2023 1049 by Kristine Olea RN  Outcome: Progressing     Problem: Gastrointestinal - Adult  Goal: Minimal or absence of nausea and vomiting  3/10/2023 1049 by Kristine Olea RN  Outcome: Progressing     Problem: Gastrointestinal - Adult  Goal: Maintains or returns to baseline bowel function  3/10/2023 1049 by Kristine Olea RN  Outcome: Progressing     Problem: Gastrointestinal - Adult  Goal: Maintains adequate nutritional intake  3/10/2023 1049 by Kristine Olea RN  Outcome: Progressing     Problem: Genitourinary - Adult  Goal: Absence of urinary retention  3/10/2023 1049 by Kristine Olea RN  Outcome: Progressing     Problem: Infection - Adult  Goal: Absence of infection at discharge  3/10/2023 1049 by Kristine Olea RN  Outcome: Progressing     Problem: Infection - Adult  Goal: Absence of infection during hospitalization  3/10/2023 1049 by Kristine Olea RN  Outcome: Progressing     Problem: Infection - Adult  Goal: Absence of fever/infection during anticipated neutropenic period  3/10/2023 1049 by Kristine Olea RN  Outcome: Progressing     Problem: Metabolic/Fluid and Electrolytes - Adult  Goal: Electrolytes maintained within normal limits  3/10/2023 1049 by Kristine Olea RN  Outcome: Progressing

## 2023-03-10 NOTE — PLAN OF CARE
Problem: Discharge Planning  Goal: Discharge to home or other facility with appropriate resources  Outcome: Progressing     Problem: Pain  Goal: Verbalizes/displays adequate comfort level or baseline comfort level  Outcome: Progressing  Flowsheets (Taken 3/10/2023 0013)  Verbalizes/displays adequate comfort level or baseline comfort level:   Encourage patient to monitor pain and request assistance   Administer analgesics based on type and severity of pain and evaluate response   Consider cultural and social influences on pain and pain management   Assess pain using appropriate pain scale   Implement non-pharmacological measures as appropriate and evaluate response     Problem: Safety - Adult  Goal: Free from fall injury  Outcome: Progressing  Flowsheets (Taken 3/10/2023 0013)  Free From Fall Injury:   Instruct family/caregiver on patient safety   Based on caregiver fall risk screen, instruct family/caregiver to ask for assistance with transferring infant if caregiver noted to have fall risk factors     Problem: ABCDS Injury Assessment  Goal: Absence of physical injury  Outcome: Progressing     Problem: Neurosensory - Adult  Goal: Achieves maximal functionality and self care  Outcome: Progressing     Problem: Respiratory - Adult  Goal: Achieves optimal ventilation and oxygenation  Outcome: Progressing     Problem: Cardiovascular - Adult  Goal: Maintains optimal cardiac output and hemodynamic stability  Outcome: Progressing  Goal: Absence of cardiac dysrhythmias or at baseline  Outcome: Progressing     Problem: Skin/Tissue Integrity - Adult  Goal: Skin integrity remains intact  Outcome: Progressing  Flowsheets (Taken 3/9/2023 1953)  Skin Integrity Remains Intact: Monitor for areas of redness and/or skin breakdown  Goal: Incisions, wounds, or drain sites healing without S/S of infection  Outcome: Progressing  Flowsheets (Taken 3/9/2023 1953)  Incisions, Wounds, or Drain Sites Healing Without Sign and Symptoms of Infection: TWICE DAILY: Assess and document skin integrity  Goal: Oral mucous membranes remain intact  Outcome: Progressing     Problem: Musculoskeletal - Adult  Goal: Return mobility to safest level of function  Outcome: Progressing  Goal: Maintain proper alignment of affected body part  Outcome: Progressing  Goal: Return ADL status to a safe level of function  Outcome: Progressing     Problem: Gastrointestinal - Adult  Goal: Minimal or absence of nausea and vomiting  Outcome: Progressing  Goal: Maintains or returns to baseline bowel function  Outcome: Progressing  Goal: Maintains adequate nutritional intake  Outcome: Progressing     Problem: Genitourinary - Adult  Goal: Absence of urinary retention  Outcome: Progressing     Problem: Infection - Adult  Goal: Absence of infection at discharge  Outcome: Progressing  Goal: Absence of infection during hospitalization  Outcome: Progressing  Goal: Absence of fever/infection during anticipated neutropenic period  Outcome: Progressing     Problem: Metabolic/Fluid and Electrolytes - Adult  Goal: Electrolytes maintained within normal limits  Outcome: Progressing  Goal: Hemodynamic stability and optimal renal function maintained  Outcome: Progressing  Goal: Glucose maintained within prescribed range  Outcome: Progressing     Problem: Chronic Conditions and Co-morbidities  Goal: Patient's chronic conditions and co-morbidity symptoms are monitored and maintained or improved  Outcome: Progressing     Problem: Skin/Tissue Integrity  Goal: Absence of new skin breakdown  Description: 1. Monitor for areas of redness and/or skin breakdown  2. Assess vascular access sites hourly  3. Every 4-6 hours minimum:  Change oxygen saturation probe site  4. Every 4-6 hours:  If on nasal continuous positive airway pressure, respiratory therapy assess nares and determine need for appliance change or resting period.   Outcome: Progressing

## 2023-03-10 NOTE — PROGRESS NOTES
Comprehensive Nutrition Assessment    Type and Reason for Visit:  Initial, RD Nutrition Re-Screen/LOS    Nutrition Recommendations/Plan:   Modify diet to include double protein  Monitor weight and PO intake     Malnutrition Assessment:  Malnutrition Status:  Mild malnutrition (03/10/23 1410)    Context:  Acute Illness     Findings of the 6 clinical characteristics of malnutrition:  Energy Intake:  Mild decrease in energy intake (Comment) (no PO 3/6-3/7)  Weight Loss:  1% to 2% over 1 week     Body Fat Loss:  Unable to assess     Muscle Mass Loss:  Unable to assess    Fluid Accumulation:  Moderate to Severe (related to wound infection) Extremities   Strength:  Not Performed    Nutrition Assessment:    Patient admitted for draining diabetic wound on right foot, fever, chills. Medical history includes ALEJANDRO, cellulitis, CKD, hypertension, diabetes mellitus II, hyperlipidemia, MRSA. Patient refused to eat 3/6-3/7 with worsening confusion and disorientation, potentially from medication. Doing much better 3/8, weaned off most medication. Lost 2% weight in 6 days. Currently waiting for I&D and hardware removal surgery, possibly this weekend. PO intake now is 100%, no GI distress. Both patient and wife did not feel comfortable introducing [employer] wound healing supplement due to disorientation episode a few days ago despite reassurance of its contents. Stated they would be more comfortable with double protein portion at meals instead. Add double protein to current diet, monitor weight, PO intake. Nutrition Related Findings:    pressure would LLE, draining diabetic ulcer RLE; +3 RLE edema; active bowels Wound Type: Pressure Injury, Diabetic Ulcer       Current Nutrition Intake & Therapies:    Average Meal Intake: %  Average Supplements Intake: None Ordered  ADULT DIET; Regular; 4 carb choices (60 gm/meal);  Give double portions of protein/meat at meals    Anthropometric Measures:  Height: 5' 10\" (177.8 cm)  Ideal Body Weight (IBW): 166 lbs (75 kg)    Admission Body Weight: 232 lb (105.2 kg)  Current Body Weight: 228 lb (103.4 kg), 137.3 % IBW. Weight Source: Bed Scale  Current BMI (kg/m2): 32.7        Weight Adjustment For: No Adjustment                 BMI Categories: Obese Class 1 (BMI 30.0-34. 9)    Estimated Daily Nutrient Needs:  Energy Requirements Based On: Kcal/kg  Weight Used for Energy Requirements: Current  Energy (kcal/day): 1654 - 1861 kcal/day (16-18 kcal/kg)  Weight Used for Protein Requirements: Ideal  Protein (g/day): 112 - 128 g/day (1.5-1.7 g/day)         Nutrition Diagnosis:   Inadequate protein-energy intake related to cognitive or neurological impairment as evidenced by intake 0-25%, weight loss 1%-2% in 1 week  Increased nutrient needs related to increase demand for energy/nutrients as evidenced by other (comment) (diabetic ulcer and pressure wound)    Nutrition Interventions:   Food and/or Nutrient Delivery: Modify Current Diet  Nutrition Education/Counseling: Education not indicated  Coordination of Nutrition Care: Continue to monitor while inpatient       Goals:     Goals: Meet at least 75% of estimated needs       Nutrition Monitoring and Evaluation:   Behavioral-Environmental Outcomes: None Identified  Food/Nutrient Intake Outcomes: Food and Nutrient Intake  Physical Signs/Symptoms Outcomes: Biochemical Data, Skin, Weight    Discharge Planning:    Continue current diet     Osmar Dewitt Dietetic Intern    Reviewed and approved by: Sreedhar HARRISON, RDN, LDN  Lead Clinical Dietitian  RD Office Phone [phone]

## 2023-03-10 NOTE — CONSULTS
Department of Psychiatry  Consult Progress Note  3/9/2023    Patient Name: José Farrar    Reason for initial consult:  Suicidal ideation and disoriented         Interval History:      Patient agreeable to assessment at bedside where he is well oriented and eager to move forward with surgery of discharge. 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 The patient denies any depressive symptoms or suicidal ideation. He was able to make eye contact and had affective reactivity. His wife agrees that he has improved from two days when he was not like his usual self. He is aware that psychiatry will sign off from his care as his symptoms of disorientation have resolved. Mental Status Exam  Level of consciousness: Alert and awake   Appearance: Appropriate attire for setting, seated in chair, with fair  grooming and hygiene   Behavior/Motor: Approachable, engages with interviewer, no psychomotor abnormalities   Attitude toward examiner: Cooperative, attentive, good eye contact  Speech: spontaneous, normal rate, normal volume, and well articulated   Mood:  euthymic  Affect: Full range  Thought processes: linear, goal directed, and coherent   Thought content:  Denies homicidal ideation  Suicidal Ideation: Denies suicidal ideations,  Delusions: No evidence of delusions. Perceptual Disturbance:  Patient does not appear to be responding to internal stimuli. Cognition: Oriented to self, location, time, and situation  Memory: intact  Insight: good   Judgement: good       Data   height is 5' 10\" (1.778 m) and weight is 224 lb 12.8 oz (102 kg). His temperature is 98.4 °F (36.9 °C). His blood pressure is 149/71 (abnormal) and his pulse is 79. His respiration is 16 and oxygen saturation is 97%. Labs:   No results displayed because visit has over 200 results. Reviewed patient's current plan of care and vital signs.     Labs reviewed: [x] Yes    Medications  Current Facility-Administered Medications: acetaminophen (TYLENOL) tablet 650 mg, 650 mg, Oral, Q4H PRN  gabapentin (NEURONTIN) capsule 300 mg, 300 mg, Oral, TID  dextrose 5 % and 0.9 % sodium chloride infusion, , IntraVENous, Continuous  fentaNYL (SUBLIMAZE) injection 25 mcg, 25 mcg, IntraVENous, Q2H PRN  amLODIPine (NORVASC) tablet 5 mg, 5 mg, Oral, Daily  aspirin EC tablet 81 mg, 81 mg, Oral, Daily  cetirizine (ZYRTEC) tablet 10 mg, 10 mg, Oral, Nightly  HYDROcodone-acetaminophen (NORCO)  MG per tablet 1 tablet, 1 tablet, Oral, Q6H PRN  [Held by provider] glipiZIDE (GLUCOTROL) tablet 20 mg, 20 mg, Oral, BID AC  [Held by provider] alogliptin (NESINA) tablet 12.5 mg, 12.5 mg, Oral, Daily  insulin glargine (LANTUS) injection vial 10 Units, 10 Units, SubCUTAneous, BID  atorvastatin (LIPITOR) tablet 20 mg, 20 mg, Oral, Nightly  glucose chewable tablet 16 g, 4 tablet, Oral, PRN  dextrose bolus 10% 125 mL, 125 mL, IntraVENous, PRN **OR** dextrose bolus 10% 250 mL, 250 mL, IntraVENous, PRN  glucagon (rDNA) injection 1 mg, 1 mg, SubCUTAneous, PRN  dextrose 10 % infusion, , IntraVENous, Continuous PRN  sodium chloride flush 0.9 % injection 5-40 mL, 5-40 mL, IntraVENous, 2 times per day  sodium chloride flush 0.9 % injection 10 mL, 10 mL, IntraVENous, PRN  0.9 % sodium chloride infusion, , IntraVENous, PRN  ondansetron (ZOFRAN-ODT) disintegrating tablet 4 mg, 4 mg, Oral, Q8H PRN **OR** ondansetron (ZOFRAN) injection 4 mg, 4 mg, IntraVENous, Q6H PRN  polyethylene glycol (GLYCOLAX) packet 17 g, 17 g, Oral, Daily PRN  heparin (porcine) injection 5,000 Units, 5,000 Units, SubCUTAneous, 3 times per day  insulin lispro (HUMALOG) injection vial 0-8 Units, 0-8 Units, SubCUTAneous, TID WC  insulin lispro (HUMALOG) injection vial 0-4 Units, 0-4 Units, SubCUTAneous, Nightly      ASSESSMENT    Diagnosis:  Diabetic foot infection  Delirium due to medical condition      Patient Active Problem List   Diagnosis Essential hypertension    Type II or unspecified type diabetes mellitus without mention of complication, not stated as uncontrolled    Neuropathy    Vertigo    Charcot foot due to diabetes mellitus (HCC)    Hyperlipidemia    Cellulitis    Cellulitis of left foot    Right foot infection    Diabetic polyneuropathy associated with type 2 diabetes mellitus (HCC)    Foreign body (FB) in soft tissue    Cellulitis of left leg    Abscess of right foot    Chest pain at rest    Tobacco abuse    Type 2 diabetes mellitus treated with insulin (HCC)    Acute kidney injury superimposed on chronic kidney disease (HCC)    Bandemia    Chronic multifocal osteomyelitis of right foot (HCC)    Diabetic infection of right foot (HCC)    Acquired hammer toe deformity of lesser toe of right foot    Post-op pain    Right foot pain    Hallux hammertoe, right    Osteomyelitis (HCC)    Wound dehiscence    Diabetic foot ([address]    Hyperglycemia due to diabetes mellitus ([address]    Foot ulcer ([address]    Cellulitis of right foot    Type 2 diabetes mellitus with right diabetic foot ulcer ([address]    Charcot's joint of right foot    Stage 3b chronic kidney disease ([address]    Diabetic foot infection ([address] with VRE    Hyponatremia    Microcytic anemia    Infection due to vancomycin resistant Enterococcus faecium    Acute metabolic encephalopathy        PLAN  Disposition: No indication for psych admission  Risk Management:  routine:  no special precautions necessary     Psychiatry will sign off. No Medication Changes Today  Additional recommendations will follow the clinical course. Thank you very much for allowing us to participate in the care of this patient. Electronically signed by ROCHELLE France CNP on 3/9/2023 at 7:01 PM     **This report has been created using voice recognition software. It may contain minor errors which are inherent in voice recognition technology. **

## 2023-03-10 NOTE — PROGRESS NOTES
[facility]  Office: [address], DO, Leonvalente Beaulieu, DO, Leonelwalter Burgos, DO, Clarita Redd Solis, DO, Rachelle Alarcon MD, Rian Pereyra MD, Angel Mcelroy MD, Min Desouza MD,  Smith Brooks MD, Wilmer Bay MD, Arun Lara, DO, Sheridan Kim MD,  Caesar Boo MD, Amilcar Mathis MD, Jennifer Dowell, DO, Vanessa Elkins MD, Chloe Casas MD, Denita Rivera DO, Frank Chou MD, Laurie Gonzalez MD, Stefan Marin MD, Rolanda Tafoya MD, Donnie Terrell DO, Breana Weinberg MD, Rhonda Hargrove MD, Renita Charles, CNP,  Althea Perez, CNP, Rhiannon Huang, CNP, Bob Montalvo, CNP,  Dangelo Prado, [facility], Kulwinder Purvis, CNP, Abdias Paris, CNP, William Owens, CNP, Leatha Friedman, CNP, Zachariah Chaves, [facility], Jackie Wing, PA-C, Chucky Caballero, CNS, Nicolás Peterss, CNP, Rubén Advent, [facility]    Progress Note    3/10/2023    11:57 AM    Name:   Bhavna Campos  MRN:     [id number]     [place]:      [de-identified]   Room:   2017/2017-02  IP Day:  7  Admit Date:  3/3/2023  2:54 PM    PCP:   Be Bhatia MD  Code Status:  Full Code    Subjective:     C/C:   Chief Complaint   Patient presents with    Wound Infection     Right foot     Interval History Status: unchanged. Feels fine today    He doesn't really remember the other day and doesn't know why he acted like that    Denies cp/sob/n/v    Brief History:     Per my partner   This is a 79-year-old male that presents with right foot wound with concerns for infection. Recent outpatient culture with vancomycin-resistant Enterococcus faecium. He presented to the emergency room with worsening drainage and developed fevers and chills. Overnight he was started on Maxipime. Recent outpatient cultures were obtained with evidence of VRE with antibiotics will be adjusted accordingly.     Review of Systems:     Constitutional:  negative for chills, fevers, sweats  Respiratory:  negative for cough, wheezing, shortness of breath  Cardiovascular:  negative for chest pain, chest pressure/discomfort, palpitations  Gastrointestinal:  negative for abdominal pain, constipation, diarrhea, nausea, vomiting  Neurological:  negative for dizziness, headache      Medications:     Allergies:    Allergies   Allergen Reactions    Morphine Itching    Other Other (See Comments)     Other reaction(s): Unknown    Percocet [Oxycodone-Acetaminophen]      Facial swelling       Current Meds:   Scheduled Meds:    gabapentin  300 mg Oral TID    amLODIPine  5 mg Oral Daily    aspirin  81 mg Oral Daily    cetirizine  10 mg Oral Nightly    [Held by provider] glipiZIDE  20 mg Oral BID AC    [Held by provider] alogliptin  12.5 mg Oral Daily    insulin glargine  10 Units SubCUTAneous BID    atorvastatin  20 mg Oral Nightly    sodium chloride flush  5-40 mL IntraVENous 2 times per day    heparin (porcine)  5,000 Units SubCUTAneous 3 times per day    insulin lispro  0-8 Units SubCUTAneous TID WC    insulin lispro  0-4 Units SubCUTAneous Nightly     Continuous Infusions:    dextrose      sodium chloride Stopped (03/05/23 1313)     PRN Meds: acetaminophen, fentanNYL, HYDROcodone-acetaminophen, glucose, dextrose bolus **OR** dextrose bolus, glucagon (rDNA), dextrose, sodium chloride flush, sodium chloride, ondansetron **OR** ondansetron, polyethylene glycol    Data:     Past Medical History:   has a past medical history of Abscess of right foot, Acquired hammer toe deformity of lesser toe of right foot, ALEJANDRO (acute kidney injury) ([facility]), Cellulitis, Cellulitis of left foot, Cellulitis of right foot, Charcot foot due to diabetes mellitus ([facility]), Chest pain at rest, Chronic multifocal osteomyelitis of right foot ([facility]), CKD (chronic kidney disease), Diabetic polyneuropathy associated with type 2 diabetes mellitus ([facility]), Essential hypertension, Fractures, multiple, Hyperlipidemia, Hypertension, Leukocytosis,  MRSA (methicillin resistant staph aureus) culture positive, Neuropathy, Pain in right foot, Pneumonia, Right foot infection, Right foot pain, Tobacco abuse, Type II or unspecified type diabetes mellitus without mention of complication, not stated as uncontrolled, Vertigo, 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 Social History:   reports that he has been smoking cigarettes. He has been smoking an average of .5 packs per day. He has never used smokeless tobacco. He reports that he does not currently use drugs. He reports that he does not drink alcohol. Family History:   Family History   Problem Relation Age of Onset    Diabetes Mother     Cancer Father     Hypertension Maternal Grandmother        Vitals:  BP (!) 145/74   Pulse 72   Temp 99.1 °F (37.3 °C) (Oral)   Resp 18   Ht 5' 10\" (1.778 m)   Wt 228 lb (103.4 kg)   SpO2 96%   BMI 32.71 kg/m²   Temp (24hrs), Av °F (37.2 °C), Min:98.4 °F (36.9 °C), Max:99.5 °F (37.5 °C)    Recent Labs     23  1118 23  1627 23  1954 03/10/23  0609   POCGLU 181* 228* 184* 76       I/O (24Hr):     Intake/Output Summary (Last 24 hours) at 3/10/2023 1157  Last data filed at 3/9/2023 2044  Gross per 24 hour   Intake --   Output 700 ml   Net -700 ml       Labs:  Hematology:  Recent Labs     23  1247 23  0614   WBC 7.4  --    RBC 2.99*  --    HGB 8.4*  --    HCT 24.8*  --    MCV 82.9  --    MCH 28.1  --    MCHC 33.9  --    RDW 15.7*  --      --    MPV 8.7  --    CRP  --  34.8*     Chemistry:  Recent Labs     23  1247 23  0637 23  0614    137 136   K 4.1 4.1 4.0    107 105   CO2 19* 20 23   GLUCOSE 98 152* 113*   BUN 19 17 16   CREATININE 2.23* 2.33* 2.28*   ANIONGAP 10 10 8*   LABGLOM 32* 30* 31*   CALCIUM 9.5 9.7 9.2     Recent Labs     23  1247 23  1638 23  1908 23  0600 23  1118 03/09/23  1627 03/09/23  1954 03/10/23  0609   PROT 7.0  --   --   --   --   --   --   --    LABALBU 2.8*  --   --   --   --   --   --   --    AST 25  --   --   --   --   --   --   --    ALT 18  --   --   --   --   --   --   --    ALKPHOS 123  --   --   --   --   --   --   --    BILITOT 0.2*  --   --   --   --   --   --   --    POCGLU  --    < > 233* 119* 181* 228* 184* 76    < > = values in this interval not displayed. ABG:  Lab Results   Component Value Date/Time    POCPH 7.393 03/06/2023 02:39 PM    POCPCO2 36.8 03/06/2023 02:39 PM    POCPO2 73.0 03/06/2023 02:39 PM    POCHCO3 22.4 03/06/2023 02:39 PM    NBEA 2 03/06/2023 02:39 PM    GYDK7DTU 94 03/06/2023 02:39 PM    FIO2 21.0 03/06/2023 02:39 PM     Lab Results   Component Value Date/Time    SPECIAL 10ML,RTFA 03/07/2023 12:46 PM     Lab Results   Component Value Date/Time    CULTURE NO GROWTH 2 DAYS 03/07/2023 12:46 PM       Radiology:  CT HEAD WO CONTRAST    Result Date: 3/6/2023  No acute intracranial abnormality. US RETROPERITONEAL COMPLETE    Result Date: 3/4/2023  Mildly echogenic renal parenchyma suggestive of intrinsic renal parenchymal disease. No hydronephrosis.        Physical Examination:        General appearance:  alert, cooperative and no distress  Mental Status:  oriented  and normal affect  Lungs:  clear to auscultation bilaterally, normal effort  Heart:  regular rate and rhythm, no murmur  Abdomen:  soft, nontender, nondistended, normal bowel sounds, no masses, hepatomegaly, splenomegaly  Extremities:  no edema, redness, tenderness in the calves  Mentation back to normal    Assessment:        Hospital Problems             Last Modified POA    * (Principal) Diabetic foot infection ([facility] with VRE 3/4/2023 Yes    Type 2 diabetes mellitus with right diabetic foot ulcer ([facility] 3/3/2023 Yes    Charcot's joint of right foot 3/3/2023 Yes    Stage 3b chronic kidney disease ([facility] (Chronic) 3/3/2023 Yes    Hyponatremia 3/3/2023 Yes    Microcytic anemia 3/3/2023 Yes    Infection due to vancomycin resistant Enterococcus faecium 3/4/2023 Yes    Acute metabolic encephalopathy 2/2/6931 Yes    Delirium due to another medical condition 3/9/2023 Yes    Essential hypertension (Chronic) 3/3/2023 Yes    Neuropathy 3/3/2023 Yes    Charcot foot due to diabetes mellitus ([facility] 3/3/2023 Yes    Hyperlipidemia 3/3/2023 Yes    Acute kidney injury superimposed on chronic kidney disease ([facility] 3/3/2023 Yes       Plan:        Unclear what caused his ams 3/7 but much better; cefepime stopped in case that was cause.   Also lowered neurontin dose as his dose is too high for his level of renal function  mri head done , negative  May be rescheduled for surgery anytime    Fernando Solis DO  3/10/2023  11:57 AM

## 2023-03-10 NOTE — PROGRESS NOTES
DATE: 3/10/2023    NAME: Yane Funez  MRN: [id number]   : 1955    Patient not seen this date for Occupational Therapy due to:      [] Cancel by RN or physician due to:    [] Hemodialysis    [] Critical Lab Value Level     [] Blood transfusion in progress    [] Acute or unstable cardiovascular status   _MAP < 55 or more than >115  _HR < 40 or > 130    [] Acute or unstable pulmonary status   -FiO2 > 60%   _RR < 5 or >40    _O2 sats < 85%    [] Strict Bedrest    [] Off Unit for surgery or procedure    [] Off Unit for testing       [] Pending imaging to R/O fracture    [x] Refusal by Patient : Pt. Declined treatment today stating \"I hurt today and I hope to be leaving. ... I can't do that other therapy either\". Pt. Educated on reasons for treatment and pt. Cont to refuse. OT will cont to follow. [] Other      [] OT being discontinued at this time. Patient independent. No further needs. [] OT being discontinued at this time as the patient has been transferred to hospice care. No further needs.       JOVANY Mar

## 2023-03-10 NOTE — PROGRESS NOTES
Physical Therapy  DATE: 3/10/2023    NAME: Giorgi Ramsey  MRN: [id number]   : 1955    Patient not seen this date for Physical Therapy due to:      [] Cancel by RN or physician due to:    [] Hemodialysis    [] Critical Lab Value Level     [] Blood transfusion in progress    [] Acute or unstable cardiovascular status   _MAP < 55 or more than >115  _HR < 40 or > 130    [] Acute or unstable pulmonary status   -FiO2 > 60%   _RR < 5 or >40    _O2 sats < 85%    [] Strict Bedrest    [] Off Unit for surgery or procedure    [] Off Unit for testing       [] Pending imaging to R/O fracture    [x] Refusal by Patient: Pt. declined treatment today stating \"I hurt today and I hope to be leaving. ... I can't do that other therapy either\". Pt. educated on reasons for treatment and pt. cont to refuse. PT will cont to follow. [] Other      [] PT being discontinued at this time. Patient independent. No further needs. [] PT being discontinued at this time as the patient has been transferred to hospice care. No further needs. Treatment & documentation completed by Jose Rafael Rodriguez, Student Physical Therapist Assistant  co-signed by Ema Lopez PTA   I am the clinical instructor and have supervised the student in provision of therapy services. I have reviewed the clinical documentation and am responsible for the care provided under the therapy plan of care.

## 2023-03-10 NOTE — PROGRESS NOTES
Progress Note  Podiatric Medicine and Surgery     Subjective     CC: right foot wound, s/p right foot midtarsal and subtalar join arthrodesis (22)    Interval history:  -Patient seen and examined at bedside.    -Mentation has significantly improved  -Patient asking when he can get surgery. He states he would like to get the surgery outpatient if the surgery is not planned for the weekend.  -Patient remains off antibiotics  -Plan is still for I&D and removal of hardware of right lower extremity. Date and time TBD, possibly this weekend. HPI:  Apurva Capellan is a 78 y.o. male seen at [address] [place], [place] [address],[address] for right foot pain and swelling. Patient is well known to podiatry service and has been following outpatient with Dr. Shavonne Mishra since surgery on 22 to right foot. Patient states he has been compliant with wound care and walking in CAM boot, however noticed worsening pain and redness to right foot, prompting him to get CT at outside facility and presenting to [facility]'s ED. Patient states he has not been on IV antibiotics for a while, does follow Infectious disease who saw him on 3/1/23. Patient denies any constitutional symptoms at this time. Patient is type 2 diabetic, ALEJANDRO on CKD, and has history of Charcot foot. No further pedal complaints at this time.      PCP is Lindsey Sanchez MD      Objective     Vitals:  Patient Vitals for the past 8 hrs:   BP Temp Temp src Pulse Resp SpO2 Weight   03/10/23 0719 (!) 145/74 99.1 °F (37.3 °C) Oral 72 18 96 % --   03/10/23 0610 -- -- -- -- -- -- 228 lb (103.4 kg)   03/10/23 0442 133/72 99 °F (37.2 °C) -- 74 16 96 % --       Average, Min, and Max for last 24 hours Vitals:  TEMPERATURE:  Temp  Av.9 °F (37.2 °C)  Min: 98.4 °F (36.9 °C)  Max: 99.5 °F (37.5 °C)    RESPIRATIONS RANGE: Resp  Av.3  Min: 16  Max: 18    PULSE RANGE: Pulse  Av.4  Min: 72  Max: 81    BLOOD PRESSURE RANGE:  Systolic (12AVY), ZFM:051 , Min:133 , JYM:110   ; Diastolic (86VDA), FVS:98, Min:71, Max:76      PULSE OXIMETRY RANGE: SpO2  Av.6 %  Min: 96 %  Max: 98 %  I&O:  I/O last 3 completed shifts: In: 1668.7 [I.V.:1668.7]  Out:  [Urine:]    CBC:  Recent Labs     23  1247 23  0614   WBC 7.4  --    HGB 8.4*  --    HCT 24.8*  --      --    CRP  --  34.8*          BMP:  Recent Labs     23  1247 23  0637 23  0614    137 136   K 4.1 4.1 4.0    107 105   CO2 19* 20 23   BUN 19 17 16   CREATININE 2.23* 2.33* 2.28*   GLUCOSE 98 152* 113*   CALCIUM 9.5 9.7 9.2          Coags:  Recent Labs     23  1247   PROT 7.0         Lab Results   Component Value Date    LABA1C 6.9 (H) 10/05/2022     Lab Results   Component Value Date    SEDRATE 53 (H) 2023     Lab Results   Component Value Date    CRP 34.8 (H) 2023         Lower Extremity Physical Exam:  Vascular: DP and PT pulses are palpable, bilaterally. CFT <4 seconds to all digits. Hair growth is absent to the level of the digits. Nonpitting edema to lateral ankle right foot. Neuro: Saph/sural/SP/DP/plantar sensation intact to light touch. Musculoskeletal: Muscle strength is 4/5, decreased ROM , adequate strength to all lower extremity muscle groups. Gross deformity is absent. Dermatologic: Full thickness ulcer #1 located lateral aspect of right ankle and measures approximately 0.5cm x 0.3cm x 0.5 cm. Base is fibrogranular. Periwound skin is hyperkeratotic. Minimal serous drainage with no associated mal odor. Erythema noted to right ankle with associated increase in warmth. Does probe deep, sinus tracks proximal. No fluctuance, crepitus, or induration. Interdigital maceration absent. Dry eschar appreciated to plantar right heel with no active drainage. Fluctuance appreciated but no induration. Interdigital maceration is absent    Clinical Images: See Media panel    Imaging:   MRI BRAIN WO CONTRAST   Final Result   Motion artifact limits evaluation.       No acute intracranial abnormality. CT HEAD WO CONTRAST   Final Result   No acute intracranial abnormality. US RETROPERITONEAL COMPLETE   Final Result   Mildly echogenic renal parenchyma suggestive of intrinsic renal parenchymal   disease. No hydronephrosis. Assessment     Naman Wilder is a 78 y.o. 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 *        Plan     Patient examined and evaluated at bedside   Treatment options discussed in detail with the patient  CT results from outside facility discussed with patient. Findings consistent with possible hardware loosening vs infection to hardware  Neurology on board:  MRI brain without contrast: Results limited, negative for intracranial abnormality  EEG consistent with encephalopathy  -Plan is still for I&D and removal of hardware of right lower extremity. Date and time TBD, possibly this weekend. IV abx: Cefepime and linezolid held, monitoring off antibiotics.   Appreciate ID recommendations   Dressing applied to Right foot: betadine soaked gauze, DSD, Ace  WBAT to Right lower extremity  Will discuss with Dr. Allison Yusuf [facility]   Podiatric Medicine & Surgery 3/10/2023 at 8:47 AM

## 2023-03-10 NOTE — PROGRESS NOTES
[facility]  Progress Note    Laura Parham  MRN: [id number]  Date: 3/10/2023  LOS: 7     Reason for F/U :   Right foot infection    Impression :   Charcot foot deformity with history of right midtarsal and subtalar foot arthrodesis  History of osteomyelitis   status post antimicrobial therapy in late 2022  Draining wounds right lower extremity with imaging suggesting potential infection involving hardware  Diabetes mellitus type 2 with associated chronic kidney disease  Altered mental status    Recommendations: The patient did receive cefepime and linezolid 3/3/2023 through 3/7/2023   The cefepime was discontinued as it has been known to cause some CNS toxicities. The encephalopathy has since resolved and all imaging studies including CT of the head, MRI with no significant findings  The plan is for surgery to be done sometime this weekend and I did discuss with the patient and his wife that he will need to resume antimicrobial therapy and the plan will be to start him on meropenem and daptomycin  He will likely need IV antibiotic therapy at the time of discharge. Infection Control Recommendations:   Universal precautions    Discharge Planning:   Estimated Length of IV antimicrobials: To be determined  Patient will need Midline Catheter Insertion/ PICC line Insertion: No  Patient will need: Home IV , [place],  SNF,  LTAC: Undetermined  Patient willneed outpatient wound care: No    Medical Decision making / Summary of Stay:   Laura Parham is a 78y.o.-year-old male presented to the hospital with worsening right foot pain and swelling for several weeks associated with open ulcer on the lateral aspect of the hand foot that probes to bone. Symptoms moderate to severe, worse with movement, no alleviating factors. He also complaining of subjective fever and chills, mild shortness of breath.   CT of the right foot was done at [facility] on 2/27/2023 showed lucency around hardware within the calcaneus. The patient had recent surgery done by Dr. Jannette Brower on 2022 with hardware in place. History of Charcot foot, chronic renal failure and diabetes mellitus. Initial WBC normal, creatinine 2.89, sedimentation rate 53, C-reactive protein 136    The wife is at bedside and she also reports episodes of confusion and is very concerned. She did raise that she was worried about the fentanyl that the patient received on 3/3/2023. The patient answers \"I do not know\" to almost all of the questions. Current evaluation:3/10/2023    BP (!) 145/74   Pulse 72   Temp 99.1 °F (37.3 °C) (Oral)   Resp 18   Ht 5' 10\" (1.778 m)   Wt 228 lb (103.4 kg)   SpO2 96%   BMI 32.71 kg/m²     Temperature Range: Temp: 99.1 °F (37.3 °C) Temp  Av °F (37.2 °C)  Min: 98.4 °F (36.9 °C)  Max: 99.5 °F (37.5 °C)  The patient is seen and evaluated at bedside he is awake and alert he is now back to his baseline mentation. No subjective fevers or chills no cough or shortness of breath. No abdominal pain nausea vomiting or diarrhea. Review of Systems   Constitutional: Negative. HENT: Negative. Respiratory: Negative. Cardiovascular: Negative. Gastrointestinal: Negative. Genitourinary: Negative. Musculoskeletal: Negative. Skin:  Positive for wound. Neurological: Negative. Psychiatric/Behavioral: Negative. Physical Examination :     Physical Exam  Constitutional:       Appearance: He is well-developed. HENT:      Head: Normocephalic and atraumatic. Cardiovascular:      Rate and Rhythm: Normal rate. Heart sounds: Normal heart sounds. No friction rub. No gallop. Pulmonary:      Effort: Pulmonary effort is normal.      Breath sounds: Normal breath sounds. No wheezing. Abdominal:      General: Bowel sounds are normal.      Palpations: Abdomen is soft. There is no mass. Tenderness: There is no abdominal tenderness.    Musculoskeletal:         General: Normal range of motion. Cervical back: Normal range of motion and neck supple. Lymphadenopathy:      Cervical: No cervical adenopathy. Skin:     General: Skin is warm and dry. Comments: Lower extremity ankle/foot in a dressing   Neurological:      Mental Status: He is alert and oriented to person, place, and time. Laboratory data:   I have independently reviewed the followinglabs:  CBC with Differential:   No results for input(s): WBC, HGB, HCT, PLT, SEGSPCT, BANDSPCT, LYMPHOPCT, MONOPCT, EOSPCT in the last 72 hours. BMP:   Recent Labs     03/08/23  0637 03/09/23  0614    136   K 4.1 4.0    105   CO2 20 23   BUN 17 16   CREATININE 2.33* 2.28*       Hepatic Function Panel: No results for input(s): PROT, LABALBU, BILIDIR, IBILI, BILITOT, ALKPHOS, ALT, AST in the last 72 hours. No results found for: PROCAL  Lab Results   Component Value Date/Time    CRP 34.8 03/09/2023 06:14 AM    CRP 75.8 03/06/2023 04:22 PM    .7 03/05/2023 05:57 AM     Lab Results   Component Value Date    SEDRATE 53 (H) 03/03/2023         No results found for: DDIMER  No results found for: FERRITIN  No results found for: LDH  No results found for: FIBRINOGEN    No results found for requested labs within last 30 days. Lab Results   Component Value Date/Time    COVID19 DETECTED 05/26/2021 12:06 PM    COVID19 Not Detected 12/07/2020 03:10 PM    COVID19 Not Detected 08/08/2020 10:02 PM       No results for input(s): [facility] in the last 72 hours. Imaging Studies:           MRI OF THE BRAIN WITHOUT CONTRAST  3/8/2023 7:46 am  Impression   Motion artifact limits evaluation. No acute intracranial abnormality.      Cultures:   None    Medications:      gabapentin  300 mg Oral TID    amLODIPine  5 mg Oral Daily    aspirin  81 mg Oral Daily    cetirizine  10 mg Oral Nightly    [Held by provider] glipiZIDE  20 mg Oral BID AC    [Held by provider] alogliptin  12.5 mg Oral Daily    insulin glargine  10 Units SubCUTAneous BID    atorvastatin  20 mg Oral Nightly    sodium chloride flush  5-40 mL IntraVENous 2 times per day    heparin (porcine)  5,000 Units SubCUTAneous 3 times per day    insulin lispro  0-8 Units SubCUTAneous TID WC    insulin lispro  0-4 Units SubCUTAneous Nightly       Electronically signed by Esthela Foster MD on 3/10/2023 at 12:49 PM      [facility]  Esthela Foster MD  [employer] messaging  OFFICE: [phone]    Thank you for allowing us to participate in the care of this patient. Please call with questions. This note is created with the assistance of a speech recognition program.  While intending to generate a document that actually reflects the content of the visit, the document can still have some errors including those of syntax and sound a like substitutions which may escape proof reading. In such instances, actual meaning can be extrapolated by contextual diversion.

## 2023-03-11 ENCOUNTER — APPOINTMENT (OUTPATIENT)
Dept: CT IMAGING | Age: 68
DRG: 981 | End: 2023-03-11
Payer: MEDICARE

## 2023-03-11 LAB
CRP SERPL HS-MCNC: 40.1 MG/L (ref 0–5)
ERYTHROCYTE [SEDIMENTATION RATE] IN BLOOD BY WESTERGREN METHOD: 72 MM/HR (ref 0–20)
GLUCOSE BLD-MCNC: 102 MG/DL (ref 75–110)
GLUCOSE BLD-MCNC: 126 MG/DL (ref 75–110)
GLUCOSE BLD-MCNC: 158 MG/DL (ref 75–110)
GLUCOSE BLD-MCNC: 231 MG/DL (ref 75–110)

## 2023-03-11 PROCEDURE — 6360000002 HC RX W HCPCS: Performed by: NURSE PRACTITIONER

## 2023-03-11 PROCEDURE — 36415 COLL VENOUS BLD VENIPUNCTURE: CPT

## 2023-03-11 PROCEDURE — 86140 C-REACTIVE PROTEIN: CPT

## 2023-03-11 PROCEDURE — 6370000000 HC RX 637 (ALT 250 FOR IP): Performed by: INTERNAL MEDICINE

## 2023-03-11 PROCEDURE — 99231 SBSQ HOSP IP/OBS SF/LOW 25: CPT | Performed by: INTERNAL MEDICINE

## 2023-03-11 PROCEDURE — 99232 SBSQ HOSP IP/OBS MODERATE 35: CPT | Performed by: PSYCHIATRY & NEUROLOGY

## 2023-03-11 PROCEDURE — 6370000000 HC RX 637 (ALT 250 FOR IP): Performed by: NURSE PRACTITIONER

## 2023-03-11 PROCEDURE — 82947 ASSAY GLUCOSE BLOOD QUANT: CPT

## 2023-03-11 PROCEDURE — 99232 SBSQ HOSP IP/OBS MODERATE 35: CPT | Performed by: INTERNAL MEDICINE

## 2023-03-11 PROCEDURE — 73700 CT LOWER EXTREMITY W/O DYE: CPT

## 2023-03-11 PROCEDURE — 85652 RBC SED RATE AUTOMATED: CPT

## 2023-03-11 PROCEDURE — 1200000000 HC SEMI PRIVATE

## 2023-03-11 PROCEDURE — 2580000003 HC RX 258: Performed by: NURSE PRACTITIONER

## 2023-03-11 RX ADMIN — GABAPENTIN 300 MG: 300 CAPSULE ORAL at 09:31

## 2023-03-11 RX ADMIN — ASPIRIN 81 MG: 81 TABLET, COATED ORAL at 09:31

## 2023-03-11 RX ADMIN — AMLODIPINE BESYLATE 5 MG: 5 TABLET ORAL at 09:31

## 2023-03-11 RX ADMIN — SODIUM CHLORIDE, PRESERVATIVE FREE 10 ML: 5 INJECTION INTRAVENOUS at 20:29

## 2023-03-11 RX ADMIN — CETIRIZINE HYDROCHLORIDE 10 MG: 10 TABLET, FILM COATED ORAL at 20:28

## 2023-03-11 RX ADMIN — GABAPENTIN 300 MG: 300 CAPSULE ORAL at 20:28

## 2023-03-11 RX ADMIN — ATORVASTATIN CALCIUM 20 MG: 20 TABLET, FILM COATED ORAL at 20:28

## 2023-03-11 RX ADMIN — HEPARIN SODIUM 5000 UNITS: 5000 INJECTION INTRAVENOUS; SUBCUTANEOUS at 20:32

## 2023-03-11 RX ADMIN — HYDROCODONE BITARTRATE AND ACETAMINOPHEN 1 TABLET: 10; 325 TABLET ORAL at 19:53

## 2023-03-11 RX ADMIN — INSULIN GLARGINE 10 UNITS: 100 INJECTION, SOLUTION SUBCUTANEOUS at 20:28

## 2023-03-11 RX ADMIN — HEPARIN SODIUM 5000 UNITS: 5000 INJECTION INTRAVENOUS; SUBCUTANEOUS at 05:46

## 2023-03-11 RX ADMIN — SODIUM CHLORIDE, PRESERVATIVE FREE 10 ML: 5 INJECTION INTRAVENOUS at 09:31

## 2023-03-11 RX ADMIN — GABAPENTIN 300 MG: 300 CAPSULE ORAL at 15:14

## 2023-03-11 ASSESSMENT — PAIN SCALES - GENERAL
PAINLEVEL_OUTOF10: 8
PAINLEVEL_OUTOF10: 8

## 2023-03-11 ASSESSMENT — PAIN DESCRIPTION - DESCRIPTORS
DESCRIPTORS: THROBBING
DESCRIPTORS: THROBBING

## 2023-03-11 ASSESSMENT — PAIN DESCRIPTION - PAIN TYPE
TYPE: ACUTE PAIN
TYPE: ACUTE PAIN

## 2023-03-11 ASSESSMENT — PAIN - FUNCTIONAL ASSESSMENT
PAIN_FUNCTIONAL_ASSESSMENT: ACTIVITIES ARE NOT PREVENTED
PAIN_FUNCTIONAL_ASSESSMENT: PREVENTS OR INTERFERES SOME ACTIVE ACTIVITIES AND ADLS

## 2023-03-11 ASSESSMENT — ENCOUNTER SYMPTOMS
GASTROINTESTINAL NEGATIVE: 1
RESPIRATORY NEGATIVE: 1

## 2023-03-11 ASSESSMENT — PAIN DESCRIPTION - FREQUENCY
FREQUENCY: CONTINUOUS
FREQUENCY: CONTINUOUS

## 2023-03-11 ASSESSMENT — PAIN DESCRIPTION - ONSET
ONSET: ON-GOING
ONSET: ON-GOING

## 2023-03-11 ASSESSMENT — PAIN DESCRIPTION - LOCATION
LOCATION: FOOT
LOCATION: FOOT

## 2023-03-11 ASSESSMENT — PAIN DESCRIPTION - ORIENTATION
ORIENTATION: RIGHT
ORIENTATION: RIGHT

## 2023-03-11 NOTE — PROGRESS NOTES
Physical Therapy  DATE: 3/11/2023    NAME: Cande Cruz  MRN: [id number]   : 1955    Patient not seen this date for Physical Therapy due to:      [] Cancel by RN or physician due to:    [] Hemodialysis    [] Critical Lab Value Level     [] Blood transfusion in progress    [] Acute or unstable cardiovascular status   _MAP < 55 or more than >115  _HR < 40 or > 130    [] Acute or unstable pulmonary status   -FiO2 > 60%   _RR < 5 or >40    _O2 sats < 85%    [] Strict Bedrest    [] Off Unit for surgery or procedure    [] Off Unit for testing       [] Pending imaging to R/O fracture    [x] Refusal by Patient  Pt stated he's not interested and not getting out of bed. Stated he might get up later today if the nurse wants him to    [] Other      [] PT being discontinued at this time. Patient independent. No further needs. [] PT being discontinued at this time as the patient has been transferred to hospice care. No further needs.       Xuan Cardoso

## 2023-03-11 NOTE — PROGRESS NOTES
[facility]  Progress Note    Mary Stein  MRN: [id number]  Date: 3/11/2023  LOS: 8     Reason for F/U :   Right foot infection    Impression :   Charcot foot deformity with history of right midtarsal and subtalar foot arthrodesis  History of osteomyelitis   status post antimicrobial therapy in late 2022  Draining wounds right lower extremity with imaging suggesting potential infection involving hardware  Diabetes mellitus type 2 with associated chronic kidney disease  Altered mental status    Recommendations: The patient did receive cefepime and linezolid 3/3/2023 through 3/7/2023   The cefepime was discontinued as it has been known to cause some CNS toxicities. The encephalopathy has since resolved and all imaging studies including CT of the head, MRI with no significant findings  The plan is for surgery tomorrow   The patient will need to start antimicrobial therapy with meropenem and daptomycin in the perioperative period  He will likely need IV antibiotic therapy at the time of discharge. Infection Control Recommendations:   Universal precautions    Discharge Planning:   Estimated Length of IV antimicrobials: To be determined  Patient will need Midline Catheter Insertion/ PICC line Insertion: No  Patient [facility] , [facility],  [facility],  LTAC: Undetermined  Patient willneed outpatient wound care: No    Medical Decision making / Summary of Stay:   Mary Stein is a 78y.o.-year-old male presented to the hospital with worsening right foot pain and swelling for several weeks associated with open ulcer on the lateral aspect of the hand foot that probes to bone. Symptoms moderate to severe, worse with movement, no alleviating factors. He also complaining of subjective fever and chills, mild shortness of breath. CT of the right foot was done at [facility] on 2/27/2023 showed lucency around hardware within the calcaneus.   The patient had recent surgery done by Dr. Teresa García on 2022 with hardware in place. History of Charcot foot, chronic renal failure and diabetes mellitus. Initial WBC normal, creatinine 2.89, sedimentation rate 53, C-reactive protein 136    The wife is at bedside and she also reports episodes of confusion and is very concerned. She did raise that she was worried about the fentanyl that the patient received on 3/3/2023. The patient answers \"I do not know\" to almost all of the questions. Current evaluation:3/11/2023    BP (!) 143/76 Comment: nurse notified  Pulse 70   Temp 97.5 °F (36.4 °C) (Oral)   Resp 16   Ht 5' 10\" (1.778 m)   Wt 229 lb 4.8 oz (104 kg)   SpO2 98%   BMI 32.90 kg/m²     Temperature Range: Temp: 97.5 °F (36.4 °C) Temp  Av.9 °F (36.6 °C)  Min: 97.5 °F (36.4 °C)  Max: 98.6 °F (37 °C)  The patient is seen and evaluated at bedside and is awake and alert in no acute distress. No surgical fever or chills. Abdominal pain nausea vomiting or diarrhea. No chest pains or palpitations. Review of Systems   Constitutional: Negative. HENT: Negative. Respiratory: Negative. Cardiovascular: Negative. Gastrointestinal: Negative. Genitourinary: Negative. Musculoskeletal: Negative. Skin:  Positive for wound. Neurological: Negative. Psychiatric/Behavioral: Negative. Physical Examination :     Physical Exam  Constitutional:       Appearance: He is well-developed. HENT:      Head: Normocephalic and atraumatic. Cardiovascular:      Rate and Rhythm: Normal rate. Heart sounds: Normal heart sounds. No friction rub. No gallop. Pulmonary:      Effort: Pulmonary effort is normal.      Breath sounds: Normal breath sounds. No wheezing. Abdominal:      General: Bowel sounds are normal.      Palpations: Abdomen is soft. There is no mass. Tenderness: There is no abdominal tenderness. Musculoskeletal:         General: Normal range of motion. Cervical back: Normal range of motion and neck supple. Lymphadenopathy:      Cervical: No cervical adenopathy. Skin:     General: Skin is warm and dry. Comments: Lower extremity ankle/foot in a dressing   Neurological:      Mental Status: He is alert and oriented to person, place, and time. Laboratory data:   I have independently reviewed the followinglabs:  CBC with Differential:   No results for input(s): WBC, HGB, HCT, PLT, SEGSPCT, BANDSPCT, LYMPHOPCT, MONOPCT, EOSPCT in the last 72 hours. BMP:   Recent Labs     03/09/23  0614      K 4.0      CO2 23   BUN 16   CREATININE 2.28*       Hepatic Function Panel: No results for input(s): PROT, LABALBU, BILIDIR, IBILI, BILITOT, ALKPHOS, ALT, AST in the last 72 hours. No results found for: PROCAL  Lab Results   Component Value Date/Time    CRP 40.1 03/11/2023 06:07 AM    CRP 34.8 03/09/2023 06:14 AM    CRP 75.8 03/06/2023 04:22 PM     Lab Results   Component Value Date    SEDRATE 72 (H) 03/11/2023         No results found for: DDIMER  No results found for: FERRITIN  No results found for: LDH  No results found for: FIBRINOGEN    No results found for requested labs within last 30 days. Lab Results   Component Value Date/Time    COVID19 DETECTED 05/26/2021 12:06 PM    COVID19 Not Detected 12/07/2020 03:10 PM    COVID19 Not Detected 08/08/2020 10:02 PM       No results for input(s): VANCOTROUGH in the last 72 hours. Imaging Studies:           MRI OF THE BRAIN WITHOUT CONTRAST  3/8/2023 7:46 am  Impression   Motion artifact limits evaluation. No acute intracranial abnormality. Cultures:     Culture, Blood 1 [[id number]] Collected: 03/07/23 1238   Order Status: Completed Specimen: Blood Updated: 03/10/23 1411    Specimen Description . BLOOD    Special Requests LT HAND,6ML    Culture NO GROWTH 3 DAYS   Culture, Blood 1 [[id number]] Collected: 03/07/23 1246   Order Status: Completed Specimen: Blood Updated: 03/10/23 1410    Specimen Description . BLOOD    Special Requests 10ML,RTFA Culture NO GROWTH 3 DAYS       Medications:      gabapentin  300 mg Oral TID    amLODIPine  5 mg Oral Daily    aspirin  81 mg Oral Daily    cetirizine  10 mg Oral Nightly    [Held by provider] glipiZIDE  20 mg Oral BID AC    [Held by provider] alogliptin  12.5 mg Oral Daily    insulin glargine  10 Units SubCUTAneous BID    atorvastatin  20 mg Oral Nightly    sodium chloride flush  5-40 mL IntraVENous 2 times per day    heparin (porcine)  5,000 Units SubCUTAneous 3 times per day    insulin lispro  0-8 Units SubCUTAneous TID WC    insulin lispro  0-4 Units SubCUTAneous Nightly       Electronically signed by Emerald Varghese MD on 3/11/2023 at 10:43 AM      [facility]  Emerald Varghese MD  [employer] messaging  OFFICE: [phone]    Thank you for allowing us to participate in the care of this patient. Please call with questions. This note is created with the assistance of a speech recognition program.  While intending to generate a document that actually reflects the content of the visit, the document can still have some errors including those of syntax and sound a like substitutions which may escape proof reading. In such instances, actual meaning can be extrapolated by contextual diversion.

## 2023-03-11 NOTE — PLAN OF CARE
Problem: Discharge Planning  Goal: Discharge to home or other facility with appropriate resources  Outcome: Progressing     Problem: Pain  Goal: Verbalizes/displays adequate comfort level or baseline comfort level  Outcome: Progressing     Problem: Safety - Adult  Goal: Free from fall injury  Outcome: Progressing     Problem: ABCDS Injury Assessment  Goal: Absence of physical injury  Outcome: Progressing     Problem: Neurosensory - Adult  Goal: Achieves maximal functionality and self care  Outcome: Progressing     Problem: Respiratory - Adult  Goal: Achieves optimal ventilation and oxygenation  Outcome: Progressing     Problem: Cardiovascular - Adult  Goal: Maintains optimal cardiac output and hemodynamic stability  Outcome: Progressing  Goal: Absence of cardiac dysrhythmias or at baseline  Outcome: Progressing     Problem: Skin/Tissue Integrity - Adult  Goal: Skin integrity remains intact  Outcome: Progressing  Goal: Incisions, wounds, or drain sites healing without S/S of infection  Outcome: Progressing  Goal: Oral mucous membranes remain intact  Outcome: Progressing     Problem: Musculoskeletal - Adult  Goal: Return mobility to safest level of function  Outcome: Progressing  Goal: Maintain proper alignment of affected body part  Outcome: Progressing  Goal: Return ADL status to a safe level of function  Outcome: Progressing     Problem: Gastrointestinal - Adult  Goal: Minimal or absence of nausea and vomiting  Outcome: Progressing  Goal: Maintains or returns to baseline bowel function  Outcome: Progressing  Goal: Maintains adequate nutritional intake  Outcome: Progressing     Problem: Genitourinary - Adult  Goal: Absence of urinary retention  Outcome: Progressing     Problem: Infection - Adult  Goal: Absence of infection at discharge  Outcome: Progressing  Goal: Absence of infection during hospitalization  Outcome: Progressing  Goal: Absence of fever/infection during anticipated neutropenic period  Outcome: Progressing     Problem: Metabolic/Fluid and Electrolytes - Adult  Goal: Electrolytes maintained within normal limits  Outcome: Progressing  Goal: Hemodynamic stability and optimal renal function maintained  Outcome: Progressing  Goal: Glucose maintained within prescribed range  Outcome: Progressing     Problem: Hematologic - Adult  Goal: Maintains hematologic stability  Outcome: Progressing     Problem: Chronic Conditions and Co-morbidities  Goal: Patient's chronic conditions and co-morbidity symptoms are monitored and maintained or improved  Outcome: Progressing     Problem: Skin/Tissue Integrity  Goal: Absence of new skin breakdown  Description: 1. Monitor for areas of redness and/or skin breakdown  2. Assess vascular access sites hourly  3. Every 4-6 hours minimum:  Change oxygen saturation probe site  4. Every 4-6 hours:  If on nasal continuous positive airway pressure, respiratory therapy assess nares and determine need for appliance change or resting period.   Outcome: Progressing     Problem: Nutrition Deficit:  Goal: Optimize nutritional status  Outcome: Progressing

## 2023-03-11 NOTE — PROGRESS NOTES
[facility]  Office: [address], DO, Louis Mustafa, DO, Damian Delgado, DO, Kyler Gregory Blood, DO, Nikki Schmidt MD, Mumtaz Javier MD, Bill Benito MD, Adele Alicea MD,  Lolis Robertson MD, Wil Dillard MD, Akin Charels, DO, Faith Thomas MD,  Clemente Joshi MD, Roger Trujillo MD, Araine Lynn, DO, Vadim Dent MD, Chandler Lombard, MD, Mercedes Luis, DO, Ni Cedillo MD, Maximus Valentin MD, Modesta Morataya MD, Black Myles MD, Arleth Tran DO, Bhavya Zurita MD, Antoine Sagastume MD, Mindy Guadarrama, CNP,  Digna De Anda, CNP, Erin Kent, CNP, Stephani Davidson, CNP,  Tino Barrow, [facility], Jose Marcano, CNP, Matthew Jones, CNP, Dylon South, CNP, Aden Carbajal, CNP, Tc Crawford, CNP, Neno Lira PAElanC, Marly Padgett, CNS, Roseann Keating, [facility], Dai Gutiérrezs, [facility]    Progress Note    3/11/2023    12:58 PM    Name:   Lila Landers  MRN:     [id number]     [place]:      [de-identified]   Room:   2017/2017-02  IP Day:  8  Admit Date:  3/3/2023  2:54 PM    PCP:   Rudy Ortiz MD  Code Status:  Full Code    Subjective:     C/C:   Chief Complaint   Patient presents with    Wound Infection     Right foot     Interval History Status: unchanged. Feels fine today    He doesn't really remember the other day and doesn't know why he acted like that    Denies cp/sob/n/v    Brief History:     Per my partner   This is a 58-year-old male that presents with right foot wound with concerns for infection. Recent outpatient culture with vancomycin-resistant Enterococcus faecium. He presented to the emergency room with worsening drainage and developed fevers and chills. Overnight he was started on Maxipime. Recent outpatient cultures were obtained with evidence of VRE with antibiotics will be adjusted accordingly.     Review of Systems:     Constitutional:  negative for chills, fevers, sweats  Respiratory:  negative for cough, wheezing, shortness of breath  Cardiovascular:  negative for chest pain, chest pressure/discomfort, palpitations  Gastrointestinal:  negative for abdominal pain, constipation, diarrhea, nausea, vomiting  Neurological:  negative for dizziness, headache      Medications: Allergies:     Allergies   Allergen Reactions    Morphine Itching    Other Other (See Comments)     Other reaction(s): Unknown    Percocet [Oxycodone-Acetaminophen]      Facial swelling       Current Meds:   Scheduled Meds:    gabapentin  300 mg Oral TID    amLODIPine  5 mg Oral Daily    aspirin  81 mg Oral Daily    cetirizine  10 mg Oral Nightly    [Held by provider] glipiZIDE  20 mg Oral BID AC    [Held by provider] alogliptin  12.5 mg Oral Daily    insulin glargine  10 Units SubCUTAneous BID    atorvastatin  20 mg Oral Nightly    sodium chloride flush  5-40 mL IntraVENous 2 times per day    heparin (porcine)  5,000 Units SubCUTAneous 3 times per day    insulin lispro  0-8 Units SubCUTAneous TID WC    insulin lispro  0-4 Units SubCUTAneous Nightly     Continuous Infusions:    dextrose      sodium chloride Stopped (03/05/23 1313)     PRN Meds: acetaminophen, fentanNYL, HYDROcodone-acetaminophen, glucose, dextrose bolus **OR** dextrose bolus, glucagon (rDNA), dextrose, sodium chloride flush, sodium chloride, ondansetron **OR** ondansetron, polyethylene glycol    Data:     Past Medical History:   has a past medical history of Abscess of right foot, Acquired hammer toe deformity of lesser toe of right foot, ALEJANDRO (acute kidney injury) ([facility] [address] Cellulitis, Cellulitis of left foot, Cellulitis of right foot, Charcot foot due to diabetes mellitus ([facility] [address] Chest pain at rest, Chronic multifocal osteomyelitis of right foot ([facility] [address] CKD (chronic kidney disease), Diabetic polyneuropathy associated with type 2 diabetes mellitus ([facility] [address] Essential hypertension, Fractures, multiple, Hyperlipidemia, Hypertension, Leukocytosis, MRSA (methicillin resistant staph aureus) culture positive, Neuropathy, Pain in right foot, Pneumonia, Right foot infection, Right foot pain, Tobacco abuse, Type II or unspecified type diabetes mellitus without mention of complication, not stated as uncontrolled, Vertigo, 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 Social History:   reports that he has been smoking cigarettes. He has been smoking an average of .5 packs per day. He has never used smokeless tobacco. He reports that he does not currently use drugs. He reports that he does not drink alcohol. Family History:   Family History   Problem Relation Age of Onset    Diabetes Mother     Cancer Father     Hypertension Maternal Grandmother        Vitals:  /71   Pulse 75   Temp 99 °F (37.2 °C) (Oral)   Resp 16   Ht 5' 10\" (1.778 m)   Wt 229 lb 4.8 oz (104 kg)   SpO2 97%   BMI 32.90 kg/m²   Temp (24hrs), Av.1 °F (36.7 °C), Min:97.5 °F (36.4 °C), Max:99 °F (37.2 °C)    Recent Labs     03/10/23  1628 03/10/23  2001 03/11/23  0610 23  1101   POCGLU 145* 160* 102 126*       I/O (24Hr):     Intake/Output Summary (Last 24 hours) at 3/11/2023 1258  Last data filed at 3/11/2023 1105  Gross per 24 hour   Intake 540 ml   Output 1050 ml   Net -510 ml       Labs:  Hematology:  Recent Labs     23  0614 23  0607   SEDRATE  --  72*   CRP 34.8* 40.1*     Chemistry:  Recent Labs     23  0614      K 4.0      CO2 23   GLUCOSE 113*   BUN 16   CREATININE 2.28*   ANIONGAP 8*   LABGLOM 31*   CALCIUM 9.2     Recent Labs     03/10/23  0609 03/10/23  1137 03/10/23  1628 03/10/23  2001 03/11/23  0610 23  1101   POCGLU 76 156* 145* 160* 102 126*     ABG:  Lab Results   Component Value Date/Time    POCPH 7.393 2023 02:39 PM    POCPCO2 36.8 2023 02:39 PM    POCPO2 73.0 2023 02:39 PM    POCHCO3 22.4 2023 02:39 PM    NBEA 2 03/06/2023 02:39 PM    CBZF6ACB 94 03/06/2023 02:39 PM    FIO2 21.0 03/06/2023 02:39 PM     Lab Results   Component Value Date/Time    SPECIAL 10ML,RTFA 03/07/2023 12:46 PM     Lab Results   Component Value Date/Time    CULTURE NO GROWTH 3 DAYS 03/07/2023 12:46 PM       Radiology:  CT HEAD WO CONTRAST    Result Date: 3/6/2023  No acute intracranial abnormality. US RETROPERITONEAL COMPLETE    Result Date: 3/4/2023  Mildly echogenic renal parenchyma suggestive of intrinsic renal parenchymal disease. No hydronephrosis. Physical Examination:        General appearance:  alert, cooperative and no distress  Mental Status:  oriented to person, place and time, and normal affect  Lungs:  clear to auscultation bilaterally, normal effort  Heart:  regular rate and rhythm, no murmur  Abdomen:  soft, nontender, nondistended, normal bowel sounds, no masses, hepatomegaly, splenomegaly  Extremities:  no edema, redness, tenderness in the calves  Mentation back to normal    Assessment:        Hospital Problems             Last Modified POA    * (Principal) Diabetic foot infection ([address] with VRE 3/4/2023 Yes    Type 2 diabetes mellitus with right diabetic foot ulcer ([address] 3/3/2023 Yes    Charcot's joint of right foot 3/3/2023 Yes    Stage 3b chronic kidney disease ([address] (Chronic) 3/3/2023 Yes    Hyponatremia 3/3/2023 Yes    Microcytic anemia 3/3/2023 Yes    Infection due to vancomycin resistant Enterococcus faecium 3/4/2023 Yes    Acute metabolic encephalopathy 2/5/8268 Yes    Delirium due to another medical condition 3/9/2023 Yes    Altered mental status 3/10/2023 Yes    Essential hypertension (Chronic) 3/3/2023 Yes    Neuropathy 3/3/2023 Yes    Charcot foot due to diabetes mellitus ([address] 3/3/2023 Yes    Hyperlipidemia 3/3/2023 Yes    Acute kidney injury superimposed on chronic kidney disease ([address] 3/3/2023 Yes       Plan:        Unclear what caused his ams 3/7 but much better; cefepime stopped in case that was cause.   Also lowered neurontin dose as his dose is too high for his level of renal function  mri head done , negative  For OR tomorrow, then will need iv antibiotics for discharge    Speedy Guido Blood, DO  3/11/2023  12:58 PM

## 2023-03-11 NOTE — PROGRESS NOTES
Progress Note  Podiatric Medicine and Surgery     Subjective     CC: right foot wound, s/p right foot midtarsal and subtalar join arthrodesis (22)    Interval history:  -Patient seen and examined at bedside with Dr. Terrance Reaves.    -Patient remains off antibiotics  -Plan for I&D and removal of hardware of right lower extremity on 3/12/23 at 8am.     HPI:  Radha Noel is a 78 y.o. male seen at [facility] [address] for right foot pain and swelling. Patient is well known to podiatry service and has been following outpatient with Dr. Terrance Reaves since surgery on 22 to right foot. Patient states he has been compliant with wound care and walking in CAM boot, however noticed worsening pain and redness to right foot, prompting him to get CT at outside facility and presenting to [facility]'s ED. Patient states he has not been on IV antibiotics for a while, does follow Infectious disease who saw him on 3/1/23. Patient denies any constitutional symptoms at this time. Patient is type 2 diabetic, ALEJANDRO on CKD, and has history of Charcot foot. No further pedal complaints at this time.      PCP is Alex Otoole MD      Objective     Vitals:  Patient Vitals for the past 8 hrs:   BP Temp Temp src Pulse Resp SpO2 Weight   23 1105 135/71 99 °F (37.2 °C) Oral 75 16 97 % --   23 0712 (!) 143/76 97.5 °F (36.4 °C) Oral 70 16 98 % --   23 0509 -- -- -- -- -- -- 229 lb 4.8 oz (104 kg)   23 0432 139/71 97.9 °F (36.6 °C) Oral 70 16 95 % --     Average, Min, and Max for last 24 hours Vitals:  TEMPERATURE:  Temp  Av.1 °F (36.7 °C)  Min: 97.5 °F (36.4 °C)  Max: 99 °F (37.2 °C)    RESPIRATIONS RANGE: Resp  Av  Min: 16  Max: 16    PULSE RANGE: Pulse  Av.2  Min: 70  Max: 80    BLOOD PRESSURE RANGE:  Systolic (54PUV), RWU:024 , Min:135 , YSD:669   ; Diastolic (66VHU), RDE:04, Min:71, Max:80      PULSE OXIMETRY RANGE: SpO2  Av %  Min: 95 %  Max: 98 %  I&O:  I/O last 3 completed shifts:  In: -   Out: 1500 [Urine:1500]    CBC:  Recent Labs     03/09/23  0614 03/11/23  0607   CRP 34.8* 40.1*        BMP:  Recent Labs     03/09/23  0614      K 4.0      CO2 23   BUN 16   CREATININE 2.28*   GLUCOSE 113*   CALCIUM 9.2        Coags:  No results for input(s): APTT, PROT, INR in the last 72 hours.    Lab Results   Component Value Date    LABA1C 6.9 (H) 10/05/2022     Lab Results   Component Value Date    SEDRATE 72 (H) 03/11/2023     Lab Results   Component Value Date    CRP 40.1 (H) 03/11/2023         Lower Extremity Physical Exam:  Vascular: DP and PT pulses are palpable, bilaterally. CFT <4 seconds to all digits.  Hair growth is absent to the level of the digits.  Nonpitting edema to lateral ankle right foot.    Neuro: Saph/sural/SP/DP/plantar sensation intact to light touch.    Musculoskeletal: Muscle strength is 4/5, decreased ROM , adequate strength to all lower extremity muscle groups. Gross deformity is absent.    Dermatologic: Full thickness ulcer #1 located lateral aspect of right ankle and measures approximately 0.5cm x 0.3cm x 0.5 cm. Base is fibrogranular. Periwound skin is hyperkeratotic.  Minimal serous drainage with no associated mal odor. Erythema noted to right ankle with associated increase in warmth. Does probe deep, sinus tracks proximal. No fluctuance, crepitus, or induration.   Interdigital maceration absent.     Dry eschar appreciated to plantar right heel with no active drainage. Fluctuance appreciated but no induration. Interdigital maceration is absent    Clinical Images: See Media panel    Imaging:   MRI BRAIN WO CONTRAST   Final Result   Motion artifact limits evaluation.      No acute intracranial abnormality.         CT HEAD WO CONTRAST   Final Result   No acute intracranial abnormality.         US RETROPERITONEAL COMPLETE   Final Result   Mildly echogenic renal parenchyma suggestive of intrinsic renal parenchymal   disease.  No hydronephrosis.         CT FOOT RIGHT WO CONTRAST    (Results Pending)       Assessment     Socorro Degroot is a 78 y.o. male with   Cellulitis, right lower extremity  S/p right midtarsal and subtalar foot arthrodesis  Charcot neuroarthropathy, right ankle  Right ankle pain  Encephalopathy of unknown etiology    Principal Problem:    Diabetic foot infection ([address] with VRE  Active Problems:    Type 2 diabetes mellitus with right diabetic foot ulcer ([address]    Charcot's joint of right foot    Stage 3b chronic kidney disease ([address]    Hyponatremia    Microcytic anemia    Infection due to vancomycin resistant Enterococcus faecium    Acute metabolic encephalopathy    Delirium due to another medical condition    Altered mental status    Essential hypertension    Neuropathy    Charcot foot due to diabetes mellitus ([address]    Hyperlipidemia    Acute kidney injury superimposed on chronic kidney disease ([address]  Resolved Problems:    * No resolved hospital problems. *        Plan     Patient examined and evaluated at bedside   Treatment options discussed in detail with the patient  CT results from outside facility discussed with patient.  Findings consistent with possible hardware loosening vs infection to hardware  Neurology on board:  MRI brain without contrast: Results limited, negative for intracranial abnormality  EEG consistent with encephalopathy  Plan for I&D with Hardware removal, antibiotic cement spacer and possible application of external fixator of right lower extremity on 3/12/23 @ 8am  Consent signed and placed in chart  Foot marked  NPO starting at midnight  Dressing applied to Right foot: betadine soaked gauze, DSD, Ace  WBAT to Right lower extremity  Seen and discussed with Dr. Az Wade DPM   Podiatric Medicine & Surgery   3/11/2023 at 11:54 AM

## 2023-03-11 NOTE — PROGRESS NOTES
DATE: 3/11/2023    NAME: Luciano Hernandes  MRN: [id number]   : 1955    Patient not seen this date for Occupational  Therapy due to:      [] Cancel by RN or physician due to:    [] Hemodialysis    [] Critical Lab Value Level     [] Blood transfusion in progress    [] Acute or unstable cardiovascular status   _MAP < 55 or more than >115  _HR < 40 or > 130    [] Acute or unstable pulmonary status   -FiO2 > 60%   _RR < 5 or >40    _O2 sats < 85%    [] Strict Bedrest    [] Off Unit for surgery or procedure    [] Off Unit for testing       [] Pending imaging to R/O fracture    [x] Refusal by Patient : Pt. Refused treatment stating \"I am not in the mood today\". OT will cont to follow. [] Other      [] OT being discontinued at this time. Patient independent. No further needs. [] OT being discontinued at this time as the patient has been transferred to hospice care. No further needs.       Piyush Gardner, JOVANY

## 2023-03-11 NOTE — PROGRESS NOTES
Department of Psychiatry  Behavioral Health Consult    REASON FOR CONSULT: SI and disoriented    CONSULTING PHYSICIAN: Elmo Tamez    History obtained from: Patient, wife and chart    Interim history.   -The patient was seen face-to-face. His wife is present. He states he is feeling better. He has no recollection of our previous interview. The patient is frustrated with people \"lying\" to him about foot surgery. The patient had eaten breakfast and lunch and was later told that he could have gone for surgery. The patient's mood is somewhat irritable and anxious. The patient denies any suicidal thoughts. He was oriented to time place and person. He seems to be improving in his cognition. His wife reports significant improvement in cognition      HISTORY OF PRESENT ILLNESS:    The patient is a 78 y.o. male with no significant past psych history who is admitted for pain and swelling for right foot. I have noted that the patient was hyponatremic at the time of admission. His creatinine was 2.89. Hemoglobin was 9.4. Infectious disease has been involved. The patient had reported suicidal ideation to his podiatrist.  The patient was unable to exchange his usual banter when speaking to his doctor and appeared to be disoriented as per his wife. The patient was seen face-to-face. He is pleasant on approach. He has difficulty recollecting the circumstances of his admission and the course of his admission. He says he has been watching the news but could not tell me about any current events. He was able to correctly name the current and previous president of [place]. He was oriented to place and situation but not to time. The patient denies any depressive symptoms or suicidal ideation. He was able to make eye contact and had affective reactivity. His wife agrees that he has improved from yesterday when he was not like his usual self. Patient denies any auditory or visual hallucinations. He appears to have no psychotic phenomena. Discussed that the patient seems to be improving in his delirium and does not need medications at this time. We will continue to follow        The patient is not currently receiving care for the above psychiatric illness. Psychiatric Review of Systems           Obsessions and Compulsions: Denies       Viktoria or Hypomania: Denies     Hallucinations: Denies     Panic Attacks:  Denies     Delusions:  Denies     Phobias:  Denies     Trauma: Denies      Substance Abuse History:  Denies    Past Psychiatric History:  No Suicide attempts, admissions to psychiatry or previous treatment for mental health      Social History: . Retired .      Past Medical History:        Diagnosis Date    Abscess of right foot 08/04/2018    Acquired hammer toe deformity of lesser toe of right foot     ALEJANDRO (acute kidney injury) ([address] 08/05/2018    Cellulitis 04/24/2018    Cellulitis of left foot 04/24/2018    Cellulitis of right foot     and abscess    Charcot foot due to diabetes mellitus ([address] 2014    Right foot     Chest pain at rest 08/04/2018    Chronic multifocal osteomyelitis of right foot ([address]     CKD (chronic kidney disease)     Diabetic polyneuropathy associated with type 2 diabetes mellitus ([address]     Essential hypertension     Fractures, multiple 07/21/2014    Right foot fractures     Hyperlipidemia     Hypertension     Leukocytosis     MRSA (methicillin resistant staph aureus) culture positive 2018    rt foot    Neuropathy     diabetic with charcot affecting the right foot    Pain in right foot     redness and swelling    Pneumonia 2009    Right foot infection     Right foot pain     Tobacco abuse 04/24/2018    Type II or unspecified type diabetes mellitus without mention of complication, not stated as uncontrolled     Vertigo     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 stepped on sharp object. Covered by dressing.     Wound, open     Right posterior -Diabetic Ulcer       Past Surgical History:        Procedure Laterality Date    ANKLE SURGERY Right 10/12/2022    RIGHT MID-FOOT OSTEOTOMY WITH OF APPLICATION EXTERNAL FIXATOR SERA performed by Rosmery Buenrostro DPM at [address] [facility] Right 11/15/2022    right lower extremity  adjustment of exfix performed by Rosmery Buenrostro DPM at [facility]      at age 24    ARTHRODESIS Right 12/27/2022    RIGHT SUBTALAR JOINT AND MULTIPLE MID FOOT JOINT FUSIONS WITH SERA AND PRE-OP POP BLOCK performed by Rosmery Buenrostro DPM at [facility] [address] [facility] Right 12/11/2020    RIGHT HALLUX EXOSTECTOMY AND 3RD DIGIT EXTENSIOR TENOTOMY performed by Dustin De Anda DPM at [address] [facility] Left 04/24/2018    I&D foreign body removal    FOOT DEBRIDEMENT Right 03/20/2020    RIGHT  FOOT   INCISION AND DRAINAGE WITH BONE BIOPSY performed by Dustin De Anda DPM at [address] [place] [address] Right 06/08/2021    FOOT DEBRIDEMENT INCISION AND DRAINAGE performed by Dustin De Anda DPM at [address] [place] [address] Right 09/16/2021    RIGHT FOOT  INCISION AND DRAINAGE   WITH PULSE LAVAGE performed by Dustin De Anda DPM at [place] [address] Right 06/11/2021    RIGHT FOOT FLAP CLOSURE performed by Dustin De Anda DPM at [place] [address] Right 27/40/4750    APPLICATION EXTERNAL FIXATOR RIGHT FOOT - SERA - Right    FOOT SURGERY Right 12/27/2022    RIGHT SUBTALAR JOINT AND MULTIPLE MID FOOT JOINT FUSIONS WITH SERA AND PRE-OP POP BLOCK (Right: Foot)    FOOT SURGERY Right 12/27/2022    RIGHT FOOT/ANKLE EXTERNAL FIXATOR  REMOVAL performed by Rosmery Buenrostro DPM at [facility] [address]    IR INS PICC VAD W SQ PORT GREATER THAN 5  10/07/2022    IR INS PICC VAD W SQ PORT GREATER THAN 5 10/7/2022 [facility]    KNEE ARTHROSCOPY Right 1989    KNEE ARTHROSCOPY Left 1990    KNEE SURGERY Bilateral 1983    arthroscopy    NECK SURGERY  1987    WA I&D BELOW FASCIA FOOT 1 BURSAL SPACE Left 04/24/2018    LEFT FOOT MULTIPLE  INCISIONS  AND DRAINAGE AND REMOVAL FOREIGN BODY  IRENE performed by Emogene Cushing, DPM at [address] [place] [address]         Medications Prior to Admission:   Medications Prior to Admission: cyclobenzaprine (FLEXERIL) 10 MG tablet, Take 10 mg by mouth at bedtime  ketorolac (TORADOL) 10 MG tablet, Take 1 tablet by mouth every 6 hours as needed for Pain  HYDROcodone-acetaminophen (NORCO)  MG per tablet, Take 1 tablet by mouth every 6 hours as needed for Pain. TRUEPLUS PEN NEEDLES 31G X 8 MM MISC,   amLODIPine (NORVASC) 5 MG tablet, Take 5 mg by mouth daily  LANTUS SOLOSTAR 100 UNIT/ML injection pen, Inject 15 Units into the skin nightly (Patient taking differently: Inject 10 Units into the skin 2 times daily)  JANUVIA 100 MG tablet, Take 100 mg by mouth daily   Misc. Devices MISC, 1 PAIR OF DIABETIC SHOES (1 LEFT/ 1 RIGHT) 1-3 PAIRS OF INSERTS (LEFT/ RIGHT)  cetirizine (ZYRTEC) 10 MG tablet, Take 10 mg by mouth nightly   simvastatin (ZOCOR) 40 MG tablet, Take 40 mg by mouth nightly   glipiZIDE (GLUCOTROL) 10 MG tablet, Take 20 mg by mouth 2 times daily (before meals) Takes 2 tabs (=20mg) BID  aspirin 81 MG tablet, Take 81 mg by mouth daily  gabapentin (NEURONTIN) 300 MG capsule, Take 900 mg by mouth 3 times daily.  Take 3 caps (=900mg) 3 times a day    Allergies:  Morphine, Other, and Percocet [oxycodone-acetaminophen]    FAMILY/SOCIAL HISTORY:  Family History   Problem Relation Age of Onset    Diabetes Mother     Cancer Father     Hypertension Maternal Grandmother      Social History     Socioeconomic History    Marital status:      Spouse name: Not on file    Number of children: Not on file    Years of education: Not on file    Highest education level: Not on file   Occupational History    Not on file   Tobacco Use    Smoking status: Every Day     Packs/day: 0.50     Types: Cigarettes    Smokeless tobacco: Never   Vaping Use    Vaping Use: Never used   Substance and Sexual Activity    Alcohol use: No    Drug use: Not Currently    Sexual activity: Not Currently   Other Topics Concern    Not on file   Social History Narrative    Not on file     Social Determinants of Health     Financial Resource Strain: Not on file   Food Insecurity: Not on file   Transportation Needs: Not on file   Physical Activity: Not on file   Stress: Not on file   Social Connections: Not on file   Intimate Partner Violence: Not on file   Housing Stability: Not on file       REVIEW OF SYSTEMS    Constitutional: [] fever  [] chills  [] weight loss  []weakness [] Other:  Eyes:  [] photophobia  [] discharge [] acuity change   [] Diplopia   [] Other:  HENT:  [] sore throat  [] ear pain [] Tinnitus   [] Other  Respiratory:  [] Cough  [] Shortness of breath   [] Sputum   [] Other:   Cardiac: []Chest pain   []Palpitations []Edema  []PND  [] Other:  GI:  []Abdominal pain   []Nausea  []Vomiting  []Diarrhea  [] Other:  :  [] Dysuria   []Frequency  []Hematuria  []Discharge  [] Other:  Possible Pregnancy: []Yes   []No   LMP:   Musculoskeletal:  []Back pain  []Neck pain  []Recent Injury   Skin:  []Rash  [] Itching  [] Other:  Neurologic:  [] Headache  [] Focal weakness  [] Sensory changes []Other:  Endocrine:  [] Polyuria  [] Polydipsia  [] Hair Loss  [] Other:  Lymphatic:   [] Swollen glands   Psychiatric:  As per HPI      All other systems negative except as marked or mentioned/indicated in the HPI. Marlon Tapia      PHYSICAL EXAM:  Vitals:  BP (!) 151/72   Pulse 80   Temp 98.6 °F (37 °C) (Oral)   Resp 16   Ht 5' 10\" (1.778 m)   Wt 228 lb (103.4 kg)   SpO2 95%   BMI 32.71 kg/m²      Neuro Exam:      Involuntary Movements: No    Mental Status Examination:    Level of consciousness:  alert and awake   Appearance:  hospital attire  Behavior/Motor:  no abnormalities noted  Attitude toward examiner:  cooperative and attentive  Speech:  spontaneous, normal rate, and normal volume   Mood: frustrated and ansious  Affect:  mood congruent  Thought processes:  linear, goal directed, and coherent   Thought content:  Suicidal Ideation:  denies suicidal ideation  Delusions:  no evidence of delusions  Perceptual Disturbance:  denies any perceptual disturbance  Cognition:  oriented to person, place, and time   Concentration intact  Memory impaired recent memory. Insight fair   Judgement good   Fund of Knowledge- Limited        LABS: REVIEWED TODAY:  No results for input(s): WBC, HGB, PLT in the last 72 hours. Recent Labs     03/08/23  0637 03/09/23  0614    136   K 4.1 4.0    105   CO2 20 23   BUN 17 16   CREATININE 2.33* 2.28*   GLUCOSE 152* 113*     No results for input(s): BILITOT, ALKPHOS, AST, ALT in the last 72 hours. No results found for: Jenny Shy, LABBENZ, CANNAB, COCAINESCRN, LABMETH, OPIATESCREENURINE, PHENCYCLIDINESCREENURINE, PPXUR, ETOH  Lab Results   Component Value Date/Time    TSH 3.63 03/06/2023 04:22 PM     No results found for: LITHIUM  No results found for: VALPROATE, CBMZ  No results found for: LITHIUM, VALPROATE    FURTHER LABS ORDERED :      Radiology   CT HEAD WO CONTRAST    Result Date: 3/6/2023  EXAMINATION: CT OF THE HEAD WITHOUT CONTRAST  3/6/2023 1:45 pm TECHNIQUE: CT of the head was performed without the administration of intravenous contrast. Automated exposure control, iterative reconstruction, and/or weight based adjustment of the mA/kV was utilized to reduce the radiation dose to as low as reasonably achievable. COMPARISON: None. HISTORY: ORDERING SYSTEM PROVIDED HISTORY: disorientation TECHNOLOGIST PROVIDED HISTORY: disorientation Reason for Exam: Disoriented, AMS, dizzy FINDINGS: BRAIN/VENTRICLES: There is no acute intracranial hemorrhage, mass effect or midline shift. No abnormal extra-axial fluid collection.   The gray-white differentiation is maintained without evidence of an acute infarct.  There is no evidence of hydrocephalus. ORBITS: The visualized portion of the orbits demonstrate no acute abnormality. SINUSES: The visualized paranasal sinuses and mastoid air cells demonstrate no acute abnormality. SOFT TISSUES/SKULL:  No acute abnormality of the visualized skull or soft tissues.     No acute intracranial abnormality.     US RETROPERITONEAL COMPLETE    Result Date: 3/4/2023  EXAMINATION: RETROPERITONEAL ULTRASOUND OF THE KIDNEYS AND URINARY BLADDER 3/4/2023 COMPARISON: CT abdomen and pelvis 12/03/2021.  Renal ultrasound 08/07/2018 HISTORY: ORDERING SYSTEM PROVIDED HISTORY: ALEJANDRO TECHNOLOGIST PROVIDED HISTORY: ALEJANDRO FINDINGS: Kidneys: The right kidney measures 11.3 cm in length and the left kidney measures 10.9 cm in length. Kidneys demonstrate mildly increased cortical echogenicity.  No evidence of hydronephrosis or intrarenal stones.  Exophytic 1.1 x 1.1 x 1.0 cm lesion arising from the inferior right kidney is consistent with a cyst, grossly unchanged compared to more recent CT imaging.  A cleft of fat or AML measuring 1.4 cm in the interpolar right kidney is noted, unchanged from prior ultrasound. Bladder: Prevoid volume of 502 cc.  The patient did not void for this exam.  Ureteral jets are not visualized.     Mildly echogenic renal parenchyma suggestive of intrinsic renal parenchymal disease.  No hydronephrosis.     MRI BRAIN WO CONTRAST    Result Date: 3/8/2023  EXAMINATION: MRI OF THE BRAIN WITHOUT CONTRAST  3/8/2023 7:46 am TECHNIQUE: Multiplanar multisequence MRI of the brain was performed without the administration of intravenous contrast. COMPARISON: CT head 03/06/2023 HISTORY: ORDERING SYSTEM PROVIDED HISTORY: AMS TECHNOLOGIST PROVIDED HISTORY: AMS Reason for Exam: AMS FINDINGS: Motion artifact limits evaluation. INTRACRANIAL STRUCTURES/VENTRICLES: There is no acute infarct. No mass effect or midline shift. No evidence of  an acute intracranial hemorrhage. The ventricles and sulci are normal in size and configuration. The sellar/suprasellar regions appear unremarkable. The normal signal voids within the major intracranial vessels appear maintained. Prominent ventricles and sulci, consistent with mild parenchymal volume loss. ORBITS: The visualized portion of the orbits demonstrate no acute abnormality. SINUSES: The visualized mastoid air cells demonstrate no acute abnormality. Ethmoid mucosal thickening. BONES/SOFT TISSUES: The bone marrow signal intensity appears normal. The soft tissues demonstrate no acute abnormality. Motion artifact limits evaluation. No acute intracranial abnormality. DIAGNOSIS:  Diabetic foot infection  Delirium due to medical condition        RISK ASSESSMENT: low risk of suicide or harm to others      RECOMMENDATIONS  Disposition: No indication for psych admission  Risk Management:  routine:  no special precautions necessary    Medications:  No psychotropics indicated. Discussed with the treating physician/ team about the patient and treatment plan  Reviewed the chart    Discussed with the patient risk, benefit, alternative and common side effects for the  proposed medication treatment. Patient is consenting to the treatment. Thanks for the consult. Please call me if needed. Electronically signed by Augusto Wetzel MD on 3/10/2023 at 7:21 PM    Please note that this chart was generated using voice recognition Dragon dictation software. Although every effort was made to ensure the accuracy of this automated transcription, some errors in transcription may have occurred.

## 2023-03-11 NOTE — PLAN OF CARE
Problem: Pain  Goal: Verbalizes/displays adequate comfort level or baseline comfort level  Outcome: Progressing     Problem: Safety - Adult  Goal: Free from fall injury  Outcome: Progressing     Problem: ABCDS Injury Assessment  Goal: Absence of physical injury  Outcome: Progressing     Problem: Neurosensory - Adult  Goal: Achieves maximal functionality and self care  Outcome: Progressing

## 2023-03-12 ENCOUNTER — ANESTHESIA (OUTPATIENT)
Dept: OPERATING ROOM | Age: 68
End: 2023-03-12
Payer: MEDICARE

## 2023-03-12 ENCOUNTER — APPOINTMENT (OUTPATIENT)
Dept: GENERAL RADIOLOGY | Age: 68
DRG: 981 | End: 2023-03-12
Payer: MEDICARE

## 2023-03-12 LAB
CK SERPL-CCNC: 34 U/L (ref 39–308)
GLUCOSE BLD-MCNC: 112 MG/DL (ref 75–110)
GLUCOSE BLD-MCNC: 132 MG/DL (ref 75–110)
GLUCOSE BLD-MCNC: 135 MG/DL (ref 75–110)
GLUCOSE BLD-MCNC: 155 MG/DL (ref 75–110)
GLUCOSE BLD-MCNC: 185 MG/DL (ref 75–110)
MICROORGANISM SPEC CULT: NORMAL
RBC FLUID: NORMAL CELLS/UL
SERVICE CMNT-IMP: NORMAL
SERVICE CMNT-IMP: NORMAL
SPECIMEN DESCRIPTION: NORMAL
SPECIMEN TYPE: NORMAL
WBC FLUID: 895 CELLS/UL

## 2023-03-12 PROCEDURE — 7100000000 HC PACU RECOVERY - FIRST 15 MIN: Performed by: STUDENT IN AN ORGANIZED HEALTH CARE EDUCATION/TRAINING PROGRAM

## 2023-03-12 PROCEDURE — 87205 SMEAR GRAM STAIN: CPT

## 2023-03-12 PROCEDURE — 6360000002 HC RX W HCPCS: Performed by: ANESTHESIOLOGY

## 2023-03-12 PROCEDURE — 99232 SBSQ HOSP IP/OBS MODERATE 35: CPT | Performed by: PSYCHIATRY & NEUROLOGY

## 2023-03-12 PROCEDURE — 6370000000 HC RX 637 (ALT 250 FOR IP): Performed by: NURSE PRACTITIONER

## 2023-03-12 PROCEDURE — 89060 EXAM SYNOVIAL FLUID CRYSTALS: CPT

## 2023-03-12 PROCEDURE — 3209999900 FLUORO FOR SURGICAL PROCEDURES

## 2023-03-12 PROCEDURE — 2580000003 HC RX 258: Performed by: INTERNAL MEDICINE

## 2023-03-12 PROCEDURE — 2500000003 HC RX 250 WO HCPCS: Performed by: ANESTHESIOLOGY

## 2023-03-12 PROCEDURE — 3600000012 HC SURGERY LEVEL 2 ADDTL 15MIN: Performed by: STUDENT IN AN ORGANIZED HEALTH CARE EDUCATION/TRAINING PROGRAM

## 2023-03-12 PROCEDURE — 2720000010 HC SURG SUPPLY STERILE: Performed by: STUDENT IN AN ORGANIZED HEALTH CARE EDUCATION/TRAINING PROGRAM

## 2023-03-12 PROCEDURE — 7100000001 HC PACU RECOVERY - ADDTL 15 MIN: Performed by: STUDENT IN AN ORGANIZED HEALTH CARE EDUCATION/TRAINING PROGRAM

## 2023-03-12 PROCEDURE — C1713 ANCHOR/SCREW BN/BN,TIS/BN: HCPCS | Performed by: STUDENT IN AN ORGANIZED HEALTH CARE EDUCATION/TRAINING PROGRAM

## 2023-03-12 PROCEDURE — 99232 SBSQ HOSP IP/OBS MODERATE 35: CPT | Performed by: INTERNAL MEDICINE

## 2023-03-12 PROCEDURE — 3600000002 HC SURGERY LEVEL 2 BASE: Performed by: STUDENT IN AN ORGANIZED HEALTH CARE EDUCATION/TRAINING PROGRAM

## 2023-03-12 PROCEDURE — 87077 CULTURE AEROBIC IDENTIFY: CPT

## 2023-03-12 PROCEDURE — 0SGH04Z FUSION OF RIGHT TARSAL JOINT WITH INTERNAL FIXATION DEVICE, OPEN APPROACH: ICD-10-PCS

## 2023-03-12 PROCEDURE — 6360000002 HC RX W HCPCS

## 2023-03-12 PROCEDURE — 2580000003 HC RX 258

## 2023-03-12 PROCEDURE — 88304 TISSUE EXAM BY PATHOLOGIST: CPT

## 2023-03-12 PROCEDURE — 6370000000 HC RX 637 (ALT 250 FOR IP)

## 2023-03-12 PROCEDURE — 1200000000 HC SEMI PRIVATE

## 2023-03-12 PROCEDURE — 2709999900 HC NON-CHARGEABLE SUPPLY: Performed by: STUDENT IN AN ORGANIZED HEALTH CARE EDUCATION/TRAINING PROGRAM

## 2023-03-12 PROCEDURE — 99231 SBSQ HOSP IP/OBS SF/LOW 25: CPT | Performed by: INTERNAL MEDICINE

## 2023-03-12 PROCEDURE — 82550 ASSAY OF CK (CPK): CPT

## 2023-03-12 PROCEDURE — 88112 CYTOPATH CELL ENHANCE TECH: CPT

## 2023-03-12 PROCEDURE — 6360000002 HC RX W HCPCS: Performed by: INTERNAL MEDICINE

## 2023-03-12 PROCEDURE — 82947 ASSAY GLUCOSE BLOOD QUANT: CPT

## 2023-03-12 PROCEDURE — 0QSJ3BZ REPOSITION RIGHT FIBULA WITH MONOPLANAR EXTERNAL FIXATION DEVICE, PERCUTANEOUS APPROACH: ICD-10-PCS

## 2023-03-12 PROCEDURE — 87176 TISSUE HOMOGENIZATION CULTR: CPT

## 2023-03-12 PROCEDURE — 87186 SC STD MICRODIL/AGAR DIL: CPT

## 2023-03-12 PROCEDURE — 87070 CULTURE OTHR SPECIMN AEROBIC: CPT

## 2023-03-12 PROCEDURE — 36415 COLL VENOUS BLD VENIPUNCTURE: CPT

## 2023-03-12 PROCEDURE — 88305 TISSUE EXAM BY PATHOLOGIST: CPT

## 2023-03-12 PROCEDURE — 86403 PARTICLE AGGLUT ANTBDY SCRN: CPT

## 2023-03-12 PROCEDURE — 87075 CULTR BACTERIA EXCEPT BLOOD: CPT

## 2023-03-12 PROCEDURE — 3700000001 HC ADD 15 MINUTES (ANESTHESIA): Performed by: STUDENT IN AN ORGANIZED HEALTH CARE EDUCATION/TRAINING PROGRAM

## 2023-03-12 PROCEDURE — 3700000000 HC ANESTHESIA ATTENDED CARE: Performed by: STUDENT IN AN ORGANIZED HEALTH CARE EDUCATION/TRAINING PROGRAM

## 2023-03-12 PROCEDURE — 2500000003 HC RX 250 WO HCPCS: Performed by: STUDENT IN AN ORGANIZED HEALTH CARE EDUCATION/TRAINING PROGRAM

## 2023-03-12 DEVICE — INJECTABLE HA BONE CEMENT
Type: IMPLANTABLE DEVICE | Site: FOOT | Status: FUNCTIONAL
Brand: HYDROSET XT

## 2023-03-12 RX ORDER — PROPOFOL 10 MG/ML
INJECTION, EMULSION INTRAVENOUS PRN
Status: DISCONTINUED | OUTPATIENT
Start: 2023-03-12 | End: 2023-03-12 | Stop reason: SDUPTHER

## 2023-03-12 RX ORDER — LIDOCAINE HYDROCHLORIDE 20 MG/ML
INJECTION, SOLUTION INFILTRATION; PERINEURAL PRN
Status: DISCONTINUED | OUTPATIENT
Start: 2023-03-12 | End: 2023-03-12 | Stop reason: SDUPTHER

## 2023-03-12 RX ORDER — FENTANYL CITRATE 50 UG/ML
INJECTION, SOLUTION INTRAMUSCULAR; INTRAVENOUS PRN
Status: DISCONTINUED | OUTPATIENT
Start: 2023-03-12 | End: 2023-03-12 | Stop reason: SDUPTHER

## 2023-03-12 RX ORDER — HYDROMORPHONE HYDROCHLORIDE 1 MG/ML
0.25 INJECTION, SOLUTION INTRAMUSCULAR; INTRAVENOUS; SUBCUTANEOUS EVERY 5 MIN PRN
Status: DISCONTINUED | OUTPATIENT
Start: 2023-03-12 | End: 2023-03-12 | Stop reason: HOSPADM

## 2023-03-12 RX ORDER — ONDANSETRON 2 MG/ML
4 INJECTION INTRAMUSCULAR; INTRAVENOUS
Status: DISCONTINUED | OUTPATIENT
Start: 2023-03-12 | End: 2023-03-12 | Stop reason: HOSPADM

## 2023-03-12 RX ORDER — HYDROCODONE BITARTRATE AND ACETAMINOPHEN 5; 325 MG/1; MG/1
1 TABLET ORAL EVERY 4 HOURS PRN
Status: DISCONTINUED | OUTPATIENT
Start: 2023-03-12 | End: 2023-03-14 | Stop reason: HOSPADM

## 2023-03-12 RX ORDER — HYDROMORPHONE HYDROCHLORIDE 1 MG/ML
1 INJECTION, SOLUTION INTRAMUSCULAR; INTRAVENOUS; SUBCUTANEOUS
Status: DISCONTINUED | OUTPATIENT
Start: 2023-03-12 | End: 2023-03-14 | Stop reason: HOSPADM

## 2023-03-12 RX ORDER — HYDROCODONE BITARTRATE AND ACETAMINOPHEN 5; 325 MG/1; MG/1
2 TABLET ORAL EVERY 4 HOURS PRN
Status: DISCONTINUED | OUTPATIENT
Start: 2023-03-12 | End: 2023-03-14 | Stop reason: HOSPADM

## 2023-03-12 RX ORDER — HYDROMORPHONE HYDROCHLORIDE 1 MG/ML
0.5 INJECTION, SOLUTION INTRAMUSCULAR; INTRAVENOUS; SUBCUTANEOUS EVERY 5 MIN PRN
Status: DISCONTINUED | OUTPATIENT
Start: 2023-03-12 | End: 2023-03-12 | Stop reason: HOSPADM

## 2023-03-12 RX ORDER — TIZANIDINE 4 MG/1
4 TABLET ORAL 2 TIMES DAILY
Status: DISCONTINUED | OUTPATIENT
Start: 2023-03-12 | End: 2023-03-14 | Stop reason: HOSPADM

## 2023-03-12 RX ORDER — DIPHENHYDRAMINE HYDROCHLORIDE 50 MG/ML
12.5 INJECTION INTRAMUSCULAR; INTRAVENOUS
Status: DISCONTINUED | OUTPATIENT
Start: 2023-03-12 | End: 2023-03-12 | Stop reason: HOSPADM

## 2023-03-12 RX ORDER — ONDANSETRON 2 MG/ML
INJECTION INTRAMUSCULAR; INTRAVENOUS PRN
Status: DISCONTINUED | OUTPATIENT
Start: 2023-03-12 | End: 2023-03-12 | Stop reason: SDUPTHER

## 2023-03-12 RX ADMIN — GABAPENTIN 300 MG: 300 CAPSULE ORAL at 20:43

## 2023-03-12 RX ADMIN — HYDROCODONE BITARTRATE AND ACETAMINOPHEN 1 TABLET: 10; 325 TABLET ORAL at 05:27

## 2023-03-12 RX ADMIN — HEPARIN SODIUM 5000 UNITS: 5000 INJECTION INTRAVENOUS; SUBCUTANEOUS at 13:16

## 2023-03-12 RX ADMIN — PROPOFOL 150 MG: 10 INJECTION, EMULSION INTRAVENOUS at 08:05

## 2023-03-12 RX ADMIN — GABAPENTIN 300 MG: 300 CAPSULE ORAL at 13:16

## 2023-03-12 RX ADMIN — FENTANYL CITRATE 100 MCG: 50 INJECTION INTRAMUSCULAR; INTRAVENOUS at 10:11

## 2023-03-12 RX ADMIN — ATORVASTATIN CALCIUM 20 MG: 20 TABLET, FILM COATED ORAL at 20:43

## 2023-03-12 RX ADMIN — DAPTOMYCIN 500 MG: 500 INJECTION, POWDER, LYOPHILIZED, FOR SOLUTION INTRAVENOUS at 15:32

## 2023-03-12 RX ADMIN — HYDROCODONE BITARTRATE AND ACETAMINOPHEN 1 TABLET: 5; 325 TABLET ORAL at 20:43

## 2023-03-12 RX ADMIN — HYDROCODONE BITARTRATE AND ACETAMINOPHEN 1 TABLET: 10; 325 TABLET ORAL at 14:28

## 2023-03-12 RX ADMIN — MEROPENEM 1000 MG: 1 INJECTION, POWDER, FOR SOLUTION INTRAVENOUS at 16:15

## 2023-03-12 RX ADMIN — HYDROMORPHONE HYDROCHLORIDE 0.5 MG: 1 INJECTION, SOLUTION INTRAMUSCULAR; INTRAVENOUS; SUBCUTANEOUS at 13:14

## 2023-03-12 RX ADMIN — SODIUM CHLORIDE, PRESERVATIVE FREE 10 ML: 5 INJECTION INTRAVENOUS at 20:49

## 2023-03-12 RX ADMIN — INSULIN GLARGINE 10 UNITS: 100 INJECTION, SOLUTION SUBCUTANEOUS at 21:47

## 2023-03-12 RX ADMIN — TIZANIDINE 4 MG: 4 TABLET ORAL at 13:16

## 2023-03-12 RX ADMIN — TIZANIDINE 4 MG: 4 TABLET ORAL at 20:43

## 2023-03-12 RX ADMIN — HYDROMORPHONE HYDROCHLORIDE 1 MG: 1 INJECTION, SOLUTION INTRAMUSCULAR; INTRAVENOUS; SUBCUTANEOUS at 16:09

## 2023-03-12 RX ADMIN — ONDANSETRON 4 MG: 2 INJECTION INTRAMUSCULAR; INTRAVENOUS at 10:11

## 2023-03-12 RX ADMIN — LIDOCAINE HYDROCHLORIDE 5 ML: 20 INJECTION, SOLUTION INFILTRATION; PERINEURAL at 08:05

## 2023-03-12 RX ADMIN — HEPARIN SODIUM 5000 UNITS: 5000 INJECTION INTRAVENOUS; SUBCUTANEOUS at 20:48

## 2023-03-12 RX ADMIN — CETIRIZINE HYDROCHLORIDE 10 MG: 10 TABLET, FILM COATED ORAL at 20:43

## 2023-03-12 RX ADMIN — FENTANYL CITRATE 100 MCG: 50 INJECTION INTRAMUSCULAR; INTRAVENOUS at 08:05

## 2023-03-12 ASSESSMENT — PAIN SCALES - GENERAL
PAINLEVEL_OUTOF10: 6
PAINLEVEL_OUTOF10: 9
PAINLEVEL_OUTOF10: 7
PAINLEVEL_OUTOF10: 9

## 2023-03-12 ASSESSMENT — PAIN - FUNCTIONAL ASSESSMENT: PAIN_FUNCTIONAL_ASSESSMENT: PREVENTS OR INTERFERES SOME ACTIVE ACTIVITIES AND ADLS

## 2023-03-12 ASSESSMENT — ENCOUNTER SYMPTOMS
GASTROINTESTINAL NEGATIVE: 1
SHORTNESS OF BREATH: 0
RESPIRATORY NEGATIVE: 1

## 2023-03-12 ASSESSMENT — PAIN DESCRIPTION - ORIENTATION: ORIENTATION: RIGHT

## 2023-03-12 ASSESSMENT — PAIN DESCRIPTION - LOCATION
LOCATION: FOOT

## 2023-03-12 ASSESSMENT — PAIN DESCRIPTION - DESCRIPTORS: DESCRIPTORS: ACHING

## 2023-03-12 NOTE — PLAN OF CARE
Problem: Safety - Adult  Goal: Free from fall injury  3/12/2023 0442 by Apolinar Cheng RN  Outcome: Progressing     Problem: ABCDS Injury Assessment  Goal: Absence of physical injury  3/12/2023 0442 by Apolinar Cheng RN  Outcome: Progressing     Problem: Neurosensory - Adult  Goal: Achieves maximal functionality and self care  3/12/2023 0442 by Apolinar Cheng RN  Outcome: Progressing     Problem: Cardiovascular - Adult  Goal: Maintains optimal cardiac output and hemodynamic stability  3/12/2023 0442 by Apolinar Cheng RN  Outcome: Progressing     Problem: Skin/Tissue Integrity - Adult  Goal: Skin integrity remains intact  3/12/2023 0442 by Apolinar Cheng RN  Outcome: Progressing  Flowsheets (Taken 3/11/2023 1905)  Skin Integrity Remains Intact: Monitor for areas of redness and/or skin breakdown

## 2023-03-12 NOTE — ANESTHESIA POSTPROCEDURE EVALUATION
Department of Anesthesiology  Postprocedure Note    Patient: Sherri Aguilar  MRN: [id number]  YOB: 1955  Date of evaluation: 3/12/2023      Procedure Summary     Date: 03/12/23 Room / Location: [place] [address] / [facility] - INPATIENT    Anesthesia Start: 0800 Anesthesia Stop: 3583    Procedure: APPLICATION OF MONORAIL, TISSUE TRANSFER, SUBTALAR JOINT FUSION, HARDWARE REMOVAL, BONE BIOPSY, ANTIBIOTIC SPACER (Right: Foot) Diagnosis:       Diabetic foot ([address]      (Diabetic foot ([address] [E11.8])    Surgeons: Chandra Oliver DPM Responsible Provider: Truman Henry MD    Anesthesia Type: general ASA Status: 4          Anesthesia Type: No value filed.     Amy Phase I:      Amy Phase II:        Anesthesia Post Evaluation    Complications: no

## 2023-03-12 NOTE — BRIEF OP NOTE
Brief Postoperative Note      Patient: Carson Fragoso  YOB: 1955  MRN: [id number]    Date of Procedure: 3/12/2023    Pre-Op Diagnosis: Diabetic foot ([address] [E11.8]    Post-Op Diagnosis: Same       Procedure(s):  APPLICATION OF MONORAIL, TISSUE TRANSFER, SUBTALAR JOINT FUSION, HARDWARE REMOVAL, BONE BIOPSY, ANTIBIOTIC SPACER    Surgeon(s):  Phyllis Owens DPM    Assistant:  Resident: Cody Reyes DPM    Anesthesia: General    Estimated Blood Loss (mL): less than 777     Complications: None    Specimens:   ID Type Source Tests Collected by Time Destination   1 : RIGHT FOOT HARDWARE REMOVAL - SCREW  Swab Foot CULTURE, WOUND, CULTURE, ANAEROBIC AND AEROBIC Phyllis Owens DPM 3/12/2023 8592    A : RIGHT FOOT HARDWARE REMOVAL - SCREW  Bone Foot SURGICAL PATHOLOGY, CULTURE, ANAEROBIC AND AEROBIC Phyllis Owens DPM 3/12/2023 5864    B : RIGHT FOOT HARDWARE REMOVAL - SCREW  Body Fluid Foot CYTOLOGY, NON-GYN, BODY FLUID CELL COUNT, CULTURE, BODY FLUID, CRYSTALS, BODY FLUID Phyllis Owens [facility] 3/12/2023 2112        Implants:  Implant Name Type Inv.  Item Serial No.  Lot No. LRB No. Used Action   GRAFT BNE SUB 10CC [employer]ON[employer]ID TARA VOID FILL - [id number]  GRAFT BNE SUB 10CC [employer] TARA VOID F[employer]  [employer] [facility]- [id number] Right 1 Implanted         Drains: * No LDAs found *    Findings: See op note for details    Electronically signed by Dai Washington DPM on 3/12/2023 at 10:47 AM

## 2023-03-12 NOTE — PROGRESS NOTES
Progress Note  Podiatric Medicine and Surgery     Subjective     CC: right foot wound, s/p right foot midtarsal and subtalar join arthrodesis (22)    Interval history:  -Patient seen and examined at bedside     -Patient remains off antibiotics  -Plan for I&D and removal of hardware of right lower extremity today at 8am.  - NPO since midnight. HPI:  Ivan Pollard is a 78 y.o. male seen at [address] for right foot pain and swelling. Patient is well known to podiatry service and has been following outpatient with Dr. Jose Alberto Aranda since surgery on 22 to right foot. Patient states he has been compliant with wound care and walking in CAM boot, however noticed worsening pain and redness to right foot, prompting him to get CT at outside facility and presenting to [facility]'s ED. Patient states he has not been on IV antibiotics for a while, does follow Infectious disease who saw him on 3/1/23. Patient denies any constitutional symptoms at this time. Patient is type 2 diabetic, ALEJANDRO on CKD, and has history of Charcot foot. No further pedal complaints at this time. PCP is Alex Giraldo MD      Objective     Vitals:  Patient Vitals for the past 8 hrs:   BP Temp Temp src Pulse Resp SpO2 Weight   23 0720 (!) 146/69 98.4 °F (36.9 °C) Oral 71 16 94 % --   23 0503 (!) 147/78 98.6 °F (37 °C) Oral 77 -- 96 % 222 lb 11.2 oz (101 kg)     Average, Min, and Max for last 24 hours Vitals:  TEMPERATURE:  Temp  Av.6 °F (37 °C)  Min: 97.9 °F (36.6 °C)  Max: 99 °F (37.2 °C)    RESPIRATIONS RANGE: Resp  Av  Min: 16  Max: 16    PULSE RANGE: Pulse  Av.6  Min: 71  Max: 81    BLOOD PRESSURE RANGE:  Systolic (32FVB), VAH:822 , Min:135 , XMK:607   ; Diastolic (29RGO), QPM:04, Min:69, Max:78      PULSE OXIMETRY RANGE: SpO2  Av.8 %  Min: 94 %  Max: 100 %  I&O:  I/O last 3 completed shifts:   In: 540 [P.O.:540]  Out: 1050 [Urine:1050]    CBC:  Recent Labs     23  0607   CRP 40.1*     BMP:  No results for input(s): NA, K, CL, CO2, BUN, CREATININE, GLUCOSE, CALCIUM in the last 72 hours.       Coags:  No results for input(s): APTT, PROT, INR in the last 72 hours.    Lab Results   Component Value Date    LABA1C 6.9 (H) 10/05/2022     Lab Results   Component Value Date    SEDRATE 72 (H) 03/11/2023     Lab Results   Component Value Date    CRP 40.1 (H) 03/11/2023         Lower Extremity Physical Exam: Physical exam from 3/11/23. Plan for surgery today  Vascular: DP and PT pulses are palpable, bilaterally. CFT <4 seconds to all digits.  Hair growth is absent to the level of the digits.  Nonpitting edema to lateral ankle right foot.    Neuro: Saph/sural/SP/DP/plantar sensation intact to light touch.    Musculoskeletal: Muscle strength is 4/5, decreased ROM , adequate strength to all lower extremity muscle groups. Gross deformity is absent.    Dermatologic: Full thickness ulcer #1 located lateral aspect of right ankle and measures approximately 0.5cm x 0.3cm x 0.5 cm. Base is fibrogranular. Periwound skin is hyperkeratotic.  Minimal serous drainage with no associated mal odor. Erythema noted to right ankle with associated increase in warmth. Does probe deep, sinus tracks proximal. No fluctuance, crepitus, or induration.   Interdigital maceration absent.     Dry eschar appreciated to plantar right heel with no active drainage. Fluctuance appreciated but no induration. Interdigital maceration is absent    Clinical Images: See Media panel    Imaging:   CT FOOT RIGHT WO CONTRAST   Final Result   1. Periprosthetic lucency measuring between 3 and 4 mm involving the   arthrodesis screws components located in the talus and calcaneus concerning   for hardware loosening and/or infection as detailed above.   2. Arthrodesis screws involving the subtalar joint, traversing the 1st   metatarsal head through the midfoot into the talus, as well as the 2nd TMT   joint and the space between the 3rd and 4th TMT joints  and calcaneocuboid   joint. 3. Underlying probable neuropathic foot/Charcot arthropathy. 4. Mild-to-moderate degenerative changes of the midfoot. 5. Moderate soft tissue edema and swelling of the right foot and ankle likely   cellulitis. No organized fluid collection. Effacement of the sinus tarsi   fat. 6. Probable underlying severe Achilles tendinosis. Interstitial tearing of   the Achilles tendon not excluded. 7. Deformity of the distal aspect of the proximal phalanx of the 3rd digit. 8. Proximal dislocation of the distal phalanx 1st digit in relation to the   proximal phalanx. MRI BRAIN WO CONTRAST   Final Result   Motion artifact limits evaluation. No acute intracranial abnormality. CT HEAD WO CONTRAST   Final Result   No acute intracranial abnormality. US RETROPERITONEAL COMPLETE   Final Result   Mildly echogenic renal parenchyma suggestive of intrinsic renal parenchymal   disease. No hydronephrosis. Assessment     Jules Mcintosh is a 78 y.o. male with   Cellulitis, right lower extremity  S/p right midtarsal and subtalar foot arthrodesis  Charcot neuroarthropathy, right ankle  Right ankle pain  Encephalopathy of unknown etiology    Principal Problem:    Diabetic foot infection ([address] with VRE  Active Problems:    Type 2 diabetes mellitus with right diabetic foot ulcer ([address]    Charcot's joint of right foot    Stage 3b chronic kidney disease ([address]    Hyponatremia    Microcytic anemia    Infection due to vancomycin resistant Enterococcus faecium    Acute metabolic encephalopathy    Delirium due to another medical condition    Altered mental status    Essential hypertension    Neuropathy    Charcot foot due to diabetes mellitus ([address]    Hyperlipidemia    Acute kidney injury superimposed on chronic kidney disease ([address]  Resolved Problems:    * No resolved hospital problems.  *        Plan     Patient examined and evaluated at bedside   Treatment options discussed in detail with the patient  CT results reviewed.  Findings consistent with hardware loosening vs infection to hardware  Plan for I&D with Hardware removal, antibiotic cement spacer and possible application of external fixator of right lower extremity today @ 8am  Consent signed and placed in chart  Foot marked  NPO since midnight  Dressing left intact to Right foot: betadine soaked gauze, DSD, Ace  WBAT to Right lower extremity  Discussed with Dr. Sangeetha Bahena, [address]   Podiatric Medicine & Surgery   3/12/2023 at 7:33 AM

## 2023-03-12 NOTE — PROGRESS NOTES
[facility]  Office: [address], DO, Nelson Pennington, DO, Christine Dee, DO, Chinmay Watson Blood, DO, Marilou Vegas MD, Dorie Guerrero MD, Yvone Phalen, MD, Amaury Rodriguez MD,  Homer Hashimoto, MD, Diego Tang MD, Pauly Tapia, DO, Libby Whitlock MD,  Karol Chicas MD, Shauna Murray MD, Prince Chinchilla, DO, Aaron Fields MD, Karena Ashley MD, Ammy Stallings, DO, Milagros Ward MD, Bart Braxton MD, Fabby Juan MD, Luly Coto MD, Deon Lu DO, Maddie Ortega MD, Herbert Lanier MD, Marguerite Prater, CNP,  Marleen Colunga, CNP, Manuel Mckenna, CNP, Mayra Mao, CNP,  Derick Siddiqui, [facility], Clarissa Gold, CNP, Jordon Arriaga, CNP, Melisa Zepeda, CNP, Wen Phipps, CNP, Sonia Cain, CNP, Faye Ibanez PA-C, Farhana Quintana, CNS, Ryan Garcia, CNP, Santosh Lopes, [facility]    Progress Note    3/12/2023    12:23 PM    Name:   Denver Demark  MRN:     [id number]     [place]:      [de-identified]   Room:   2017/2017-02  IP Day:  9  Admit Date:  3/3/2023  2:54 PM    PCP:   Guzman Phoenix MD  Code Status:  Full Code    Subjective:     C/C:   Chief Complaint   Patient presents with    Wound Infection     Right foot     Interval History Status: unchanged. Feels fine today    He doesn't really remember the other day when he got confused and doesn't know why he acted like that    Denies cp/sob/n/v    Some postop pain but not bad    Brief History:     Per my partner   This is a 69-year-old male that presents with right foot wound with concerns for infection. Recent outpatient culture with vancomycin-resistant Enterococcus faecium. He presented to the emergency room with worsening drainage and developed fevers and chills. Overnight he was started on Maxipime. Recent outpatient cultures were obtained with evidence of VRE with antibiotics will be adjusted accordingly.     Review of Systems: Constitutional:  negative for chills, fevers, sweats  Respiratory:  negative for cough, wheezing, shortness of breath  Cardiovascular:  negative for chest pain, chest pressure/discomfort, palpitations  Gastrointestinal:  negative for abdominal pain, constipation, diarrhea, nausea, vomiting  Neurological:  negative for dizziness, headache      Medications: Allergies:     Allergies   Allergen Reactions    Morphine Itching    Other Other (See Comments)     Other reaction(s): Unknown    Percocet [Oxycodone-Acetaminophen]      Facial swelling       Current Meds:   Scheduled Meds:    tiZANidine  4 mg Oral BID    gabapentin  300 mg Oral TID    amLODIPine  5 mg Oral Daily    aspirin  81 mg Oral Daily    cetirizine  10 mg Oral Nightly    [Held by provider] glipiZIDE  20 mg Oral BID AC    [Held by provider] alogliptin  12.5 mg Oral Daily    insulin glargine  10 Units SubCUTAneous BID    atorvastatin  20 mg Oral Nightly    sodium chloride flush  5-40 mL IntraVENous 2 times per day    heparin (porcine)  5,000 Units SubCUTAneous 3 times per day    insulin lispro  0-8 Units SubCUTAneous TID WC    insulin lispro  0-4 Units SubCUTAneous Nightly     Continuous Infusions:    dextrose      sodium chloride Stopped (03/05/23 1313)     PRN Meds: HYDROmorphone **OR** HYDROmorphone, acetaminophen, fentanNYL, HYDROcodone-acetaminophen, glucose, dextrose bolus **OR** dextrose bolus, glucagon (rDNA), dextrose, sodium chloride flush, sodium chloride, ondansetron **OR** ondansetron, polyethylene glycol    Data:     Past Medical History:   has a past medical history of Abscess of right foot, Acquired hammer toe deformity of lesser toe of right foot, ALEJANDRO (acute kidney injury) ([facility] Cellulitis, Cellulitis of left foot, Cellulitis of right foot, Charcot foot due to diabetes mellitus ([address] Chest pain at rest, Chronic multifocal osteomyelitis of right foot ([address] CKD (chronic kidney disease), Diabetic polyneuropathy associated with type 2 diabetes mellitus ([facility] Essential hypertension, Fractures, multiple, Hyperlipidemia, Hypertension, Leukocytosis, MRSA (methicillin resistant staph aureus) culture positive, Neuropathy, Pain in right foot, Pneumonia, Right foot infection, Right foot pain, Tobacco abuse, Type II or unspecified type diabetes mellitus without mention of complication, not stated as uncontrolled, Vertigo, Well controlled type 2 diabetes mellitus with neurological manifestations ([address] Wound dehiscence, Wound, open, and Wound, open. Social History:   reports that he has been smoking cigarettes. He has been smoking an average of .5 packs per day. He has never used smokeless tobacco. He reports that he does not currently use drugs. He reports that he does not drink alcohol. Family History:   Family History   Problem Relation Age of Onset    Diabetes Mother     Cancer Father     Hypertension Maternal Grandmother        Vitals:  BP (!) 149/72   Pulse 68   Temp 97.5 °F (36.4 °C) (Oral)   Resp 13   Ht 5' 10\" (1.778 m)   Wt 222 lb 11.2 oz (101 kg)   SpO2 92%   BMI 31.95 kg/m²   Temp (24hrs), Av °F (36.7 °C), Min:97 °F (36.1 °C), Max:99 °F (37.2 °C)    Recent Labs     23  0601 23  1039 23  1131   POCGLU 231* 132* 135* 112*       I/O (24Hr): Intake/Output Summary (Last 24 hours) at 3/12/2023 1223  Last data filed at 3/12/2023 1129  Gross per 24 hour   Intake 150 ml   Output --   Net 150 ml       Labs:  Hematology:  Recent Labs     23  0607   SEDRATE 72*   CRP 40.1*     Chemistry:  No results for input(s): NA, K, CL, CO2, GLUCOSE, BUN, CREATININE, MG, ANIONGAP, LABGLOM, GFRAA, CALCIUM, CAION, PHOS, PSA, PROBNP, TROPHS, CKTOTAL, CKMB, CKMBINDEX, MYOGLOBIN, DIGOXIN, LACTACIDWB in the last 72 hours.     Recent Labs     23  1101 23  1631 23/23  0601 23  1039 23  1131   POCGLU 126* 158* 231* 132* 135* 112*     ABG:  Lab Results   Component Value Date/Time    POCPH 7.393 03/06/2023 02:39 PM    POCPCO2 36.8 03/06/2023 02:39 PM    POCPO2 73.0 03/06/2023 02:39 PM    POCHCO3 22.4 03/06/2023 02:39 PM    NBEA 2 03/06/2023 02:39 PM    SRKO3DWA 94 03/06/2023 02:39 PM    FIO2 21.0 03/06/2023 02:39 PM     Lab Results   Component Value Date/Time    SPECIAL 10ML,RTFA 03/07/2023 12:46 PM     Lab Results   Component Value Date/Time    CULTURE NO GROWTH 5 DAYS 03/07/2023 12:46 PM       Radiology:  CT HEAD WO CONTRAST    Result Date: 3/6/2023  No acute intracranial abnormality. US RETROPERITONEAL COMPLETE    Result Date: 3/4/2023  Mildly echogenic renal parenchyma suggestive of intrinsic renal parenchymal disease. No hydronephrosis.        Physical Examination:        General appearance:  alert, cooperative and no distress  Mental Status:  oriented to person, place and time, and normal affect  Lungs:  clear to auscultation bilaterally, normal effort  Heart:  regular rate and rhythm, no murmur  Abdomen:  soft, nontender, nondistended, normal bowel sounds, no masses, hepatomegaly, splenomegaly  Extremities:  no redness, tenderness in the calves; right foot bandaged from surgery  Mentation back to normal    Assessment:        Hospital Problems             Last Modified POA    * (Principal) Diabetic foot infection ([address] with VRE 3/4/2023 Yes    Type 2 diabetes mellitus with right diabetic foot ulcer ([address] 3/3/2023 Yes    Charcot's joint of right foot 3/3/2023 Yes    Stage 3b chronic kidney disease ([address] (Chronic) 3/3/2023 Yes    Hyponatremia 3/3/2023 Yes    Microcytic anemia 3/3/2023 Yes    Infection due to vancomycin resistant Enterococcus faecium 3/4/2023 Yes    Acute metabolic encephalopathy 6/7/2830 Yes    Delirium due to another medical condition 3/9/2023 Yes    Altered mental status 3/10/2023 Yes    Essential hypertension (Chronic) 3/3/2023 Yes    Neuropathy 3/3/2023 Yes    Charcot foot due to diabetes mellitus ([address] 3/3/2023 Yes    Hyperlipidemia 3/3/2023 Yes Acute kidney injury superimposed on chronic kidney disease ([facility] 3/3/2023 Yes       Plan:        Unclear what caused his ams 3/7 but much better; cefepime stopped in case that was cause.   Also lowered neurontin dose as his dose is too high for his level of renal function  mri head done , negative  Had OR today  Will need to be set up for iv antibiotics at discharge    [address], DO  3/12/2023  12:23 PM

## 2023-03-12 NOTE — PROGRESS NOTES
Department of Psychiatry  Behavioral Health Consult    REASON FOR CONSULT: SI and disoriented    CONSULTING PHYSICIAN: Alexia Flynn    History obtained from: Patient, wife and chart    Interim history. The patient was seen face-to-face. He was lethargic. The patient is oriented to time place and person. The patient denies any auditory or visual hallucinations or psychotic phenomena at this time. He told me he had surgery. I spoke with the patient's nurse. He has been in pain and has just received pain medication which may explain his sedation. HISTORY OF PRESENT ILLNESS:    The patient is a 78 y.o. male with no significant past psych history who is admitted for pain and swelling for right foot. I have noted that the patient was hyponatremic at the time of admission. His creatinine was 2.89. Hemoglobin was 9.4. Infectious disease has been involved. The patient had reported suicidal ideation to his podiatrist.  The patient was unable to exchange his usual banter when speaking to his doctor and appeared to be disoriented as per his wife. The patient was seen face-to-face. He is pleasant on approach. He has difficulty recollecting the circumstances of his admission and the course of his admission. He says he has been watching the news but could not tell me about any current events. He was able to correctly name the current and previous president of [place]. He was oriented to place and situation but not to time. The patient denies any depressive symptoms or suicidal ideation. He was able to make eye contact and had affective reactivity. His wife agrees that he has improved from yesterday when he was not like his usual self. Patient denies any auditory or visual hallucinations. He appears to have no psychotic phenomena. Discussed that the patient seems to be improving in his delirium and does not need medications at this time.   We will continue to follow        The patient is not currently receiving care for the above psychiatric illness. Psychiatric Review of Systems           Obsessions and Compulsions: Denies       Viktoria or Hypomania: Denies     Hallucinations: Denies     Panic Attacks:  Denies     Delusions:  Denies     Phobias:  Denies     Trauma: Denies      Substance Abuse History:  Denies    Past Psychiatric History:  No Suicide attempts, admissions to psychiatry or previous treatment for mental health      Social History: . Retired . Past Medical History:        Diagnosis Date    Abscess of right foot 08/04/2018    Acquired hammer toe deformity of lesser toe of right foot     ALEJANDRO (acute kidney injury) ([address] 08/05/2018    Cellulitis 04/24/2018    Cellulitis of left foot 04/24/2018    Cellulitis of right foot     and abscess    Charcot foot due to diabetes mellitus ([address] 2014    Right foot     Chest pain at rest 08/04/2018    Chronic multifocal osteomyelitis of right foot ([address]     CKD (chronic kidney disease)     Diabetic polyneuropathy associated with type 2 diabetes mellitus ([address]     Essential hypertension     Fractures, multiple 07/21/2014    Right foot fractures     Hyperlipidemia     Hypertension     Leukocytosis     MRSA (methicillin resistant staph aureus) culture positive 2018    rt foot    Neuropathy     diabetic with charcot affecting the right foot    Pain in right foot     redness and swelling    Pneumonia 2009    Right foot infection     Right foot pain     Tobacco abuse 04/24/2018    Type II or unspecified type diabetes mellitus without mention of complication, not stated as uncontrolled     Vertigo     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 stepped on sharp object. Covered by dressing.     Wound, open     Right posterior -Diabetic Ulcer       Past Surgical History:        Procedure Laterality Date    ANKLE SURGERY Right 10/12/2022    RIGHT MID-FOOT OSTEOTOMY WITH OF APPLICATION EXTERNAL FIXATOR SERA performed by Cherelle House DPM at [address] Right 11/15/2022    right lower extremity  adjustment of exfix performed by Cherelle House DPM at [facility]      at age 24    ARTHRODESIS Right 12/27/2022    RIGHT SUBTALAR JOINT AND MULTIPLE MID FOOT JOINT FUSIONS WITH SERA AND PRE-OP POP BLOCK performed by Cherelle House DPM at [place] [id number] [facility] Right 12/11/2020    RIGHT HALLUX EXOSTECTOMY AND 3RD DIGIT EXTENSIOR TENOTOMY performed by Joanne Donahue DPM at [address] [facility] Left 04/24/2018    I&D foreign body removal    FOOT DEBRIDEMENT Right 03/20/2020    RIGHT  FOOT   INCISION AND DRAINAGE WITH BONE BIOPSY performed by Joanne Donahue DPM at [address] Right 06/08/2021    FOOT DEBRIDEMENT INCISION AND DRAINAGE performed by Joanne Donahue DPM at [address] Right 09/16/2021    RIGHT FOOT  INCISION AND DRAINAGE   WITH PULSE LAVAGE performed by Joanne Donahue DPM at [place] [address] Right 06/11/2021    RIGHT FOOT FLAP CLOSURE performed by Joanne Donahue DPM at [place] [address] Right 04/79/0749    APPLICATION EXTERNAL FIXATOR RIGHT FOOT - SERA - Right    FOOT SURGERY Right 12/27/2022    RIGHT SUBTALAR JOINT AND MULTIPLE MID FOOT JOINT FUSIONS WITH SERA AND PRE-OP POP BLOCK (Right: Foot)    FOOT SURGERY Right 12/27/2022    RIGHT FOOT/ANKLE EXTERNAL FIXATOR  REMOVAL performed by Cherelle House DPM at [facility] [address]    IR INS PICC VAD W SQ PORT GREATER THAN 5  10/07/2022    IR INS PICC VAD W SQ PORT GREATER THAN 5 10/7/2022 [facility]    KNEE ARTHROSCOPY Right 1989    KNEE ARTHROSCOPY Left 1990    KNEE SURGERY Bilateral 1983    arthroscopy    NECK SURGERY  1987    MD I&D BELOW FASCIA FOOT 1 BURSAL SPACE Left 04/24/2018    LEFT FOOT MULTIPLE  INCISIONS AND DRAINAGE AND REMOVAL FOREIGN BODY  IRENE performed by Heaven Fair DPM at [facility]         Medications Prior to Admission:   Medications Prior to Admission: cyclobenzaprine (FLEXERIL) 10 MG tablet, Take 10 mg by mouth at bedtime  ketorolac (TORADOL) 10 MG tablet, Take 1 tablet by mouth every 6 hours as needed for Pain  HYDROcodone-acetaminophen (NORCO)  MG per tablet, Take 1 tablet by mouth every 6 hours as needed for Pain. TRUEPLUS PEN NEEDLES 31G X 8 MM MISC,   amLODIPine (NORVASC) 5 MG tablet, Take 5 mg by mouth daily  LANTUS SOLOSTAR 100 UNIT/ML injection pen, Inject 15 Units into the skin nightly (Patient taking differently: Inject 10 Units into the skin 2 times daily)  JANUVIA 100 MG tablet, Take 100 mg by mouth daily   Misc. Devices MISC, 1 PAIR OF DIABETIC SHOES (1 LEFT/ 1 RIGHT) 1-3 PAIRS OF INSERTS (LEFT/ RIGHT)  cetirizine (ZYRTEC) 10 MG tablet, Take 10 mg by mouth nightly   simvastatin (ZOCOR) 40 MG tablet, Take 40 mg by mouth nightly   glipiZIDE (GLUCOTROL) 10 MG tablet, Take 20 mg by mouth 2 times daily (before meals) Takes 2 tabs (=20mg) BID  aspirin 81 MG tablet, Take 81 mg by mouth daily  gabapentin (NEURONTIN) 300 MG capsule, Take 900 mg by mouth 3 times daily.  Take 3 caps (=900mg) 3 times a day    Allergies:  Morphine, Other, and Percocet [oxycodone-acetaminophen]    FAMILY/SOCIAL HISTORY:  Family History   Problem Relation Age of Onset    Diabetes Mother     Cancer Father     Hypertension Maternal Grandmother      Social History     Socioeconomic History    Marital status:      Spouse name: Not on file    Number of children: Not on file    Years of education: Not on file    Highest education level: Not on file   Occupational History    Not on file   Tobacco Use    Smoking status: Every Day     Packs/day: 0.50     Types: Cigarettes    Smokeless tobacco: Never   Vaping Use    Vaping Use: Never used   Substance and Sexual Activity    Alcohol use: No    Drug use: Not Currently Sexual activity: Not Currently   Other Topics Concern    Not on file   Social History Narrative    Not on file     Social Determinants of Health     Financial Resource Strain: Not on file   Food Insecurity: Not on file   Transportation Needs: Not on file   Physical Activity: Not on file   Stress: Not on file   Social Connections: Not on file   Intimate Partner Violence: Not on file   Housing Stability: Not on file       REVIEW OF SYSTEMS    Constitutional: [] fever  [] chills  [] weight loss  []weakness [] Other:  Eyes:  [] photophobia  [] discharge [] acuity change   [] Diplopia   [] Other:  HENT:  [] sore throat  [] ear pain [] Tinnitus   [] Other  Respiratory:  [] Cough  [] Shortness of breath   [] Sputum   [] Other:   Cardiac: []Chest pain   []Palpitations []Edema  []PND  [] Other:  GI:  []Abdominal pain   []Nausea  []Vomiting  []Diarrhea  [] Other:  :  [] Dysuria   []Frequency  []Hematuria  []Discharge  [] Other:  Possible Pregnancy: []Yes   []No   LMP:   Musculoskeletal:  []Back pain  []Neck pain  []Recent Injury   Skin:  []Rash  [] Itching  [] Other:  Neurologic:  [] Headache  [] Focal weakness  [] Sensory changes []Other:  Endocrine:  [] Polyuria  [] Polydipsia  [] Hair Loss  [] Other:  Lymphatic:   [] Swollen glands   Psychiatric:  As per HPI      All other systems negative except as marked or mentioned/indicated in the HPI. Teressa Zepeda      PHYSICAL EXAM:  Vitals:  BP (!) 185/91   Pulse (!) 112   Temp (!) 100.6 °F (38.1 °C) (Oral)   Resp 18   Ht 5' 10\" (1.778 m)   Wt 222 lb 11.2 oz (101 kg)   SpO2 92%   BMI 31.95 kg/m²      Neuro Exam:      Involuntary Movements: No    Mental Status Examination:    Level of consciousness: Lethargic   appearance:  hospital attire  Behavior/Motor:  no abnormalities noted  Attitude toward examiner:  cooperative and attentive  Speech:  spontaneous, normal rate, and normal volume   Mood: euthymic  Affect:  mood congruent  Thought processes:  linear, goal directed, and coherent   Thought content:  Suicidal Ideation:  denies suicidal ideation  Delusions:  no evidence of delusions  Perceptual Disturbance:  denies any perceptual disturbance  Cognition:  oriented to person, place, and time   Concentration intact  Memory impaired recent memory. Insight fair   Judgement good   Fund of Knowledge- Limited        LABS: REVIEWED TODAY:  No results for input(s): WBC, HGB, PLT in the last 72 hours. No results for input(s): NA, K, CL, CO2, BUN, CREATININE, GLUCOSE in the last 72 hours. No results for input(s): BILITOT, ALKPHOS, AST, ALT in the last 72 hours. No results found for: Ethelene Young, LABBENZ, CANNAB, COCAINESCRN, LABMETH, OPIATESCREENURINE, PHENCYCLIDINESCREENURINE, PPXUR, ETOH  Lab Results   Component Value Date/Time    TSH 3.63 03/06/2023 04:22 PM     No results found for: LITHIUM  No results found for: VALPROATE, CBMZ  No results found for: LITHIUM, VALPROATE    FURTHER LABS ORDERED :      Radiology   CT HEAD WO CONTRAST    Result Date: 3/6/2023  EXAMINATION: CT OF THE HEAD WITHOUT CONTRAST  3/6/2023 1:45 pm TECHNIQUE: CT of the head was performed without the administration of intravenous contrast. Automated exposure control, iterative reconstruction, and/or weight based adjustment of the mA/kV was utilized to reduce the radiation dose to as low as reasonably achievable. COMPARISON: None. HISTORY: ORDERING SYSTEM PROVIDED HISTORY: disorientation TECHNOLOGIST PROVIDED HISTORY: disorientation Reason for Exam: Disoriented, AMS, dizzy FINDINGS: BRAIN/VENTRICLES: There is no acute intracranial hemorrhage, mass effect or midline shift. No abnormal extra-axial fluid collection. The gray-white differentiation is maintained without evidence of an acute infarct. There is no evidence of hydrocephalus. ORBITS: The visualized portion of the orbits demonstrate no acute abnormality.  SINUSES: The visualized paranasal sinuses and mastoid air cells demonstrate no acute abnormality. SOFT TISSUES/SKULL:  No acute abnormality of the visualized skull or soft tissues. No acute intracranial abnormality. US RETROPERITONEAL COMPLETE    Result Date: 3/4/2023  EXAMINATION: RETROPERITONEAL ULTRASOUND OF THE KIDNEYS AND URINARY BLADDER 3/4/2023 COMPARISON: CT abdomen and pelvis 12/03/2021. Renal ultrasound 08/07/2018 HISTORY: ORDERING SYSTEM PROVIDED HISTORY: ALEJANDRO TECHNOLOGIST PROVIDED HISTORY: ALEJANDRO FINDINGS: Kidneys: The right kidney measures 11.3 cm in length and the left kidney measures 10.9 cm in length. Kidneys demonstrate mildly increased cortical echogenicity. No evidence of hydronephrosis or intrarenal stones. Exophytic 1.1 x 1.1 x 1.0 cm lesion arising from the inferior right kidney is consistent with a cyst, grossly unchanged compared to more recent CT imaging. A cleft of fat or AML measuring 1.4 cm in the interpolar right kidney is noted, unchanged from prior ultrasound. Bladder: Prevoid volume of 502 cc. The patient did not void for this exam.  Ureteral jets are not visualized. Mildly echogenic renal parenchyma suggestive of intrinsic renal parenchymal disease. No hydronephrosis. MRI BRAIN WO CONTRAST    Result Date: 3/8/2023  EXAMINATION: MRI OF THE BRAIN WITHOUT CONTRAST  3/8/2023 7:46 am TECHNIQUE: Multiplanar multisequence MRI of the brain was performed without the administration of intravenous contrast. COMPARISON: CT head 03/06/2023 HISTORY: ORDERING SYSTEM PROVIDED HISTORY: AMS TECHNOLOGIST PROVIDED HISTORY: AMS Reason for Exam: AMS FINDINGS: Motion artifact limits evaluation. INTRACRANIAL STRUCTURES/VENTRICLES: There is no acute infarct. No mass effect or midline shift. No evidence of an acute intracranial hemorrhage. The ventricles and sulci are normal in size and configuration. The sellar/suprasellar regions appear unremarkable. The normal signal voids within the major intracranial vessels appear maintained.  Prominent ventricles and sulci, consistent with mild parenchymal volume loss. ORBITS: The visualized portion of the orbits demonstrate no acute abnormality. SINUSES: The visualized mastoid air cells demonstrate no acute abnormality. Ethmoid mucosal thickening. BONES/SOFT TISSUES: The bone marrow signal intensity appears normal. The soft tissues demonstrate no acute abnormality. Motion artifact limits evaluation. No acute intracranial abnormality. DIAGNOSIS:  Diabetic foot infection  Delirium due to medical condition        RISK ASSESSMENT: low risk of suicide or harm to others      RECOMMENDATIONS  Disposition: No indication for psych admission  Risk Management:  routine:  no special precautions necessary    Medications:  No psychotropics indicated. Discussed with the treating physician/ team about the patient and treatment plan  Reviewed the chart    Discussed with the patient risk, benefit, alternative and common side effects for the  proposed medication treatment. Patient is consenting to the treatment. Thanks for the consult. Please call me if needed. Electronically signed by Merlin Adie, MD on 3/12/2023 at 7:02 PM    Please note that this chart was generated using voice recognition Dragon dictation software. Although every effort was made to ensure the accuracy of this automated transcription, some errors in transcription may have occurred.

## 2023-03-12 NOTE — ANESTHESIA PRE PROCEDURE
Department of Anesthesiology  Preprocedure Note       Name:  Deb Harrison   Age:  78 y.o.  :  1955                                          MRN:  [id number]         Date:  3/12/2023      Surgeon: Grant Roberson):  Esdras Hogan DPM    Procedure: Procedure(s):  RIGHT FOOT HARDWARE REMOVAL AND I&D    -  SERA   possible External Fixator    Medications prior to admission:   Prior to Admission medications    Medication Sig Start Date End Date Taking? Authorizing Provider   cyclobenzaprine (FLEXERIL) 10 MG tablet Take 10 mg by mouth at bedtime   Yes Historical Provider, MD   ketorolac (TORADOL) 10 MG tablet Take 1 tablet by mouth every 6 hours as needed for Pain 22  Cali Nicholson DPM   HYDROcodone-acetaminophen (NORCO)  MG per tablet Take 1 tablet by mouth every 6 hours as needed for Pain. Historical Provider, MD   TRUEPLUS PEN NEEDLES 31G X 8 MM MISC  21   Historical Provider, MD   amLODIPine (NORVASC) 5 MG tablet Take 5 mg by mouth daily 10/20/21   Historical Provider, MD   LANTUS SOLOSTAR 100 UNIT/ML injection pen Inject 15 Units into the skin nightly  Patient taking differently: Inject 10 Units into the skin 2 times daily 21   ROCHELLE Holman - NP   JANUVIA 100 MG tablet Take 100 mg by mouth daily  20   Historical Provider, MD   Misc. Devices MISC 1 PAIR OF DIABETIC SHOES (1 LEFT/ 1 RIGHT)  1-3 PAIRS OF INSERTS (LEFT/ RIGHT) 3/5/20   Alcon Jeong DPM   cetirizine (ZYRTEC) 10 MG tablet Take 10 mg by mouth nightly  10/21/19   Historical Provider, MD   simvastatin (ZOCOR) 40 MG tablet Take 40 mg by mouth nightly  18   Historical Provider, MD   glipiZIDE (GLUCOTROL) 10 MG tablet Take 20 mg by mouth 2 times daily (before meals) Takes 2 tabs (=20mg) BID    Historical Provider, MD   aspirin 81 MG tablet Take 81 mg by mouth daily    Historical Provider, MD   gabapentin (NEURONTIN) 300 MG capsule Take 900 mg by mouth 3 times daily.  Take 3 caps (=900mg) 3 times a day    Historical Provider, MD       Current medications:    Current Facility-Administered Medications   Medication Dose Route Frequency Provider Last Rate Last Admin    [MAR Hold] acetaminophen (TYLENOL) tablet 650 mg  650 mg Oral Q4H PRN Steve P Blood, DO        [MAR Hold] gabapentin (NEURONTIN) capsule 300 mg  300 mg Oral TID Steve P Blood, DO   300 mg at 03/11/23 2028    [MAR Hold] fentaNYL (SUBLIMAZE) injection 25 mcg  25 mcg IntraVENous Q2H PRN Pichardo Opoka Orlop, DO   25 mcg at 03/04/23 2128    [MAR Hold] amLODIPine (NORVASC) tablet 5 mg  5 mg Oral Daily Christine Ripa, APRN - CNP   5 mg at 03/11/23 0931    [MAR Hold] aspirin EC tablet 81 mg  81 mg Oral Daily Christine Ripa, APRN - CNP   81 mg at 03/11/23 0931    [MAR Hold] cetirizine (ZYRTEC) tablet 10 mg  10 mg Oral Nightly Christine Ripa, APRN - CNP   10 mg at 03/11/23 2028    [MAR Hold] HYDROcodone-acetaminophen (NORCO)  MG per tablet 1 tablet  1 tablet Oral Q6H PRN Christine Ripa, APRN - CNP   1 tablet at 03/12/23 0527    [Held by provider] glipiZIDE (GLUCOTROL) tablet 20 mg  20 mg Oral BID AC Mala Clarke APRN - CNP        [Held by provider] alogliptin (NESINA) tablet 12.5 mg  12.5 mg Oral Daily Mala Clarke APRN - CNP        [facility] insulin glargine (LANTUS) injection vial 10 Units  10 Units SubCUTAneous BID Christine Ripa, APRN - CNP   10 Units at 03/11/23 2028    [MAR Hold] atorvastatin (LIPITOR) tablet 20 mg  20 mg Oral Nightly Christine Ripa, APRN - CNP   20 mg at 03/11/23 2028    [MAR Hold] glucose chewable tablet 16 g  4 tablet Oral PRN Christine Ripa, APRN - CNP        [facility] Hold] dextrose bolus 10% 125 mL  125 mL IntraVENous PRN Christine Ripa, APRN - CNP        Or   Bal Stager [JSV Hold] dextrose bolus 10% 250 mL  250 mL IntraVENous PRN Christine Ripa, APRN - CNP        [MAR Hold] glucagon (rDNA) injection 1 mg  1 mg SubCUTAneous PRN Christine Ripa, APRN - CNP   1 mg at 03/07/23 0113   [facility] Hold] dextrose 10 % infusion   IntraVENous Continuous PRN ROCHELLE Narayan CNP        [facility] Hold] sodium chloride flush 0.9 % injection 5-40 mL  5-40 mL IntraVENous 2 times per day ROCHELLE Kaplan CNP   10 mL at 03/11/23 2029    [MAR Hold] sodium chloride flush 0.9 % injection 10 mL  10 mL IntraVENous PRN ROCHELLE Kaplan CNP   10 mL at 03/07/23 1815    [MAR Hold] 0.9 % sodium chloride infusion   IntraVENous PRN ROCHELLE Kaplan CNP   Stopped at 03/05/23 1313    [MAR Hold] ondansetron (ZOFRAN-ODT) disintegrating tablet 4 mg  4 mg Oral Q8H PRN ROCHELLE Kaplan CNP        Or    [MAR Hold] ondansetron (ZOFRAN) injection 4 mg  4 mg IntraVENous Q6H PRN ROCHELLE Kaplan CNP        [MAR Hold] polyethylene glycol (GLYCOLAX) packet 17 g  17 g Oral Daily PRN ROCHELLE Kaplan CNP        [facility] Hold] heparin (porcine) injection 5,000 Units  5,000 Units SubCUTAneous 3 times per day ROCHELLE Kaplan CNP   5,000 Units at 03/11/23 2032    [MAR Hold] insulin lispro (HUMALOG) injection vial 0-8 Units  0-8 Units SubCUTAneous TID  ROCHELLE Narayan CNP   2 Units at 03/09/23 1736    [MAR Hold] insulin lispro (HUMALOG) injection vial 0-4 Units  0-4 Units SubCUTAneous Nightly ROCHELLE Kaplan CNP         Facility-Administered Medications Ordered in Other Encounters   Medication Dose Route Frequency Provider Last Rate Last Admin    fentaNYL (SUBLIMAZE) injection   IntraVENous PRN Reggie Canavan, MD   100 mcg at 03/12/23 0805    lidocaine 2 % injection   IntraVENous PRN Reggie Canavan, MD   5 mL at 03/12/23 0805    propofol injection   IntraVENous PRN Reggie Canavan, MD   150 mg at 03/12/23 0805       Allergies:     Allergies   Allergen Reactions    Morphine Itching    Other Other (See Comments)     Other reaction(s): Unknown    Percocet [Oxycodone-Acetaminophen]      Facial swelling       Problem List:    Patient Active Problem List   Diagnosis Code    Essential hypertension I10    Type II or unspecified type diabetes mellitus without mention of complication, not stated as uncontrolled E11.9    Neuropathy G62.9    Vertigo R44    Charcot foot due to diabetes mellitus ([facility] E11.610    Hyperlipidemia E78.5    Cellulitis L03.90    Cellulitis of left foot L03. 80    Right foot infection L08.9    Diabetic polyneuropathy associated with type 2 diabetes mellitus ([facility] E11.42    Foreign body (FB) in soft tissue M79.5    Cellulitis of left leg L03. 116    Abscess of right foot L02.611    Chest pain at rest R07.9    Tobacco abuse Z72.0    Type 2 diabetes mellitus treated with insulin ([facility]) E11.9, Z79.4    Acute kidney injury superimposed on chronic kidney disease ([facility]) N17.9, N18.9    Bandemia D72.825    Chronic multifocal osteomyelitis of right foot ([facility]) M86.371    Diabetic infection of right foot ([facility]) E11.628, L08.9    Acquired hammer toe deformity of lesser toe of right foot M20.41    Post-op pain G89.18    Right foot pain M79.671    Hallux hammertoe, right M20.31    Osteomyelitis ([facility]) M86.9    Wound dehiscence T81.30XA    Diabetic foot ([facility]) E11.8    Hyperglycemia due to diabetes mellitus ([facility]) E11.65    Foot ulcer ([facility]) L97.509    Cellulitis of right foot L03.115    Type 2 diabetes mellitus with right diabetic foot ulcer ([facility]) E11.621, L97.519    Charcot's joint of right foot M14.671    Stage 3b chronic kidney disease ([facility]) N18.32    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, 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.9    Hyponatremia E87.1    Microcytic anemia D50.9    Infection due to vancomycin resistant Enterococcus faecium A49.8, Z16.21    Acute metabolic encephalopathy D32.73    Delirium due to another medical condition F05    Altered mental status R41.82       Past Medical History:        Diagnosis Date    Abscess of right foot 08/04/2018    Acquired hammer toe deformity of lesser toe of right foot     [facility] 08/05/2018    Cellulitis 04/24/2018    Cellulitis of left foot 04/24/2018    Cellulitis of right foot     and abscess    Charcot foot due to diabetes mellitus ([address] 2014    Right foot     Chest pain at rest 08/04/2018    Chronic multifocal osteomyelitis of right foot ([address]     CKD (chronic kidney disease)     Diabetic polyneuropathy associated with type 2 diabetes mellitus ([address]     Essential hypertension     Fractures, multiple 07/21/2014    Right foot fractures     Hyperlipidemia     Hypertension     Leukocytosis     MRSA (methicillin resistant staph aureus) culture positive 2018    rt foot    Neuropathy     diabetic with charcot affecting the right foot    Pain in right foot     redness and swelling    Pneumonia 2009    Right foot infection     Right foot pain     Tobacco abuse 04/24/2018    Type II or unspecified type diabetes mellitus without mention of complication, not stated as uncontrolled     Vertigo     Well controlled type 2 diabetes mellitus with neurological manifestations ([address] 08/04/2018    Wound dehiscence     Wound, open     Left Ball of  foot, pt. stepped on sharp object. Covered by dressing.     Wound, open     Right posterior -Diabetic Ulcer       Past Surgical History:        Procedure Laterality Date    ANKLE SURGERY Right 10/12/2022    RIGHT MID-FOOT OSTEOTOMY WITH OF APPLICATION EXTERNAL FIXATOR SERA [facility]15[facility]2022    right lower extremity  adjustment of exfix performed by Tahira Villeda DPM at [facility] [address]      at age 24    ARTHRODESIS Right 12/27/2022    RIGHT SUBTALAR JOINT AND MULTIPLE MID FOOT JOINT FUSIONS WITH SERA AND PRE-OP POP BLOCK performed by Tahira Villeda DPM at [address] Right 12/11/2020    RIGHT HALLUX EXOSTECTOMY AND 3RD DIGIT EXTENSIOR TENOTOMY performed by Kody Ahumada DPM at [address] [place] [address] 04/24/2018    I&D foreign body removal    FOOT DEBRIDEMENT Right 03/20/2020    RIGHT  FOOT   INCISION AND DRAINAGE WITH BONE BIOPSY performed by Michi Melara DPM at [facility] Right 06/08/2021    FOOT DEBRIDEMENT INCISION AND DRAINAGE performed by Michi Melara DPM at [facility] Right 09/16/2021    RIGHT FOOT  INCISION AND DRAINAGE   WITH PULSE LAVAGE performed by Michi Melara DPM at [address] [place] [address] Right 06/11/2021    RIGHT FOOT FLAP CLOSURE [facility]i [facility] DPM [facility] Right 30/13/0070    APPLICATION EXTERNAL FIXATOR RIGHT FOOT - SERA - Right    FOOT SURGERY Right 12/27/2022    RIGHT SUBTALAR JOINT AND MULTIPLE MID FOOT JOINT FUSIONS WITH SERA AND PRE-OP POP BLOCK (Right: Foot)    FOOT SURGERY Right 12/27/2022    RIGHT FOOT/ANKLE EXTERNAL FIXATOR  REMOVAL performed by Audra Russell DPM at [facility] [address] IR INS PICC VAD W SQ PORT GREATER THAN 5  10/07/2022    IR INS PICC VAD W SQ PORT GREATER THAN 5 10/7/2022 [facility]    KNEE ARTHROSCOPY Right 1989    KNEE ARTHROSCOPY Left 1990    KNEE SURGERY Bilateral 1983    arthroscopy    NECK SURGERY  1987    MO I&D BELOW FASCIA FOOT 1 BURSAL SPACE Left 04/24/2018    LEFT FOOT MULTIPLE  INCISIONS  AND DRAINAGE AND REMOVAL FOREIGN BODY  IRENE performed by Michi Melara DPM at [place] [address] History:    Social History     Tobacco Use    Smoking status: Every Day     Packs/day: 0.50     Types: Cigarettes    Smokeless tobacco: Never   Substance Use Topics    Alcohol use:  No                                Ready to quit: Not Answered  Counseling given: Not Answered      Vital Signs (Current):   Vitals:    03/11/23 1905 03/11/23 2311 03/12/23 0503 03/12/23 0720   BP: (!) 163/74 (!) 156/69 (!) 147/78 (!) 146/69   Pulse: 81 79 77 71   Resp: 16 16  16   Temp: 99 °F (37.2 °C) 97.9 °F (36.6 °C) 98.6 °F (37 °C) 98.4 °F (36.9 °C)   TempSrc: Oral Oral Oral Oral   SpO2: 97% 100% 96% 94%   Weight:   222 lb 11.2 oz (101 kg)    Height:                                                  BP Readings from Last 3 Encounters:   03/12/23 (!) 146/69   03/01/23 (!) 140/80   12/27/22 (!) 143/80       NPO Status:                                                                                 BMI:   Wt Readings from Last 3 Encounters:   03/12/23 222 lb 11.2 oz (101 kg)   03/01/23 234 lb (106.1 kg)   12/27/22 244 lb (110.7 kg)     Body mass index is 31.95 kg/m².     CBC:   Lab Results   Component Value Date/Time    WBC 7.4 03/07/2023 12:47 PM    RBC 2.99 03/07/2023 12:47 PM    HGB 8.4 03/07/2023 12:47 PM    HCT 24.8 03/07/2023 12:47 PM    MCV 82.9 03/07/2023 12:47 PM    RDW 15.7 03/07/2023 12:47 PM     03/07/2023 12:47 PM       CMP:   Lab Results   Component Value Date/Time     03/09/2023 06:14 AM    K 4.0 03/09/2023 06:14 AM     03/09/2023 06:14 AM    CO2 23 03/09/2023 06:14 AM    BUN 16 03/09/2023 06:14 AM    CREATININE 2.28 03/09/2023 06:14 AM    GFRAA 38 09/13/2022 06:41 PM    LABGLOM 31 03/09/2023 06:14 AM    GLUCOSE 113 03/09/2023 06:14 AM    PROT 7.0 03/07/2023 12:47 PM    CALCIUM 9.2 03/09/2023 06:14 AM    BILITOT 0.2 03/07/2023 12:47 PM    ALKPHOS 123 03/07/2023 12:47 PM    AST 25 03/07/2023 12:47 PM    ALT 18 03/07/2023 12:47 PM       POC Tests:   Recent Labs     03/12/23  0601   POCGLU 132*       Coags: No results found for: PROTIME, INR, APTT    HCG (If Applicable): No results found for: PREGTESTUR, PREGSERUM, HCG, HCGQUANT     ABGs: No results found for: PHART, PO2ART, KPY7CZQ, YPF7NDY, BEART, S3BTQFEZ     Type & Screen (If Applicable):  No results found for: LABABO, LABRH    Drug/Infectious Status (If Applicable):  No results found for: HIV, HEPCAB    COVID-19 Screening (If Applicable):   Lab Results   Component Value Date/Time    COVID19 DETECTED 05/26/2021 12:06 PM    COVID19 Not Detected 12/07/2020 03:10 PM    COVID19 Not Detected 08/08/2020 10:02 PM           Anesthesia Evaluation    Airway: Mallampati: I  TM distance: >3 FB   Neck ROM: full  Mouth opening: > = 3 FB   Dental:          Pulmonary:       (-) shortness of breath                           Cardiovascular:    (+) hypertension:,     (-)  angina                Neuro/Psych:               GI/Hepatic/Renal:   (+) renal disease: CRI,           Endo/Other:    (+) Diabetes, . Abdominal:             Vascular:           Other Findings:           Anesthesia Plan      general     ASA 4                                   Lucy Pacheco MD   3/12/2023

## 2023-03-12 NOTE — PLAN OF CARE
Problem: Discharge Planning  Goal: Discharge to home or other facility with appropriate resources  3/12/2023 1501 by Carolyn Fisher RN  Outcome: Progressing  3/12/2023 0442 by Esperanza Bowens RN  Outcome: Progressing     Problem: Skin/Tissue Integrity - Adult  Goal: Skin integrity remains intact  3/12/2023 0442 by Esperanza Bowens RN  Outcome: Progressing  Flowsheets (Taken 3/11/2023 1905)  Skin Integrity Remains Intact: Monitor for areas of redness and/or skin breakdown  Goal: Incisions, wounds, or drain sites healing without S/S of infection  3/12/2023 0442 by Esperanza Bowens RN  Outcome: Progressing  Flowsheets (Taken 3/11/2023 1905)  Incisions, Wounds, or Drain Sites Healing Without Sign and Symptoms of Infection: TWICE DAILY: Assess and document skin integrity  Goal: Oral mucous membranes remain intact  3/12/2023 0442 by Esperanza Bowens RN  Outcome: Progressing  Flowsheets (Taken 3/11/2023 1905)  Oral Mucous Membranes Remain Intact: Assess oral mucosa and hygiene practices

## 2023-03-12 NOTE — OP NOTE
OP NOTE    PATIENT NAME: Radha Benítez  YOB: 1955  -  78 y.o. 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 PROCEDURE:   Subtalar joint arthrodesis, right foot (CPT 19752)  Application of uniplanar external fixator, right lower extremity (CPT 20690)  Soft tissue transfer of 5.5 cm², right foot (CPT 50543)  Incision and drainage of bone cortex, right foot (CPT 20240)  Insertion of intramedullary drug delivery device, right foot (CPT 20702)  Arthrocentesis of ankle joint, right lower extremity (CPT 20605)    All procedures billed with 78 modifier as unplanned return to the OR during a global period    ANESTHESIA: General, 10 cc of a one-to-one mix of 1% lidocaine plain 0.5% Marcaine plain    HEMOSTASIS: Direct pressure. ESTIMATED BLOOD LOSS: Roughly 60cc. MATERIALS:   Implant Name Type Inv.  Item Serial No.  Lot No. LRB No. Used Action   GRAFT BNE SUB 10CC [employer] PHSP[employer] HYALURONIC ACID TARA VOID FILL - [id number]  GRAFT BNE SUB 10CC [employer] PHSP[employer] HYALURONIC ACID TARA VOID FILL  [employer] [facility] [id number] Right 1 Implanted       INJECTABLES: None    SPECIMEN:   ID Type Source Tests Collected by Time Destination   1 : RIGHT FOOT HARDWARE REMOVAL - SCREW  Swab Foot CULTURE, WOUND, CULTURE, ANAEROBIC AND AEROBIC Mehdi Savage DPM 3/12/2023 [id number]    A : RIGHT FOOT HARDWARE REMOVAL - SCREW  Bone Foot SURGICAL PATHOLOGY, CULTURE, ANAEROBIC AND AEROBIC Mehdi Savage DPM 3/12/2023 9060    B : RIGHT FOOT HARDWARE REMOVAL - SCREW  Body Fluid Foot CYTOLOGY, NON-GYN, BODY FLUID CELL COUNT, CULTURE, BODY FLUID, CRYSTALS, BODY FLUID [facility], M[facility] 3/12/2023 5771        COMPLICATIONS: None    FINDINGS: Septic arthritis of the subtalar joint, failing orthopedic hardware, stable tibiotalar joint. Please see op note for full detail. The patient was counseled at length about the risks of sheela Covid-19 during their perioperative period and any recovery window from their procedure. The patient was made aware that sheela Covid-19  may worsen their prognosis for recovering from their procedure  and lend to a higher morbidity and/or mortality risk. All material risks, benefits, and reasonable alternatives including postponing the procedure were discussed. The patient does wish to proceed with the procedure at this time. INDICATIONS FOR PROCEDURE: The patient is a 41-year-old male admitted for cellulitis secondary to infected diabetic ulceration to the right foot. The patient has a complex history of diabetes mellitus type 2 which has been poorly controlled as well as Charcot arthropathy to the right lower extremity. He has an extensive surgical history to the right foot and most recently underwent Charcot reconstruction on 12/27/2022. During his postoperative course the patient fell into a depressive state and was unable to maintain several of his medical appointments, there is also difficulty and limitations due to social determinants. He has had intermittent bouts of kidney failure. He has had difficulty maintaining appropriate antimicrobial therapy as a result of the above. Upon further work up the patient was found to be septic secondary to diabetic foot infection with osteomyelitis and septic arthritis to the right lower extremity. Infectious work-up revealed multiple elevated inflammatory markers including a CRP of 75.8, ESR of 72.   Preoperative CT of the right foot and ankle was obtained which demonstrated erosive changes of the calcaneus adjacent to the posterior facet of the subtalar joint, suspected septic arthritis of the subtalar joint, radiolucency around orthopedic hardware suggestive of loosening an infectious process. Physical exam indicated a nonhealing ulceration with purulent drainage and erythema to the right foot. Bedside I&D was performed  but due to pain and extent of infection, full exposure and drainage of the abscess was not obtainable. We thoroughly educated the patient on the details of his condition and the surgical plan. We discussed application of external fixator, subtalar joint arthrodesis, hardware removal, bone biopsy, antibiotic spacer, debridement of nonviable tissue of the right lower extremity including all the risk, benefits, alternatives. Surgical intervention is indicated in attempt of limb salvage, infection control, and wound healing. Discussed with the patient that given the severity of the infection, compromised vascular status to the lower extremities, history of diabetes he is at high risk of more proximal amputation and or limb loss. All questions answered to patients appartent satisfaction. Patient verbalizes and demonstrates understanding. No guarantees were given or implied. The patient provided informed written consent which was signed and placed in the chart. PROCEDURE IN DETAIL: Under mild sedation the patient was transported from pre-op to the operating room and placed on the operating table in the supine position with a safety strap across the lap. A well-padded pneumatic tourniquet was applied to the right thigh. After adequate sedation by anesthesia a local block of 10 cc of 1% lidocaine plain and 0.5% Marcaine plain was given. The right lower extremity was then prepped, scrubbed, and draped in the usual aseptic fashion. A timeout was performed confirming correct patient, correct surgical site, correct procedure, antibiotics, everyone in the room was in agreement.   Tourniquet was not inflated to allow for drainage of infection and minimize arterial insult.    We proceeded with arthrocentesis of the ankle joint due to concern for potential septic arthritis with involvement of the ankle joint and the subtalar joint.  Under the guidance of intraoperative fluoroscopy ankle joint arthrocentesis was performed from a medial approach.  The fluid aspirated appeared to be within normal limits.  There is no purulent drainage from the site.  The specimen was passed the back table to be sent for arthrocentesis panel.    At this time we proceeded with removal of hardware.  Hardware removal was required as a means of infection source control to remove the main nidus of the infection at this level.  Attention was directed to the posterior aspect of the right heel.  There is noted to be ulceration to subcutaneous tissue at this site which measured approximately 1.7 cm x 1.9 cm.  The ulceration did not appear infected at this time.  Incision was made utilizing a scalpel and deepened to level of bone.  All soft tissue and adhesions were freed from the hardware with use of sharp dissection and rongeur until hardware was visible.  The hardware did appear to be loose within the calcaneus.  The hardware was removed with use of large needle .  There is no purulent drainage expressed from the site.  Hardware was removed in 1 piece.      Next we moved forward with incision and drainage of the bone cortex of the calcaneus.  Attention was directed to the lateral aspect of the hindfoot at the level of the subtalar joint.    Full-thickness ulceration to level of bone was appreciated.  Upon further inspection this wound did probe to previous hardware tract.  There was purulent drainage from the site.  The ulceration measured 0.5cm x 0.6cm x 3.5cm.  Next full-thickness circumferential incision was made utilizing a scalpel on the margins of the ulceration.  The incision was extended to allow for exposure to the lateral subtalar joint.  There was purulent  drainage noted upon making the incision. All surrounding necrotic, infected, fibrotic, nonviable tissue was removed utilizing sharp excision by combination of scalpel and rongeur. Dissection was carried out to level of bone. Bone density testing was performed utilizing a Jock Canter which easily plunged into the calcaneus bone. The overall presentation was highly concerning for osteomyelitis. The periosteum of the calcaneus was incised and reflected to expose the clinically infected portion of the calcaneus. The calcaneus was debrided utilizing a curette and rongeur until all infected tissue and bone was removed. Intra-medullary abscess was absent noted. A portion of the bone removed during debridement was passed to the back table to be sent for microbiology and pathology. Therefore we proceeded with subtalar joint arthrodesis and application of monorail external fixator. Status post hardware removal and incision and drainage we performed stress testing of the subtalar joint and the calcaneus. The subtalar joint was noted to be inherently unstable with no osseous bridge from previous attempted fusion. There was significant diastases of the subtalar joint with inversion and eversion stress testing. We felt that at this point the right foot was completely unstable and nonfunctional if left in this condition. Hindfoot stability is paramount to a functional limb especially in the setting of Charcot arthropathy. The subtalar joint was prepped for arthrodesis. All intervening soft tissue and nonviable tissue was removed with use of rongeur, curettes. Subchondral drilling was performed in hopes of recruiting osteo progenitor cells to the area. Care was taken not to violate the tibiotalar joint. Several tactile maneuvers were performed to compress the subtalar joint to restore stability and anatomic alignment at the level of the hindfoot and tibiotalar joint.   Provisional alignment was held with K wire placement. Next we move forward with our fixation with use of a uniplanar external fixator. We felt the external fixator would provide compression across the subtalar joint but also restore stability to the dislocation at the level of the subtalar joint. The external fixator should also greatly benefit the patient as it would allow for fixation across the subtalar joint without internal hardware in the setting of infection. External fixation but also allow for partial weightbearing in the postoperative period for essential functions and transfers. The external fixator was secured to the hindfoot and the ankle with two 5.0 mm half pins traversing from medial to lateral into the calcaneus and two 5.0 mm half pins traversing from medial to lateral through the distal tibia. All clamps were tightened in place. The monorail was then compressed to allow for further stability of the subtalar joint. Next we moved forward with implantation of intramedullary antibiotic spacer. Parkzzz Hydrocet was mixed with tobramycin powder and placed throughout the previous hardware tract throughout the intramedullary cavity of the talus and the calcaneus. Hydrocet was used with the hopes of slow osseous integration postoperatively to allow for osseous bridge across the subtalar joint. Ultimately increasing chances for improved stability given no internal hardware can be used in the setting of infection. Next we proceeded with advancement flap of the posterior heel. As noted above there was full-thickness ulceration to the level of the subcutaneous tissue at this level. Incision mapping was planned to create 2 converging V to Y incisions. Incision was made utilizing a scalpel. The ulceration was excised in oncologic fashion in this process. The incision was deepened to the level of subcutaneous tissue. The flap including epidermis, dermis, subcutaneous tissue was then raised from the underlying fascia.   Care taken to keep it full-thickness. Meticulous dissection was carried out to raise a plantar advancement flap. As the flap was raised care was taken to preserve the incorporated associated perfusing vessels. The flap was raised until there was adequate tissue available for closure of the surgical site. Hemostasis was controlled throughout the dissection process utilizing electrocautery and hand ties. The flap measured approximately 2.5 cm x 2.2 cm. Total surface area was 5.5 cm². At this time the surgical site was irrigated with copious amounts of sterile saline. The flap was then raised, advanced proximally. The flap was inset into the recipient site. Flap edges were viable with good capillary refill time and without signs of venous congestion at this time. The surgical site was closed in layers utilizing 3-0 Monocryl for subcutaneous closure, and 3-0 Prolene for skin closure. The lateral surgical site was approximated utilizing 3-0 Prolene and otherwise left open to allow for drainage, it was packed with Betadine soaked quarter inch iodoform packing. Dressings consisted of zeroform, 4 x 4s, ABDs, Kerlix, ACE were applied. The patient tolerated the above procedure and anesthesia well without complications. The patient was transported from the operating room to the PACU with vital signs stable and vascular status intact to the right lower extremity. Postoperative plan: No further surgical intervention planned during this admission. Patient is good for discharge once final antibiotic recommendations have been made at the discretion of infectious disease team.  Unfortunately overall limb salvage prognosis is guarded. Dressing changes to be performed every other day. Patient can be partial weightbearing to the heel with use of walker for essential functions and PT/OT needs. Patient will receive radiographs postoperatively. He is to follow-up in my clinic within 1 week of discharge.       Reena Rios DPM on 3/12/2023 at 10:41 AM  [facility] [address] [facility], [employer]    This note was generated with the assistance of a speech recognition program. While intending to generate a timely document that accurately reflects the content of the visit, no guarantee can be provided that every grammatical or spelling mistake has been or will be identified or corrected. In such instances, actual meaning can be extrapolated by context. Thank you for your understanding.

## 2023-03-12 NOTE — PROGRESS NOTES
Physical Therapy  DATE: 3/12/2023    NAME: Apurva Capellan  MRN: [id number]   : 1955    Patient not seen this date for Physical Therapy due to:      [] Cancel by RN or physician due to:    [] Hemodialysis    [] Critical Lab Value Level     [] Blood transfusion in progress    [] Acute or unstable cardiovascular status   _MAP < 55 or more than >115  _HR < 40 or > 130    [] Acute or unstable pulmonary status   -FiO2 > 60%   _RR < 5 or >40    _O2 sats < 85%    [] Strict Bedrest    [x] Off Unit for surgery or procedure    [] Off Unit for testing       [] Pending imaging to R/O fracture    [] Refusal by Patient      [] Other      [] PT being discontinued at this time. Patient independent. No further needs. [] PT being discontinued at this time as the patient has been transferred to hospice care. No further needs.       Alfonso Pierce, PTA

## 2023-03-12 NOTE — PROGRESS NOTES
Department of Psychiatry  Behavioral Health Consult    REASON FOR CONSULT: SI and disoriented    CONSULTING PHYSICIAN: Jennifer Curiel    History obtained from: Patient, wife and chart    Interim history. The patient was seen face-to-face. He has been feeling better. His wife has visited today that she was not present at the time of the assessment. He has not gone for surgery yet. He states that surgery is scheduled for tomorrow morning. He denies any problems with his mood or cognition. He was oriented to time place and person. He is aware that he may have some confusion after surgery. We will continue to monitor. HISTORY OF PRESENT ILLNESS:    The patient is a 78 y.o. male with no significant past psych history who is admitted for pain and swelling for right foot. I have noted that the patient was hyponatremic at the time of admission. His creatinine was 2.89. Hemoglobin was 9.4. Infectious disease has been involved. The patient had reported suicidal ideation to his podiatrist.  The patient was unable to exchange his usual banter when speaking to his doctor and appeared to be disoriented as per his wife. The patient was seen face-to-face. He is pleasant on approach. He has difficulty recollecting the circumstances of his admission and the course of his admission. He says he has been watching the news but could not tell me about any current events. He was able to correctly name the current and previous president of [place]. He was oriented to place and situation but not to time. The patient denies any depressive symptoms or suicidal ideation. He was able to make eye contact and had affective reactivity. His wife agrees that he has improved from yesterday when he was not like his usual self. Patient denies any auditory or visual hallucinations. He appears to have no psychotic phenomena.     Discussed that the patient seems to be improving in his delirium and does not need medications at this time. We will continue to follow        The patient is not currently receiving care for the above psychiatric illness. Psychiatric Review of Systems           Obsessions and Compulsions: Denies       Viktoria or Hypomania: Denies     Hallucinations: Denies     Panic Attacks:  Denies     Delusions:  Denies     Phobias:  Denies     Trauma: Denies      Substance Abuse History:  Denies    Past Psychiatric History:  No Suicide attempts, admissions to psychiatry or previous treatment for mental health      Social History: . Retired . Past Medical History:        Diagnosis Date    Abscess of right foot 08/04/2018    Acquired hammer toe deformity of lesser toe of right foot     ALEJANDRO (acute kidney injury) ([address] 08/05/2018    Cellulitis 04/24/2018    Cellulitis of left foot 04/24/2018    Cellulitis of right foot     and abscess    Charcot foot due to diabetes mellitus ([address] 2014    Right foot     Chest pain at rest 08/04/2018    Chronic multifocal osteomyelitis of right foot ([address]     CKD (chronic kidney disease)     Diabetic polyneuropathy associated with type 2 diabetes mellitus ([address]     Essential hypertension     Fractures, multiple 07/21/2014    Right foot fractures     Hyperlipidemia     Hypertension     Leukocytosis     MRSA (methicillin resistant staph aureus) culture positive 2018    rt foot    Neuropathy     diabetic with charcot affecting the right foot    Pain in right foot     redness and swelling    Pneumonia 2009    Right foot infection     Right foot pain     Tobacco abuse 04/24/2018    Type II or unspecified type diabetes mellitus without mention of complication, not stated as uncontrolled     Vertigo     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 stepped on sharp object. Covered by dressing.     Wound, open     Right posterior -Diabetic Ulcer       Past Surgical History:        Procedure Laterality Date    ANKLE SURGERY Right 10/12/2022    RIGHT MID-FOOT OSTEOTOMY WITH OF APPLICATION EXTERNAL FIXATOR SERA performed by Santhosh Butler DPM at [facility]    ANKLE SURGERY Right 11/15/2022    right lower extremity  adjustment of exfix performed by Santhosh Butler DPM at [facility] OR    APPENDECTOMY      at age 20    ARTHRODESIS Right 12/27/2022    RIGHT SUBTALAR JOINT AND MULTIPLE MID FOOT JOINT FUSIONS WITH SEAR AND PRE-OP POP BLOCK performed by Santhosh Butler DPM at [facility] OR    ARTHROPLASTY Right 12/11/2020    RIGHT HALLUX EXOSTECTOMY AND 3RD DIGIT EXTENSIOR TENOTOMY performed by Fabian Sanchez DPM at [facility] OR    COLONOSCOPY      FOOT DEBRIDEMENT Left 04/24/2018    I&D foreign body removal    FOOT DEBRIDEMENT Right 03/20/2020    RIGHT  FOOT   INCISION AND DRAINAGE WITH BONE BIOPSY performed by Fabian Sanchez DPM at [facility]    FOOT DEBRIDEMENT Right 06/08/2021    FOOT DEBRIDEMENT INCISION AND DRAINAGE performed by Fabian Sanchez DPM at [facility]    FOOT DEBRIDEMENT Right 09/16/2021    RIGHT FOOT  INCISION AND DRAINAGE   WITH PULSE LAVAGE performed by Fabian Sanchez DPM at [facility]    FOOT SURGERY Right 06/11/2021    RIGHT FOOT FLAP CLOSURE performed by Fabian Sanchez DPM at [facility]    FOOT SURGERY Right 10/12/2022    APPLICATION EXTERNAL FIXATOR RIGHT FOOT - SERA - Right    FOOT SURGERY Right 12/27/2022    RIGHT SUBTALAR JOINT AND MULTIPLE MID FOOT JOINT FUSIONS WITH SERA AND PRE-OP POP BLOCK (Right: Foot)    FOOT SURGERY Right 12/27/2022    RIGHT FOOT/ANKLE EXTERNAL FIXATOR  REMOVAL performed by Santhosh Butler DPM at [facility] OR    IR INS PICC VAD W SQ PORT GREATER THAN 5  10/07/2022    IR INS PICC VAD W SQ PORT GREATER THAN 5 10/7/2022 [facility]    KNEE ARTHROSCOPY Right 1989    KNEE ARTHROSCOPY Left 1990    KNEE SURGERY Bilateral 1983    arthroscopy    NECK SURGERY  1987    TX I&D BELOW  FASCIA FOOT 1 BURSAL SPACE Left 04/24/2018    LEFT FOOT MULTIPLE  INCISIONS  AND DRAINAGE AND REMOVAL FOREIGN BODY  IRENE performed by Joanne Donahue DPM at [facility]         Medications Prior to Admission:   Medications Prior to Admission: cyclobenzaprine (FLEXERIL) 10 MG tablet, Take 10 mg by mouth at bedtime  ketorolac (TORADOL) 10 MG tablet, Take 1 tablet by mouth every 6 hours as needed for Pain  HYDROcodone-acetaminophen (NORCO)  MG per tablet, Take 1 tablet by mouth every 6 hours as needed for Pain. TRUEPLUS PEN NEEDLES 31G X 8 MM MISC,   amLODIPine (NORVASC) 5 MG tablet, Take 5 mg by mouth daily  LANTUS SOLOSTAR 100 UNIT/ML injection pen, Inject 15 Units into the skin nightly (Patient taking differently: Inject 10 Units into the skin 2 times daily)  JANUVIA 100 MG tablet, Take 100 mg by mouth daily   Misc. Devices MISC, 1 PAIR OF DIABETIC SHOES (1 LEFT/ 1 RIGHT) 1-3 PAIRS OF INSERTS (LEFT/ RIGHT)  cetirizine (ZYRTEC) 10 MG tablet, Take 10 mg by mouth nightly   simvastatin (ZOCOR) 40 MG tablet, Take 40 mg by mouth nightly   glipiZIDE (GLUCOTROL) 10 MG tablet, Take 20 mg by mouth 2 times daily (before meals) Takes 2 tabs (=20mg) BID  aspirin 81 MG tablet, Take 81 mg by mouth daily  gabapentin (NEURONTIN) 300 MG capsule, Take 900 mg by mouth 3 times daily.  Take 3 caps (=900mg) 3 times a day    Allergies:  Morphine, Other, and Percocet [oxycodone-acetaminophen]    FAMILY/SOCIAL HISTORY:  Family History   Problem Relation Age of Onset    Diabetes Mother     Cancer Father     Hypertension Maternal Grandmother      Social History     Socioeconomic History    Marital status:      Spouse name: Not on file    Number of children: Not on file    Years of education: Not on file    Highest education level: Not on file   Occupational History    Not on file   Tobacco Use    Smoking status: Every Day     Packs/day: 0.50     Types: Cigarettes    Smokeless tobacco: Never   Vaping Use    Vaping Use: Never used Substance and Sexual Activity    Alcohol use: No    Drug use: Not Currently    Sexual activity: Not Currently   Other Topics Concern    Not on file   Social History Narrative    Not on file     Social Determinants of Health     Financial Resource Strain: Not on file   Food Insecurity: Not on file   Transportation Needs: Not on file   Physical Activity: Not on file   Stress: Not on file   Social Connections: Not on file   Intimate Partner Violence: Not on file   Housing Stability: Not on file       REVIEW OF SYSTEMS    Constitutional: [] fever  [] chills  [] weight loss  []weakness [] Other:  Eyes:  [] photophobia  [] discharge [] acuity change   [] Diplopia   [] Other:  HENT:  [] sore throat  [] ear pain [] Tinnitus   [] Other  Respiratory:  [] Cough  [] Shortness of breath   [] Sputum   [] Other:   Cardiac: []Chest pain   []Palpitations []Edema  []PND  [] Other:  GI:  []Abdominal pain   []Nausea  []Vomiting  []Diarrhea  [] Other:  :  [] Dysuria   []Frequency  []Hematuria  []Discharge  [] Other:  Possible Pregnancy: []Yes   []No   LMP:   Musculoskeletal:  []Back pain  []Neck pain  []Recent Injury   Skin:  []Rash  [] Itching  [] Other:  Neurologic:  [] Headache  [] Focal weakness  [] Sensory changes []Other:  Endocrine:  [] Polyuria  [] Polydipsia  [] Hair Loss  [] Other:  Lymphatic:   [] Swollen glands   Psychiatric:  As per HPI      All other systems negative except as marked or mentioned/indicated in the HPI. Loreaa Press PHYSICAL EXAM:  Vitals:  BP (!) 163/74   Pulse 81   Temp 99 °F (37.2 °C) (Oral)   Resp 16   Ht 5' 10\" (1.778 m)   Wt 229 lb 4.8 oz (104 kg)   SpO2 97%   BMI 32.90 kg/m²      Neuro Exam:      Involuntary Movements: No    Mental Status Examination:    Level of consciousness:  within normal limits.     Appearance:  hospital attire  Behavior/Motor:  no abnormalities noted  Attitude toward examiner:  cooperative and attentive  Speech:  spontaneous, normal rate, and normal volume   Mood: euthymic  Affect:  mood congruent  Thought processes:  linear, goal directed, and coherent   Thought content:  Suicidal Ideation:  denies suicidal ideation  Delusions:  no evidence of delusions  Perceptual Disturbance:  denies any perceptual disturbance  Cognition:  oriented to person, place, and time   Concentration intact  Memory impaired recent memory. Insight fair   Judgement good   Fund of Knowledge- Limited        LABS: REVIEWED TODAY:  No results for input(s): WBC, HGB, PLT in the last 72 hours. Recent Labs     03/09/23  0614      K 4.0      CO2 23   BUN 16   CREATININE 2.28*   GLUCOSE 113*     No results for input(s): BILITOT, ALKPHOS, AST, ALT in the last 72 hours. No results found for: Marilyne Aiken, LABBENZ, CANNAB, COCAINESCRN, LABMETH, OPIATESCREENURINE, PHENCYCLIDINESCREENURINE, PPXUR, ETOH  Lab Results   Component Value Date/Time    TSH 3.63 03/06/2023 04:22 PM     No results found for: LITHIUM  No results found for: VALPROATE, CBMZ  No results found for: LITHIUM, VALPROATE    FURTHER LABS ORDERED :      Radiology   CT HEAD WO CONTRAST    Result Date: 3/6/2023  EXAMINATION: CT OF THE HEAD WITHOUT CONTRAST  3/6/2023 1:45 pm TECHNIQUE: CT of the head was performed without the administration of intravenous contrast. Automated exposure control, iterative reconstruction, and/or weight based adjustment of the mA/kV was utilized to reduce the radiation dose to as low as reasonably achievable. COMPARISON: None. HISTORY: ORDERING SYSTEM PROVIDED HISTORY: disorientation TECHNOLOGIST PROVIDED HISTORY: disorientation Reason for Exam: Disoriented, AMS, dizzy FINDINGS: BRAIN/VENTRICLES: There is no acute intracranial hemorrhage, mass effect or midline shift. No abnormal extra-axial fluid collection. The gray-white differentiation is maintained without evidence of an acute infarct. There is no evidence of hydrocephalus.  ORBITS: The visualized portion of the orbits demonstrate no acute abnormality. SINUSES: The visualized paranasal sinuses and mastoid air cells demonstrate no acute abnormality. SOFT TISSUES/SKULL:  No acute abnormality of the visualized skull or soft tissues. No acute intracranial abnormality. US RETROPERITONEAL COMPLETE    Result Date: 3/4/2023  EXAMINATION: RETROPERITONEAL ULTRASOUND OF THE KIDNEYS AND URINARY BLADDER 3/4/2023 COMPARISON: CT abdomen and pelvis 12/03/2021. Renal ultrasound 08/07/2018 HISTORY: ORDERING SYSTEM PROVIDED HISTORY: ALEJANDRO TECHNOLOGIST PROVIDED HISTORY: ALEJANDRO FINDINGS: Kidneys: The right kidney measures 11.3 cm in length and the left kidney measures 10.9 cm in length. Kidneys demonstrate mildly increased cortical echogenicity. No evidence of hydronephrosis or intrarenal stones. Exophytic 1.1 x 1.1 x 1.0 cm lesion arising from the inferior right kidney is consistent with a cyst, grossly unchanged compared to more recent CT imaging. A cleft of fat or AML measuring 1.4 cm in the interpolar right kidney is noted, unchanged from prior ultrasound. Bladder: Prevoid volume of 502 cc. The patient did not void for this exam.  Ureteral jets are not visualized. Mildly echogenic renal parenchyma suggestive of intrinsic renal parenchymal disease. No hydronephrosis. MRI BRAIN WO CONTRAST    Result Date: 3/8/2023  EXAMINATION: MRI OF THE BRAIN WITHOUT CONTRAST  3/8/2023 7:46 am TECHNIQUE: Multiplanar multisequence MRI of the brain was performed without the administration of intravenous contrast. COMPARISON: CT head 03/06/2023 HISTORY: ORDERING SYSTEM PROVIDED HISTORY: AMS TECHNOLOGIST PROVIDED HISTORY: AMS Reason for Exam: AMS FINDINGS: Motion artifact limits evaluation. INTRACRANIAL STRUCTURES/VENTRICLES: There is no acute infarct. No mass effect or midline shift. No evidence of an acute intracranial hemorrhage. The ventricles and sulci are normal in size and configuration. The sellar/suprasellar regions appear unremarkable.   The normal signal voids within the major intracranial vessels appear maintained. Prominent ventricles and sulci, consistent with mild parenchymal volume loss. ORBITS: The visualized portion of the orbits demonstrate no acute abnormality. SINUSES: The visualized mastoid air cells demonstrate no acute abnormality. Ethmoid mucosal thickening. BONES/SOFT TISSUES: The bone marrow signal intensity appears normal. The soft tissues demonstrate no acute abnormality. Motion artifact limits evaluation. No acute intracranial abnormality. DIAGNOSIS:  Diabetic foot infection  Delirium due to medical condition        RISK ASSESSMENT: low risk of suicide or harm to others      RECOMMENDATIONS  Disposition: No indication for psych admission  Risk Management:  routine:  no special precautions necessary    Medications:  No psychotropics indicated. Discussed with the treating physician/ team about the patient and treatment plan  Reviewed the chart    Discussed with the patient risk, benefit, alternative and common side effects for the  proposed medication treatment. Patient is consenting to the treatment. Thanks for the consult. Please call me if needed. Electronically signed by Zacarias Yang MD on 3/11/2023 at 7:32 PM    Please note that this chart was generated using voice recognition Dragon dictation software. Although every effort was made to ensure the accuracy of this automated transcription, some errors in transcription may have occurred.

## 2023-03-12 NOTE — PROGRESS NOTES
[facility]  Progress Note    Lindy Ron  MRN: [id number]  Date: 3/12/2023  LOS: 9     Reason for F/U :   Right foot infection    Impression :   Charcot foot deformity with history of right midtarsal and subtalar foot arthrodesis  History of osteomyelitis   status post antimicrobial therapy in late 2022  Draining wounds right lower extremity with imaging suggesting potential infection involving hardware  Diabetes mellitus type 2 with associated chronic kidney disease  Altered mental status  the encephalopathy has since resolved and all imaging studies including CT of the head, MRI with no significant findings    Recommendations: The patient did receive cefepime and linezolid 3/3/2023 through 3/7/2023   The cefepime was discontinued as it has been known to cause some CNS toxicities. I will start antimicrobial therapy with meropenem and daptomycin   He will likely need IV antibiotic therapy at the time of discharge. Infection Control Recommendations:   Universal precautions    Discharge Planning:   Estimated Length of IV antimicrobials: To be determined  Patient will need Midline Catheter Insertion/ PICC line Insertion: No  Patient will need: Home IV , [place],  SNF,  LTAC: Undetermined  Patient willneed outpatient wound care: No    Medical Decision making / Summary of Stay:   Lindy Ron is a 78y.o.-year-old male presented to the hospital with worsening right foot pain and swelling for several weeks associated with open ulcer on the lateral aspect of the hand foot that probes to bone. Symptoms moderate to severe, worse with movement, no alleviating factors. He also complaining of subjective fever and chills, mild shortness of breath. CT of the right foot was done at [facility] on 2/27/2023 showed lucency around hardware within the calcaneus. The patient had recent surgery done by Dr. Ann Rolon on 12/27/2022 with hardware in place.   History of Charcot foot, chronic renal failure and diabetes mellitus. Initial WBC normal, creatinine 2.89, sedimentation rate 53, C-reactive protein 136    The wife is at bedside and she also reports episodes of confusion and is very concerned. She did raise that she was worried about the fentanyl that the patient received on 3/3/2023. The patient answers \"I do not know\" to almost all of the questions. Current evaluation:3/12/2023    BP (!) 149/72   Pulse 68   Temp 97.5 °F (36.4 °C) (Oral)   Resp 13   Ht 5' 10\" (1.778 m)   Wt 222 lb 11.2 oz (101 kg)   SpO2 92%   BMI 31.95 kg/m²     Temperature Range: Temp: 97.5 °F (36.4 °C) Temp  Av °F (36.7 °C)  Min: 97 °F (36.1 °C)  Max: 99 °F (37.2 °C)  The patient is seen and evaluated at bedside and he is awake and alert in no acute distress. He was taken to the operating room earlier today and he underwent a subtalar arthrodesis of the right foot with application of a uniplanar external fixator device with incision and drainage of bone cortex in the right foot and the insertion of an intramedullary drug delivery device. The patient had arthrocentesis of the ankle joint  The patient had septic arthritis of the subtalar joint failing orthopedic hardware in the stable tibiotalar joint    Review of Systems   Constitutional: Negative. HENT: Negative. Respiratory: Negative. Cardiovascular: Negative. Gastrointestinal: Negative. Genitourinary: Negative. Musculoskeletal: Negative. Skin:  Positive for wound. Neurological: Negative. Psychiatric/Behavioral: Negative. Physical Examination :     Physical Exam  Constitutional:       Appearance: He is well-developed. HENT:      Head: Normocephalic and atraumatic. Cardiovascular:      Rate and Rhythm: Normal rate. Heart sounds: Normal heart sounds. No friction rub. No gallop. Pulmonary:      Effort: Pulmonary effort is normal.      Breath sounds: Normal breath sounds. No wheezing.    Abdominal:      General: Bowel sounds are normal.      Palpations: Abdomen is soft. There is no mass. Tenderness: There is no abdominal tenderness. Musculoskeletal:         General: Normal range of motion. Cervical back: Normal range of motion and neck supple. Lymphadenopathy:      Cervical: No cervical adenopathy. Skin:     General: Skin is warm and dry. Comments: Lower extremity ankle/foot in a dressing   Neurological:      Mental Status: He is alert and oriented to person, place, and time. Laboratory data:   I have independently reviewed the followinglabs:  CBC with Differential:   No results for input(s): WBC, HGB, HCT, PLT, SEGSPCT, BANDSPCT, LYMPHOPCT, MONOPCT, EOSPCT in the last 72 hours. BMP:   No results for input(s): NA, K, CL, CO2, BUN, CREATININE, CA, MG in the last 72 hours. Hepatic Function Panel: No results for input(s): PROT, LABALBU, BILIDIR, IBILI, BILITOT, ALKPHOS, ALT, AST in the last 72 hours. No results found for: PROCAL  Lab Results   Component Value Date/Time    CRP 40.1 03/11/2023 06:07 AM    CRP 34.8 03/09/2023 06:14 AM    CRP 75.8 03/06/2023 04:22 PM     Lab Results   Component Value Date    SEDRATE 72 (H) 03/11/2023         No results found for: DDIMER  No results found for: FERRITIN  No results found for: LDH  No results found for: FIBRINOGEN    No results found for requested labs within last 30 days. Lab Results   Component Value Date/Time    COVID19 DETECTED 05/26/2021 12:06 PM    COVID19 Not Detected 12/07/2020 03:10 PM    COVID19 Not Detected 08/08/2020 10:02 PM       No results for input(s): VANCOTROUGH in the last 72 hours. Imaging Studies:           MRI OF THE BRAIN WITHOUT CONTRAST  3/8/2023 7:46 am  Impression   Motion artifact limits evaluation. No acute intracranial abnormality.      Cultures:     Culture, Wound Aerobic Only [[id number]] Collected: 03/12/23 0844   Order Status: Completed Specimen: Swab from Foot Updated: 03/12/23 1420    Specimen Description . FOOT RIGHT    Culture DUPLICATE ORDER   Culture, Body Fluid [[id number]] Collected: 03/12/23 0853   Order Status: Completed Specimen: Body Fluid from Foot Updated: 03/12/23 1419    Specimen Description . FOOT RIGHT    Culture DUE TO THE SPECIMEN TYPE, THE ORDER WAS CANCELED AND REORDERED. PLEASE REFER TO: ANAEROBIC, AEROBIC CULTURE   Culture, Anaerobic and Aerobic [[id number]] Collected: 03/12/23 0844   Order Status: Sent Specimen: Swab from Foot Updated: 03/12/23 1413   Culture, Anaerobic and Aerobic [[id number]] Collected: 03/12/23 0852   Order Status: Sent Specimen: Bone from Foot Updated: 03/12/23 1413   Culture, Anaerobic and Aerobic [[id number]] Collected: 03/12/23 0853   Order Status: No result Specimen: Foot Updated: 03/12/23 1413   Culture, Blood 1 [[id number]] Collected: 03/07/23 1238   Order Status: Completed Specimen: Blood Updated: 03/12/23 1117    Specimen Description . BLOOD    Special Requests LT HAND,6ML    Culture NO GROWTH 5 DAYS   Culture, Blood 1 [[id number]] Collected: 03/07/23 1246   Order Status: Completed Specimen: Blood Updated: 03/12/23 1117    Specimen Description . BLOOD    Special Requests 10ML,RTFA    Culture NO GROWTH 5 DAYS     Medications:      tiZANidine  4 mg Oral BID    gabapentin  300 mg Oral TID    amLODIPine  5 mg Oral Daily    aspirin  81 mg Oral Daily    cetirizine  10 mg Oral Nightly    [Held by provider] glipiZIDE  20 mg Oral BID AC    [Held by provider] alogliptin  12.5 mg Oral Daily    insulin glargine  10 Units SubCUTAneous BID    atorvastatin  20 mg Oral Nightly    sodium chloride flush  5-40 mL IntraVENous 2 times per day    heparin (porcine)  5,000 Units SubCUTAneous 3 times per day    insulin lispro  0-8 Units SubCUTAneous TID WC    insulin lispro  0-4 Units SubCUTAneous Nightly       Electronically signed by Lisa Carmen MD on 3/12/2023 at 2:27 PM      [facility]  Lisa Carmen MD  Perfect Serve messaging  OFFICE: [phone]    Thank you for allowing us to participate in the care of this patient. Please call with questions. This note is created with the assistance of a speech recognition program.  While intending to generate a document that actually reflects the content of the visit, the document can still have some errors including those of syntax and sound a like substitutions which may escape proof reading. In such instances, actual meaning can be extrapolated by contextual diversion.

## 2023-03-13 ENCOUNTER — APPOINTMENT (OUTPATIENT)
Dept: INTERVENTIONAL RADIOLOGY/VASCULAR | Age: 68
DRG: 981 | End: 2023-03-13
Payer: MEDICARE

## 2023-03-13 ENCOUNTER — APPOINTMENT (OUTPATIENT)
Dept: GENERAL RADIOLOGY | Age: 68
DRG: 981 | End: 2023-03-13
Payer: MEDICARE

## 2023-03-13 LAB
CASE NUMBER:: NORMAL
CRP SERPL HS-MCNC: 160 MG/L (ref 0–5)
CRYSTALS, FLUID: NEGATIVE
GLUCOSE BLD-MCNC: 116 MG/DL (ref 75–110)
GLUCOSE BLD-MCNC: 127 MG/DL (ref 75–110)
GLUCOSE BLD-MCNC: 144 MG/DL (ref 75–110)
GLUCOSE BLD-MCNC: 203 MG/DL (ref 75–110)
SPECIMEN DESCRIPTION: NORMAL
SPECIMEN TYPE: NORMAL

## 2023-03-13 PROCEDURE — 6370000000 HC RX 637 (ALT 250 FOR IP)

## 2023-03-13 PROCEDURE — 77001 FLUOROGUIDE FOR VEIN DEVICE: CPT

## 2023-03-13 PROCEDURE — 36415 COLL VENOUS BLD VENIPUNCTURE: CPT

## 2023-03-13 PROCEDURE — 2580000003 HC RX 258: Performed by: INTERNAL MEDICINE

## 2023-03-13 PROCEDURE — 86140 C-REACTIVE PROTEIN: CPT

## 2023-03-13 PROCEDURE — 99232 SBSQ HOSP IP/OBS MODERATE 35: CPT | Performed by: INTERNAL MEDICINE

## 2023-03-13 PROCEDURE — 1200000000 HC SEMI PRIVATE

## 2023-03-13 PROCEDURE — 36558 INSERT TUNNELED CV CATH: CPT

## 2023-03-13 PROCEDURE — 2709999900 IR TUNNELED CVC PLACE WO SQ PORT/PUMP > 5 YEARS

## 2023-03-13 PROCEDURE — 6360000002 HC RX W HCPCS: Performed by: INTERNAL MEDICINE

## 2023-03-13 PROCEDURE — 76937 US GUIDE VASCULAR ACCESS: CPT

## 2023-03-13 PROCEDURE — 6360000002 HC RX W HCPCS

## 2023-03-13 PROCEDURE — 02HV33Z INSERTION OF INFUSION DEVICE INTO SUPERIOR VENA CAVA, PERCUTANEOUS APPROACH: ICD-10-PCS | Performed by: RADIOLOGY

## 2023-03-13 PROCEDURE — 73630 X-RAY EXAM OF FOOT: CPT

## 2023-03-13 PROCEDURE — 99232 SBSQ HOSP IP/OBS MODERATE 35: CPT | Performed by: PSYCHIATRY & NEUROLOGY

## 2023-03-13 PROCEDURE — 6370000000 HC RX 637 (ALT 250 FOR IP): Performed by: INTERNAL MEDICINE

## 2023-03-13 PROCEDURE — 82947 ASSAY GLUCOSE BLOOD QUANT: CPT

## 2023-03-13 PROCEDURE — 0JH63XZ INSERTION OF TUNNELED VASCULAR ACCESS DEVICE INTO CHEST SUBCUTANEOUS TISSUE AND FASCIA, PERCUTANEOUS APPROACH: ICD-10-PCS | Performed by: RADIOLOGY

## 2023-03-13 RX ORDER — GABAPENTIN 400 MG/1
400 CAPSULE ORAL 3 TIMES DAILY
Status: DISCONTINUED | OUTPATIENT
Start: 2023-03-13 | End: 2023-03-14 | Stop reason: HOSPADM

## 2023-03-13 RX ADMIN — ATORVASTATIN CALCIUM 20 MG: 20 TABLET, FILM COATED ORAL at 20:37

## 2023-03-13 RX ADMIN — INSULIN LISPRO 2 UNITS: 100 INJECTION, SOLUTION INTRAVENOUS; SUBCUTANEOUS at 12:20

## 2023-03-13 RX ADMIN — MEROPENEM 1000 MG: 1 INJECTION, POWDER, FOR SOLUTION INTRAVENOUS at 16:25

## 2023-03-13 RX ADMIN — HEPARIN SODIUM 5000 UNITS: 5000 INJECTION INTRAVENOUS; SUBCUTANEOUS at 22:00

## 2023-03-13 RX ADMIN — HYDROMORPHONE HYDROCHLORIDE 1 MG: 1 INJECTION, SOLUTION INTRAMUSCULAR; INTRAVENOUS; SUBCUTANEOUS at 05:31

## 2023-03-13 RX ADMIN — GABAPENTIN 400 MG: 400 CAPSULE ORAL at 14:47

## 2023-03-13 RX ADMIN — GABAPENTIN 400 MG: 400 CAPSULE ORAL at 20:37

## 2023-03-13 RX ADMIN — HEPARIN SODIUM 5000 UNITS: 5000 INJECTION INTRAVENOUS; SUBCUTANEOUS at 05:35

## 2023-03-13 RX ADMIN — HYDROCODONE BITARTRATE AND ACETAMINOPHEN 2 TABLET: 5; 325 TABLET ORAL at 14:47

## 2023-03-13 RX ADMIN — HEPARIN SODIUM 5000 UNITS: 5000 INJECTION INTRAVENOUS; SUBCUTANEOUS at 15:14

## 2023-03-13 RX ADMIN — TIZANIDINE 4 MG: 4 TABLET ORAL at 08:35

## 2023-03-13 RX ADMIN — ASPIRIN 81 MG: 81 TABLET, COATED ORAL at 08:35

## 2023-03-13 RX ADMIN — TIZANIDINE 4 MG: 4 TABLET ORAL at 20:37

## 2023-03-13 RX ADMIN — HYDROCODONE BITARTRATE AND ACETAMINOPHEN 2 TABLET: 5; 325 TABLET ORAL at 04:14

## 2023-03-13 RX ADMIN — GABAPENTIN 300 MG: 300 CAPSULE ORAL at 08:35

## 2023-03-13 RX ADMIN — HYDROCODONE BITARTRATE AND ACETAMINOPHEN 2 TABLET: 5; 325 TABLET ORAL at 18:45

## 2023-03-13 RX ADMIN — CETIRIZINE HYDROCHLORIDE 10 MG: 10 TABLET, FILM COATED ORAL at 20:37

## 2023-03-13 RX ADMIN — INSULIN GLARGINE 10 UNITS: 100 INJECTION, SOLUTION SUBCUTANEOUS at 08:34

## 2023-03-13 RX ADMIN — AMLODIPINE BESYLATE 5 MG: 5 TABLET ORAL at 08:35

## 2023-03-13 RX ADMIN — MEROPENEM 1000 MG: 1 INJECTION, POWDER, FOR SOLUTION INTRAVENOUS at 04:47

## 2023-03-13 RX ADMIN — DAPTOMYCIN 500 MG: 500 INJECTION, POWDER, LYOPHILIZED, FOR SOLUTION INTRAVENOUS at 14:51

## 2023-03-13 RX ADMIN — HYDROCODONE BITARTRATE AND ACETAMINOPHEN 2 TABLET: 5; 325 TABLET ORAL at 08:34

## 2023-03-13 ASSESSMENT — PAIN DESCRIPTION - DESCRIPTORS
DESCRIPTORS: THROBBING
DESCRIPTORS: THROBBING
DESCRIPTORS: ACHING;BURNING;STABBING
DESCRIPTORS: THROBBING
DESCRIPTORS: SHARP
DESCRIPTORS: THROBBING

## 2023-03-13 ASSESSMENT — PAIN SCALES - GENERAL
PAINLEVEL_OUTOF10: 9
PAINLEVEL_OUTOF10: 8
PAINLEVEL_OUTOF10: 10
PAINLEVEL_OUTOF10: 9
PAINLEVEL_OUTOF10: 8
PAINLEVEL_OUTOF10: 9
PAINLEVEL_OUTOF10: 8
PAINLEVEL_OUTOF10: 9
PAINLEVEL_OUTOF10: 4

## 2023-03-13 ASSESSMENT — PAIN DESCRIPTION - LOCATION
LOCATION: FOOT

## 2023-03-13 ASSESSMENT — ENCOUNTER SYMPTOMS
GASTROINTESTINAL NEGATIVE: 1
RESPIRATORY NEGATIVE: 1

## 2023-03-13 ASSESSMENT — PAIN DESCRIPTION - ORIENTATION
ORIENTATION: RIGHT

## 2023-03-13 NOTE — CARE COORDINATION
Discharge Planning    Phone call to Christina at [facility] and cost for Meropenem and Daptomycin including supplies is $419 a week. Pt informed and agrees with cost.  Will need script for meds today in order for meds to be delivered this evening with start of care in am 3-14. Perfect serve message to Dr Lady Shook. Christina informed pt ok with cost.    Need script  for antibiotics before 2pm in order to get delivery this evening. Pt scheduled to get PICC line today.     Scripts for antibiotics in front of chart and need Dr Marianne Sahu and then fax to [facility]

## 2023-03-13 NOTE — PROGRESS NOTES
Department of Psychiatry  Behavioral Health Consult    REASON FOR CONSULT: SI and disoriented    CONSULTING PHYSICIAN: Elmo Tamez    History obtained from: Patient, wife and chart    Interim history. The patient was seen face-to-face. The patient was pleasant on approach. He is alert and awake. He denies any suicidal thoughts. He is oriented to time place and person. He states his pain is well controlled. He has no evidence of any psychotic phenomena. The patient appears not to have relapsed into delirium after surgery. Discussed that he probably does not need more follow-up from psychiatry. We will sign off      HISTORY OF PRESENT ILLNESS:    The patient is a 78 y.o. male with no significant past psych history who is admitted for pain and swelling for right foot. I have noted that the patient was hyponatremic at the time of admission. His creatinine was 2.89. Hemoglobin was 9.4. Infectious disease has been involved. The patient had reported suicidal ideation to his podiatrist.  The patient was unable to exchange his usual banter when speaking to his doctor and appeared to be disoriented as per his wife. The patient was seen face-to-face. He is pleasant on approach. He has difficulty recollecting the circumstances of his admission and the course of his admission. He says he has been watching the news but could not tell me about any current events. He was able to correctly name the current and previous president of [place]. He was oriented to place and situation but not to time. The patient denies any depressive symptoms or suicidal ideation. He was able to make eye contact and had affective reactivity. His wife agrees that he has improved from yesterday when he was not like his usual self. Patient denies any auditory or visual hallucinations. He appears to have no psychotic phenomena.     Discussed that the patient seems to be improving in his delirium and does not need medications at this time. We will continue to follow        The patient is not currently receiving care for the above psychiatric illness. Psychiatric Review of Systems           Obsessions and Compulsions: Denies       Viktoria or Hypomania: Denies     Hallucinations: Denies     Panic Attacks:  Denies     Delusions:  Denies     Phobias:  Denies     Trauma: Denies      Substance Abuse History:  Denies    Past Psychiatric History:  No Suicide attempts, admissions to psychiatry or previous treatment for mental health      Social History: . Retired . Past Medical History:        Diagnosis Date    Abscess of right foot 08/04/2018    Acquired hammer toe deformity of lesser toe of right foot     ALEJANDRO (acute kidney injury) ([address] 08/05/2018    Cellulitis 04/24/2018    Cellulitis of left foot 04/24/2018    Cellulitis of right foot     and abscess    Charcot foot due to diabetes mellitus ([address] 2014    Right foot     Chest pain at rest 08/04/2018    Chronic multifocal osteomyelitis of right foot ([address]     CKD (chronic kidney disease)     Diabetic polyneuropathy associated with type 2 diabetes mellitus ([address]     Essential hypertension     Fractures, multiple 07/21/2014    Right foot fractures     Hyperlipidemia     Hypertension     Leukocytosis     MRSA (methicillin resistant staph aureus) culture positive 2018    rt foot    Neuropathy     diabetic with charcot affecting the right foot    Pain in right foot     redness and swelling    Pneumonia 2009    Right foot infection     Right foot pain     Tobacco abuse 04/24/2018    Type II or unspecified type diabetes mellitus without mention of complication, not stated as uncontrolled     Vertigo     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 stepped on sharp object. Covered by dressing.     Wound, open     Right posterior -Diabetic Ulcer       Past Surgical History:        Procedure Laterality Date    ANKLE SURGERY Right 10/12/2022    RIGHT MID-FOOT OSTEOTOMY WITH OF APPLICATION EXTERNAL FIXATOR SERA performed by Estevan Riojas DPM at [address] [facility] Right 11/15/2022    right lower extremity  adjustment of exfix performed by Estevan Riojas DPM at [facility]      at age 24    ARTHRODESIS Right 12/27/2022    RIGHT SUBTALAR JOINT AND MULTIPLE MID FOOT JOINT FUSIONS WITH SERA AND PRE-OP POP BLOCK performed by Estevan Riojas DPM at [facility] [address] [facility] Right 12/11/2020    RIGHT HALLUX EXOSTECTOMY AND 3RD DIGIT EXTENSIOR TENOTOMY performed by Henrry Lopez DPM at [address] [facility] Left 04/24/2018    I&D foreign body removal    FOOT DEBRIDEMENT Right 03/20/2020    RIGHT  FOOT   INCISION AND DRAINAGE WITH BONE BIOPSY performed by Henrry Lopez DPM at [address] Right 06/08/2021    FOOT DEBRIDEMENT INCISION AND DRAINAGE performed by Henrry Lopez DPM at [address] Right 09/16/2021    RIGHT FOOT  INCISION AND DRAINAGE   WITH PULSE LAVAGE performed by Henrry Lopez DPM at [facility] Right 06/11/2021    RIGHT FOOT FLAP CLOSURE performed by Henrry Lopez DPM at [facility] Right 10/75/0729    APPLICATION EXTERNAL FIXATOR RIGHT FOOT - SERA - Right    FOOT SURGERY Right 12/27/2022    RIGHT SUBTALAR JOINT AND MULTIPLE MID FOOT JOINT FUSIONS WITH SERA AND PRE-OP POP BLOCK (Right: Foot)    FOOT SURGERY Right 12/27/2022    RIGHT FOOT/ANKLE EXTERNAL FIXATOR  REMOVAL performed by Estevan Riojas DPM at [facility] [address] Right 2/06/1514    APPLICATION OF MONORAIL, TISSUE TRANSFER, SUBTALAR JOINT FUSION, HARDWARE REMOVAL, BONE BIOPSY, ANTIBIOTIC SPACER performed by Estevan Riojas DPM at [facility] INS PICC VAD W SQ PORT GREATER THAN 5  10/07/2022    IR INS PICC VAD W SQ PORT GREATER THAN 5 10/7/2022 [facility]    IR TUNNELED CATHETER PLACEMENT GREATER THAN 5 YEARS  3/13/2023    IR TUNNELED CATHETER PLACEMENT GREATER THAN 5 YEARS 3/13/2023 [facility]    KNEE ARTHROSCOPY Right 1989    KNEE ARTHROSCOPY Left 1990    KNEE SURGERY Bilateral 1983    arthroscopy    NECK SURGERY  1987    AZ I&D BELOW FASCIA FOOT 1 BURSAL SPACE Left 04/24/2018    LEFT FOOT MULTIPLE  INCISIONS  AND DRAINAGE AND REMOVAL FOREIGN BODY  IRENE performed by Joelle Sesay DPM at [address] [place] [address]         Medications Prior to Admission:   Medications Prior to Admission: cyclobenzaprine (FLEXERIL) 10 MG tablet, Take 10 mg by mouth at bedtime  ketorolac (TORADOL) 10 MG tablet, Take 1 tablet by mouth every 6 hours as needed for Pain  HYDROcodone-acetaminophen (NORCO)  MG per tablet, Take 1 tablet by mouth every 6 hours as needed for Pain. TRUEPLUS PEN NEEDLES 31G X 8 MM MISC,   amLODIPine (NORVASC) 5 MG tablet, Take 5 mg by mouth daily  LANTUS SOLOSTAR 100 UNIT/ML injection pen, Inject 15 Units into the skin nightly (Patient taking differently: Inject 10 Units into the skin 2 times daily)  JANUVIA 100 MG tablet, Take 100 mg by mouth daily   Misc. Devices MISC, 1 PAIR OF DIABETIC SHOES (1 LEFT/ 1 RIGHT) 1-3 PAIRS OF INSERTS (LEFT/ RIGHT)  cetirizine (ZYRTEC) 10 MG tablet, Take 10 mg by mouth nightly   simvastatin (ZOCOR) 40 MG tablet, Take 40 mg by mouth nightly   glipiZIDE (GLUCOTROL) 10 MG tablet, Take 20 mg by mouth 2 times daily (before meals) Takes 2 tabs (=20mg) BID  aspirin 81 MG tablet, Take 81 mg by mouth daily  gabapentin (NEURONTIN) 300 MG capsule, Take 900 mg by mouth 3 times daily.  Take 3 caps (=900mg) 3 times a day    Allergies:  Morphine, Other, and Percocet [oxycodone-acetaminophen]    FAMILY/SOCIAL HISTORY:  Family History   Problem Relation Age of Onset    Diabetes Mother     Cancer Father     Hypertension Maternal Grandmother      Social History     Socioeconomic History Marital status:      Spouse name: Not on file    Number of children: Not on file    Years of education: Not on file    Highest education level: Not on file   Occupational History    Not on file   Tobacco Use    Smoking status: Every Day     Packs/day: 0.50     Types: Cigarettes    Smokeless tobacco: Never   Vaping Use    Vaping Use: Never used   Substance and Sexual Activity    Alcohol use: No    Drug use: Not Currently    Sexual activity: Not Currently   Other Topics Concern    Not on file   Social History Narrative    Not on file     Social Determinants of Health     Financial Resource Strain: Not on file   Food Insecurity: Not on file   Transportation Needs: Not on file   Physical Activity: Not on file   Stress: Not on file   Social Connections: Not on file   Intimate Partner Violence: Not on file   Housing Stability: Not on file       REVIEW OF SYSTEMS    Constitutional: [] fever  [] chills  [] weight loss  []weakness [] Other:  Eyes:  [] photophobia  [] discharge [] acuity change   [] Diplopia   [] Other:  HENT:  [] sore throat  [] ear pain [] Tinnitus   [] Other  Respiratory:  [] Cough  [] Shortness of breath   [] Sputum   [] Other:   Cardiac: []Chest pain   []Palpitations []Edema  []PND  [] Other:  GI:  []Abdominal pain   []Nausea  []Vomiting  []Diarrhea  [] Other:  :  [] Dysuria   []Frequency  []Hematuria  []Discharge  [] Other:  Possible Pregnancy: []Yes   []No   LMP:   Musculoskeletal:  []Back pain  []Neck pain  []Recent Injury   Skin:  []Rash  [] Itching  [] Other:  Neurologic:  [] Headache  [] Focal weakness  [] Sensory changes []Other:  Endocrine:  [] Polyuria  [] Polydipsia  [] Hair Loss  [] Other:  Lymphatic:   [] Swollen glands   Psychiatric:  As per HPI      All other systems negative except as marked or mentioned/indicated in the HPI. Sallyanne Chain      PHYSICAL EXAM:  Vitals:  BP (!) 153/77   Pulse 81   Temp 97.3 °F (36.3 °C)   Resp 18   Ht 5' 10\" (1.778 m)   Wt 224 lb 8 oz (101.8 kg)   SpO2 93%   BMI 32.21 kg/m²      Neuro Exam:      Involuntary Movements: No    Mental Status Examination:    Level of consciousness: Alert and awake  appearance:  hospital attire  Behavior/Motor:  no abnormalities noted  Attitude toward examiner:  cooperative and attentive  Speech:  spontaneous, normal rate, and normal volume   Mood: euthymic  Affect:  mood congruent  Thought processes:  linear, goal directed, and coherent   Thought content:  Suicidal Ideation:  denies suicidal ideation  Delusions:  no evidence of delusions  Perceptual Disturbance:  denies any perceptual disturbance  Cognition:  oriented to person, place, and time   Concentration intact  Memory impaired recent memory. Insight good  Judgement good   Fund of Knowledge- Limited        LABS: REVIEWED TODAY:  No results for input(s): WBC, HGB, PLT in the last 72 hours. No results for input(s): NA, K, CL, CO2, BUN, CREATININE, GLUCOSE in the last 72 hours. No results for input(s): BILITOT, ALKPHOS, AST, ALT in the last 72 hours. No results found for: Marilyne Aiken, LABBENZ, CANNAB, COCAINESCRN, LABMETH, OPIATESCREENURINE, PHENCYCLIDINESCREENURINE, PPXUR, ETOH  Lab Results   Component Value Date/Time    TSH 3.63 03/06/2023 04:22 PM     No results found for: LITHIUM  No results found for: VALPROATE, CBMZ  No results found for: LITHIUM, VALPROATE    FURTHER LABS ORDERED :      Radiology   CT HEAD WO CONTRAST    Result Date: 3/6/2023  EXAMINATION: CT OF THE HEAD WITHOUT CONTRAST  3/6/2023 1:45 pm TECHNIQUE: CT of the head was performed without the administration of intravenous contrast. Automated exposure control, iterative reconstruction, and/or weight based adjustment of the mA/kV was utilized to reduce the radiation dose to as low as reasonably achievable. COMPARISON: None.  HISTORY: ORDERING SYSTEM PROVIDED HISTORY: disorientation TECHNOLOGIST PROVIDED HISTORY: disorientation Reason for Exam: Disoriented, AMS, dizzy FINDINGS: BRAIN/VENTRICLES: There is no acute intracranial hemorrhage, mass effect or midline shift. No abnormal extra-axial fluid collection. The gray-white differentiation is maintained without evidence of an acute infarct. There is no evidence of hydrocephalus. ORBITS: The visualized portion of the orbits demonstrate no acute abnormality. SINUSES: The visualized paranasal sinuses and mastoid air cells demonstrate no acute abnormality. SOFT TISSUES/SKULL:  No acute abnormality of the visualized skull or soft tissues. No acute intracranial abnormality. US RETROPERITONEAL COMPLETE    Result Date: 3/4/2023  EXAMINATION: RETROPERITONEAL ULTRASOUND OF THE KIDNEYS AND URINARY BLADDER 3/4/2023 COMPARISON: CT abdomen and pelvis 12/03/2021. Renal ultrasound 08/07/2018 HISTORY: ORDERING SYSTEM PROVIDED HISTORY: ALEJANDRO TECHNOLOGIST PROVIDED HISTORY: ALEJANDRO FINDINGS: Kidneys: The right kidney measures 11.3 cm in length and the left kidney measures 10.9 cm in length. Kidneys demonstrate mildly increased cortical echogenicity. No evidence of hydronephrosis or intrarenal stones. Exophytic 1.1 x 1.1 x 1.0 cm lesion arising from the inferior right kidney is consistent with a cyst, grossly unchanged compared to more recent CT imaging. A cleft of fat or AML measuring 1.4 cm in the interpolar right kidney is noted, unchanged from prior ultrasound. Bladder: Prevoid volume of 502 cc. The patient did not void for this exam.  Ureteral jets are not visualized. Mildly echogenic renal parenchyma suggestive of intrinsic renal parenchymal disease. No hydronephrosis.      MRI BRAIN WO CONTRAST    Result Date: 3/8/2023  EXAMINATION: MRI OF THE BRAIN WITHOUT CONTRAST  3/8/2023 7:46 am TECHNIQUE: Multiplanar multisequence MRI of the brain was performed without the administration of intravenous contrast. COMPARISON: CT head 03/06/2023 HISTORY: ORDERING SYSTEM PROVIDED HISTORY: AMS TECHNOLOGIST PROVIDED HISTORY: AMS Reason for Exam: AMS FINDINGS: Motion artifact limits evaluation. INTRACRANIAL STRUCTURES/VENTRICLES: There is no acute infarct. No mass effect or midline shift. No evidence of an acute intracranial hemorrhage. The ventricles and sulci are normal in size and configuration. The sellar/suprasellar regions appear unremarkable. The normal signal voids within the major intracranial vessels appear maintained. Prominent ventricles and sulci, consistent with mild parenchymal volume loss. ORBITS: The visualized portion of the orbits demonstrate no acute abnormality. SINUSES: The visualized mastoid air cells demonstrate no acute abnormality. Ethmoid mucosal thickening. BONES/SOFT TISSUES: The bone marrow signal intensity appears normal. The soft tissues demonstrate no acute abnormality. Motion artifact limits evaluation. No acute intracranial abnormality. DIAGNOSIS:  Diabetic foot infection  Delirium due to medical condition        RISK ASSESSMENT: low risk of suicide or harm to others      RECOMMENDATIONS  Disposition: No indication for psych admission  Risk Management:  routine:  no special precautions necessary    Medications:  No psychotropics indicated. Discussed with the treating physician/ team about the patient and treatment plan  Will sign off    Discussed with the patient risk, benefit, alternative and common side effects for the  proposed medication treatment. Patient is consenting to the treatment. Thanks for the consult. Please call me if needed. Electronically signed by Laura Jennings MD on 3/13/2023 at 7:38 PM    Please note that this chart was generated using voice recognition Dragon dictation software. Although every effort was made to ensure the accuracy of this automated transcription, some errors in transcription may have occurred.

## 2023-03-13 NOTE — PLAN OF CARE
Problem: Discharge Planning  Goal: Discharge to home or other facility with appropriate resources  Outcome: Progressing  Flowsheets (Taken 3/7/2023 0750 by Bruce Calabrese RN)  Discharge to home or other facility with appropriate resources:   Identify barriers to discharge with patient and caregiver   Arrange for needed discharge resources and transportation as appropriate   Identify discharge learning needs (meds, wound care, etc)   Refer to discharge planning if patient needs post-hospital services based on physician order or complex needs related to functional status, cognitive ability or social support system     Problem: Pain  Goal: Verbalizes/displays adequate comfort level or baseline comfort level  Outcome: Progressing  Note: Pain level assessment complete.    Patient educated on pain scale and control interventions  PRN pain medication given per patient request  Patient instructed to call out with new onset of pain or unrelieved pain       Problem: Safety - Adult  Goal: Free from fall injury  Outcome: Progressing  Note: Proper pt identification  Hourly rounding performed  Anticipatory needs met  Non-skid socks worn  Proper transferring technique  2/4 side rails up  Personal necessities within reach  Bed low and locked  Call light in reach  Proper lighting  Room free of clutter         Problem: ABCDS Injury Assessment  Goal: Absence of physical injury  Outcome: Progressing  Flowsheets (Taken 3/13/2023 0554)  Absence of Physical Injury: Implement safety measures based on patient assessment     Problem: Neurosensory - Adult  Goal: Achieves maximal functionality and self care  Outcome: Progressing  Flowsheets (Taken 3/7/2023 0750 by Álvaro Perez RN)  Achieves maximal functionality and self care:   Monitor swallowing and airway patency with patient fatigue and changes in neurological status   Encourage and assist patient to increase activity and self care with guidance from physical therapy/occupational therapy     Problem: Respiratory - Adult  Goal: Achieves optimal ventilation and oxygenation  Outcome: Progressing  Flowsheets (Taken 3/5/2023 0935 by Blanche Hawthorne RN)  Achieves optimal ventilation and oxygenation:   Assess for changes in respiratory status   Position to facilitate oxygenation and minimize respiratory effort   Encourage broncho-pulmonary hygiene including cough, deep breathe, incentive spirometry     Problem: Cardiovascular - Adult  Goal: Maintains optimal cardiac output and hemodynamic stability  Outcome: Progressing  Flowsheets (Taken 3/7/2023 0750 by Blanche Hawthorne RN)  Maintains optimal cardiac output and hemodynamic stability:   Monitor blood pressure and heart rate   Monitor urine output and notify Licensed Independent Practitioner for values outside of normal range   Assess for signs of decreased cardiac output  Goal: Absence of cardiac dysrhythmias or at baseline  Outcome: Progressing  Flowsheets (Taken 3/7/2023 0750 by Blanche Hawthorne RN)  Absence of cardiac dysrhythmias or at baseline: Monitor cardiac rate and rhythm     Problem: Skin/Tissue Integrity - Adult  Goal: Skin integrity remains intact  Outcome: Progressing  Flowsheets (Taken 3/11/2023 1905 by William Alvarado RN)  Skin Integrity Remains Intact: Monitor for areas of redness and/or skin breakdown  Goal: Incisions, wounds, or drain sites healing without S/S of infection  Outcome: Progressing  Flowsheets (Taken 3/11/2023 1905 by William Alvarado RN)  Incisions, Wounds, or Drain Sites Healing Without Sign and Symptoms of Infection: TWICE DAILY: Assess and document skin integrity  Goal: Oral mucous membranes remain intact  Outcome: Progressing  Flowsheets (Taken 3/11/2023 1905 by William Alvarado RN)  Oral Mucous Membranes Remain Intact: Assess oral mucosa and hygiene practices     Problem: Musculoskeletal - Adult  Goal: Return mobility to safest level of function  Outcome: Progressing  Flowsheets (Taken 3/7/2023 0750 by Glorianne Darrell, RN)  Return Mobility to [employer] Level of Function:   Assess patient stability and activity tolerance for standing, transferring and ambulating with or without assistive devices   Assist with transfers and ambulation using safe patient handling equipment as needed   Ensure adequate protection for wounds/incisions during mobilization   Instruct patient/family in ordered activity level  Goal: Maintain proper alignment of affected body part  Outcome: Progressing  Flowsheets (Taken 3/7/2023 0750 by Ortiz Ramírez RN)  Maintain proper alignment of affected body part:   Support and protect limb and body alignment per provider's orders   Instruct and reinforce with patient and family use of appropriate assistive device and precautions (e.g. spinal or hip dislocation precautions)  Goal: Return ADL status to a safe level of function  Outcome: Progressing  Flowsheets (Taken 3/7/2023 0750 by Ortiz Ramírez RN)  Return ADL Status to a Safe Level of Function:   Administer medication as ordered   Assess activities of daily living deficits and provide assistive devices as needed   Assist and instruct patient to increase activity and self care as tolerated     Problem: Gastrointestinal - Adult  Goal: Minimal or absence of nausea and vomiting  Outcome: Progressing  Flowsheets (Taken 3/7/2023 0750 by Alta Lund RN)  Minimal or absence of nausea and vomiting:   Administer IV fluids as ordered to ensure adequate hydration   Provide nonpharmacologic comfort measures as appropriate  Goal: Maintains or returns to baseline bowel function  Outcome: Progressing  Flowsheets (Taken 3/7/2023 0750 by Alta Lund RN)  Maintains or returns to baseline bowel function:   Assess bowel function   Encourage oral fluids to ensure adequate hydration   Administer IV fluids as ordered to ensure adequate hydration  Goal: Maintains adequate nutritional intake  Outcome: Progressing  Flowsheets (Taken 3/7/2023 0750 by Alta Lund RN)  Maintains adequate nutritional intake:   Monitor percentage of each meal consumed   Identify factors contributing to decreased intake, treat as appropriate     Problem: Genitourinary - Adult  Goal: Absence of urinary retention  Outcome: Progressing  Flowsheets (Taken 3/7/2023 0750 by Toni Jain RN)  Absence of urinary retention:   Assess patients ability to void and empty bladder   Monitor intake/output and perform bladder scan as needed     Problem: Infection - Adult  Goal: Absence of infection at discharge  Outcome: Progressing  Flowsheets (Taken 3/9/2023 0825 by Mireya Blair RN)  Absence of infection at discharge:   Assess and monitor for signs and symptoms of infection   Monitor lab/diagnostic results  Goal: Absence of infection during hospitalization  Outcome: Progressing  Flowsheets (Taken 3/9/2023 0825 by Mireya Blair RN)  Absence of infection during hospitalization:   Assess and monitor for signs and symptoms of infection   Monitor lab/diagnostic results  Goal: Absence of fever/infection during anticipated neutropenic period  Outcome: Progressing  Flowsheets (Taken 3/7/2023 0750 by Toni Jain RN)  Absence of fever/infection during anticipated neutropenic period: Monitor white blood cell count     Problem: Metabolic/Fluid and Electrolytes - Adult  Goal: Electrolytes maintained within normal limits  Outcome: Progressing  Flowsheets (Taken 3/9/2023 0825 by Mireya Blair RN)  Electrolytes maintained within normal limits:   Monitor labs and assess patient for signs and symptoms of electrolyte imbalances   Administer electrolyte replacement as ordered  Goal: Hemodynamic stability and optimal renal function maintained  Outcome: Progressing  Flowsheets (Taken 3/7/2023 0750 by Toni Jain RN)  Hemodynamic stability and optimal renal function maintained:   Monitor labs and assess for signs and symptoms of volume excess or deficit   Monitor intake, output and patient weight   Monitor urine specific gravity, serum osmolarity and serum sodium as indicated or ordered   Encourage oral intake as appropriate   Monitor response to interventions for patient's volume status, including labs, urine output, blood pressure (other measures as available)  Goal: Glucose maintained within prescribed range  Outcome: Progressing  Flowsheets (Taken 3/7/2023 0750 by Melo Albert, RN)  Glucose maintained within prescribed range:   Monitor blood glucose as ordered   Assess for signs and symptoms of hyperglycemia and hypoglycemia   Administer ordered medications to maintain glucose within target range   Assess barriers to adequate nutritional intake and initiate nutrition consult as needed     Problem: Hematologic - Adult  Goal: Maintains hematologic stability  Outcome: Progressing  Flowsheets (Taken 3/7/2023 0750 by Reji Ridley, RN)  Maintains hematologic stability: Assess for signs and symptoms of bleeding or hemorrhage     Problem: Chronic Conditions and Co-morbidities  Goal: Patient's chronic conditions and co-morbidity symptoms are monitored and maintained or improved  Outcome: Progressing  Flowsheets (Taken 3/9/2023 0042 by Viviana Huggins, RN)  Care Plan - Patient's Chronic Conditions and Co-Morbidity Symptoms are Monitored and Maintained or Improved:   Monitor and assess patient's chronic conditions and comorbid symptoms for stability, deterioration, or improvement   Collaborate with multidisciplinary team to address chronic and comorbid conditions and prevent exacerbation or deterioration   Update acute care plan with appropriate goals if chronic or comorbid symptoms are exacerbated and prevent overall improvement and discharge     Problem: Skin/Tissue Integrity  Goal: Absence of new skin breakdown  Description: 1. Monitor for areas of redness and/or skin breakdown  2. Assess vascular access sites hourly  3. Every 4-6 hours minimum:  Change oxygen saturation probe site  4.   Every 4-6 hours:  If on nasal continuous positive airway pressure, respiratory therapy assess nares and determine need for appliance change or resting period.   Outcome: Progressing  Note: Skin assessment completed and documented  Damaso scale updated  Relieve pressure to bony prominences  Avoid shearing  Assess s/sx of infection   Turns self  Heels elevated  Structural intactness and normal physiological function of skin and mucous membranes       Problem: Nutrition Deficit:  Goal: Optimize nutritional status  Outcome: Progressing  Flowsheets (Taken 3/13/2023 4015)  Nutrient intake appropriate for improving, restoring, or maintaining nutritional needs:   Assess nutritional status and recommend course of action   Monitor oral intake, labs, and treatment plans

## 2023-03-13 NOTE — PROGRESS NOTES
[address]  Occupational Therapy Not Seen    DATE: 3/13/2023    NAME: Apurva Capellan  MRN: [id number]   : 1955    Patient not seen this date for Occupational Therapy due to:      [] Cancel by RN or physician due to:    [] Hemodialysis    [] Critical Lab Value Level     [] Blood transfusion in progress    [] Acute or unstable cardiovascular status   _MAP < 55 or more than >115  _HR < 40 or > 130    [] Acute or unstable pulmonary status   -FiO2 > 60%   _RR < 5 or >40    _O2 sats < 85%    [] Strict Bedrest    [] Off Unit for surgery or procedure    [] Off Unit for testing       [] Pending imaging to R/O fracture    [x] Refusal by Patient: Pt sitting EOB upon arrival and refused all therapy. Pt stated he's going home today and \"I've been dealing with this since , I get around at home just fine. \" Offered to assist pt with transfers training and pt stated he has a \"homemade lift\" at home. Pt refused all tasks and stated he has no needs at this time. [] Other      [] OT being discontinued at this time. Patient independent. No further needs. [] OT being discontinued at this time as the patient has been transferred to hospice care. No further needs.       JOVANY Moise

## 2023-03-13 NOTE — PLAN OF CARE
Problem: Discharge Planning  Goal: Discharge to home or other facility with appropriate resources  3/13/2023 1349 by Donte Wilkes RN  Outcome: Progressing  Flowsheets (Taken 3/13/2023 0830)  Discharge to home or other facility with appropriate resources:   Identify barriers to discharge with patient and caregiver   Arrange for needed discharge resources and transportation as appropriate   Identify discharge learning needs (meds, wound care, etc)  3/13/2023 0554 by Jered Jay RN  Outcome: Progressing  Flowsheets (Taken 3/7/2023 0750 by Bruce Calabrese RN)  Discharge to home or other facility with appropriate resources:   Identify barriers to discharge with patient and caregiver   Arrange for needed discharge resources and transportation as appropriate   Identify discharge learning needs (meds, wound care, etc)   Refer to discharge planning if patient needs post-hospital services based on physician order or complex needs related to functional status, cognitive ability or social support system     Problem: Pain  Goal: Verbalizes/displays adequate comfort level or baseline comfort level  3/13/2023 1349 by Donte Wilkes RN  Outcome: Progressing  3/13/2023 0554 by Jered Jay RN  Outcome: Progressing  Note: Pain level assessment complete.    Patient educated on pain scale and control interventions  PRN pain medication given per patient request  Patient instructed to call out with new onset of pain or unrelieved pain       Problem: Safety - Adult  Goal: Free from fall injury  3/13/2023 1349 by Donte Wilkes RN  Outcome: Progressing  3/13/2023 0554 by Jered Jay RN  Outcome: Progressing  Note: Proper pt identification  Hourly rounding performed  Anticipatory needs met  Non-skid socks worn  Proper transferring technique  2/4 side rails up  Personal necessities within reach  Bed low and locked  Call light in reach  Proper lighting  Room free of clutter         Problem: ABCDS Injury Assessment  Goal: Absence of physical injury  3/13/2023 1349 by Iam Barbosa RN  Outcome: Progressing  3/13/2023 0554 by José Murry RN  Outcome: Progressing  Flowsheets (Taken 3/13/2023 0554)  Absence of Physical Injury: Implement safety measures based on patient assessment     Problem: Neurosensory - Adult  Goal: Achieves maximal functionality and self care  3/13/2023 1349 by Iam Barbosa RN  Outcome: Progressing  3/13/2023 0554 by José Murry RN  Outcome: Progressing  Flowsheets (Taken 3/7/2023 0750 by Alta Lund RN)  Achieves maximal functionality and self care:   Monitor swallowing and airway patency with patient fatigue and changes in neurological status   Encourage and assist patient to increase activity and self care with guidance from physical therapy/occupational therapy     Problem: Respiratory - Adult  Goal: Achieves optimal ventilation and oxygenation  3/13/2023 1349 by Iam Barbosa RN  Outcome: Progressing  3/13/2023 0554 by José Murry RN  Outcome: Progressing  Flowsheets (Taken 3/5/2023 0935 by Alta Lund RN)  Achieves optimal ventilation and oxygenation:   Assess for changes in respiratory status   Position to facilitate oxygenation and minimize respiratory effort   Encourage broncho-pulmonary hygiene including cough, deep breathe, incentive spirometry     Problem: Cardiovascular - Adult  Goal: Maintains optimal cardiac output and hemodynamic stability  3/13/2023 1349 by Iam Barbosa RN  Outcome: Progressing  3/13/2023 0554 by José Murry RN  Outcome: Progressing  Flowsheets (Taken 3/7/2023 0750 by Alta Lund RN)  Maintains optimal cardiac output and hemodynamic stability:   Monitor blood pressure and heart rate   Monitor urine output and notify Licensed Independent Practitioner for values outside of normal range   Assess for signs of decreased cardiac output  Goal: Absence of cardiac dysrhythmias or at baseline  3/13/2023 1349 by Iam Barbosa RN  Outcome: Progressing  3/13/2023 0554 by Flo Couch RN  Outcome: Progressing  Flowsheets (Taken 3/7/2023 0750 by Wesley Epstein RN)  Absence of cardiac dysrhythmias or at baseline: Monitor cardiac rate and rhythm     Problem: Skin/Tissue Integrity - Adult  Goal: Skin integrity remains intact  3/13/2023 1349 by Cayla Grider RN  Outcome: Progressing  Flowsheets (Taken 3/13/2023 0830)  Skin Integrity Remains Intact: Monitor for areas of redness and/or skin breakdown  3/13/2023 0554 by Flo Couch RN  Outcome: Progressing  Flowsheets (Taken 3/11/2023 1905 by Rosalba Loja RN)  Skin Integrity Remains Intact: Monitor for areas of redness and/or skin breakdown  Goal: Incisions, wounds, or drain sites healing without S/S of infection  3/13/2023 1349 by Cayla Grider RN  Outcome: Progressing  Flowsheets (Taken 3/13/2023 0830)  Incisions, Wounds, or Drain Sites Healing Without Sign and Symptoms of Infection: TWICE DAILY: Assess and document skin integrity  3/13/2023 0554 by Flo Couch RN  Outcome: Progressing  Flowsheets (Taken 3/11/2023 1905 by Rosalba Loja RN)  Incisions, Wounds, or Drain Sites Healing Without Sign and Symptoms of Infection: TWICE DAILY: Assess and document skin integrity  Goal: Oral mucous membranes remain intact  3/13/2023 1349 by Cayla Grider RN  Outcome: Progressing  Flowsheets (Taken 3/13/2023 0830)  Oral Mucous Membranes Remain Intact: Assess oral mucosa and hygiene practices  3/13/2023 0554 by Flo Couch RN  Outcome: Progressing  Flowsheets (Taken 3/11/2023 1905 by Rosalba Loja RN)  Oral Mucous Membranes Remain Intact: Assess oral mucosa and hygiene practices     Problem: Musculoskeletal - Adult  Goal: Return mobility to safest level of function  3/13/2023 1349 by Cayla Grider RN  Outcome: Progressing  Flowsheets (Taken 3/13/2023 0830)  Return Mobility to Safest Level of Function:   Assess patient stability and activity tolerance for standing, transferring and ambulating with or without assistive devices   Assist with transfers and ambulation using safe patient handling equipment as needed  3/13/2023 0554 by Flo Couch RN  Outcome: Progressing  Flowsheets (Taken 3/7/2023 0750 by Kamar Richards RN)  Return Mobility to Safest Level of Function:   Assess patient stability and activity tolerance for standing, transferring and ambulating with or without assistive devices   Assist with transfers and ambulation using safe patient handling equipment as needed   Ensure adequate protection for wounds/incisions during mobilization   Instruct patient/family in ordered activity level  Goal: Maintain proper alignment of affected body part  3/13/2023 1349 by Cayla Grider RN  Outcome: Progressing  Flowsheets (Taken 3/13/2023 0830)  Maintain proper alignment of affected body part: Support and protect limb and body alignment per provider's orders  3/13/2023 0554 by Flo Couch RN  Outcome: Progressing  Flowsheets (Taken 3/7/2023 0750 by Kamar Richards RN)  Maintain proper alignment of affected body part:   Support and protect limb and body alignment per provider's orders   Instruct and reinforce with patient and family use of appropriate assistive device and precautions (e.g. spinal or hip dislocation precautions)  Goal: Return ADL status to a safe level of function  3/13/2023 1349 by Cayla Grider RN  Outcome: Progressing  Flowsheets (Taken 3/13/2023 0830)  Return ADL Status to a Safe Level of Function:   Administer medication as ordered   Assess activities of daily living deficits and provide assistive devices as needed  3/13/2023 0554 by Flo Couch RN  Outcome: Progressing  Flowsheets (Taken 3/7/2023 0750 by Kamar Richards RN)  Return ADL Status to a Safe Level of Function:   Administer medication as ordered   Assess activities of daily living deficits and provide assistive devices as needed   Assist and instruct patient to increase activity and self care as tolerated     Problem: Gastrointestinal - Adult  Goal: Minimal or absence of nausea and vomiting  3/13/2023 1349 by Iam Barbosa RN  Outcome: Progressing  Flowsheets (Taken 3/13/2023 0830)  Minimal or absence of nausea and vomiting: Administer ordered antiemetic medications as needed  3/13/2023 0554 by José Murry RN  Outcome: Progressing  Flowsheets (Taken 3/7/2023 0750 by Alta Lund RN)  Minimal or absence of nausea and vomiting:   Administer IV fluids as ordered to ensure adequate hydration   Provide nonpharmacologic comfort measures as appropriate  Goal: Maintains or returns to baseline bowel function  3/13/2023 1349 by Iam Barbosa RN  Outcome: Progressing  Flowsheets (Taken 3/13/2023 0830)  Maintains or returns to baseline bowel function:   Assess bowel function   Administer ordered medications as needed  3/13/2023 0554 by José Murry RN  Outcome: Progressing  Flowsheets (Taken 3/7/2023 0750 by Alta Lund RN)  Maintains or returns to baseline bowel function:   Assess bowel function   Encourage oral fluids to ensure adequate hydration   Administer IV fluids as ordered to ensure adequate hydration  Goal: Maintains adequate nutritional intake  3/13/2023 1349 by Iam Barbosa RN  Outcome: Progressing  Flowsheets (Taken 3/13/2023 0830)  Maintains adequate nutritional intake:   Monitor percentage of each meal consumed   Assist with meals as needed  3/13/2023 0554 by José Murry RN  Outcome: Progressing  Flowsheets (Taken 3/7/2023 0750 by Alta Lund RN)  Maintains adequate nutritional intake:   Monitor percentage of each meal consumed   Identify factors contributing to decreased intake, treat as appropriate     Problem: Genitourinary - Adult  Goal: Absence of urinary retention  3/13/2023 1349 by Iam Barbosa RN  Outcome: Progressing  Flowsheets (Taken 3/13/2023 0830)  Absence of urinary retention: Assess patients ability to void and empty bladder  3/13/2023 0554 by Baron Bernal CHRISTY Allen  Outcome: Progressing  Flowsheets (Taken 3/7/2023 0750 by Candy Cyr RN)  Absence of urinary retention:   Assess patients ability to void and empty bladder   Monitor intake/output and perform bladder scan as needed     Problem: Infection - Adult  Goal: Absence of infection at discharge  3/13/2023 1349 by Michael Gresham RN  Outcome: Progressing  Flowsheets (Taken 3/13/2023 0830)  Absence of infection at discharge:   Assess and monitor for signs and symptoms of infection   Monitor lab/diagnostic results  3/13/2023 0554 by Roland Adamson RN  Outcome: Progressing  Flowsheets (Taken 3/9/2023 0825 by Jimi Pierson RN)  Absence of infection at discharge:   Assess and monitor for signs and symptoms of infection   Monitor lab/diagnostic results  Goal: Absence of infection during hospitalization  3/13/2023 1349 by Michael Gresham RN  Outcome: Progressing  Flowsheets (Taken 3/13/2023 0830)  Absence of infection during hospitalization:   Assess and monitor for signs and symptoms of infection   Monitor lab/diagnostic results  3/13/2023 0554 by Roland Adamson RN  Outcome: Progressing  Flowsheets (Taken 3/9/2023 0825 by Jimi Pierson RN)  Absence of infection during hospitalization:   Assess and monitor for signs and symptoms of infection   Monitor lab/diagnostic results  Goal: Absence of fever/infection during anticipated neutropenic period  3/13/2023 1349 by Michael Gresham RN  Outcome: Progressing  Flowsheets (Taken 3/13/2023 0830)  Absence of fever/infection during anticipated neutropenic period: Monitor white blood cell count  3/13/2023 0554 by Roland Adamson RN  Outcome: Progressing  Flowsheets (Taken 3/7/2023 0750 by Candy Cyr RN)  Absence of fever/infection during anticipated neutropenic period: Monitor white blood cell count     Problem: Metabolic/Fluid and Electrolytes - Adult  Goal: Electrolytes maintained within normal limits  3/13/2023 1349 by Michael Gresham RN  Outcome: Progressing  Flowsheets (Taken 3/13/2023 0830)  Electrolytes maintained within normal limits:   Monitor labs and assess patient for signs and symptoms of electrolyte imbalances   Administer electrolyte replacement as ordered  3/13/2023 0554 by Alysia Dejesus RN  Outcome: Progressing  Flowsheets (Taken 3/9/2023 0825 by Glory Vizcaino RN)  Electrolytes maintained within normal limits:   Monitor labs and assess patient for signs and symptoms of electrolyte imbalances   Administer electrolyte replacement as ordered  Goal: Hemodynamic stability and optimal renal function maintained  3/13/2023 1349 by Adri Guzman RN  Outcome: Progressing  Flowsheets (Taken 3/13/2023 0830)  Hemodynamic stability and optimal renal function maintained: Monitor labs and assess for signs and symptoms of volume excess or deficit  3/13/2023 0554 by Alysia Dejesus RN  Outcome: Progressing  Flowsheets (Taken 3/7/2023 0750 by Lonnie Steve RN)  Hemodynamic stability and optimal renal function maintained:   Monitor labs and assess for signs and symptoms of volume excess or deficit   Monitor intake, output and patient weight   Monitor urine specific gravity, serum osmolarity and serum sodium as indicated or ordered   Encourage oral intake as appropriate   Monitor response to interventions for patient's volume status, including labs, urine output, blood pressure (other measures as available)  Goal: Glucose maintained within prescribed range  3/13/2023 1349 by Adri Guzman RN  Outcome: Progressing  Flowsheets (Taken 3/13/2023 0830)  Glucose maintained within prescribed range:   Monitor blood glucose as ordered   Administer ordered medications to maintain glucose within target range  3/13/2023 0554 by Alysia Dejesus RN  Outcome: Progressing  Flowsheets (Taken 3/7/2023 0750 by Pérez Mcbride RN)  Glucose maintained within prescribed range:   Monitor blood glucose as ordered   Assess for signs and symptoms of hyperglycemia and hypoglycemia   Administer ordered medications to maintain glucose within target range   Assess barriers to adequate nutritional intake and initiate nutrition consult as needed     Problem: Hematologic - Adult  Goal: Maintains hematologic stability  3/13/2023 1349 by Shona Coy RN  Outcome: Progressing  Flowsheets (Taken 3/13/2023 0830)  Maintains hematologic stability:   Assess for signs and symptoms of bleeding or hemorrhage   Monitor labs for bleeding or clotting disorders  3/13/2023 0554 by Mckenna Hawthorne RN  Outcome: Progressing  Flowsheets (Taken 3/7/2023 0750 by Rosie Mcelroy RN)  Maintains hematologic stability: Assess for signs and symptoms of bleeding or hemorrhage     Problem: Chronic Conditions and Co-morbidities  Goal: Patient's chronic conditions and co-morbidity symptoms are monitored and maintained or improved  3/13/2023 1349 by Shona Coy RN  Outcome: Progressing  Flowsheets (Taken 3/13/2023 0830)  Care Plan - Patient's Chronic Conditions and Co-Morbidity Symptoms are Monitored and Maintained or Improved:   Monitor and assess patient's chronic conditions and comorbid symptoms for stability, deterioration, or improvement   Collaborate with multidisciplinary team to address chronic and comorbid conditions and prevent exacerbation or deterioration  3/13/2023 0554 by Mckenna Hawthorne RN  Outcome: Progressing  Flowsheets (Taken 3/9/2023 0042 by Adrien Cole RN)  Care Plan - Patient's Chronic Conditions and Co-Morbidity Symptoms are Monitored and Maintained or Improved:   Monitor and assess patient's chronic conditions and comorbid symptoms for stability, deterioration, or improvement   Collaborate with multidisciplinary team to address chronic and comorbid conditions and prevent exacerbation or deterioration   Update acute care plan with appropriate goals if chronic or comorbid symptoms are exacerbated and prevent overall improvement and discharge     Problem: Skin/Tissue Integrity  Goal: Absence of new skin breakdown  Description: 1. Monitor for areas of redness and/or skin breakdown  2. Assess vascular access sites hourly  3. Every 4-6 hours minimum:  Change oxygen saturation probe site  4. Every 4-6 hours:  If on nasal continuous positive airway pressure, respiratory therapy assess nares and determine need for appliance change or resting period.   3/13/2023 1349 by Iam Barbosa RN  Outcome: Progressing  3/13/2023 0554 by José Murry RN  Outcome: Progressing  Note: Skin assessment completed and documented  Damaso scale updated  Relieve pressure to bony prominences  Avoid shearing  Assess s/sx of infection   Turns self  Heels elevated  Structural intactness and normal physiological function of skin and mucous membranes       Problem: Nutrition Deficit:  Goal: Optimize nutritional status  3/13/2023 1349 by Iam Barbosa RN  Outcome: Progressing  3/13/2023 0554 by José Murry RN  Outcome: Progressing  Flowsheets (Taken 3/13/2023 0554)  Nutrient intake appropriate for improving, restoring, or maintaining nutritional needs:   Assess nutritional status and recommend course of action   Monitor oral intake, labs, and treatment plans

## 2023-03-13 NOTE — PROGRESS NOTES
[facility]  Office: [address], DO, Davis Sandra, DO, Yanna Agustin, DO, Cony Jazmin Blood, DO, Katelin Mcnair MD, Johann Riedel, MD, Rik Padron MD, Melodie Alvarez MD,  Pineda Eldridge MD, Sharmila Carver MD, Mumtaz Hernandez, DO, Leno Hunter MD,  Lance Luis MD, June Rock MD, Aldo Cummings, DO, Nate Mcleod MD, Kieran Freeman MD, Thais Carrasquillo, DO, Kristina López MD, Ariane Helton MD, Barbra Hogan MD, Maxim Maxwell MD, Adele Garcia, DO, Whitney Nichols MD, Dana Lawrence MD, Oksana Groves, [facility],  Alee Sloan, CNP, Niki Rios, CNP, Ra Call, CNP,  Melba Ren, [facility], Renetat Dennis, CNP, Mayco Scott, CNP, Swetha Smith, CNP, Wiley Peraza, CNP, Jeyson Garza, CNP, Chris Mclean PA-C, Bonnie Shah, CNS, Harper Walter, CNP, Celestino Schultz, [facility]    Progress Note    3/13/2023    12:58 PM    Name:   Laura Parham  MRN:     [id number]     [place]:      [de-identified]   Room:   2017/2017-02  IP Day:  10  Admit Date:  3/3/2023  2:54 PM    PCP:   Hortencia Wasserman MD  Code Status:  Full Code    Subjective:     C/C:   Chief Complaint   Patient presents with    Wound Infection     Right foot     Interval History Status: unchanged. Feels fine today    He doesn't really remember the other day when he got confused and doesn't know why he acted like that    Denies cp/sob/n/v    Some postop pain but tolerable    Brief History:     Per my partner   This is a 58-year-old male that presents with right foot wound with concerns for infection. Recent outpatient culture with vancomycin-resistant Enterococcus faecium. He presented to the emergency room with worsening drainage and developed fevers and chills. Overnight he was started on Maxipime. Recent outpatient cultures were obtained with evidence of VRE with antibiotics will be adjusted accordingly.     Review of Systems:     Constitutional:  negative for chills, fevers, sweats  Respiratory:  negative for cough, wheezing, shortness of breath  Cardiovascular:  negative for chest pain, chest pressure/discomfort, palpitations  Gastrointestinal:  negative for abdominal pain, constipation, diarrhea, nausea, vomiting  Neurological:  negative for dizziness, headache      Medications:     Allergies:    Allergies   Allergen Reactions    Morphine Itching    Other Other (See Comments)     Other reaction(s): Unknown    Percocet [Oxycodone-Acetaminophen]      Facial swelling       Current Meds:   Scheduled Meds:    meropenem  1,000 mg IntraVENous Q12H    tiZANidine  4 mg Oral BID    daptomycin (CUBICIN) IVPB  6 mg/kg (Adjusted) IntraVENous Q24H    gabapentin  300 mg Oral TID    amLODIPine  5 mg Oral Daily    aspirin  81 mg Oral Daily    cetirizine  10 mg Oral Nightly    [Held by provider] glipiZIDE  20 mg Oral BID AC    [Held by provider] alogliptin  12.5 mg Oral Daily    insulin glargine  10 Units SubCUTAneous BID    atorvastatin  20 mg Oral Nightly    sodium chloride flush  5-40 mL IntraVENous 2 times per day    heparin (porcine)  5,000 Units SubCUTAneous 3 times per day    insulin lispro  0-8 Units SubCUTAneous TID WC    insulin lispro  0-4 Units SubCUTAneous Nightly     Continuous Infusions:    dextrose      sodium chloride Stopped (03/05/23 1313)     PRN Meds: HYDROmorphone **OR** HYDROmorphone, HYDROcodone 5 mg - acetaminophen **OR** HYDROcodone 5 mg - acetaminophen, acetaminophen, glucose, dextrose bolus **OR** dextrose bolus, glucagon (rDNA), dextrose, sodium chloride flush, sodium chloride, ondansetron **OR** ondansetron, polyethylene glycol    Data:     Past Medical History:   has a past medical history of Abscess of right foot, Acquired hammer toe deformity of lesser toe of right foot, ALEJANDRO (acute kidney injury) (HCC), Cellulitis, Cellulitis of left foot, Cellulitis of right foot, Charcot foot due to diabetes mellitus (HCC), Chest pain  at rest, Chronic multifocal osteomyelitis of right foot ([facility] [address] CKD (chronic kidney disease), Diabetic polyneuropathy associated with type 2 diabetes mellitus ([facility] [address] Essential hypertension, Fractures, multiple, Hyperlipidemia, Hypertension, Leukocytosis, MRSA (methicillin resistant staph aureus) culture positive, Neuropathy, Pain in right foot, Pneumonia, Right foot infection, Right foot pain, Tobacco abuse, Type II or unspecified type diabetes mellitus without mention of complication, not stated as uncontrolled, Vertigo, Well controlled type 2 diabetes mellitus with neurological manifestations ([facility] [address] Wound dehiscence, Wound, open, and Wound, open. Social History:   reports that he has been smoking cigarettes. He has been smoking an average of .5 packs per day. He has never used smokeless tobacco. He reports that he does not currently use drugs. He reports that he does not drink alcohol. Family History:   Family History   Problem Relation Age of Onset    Diabetes Mother     Cancer Father     Hypertension Maternal Grandmother        Vitals:  /63   Pulse 82   Temp 98.1 °F (36.7 °C) (Axillary)   Resp 16   Ht 5' 10\" (1.778 m)   Wt 224 lb 8 oz (101.8 kg)   SpO2 97%   BMI 32.21 kg/m²   Temp (24hrs), Av.2 °F (37.3 °C), Min:98.1 °F (36.7 °C), Max:100.6 °F (38.1 °C)    Recent Labs     23  1619 23  0653 23  1123   POCGLU 185* 155* 127* 203*       I/O (24Hr):     Intake/Output Summary (Last 24 hours) at 3/13/2023 1258  Last data filed at 3/13/2023 0450  Gross per 24 hour   Intake --   Output 550 ml   Net -550 ml       Labs:  Hematology:  Recent Labs     23  0607 23  0630   SEDRATE 72*  --    CRP 40.1* 160.0*     Chemistry:  Recent Labs     23  1511   CKTOTAL 34*       Recent Labs     23  1039 23  1131 23  1619 23  0653 23  1123   POCGLU 135* 112* 185* 155* 127* 203*     ABG:  Lab Results   Component Value Date/Time    POCPH 7.393 03/06/2023 02:39 PM    POCPCO2 36.8 03/06/2023 02:39 PM    POCPO2 73.0 03/06/2023 02:39 PM    POCHCO3 22.4 03/06/2023 02:39 PM    NBEA 2 03/06/2023 02:39 PM    YHBO1KCM 94 03/06/2023 02:39 PM    FIO2 21.0 03/06/2023 02:39 PM     Lab Results   Component Value Date/Time    SPECIAL 10ML,RTFA 03/07/2023 12:46 PM     Lab Results   Component Value Date/Time    CULTURE  03/12/2023 08:53 AM     DUE TO THE SPECIMEN TYPE, THE ORDER WAS CANCELED AND REORDERED. PLEASE REFER TO: ANAEROBIC, AEROBIC CULTURE    CULTURE PENDING 03/12/2023 08:53 AM       Radiology:  CT HEAD WO CONTRAST    Result Date: 3/6/2023  No acute intracranial abnormality. US RETROPERITONEAL COMPLETE    Result Date: 3/4/2023  Mildly echogenic renal parenchyma suggestive of intrinsic renal parenchymal disease. No hydronephrosis.        Physical Examination:        General appearance:  alert, cooperative and no distress  Mental Status:  oriented to person, place and time, and normal affect  Lungs:  clear to auscultation bilaterally, normal effort  Heart:  regular rate and rhythm, no murmur  Abdomen:  soft, nontender, nondistended, normal bowel sounds, no masses, hepatomegaly, splenomegaly  Extremities:  no redness, tenderness in the calves; right foot bandaged from surgery  Mentation back to normal    Assessment:        Hospital Problems             Last Modified POA    * (Principal) Diabetic foot infection ([address] with VRE 3/4/2023 Yes    Type 2 diabetes mellitus with right diabetic foot ulcer ([address] 3/3/2023 Yes    Charcot's joint of right foot 3/3/2023 Yes    Stage 3b chronic kidney disease ([address] (Chronic) 3/3/2023 Yes    Hyponatremia 3/3/2023 Yes    Microcytic anemia 3/3/2023 Yes    Infection due to vancomycin resistant Enterococcus faecium 3/4/2023 Yes    Acute metabolic encephalopathy 7/7/3418 Yes    Delirium due to another medical condition 3/9/2023 Yes    Altered mental status 3/10/2023 Yes    Essential hypertension (Chronic) 3/3/2023 Yes    Neuropathy 3/3/2023 Yes    Charcot foot due to diabetes mellitus ([address] 3/3/2023 Yes    Hyperlipidemia 3/3/2023 Yes    Acute kidney injury superimposed on chronic kidney disease ([facility] 3/3/2023 Yes       Plan:        Unclear what caused his ams 3/7 but much better; cefepime stopped in case that was cause. Also lowered neurontin dose as his dose is too high for his level of renal function.   We can increase the dose just a bit per pharmacy  mri head done , negative  Had OR yesterday  Recheck labs in am  Can resume alogliptin-it and glipizide on hold from earlier hypoglycemia  Will need to be set up for iv antibiotics at discharge    [address] Blood, DO  3/13/2023  12:58 PM

## 2023-03-13 NOTE — PROGRESS NOTES
[facility]  Progress Note    Carny Mills  MRN: [id number]  Date: 3/13/2023  LOS: 8     Reason for F/U :   Right foot infection    Impression :   Charcot foot deformity with history of right midtarsal and subtalar foot arthrodesis  History of osteomyelitis   status post antimicrobial therapy in late 2022  Draining wounds right lower extremity with imaging suggesting potential infection involving hardware  Status post subtalar joint arthrodesis with application of uniplanar external fixator in the right lower extremity and soft tissue transfer with incision and drainage of bone cortex in the right foot and insertion of intramedullary drug delivery device and arthrocentesis of the ankle joint 3/12/2023  Diabetes mellitus type 2 with associated chronic kidney disease  Altered mental status  the encephalopathy has since resolved and all imaging studies including CT of the head, MRI with no significant findings    Recommendations: The patient did receive cefepime and linezolid 3/3/2023 through 3/7/2023   The cefepime was discontinued as it has been known to cause some CNS toxicities and the mentation is improved off the cefepime  The plan is to continue the antimicrobial therapy with meropenem and daptomycin   The plan will be to discharge him on 6 weeks of IV antimicrobial therapy through 4/23/2023    Infection Control Recommendations:   Universal precautions    Discharge Planning:   Estimated Length of IV antimicrobials: 4/23/2023  Patient will need PICC line Insertion  Patient will need: Home IV   Patient willneed outpatient wound care: Yes    Medical Decision making / Summary of Stay:   Caryn Mills is a 78y.o.-year-old male presented to the hospital with worsening right foot pain and swelling for several weeks associated with open ulcer on the lateral aspect of the hand foot that probes to bone. Symptoms moderate to severe, worse with movement, no alleviating factors.   He also complaining of subjective fever and chills, mild shortness of breath. CT of the right foot was done at [facility] on 2023 showed lucency around hardware within the calcaneus. The patient had recent surgery done by Dr. Miranda Garduno on 2022 with hardware in place. History of Charcot foot, chronic renal failure and diabetes mellitus. Initial WBC normal, creatinine 2.89, sedimentation rate 53, C-reactive protein 136    The wife is at bedside and she also reports episodes of confusion and is very concerned. She did raise that she was worried about the fentanyl that the patient received on 3/3/2023. The patient answers \"I do not know\" to almost all of the questions. He was taken to the operating room 3/12/2023 and he underwent a subtalar arthrodesis of the right foot with application of a uniplanar external fixator device with incision and drainage of bone cortex in the right foot and the insertion of an intramedullary drug delivery device. The patient had arthrocentesis of the ankle joint  The patient had septic arthritis of the subtalar joint failing orthopedic hardware in the stable tibiotalar joint      Current evaluation:3/13/2023    BP (!) 144/69   Pulse 83   Temp 98.4 °F (36.9 °C) (Oral)   Resp 16   Ht 5' 10\" (1.778 m)   Wt 224 lb 8 oz (101.8 kg)   SpO2 91%   BMI 32.21 kg/m²     Temperature Range: Temp: 98.4 °F (36.9 °C) Temp  Av.6 °F (37 °C)  Min: 97 °F (36.1 °C)  Max: 100.6 °F (38.1 °C)  The patient is seen and evaluated at bedside and he is awake and alert in no acute distress. No abdominal pain nausea vomiting or diarrhea. No diarrhea at this point in time    Review of Systems   Constitutional: Negative. HENT: Negative. Respiratory: Negative. Cardiovascular: Negative. Gastrointestinal: Negative. Genitourinary: Negative. Musculoskeletal: Negative. Skin:  Positive for wound. Neurological: Negative. Psychiatric/Behavioral: Negative.        Physical Examination :     Physical Exam  Constitutional:       Appearance: He is well-developed. HENT:      Head: Normocephalic and atraumatic. Cardiovascular:      Rate and Rhythm: Normal rate. Heart sounds: Normal heart sounds. No friction rub. No gallop. Pulmonary:      Effort: Pulmonary effort is normal.      Breath sounds: Normal breath sounds. No wheezing. Abdominal:      General: Bowel sounds are normal.      Palpations: Abdomen is soft. There is no mass. Tenderness: There is no abdominal tenderness. Musculoskeletal:         General: Normal range of motion. Cervical back: Normal range of motion and neck supple. Lymphadenopathy:      Cervical: No cervical adenopathy. Skin:     General: Skin is warm and dry. Comments: Lower extremity ankle/foot in a dressing   Neurological:      Mental Status: He is alert and oriented to person, place, and time. Laboratory data:   I have independently reviewed the followinglabs:  CBC with Differential:   No results for input(s): WBC, HGB, HCT, PLT, SEGSPCT, BANDSPCT, LYMPHOPCT, MONOPCT, EOSPCT in the last 72 hours. BMP:   No results for input(s): NA, K, CL, CO2, BUN, CREATININE, CA, MG in the last 72 hours. Hepatic Function Panel: No results for input(s): PROT, LABALBU, BILIDIR, IBILI, BILITOT, ALKPHOS, ALT, AST in the last 72 hours. No results found for: PROCAL  Lab Results   Component Value Date/Time    .0 03/13/2023 06:30 AM    CRP 40.1 03/11/2023 06:07 AM    CRP 34.8 03/09/2023 06:14 AM     Lab Results   Component Value Date    SEDRATE 72 (H) 03/11/2023         No results found for: DDIMER  No results found for: FERRITIN  No results found for: LDH  No results found for: FIBRINOGEN    No results found for requested labs within last 30 days.      Lab Results   Component Value Date/Time    COVID19 DETECTED 05/26/2021 12:06 PM    COVID19 Not Detected 12/07/2020 03:10 PM    COVID19 Not Detected 08/08/2020 10:02 PM       No results for input(s): [address] in the last 72 hours. Imaging Studies:           MRI OF THE BRAIN WITHOUT CONTRAST  3/8/2023 7:46 am  Impression   Motion artifact limits evaluation. No acute intracranial abnormality. Cultures:     Culture, Anaerobic and Aerobic [[id number]] (Abnormal) Collected: 03/12/23 0852   Order Status: Completed Specimen: Bone from Foot Updated: 03/12/23 2195    Specimen Description . FOOT RIGHT    Direct Exam RARE NEUTROPHILS Abnormal      NO ORGANISMS SEEN    Culture PENDING   Culture, Anaerobic and Aerobic [[id number]] (Abnormal) Collected: 03/12/23 0853   Order Status: Completed Specimen: Foot Updated: 03/12/23 7401    Specimen Description . FOOT RIGHT    Direct Exam FEW NEUTROPHILS Abnormal      NO ORGANISMS SEEN    Culture PENDING   Culture, Anaerobic and Aerobic [[id number]] Collected: 03/12/23 0844   Order Status: Completed Specimen: Swab from Foot Updated: 03/12/23 1739    Specimen Description . FOOT RIGHT    Direct Exam NO NEUTROPHILS SEEN     NO ORGANISMS SEEN    Culture PENDING   Culture, Blood 1 [[id number]] Collected: 03/07/23 1238   Order Status: Completed Specimen: Blood Updated: 03/12/23 1514    Specimen Description . BLOOD    Special Requests LT HAND,6ML    Culture NO GROWTH 5 DAYS   Culture, Blood 1 [[id number]] Collected: 03/07/23 1246   Order Status: Completed Specimen: Blood Updated: 03/12/23 1513    Specimen Description . BLOOD    Special Requests 10ML,RTFA    Culture NO GROWTH 5 DAYS     Medications:      meropenem  1,000 mg IntraVENous Q12H    tiZANidine  4 mg Oral BID    daptomycin (CUBICIN) IVPB  6 mg/kg (Adjusted) IntraVENous Q24H    gabapentin  300 mg Oral TID    amLODIPine  5 mg Oral Daily    aspirin  81 mg Oral Daily    cetirizine  10 mg Oral Nightly    [Held by provider] glipiZIDE  20 mg Oral BID AC    [Held by provider] alogliptin  12.5 mg Oral Daily    insulin glargine  10 Units SubCUTAneous BID    atorvastatin  20 mg Oral Nightly    sodium chloride flush  5-40 mL IntraVENous 2 times per day    heparin (porcine)  5,000 Units SubCUTAneous 3 times per day    insulin lispro  0-8 Units SubCUTAneous TID WC    insulin lispro  0-4 Units SubCUTAneous Nightly       Electronically signed by Luis Carlos Dyer MD on 3/13/2023 at 9:08 AM      [facility]  Luis Carlos Dyer MD  [employer]  OFFICE: [phone]    Thank you for allowing us to participate in the care of this patient. Please call with questions. This note is created with the assistance of a speech recognition program.  While intending to generate a document that actually reflects the content of the visit, the document can still have some errors including those of syntax and sound a like substitutions which may escape proof reading. In such instances, actual meaning can be extrapolated by contextual diversion.

## 2023-03-13 NOTE — PROGRESS NOTES
Physical Therapy  DATE: 3/13/2023    NAME: James Ahumada  MRN: [id number]   : 1955    Patient not seen this date for Physical Therapy due to:      [] Cancel by RN or physician due to:    [] Hemodialysis    [] Critical Lab Value Level     [] Blood transfusion in progress    [] Acute or unstable cardiovascular status   _MAP < 55 or more than >115  _HR < 40 or > 130    [] Acute or unstable pulmonary status   -FiO2 > 60%   _RR < 5 or >40    _O2 sats < 85%    [] Strict Bedrest    [] Off Unit for surgery or procedure    [] Off Unit for testing       [] Pending imaging to R/O fracture    [x] Refusal by Patient  Pt sitting EOB upon arrival and refused all therapy. Pt stated he's going home today   [] Other      [] PT being discontinued at this time. Patient independent. No further needs. [] PT being discontinued at this time as the patient has been transferred to hospice care. No further needs.       Kierra Cruz, PTA

## 2023-03-13 NOTE — BRIEF OP NOTE
Brief Postoperative Note    Josephine Gay  YOB: 1955  [id number]    Pre-operative Diagnosis: Osteomyelitis    Post-operative Diagnosis: Same    Procedure:  Tunneled CVC placement    Anesthesia: Local    Surgeons/Assistants: Lyle    Estimated Blood Loss: less than 50     Complications: None    Specimens: Was Not Obtained     Electronically signed by Tanisha Cannon MD on 3/13/2023 at 2:34 PM

## 2023-03-13 NOTE — PROGRESS NOTES
Progress Note  Podiatric Medicine and Surgery     Subjective     CC: S/p STJ arthrodesis, application of uniplanar external fixator I&D. Removal of hardware and insertion of abx cement (DOS: 3/12/23)    Interval history:  -Patient seen and examined at bedside     -Patient states he was in a lot pain after surgery. Pain medications were altered. Pt states pain was controlled after pain medication modifications  -No other complaints at this time    HPI:  Ru Koenig is a 78 y.o. male seen at [address] [place] [address],[address] for right foot pain and swelling. Patient is well known to podiatry service and has been following outpatient with Dr. Jimy Houston since surgery on 22 to right foot. Patient states he has been compliant with wound care and walking in CAM boot, however noticed worsening pain and redness to right foot, prompting him to get CT at outside facility and presenting to [facility] ED. Patient states he has not been on IV antibiotics for a while, does follow Infectious disease who saw him on 3/1/23. Patient denies any constitutional symptoms at this time. Patient is type 2 diabetic, ALEJANDRO on CKD, and has history of Charcot foot. No further pedal complaints at this time.      PCP is Chuyita Iglesias MD      Objective     Vitals:  Patient Vitals for the past 8 hrs:   BP Temp Temp src Pulse Resp SpO2 Weight   23 0904 -- -- -- -- 16 -- --   23 0834 -- -- -- -- 16 -- --   23 0748 (!) 144/69 98.4 °F (36.9 °C) Oral 83 16 91 % --   23 0601 -- -- -- -- 18 -- --   23 0557 137/60 99 °F (37.2 °C) Oral 91 18 (!) 89 % --   23 0403 -- -- -- -- -- -- 224 lb 8 oz (101.8 kg)     Average, Min, and Max for last 24 hours Vitals:  TEMPERATURE:  Temp  Av.8 °F (37.1 °C)  Min: 97 °F (36.1 °C)  Max: 100.6 °F (38.1 °C)    RESPIRATIONS RANGE: Resp  Avg: 15.8  Min: 12  Max: 18    PULSE RANGE: Pulse  Av  Min: 68  Max: 112    BLOOD PRESSURE RANGE:  Systolic (03GHR), RFW:790 , Min:137 , QRX:648   ; Diastolic (24hrs), Av, Min:50, Max:91      PULSE OXIMETRY RANGE: SpO2  Av.1 %  Min: 86 %  Max: 97 %  I&O:  I/O last 3 completed shifts: In: 150 [I.V.:150]  Out: 550 [Urine:550]    CBC:  Recent Labs     23  0607 23  0630   CRP 40.1* 160.0*        BMP:  No results for input(s): NA, K, CL, CO2, BUN, CREATININE, GLUCOSE, CALCIUM in the last 72 hours. Coags:  No results for input(s): APTT, PROT, INR in the last 72 hours. Lab Results   Component Value Date    LABA1C 6.9 (H) 10/05/2022     Lab Results   Component Value Date    SEDRATE 72 (H) 2023     Lab Results   Component Value Date    .0 (H) 2023         Lower Extremity Physical Exam: Physical exam from 3/11/23. Vascular: DP and PT pulses are palpable, bilaterally. CFT <4 seconds to all digits. Hair growth is absent to the level of the digits. Nonpitting edema to lateral ankle right foot. Neuro: Saph/sural/SP/DP/plantar sensation intact to light touch. Musculoskeletal: Muscle strength is 4/5, decreased ROM , adequate strength to all lower extremity muscle groups. Gross deformity is absent. Dermatologic: Full thickness ulcer #1 located lateral aspect of right ankle and measures approximately 0.5cm x 0.3cm x 0.5 cm. Base is fibrogranular. Periwound skin is hyperkeratotic. Minimal serous drainage with no associated mal odor. Erythema noted to right ankle with associated increase in warmth. Does probe deep, sinus tracks proximal. No fluctuance, crepitus, or induration. Interdigital maceration absent. Dry eschar appreciated to plantar right heel with no active drainage. Fluctuance appreciated but no induration. Interdigital maceration is absent    Clinical Images: See Media panel    Imaging:   XR FOOT RIGHT (MIN 3 VIEWS)   Final Result   Postop ankle and subtalar fusion with external fixator in place. FLUORO FOR SURGICAL PROCEDURES   Final Result      CT FOOT RIGHT WO CONTRAST   Final Result   1. Periprosthetic lucency measuring between 3 and 4 mm involving the   arthrodesis screws components located in the talus and calcaneus concerning   for hardware loosening and/or infection as detailed above. 2. Arthrodesis screws involving the subtalar joint, traversing the 1st   metatarsal head through the midfoot into the talus, as well as the 2nd TMT   joint and the space between the 3rd and 4th TMT joints and calcaneocuboid   joint. 3. Underlying probable neuropathic foot/Charcot arthropathy. 4. Mild-to-moderate degenerative changes of the midfoot. 5. Moderate soft tissue edema and swelling of the right foot and ankle likely   cellulitis. No organized fluid collection. Effacement of the sinus tarsi   fat. 6. Probable underlying severe Achilles tendinosis. Interstitial tearing of   the Achilles tendon not excluded. 7. Deformity of the distal aspect of the proximal phalanx of the 3rd digit. 8. Proximal dislocation of the distal phalanx 1st digit in relation to the   proximal phalanx. MRI BRAIN WO CONTRAST   Final Result   Motion artifact limits evaluation. No acute intracranial abnormality. CT HEAD WO CONTRAST   Final Result   No acute intracranial abnormality. US RETROPERITONEAL COMPLETE   Final Result   Mildly echogenic renal parenchyma suggestive of intrinsic renal parenchymal   disease. No hydronephrosis. Assessment     Apurva Capellan is a 78 y.o. male with   S/p STJ arthrodesis, application of uniplanar external fixator I&D.  Removal of hardware and insertion of abx cement (DOS: 3/12/23)  Cellulitis, right lower extremity  S/p right midtarsal and subtalar foot arthrodesis  Charcot neuroarthropathy, right ankle  Right ankle pain  Encephalopathy of unknown etiology    Principal Problem:    Diabetic foot infection ([address] with VRE  Active Problems:    Type 2 diabetes mellitus with right diabetic foot ulcer ([address]    Charcot's joint of right foot    Stage 3b chronic kidney disease (HCC)    Hyponatremia    Microcytic anemia    Infection due to vancomycin resistant Enterococcus faecium    Acute metabolic encephalopathy    Delirium due to another medical condition    Altered mental status    Essential hypertension    Neuropathy    Charcot foot due to diabetes mellitus ([facility]    Hyperlipidemia    Acute kidney injury superimposed on chronic kidney disease ([facility]  Resolved Problems:    * No resolved hospital problems. *        Plan     Patient examined and evaluated at bedside   Treatment options discussed in detail with the patient  CT results reviewed.  Findings consistent with hardware loosening vs infection to hardware  Pt has an external fixator (monorail) in place to the right foot  Pain medications modified to help with pain  Dressings will be changed tomorrow with adaptic, 4x4 gauze, ABD pads, kerlix and ACE wrap  Non weight bearing to Right lower extremity  Discussed with Dr. Allison Yusuf DPM   Podiatric Medicine & Surgery   3/13/2023 at 10:58 AM

## 2023-03-14 ENCOUNTER — TELEPHONE (OUTPATIENT)
Dept: INFECTIOUS DISEASES | Age: 68
End: 2023-03-14

## 2023-03-14 VITALS
TEMPERATURE: 99.5 F | DIASTOLIC BLOOD PRESSURE: 71 MMHG | RESPIRATION RATE: 17 BRPM | HEART RATE: 84 BPM | BODY MASS INDEX: 31.92 KG/M2 | WEIGHT: 223 LBS | SYSTOLIC BLOOD PRESSURE: 150 MMHG | OXYGEN SATURATION: 94 % | HEIGHT: 70 IN

## 2023-03-14 DIAGNOSIS — T81.89XA DRAINING POSTOPERATIVE WOUND, INITIAL ENCOUNTER: Primary | ICD-10-CM

## 2023-03-14 PROBLEM — N18.9 ACUTE KIDNEY INJURY SUPERIMPOSED ON CHRONIC KIDNEY DISEASE (HCC): Status: RESOLVED | Noted: 2018-08-05 | Resolved: 2023-03-14

## 2023-03-14 PROBLEM — F05 DELIRIUM DUE TO ANOTHER MEDICAL CONDITION: Status: RESOLVED | Noted: 2023-03-09 | Resolved: 2023-03-14

## 2023-03-14 PROBLEM — E87.1 HYPONATREMIA: Status: RESOLVED | Noted: 2023-03-03 | Resolved: 2023-03-14

## 2023-03-14 PROBLEM — R41.82 ALTERED MENTAL STATUS: Status: RESOLVED | Noted: 2023-03-10 | Resolved: 2023-03-14

## 2023-03-14 PROBLEM — N17.9 ACUTE KIDNEY INJURY SUPERIMPOSED ON CHRONIC KIDNEY DISEASE (HCC): Status: RESOLVED | Noted: 2018-08-05 | Resolved: 2023-03-14

## 2023-03-14 PROBLEM — G93.41 ACUTE METABOLIC ENCEPHALOPATHY: Status: RESOLVED | Noted: 2023-03-08 | Resolved: 2023-03-14

## 2023-03-14 LAB
ANION GAP SERPL CALCULATED.3IONS-SCNC: 10 MMOL/L (ref 9–17)
BUN SERPL-MCNC: 24 MG/DL (ref 8–23)
BUN/CREAT BLD: 8 (ref 9–20)
CALCIUM SERPL-MCNC: 9.4 MG/DL (ref 8.6–10.4)
CHLORIDE SERPL-SCNC: 107 MMOL/L (ref 98–107)
CO2 SERPL-SCNC: 22 MMOL/L (ref 20–31)
CREAT SERPL-MCNC: 3.18 MG/DL (ref 0.7–1.2)
ERYTHROCYTE [SEDIMENTATION RATE] IN BLOOD BY WESTERGREN METHOD: 40 MM/HR (ref 0–20)
GFR SERPL CREATININE-BSD FRML MDRD: 21 ML/MIN/1.73M2
GLUCOSE BLD-MCNC: 120 MG/DL (ref 75–110)
GLUCOSE BLD-MCNC: 131 MG/DL (ref 75–110)
GLUCOSE BLD-MCNC: 98 MG/DL (ref 75–110)
GLUCOSE SERPL-MCNC: 100 MG/DL (ref 70–99)
POTASSIUM SERPL-SCNC: 3.8 MMOL/L (ref 3.7–5.3)
SODIUM SERPL-SCNC: 139 MMOL/L (ref 135–144)
SURGICAL PATHOLOGY REPORT: NORMAL
SURGICAL PATHOLOGY REPORT: NORMAL

## 2023-03-14 PROCEDURE — 85652 RBC SED RATE AUTOMATED: CPT

## 2023-03-14 PROCEDURE — 2580000003 HC RX 258: Performed by: INTERNAL MEDICINE

## 2023-03-14 PROCEDURE — 6370000000 HC RX 637 (ALT 250 FOR IP)

## 2023-03-14 PROCEDURE — 6370000000 HC RX 637 (ALT 250 FOR IP): Performed by: INTERNAL MEDICINE

## 2023-03-14 PROCEDURE — 6360000002 HC RX W HCPCS: Performed by: INTERNAL MEDICINE

## 2023-03-14 PROCEDURE — 80048 BASIC METABOLIC PNL TOTAL CA: CPT

## 2023-03-14 PROCEDURE — 82947 ASSAY GLUCOSE BLOOD QUANT: CPT

## 2023-03-14 PROCEDURE — 97116 GAIT TRAINING THERAPY: CPT

## 2023-03-14 PROCEDURE — 97530 THERAPEUTIC ACTIVITIES: CPT

## 2023-03-14 PROCEDURE — 6360000002 HC RX W HCPCS

## 2023-03-14 PROCEDURE — 97110 THERAPEUTIC EXERCISES: CPT

## 2023-03-14 PROCEDURE — 2580000003 HC RX 258

## 2023-03-14 PROCEDURE — 36415 COLL VENOUS BLD VENIPUNCTURE: CPT

## 2023-03-14 PROCEDURE — 99239 HOSP IP/OBS DSCHRG MGMT >30: CPT | Performed by: NURSE PRACTITIONER

## 2023-03-14 PROCEDURE — 99232 SBSQ HOSP IP/OBS MODERATE 35: CPT | Performed by: INTERNAL MEDICINE

## 2023-03-14 RX ORDER — HYDROCODONE BITARTRATE AND ACETAMINOPHEN 5; 325 MG/1; MG/1
2 TABLET ORAL EVERY 6 HOURS PRN
Qty: 56 TABLET | Refills: 0 | Status: SHIPPED | OUTPATIENT
Start: 2023-03-14 | End: 2023-03-21

## 2023-03-14 RX ORDER — INSULIN GLARGINE 100 [IU]/ML
10 INJECTION, SOLUTION SUBCUTANEOUS 2 TIMES DAILY
COMMUNITY
Start: 2023-03-14

## 2023-03-14 RX ORDER — GABAPENTIN 400 MG/1
400 CAPSULE ORAL 3 TIMES DAILY
Qty: 90 CAPSULE | Refills: 1 | Status: SHIPPED | OUTPATIENT
Start: 2023-03-14 | End: 2023-04-13

## 2023-03-14 RX ORDER — TIZANIDINE 4 MG/1
4 TABLET ORAL 2 TIMES DAILY
Qty: 20 TABLET | Refills: 0 | Status: SHIPPED | OUTPATIENT
Start: 2023-03-14 | End: 2023-03-24

## 2023-03-14 RX ADMIN — TIZANIDINE 4 MG: 4 TABLET ORAL at 07:37

## 2023-03-14 RX ADMIN — ALOGLIPTIN 12.5 MG: 12.5 TABLET, FILM COATED ORAL at 07:37

## 2023-03-14 RX ADMIN — SODIUM CHLORIDE, PRESERVATIVE FREE 10 ML: 5 INJECTION INTRAVENOUS at 07:49

## 2023-03-14 RX ADMIN — INSULIN GLARGINE 10 UNITS: 100 INJECTION, SOLUTION SUBCUTANEOUS at 07:49

## 2023-03-14 RX ADMIN — HYDROCODONE BITARTRATE AND ACETAMINOPHEN 2 TABLET: 5; 325 TABLET ORAL at 07:35

## 2023-03-14 RX ADMIN — MEROPENEM 1000 MG: 1 INJECTION, POWDER, FOR SOLUTION INTRAVENOUS at 15:36

## 2023-03-14 RX ADMIN — AMLODIPINE BESYLATE 5 MG: 5 TABLET ORAL at 07:36

## 2023-03-14 RX ADMIN — HEPARIN SODIUM 5000 UNITS: 5000 INJECTION INTRAVENOUS; SUBCUTANEOUS at 14:36

## 2023-03-14 RX ADMIN — ASPIRIN 81 MG: 81 TABLET, COATED ORAL at 07:37

## 2023-03-14 RX ADMIN — GABAPENTIN 400 MG: 400 CAPSULE ORAL at 14:43

## 2023-03-14 RX ADMIN — HEPARIN SODIUM 5000 UNITS: 5000 INJECTION INTRAVENOUS; SUBCUTANEOUS at 05:49

## 2023-03-14 RX ADMIN — GABAPENTIN 400 MG: 400 CAPSULE ORAL at 07:35

## 2023-03-14 RX ADMIN — HYDROCODONE BITARTRATE AND ACETAMINOPHEN 2 TABLET: 5; 325 TABLET ORAL at 02:15

## 2023-03-14 RX ADMIN — MEROPENEM 1000 MG: 1 INJECTION, POWDER, FOR SOLUTION INTRAVENOUS at 04:28

## 2023-03-14 ASSESSMENT — PAIN SCALES - GENERAL
PAINLEVEL_OUTOF10: 8
PAINLEVEL_OUTOF10: 7
PAINLEVEL_OUTOF10: 8

## 2023-03-14 ASSESSMENT — PAIN DESCRIPTION - DESCRIPTORS
DESCRIPTORS: THROBBING
DESCRIPTORS: ACHING

## 2023-03-14 ASSESSMENT — PAIN DESCRIPTION - ORIENTATION
ORIENTATION: RIGHT
ORIENTATION: RIGHT

## 2023-03-14 ASSESSMENT — ENCOUNTER SYMPTOMS
GASTROINTESTINAL NEGATIVE: 1
RESPIRATORY NEGATIVE: 1

## 2023-03-14 ASSESSMENT — PAIN DESCRIPTION - LOCATION
LOCATION: LEG
LOCATION: FOOT

## 2023-03-14 ASSESSMENT — PAIN - FUNCTIONAL ASSESSMENT: PAIN_FUNCTIONAL_ASSESSMENT: ACTIVITIES ARE NOT PREVENTED

## 2023-03-14 ASSESSMENT — PAIN DESCRIPTION - FREQUENCY: FREQUENCY: CONTINUOUS

## 2023-03-14 ASSESSMENT — PAIN DESCRIPTION - ONSET: ONSET: ON-GOING

## 2023-03-14 NOTE — PROGRESS NOTES
Physical Therapy  Facility/Department: [facility]  Daily Treatment Note  NAME: Socorro Degroot  : 1955  MRN: [id number]    Date of Service: 3/14/2023    Discharge Recommendations:  Patient would benefit from continued therapy after discharge    Pt currently functioning below baseline. Recommend daily inpatient skilled therapy at time of discharge to maximize long term outcomes and prevent re-admission. Please refer to AM-PAC score for current level of function. Patient Diagnosis(es): The primary encounter diagnosis was Diabetic foot infection ([facility] Diagnoses of Charcot foot due to diabetes mellitus ([facility] [facility] Diabetic foot ([facility] [address] Diabetic foot infection ([facility] [address] [facility] with VRE, and Acute hematogenous osteomyelitis of right foot ([facility] [address] were also pertinent to this visit. Assessment   Assessment: Patient progressing toward STGs, he was able ambulated around the room with Min A with RW but initally requried Min-Mod x 2 A to complete STS from EOB to maintain [address] on Rt LE. Patient would benefit from continued skilled PT services. Activity Tolerance: Patient limited by endurance; Patient tolerated treatment well   AM-PAC Score: 17  Plan    Physcial Therapy Plan  General Plan: 5-7 times per week  Current Treatment Recommendations: Balance training;Functional mobility training;Transfer training;Neuromuscular re-education;Stair training;Gait training; Endurance training;Home exercise program;Equipment evaluation, education, & procurement;Patient/Caregiver education & training; Safety education & training; Therapeutic activities     Restrictions  Restrictions/Precautions  Restrictions/Precautions: General Precautions, Fall Risk, Weight Bearing  Required Braces or Orthoses?: Yes  Lower Extremity Weight Bearing Restrictions  Right Lower Extremity Weight Bearing: Weight Bearing As Tolerated  Required Braces or Orthoses  Right Lower Extremity Brace: Boot  RLE Brace Type: CAM Boot  Position Activity Restriction  Other position/activity restrictions: Up w/ assist     Subjective    Subjective  Subjective: Patient resting in bed upon arrival and agreeable for PT treatment. RN reports patient medically stable. Orientation  Overall Orientation Status: Within Functional Limits  Cognition  Overall Cognitive Status: Exceptions  Arousal/Alertness: Appropriate responses to stimuli  Following Commands: Follows multistep commands with repitition  Attention Span: Attends with cues to redirect  Memory: Appears intact  Safety Judgement: Decreased awareness of need for safety  Problem Solving: Decreased awareness of errors;Assistance required to identify errors made;Assistance required to correct errors made  Insights: Decreased awareness of deficits  Initiation: Requires cues for some  Sequencing: Requires cues for some  Cognition Comment: Pt. pleasant and cooperative with treatment today. Objective   Bed Mobility Training  Bed Mobility Training: Yes  Overall Level of Assistance: Modified independent  Interventions:  (use of bed rails, no cues needed)  Rolling: Modified independent  Supine to Sit: Modified independent  Sit to Supine:  (Patient requested to sit EOB at end session. RN arianna)  Scooting: Modified independent  Balance  Sitting:  (SBA)  Standing:  (Min A with RW)  Transfer Training  Transfer Training: Yes  Overall Level of Assistance: Minimum assistance; Moderate assistance;Assist X2  Interventions: Safety awareness training; Tactile cues; Verbal cues (During initally standing balance Patient required Min-Mod x 2 A as he was unable to maintain NWBing on Rt LE. Once standing and able to use momenteum to hop with RW around the room, Min x 1 A)  Sit to Stand: Minimum assistance; Moderate assistance;Assist X2  Stand to Sit: Minimum assistance; Moderate assistance;Assist X2  Stand Pivot Transfers: Contact-guard assistance  Bed to Chair: Minimum assistance  Gait Training  Gait Training: Yes  Gait  Overall Level of Assistance: Minimum assistance  Interventions: Verbal cues; Tactile cues; Safety awareness training (VCs for speed, assist for line mgmt and RW mgmt.)  Gait Abnormalities:  (Patient able to hop around Room with good tolerance and safety. Reports he does have a small home and does not need to walk any further then we just did.)  Distance (ft):  (15' x 2)  Assistive Device: Gait belt;Walker, rolling  Neuromuscular Education  Neuromuscular Education: Yes  Treatment: Gait ;Sitting;Standing  Neuromuscular Comments: VCs req for proper breathing del (pursed lip breathing) during functional mobility. Tactile and VCs req for postural control during sit<>stands & amb to promote abdominal and erector spinae mm facilitation for increased stability and balance, decreasing kyphosis of the spine. Pt req VCs to correct for forward WS with squatting in addition to pressing firmly into ground with feet, to promote the appropriate body mechanics for sit<>stand transfers. PT Exercises  Exercise Treatment: seated LE AROM issued pt HEP reviewed all ex with pt  Circulation/Endurance Exercises: Patient performs AP on LLE only this date     Safety Devices  Type of Devices: Call light within reach;Gait belt;Nurse notified; Left in bed  Restraints  Restraints Initially in Place: No       Goals  Short Term Goals  Time Frame for Short Term Goals: 10 visits  Short Term Goal 1: Pt to demonstrate bed mobility independently  Short Term Goal 2: Pt to perform STS transfers w/ least restrictive AD independently  Short Term Goal 3: Pt to ambulate at least 100ft w/ least restrictive AD independently  Short Term Goal 4: Pt to ascend/descend 3 stairs w/ handrails SBA  Short Term Goal 5: Pt to actively participate in at least 30 minutes of physical therapy for ther act, ther ex, balance, gait, and endurance training  Patient Goals   Patient Goals :  To go home    Education  Patient Education  Education Given To: Patient  Education Provided: Role of Therapy;Plan of Care;Home Exercise Program;Fall Prevention Strategies;Precautions;Transfer Training  Education Provided Comments: Pt educated on: purpose of acute PT treatment, importance of continued mobility throughout admission, general safety awareness, fall risk prevention, importance of wearing CAM boot, safe transfers & ambulation w/ RW, and PT POC. Pt w/ fair return demo. Education Method: Verbal;Demonstration  Education Outcome: Verbalized understanding    Therapy Time   Individual Concurrent Group Co-treatment   Time In       2703   Time Out       5823   Minutes       25       Co-treatment with OT warranted secondary to decreased safety and independence requiring 2 skilled therapy professionals to address individual discipline's goals. PT addressing pre gait trunk strengthening, weight shifting prior to transfers, transfer training, and postural control in sitting/standing, maintain NWBing.      Rod Nunez, PTA

## 2023-03-14 NOTE — TELEPHONE ENCOUNTER
Nery Bennett from [facility] called and needs the following lab orders I pended for patient. Please sign. Thanks!

## 2023-03-14 NOTE — PLAN OF CARE
Problem: Discharge Planning  Goal: Discharge to home or other facility with appropriate resources  3/14/2023 1131 by Lizz Mcdaniels RN  Outcome: Progressing  Flowsheets (Taken 3/14/2023 0815)  Discharge to home or other facility with appropriate resources:   Identify barriers to discharge with patient and caregiver   Arrange for needed discharge resources and transportation as appropriate   Identify discharge learning needs (meds, wound care, etc)  3/14/2023 0021 by Moira Shannon RN  Outcome: Progressing  Flowsheets (Taken 3/14/2023 0021)  Discharge to home or other facility with appropriate resources:   Identify barriers to discharge with patient and caregiver   Arrange for needed discharge resources and transportation as appropriate   Identify discharge learning needs (meds, wound care, etc)     Problem: Pain  Goal: Verbalizes/displays adequate comfort level or baseline comfort level  3/14/2023 1131 by Lizz Mcdaniels RN  Outcome: Progressing  3/14/2023 0021 by Moira Shannon RN  Outcome: Progressing  Note: Pain level assessment complete.    Patient educated on pain scale and control interventions  PRN pain medication given per patient request  Patient instructed to call out with new onset of pain or unrelieved pain       Problem: Safety - Adult  Goal: Free from fall injury  3/14/2023 1131 by Lizz Mcdaniels RN  Outcome: Progressing  3/14/2023 0021 by Moira Shannon RN  Outcome: Progressing  Note: Proper pt identification  Hourly rounding performed  Anticipatory needs met  Non-skid socks worn  Proper transferring technique  2/4 side rails up  Personal necessities within reach  Bed low and locked  Call light in reach  Proper lighting  Room free of clutter         Problem: ABCDS Injury Assessment  Goal: Absence of physical injury  3/14/2023 1131 by Lizz Mcdaniels RN  Outcome: Progressing  3/14/2023 0021 by Moira Shannon RN  Outcome: Progressing  Flowsheets (Taken 3/13/2023 0554)  Absence of Physical Injury: Implement safety measures based on patient assessment     Problem: Neurosensory - Adult  Goal: Achieves maximal functionality and self care  3/14/2023 1131 by Selena Sanchez RN  Outcome: Progressing  3/14/2023 0021 by Kishor Zavala RN  Outcome: Progressing  Flowsheets (Taken 3/7/2023 0750 by Selena Sanchez RN)  Achieves maximal functionality and self care:   Monitor swallowing and airway patency with patient fatigue and changes in neurological status   Encourage and assist patient to increase activity and self care with guidance from physical therapy/occupational therapy     Problem: Respiratory - Adult  Goal: Achieves optimal ventilation and oxygenation  3/14/2023 1131 by Selena Sanchez RN  Outcome: Progressing  3/14/2023 0021 by Kishor Zavala RN  Outcome: Progressing  Flowsheets (Taken 3/5/2023 0935 by Selena Sanchez RN)  Achieves optimal ventilation and oxygenation:   Assess for changes in respiratory status   Position to facilitate oxygenation and minimize respiratory effort   Encourage broncho-pulmonary hygiene including cough, deep breathe, incentive spirometry     Problem: Cardiovascular - Adult  Goal: Maintains optimal cardiac output and hemodynamic stability  3/14/2023 1131 by Selena Sanchez RN  Outcome: Progressing  3/14/2023 0021 by Kishor Zavala RN  Outcome: Progressing  Flowsheets (Taken 3/7/2023 0750 by Selena Sanchez RN)  Maintains optimal cardiac output and hemodynamic stability:   Monitor blood pressure and heart rate   Monitor urine output and notify Licensed Independent Practitioner for values outside of normal range   Assess for signs of decreased cardiac output  Goal: Absence of cardiac dysrhythmias or at baseline  3/14/2023 1131 by Selena Sanchez RN  Outcome: Progressing  3/14/2023 0021 by Kishor Zavala RN  Outcome: Progressing  Flowsheets (Taken 3/7/2023 0750 by Selena Sanchez RN)  Absence of cardiac dysrhythmias or at baseline: Monitor cardiac rate and rhythm     Problem: Skin/Tissue Integrity - Adult  Goal: Skin integrity remains intact  3/14/2023 1131 by Ana Aparicio RN  Outcome: Progressing  3/14/2023 0021 by Dawn Blanco RN  Outcome: Progressing  Flowsheets (Taken 3/13/2023 0830 by Pascale Dillard RN)  Skin Integrity Remains Intact: Monitor for areas of redness and/or skin breakdown  Goal: Incisions, wounds, or drain sites healing without S/S of infection  3/14/2023 1131 by Ana Aparicio RN  Outcome: Progressing  3/14/2023 0021 by Dawn Blanco RN  Outcome: Progressing  Flowsheets (Taken 3/13/2023 0830 by Pascale Dillard RN)  Incisions, Wounds, or Drain Sites Healing Without Sign and Symptoms of Infection: TWICE DAILY: Assess and document skin integrity  Goal: Oral mucous membranes remain intact  3/14/2023 1131 by Ana Aparicio RN  Outcome: Progressing  3/14/2023 0021 by Dawn Blanco RN  Outcome: Progressing  Flowsheets (Taken 3/13/2023 0830 by Pascale Dillard RN)  Oral Mucous Membranes Remain Intact: Assess oral mucosa and hygiene practices     Problem: Musculoskeletal - Adult  Goal: Return mobility to safest level of function  3/14/2023 1131 by Ana Aparicio RN  Outcome: Progressing  Flowsheets (Taken 3/14/2023 0815)  Return Mobility to Safest Level of Function:   Assess patient stability and activity tolerance for standing, transferring and ambulating with or without assistive devices   Assist with transfers and ambulation using safe patient handling equipment as needed   Ensure adequate protection for wounds/incisions during mobilization   Obtain physical therapy/occupational therapy consults as needed   Instruct patient/family in ordered activity level  3/14/2023 0021 by Dawn Blanco RN  Outcome: Progressing  Flowsheets (Taken 3/13/2023 0830 by Pascale Dillard RN)  Return Mobility to Safest Level of Function:   Assess patient stability and activity tolerance for standing, transferring and ambulating with or without assistive devices   Assist with transfers and ambulation using safe patient handling equipment as needed  Goal: Maintain proper alignment of affected body part  3/14/2023 1131 by Candy Cyr RN  Outcome: Progressing  Flowsheets (Taken 3/14/2023 0815)  Maintain proper alignment of affected body part:   Support and protect limb and body alignment per provider's orders   Instruct and reinforce with patient and family use of appropriate assistive device and precautions (e.g. spinal or hip dislocation precautions)  3/14/2023 0021 by Roland Adamson RN  Outcome: Progressing  Flowsheets (Taken 3/13/2023 0830 by Michael Gresham RN)  Maintain proper alignment of affected body part: Support and protect limb and body alignment per provider's orders  Goal: Return ADL status to a safe level of function  3/14/2023 1131 by Candy Cyr RN  Outcome: Progressing  Flowsheets (Taken 3/14/2023 0815)  Return ADL Status to a Safe Level of Function:   Administer medication as ordered   Assess activities of daily living deficits and provide assistive devices as needed   Obtain physical therapy/occupational therapy consults as needed   Assist and instruct patient to increase activity and self care as tolerated  3/14/2023 0021 by Roland Adamson RN  Outcome: Progressing  Flowsheets (Taken 3/13/2023 0830 by Michael Gresham RN)  Return ADL Status to a Safe Level of Function:   Administer medication as ordered   Assess activities of daily living deficits and provide assistive devices as needed     Problem: Gastrointestinal - Adult  Goal: Minimal or absence of nausea and vomiting  3/14/2023 1131 by Candy Cyr RN  Outcome: Progressing  3/14/2023 0021 by Roland Adamson RN  Outcome: Progressing  Flowsheets (Taken 3/13/2023 0830 by Michael Gresham RN)  Minimal or absence of nausea and vomiting: Administer ordered antiemetic medications as needed  Goal: Maintains or returns to baseline bowel function  3/14/2023 1131 by Candy Cyr RN  Outcome: Progressing  3/14/2023 0021 by Erika Mortensen RN  Outcome: Progressing  Flowsheets (Taken 3/13/2023 0830 by Shirley Love RN)  Maintains or returns to baseline bowel function:   Assess bowel function   Administer ordered medications as needed  Goal: Maintains adequate nutritional intake  3/14/2023 1131 by Colton Merrill RN  Outcome: Progressing  3/14/2023 0021 by Erika Mortensen RN  Outcome: Progressing  Flowsheets (Taken 3/13/2023 0830 by Shirley Love RN)  Maintains adequate nutritional intake:   Monitor percentage of each meal consumed   Assist with meals as needed     Problem: Genitourinary - Adult  Goal: Absence of urinary retention  3/14/2023 1131 by Colton Merrill RN  Outcome: Progressing  3/14/2023 0021 by Erika Mortensen RN  Outcome: Progressing  Flowsheets (Taken 3/13/2023 0830 by Shirley Love RN)  Absence of urinary retention: Assess patients ability to void and empty bladder     Problem: Infection - Adult  Goal: Absence of infection at discharge  3/14/2023 1131 by Colton Merrill RN  Outcome: Progressing  Flowsheets (Taken 3/14/2023 0815)  Absence of infection at discharge:   Assess and monitor for signs and symptoms of infection   Monitor lab/diagnostic results   Monitor all insertion sites i.e., indwelling lines, tubes and drains  3/14/2023 0021 by Erika Mortensen RN  Outcome: Progressing  Flowsheets (Taken 3/13/2023 0830 by Shirley Love RN)  Absence of infection at discharge:   Assess and monitor for signs and symptoms of infection   Monitor lab/diagnostic results  Goal: Absence of infection during hospitalization  3/14/2023 1131 by Colton Merrill RN  Outcome: Progressing  Flowsheets (Taken 3/14/2023 0815)  Absence of infection during hospitalization:   Assess and monitor for signs and symptoms of infection   Monitor lab/diagnostic results   Monitor all insertion sites i.e., indwelling lines, tubes and drains  3/14/2023 0021 by Erika Mortensen RN  Outcome: Progressing  Flowsheets (Taken 3/13/2023 0830 by Magalis Tarango RN)  Absence of infection during hospitalization:   Assess and monitor for signs and symptoms of infection   Monitor lab/diagnostic results  Goal: Absence of fever/infection during anticipated neutropenic period  3/14/2023 1131 by Scarlet Allan RN  Outcome: Progressing  Flowsheets (Taken 3/14/2023 0815)  Absence of fever/infection during anticipated neutropenic period: Monitor white blood cell count  3/14/2023 0021 by Bill King RN  Outcome: Progressing  Flowsheets (Taken 3/13/2023 0830 by Magalis Tarango RN)  Absence of fever/infection during anticipated neutropenic period: Monitor white blood cell count     Problem: Metabolic/Fluid and Electrolytes - Adult  Goal: Electrolytes maintained within normal limits  3/14/2023 1131 by Scarlet Allan RN  Outcome: Progressing  Flowsheets (Taken 3/14/2023 0815)  Electrolytes maintained within normal limits: Monitor labs and assess patient for signs and symptoms of electrolyte imbalances  3/14/2023 0021 by Bill King RN  Outcome: Progressing  Flowsheets (Taken 3/13/2023 0830 by Magalis Tarango RN)  Electrolytes maintained within normal limits:   Monitor labs and assess patient for signs and symptoms of electrolyte imbalances   Administer electrolyte replacement as ordered  Goal: Hemodynamic stability and optimal renal function maintained  3/14/2023 1131 by Scarlet Allan RN  Outcome: Progressing  Flowsheets (Taken 3/14/2023 0815)  Hemodynamic stability and optimal renal function maintained:   Monitor labs and assess for signs and symptoms of volume excess or deficit   Monitor intake, output and patient weight  3/14/2023 0021 by Bill King RN  Outcome: Progressing  Flowsheets (Taken 3/13/2023 0830 by Magalis Tarango RN)  Hemodynamic stability and optimal renal function maintained: Monitor labs and assess for signs and symptoms of volume excess or deficit  Goal: Glucose maintained within prescribed range  3/14/2023 [phone] by Dulcie Dubin, RN  Outcome: Progressing  Flowsheets (Taken 3/14/2023 0815)  Glucose maintained within prescribed range: Monitor blood glucose as ordered  3/14/2023 0021 by Rudolph Garduno RN  Outcome: Progressing  Flowsheets (Taken 3/13/2023 0830 by Melina Hooks RN)  Glucose maintained within prescribed range:   Monitor blood glucose as ordered   Administer ordered medications to maintain glucose within target range     Problem: Hematologic - Adult  Goal: Maintains hematologic stability  3/14/2023 1131 by Dulcie Dubin, RN  Outcome: Progressing  Flowsheets (Taken 3/14/2023 0815)  Maintains hematologic stability: Assess for signs and symptoms of bleeding or hemorrhage  3/14/2023 0021 by Rudolph Garduno RN  Outcome: Progressing  Flowsheets (Taken 3/13/2023 0830 by Melina Hooks RN)  Maintains hematologic stability:   Assess for signs and symptoms of bleeding or hemorrhage   Monitor labs for bleeding or clotting disorders     Problem: Chronic Conditions and Co-morbidities  Goal: Patient's chronic conditions and co-morbidity symptoms are monitored and maintained or improved  3/14/2023 1131 by Dulcie Dubin, RN  Outcome: Progressing  Flowsheets (Taken 3/14/2023 0815)  Care Plan - Patient's Chronic Conditions and Co-Morbidity Symptoms are Monitored and Maintained or Improved:   Monitor and assess patient's chronic conditions and comorbid symptoms for stability, deterioration, or improvement   Collaborate with multidisciplinary team to address chronic and comorbid conditions and prevent exacerbation or deterioration   Update acute care plan with appropriate goals if chronic or comorbid symptoms are exacerbated and prevent overall improvement and discharge  3/14/2023 0021 by Rudolph Garduno RN  Outcome: Progressing  Flowsheets (Taken 3/13/2023 0830 by Melina Hooks RN)  Care Plan - Patient's Chronic Conditions and Co-Morbidity Symptoms are Monitored and Maintained or Improved:   Monitor and assess patient's chronic conditions and comorbid symptoms for stability, deterioration, or improvement   Collaborate with multidisciplinary team to address chronic and comorbid conditions and prevent exacerbation or deterioration     Problem: Skin/Tissue Integrity  Goal: Absence of new skin breakdown  Description: 1. Monitor for areas of redness and/or skin breakdown  2. Assess vascular access sites hourly  3. Every 4-6 hours minimum:  Change oxygen saturation probe site  4. Every 4-6 hours:  If on nasal continuous positive airway pressure, respiratory therapy assess nares and determine need for appliance change or resting period.   3/14/2023 1131 by Candy Cyr RN  Outcome: Progressing  3/14/2023 0021 by Roland Adamson RN  Outcome: Progressing  Note: Skin assessment completed and documented  Damaso scale updated  Relieve pressure to bony prominences  Avoid shearing  Assess s/sx of infection   Turns self  Heels elevated  Structural intactness and normal physiological function of skin and mucous membranes       Problem: Nutrition Deficit:  Goal: Optimize nutritional status  3/14/2023 1131 by Candy Cyr RN  Outcome: Progressing  3/14/2023 0021 by Roland Adamson RN  Outcome: Progressing  Flowsheets (Taken 3/13/2023 4850)  Nutrient intake appropriate for improving, restoring, or maintaining nutritional needs:   Assess nutritional status and recommend course of action   Monitor oral intake, labs, and treatment plans

## 2023-03-14 NOTE — CARE COORDINATION
Call to [facility] with OC and signed scirpts faxed for [address] and [address].     Will be delivered to home today between 5pm-9pm.

## 2023-03-14 NOTE — CARE COORDINATION
Spoke to Kyra Olson from [facility] and they will start care in AM Wednesday for IV ATB.

## 2023-03-14 NOTE — PROGRESS NOTES
[facility]  Progress Note    Carson Fragoso  MRN: [id number]  Date: 3/14/2023  LOS: 6     Reason for F/U :   Right foot infection    Impression :   Charcot foot deformity with history of right midtarsal and subtalar foot arthrodesis  History of osteomyelitis   status post antimicrobial therapy in late 2022  Draining wounds right lower extremity with imaging suggesting potential infection involving hardware  Status post subtalar joint arthrodesis with application of uniplanar external fixator in the right lower extremity and soft tissue transfer with incision and drainage of bone cortex in the right foot and insertion of intramedullary drug delivery device and arthrocentesis of the ankle joint 3/12/2023  Diabetes mellitus type 2 with associated chronic kidney disease  Altered mental status  the encephalopathy has since resolved and all imaging studies including CT of the head, MRI with no significant findings    Recommendations: The patient did receive cefepime and linezolid 3/3/2023 through 3/7/2023   The cefepime was discontinued as the patient developed some encephalopathy and cefepime has been known to cause some CNS toxicities - the mentation is improved off the cefepime  The plan is to continue the antimicrobial therapy with meropenem and daptomycin for 6 weeks through 4/23/2023  Plans are for discharge today    Infection Control Recommendations:   Universal precautions    Discharge Planning:   Estimated Length of IV antimicrobials: 4/23/2023  Patient will need PICC line Insertion  Patient will need: Home IV   Patient willneed outpatient wound care: Yes    Medical Decision making / Summary of Stay:   Carson Fragoso is a 78y.o.-year-old male presented to the hospital with worsening right foot pain and swelling for several weeks associated with open ulcer on the lateral aspect of the hand foot that probes to bone. Symptoms moderate to severe, worse with movement, no alleviating factors. He also complaining of subjective fever and chills, mild shortness of breath. CT of the right foot was done at [facility] on 2023 showed lucency around hardware within the calcaneus. The patient had recent surgery done by Dr. Lacho Herndon on 2022 with hardware in place. History of Charcot foot, chronic renal failure and diabetes mellitus. Initial WBC normal, creatinine 2.89, sedimentation rate 53, C-reactive protein 136    The wife is at bedside and she also reports episodes of confusion and is very concerned. She did raise that she was worried about the fentanyl that the patient received on 3/3/2023. The patient answers \"I do not know\" to almost all of the questions. He was taken to the operating room 3/12/2023 and he underwent a subtalar arthrodesis of the right foot with application of a uniplanar external fixator device with incision and drainage of bone cortex in the right foot and the insertion of an intramedullary drug delivery device. 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 The patient had a tunneled PICC line placed in the right neck  He does not report any new issues or concerns  No subjective fever or chills, no abdominal pain nausea vomiting or diarrhea. Review of Systems   Constitutional: Negative. HENT: Negative. Respiratory: Negative. Cardiovascular: Negative. Gastrointestinal: Negative. Genitourinary: Negative. Musculoskeletal: Negative. Skin:  Positive for wound. Neurological: Negative. Psychiatric/Behavioral: Negative. Physical Examination :     Physical Exam  Constitutional:       Appearance: He is well-developed. HENT:      Head: Normocephalic and atraumatic. Cardiovascular:      Rate and Rhythm: Normal rate. Heart sounds: Normal heart sounds. No friction rub. No gallop. Pulmonary:      Effort: Pulmonary effort is normal.      Breath sounds: Normal breath sounds. No wheezing. Abdominal:      General: Bowel sounds are normal.      Palpations: Abdomen is soft. There is no mass. Tenderness: There is no abdominal tenderness. Musculoskeletal:         General: Normal range of motion. Cervical back: Normal range of motion and neck supple. Lymphadenopathy:      Cervical: No cervical adenopathy. Skin:     General: Skin is warm and dry. Comments: Lower extremity ankle/foot in a dressing   Neurological:      Mental Status: He is alert and oriented to person, place, and time. Laboratory data:   I have independently reviewed the followinglabs:  CBC with Differential:   No results for input(s): WBC, HGB, HCT, PLT, SEGSPCT, BANDSPCT, LYMPHOPCT, MONOPCT, EOSPCT in the last 72 hours. BMP:   Recent Labs     03/14/23  0629      K 3.8      CO2 22   BUN 24*   CREATININE 3.18*       Hepatic Function Panel: No results for input(s): PROT, LABALBU, BILIDIR, IBILI, BILITOT, ALKPHOS, ALT, AST in the last 72 hours. No results found for: PROCAL  Lab Results   Component Value Date/Time    .0 03/13/2023 06:30 AM    CRP 40.1 03/11/2023 06:07 AM    CRP 34.8 03/09/2023 06:14 AM     Lab Results   Component Value Date    SEDRATE 40 (H) 03/14/2023         No results found for: DDIMER  No results found for: FERRITIN  No results found for: LDH  No results found for: FIBRINOGEN    No results found for requested labs within last 30 days.      Lab Results   Component Value Date/Time    COVID19 DETECTED 05/26/2021 12:06 PM    COVID19 Not Detected 12/07/2020 03:10 PM    COVID19 Not Detected 08/08/2020 10:02 PM       No results for input(s): LUPILLO in the last 72 hours. Imaging Studies:           MRI OF THE BRAIN WITHOUT CONTRAST  3/8/2023 7:46 am  Impression   Motion artifact limits evaluation. No acute intracranial abnormality. Cultures:     Culture, Anaerobic and Aerobic [[id number]] (Abnormal) Collected: 03/12/23 0852   Order Status: Completed Specimen: Bone from Foot Updated: 03/12/23 1745    Specimen Description . FOOT RIGHT    Direct Exam RARE NEUTROPHILS Abnormal      NO ORGANISMS SEEN    Culture PENDING   Culture, Anaerobic and Aerobic [[id number]] (Abnormal) Collected: 03/12/23 0853   Order Status: Completed Specimen: Foot Updated: 03/12/23 1741    Specimen Description . FOOT RIGHT    Direct Exam FEW NEUTROPHILS Abnormal      NO ORGANISMS SEEN    Culture PENDING   Culture, Anaerobic and Aerobic [[id number]] Collected: 03/12/23 0844   Order Status: Completed Specimen: Swab from Foot Updated: 03/12/23 1739    Specimen Description . FOOT RIGHT    Direct Exam NO NEUTROPHILS SEEN     NO ORGANISMS SEEN    Culture PENDING   Culture, Blood 1 [[id number]] Collected: 03/07/23 1238   Order Status: Completed Specimen: Blood Updated: 03/12/23 1514    Specimen Description . BLOOD    Special Requests LT HAND,6ML    Culture NO GROWTH 5 DAYS   Culture, Blood 1 [[id number]] Collected: 03/07/23 1246   Order Status: Completed Specimen: Blood Updated: 03/12/23 1513    Specimen Description . BLOOD    Special Requests 10ML,RTFA    Culture NO GROWTH 5 DAYS     Medications:      meropenem  1,000 mg IntraVENous Q12H    gabapentin  400 mg Oral TID    tiZANidine  4 mg Oral BID    daptomycin (CUBICIN) IVPB  6 mg/kg (Adjusted) IntraVENous Q24H    amLODIPine  5 mg Oral Daily    aspirin  81 mg Oral Daily    cetirizine  10 mg Oral Nightly    [Held by provider] glipiZIDE  20 mg Oral BID AC    alogliptin  12.5 mg Oral Daily insulin glargine  10 Units SubCUTAneous BID    atorvastatin  20 mg Oral Nightly    sodium chloride flush  5-40 mL IntraVENous 2 times per day    heparin (porcine)  5,000 Units SubCUTAneous 3 times per day    insulin lispro  0-8 Units SubCUTAneous TID WC    insulin lispro  0-4 Units SubCUTAneous Nightly       Electronically signed by Dorys Bose MD on 3/14/2023 at 8:59 AM      [facility]  Dorys Bose MD  [employer]  OFFICE: [phone]    Thank you for allowing us to participate in the care of this patient. Please call with questions. This note is created with the assistance of a speech recognition program.  While intending to generate a document that actually reflects the content of the visit, the document can still have some errors including those of syntax and sound a like substitutions which may escape proof reading. In such instances, actual meaning can be extrapolated by contextual diversion.

## 2023-03-14 NOTE — PROGRESS NOTES
[facility]  Office: [address] [facility], DO, Dale Moody, DO, Diana Canton, DO, Micaela Solis, DO, Timothy Mcarthur MD, Stanley Tran MD, Princess Tim MD, Jackelyn Slaughter MD,  Marcelle Ag MD, Nessa Foy MD, Ladell Scheuermann, DO, Darion Marinelli MD,  Dana Hylton MD, Abner Hogan MD, Praneeth Arias, DO, Mahogany Tracey MD, Onesimo Bennett MD, Lana Nichole DO, Bebeto Hoover MD, Elida Buenrostro MD, Pia Marrero MD, Brody Dimas MD, Bria Dumont MD, Zak Lemus DO, Will Arvizu MD, Kiki Ambrocio MD, Sari Marcelo, [facility],  Glenda Ang, CNP, Eduardo Lee, CNP, Yulia Ridley, CNP,  Killian Dean, [facility], Teena Fournier, CNP, Chary Guerra, CNP, Kamar Cobb, CNP, Arleen Alpers, CNP, Aide Lind, CNP, Jerene Oppenheim, PA-C, Ariana Hamilton, CNS, Rocky Fatima, CNP, Mary Kate Robison, [facility]    Progress Note    3/14/2023    10:07 AM    Name:   Ryan Lowery  MRN:     [id number]     [place]:      [de-identified]   Room:   2017/2017-02   Day:  11  Admit Date:  3/3/2023  2:54 PM    PCP:   Abigail Segura MD  Code Status:  Full Code    Subjective:     C/C:   Chief Complaint   Patient presents with    Wound Infection     Right foot     Interval History Status: improved. Patient sitting comfortably in bed, vital signs stable. No new issues overnight    Brief History:   Per my partner   This is a 73-year-old male that presents with right foot wound with concerns for infection. Recent outpatient culture with vancomycin-resistant Enterococcus faecium. He presented to the emergency room with worsening drainage and developed fevers and chills. Overnight he was started on Maxipime. Recent outpatient cultures were obtained with evidence of VRE. ID was consulted.   Patient later developed encephalopathy believed to be from cefepime as it is known to cause some CNS toxicities which was discontinued and he has been on meropenem and daptomycin. patient's mentation has improved off of the cefepime. S/P STJ arthrodesis, application of uniplanar external fixator, I&D, removal of hardware and insertion of antibiotic cement on 3/12/2023  Review of Systems:     Constitutional:  negative for chills, fevers, sweats  Respiratory:  negative for cough, dyspnea on exertion, shortness of breath, wheezing  Cardiovascular:  negative for chest pain, chest pressure/discomfort, lower extremity edema, palpitations  Gastrointestinal:  negative for abdominal pain, constipation, diarrhea, nausea, vomiting  Neurological:  negative for dizziness, headache  + right foot wound  Medications: Allergies:     Allergies   Allergen Reactions    Morphine Itching    Other Other (See Comments)     Other reaction(s): Unknown    Percocet [Oxycodone-Acetaminophen]      Facial swelling       Current Meds:   Scheduled Meds:    [START ON 3/15/2023] daptomycin (CUBICIN) IVPB  6 mg/kg (Adjusted) IntraVENous Q48H    meropenem  1,000 mg IntraVENous Q12H    gabapentin  400 mg Oral TID    tiZANidine  4 mg Oral BID    amLODIPine  5 mg Oral Daily    aspirin  81 mg Oral Daily    cetirizine  10 mg Oral Nightly    [Held by provider] glipiZIDE  20 mg Oral BID AC    alogliptin  12.5 mg Oral Daily    insulin glargine  10 Units SubCUTAneous BID    atorvastatin  20 mg Oral Nightly    sodium chloride flush  5-40 mL IntraVENous 2 times per day    heparin (porcine)  5,000 Units SubCUTAneous 3 times per day    insulin lispro  0-8 Units SubCUTAneous TID WC    insulin lispro  0-4 Units SubCUTAneous Nightly     Continuous Infusions:    dextrose      sodium chloride Stopped (03/05/23 1313)     PRN Meds: HYDROmorphone **OR** HYDROmorphone, HYDROcodone 5 mg - acetaminophen **OR** HYDROcodone 5 mg - acetaminophen, acetaminophen, glucose, dextrose bolus **OR** dextrose bolus, glucagon (rDNA), dextrose, sodium chloride flush, sodium chloride, ondansetron **OR** ondansetron, polyethylene glycol    Data:     Past Medical History:   has a past medical history of Abscess of right foot, Acquired hammer toe deformity of lesser toe of right foot, ALEJANDRO (acute kidney injury) ([facility] Cellulitis, Cellulitis of left foot, Cellulitis of right foot, Charcot foot due to diabetes mellitus ([facility] Chest pain at rest, Chronic multifocal osteomyelitis of right foot ([facility] CKD (chronic kidney disease), Diabetic polyneuropathy associated with type 2 diabetes mellitus ([facility] Essential hypertension, Fractures, multiple, Hyperlipidemia, Hypertension, Leukocytosis, MRSA (methicillin resistant staph aureus) culture positive, Neuropathy, Pain in right foot, Pneumonia, Right foot infection, Right foot pain, Tobacco abuse, Type II or unspecified type diabetes mellitus without mention of complication, not stated as uncontrolled, Vertigo, Well controlled type 2 diabetes mellitus with neurological manifestations ([facility] Wound dehiscence, Wound, open, and Wound, open. Social History:   reports that he has been smoking cigarettes. He has been smoking an average of .5 packs per day. He has never used smokeless tobacco. He reports that he does not currently use drugs. He reports that he does not drink alcohol. Family History:   Family History   Problem Relation Age of Onset    Diabetes Mother     Cancer Father     Hypertension Maternal Grandmother        Vitals:  BP (!) 145/65   Pulse 94   Temp 97.5 °F (36.4 °C) (Oral)   Resp 17   Ht 5' 10\" (1.778 m)   Wt 223 lb (101.2 kg)   SpO2 94%   BMI 32.00 kg/m²   Temp (24hrs), Av.8 °F (36.6 °C), Min:97.3 °F (36.3 °C), Max:98.4 °F (36.9 °C)    Recent Labs     23  1123 23  1640 23  1931 23  0615   POCGLU 203* 144* 116* 98       I/O (24Hr):     Intake/Output Summary (Last 24 hours) at 3/14/2023 1007  Last data filed at 3/14/2023 0740  Gross per 24 hour   Intake 499.18 ml   Output 600 ml   Net -100.82 ml Labs:  Hematology:  Recent Labs     03/13/23  0630 03/14/23  0629   SEDRATE  --  40*   .0*  --      Chemistry:  Recent Labs     03/12/23  1511 03/14/23  0629   NA  --  139   K  --  3.8   CL  --  107   CO2  --  22   GLUCOSE  --  100*   BUN  --  24*   CREATININE  --  3.18*   ANIONGAP  --  10   LABGLOM  --  21*   CALCIUM  --  9.4   CKTOTAL 34*  --      Recent Labs     03/12/23  2048 03/13/23  0653 03/13/23  1123 03/13/23  1640 03/13/23  1931 03/14/23  0615   POCGLU 155* 127* 203* 144* 116* 98     ABG:  Lab Results   Component Value Date/Time    POCPH 7.393 03/06/2023 02:39 PM    POCPCO2 36.8 03/06/2023 02:39 PM    POCPO2 73.0 03/06/2023 02:39 PM    POCHCO3 22.4 03/06/2023 02:39 PM    NBEA 2 03/06/2023 02:39 PM    ULLH5NUP 94 03/06/2023 02:39 PM    FIO2 21.0 03/06/2023 02:39 PM     Lab Results   Component Value Date/Time    SPECIAL 10ML,RTFA 03/07/2023 12:46 PM     Lab Results   Component Value Date/Time    CULTURE  03/12/2023 08:53 AM     DUE TO THE SPECIMEN TYPE, THE ORDER WAS CANCELED AND REORDERED. PLEASE REFER TO: ANAEROBIC, AEROBIC CULTURE    CULTURE CULTURE IN PROGRESS 03/12/2023 08:53 AM       Radiology:  XR FOOT RIGHT (MIN 3 VIEWS)    Result Date: 3/13/2023  Postop ankle and subtalar fusion with external fixator in place. CT FOOT RIGHT WO CONTRAST    Result Date: 3/11/2023  1. Periprosthetic lucency measuring between 3 and 4 mm involving the arthrodesis screws components located in the talus and calcaneus concerning for hardware loosening and/or infection as detailed above. 2. Arthrodesis screws involving the subtalar joint, traversing the 1st metatarsal head through the midfoot into the talus, as well as the 2nd TMT joint and the space between the 3rd and 4th TMT joints and calcaneocuboid joint. 3. Underlying probable neuropathic foot/Charcot arthropathy. 4. Mild-to-moderate degenerative changes of the midfoot.  5. Moderate soft tissue edema and swelling of the right foot and ankle likely cellulitis. No organized fluid collection. Effacement of the sinus tarsi fat. 6. Probable underlying severe Achilles tendinosis. Interstitial tearing of the Achilles tendon not excluded. 7. Deformity of the distal aspect of the proximal phalanx of the 3rd digit. 8. Proximal dislocation of the distal phalanx 1st digit in relation to the proximal phalanx. IR TUNNELED CVC PLACE WO SQ PORT/PUMP > 5 YEARS    Result Date: 3/13/2023  Successful ultrasound and fluoroscopy guided tunneled right internal jugular 6 Bhutanese double-lumen central venous catheter placement . MRI BRAIN WO CONTRAST    Result Date: 3/8/2023  Motion artifact limits evaluation. No acute intracranial abnormality.        Physical Examination:        General appearance:  alert, cooperative and no distress  Mental Status:  oriented to person, place and time and normal affect  Lungs:  clear to auscultation bilaterally, normal effort  Heart:  regular rate and rhythm, no murmur  Abdomen:  soft, nontender, nondistended, normal bowel sounds, no masses, hepatomegaly, splenomegaly  Extremities:  no edema, redness, tenderness in the calves  Skin:  right foot wound, with surgical dressing and external fixator in place    Assessment:        Hospital Problems             Last Modified POA    * (Principal) Diabetic foot infection ([address] with VRE 3/4/2023 Yes    Type 2 diabetes mellitus with right diabetic foot ulcer ([address] 3/3/2023 Yes    Charcot's joint of right foot 3/3/2023 Yes    Stage 3b chronic kidney disease ([address] (Chronic) 3/3/2023 Yes    Hyponatremia 3/3/2023 Yes    Microcytic anemia 3/3/2023 Yes    Infection due to vancomycin resistant Enterococcus faecium 3/4/2023 Yes    Acute metabolic encephalopathy 8/0/5385 Yes    Delirium due to another medical condition 3/9/2023 Yes    Altered mental status 3/10/2023 Yes    Essential hypertension (Chronic) 3/3/2023 Yes    Neuropathy 3/3/2023 Yes    Charcot foot due to diabetes mellitus ([address] 3/3/2023 Yes Hyperlipidemia 3/3/2023 Yes    Acute kidney injury superimposed on chronic kidney disease ([facility] 3/3/2023 Yes       Plan:        Continue IV antibiotics for total of 6 weeks through 4/23/2023  Monitor labs, replace electrolytes as needed  Glycemic control-continue Lantus twice daily and insulin sliding scale as needed.   Med rec and JERED completed  Continue pain control as needed  Continue local wound care, dressings to be changed every 2 to 3 days as directed by podiatry with home health care  Outpatient follow-up with podiatry in 1 week  Plan discussed with patient and staff  Plan for discharge home with home care later this afternoon    ROCHELLE Sorenson NP  3/14/2023  10:07 AM

## 2023-03-14 NOTE — CARE COORDINATION
Face sheet, script for walker and face to face note faxed to [facility]. Will deliver to patients room.

## 2023-03-14 NOTE — DISCHARGE SUMMARY
[facility]  Office: [address], DO, Benji Burton, DO, Tameka Key, DO, Billy Lowery Blood, DO, Dionne Yanez MD, Ginny Malave MD, Ishaan Guzmán MD, Darylene Sparks, MD,  Mireya Jovel MD, Vlad Carr MD, Casimiro Khan, DO, Britton Menezes MD,  Murray Catherine MD, Romeo Lucia MD, Alba Rubin DO, Zoya Calzada MD, Hai Oliveira MD, Andrew Doty, DO, Stark Mcardle, MD, Ivan Reilly MD, Britney Quinonez MD, Lan Castro MD, Hillis Kocher, MD, Beto Sosa, DO, Josefa Damon MD, Irma Gamez MD, Edwin Andrews, CNP,  Adela Winston, CNP, Melisa Salgado, CNP, Rubin Willett, CNP,  Joann Lenz, [facility], Delmi Marcos, CNP, Mario Stiles, CNP, Candee Dakin, CNP, Clifton Alejandro, CNP, Celia Sanchez, CNP, Merced Traylor PA-C, Marco A Jennings, CNS, Kimo Walker, CNP, Ady Browne, [facility]    Discharge Summary     Patient ID: Jules Mcintosh  :  1955   MRN: [id number]     ACCOUNT:  [de-identified]   Patient's PCP: Janette Page MD  Admit Date: 3/3/2023   Discharge Date: 3/14/2023     Length of Stay: 11  Code Status:  Full Code  Admitting Physician: Megha Brenner DO  Discharge Physician: ROCHELLE Granados NP     Active Discharge Diagnoses:     Hospital Problem Lists:  Principal Problem:    Diabetic foot infection [facility]) with VRE  Active Problems:    Type 2 diabetes mellitus with right diabetic foot ulcer ([facility]    Charcot's joint of right foot    Stage 3b chronic kidney disease ([facility] [address]    Microcytic anemia    Infection due to vancomycin resistant Enterococcus faecium    Essential hypertension    Neuropathy    Charcot foot due to diabetes mellitus ([facility] [address]    Hyperlipidemia  Resolved Problems:    Hyponatremia    Acute metabolic encephalopathy    Delirium due to another medical condition    Altered mental status    Acute kidney injury superimposed on chronic kidney disease [facility])      Admission Condition:  serious     Discharged Condition: stable    Hospital Stay:     Hospital Course:  Donna Condon is a 78 y.o. male who was admitted for the management of  Diabetic foot infection ([address] , presented to ER with Wound Infection (Right foot) Recent outpatient culture with vancomycin-resistant Enterococcus faecium. he was started on Maxipime and podiatry and ID were consulted. Patient later developed encephalopathy believed to be from cefepime as it is known to cause some CNS toxicities which was discontinued and he has been on meropenem and daptomycin. Patient's mentation has improved off of the cefepime. S/P STJ arthrodesis, application of uniplanar external fixator, I&D, removal of hardware and insertion of antibiotic cement on 3/12/2023. Patient is stable for discharge home with IV antbiotics via PICC line.      Significant therapeutic interventions: see above     Significant Diagnostic Studies:   Labs / Micro:  CBC:   Lab Results   Component Value Date/Time    WBC 7.4 03/07/2023 12:47 PM    RBC 2.99 03/07/2023 12:47 PM    HGB 8.4 03/07/2023 12:47 PM    HCT 24.8 03/07/2023 12:47 PM    MCV 82.9 03/07/2023 12:47 PM    MCH 28.1 03/07/2023 12:47 PM    MCHC 33.9 03/07/2023 12:47 PM    RDW 15.7 03/07/2023 12:47 PM     03/07/2023 12:47 PM     BMP:    Lab Results   Component Value Date/Time    GLUCOSE 100 03/14/2023 06:29 AM     03/14/2023 06:29 AM    K 3.8 03/14/2023 06:29 AM     03/14/2023 06:29 AM    CO2 22 03/14/2023 06:29 AM    ANIONGAP 10 03/14/2023 06:29 AM    BUN 24 03/14/2023 06:29 AM    CREATININE 3.18 03/14/2023 06:29 AM    BUNCRER 8 03/14/2023 06:29 AM    CALCIUM 9.4 03/14/2023 06:29 AM    LABGLOM 21 03/14/2023 06:29 AM    GFRAA 38 09/13/2022 06:41 PM    GFR      09/13/2022 06:41 PM     CMP:    Lab Results   Component Value Date/Time    GLUCOSE 100 03/14/2023 06:29 AM     03/14/2023 06:29 AM    K 3.8 03/14/2023 06:29 AM     03/14/2023 06:29 AM CO2 22 03/14/2023 06:29 AM    BUN 24 03/14/2023 06:29 AM    CREATININE 3.18 03/14/2023 06:29 AM    ANIONGAP 10 03/14/2023 06:29 AM    ALKPHOS 123 03/07/2023 12:47 PM    ALT 18 03/07/2023 12:47 PM    AST 25 03/07/2023 12:47 PM    BILITOT 0.2 03/07/2023 12:47 PM    LABALBU 2.8 03/07/2023 12:47 PM    ALBUMIN NOT REPORTED 12/03/2021 02:30 PM    LABGLOM 21 03/14/2023 06:29 AM    GFRAA 38 09/13/2022 06:41 PM    GFR      09/13/2022 06:41 PM    PROT 7.0 03/07/2023 12:47 PM    CALCIUM 9.4 03/14/2023 06:29 AM     Radiology:  XR FOOT RIGHT (MIN 3 VIEWS)    Result Date: 3/13/2023  Postop ankle and subtalar fusion with external fixator in place. CT FOOT RIGHT WO CONTRAST    Result Date: 3/11/2023  1. Periprosthetic lucency measuring between 3 and 4 mm involving the arthrodesis screws components located in the talus and calcaneus concerning for hardware loosening and/or infection as detailed above. 2. Arthrodesis screws involving the subtalar joint, traversing the 1st metatarsal head through the midfoot into the talus, as well as the 2nd TMT joint and the space between the 3rd and 4th TMT joints and calcaneocuboid joint. 3. Underlying probable neuropathic foot/Charcot arthropathy. 4. Mild-to-moderate degenerative changes of the midfoot. 5. Moderate soft tissue edema and swelling of the right foot and ankle likely cellulitis. No organized fluid collection. Effacement of the sinus tarsi fat. 6. Probable underlying severe Achilles tendinosis. Interstitial tearing of the Achilles tendon not excluded. 7. Deformity of the distal aspect of the proximal phalanx of the 3rd digit. 8. Proximal dislocation of the distal phalanx 1st digit in relation to the proximal phalanx. IR TUNNELED CVC PLACE WO SQ PORT/PUMP > 5 YEARS    Result Date: 3/13/2023  Successful ultrasound and fluoroscopy guided tunneled right internal jugular 6 Danish double-lumen central venous catheter placement .      MRI BRAIN WO CONTRAST    Result Date: 3/8/2023  Motion artifact limits evaluation. No acute intracranial abnormality. Consultations:    Consults:     Final Specialist Recommendations/Findings:   IP CONSULT TO PODIATRY  IP CONSULT TO HOSPITALIST  PHARMACY TO DOSE VANCOMYCIN  IP CONSULT TO INFECTIOUS DISEASES  PHARMACY TO DOSE VANCOMYCIN  IP CONSULT TO NEUROLOGY  IP CONSULT TO PSYCHIATRY      The patient was seen and examined on day of discharge and this discharge summary is in conjunction with any daily progress note from day of discharge. Discharge plan:     Disposition: Home    Physician Follow Up:   [address] [facility]   Follow up  home health agency    [place] [address]  [phone]    Schedule an appointment as soon as possible for a visit in 4 week(s)      [address]  [address] [facility] [facility]  [phone]    Follow up in 1 week(s)       Requiring Further Evaluation/Follow Up POST HOSPITALIZATION/Incidental Findings: outpatient follow up with Infectious disease and podiatry    Diet: diabetic diet    Activity: As tolerated    Instructions to Patient: Take all medications as prescribed, follow-up with healthcare providers as discussed, continue dressing changes at home every 2 to 3 days with home health care, reduce use of narcotics as pain becomes more manageable    Discharge Medications:      Medication List        START taking these medications      DAPTOmycin  infusion  Commonly known as: CUBICIN  Infuse 500 mg intravenously every 48 hours Through 4/23/2023 compound per protocol. HYDROcodone-acetaminophen 5-325 MG per tablet  Commonly known as: NORCO  Take 2 tablets by mouth every 6 hours as needed for Pain for up to 7 days.  Max Daily Amount: 8 tablets  Replaces: HYDROcodone-acetaminophen  MG per tablet     meropenem  infusion  Commonly known as: MERREM  Infuse 1,000 mg intravenously in the morning and 1,000 mg in the evening. Continue through 4/23/2023 compound per protocol..     tiZANidine 4 MG tablet  Commonly known as: ZANAFLEX  Take 1 tablet by mouth in the morning and at bedtime for 10 days            CHANGE how you take these medications      gabapentin 400 MG capsule  Commonly known as: NEURONTIN  Take 1 capsule by mouth 3 times daily for 30 days.  What changed:   medication strength  how much to take  additional instructions            CONTINUE taking these medications      amLODIPine 5 MG tablet  Commonly known as: NORVASC     aspirin 81 MG tablet     cetirizine 10 MG tablet  Commonly known as: ZYRTEC     glipiZIDE 10 MG tablet  Commonly known as: GLUCOTROL     Januvia 100 MG tablet  Generic drug: SITagliptin     Lantus SoloStar 100 UNIT/ML injection pen  Generic drug: insulin glargine     Misc. Devices Misc  1 PAIR OF DIABETIC SHOES (1 LEFT/ 1 RIGHT)  1-3 PAIRS OF INSERTS (LEFT/ RIGHT)     simvastatin 40 MG tablet  Commonly known as: ZOCOR     TRUEplus Pen Needles 31G X 8 MM Misc  Generic drug: Insulin Pen Needle            STOP taking these medications      cyclobenzaprine 10 MG tablet  Commonly known as: FLEXERIL     HYDROcodone-acetaminophen  MG per tablet  Commonly known as: NORCO  Replaced by: HYDROcodone-acetaminophen 5-325 MG per tablet     ketorolac 10 MG tablet  Commonly known as: TORADOL     mupirocin 2 % ointment  Commonly known as: BACTROBAN               Where to Get Your Medications        These medications were sent to [id number] - [place], [place] - [address] -  [phone] - F [phone]  [address] [place] [address]      Phone: [phone]   gabapentin 400 MG capsule  HYDROcodone-acetaminophen 5-325 MG per tablet  tiZANidine 4 MG tablet       You can get these medications from any pharmacy    Bring a paper prescription for each of these medications  DAPTOmycin  infusion  meropenem  infusion         No  discharge procedures on file. Time Spent on discharge is  37 mins in patient examination, evaluation, counseling as well as medication reconciliation, prescriptions for required medications, discharge plan and follow up. Electronically signed by   ROCHELLE Harman NP  3/14/2023  1:52 PM      Thank you Dr. Rudy Hutson MD for the opportunity to be involved in this patient's care.

## 2023-03-14 NOTE — DISCHARGE INSTRUCTIONS
Podiatric Post Operative Instructions: You have had a surgical procedure on your right foot. Fluids and Diet:  Begin with clear liquids, broth, dry toast, and crackers. If not nauseated then resume your regular pre-operative diet when you are ready    Medications: Take your prescriptions as directed  You are receiving new prescriptions for Percocet, gabapentin, tizanidine  If your pain is not severe then you may take the non-prescription medication that you normally take for aches and pains  You may resume your regularly scheduled medications (unless otherwise directed)  If any side effects or adverse reactions occur, discontinue the medication and contact your doctor. Review the patient drug information that is provided before you take any medication    Ambulation and Activity:  You are advised to go directly home from the hospital  Use bedside commode, walker, or scooter as needed  You may not put weight on the operated foot. You should wear the surgical shoe on the operated foot at all times when awake. Avoid stairs if possible. Do not lift or move heavy objects  Do not drive until cleared by your physician    Bandage and Wound Care Instructions:  Dressings changed today with Xeroform around pin sites and over incisions, 4x4 gauze, ABD pads, kerlix and ACE wrap. Plan for [place] [address] to change dressings every 2-3 days as above. Keep bandage clean and dry  Do not shower or bathe the operative extremity  Do not remove the bandage (unless otherwise directed)   Do not attempt to put anything between the cast or dressing and your skin, some itching is normal.    Ice and Elevation:  Elevate operative extremity as much as possible to reduce swelling and discomfort. Elevate with 2 pillows at or above the level of the heart for the first 72 hours. Ice:  [facility] dispensed insulated ice bag over the bandage 20 minutes of every hour while awake for the first 72 hours.   You may ice behind the knee as well.    Special Instructions: Call your doctor immediately if you develop any of the following. Fever over 100.4 degrees Fahrenheit by mouth - take your temperature daily until your first follow up visit. Pain not relieved by medication ordered  Swelling, increased redness, warmth, or hardness around operative area. Numb, tingling or cold toes. Toe(s) become white or bluish  Bandage becomes wet, soiled, or blood soaked (small amount of bleeding may be normal)  Increased or progressive drainage from surgical area. Follow up instructions: You will need to follow up with Dr. Jossue Boss  Call when you get home to schedule or confirm your appointment. Call your Podiatrist office if you have any questions or concerns.

## 2023-03-14 NOTE — PLAN OF CARE
Donna Condon was evaluated today and a DME order was entered for a wheeled walker because he requires this to successfully complete daily living tasks of bathing, toileting, personal cares, and ambulating. A wheeled walker is necessary due to the patient's unsteady gait, upper body weakness, and inability to  an ambulation device; and he can ambulate only by pushing a walker instead of a lesser assistive device such as a cane, crutch, or standard walker. The need for this equipment was discussed with the patient and he understands and is in agreement.      Ambreen Viera, ROCHELLE - CNP

## 2023-03-14 NOTE — PROGRESS NOTES
Patient discharged via wheelchair to home with [facility] care and all his belongings in stable condition. Meds to bed delivered prior to discharge.  Patient understood and signed AVS.

## 2023-03-14 NOTE — PLAN OF CARE
Problem: Discharge Planning  Goal: Discharge to home or other facility with appropriate resources  3/14/2023 0021 by Gladis Mondragon RN  Outcome: Progressing  Flowsheets (Taken 3/14/2023 0021)  Discharge to home or other facility with appropriate resources:   Identify barriers to discharge with patient and caregiver   Arrange for needed discharge resources and transportation as appropriate   Identify discharge learning needs (meds, wound care, etc)  3/13/2023 1349 by Marybeth Martell RN  Outcome: Progressing  Flowsheets (Taken 3/13/2023 0830)  Discharge to home or other facility with appropriate resources:   Identify barriers to discharge with patient and caregiver   Arrange for needed discharge resources and transportation as appropriate   Identify discharge learning needs (meds, wound care, etc)     Problem: Pain  Goal: Verbalizes/displays adequate comfort level or baseline comfort level  3/14/2023 0021 by Gladis Mondragon RN  Outcome: Progressing  Note: Pain level assessment complete.    Patient educated on pain scale and control interventions  PRN pain medication given per patient request  Patient instructed to call out with new onset of pain or unrelieved pain    3/13/2023 1349 by Marybeth Martell RN  Outcome: Progressing     Problem: Safety - Adult  Goal: Free from fall injury  3/14/2023 0021 by Gladis Mondragon RN  Outcome: Progressing  Note: Proper pt identification  Hourly rounding performed  Anticipatory needs met  Non-skid socks worn  Proper transferring technique  2/4 side rails up  Personal necessities within reach  Bed low and locked  Call light in reach  Proper lighting  Room free of clutter      3/13/2023 1349 by Marybeth Martell RN  Outcome: Progressing     Problem: ABCDS Injury Assessment  Goal: Absence of physical injury  3/14/2023 0021 by Gladis Mondragon RN  Outcome: Progressing  Flowsheets (Taken 3/13/2023 0554)  Absence of Physical Injury: Implement safety measures based on patient assessment  3/13/2023 1349 by Trey Benedict RN  Outcome: Progressing     Problem: Neurosensory - Adult  Goal: Achieves maximal functionality and self care  3/14/2023 0021 by Tino Rosenthal RN  Outcome: Progressing  Flowsheets (Taken 3/7/2023 0750 by Bernie Garcia RN)  Achieves maximal functionality and self care:   Monitor swallowing and airway patency with patient fatigue and changes in neurological status   Encourage and assist patient to increase activity and self care with guidance from physical therapy/occupational therapy  3/13/2023 1349 by Trey Benedict RN  Outcome: Progressing     Problem: Respiratory - Adult  Goal: Achieves optimal ventilation and oxygenation  3/14/2023 0021 by Tino Rosenthal RN  Outcome: Progressing  Flowsheets (Taken 3/5/2023 0935 by Bernie Garcia RN)  Achieves optimal ventilation and oxygenation:   Assess for changes in respiratory status   Position to facilitate oxygenation and minimize respiratory effort   Encourage broncho-pulmonary hygiene including cough, deep breathe, incentive spirometry  3/13/2023 1349 by Trey Benedict RN  Outcome: Progressing     Problem: Cardiovascular - Adult  Goal: Maintains optimal cardiac output and hemodynamic stability  3/14/2023 0021 by Tino Rosenthal RN  Outcome: Progressing  Flowsheets (Taken 3/7/2023 0750 by Bernie Garcia RN)  Maintains optimal cardiac output and hemodynamic stability:   Monitor blood pressure and heart rate   Monitor urine output and notify Licensed Independent Practitioner for values outside of normal range   Assess for signs of decreased cardiac output  3/13/2023 1349 by Trey Benedict RN  Outcome: Progressing  Goal: Absence of cardiac dysrhythmias or at baseline  3/14/2023 0021 by Tino Rosenthal RN  Outcome: Progressing  Flowsheets (Taken 3/7/2023 0750 by Bernie Garcia RN)  Absence of cardiac dysrhythmias or at baseline: Monitor cardiac rate and rhythm  3/13/2023 1349 by Trey Benedict RN  Outcome: Progressing     Problem: Skin/Tissue Integrity - Adult  Goal: Skin integrity remains intact  3/14/2023 0021 by Kishor Zavala RN  Outcome: Progressing  Flowsheets (Taken 3/13/2023 0830 by Tyra Gilliland RN)  Skin Integrity Remains Intact: Monitor for areas of redness and/or skin breakdown  3/13/2023 1349 by Tyra Gilliland RN  Outcome: Progressing  Flowsheets (Taken 3/13/2023 0830)  Skin Integrity Remains Intact: Monitor for areas of redness and/or skin breakdown  Goal: Incisions, wounds, or drain sites healing without S/S of infection  3/14/2023 0021 by Kishor Zavala RN  Outcome: Progressing  Flowsheets (Taken 3/13/2023 0830 by Tyra Gilliland RN)  Incisions, Wounds, or Drain Sites Healing Without Sign and Symptoms of Infection: TWICE DAILY: Assess and document skin integrity  3/13/2023 1349 by Tyra Gilliland RN  Outcome: Progressing  Flowsheets (Taken 3/13/2023 0830)  Incisions, Wounds, or Drain Sites Healing Without Sign and Symptoms of Infection: TWICE DAILY: Assess and document skin integrity  Goal: Oral mucous membranes remain intact  3/14/2023 0021 by Kishor Zavala RN  Outcome: Progressing  Flowsheets (Taken 3/13/2023 0830 by Tyra Gilliland RN)  Oral Mucous Membranes Remain Intact: Assess oral mucosa and hygiene practices  3/13/2023 1349 by Tyra Gilliland RN  Outcome: Progressing  Flowsheets (Taken 3/13/2023 0830)  Oral Mucous Membranes Remain Intact: Assess oral mucosa and hygiene practices     Problem: Musculoskeletal - Adult  Goal: Return mobility to safest level of function  3/14/2023 0021 by Kishor Zavala RN  Outcome: Progressing  Flowsheets (Taken 3/13/2023 0830 by Tyra Gilliland RN)  Return Mobility to Safest Level of Function:   Assess patient stability and activity tolerance for standing, transferring and ambulating with or without assistive devices   Assist with transfers and ambulation using safe patient handling equipment as needed  3/13/2023 1349 by Tyra Gilliland RN  Outcome: Progressing  Flowsheets (Taken 3/13/2023 0830)  Return Mobility to Safest Level of Function:   Assess patient stability and activity tolerance for standing, transferring and ambulating with or without assistive devices   Assist with transfers and ambulation using safe patient handling equipment as needed  Goal: Maintain proper alignment of affected body part  3/14/2023 0021 by Dale Barillas RN  Outcome: Progressing  Flowsheets (Taken 3/13/2023 0830 by Javed Clark RN)  Maintain proper alignment of affected body part: Support and protect limb and body alignment per provider's orders  3/13/2023 1349 by Javed Clark RN  Outcome: Progressing  Flowsheets (Taken 3/13/2023 0830)  Maintain proper alignment of affected body part: Support and protect limb and body alignment per provider's orders  Goal: Return ADL status to a safe level of function  3/14/2023 0021 by Dale Barillas RN  Outcome: Progressing  Flowsheets (Taken 3/13/2023 0830 by Javed Clark RN)  Return ADL Status to a Safe Level of Function:   Administer medication as ordered   Assess activities of daily living deficits and provide assistive devices as needed  3/13/2023 1349 by Javed Clark RN  Outcome: Progressing  Flowsheets (Taken 3/13/2023 0830)  Return ADL Status to a Safe Level of Function:   Administer medication as ordered   Assess activities of daily living deficits and provide assistive devices as needed     Problem: Gastrointestinal - Adult  Goal: Minimal or absence of nausea and vomiting  3/14/2023 0021 by Dale Barillas RN  Outcome: Progressing  Flowsheets (Taken 3/13/2023 0830 by Javed Clark RN)  Minimal or absence of nausea and vomiting: Administer ordered antiemetic medications as needed  3/13/2023 1349 by Javed Clark RN  Outcome: Progressing  Flowsheets (Taken 3/13/2023 0830)  Minimal or absence of nausea and vomiting: Administer ordered antiemetic medications as needed  Goal: Maintains or returns to baseline bowel function  3/14/2023 0021 by Thais Mena RN  Outcome: Progressing  Flowsheets (Taken 3/13/2023 0830 by Ava Randle RN)  Maintains or returns to baseline bowel function:   Assess bowel function   Administer ordered medications as needed  3/13/2023 1349 by Ava Randle RN  Outcome: Progressing  Flowsheets (Taken 3/13/2023 0830)  Maintains or returns to baseline bowel function:   Assess bowel function   Administer ordered medications as needed  Goal: Maintains adequate nutritional intake  3/14/2023 0021 by Thais Mena RN  Outcome: Progressing  Flowsheets (Taken 3/13/2023 0830 by Ava Randle RN)  Maintains adequate nutritional intake:   Monitor percentage of each meal consumed   Assist with meals as needed  3/13/2023 1349 by Ava Randle RN  Outcome: Progressing  Flowsheets (Taken 3/13/2023 0830)  Maintains adequate nutritional intake:   Monitor percentage of each meal consumed   Assist with meals as needed     Problem: Genitourinary - Adult  Goal: Absence of urinary retention  3/14/2023 0021 by Thais Mena RN  Outcome: Progressing  Flowsheets (Taken 3/13/2023 0830 by Ava Randle RN)  Absence of urinary retention: Assess patients ability to void and empty bladder  3/13/2023 1349 by Ava Randle RN  Outcome: Progressing  Flowsheets (Taken 3/13/2023 0830)  Absence of urinary retention: Assess patients ability to void and empty bladder     Problem: Infection - Adult  Goal: Absence of infection at discharge  3/14/2023 0021 by Thais Mena RN  Outcome: Progressing  Flowsheets (Taken 3/13/2023 0830 by Ava Randle RN)  Absence of infection at discharge:   Assess and monitor for signs and symptoms of infection   Monitor lab/diagnostic results  3/13/2023 1349 by Ava Randle RN  Outcome: Progressing  Flowsheets (Taken 3/13/2023 0830)  Absence of infection at discharge:   Assess and monitor for signs and symptoms of infection   Monitor lab/diagnostic results  Goal: Absence of infection during hospitalization  3/14/2023 0021 by Kishor Zavala RN  Outcome: Progressing  Flowsheets (Taken 3/13/2023 0830 by Tyra Gilliland RN)  Absence of infection during hospitalization:   Assess and monitor for signs and symptoms of infection   Monitor lab/diagnostic results  3/13/2023 1349 by Tyra Gilliland RN  Outcome: Progressing  Flowsheets (Taken 3/13/2023 0830)  Absence of infection during hospitalization:   Assess and monitor for signs and symptoms of infection   Monitor lab/diagnostic results  Goal: Absence of fever/infection during anticipated neutropenic period  3/14/2023 0021 by Kishor Zavala RN  Outcome: Progressing  Flowsheets (Taken 3/13/2023 0830 by Tyra Gilliland RN)  Absence of fever/infection during anticipated neutropenic period: Monitor white blood cell count  3/13/2023 1349 by Tyra Gilliland RN  Outcome: Progressing  Flowsheets (Taken 3/13/2023 0830)  Absence of fever/infection during anticipated neutropenic period: Monitor white blood cell count     Problem: Metabolic/Fluid and Electrolytes - Adult  Goal: Electrolytes maintained within normal limits  3/14/2023 0021 by Kishor Zavala RN  Outcome: Progressing  Flowsheets (Taken 3/13/2023 0830 by Tyra Gilliland RN)  Electrolytes maintained within normal limits:   Monitor labs and assess patient for signs and symptoms of electrolyte imbalances   Administer electrolyte replacement as ordered  3/13/2023 1349 by Tyra Gilliland RN  Outcome: Progressing  Flowsheets (Taken 3/13/2023 0830)  Electrolytes maintained within normal limits:   Monitor labs and assess patient for signs and symptoms of electrolyte imbalances   Administer electrolyte replacement as ordered  Goal: Hemodynamic stability and optimal renal function maintained  3/14/2023 0021 by Kishor Zavala RN  Outcome: Progressing  Flowsheets (Taken 3/13/2023 0830 by Tyra Gilliland RN)  Hemodynamic stability and optimal renal function maintained: Monitor labs and assess for signs and symptoms of volume excess or deficit  3/13/2023 1349 by Cheryl Carter RN  Outcome: Progressing  Flowsheets (Taken 3/13/2023 0830)  Hemodynamic stability and optimal renal function maintained: Monitor labs and assess for signs and symptoms of volume excess or deficit  Goal: Glucose maintained within prescribed range  3/14/2023 0021 by Mala Allen RN  Outcome: Progressing  Flowsheets (Taken 3/13/2023 0830 by Cheryl Carter RN)  Glucose maintained within prescribed range:   Monitor blood glucose as ordered   Administer ordered medications to maintain glucose within target range  3/13/2023 1349 by Cheryl Carter RN  Outcome: Progressing  Flowsheets (Taken 3/13/2023 0830)  Glucose maintained within prescribed range:   Monitor blood glucose as ordered   Administer ordered medications to maintain glucose within target range     Problem: Hematologic - Adult  Goal: Maintains hematologic stability  3/14/2023 0021 by Mala Allen RN  Outcome: Progressing  Flowsheets (Taken 3/13/2023 0830 by Cheryl Carter RN)  Maintains hematologic stability:   Assess for signs and symptoms of bleeding or hemorrhage   Monitor labs for bleeding or clotting disorders  3/13/2023 1349 by Cheryl Carter RN  Outcome: Progressing  Flowsheets (Taken 3/13/2023 0830)  Maintains hematologic stability:   Assess for signs and symptoms of bleeding or hemorrhage   Monitor labs for bleeding or clotting disorders     Problem: Chronic Conditions and Co-morbidities  Goal: Patient's chronic conditions and co-morbidity symptoms are monitored and maintained or improved  3/14/2023 0021 by Mala Allen RN  Outcome: Progressing  Flowsheets (Taken 3/13/2023 0830 by Cheryl Carter RN)  Care Plan - Patient's Chronic Conditions and Co-Morbidity Symptoms are Monitored and Maintained or Improved:   Monitor and assess patient's chronic conditions and comorbid symptoms for stability, deterioration, or improvement    Collaborate with multidisciplinary team to address chronic and comorbid conditions and prevent exacerbation or deterioration  3/13/2023 1349 by Segun Mathias RN  Outcome: Progressing  Flowsheets (Taken 3/13/2023 0830)  Care Plan - Patient's Chronic Conditions and Co-Morbidity Symptoms are Monitored and Maintained or Improved:   Monitor and assess patient's chronic conditions and comorbid symptoms for stability, deterioration, or improvement   Collaborate with multidisciplinary team to address chronic and comorbid conditions and prevent exacerbation or deterioration     Problem: Skin/Tissue Integrity  Goal: Absence of new skin breakdown  Description: 1. Monitor for areas of redness and/or skin breakdown  2. Assess vascular access sites hourly  3. Every 4-6 hours minimum:  Change oxygen saturation probe site  4. Every 4-6 hours:  If on nasal continuous positive airway pressure, respiratory therapy assess nares and determine need for appliance change or resting period.   3/14/2023 0021 by Addi Caro RN  Outcome: Progressing  Note: Skin assessment completed and documented  Damaso scale updated  Relieve pressure to bony prominences  Avoid shearing  Assess s/sx of infection   Turns self  Heels elevated  Structural intactness and normal physiological function of skin and mucous membranes    3/13/2023 1349 by Segun Mathias RN  Outcome: Progressing     Problem: Nutrition Deficit:  Goal: Optimize nutritional status  3/14/2023 0021 by Addi Caro RN  Outcome: Progressing  Flowsheets (Taken 3/13/2023 0554)  Nutrient intake appropriate for improving, restoring, or maintaining nutritional needs:   Assess nutritional status and recommend course of action   Monitor oral intake, labs, and treatment plans  3/13/2023 1349 by Segun Mathias RN  Outcome: Progressing

## 2023-03-14 NOTE — PROGRESS NOTES
Progress Note  Podiatric Medicine and Surgery     Subjective     CC: S/p STJ arthrodesis, application of uniplanar external fixator I&D. Removal of hardware and insertion of abx cement (DOS: 3/12/23)    Interval history:  -Patient seen and examined at bedside     -Patient's pain is well controlled this time. Does complain of pain at pin sites from external fixator.  -Patient PICC line placement yesterday.  -Plan for patient to go home today with Chilango Gomez per case management  -No other complaints at this time    HPI:  Reji Ralph is a 78 y.o. male seen at [address] [place], [place] [address],[address] for right foot pain and swelling. Patient is well known to podiatry service and has been following outpatient with Dr. Adore Biswas since surgery on 22 to right foot. Patient states he has been compliant with wound care and walking in CAM boot, however noticed worsening pain and redness to right foot, prompting him to get CT at outside facility and presenting to [facility] ED. Patient states he has not been on IV antibiotics for a while, does follow Infectious disease who saw him on 3/1/23. Patient denies any constitutional symptoms at this time. Patient is type 2 diabetic, ALEJANDRO on CKD, and has history of Charcot foot. No further pedal complaints at this time.      PCP is Eloy Alfaro MD      Objective     Vitals:  Patient Vitals for the past 8 hrs:   BP Temp Temp src Pulse Resp SpO2 Weight   23 1057 120/65 98.2 °F (36.8 °C) Oral 84 17 93 % --   23 0745 (!) 145/65 97.5 °F (36.4 °C) Oral 94 17 94 % --   23 0348 -- -- -- -- -- -- 223 lb (101.2 kg)       Average, Min, and Max for last 24 hours Vitals:  TEMPERATURE:  Temp  Av.9 °F (36.6 °C)  Min: 97.3 °F (36.3 °C)  Max: 98.4 °F (36.9 °C)    RESPIRATIONS RANGE: Resp  Av.8  Min: 7  Max: 18    PULSE RANGE: Pulse  Av.9  Min: 78  Max: 94    BLOOD PRESSURE RANGE:  Systolic (89VAW), WVQ:788 , Min:115 , GVH:036   ; Diastolic (46TFL), TJU:06, Min:63, Max:77      PULSE OXIMETRY RANGE: SpO2  Av.1 %  Min: 91 %  Max: 97 %  I&O:  I/O last 3 completed shifts: In: 249.2 [IV Piggyback:249.2]  Out: 1150 [NVZWW:3461]    CBC:  Recent Labs     23  0630   .0*          BMP:  Recent Labs     23  0629      K 3.8      CO2 22   BUN 24*   CREATININE 3.18*   GLUCOSE 100*   CALCIUM 9.4          Coags:  No results for input(s): APTT, PROT, INR in the last 72 hours. Lab Results   Component Value Date    LABA1C 6.9 (H) 10/05/2022     Lab Results   Component Value Date    SEDRATE 40 (H) 2023     Lab Results   Component Value Date    .0 (H) 2023         Lower Extremity Physical Exam: Physical exam from 3/11/23. Vascular: DP and PT pulses are palpable, bilaterally. CFT <4 seconds to all digits. Hair growth is absent to the level of the digits. Nonpitting edema to lateral ankle right foot. Neuro: Saph/sural/SP/DP/plantar sensation diminished to light touch. Musculoskeletal: Muscle strength deferred at this time due to external fixator in place. Dermatologic: Surgical incisions to lateral and posterior heel. Incisions well coapted. Lateral incision packed with iodoform packing. Minimal drainage noted from the sites, with drainage or is a serosanguineous. External fixator in place. No signs of pin site infections. Minimal erythema around pin sites. Clinical Images:                 Imaging:   IR TUNNELED CVC PLACE WO SQ PORT/PUMP > 5 YEARS   Final Result   Successful ultrasound and fluoroscopy guided tunneled right internal jugular   6 Serbian double-lumen central venous catheter placement . XR FOOT RIGHT (MIN 3 VIEWS)   Final Result   Postop ankle and subtalar fusion with external fixator in place. FLUORO FOR SURGICAL PROCEDURES   Final Result      CT FOOT RIGHT WO CONTRAST   Final Result   1.  Periprosthetic lucency measuring between 3 and 4 mm involving the   arthrodesis screws components located in the talus and calcaneus concerning   for hardware loosening and/or infection as detailed above. 2. Arthrodesis screws involving the subtalar joint, traversing the 1st   metatarsal head through the midfoot into the talus, as well as the 2nd TMT   joint and the space between the 3rd and 4th TMT joints and calcaneocuboid   joint. 3. Underlying probable neuropathic foot/Charcot arthropathy. 4. Mild-to-moderate degenerative changes of the midfoot. 5. Moderate soft tissue edema and swelling of the right foot and ankle likely   cellulitis. No organized fluid collection. Effacement of the sinus tarsi   fat. 6. Probable underlying severe Achilles tendinosis. Interstitial tearing of   the Achilles tendon not excluded. 7. Deformity of the distal aspect of the proximal phalanx of the 3rd digit. 8. Proximal dislocation of the distal phalanx 1st digit in relation to the   proximal phalanx. MRI BRAIN WO CONTRAST   Final Result   Motion artifact limits evaluation. No acute intracranial abnormality. CT HEAD WO CONTRAST   Final Result   No acute intracranial abnormality. US RETROPERITONEAL COMPLETE   Final Result   Mildly echogenic renal parenchyma suggestive of intrinsic renal parenchymal   disease. No hydronephrosis. Assessment     Brad Boucher is a 78 y.o. male with   S/p STJ arthrodesis, application of uniplanar external fixator I&D.  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 *        Plan     Patient examined and evaluated at bedside   Treatment options discussed in detail with the patient  CT results reviewed. Findings consistent with hardware loosening vs infection to hardware  Pt has an external fixator (monorail) in place to the right foot  Dressings changed today with Xeroform around the pin sites and over incisions, 4x4 gauze, ABD pads, kerlix and ACE wrap. Plan for [facility] to change dressings every 2-3 days as above. Non weight bearing to Right lower extremity  Rx Norco, gabapentin, tizanidine for pain. Meropenem and daptomycin on discharge through newly placed PICC line, per ID  Patient to follow-up with Dr. Valencia Ford in his office within 1 week.   Discussed with Dr. Delmis Holman DPM   Podiatric Medicine & Surgery   3/14/2023 at 11:00 AM

## 2023-03-14 NOTE — PROGRESS NOTES
Occupational Therapy  Facility/Department: [facility]  Rehabilitation Occupational Therapy Daily Treatment Note    Date: 3/14/23  Patient Name: Brad Boucher       Room:   MRN: [id number]  Account: [de-identified]   : 1955  (69 y.o.) Gender: male      CHRISTY Pfeiffer reports patient is medically stable for therapy treatment this date. Chart reviewed prior to treatment and patient is agreeable for therapy. All lines intact and patient positioned comfortably at end of treatment. All patient needs addressed prior to ending therapy session. Due to recent hospitalization and medical condition, pt would benefit from additional intermittent skilled therapy at time of discharge. Please refer to the AM-PAC score for current functional status. Past Medical History:  has a past medical history of Abscess of right foot, Acquired hammer toe deformity of lesser toe of right foot, ALEJANDRO (acute kidney injury) ([address] Cellulitis, Cellulitis of left foot, Cellulitis of right foot, Charcot foot due to diabetes mellitus ([address] Chest pain at rest, Chronic multifocal osteomyelitis of right foot ([address] CKD (chronic kidney disease), Diabetic polyneuropathy associated with type 2 diabetes mellitus ([address] Essential hypertension, Fractures, multiple, Hyperlipidemia, Hypertension, Leukocytosis, MRSA (methicillin resistant staph aureus) culture positive, Neuropathy, Pain in right foot, Pneumonia, Right foot infection, Right foot pain, Tobacco abuse, Type II or unspecified type diabetes mellitus without mention of complication, not stated as uncontrolled, Vertigo, 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 Past Surgical History:   has a past surgical history that includes Neck surgery (); Appendectomy; knee surgery (Bilateral, ); Knee arthroscopy (Right, ); Knee arthroscopy (Left, );  Foot Debridement (Left, 04/24/2018); pr i&d below fascia foot 1 bursal space (Left, 04/24/2018); Colonoscopy; Foot Debridement (Right, 03/20/2020); arthroplasty (Right, 12/11/2020); Foot Debridement (Right, 06/08/2021); Foot surgery (Right, 06/11/2021); Foot Debridement (Right, 09/16/2021); IR INSERT PICC VAD W SQ PORT >5 YEARS (10/07/2022); Foot surgery (Right, 10/12/2022); Ankle surgery (Right, 10/12/2022); Ankle surgery (Right, 11/15/2022); Foot surgery (Right, 12/27/2022); arthrodesis (Right, 12/27/2022); Foot surgery (Right, 12/27/2022); Foot surgery (Right, 3/12/2023); and IR TUNNELED CVC PLACE WO SQ PORT/PUMP > 5 YEARS (3/13/2023). Restrictions  Restrictions/Precautions: General Precautions, Fall Risk, Weight Bearing  Other position/activity restrictions: Up w/ assist  Right Lower Extremity Weight Bearing: Weight Bearing As Tolerated  Required Braces or Orthoses  Right Lower Extremity Brace: Boot  RLE Brace Type: CAM Boot  Required Braces or Orthoses?: Yes    Subjective  Subjective: Pt resting in bed upon arrival agreeable to treatment. Restrictions/Precautions: General Precautions; Fall Risk;Weight Bearing             Objective     Cognition  Overall Cognitive Status: Exceptions  Arousal/Alertness: Appropriate responses to stimuli  Following Commands:  Follows multistep commands with repitition  Attention Span: Attends with cues to redirect  Memory: Appears intact  Safety Judgement: Decreased awareness of need for safety  Problem Solving: Decreased awareness of errors;Assistance required to identify errors made;Assistance required to correct errors made  Insights: Decreased awareness of deficits  Initiation: Requires cues for some  Sequencing: Requires cues for some  Orientation  Overall Orientation Status: Within Functional Limits         ADL  Putting On/Taking Off Footwear  Assistance Level: Stand by assist;Set-up  Skilled Clinical Factors: Able to jeannette L sock while seated EOB          Functional Mobility  Device: Rolling walker  Assistance Level: Minimal assistance  Skilled Clinical Factors: Hopped in room able to maintain being up for 1-2 min before pt wanted to be done with mobility. Pt with poor adherence to NWB status d/t placing it on ground and attempting to walk on it at times. Max VC's for slow/controlled movement, NWB status to RLE, pursed lip breathing, pacing self, visual scanning, overall safety, and awareness/assist with IV pole. Bed Mobility  Overall Assistance Level: Independent  Additional Factors: Head of bed flat  Sit to Supine  Assistance Level: Independent  Supine to Sit  Assistance Level: Independent  Scooting  Assistance Level: Independent  Sit to Stand  Assistance Level: Minimal assistance; Moderate assistance; Requires x 2 assistance  Skilled Clinical Factors: During initial stand from EOB pt required Min-Mod A x2 when attempting to find balance d/t NWB on RLE but then progressed to [address] A for remainder of session. Max VC's for squaring self/AD and reaching back prior to sitting, upright posture, pacing self, pushing up from surface when standing, NWB to RLE, and awareness/assist with IV pole. Stand to Sit  Assistance Level: Minimal assistance; Moderate assistance; Requires x 2 assistance   OT Exercises  Exercise Treatment: Pt. completed [address] program with graded theraband to promote functional strength and endurance to return home. Pt. completed 1 set of exercises with success and good form with theraband. Pt was able to remember all exercises from yesterday session. Assessment  Assessment  Assessment: Pt with some difficulty maintaining NWB status throughout session during mobility and transfers, but pt is not receptive when given cueing and prefers to do things his own way. Pt was agreeable to completing UB theraband exercises at end of session. Skilled OT warranted to promote I/safety to return pt to prior living arrangement with assist as needed.   Activity Tolerance: Patient limited by endurance; Patient tolerated treatment well  Discharge Recommendations: Home with assist PRN  OT Equipment Recommendations  Equipment Needed: Yes  Mobility Devices: Dolph Clotilde: Rolling  Safety Devices  Safety Devices in place: Yes  Type of devices: All fall risk precautions in place;Call light within reach;Nurse notified; Left in bed;Gait belt    Patient Education  Education  Education Given To: Patient  Education Provided: Role of Therapy;Home Exercise Program;Plan of Care;Safety;Transfer Training;Mobility Training;ADL Function;Precautions; Energy Conservation;DME/Home Modifications  Education Method: Verbal;Demonstration  Barriers to Learning: None  Education Outcome: Verbalized understanding;Continued education needed    Plan  Occupational Therapy Plan  Times Per Week: 4-5x/wk 1x/day as venkata  Times Per Day: Once a day  Current Treatment Recommendations: Strengthening;Balance training;Functional mobility training; Endurance training; Safety education & training;Patient/Caregiver education & training;Equipment evaluation, education, & procurement;Self-Care / ADL; Home management training  Additional Comments: Cont with stated POC    Goals  Patient Goals   Patient goals : To go home! Short Term Goals  Time Frame for Short Term Goals: By discharge, pt to demo  Short Term Goal 1: ADL transfer and functional mobility to Mod I with use of AD as needed. Short Term Goal 2: increased B UE strength by 1/2 grade to assist/I with B UE HEP with use of handouts as needed. Short Term Goal 3: UB/LB ADL to Mod I with use of handouts as needed. Short Term Goal 4: bed mobility to Mod I with use of bedrails as needed. Short Term Goal 5: I with fall prevention education, EC/WS tech, recommendations for discharge/AE, disease specific education with use of handouts.     AM-PAC Score        AM-[facility] Inpatient Daily Activity Raw Score: 18 (03/14/23 1627)  -PAC Inpatient ADL T-Scale Score : 38.66 (03/14/23 1627)  ADL Inpatient CMS 0-100% Score: 46.65 (03/14/23 1627)  ADL Inpatient CMS G-Code Modifier : CK (03/14/23 1627)      Therapy Time   Individual Concurrent Group Co-treatment   Time In 1349         Time Out 1413         Minutes 24               Co-treatment with PT warranted secondary to decreased safety and independence requiring 2 skilled therapy professionals to address individual discipline's goals. OT addressing preparation for ADL transfer, sitting balance for increased ADL performance, sitting/activity tolerance, functional reaching, environmental safety/scanning, fall prevention, functional mobility for ADL transfers, ability to sequence and follow directions, bed mobility tech, and functional UE strength.      JOVANY Chambers

## 2023-03-15 LAB
MICROORGANISM SPEC CULT: ABNORMAL
MICROORGANISM/AGENT SPEC: ABNORMAL
MICROORGANISM/AGENT SPEC: ABNORMAL
SPECIMEN DESCRIPTION: ABNORMAL

## 2023-03-15 NOTE — TELEPHONE ENCOUNTER
Tre Cormier called back and wants to know if you are ok holding simvastatin until done with abx there is a reaction.  I will let her know  Her number is [phone]

## 2023-03-17 LAB
MICROORGANISM SPEC CULT: ABNORMAL
MICROORGANISM/AGENT SPEC: ABNORMAL
SPECIMEN DESCRIPTION: ABNORMAL
SPECIMEN DESCRIPTION: ABNORMAL

## 2023-04-13 ENCOUNTER — OFFICE VISIT (OUTPATIENT)
Dept: INFECTIOUS DISEASES | Age: 68
End: 2023-04-13
Payer: MEDICARE

## 2023-04-13 VITALS
WEIGHT: 235 LBS | BODY MASS INDEX: 33.64 KG/M2 | HEIGHT: 70 IN | SYSTOLIC BLOOD PRESSURE: 152 MMHG | OXYGEN SATURATION: 99 % | HEART RATE: 89 BPM | DIASTOLIC BLOOD PRESSURE: 83 MMHG

## 2023-04-13 DIAGNOSIS — L08.9 DIABETIC INFECTION OF RIGHT FOOT (HCC): ICD-10-CM

## 2023-04-13 DIAGNOSIS — L97.519 TYPE 2 DIABETES MELLITUS WITH RIGHT DIABETIC FOOT ULCER (HCC): ICD-10-CM

## 2023-04-13 DIAGNOSIS — E11.628 DIABETIC INFECTION OF RIGHT FOOT (HCC): ICD-10-CM

## 2023-04-13 DIAGNOSIS — E11.621 TYPE 2 DIABETES MELLITUS WITH RIGHT DIABETIC FOOT ULCER (HCC): ICD-10-CM

## 2023-04-13 DIAGNOSIS — T81.89XD DRAINING POSTOPERATIVE WOUND, SUBSEQUENT ENCOUNTER: ICD-10-CM

## 2023-04-13 DIAGNOSIS — E11.610 CHARCOT FOOT DUE TO DIABETES MELLITUS (HCC): Primary | ICD-10-CM

## 2023-04-13 PROBLEM — T81.89XA DRAINING POSTOPERATIVE WOUND: Status: ACTIVE | Noted: 2023-04-13

## 2023-04-13 PROCEDURE — 99214 OFFICE O/P EST MOD 30 MIN: CPT | Performed by: INTERNAL MEDICINE

## 2023-04-13 PROCEDURE — G8427 DOCREV CUR MEDS BY ELIG CLIN: HCPCS | Performed by: INTERNAL MEDICINE

## 2023-04-13 PROCEDURE — 3078F DIAST BP <80 MM HG: CPT | Performed by: INTERNAL MEDICINE

## 2023-04-13 PROCEDURE — G8417 CALC BMI ABV UP PARAM F/U: HCPCS | Performed by: INTERNAL MEDICINE

## 2023-04-13 PROCEDURE — 3017F COLORECTAL CA SCREEN DOC REV: CPT | Performed by: INTERNAL MEDICINE

## 2023-04-13 PROCEDURE — 4004F PT TOBACCO SCREEN RCVD TLK: CPT | Performed by: INTERNAL MEDICINE

## 2023-04-13 PROCEDURE — 1123F ACP DISCUSS/DSCN MKR DOCD: CPT | Performed by: INTERNAL MEDICINE

## 2023-04-13 PROCEDURE — 3074F SYST BP LT 130 MM HG: CPT | Performed by: INTERNAL MEDICINE

## 2023-04-13 PROCEDURE — 1111F DSCHRG MED/CURRENT MED MERGE: CPT | Performed by: INTERNAL MEDICINE

## 2023-04-13 PROCEDURE — 3046F HEMOGLOBIN A1C LEVEL >9.0%: CPT | Performed by: INTERNAL MEDICINE

## 2023-04-13 PROCEDURE — 2022F DILAT RTA XM EVC RTNOPTHY: CPT | Performed by: INTERNAL MEDICINE

## 2023-04-13 ASSESSMENT — ENCOUNTER SYMPTOMS
GASTROINTESTINAL NEGATIVE: 1
RESPIRATORY NEGATIVE: 1

## 2023-04-18 ENCOUNTER — APPOINTMENT (OUTPATIENT)
Dept: GENERAL RADIOLOGY | Age: 68
End: 2023-04-18
Payer: MEDICARE

## 2023-04-18 ENCOUNTER — HOSPITAL ENCOUNTER (EMERGENCY)
Age: 68
Discharge: HOME | End: 2023-04-18
Attending: EMERGENCY MEDICINE
Payer: MEDICARE

## 2023-04-18 VITALS
RESPIRATION RATE: 18 BRPM | DIASTOLIC BLOOD PRESSURE: 66 MMHG | OXYGEN SATURATION: 96 % | TEMPERATURE: 98.7 F | HEART RATE: 88 BPM | SYSTOLIC BLOOD PRESSURE: 140 MMHG

## 2023-04-18 DIAGNOSIS — M79.89 LEG SWELLING: Primary | ICD-10-CM

## 2023-04-18 LAB
ABSOLUTE EOS #: 0.69 K/UL (ref 0–0.44)
ABSOLUTE IMMATURE GRANULOCYTE: 0.07 K/UL (ref 0–0.3)
ABSOLUTE LYMPH #: 2.99 K/UL (ref 1.1–3.7)
ABSOLUTE MONO #: 0.76 K/UL (ref 0.1–1.2)
ANION GAP SERPL CALCULATED.3IONS-SCNC: 9 MMOL/L (ref 9–17)
BASOPHILS # BLD: 1 % (ref 0–2)
BASOPHILS ABSOLUTE: 0.08 K/UL (ref 0–0.2)
BUN SERPL-MCNC: 29 MG/DL (ref 8–23)
BUN/CREAT BLD: 11 (ref 9–20)
CALCIUM SERPL-MCNC: 9.7 MG/DL (ref 8.6–10.4)
CHLORIDE SERPL-SCNC: 100 MMOL/L (ref 98–107)
CO2 SERPL-SCNC: 24 MMOL/L (ref 20–31)
CREAT SERPL-MCNC: 2.59 MG/DL (ref 0.7–1.2)
CRP SERPL HS-MCNC: 67.8 MG/L (ref 0–5)
EOSINOPHILS RELATIVE PERCENT: 7 % (ref 1–4)
ERYTHROCYTE [SEDIMENTATION RATE] IN BLOOD BY WESTERGREN METHOD: 28 MM/HR (ref 0–20)
GFR SERPL CREATININE-BSD FRML MDRD: 26 ML/MIN/1.73M2
GLUCOSE SERPL-MCNC: 186 MG/DL (ref 70–99)
HCT VFR BLD AUTO: 22 % (ref 40.7–50.3)
HGB BLD-MCNC: 7.3 G/DL (ref 13–17)
IMMATURE GRANULOCYTES: 1 %
LYMPHOCYTES # BLD: 30 % (ref 24–43)
MCH RBC QN AUTO: 28 PG (ref 25.2–33.5)
MCHC RBC AUTO-ENTMCNC: 33.2 G/DL (ref 28.4–34.8)
MCV RBC AUTO: 84.3 FL (ref 82.6–102.9)
MONOCYTES # BLD: 8 % (ref 3–12)
NRBC AUTOMATED: 0 PER 100 WBC
PDW BLD-RTO: 17.2 % (ref 11.8–14.4)
PLATELET # BLD AUTO: 278 K/UL (ref 138–453)
PMV BLD AUTO: 8.5 FL (ref 8.1–13.5)
POTASSIUM SERPL-SCNC: 4.5 MMOL/L (ref 3.7–5.3)
RBC # BLD: 2.61 M/UL (ref 4.21–5.77)
RBC # BLD: ABNORMAL 10*6/UL
SEG NEUTROPHILS: 54 % (ref 36–65)
SEGMENTED NEUTROPHILS ABSOLUTE COUNT: 5.44 K/UL (ref 1.5–8.1)
SODIUM SERPL-SCNC: 133 MMOL/L (ref 135–144)
WBC # BLD AUTO: 10 K/UL (ref 3.5–11.3)

## 2023-04-18 PROCEDURE — 86140 C-REACTIVE PROTEIN: CPT

## 2023-04-18 PROCEDURE — 85652 RBC SED RATE AUTOMATED: CPT

## 2023-04-18 PROCEDURE — 96374 THER/PROPH/DIAG INJ IV PUSH: CPT

## 2023-04-18 PROCEDURE — 93971 EXTREMITY STUDY: CPT

## 2023-04-18 PROCEDURE — 73610 X-RAY EXAM OF ANKLE: CPT

## 2023-04-18 PROCEDURE — 99284 EMERGENCY DEPT VISIT MOD MDM: CPT

## 2023-04-18 PROCEDURE — 6360000002 HC RX W HCPCS: Performed by: NURSE PRACTITIONER

## 2023-04-18 PROCEDURE — 80048 BASIC METABOLIC PNL TOTAL CA: CPT

## 2023-04-18 PROCEDURE — 85025 COMPLETE CBC W/AUTO DIFF WBC: CPT

## 2023-04-18 PROCEDURE — 73630 X-RAY EXAM OF FOOT: CPT

## 2023-04-18 RX ORDER — FENTANYL CITRATE 0.05 MG/ML
50 INJECTION, SOLUTION INTRAMUSCULAR; INTRAVENOUS ONCE
Status: COMPLETED | OUTPATIENT
Start: 2023-04-18 | End: 2023-04-18

## 2023-04-18 RX ORDER — HEPARIN SODIUM (PORCINE) LOCK FLUSH IV SOLN 100 UNIT/ML 100 UNIT/ML
500 SOLUTION INTRAVENOUS ONCE
Status: DISCONTINUED | OUTPATIENT
Start: 2023-04-18 | End: 2023-04-18 | Stop reason: HOSPADM

## 2023-04-18 RX ORDER — SODIUM CHLORIDE 9 MG/ML
INJECTION, SOLUTION INTRAVENOUS CONTINUOUS
Status: DISCONTINUED | OUTPATIENT
Start: 2023-04-18 | End: 2023-04-18 | Stop reason: HOSPADM

## 2023-04-18 RX ADMIN — FENTANYL CITRATE 50 MCG: 0.05 INJECTION, SOLUTION INTRAMUSCULAR; INTRAVENOUS at 16:20

## 2023-04-18 ASSESSMENT — ENCOUNTER SYMPTOMS
COLOR CHANGE: 0
SHORTNESS OF BREATH: 0

## 2023-04-18 ASSESSMENT — PAIN DESCRIPTION - ORIENTATION: ORIENTATION: RIGHT

## 2023-04-18 ASSESSMENT — PAIN - FUNCTIONAL ASSESSMENT: PAIN_FUNCTIONAL_ASSESSMENT: 0-10

## 2023-04-18 ASSESSMENT — PAIN DESCRIPTION - LOCATION: LOCATION: ANKLE

## 2023-04-18 ASSESSMENT — PAIN SCALES - GENERAL: PAINLEVEL_OUTOF10: 10

## 2023-04-18 ASSESSMENT — PAIN DESCRIPTION - DESCRIPTORS: DESCRIPTORS: BURNING;TIGHTNESS

## 2023-04-18 ASSESSMENT — PAIN DESCRIPTION - FREQUENCY: FREQUENCY: CONTINUOUS

## 2023-04-18 NOTE — ED PROVIDER NOTES
eMERGENCY dEPARTMENT eNCOUnter   [address] Name: Radha Charlton  MRN: [id number]  [place] 1955  Date of evaluation: 4/18/23     Radha Charlton is a 78 y.o. male with CC: Leg Swelling (Right,  post-op  ankle surgery, seen Podiatry yesterday)        This visit was performed by both a physician and an APC. I performed all aspects of the MDM as documented.       The care is provided during an unprecedented national emergency due to the novel coronavirus, Floresita Espino MD  Attending Emergency Physician            Luna Segundo MD  04/18/23 0988
extremity which was placed by the nurse. I discussed test results and follow-up care with the patient. Evaluation and treatment course in the ED, and plan of care upon discharge was discussed in length with the patient. Patient had no further questions prior to being discharged and was instructed to return to the ED for new or worsening symptoms. Care was provided during an unprecedented national emergency due to the novel coronavirus, Covid-19. Wells DVT Criteria:  []YES  [x]NO :History of previous DVT (If yes 1 point)  []YES  [x]NO :Active cancer treatment ongoing or within the previous six months or palliative (If yes 1 point)  []YES  [x]NO :Paralysis, paresis, or recent plaster immobilization of the lower extremities  (If yes 1 point)  [x]YES  []NO :Recently bedridden for more than three days, or major surgery within twelve weeks (If yes 1 point)  [x]YES  []NO :Localized tenderness along the distribution of the deep venous system (If yes 1 point)  []YES  [x]NO :Entire leg swollen (If yes 1 point)  []YES  [x]NO :Calf swelling by more than 3 cm when compared to the asymptomatic leg measured 10 cm below tibial tuberosity (If yes 1 point)  [x]YES  []NO :Pitting edema in the symptomatic leg (If yes 1 point)  []YES  [x]NO :Collateral superficial veins (nonvaricose) (If yes 1 point)  []YES  [x]NO :Alternative diagnosis as likely or more likely than that of deep venous thrombosis (-2 if yes)    Wells DVT Score Criteria Below TOTAL =  3    If Wells score is 2 or less, then d-dimer testing is recommended. CONSULTS:  None    PROCEDURES:  Procedures    FINAL IMPRESSION      1.  Leg swelling            Problem List  Patient Active Problem List   Diagnosis Code    Essential hypertension I10    Type II or unspecified type diabetes mellitus without mention of complication, not stated as uncontrolled E11.9    Neuropathy G62.9    Vertigo R42    Charcot foot due to diabetes mellitus ([facility] E11.610    Hyperlipidemia

## 2023-04-24 ENCOUNTER — HOSPITAL ENCOUNTER (EMERGENCY)
Age: 68
Discharge: HOME | End: 2023-04-24
Attending: EMERGENCY MEDICINE
Payer: MEDICARE

## 2023-04-24 ENCOUNTER — APPOINTMENT (OUTPATIENT)
Dept: GENERAL RADIOLOGY | Age: 68
End: 2023-04-24
Payer: MEDICARE

## 2023-04-24 VITALS
BODY MASS INDEX: 34.44 KG/M2 | RESPIRATION RATE: 18 BRPM | SYSTOLIC BLOOD PRESSURE: 160 MMHG | TEMPERATURE: 99.4 F | HEART RATE: 88 BPM | DIASTOLIC BLOOD PRESSURE: 65 MMHG | WEIGHT: 240 LBS | OXYGEN SATURATION: 96 %

## 2023-04-24 DIAGNOSIS — D64.9 ANEMIA, UNSPECIFIED TYPE: Primary | ICD-10-CM

## 2023-04-24 LAB
ABSOLUTE EOS #: 0.68 K/UL (ref 0–0.44)
ABSOLUTE IMMATURE GRANULOCYTE: 0.09 K/UL (ref 0–0.3)
ABSOLUTE LYMPH #: 3.32 K/UL (ref 1.1–3.7)
ABSOLUTE MONO #: 0.92 K/UL (ref 0.1–1.2)
ALBUMIN SERPL-MCNC: 3 G/DL (ref 3.5–5.2)
ALP SERPL-CCNC: 161 U/L (ref 40–129)
ALT SERPL-CCNC: 9 U/L (ref 5–41)
ANION GAP SERPL CALCULATED.3IONS-SCNC: 9 MMOL/L (ref 9–17)
AST SERPL-CCNC: 16 U/L
BASOPHILS # BLD: 1 % (ref 0–2)
BASOPHILS ABSOLUTE: 0.1 K/UL (ref 0–0.2)
BILIRUB DIRECT SERPL-MCNC: <0.1 MG/DL
BILIRUB INDIRECT SERPL-MCNC: ABNORMAL MG/DL (ref 0–1)
BILIRUB SERPL-MCNC: 0.2 MG/DL (ref 0.3–1.2)
BUN SERPL-MCNC: 29 MG/DL (ref 8–23)
BUN/CREAT BLD: 11 (ref 9–20)
CALCIUM SERPL-MCNC: 10 MG/DL (ref 8.6–10.4)
CHLORIDE SERPL-SCNC: 98 MMOL/L (ref 98–107)
CO2 SERPL-SCNC: 25 MMOL/L (ref 20–31)
CREAT SERPL-MCNC: 2.59 MG/DL (ref 0.7–1.2)
EOSINOPHILS RELATIVE PERCENT: 6 % (ref 1–4)
GFR SERPL CREATININE-BSD FRML MDRD: 26 ML/MIN/1.73M2
GLUCOSE SERPL-MCNC: 189 MG/DL (ref 70–99)
HCT VFR BLD AUTO: 23.4 % (ref 40.7–50.3)
HCT VFR BLD AUTO: 23.8 % (ref 40.7–50.3)
HEMOCCULT SP1 STL QL: NEGATIVE
HGB BLD-MCNC: 7.6 G/DL (ref 13–17)
HGB BLD-MCNC: 7.8 G/DL (ref 13–17)
IMMATURE GRANULOCYTES: 1 %
INR PPP: 1
LYMPHOCYTES # BLD: 30 % (ref 24–43)
MCH RBC QN AUTO: 28 PG (ref 25.2–33.5)
MCHC RBC AUTO-ENTMCNC: 32.8 G/DL (ref 28.4–34.8)
MCV RBC AUTO: 85.3 FL (ref 82.6–102.9)
MONOCYTES # BLD: 8 % (ref 3–12)
NRBC AUTOMATED: 0 PER 100 WBC
PARTIAL THROMBOPLASTIN TIME: 36.6 SEC (ref 23.9–33.8)
PDW BLD-RTO: 17.5 % (ref 11.8–14.4)
PLATELET # BLD AUTO: 293 K/UL (ref 138–453)
PMV BLD AUTO: 8.5 FL (ref 8.1–13.5)
POTASSIUM SERPL-SCNC: 5.2 MMOL/L (ref 3.7–5.3)
PROT SERPL-MCNC: 7.9 G/DL (ref 6.4–8.3)
PROTHROMBIN TIME: 13.4 SEC (ref 11.5–14.2)
RBC # BLD: 2.79 M/UL (ref 4.21–5.77)
RBC # BLD: ABNORMAL 10*6/UL
SEG NEUTROPHILS: 54 % (ref 36–65)
SEGMENTED NEUTROPHILS ABSOLUTE COUNT: 6.01 K/UL (ref 1.5–8.1)
SODIUM SERPL-SCNC: 132 MMOL/L (ref 135–144)
WBC # BLD AUTO: 11.1 K/UL (ref 3.5–11.3)

## 2023-04-24 PROCEDURE — 85018 HEMOGLOBIN: CPT

## 2023-04-24 PROCEDURE — 85730 THROMBOPLASTIN TIME PARTIAL: CPT

## 2023-04-24 PROCEDURE — 80076 HEPATIC FUNCTION PANEL: CPT

## 2023-04-24 PROCEDURE — 86901 BLOOD TYPING SEROLOGIC RH(D): CPT

## 2023-04-24 PROCEDURE — 85014 HEMATOCRIT: CPT

## 2023-04-24 PROCEDURE — 86920 COMPATIBILITY TEST SPIN: CPT

## 2023-04-24 PROCEDURE — 82270 OCCULT BLOOD FECES: CPT

## 2023-04-24 PROCEDURE — 71045 X-RAY EXAM CHEST 1 VIEW: CPT

## 2023-04-24 PROCEDURE — 80048 BASIC METABOLIC PNL TOTAL CA: CPT

## 2023-04-24 PROCEDURE — 85025 COMPLETE CBC W/AUTO DIFF WBC: CPT

## 2023-04-24 PROCEDURE — 86900 BLOOD TYPING SEROLOGIC ABO: CPT

## 2023-04-24 PROCEDURE — 99284 EMERGENCY DEPT VISIT MOD MDM: CPT

## 2023-04-24 PROCEDURE — 85610 PROTHROMBIN TIME: CPT

## 2023-04-24 PROCEDURE — 86850 RBC ANTIBODY SCREEN: CPT

## 2023-04-24 RX ORDER — SODIUM CHLORIDE 9 MG/ML
INJECTION, SOLUTION INTRAVENOUS PRN
Status: DISCONTINUED | OUTPATIENT
Start: 2023-04-24 | End: 2023-04-24 | Stop reason: HOSPADM

## 2023-04-24 ASSESSMENT — ENCOUNTER SYMPTOMS
VOMITING: 0
NAUSEA: 0
ANAL BLEEDING: 0
SHORTNESS OF BREATH: 0
DIARRHEA: 0
BACK PAIN: 0
ABDOMINAL PAIN: 0
COLOR CHANGE: 0
BLOOD IN STOOL: 0
COUGH: 0

## 2023-04-24 ASSESSMENT — PAIN - FUNCTIONAL ASSESSMENT: PAIN_FUNCTIONAL_ASSESSMENT: NONE - DENIES PAIN

## 2023-04-24 NOTE — CONSENT
Informed Consent for Blood Component Transfusion Note    I have discussed with the patient the rationale for blood component transfusion; its benefits in treating or preventing fatigue, organ damage, or death; and its risk which includes mild transfusion reactions, rare risk of blood borne infection, or more serious but rare reactions. I have discussed the alternatives to transfusion, including the risk and consequences of not receiving transfusion. The patient had an opportunity to ask questions and had agreed to proceed with transfusion of blood components.     Electronically signed by ROCHELLE Alanis CNP on 4/24/23 at 4:00 PM EDT

## 2023-04-24 NOTE — ED PROVIDER NOTES
[facility]  eMERGENCY dEPARTMENT eNCOUnter     Pt Name: Denver Demark  MRN: [id number]  [place] 1955  Date of evaluation: 4/24/23    Denver Demark is a 78 y.o. male with CC: Abnormal Lab (Hemoglobin  5.5 today)      MDM: Chronic anemia. Outpatient labs hgb 5.5 sent to ed. CBC and separate draw H&H here with both Hgb>7. Patient otherwise feeling baseline. Hemmocult negative. This visit was performed by both a physician and an APC. I performed all aspects of the MDM as documented.     Ana Schroeder DO  Attending Emergency Physician                  Brinda Gitelman, DO  04/24/23 8192

## 2023-04-24 NOTE — ED PROVIDER NOTES
[facility] ED  eMERGENCY dEPARTMENT eNCOUnter      Pt Name: Lionel Sifuentes  MRN: [id number]  [place] 1955  Date of evaluation: 4/24/2023  Provider: ROCHELLE Martinez 6892       Chief Complaint   Patient presents with    Abnormal Lab     Hemoglobin  5.5 today         HISTORY OF PRESENT ILLNESS  (Location/Symptom, Timing/Onset, Context/Setting, Quality, Duration, Modifying Factors, Severity.)   Lionel Sifuentes is a 78 y.o. male who presents to the emergency department. The patient reported he had outpatient lab work drawn today. He was advised to come to the ED due to a hemoglobin result of 5.5; it was resulted at a [facility] facility. The patient has a PICC line in place for outpatient antibiotics for a foot infection; he does heparin flushes for the line. He denies dizziness, fever, chills, SOB. Denies blood in his stools, black stools. He has hx of anemia, CKD. Denies pain at this time. Nursing Notes were reviewed. ALLERGIES     Morphine, Other, and Percocet [oxycodone-acetaminophen]    CURRENT MEDICATIONS       Previous Medications    AMLODIPINE (NORVASC) 5 MG TABLET    Take 1 tablet by mouth daily    ASPIRIN 81 MG TABLET    Take 1 tablet by mouth daily    CETIRIZINE (ZYRTEC) 10 MG TABLET    Take 1 tablet by mouth nightly    DAPTOMYCIN (CUBICIN) INFUSION    Infuse 500 mg intravenously every 48 hours Through 4/23/2023 compound per protocol. GABAPENTIN (NEURONTIN) 400 MG CAPSULE    Take 1 capsule by mouth 3 times daily for 30 days. GLIPIZIDE (GLUCOTROL) 10 MG TABLET    Take 2 tablets by mouth 2 times daily (before meals) Takes 2 tabs (=20mg) BID    JANUVIA 100 MG TABLET    Take 1 tablet by mouth daily    LANTUS SOLOSTAR 100 UNIT/ML INJECTION PEN    Inject 10 Units into the skin 2 times daily    MISC.  DEVICES MISC    1 PAIR OF DIABETIC SHOES (1 LEFT/ 1 RIGHT)  1-3 PAIRS OF INSERTS (LEFT/ RIGHT)    SIMVASTATIN (ZOCOR) 40 MG TABLET    Take 1 tablet by

## 2023-04-28 LAB
ABO/RH: NORMAL
ANTIBODY SCREEN: NEGATIVE
ARM BAND NUMBER: NORMAL
BLD PROD TYP BPU: NORMAL
BPU ID: NORMAL
CROSSMATCH RESULT: NORMAL
DISPENSE STATUS BLOOD BANK: NORMAL
EXPIRATION DATE: NORMAL
TRANSFUSION STATUS: NORMAL
UNIT DIVISION: 0

## 2023-05-04 RX ORDER — DAPTOMYCIN 50 MG/ML
INJECTION, POWDER, LYOPHILIZED, FOR SOLUTION INTRAVENOUS
COMMUNITY
Start: 2023-04-20

## 2023-05-04 RX ORDER — MEROPENEM 1 G/1
INJECTION, POWDER, FOR SOLUTION INTRAVENOUS
COMMUNITY
Start: 2023-04-20

## 2023-05-04 RX ORDER — SODIUM CHLORIDE, SODIUM LACTATE, POTASSIUM CHLORIDE, CALCIUM CHLORIDE 600; 310; 30; 20 MG/100ML; MG/100ML; MG/100ML; MG/100ML
INJECTION, SOLUTION INTRAVENOUS CONTINUOUS
Status: CANCELLED | OUTPATIENT
Start: 2023-05-04

## 2023-05-04 RX ORDER — SODIUM CHLORIDE 0.9 % (FLUSH) 0.9 %
5-40 SYRINGE (ML) INJECTION EVERY 12 HOURS SCHEDULED
Status: CANCELLED | OUTPATIENT
Start: 2023-05-04

## 2023-05-04 RX ORDER — SODIUM CHLORIDE 0.9 % (FLUSH) 0.9 %
5-40 SYRINGE (ML) INJECTION PRN
Status: CANCELLED | OUTPATIENT
Start: 2023-05-04

## 2023-05-04 RX ORDER — LIDOCAINE HYDROCHLORIDE 10 MG/ML
1 INJECTION, SOLUTION INFILTRATION; PERINEURAL
Status: CANCELLED | OUTPATIENT
Start: 2023-05-04 | End: 2023-05-05

## 2023-05-04 RX ORDER — SODIUM CHLORIDE 9 MG/ML
INJECTION, SOLUTION INTRAVENOUS PRN
Status: CANCELLED | OUTPATIENT
Start: 2023-05-04

## 2023-05-04 NOTE — PROGRESS NOTES
Preoperative Instructions:    Stop eating solid foods at midnight the night prior to your surgery. Stop drinking clear liquids at midnight the night prior to your surgery. Arrive at the surgery center (3rd entrance) on ____1-0-52___________ by __0530-0600am_____________. Please stop any blood thinning medications as directed by your surgeon or prescribing physician. Failure to stop certain medications may interfere with your scheduled surgery. These may include: Aspirin, Coumadin, Plavix, NSAIDS (Motrin, Aleve, Advil, Mobic, Celebrex), Eliquis, Pradaxa, Xarelto, Fish oil, and herbal supplements. You may continue the rest of your medications through the night before surgery unless instructed otherwise. Day of surgery please take only the following medication(s) with a small sip of water: Amlodipine      Please  shower where able with antibacterial soap and water. the day of surgery. Reminders:  -If you are going home the day of your procedure, you will need a family member or friend to stay during the procedure and drive you home after your procedure. Your  must be 25years of age or older and able to sign off on your discharge instructions.    -If you are going home the same day of your surgery, someone must remain with you for the first 24 hours after your surgery if you receive sedation or anesthesia.      -Please do not wear any jewelery , lotions , contacts or body piercing the day of surgery

## 2023-05-05 ENCOUNTER — APPOINTMENT (OUTPATIENT)
Dept: GENERAL RADIOLOGY | Age: 68
End: 2023-05-05
Attending: STUDENT IN AN ORGANIZED HEALTH CARE EDUCATION/TRAINING PROGRAM
Payer: MEDICARE

## 2023-05-05 ENCOUNTER — HOSPITAL ENCOUNTER (OUTPATIENT)
Age: 68
Setting detail: OUTPATIENT SURGERY
Discharge: HOME | End: 2023-05-05
Attending: STUDENT IN AN ORGANIZED HEALTH CARE EDUCATION/TRAINING PROGRAM | Admitting: STUDENT IN AN ORGANIZED HEALTH CARE EDUCATION/TRAINING PROGRAM
Payer: MEDICARE

## 2023-05-05 VITALS
HEART RATE: 88 BPM | WEIGHT: 245 LBS | HEIGHT: 71 IN | OXYGEN SATURATION: 95 % | TEMPERATURE: 98.2 F | RESPIRATION RATE: 14 BRPM | BODY MASS INDEX: 34.3 KG/M2 | SYSTOLIC BLOOD PRESSURE: 159 MMHG | DIASTOLIC BLOOD PRESSURE: 72 MMHG

## 2023-05-05 DIAGNOSIS — G89.18 POST-OP PAIN: Primary | ICD-10-CM

## 2023-05-05 LAB — GLUCOSE BLD-MCNC: 58 MG/DL (ref 75–110)

## 2023-05-05 PROCEDURE — 7100000010 HC PHASE II RECOVERY - FIRST 15 MIN: Performed by: STUDENT IN AN ORGANIZED HEALTH CARE EDUCATION/TRAINING PROGRAM

## 2023-05-05 PROCEDURE — 3209999900 FLUORO FOR SURGICAL PROCEDURES

## 2023-05-05 PROCEDURE — 7100000011 HC PHASE II RECOVERY - ADDTL 15 MIN: Performed by: STUDENT IN AN ORGANIZED HEALTH CARE EDUCATION/TRAINING PROGRAM

## 2023-05-05 PROCEDURE — 2500000003 HC RX 250 WO HCPCS: Performed by: STUDENT IN AN ORGANIZED HEALTH CARE EDUCATION/TRAINING PROGRAM

## 2023-05-05 PROCEDURE — 3600000002 HC SURGERY LEVEL 2 BASE: Performed by: STUDENT IN AN ORGANIZED HEALTH CARE EDUCATION/TRAINING PROGRAM

## 2023-05-05 PROCEDURE — 82947 ASSAY GLUCOSE BLOOD QUANT: CPT

## 2023-05-05 PROCEDURE — 3600000012 HC SURGERY LEVEL 2 ADDTL 15MIN: Performed by: STUDENT IN AN ORGANIZED HEALTH CARE EDUCATION/TRAINING PROGRAM

## 2023-05-05 PROCEDURE — 2709999900 HC NON-CHARGEABLE SUPPLY: Performed by: STUDENT IN AN ORGANIZED HEALTH CARE EDUCATION/TRAINING PROGRAM

## 2023-05-05 RX ORDER — BUPIVACAINE HYDROCHLORIDE AND EPINEPHRINE 5; 5 MG/ML; UG/ML
INJECTION, SOLUTION EPIDURAL; INTRACAUDAL; PERINEURAL PRN
Status: DISCONTINUED | OUTPATIENT
Start: 2023-05-05 | End: 2023-05-05 | Stop reason: ALTCHOICE

## 2023-05-05 RX ORDER — CYCLOBENZAPRINE HCL 10 MG
10 TABLET ORAL NIGHTLY PRN
Qty: 30 TABLET | Refills: 0 | Status: SHIPPED | OUTPATIENT
Start: 2023-05-05 | End: 2023-05-15

## 2023-05-05 RX ORDER — HYDROCODONE BITARTRATE AND ACETAMINOPHEN 5; 325 MG/1; MG/1
1 TABLET ORAL EVERY 6 HOURS PRN
Qty: 28 TABLET | Refills: 0 | Status: CANCELLED | OUTPATIENT
Start: 2023-05-05 | End: 2023-05-12

## 2023-05-05 ASSESSMENT — PAIN DESCRIPTION - DESCRIPTORS: DESCRIPTORS: OTHER (COMMENT)

## 2023-05-05 ASSESSMENT — PAIN - FUNCTIONAL ASSESSMENT: PAIN_FUNCTIONAL_ASSESSMENT: 0-10

## 2023-05-05 NOTE — DISCHARGE INSTRUCTIONS
Podiatric Post Operative Instructions: You are being discharged following hardware removal on your right lower extremity. Fluids and Diet:  Lean proteins and vegetables should be the backbone of your diet to aid in the healing process  Reduce salt intake to assist with decreased fluid retention which reduces the swelling to your affected extremity     Medications: Take your prescriptions as directed  You are receiving new prescriptions for pain, IV abx. If your pain is not severe then you may take the non-prescription medication that you normally take for aches and pains  You may resume your regularly scheduled medications (unless otherwise directed)  If any side effects or adverse reactions occur, discontinue the medication and contact your doctor. Review the patient drug information that is provided before you take any medication    Ambulation and Activity:  You are advised to go directly home from the hospital  Use assistive devices to ambulate at all times (for walking, moving around - be very careful to avoid weight on your injured extremity). You may not put weight on the operated foot. Avoid stairs if possible. Do not lift or move heavy objects  Do not drive until cleared by your physician    Bandage and Wound Care Instructions:  Keep bandage clean and dry  Do not shower or bathe the operative extremity  Do not remove the bandage  Do not attempt to put anything between the dressing and your skin, some itching is normal.    Ice and Elevation:  Elevate operative extremity as much as possible to reduce swelling and discomfort. Elevate with 2 pillows at or above the level of the heart for the first 72 hours and whenever possible to reduce swelling. Ice:  Apply ice to the back of the knee of the affected extremity for 20 minutes of every 2 hours while awake for the first 72 hours. You may ice the bandage as well.     Special Instructions: Call your doctor immediately if you develop any of the

## 2023-05-05 NOTE — H&P
History and Physical  [facility]        PATIENT NAME: Libby Ramirez OF BIRTH: 1955  GENDER: male     Procedure Date: 5/5/2023  5:39 AM     Attending Provider: Cailin Atkins DPM    Resident: Gill Washington DPM    Chief Complaint: Right STJ septic non-union    Procedure Scheduled: Removal of external fixation device, right ankle    History of present Illness: The patient is a 78 y.o. male  who presents to pre-op area prior to scheduled for above mentioned procedure. Pt recommended to undergo above mentioned procedures at earliest possible convenience. Pt denies changes to his medical history since he was last seen in office. Denies current nausea, vomiting, chest pain, SOB, fever, and chills. Consent form signed and reviewed. Any/all additional questions/concerns were addressed and consent form updated. Pt elected to pursue the above mentioned procedures as scheduled for today. Past Medical History:  has a past medical history of Abscess of right foot, Acquired hammer toe deformity of lesser toe of right foot, ALEJANDRO (acute kidney injury) ([address] Anemia, Cellulitis, Cellulitis of left foot, Cellulitis of right foot, Charcot foot due to diabetes mellitus ([address] Chest pain at rest, Chronic multifocal osteomyelitis of right foot ([address] CKD (chronic kidney disease), Diabetic polyneuropathy associated with type 2 diabetes mellitus ([address] Essential hypertension, Fractures, multiple, Hyperlipidemia, Hypertension, Leukocytosis, MRSA (methicillin resistant staph aureus) culture positive, Neuropathy, Pain in right foot, Pneumonia, Right foot infection, Right foot pain, Tobacco abuse, Type II or unspecified type diabetes mellitus without mention of complication, not stated as uncontrolled, Vertigo, 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     Past Surgical History:   Past Surgical History:

## 2023-05-05 NOTE — BRIEF OP NOTE
Brief Postoperative Note      Patient: Karie Sherwood  YOB: 1955  MRN: [id number]    Date of Procedure: 5/5/2023    Pre-Op Diagnosis Codes:     * Osteomyelitis of right ankle, unspecified type ([facility] [M86.9]    Post-Op Diagnosis: Same       Procedure(s):  RIGHT ANKLE EXTERNAL FIXATOR REMOVAL WITH SERA    Surgeon(s):  Yvon Jensen DPM    Assistant:  Yadi Mclaughlin DPM    Anesthesia: Local 5ml 1% lidocaine with epi/5ml marcaine 0.5% with epi    Estimated Blood Loss (mL): Minimal    Complications: None    Specimens:   * No specimens in log *    Implants:  * No implants in log *      Drains: * No LDAs found *    Findings: STJ fusion noted stressed under fluroscopy. Hardware removed under local.  Posterior splint applied.       Electronically signed by Eliot Zimmer DPM on 5/5/2023 at 7:06 AM

## 2023-05-06 NOTE — OP NOTE
fibro-osseous fusion. Hardware was successfully removed in 1 piece without any breakage. The pin sites were left open to heal by secondary intention. Dressings were then applied consisting of Xeroform, gauze 4 x 4's, ABD, Kerlix, Ace. A well-padded short leg posterior splint was then applied to the right lower extremity. The patient tolerated above procedure and anesthesia well without complication. He was transported to PACU with vital signs stable neurovascular status intact to the right lower extremity. Postoperative plan: Patient will be discharged on pain medication per postoperative protocols. He is allowed partial weightbearing to the right lower extremity with use of cam or Crow boot after the first postoperative week. He is to follow-up in my office within 1 week of discharge. No need for dressing changes. Kerin Bamberger, DPM on 5/5/2023 at 8:44 PM  The [address] [place] [address] Surgery  [employer]ate, [employer]    This note was generated with the assistance of a speech recognition program. While intending to generate a timely document that accurately reflects the content of the visit, no guarantee can be provided that every grammatical or spelling mistake has been or will be identified or corrected. In such instances, actual meaning can be extrapolated by context. Thank you for your understanding.

## 2023-05-08 ENCOUNTER — TELEPHONE (OUTPATIENT)
Dept: INFECTIOUS DISEASES | Age: 68
End: 2023-05-08

## 2023-05-11 ENCOUNTER — OFFICE VISIT (OUTPATIENT)
Dept: INFECTIOUS DISEASES | Age: 68
End: 2023-05-11
Payer: MEDICARE

## 2023-05-11 ENCOUNTER — HOSPITAL ENCOUNTER (EMERGENCY)
Age: 68
Discharge: HOME | End: 2023-05-11
Attending: STUDENT IN AN ORGANIZED HEALTH CARE EDUCATION/TRAINING PROGRAM
Payer: MEDICARE

## 2023-05-11 VITALS
HEIGHT: 71 IN | SYSTOLIC BLOOD PRESSURE: 147 MMHG | TEMPERATURE: 97 F | BODY MASS INDEX: 33.6 KG/M2 | HEART RATE: 87 BPM | DIASTOLIC BLOOD PRESSURE: 79 MMHG | WEIGHT: 240 LBS

## 2023-05-11 VITALS
OXYGEN SATURATION: 98 % | DIASTOLIC BLOOD PRESSURE: 93 MMHG | RESPIRATION RATE: 16 BRPM | SYSTOLIC BLOOD PRESSURE: 159 MMHG | TEMPERATURE: 98.6 F | HEART RATE: 93 BPM

## 2023-05-11 DIAGNOSIS — E11.610 CHARCOT FOOT DUE TO DIABETES MELLITUS (HCC): Primary | ICD-10-CM

## 2023-05-11 DIAGNOSIS — D64.9 ANEMIA, UNSPECIFIED TYPE: Primary | ICD-10-CM

## 2023-05-11 DIAGNOSIS — M86.371 CHRONIC MULTIFOCAL OSTEOMYELITIS OF RIGHT FOOT (HCC): ICD-10-CM

## 2023-05-11 LAB
ABSOLUTE EOS #: 0.44 K/UL (ref 0–0.44)
ABSOLUTE IMMATURE GRANULOCYTE: 0.06 K/UL (ref 0–0.3)
ABSOLUTE LYMPH #: 2.62 K/UL (ref 1.1–3.7)
ABSOLUTE MONO #: 0.74 K/UL (ref 0.1–1.2)
ANION GAP SERPL CALCULATED.3IONS-SCNC: 10 MMOL/L (ref 9–17)
BASOPHILS # BLD: 1 % (ref 0–2)
BASOPHILS ABSOLUTE: 0.07 K/UL (ref 0–0.2)
BUN SERPL-MCNC: 34 MG/DL (ref 8–23)
BUN/CREAT BLD: 11 (ref 9–20)
CALCIUM SERPL-MCNC: 10.1 MG/DL (ref 8.6–10.4)
CHLORIDE SERPL-SCNC: 103 MMOL/L (ref 98–107)
CO2 SERPL-SCNC: 24 MMOL/L (ref 20–31)
CREAT SERPL-MCNC: 2.99 MG/DL (ref 0.7–1.2)
DATE, STOOL #1: NORMAL
EOSINOPHILS RELATIVE PERCENT: 5 % (ref 1–4)
GFR SERPL CREATININE-BSD FRML MDRD: 22 ML/MIN/1.73M2
GLUCOSE SERPL-MCNC: 99 MG/DL (ref 70–99)
HCT VFR BLD AUTO: 21.5 % (ref 40.7–50.3)
HEMOCCULT SP1 STL QL: NEGATIVE
HGB BLD-MCNC: 7.3 G/DL (ref 13–17)
IMMATURE GRANULOCYTES: 1 %
INR PPP: 1
LYMPHOCYTES # BLD: 27 % (ref 24–43)
MCH RBC QN AUTO: 27.3 PG (ref 25.2–33.5)
MCHC RBC AUTO-ENTMCNC: 34 G/DL (ref 28.4–34.8)
MCV RBC AUTO: 80.5 FL (ref 82.6–102.9)
MONOCYTES # BLD: 8 % (ref 3–12)
NRBC AUTOMATED: 0 PER 100 WBC
PARTIAL THROMBOPLASTIN TIME: 38.1 SEC (ref 23.9–33.8)
PDW BLD-RTO: 17.5 % (ref 11.8–14.4)
PLATELET # BLD AUTO: 284 K/UL (ref 138–453)
PMV BLD AUTO: 8.3 FL (ref 8.1–13.5)
POTASSIUM SERPL-SCNC: 4.7 MMOL/L (ref 3.7–5.3)
PROTHROMBIN TIME: 12.9 SEC (ref 11.5–14.2)
RBC # BLD: 2.67 M/UL (ref 4.21–5.77)
RBC # BLD: ABNORMAL 10*6/UL
SEG NEUTROPHILS: 59 % (ref 36–65)
SEGMENTED NEUTROPHILS ABSOLUTE COUNT: 5.71 K/UL (ref 1.5–8.1)
SODIUM SERPL-SCNC: 137 MMOL/L (ref 135–144)
TIME, STOOL #1: 2119
WBC # BLD AUTO: 9.6 K/UL (ref 3.5–11.3)

## 2023-05-11 PROCEDURE — 1123F ACP DISCUSS/DSCN MKR DOCD: CPT | Performed by: INTERNAL MEDICINE

## 2023-05-11 PROCEDURE — 3046F HEMOGLOBIN A1C LEVEL >9.0%: CPT | Performed by: INTERNAL MEDICINE

## 2023-05-11 PROCEDURE — 82272 OCCULT BLD FECES 1-3 TESTS: CPT

## 2023-05-11 PROCEDURE — 99213 OFFICE O/P EST LOW 20 MIN: CPT | Performed by: INTERNAL MEDICINE

## 2023-05-11 PROCEDURE — 36415 COLL VENOUS BLD VENIPUNCTURE: CPT

## 2023-05-11 PROCEDURE — G8428 CUR MEDS NOT DOCUMENT: HCPCS | Performed by: INTERNAL MEDICINE

## 2023-05-11 PROCEDURE — 85025 COMPLETE CBC W/AUTO DIFF WBC: CPT

## 2023-05-11 PROCEDURE — 2022F DILAT RTA XM EVC RTNOPTHY: CPT | Performed by: INTERNAL MEDICINE

## 2023-05-11 PROCEDURE — 80048 BASIC METABOLIC PNL TOTAL CA: CPT

## 2023-05-11 PROCEDURE — 3078F DIAST BP <80 MM HG: CPT | Performed by: INTERNAL MEDICINE

## 2023-05-11 PROCEDURE — 99283 EMERGENCY DEPT VISIT LOW MDM: CPT

## 2023-05-11 PROCEDURE — 85610 PROTHROMBIN TIME: CPT

## 2023-05-11 PROCEDURE — 3017F COLORECTAL CA SCREEN DOC REV: CPT | Performed by: INTERNAL MEDICINE

## 2023-05-11 PROCEDURE — 4004F PT TOBACCO SCREEN RCVD TLK: CPT | Performed by: INTERNAL MEDICINE

## 2023-05-11 PROCEDURE — 3077F SYST BP >= 140 MM HG: CPT | Performed by: INTERNAL MEDICINE

## 2023-05-11 PROCEDURE — 85730 THROMBOPLASTIN TIME PARTIAL: CPT

## 2023-05-11 PROCEDURE — G8417 CALC BMI ABV UP PARAM F/U: HCPCS | Performed by: INTERNAL MEDICINE

## 2023-05-11 RX ORDER — FERROUS SULFATE 325(65) MG
325 TABLET ORAL 2 TIMES DAILY
Qty: 60 TABLET | Refills: 0 | Status: SHIPPED | OUTPATIENT
Start: 2023-05-11 | End: 2023-06-10

## 2023-05-11 ASSESSMENT — PAIN - FUNCTIONAL ASSESSMENT: PAIN_FUNCTIONAL_ASSESSMENT: NONE - DENIES PAIN

## 2023-05-12 NOTE — ED PROVIDER NOTES
[address] [place] [address] ED  [facility]      Pt Name: Liam Back  MRN: [id number]  [place] 1955  Date ofevaluation: 5/11/2023  Provider: Dyllan Valenzuela       Chief Complaint   Patient presents with    Abnormal Lab     Labs today, hemoglobin 7.4         HISTORY OF PRESENT ILLNESS  (Location/Symptom, Timing/Onset, Context/Setting, Quality, Duration, Modifying Factors, Severity.)   Liam Back is a 78 y.o. male who presents to the emergency department with abnormal lab. Hemoglobin 7.4 on outpatient labs. Patient has no complaints. Denies any bloody or black stools. It has been ongoing concern for over a month. Patient not taking iron supplements. Nursing Notes were reviewed. ALLERGIES     Cefepime, Morphine, Other, and Percocet [oxycodone-acetaminophen]    CURRENT MEDICATIONS       Discharge Medication List as of 5/11/2023  9:57 PM        CONTINUE these medications which have NOT CHANGED    Details   cyclobenzaprine (FLEXERIL) 10 MG tablet Take 1 tablet by mouth nightly as needed for Muscle spasms, Disp-30 tablet, R-0Normal      DAPTOmycin (CUBICIN) 500 MG injection Historical Med      meropenem (MERREM) 1 g injection Historical Med      gabapentin (NEURONTIN) 400 MG capsule Take 1 capsule by mouth 3 times daily for 30 days. , Disp-90 capsule, R-1Normal      LANTUS SOLOSTAR 100 UNIT/ML injection pen Inject 10 Units into the skin 2 times daily, DAWHistorical Med      TRUEPLUS PEN NEEDLES 31G X 8 MM MISC DAWHistorical Med      amLODIPine (NORVASC) 5 MG tablet Take 1 tablet by mouth dailyHistorical Med      JANUVIA 100 MG tablet Take 1 tablet by mouth daily, DAWHistorical Med      Misc.  Devices MISC Disp-2 each, R-0, Print1 PAIR OF DIABETIC SHOES (1 LEFT/ 1 RIGHT) 1-3 PAIRS OF INSERTS (LEFT/ RIGHT)      cetirizine (ZYRTEC) 10 MG tablet Take 1 tablet by mouth nightlyHistorical Med      simvastatin (ZOCOR) 40 MG tablet Take 1 tablet by mouth

## 2023-05-12 NOTE — ED NOTES
Pt presents to ED for low hemoglobin. Pt had blood work done today outpatient. Hgb is 7.4 Pt denies any symptoms. Pt stated he was recently in ED for same thing.        Selena Beranl RN  05/11/23 0641

## 2023-05-12 NOTE — ED NOTES
Writer at bedside with [facility] for rectal exam     Mally Noe RN  05/11/23 1029

## 2023-05-15 ENCOUNTER — HOSPITAL ENCOUNTER (OUTPATIENT)
Age: 68
Setting detail: SPECIMEN
Discharge: HOME | End: 2023-05-15

## 2023-05-15 LAB
ALBUMIN SERPL-MCNC: 3 G/DL (ref 3.5–5.2)
ALBUMIN/GLOB SERPL: 0.7 {RATIO} (ref 1–2.5)
ALP SERPL-CCNC: 136 U/L (ref 40–129)
ALT SERPL-CCNC: 13 U/L (ref 5–41)
ANION GAP SERPL CALCULATED.3IONS-SCNC: 10 MMOL/L (ref 9–17)
AST SERPL-CCNC: 25 U/L
BASOPHILS # BLD: 0.05 K/UL (ref 0–0.2)
BASOPHILS # BLD: 1 % (ref 0–2)
BILIRUB SERPL-MCNC: 0.2 MG/DL (ref 0.3–1.2)
BUN SERPL-MCNC: 28 MG/DL (ref 8–23)
CALCIUM SERPL-MCNC: 9.7 MG/DL (ref 8.6–10.4)
CHLORIDE SERPL-SCNC: 103 MMOL/L (ref 98–107)
CK SERPL-CCNC: 29 U/L (ref 39–308)
CO2 SERPL-SCNC: 23 MMOL/L (ref 20–31)
CREAT SERPL-MCNC: 2.81 MG/DL (ref 0.7–1.2)
EOSINOPHIL # BLD: 0.44 K/UL (ref 0–0.44)
EOSINOPHILS RELATIVE PERCENT: 4 % (ref 1–4)
ERYTHROCYTE [DISTWIDTH] IN BLOOD BY AUTOMATED COUNT: 17.7 % (ref 11.8–14.4)
ERYTHROCYTE [SEDIMENTATION RATE] IN BLOOD BY WESTERGREN METHOD: 26 MM/HR (ref 0–20)
GFR SERPL CREATININE-BSD FRML MDRD: 24 ML/MIN/1.73M2
GLUCOSE SERPL-MCNC: 202 MG/DL (ref 70–99)
HCT VFR BLD AUTO: 20.6 % (ref 40.7–50.3)
HGB BLD-MCNC: 6.7 G/DL (ref 13–17)
IMM GRANULOCYTES # BLD AUTO: 0.09 K/UL (ref 0–0.3)
IMM GRANULOCYTES NFR BLD: 1 %
LYMPHOCYTES # BLD: 25 % (ref 24–43)
LYMPHOCYTES NFR BLD: 2.59 K/UL (ref 1.1–3.7)
MCH RBC QN AUTO: 27.5 PG (ref 25.2–33.5)
MCHC RBC AUTO-ENTMCNC: 32.5 G/DL (ref 28.4–34.8)
MCV RBC AUTO: 84.4 FL (ref 82.6–102.9)
MONOCYTES NFR BLD: 0.62 K/UL (ref 0.1–1.2)
MONOCYTES NFR BLD: 6 % (ref 3–12)
NEUTROPHILS NFR BLD: 64 % (ref 36–65)
NEUTS SEG NFR BLD: 6.66 K/UL (ref 1.5–8.1)
NRBC AUTOMATED: 0 PER 100 WBC
PLATELET # BLD AUTO: 303 K/UL (ref 138–453)
PMV BLD AUTO: 8.6 FL (ref 8.1–13.5)
POTASSIUM SERPL-SCNC: 5.1 MMOL/L (ref 3.7–5.3)
PROT SERPL-MCNC: 7.5 G/DL (ref 6.4–8.3)
RBC # BLD AUTO: 2.44 M/UL (ref 4.21–5.77)
RBC # BLD: ABNORMAL 10*6/UL
SODIUM SERPL-SCNC: 136 MMOL/L (ref 135–144)
WBC OTHER # BLD: 10.5 K/UL (ref 3.5–11.3)

## 2023-05-21 LAB — AEROBIC CULTURE: NORMAL

## 2023-05-22 ENCOUNTER — HOSPITAL ENCOUNTER (OUTPATIENT)
Age: 68
Setting detail: SPECIMEN
Discharge: HOME | End: 2023-05-22

## 2023-05-22 LAB
BASOPHILS # BLD: 0.11 K/UL (ref 0–0.2)
BASOPHILS NFR BLD: 1 % (ref 0–2)
EOSINOPHIL # BLD: 0.43 K/UL (ref 0–0.44)
EOSINOPHILS RELATIVE PERCENT: 4 % (ref 1–4)
ERYTHROCYTE [DISTWIDTH] IN BLOOD BY AUTOMATED COUNT: 18.4 % (ref 11.8–14.4)
HCT VFR BLD AUTO: 21 % (ref 40.7–50.3)
HGB BLD-MCNC: 6.9 G/DL (ref 13–17)
IMM GRANULOCYTES # BLD AUTO: 0.11 K/UL (ref 0–0.3)
IMM GRANULOCYTES NFR BLD: 1 %
LYMPHOCYTES # BLD: 24 % (ref 24–43)
LYMPHOCYTES NFR BLD: 2.57 K/UL (ref 1.1–3.7)
MCH RBC QN AUTO: 27.8 PG (ref 25.2–33.5)
MCHC RBC AUTO-ENTMCNC: 32.9 G/DL (ref 28.4–34.8)
MCV RBC AUTO: 84.7 FL (ref 82.6–102.9)
MONOCYTES NFR BLD: 0.86 K/UL (ref 0.1–1.2)
MONOCYTES NFR BLD: 8 % (ref 3–12)
MORPHOLOGY: NORMAL
NEUTROPHILS NFR BLD: 62 % (ref 36–65)
NEUTS SEG NFR BLD: 6.62 K/UL (ref 1.5–8.1)
NRBC AUTOMATED: 0 PER 100 WBC
PLATELET # BLD AUTO: 269 K/UL (ref 138–453)
PMV BLD AUTO: 8.6 FL (ref 8.1–13.5)
RBC # BLD AUTO: 2.48 M/UL (ref 4.21–5.77)
RBC # BLD: ABNORMAL 10*6/UL
WBC OTHER # BLD: 10.7 K/UL (ref 3.5–11.3)

## 2023-05-23 LAB
ALBUMIN SERPL-MCNC: 3.3 G/DL (ref 3.5–5.2)
ALBUMIN/GLOB SERPL: 0.7 {RATIO} (ref 1–2.5)
ALP SERPL-CCNC: 140 U/L (ref 40–129)
ALT SERPL-CCNC: 13 U/L (ref 5–41)
ANION GAP SERPL CALCULATED.3IONS-SCNC: 13 MMOL/L (ref 9–17)
AST SERPL-CCNC: 19 U/L
BILIRUB SERPL-MCNC: 0.2 MG/DL (ref 0.3–1.2)
BUN SERPL-MCNC: 33 MG/DL (ref 8–23)
CALCIUM SERPL-MCNC: 10.8 MG/DL (ref 8.6–10.4)
CHLORIDE SERPL-SCNC: 103 MMOL/L (ref 98–107)
CK SERPL-CCNC: 34 U/L (ref 39–308)
CO2 SERPL-SCNC: 19 MMOL/L (ref 20–31)
CREAT SERPL-MCNC: 2.95 MG/DL (ref 0.7–1.2)
GFR SERPL CREATININE-BSD FRML MDRD: 23 ML/MIN/1.73M2
GLUCOSE SERPL-MCNC: 55 MG/DL (ref 70–99)
POTASSIUM SERPL-SCNC: 4.7 MMOL/L (ref 3.7–5.3)
PROT SERPL-MCNC: 7.8 G/DL (ref 6.4–8.3)
SODIUM SERPL-SCNC: 135 MMOL/L (ref 135–144)

## 2023-05-29 ENCOUNTER — HOSPITAL ENCOUNTER (OUTPATIENT)
Age: 68
Setting detail: SPECIMEN
Discharge: HOME | End: 2023-05-29
Payer: MEDICARE

## 2023-05-29 LAB
ALBUMIN SERPL-MCNC: 3.2 G/DL (ref 3.5–5.2)
ALP SERPL-CCNC: 150 U/L (ref 40–129)
ALT SERPL-CCNC: 11 U/L (ref 5–41)
ANION GAP SERPL CALCULATED.3IONS-SCNC: 10 MMOL/L (ref 9–17)
AST SERPL-CCNC: 16 U/L
BASOPHILS # BLD: 0.06 K/UL (ref 0–0.2)
BASOPHILS NFR BLD: 1 % (ref 0–2)
BILIRUB SERPL-MCNC: 0.3 MG/DL (ref 0.3–1.2)
BUN SERPL-MCNC: 31 MG/DL (ref 8–23)
BUN/CREAT SERPL: 9 (ref 9–20)
CALCIUM SERPL-MCNC: 9.6 MG/DL (ref 8.6–10.4)
CHLORIDE SERPL-SCNC: 102 MMOL/L (ref 98–107)
CK SERPL-CCNC: 43 U/L (ref 39–308)
CO2 SERPL-SCNC: 23 MMOL/L (ref 20–31)
CREAT SERPL-MCNC: 3.3 MG/DL (ref 0.7–1.2)
EOSINOPHIL # BLD: 0.51 K/UL (ref 0–0.44)
EOSINOPHILS RELATIVE PERCENT: 5 % (ref 1–4)
ERYTHROCYTE [DISTWIDTH] IN BLOOD BY AUTOMATED COUNT: 18.3 % (ref 11.8–14.4)
ERYTHROCYTE [SEDIMENTATION RATE] IN BLOOD BY WESTERGREN METHOD: 33 MM/HR (ref 0–20)
GFR SERPL CREATININE-BSD FRML MDRD: 20 ML/MIN/1.73M2
GLUCOSE SERPL-MCNC: 125 MG/DL (ref 70–99)
HCT VFR BLD AUTO: 21.4 % (ref 40.7–50.3)
HGB BLD-MCNC: 7.2 G/DL (ref 13–17)
IMM GRANULOCYTES # BLD AUTO: 0.05 K/UL (ref 0–0.3)
IMM GRANULOCYTES NFR BLD: 1 %
LYMPHOCYTES # BLD: 27 % (ref 24–43)
LYMPHOCYTES NFR BLD: 2.77 K/UL (ref 1.1–3.7)
MCH RBC QN AUTO: 27.7 PG (ref 25.2–33.5)
MCHC RBC AUTO-ENTMCNC: 33.6 G/DL (ref 28.4–34.8)
MCV RBC AUTO: 82.3 FL (ref 82.6–102.9)
MONOCYTES NFR BLD: 0.76 K/UL (ref 0.1–1.2)
MONOCYTES NFR BLD: 7 % (ref 3–12)
NEUTROPHILS NFR BLD: 59 % (ref 36–65)
NEUTS SEG NFR BLD: 6.22 K/UL (ref 1.5–8.1)
NRBC AUTOMATED: 0 PER 100 WBC
PLATELET # BLD AUTO: 253 K/UL (ref 138–453)
PMV BLD AUTO: 8.7 FL (ref 8.1–13.5)
POTASSIUM SERPL-SCNC: 4.5 MMOL/L (ref 3.7–5.3)
PROT SERPL-MCNC: 7.5 G/DL (ref 6.4–8.3)
RBC # BLD AUTO: 2.6 M/UL (ref 4.21–5.77)
RBC # BLD: ABNORMAL 10*6/UL
SODIUM SERPL-SCNC: 135 MMOL/L (ref 135–144)
WBC OTHER # BLD: 10.4 K/UL (ref 3.5–11.3)

## 2023-05-29 PROCEDURE — 85652 RBC SED RATE AUTOMATED: CPT

## 2023-05-29 PROCEDURE — 80053 COMPREHEN METABOLIC PANEL: CPT

## 2023-05-29 PROCEDURE — 85025 COMPLETE CBC W/AUTO DIFF WBC: CPT

## 2023-05-29 PROCEDURE — 82550 ASSAY OF CK (CPK): CPT

## 2023-05-31 ENCOUNTER — OFFICE VISIT (OUTPATIENT)
Dept: INFECTIOUS DISEASES | Age: 68
End: 2023-05-31
Payer: MEDICARE

## 2023-05-31 VITALS
TEMPERATURE: 97.3 F | WEIGHT: 239 LBS | SYSTOLIC BLOOD PRESSURE: 164 MMHG | DIASTOLIC BLOOD PRESSURE: 79 MMHG | BODY MASS INDEX: 33.46 KG/M2 | HEIGHT: 71 IN

## 2023-05-31 DIAGNOSIS — M86.371 CHRONIC MULTIFOCAL OSTEOMYELITIS OF RIGHT FOOT (HCC): ICD-10-CM

## 2023-05-31 DIAGNOSIS — E11.610 CHARCOT FOOT DUE TO DIABETES MELLITUS (HCC): Primary | ICD-10-CM

## 2023-05-31 PROCEDURE — G8417 CALC BMI ABV UP PARAM F/U: HCPCS | Performed by: INTERNAL MEDICINE

## 2023-05-31 PROCEDURE — 1123F ACP DISCUSS/DSCN MKR DOCD: CPT | Performed by: INTERNAL MEDICINE

## 2023-05-31 PROCEDURE — 3077F SYST BP >= 140 MM HG: CPT | Performed by: INTERNAL MEDICINE

## 2023-05-31 PROCEDURE — G8427 DOCREV CUR MEDS BY ELIG CLIN: HCPCS | Performed by: INTERNAL MEDICINE

## 2023-05-31 PROCEDURE — 4004F PT TOBACCO SCREEN RCVD TLK: CPT | Performed by: INTERNAL MEDICINE

## 2023-05-31 PROCEDURE — 99214 OFFICE O/P EST MOD 30 MIN: CPT | Performed by: INTERNAL MEDICINE

## 2023-05-31 PROCEDURE — 3046F HEMOGLOBIN A1C LEVEL >9.0%: CPT | Performed by: INTERNAL MEDICINE

## 2023-05-31 PROCEDURE — 2022F DILAT RTA XM EVC RTNOPTHY: CPT | Performed by: INTERNAL MEDICINE

## 2023-05-31 PROCEDURE — 3017F COLORECTAL CA SCREEN DOC REV: CPT | Performed by: INTERNAL MEDICINE

## 2023-05-31 PROCEDURE — 3078F DIAST BP <80 MM HG: CPT | Performed by: INTERNAL MEDICINE

## 2023-05-31 ASSESSMENT — ENCOUNTER SYMPTOMS
GASTROINTESTINAL NEGATIVE: 1
GASTROINTESTINAL NEGATIVE: 1
RESPIRATORY NEGATIVE: 1
RESPIRATORY NEGATIVE: 1

## 2023-05-31 NOTE — PATIENT INSTRUCTIONS
Stop abx - call [facility] - no abx    *PER DR LIZARRAGA, PATIENT WANTS TO KEEP TUNNELED PICC UNTIL AFTER SEEING HIS FOOT DOCTOR*    Called [facility] 10:07am: [phone], spoke to Fer. Orders given. Called [facility]  2/49pm: #[phone], message was relayed. Ron Benítez  6/1/23-Spoke to Lidia with [facility], confirmed line will be kept until after patient see's his foot doctor Dr Zeb Cornejo. Patient is supposed to call the office for the plan going forward. Dr Adrianna Singh will place an order with IR to pull his PICC.  Ron Benítez

## 2023-05-31 NOTE — PROGRESS NOTES
[facility]  Office Progress Note  Today's Date and Time: 5/31/2023, 9:59 AM    Impression:     1. Charcot foot due to diabetes mellitus ([address]    2. Chronic multifocal osteomyelitis of right foot (HCC)         Recommendations   The patient has been on intravenous antimicrobial therapy with meropenem and daptomycin and this course of antibiotics had been extended at his last visit to complete today 5/31/2023  The patient at this point in time is quite fed up with the issues he continues to have with the right ankle and due to the progressive pain and continued soft tissue swelling he does not really want to undergo any further limb salvaging procedures  The patient at this point in time he tells me that he wants to proceed with a BKA and he has decided we will stop the antimicrobial therapy  He does have a tunneled PICC line in place in the right anterior chest and we did discuss getting this taken out but he would like to keep it until he discusses his plans with the podiatry team and definitive plan/recommendations are made. I have ordered the following medications/ labs:  No orders of the defined types were placed in this encounter. No orders of the defined types were placed in this encounter. Chief complaint/reason for consultation:     Chief Complaint   Patient presents with    Frequent Infections     Follow up         History of Present Illness:   Anatoliy Luevano is a 78y.o.-year-old male who I am seeing in follow-up and he is here with his wife for the visit today. He has a longstanding history of a Charcot foot deformity and has had infections in his right foot with concern for multifocal osteomyelitis of the foot.   The patient has been on intravenous antimicrobial therapy for quite some time and has undergone multiple debridement procedures and subsequent external fixator placement and removal.  The patient tells me that he recently followed up with Dr. Grady Benítez from

## 2023-06-12 DIAGNOSIS — E11.610 CHARCOT FOOT DUE TO DIABETES MELLITUS (HCC): Primary | ICD-10-CM

## 2023-06-15 PROBLEM — D63.8 ANEMIA OF CHRONIC DISEASE: Status: ACTIVE | Noted: 2023-06-15

## 2023-06-15 PROBLEM — D50.9 IRON DEFICIENCY ANEMIA, UNSPECIFIED: Status: ACTIVE | Noted: 2023-06-15

## 2023-06-19 ENCOUNTER — HOSPITAL ENCOUNTER (OUTPATIENT)
Dept: INTERVENTIONAL RADIOLOGY/VASCULAR | Age: 68
Discharge: HOME | End: 2023-06-21
Payer: MEDICARE

## 2023-06-19 DIAGNOSIS — E11.610 CHARCOT FOOT DUE TO DIABETES MELLITUS (HCC): ICD-10-CM

## 2023-06-19 PROCEDURE — 2709999900 IR REMOVE TUNNELED CVAD W SQ PORT/PUMP INSERT

## 2023-06-19 PROCEDURE — 36589 REMOVAL TUNNELED CV CATH: CPT

## 2023-06-19 PROCEDURE — 77001 FLUOROGUIDE FOR VEIN DEVICE: CPT

## 2023-06-20 ENCOUNTER — TELEPHONE (OUTPATIENT)
Dept: ONCOLOGY | Age: 68
End: 2023-06-20

## 2023-06-20 NOTE — TELEPHONE ENCOUNTER
I TRIED TO CALL [facility]HELLE TO SCHEDULE HIS RETACRIT INJECTIONS & IRON INFUSIONS AND HAD TO LEAVE A MESSAGE TO CALL THE OFFICE TO SCHEDULE.

## 2023-06-21 ENCOUNTER — HOSPITAL ENCOUNTER (OUTPATIENT)
Dept: INFUSION THERAPY | Facility: MEDICAL CENTER | Age: 68
Discharge: HOME | End: 2023-06-21
Payer: MEDICARE

## 2023-06-21 VITALS
DIASTOLIC BLOOD PRESSURE: 75 MMHG | TEMPERATURE: 98.2 F | RESPIRATION RATE: 16 BRPM | SYSTOLIC BLOOD PRESSURE: 161 MMHG | HEART RATE: 76 BPM

## 2023-06-21 DIAGNOSIS — D63.8 ANEMIA OF CHRONIC DISEASE: Primary | ICD-10-CM

## 2023-06-21 DIAGNOSIS — D50.9 IRON DEFICIENCY ANEMIA, UNSPECIFIED IRON DEFICIENCY ANEMIA TYPE: ICD-10-CM

## 2023-06-21 LAB
25(OH)D3 SERPL-MCNC: 23.3 NG/ML
ALBUMIN SERPL-MCNC: 3.6 G/DL (ref 3.5–5.2)
ALP SERPL-CCNC: 141 U/L (ref 40–129)
ALT SERPL-CCNC: 9 U/L (ref 5–41)
ANION GAP SERPL CALCULATED.3IONS-SCNC: 11 MMOL/L (ref 9–17)
AST SERPL-CCNC: 12 U/L
BASOPHILS # BLD: 0.07 K/UL (ref 0–0.2)
BASOPHILS NFR BLD: 1 % (ref 0–2)
BILIRUB SERPL-MCNC: 0.2 MG/DL (ref 0.3–1.2)
BUN SERPL-MCNC: 30 MG/DL (ref 8–23)
BUN/CREAT SERPL: 7 (ref 9–20)
CALCIUM SERPL-MCNC: 9.5 MG/DL (ref 8.6–10.4)
CHLORIDE SERPL-SCNC: 102 MMOL/L (ref 98–107)
CO2 SERPL-SCNC: 21 MMOL/L (ref 20–31)
CREAT SERPL-MCNC: 4.14 MG/DL (ref 0.7–1.2)
EOSINOPHIL # BLD: 0.36 K/UL (ref 0–0.44)
EOSINOPHILS RELATIVE PERCENT: 4 % (ref 1–4)
ERYTHROCYTE [DISTWIDTH] IN BLOOD BY AUTOMATED COUNT: 19.1 % (ref 11.8–14.4)
FERRITIN SERPL-MCNC: 244 NG/ML (ref 30–400)
GFR SERPL CREATININE-BSD FRML MDRD: 15 ML/MIN/1.73M2
GLUCOSE SERPL-MCNC: 166 MG/DL (ref 70–99)
HCT VFR BLD AUTO: 25.1 % (ref 40.7–50.3)
HGB BLD-MCNC: 8.3 G/DL (ref 13–17)
IMM GRANULOCYTES # BLD AUTO: 0.1 K/UL (ref 0–0.3)
IMM GRANULOCYTES NFR BLD: 1 %
IRON SATN MFR SERPL: 33 % (ref 20–55)
IRON SERPL-MCNC: 79 UG/DL (ref 59–158)
LYMPHOCYTES # BLD: 25 % (ref 24–43)
LYMPHOCYTES NFR BLD: 2.34 K/UL (ref 1.1–3.7)
MCH RBC QN AUTO: 29 PG (ref 25.2–33.5)
MCHC RBC AUTO-ENTMCNC: 33.1 G/DL (ref 28.4–34.8)
MCV RBC AUTO: 87.8 FL (ref 82.6–102.9)
MONOCYTES NFR BLD: 0.53 K/UL (ref 0.1–1.2)
MONOCYTES NFR BLD: 6 % (ref 3–12)
NEUTROPHILS NFR BLD: 63 % (ref 36–65)
NEUTS SEG NFR BLD: 6.04 K/UL (ref 1.5–8.1)
NRBC AUTOMATED: 0 PER 100 WBC
PLATELET # BLD AUTO: 220 K/UL (ref 138–453)
PMV BLD AUTO: 8.3 FL (ref 8.1–13.5)
POTASSIUM SERPL-SCNC: 5.1 MMOL/L (ref 3.7–5.3)
PROT SERPL-MCNC: 7.9 G/DL (ref 6.4–8.3)
PTH-INTACT SERPL-MCNC: 9.4 PG/ML (ref 14–72)
RBC # BLD AUTO: 2.86 M/UL (ref 4.21–5.77)
RBC # BLD: ABNORMAL 10*6/UL
SODIUM SERPL-SCNC: 134 MMOL/L (ref 135–144)
TIBC SERPL-MCNC: 238 UG/DL (ref 250–450)
UNSATURATED IRON BINDING CAPACITY: 159 UG/DL (ref 112–347)
WBC OTHER # BLD: 9.4 K/UL (ref 3.5–11.3)

## 2023-06-21 PROCEDURE — 85027 COMPLETE CBC AUTOMATED: CPT

## 2023-06-21 PROCEDURE — 82728 ASSAY OF FERRITIN: CPT

## 2023-06-21 PROCEDURE — 96372 THER/PROPH/DIAG INJ SC/IM: CPT

## 2023-06-21 PROCEDURE — 96375 TX/PRO/DX INJ NEW DRUG ADDON: CPT

## 2023-06-21 PROCEDURE — 82306 VITAMIN D 25 HYDROXY: CPT

## 2023-06-21 PROCEDURE — 83970 ASSAY OF PARATHORMONE: CPT

## 2023-06-21 PROCEDURE — 83540 ASSAY OF IRON: CPT

## 2023-06-21 PROCEDURE — 2580000003 HC RX 258: Performed by: INTERNAL MEDICINE

## 2023-06-21 PROCEDURE — 80053 COMPREHEN METABOLIC PANEL: CPT

## 2023-06-21 PROCEDURE — 83550 IRON BINDING TEST: CPT

## 2023-06-21 PROCEDURE — 36415 COLL VENOUS BLD VENIPUNCTURE: CPT

## 2023-06-21 PROCEDURE — 6360000002 HC RX W HCPCS: Performed by: INTERNAL MEDICINE

## 2023-06-21 PROCEDURE — 96365 THER/PROPH/DIAG IV INF INIT: CPT

## 2023-06-21 RX ORDER — SODIUM CHLORIDE 9 MG/ML
5-250 INJECTION, SOLUTION INTRAVENOUS PRN
Status: DISCONTINUED | OUTPATIENT
Start: 2023-06-21 | End: 2023-06-22 | Stop reason: HOSPADM

## 2023-06-21 RX ORDER — SODIUM CHLORIDE 9 MG/ML
5-250 INJECTION, SOLUTION INTRAVENOUS PRN
Status: CANCELLED | OUTPATIENT
Start: 2023-06-28

## 2023-06-21 RX ADMIN — SODIUM CHLORIDE 100 ML/HR: 9 INJECTION, SOLUTION INTRAVENOUS at 09:29

## 2023-06-21 RX ADMIN — EPOETIN ALFA-EPBX 10000 UNITS: 10000 INJECTION, SOLUTION INTRAVENOUS; SUBCUTANEOUS at 10:56

## 2023-06-21 RX ADMIN — IRON SUCROSE 200 MG: 20 INJECTION, SOLUTION INTRAVENOUS at 09:58

## 2023-06-21 NOTE — PROGRESS NOTES
Patient arrived ambulatory for 1 of 5 Venofer infusion and retacrit push. Denies any complaint or concern. Patient states he has not had any iron infusions in the past.  Vitals as charted. Labs reviewed after order sent by ordering provider. Peripheral IV established per policy. Patient educated on Venofer and retacrit, verbalizes understanding of possible adverse reaction; denies questions. Venofer infused with no sign of adverse reaction; line flushed for 30 minutes while monitoring patient. Retacrit given IVP per order; line flushed. Intact IV catheter removed with pressure dressing applied. Patient discharged with calendar in hand.

## 2023-06-27 ENCOUNTER — TELEPHONE (OUTPATIENT)
Dept: ONCOLOGY | Age: 68
End: 2023-06-27

## 2023-06-27 ENCOUNTER — INITIAL CONSULT (OUTPATIENT)
Dept: ONCOLOGY | Age: 68
End: 2023-06-27
Payer: MEDICARE

## 2023-06-27 VITALS
RESPIRATION RATE: 16 BRPM | SYSTOLIC BLOOD PRESSURE: 157 MMHG | TEMPERATURE: 97.9 F | HEART RATE: 83 BPM | WEIGHT: 236 LBS | BODY MASS INDEX: 32.92 KG/M2 | DIASTOLIC BLOOD PRESSURE: 78 MMHG

## 2023-06-27 DIAGNOSIS — N18.4 CHRONIC RENAL FAILURE, STAGE 4 (SEVERE) (HCC): ICD-10-CM

## 2023-06-27 DIAGNOSIS — D63.8 ANEMIA OF CHRONIC DISEASE: ICD-10-CM

## 2023-06-27 DIAGNOSIS — D64.9 NORMOCYTIC ANEMIA: Primary | ICD-10-CM

## 2023-06-27 DIAGNOSIS — N18.4 ANEMIA OF CHRONIC RENAL FAILURE, STAGE 4 (SEVERE) (HCC): ICD-10-CM

## 2023-06-27 DIAGNOSIS — D63.1 ANEMIA OF CHRONIC RENAL FAILURE, STAGE 4 (SEVERE) (HCC): ICD-10-CM

## 2023-06-27 PROCEDURE — 99205 OFFICE O/P NEW HI 60 MIN: CPT | Performed by: INTERNAL MEDICINE

## 2023-06-27 PROCEDURE — 3078F DIAST BP <80 MM HG: CPT | Performed by: INTERNAL MEDICINE

## 2023-06-27 PROCEDURE — G8427 DOCREV CUR MEDS BY ELIG CLIN: HCPCS | Performed by: INTERNAL MEDICINE

## 2023-06-27 PROCEDURE — 99202 OFFICE O/P NEW SF 15 MIN: CPT | Performed by: INTERNAL MEDICINE

## 2023-06-27 PROCEDURE — G8417 CALC BMI ABV UP PARAM F/U: HCPCS | Performed by: INTERNAL MEDICINE

## 2023-06-27 PROCEDURE — 3077F SYST BP >= 140 MM HG: CPT | Performed by: INTERNAL MEDICINE

## 2023-06-27 RX ORDER — TIZANIDINE 4 MG/1
TABLET ORAL
COMMUNITY
Start: 2023-06-08

## 2023-06-27 RX ORDER — MULTIVIT WITH MINERALS/LUTEIN
250 TABLET ORAL 2 TIMES DAILY
COMMUNITY

## 2023-06-27 RX ORDER — HYDROCODONE BITARTRATE AND ACETAMINOPHEN 10; 325 MG/1; MG/1
TABLET ORAL
COMMUNITY
Start: 2023-06-07

## 2023-06-30 ENCOUNTER — HOSPITAL ENCOUNTER (OUTPATIENT)
Dept: INFUSION THERAPY | Facility: MEDICAL CENTER | Age: 68
Discharge: HOME | End: 2023-06-30
Payer: MEDICARE

## 2023-06-30 ENCOUNTER — HOSPITAL ENCOUNTER (OUTPATIENT)
Age: 68
Setting detail: SPECIMEN
Discharge: HOME | End: 2023-06-30
Payer: MEDICARE

## 2023-06-30 VITALS
DIASTOLIC BLOOD PRESSURE: 73 MMHG | HEART RATE: 86 BPM | TEMPERATURE: 98.3 F | RESPIRATION RATE: 16 BRPM | SYSTOLIC BLOOD PRESSURE: 158 MMHG

## 2023-06-30 DIAGNOSIS — D64.9 NORMOCYTIC ANEMIA: ICD-10-CM

## 2023-06-30 DIAGNOSIS — D50.9 IRON DEFICIENCY ANEMIA, UNSPECIFIED IRON DEFICIENCY ANEMIA TYPE: ICD-10-CM

## 2023-06-30 DIAGNOSIS — D63.8 ANEMIA OF CHRONIC DISEASE: Primary | ICD-10-CM

## 2023-06-30 LAB
BASOPHILS # BLD: 0.08 K/UL (ref 0–0.2)
BASOPHILS NFR BLD: 1 % (ref 0–2)
EOSINOPHIL # BLD: 0.4 K/UL (ref 0–0.44)
EOSINOPHILS RELATIVE PERCENT: 4 % (ref 1–4)
ERYTHROCYTE [DISTWIDTH] IN BLOOD BY AUTOMATED COUNT: 18.5 % (ref 11.8–14.4)
FREE KAPPA/LAMBDA RATIO: 1.8 (ref 0.26–1.65)
HCT VFR BLD AUTO: 28.6 % (ref 40.7–50.3)
HGB BLD-MCNC: 9.3 G/DL (ref 13–17)
IGA SERPL-MCNC: 534 MG/DL (ref 70–400)
IGG SERPL-MCNC: 1719 MG/DL (ref 700–1600)
IGM SERPL-MCNC: 64 MG/DL (ref 40–230)
IMM GRANULOCYTES # BLD AUTO: 0.08 K/UL (ref 0–0.3)
IMM GRANULOCYTES NFR BLD: 1 %
KAPPA LC FREE SER-MCNC: 30.76 MG/DL (ref 0.37–1.94)
LAMBDA LC FREE SERPL-MCNC: 17.06 MG/DL (ref 0.57–2.63)
LYMPHOCYTES # BLD: 29 % (ref 24–43)
LYMPHOCYTES NFR BLD: 2.85 K/UL (ref 1.1–3.7)
MCH RBC QN AUTO: 29 PG (ref 25.2–33.5)
MCHC RBC AUTO-ENTMCNC: 32.5 G/DL (ref 28.4–34.8)
MCV RBC AUTO: 89.1 FL (ref 82.6–102.9)
MONOCYTES NFR BLD: 0.72 K/UL (ref 0.1–1.2)
MONOCYTES NFR BLD: 7 % (ref 3–12)
NEUTROPHILS NFR BLD: 58 % (ref 36–65)
NEUTS SEG NFR BLD: 5.81 K/UL (ref 1.5–8.1)
NRBC BLD-RTO: 0 PER 100 WBC
PLATELET # BLD AUTO: 242 K/UL (ref 138–453)
PMV BLD AUTO: 8.3 FL (ref 8.1–13.5)
RBC # BLD AUTO: 3.21 M/UL (ref 4.21–5.77)
RBC # BLD: ABNORMAL 10*6/UL
WBC OTHER # BLD: 9.9 K/UL (ref 3.5–11.3)

## 2023-06-30 PROCEDURE — 2580000003 HC RX 258: Performed by: INTERNAL MEDICINE

## 2023-06-30 PROCEDURE — 96374 THER/PROPH/DIAG INJ IV PUSH: CPT

## 2023-06-30 PROCEDURE — 96372 THER/PROPH/DIAG INJ SC/IM: CPT

## 2023-06-30 PROCEDURE — 86334 IMMUNOFIX E-PHORESIS SERUM: CPT

## 2023-06-30 PROCEDURE — 82784 ASSAY IGA/IGD/IGG/IGM EACH: CPT

## 2023-06-30 PROCEDURE — 85027 COMPLETE CBC AUTOMATED: CPT

## 2023-06-30 PROCEDURE — 96375 TX/PRO/DX INJ NEW DRUG ADDON: CPT

## 2023-06-30 PROCEDURE — 36415 COLL VENOUS BLD VENIPUNCTURE: CPT

## 2023-06-30 PROCEDURE — 96365 THER/PROPH/DIAG IV INF INIT: CPT

## 2023-06-30 PROCEDURE — 6360000002 HC RX W HCPCS: Performed by: INTERNAL MEDICINE

## 2023-06-30 PROCEDURE — 83521 IG LIGHT CHAINS FREE EACH: CPT

## 2023-06-30 RX ORDER — SODIUM CHLORIDE 9 MG/ML
5-250 INJECTION, SOLUTION INTRAVENOUS PRN
Status: CANCELLED | OUTPATIENT
Start: 2023-07-05

## 2023-06-30 RX ORDER — SODIUM CHLORIDE 9 MG/ML
5-250 INJECTION, SOLUTION INTRAVENOUS PRN
Status: DISCONTINUED | OUTPATIENT
Start: 2023-06-30 | End: 2023-07-01 | Stop reason: HOSPADM

## 2023-06-30 RX ADMIN — SODIUM CHLORIDE 250 ML/HR: 9 INJECTION, SOLUTION INTRAVENOUS at 08:15

## 2023-06-30 RX ADMIN — IRON SUCROSE 200 MG: 20 INJECTION, SOLUTION INTRAVENOUS at 08:42

## 2023-06-30 RX ADMIN — EPOETIN ALFA-EPBX 10000 UNITS: 10000 INJECTION, SOLUTION INTRAVENOUS; SUBCUTANEOUS at 09:10

## 2023-07-04 LAB
INTERPRETATION SERPL IFE-IMP: NORMAL
PATHOLOGIST: NORMAL

## 2023-07-07 ENCOUNTER — HOSPITAL ENCOUNTER (OUTPATIENT)
Dept: INFUSION THERAPY | Facility: MEDICAL CENTER | Age: 68
Discharge: HOME | End: 2023-07-07
Payer: MEDICARE

## 2023-07-07 VITALS
SYSTOLIC BLOOD PRESSURE: 158 MMHG | RESPIRATION RATE: 16 BRPM | TEMPERATURE: 98.3 F | HEART RATE: 109 BPM | DIASTOLIC BLOOD PRESSURE: 80 MMHG

## 2023-07-07 DIAGNOSIS — D50.9 IRON DEFICIENCY ANEMIA, UNSPECIFIED IRON DEFICIENCY ANEMIA TYPE: ICD-10-CM

## 2023-07-07 DIAGNOSIS — D63.8 ANEMIA OF CHRONIC DISEASE: Primary | ICD-10-CM

## 2023-07-07 LAB
ALBUMIN: 3.7 G/DL
ALK PHOSPHATASE: 138 U/L
ALT SERPL-CCNC: 16 U/L
AST SERPL-CCNC: 21 U/L
BASOPHILS # BLD: 0.08 K/UL (ref 0–0.2)
BASOPHILS ABSOLUTE: 0.09 X10^3UL
BASOPHILS NFR BLD: 1 % (ref 0–2)
BASOPHILS RELATIVE PERCENT: 0.7 %
BILIRUB SERPL-MCNC: 0.5 MG/DL
BUN BLDV-MCNC: 30 MG/DL
CALCIUM SERPL-MCNC: 10.1 MG/DL
CHLORIDE BLD-SCNC: 108 MMOL/L
CO2: 22 MMOL/L
CREAT SERPL-MCNC: 5.08 MG/DL
EOSINOPHIL # BLD: 0.4 K/UL (ref 0–0.44)
EOSINOPHILS ABSOLUTE: 0.39 X10^3UL
EOSINOPHILS RELATIVE PERCENT: 3 % (ref 1–4)
EOSINOPHILS RELATIVE PERCENT: 3.1 %
ERYTHROCYTE [DISTWIDTH] IN BLOOD BY AUTOMATED COUNT: 17.3 % (ref 11.8–14.4)
ERYTHROCYTE [DISTWIDTH] IN BLOOD BY AUTOMATED COUNT: 58.2 FL
FERRITIN: 249 NG/ML
GFR AFRICAN AMERICAN: 13.9 ML/M1.7
GFR NON-AFRICAN AMERICAN: 11.5 ML/M1.7
GLUCOSE: 75 MG/DL
HCT VFR BLD AUTO: 31.3 % (ref 40.7–50.3)
HCT VFR BLD CALC: 31.1 %
HEMOGLOBIN: 10.1 G/DL
HGB BLD-MCNC: 10.2 G/DL (ref 13–17)
IMM GRANULOCYTES # BLD AUTO: 0.08 K/UL (ref 0–0.3)
IMM GRANULOCYTES NFR BLD: 1 %
IMMATURE GRANULOCYTES %: 0.6 %
IMMATURE GRANULOCYTES ABSOLUTE: 0.08 X10^3UL
IRON % SATURATION: 275 %
IRON: 734 UG/DL
LYMPHOCYTES # BLD: 24 % (ref 24–43)
LYMPHOCYTES ABSOLUTE: 3.22 X10^3UL
LYMPHOCYTES NFR BLD: 3.1 K/UL (ref 1.1–3.7)
LYMPHOCYTES RELATIVE PERCENT: 25.5 %
MCH RBC QN AUTO: 29.4 PG
MCH RBC QN AUTO: 29.4 PG (ref 25.2–33.5)
MCHC RBC AUTO-ENTMCNC: 32.5 G/DL
MCHC RBC AUTO-ENTMCNC: 32.6 G/DL (ref 28.4–34.8)
MCV RBC AUTO: 90.2 FL (ref 82.6–102.9)
MCV RBC AUTO: 90.4 FL
MONOCYTES ABSOLUTE: 0.88 X10^3UL
MONOCYTES NFR BLD: 0.78 K/UL (ref 0.1–1.2)
MONOCYTES NFR BLD: 6 % (ref 3–12)
MONOCYTES RELATIVE PERCENT: 7 %
NEUTROPHILS ABSOLUTE: 7.96 X10^3UL
NEUTROPHILS NFR BLD: 65 % (ref 36–65)
NEUTS SEG NFR BLD: 8.26 K/UL (ref 1.5–8.1)
NRBC BLD-RTO: 0 PER 100 WBC
PLATELET # BLD AUTO: 318 K/UL (ref 138–453)
PLATELETS: 304 X10^3UL
PMV BLD AUTO: 8.7 FL (ref 8.1–13.5)
POTASSIUM SERPL-SCNC: 5.1 MMOL/L
PTH INTACT: 7.7 PG/ML
RBC # BLD AUTO: 3.47 M/UL (ref 4.21–5.77)
RBC # BLD: ABNORMAL 10*6/UL
RBC: 3.44 X10^6UL
SEGMENTED NEUTROPHILS RELATIVE PERCENT: 63.1 %
SODIUM BLD-SCNC: 143 MMOL/L
TOTAL IRON BINDING CAPACITY: 267 UG/DL
TOTAL PROTEIN: 7.8 G/DL
VITAMIN D 25-HYDROXY: 28.9 NG/ML
WBC OTHER # BLD: 12.7 K/UL (ref 3.5–11.3)
WBC: 12.61 X10^3UL

## 2023-07-07 PROCEDURE — 96365 THER/PROPH/DIAG IV INF INIT: CPT

## 2023-07-07 PROCEDURE — 2580000003 HC RX 258: Performed by: INTERNAL MEDICINE

## 2023-07-07 PROCEDURE — 36415 COLL VENOUS BLD VENIPUNCTURE: CPT

## 2023-07-07 PROCEDURE — 96375 TX/PRO/DX INJ NEW DRUG ADDON: CPT

## 2023-07-07 PROCEDURE — 6360000002 HC RX W HCPCS: Performed by: INTERNAL MEDICINE

## 2023-07-07 PROCEDURE — 85027 COMPLETE CBC AUTOMATED: CPT

## 2023-07-07 RX ORDER — SODIUM CHLORIDE 9 MG/ML
5-250 INJECTION, SOLUTION INTRAVENOUS PRN
Status: DISCONTINUED | OUTPATIENT
Start: 2023-07-07 | End: 2023-07-08 | Stop reason: HOSPADM

## 2023-07-07 RX ORDER — SODIUM CHLORIDE 9 MG/ML
5-250 INJECTION, SOLUTION INTRAVENOUS PRN
Status: CANCELLED | OUTPATIENT
Start: 2023-07-14

## 2023-07-07 RX ADMIN — IRON SUCROSE 200 MG: 20 INJECTION, SOLUTION INTRAVENOUS at 08:44

## 2023-07-07 RX ADMIN — SODIUM CHLORIDE 250 ML/HR: 9 INJECTION, SOLUTION INTRAVENOUS at 08:20

## 2023-07-07 NOTE — PROGRESS NOTES
Patient arrived ambulatory with wife for 3/5 Venofer infusion and possible Retacrit injection. Patient denies complaints or concerns. IV inserted per unit protocol. Labs reviewed. Patient will not require Retcrit injection. Venofer infused with no sign adverse reaction;line flushed. IV removed with pressure dressing applied. IV removed with pressure dressing applied. Patient ambulated off unit with wife at discharge.

## 2023-07-12 ENCOUNTER — OFFICE VISIT (OUTPATIENT)
Dept: INFECTIOUS DISEASES | Age: 68
End: 2023-07-12
Payer: MEDICARE

## 2023-07-12 VITALS
SYSTOLIC BLOOD PRESSURE: 142 MMHG | TEMPERATURE: 97 F | DIASTOLIC BLOOD PRESSURE: 89 MMHG | BODY MASS INDEX: 33.18 KG/M2 | HEIGHT: 71 IN | WEIGHT: 237 LBS | HEART RATE: 89 BPM

## 2023-07-12 DIAGNOSIS — E11.610 CHARCOT FOOT DUE TO DIABETES MELLITUS (HCC): Primary | ICD-10-CM

## 2023-07-12 DIAGNOSIS — M86.371 CHRONIC MULTIFOCAL OSTEOMYELITIS OF RIGHT FOOT (HCC): ICD-10-CM

## 2023-07-12 PROCEDURE — 3078F DIAST BP <80 MM HG: CPT | Performed by: INTERNAL MEDICINE

## 2023-07-12 PROCEDURE — 3074F SYST BP LT 130 MM HG: CPT | Performed by: INTERNAL MEDICINE

## 2023-07-12 PROCEDURE — 99214 OFFICE O/P EST MOD 30 MIN: CPT | Performed by: INTERNAL MEDICINE

## 2023-07-12 PROCEDURE — 3046F HEMOGLOBIN A1C LEVEL >9.0%: CPT | Performed by: INTERNAL MEDICINE

## 2023-07-12 PROCEDURE — 4004F PT TOBACCO SCREEN RCVD TLK: CPT | Performed by: INTERNAL MEDICINE

## 2023-07-12 PROCEDURE — G8417 CALC BMI ABV UP PARAM F/U: HCPCS | Performed by: INTERNAL MEDICINE

## 2023-07-12 PROCEDURE — 2022F DILAT RTA XM EVC RTNOPTHY: CPT | Performed by: INTERNAL MEDICINE

## 2023-07-12 PROCEDURE — G8427 DOCREV CUR MEDS BY ELIG CLIN: HCPCS | Performed by: INTERNAL MEDICINE

## 2023-07-12 PROCEDURE — 1123F ACP DISCUSS/DSCN MKR DOCD: CPT | Performed by: INTERNAL MEDICINE

## 2023-07-12 PROCEDURE — 3017F COLORECTAL CA SCREEN DOC REV: CPT | Performed by: INTERNAL MEDICINE

## 2023-07-12 ASSESSMENT — ENCOUNTER SYMPTOMS
GASTROINTESTINAL NEGATIVE: 1
RESPIRATORY NEGATIVE: 1

## 2023-07-12 NOTE — PROGRESS NOTES
friction rub. No gallop. Pulmonary:      Effort: Pulmonary effort is normal.      Breath sounds: Normal breath sounds. No wheezing. Abdominal:      General: Bowel sounds are normal.      Palpations: Abdomen is soft. There is no mass. Tenderness: There is no abdominal tenderness. Musculoskeletal:         General: Swelling (right ankle join) present. Cervical back: Normal range of motion and neck supple. Lymphadenopathy:      Cervical: No cervical adenopathy. Skin:     General: Skin is warm and dry. Findings: Erythema present. Neurological:      Mental Status: He is alert and oriented to person, place, and time.        Laboratory studies :  Medical Decision Making:   I have independently reviewed the following labs:  CBC with Differential:  Lab Results   Component Value Date/Time    WBC 12.61 07/07/2023 09:40 AM    WBC 12.7 07/07/2023 08:16 AM    WBC 9.9 06/30/2023 07:50 AM    HGB 10.1 07/07/2023 09:40 AM    HGB 10.2 07/07/2023 08:16 AM    HCT 31.1 07/07/2023 09:40 AM    HCT 31.3 07/07/2023 08:16 AM     07/07/2023 09:40 AM     07/07/2023 08:16 AM     06/30/2023 07:50 AM    SEGSPCT 63.1 07/07/2023 09:40 AM    SEGSPCT 59.3 05/11/2023 10:54 AM    LYMPHOPCT 25.5 07/07/2023 09:40 AM    LYMPHOPCT 24 07/07/2023 08:16 AM    LYMPHOPCT 29 06/30/2023 07:50 AM    LYMPHOPCT 27.4 05/11/2023 10:54 AM    MONOPCT 7.0 07/07/2023 09:40 AM    MONOPCT 6 07/07/2023 08:16 AM    MONOPCT 7 06/30/2023 07:50 AM    MONOPCT 6.8 05/11/2023 10:54 AM    EOSPCT 3.1 07/07/2023 09:40 AM    EOSPCT 5.1 05/11/2023 10:54 AM       BMP:  Lab Results   Component Value Date/Time     07/07/2023 09:40 AM     06/21/2023 09:44 AM    K 5.1 07/07/2023 09:40 AM    K 5.1 06/21/2023 09:44 AM     07/07/2023 09:40 AM     06/21/2023 09:44 AM    CO2 22 07/07/2023 09:40 AM    CO2 21 06/21/2023 09:44 AM    BUN 30 07/07/2023 09:40 AM    BUN 30 06/21/2023 09:44 AM    CREATININE 5.08 07/07/2023 09:40 AM

## 2023-07-14 ENCOUNTER — HOSPITAL ENCOUNTER (OUTPATIENT)
Dept: INFUSION THERAPY | Facility: MEDICAL CENTER | Age: 68
Discharge: HOME | End: 2023-07-14
Payer: MEDICARE

## 2023-07-14 ENCOUNTER — HOSPITAL ENCOUNTER (OUTPATIENT)
Age: 68
Setting detail: SPECIMEN
Discharge: HOME | End: 2023-07-14
Payer: MEDICARE

## 2023-07-14 VITALS
HEART RATE: 82 BPM | TEMPERATURE: 98 F | SYSTOLIC BLOOD PRESSURE: 149 MMHG | RESPIRATION RATE: 16 BRPM | DIASTOLIC BLOOD PRESSURE: 75 MMHG

## 2023-07-14 DIAGNOSIS — D63.8 ANEMIA OF CHRONIC DISEASE: Primary | ICD-10-CM

## 2023-07-14 DIAGNOSIS — D50.9 IRON DEFICIENCY ANEMIA, UNSPECIFIED IRON DEFICIENCY ANEMIA TYPE: ICD-10-CM

## 2023-07-14 LAB
ALBUMIN, U: >300 MG/DL
ALBUMIN: 4 G/DL
ALK PHOSPHATASE: 129 U/L
ALT SERPL-CCNC: 17 U/L
AST SERPL-CCNC: 22 U/L
BACTERIA, URINE: ABNORMAL /HPF
BASOPHILS # BLD: 0.06 K/UL (ref 0–0.2)
BASOPHILS ABSOLUTE: 0.11 X10^3UL
BASOPHILS NFR BLD: 1 % (ref 0–2)
BASOPHILS RELATIVE PERCENT: 0.8 %
BILIRUB SERPL-MCNC: 0.5 MG/DL
BILIRUBIN, URINE: ABNORMAL
BUN BLDV-MCNC: 26 MG/DL
CALCIUM SERPL-MCNC: 10.6 MG/DL
CHARACTER, URINE: ABNORMAL
CHLORIDE BLD-SCNC: 102 MMOL/L
CO2: 21 MMOL/L
COLOR, URINE: ABNORMAL
CREAT SERPL-MCNC: 4.98 MG/DL
EOSINOPHIL # BLD: 0.38 K/UL (ref 0–0.44)
EOSINOPHILS ABSOLUTE: 0.38 X10^3UL
EOSINOPHILS RELATIVE PERCENT: 2.9 %
EOSINOPHILS RELATIVE PERCENT: 4 % (ref 1–4)
ERYTHROCYTE [DISTWIDTH] IN BLOOD BY AUTOMATED COUNT: 15.9 % (ref 11.8–14.4)
ERYTHROCYTE [DISTWIDTH] IN BLOOD BY AUTOMATED COUNT: 52 FL
FERRITIN: 380 NG/ML
GFR AFRICAN AMERICAN: 14.2 ML/M1.7
GFR NON-AFRICAN AMERICAN: 11.7 ML/M1.7
GLUCOSE, URINE: ABNORMAL MG/DL
GLUCOSE: 84 MG/DL
HCT VFR BLD AUTO: 29.4 % (ref 40.7–50.3)
HCT VFR BLD CALC: 32.6 %
HEMOGLOBIN: 10.7 G/DL
HGB BLD-MCNC: 9.8 G/DL (ref 13–17)
IMM GRANULOCYTES # BLD AUTO: 0.08 K/UL (ref 0–0.3)
IMM GRANULOCYTES NFR BLD: 1 %
IMMATURE GRANULOCYTES %: 0.6 %
IMMATURE GRANULOCYTES ABSOLUTE: 0.08 X10^3UL
IRON % SATURATION: 290 %
IRON: 740 UG/DL
KETONES, URINE: ABNORMAL MG/DL
LEUKOCYTE ESTERASE, URINE: ABNORMAL
LYMPHOCYTES # BLD: 25 % (ref 24–43)
LYMPHOCYTES ABSOLUTE: 3.65 X10^3UL
LYMPHOCYTES NFR BLD: 2.7 K/UL (ref 1.1–3.7)
LYMPHOCYTES RELATIVE PERCENT: 27.6 %
MCH RBC QN AUTO: 29.5 PG
MCH RBC QN AUTO: 29.7 PG (ref 25.2–33.5)
MCHC RBC AUTO-ENTMCNC: 32.8 G/DL
MCHC RBC AUTO-ENTMCNC: 33.3 G/DL (ref 28.4–34.8)
MCV RBC AUTO: 89.1 FL (ref 82.6–102.9)
MCV RBC AUTO: 89.8 FL
MONOCYTES ABSOLUTE: 0.91 X10^3UL
MONOCYTES NFR BLD: 0.84 K/UL (ref 0.1–1.2)
MONOCYTES NFR BLD: 8 % (ref 3–12)
MONOCYTES RELATIVE PERCENT: 6.9 %
MUCUS, URINE: ABNORMAL /HPF
NEUTROPHILS ABSOLUTE: 8.1 X10^3UL
NEUTROPHILS NFR BLD: 61 % (ref 36–65)
NEUTS SEG NFR BLD: 6.67 K/UL (ref 1.5–8.1)
NITRITE, URINE: ABNORMAL
NRBC BLD-RTO: 0 PER 100 WBC
OCCULT BLOOD,URINE: ABNORMAL
PH, URINE: 6
PLATELET # BLD AUTO: 299 K/UL (ref 138–453)
PLATELETS: 364 X10^3UL
PMV BLD AUTO: 9 FL (ref 8.1–13.5)
POTASSIUM SERPL-SCNC: 4 MMOL/L
PTH INTACT: 7.6 PG/ML
RBC # BLD AUTO: 3.3 M/UL (ref 4.21–5.77)
RBC # BLD: ABNORMAL 10*6/UL
RBC URINE: ABNORMAL /HPF
RBC: 3.63 X10^6UL
SEGMENTED NEUTROPHILS RELATIVE PERCENT: 61.2 %
SODIUM BLD-SCNC: 140 MMOL/L
SPECIFIC GRAVITY UA: 1.01
SQUAMOUS EPITHELIAL CELLS: ABNORMAL /HPF
TOTAL IRON BINDING CAPACITY: 255 UG/DL
TOTAL PROTEIN: 8.3 G/DL
URINE CULTURE REFLEX: NO
UROBILINOGEN, URINE: 0.2 MG/DL
WBC OTHER # BLD: 10.7 K/UL (ref 3.5–11.3)
WBC URINE: ABNORMAL /HPF
WBC: 13.23 X10^3UL

## 2023-07-14 PROCEDURE — 96372 THER/PROPH/DIAG INJ SC/IM: CPT

## 2023-07-14 PROCEDURE — 2580000003 HC RX 258: Performed by: INTERNAL MEDICINE

## 2023-07-14 PROCEDURE — 96375 TX/PRO/DX INJ NEW DRUG ADDON: CPT

## 2023-07-14 PROCEDURE — 85027 COMPLETE CBC AUTOMATED: CPT

## 2023-07-14 PROCEDURE — 6360000002 HC RX W HCPCS: Performed by: INTERNAL MEDICINE

## 2023-07-14 PROCEDURE — 36415 COLL VENOUS BLD VENIPUNCTURE: CPT

## 2023-07-14 PROCEDURE — 96374 THER/PROPH/DIAG INJ IV PUSH: CPT

## 2023-07-14 RX ORDER — SODIUM CHLORIDE 9 MG/ML
5-250 INJECTION, SOLUTION INTRAVENOUS PRN
Status: DISCONTINUED | OUTPATIENT
Start: 2023-07-14 | End: 2023-07-15 | Stop reason: HOSPADM

## 2023-07-14 RX ORDER — SODIUM CHLORIDE 9 MG/ML
5-250 INJECTION, SOLUTION INTRAVENOUS PRN
Status: CANCELLED | OUTPATIENT
Start: 2023-07-21

## 2023-07-14 RX ADMIN — EPOETIN ALFA-EPBX 10000 UNITS: 10000 INJECTION, SOLUTION INTRAVENOUS; SUBCUTANEOUS at 09:22

## 2023-07-14 RX ADMIN — IRON SUCROSE 200 MG: 20 INJECTION, SOLUTION INTRAVENOUS at 09:04

## 2023-07-14 RX ADMIN — SODIUM CHLORIDE 100 ML/HR: 9 INJECTION, SOLUTION INTRAVENOUS at 09:02

## 2023-07-14 NOTE — PROGRESS NOTES
Patient arrive ambulatory with spouse for 4 of 5 venofer infusions and retacrit injection. Denies complaint or concern. Vitals as charted. Peripheral IV established per policy. Patient tolerate venofer with no adverse reaction; then tolerate retacrit to IV after flushing venofer. Intact IV catheter removed with pressure dressing applied. Patient ambulate off unit with spouse at discharge.

## 2023-07-18 ENCOUNTER — OFFICE VISIT (OUTPATIENT)
Dept: PODIATRY | Age: 68
End: 2023-07-18
Payer: MEDICARE

## 2023-07-18 VITALS — BODY MASS INDEX: 33.93 KG/M2 | HEIGHT: 70 IN | WEIGHT: 237 LBS

## 2023-07-18 DIAGNOSIS — I73.9 PVD (PERIPHERAL VASCULAR DISEASE) (HCC): ICD-10-CM

## 2023-07-18 DIAGNOSIS — E11.51 TYPE II DIABETES MELLITUS WITH PERIPHERAL CIRCULATORY DISORDER (HCC): Primary | ICD-10-CM

## 2023-07-18 DIAGNOSIS — B35.1 DERMATOPHYTOSIS OF NAIL: ICD-10-CM

## 2023-07-18 DIAGNOSIS — M79.672 PAIN IN BOTH FEET: ICD-10-CM

## 2023-07-18 DIAGNOSIS — M79.671 RIGHT FOOT PAIN: ICD-10-CM

## 2023-07-18 DIAGNOSIS — M25.471 EDEMA OF RIGHT ANKLE: ICD-10-CM

## 2023-07-18 DIAGNOSIS — M79.671 PAIN IN BOTH FEET: ICD-10-CM

## 2023-07-18 PROCEDURE — G8427 DOCREV CUR MEDS BY ELIG CLIN: HCPCS | Performed by: PODIATRIST

## 2023-07-18 PROCEDURE — 4004F PT TOBACCO SCREEN RCVD TLK: CPT | Performed by: PODIATRIST

## 2023-07-18 PROCEDURE — G8417 CALC BMI ABV UP PARAM F/U: HCPCS | Performed by: PODIATRIST

## 2023-07-18 PROCEDURE — 3046F HEMOGLOBIN A1C LEVEL >9.0%: CPT | Performed by: PODIATRIST

## 2023-07-18 PROCEDURE — 99213 OFFICE O/P EST LOW 20 MIN: CPT | Performed by: PODIATRIST

## 2023-07-18 PROCEDURE — 11721 DEBRIDE NAIL 6 OR MORE: CPT | Performed by: PODIATRIST

## 2023-07-18 PROCEDURE — 2022F DILAT RTA XM EVC RTNOPTHY: CPT | Performed by: PODIATRIST

## 2023-07-18 PROCEDURE — 1123F ACP DISCUSS/DSCN MKR DOCD: CPT | Performed by: PODIATRIST

## 2023-07-18 PROCEDURE — 3017F COLORECTAL CA SCREEN DOC REV: CPT | Performed by: PODIATRIST

## 2023-07-18 RX ORDER — SODIUM CHLORIDE 9 MG/ML
INJECTION, SOLUTION INTRAVENOUS
COMMUNITY
Start: 2023-04-24

## 2023-07-18 RX ORDER — GABAPENTIN 300 MG/1
CAPSULE ORAL
COMMUNITY
Start: 2023-04-10

## 2023-07-18 NOTE — PROGRESS NOTES
questions were answered in detail. Proper foot hygiene and care was discussed with the pt. Informed patient on proper diabetic foot care and importance of tight glycemic control. Patient to check feet daily and contact the office with any questions/problems/concerns. Other comorbidity noted and will be managed by PCP. All labs were reviewed and all imagining including the above findings were reviewed PRIOR to the patients arrival and with the patient today. Previous patient encounter was reviewed. Encounters from the patients other medical providers were reviewed and noted. I personally interpreted the imaging and labs and discussed the results with the patient Time was spent educating the patient and their families/caregivers on proper care of the feet and ankles. All the above diagnosis were addressed at todays visit and all questions were answered.   A total of 20 minutes was spent with this patients encounter which included charting after the patients visit    7/18/2023    Electronically signed by Reece Harvey DPM on 7/18/2023 at 3:26 PM  7/18/2023

## 2023-07-21 ENCOUNTER — HOSPITAL ENCOUNTER (OUTPATIENT)
Age: 68
Setting detail: SPECIMEN
Discharge: HOME | End: 2023-07-21
Payer: MEDICARE

## 2023-07-21 ENCOUNTER — HOSPITAL ENCOUNTER (OUTPATIENT)
Dept: INFUSION THERAPY | Facility: MEDICAL CENTER | Age: 68
Discharge: HOME | End: 2023-07-21
Payer: MEDICARE

## 2023-07-21 VITALS
RESPIRATION RATE: 16 BRPM | DIASTOLIC BLOOD PRESSURE: 75 MMHG | SYSTOLIC BLOOD PRESSURE: 176 MMHG | HEART RATE: 93 BPM | TEMPERATURE: 98.1 F

## 2023-07-21 DIAGNOSIS — N18.4 CHRONIC RENAL FAILURE, STAGE 4 (SEVERE) (HCC): ICD-10-CM

## 2023-07-21 DIAGNOSIS — D63.8 ANEMIA OF CHRONIC DISEASE: Primary | ICD-10-CM

## 2023-07-21 DIAGNOSIS — D50.9 IRON DEFICIENCY ANEMIA, UNSPECIFIED IRON DEFICIENCY ANEMIA TYPE: ICD-10-CM

## 2023-07-21 DIAGNOSIS — D64.9 NORMOCYTIC ANEMIA: ICD-10-CM

## 2023-07-21 LAB
BASOPHILS # BLD: 0.07 K/UL (ref 0–0.2)
BASOPHILS NFR BLD: 1 % (ref 0–2)
EOSINOPHIL # BLD: 0.34 K/UL (ref 0–0.44)
EOSINOPHILS RELATIVE PERCENT: 3 % (ref 1–4)
ERYTHROCYTE [DISTWIDTH] IN BLOOD BY AUTOMATED COUNT: 15.5 % (ref 11.8–14.4)
HCT VFR BLD AUTO: 30.5 % (ref 40.7–50.3)
HGB BLD-MCNC: 10.1 G/DL (ref 13–17)
IMM GRANULOCYTES # BLD AUTO: 0.08 K/UL (ref 0–0.3)
IMM GRANULOCYTES NFR BLD: 1 %
LYMPHOCYTES NFR BLD: 2.1 K/UL (ref 1.1–3.7)
LYMPHOCYTES RELATIVE PERCENT: 18 % (ref 24–43)
MCH RBC QN AUTO: 29.6 PG (ref 25.2–33.5)
MCHC RBC AUTO-ENTMCNC: 33.1 G/DL (ref 28.4–34.8)
MCV RBC AUTO: 89.4 FL (ref 82.6–102.9)
MONOCYTES NFR BLD: 0.81 K/UL (ref 0.1–1.2)
MONOCYTES NFR BLD: 7 % (ref 3–12)
NEUTROPHILS NFR BLD: 70 % (ref 36–65)
NEUTS SEG NFR BLD: 8.27 K/UL (ref 1.5–8.1)
NRBC BLD-RTO: 0 PER 100 WBC
PLATELET # BLD AUTO: 298 K/UL (ref 138–453)
PMV BLD AUTO: 8.6 FL (ref 8.1–13.5)
RBC # BLD AUTO: 3.41 M/UL (ref 4.21–5.77)
RBC # BLD: ABNORMAL 10*6/UL
WBC OTHER # BLD: 11.7 K/UL (ref 3.5–11.3)

## 2023-07-21 PROCEDURE — 6360000002 HC RX W HCPCS: Performed by: INTERNAL MEDICINE

## 2023-07-21 PROCEDURE — 85027 COMPLETE CBC AUTOMATED: CPT

## 2023-07-21 PROCEDURE — 2580000003 HC RX 258: Performed by: INTERNAL MEDICINE

## 2023-07-21 PROCEDURE — 36415 COLL VENOUS BLD VENIPUNCTURE: CPT

## 2023-07-21 PROCEDURE — 96365 THER/PROPH/DIAG IV INF INIT: CPT

## 2023-07-21 RX ORDER — SODIUM CHLORIDE 9 MG/ML
5-250 INJECTION, SOLUTION INTRAVENOUS PRN
Status: DISCONTINUED | OUTPATIENT
Start: 2023-07-21 | End: 2023-07-22 | Stop reason: HOSPADM

## 2023-07-21 RX ORDER — SODIUM CHLORIDE 9 MG/ML
5-250 INJECTION, SOLUTION INTRAVENOUS PRN
OUTPATIENT
Start: 2023-07-21

## 2023-07-21 RX ADMIN — IRON SUCROSE 200 MG: 20 INJECTION, SOLUTION INTRAVENOUS at 08:38

## 2023-07-21 RX ADMIN — SODIUM CHLORIDE 100 ML/HR: 9 INJECTION, SOLUTION INTRAVENOUS at 08:31

## 2023-07-21 NOTE — PROGRESS NOTES
Patient arrived ambulatory with wife for 5/5 Venofer infusions and possible Retacrit injection. Patient denies complaints or concerns. IV inserted per unit protocol. Labs and order reviewed, Hgb 10.1 today, patient will not require Retcrit injection. Venofer infused with no sign adverse reaction; line flushed. IV removed with pressure dressing applied. IV removed with pressure dressing applied. Patient ambulated off unit with wife at discharge.

## 2023-08-02 LAB
ALBUMIN: 3.8 G/DL
ALK PHOSPHATASE: 140 U/L
ALT SERPL-CCNC: 14 U/L
AST SERPL-CCNC: 19 U/L
BILIRUB SERPL-MCNC: 0.4 MG/DL
BUN BLDV-MCNC: 33 MG/DL
CALCIUM SERPL-MCNC: 10.4 MG/DL
CHLORIDE BLD-SCNC: 106 MMOL/L
CO2: 21 MMOL/L
CREAT SERPL-MCNC: 5.37 MG/DL
GFR AFRICAN AMERICAN: 13 ML/M1.7
GFR NON-AFRICAN AMERICAN: 10.7 ML/M1.7
GLUCOSE: 138 MG/DL
POTASSIUM SERPL-SCNC: 4.9 MMOL/L
SODIUM BLD-SCNC: 138 MMOL/L
TOTAL PROTEIN: 8 G/DL

## 2023-08-08 ENCOUNTER — TELEPHONE (OUTPATIENT)
Dept: ONCOLOGY | Age: 68
End: 2023-08-08

## 2023-08-08 ENCOUNTER — OFFICE VISIT (OUTPATIENT)
Dept: ONCOLOGY | Age: 68
End: 2023-08-08
Payer: MEDICARE

## 2023-08-08 VITALS
SYSTOLIC BLOOD PRESSURE: 158 MMHG | WEIGHT: 236.7 LBS | RESPIRATION RATE: 16 BRPM | BODY MASS INDEX: 33.96 KG/M2 | DIASTOLIC BLOOD PRESSURE: 77 MMHG | HEART RATE: 87 BPM | TEMPERATURE: 97.5 F

## 2023-08-08 DIAGNOSIS — D63.8 ANEMIA OF CHRONIC DISEASE: ICD-10-CM

## 2023-08-08 DIAGNOSIS — D64.9 NORMOCYTIC ANEMIA: Primary | ICD-10-CM

## 2023-08-08 PROCEDURE — 3017F COLORECTAL CA SCREEN DOC REV: CPT | Performed by: INTERNAL MEDICINE

## 2023-08-08 PROCEDURE — 4004F PT TOBACCO SCREEN RCVD TLK: CPT | Performed by: INTERNAL MEDICINE

## 2023-08-08 PROCEDURE — 99211 OFF/OP EST MAY X REQ PHY/QHP: CPT | Performed by: INTERNAL MEDICINE

## 2023-08-08 PROCEDURE — G8417 CALC BMI ABV UP PARAM F/U: HCPCS | Performed by: INTERNAL MEDICINE

## 2023-08-08 PROCEDURE — 3077F SYST BP >= 140 MM HG: CPT | Performed by: INTERNAL MEDICINE

## 2023-08-08 PROCEDURE — 1123F ACP DISCUSS/DSCN MKR DOCD: CPT | Performed by: INTERNAL MEDICINE

## 2023-08-08 PROCEDURE — G8427 DOCREV CUR MEDS BY ELIG CLIN: HCPCS | Performed by: INTERNAL MEDICINE

## 2023-08-08 PROCEDURE — 99214 OFFICE O/P EST MOD 30 MIN: CPT | Performed by: INTERNAL MEDICINE

## 2023-08-08 PROCEDURE — 3078F DIAST BP <80 MM HG: CPT | Performed by: INTERNAL MEDICINE

## 2023-08-08 NOTE — TELEPHONE ENCOUNTER
[facility] ARRIVES AMBULATORY FOR MD VISIT  DR Carrera Pi IN TO SEE PATIENT  ORDERS RECEIVED  RV 3-4 MONTHS WITH CBC & IRON STUDIES BEFORE  LABS CDP FE TIBC FERRITIN DUE 11/30/23, ORDERS MAILED TO PT  MD VISIT 12/07/23 @1:30PM  AVS PRINTED AND GIVEN TO PATIENT WITH INSTRUCTIONS  PATIENT DISCHARGED AMBULATORY

## 2023-08-10 LAB
C-REACTIVE PROTEIN: 35.64 MG/L
ERYTHROCYTE SEDIMENTATION RATE: 72 MM/HR

## 2023-08-18 DIAGNOSIS — M25.571 RIGHT ANKLE PAIN, UNSPECIFIED CHRONICITY: Primary | ICD-10-CM

## 2023-08-22 ENCOUNTER — OFFICE VISIT (OUTPATIENT)
Dept: ORTHOPEDIC SURGERY | Age: 68
End: 2023-08-22
Payer: MEDICARE

## 2023-08-22 VITALS — HEIGHT: 70 IN | BODY MASS INDEX: 33.79 KG/M2 | RESPIRATION RATE: 16 BRPM | WEIGHT: 236 LBS

## 2023-08-22 DIAGNOSIS — Z96.9 RETAINED ORTHOPEDIC HARDWARE: ICD-10-CM

## 2023-08-22 DIAGNOSIS — R52 INTRACTABLE PAIN: ICD-10-CM

## 2023-08-22 DIAGNOSIS — E11.42 DIABETIC POLYNEUROPATHY ASSOCIATED WITH TYPE 2 DIABETES MELLITUS (HCC): ICD-10-CM

## 2023-08-22 DIAGNOSIS — Z91.89 AT HIGH RISK FOR POSTOPERATIVE COMPLICATIONS: ICD-10-CM

## 2023-08-22 DIAGNOSIS — F17.200 TOBACCO DEPENDENCY: ICD-10-CM

## 2023-08-22 DIAGNOSIS — E11.610 CHARCOT FOOT DUE TO DIABETES MELLITUS (HCC): Primary | ICD-10-CM

## 2023-08-22 PROCEDURE — 99203 OFFICE O/P NEW LOW 30 MIN: CPT | Performed by: ORTHOPAEDIC SURGERY

## 2023-08-22 PROCEDURE — G8417 CALC BMI ABV UP PARAM F/U: HCPCS | Performed by: ORTHOPAEDIC SURGERY

## 2023-08-22 PROCEDURE — 4004F PT TOBACCO SCREEN RCVD TLK: CPT | Performed by: ORTHOPAEDIC SURGERY

## 2023-08-22 PROCEDURE — 3017F COLORECTAL CA SCREEN DOC REV: CPT | Performed by: ORTHOPAEDIC SURGERY

## 2023-08-22 PROCEDURE — 3046F HEMOGLOBIN A1C LEVEL >9.0%: CPT | Performed by: ORTHOPAEDIC SURGERY

## 2023-08-22 PROCEDURE — G8428 CUR MEDS NOT DOCUMENT: HCPCS | Performed by: ORTHOPAEDIC SURGERY

## 2023-08-22 PROCEDURE — 2022F DILAT RTA XM EVC RTNOPTHY: CPT | Performed by: ORTHOPAEDIC SURGERY

## 2023-08-22 PROCEDURE — 1123F ACP DISCUSS/DSCN MKR DOCD: CPT | Performed by: ORTHOPAEDIC SURGERY

## 2023-08-22 NOTE — PROGRESS NOTES
an emotional toll. I also recommended the patient attend an amputee support group preoperatively and speak with a local prosthetist, and information was provided at today's visit to facilitate this. The patient does agree that these recommendations are in his best interest and agrees to pursue them. The following labs were ordered to help assess the patient's potential risk, and help guide the conversation surrounding the treatment course/options:  Hemoglobin A1c.    -We discussed that this will be obtained just prior to his next visit, and that he will follow-up with his PCP regarding glucose control ASAP. All questions were answered and the above plan was agreed upon. The patient will return to clinic in 6 weeks without x-rays. At his next visit, we will review his hemoglobin A1c, and possibly consider surgery as above. At the patient's next visit, depending on how the patient is doing and/or new imaging/labs results, we may consider the following options:    []  Lace up ankle     []  CAM boot         []  removable wrist brace     []  PT:        []  Wean out immobilization         []  Adv activity      []  Footmind        []  Spenco       []  Custom Orthotic:               []  AZ brace                    []  Rocker Bottom      []  Night splint    []  Heel cups        []  Strap        []  Toe gizmos    []  Topl        []  NSAIDs         []  Pancho        []  Ref:         []  Stress Xray    []  CT        []  MRI  []  Inj:          []  Consider OR      []  Pick OR date    No follow-ups on file. No orders of the defined types were placed in this encounter. No orders of the defined types were placed in this encounter. Anna Faustin MD  Orthopedic Surgery        Please excuse any typos/errors, as this note was created with the assistance of voice recognition software.  While intending to generate a document that actually reflects the content of the visit, the document can still have some

## 2023-09-05 ENCOUNTER — HOSPITAL ENCOUNTER (EMERGENCY)
Age: 68
Discharge: HOME | DRG: 673 | End: 2023-09-05
Attending: EMERGENCY MEDICINE
Payer: MEDICARE

## 2023-09-05 ENCOUNTER — APPOINTMENT (OUTPATIENT)
Dept: VASCULAR LAB | Age: 68
DRG: 673 | End: 2023-09-05
Attending: EMERGENCY MEDICINE
Payer: MEDICARE

## 2023-09-05 ENCOUNTER — APPOINTMENT (OUTPATIENT)
Dept: GENERAL RADIOLOGY | Age: 68
DRG: 673 | End: 2023-09-05
Payer: MEDICARE

## 2023-09-05 VITALS
DIASTOLIC BLOOD PRESSURE: 70 MMHG | BODY MASS INDEX: 33.86 KG/M2 | RESPIRATION RATE: 16 BRPM | SYSTOLIC BLOOD PRESSURE: 153 MMHG | WEIGHT: 236 LBS | OXYGEN SATURATION: 98 % | TEMPERATURE: 98 F | HEART RATE: 66 BPM

## 2023-09-05 DIAGNOSIS — E11.610 CHARCOT FOOT DUE TO DIABETES MELLITUS (HCC): Primary | ICD-10-CM

## 2023-09-05 DIAGNOSIS — M79.89 FOOT SWELLING: ICD-10-CM

## 2023-09-05 LAB
ANION GAP SERPL CALCULATED.3IONS-SCNC: 11 MMOL/L (ref 9–17)
BASOPHILS # BLD: 0.08 K/UL (ref 0–0.2)
BASOPHILS NFR BLD: 1 % (ref 0–2)
BUN SERPL-MCNC: 38 MG/DL (ref 8–23)
BUN/CREAT SERPL: 6 (ref 9–20)
CALCIUM SERPL-MCNC: 10.2 MG/DL (ref 8.6–10.4)
CHLORIDE SERPL-SCNC: 101 MMOL/L (ref 98–107)
CO2 SERPL-SCNC: 21 MMOL/L (ref 20–31)
CREAT SERPL-MCNC: 6.2 MG/DL (ref 0.7–1.2)
EOSINOPHIL # BLD: 0.32 K/UL (ref 0–0.44)
EOSINOPHILS RELATIVE PERCENT: 3 % (ref 1–4)
ERYTHROCYTE [DISTWIDTH] IN BLOOD BY AUTOMATED COUNT: 15 % (ref 11.8–14.4)
GFR SERPL CREATININE-BSD FRML MDRD: 9 ML/MIN/1.73M2
GLUCOSE SERPL-MCNC: 112 MG/DL (ref 70–99)
HCT VFR BLD AUTO: 29.7 % (ref 40.7–50.3)
HGB BLD-MCNC: 10 G/DL (ref 13–17)
IMM GRANULOCYTES # BLD AUTO: 0.09 K/UL (ref 0–0.3)
IMM GRANULOCYTES NFR BLD: 1 %
LYMPHOCYTES NFR BLD: 3.09 K/UL (ref 1.1–3.7)
LYMPHOCYTES RELATIVE PERCENT: 28 % (ref 24–43)
MCH RBC QN AUTO: 29.6 PG (ref 25.2–33.5)
MCHC RBC AUTO-ENTMCNC: 33.7 G/DL (ref 28.4–34.8)
MCV RBC AUTO: 87.9 FL (ref 82.6–102.9)
MONOCYTES NFR BLD: 0.77 K/UL (ref 0.1–1.2)
MONOCYTES NFR BLD: 7 % (ref 3–12)
NEUTROPHILS NFR BLD: 60 % (ref 36–65)
NEUTS SEG NFR BLD: 6.82 K/UL (ref 1.5–8.1)
NRBC BLD-RTO: 0 PER 100 WBC
PLATELET # BLD AUTO: 184 K/UL (ref 138–453)
PMV BLD AUTO: 8.4 FL (ref 8.1–13.5)
POTASSIUM SERPL-SCNC: 4.8 MMOL/L (ref 3.7–5.3)
RBC # BLD AUTO: 3.38 M/UL (ref 4.21–5.77)
RBC # BLD: ABNORMAL 10*6/UL
SODIUM SERPL-SCNC: 133 MMOL/L (ref 135–144)
WBC OTHER # BLD: 11.2 K/UL (ref 3.5–11.3)

## 2023-09-05 PROCEDURE — 99284 EMERGENCY DEPT VISIT MOD MDM: CPT

## 2023-09-05 PROCEDURE — 80048 BASIC METABOLIC PNL TOTAL CA: CPT

## 2023-09-05 PROCEDURE — 6370000000 HC RX 637 (ALT 250 FOR IP): Performed by: EMERGENCY MEDICINE

## 2023-09-05 PROCEDURE — 73630 X-RAY EXAM OF FOOT: CPT

## 2023-09-05 PROCEDURE — 36415 COLL VENOUS BLD VENIPUNCTURE: CPT

## 2023-09-05 PROCEDURE — 85025 COMPLETE CBC W/AUTO DIFF WBC: CPT

## 2023-09-05 PROCEDURE — 93971 EXTREMITY STUDY: CPT | Performed by: SURGERY

## 2023-09-05 PROCEDURE — 93971 EXTREMITY STUDY: CPT

## 2023-09-05 RX ORDER — HYDROCODONE BITARTRATE AND ACETAMINOPHEN 5; 325 MG/1; MG/1
1 TABLET ORAL ONCE
Status: COMPLETED | OUTPATIENT
Start: 2023-09-05 | End: 2023-09-05

## 2023-09-05 RX ADMIN — HYDROCODONE BITARTRATE AND ACETAMINOPHEN 1 TABLET: 5; 325 TABLET ORAL at 12:33

## 2023-09-05 ASSESSMENT — PAIN SCALES - GENERAL
PAINLEVEL_OUTOF10: 9
PAINLEVEL_OUTOF10: 9

## 2023-09-05 ASSESSMENT — PAIN DESCRIPTION - LOCATION
LOCATION: LEG;ANKLE;FOOT
LOCATION: FOOT

## 2023-09-05 ASSESSMENT — ENCOUNTER SYMPTOMS
VOMITING: 0
COUGH: 0
EYE REDNESS: 0
BACK PAIN: 0
SHORTNESS OF BREATH: 0
ABDOMINAL PAIN: 0
EYE PAIN: 0
SORE THROAT: 0
DIARRHEA: 0

## 2023-09-05 ASSESSMENT — PAIN DESCRIPTION - ORIENTATION
ORIENTATION: RIGHT
ORIENTATION: RIGHT

## 2023-09-05 ASSESSMENT — PAIN - FUNCTIONAL ASSESSMENT: PAIN_FUNCTIONAL_ASSESSMENT: 0-10

## 2023-09-05 ASSESSMENT — PAIN DESCRIPTION - DESCRIPTORS: DESCRIPTORS: BURNING;PRESSURE

## 2023-09-05 ASSESSMENT — PAIN DESCRIPTION - FREQUENCY: FREQUENCY: CONTINUOUS

## 2023-09-05 NOTE — DISCHARGE INSTRUCTIONS
Follow-up with your podiatrist and nephrologist as soon as possible and as scheduled. Please return with any worsening symptoms such as worsening pain, swelling, fevers, vomiting, confusion. Make sure you are elevating your foot appropriately.

## 2023-09-05 NOTE — ED PROVIDER NOTES
[facility]  EMERGENCY MEDICINE     Pt Name: Bossman Olvera  MRN: [id number]  [address] [facility] [place] 1955  Date of evaluation: 9/5/2023  PCP:    Adela Randolph MD  Provider: Bobbi Garcia DO    CHIEF COMPLAINT       Chief Complaint   Patient presents with    Leg Swelling     Right, right foot/ankle, states has had 20 surgeries, by Dr. Kusum Saleem    Patient is 70-year-old male with history of CKD, hypertension, diabetes, right Charcot foot that presents with worsening swelling of right foot and right lower leg. Patient also describes some right posterior calf pain. Patient states he has had this continued swelling over the past several weeks but has worsened over the past 2 to 3 days where he could not put his boot on. Patient states multiple previous surgeries done by podiatry. Patient is pending right foot amputation and had surgical consult approximately 2 weeks ago for this. Patient has some slight tenderness of the foot. Patient last surgery was in April of this year. Patient does have hardware in this foot. Patient denies any new trauma. No redness of the foot. No fevers or chills. Triage notes and Nursing notes were reviewed by myself. Any discrepancies are addressed above.     PAST MEDICAL HISTORY     Past Medical History:   Diagnosis Date    Abscess of right foot 08/04/2018    Acquired hammer toe deformity of lesser toe of right foot     ALEJANDRO (acute kidney injury) ([address]) 08/05/2018    Anemia     Cellulitis 04/24/2018    Cellulitis of left foot 04/24/2018    Cellulitis of right foot     and abscess    Charcot foot due to diabetes mellitus ([address]) 2014    Right foot     Chest pain at rest 08/04/2018    Chronic multifocal osteomyelitis of right foot (HCC)     CKD (chronic kidney disease)     Diabetic polyneuropathy associated with type 2 diabetes mellitus ([address])     Essential hypertension     Fractures, multiple 07/21/2014    Right foot fractures     Hyperlipidemia necessity of raising leg to decrease edema and wife states patient has not compliant with elevation. Strict return precautions and follow up instructions were discussed with the patient with which the patient agrees    ED Medications administered this visit:    Medications   HYDROcodone-acetaminophen (NORCO) 5-325 MG per tablet 1 tablet (1 tablet Oral Given 9/5/23 6803)         FINAL IMPRESSION      1. Charcot foot due to diabetes mellitus ([address])    2. Foot swelling          DISPOSITION/PLAN   DISPOSITION Decision To Discharge 09/05/2023 02:06:11 PM      PATIENT REFERRED TO:  No follow-up provider specified.     DISCHARGE MEDICATIONS:  New Prescriptions    No medications on file              Martina Gunn DO (electronically signed)  Attending Physician, Emergency Department         Martina Gunn DO  09/05/23 3304

## 2023-09-07 ENCOUNTER — APPOINTMENT (OUTPATIENT)
Dept: GENERAL RADIOLOGY | Age: 68
DRG: 673 | End: 2023-09-07
Payer: MEDICARE

## 2023-09-07 ENCOUNTER — HOSPITAL ENCOUNTER (INPATIENT)
Age: 68
LOS: 6 days | Discharge: HOME | DRG: 673 | End: 2023-09-13
Attending: EMERGENCY MEDICINE | Admitting: INTERNAL MEDICINE
Payer: MEDICARE

## 2023-09-07 DIAGNOSIS — N18.6 ESRD NEEDING DIALYSIS (HCC): Primary | ICD-10-CM

## 2023-09-07 DIAGNOSIS — Z99.2 ESRD NEEDING DIALYSIS (HCC): Primary | ICD-10-CM

## 2023-09-07 LAB
ALBUMIN: 3.6 G/DL
ALK PHOSPHATASE: 120 U/L
ALT SERPL-CCNC: 13 U/L
ANION GAP SERPL CALCULATED.3IONS-SCNC: 14 MMOL/L (ref 9–17)
AST SERPL-CCNC: 16 U/L
BASOPHILS # BLD: 0.07 K/UL (ref 0–0.2)
BASOPHILS ABSOLUTE: 0.06 X10^3UL
BASOPHILS NFR BLD: 1 % (ref 0–2)
BASOPHILS RELATIVE PERCENT: 0.6 %
BILIRUB SERPL-MCNC: 0.4 MG/DL
BUN BLDV-MCNC: 43 MG/DL
BUN SERPL-MCNC: 42 MG/DL (ref 8–23)
BUN/CREAT SERPL: 7 (ref 9–20)
CALCIUM SERPL-MCNC: 10 MG/DL (ref 8.6–10.4)
CALCIUM SERPL-MCNC: 9.4 MG/DL
CHLORIDE BLD-SCNC: 106 MMOL/L
CHLORIDE SERPL-SCNC: 102 MMOL/L (ref 98–107)
CO2 SERPL-SCNC: 20 MMOL/L (ref 20–31)
CO2: 19 MMOL/L
CREAT SERPL-MCNC: 6.2 MG/DL (ref 0.7–1.2)
CREAT SERPL-MCNC: 6.56 MG/DL
EOSINOPHIL # BLD: 0.3 K/UL (ref 0–0.44)
EOSINOPHILS ABSOLUTE: 0.3 X10^3UL
EOSINOPHILS RELATIVE PERCENT: 2.9 %
EOSINOPHILS RELATIVE PERCENT: 3 % (ref 1–4)
ERYTHROCYTE [DISTWIDTH] IN BLOOD BY AUTOMATED COUNT: 14.9 % (ref 11.8–14.4)
ERYTHROCYTE [DISTWIDTH] IN BLOOD BY AUTOMATED COUNT: 47.8 FL
GFR AFRICAN AMERICAN: 10.3 ML/M1.7
GFR NON-AFRICAN AMERICAN: 8.5 ML/M1.7
GFR SERPL CREATININE-BSD FRML MDRD: 9 ML/MIN/1.73M2
GLUCOSE BLD-MCNC: 76 MG/DL (ref 75–110)
GLUCOSE SERPL-MCNC: 122 MG/DL (ref 70–99)
GLUCOSE: 145 MG/DL
HCT VFR BLD AUTO: 31 % (ref 40.7–50.3)
HCT VFR BLD CALC: 29.7 %
HEMOGLOBIN: 10 G/DL
HGB BLD-MCNC: 10.5 G/DL (ref 13–17)
IMM GRANULOCYTES # BLD AUTO: 0.07 K/UL (ref 0–0.3)
IMM GRANULOCYTES NFR BLD: 1 %
IMMATURE GRANULOCYTES %: 0.9 %
IMMATURE GRANULOCYTES ABSOLUTE: 0.09 X10^3UL
LYMPHOCYTES ABSOLUTE: 2.8 X10^3UL
LYMPHOCYTES NFR BLD: 2.52 K/UL (ref 1.1–3.7)
LYMPHOCYTES RELATIVE PERCENT: 26 % (ref 24–43)
LYMPHOCYTES RELATIVE PERCENT: 27.2 %
MCH RBC QN AUTO: 29.6 PG
MCH RBC QN AUTO: 29.6 PG (ref 25.2–33.5)
MCHC RBC AUTO-ENTMCNC: 33.7 G/DL
MCHC RBC AUTO-ENTMCNC: 33.9 G/DL (ref 28.4–34.8)
MCV RBC AUTO: 87.3 FL (ref 82.6–102.9)
MCV RBC AUTO: 87.9 FL
MONOCYTES ABSOLUTE: 0.65 X10^3UL
MONOCYTES NFR BLD: 0.69 K/UL (ref 0.1–1.2)
MONOCYTES NFR BLD: 7 % (ref 3–12)
MONOCYTES RELATIVE PERCENT: 6.3 %
NEUTROPHILS ABSOLUTE: 6.39 X10^3UL
NEUTROPHILS NFR BLD: 62 % (ref 36–65)
NEUTS SEG NFR BLD: 5.91 K/UL (ref 1.5–8.1)
NRBC BLD-RTO: 0 PER 100 WBC
PLATELET # BLD AUTO: 190 K/UL (ref 138–453)
PLATELETS: 210 X10^3UL
PMV BLD AUTO: 8.5 FL (ref 8.1–13.5)
POTASSIUM SERPL-SCNC: 4.9 MMOL/L (ref 3.7–5.3)
POTASSIUM SERPL-SCNC: 5.2 MMOL/L
PTH INTACT: 7.5 PG/ML
RBC # BLD AUTO: 3.55 M/UL (ref 4.21–5.77)
RBC # BLD: ABNORMAL 10*6/UL
RBC: 3.38 X10^6UL
SEGMENTED NEUTROPHILS RELATIVE PERCENT: 62.1 %
SODIUM BLD-SCNC: 137 MMOL/L
SODIUM SERPL-SCNC: 136 MMOL/L (ref 135–144)
TOTAL PROTEIN: 7.3 G/DL
VITAMIN D 25-HYDROXY: 27.1 NG/ML
WBC OTHER # BLD: 9.6 K/UL (ref 3.5–11.3)
WBC: 10.29 X10^3UL

## 2023-09-07 PROCEDURE — 6370000000 HC RX 637 (ALT 250 FOR IP): Performed by: NURSE PRACTITIONER

## 2023-09-07 PROCEDURE — 71045 X-RAY EXAM CHEST 1 VIEW: CPT

## 2023-09-07 PROCEDURE — 2580000003 HC RX 258: Performed by: NURSE PRACTITIONER

## 2023-09-07 PROCEDURE — 80048 BASIC METABOLIC PNL TOTAL CA: CPT

## 2023-09-07 PROCEDURE — 93005 ELECTROCARDIOGRAM TRACING: CPT | Performed by: PHYSICIAN ASSISTANT

## 2023-09-07 PROCEDURE — 1200000000 HC SEMI PRIVATE

## 2023-09-07 PROCEDURE — 99222 1ST HOSP IP/OBS MODERATE 55: CPT | Performed by: NURSE PRACTITIONER

## 2023-09-07 PROCEDURE — 82947 ASSAY GLUCOSE BLOOD QUANT: CPT

## 2023-09-07 PROCEDURE — 85025 COMPLETE CBC W/AUTO DIFF WBC: CPT

## 2023-09-07 PROCEDURE — 6360000002 HC RX W HCPCS: Performed by: NURSE PRACTITIONER

## 2023-09-07 PROCEDURE — 99285 EMERGENCY DEPT VISIT HI MDM: CPT

## 2023-09-07 RX ORDER — ASPIRIN 81 MG/1
81 TABLET ORAL DAILY
Status: DISCONTINUED | OUTPATIENT
Start: 2023-09-07 | End: 2023-09-13 | Stop reason: HOSPADM

## 2023-09-07 RX ORDER — SODIUM CHLORIDE 0.9 % (FLUSH) 0.9 %
5-40 SYRINGE (ML) INJECTION EVERY 12 HOURS SCHEDULED
Status: DISCONTINUED | OUTPATIENT
Start: 2023-09-07 | End: 2023-09-13 | Stop reason: HOSPADM

## 2023-09-07 RX ORDER — CETIRIZINE HYDROCHLORIDE 10 MG/1
10 TABLET ORAL NIGHTLY
Status: DISCONTINUED | OUTPATIENT
Start: 2023-09-07 | End: 2023-09-13 | Stop reason: HOSPADM

## 2023-09-07 RX ORDER — NICOTINE 21 MG/24HR
1 PATCH, TRANSDERMAL 24 HOURS TRANSDERMAL DAILY
Status: DISCONTINUED | OUTPATIENT
Start: 2023-09-07 | End: 2023-09-13 | Stop reason: HOSPADM

## 2023-09-07 RX ORDER — POLYETHYLENE GLYCOL 3350 17 G/17G
17 POWDER, FOR SOLUTION ORAL DAILY PRN
Status: DISCONTINUED | OUTPATIENT
Start: 2023-09-07 | End: 2023-09-13 | Stop reason: HOSPADM

## 2023-09-07 RX ORDER — SODIUM CHLORIDE 0.9 % (FLUSH) 0.9 %
10 SYRINGE (ML) INJECTION PRN
Status: DISCONTINUED | OUTPATIENT
Start: 2023-09-07 | End: 2023-09-13 | Stop reason: HOSPADM

## 2023-09-07 RX ORDER — TIZANIDINE 4 MG/1
4 TABLET ORAL ONCE
Status: COMPLETED | OUTPATIENT
Start: 2023-09-07 | End: 2023-09-07

## 2023-09-07 RX ORDER — INSULIN GLARGINE 100 [IU]/ML
10 INJECTION, SOLUTION SUBCUTANEOUS 2 TIMES DAILY
Status: DISCONTINUED | OUTPATIENT
Start: 2023-09-07 | End: 2023-09-11

## 2023-09-07 RX ORDER — ONDANSETRON 4 MG/1
4 TABLET, ORALLY DISINTEGRATING ORAL EVERY 8 HOURS PRN
Status: DISCONTINUED | OUTPATIENT
Start: 2023-09-07 | End: 2023-09-13 | Stop reason: HOSPADM

## 2023-09-07 RX ORDER — ATORVASTATIN CALCIUM 20 MG/1
20 TABLET, FILM COATED ORAL DAILY
Status: DISCONTINUED | OUTPATIENT
Start: 2023-09-07 | End: 2023-09-13 | Stop reason: HOSPADM

## 2023-09-07 RX ORDER — AMLODIPINE BESYLATE 10 MG/1
10 TABLET ORAL DAILY
Status: DISCONTINUED | OUTPATIENT
Start: 2023-09-07 | End: 2023-09-13 | Stop reason: HOSPADM

## 2023-09-07 RX ORDER — ONDANSETRON 2 MG/ML
4 INJECTION INTRAMUSCULAR; INTRAVENOUS EVERY 6 HOURS PRN
Status: DISCONTINUED | OUTPATIENT
Start: 2023-09-07 | End: 2023-09-13 | Stop reason: HOSPADM

## 2023-09-07 RX ORDER — SODIUM CHLORIDE 9 MG/ML
INJECTION, SOLUTION INTRAVENOUS PRN
Status: DISCONTINUED | OUTPATIENT
Start: 2023-09-07 | End: 2023-09-13 | Stop reason: HOSPADM

## 2023-09-07 RX ORDER — HEPARIN SODIUM 5000 [USP'U]/ML
5000 INJECTION, SOLUTION INTRAVENOUS; SUBCUTANEOUS EVERY 8 HOURS SCHEDULED
Status: DISCONTINUED | OUTPATIENT
Start: 2023-09-07 | End: 2023-09-13 | Stop reason: HOSPADM

## 2023-09-07 RX ORDER — INSULIN LISPRO 100 [IU]/ML
0-8 INJECTION, SOLUTION INTRAVENOUS; SUBCUTANEOUS
Status: DISCONTINUED | OUTPATIENT
Start: 2023-09-08 | End: 2023-09-13 | Stop reason: HOSPADM

## 2023-09-07 RX ORDER — DEXTROSE MONOHYDRATE 100 MG/ML
INJECTION, SOLUTION INTRAVENOUS CONTINUOUS PRN
Status: DISCONTINUED | OUTPATIENT
Start: 2023-09-07 | End: 2023-09-13 | Stop reason: HOSPADM

## 2023-09-07 RX ORDER — LANOLIN ALCOHOL/MO/W.PET/CERES
325 CREAM (GRAM) TOPICAL 2 TIMES DAILY
Status: DISCONTINUED | OUTPATIENT
Start: 2023-09-07 | End: 2023-09-13 | Stop reason: HOSPADM

## 2023-09-07 RX ORDER — MULTIVIT WITH MINERALS/LUTEIN
250 TABLET ORAL 2 TIMES DAILY
Status: DISCONTINUED | OUTPATIENT
Start: 2023-09-07 | End: 2023-09-13 | Stop reason: HOSPADM

## 2023-09-07 RX ORDER — INSULIN LISPRO 100 [IU]/ML
0-4 INJECTION, SOLUTION INTRAVENOUS; SUBCUTANEOUS NIGHTLY
Status: DISCONTINUED | OUTPATIENT
Start: 2023-09-07 | End: 2023-09-13 | Stop reason: HOSPADM

## 2023-09-07 RX ADMIN — FERROUS SULFATE TAB EC 325 MG (65 MG FE EQUIVALENT) 325 MG: 325 (65 FE) TABLET DELAYED RESPONSE at 23:12

## 2023-09-07 RX ADMIN — TIZANIDINE 4 MG: 4 TABLET ORAL at 23:12

## 2023-09-07 RX ADMIN — CETIRIZINE HYDROCHLORIDE 10 MG: 10 TABLET, FILM COATED ORAL at 23:12

## 2023-09-07 RX ADMIN — AMLODIPINE BESYLATE 10 MG: 10 TABLET ORAL at 23:27

## 2023-09-07 RX ADMIN — SODIUM CHLORIDE, PRESERVATIVE FREE 10 ML: 5 INJECTION INTRAVENOUS at 23:15

## 2023-09-07 RX ADMIN — HEPARIN SODIUM 5000 UNITS: 5000 INJECTION INTRAVENOUS; SUBCUTANEOUS at 23:14

## 2023-09-07 RX ADMIN — Medication 250 MG: at 23:27

## 2023-09-07 ASSESSMENT — PAIN SCALES - GENERAL
PAINLEVEL_OUTOF10: 8
PAINLEVEL_OUTOF10: 9
PAINLEVEL_OUTOF10: 5

## 2023-09-07 ASSESSMENT — PAIN DESCRIPTION - LOCATION
LOCATION: FOOT
LOCATION: FLANK

## 2023-09-07 ASSESSMENT — ENCOUNTER SYMPTOMS
DIARRHEA: 0
CHEST TIGHTNESS: 0
ABDOMINAL PAIN: 0
SHORTNESS OF BREATH: 0
NAUSEA: 0
CONSTIPATION: 0
VOMITING: 0
COUGH: 0

## 2023-09-07 ASSESSMENT — LIFESTYLE VARIABLES
HOW MANY STANDARD DRINKS CONTAINING ALCOHOL DO YOU HAVE ON A TYPICAL DAY: PATIENT DOES NOT DRINK
HOW OFTEN DO YOU HAVE A DRINK CONTAINING ALCOHOL: NEVER

## 2023-09-07 ASSESSMENT — PAIN DESCRIPTION - ORIENTATION
ORIENTATION: RIGHT;LOWER
ORIENTATION: LEFT

## 2023-09-07 ASSESSMENT — PAIN - FUNCTIONAL ASSESSMENT: PAIN_FUNCTIONAL_ASSESSMENT: 0-10

## 2023-09-07 ASSESSMENT — PAIN DESCRIPTION - DESCRIPTORS
DESCRIPTORS: PRESSURE
DESCRIPTORS: THROBBING

## 2023-09-07 NOTE — H&P
[facility]  Office: [address], DO, Jack Coburn, DO, Elliot Barrera, DO, Apolinar Arriaga Blood, DO, Meliza Hunter MD, Nazario Morrell MD, Yael Meza MD, Natasha Johnson MD,  Caleb Dey MD, Miriam Givens MD, Ana Arriaga, DO, Rosi Lima MD,  Emeli Kline MD, Linda Ceja MD, Amari Bowen, DO, Garcia Fitzpatrick MD,  Rory Batres, DO, Leona Eisenmenger, MD, Brody Bowden MD, Jese Champion MD, Cheryle Spillers, MD,  Ministerio Ann MD, Esa Butts MD, Mery Mariscal MD, Eliane Randle, DO, Florence Thomas MD,  David Fine MD, Shoshana Youssef, CNP,  Anibal Park, CNP,, Paz Rojo, CNP,  Annabelle Bates, [facility], Lera Sandhoff, CNP, Sabiha Garcia, CNP, Forrest Harris, CNP, Marguerite Dye, CNP, Olena Lomeli, CNP, Anh Anderson, CNP, Win Chung PA-C, Marvin Daniels, [facility], Grace Arreaga, CNP, Janet Flores, [facility]    HISTORY AND PHYSICAL EXAMINATION            Date:   9/7/2023  Patient name:  Smiley Watson  Date of admission:  9/7/2023  3:24 PM  MRN:   [id number]  Account:  [de-identified]  YOB: 1955  PCP:    Glen Flowers MD  Room:   [place] [address]  Code Status:    Full    Chief Complaint:     Chief Complaint   Patient presents with    Other     Pt sent by PCP for port placement and dialysis. Hx of multiple foot surgeries. States he was placed on antibiotics for this which put him in kidney failure. No history of dialysis     History Obtained From:     patient, electronic medical record    History of Present Illness:     Patient presents to the emergency room today after being sent by his nephrologist. Patient has been dealing with CKD stage 5, not on dialysis for quite some time. Patient states that they have been limping his kidneys along while trying to fix his foot.   Nephrology has told the patient that it is time that he start dialysis as his potassium has been

## 2023-09-08 ENCOUNTER — APPOINTMENT (OUTPATIENT)
Dept: INTERVENTIONAL RADIOLOGY/VASCULAR | Age: 68
DRG: 673 | End: 2023-09-08
Payer: MEDICARE

## 2023-09-08 LAB
ANION GAP SERPL CALCULATED.3IONS-SCNC: 10 MMOL/L (ref 9–17)
BASOPHILS # BLD: 0.05 K/UL (ref 0–0.2)
BASOPHILS NFR BLD: 1 % (ref 0–2)
BUN SERPL-MCNC: 42 MG/DL (ref 8–23)
BUN/CREAT SERPL: 7 (ref 9–20)
CALCIUM SERPL-MCNC: 9.6 MG/DL (ref 8.6–10.4)
CHLORIDE SERPL-SCNC: 106 MMOL/L (ref 98–107)
CO2 SERPL-SCNC: 20 MMOL/L (ref 20–31)
CREAT SERPL-MCNC: 6.4 MG/DL (ref 0.7–1.2)
EKG ATRIAL RATE: 70 BPM
EKG P AXIS: -2 DEGREES
EKG P-R INTERVAL: 248 MS
EKG Q-T INTERVAL: 380 MS
EKG QRS DURATION: 82 MS
EKG QTC CALCULATION (BAZETT): 410 MS
EKG R AXIS: 1 DEGREES
EKG T AXIS: 74 DEGREES
EKG VENTRICULAR RATE: 70 BPM
EOSINOPHIL # BLD: 0.29 K/UL (ref 0–0.44)
EOSINOPHILS RELATIVE PERCENT: 3 % (ref 1–4)
ERYTHROCYTE [DISTWIDTH] IN BLOOD BY AUTOMATED COUNT: 14.9 % (ref 11.8–14.4)
GFR SERPL CREATININE-BSD FRML MDRD: 9 ML/MIN/1.73M2
GLUCOSE BLD-MCNC: 106 MG/DL (ref 75–110)
GLUCOSE BLD-MCNC: 124 MG/DL (ref 75–110)
GLUCOSE BLD-MCNC: 196 MG/DL (ref 75–110)
GLUCOSE BLD-MCNC: 63 MG/DL (ref 75–110)
GLUCOSE BLD-MCNC: 74 MG/DL (ref 75–110)
GLUCOSE SERPL-MCNC: 65 MG/DL (ref 70–99)
HBV SURFACE AB SERPL IA-ACNC: 34.89 MIU/ML
HBV SURFACE AG SERPL QL IA: NONREACTIVE
HCT VFR BLD AUTO: 27 % (ref 40.7–50.3)
HGB BLD-MCNC: 9.2 G/DL (ref 13–17)
IMM GRANULOCYTES # BLD AUTO: 0.06 K/UL (ref 0–0.3)
IMM GRANULOCYTES NFR BLD: 1 %
INR PPP: 1
LYMPHOCYTES NFR BLD: 2.33 K/UL (ref 1.1–3.7)
LYMPHOCYTES RELATIVE PERCENT: 27 % (ref 24–43)
MCH RBC QN AUTO: 29.6 PG (ref 25.2–33.5)
MCHC RBC AUTO-ENTMCNC: 34.1 G/DL (ref 28.4–34.8)
MCV RBC AUTO: 86.8 FL (ref 82.6–102.9)
MONOCYTES NFR BLD: 0.64 K/UL (ref 0.1–1.2)
MONOCYTES NFR BLD: 7 % (ref 3–12)
NEUTROPHILS NFR BLD: 61 % (ref 36–65)
NEUTS SEG NFR BLD: 5.43 K/UL (ref 1.5–8.1)
NRBC BLD-RTO: 0 PER 100 WBC
PLATELET # BLD AUTO: 187 K/UL (ref 138–453)
PMV BLD AUTO: 8.9 FL (ref 8.1–13.5)
POTASSIUM SERPL-SCNC: 5.2 MMOL/L (ref 3.7–5.3)
PROTHROMBIN TIME: 13.1 SEC (ref 11.5–14.2)
RBC # BLD AUTO: 3.11 M/UL (ref 4.21–5.77)
RBC # BLD: ABNORMAL 10*6/UL
SODIUM SERPL-SCNC: 136 MMOL/L (ref 135–144)
WBC OTHER # BLD: 8.8 K/UL (ref 3.5–11.3)

## 2023-09-08 PROCEDURE — 6370000000 HC RX 637 (ALT 250 FOR IP): Performed by: NURSE PRACTITIONER

## 2023-09-08 PROCEDURE — 2709999900 IR INSERT TUNNELED CVAD W SQ PUMP

## 2023-09-08 PROCEDURE — 2580000003 HC RX 258: Performed by: NURSE PRACTITIONER

## 2023-09-08 PROCEDURE — 05HM33Z INSERTION OF INFUSION DEVICE INTO RIGHT INTERNAL JUGULAR VEIN, PERCUTANEOUS APPROACH: ICD-10-PCS | Performed by: RADIOLOGY

## 2023-09-08 PROCEDURE — 87340 HEPATITIS B SURFACE AG IA: CPT

## 2023-09-08 PROCEDURE — 6360000002 HC RX W HCPCS: Performed by: RADIOLOGY

## 2023-09-08 PROCEDURE — 85025 COMPLETE CBC W/AUTO DIFF WBC: CPT

## 2023-09-08 PROCEDURE — 6360000002 HC RX W HCPCS: Performed by: NURSE PRACTITIONER

## 2023-09-08 PROCEDURE — 5A1D70Z PERFORMANCE OF URINARY FILTRATION, INTERMITTENT, LESS THAN 6 HOURS PER DAY: ICD-10-PCS | Performed by: RADIOLOGY

## 2023-09-08 PROCEDURE — 76937 US GUIDE VASCULAR ACCESS: CPT

## 2023-09-08 PROCEDURE — 82947 ASSAY GLUCOSE BLOOD QUANT: CPT

## 2023-09-08 PROCEDURE — 1200000000 HC SEMI PRIVATE

## 2023-09-08 PROCEDURE — 90935 HEMODIALYSIS ONE EVALUATION: CPT

## 2023-09-08 PROCEDURE — 0JH63XZ INSERTION OF TUNNELED VASCULAR ACCESS DEVICE INTO CHEST SUBCUTANEOUS TISSUE AND FASCIA, PERCUTANEOUS APPROACH: ICD-10-PCS | Performed by: RADIOLOGY

## 2023-09-08 PROCEDURE — 36558 INSERT TUNNELED CV CATH: CPT

## 2023-09-08 PROCEDURE — 99232 SBSQ HOSP IP/OBS MODERATE 35: CPT | Performed by: NURSE PRACTITIONER

## 2023-09-08 PROCEDURE — 97530 THERAPEUTIC ACTIVITIES: CPT

## 2023-09-08 PROCEDURE — 97535 SELF CARE MNGMENT TRAINING: CPT

## 2023-09-08 PROCEDURE — 80048 BASIC METABOLIC PNL TOTAL CA: CPT

## 2023-09-08 PROCEDURE — 93010 ELECTROCARDIOGRAM REPORT: CPT | Performed by: INTERNAL MEDICINE

## 2023-09-08 PROCEDURE — 77001 FLUOROGUIDE FOR VEIN DEVICE: CPT

## 2023-09-08 PROCEDURE — 86317 IMMUNOASSAY INFECTIOUS AGENT: CPT

## 2023-09-08 PROCEDURE — 97166 OT EVAL MOD COMPLEX 45 MIN: CPT

## 2023-09-08 PROCEDURE — 2500000003 HC RX 250 WO HCPCS: Performed by: INTERNAL MEDICINE

## 2023-09-08 PROCEDURE — 97161 PT EVAL LOW COMPLEX 20 MIN: CPT

## 2023-09-08 PROCEDURE — 85610 PROTHROMBIN TIME: CPT

## 2023-09-08 PROCEDURE — 36415 COLL VENOUS BLD VENIPUNCTURE: CPT

## 2023-09-08 RX ORDER — LANOLIN ALCOHOL/MO/W.PET/CERES
3 CREAM (GRAM) TOPICAL NIGHTLY PRN
Status: DISCONTINUED | OUTPATIENT
Start: 2023-09-08 | End: 2023-09-09

## 2023-09-08 RX ORDER — TIZANIDINE 4 MG/1
4 TABLET ORAL NIGHTLY PRN
Status: DISCONTINUED | OUTPATIENT
Start: 2023-09-08 | End: 2023-09-13 | Stop reason: HOSPADM

## 2023-09-08 RX ORDER — HYDROCODONE BITARTRATE AND ACETAMINOPHEN 10; 325 MG/1; MG/1
1 TABLET ORAL EVERY 6 HOURS PRN
Status: DISCONTINUED | OUTPATIENT
Start: 2023-09-08 | End: 2023-09-13 | Stop reason: HOSPADM

## 2023-09-08 RX ORDER — ALOGLIPTIN 6.25 MG/1
6.25 TABLET, FILM COATED ORAL DAILY
Status: DISCONTINUED | OUTPATIENT
Start: 2023-09-08 | End: 2023-09-13 | Stop reason: HOSPADM

## 2023-09-08 RX ORDER — METOPROLOL SUCCINATE 25 MG/1
25 TABLET, EXTENDED RELEASE ORAL DAILY
COMMUNITY

## 2023-09-08 RX ORDER — GABAPENTIN 400 MG/1
400 CAPSULE ORAL 3 TIMES DAILY
Status: DISCONTINUED | OUTPATIENT
Start: 2023-09-08 | End: 2023-09-13 | Stop reason: HOSPADM

## 2023-09-08 RX ORDER — CLINDAMYCIN PHOSPHATE 600 MG/50ML
600 INJECTION INTRAVENOUS
Status: COMPLETED | OUTPATIENT
Start: 2023-09-08 | End: 2023-09-08

## 2023-09-08 RX ORDER — HEPARIN SODIUM 1000 [USP'U]/ML
INJECTION, SOLUTION INTRAVENOUS; SUBCUTANEOUS PRN
Status: COMPLETED | OUTPATIENT
Start: 2023-09-08 | End: 2023-09-08

## 2023-09-08 RX ORDER — GLIPIZIDE 10 MG/1
20 TABLET ORAL
Status: DISCONTINUED | OUTPATIENT
Start: 2023-09-09 | End: 2023-09-13 | Stop reason: HOSPADM

## 2023-09-08 RX ADMIN — FERROUS SULFATE TAB EC 325 MG (65 MG FE EQUIVALENT) 325 MG: 325 (65 FE) TABLET DELAYED RESPONSE at 13:54

## 2023-09-08 RX ADMIN — SODIUM CHLORIDE, PRESERVATIVE FREE 10 ML: 5 INJECTION INTRAVENOUS at 13:58

## 2023-09-08 RX ADMIN — HEPARIN SODIUM 1900 UNITS: 1000 INJECTION INTRAVENOUS; SUBCUTANEOUS at 12:46

## 2023-09-08 RX ADMIN — HEPARIN SODIUM 5000 UNITS: 5000 INJECTION INTRAVENOUS; SUBCUTANEOUS at 13:54

## 2023-09-08 RX ADMIN — HEPARIN SODIUM 1900 UNITS: 1000 INJECTION INTRAVENOUS; SUBCUTANEOUS at 12:47

## 2023-09-08 RX ADMIN — HEPARIN SODIUM 1900 UNITS: 1000 INJECTION INTRAVENOUS; SUBCUTANEOUS at 12:45

## 2023-09-08 RX ADMIN — Medication 1.9 ML: at 19:39

## 2023-09-08 RX ADMIN — CETIRIZINE HYDROCHLORIDE 10 MG: 10 TABLET, FILM COATED ORAL at 20:14

## 2023-09-08 RX ADMIN — CLINDAMYCIN PHOSPHATE 600 MG: 600 INJECTION, SOLUTION INTRAVENOUS at 12:09

## 2023-09-08 RX ADMIN — HYDROCODONE BITARTRATE AND ACETAMINOPHEN 1 TABLET: 10; 325 TABLET ORAL at 20:19

## 2023-09-08 RX ADMIN — DEXTROSE MONOHYDRATE 125 ML: 100 INJECTION, SOLUTION INTRAVENOUS at 06:38

## 2023-09-08 RX ADMIN — HEPARIN SODIUM 5000 UNITS: 5000 INJECTION INTRAVENOUS; SUBCUTANEOUS at 20:14

## 2023-09-08 RX ADMIN — AMLODIPINE BESYLATE 10 MG: 10 TABLET ORAL at 13:54

## 2023-09-08 RX ADMIN — Medication 250 MG: at 13:54

## 2023-09-08 RX ADMIN — Medication 250 MG: at 20:14

## 2023-09-08 RX ADMIN — Medication 3 MG: at 21:25

## 2023-09-08 RX ADMIN — FERROUS SULFATE TAB EC 325 MG (65 MG FE EQUIVALENT) 325 MG: 325 (65 FE) TABLET DELAYED RESPONSE at 20:14

## 2023-09-08 RX ADMIN — TIZANIDINE 4 MG: 4 TABLET ORAL at 20:19

## 2023-09-08 RX ADMIN — GABAPENTIN 400 MG: 400 CAPSULE ORAL at 15:14

## 2023-09-08 RX ADMIN — GABAPENTIN 400 MG: 400 CAPSULE ORAL at 20:14

## 2023-09-08 RX ADMIN — INSULIN GLARGINE 10 UNITS: 100 INJECTION, SOLUTION SUBCUTANEOUS at 20:14

## 2023-09-08 RX ADMIN — HYDROCODONE BITARTRATE AND ACETAMINOPHEN 1 TABLET: 10; 325 TABLET ORAL at 09:57

## 2023-09-08 ASSESSMENT — ENCOUNTER SYMPTOMS
VOMITING: 0
NAUSEA: 0
BACK PAIN: 1
SHORTNESS OF BREATH: 0
CHEST TIGHTNESS: 0
CONSTIPATION: 0
COUGH: 0
DIARRHEA: 0
ABDOMINAL PAIN: 0

## 2023-09-08 ASSESSMENT — PAIN DESCRIPTION - DESCRIPTORS: DESCRIPTORS: DISCOMFORT

## 2023-09-08 ASSESSMENT — PAIN DESCRIPTION - LOCATION
LOCATION: FLANK
LOCATION: FOOT

## 2023-09-08 ASSESSMENT — PAIN SCALES - GENERAL
PAINLEVEL_OUTOF10: 7
PAINLEVEL_OUTOF10: 8

## 2023-09-08 ASSESSMENT — PAIN - FUNCTIONAL ASSESSMENT: PAIN_FUNCTIONAL_ASSESSMENT: ACTIVITIES ARE NOT PREVENTED

## 2023-09-08 ASSESSMENT — PAIN DESCRIPTION - ORIENTATION: ORIENTATION: RIGHT

## 2023-09-08 NOTE — BRIEF OP NOTE
Brief Postoperative Note    Marguerite Wagner  YOB: 1955  [id number]    Pre-operative Diagnosis: Chronic Renal Failure      Post-operative Diagnosis: Same    Procedure: Tunneled Dialysis Catheter    Medication Given: none    Anesthesia: 2%Lidocaine Local Injection     Surgeons/Assistants: Sayra Iqbal MD    Estimated Blood Loss: Minimal    Complications: none    14 Fr x 23 tunneled HD Catheter placed Site:  Right Internal Jugular Vein. Dressing applied. May use HD cath for dialysis. Vital signs were reviewed and were stable after the procedure. Discharged to floor.     Electronically signed by Sayra Iqbal MD on 9/8/2023 at 12:53 PM

## 2023-09-08 NOTE — DIALYSIS
HEMODIALYSIS PRE-TREATMENT NOTE    Patient Identifiers prior to treatment: Pt Name and     Isolation Required: VRE & MRSA           Isolation Type: Contact       (please document if patient is being managed as a PUI/COVID-19 patient)        Hepatitis status:                           Date Drawn                             Result  Hepatitis B Surface Antigen 2023 Neg        Hepatitis B Surface Antibody 2023 Pos     34.89   Hepatitis B Core Antibody NA NA          How was Hepatitis Status verified: Pt EMR     Was a copy of the labs you documented provided to facility for the patient's chart: NA    Hemodialysis orders verified: Yes    Access Within normal limits ( I.e. s/s of infection,...): WNL     Pre-Assessment completed: Yes    Pre-dialysis report received from: Jessie Warner RN                        Time: 1430 pm

## 2023-09-08 NOTE — CONSULTS
Consult Note    Reason for Consult: End-stage renal failure fluid and electrolyte management  Progressive renal failure end-stage renal    Requesting Physician:  Susi Solis, DO    HISTORY OF PRESENT ILLNESS:    The patient is a 80 y.o. male who presents with progressively worsening renal function patient has developed end-stage renal failure his GFR is only about 5 mL/min his potassium is elevated at 5.2 patient has known history of chronic kidney disease which is progressing over time he is a nondiabetic, history of hyperlipidemia, anemia of chronic disease, hypertension, renal osteodystrophy, metabolic acidosis history of Charcot joint right foot patient is in need for below-knee amputation tunneled catheter is being planned for dialysis initiation    Review Of Systems:  Constitutional: No fever, chills, lethargy, weakness or wt loss. HEENT:  No headache, nasal discharge or sore throat. Cardiac:  No chest pain, dyspnea, orthopnea or PND. Chest:              No cough, phlegm or wheezing. Abdomen:  No abdominal pain, nausea, vomiting or diarrhea. Neuro:  No gross focal weakness, numbness, abnormal movements or seizure like activity. Skin:   No rashes or itching. :   No hematuria, pyuria, dysuria or flank pain. Extremities:  No swelling or joint pains. Endocrine: No polyuria, polydypsia, or thyroid problems. Hematology:    No bleeding disorders, bruising or anemia. All other ROS is negative. Unable to obtain because he is intubated and sedated.     Past Medical History:   Diagnosis Date    Abscess of right foot 08/04/2018    Acquired hammer toe deformity of lesser toe of right foot     ALEJANDRO (acute kidney injury) ([address]) 08/05/2018    Anemia     Cellulitis 04/24/2018    Cellulitis of left foot 04/24/2018    Cellulitis of right foot     and abscess    Charcot foot due to diabetes mellitus ([address]) 2014    Right foot     Chest pain at rest 08/04/2018    Chronic multifocal osteomyelitis of right foot ([address])

## 2023-09-09 LAB
ALBUMIN, U: >300 MG/DL
ANION GAP SERPL CALCULATED.3IONS-SCNC: 13 MMOL/L (ref 9–17)
BACTERIA, URINE: ABNORMAL /HPF
BILIRUBIN, URINE: ABNORMAL
BUN SERPL-MCNC: 29 MG/DL (ref 8–23)
BUN/CREAT SERPL: 6 (ref 9–20)
CALCIUM SERPL-MCNC: 9.2 MG/DL (ref 8.6–10.4)
CHARACTER, URINE: ABNORMAL
CHLORIDE SERPL-SCNC: 101 MMOL/L (ref 98–107)
CO2 SERPL-SCNC: 20 MMOL/L (ref 20–31)
COLOR, URINE: ABNORMAL
CREAT SERPL-MCNC: 4.8 MG/DL (ref 0.7–1.2)
FERRITIN SERPL-MCNC: 687 NG/ML (ref 30–400)
GFR SERPL CREATININE-BSD FRML MDRD: 13 ML/MIN/1.73M2
GLUCOSE BLD-MCNC: 112 MG/DL (ref 75–110)
GLUCOSE BLD-MCNC: 141 MG/DL (ref 75–110)
GLUCOSE BLD-MCNC: 147 MG/DL (ref 75–110)
GLUCOSE BLD-MCNC: 164 MG/DL (ref 75–110)
GLUCOSE SERPL-MCNC: 107 MG/DL (ref 70–99)
GLUCOSE, URINE: 250 MG/DL
IRON SATN MFR SERPL: 37 % (ref 20–55)
IRON SERPL-MCNC: 79 UG/DL (ref 59–158)
KETONES, URINE: ABNORMAL MG/DL
LEUKOCYTE ESTERASE, URINE: ABNORMAL
MUCUS, URINE: ABNORMAL /HPF
NITRITE, URINE: ABNORMAL
OCCULT BLOOD,URINE: ABNORMAL
PH, URINE: 6
POTASSIUM SERPL-SCNC: 4.6 MMOL/L (ref 3.7–5.3)
RBC URINE: ABNORMAL /HPF
SODIUM SERPL-SCNC: 134 MMOL/L (ref 135–144)
SPECIFIC GRAVITY UA: 1.02
SQUAMOUS EPITHELIAL CELLS: ABNORMAL /HPF
TIBC SERPL-MCNC: 216 UG/DL (ref 250–450)
UNSATURATED IRON BINDING CAPACITY: 137 UG/DL (ref 112–347)
URINE CULTURE REFLEX: YES
URINE CULTURE, ROUTINE: NORMAL STATUS
UROBILINOGEN, URINE: 0.2 MG/DL
WBC URINE: ABNORMAL /HPF

## 2023-09-09 PROCEDURE — 2500000003 HC RX 250 WO HCPCS: Performed by: INTERNAL MEDICINE

## 2023-09-09 PROCEDURE — 36415 COLL VENOUS BLD VENIPUNCTURE: CPT

## 2023-09-09 PROCEDURE — 83550 IRON BINDING TEST: CPT

## 2023-09-09 PROCEDURE — 1200000000 HC SEMI PRIVATE

## 2023-09-09 PROCEDURE — 6370000000 HC RX 637 (ALT 250 FOR IP): Performed by: NURSE PRACTITIONER

## 2023-09-09 PROCEDURE — 80048 BASIC METABOLIC PNL TOTAL CA: CPT

## 2023-09-09 PROCEDURE — 82728 ASSAY OF FERRITIN: CPT

## 2023-09-09 PROCEDURE — 6360000002 HC RX W HCPCS: Performed by: NURSE PRACTITIONER

## 2023-09-09 PROCEDURE — 2580000003 HC RX 258: Performed by: NURSE PRACTITIONER

## 2023-09-09 PROCEDURE — 99232 SBSQ HOSP IP/OBS MODERATE 35: CPT | Performed by: NURSE PRACTITIONER

## 2023-09-09 PROCEDURE — 82947 ASSAY GLUCOSE BLOOD QUANT: CPT

## 2023-09-09 PROCEDURE — 83540 ASSAY OF IRON: CPT

## 2023-09-09 PROCEDURE — 6360000002 HC RX W HCPCS: Performed by: INTERNAL MEDICINE

## 2023-09-09 PROCEDURE — 90935 HEMODIALYSIS ONE EVALUATION: CPT

## 2023-09-09 RX ORDER — ZOLPIDEM TARTRATE 5 MG/1
5 TABLET ORAL NIGHTLY PRN
Status: DISCONTINUED | OUTPATIENT
Start: 2023-09-09 | End: 2023-09-13 | Stop reason: HOSPADM

## 2023-09-09 RX ADMIN — HEPARIN SODIUM 5000 UNITS: 5000 INJECTION INTRAVENOUS; SUBCUTANEOUS at 22:59

## 2023-09-09 RX ADMIN — GLIPIZIDE 20 MG: 10 TABLET ORAL at 08:37

## 2023-09-09 RX ADMIN — GABAPENTIN 400 MG: 400 CAPSULE ORAL at 16:24

## 2023-09-09 RX ADMIN — Medication 250 MG: at 21:41

## 2023-09-09 RX ADMIN — GABAPENTIN 400 MG: 400 CAPSULE ORAL at 08:36

## 2023-09-09 RX ADMIN — ZOLPIDEM TARTRATE 5 MG: 5 TABLET ORAL at 22:59

## 2023-09-09 RX ADMIN — ASPIRIN 81 MG: 81 TABLET, COATED ORAL at 08:37

## 2023-09-09 RX ADMIN — ATORVASTATIN CALCIUM 20 MG: 20 TABLET, FILM COATED ORAL at 08:37

## 2023-09-09 RX ADMIN — GABAPENTIN 400 MG: 400 CAPSULE ORAL at 21:41

## 2023-09-09 RX ADMIN — HEPARIN SODIUM 5000 UNITS: 5000 INJECTION INTRAVENOUS; SUBCUTANEOUS at 05:31

## 2023-09-09 RX ADMIN — TIZANIDINE 4 MG: 4 TABLET ORAL at 22:59

## 2023-09-09 RX ADMIN — Medication 2 ML: at 16:19

## 2023-09-09 RX ADMIN — FERROUS SULFATE TAB EC 325 MG (65 MG FE EQUIVALENT) 325 MG: 325 (65 FE) TABLET DELAYED RESPONSE at 21:41

## 2023-09-09 RX ADMIN — SODIUM CHLORIDE, PRESERVATIVE FREE 10 ML: 5 INJECTION INTRAVENOUS at 21:41

## 2023-09-09 RX ADMIN — AMLODIPINE BESYLATE 10 MG: 10 TABLET ORAL at 08:37

## 2023-09-09 RX ADMIN — CETIRIZINE HYDROCHLORIDE 10 MG: 10 TABLET, FILM COATED ORAL at 21:40

## 2023-09-09 RX ADMIN — HYDROCODONE BITARTRATE AND ACETAMINOPHEN 1 TABLET: 10; 325 TABLET ORAL at 09:07

## 2023-09-09 RX ADMIN — INSULIN GLARGINE 10 UNITS: 100 INJECTION, SOLUTION SUBCUTANEOUS at 21:40

## 2023-09-09 RX ADMIN — HYDROCODONE BITARTRATE AND ACETAMINOPHEN 1 TABLET: 10; 325 TABLET ORAL at 02:30

## 2023-09-09 RX ADMIN — Medication 250 MG: at 08:37

## 2023-09-09 RX ADMIN — INSULIN GLARGINE 10 UNITS: 100 INJECTION, SOLUTION SUBCUTANEOUS at 08:37

## 2023-09-09 RX ADMIN — FERROUS SULFATE TAB EC 325 MG (65 MG FE EQUIVALENT) 325 MG: 325 (65 FE) TABLET DELAYED RESPONSE at 08:37

## 2023-09-09 RX ADMIN — ALOGLIPTIN 6.25 MG: 6.25 TABLET, FILM COATED ORAL at 08:37

## 2023-09-09 RX ADMIN — HEPARIN SODIUM 5000 UNITS: 5000 INJECTION INTRAVENOUS; SUBCUTANEOUS at 16:24

## 2023-09-09 RX ADMIN — EPOETIN ALFA-EPBX 8000 UNITS: 4000 INJECTION, SOLUTION INTRAVENOUS; SUBCUTANEOUS at 12:07

## 2023-09-09 RX ADMIN — HYDROCODONE BITARTRATE AND ACETAMINOPHEN 1 TABLET: 10; 325 TABLET ORAL at 18:00

## 2023-09-09 RX ADMIN — GLIPIZIDE 20 MG: 10 TABLET ORAL at 16:24

## 2023-09-09 RX ADMIN — SODIUM CHLORIDE, PRESERVATIVE FREE 10 ML: 5 INJECTION INTRAVENOUS at 08:38

## 2023-09-09 ASSESSMENT — PAIN DESCRIPTION - ORIENTATION
ORIENTATION: RIGHT;UPPER
ORIENTATION: RIGHT
ORIENTATION: RIGHT

## 2023-09-09 ASSESSMENT — PAIN DESCRIPTION - ONSET
ONSET: ON-GOING
ONSET: ON-GOING

## 2023-09-09 ASSESSMENT — ENCOUNTER SYMPTOMS
COUGH: 0
SHORTNESS OF BREATH: 0
VOMITING: 0
DIARRHEA: 0
CHEST TIGHTNESS: 0
BACK PAIN: 1
ABDOMINAL PAIN: 0
NAUSEA: 0
CONSTIPATION: 0

## 2023-09-09 ASSESSMENT — PAIN DESCRIPTION - LOCATION
LOCATION: SHOULDER
LOCATION: ANKLE
LOCATION: ANKLE

## 2023-09-09 ASSESSMENT — PAIN - FUNCTIONAL ASSESSMENT: PAIN_FUNCTIONAL_ASSESSMENT: ACTIVITIES ARE NOT PREVENTED

## 2023-09-09 ASSESSMENT — PAIN DESCRIPTION - FREQUENCY
FREQUENCY: CONTINUOUS
FREQUENCY: CONTINUOUS

## 2023-09-09 ASSESSMENT — PAIN SCALES - GENERAL
PAINLEVEL_OUTOF10: 7
PAINLEVEL_OUTOF10: 5
PAINLEVEL_OUTOF10: 9
PAINLEVEL_OUTOF10: 8

## 2023-09-09 ASSESSMENT — PAIN DESCRIPTION - DESCRIPTORS
DESCRIPTORS: DISCOMFORT
DESCRIPTORS: ACHING
DESCRIPTORS: ACHING

## 2023-09-09 NOTE — DIALYSIS
HEMODIALYSIS POST TREATMENT NOTE    Treatment time ordered: 2.5 hr    Actual treatment time: 2.5 hr    UltraFiltration Goal: 2400 mls  UltraFiltration Removed: 2400 mls      Pre Treatment weight: 106.4 kg  Post Treatment weight: 104.4 kg  Estimated Dry Weight: TBD    Access used:     Central Venous Catheter:          Tunneled or Non-tunneled: Tunneled           Site: Right Chest          Access Flow: Good      Internal Access:       AV Fistula or AV Graft: NA         Site: NA       Access Flow: NA       Sign and symptoms of infection: No       If YES: NA    Medications or blood products given: No    Chronic outpatient schedule: TBD    Chronic outpatient unit: TBD    Summary of response to treatment: completed 2.5 hr HD TX and removed 2 Liters of fluid. pt tolerated HD TX well. CVC dressing is clean, dry and intact. Robbert Poli tessio flushed and clamped with Sodium Citrate (Arterial 1.9ml, Venous 1.9ml), clamped with dead end caps attached. report given to primary nurse. record completed and printed to be placed in pt's chart. Explain if orders NOT met, was physician notified:NA      ACES flowsheet faxed to patient unit/ placed in patient chart: Faxed to Unit. Post assessment completed: Yes    Report given to: Richi Chávez RN      * Intra-treatment documented Safety Checks include the followin) Access and face visible at all times. 2) All connections and blood lines are secure with no kinks. 3) NVL alarm engaged. 4) Hemosafe device applied (if applicable). 5) No collapse of Arterial or Venous blood chambers. 6) All blood lines / pump segments in the air detectors.

## 2023-09-10 LAB
ANION GAP SERPL CALCULATED.3IONS-SCNC: 11 MMOL/L (ref 9–17)
BUN SERPL-MCNC: 17 MG/DL (ref 8–23)
BUN/CREAT SERPL: 4 (ref 9–20)
CALCIUM SERPL-MCNC: 9.3 MG/DL (ref 8.6–10.4)
CHLORIDE SERPL-SCNC: 100 MMOL/L (ref 98–107)
CO2 SERPL-SCNC: 25 MMOL/L (ref 20–31)
CREAT SERPL-MCNC: 4 MG/DL (ref 0.7–1.2)
GFR SERPL CREATININE-BSD FRML MDRD: 16 ML/MIN/1.73M2
GLUCOSE BLD-MCNC: 105 MG/DL (ref 75–110)
GLUCOSE BLD-MCNC: 143 MG/DL (ref 75–110)
GLUCOSE BLD-MCNC: 75 MG/DL (ref 75–110)
GLUCOSE SERPL-MCNC: 78 MG/DL (ref 70–99)
POTASSIUM SERPL-SCNC: 4.2 MMOL/L (ref 3.7–5.3)
SODIUM SERPL-SCNC: 136 MMOL/L (ref 135–144)

## 2023-09-10 PROCEDURE — 99232 SBSQ HOSP IP/OBS MODERATE 35: CPT | Performed by: NURSE PRACTITIONER

## 2023-09-10 PROCEDURE — 82947 ASSAY GLUCOSE BLOOD QUANT: CPT

## 2023-09-10 PROCEDURE — 80048 BASIC METABOLIC PNL TOTAL CA: CPT

## 2023-09-10 PROCEDURE — 1200000000 HC SEMI PRIVATE

## 2023-09-10 PROCEDURE — 6360000002 HC RX W HCPCS: Performed by: NURSE PRACTITIONER

## 2023-09-10 PROCEDURE — 2580000003 HC RX 258: Performed by: NURSE PRACTITIONER

## 2023-09-10 PROCEDURE — 6370000000 HC RX 637 (ALT 250 FOR IP): Performed by: NURSE PRACTITIONER

## 2023-09-10 PROCEDURE — 36415 COLL VENOUS BLD VENIPUNCTURE: CPT

## 2023-09-10 RX ADMIN — FERROUS SULFATE TAB EC 325 MG (65 MG FE EQUIVALENT) 325 MG: 325 (65 FE) TABLET DELAYED RESPONSE at 08:48

## 2023-09-10 RX ADMIN — SODIUM CHLORIDE, PRESERVATIVE FREE 10 ML: 5 INJECTION INTRAVENOUS at 08:49

## 2023-09-10 RX ADMIN — ALOGLIPTIN 6.25 MG: 6.25 TABLET, FILM COATED ORAL at 08:48

## 2023-09-10 RX ADMIN — HYDROCODONE BITARTRATE AND ACETAMINOPHEN 1 TABLET: 10; 325 TABLET ORAL at 18:57

## 2023-09-10 RX ADMIN — ATORVASTATIN CALCIUM 20 MG: 20 TABLET, FILM COATED ORAL at 08:48

## 2023-09-10 RX ADMIN — GABAPENTIN 400 MG: 400 CAPSULE ORAL at 08:48

## 2023-09-10 RX ADMIN — INSULIN GLARGINE 10 UNITS: 100 INJECTION, SOLUTION SUBCUTANEOUS at 08:48

## 2023-09-10 RX ADMIN — Medication 250 MG: at 08:48

## 2023-09-10 RX ADMIN — ASPIRIN 81 MG: 81 TABLET, COATED ORAL at 08:48

## 2023-09-10 RX ADMIN — FERROUS SULFATE TAB EC 325 MG (65 MG FE EQUIVALENT) 325 MG: 325 (65 FE) TABLET DELAYED RESPONSE at 20:43

## 2023-09-10 RX ADMIN — GABAPENTIN 400 MG: 400 CAPSULE ORAL at 20:43

## 2023-09-10 RX ADMIN — ZOLPIDEM TARTRATE 5 MG: 5 TABLET ORAL at 22:15

## 2023-09-10 RX ADMIN — HEPARIN SODIUM 5000 UNITS: 5000 INJECTION INTRAVENOUS; SUBCUTANEOUS at 16:17

## 2023-09-10 RX ADMIN — CETIRIZINE HYDROCHLORIDE 10 MG: 10 TABLET, FILM COATED ORAL at 20:43

## 2023-09-10 RX ADMIN — HEPARIN SODIUM 5000 UNITS: 5000 INJECTION INTRAVENOUS; SUBCUTANEOUS at 20:42

## 2023-09-10 RX ADMIN — AMLODIPINE BESYLATE 10 MG: 10 TABLET ORAL at 08:48

## 2023-09-10 RX ADMIN — GABAPENTIN 400 MG: 400 CAPSULE ORAL at 16:17

## 2023-09-10 RX ADMIN — SODIUM CHLORIDE, PRESERVATIVE FREE 10 ML: 5 INJECTION INTRAVENOUS at 20:56

## 2023-09-10 RX ADMIN — Medication 250 MG: at 20:43

## 2023-09-10 RX ADMIN — TIZANIDINE 4 MG: 4 TABLET ORAL at 22:15

## 2023-09-10 RX ADMIN — HEPARIN SODIUM 5000 UNITS: 5000 INJECTION INTRAVENOUS; SUBCUTANEOUS at 08:48

## 2023-09-10 RX ADMIN — INSULIN GLARGINE 10 UNITS: 100 INJECTION, SOLUTION SUBCUTANEOUS at 20:51

## 2023-09-10 RX ADMIN — GLIPIZIDE 20 MG: 10 TABLET ORAL at 16:17

## 2023-09-10 ASSESSMENT — PAIN DESCRIPTION - DESCRIPTORS
DESCRIPTORS: ACHING
DESCRIPTORS: ACHING

## 2023-09-10 ASSESSMENT — PAIN DESCRIPTION - ORIENTATION
ORIENTATION: RIGHT
ORIENTATION: RIGHT

## 2023-09-10 ASSESSMENT — PAIN DESCRIPTION - LOCATION
LOCATION: ANKLE
LOCATION: ANKLE

## 2023-09-10 ASSESSMENT — ENCOUNTER SYMPTOMS
DIARRHEA: 0
CHEST TIGHTNESS: 0
VOMITING: 0
NAUSEA: 0
SHORTNESS OF BREATH: 0
COUGH: 0
ABDOMINAL PAIN: 0
BACK PAIN: 1
CONSTIPATION: 0

## 2023-09-10 ASSESSMENT — PAIN SCALES - GENERAL
PAINLEVEL_OUTOF10: 4
PAINLEVEL_OUTOF10: 8

## 2023-09-10 ASSESSMENT — PAIN DESCRIPTION - ONSET
ONSET: ON-GOING
ONSET: ON-GOING

## 2023-09-10 ASSESSMENT — PAIN DESCRIPTION - FREQUENCY
FREQUENCY: CONTINUOUS
FREQUENCY: CONTINUOUS

## 2023-09-11 LAB
ANION GAP SERPL CALCULATED.3IONS-SCNC: 10 MMOL/L (ref 9–17)
BUN SERPL-MCNC: 23 MG/DL (ref 8–23)
BUN/CREAT SERPL: 5 (ref 9–20)
CALCIUM SERPL-MCNC: 9.7 MG/DL (ref 8.6–10.4)
CHLORIDE SERPL-SCNC: 104 MMOL/L (ref 98–107)
CO2 SERPL-SCNC: 25 MMOL/L (ref 20–31)
CREAT SERPL-MCNC: 5.1 MG/DL (ref 0.7–1.2)
GFR SERPL CREATININE-BSD FRML MDRD: 12 ML/MIN/1.73M2
GLUCOSE BLD-MCNC: 100 MG/DL (ref 75–110)
GLUCOSE BLD-MCNC: 122 MG/DL (ref 75–110)
GLUCOSE BLD-MCNC: 160 MG/DL (ref 75–110)
GLUCOSE BLD-MCNC: 197 MG/DL (ref 75–110)
GLUCOSE BLD-MCNC: 45 MG/DL (ref 75–110)
GLUCOSE SERPL-MCNC: 50 MG/DL (ref 70–99)
HGB BLD-MCNC: 10.2 G/DL (ref 13–17)
POTASSIUM SERPL-SCNC: 4.3 MMOL/L (ref 3.7–5.3)
SODIUM SERPL-SCNC: 139 MMOL/L (ref 135–144)

## 2023-09-11 PROCEDURE — 6370000000 HC RX 637 (ALT 250 FOR IP): Performed by: NURSE PRACTITIONER

## 2023-09-11 PROCEDURE — 2580000003 HC RX 258: Performed by: NURSE PRACTITIONER

## 2023-09-11 PROCEDURE — 99232 SBSQ HOSP IP/OBS MODERATE 35: CPT | Performed by: NURSE PRACTITIONER

## 2023-09-11 PROCEDURE — 82947 ASSAY GLUCOSE BLOOD QUANT: CPT

## 2023-09-11 PROCEDURE — 36415 COLL VENOUS BLD VENIPUNCTURE: CPT

## 2023-09-11 PROCEDURE — 80048 BASIC METABOLIC PNL TOTAL CA: CPT

## 2023-09-11 PROCEDURE — 85018 HEMOGLOBIN: CPT

## 2023-09-11 PROCEDURE — 2500000003 HC RX 250 WO HCPCS: Performed by: INTERNAL MEDICINE

## 2023-09-11 PROCEDURE — 1200000000 HC SEMI PRIVATE

## 2023-09-11 PROCEDURE — 6360000002 HC RX W HCPCS: Performed by: NURSE PRACTITIONER

## 2023-09-11 PROCEDURE — 90935 HEMODIALYSIS ONE EVALUATION: CPT

## 2023-09-11 RX ORDER — INSULIN GLARGINE 100 [IU]/ML
10 INJECTION, SOLUTION SUBCUTANEOUS
Status: DISCONTINUED | OUTPATIENT
Start: 2023-09-12 | End: 2023-09-13 | Stop reason: HOSPADM

## 2023-09-11 RX ADMIN — GABAPENTIN 400 MG: 400 CAPSULE ORAL at 08:59

## 2023-09-11 RX ADMIN — HEPARIN SODIUM 5000 UNITS: 5000 INJECTION INTRAVENOUS; SUBCUTANEOUS at 21:06

## 2023-09-11 RX ADMIN — ATORVASTATIN CALCIUM 20 MG: 20 TABLET, FILM COATED ORAL at 09:00

## 2023-09-11 RX ADMIN — Medication 2 ML: at 12:14

## 2023-09-11 RX ADMIN — Medication 2 ML: at 12:15

## 2023-09-11 RX ADMIN — SODIUM CHLORIDE, PRESERVATIVE FREE 10 ML: 5 INJECTION INTRAVENOUS at 09:01

## 2023-09-11 RX ADMIN — HEPARIN SODIUM 5000 UNITS: 5000 INJECTION INTRAVENOUS; SUBCUTANEOUS at 14:51

## 2023-09-11 RX ADMIN — ASPIRIN 81 MG: 81 TABLET, COATED ORAL at 09:00

## 2023-09-11 RX ADMIN — Medication 250 MG: at 09:00

## 2023-09-11 RX ADMIN — AMLODIPINE BESYLATE 10 MG: 10 TABLET ORAL at 09:00

## 2023-09-11 RX ADMIN — HEPARIN SODIUM 5000 UNITS: 5000 INJECTION INTRAVENOUS; SUBCUTANEOUS at 06:38

## 2023-09-11 RX ADMIN — CETIRIZINE HYDROCHLORIDE 10 MG: 10 TABLET, FILM COATED ORAL at 21:05

## 2023-09-11 RX ADMIN — ZOLPIDEM TARTRATE 5 MG: 5 TABLET ORAL at 21:05

## 2023-09-11 RX ADMIN — GLIPIZIDE 20 MG: 10 TABLET ORAL at 06:38

## 2023-09-11 RX ADMIN — Medication 16 G: at 07:04

## 2023-09-11 RX ADMIN — FERROUS SULFATE TAB EC 325 MG (65 MG FE EQUIVALENT) 325 MG: 325 (65 FE) TABLET DELAYED RESPONSE at 09:00

## 2023-09-11 RX ADMIN — ALOGLIPTIN 6.25 MG: 6.25 TABLET, FILM COATED ORAL at 09:00

## 2023-09-11 RX ADMIN — Medication 250 MG: at 21:05

## 2023-09-11 RX ADMIN — TIZANIDINE 4 MG: 4 TABLET ORAL at 21:06

## 2023-09-11 RX ADMIN — SODIUM CHLORIDE, PRESERVATIVE FREE 10 ML: 5 INJECTION INTRAVENOUS at 21:07

## 2023-09-11 RX ADMIN — HYDROCODONE BITARTRATE AND ACETAMINOPHEN 1 TABLET: 10; 325 TABLET ORAL at 21:05

## 2023-09-11 RX ADMIN — GLIPIZIDE 20 MG: 10 TABLET ORAL at 17:14

## 2023-09-11 RX ADMIN — FERROUS SULFATE TAB EC 325 MG (65 MG FE EQUIVALENT) 325 MG: 325 (65 FE) TABLET DELAYED RESPONSE at 21:05

## 2023-09-11 RX ADMIN — GABAPENTIN 400 MG: 400 CAPSULE ORAL at 14:51

## 2023-09-11 RX ADMIN — GABAPENTIN 400 MG: 400 CAPSULE ORAL at 21:05

## 2023-09-11 ASSESSMENT — PAIN DESCRIPTION - ORIENTATION: ORIENTATION: RIGHT

## 2023-09-11 ASSESSMENT — PAIN DESCRIPTION - DESCRIPTORS: DESCRIPTORS: ACHING;DISCOMFORT;SORE

## 2023-09-11 ASSESSMENT — PAIN SCALES - GENERAL: PAINLEVEL_OUTOF10: 8

## 2023-09-11 ASSESSMENT — PAIN DESCRIPTION - LOCATION: LOCATION: FOOT

## 2023-09-12 ENCOUNTER — TELEPHONE (OUTPATIENT)
Dept: ORTHOPEDIC SURGERY | Age: 68
End: 2023-09-12

## 2023-09-12 LAB
GLUCOSE BLD-MCNC: 107 MG/DL (ref 75–110)
GLUCOSE BLD-MCNC: 131 MG/DL (ref 75–110)
GLUCOSE BLD-MCNC: 215 MG/DL (ref 75–110)
HBV CORE AB SER QL: NONREACTIVE

## 2023-09-12 PROCEDURE — 82947 ASSAY GLUCOSE BLOOD QUANT: CPT

## 2023-09-12 PROCEDURE — 1200000000 HC SEMI PRIVATE

## 2023-09-12 PROCEDURE — 2580000003 HC RX 258: Performed by: NURSE PRACTITIONER

## 2023-09-12 PROCEDURE — 2500000003 HC RX 250 WO HCPCS: Performed by: INTERNAL MEDICINE

## 2023-09-12 PROCEDURE — 6360000002 HC RX W HCPCS: Performed by: NURSE PRACTITIONER

## 2023-09-12 PROCEDURE — 6370000000 HC RX 637 (ALT 250 FOR IP): Performed by: NURSE PRACTITIONER

## 2023-09-12 PROCEDURE — 99232 SBSQ HOSP IP/OBS MODERATE 35: CPT | Performed by: NURSE PRACTITIONER

## 2023-09-12 PROCEDURE — 86704 HEP B CORE ANTIBODY TOTAL: CPT

## 2023-09-12 PROCEDURE — 90935 HEMODIALYSIS ONE EVALUATION: CPT

## 2023-09-12 RX ORDER — ATORVASTATIN CALCIUM 20 MG/1
20 TABLET, FILM COATED ORAL DAILY
Qty: 30 TABLET | Refills: 3 | Status: SHIPPED | OUTPATIENT
Start: 2023-09-13

## 2023-09-12 RX ADMIN — GABAPENTIN 400 MG: 400 CAPSULE ORAL at 15:15

## 2023-09-12 RX ADMIN — Medication 250 MG: at 19:47

## 2023-09-12 RX ADMIN — HEPARIN SODIUM 5000 UNITS: 5000 INJECTION INTRAVENOUS; SUBCUTANEOUS at 22:00

## 2023-09-12 RX ADMIN — Medication 2 ML: at 14:41

## 2023-09-12 RX ADMIN — SODIUM CHLORIDE, PRESERVATIVE FREE 10 ML: 5 INJECTION INTRAVENOUS at 19:47

## 2023-09-12 RX ADMIN — ATORVASTATIN CALCIUM 20 MG: 20 TABLET, FILM COATED ORAL at 08:42

## 2023-09-12 RX ADMIN — HEPARIN SODIUM 5000 UNITS: 5000 INJECTION INTRAVENOUS; SUBCUTANEOUS at 15:15

## 2023-09-12 RX ADMIN — Medication 250 MG: at 08:42

## 2023-09-12 RX ADMIN — ZOLPIDEM TARTRATE 5 MG: 5 TABLET ORAL at 21:25

## 2023-09-12 RX ADMIN — SODIUM CHLORIDE, PRESERVATIVE FREE 10 ML: 5 INJECTION INTRAVENOUS at 08:43

## 2023-09-12 RX ADMIN — TIZANIDINE 4 MG: 4 TABLET ORAL at 21:25

## 2023-09-12 RX ADMIN — FERROUS SULFATE TAB EC 325 MG (65 MG FE EQUIVALENT) 325 MG: 325 (65 FE) TABLET DELAYED RESPONSE at 08:42

## 2023-09-12 RX ADMIN — GABAPENTIN 400 MG: 400 CAPSULE ORAL at 08:43

## 2023-09-12 RX ADMIN — FERROUS SULFATE TAB EC 325 MG (65 MG FE EQUIVALENT) 325 MG: 325 (65 FE) TABLET DELAYED RESPONSE at 19:47

## 2023-09-12 RX ADMIN — ASPIRIN 81 MG: 81 TABLET, COATED ORAL at 09:28

## 2023-09-12 RX ADMIN — AMLODIPINE BESYLATE 10 MG: 10 TABLET ORAL at 08:43

## 2023-09-12 RX ADMIN — GABAPENTIN 400 MG: 400 CAPSULE ORAL at 21:00

## 2023-09-12 RX ADMIN — GLIPIZIDE 20 MG: 10 TABLET ORAL at 07:20

## 2023-09-12 RX ADMIN — INSULIN GLARGINE 10 UNITS: 100 INJECTION, SOLUTION SUBCUTANEOUS at 07:20

## 2023-09-12 RX ADMIN — ALOGLIPTIN 6.25 MG: 6.25 TABLET, FILM COATED ORAL at 08:43

## 2023-09-12 RX ADMIN — Medication 2 ML: at 14:40

## 2023-09-12 RX ADMIN — CETIRIZINE HYDROCHLORIDE 10 MG: 10 TABLET, FILM COATED ORAL at 19:50

## 2023-09-12 RX ADMIN — HEPARIN SODIUM 5000 UNITS: 5000 INJECTION INTRAVENOUS; SUBCUTANEOUS at 07:20

## 2023-09-12 RX ADMIN — HYDROCODONE BITARTRATE AND ACETAMINOPHEN 1 TABLET: 10; 325 TABLET ORAL at 21:25

## 2023-09-12 RX ADMIN — GLIPIZIDE 20 MG: 10 TABLET ORAL at 15:16

## 2023-09-12 NOTE — TELEPHONE ENCOUNTER
Spoke with patient to see if he was working with his PCP regarding his glucose control as Dr. Diogenes John had advised him. Patient said he has been at [facility] since Wednesday, 9/6/23 because of kidney failure. Halley Mack they are trying to get him set up with dialysis at this time. He asked me to cancel his 10/3/23 appointment with   Dr. Diogenes John and he will call back at later time.

## 2023-09-13 VITALS
SYSTOLIC BLOOD PRESSURE: 125 MMHG | DIASTOLIC BLOOD PRESSURE: 77 MMHG | BODY MASS INDEX: 31.92 KG/M2 | HEIGHT: 70 IN | RESPIRATION RATE: 17 BRPM | WEIGHT: 223 LBS | HEART RATE: 77 BPM | TEMPERATURE: 98.6 F | OXYGEN SATURATION: 97 %

## 2023-09-13 LAB
GLUCOSE BLD-MCNC: 161 MG/DL (ref 75–110)
GLUCOSE BLD-MCNC: 89 MG/DL (ref 75–110)
HGB BLD-MCNC: 10.7 G/DL (ref 13–17)

## 2023-09-13 PROCEDURE — 97535 SELF CARE MNGMENT TRAINING: CPT

## 2023-09-13 PROCEDURE — 85018 HEMOGLOBIN: CPT

## 2023-09-13 PROCEDURE — 2580000003 HC RX 258: Performed by: NURSE PRACTITIONER

## 2023-09-13 PROCEDURE — 36415 COLL VENOUS BLD VENIPUNCTURE: CPT

## 2023-09-13 PROCEDURE — 6370000000 HC RX 637 (ALT 250 FOR IP): Performed by: NURSE PRACTITIONER

## 2023-09-13 PROCEDURE — 99232 SBSQ HOSP IP/OBS MODERATE 35: CPT | Performed by: NURSE PRACTITIONER

## 2023-09-13 PROCEDURE — 6360000002 HC RX W HCPCS: Performed by: NURSE PRACTITIONER

## 2023-09-13 PROCEDURE — 82947 ASSAY GLUCOSE BLOOD QUANT: CPT

## 2023-09-13 RX ADMIN — FERROUS SULFATE TAB EC 325 MG (65 MG FE EQUIVALENT) 325 MG: 325 (65 FE) TABLET DELAYED RESPONSE at 08:17

## 2023-09-13 RX ADMIN — HEPARIN SODIUM 5000 UNITS: 5000 INJECTION INTRAVENOUS; SUBCUTANEOUS at 06:18

## 2023-09-13 RX ADMIN — Medication 250 MG: at 08:16

## 2023-09-13 RX ADMIN — ASPIRIN 81 MG: 81 TABLET, COATED ORAL at 08:17

## 2023-09-13 RX ADMIN — GABAPENTIN 400 MG: 400 CAPSULE ORAL at 13:20

## 2023-09-13 RX ADMIN — SODIUM CHLORIDE, PRESERVATIVE FREE 10 ML: 5 INJECTION INTRAVENOUS at 08:17

## 2023-09-13 RX ADMIN — ALOGLIPTIN 6.25 MG: 6.25 TABLET, FILM COATED ORAL at 08:16

## 2023-09-13 RX ADMIN — HEPARIN SODIUM 5000 UNITS: 5000 INJECTION INTRAVENOUS; SUBCUTANEOUS at 13:19

## 2023-09-13 RX ADMIN — AMLODIPINE BESYLATE 10 MG: 10 TABLET ORAL at 08:16

## 2023-09-13 RX ADMIN — ATORVASTATIN CALCIUM 20 MG: 20 TABLET, FILM COATED ORAL at 08:17

## 2023-09-13 RX ADMIN — GABAPENTIN 400 MG: 400 CAPSULE ORAL at 08:17

## 2023-09-13 NOTE — CARE COORDINATION
DC Planning    Spoke with pt, declines dc needs, spoke with LSW-HD slot pending-Hep B panel and financial /insurance clearance. Pt does drive lives with wife-she can drive him to HD if needed but feels he is tolerating HD txs well thus far. He does have a walker if needed. IMM letter provided to patient. Patient offered four hours to make informed decision regarding appeal process; patient agreeable to discharge. Final HD pending.
Discharge planning    Patient chart reviewed. Admitted for HD to be established. Will be a new HD patient. Updated social work and will meet with patient and complete assessment. His nephrologist is Dr Latricia Shields.
Social work: Hep B Core antibody faxed to [facility]- await confirmation of chairtime.
Social work: Referral to . Spoke to [facility] intake, available chairtime is T/Th/Sa 1st shift(5:15-6:00AM). Assigned worker is randy [phone] ext [phone].
Social work: Spoke to [employer], records have been sent to [facility] for [employer].   SW also left VM for Jaleel/BLANCO to expedite approval- await c/b.     UPDATE:  Spoke to Carl/Clair rosas, who confirmed patient is medically and financially cleared to start outpatient dialysis on Friday, 9/15. Chairtime will be MWF 5:45AM, with arrival at 5:15AM for first day of treatment.
Social work: Spoke to [employer], stated they need Hepatitis B Core antibody in order to proceed with referral.  Perfect serve to NP to order.
wife     The Patient and/or Patient Representative Agree with the Discharge Plan?  Yes    MATTHEW Matias, [place]  Case Management Department  Ph: [phone] Fax: [phone]

## 2023-09-18 DIAGNOSIS — D64.9 NORMOCYTIC ANEMIA: Primary | ICD-10-CM

## 2023-09-26 ENCOUNTER — OFFICE VISIT (OUTPATIENT)
Dept: PODIATRY | Age: 68
End: 2023-09-26
Payer: MEDICARE

## 2023-09-26 VITALS — BODY MASS INDEX: 31.92 KG/M2 | WEIGHT: 223 LBS | HEIGHT: 70 IN

## 2023-09-26 DIAGNOSIS — N18.6 ESRD NEEDING DIALYSIS (HCC): ICD-10-CM

## 2023-09-26 DIAGNOSIS — E11.51 TYPE II DIABETES MELLITUS WITH PERIPHERAL CIRCULATORY DISORDER (HCC): Primary | ICD-10-CM

## 2023-09-26 DIAGNOSIS — Z99.2 ESRD NEEDING DIALYSIS (HCC): ICD-10-CM

## 2023-09-26 DIAGNOSIS — M14.671 CHARCOT'S JOINT OF RIGHT FOOT: ICD-10-CM

## 2023-09-26 DIAGNOSIS — M86.071 ACUTE HEMATOGENOUS OSTEOMYELITIS OF RIGHT FOOT (HCC): ICD-10-CM

## 2023-09-26 DIAGNOSIS — B35.1 DERMATOPHYTOSIS OF NAIL: ICD-10-CM

## 2023-09-26 PROCEDURE — 99999 PR OFFICE/OUTPT VISIT,PROCEDURE ONLY: CPT | Performed by: PODIATRIST

## 2023-09-26 PROCEDURE — 11721 DEBRIDE NAIL 6 OR MORE: CPT | Performed by: PODIATRIST

## 2023-09-26 NOTE — PROGRESS NOTES
Dorsally contracted digits present digits 2, Bilateral.     Vascular: DP pulses 1/4 bilateral.  PT pulses 0/4 bilateral.   CFT <5 seconds, Bilateral.  Hair growth absent to the level of the digits, Bilateral.  Edema present, Bilateral.  Varicosities absent, Bilateral. Erythema absent, Bilateral    Neurological: Sensation diminshed to light touch to level of digits, Bilateral.  Protective sensation intact 6/10 sites via 5.07/10g [place]orge Monofilament, Bilateral.  negative Tinel's, Bilateral.  negative Valleix sign, Bilateral.      Integument: Warm, dry, supple, Bilateral.  Open lesion absent, Bilateral.  Interdigital maceration absent to web spaces 4, Bilateral.  Nails 1-5 left and 1-5 right thickened > 3.0 mm, dystrophic and crumbly, discolored with subungual debris. Fissures absent, Bilateral.   General: AAO x 3 in NAD.     Derm  Toenail Description  Sites of Onychomycosis Involvement (Check affected area)  [x] [x] [x] [x] [x] [x] [x] [x] [x] [x]  5 4 3 2 1 1 2 3 4 5                          Right                                        Left    Thickness  [x] [x] [x] [x] [x] [x] [x] [x] [x] [x]  5 4 3 2 1 1 2 3 4 5                         Right                                        Left    Dystrophic Changes   [x] [x] [x] [x] [x] [x] [x] [x] [x] [x]  5 4 3 2 1 1 2 3 4 5                         Right                                        Left    Color   [x] [x] [x] [x] [x] [x] [x] [x] [x] [x]  5 4 3 2 1 1 2 3 4 5                          Right                                        Left    Incurvation/Ingrowin   [] [] [] [] [] [] [] [] [] []  5 4 3 2 1 1 2 3 4 5                         Right                                        Left    Inflammation/Pain   [x] [x] [x] [x] [x] [x] [x] [x] [x] [x]  5 4 3 2 1 1 2 3 4 5                         Right                                        Left        Visual inspection:  Deformity: hammertoe deformity tyrone feet  amputation: absent  Skin lesions: absent  Edema:

## 2023-10-04 ENCOUNTER — TELEPHONE (OUTPATIENT)
Dept: ORTHOPEDIC SURGERY | Age: 68
End: 2023-10-04

## 2023-10-04 NOTE — TELEPHONE ENCOUNTER
Per Dr. Shante Velazquez request, I called and spoke with patient to see how he is doing. Patient states he is out of hospital but is still going to dialysis 3 times per week. Says that at this time all he is concentrating on is getting his kidneys working better. Has not done anything with Physical Medicine or amputee support group. Said he did follow up with his podiatrist about 3 weeks ago. Advised Dr. Deepali Washington of the above information.

## 2023-11-02 ENCOUNTER — TELEPHONE (OUTPATIENT)
Dept: ORTHOPEDIC SURGERY | Age: 68
End: 2023-11-02

## 2023-11-02 NOTE — TELEPHONE ENCOUNTER
Spoke with patient. Still on dialysis 3 times/week. They're trying to talk him into nightly dialysis at home. Patient says he doesn't like how he feels after dialysis and is wondering if he'll feel the same everyday if he does dialysis every night. .so feeling bad every day instead of just Tuesdays, Thursdays, Saturdays. But said \"As soon as kidneys good, foot is gone! \"

## 2023-11-17 NOTE — PLAN OF CARE
Patient VSS aside from elevated 'B systolic. Patient slept well with Zanaflex and Ambien. Patient did not need Rifle last night. Patient ambulates to restroom and back. Patient remains free from falls. Safety measures applied. Call light and bedside table within reach. Will continue to monitor.        Problem: Pain  Goal: Verbalizes/displays adequate comfort level or baseline comfort level  9/10/2023 0300 by Gabriella Chaparro RN  Outcome: Progressing     Problem: Safety - Adult  Goal: Free from fall injury  9/10/2023 0300 by Gabriella Chaparro RN  Outcome: Progressing     Problem: Chronic Conditions and Co-morbidities  Goal: Patient's chronic conditions and co-morbidity symptoms are monitored and maintained or improved  9/10/2023 0300 by Gabriella Chaparro RN  Outcome: Progressing
Patient is here for a 6 months follow up.  
Patient's pain is decently controlled. Patient is resting well. No complaints at this time. Rounding continued to be done. Siderails up x 2  Hourly rounding  Call light in reach  Instructed to call for assist before attempting out of bed. Remains free from falls and accidental injury at this time   Floor free from obstacles  Bed is locked and in lowest position  Adequate lighting provided  Bed alarm on, Red Falling star and Stay with Me signs posted      Problem: Safety - Adult  Goal: Free from fall injury  9/12/2023 0307 by Bree Canales RN  Outcome: Progressing  9/11/2023 1756 by Opal Wade RN  Outcome: Progressing      Pain level assessment complete.    Patient educated on pain scale and control interventions  PRN pain medication given per patient request  Patient instructed to call out with new onset of pain or unrelieved pain    Problem: Pain  Goal: Verbalizes/displays adequate comfort level or baseline comfort level  9/12/2023 0307 by Bree Canales RN  Outcome: Progressing  9/11/2023 1756 by Opal Wade RN  Outcome: Progressing   Problem: Discharge Planning      Goal: Discharge to home or other facility with appropriate resources  9/12/2023 0307 by Bree Canales RN  Outcome: Progressing  Flowsheets (Taken 9/11/2023 1933)  Discharge to home or other facility with appropriate resources:   Identify barriers to discharge with patient and caregiver   Arrange for needed discharge resources and transportation as appropriate   Identify discharge learning needs (meds, wound care, etc)   Refer to discharge planning if patient needs post-hospital services based on physician order or complex needs related to functional status, cognitive ability or social support system  9/11/2023 1756 by Opal Wade RN  Outcome: Progressing  Flowsheets (Taken 9/11/2023 0800)  Discharge to home or other facility with appropriate resources:   Identify barriers to discharge with patient and caregiver   Arrange
Problem: Discharge Planning  Goal: Discharge to home or other facility with appropriate resources  9/10/2023 2324 by Joseph Lea RN  Outcome: Progressing  Note: Patient should return home following this admit      Problem: Pain  Goal: Verbalizes/displays adequate comfort level or baseline comfort level  9/10/2023 2324 by Joseph Lea RN  Outcome: Progressing  Note: Patient has as needed pain control and will ask for it. Problem: Safety - Adult  Goal: Free from fall injury  9/10/2023 2324 by Joseph Lea RN  Outcome: Progressing  Note: Patient will be free from falls this shift and is aware of personal limits. Problem: Chronic Conditions and Co-morbidities  Goal: Patient's chronic conditions and co-morbidity symptoms are monitored and maintained or improved  9/10/2023 2324 by Joseph Lea RN  Outcome: Progressing  Note: Monitoring all chronic conditions during this admit.
Problem: Discharge Planning  Goal: Discharge to home or other facility with appropriate resources  9/12/2023 1016 by Valentino Riddles, RN  Outcome: Progressing  Flowsheets (Taken 9/12/2023 1016)  Discharge to home or other facility with appropriate resources: Identify barriers to discharge with patient and caregiver     Problem: Pain  Goal: Verbalizes/displays adequate comfort level or baseline comfort level  9/12/2023 1016 by Valentino Riddles, RN  Outcome: Progressing  Flowsheets (Taken 9/12/2023 1016)  Verbalizes/displays adequate comfort level or baseline comfort level: Encourage patient to monitor pain and request assistance     Problem: Safety - Adult  Goal: Free from fall injury  9/12/2023 1016 by Valentino Riddles, RN  Outcome: Progressing  Flowsheets (Taken 9/9/2023 0244 by Shelley Ramirez RN)  Free From Fall Injury: Instruct family/caregiver on patient safety     Problem: Chronic Conditions and Co-morbidities  Goal: Patient's chronic conditions and co-morbidity symptoms are monitored and maintained or improved  9/12/2023 1016 by Valentino Riddles, RN  Outcome: Progressing  Flowsheets (Taken 9/12/2023 1016)  Care Plan - Patient's Chronic Conditions and Co-Morbidity Symptoms are Monitored and Maintained or Improved: Monitor and assess patient's chronic conditions and comorbid symptoms for stability, deterioration, or improvement
Problem: Discharge Planning  Goal: Discharge to home or other facility with appropriate resources  9/13/2023 0004 by Manpreet Marques RN  Outcome: Progressing  9/12/2023 1016 by Izabela Forbes RN  Outcome: Progressing  Flowsheets (Taken 9/12/2023 1016)  Discharge to home or other facility with appropriate resources: Identify barriers to discharge with patient and caregiver     Problem: Pain  Goal: Verbalizes/displays adequate comfort level or baseline comfort level  9/13/2023 0004 by Manpreet Marques RN  Outcome: Progressing  9/12/2023 1016 by Izabela Forbes RN  Outcome: Progressing  Flowsheets (Taken 9/12/2023 1016)  Verbalizes/displays adequate comfort level or baseline comfort level: Encourage patient to monitor pain and request assistance     Problem: Safety - Adult  Goal: Free from fall injury  9/13/2023 0004 by Manpreet Marques RN  Outcome: Progressing  9/12/2023 1016 by Izabela Forbes RN  Outcome: Progressing  Flowsheets (Taken 9/9/2023 0244 by Nivia Haskins RN)  Free From Fall Injury: Instruct family/caregiver on patient safety
Problem: Discharge Planning  Goal: Discharge to home or other facility with appropriate resources  9/13/2023 0952 by Ti Corcoran RN  Outcome: Progressing  Flowsheets (Taken 9/13/2023 4034)  Discharge to home or other facility with appropriate resources: Identify barriers to discharge with patient and caregiver     Problem: Pain  Goal: Verbalizes/displays adequate comfort level or baseline comfort level  9/13/2023 0952 by Ti Corcoran RN  Outcome: Progressing  Flowsheets (Taken 9/13/2023 8481)  Verbalizes/displays adequate comfort level or baseline comfort level: Encourage patient to monitor pain and request assistance     Problem: Safety - Adult  Goal: Free from fall injury  9/13/2023 0952 by Ti Corcoran RN  Outcome: Progressing  [address] (Taken 9/13/2023 0952)  Free From Fall Injury: Instruct family/caregiver on patient safety     Problem: Chronic Conditions and Co-morbidities  Goal: Patient's chronic conditions and co-morbidity symptoms are monitored and maintained or improved  9/13/2023 0952 by Ti Corcoran RN  Outcome: Progressing  Flowsheets (Taken 9/13/2023 2360)  Care Plan - Patient's Chronic Conditions and Co-Morbidity Symptoms are Monitored and Maintained or Improved: Monitor and assess patient's chronic conditions and comorbid symptoms for stability, deterioration, or improvement
Problem: Discharge Planning  Goal: Discharge to home or other facility with appropriate resources  Outcome: Not Progressing  Flowsheets (Taken 9/7/2023 2018)  Discharge to home or other facility with appropriate resources:   Identify barriers to discharge with patient and caregiver   Identify discharge learning needs (meds, wound care, etc)   Refer to discharge planning if patient needs post-hospital services based on physician order or complex needs related to functional status, cognitive ability or social support system   Arrange for needed discharge resources and transportation as appropriate   Arrange for interpreters to assist at discharge as needed     Problem: Pain  Goal: Verbalizes/displays adequate comfort level or baseline comfort level  Outcome: Not Progressing     Problem: Safety - Adult  Goal: Free from fall injury  Outcome: Not Progressing
Problem: Discharge Planning  Goal: Discharge to home or other facility with appropriate resources  Outcome: Progressing  Flowsheets (Taken 9/10/2023 0800)  Discharge to home or other facility with appropriate resources:   Identify barriers to discharge with patient and caregiver   Arrange for needed discharge resources and transportation as appropriate   Identify discharge learning needs (meds, wound care, etc)   Refer to discharge planning if patient needs post-hospital services based on physician order or complex needs related to functional status, cognitive ability or social support system     Problem: Pain  Goal: Verbalizes/displays adequate comfort level or baseline comfort level  9/10/2023 1102 by Yuniel Davis RN  Outcome: Progressing  9/10/2023 0300 by Lamine Salmeron RN  Outcome: Progressing     Problem: Safety - Adult  Goal: Free from fall injury  9/10/2023 1102 by Yuniel Davis RN  Outcome: Progressing  9/10/2023 0300 by Lamine Salmeron RN  Outcome: Progressing     Problem: Chronic Conditions and Co-morbidities  Goal: Patient's chronic conditions and co-morbidity symptoms are monitored and maintained or improved  9/10/2023 1102 by Yuniel Davis RN  Outcome: Progressing  Flowsheets (Taken 9/10/2023 0800)  Care Plan - Patient's Chronic Conditions and Co-Morbidity Symptoms are Monitored and Maintained or Improved:   Monitor and assess patient's chronic conditions and comorbid symptoms for stability, deterioration, or improvement   Collaborate with multidisciplinary team to address chronic and comorbid conditions and prevent exacerbation or deterioration   Update acute care plan with appropriate goals if chronic or comorbid symptoms are exacerbated and prevent overall improvement and discharge  9/10/2023 0300 by Lamine Salmeron RN  Outcome: Progressing
Problem: Discharge Planning  Goal: Discharge to home or other facility with appropriate resources  Outcome: Progressing  Flowsheets (Taken 9/11/2023 0800)  Discharge to home or other facility with appropriate resources:   Identify barriers to discharge with patient and caregiver   Arrange for needed discharge resources and transportation as appropriate   Identify discharge learning needs (meds, wound care, etc)   Refer to discharge planning if patient needs post-hospital services based on physician order or complex needs related to functional status, cognitive ability or social support system     Problem: Pain  Goal: Verbalizes/displays adequate comfort level or baseline comfort level  Outcome: Progressing     Problem: Safety - Adult  Goal: Free from fall injury  Outcome: Progressing     Problem: Chronic Conditions and Co-morbidities  Goal: Patient's chronic conditions and co-morbidity symptoms are monitored and maintained or improved  Outcome: Progressing  Flowsheets (Taken 9/11/2023 0800)  Care Plan - Patient's Chronic Conditions and Co-Morbidity Symptoms are Monitored and Maintained or Improved:   Collaborate with multidisciplinary team to address chronic and comorbid conditions and prevent exacerbation or deterioration   Monitor and assess patient's chronic conditions and comorbid symptoms for stability, deterioration, or improvement   Update acute care plan with appropriate goals if chronic or comorbid symptoms are exacerbated and prevent overall improvement and discharge
Problem: Discharge Planning  Goal: Discharge to home or other facility with appropriate resources  Outcome: Progressing  Flowsheets (Taken 9/9/2023 0800)  Discharge to home or other facility with appropriate resources:   Identify barriers to discharge with patient and caregiver   Arrange for needed discharge resources and transportation as appropriate   Identify discharge learning needs (meds, wound care, etc)   Refer to discharge planning if patient needs post-hospital services based on physician order or complex needs related to functional status, cognitive ability or social support system     Problem: Pain  Goal: Verbalizes/displays adequate comfort level or baseline comfort level  Outcome: Progressing     Problem: Safety - Adult  Goal: Free from fall injury  Outcome: Progressing     Problem: Chronic Conditions and Co-morbidities  Goal: Patient's chronic conditions and co-morbidity symptoms are monitored and maintained or improved  Outcome: Progressing  Flowsheets (Taken 9/9/2023 0800)  Care Plan - Patient's Chronic Conditions and Co-Morbidity Symptoms are Monitored and Maintained or Improved:   Monitor and assess patient's chronic conditions and comorbid symptoms for stability, deterioration, or improvement   Collaborate with multidisciplinary team to address chronic and comorbid conditions and prevent exacerbation or deterioration   Update acute care plan with appropriate goals if chronic or comorbid symptoms are exacerbated and prevent overall improvement and discharge
Pt utilizes pain scale appropriately, denies pain. Demonstrates proper use of call light, remains free from injury.       Problem: Discharge Planning  Goal: Discharge to home or other facility with appropriate resources  9/8/2023 0113 by Brigette Gottlieb RN  Outcome: Progressing     Problem: Pain  Goal: Verbalizes/displays adequate comfort level or baseline comfort level  9/8/2023 0113 by Brigette Gottlieb RN  Outcome: Progressing     Problem: Safety - Adult  Goal: Free from fall injury  9/8/2023 0113 by Brigette Gottlieb RN  Outcome: Progressing     Problem: Discharge Planning  Goal: Discharge to home or other facility with appropriate resources  9/8/2023 0113 by Brigette Gottlieb RN  Outcome: Progressing  9/8/2023 0112 by Brigette Gottlieb RN  Outcome: Not Progressing  Flowsheets (Taken 9/7/2023 2018)  Discharge to home or other facility with appropriate resources:   Identify barriers to discharge with patient and caregiver   Identify discharge learning needs (meds, wound care, etc)   Refer to discharge planning if patient needs post-hospital services based on physician order or complex needs related to functional status, cognitive ability or social support system   Arrange for needed discharge resources and transportation as appropriate   Arrange for interpreters to assist at discharge as needed     Problem: Pain  Goal: Verbalizes/displays adequate comfort level or baseline comfort level  9/8/2023 0113 by Brigette Gottlieb RN  Outcome: Progressing  9/8/2023 0112 by Brigette Gottlieb RN  Outcome: Not Progressing     Problem: Safety - Adult  Goal: Free from fall injury  9/8/2023 0113 by Brigette Gottlieb RN  Outcome: Progressing  9/8/2023 0112 by Brigette Gottlieb RN  Outcome: Not Progressing
Verbalizes/displays adequate comfort level or baseline comfort level  9/8/2023 1248 by Gallo Boss RN  Outcome: Progressing  9/8/2023 0113 by Christina Ortiz RN  Outcome: Progressing  9/8/2023 0112 by Christina Ortiz RN  Outcome: Not Progressing     Problem: Safety - Adult  Goal: Free from fall injury  9/8/2023 0113 by Christina Ortiz RN  Outcome: Progressing  9/8/2023 0112 by Christina Ortiz RN  Outcome: Not Progressing
prevent overall improvement and discharge

## 2023-12-05 ENCOUNTER — HOSPITAL ENCOUNTER (OUTPATIENT)
Age: 68
Setting detail: SPECIMEN
Discharge: HOME | End: 2023-12-05
Payer: MEDICARE

## 2023-12-05 DIAGNOSIS — D64.9 NORMOCYTIC ANEMIA: ICD-10-CM

## 2023-12-05 LAB
BASOPHILS # BLD: 0.08 K/UL (ref 0–0.2)
BASOPHILS NFR BLD: 1 % (ref 0–2)
EOSINOPHIL # BLD: 0.22 K/UL (ref 0–0.44)
EOSINOPHILS RELATIVE PERCENT: 2 % (ref 1–4)
ERYTHROCYTE [DISTWIDTH] IN BLOOD BY AUTOMATED COUNT: 16 % (ref 11.8–14.4)
FERRITIN SERPL-MCNC: 676 NG/ML (ref 30–400)
HCT VFR BLD AUTO: 34 % (ref 40.7–50.3)
HGB BLD-MCNC: 11.3 G/DL (ref 13–17)
IMM GRANULOCYTES # BLD AUTO: 0.09 K/UL (ref 0–0.3)
IMM GRANULOCYTES NFR BLD: 1 %
IRON SATN MFR SERPL: 36 % (ref 20–55)
IRON SERPL-MCNC: 83 UG/DL (ref 59–158)
LYMPHOCYTES NFR BLD: 2.51 K/UL (ref 1.1–3.7)
LYMPHOCYTES RELATIVE PERCENT: 23 % (ref 24–43)
MCH RBC QN AUTO: 31.6 PG (ref 25.2–33.5)
MCHC RBC AUTO-ENTMCNC: 33.2 G/DL (ref 28.4–34.8)
MCV RBC AUTO: 95 FL (ref 82.6–102.9)
MONOCYTES NFR BLD: 0.66 K/UL (ref 0.1–1.2)
MONOCYTES NFR BLD: 6 % (ref 3–12)
NEUTROPHILS NFR BLD: 67 % (ref 36–65)
NEUTS SEG NFR BLD: 7.27 K/UL (ref 1.5–8.1)
NRBC BLD-RTO: 0 PER 100 WBC
PLATELET # BLD AUTO: 234 K/UL (ref 138–453)
PMV BLD AUTO: 8.6 FL (ref 8.1–13.5)
RBC # BLD AUTO: 3.58 M/UL (ref 4.21–5.77)
RBC # BLD: ABNORMAL 10*6/UL
TIBC SERPL-MCNC: 230 UG/DL (ref 250–450)
UNSATURATED IRON BINDING CAPACITY: 147 UG/DL (ref 112–347)
WBC OTHER # BLD: 10.8 K/UL (ref 3.5–11.3)

## 2023-12-05 PROCEDURE — 36415 COLL VENOUS BLD VENIPUNCTURE: CPT

## 2023-12-05 PROCEDURE — 83550 IRON BINDING TEST: CPT

## 2023-12-05 PROCEDURE — 83540 ASSAY OF IRON: CPT

## 2023-12-05 PROCEDURE — 85025 COMPLETE CBC W/AUTO DIFF WBC: CPT

## 2023-12-05 PROCEDURE — 82728 ASSAY OF FERRITIN: CPT

## 2023-12-07 ENCOUNTER — OFFICE VISIT (OUTPATIENT)
Dept: ONCOLOGY | Age: 68
End: 2023-12-07
Payer: MEDICARE

## 2023-12-07 ENCOUNTER — TELEPHONE (OUTPATIENT)
Dept: ORTHOPEDIC SURGERY | Age: 68
End: 2023-12-07

## 2023-12-07 ENCOUNTER — TELEPHONE (OUTPATIENT)
Dept: ONCOLOGY | Age: 68
End: 2023-12-07

## 2023-12-07 VITALS
DIASTOLIC BLOOD PRESSURE: 62 MMHG | TEMPERATURE: 98.6 F | BODY MASS INDEX: 32.49 KG/M2 | WEIGHT: 226.4 LBS | SYSTOLIC BLOOD PRESSURE: 134 MMHG | HEART RATE: 85 BPM

## 2023-12-07 DIAGNOSIS — D64.9 NORMOCYTIC ANEMIA: Primary | ICD-10-CM

## 2023-12-07 PROCEDURE — 3017F COLORECTAL CA SCREEN DOC REV: CPT | Performed by: INTERNAL MEDICINE

## 2023-12-07 PROCEDURE — G8427 DOCREV CUR MEDS BY ELIG CLIN: HCPCS | Performed by: INTERNAL MEDICINE

## 2023-12-07 PROCEDURE — 3075F SYST BP GE 130 - 139MM HG: CPT | Performed by: INTERNAL MEDICINE

## 2023-12-07 PROCEDURE — G8484 FLU IMMUNIZE NO ADMIN: HCPCS | Performed by: INTERNAL MEDICINE

## 2023-12-07 PROCEDURE — 1123F ACP DISCUSS/DSCN MKR DOCD: CPT | Performed by: INTERNAL MEDICINE

## 2023-12-07 PROCEDURE — 99211 OFF/OP EST MAY X REQ PHY/QHP: CPT | Performed by: INTERNAL MEDICINE

## 2023-12-07 PROCEDURE — 3078F DIAST BP <80 MM HG: CPT | Performed by: INTERNAL MEDICINE

## 2023-12-07 PROCEDURE — 4004F PT TOBACCO SCREEN RCVD TLK: CPT | Performed by: INTERNAL MEDICINE

## 2023-12-07 PROCEDURE — 99214 OFFICE O/P EST MOD 30 MIN: CPT | Performed by: INTERNAL MEDICINE

## 2023-12-07 PROCEDURE — G8417 CALC BMI ABV UP PARAM F/U: HCPCS | Performed by: INTERNAL MEDICINE

## 2023-12-07 RX ORDER — HYDROXYZINE HYDROCHLORIDE 10 MG/1
TABLET, FILM COATED ORAL
COMMUNITY
Start: 2023-10-30

## 2023-12-07 RX ORDER — FOLIC ACID/VIT B COMPLEX AND C 0.8 MG
TABLET ORAL
COMMUNITY
Start: 2023-11-09

## 2023-12-07 RX ORDER — LOSARTAN POTASSIUM 50 MG/1
TABLET ORAL
COMMUNITY
Start: 2023-11-13

## 2023-12-07 RX ORDER — LANOLIN ALCOHOL/MO/W.PET/CERES
5 CREAM (GRAM) TOPICAL NIGHTLY PRN
COMMUNITY

## 2023-12-07 NOTE — PROGRESS NOTES
ALKPHOS 120 09/07/2023 0952    ALKPHOS 141 (H) 06/21/2023 0944    AST 16 09/07/2023 0952    ALT 13 09/07/2023 0952    BILITOT 0.4 09/07/2023 0952            IMPRESSION:  Severe normocytic anemia  End-stage renal disease on hemodialysis  Anemia of chronic renal failure  Multiple comorbidities as listed  . Family history of multiple myeloma      PLAN: Records, labs and images were reviewed and discussed with the patient. I explained to the patient the nature of this anemia and underlying cause and management plans. Patient is having severe anemia and severe renal insufficiency. These 2 problems have been getting worse over the last 2 years. Further lab work showed normal iron studies and normal vitamin B12 and folate. Patient has no evidence of any active bleeding. Previous colonoscopy was normal.  I believe patient's anemia is related to chronic renal failure. He was started on IV Procrit and IV iron which was started June 21, 2023. Labs were reviewed and discussed. Patient has significant improvement. Hemoglobin is mainly in the 10 range. Continue with the Procrit injections. Patient is started on dialysis. He will receive Epogen during dialysis. Will also receive iron infusion as needed with dialysis. I will see him as needed. The patient's questions were answered to the best of his satisfaction and he verbalized full understanding and agreement.

## 2024-01-04 ENCOUNTER — TELEPHONE (OUTPATIENT)
Dept: ORTHOPEDIC SURGERY | Age: 69
End: 2024-01-04

## 2024-01-04 NOTE — TELEPHONE ENCOUNTER
Spoke with patient. Kidneys still not responding too well to dialysis.   OK to call in month or so to check in.

## 2024-02-29 ENCOUNTER — OFFICE VISIT (OUTPATIENT)
Dept: PODIATRY | Age: 69
End: 2024-02-29
Payer: MEDICARE

## 2024-02-29 VITALS — WEIGHT: 224.87 LBS | HEIGHT: 70 IN | BODY MASS INDEX: 32.19 KG/M2

## 2024-02-29 DIAGNOSIS — M79.672 PAIN IN BOTH FEET: ICD-10-CM

## 2024-02-29 DIAGNOSIS — N18.6 ESRD NEEDING DIALYSIS (HCC): ICD-10-CM

## 2024-02-29 DIAGNOSIS — M79.671 PAIN IN BOTH FEET: ICD-10-CM

## 2024-02-29 DIAGNOSIS — Z99.2 ESRD NEEDING DIALYSIS (HCC): ICD-10-CM

## 2024-02-29 DIAGNOSIS — M14.671 CHARCOT'S JOINT OF RIGHT FOOT: Primary | ICD-10-CM

## 2024-02-29 DIAGNOSIS — I73.9 PVD (PERIPHERAL VASCULAR DISEASE) (HCC): ICD-10-CM

## 2024-02-29 DIAGNOSIS — E11.51 TYPE II DIABETES MELLITUS WITH PERIPHERAL CIRCULATORY DISORDER (HCC): ICD-10-CM

## 2024-02-29 PROCEDURE — G8417 CALC BMI ABV UP PARAM F/U: HCPCS | Performed by: PODIATRIST

## 2024-02-29 PROCEDURE — 3046F HEMOGLOBIN A1C LEVEL >9.0%: CPT | Performed by: PODIATRIST

## 2024-02-29 PROCEDURE — 2022F DILAT RTA XM EVC RTNOPTHY: CPT | Performed by: PODIATRIST

## 2024-02-29 PROCEDURE — 1123F ACP DISCUSS/DSCN MKR DOCD: CPT | Performed by: PODIATRIST

## 2024-02-29 PROCEDURE — G8484 FLU IMMUNIZE NO ADMIN: HCPCS | Performed by: PODIATRIST

## 2024-02-29 PROCEDURE — 3017F COLORECTAL CA SCREEN DOC REV: CPT | Performed by: PODIATRIST

## 2024-02-29 PROCEDURE — 99213 OFFICE O/P EST LOW 20 MIN: CPT | Performed by: PODIATRIST

## 2024-02-29 PROCEDURE — G8427 DOCREV CUR MEDS BY ELIG CLIN: HCPCS | Performed by: PODIATRIST

## 2024-02-29 PROCEDURE — 4004F PT TOBACCO SCREEN RCVD TLK: CPT | Performed by: PODIATRIST

## 2024-02-29 PROCEDURE — 11721 DEBRIDE NAIL 6 OR MORE: CPT | Performed by: PODIATRIST

## 2024-02-29 NOTE — PROGRESS NOTES
Vascular: DP pulses 1/4 bilateral.  PT pulses 0/4 bilateral.   CFT <5 seconds, Bilateral.  Hair growth absent to the level of the digits, Bilateral.  Edema present, Bilateral.  Varicosities absent, Bilateral. Erythema absent, Bilateral    Neurological: Sensation diminshed to light touch to level of digits, Bilateral.  Protective sensation intact 6/10 sites via 5.07/10g [place]-Jorge Monofilament, Bilateral.  negative Tinel's, Bilateral.  negative Valleix sign, Bilateral.      Integument: Warm, dry, supple, Bilateral.  Open lesion absent, Bilateral.  Interdigital maceration absent to web spaces 4, Bilateral.  Nails 1-5 left and 1-5 right thickened > 3.0 mm, dystrophic and crumbly, discolored with subungual debris.  Fissures absent, Bilateral.   General: AAO x 3 in NAD.    Derm  Toenail Description  Sites of Onychomycosis Involvement (Check affected area)  [x] [x] [x] [x] [x] [x] [x] [x] [x] [x]  5 4 3 2 1 1 2 3 4 5                          Right                                        Left    Thickness  [x] [x] [x] [x] [x] [x] [x] [x] [x] [x]  5 4 3 2 1 1 2 3 4 5                         Right                                        Left    Dystrophic Changes   [x] [x] [x] [x] [x] [x] [x] [x] [x] [x]  5 4 3 2 1 1 2 3 4 5                         Right                                        Left    Color   [x] [x] [x] [x] [x] [x] [x] [x] [x] [x]  5 4 3 2 1 1 2 3 4 5                          Right                                        Left    Incurvation/Ingrowin   [] [] [] [] [] [] [] [] [] []  5 4 3 2 1 1 2 3 4 5                         Right                                        Left    Inflammation/Pain   [x] [x] [x] [x] [x] [x] [x] [x] [x] [x]  5 4 3 2 1 1 2 3 4 5                         Right                                        Left        Visual inspection:  Deformity: hammertoe deformity tyrone feet  amputation: absent  Skin lesions: absent  Edema: right- 2+ pitting edema, left- 2+ pitting edema    Sensory

## 2024-04-08 ENCOUNTER — TELEPHONE (OUTPATIENT)
Dept: ORTHOPEDIC SURGERY | Age: 69
End: 2024-04-08

## 2024-04-08 NOTE — TELEPHONE ENCOUNTER
Spoke with patient about Dr. Newby leaving.  Patient is working towards getting kidney transplant. Going to dialysis 4 times/week.  Going to try and get set up for at home dialysis. But cannot do anything with his foot/ankle until all that gets rectified.

## 2024-04-27 ENCOUNTER — HOSPITAL ENCOUNTER (EMERGENCY)
Age: 69
Discharge: HOME | End: 2024-04-27
Attending: EMERGENCY MEDICINE
Payer: MEDICARE

## 2024-04-27 ENCOUNTER — APPOINTMENT (OUTPATIENT)
Dept: GENERAL RADIOLOGY | Age: 69
End: 2024-04-27
Payer: MEDICARE

## 2024-04-27 VITALS
BODY MASS INDEX: 32.21 KG/M2 | HEART RATE: 78 BPM | HEIGHT: 70 IN | RESPIRATION RATE: 18 BRPM | TEMPERATURE: 99.1 F | OXYGEN SATURATION: 95 % | DIASTOLIC BLOOD PRESSURE: 59 MMHG | SYSTOLIC BLOOD PRESSURE: 131 MMHG | WEIGHT: 225 LBS

## 2024-04-27 DIAGNOSIS — E16.2 HYPOGLYCEMIA: ICD-10-CM

## 2024-04-27 DIAGNOSIS — B34.9 VIRAL ILLNESS: Primary | ICD-10-CM

## 2024-04-27 LAB
ALBUMIN SERPL-MCNC: 3.8 G/DL (ref 3.5–5.2)
ALP SERPL-CCNC: 101 U/L (ref 40–129)
ALT SERPL-CCNC: 25 U/L (ref 5–41)
ANION GAP SERPL CALCULATED.3IONS-SCNC: 15 MMOL/L (ref 9–17)
AST SERPL-CCNC: 31 U/L
BASOPHILS # BLD: 0.03 K/UL (ref 0–0.2)
BASOPHILS NFR BLD: 1 % (ref 0–2)
BILIRUB SERPL-MCNC: 0.5 MG/DL (ref 0.3–1.2)
BUN SERPL-MCNC: 23 MG/DL (ref 8–23)
BUN/CREAT SERPL: 4 (ref 9–20)
CALCIUM SERPL-MCNC: 8.6 MG/DL (ref 8.6–10.4)
CHLORIDE SERPL-SCNC: 98 MMOL/L (ref 98–107)
CO2 SERPL-SCNC: 22 MMOL/L (ref 20–31)
CREAT SERPL-MCNC: 6.1 MG/DL (ref 0.7–1.2)
EOSINOPHIL # BLD: <0.03 K/UL (ref 0–0.44)
EOSINOPHILS RELATIVE PERCENT: 0 % (ref 1–4)
ERYTHROCYTE [DISTWIDTH] IN BLOOD BY AUTOMATED COUNT: 15.2 % (ref 11.8–14.4)
FLUAV RNA RESP QL NAA+PROBE: NOT DETECTED
FLUBV RNA RESP QL NAA+PROBE: NOT DETECTED
GFR SERPL CREATININE-BSD FRML MDRD: 9 ML/MIN/1.73M2
GLUCOSE BLD-MCNC: 118 MG/DL (ref 75–110)
GLUCOSE SERPL-MCNC: 50 MG/DL (ref 70–99)
HCT VFR BLD AUTO: 26.6 % (ref 40.7–50.3)
HGB BLD-MCNC: 9.1 G/DL (ref 13–17)
IMM GRANULOCYTES # BLD AUTO: 0.03 K/UL (ref 0–0.3)
IMM GRANULOCYTES NFR BLD: 1 %
LYMPHOCYTES NFR BLD: 1.78 K/UL (ref 1.1–3.7)
LYMPHOCYTES RELATIVE PERCENT: 29 % (ref 24–43)
MCH RBC QN AUTO: 31.2 PG (ref 25.2–33.5)
MCHC RBC AUTO-ENTMCNC: 34.2 G/DL (ref 28.4–34.8)
MCV RBC AUTO: 91.1 FL (ref 82.6–102.9)
MONOCYTES NFR BLD: 0.7 K/UL (ref 0.1–1.2)
MONOCYTES NFR BLD: 11 % (ref 3–12)
NEUTROPHILS NFR BLD: 58 % (ref 36–65)
NEUTS SEG NFR BLD: 3.64 K/UL (ref 1.5–8.1)
NRBC BLD-RTO: 0 PER 100 WBC
PLATELET # BLD AUTO: 129 K/UL (ref 138–453)
PMV BLD AUTO: 9.8 FL (ref 8.1–13.5)
POTASSIUM SERPL-SCNC: 3.7 MMOL/L (ref 3.7–5.3)
PROT SERPL-MCNC: 7.3 G/DL (ref 6.4–8.3)
RBC # BLD AUTO: 2.92 M/UL (ref 4.21–5.77)
RBC # BLD: ABNORMAL 10*6/UL
SARS-COV-2 RNA RESP QL NAA+PROBE: NOT DETECTED
SODIUM SERPL-SCNC: 135 MMOL/L (ref 135–144)
SOURCE: NORMAL
SPECIMEN DESCRIPTION: NORMAL
WBC OTHER # BLD: 6.2 K/UL (ref 3.5–11.3)

## 2024-04-27 PROCEDURE — 85025 COMPLETE CBC W/AUTO DIFF WBC: CPT

## 2024-04-27 PROCEDURE — 80053 COMPREHEN METABOLIC PANEL: CPT

## 2024-04-27 PROCEDURE — 71045 X-RAY EXAM CHEST 1 VIEW: CPT

## 2024-04-27 PROCEDURE — 87040 BLOOD CULTURE FOR BACTERIA: CPT

## 2024-04-27 PROCEDURE — 2500000003 HC RX 250 WO HCPCS: Performed by: EMERGENCY MEDICINE

## 2024-04-27 PROCEDURE — 82947 ASSAY GLUCOSE BLOOD QUANT: CPT

## 2024-04-27 PROCEDURE — 99284 EMERGENCY DEPT VISIT MOD MDM: CPT

## 2024-04-27 PROCEDURE — 87636 SARSCOV2 & INF A&B AMP PRB: CPT

## 2024-04-27 RX ORDER — DEXTROSE MONOHYDRATE 25 G/50ML
25 INJECTION, SOLUTION INTRAVENOUS ONCE
Status: COMPLETED | OUTPATIENT
Start: 2024-04-27 | End: 2024-04-27

## 2024-04-27 RX ADMIN — DEXTROSE MONOHYDRATE 25 G: 25 INJECTION, SOLUTION INTRAVENOUS at 10:50

## 2024-04-27 NOTE — DISCHARGE INSTRUCTIONS
Use Tylenol as needed for fevers.  Do your best to eat and drink some fluids.  If you have any concerning symptoms or feel much worse come back to the ER.  Go to a make-up dialysis session as soon as possible.

## 2024-04-28 LAB
MICROORGANISM SPEC CULT: NORMAL
MICROORGANISM SPEC CULT: NORMAL
SERVICE CMNT-IMP: NORMAL
SERVICE CMNT-IMP: NORMAL
SPECIMEN DESCRIPTION: NORMAL
SPECIMEN DESCRIPTION: NORMAL

## 2024-04-28 NOTE — ED PROVIDER NOTES
19   Ht 1.778 m (5' 10\")   Wt 102.1 kg (225 lb)   SpO2 95%   BMI 32.28 kg/m²    Physical Exam  Constitutional:       General: He is not in acute distress.     Appearance: Normal appearance. He is normal weight. He is not ill-appearing.   HENT:      Head: Normocephalic and atraumatic.      Nose: Nose normal. No rhinorrhea.      Mouth/Throat:      Mouth: Mucous membranes are moist.      Pharynx: No oropharyngeal exudate or posterior oropharyngeal erythema.   Eyes:      Extraocular Movements: Extraocular movements intact.      Conjunctiva/sclera: Conjunctivae normal.      Pupils: Pupils are equal, round, and reactive to light.   Cardiovascular:      Rate and Rhythm: Normal rate and regular rhythm.      Pulses: Normal pulses.      Heart sounds: Normal heart sounds. No murmur heard.  Pulmonary:      Effort: Pulmonary effort is normal. No respiratory distress.      Breath sounds: Normal breath sounds. No wheezing or rhonchi.   Abdominal:      General: Bowel sounds are normal.      Palpations: Abdomen is soft. There is no mass.      Tenderness: There is no abdominal tenderness. There is no right CVA tenderness, left CVA tenderness, guarding or rebound.   Musculoskeletal:         General: Normal range of motion.      Cervical back: No rigidity.   Skin:     General: Skin is warm and dry.      Capillary Refill: Capillary refill takes less than 2 seconds.      Comments: Right chest dialysis access port clean/dry/intact, no surrounding skin breakdown or drainage or erythema   Neurological:      General: No focal deficit present.      Mental Status: He is alert and oriented to person, place, and time.   Psychiatric:         Mood and Affect: Mood normal.         Thought Content: Thought content normal.         MEDICAL DECISION MAKING/ED Course:     - CBC, CMP, blood cultures  - CXR  - covid and flu swab  - amp D50, pt given food in the ED    68-year-old male was sent to the ED from dialysis for fatigue and fever.  On

## 2024-06-25 ENCOUNTER — OFFICE VISIT (OUTPATIENT)
Dept: PODIATRY | Age: 69
End: 2024-06-25
Payer: MEDICARE

## 2024-06-25 VITALS — WEIGHT: 225 LBS | HEIGHT: 70 IN | BODY MASS INDEX: 32.21 KG/M2

## 2024-06-25 DIAGNOSIS — M79.671 PAIN IN BOTH FEET: ICD-10-CM

## 2024-06-25 DIAGNOSIS — E11.51 TYPE II DIABETES MELLITUS WITH PERIPHERAL CIRCULATORY DISORDER (HCC): Primary | ICD-10-CM

## 2024-06-25 DIAGNOSIS — M79.672 PAIN IN BOTH FEET: ICD-10-CM

## 2024-06-25 DIAGNOSIS — B35.1 DERMATOPHYTOSIS OF NAIL: ICD-10-CM

## 2024-06-25 DIAGNOSIS — M66.361: ICD-10-CM

## 2024-06-25 PROCEDURE — 99214 OFFICE O/P EST MOD 30 MIN: CPT | Performed by: PODIATRIST

## 2024-06-25 PROCEDURE — G8417 CALC BMI ABV UP PARAM F/U: HCPCS | Performed by: PODIATRIST

## 2024-06-25 PROCEDURE — G8427 DOCREV CUR MEDS BY ELIG CLIN: HCPCS | Performed by: PODIATRIST

## 2024-06-25 PROCEDURE — 3046F HEMOGLOBIN A1C LEVEL >9.0%: CPT | Performed by: PODIATRIST

## 2024-06-25 PROCEDURE — 2022F DILAT RTA XM EVC RTNOPTHY: CPT | Performed by: PODIATRIST

## 2024-06-25 PROCEDURE — 1123F ACP DISCUSS/DSCN MKR DOCD: CPT | Performed by: PODIATRIST

## 2024-06-25 PROCEDURE — 11721 DEBRIDE NAIL 6 OR MORE: CPT | Performed by: PODIATRIST

## 2024-06-25 PROCEDURE — 4004F PT TOBACCO SCREEN RCVD TLK: CPT | Performed by: PODIATRIST

## 2024-06-25 PROCEDURE — 3017F COLORECTAL CA SCREEN DOC REV: CPT | Performed by: PODIATRIST

## 2024-06-25 NOTE — PROGRESS NOTES
Left        Visual inspection:  Deformity: hammertoe deformity tyrone feet  amputation: absent  Skin lesions: absent  Edema: right- 2+ pitting edema, left- 2+ pitting edema    Sensory exam:  Monofilament sensation: abnormal - 6/10 via SW 5.07/10g monofilament to the plantar foot bilateral feet    Pulses: abnormal - 1/4 dorsalis pedis pulse and 0/4 Posterior tibial pulse,   Pinprick: Impaired  Proprioception: Impaired  Vibration (128 Hz): Impaired       DM with PVD       [x]Yes    []No    Xrays right ankle 3 views;  no kagers triangle.  No fracture or dislocation seen   possible achilles tendon rupture   Assessment:  68 y.o. male with:   Diagnosis Orders   1. Type II diabetes mellitus with peripheral circulatory disorder (HCC)  HM DIABETES FOOT EXAM    49565 - VA DEBRIDEMENT OF NAILS, 6 OR MORE      2. Pain in both feet  HM DIABETES FOOT EXAM    77231 - VA DEBRIDEMENT OF NAILS, 6 OR MORE      3. Dermatophytosis of nail  HM DIABETES FOOT EXAM    17220 - VA DEBRIDEMENT OF NAILS, 6 OR MORE    US EXTREMITY JOINT RIGHT NON VASC COMPLETE      4. Degenerative rupture of right Achilles tendon  XR ANKLE RIGHT (MIN 3 VIEWS)    US EXTREMITY JOINT RIGHT NON VASC COMPLETE              Q7   []Yes    []No                Q8   [x]Yes    []No                     Q9   []Yes    []No    Plan:   Pt was evaluated and examined. Patient was given personalized discharge instructions.     X rays right ankle 3 views show the above findings.  I do not see his kagers triangle.  He has metal in his foot so an MRI cannot happen right now      Will order an ultrasound to see if there is a del.      Even if torn I dont think he will need surgery as he is in his CROW boot at all times        Nails 1-10 were debrided sharply in length and thickness with a nipper and , without incident. Pt will follow up in 2 weeks or sooner if any problems arise. Diagnosis was discussed with the pt and all of their questions were answered in detail. Proper

## 2024-07-02 ENCOUNTER — TELEPHONE (OUTPATIENT)
Dept: PODIATRY | Age: 69
End: 2024-07-02

## 2024-07-02 ENCOUNTER — HOSPITAL ENCOUNTER (OUTPATIENT)
Dept: ULTRASOUND IMAGING | Age: 69
Discharge: HOME | End: 2024-07-04
Attending: PODIATRIST
Payer: MEDICARE

## 2024-07-02 DIAGNOSIS — B35.1 DERMATOPHYTOSIS OF NAIL: ICD-10-CM

## 2024-07-02 DIAGNOSIS — M66.361: ICD-10-CM

## 2024-07-02 PROCEDURE — 76881 US COMPL JOINT R-T W/IMG: CPT

## 2024-07-19 ENCOUNTER — HOSPITAL ENCOUNTER (EMERGENCY)
Age: 69
Discharge: HOME | End: 2024-07-19
Attending: EMERGENCY MEDICINE
Payer: MEDICARE

## 2024-07-19 ENCOUNTER — APPOINTMENT (OUTPATIENT)
Dept: CT IMAGING | Age: 69
End: 2024-07-19
Payer: MEDICARE

## 2024-07-19 VITALS
BODY MASS INDEX: 32.58 KG/M2 | SYSTOLIC BLOOD PRESSURE: 143 MMHG | OXYGEN SATURATION: 92 % | TEMPERATURE: 98 F | HEART RATE: 73 BPM | WEIGHT: 227.07 LBS | DIASTOLIC BLOOD PRESSURE: 60 MMHG | RESPIRATION RATE: 17 BRPM

## 2024-07-19 DIAGNOSIS — G56.91 NEUROPATHY OF HAND, RIGHT: ICD-10-CM
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
## 2024-07-19 DIAGNOSIS — S40.011A CONTUSION OF MULTIPLE SITES OF RIGHT SHOULDER, INITIAL ENCOUNTER: ICD-10-CM

## 2024-07-19 LAB
ANION GAP SERPL CALCULATED.3IONS-SCNC: 17 MMOL/L (ref 9–17)
BASOPHILS # BLD: <0.03 K/UL (ref 0–0.2)
BASOPHILS NFR BLD: 0 % (ref 0–2)
BUN SERPL-MCNC: 27 MG/DL (ref 8–23)
BUN/CREAT SERPL: 5 (ref 9–20)
CALCIUM SERPL-MCNC: 8.6 MG/DL (ref 8.6–10.4)
CHLORIDE SERPL-SCNC: 95 MMOL/L (ref 98–107)
CO2 SERPL-SCNC: 21 MMOL/L (ref 20–31)
CREAT SERPL-MCNC: 5.8 MG/DL (ref 0.7–1.2)
EOSINOPHIL # BLD: <0.03 K/UL (ref 0–0.44)
EOSINOPHILS RELATIVE PERCENT: 0 % (ref 1–4)
ERYTHROCYTE [DISTWIDTH] IN BLOOD BY AUTOMATED COUNT: 15.3 % (ref 11.8–14.4)
ETHANOL PERCENT: <0.01 %
ETHANOLAMINE SERPL-MCNC: <10 MG/DL (ref 0–0.08)
GFR, ESTIMATED: 10 ML/MIN/1.73M2
GLUCOSE SERPL-MCNC: 89 MG/DL (ref 70–99)
HCT VFR BLD AUTO: 32.6 % (ref 40.7–50.3)
HGB BLD-MCNC: 11.1 G/DL (ref 13–17)
IMM GRANULOCYTES # BLD AUTO: 0.02 K/UL (ref 0–0.3)
IMM GRANULOCYTES NFR BLD: 0 %
LYMPHOCYTES NFR BLD: 1.43 K/UL (ref 1.1–3.7)
LYMPHOCYTES RELATIVE PERCENT: 22 % (ref 24–43)
MCH RBC QN AUTO: 32 PG (ref 25.2–33.5)
MCHC RBC AUTO-ENTMCNC: 34 G/DL (ref 28–38)
MCV RBC AUTO: 93.9 FL (ref 82.6–102.9)
MONOCYTES NFR BLD: 0.45 K/UL (ref 0.1–1.2)
MONOCYTES NFR BLD: 7 % (ref 3–12)
NEUTROPHILS NFR BLD: 70 % (ref 36–65)
NEUTS SEG NFR BLD: 4.47 K/UL (ref 1.5–8.1)
PLATELET # BLD AUTO: 153 K/UL (ref 138–453)
PMV BLD AUTO: 8.9 FL (ref 8.1–13.5)
POTASSIUM SERPL-SCNC: 4.5 MMOL/L (ref 3.7–5.3)
RBC # BLD AUTO: 3.47 M/UL (ref 4.21–5.77)
RBC # BLD: ABNORMAL 10*6/UL
SODIUM SERPL-SCNC: 133 MMOL/L (ref 135–144)
WBC OTHER # BLD: 6.4 K/UL (ref 3.5–11.3)

## 2024-07-19 PROCEDURE — 99285 EMERGENCY DEPT VISIT HI MDM: CPT

## 2024-07-19 PROCEDURE — 71260 CT THORAX DX C+: CPT

## 2024-07-19 PROCEDURE — 96374 THER/PROPH/DIAG INJ IV PUSH: CPT

## 2024-07-19 PROCEDURE — 2580000003 HC RX 258: Performed by: EMERGENCY MEDICINE

## 2024-07-19 PROCEDURE — G0480 DRUG TEST DEF 1-7 CLASSES: HCPCS

## 2024-07-19 PROCEDURE — 72125 CT NECK SPINE W/O DYE: CPT

## 2024-07-19 PROCEDURE — 6360000002 HC RX W HCPCS: Performed by: EMERGENCY MEDICINE

## 2024-07-19 PROCEDURE — 6360000004 HC RX CONTRAST MEDICATION: Performed by: EMERGENCY MEDICINE

## 2024-07-19 PROCEDURE — 70450 CT HEAD/BRAIN W/O DYE: CPT

## 2024-07-19 PROCEDURE — 80048 BASIC METABOLIC PNL TOTAL CA: CPT

## 2024-07-19 PROCEDURE — 85025 COMPLETE CBC W/AUTO DIFF WBC: CPT

## 2024-07-19 RX ORDER — SODIUM CHLORIDE 0.9 % (FLUSH) 0.9 %
10 SYRINGE (ML) INJECTION ONCE
Status: COMPLETED | OUTPATIENT
Start: 2024-07-19 | End: 2024-07-19

## 2024-07-19 RX ORDER — 0.9 % SODIUM CHLORIDE 0.9 %
80 INTRAVENOUS SOLUTION INTRAVENOUS ONCE
Status: COMPLETED | OUTPATIENT
Start: 2024-07-19 | End: 2024-07-19

## 2024-07-19 RX ORDER — FENTANYL CITRATE 0.05 MG/ML
50 INJECTION, SOLUTION INTRAMUSCULAR; INTRAVENOUS ONCE
Status: COMPLETED | OUTPATIENT
Start: 2024-07-19 | End: 2024-07-19

## 2024-07-19 RX ADMIN — FENTANYL CITRATE 50 MCG: 0.05 INJECTION, SOLUTION INTRAMUSCULAR; INTRAVENOUS at 15:34

## 2024-07-19 RX ADMIN — SODIUM CHLORIDE 80 ML: 9 INJECTION, SOLUTION INTRAVENOUS at 15:01

## 2024-07-19 RX ADMIN — IOPAMIDOL 75 ML: 755 INJECTION, SOLUTION INTRAVENOUS at 15:00

## 2024-07-19 RX ADMIN — SODIUM CHLORIDE, PRESERVATIVE FREE 10 ML: 5 INJECTION INTRAVENOUS at 15:00

## 2024-07-19 ASSESSMENT — PAIN SCALES - GENERAL
PAINLEVEL_OUTOF10: 10
PAINLEVEL_OUTOF10: 10

## 2024-07-19 ASSESSMENT — PAIN - FUNCTIONAL ASSESSMENT: PAIN_FUNCTIONAL_ASSESSMENT: 0-10

## 2024-07-19 ASSESSMENT — PAIN DESCRIPTION - FREQUENCY: FREQUENCY: CONTINUOUS

## 2024-07-19 ASSESSMENT — PAIN DESCRIPTION - LOCATION: LOCATION: HEAD;NECK;SHOULDER

## 2024-07-19 ASSESSMENT — PAIN DESCRIPTION - DESCRIPTORS: DESCRIPTORS: SHARP

## 2024-07-19 NOTE — ED NOTES
Pt returns from CT. Pt keeping eyes closed with slight moan when repositioning. Scale 10/10 generalized.

## 2024-07-19 NOTE — ED PROVIDER NOTES
foot     redness and swelling    Pneumonia 2009    Right foot infection     Right foot pain     Tobacco abuse 04/24/2018    Type II or unspecified type diabetes mellitus without mention of complication, not stated as uncontrolled     Vertigo     Well controlled type 2 diabetes mellitus with neurological manifestations ([facility]ly [facility]) 08/04/2018    Wound dehiscence     Wound, open     Left Ball of  foot, pt. stepped on sharp object. Covered by dressing.    Wound, open     Right posterior -Diabetic Ulcer     Past Problem List  Patient Active Problem List   Diagnosis Code    Essential hypertension I10    Type II or unspecified type diabetes mellitus without mention of complication, not stated as uncontrolled E11.9    Neuropathy G62.9    Vertigo R42    Charcot foot due to diabetes mellitus ([facility]) E11.610    Hyperlipidemia E78.5    Cellulitis L03.90    Cellulitis of left foot L03.116    Right foot infection L08.9    Diabetic polyneuropathy associated with type 2 diabetes mellitus ([facility]) E11.42    Foreign body (FB) in soft tissue M79.5    Cellulitis of left leg L03.116    Abscess of right foot L02.611    Chest pain at rest R07.9    Tobacco abuse Z72.0    Type 2 diabetes mellitus treated with insulin ([facility]) E11.9, Z79.4    Bandemia D72.825    Chronic multifocal osteomyelitis of right foot ([facility]ly [facility]) M86.371    Diabetic infection of right foot ([facility]ly [facility]) E11.628, L08.9    Acquired hammer toe deformity of lesser toe of right foot M20.41    Post-op pain G89.18    Right foot pain M79.671    Hallux hammertoe, right M20.31    Osteomyelitis ([facility]) M86.9    Wound dehiscence T81.30XA    Diabetic foot ([facility]ly [facility]) E11.8    Hyperglycemia due to diabetes mellitus ([facility]) E11.65    Foot ulcer ([facility]) L97.509    Cellulitis of right foot L03.115    Type 2 diabetes mellitus with right diabetic foot ulcer ([facility]) E11.621, L97.519    Charcot's joint of right foot M14.671    Stage 3b chronic kidney disease ([facility]) N18.32    Diabetic foot infection ([facility]) with VRE E11.628, L08.9     distension.   MSK:      Left shoulder: Generalized tenderness with FROM.       Right shoulder: No tenderness, however, limited ROM with decreased right  strength.  Skin:     General: Skin is dry.   Neurological:      Mental Status: Patient is alert. Fluent speech however, misplacing certain words. Motor: 5/5 LUE, LE b/l, 4/5 RUE. Senory 5/5 UE/LE.     MEDICAL DECISION MAKING / ED COURSE:     1)  Number and Complexity of Problems  Problem List This Visit: Fall from porch    Differential Diagnosis: Contusions    Diagnoses Considered but Do Not Suspect: Intracranial hemorrhage, cervical spine fracture, right shoulder fracture    Pertinent Comorbid Conditions: ESRD on HD, Charcot foot due to diabetes, acquired hammertoe deformity right foot, hypertension, hyperlipidemia, neuropathy, type 2 diabetes.    2)  Data Reviewed  Patient's EKG independently interpreted by me: N/A    Decision Rules/Scores utilized:  N/A    HEART SCORE: N/A, no chest pain.    NIH STROKE SCALE: N/A, no focal neurodeficits.     External Documents Reviewed: I have independently reviewed patient's previous medical records including labs, notes and imaging.    Tests considered but not ordered and why:  N/A    Imaging that is independently reviewed and interpreted by me as: N/A    See more data below for the lab and radiology tests and orders.    3)  Treatment and Disposition    Patient repeat assessment: Patient is stable, more comfortable after analgesia.  Labs showing sodium 133, potassium 4.5, creatinine 5.8 within patient's normal range, glucose 89, ethanol level negative, WBC 6.4, hemoglobin 11.1.  CT head obtained demonstrating no acute pathology.  CT C-spine obtained, demonstrating out acute pathology.  CT chest abdomen pelvis obtained, demonstrate no acute traumatic injury, does show persistent left adrenal nodule compatible with benign adrenal adenoma no follow-up recommended.  Patient is also experiencing weakness in right hand, likely

## 2024-07-19 NOTE — ED NOTES
Pt states he fell while walking today from his porch around 4 feet. Pt eports head, neck and back pain, Pt does have bruising to right cheek and small skin abrasion on right elbow.

## 2024-07-27 ENCOUNTER — APPOINTMENT (OUTPATIENT)
Dept: CT IMAGING | Age: 69
End: 2024-07-27
Payer: COMMERCIAL

## 2024-07-27 ENCOUNTER — HOSPITAL ENCOUNTER (EMERGENCY)
Age: 69
Discharge: HOME | End: 2024-07-27
Attending: EMERGENCY MEDICINE
Payer: COMMERCIAL

## 2024-07-27 VITALS
HEART RATE: 82 BPM | HEIGHT: 71 IN | OXYGEN SATURATION: 94 % | WEIGHT: 217 LBS | SYSTOLIC BLOOD PRESSURE: 150 MMHG | TEMPERATURE: 98.9 F | DIASTOLIC BLOOD PRESSURE: 63 MMHG | BODY MASS INDEX: 30.38 KG/M2 | RESPIRATION RATE: 18 BRPM
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
## 2024-07-27 DIAGNOSIS — S16.1XXA STRAIN OF NECK MUSCLE, INITIAL ENCOUNTER: ICD-10-CM

## 2024-07-27 PROCEDURE — 6360000002 HC RX W HCPCS: Performed by: EMERGENCY MEDICINE

## 2024-07-27 PROCEDURE — 72125 CT NECK SPINE W/O DYE: CPT

## 2024-07-27 PROCEDURE — 70450 CT HEAD/BRAIN W/O DYE: CPT

## 2024-07-27 PROCEDURE — 96372 THER/PROPH/DIAG INJ SC/IM: CPT

## 2024-07-27 PROCEDURE — 6370000000 HC RX 637 (ALT 250 FOR IP): Performed by: EMERGENCY MEDICINE

## 2024-07-27 PROCEDURE — 99284 EMERGENCY DEPT VISIT MOD MDM: CPT

## 2024-07-27 RX ORDER — KETOROLAC TROMETHAMINE 30 MG/ML
30 INJECTION, SOLUTION INTRAMUSCULAR; INTRAVENOUS ONCE
Status: COMPLETED | OUTPATIENT
Start: 2024-07-27 | End: 2024-07-27

## 2024-07-27 RX ORDER — IBUPROFEN 600 MG/1
600 TABLET ORAL 3 TIMES DAILY PRN
Qty: 30 TABLET | Refills: 0 | Status: SHIPPED | OUTPATIENT
Start: 2024-07-27

## 2024-07-27 RX ORDER — CYCLOBENZAPRINE HCL 10 MG
10 TABLET ORAL 3 TIMES DAILY PRN
Qty: 21 TABLET | Refills: 0 | Status: SHIPPED | OUTPATIENT
Start: 2024-07-27 | End: 2024-08-06

## 2024-07-27 RX ORDER — CYCLOBENZAPRINE HCL 10 MG
10 TABLET ORAL ONCE
Status: COMPLETED | OUTPATIENT
Start: 2024-07-27 | End: 2024-07-27

## 2024-07-27 RX ADMIN — KETOROLAC TROMETHAMINE 30 MG: 30 INJECTION, SOLUTION INTRAMUSCULAR at 12:41

## 2024-07-27 RX ADMIN — CYCLOBENZAPRINE 10 MG: 10 TABLET, FILM COATED ORAL at 12:40

## 2024-07-27 ASSESSMENT — ENCOUNTER SYMPTOMS
NAUSEA: 0
DIARRHEA: 0
COUGH: 0
SHORTNESS OF BREATH: 0
PHOTOPHOBIA: 0
COLOR CHANGE: 0
ABDOMINAL PAIN: 0
TROUBLE SWALLOWING: 0
VOMITING: 0
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
## 2024-08-26 ENCOUNTER — HOSPITAL ENCOUNTER (OUTPATIENT)
Age: 69
Discharge: HOME | End: 2024-08-28

## 2024-08-26 ENCOUNTER — HOSPITAL ENCOUNTER (OUTPATIENT)
Dept: GENERAL RADIOLOGY | Age: 69
Discharge: HOME | End: 2024-08-28
Payer: COMMERCIAL

## 2024-08-26 DIAGNOSIS — M25.511 CHRONIC RIGHT SHOULDER PAIN: ICD-10-CM

## 2024-08-26 DIAGNOSIS — G89.29 CHRONIC RIGHT SHOULDER PAIN: ICD-10-CM

## 2024-08-26 PROCEDURE — 73030 X-RAY EXAM OF SHOULDER: CPT

## 2024-09-05 ENCOUNTER — OFFICE VISIT (OUTPATIENT)
Dept: PODIATRY | Age: 69
End: 2024-09-05

## 2024-09-05 VITALS — BODY MASS INDEX: 31.48 KG/M2 | WEIGHT: 224.87 LBS | HEIGHT: 71 IN

## 2024-09-05 DIAGNOSIS — B35.1 DERMATOPHYTOSIS OF NAIL: ICD-10-CM

## 2024-09-05 DIAGNOSIS — E11.51 TYPE II DIABETES MELLITUS WITH PERIPHERAL CIRCULATORY DISORDER (HCC): Primary | ICD-10-CM

## 2024-09-05 DIAGNOSIS — M79.671 PAIN IN BOTH FEET: ICD-10-CM

## 2024-09-05 DIAGNOSIS — M14.671 CHARCOT'S JOINT OF RIGHT FOOT: ICD-10-CM

## 2024-09-05 DIAGNOSIS — M79.672 PAIN IN BOTH FEET: ICD-10-CM

## 2024-09-05 RX ORDER — BUMETANIDE 2 MG/1
TABLET ORAL
COMMUNITY
Start: 2024-08-30

## 2024-10-01 NOTE — PLAN OF CARE
Improved with A1c of 6.2% today.  Goal less than 7%.  Continue metformin  mg daily.  Recommend yearly eye exam and daily foot exam.  On statin for CV prevention.  Continue.  Obtain microalbumin today.  Continue ACEi.    Problem: Pain  Goal: Verbalizes/displays adequate comfort level or baseline comfort level  Outcome: Progressing  Flowsheets (Taken 10/15/2022 1232)  Verbalizes/displays adequate comfort level or baseline comfort level:   Encourage patient to monitor pain and request assistance   Assess pain using appropriate pain scale   Administer analgesics based on type and severity of pain and evaluate response   Implement non-pharmacological measures as appropriate and evaluate response   Consider cultural and social influences on pain and pain management

## 2024-11-07 ENCOUNTER — OFFICE VISIT (OUTPATIENT)
Dept: PODIATRY | Age: 69
End: 2024-11-07
Payer: MEDICARE

## 2024-11-07 VITALS — HEIGHT: 71 IN | WEIGHT: 224 LBS | BODY MASS INDEX: 31.36 KG/M2

## 2024-11-07 DIAGNOSIS — B35.1 DERMATOPHYTOSIS OF NAIL: ICD-10-CM

## 2024-11-07 DIAGNOSIS — M79.672 PAIN IN BOTH FEET: ICD-10-CM

## 2024-11-07 DIAGNOSIS — E11.51 TYPE II DIABETES MELLITUS WITH PERIPHERAL CIRCULATORY DISORDER (HCC): Primary | ICD-10-CM

## 2024-11-07 DIAGNOSIS — M79.671 PAIN IN BOTH FEET: ICD-10-CM

## 2024-11-07 DIAGNOSIS — M25.471 EDEMA OF RIGHT ANKLE: ICD-10-CM

## 2024-11-07 PROCEDURE — 2022F DILAT RTA XM EVC RTNOPTHY: CPT | Performed by: PODIATRIST

## 2024-11-07 PROCEDURE — 1125F AMNT PAIN NOTED PAIN PRSNT: CPT | Performed by: PODIATRIST

## 2024-11-07 PROCEDURE — 1159F MED LIST DOCD IN RCRD: CPT | Performed by: PODIATRIST

## 2024-11-07 PROCEDURE — 3017F COLORECTAL CA SCREEN DOC REV: CPT | Performed by: PODIATRIST

## 2024-11-07 PROCEDURE — G8417 CALC BMI ABV UP PARAM F/U: HCPCS | Performed by: PODIATRIST

## 2024-11-07 PROCEDURE — 3046F HEMOGLOBIN A1C LEVEL >9.0%: CPT | Performed by: PODIATRIST

## 2024-11-07 PROCEDURE — G8427 DOCREV CUR MEDS BY ELIG CLIN: HCPCS | Performed by: PODIATRIST

## 2024-11-07 PROCEDURE — 11721 DEBRIDE NAIL 6 OR MORE: CPT | Performed by: PODIATRIST

## 2024-11-07 PROCEDURE — 99214 OFFICE O/P EST MOD 30 MIN: CPT | Performed by: PODIATRIST

## 2024-11-07 PROCEDURE — G8484 FLU IMMUNIZE NO ADMIN: HCPCS | Performed by: PODIATRIST

## 2024-11-07 PROCEDURE — 4004F PT TOBACCO SCREEN RCVD TLK: CPT | Performed by: PODIATRIST

## 2024-11-07 PROCEDURE — 1123F ACP DISCUSS/DSCN MKR DOCD: CPT | Performed by: PODIATRIST

## 2024-11-07 NOTE — PROGRESS NOTES
subtalar and hindfoot      1st MPJ ROM decreased, Bilateral.  Muscle strength 5/5, Bilateral.  Pain present upon palpation of toenails 1-5, Bilateral. decreased medial longitudinal arch, Bilateral.  Ankle ROM decreased,Bilateral.    Dorsally contracted digits present digits 2, Bilateral.     Vascular: DP pulses 1/4 bilateral.  PT pulses 0/4 bilateral.   CFT <5 seconds, Bilateral.  Hair growth absent to the level of the digits, Bilateral.  Edema present, Bilateral.  Varicosities absent, Bilateral. Erythema absent, Bilateral    Neurological: Sensation diminshed to light touch to level of digits, Bilateral.  Protective sensation intact 6/10 sites via 5.07/10g Grayling-Jorge Monofilament, Bilateral.  negative Tinel's, Bilateral.  negative Valleix sign, Bilateral.      Integument: Warm, dry, supple, Bilateral.  Open lesion absent, Bilateral.  Interdigital maceration absent to web spaces 4, Bilateral.  Nails 1-5 left and 1-5 right thickened > 3.0 mm, dystrophic and crumbly, discolored with subungual debris.  Fissures absent, Bilateral.   General: AAO x 3 in NAD.    Derm  Toenail Description  Sites of Onychomycosis Involvement (Check affected area)  [x] [x] [x] [x] [x] [x] [x] [x] [x] [x]  5 4 3 2 1 1 2 3 4 5                          Right                                        Left    Thickness  [x] [x] [x] [x] [x] [x] [x] [x] [x] [x]  5 4 3 2 1 1 2 3 4 5                         Right                                        Left    Dystrophic Changes   [x] [x] [x] [x] [x] [x] [x] [x] [x] [x]  5 4 3 2 1 1 2 3 4 5                         Right                                        Left    Color   [x] [x] [x] [x] [x] [x] [x] [x] [x] [x]  5 4 3 2 1 1 2 3 4 5                          Right                                        Left    Incurvation/Ingrowin   [] [] [] [] [] [] [] [] [] []  5 4 3 2 1 1 2 3 4 5                         Right                                        Left    Inflammation/Pain

## 2025-02-04 ENCOUNTER — HOSPITAL ENCOUNTER (EMERGENCY)
Age: 70
Discharge: HOME | End: 2025-02-04
Payer: MEDICARE

## 2025-02-04 ENCOUNTER — APPOINTMENT (OUTPATIENT)
Dept: CT IMAGING | Age: 70
End: 2025-02-04
Payer: MEDICARE

## 2025-02-04 VITALS
SYSTOLIC BLOOD PRESSURE: 105 MMHG | WEIGHT: 229.28 LBS | OXYGEN SATURATION: 93 % | BODY MASS INDEX: 31.98 KG/M2 | HEART RATE: 81 BPM | RESPIRATION RATE: 17 BRPM | TEMPERATURE: 99 F | DIASTOLIC BLOOD PRESSURE: 59 MMHG

## 2025-02-04 DIAGNOSIS — R20.2 PARESTHESIAS: Primary | ICD-10-CM

## 2025-02-04 DIAGNOSIS — R07.9 CHEST PAIN, UNSPECIFIED TYPE: ICD-10-CM

## 2025-02-04 DIAGNOSIS — M54.50 ACUTE LOW BACK PAIN, UNSPECIFIED BACK PAIN LATERALITY, UNSPECIFIED WHETHER SCIATICA PRESENT: ICD-10-CM

## 2025-02-04 LAB
ALBUMIN SERPL-MCNC: 4 G/DL (ref 3.5–5.2)
ALBUMIN/GLOB SERPL: 1.2 {RATIO} (ref 1–2.5)
ALP SERPL-CCNC: 132 U/L (ref 40–129)
ALT SERPL-CCNC: 12 U/L (ref 10–50)
ANION GAP SERPL CALCULATED.3IONS-SCNC: 13 MMOL/L (ref 9–16)
AST SERPL-CCNC: 16 U/L (ref 10–50)
BASOPHILS # BLD: 0.04 K/UL (ref 0–0.2)
BASOPHILS NFR BLD: 1 % (ref 0–2)
BILIRUB DIRECT SERPL-MCNC: 0.1 MG/DL (ref 0–0.2)
BILIRUB INDIRECT SERPL-MCNC: 0.1 MG/DL
BILIRUB SERPL-MCNC: 0.2 MG/DL (ref 0–1.2)
BUN SERPL-MCNC: 16 MG/DL (ref 8–23)
CALCIUM SERPL-MCNC: 8.9 MG/DL (ref 8.8–10.2)
CHLORIDE SERPL-SCNC: 101 MMOL/L (ref 98–107)
CO2 SERPL-SCNC: 24 MMOL/L (ref 20–31)
CREAT SERPL-MCNC: 3.9 MG/DL (ref 0.7–1.2)
EKG ATRIAL RATE: 85 BPM
EKG P AXIS: 30 DEGREES
EKG P-R INTERVAL: 202 MS
EKG Q-T INTERVAL: 358 MS
EKG QRS DURATION: 78 MS
EKG QTC CALCULATION (BAZETT): 426 MS
EKG R AXIS: 98 DEGREES
EKG T AXIS: 41 DEGREES
EKG VENTRICULAR RATE: 85 BPM
EOSINOPHIL # BLD: 0.13 K/UL (ref 0–0.44)
EOSINOPHILS RELATIVE PERCENT: 2 % (ref 1–4)
ERYTHROCYTE [DISTWIDTH] IN BLOOD BY AUTOMATED COUNT: 14.7 % (ref 11.8–14.4)
GFR, ESTIMATED: 16 ML/MIN/1.73M2
GLUCOSE SERPL-MCNC: 178 MG/DL (ref 82–115)
HCT VFR BLD AUTO: 29.5 % (ref 40.7–50.3)
HGB BLD-MCNC: 10.3 G/DL (ref 13–17)
IMM GRANULOCYTES # BLD AUTO: 0.04 K/UL (ref 0–0.3)
IMM GRANULOCYTES NFR BLD: 1 %
LIPASE SERPL-CCNC: 79 U/L (ref 13–60)
LYMPHOCYTES NFR BLD: 1.67 K/UL (ref 1.1–3.7)
LYMPHOCYTES RELATIVE PERCENT: 21 % (ref 24–43)
MAGNESIUM SERPL-MCNC: 2.1 MG/DL (ref 1.6–2.4)
MCH RBC QN AUTO: 33.7 PG (ref 25.2–33.5)
MCHC RBC AUTO-ENTMCNC: 34.9 G/DL (ref 28.4–34.8)
MCV RBC AUTO: 96.4 FL (ref 82.6–102.9)
MONOCYTES NFR BLD: 0.49 K/UL (ref 0.1–1.2)
MONOCYTES NFR BLD: 6 % (ref 3–12)
NEUTROPHILS NFR BLD: 69 % (ref 36–65)
NEUTS SEG NFR BLD: 5.43 K/UL (ref 1.5–8.1)
NRBC BLD-RTO: 0 PER 100 WBC
PLATELET # BLD AUTO: 176 K/UL (ref 138–453)
PMV BLD AUTO: 9.3 FL (ref 8.1–13.5)
POTASSIUM SERPL-SCNC: 4.1 MMOL/L (ref 3.7–5.3)
PROT SERPL-MCNC: 7.4 G/DL (ref 6.6–8.7)
RBC # BLD AUTO: 3.06 M/UL (ref 4.21–5.77)
RBC # BLD: ABNORMAL 10*6/UL
SODIUM SERPL-SCNC: 138 MMOL/L (ref 136–145)
TROPONIN I SERPL HS-MCNC: 51 NG/L (ref 0–22)
TROPONIN I SERPL HS-MCNC: 56 NG/L (ref 0–22)
WBC OTHER # BLD: 7.8 K/UL (ref 3.5–11.3)

## 2025-02-04 PROCEDURE — 6360000004 HC RX CONTRAST MEDICATION

## 2025-02-04 PROCEDURE — 84484 ASSAY OF TROPONIN QUANT: CPT

## 2025-02-04 PROCEDURE — 6370000000 HC RX 637 (ALT 250 FOR IP)

## 2025-02-04 PROCEDURE — 83735 ASSAY OF MAGNESIUM: CPT

## 2025-02-04 PROCEDURE — 2500000003 HC RX 250 WO HCPCS

## 2025-02-04 PROCEDURE — 85025 COMPLETE CBC W/AUTO DIFF WBC: CPT

## 2025-02-04 PROCEDURE — 80048 BASIC METABOLIC PNL TOTAL CA: CPT

## 2025-02-04 PROCEDURE — 80076 HEPATIC FUNCTION PANEL: CPT

## 2025-02-04 PROCEDURE — 71275 CT ANGIOGRAPHY CHEST: CPT

## 2025-02-04 PROCEDURE — 93005 ELECTROCARDIOGRAM TRACING: CPT

## 2025-02-04 PROCEDURE — 2580000003 HC RX 258

## 2025-02-04 PROCEDURE — 83690 ASSAY OF LIPASE: CPT

## 2025-02-04 PROCEDURE — 99285 EMERGENCY DEPT VISIT HI MDM: CPT

## 2025-02-04 RX ORDER — SODIUM CHLORIDE 0.9 % (FLUSH) 0.9 %
10 SYRINGE (ML) INJECTION PRN
Status: DISCONTINUED | OUTPATIENT
Start: 2025-02-04 | End: 2025-02-04 | Stop reason: HOSPADM

## 2025-02-04 RX ORDER — DIPHENHYDRAMINE HYDROCHLORIDE 50 MG/ML
25 INJECTION INTRAMUSCULAR; INTRAVENOUS ONCE
Status: DISCONTINUED | OUTPATIENT
Start: 2025-02-04 | End: 2025-02-04

## 2025-02-04 RX ORDER — 0.9 % SODIUM CHLORIDE 0.9 %
80 INTRAVENOUS SOLUTION INTRAVENOUS ONCE
Status: COMPLETED | OUTPATIENT
Start: 2025-02-04 | End: 2025-02-04

## 2025-02-04 RX ORDER — HYDROCODONE BITARTRATE AND ACETAMINOPHEN 5; 325 MG/1; MG/1
1 TABLET ORAL ONCE
Status: COMPLETED | OUTPATIENT
Start: 2025-02-04 | End: 2025-02-04

## 2025-02-04 RX ORDER — IOPAMIDOL 755 MG/ML
100 INJECTION, SOLUTION INTRAVASCULAR
Status: COMPLETED | OUTPATIENT
Start: 2025-02-04 | End: 2025-02-04

## 2025-02-04 RX ADMIN — IOPAMIDOL 100 ML: 755 INJECTION, SOLUTION INTRAVENOUS at 11:48

## 2025-02-04 RX ADMIN — SODIUM CHLORIDE 80 ML: 9 INJECTION, SOLUTION INTRAVENOUS at 11:49

## 2025-02-04 RX ADMIN — SODIUM CHLORIDE, PRESERVATIVE FREE 10 ML: 5 INJECTION INTRAVENOUS at 11:49

## 2025-02-04 RX ADMIN — HYDROCODONE BITARTRATE AND ACETAMINOPHEN 1 TABLET: 5; 325 TABLET ORAL at 13:04

## 2025-02-04 ASSESSMENT — ENCOUNTER SYMPTOMS
CHEST TIGHTNESS: 0
COLOR CHANGE: 0
WHEEZING: 0
SHORTNESS OF BREATH: 0
VOMITING: 0
NAUSEA: 0
ABDOMINAL PAIN: 0
BACK PAIN: 1

## 2025-02-04 ASSESSMENT — LIFESTYLE VARIABLES
HOW OFTEN DO YOU HAVE A DRINK CONTAINING ALCOHOL: NEVER
HOW MANY STANDARD DRINKS CONTAINING ALCOHOL DO YOU HAVE ON A TYPICAL DAY: PATIENT DOES NOT DRINK

## 2025-02-04 ASSESSMENT — PAIN SCALES - GENERAL: PAINLEVEL_OUTOF10: 9

## 2025-02-04 ASSESSMENT — PAIN - FUNCTIONAL ASSESSMENT
PAIN_FUNCTIONAL_ASSESSMENT: NONE - DENIES PAIN
PAIN_FUNCTIONAL_ASSESSMENT: 0-10

## 2025-02-04 NOTE — ED PROVIDER NOTES
[facility] EMERGENCY DEPARTMENT  Emergency Department Encounter  Emergency Medicine Attending     Pt Name:Samuel Mix  MRN: [id number]  Birthdate 1955  Date of evaluation: 2/4/25  PCP:  Alex Pickard MD  Note Started: 10:43 AM EST      CHIEF COMPLAINT       Chief Complaint   Patient presents with    Chest Pain     Started 0430 today, arms numb     Back Pain    Shortness of Breath       HISTORY OF PRESENT ILLNESS  (Location/Symptom, Timing/Onset, Context/Setting, Quality, Duration, Modifying Factors, Severity.)      69-year-old male with history of ESRD, diabetes, tobacco abuse presents for evaluation of chest pain and back pain.  States his chest pain has been present and intermittent since around 430 today and both of his arms are numb.  His pain radiates into his back.  He did go to dialysis today and finished dialysis but his pain continued.  He states that he feels numbness and weak in his arms.  Patient denies any history of back pain.  Denies any sciatic symptoms.  Denies any fevers.  Denies any falls or injuries or trauma.  No history of CAD.  No stents.            PAST MEDICAL / SURGICAL / SOCIAL / FAMILY HISTORY      has a past medical history of Abscess of right foot, Acquired hammer toe deformity of lesser toe of right foot, ALEJANDRO (acute kidney injury) ([facility]), Anemia, Cellulitis, Cellulitis of left foot, Cellulitis of right foot, Charcot foot due to diabetes mellitus (HCC), Chest pain at rest, Chronic multifocal osteomyelitis of right foot, CKD (chronic kidney disease), Diabetic polyneuropathy associated with type 2 diabetes mellitus (HCC), Essential hypertension, Fractures, multiple, Hyperlipidemia, Hypertension, Leukocytosis, MRSA (methicillin resistant staph aureus) culture positive, Neuropathy, Pain in right foot, Pneumonia, Right foot infection, Right foot pain, Tobacco abuse, Type II or unspecified type diabetes mellitus without mention of complication, not stated as uncontrolled,

## 2025-02-06 ENCOUNTER — OFFICE VISIT (OUTPATIENT)
Dept: PODIATRY | Age: 70
End: 2025-02-06
Payer: MEDICARE

## 2025-02-06 VITALS — HEIGHT: 71 IN | BODY MASS INDEX: 30.8 KG/M2 | WEIGHT: 220 LBS

## 2025-02-06 DIAGNOSIS — I73.9 PVD (PERIPHERAL VASCULAR DISEASE) (HCC): ICD-10-CM

## 2025-02-06 DIAGNOSIS — M14.671 CHARCOT'S JOINT OF RIGHT FOOT: ICD-10-CM

## 2025-02-06 DIAGNOSIS — E11.51 TYPE II DIABETES MELLITUS WITH PERIPHERAL CIRCULATORY DISORDER (HCC): Primary | ICD-10-CM

## 2025-02-06 DIAGNOSIS — M25.471 EDEMA OF RIGHT ANKLE: ICD-10-CM

## 2025-02-06 DIAGNOSIS — B35.1 DERMATOPHYTOSIS OF NAIL: ICD-10-CM

## 2025-02-06 DIAGNOSIS — M79.671 RIGHT FOOT PAIN: ICD-10-CM

## 2025-02-06 DIAGNOSIS — N18.6 ESRD NEEDING DIALYSIS (HCC): ICD-10-CM

## 2025-02-06 DIAGNOSIS — M79.671 PAIN IN BOTH FEET: ICD-10-CM

## 2025-02-06 DIAGNOSIS — M79.672 PAIN IN BOTH FEET: ICD-10-CM

## 2025-02-06 DIAGNOSIS — M66.361: ICD-10-CM

## 2025-02-06 DIAGNOSIS — Z99.2 ESRD NEEDING DIALYSIS (HCC): ICD-10-CM

## 2025-02-06 DIAGNOSIS — M86.071 ACUTE HEMATOGENOUS OSTEOMYELITIS OF RIGHT FOOT: ICD-10-CM

## 2025-02-06 DIAGNOSIS — E11.42 DIABETIC POLYNEUROPATHY ASSOCIATED WITH TYPE 2 DIABETES MELLITUS (HCC): ICD-10-CM

## 2025-02-06 PROCEDURE — G8417 CALC BMI ABV UP PARAM F/U: HCPCS | Performed by: PODIATRIST

## 2025-02-06 PROCEDURE — G8427 DOCREV CUR MEDS BY ELIG CLIN: HCPCS | Performed by: PODIATRIST

## 2025-02-06 PROCEDURE — 1159F MED LIST DOCD IN RCRD: CPT | Performed by: PODIATRIST

## 2025-02-06 PROCEDURE — 1123F ACP DISCUSS/DSCN MKR DOCD: CPT | Performed by: PODIATRIST

## 2025-02-06 PROCEDURE — 99213 OFFICE O/P EST LOW 20 MIN: CPT | Performed by: PODIATRIST

## 2025-02-06 PROCEDURE — 4004F PT TOBACCO SCREEN RCVD TLK: CPT | Performed by: PODIATRIST

## 2025-02-06 PROCEDURE — 3017F COLORECTAL CA SCREEN DOC REV: CPT | Performed by: PODIATRIST

## 2025-02-06 PROCEDURE — 2022F DILAT RTA XM EVC RTNOPTHY: CPT | Performed by: PODIATRIST

## 2025-02-06 PROCEDURE — 11721 DEBRIDE NAIL 6 OR MORE: CPT | Performed by: PODIATRIST

## 2025-02-06 PROCEDURE — 3046F HEMOGLOBIN A1C LEVEL >9.0%: CPT | Performed by: PODIATRIST

## 2025-02-06 PROCEDURE — 1126F AMNT PAIN NOTED NONE PRSNT: CPT | Performed by: PODIATRIST

## 2025-02-06 NOTE — PROGRESS NOTES
[facility] PODIATRY [address]  [address]  [place] [address]  Dept: [phone]  Dept Fax: [phone]    DIABETIC PROGRESS NOTE  Date of patient's visit: 2/6/2025  Patient's Name:  Samuel Mix YOB: 1955            Patient Care Team:  Alex Pickard MD as PCP - General (Family Medicine)  Joyce John MD as Consulting Physician (Infectious Diseases)  Cristina Conde MD as Consulting Physician (Infectious Diseases)  Fabian Sanchez DPM as Physician (Podiatry)          Chief Complaint   Patient presents with    Diabetes    Peripheral Neuropathy     Bilateral foot       Subjective:   Samuel Mix comes to clinic for Diabetes and Peripheral Neuropathy (Bilateral foot)    he is a diabetic and states that needs toenails trimmed today.  Pt currently has complaint of thickened, elongated nails that they cannot manage by themselves.   Pt's primary care physician is Alex Pickard MD last seen 1/9/25.   Pt's last blood sugar was patient states did not check blood sugar today.142    Pt has a new complaint of worsening diabetic neuropathy to the bilateral feet.  Patient states that he has been using his Crow boot to his Charcot side and has not had any issues with reulceration.      Patient continues to have grinding from his metal in his foot but states he is not causing any edema and does not grind while he is wearing his boot     Lab Results   Component Value Date    LABA1C 6.9 (H) 10/05/2022      Complains of numbness in the feet bilat.  Past Medical History:   Diagnosis Date    Abscess of right foot 08/04/2018    Acquired hammer toe deformity of lesser toe of right foot     ALEJANDRO (acute kidney injury) (HCC) 08/05/2018    Anemia     Cellulitis 04/24/2018    Cellulitis of left foot 04/24/2018    Cellulitis of right foot     and abscess    Charcot foot due to diabetes mellitus (HCC) 2014    Right foot     Chest

## 2025-02-24 ENCOUNTER — TRANSCRIBE ORDERS (OUTPATIENT)
Dept: ADMINISTRATIVE | Age: 70
End: 2025-02-24

## 2025-02-24 DIAGNOSIS — G56.93 NEUROPATHY OF BOTH UPPER EXTREMITIES: Primary | ICD-10-CM

## 2025-03-20 NOTE — ED PROVIDER NOTES
[address] [place] [address] ED  eMERGENCY dEPARTMENTeNCOUnter      Pt Name: Alyssa Galaviz  MRN: [id number]  [place] 1955  Date ofevaluation: 12/3/2021  Provider: Pardeep Mena Dr       Chief Complaint   Patient presents with    Abdominal Pain     RUQ abdominal pain x1 week. Pt reports hx of appendicitis, peritonitis          HISTORY OF PRESENT ILLNESS  (Location/Symptom, Timing/Onset, Context/Setting, Quality, Duration, Modifying Factors, Severity.)   Alyssa Galaviz is a 73 y.o. male who presents to the emergency department with ruq abdominal pain x nearly 2  weeks. Worse with eating. .  Reports nause and vomiting. 3 episodes of vmiting over the last couple of days. No diarrhea or constipation. No urinary symptoms      Nursing Notes were reviewed. ALLERGIES     Morphine, Other, Percocet [oxycodone-acetaminophen], and Dye [iodides]    CURRENT MEDICATIONS       Previous Medications    ALBUTEROL SULFATE  (90 BASE) MCG/ACT INHALER    Inhale 2 puffs into the lungs every 6 hours as needed     AMLODIPINE (NORVASC) 5 MG TABLET    TAKE 1 TABLET BY MOUTH EVERY DAY    ASPIRIN 81 MG TABLET    Take 81 mg by mouth daily    CEFEPIME (MAXIPIME) 2 G INJECTION        CETIRIZINE (ZYRTEC) 10 MG TABLET    Take 10 mg by mouth nightly     GABAPENTIN (NEURONTIN) 300 MG CAPSULE    Take 900 mg by mouth 3 times daily. Take 3 caps (=900mg) 3 times a day    GLIPIZIDE (GLUCOTROL) 10 MG TABLET    Take 20 mg by mouth 2 times daily (before meals) Takes 2 tabs (=20mg) BID    JANUVIA 100 MG TABLET    Take 100 mg by mouth daily     LANTUS SOLOSTAR 100 UNIT/ML INJECTION PEN    Inject 15 Units into the skin nightly    LISINOPRIL (PRINIVIL;ZESTRIL) 10 MG TABLET    Take 10 mg by mouth daily    MELOXICAM (MOBIC) 15 MG TABLET    Take 15 mg by mouth nightly     MISC.  DEVICES MISC    1 PAIR OF DIABETIC SHOES (1 LEFT/ 1 RIGHT)  1-3 PAIRS OF INSERTS (LEFT/ RIGHT)    MUPIROCIN (BACTROBAN) 2 % OINTMENT        SILVER SULFADIAZINE (SILVADENE) 1 % CREAM    Apply topically daily. SIMVASTATIN (ZOCOR) 40 MG TABLET    Take 40 mg by mouth nightly        PAST MEDICAL HISTORY         Diagnosis Date    Abscess of right foot 8/4/2018    Acquired hammer toe deformity of lesser toe of right foot     ALEJANDRO (acute kidney injury) ([address] 8/5/2018    Cellulitis 4/24/2018    Cellulitis of left foot 4/24/2018    Cellulitis of right foot     and abscess    Charcot foot due to diabetes mellitus ([address] 2014    Right foot     Chest pain at rest 8/4/2018    Chronic multifocal osteomyelitis of right foot ([address]     Diabetic polyneuropathy associated with type 2 diabetes mellitus ([address]     Essential hypertension     Fractures, multiple 07/21/2014    Right foot fractures     Hyperlipidemia     Hypertension     Leukocytosis     Neuropathy     diabetic with charcot affecting the right foot    Pain in right foot     redness and swelling    Pneumonia 2009    Right foot infection     Right foot pain     Tobacco abuse 4/24/2018    Type II or unspecified type diabetes mellitus without mention of complication, not stated as uncontrolled     Vertigo     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 stepped on sharp object. Covered by dressing.     Wound, open     Right posterior -Diabetic Ulcer       SURGICAL HISTORY           Procedure Laterality Date    APPENDECTOMY      at age 24    ARTHROPLASTY Right 12/11/2020    RIGHT HALLUX EXOSTECTOMY AND 3RD DIGIT EXTENSIOR TENOTOMY performed by Chloe Bird DPM at [facility] Left 04/24/2018    I&D foreign body removal    FOOT DEBRIDEMENT Right 3/20/2020    RIGHT  FOOT   INCISION AND DRAINAGE WITH BONE BIOPSY performed by Chloe Bird DPM at [facility] Right 6/8/2021    FOOT DEBRIDEMENT INCISION AND DRAINAGE performed by Bhargav Saul DPM at [facility] Right 2021    RIGHT FOOT  INCISION AND DRAINAGE   WITH PULSE LAVAGE performed by Bhargav Saul DPM at [facility] Right 2021    RIGHT FOOT FLAP CLOSURE performed by Bhargav Saul DPM at [facility] ARTHROSCOPY Left     KNEE SURGERY Bilateral     arthroscopy    NECK SURGERY      HI DEEP [address] FOOT INFEC,1 BURSA Left 2018    LEFT FOOT MULTIPLE  INCISIONS  AND DRAINAGE AND REMOVAL FOREIGN BODY  IRENE performed by Bhargav Saul DPM at [place] [address]           Problem Relation Age of Onset    Diabetes Mother     Cancer Father     Hypertension Maternal Grandmother      Family Status   Relation Name Status    Mother  Alive    Father      MGM  (Not Specified)        SOCIAL HISTORY      reports that he has been smoking. He has been smoking about 1.00 pack per day. He has never used smokeless tobacco. He reports previous drug use. He reports that he does not drink alcohol. REVIEW OFSYSTEMS    (2-9 systems for level 4, 10 or more for level 5)   Review of Systems    Except as noted above the remainder of the review of systems was reviewed and negative. PHYSICAL EXAM    (up to 7 for level 4, 8 or more for level 5)     ED Triage Vitals [21 1210]   BP Temp Temp src Pulse Resp SpO2 Height Weight   (!) 140/61 97.9 °F (36.6 °C) -- 81 20 98 % 5' 11\" (1.803 m) 241 lb (109.3 kg)      Physical Exam  Constitutional:       Appearance: He is well-developed. HENT:      Head: Normocephalic and atraumatic. Cardiovascular:      Rate and Rhythm: Normal rate and regular rhythm. Pulmonary:      Effort: Pulmonary effort is normal.      Breath sounds: Normal breath sounds. Abdominal:      Palpations: Abdomen is soft. Tenderness: There is abdominal tenderness in the right upper quadrant. Musculoskeletal:         General: Normal range of motion. Cervical back: Normal range of motion and neck supple. Skin:     General: Skin is warm. Neurological:      Mental Status: He is alert and oriented to person, place, and time. Psychiatric:         Behavior: Behavior normal.                 DIAGNOSTIC RESULTS     EKG: All EKG's are interpreted by the Emergency Department Physician who either signs or Co-signs this chart in the absence of a cardiologist.        RADIOLOGY:   Non-plain film images such as CT, Ultrasound and MRI are read by the radiologist. Plain radiographic images arevisualized and preliminarily interpreted by the emergency physician with the below findings:        Interpretation per the Radiologist below, if available at thetime of this note:          ED BEDSIDE ULTRASOUND:   Performed by ED Physician - none    LABS:  Labs Reviewed   CBC WITH AUTO DIFFERENTIAL - Abnormal; Notable for the following components:       Result Value    RBC 3.74 (*)     Hemoglobin 11.2 (*)     Hematocrit 32.2 (*)     All other components within normal limits   COMPREHENSIVE METABOLIC PANEL - Abnormal; Notable for the following components:    Glucose 232 (*)     CREATININE 1.42 (*)     Sodium 127 (*)     Chloride 94 (*)     GFR Non- 50 (*)     All other components within normal limits   LIPASE - Abnormal; Notable for the following components:    Lipase 403 (*)     All other components within normal limits       All other labs were within normal range or not returned as of this dictation. EMERGENCY DEPARTMENT COURSE and DIFFERENTIAL DIAGNOSIS/MDM:   Vitals:    Vitals:    12/03/21 1210   BP: (!) 140/61   Pulse: 81   Resp: 20   Temp: 97.9 °F (36.6 °C)   SpO2: 98%   Weight: 241 lb (109.3 kg)   Height: 5' 11\" (1.803 m)     6:05 PM EST  Signed out to dr Bertin Hampton with imaging pending. CONSULTS:  None    PROCEDURES:  Procedures        FINAL IMPRESSION      1.  Abdominal pain, epigastric          DISPOSITION/PLAN   DISPOSITION        PATIENTREFERRED TO: No follow-up provider specified.     DISCHARGE MEDICATIONS:     New Prescriptions    No medications on file           (Please note that portions of this note were completed with a voice recognition program.  Efforts were made to edit thedictations but occasionally words are mis-transcribed.)    YOEL Lipscomb PA-C  12/03/21 7106 Yes

## 2025-04-24 ENCOUNTER — OFFICE VISIT (OUTPATIENT)
Dept: PODIATRY | Age: 70
End: 2025-04-24
Payer: MEDICARE

## 2025-04-24 VITALS — WEIGHT: 220.46 LBS | HEIGHT: 70 IN | BODY MASS INDEX: 31.56 KG/M2

## 2025-04-24 DIAGNOSIS — M14.671 CHARCOT'S JOINT OF RIGHT FOOT: ICD-10-CM

## 2025-04-24 DIAGNOSIS — Z99.2 ESRD NEEDING DIALYSIS (HCC): ICD-10-CM

## 2025-04-24 DIAGNOSIS — M79.671 RIGHT FOOT PAIN: ICD-10-CM

## 2025-04-24 DIAGNOSIS — M66.361: ICD-10-CM

## 2025-04-24 DIAGNOSIS — M79.672 PAIN IN BOTH FEET: ICD-10-CM

## 2025-04-24 DIAGNOSIS — M79.671 PAIN IN BOTH FEET: ICD-10-CM

## 2025-04-24 DIAGNOSIS — N18.6 ESRD NEEDING DIALYSIS (HCC): ICD-10-CM

## 2025-04-24 DIAGNOSIS — M86.071 ACUTE HEMATOGENOUS OSTEOMYELITIS OF RIGHT FOOT (HCC): ICD-10-CM

## 2025-04-24 DIAGNOSIS — B35.1 DERMATOPHYTOSIS OF NAIL: ICD-10-CM

## 2025-04-24 DIAGNOSIS — I73.9 PVD (PERIPHERAL VASCULAR DISEASE): ICD-10-CM

## 2025-04-24 DIAGNOSIS — M25.471 EDEMA OF RIGHT ANKLE: ICD-10-CM

## 2025-04-24 DIAGNOSIS — E11.42 DIABETIC POLYNEUROPATHY ASSOCIATED WITH TYPE 2 DIABETES MELLITUS (HCC): ICD-10-CM

## 2025-04-24 DIAGNOSIS — E11.51 TYPE II DIABETES MELLITUS WITH PERIPHERAL CIRCULATORY DISORDER (HCC): Primary | ICD-10-CM

## 2025-04-24 PROCEDURE — 11721 DEBRIDE NAIL 6 OR MORE: CPT | Performed by: PODIATRIST

## 2025-04-24 NOTE — PROGRESS NOTES
[facility] PODIATRY [address]  [address]  [facility] [address]  Dept: [phone]  Dept Fax: [phone]    DIABETIC PROGRESS NOTE  Date of patient's visit: 4/24/2025  Patient's Name:  Samuel Mix YOB: 1955            Patient Care Team:  Alex Pickard MD as PCP - General (Family Medicine)  Joyce John MD as Consulting Physician (Infectious Diseases)  Cristina Conde MD as Consulting Physician (Infectious Diseases)  Fabian Sanchez DPM as Physician (Podiatry)          Chief Complaint   Patient presents with    Diabetes    Peripheral Neuropathy     Bilateral foot       Subjective:   Samuel Mix comes to clinic for Diabetes and Peripheral Neuropathy (Bilateral foot)    he is a diabetic and states that needs toenails trimmed today.  Pt currently has complaint of thickened, elongated nails that they cannot manage by themselves.   Pt's primary care physician is Alex Pickard MD last seen 2/10/25.   Pt's last blood sugar was patient states does not check blood sugars daily.    Pt has a new complaint of none.  Patient has been wearing his Crow boot all the time except when he is in his house.         Lab Results   Component Value Date    LABA1C 6.9 (H) 10/05/2022      Complains of numbness in the feet bilat.  Past Medical History:   Diagnosis Date    Abscess of right foot 08/04/2018    Acquired hammer toe deformity of lesser toe of right foot     ALEJANDRO (acute kidney injury) 08/05/2018    Anemia     Cellulitis 04/24/2018    Cellulitis of left foot 04/24/2018    Cellulitis of right foot     and abscess    Charcot foot due to diabetes mellitus (HCC) 2014    Right foot     Chest pain at rest 08/04/2018    Chronic multifocal osteomyelitis of right foot (HCC)     CKD (chronic kidney disease)     Diabetic polyneuropathy associated with type 2 diabetes mellitus (HCC)     Essential hypertension     Fractures,

## 2025-05-23 ENCOUNTER — APPOINTMENT (OUTPATIENT)
Dept: CT IMAGING | Age: 70
DRG: 640 | End: 2025-05-23
Payer: MEDICARE

## 2025-05-23 ENCOUNTER — APPOINTMENT (OUTPATIENT)
Dept: GENERAL RADIOLOGY | Age: 70
DRG: 640 | End: 2025-05-23
Payer: MEDICARE

## 2025-05-23 ENCOUNTER — HOSPITAL ENCOUNTER (INPATIENT)
Age: 70
LOS: 2 days | Discharge: HOME | DRG: 640 | End: 2025-05-25
Attending: EMERGENCY MEDICINE | Admitting: INTERNAL MEDICINE
Payer: MEDICARE

## 2025-05-23 DIAGNOSIS — I50.9 CONGESTIVE HEART FAILURE, UNSPECIFIED HF CHRONICITY, UNSPECIFIED HEART FAILURE TYPE (HCC): Primary | ICD-10-CM

## 2025-05-23 DIAGNOSIS — R06.00 DYSPNEA, UNSPECIFIED TYPE: ICD-10-CM

## 2025-05-23 DIAGNOSIS — R09.02 HYPOXIA: ICD-10-CM

## 2025-05-23 DIAGNOSIS — R06.02 SHORTNESS OF BREATH: ICD-10-CM

## 2025-05-23 PROBLEM — J81.0 PULMONARY EDEMA, ACUTE (HCC): Status: ACTIVE | Noted: 2025-05-23

## 2025-05-23 LAB
ANION GAP SERPL CALCULATED.3IONS-SCNC: 16 MMOL/L (ref 9–16)
BASOPHILS # BLD: 0.04 K/UL (ref 0–0.2)
BASOPHILS NFR BLD: 0 % (ref 0–2)
BNP SERPL-MCNC: ABNORMAL PG/ML (ref 0–125)
BUN SERPL-MCNC: 16 MG/DL (ref 8–23)
CALCIUM SERPL-MCNC: 9 MG/DL (ref 8.8–10.2)
CHLORIDE SERPL-SCNC: 102 MMOL/L (ref 98–107)
CO2 SERPL-SCNC: 21 MMOL/L (ref 20–31)
CREAT SERPL-MCNC: 4.7 MG/DL (ref 0.7–1.2)
EOSINOPHIL # BLD: 0.1 K/UL (ref 0–0.44)
EOSINOPHILS RELATIVE PERCENT: 1 % (ref 1–4)
ERYTHROCYTE [DISTWIDTH] IN BLOOD BY AUTOMATED COUNT: 14.4 % (ref 11.8–14.4)
FLUAV RNA RESP QL NAA+PROBE: NOT DETECTED
FLUBV RNA RESP QL NAA+PROBE: NOT DETECTED
GFR, ESTIMATED: 13 ML/MIN/1.73M2
GLUCOSE BLD-MCNC: 116 MG/DL (ref 75–110)
GLUCOSE SERPL-MCNC: 47 MG/DL (ref 82–115)
HCT VFR BLD AUTO: 28.8 % (ref 40.7–50.3)
HGB BLD-MCNC: 9.6 G/DL (ref 13–17)
IMM GRANULOCYTES # BLD AUTO: 0.06 K/UL (ref 0–0.3)
IMM GRANULOCYTES NFR BLD: 1 %
LYMPHOCYTES NFR BLD: 1.7 K/UL (ref 1.1–3.7)
LYMPHOCYTES RELATIVE PERCENT: 15 % (ref 24–43)
MCH RBC QN AUTO: 31.4 PG (ref 25.2–33.5)
MCHC RBC AUTO-ENTMCNC: 33.3 G/DL (ref 28.4–34.8)
MCV RBC AUTO: 94.1 FL (ref 82.6–102.9)
MONOCYTES NFR BLD: 0.68 K/UL (ref 0.1–1.2)
MONOCYTES NFR BLD: 6 % (ref 3–12)
MYOGLOBIN SERPL-MCNC: 149 NG/ML (ref 28–72)
NEUTROPHILS NFR BLD: 77 % (ref 36–65)
NEUTS SEG NFR BLD: 8.57 K/UL (ref 1.5–8.1)
NRBC BLD-RTO: 0 PER 100 WBC
PLATELET # BLD AUTO: 201 K/UL (ref 138–453)
PMV BLD AUTO: 8.6 FL (ref 8.1–13.5)
POTASSIUM SERPL-SCNC: 3.9 MMOL/L (ref 3.7–5.3)
RBC # BLD AUTO: 3.06 M/UL (ref 4.21–5.77)
SARS-COV-2 RNA RESP QL NAA+PROBE: NOT DETECTED
SODIUM SERPL-SCNC: 139 MMOL/L (ref 136–145)
SOURCE: NORMAL
SPECIMEN DESCRIPTION: NORMAL
TROPONIN I SERPL HS-MCNC: 78 NG/L (ref 0–22)
TROPONIN I SERPL HS-MCNC: 79 NG/L (ref 0–22)
WBC OTHER # BLD: 11.2 K/UL (ref 3.5–11.3)

## 2025-05-23 PROCEDURE — 6360000002 HC RX W HCPCS

## 2025-05-23 PROCEDURE — 85025 COMPLETE CBC W/AUTO DIFF WBC: CPT

## 2025-05-23 PROCEDURE — 99285 EMERGENCY DEPT VISIT HI MDM: CPT

## 2025-05-23 PROCEDURE — 80048 BASIC METABOLIC PNL TOTAL CA: CPT

## 2025-05-23 PROCEDURE — 83880 ASSAY OF NATRIURETIC PEPTIDE: CPT

## 2025-05-23 PROCEDURE — 99222 1ST HOSP IP/OBS MODERATE 55: CPT

## 2025-05-23 PROCEDURE — 6360000002 HC RX W HCPCS: Performed by: NURSE PRACTITIONER

## 2025-05-23 PROCEDURE — 87636 SARSCOV2 & INF A&B AMP PRB: CPT

## 2025-05-23 PROCEDURE — 71250 CT THORAX DX C-: CPT

## 2025-05-23 PROCEDURE — 96375 TX/PRO/DX INJ NEW DRUG ADDON: CPT

## 2025-05-23 PROCEDURE — 2500000003 HC RX 250 WO HCPCS: Performed by: NURSE PRACTITIONER

## 2025-05-23 PROCEDURE — 71045 X-RAY EXAM CHEST 1 VIEW: CPT

## 2025-05-23 PROCEDURE — 2060000000 HC ICU INTERMEDIATE R&B

## 2025-05-23 PROCEDURE — 6370000000 HC RX 637 (ALT 250 FOR IP): Performed by: NURSE PRACTITIONER

## 2025-05-23 PROCEDURE — 82947 ASSAY GLUCOSE BLOOD QUANT: CPT

## 2025-05-23 PROCEDURE — 83874 ASSAY OF MYOGLOBIN: CPT

## 2025-05-23 PROCEDURE — 84484 ASSAY OF TROPONIN QUANT: CPT

## 2025-05-23 PROCEDURE — 93005 ELECTROCARDIOGRAM TRACING: CPT | Performed by: NURSE PRACTITIONER

## 2025-05-23 PROCEDURE — 96374 THER/PROPH/DIAG INJ IV PUSH: CPT

## 2025-05-23 RX ORDER — SODIUM CHLORIDE 0.9 % (FLUSH) 0.9 %
5-40 SYRINGE (ML) INJECTION EVERY 12 HOURS SCHEDULED
Status: DISCONTINUED | OUTPATIENT
Start: 2025-05-23 | End: 2025-05-25 | Stop reason: HOSPADM

## 2025-05-23 RX ORDER — SEVELAMER CARBONATE 800 MG/1
1 TABLET, FILM COATED ORAL 2 TIMES DAILY WITH MEALS
COMMUNITY

## 2025-05-23 RX ORDER — ASPIRIN 81 MG/1
81 TABLET ORAL DAILY
Status: DISCONTINUED | OUTPATIENT
Start: 2025-05-24 | End: 2025-05-25 | Stop reason: HOSPADM

## 2025-05-23 RX ORDER — LEVOFLOXACIN 250 MG/1
250 TABLET, FILM COATED ORAL EVERY OTHER DAY
Status: DISCONTINUED | OUTPATIENT
Start: 2025-05-26 | End: 2025-05-25 | Stop reason: HOSPADM

## 2025-05-23 RX ORDER — ENOXAPARIN SODIUM 100 MG/ML
30 INJECTION SUBCUTANEOUS DAILY
Status: DISCONTINUED | OUTPATIENT
Start: 2025-05-24 | End: 2025-05-24

## 2025-05-23 RX ORDER — HYDROCODONE BITARTRATE AND ACETAMINOPHEN 10; 325 MG/1; MG/1
1 TABLET ORAL ONCE
Refills: 0 | Status: COMPLETED | OUTPATIENT
Start: 2025-05-23 | End: 2025-05-23

## 2025-05-23 RX ORDER — IPRATROPIUM BROMIDE AND ALBUTEROL SULFATE 2.5; .5 MG/3ML; MG/3ML
1 SOLUTION RESPIRATORY (INHALATION)
Status: DISCONTINUED | OUTPATIENT
Start: 2025-05-24 | End: 2025-05-24

## 2025-05-23 RX ORDER — LEVOFLOXACIN 500 MG/1
500 TABLET, FILM COATED ORAL DAILY
Status: DISCONTINUED | OUTPATIENT
Start: 2025-05-24 | End: 2025-05-23 | Stop reason: DRUGHIGH

## 2025-05-23 RX ORDER — HYDROXYZINE HYDROCHLORIDE 10 MG/1
10 TABLET, FILM COATED ORAL 3 TIMES DAILY PRN
Status: DISCONTINUED | OUTPATIENT
Start: 2025-05-23 | End: 2025-05-25 | Stop reason: HOSPADM

## 2025-05-23 RX ORDER — AMLODIPINE BESYLATE 5 MG/1
5 TABLET ORAL 2 TIMES DAILY
Status: DISCONTINUED | OUTPATIENT
Start: 2025-05-23 | End: 2025-05-25 | Stop reason: HOSPADM

## 2025-05-23 RX ORDER — SODIUM CHLORIDE 9 MG/ML
INJECTION, SOLUTION INTRAVENOUS PRN
Status: DISCONTINUED | OUTPATIENT
Start: 2025-05-23 | End: 2025-05-25 | Stop reason: HOSPADM

## 2025-05-23 RX ORDER — ONDANSETRON 2 MG/ML
4 INJECTION INTRAMUSCULAR; INTRAVENOUS EVERY 6 HOURS PRN
Status: DISCONTINUED | OUTPATIENT
Start: 2025-05-23 | End: 2025-05-25 | Stop reason: HOSPADM

## 2025-05-23 RX ORDER — KETOROLAC TROMETHAMINE 5 MG/ML
1 SOLUTION OPHTHALMIC 3 TIMES DAILY
COMMUNITY

## 2025-05-23 RX ORDER — INSULIN GLARGINE 100 [IU]/ML
7 INJECTION, SOLUTION SUBCUTANEOUS 2 TIMES DAILY
Status: DISCONTINUED | OUTPATIENT
Start: 2025-05-23 | End: 2025-05-25 | Stop reason: HOSPADM

## 2025-05-23 RX ORDER — SODIUM CHLORIDE 0.9 % (FLUSH) 0.9 %
5-40 SYRINGE (ML) INJECTION PRN
Status: DISCONTINUED | OUTPATIENT
Start: 2025-05-23 | End: 2025-05-25 | Stop reason: HOSPADM

## 2025-05-23 RX ORDER — GLIPIZIDE 10 MG/1
20 TABLET ORAL
Status: DISCONTINUED | OUTPATIENT
Start: 2025-05-24 | End: 2025-05-25 | Stop reason: HOSPADM

## 2025-05-23 RX ORDER — DEXTROSE MONOHYDRATE 100 MG/ML
INJECTION, SOLUTION INTRAVENOUS CONTINUOUS PRN
Status: DISCONTINUED | OUTPATIENT
Start: 2025-05-23 | End: 2025-05-25 | Stop reason: HOSPADM

## 2025-05-23 RX ORDER — BUMETANIDE 1 MG/1
2 TABLET ORAL DAILY
Status: DISCONTINUED | OUTPATIENT
Start: 2025-05-24 | End: 2025-05-24

## 2025-05-23 RX ORDER — ALBUTEROL SULFATE 0.83 MG/ML
SOLUTION RESPIRATORY (INHALATION)
Status: COMPLETED
Start: 2025-05-23 | End: 2025-05-23

## 2025-05-23 RX ORDER — ATORVASTATIN CALCIUM 20 MG/1
20 TABLET, FILM COATED ORAL DAILY
Status: DISCONTINUED | OUTPATIENT
Start: 2025-05-24 | End: 2025-05-24

## 2025-05-23 RX ORDER — ALBUTEROL SULFATE 0.83 MG/ML
2.5 SOLUTION RESPIRATORY (INHALATION) ONCE
Status: COMPLETED | OUTPATIENT
Start: 2025-05-23 | End: 2025-05-23

## 2025-05-23 RX ORDER — PREDNISONE 20 MG/1
40 TABLET ORAL DAILY
Status: DISCONTINUED | OUTPATIENT
Start: 2025-05-24 | End: 2025-05-25 | Stop reason: HOSPADM

## 2025-05-23 RX ORDER — GABAPENTIN 300 MG/1
300 CAPSULE ORAL 3 TIMES DAILY
Status: DISCONTINUED | OUTPATIENT
Start: 2025-05-23 | End: 2025-05-24

## 2025-05-23 RX ORDER — LOSARTAN POTASSIUM 50 MG/1
50 TABLET ORAL DAILY
Status: DISCONTINUED | OUTPATIENT
Start: 2025-05-24 | End: 2025-05-24

## 2025-05-23 RX ORDER — INSULIN LISPRO 100 [IU]/ML
0-8 INJECTION, SOLUTION INTRAVENOUS; SUBCUTANEOUS
Status: DISCONTINUED | OUTPATIENT
Start: 2025-05-23 | End: 2025-05-25 | Stop reason: HOSPADM

## 2025-05-23 RX ORDER — LEVOFLOXACIN 500 MG/1
500 TABLET, FILM COATED ORAL ONCE
Status: COMPLETED | OUTPATIENT
Start: 2025-05-24 | End: 2025-05-24

## 2025-05-23 RX ORDER — ALOGLIPTIN 6.25 MG/1
6.25 TABLET, FILM COATED ORAL DAILY
Status: DISCONTINUED | OUTPATIENT
Start: 2025-05-24 | End: 2025-05-25 | Stop reason: HOSPADM

## 2025-05-23 RX ORDER — FUROSEMIDE 10 MG/ML
40 INJECTION INTRAMUSCULAR; INTRAVENOUS ONCE
Status: COMPLETED | OUTPATIENT
Start: 2025-05-23 | End: 2025-05-23

## 2025-05-23 RX ORDER — METOPROLOL SUCCINATE 25 MG/1
25 TABLET, EXTENDED RELEASE ORAL DAILY
Status: DISCONTINUED | OUTPATIENT
Start: 2025-05-24 | End: 2025-05-24

## 2025-05-23 RX ORDER — ONDANSETRON 4 MG/1
4 TABLET, ORALLY DISINTEGRATING ORAL EVERY 8 HOURS PRN
Status: DISCONTINUED | OUTPATIENT
Start: 2025-05-23 | End: 2025-05-25 | Stop reason: HOSPADM

## 2025-05-23 RX ORDER — GUAIFENESIN/DEXTROMETHORPHAN 100-10MG/5
5 SYRUP ORAL EVERY 4 HOURS PRN
Status: DISCONTINUED | OUTPATIENT
Start: 2025-05-23 | End: 2025-05-24

## 2025-05-23 RX ORDER — DEXTROSE MONOHYDRATE 100 MG/ML
INJECTION, SOLUTION INTRAVENOUS CONTINUOUS PRN
Status: DISCONTINUED | OUTPATIENT
Start: 2025-05-23 | End: 2025-05-24 | Stop reason: SDUPTHER

## 2025-05-23 RX ORDER — POLYETHYLENE GLYCOL 3350 17 G/17G
17 POWDER, FOR SOLUTION ORAL DAILY PRN
Status: DISCONTINUED | OUTPATIENT
Start: 2025-05-23 | End: 2025-05-25 | Stop reason: HOSPADM

## 2025-05-23 RX ADMIN — FUROSEMIDE 40 MG: 10 INJECTION, SOLUTION INTRAMUSCULAR; INTRAVENOUS at 17:16

## 2025-05-23 RX ADMIN — HYDROCODONE BITARTRATE AND ACETAMINOPHEN 1 TABLET: 10; 325 TABLET ORAL at 21:54

## 2025-05-23 RX ADMIN — ALBUTEROL SULFATE 2.5 MG: 0.83 SOLUTION RESPIRATORY (INHALATION) at 15:47

## 2025-05-23 RX ADMIN — WATER 125 MG: 1 INJECTION INTRAMUSCULAR; INTRAVENOUS; SUBCUTANEOUS at 15:47

## 2025-05-23 RX ADMIN — ALBUTEROL SULFATE 2.5 MG: 2.5 SOLUTION RESPIRATORY (INHALATION) at 15:47

## 2025-05-23 ASSESSMENT — PAIN DESCRIPTION - LOCATION: LOCATION: FOOT

## 2025-05-23 ASSESSMENT — PAIN DESCRIPTION - ORIENTATION: ORIENTATION: RIGHT

## 2025-05-23 ASSESSMENT — PAIN SCALES - GENERAL: PAINLEVEL_OUTOF10: 9

## 2025-05-23 NOTE — ED PROVIDER NOTES
with the below findings:    Interpretation per the Radiologist below, if available at the time of this note:    CT CHEST WO CONTRAST  Result Date: 5/23/2025  EXAMINATION: CT OF THE CHEST WITHOUT CONTRAST 5/23/2025 5:42 pm TECHNIQUE: CT of the chest was performed without the administration of intravenous contrast. Multiplanar reformatted images are provided for review. Automated exposure control, iterative reconstruction, and/or weight based adjustment of the mA/kV was utilized to reduce the radiation dose to as low as reasonably achievable. COMPARISON: Chest radiograph 05/23/2025.  CT chest, abdomen, and pelvis angiogram 02/04/2025. HISTORY: ORDERING SYSTEM PROVIDED HISTORY: SOB TECHNOLOGIST PROVIDED HISTORY: SOB Decision Support Exception - unselect if not a suspected or confirmed emergency medical condition->Emergency Medical Condition (MA) Reason for Exam: SOB FINDINGS: Mediastinum: Evaluation is limited without intravenous contrast.  The thoracic aorta is normal in caliber with mild to moderate calcific plaquing. Coronary artery atherosclerotic vascular calcifications are also seen.  The main pulmonary artery is mildly enlarged measuring up to 3.3 cm in diameter. Mild cardiomegaly.  Small pericardial effusion.  The mediastinal esophagus and visualized aspects of the thyroid gland are unremarkable.  Enlarged right paratracheal node measuring 14 mm in short axis dimension on axial image 14 newly enlarged from 02/04/2025.  Mildly enlarged precarinal node measuring 12 mm on image 36 also newly enlarged.  No definite hilar lymphadenopathy. Newly enlarged subcarinal node measuring 14 mm on image 49.  No pneumomediastinum. Lungs/pleura: The central airways are patent.  Trace right pleural effusion. No left pleural effusion.  No pneumothorax.  Mild bilateral interstitial edema.  Mild bibasilar atelectasis right greater than left.  No consolidation.  Fine detail of the lungs is mildly obscured by motion artifact. Upper

## 2025-05-24 ENCOUNTER — APPOINTMENT (OUTPATIENT)
Age: 70
DRG: 640 | End: 2025-05-24
Payer: MEDICARE

## 2025-05-24 LAB
ANION GAP SERPL CALCULATED.3IONS-SCNC: 15 MMOL/L (ref 9–16)
BASOPHILS # BLD: <0.03 K/UL (ref 0–0.2)
BASOPHILS NFR BLD: 0 % (ref 0–2)
BUN SERPL-MCNC: 26 MG/DL (ref 8–23)
CALCIUM SERPL-MCNC: 8.9 MG/DL (ref 8.8–10.2)
CHLORIDE SERPL-SCNC: 99 MMOL/L (ref 98–107)
CO2 SERPL-SCNC: 19 MMOL/L (ref 20–31)
CREAT SERPL-MCNC: 5.5 MG/DL (ref 0.7–1.2)
ECHO AO ROOT DIAM: 3.4 CM
ECHO AO ROOT INDEX: 1.58 CM/M2
ECHO AV AREA PEAK VELOCITY: 2.7 CM2
ECHO AV AREA VTI: 2.8 CM2
ECHO AV AREA/BSA PEAK VELOCITY: 1.3 CM2/M2
ECHO AV AREA/BSA VTI: 1.3 CM2/M2
ECHO AV MEAN GRADIENT: 5 MMHG
ECHO AV MEAN VELOCITY: 1.1 M/S
ECHO AV PEAK GRADIENT: 9 MMHG
ECHO AV PEAK VELOCITY: 1.5 M/S
ECHO AV VELOCITY RATIO: 0.67
ECHO AV VTI: 33.9 CM
ECHO BSA: 2.2 M2
ECHO EST RA PRESSURE: 3 MMHG
ECHO LA AREA 2C: 24.7 CM2
ECHO LA AREA 4C: 25.9 CM2
ECHO LA DIAMETER INDEX: 2.09 CM/M2
ECHO LA DIAMETER: 4.5 CM
ECHO LA MAJOR AXIS: 6.2 CM
ECHO LA MINOR AXIS: 6 CM
ECHO LA TO AORTIC ROOT RATIO: 1.32
ECHO LA VOL BP: 86 ML (ref 18–58)
ECHO LA VOL MOD A2C: 82 ML (ref 18–58)
ECHO LA VOL MOD A4C: 86 ML (ref 18–58)
ECHO LA VOL/BSA BIPLANE: 40 ML/M2 (ref 16–34)
ECHO LA VOLUME INDEX MOD A2C: 38 ML/M2 (ref 16–34)
ECHO LA VOLUME INDEX MOD A4C: 40 ML/M2 (ref 16–34)
ECHO LV E' LATERAL VELOCITY: 7.18 CM/S
ECHO LV E' SEPTAL VELOCITY: 4.68 CM/S
ECHO LV EDV A2C: 79 ML
ECHO LV EDV A4C: 114 ML
ECHO LV EDV INDEX A4C: 53 ML/M2
ECHO LV EDV NDEX A2C: 37 ML/M2
ECHO LV EF PHYSICIAN: 60 %
ECHO LV EJECTION FRACTION A2C: 61 %
ECHO LV EJECTION FRACTION A4C: 64 %
ECHO LV EJECTION FRACTION BIPLANE: 63 % (ref 55–100)
ECHO LV ESV A2C: 31 ML
ECHO LV ESV A4C: 41 ML
ECHO LV ESV INDEX A2C: 14 ML/M2
ECHO LV ESV INDEX A4C: 19 ML/M2
ECHO LV FRACTIONAL SHORTENING: 28 % (ref 28–44)
ECHO LV INTERNAL DIMENSION DIASTOLE INDEX: 2.79 CM/M2
ECHO LV INTERNAL DIMENSION DIASTOLIC: 6 CM (ref 4.2–5.9)
ECHO LV INTERNAL DIMENSION SYSTOLIC INDEX: 2 CM/M2
ECHO LV INTERNAL DIMENSION SYSTOLIC: 4.3 CM
ECHO LV IVSD: 0.9 CM (ref 0.6–1)
ECHO LV MASS 2D: 186.1 G (ref 88–224)
ECHO LV MASS INDEX 2D: 86.6 G/M2 (ref 49–115)
ECHO LV POSTERIOR WALL DIASTOLIC: 0.7 CM (ref 0.6–1)
ECHO LV RELATIVE WALL THICKNESS RATIO: 0.23
ECHO LVOT AREA: 3.8 CM2
ECHO LVOT AV VTI INDEX: 0.72
ECHO LVOT DIAM: 2.2 CM
ECHO LVOT MEAN GRADIENT: 2 MMHG
ECHO LVOT PEAK GRADIENT: 4 MMHG
ECHO LVOT PEAK VELOCITY: 1 M/S
ECHO LVOT STROKE VOLUME INDEX: 43.3 ML/M2
ECHO LVOT SV: 93.1 ML
ECHO LVOT VTI: 24.5 CM
ECHO MV A VELOCITY: 1.32 M/S
ECHO MV AREA VTI: 2 CM2
ECHO MV E DECELERATION TIME (DT): 193 MS
ECHO MV E VELOCITY: 1.26 M/S
ECHO MV E/A RATIO: 0.95
ECHO MV E/E' LATERAL: 17.55
ECHO MV E/E' RATIO (AVERAGED): 22.24
ECHO MV E/E' SEPTAL: 26.92
ECHO MV LVOT VTI INDEX: 1.89
ECHO MV MAX VELOCITY: 1.4 M/S
ECHO MV MEAN GRADIENT: 4 MMHG
ECHO MV MEAN VELOCITY: 1 M/S
ECHO MV PEAK GRADIENT: 8 MMHG
ECHO MV VTI: 46.2 CM
ECHO PV MAX VELOCITY: 1 M/S
ECHO PV PEAK GRADIENT: 4 MMHG
ECHO RIGHT VENTRICULAR SYSTOLIC PRESSURE (RVSP): 36 MMHG
ECHO RV FREE WALL PEAK S': 14.4 CM/S
ECHO TV REGURGITANT MAX VELOCITY: 2.88 M/S
ECHO TV REGURGITANT PEAK GRADIENT: 33 MMHG
EKG ATRIAL RATE: 82 BPM
EKG P AXIS: 89 DEGREES
EKG P-R INTERVAL: 208 MS
EKG Q-T INTERVAL: 380 MS
EKG QRS DURATION: 88 MS
EKG QTC CALCULATION (BAZETT): 443 MS
EKG R AXIS: 10 DEGREES
EKG T AXIS: 72 DEGREES
EKG VENTRICULAR RATE: 82 BPM
EOSINOPHIL # BLD: <0.03 K/UL (ref 0–0.44)
EOSINOPHILS RELATIVE PERCENT: 0 % (ref 1–4)
ERYTHROCYTE [DISTWIDTH] IN BLOOD BY AUTOMATED COUNT: 14.4 % (ref 11.8–14.4)
EST. AVERAGE GLUCOSE BLD GHB EST-MCNC: 97 MG/DL
GFR, ESTIMATED: 11 ML/MIN/1.73M2
GLUCOSE BLD-MCNC: 148 MG/DL (ref 75–110)
GLUCOSE BLD-MCNC: 231 MG/DL (ref 75–110)
GLUCOSE BLD-MCNC: 241 MG/DL (ref 75–110)
GLUCOSE BLD-MCNC: 278 MG/DL (ref 75–110)
GLUCOSE BLD-MCNC: 282 MG/DL (ref 75–110)
GLUCOSE SERPL-MCNC: 245 MG/DL (ref 82–115)
HBA1C MFR BLD: 5 % (ref 4–6)
HCT VFR BLD AUTO: 26.3 % (ref 40.7–50.3)
HGB BLD-MCNC: 8.8 G/DL (ref 13–17)
IMM GRANULOCYTES # BLD AUTO: 0.06 K/UL (ref 0–0.3)
IMM GRANULOCYTES NFR BLD: 1 %
LYMPHOCYTES NFR BLD: 1.08 K/UL (ref 1.1–3.7)
LYMPHOCYTES RELATIVE PERCENT: 12 % (ref 24–43)
MCH RBC QN AUTO: 31.9 PG (ref 25.2–33.5)
MCHC RBC AUTO-ENTMCNC: 33.5 G/DL (ref 28.4–34.8)
MCV RBC AUTO: 95.3 FL (ref 82.6–102.9)
MONOCYTES NFR BLD: 0.13 K/UL (ref 0.1–1.2)
MONOCYTES NFR BLD: 2 % (ref 3–12)
NEUTROPHILS NFR BLD: 85 % (ref 36–65)
NEUTS SEG NFR BLD: 7.41 K/UL (ref 1.5–8.1)
NRBC BLD-RTO: 0 PER 100 WBC
PLATELET # BLD AUTO: 175 K/UL (ref 138–453)
PMV BLD AUTO: 8.8 FL (ref 8.1–13.5)
POTASSIUM SERPL-SCNC: 5.3 MMOL/L (ref 3.7–5.3)
RBC # BLD AUTO: 2.76 M/UL (ref 4.21–5.77)
SODIUM SERPL-SCNC: 134 MMOL/L (ref 136–145)
WBC OTHER # BLD: 8.7 K/UL (ref 3.5–11.3)

## 2025-05-24 PROCEDURE — 93306 TTE W/DOPPLER COMPLETE: CPT

## 2025-05-24 PROCEDURE — 36415 COLL VENOUS BLD VENIPUNCTURE: CPT

## 2025-05-24 PROCEDURE — 87449 NOS EACH ORGANISM AG IA: CPT

## 2025-05-24 PROCEDURE — 6360000002 HC RX W HCPCS: Performed by: INTERNAL MEDICINE

## 2025-05-24 PROCEDURE — 90935 HEMODIALYSIS ONE EVALUATION: CPT

## 2025-05-24 PROCEDURE — 2700000000 HC OXYGEN THERAPY PER DAY

## 2025-05-24 PROCEDURE — 6370000000 HC RX 637 (ALT 250 FOR IP): Performed by: NURSE PRACTITIONER

## 2025-05-24 PROCEDURE — 6370000000 HC RX 637 (ALT 250 FOR IP)

## 2025-05-24 PROCEDURE — 93010 ELECTROCARDIOGRAM REPORT: CPT | Performed by: INTERNAL MEDICINE

## 2025-05-24 PROCEDURE — 94761 N-INVAS EAR/PLS OXIMETRY MLT: CPT

## 2025-05-24 PROCEDURE — 80048 BASIC METABOLIC PNL TOTAL CA: CPT

## 2025-05-24 PROCEDURE — 82947 ASSAY GLUCOSE BLOOD QUANT: CPT

## 2025-05-24 PROCEDURE — 6370000000 HC RX 637 (ALT 250 FOR IP): Performed by: INTERNAL MEDICINE

## 2025-05-24 PROCEDURE — 5A1D70Z PERFORMANCE OF URINARY FILTRATION, INTERMITTENT, LESS THAN 6 HOURS PER DAY: ICD-10-PCS | Performed by: INTERNAL MEDICINE

## 2025-05-24 PROCEDURE — 87324 CLOSTRIDIUM AG IA: CPT

## 2025-05-24 PROCEDURE — 85025 COMPLETE CBC W/AUTO DIFF WBC: CPT

## 2025-05-24 PROCEDURE — 2060000000 HC ICU INTERMEDIATE R&B

## 2025-05-24 PROCEDURE — 83036 HEMOGLOBIN GLYCOSYLATED A1C: CPT

## 2025-05-24 PROCEDURE — 99232 SBSQ HOSP IP/OBS MODERATE 35: CPT | Performed by: INTERNAL MEDICINE

## 2025-05-24 PROCEDURE — 93306 TTE W/DOPPLER COMPLETE: CPT | Performed by: INTERNAL MEDICINE

## 2025-05-24 PROCEDURE — 2500000003 HC RX 250 WO HCPCS

## 2025-05-24 RX ORDER — HEPARIN SODIUM 5000 [USP'U]/ML
5000 INJECTION, SOLUTION INTRAVENOUS; SUBCUTANEOUS EVERY 8 HOURS SCHEDULED
Status: DISCONTINUED | OUTPATIENT
Start: 2025-05-24 | End: 2025-05-25 | Stop reason: HOSPADM

## 2025-05-24 RX ORDER — GUAIFENESIN/DEXTROMETHORPHAN 100-10MG/5
10 SYRUP ORAL 3 TIMES DAILY
Status: DISCONTINUED | OUTPATIENT
Start: 2025-05-24 | End: 2025-05-25 | Stop reason: HOSPADM

## 2025-05-24 RX ORDER — ATORVASTATIN CALCIUM 20 MG/1
20 TABLET, FILM COATED ORAL NIGHTLY
Status: DISCONTINUED | OUTPATIENT
Start: 2025-05-24 | End: 2025-05-25 | Stop reason: HOSPADM

## 2025-05-24 RX ORDER — KETOROLAC TROMETHAMINE 5 MG/ML
1 SOLUTION OPHTHALMIC 3 TIMES DAILY
Status: DISCONTINUED | OUTPATIENT
Start: 2025-05-24 | End: 2025-05-24

## 2025-05-24 RX ORDER — BUMETANIDE 1 MG/1
3 TABLET ORAL
Status: DISCONTINUED | OUTPATIENT
Start: 2025-05-24 | End: 2025-05-25 | Stop reason: HOSPADM

## 2025-05-24 RX ORDER — GABAPENTIN 300 MG/1
300 CAPSULE ORAL 2 TIMES DAILY
Status: DISCONTINUED | OUTPATIENT
Start: 2025-05-24 | End: 2025-05-25 | Stop reason: HOSPADM

## 2025-05-24 RX ORDER — LOSARTAN POTASSIUM 50 MG/1
50 TABLET ORAL NIGHTLY
Status: DISCONTINUED | OUTPATIENT
Start: 2025-05-24 | End: 2025-05-25 | Stop reason: HOSPADM

## 2025-05-24 RX ORDER — METOPROLOL SUCCINATE 25 MG/1
25 TABLET, EXTENDED RELEASE ORAL NIGHTLY
Status: DISCONTINUED | OUTPATIENT
Start: 2025-05-24 | End: 2025-05-25 | Stop reason: HOSPADM

## 2025-05-24 RX ORDER — IPRATROPIUM BROMIDE AND ALBUTEROL SULFATE 2.5; .5 MG/3ML; MG/3ML
1 SOLUTION RESPIRATORY (INHALATION) EVERY 4 HOURS PRN
Status: DISCONTINUED | OUTPATIENT
Start: 2025-05-24 | End: 2025-05-25 | Stop reason: HOSPADM

## 2025-05-24 RX ORDER — SEVELAMER CARBONATE 800 MG/1
800 TABLET, FILM COATED ORAL 2 TIMES DAILY WITH MEALS
Status: DISCONTINUED | OUTPATIENT
Start: 2025-05-24 | End: 2025-05-25 | Stop reason: HOSPADM

## 2025-05-24 RX ORDER — BUMETANIDE 1 MG/1
2 TABLET ORAL
Status: DISCONTINUED | OUTPATIENT
Start: 2025-05-25 | End: 2025-05-25 | Stop reason: HOSPADM

## 2025-05-24 RX ORDER — KETOROLAC TROMETHAMINE 5 MG/ML
1 SOLUTION OPHTHALMIC 3 TIMES DAILY
Status: DISCONTINUED | OUTPATIENT
Start: 2025-05-24 | End: 2025-05-25 | Stop reason: HOSPADM

## 2025-05-24 RX ORDER — HYDROCODONE BITARTRATE AND ACETAMINOPHEN 10; 325 MG/1; MG/1
1 TABLET ORAL EVERY 6 HOURS PRN
Refills: 0 | Status: DISCONTINUED | OUTPATIENT
Start: 2025-05-24 | End: 2025-05-25 | Stop reason: HOSPADM

## 2025-05-24 RX ADMIN — INSULIN LISPRO 2 UNITS: 100 INJECTION, SOLUTION INTRAVENOUS; SUBCUTANEOUS at 09:02

## 2025-05-24 RX ADMIN — KETOROLAC TROMETHAMINE 1 DROP: 0.5 SOLUTION OPHTHALMIC at 01:17

## 2025-05-24 RX ADMIN — GABAPENTIN 300 MG: 300 CAPSULE ORAL at 19:56

## 2025-05-24 RX ADMIN — METOPROLOL SUCCINATE 25 MG: 25 TABLET, EXTENDED RELEASE ORAL at 19:56

## 2025-05-24 RX ADMIN — INSULIN LISPRO 4 UNITS: 100 INJECTION, SOLUTION INTRAVENOUS; SUBCUTANEOUS at 19:57

## 2025-05-24 RX ADMIN — INSULIN LISPRO 2 UNITS: 100 INJECTION, SOLUTION INTRAVENOUS; SUBCUTANEOUS at 12:58

## 2025-05-24 RX ADMIN — SODIUM CHLORIDE, PRESERVATIVE FREE 10 ML: 5 INJECTION INTRAVENOUS at 19:58

## 2025-05-24 RX ADMIN — KETOROLAC TROMETHAMINE 1 DROP: 0.5 SOLUTION OPHTHALMIC at 19:58

## 2025-05-24 RX ADMIN — HEPARIN SODIUM 5000 UNITS: 5000 INJECTION INTRAVENOUS; SUBCUTANEOUS at 20:08

## 2025-05-24 RX ADMIN — KETOROLAC TROMETHAMINE 1 DROP: 0.5 SOLUTION OPHTHALMIC at 17:47

## 2025-05-24 RX ADMIN — GUAIFENESIN SYRUP AND DEXTROMETHORPHAN 10 ML: 100; 10 SYRUP ORAL at 12:58

## 2025-05-24 RX ADMIN — SODIUM CHLORIDE, PRESERVATIVE FREE 10 ML: 5 INJECTION INTRAVENOUS at 00:52

## 2025-05-24 RX ADMIN — INSULIN LISPRO 4 UNITS: 100 INJECTION, SOLUTION INTRAVENOUS; SUBCUTANEOUS at 01:10

## 2025-05-24 RX ADMIN — Medication 4.5 MG: at 01:10

## 2025-05-24 RX ADMIN — LEVOFLOXACIN 500 MG: 500 TABLET, FILM COATED ORAL at 08:48

## 2025-05-24 RX ADMIN — ATORVASTATIN CALCIUM 20 MG: 20 TABLET, FILM COATED ORAL at 01:10

## 2025-05-24 RX ADMIN — GABAPENTIN 300 MG: 300 CAPSULE ORAL at 08:48

## 2025-05-24 RX ADMIN — GABAPENTIN 300 MG: 300 CAPSULE ORAL at 00:52

## 2025-05-24 RX ADMIN — GLIPIZIDE 20 MG: 10 TABLET ORAL at 17:50

## 2025-05-24 RX ADMIN — ASPIRIN 81 MG: 81 TABLET, COATED ORAL at 08:47

## 2025-05-24 RX ADMIN — HEPARIN SODIUM 5000 UNITS: 5000 INJECTION INTRAVENOUS; SUBCUTANEOUS at 09:02

## 2025-05-24 RX ADMIN — HYDROCODONE BITARTRATE AND ACETAMINOPHEN 1 TABLET: 10; 325 TABLET ORAL at 20:03

## 2025-05-24 RX ADMIN — HEPARIN SODIUM 5000 UNITS: 5000 INJECTION INTRAVENOUS; SUBCUTANEOUS at 12:57

## 2025-05-24 RX ADMIN — BUMETANIDE 3 MG: 1 TABLET ORAL at 19:57

## 2025-05-24 RX ADMIN — KETOROLAC TROMETHAMINE 1 DROP: 0.5 SOLUTION OPHTHALMIC at 08:52

## 2025-05-24 RX ADMIN — SEVELAMER CARBONATE 800 MG: 800 TABLET, FILM COATED ORAL at 08:48

## 2025-05-24 RX ADMIN — GUAIFENESIN SYRUP AND DEXTROMETHORPHAN 10 ML: 100; 10 SYRUP ORAL at 19:57

## 2025-05-24 RX ADMIN — AMLODIPINE BESYLATE 5 MG: 5 TABLET ORAL at 08:48

## 2025-05-24 RX ADMIN — LOSARTAN POTASSIUM 50 MG: 50 TABLET, FILM COATED ORAL at 19:56

## 2025-05-24 RX ADMIN — SEVELAMER CARBONATE 800 MG: 800 TABLET, FILM COATED ORAL at 17:50

## 2025-05-24 RX ADMIN — GLIPIZIDE 20 MG: 10 TABLET ORAL at 06:19

## 2025-05-24 RX ADMIN — METOPROLOL SUCCINATE 25 MG: 25 TABLET, EXTENDED RELEASE ORAL at 01:10

## 2025-05-24 RX ADMIN — LOSARTAN POTASSIUM 50 MG: 50 TABLET, FILM COATED ORAL at 01:09

## 2025-05-24 RX ADMIN — AMLODIPINE BESYLATE 5 MG: 5 TABLET ORAL at 19:56

## 2025-05-24 RX ADMIN — SODIUM CHLORIDE, PRESERVATIVE FREE 10 ML: 5 INJECTION INTRAVENOUS at 08:55

## 2025-05-24 RX ADMIN — PREDNISONE 40 MG: 20 TABLET ORAL at 08:47

## 2025-05-24 RX ADMIN — AMLODIPINE BESYLATE 5 MG: 5 TABLET ORAL at 00:52

## 2025-05-24 RX ADMIN — ATORVASTATIN CALCIUM 20 MG: 20 TABLET, FILM COATED ORAL at 19:56

## 2025-05-24 ASSESSMENT — PAIN SCALES - GENERAL
PAINLEVEL_OUTOF10: 2
PAINLEVEL_OUTOF10: 9
PAINLEVEL_OUTOF10: 7
PAINLEVEL_OUTOF10: 5
PAINLEVEL_OUTOF10: 5

## 2025-05-24 ASSESSMENT — PAIN DESCRIPTION - DESCRIPTORS: DESCRIPTORS: SHARP;SHOOTING

## 2025-05-24 ASSESSMENT — PAIN DESCRIPTION - ORIENTATION
ORIENTATION: RIGHT
ORIENTATION: RIGHT

## 2025-05-24 ASSESSMENT — PAIN DESCRIPTION - LOCATION
LOCATION: FOOT;FINGER (COMMENT WHICH ONE)
LOCATION: FOOT

## 2025-05-24 ASSESSMENT — PAIN - FUNCTIONAL ASSESSMENT: PAIN_FUNCTIONAL_ASSESSMENT: PREVENTS OR INTERFERES SOME ACTIVE ACTIVITIES AND ADLS

## 2025-05-24 NOTE — ED NOTES
ED to inpatient nurses report     Admitting Diagnosis: CHF exacerbation  Chief Complaint   Patient presents with    Shortness of Breath     Px arrives complaining of SOB that initiated Monday. Px states he was at PCP earlier today and dx w pneumonia but SOB is persistent. Px also notes he is having chest pain across chest      Present to ED from home  LOC: alert and orientated to name, place, date  Patient confused: no  Vital signs   Vitals:    05/23/25 1503   BP: (!) 157/59   Pulse: 79   Resp: 19   Temp: 98.1 °F (36.7 °C)   TempSrc: Oral   SpO2: 92%   Weight: 104 kg (229 lb 4.5 oz)      Oxygen Baseline RA    Current needs required 2lpm    LDAs:   Peripheral IV 05/23/25 Right Antecubital (Active)     Mobility: Requires assistance * 1  Fall Risk: [place] 1 Fall Risk  Presents to emergency department  because of falls (Syncope, seizure, or loss of consciousness): No (05/23/25 1506)  Age > 70: No (05/23/25 1506)  Altered Mental Status, Intoxication with alcohol or substance confusion (Disorientation, impaired judgment, poor safety awaremess, or inability to follow instructions): No (05/23/25 1506)  Impaired Mobility: Ambulates or transfers with assistive devices or assistance; Unable to ambulate or transer.: No (05/23/25 1506)  Nursing Judgement: No (05/23/25 1506)  Outstanding ED orders: n/a    Consults: IP CONSULT TO INTERNAL MEDICINE  IP CONSULT TO NEPHROLOGY  []  Hospitalist  Completed  [] yes [] no Who:   []  Medicine  Completed  [] yes [] No Who:   []  Cardiology  Completed  [] yes [] No Who:   []  GI   Completed  [] yes [] No Who:   []  Neurology  Completed  [] yes [] No Who:   [x]  Nephrology Completed  [x] yes [] No Who: Ranker   []  Vascular  Completed  [] yes [] No Who:   []  Ortho  Completed  [] yes [] No Who:     []  Surgery  Completed  [] yes [] No Who:    []  Urology  Completed  [] yes [] No Who:    []  CT Surgery Completed  [] yes [] No Who:   []  Podiatry  Completed  [] yes [] No Who:    []  Other

## 2025-05-24 NOTE — ED NOTES
Reviewed medication list with patient and his daughter Camille. These medications are PM doses that patient reports he still needs to take:   Toprol  Amlodipine  Glipizide  Gabapentin  Xanaflex  Zyrtec  Vitamin C  Lipitor  Losartan  Renavite  Melatonin  Bumex (lasix given IV in ER).    Will relay to admitting RN.

## 2025-05-24 NOTE — PROGRESS NOTES
Patient admitted to room 1012  VS taken, telemetry placed and call light given/within reach. Patient A&O and given room orientation. Orders released/reviewed, initial assessment completed and navigator started. See chart for more detail.     Med rec completed by ER nurse [place] and patient's daughter aline. Additionally, patient had med list that writer used to double check. Eye drops were added.     [x] The Home Med List was verified for accuracy and marked COMPLETED. The following sources were used to assist with Medication Reconciliation:    [] Med Rec Pharmacy tech has already completed home med list in the ED and note reviewed   [] Patient med list was transcribed into the EHR and verified for accuracy.(Note method of verification)  [x] Patient provided bottles of their medications for validation (eye drops)  [x] Medications reviewed and confirmed with Samuel (Patient)  [] Contacted patient's pharmacy to confirm home medications (Please list the pharmacy)  [] Home medications reviewed using Dispense Report   [] Contacted physician office to confirm home medications  [] Medical Records received from another facility (list facility and place copy placed in chart)  [] Micaela CURRAN was notified regarding changes to home med list via ShareWithU on 5/24/2025 at the following time:        [] There are one or more home medications that need clarification before Medication Reconciliation can be marked completed. The Med List Status has been marked as In Progress.   To assist with Home Medication Reconciliation the following actions have been taken:    [] Family requested to bring medications in their original bottles to the hospital for verification  [] Family requested to call hospital with list of medications  [] Call to physicians off and left message (list physician and who they spoke to, date and time)  [] Request for medical records made to   [] MAR from facility requested to be faxed over  [] Unable to complete due

## 2025-05-24 NOTE — PROGRESS NOTES
Nephrology Consult Note    Alex Carter MD    Patient Active Problem List   Diagnosis    Essential hypertension    Type II or unspecified type diabetes mellitus without mention of complication, not stated as uncontrolled    Neuropathy    Vertigo    Charcot foot due to diabetes mellitus (HCC)    Hyperlipidemia    Cellulitis    Cellulitis of left foot    Right foot infection    Diabetic polyneuropathy associated with type 2 diabetes mellitus (HCC)    Foreign body (FB) in soft tissue    Cellulitis of left leg    Abscess of right foot    Chest pain at rest    Tobacco abuse    Type 2 diabetes mellitus treated with insulin (HCC)    Bandemia    Chronic multifocal osteomyelitis of right foot (HCC)    Diabetic infection of right foot (HCC)    Acquired hammer toe deformity of lesser toe of right foot    Post-op pain    Right foot pain    Hallux hammertoe, right    Osteomyelitis (HCC)    Wound dehiscence    Diabetic foot (HCC)    Hyperglycemia due to diabetes mellitus (HCC)    Foot ulcer (HCC)    Cellulitis of right foot    Type 2 diabetes mellitus with right diabetic foot ulcer (HCC)    Charcot's joint of right foot    Stage 3b chronic kidney disease (HCC)    Diabetic foot infection (HCC) with VRE    Microcytic anemia    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 He has had increasing shortness of breath, productive cough with yellow sputum, and some associated chest pain that is worse with coughing and deep breaths since Monday of

## 2025-05-24 NOTE — PROGRESS NOTES
[facility]  Office: [phone]  Aaron Canales DO, Sree Thomas DO, Adam Lee DO, Steve Solis DO, Andrea Macias MD, Betsy Ovalles MD, Marycruz Ellis MD, Michelle Dexter MD,  Mike Burnett MD, Marcin Armstrong MD, Mey Bentley MD,  Jesus Wright DO, Shannon Quintero MD, Qasim Perez MD, Chalo Canales DO, Maggie Hernandez MD,  Fabian Jennings DO, Flori Nye MD, Meena Suarez MD, Shy Vasquez MD,  Sukumar Echavarria MD, Abad Acosta MD, Darrius Anaya MD, Kory Baxter MD, Kelvin Pereyra MD, Emil Sparks MD, Jaspal Mccabe DO, Anju Mujica MD, Chicho Lombardi MD, Jesus Vasques MD, Mohsin Reza, MD, Nannette Cleaning MD, Shirley Waterhouse, CNP,  Lakesha Bermudez, CNP, Jaspal Fletcher, CNP,  Patrica Lakhani, DNP, Lo Mcallister, CNP, Lana Main, CNP, Hanna Aly, CNP, Angeli Hargrove, CNP, Holli Rose, PA-C, Luanne Gonzalez, CNP, Irving Marks, CNP,  Malu Monteiro, CNP, Joy Ham, CNP, Jefferson Nash, PA-C, Lauren Archibald, CNP,  Bertha Leroy, CNS, Mala Clarke, CNP, Vandana Hinojosa, CNP,   Octavia Hsu, CNP         [facility]   IN-PATIENT SERVICE   [facility]    Progress Note    5/24/2025    10:34 AM    Name:   Samuel Mix  MRN:     [id number]     Acct:      [id number]   Room:   1012/1012-02   Day:  1  Admit Date:  5/23/2025  3:24 PM    PCP:   Alex Pickard MD  Code Status:  Full Code    Subjective:     C/C:   Chief Complaint   Patient presents with    Shortness of Breath     Px arrives complaining of SOB that initiated Monday. Px states he was at PCP earlier today and dx w pneumonia but SOB is persistent. Px also notes he is having chest pain across chest     Interval History Status: .   Shortness of breath is slightly better after coming to hospital and starting treatment  He will be getting dialysis today  Still has cough with yellow expectoration  Denies fever or chills  Blood sugars 278-231, states does not take Lantus insulin or

## 2025-05-24 NOTE — RT PROTOCOL NOTE
RT Inhaler-Nebulizer Bronchodilator Protocol Note    There is a bronchodilator order in the chart from a provider indicating to follow the RT Bronchodilator Protocol and there is an “Initiate RT Inhaler-Nebulizer Bronchodilator Protocol” order as well (see protocol at bottom of note).    CXR Findings:  XR CHEST PORTABLE  Result Date: 5/23/2025  Cardiomegaly with vascular congestion and lower lung field ground-glass opacities, which may represent pulmonary edema.       The findings from the last RT Protocol Assessment were as follows:   History Pulmonary Disease: Smoker 15 pack years or more  Respiratory Pattern: Regular pattern and RR 12-20 bpm  Breath Sounds: Slightly diminished and/or crackles  Cough: Strong, spontaneous, non-productive  Indication for Bronchodilator Therapy:    Bronchodilator Assessment Score: 3    Aerosolized bronchodilator medication orders have been revised according to the RT Inhaler-Nebulizer Bronchodilator Protocol below.    Respiratory Therapist to perform RT Therapy Protocol Assessment initially then follow the protocol.  Repeat RT Therapy Protocol Assessment PRN for score 0-3 or on second treatment, BID, and PRN for scores above 3.    No Indications - adjust the frequency to every 6 hours PRN wheezing or bronchospasm, if no treatments needed after 48 hours then discontinue using Per Protocol order mode.     If indication present, adjust the RT bronchodilator orders based on the Bronchodilator Assessment Score as indicated below.  Use Inhaler orders unless patient has one or more of the following: on home nebulizer, not able to hold breath for 10 seconds, is not alert and oriented, cannot activate and use MDI correctly, or respiratory rate 25 breaths per minute or more, then use the equivalent nebulizer order(s) with same Frequency and PRN reasons based on the score.  If a patient is on this medication at home then do not decrease Frequency below that used at home.    0-3 - enter or

## 2025-05-24 NOTE — PROGRESS NOTES
Nephrology Hemodialysis Note    Alex Carter MD    Patient Active Problem List   Diagnosis    Essential hypertension    Type II or unspecified type diabetes mellitus without mention of complication, not stated as uncontrolled    Neuropathy    Vertigo    Charcot foot due to diabetes mellitus (HCC)    Hyperlipidemia    Cellulitis    Cellulitis of left foot    Right foot infection    Diabetic polyneuropathy associated with type 2 diabetes mellitus (HCC)    Foreign body (FB) in soft tissue    Cellulitis of left leg    Abscess of right foot    Chest pain at rest    Tobacco abuse    Type 2 diabetes mellitus treated with insulin (HCC)    Bandemia    Chronic multifocal osteomyelitis of right foot (HCC)    Diabetic infection of right foot (HCC)    Acquired hammer toe deformity of lesser toe of right foot    Post-op pain    Right foot pain    Hallux hammertoe, right    Osteomyelitis ([facility])    Wound dehiscence    Diabetic foot ([facility])    Hyperglycemia due to diabetes mellitus (HCC)    Foot ulcer (HCC)    Cellulitis of right foot    Type 2 diabetes mellitus with right diabetic foot ulcer ([facility])    Charcot's joint of right foot    Stage 3b chronic kidney disease ([facility])    Diabetic foot infection ([facility]) with VRE    Microcytic anemia    Infection due to vancomycin resistant Enterococcus faecium    Draining postoperative wound    Anemia of chronic disease    Iron deficiency anemia, unspecified    Anemia of chronic renal failure, stage 4 (severe) ([facility])    Normocytic anemia    ESRD needing dialysis ([facility])    Pulmonary edema, acute ([facility])             CHIEF COMPLAINT     Fluid overload    CURRENT MEDICATIONS      Current Facility-Administered Medications   Medication Dose Route Frequency Provider Last Rate Last Admin    metoprolol succinate (TOPROL XL) extended release tablet 25 mg  25 mg Oral Nightly Vandana Hinojosa APRN - CNP   25 mg at 05/24/25 0110    losartan (COZAAR) tablet 50 mg  50 mg Oral Nightly Micaela

## 2025-05-24 NOTE — H&P
needed for Pain 7/27/24   Luis Fernando Villanueva DO   B Complex-C-Folic Acid (FACUNDO-MAX) TABS  11/9/23   Kat Zhang MD   hydrOXYzine HCl (ATARAX) 10 MG tablet  10/30/23   Kat Zhang MD   losartan (COZAAR) 50 MG tablet  11/13/23   Kat Zhang MD   melatonin 3 MG TABS tablet Take 5 mg by mouth nightly as needed    Kat Zhang MD   epoetin benitez-epbx (RETACRIT) 4000 UNIT/ML SOLN injection Inject 2 mLs into the skin three times a week 9/13/23   Jaspal Fletcher APRN - NP   atorvastatin (LIPITOR) 20 MG tablet Take 1 tablet by mouth daily 9/13/23   Jaspal Fletcher APRN - NP   metoprolol succinate (TOPROL XL) 25 MG extended release tablet Take 1 tablet by mouth daily    Kat Zhang MD   HYDROcodone-acetaminophen (NORCO)  MG per tablet Take 1 tablet by mouth every 6 hours as needed. 6/7/23   Kat Zhang MD   Ascorbic Acid (VITAMIN C) 250 MG tablet Take 1 tablet by mouth in the morning and at bedtime    Kat Zhang MD   gabapentin (NEURONTIN) 400 MG capsule Take 1 capsule by mouth 3 times daily for 30 days.  Patient taking differently: Take 300 mg by mouth 3 times daily. 3/14/23 12/7/23  Lauren Archibald APRN - NP   LANTUS SOLOSTAR 100 UNIT/ML injection pen Inject 5-10 Units into the skin 2 times daily as needed (5 units if BS >125, or 10 units if BS>175) 3/14/23   Lauren Archibald APRN - NP   TRUEPLUS PEN NEEDLES 31G X 8 MM MISC  11/29/21   Kat Zhang MD   amLODIPine (NORVASC) 5 MG tablet Take 1 tablet by mouth in the morning and at bedtime 10/20/21   Kat Zhang MD   JANUVIA 100 MG tablet Take 1 tablet by mouth daily 11/13/20   Kat Zhang MD   cetirizine (ZYRTEC) 10 MG tablet Take 1 tablet by mouth nightly 10/21/19   Kat Zhang MD   glipiZIDE (GLUCOTROL) 10 MG tablet Take 2 tablets by mouth 2 times daily (before meals) Takes 2 tabs (=20mg) BID    Provider, MD Kat   aspirin 81 MG tablet Take 1 tablet by mouth

## 2025-05-24 NOTE — DIALYSIS
HEMODIALYSIS POST TREATMENT NOTE    Treatment time ordered: 3hr    Actual treatment time: 3hr    UltraFiltration Goal: 3l  UltraFiltration Removed: 3l      Pre Treatment weight: 97.5kgs  Post Treatment weight: 94.5  Estimated Dry Weight: na          Internal Access:       AV Fistula or AV Graft: fistula         Site: left upper       Access Flow: good       Sign and symptoms of infection: na       If YES: na    Medications or blood products given: na    Chronic outpatient schedule: [facility]    Chronic outpatient unit: [facility]    Summary of response to treatment: PAtient treatment completed, dializer started to clott. blood returned and bruit and thrill present less than 5 minute bleed time./ a total of 3500ml removed including rinse back.     Explain if orders NOT met, was physician notified:hayde      ACES flowsheet faxed to patient unit/ placed in patient chart: na    Post assessment completed: yes    Report given to: ya      * Intra-treatment documented Safety Checks include the followin) Access and face visible at all times.     2) All connections and blood lines are secure with no kinks.     3) NVL alarm engaged.     4) Hemosafe device applied (if applicable).     5) No collapse of Arterial or Venous blood chambers.     6) All blood lines / pump segments in the air detectors.

## 2025-05-25 VITALS
DIASTOLIC BLOOD PRESSURE: 71 MMHG | WEIGHT: 208.34 LBS | SYSTOLIC BLOOD PRESSURE: 132 MMHG | TEMPERATURE: 98.1 F | HEIGHT: 70 IN | BODY MASS INDEX: 29.83 KG/M2 | RESPIRATION RATE: 16 BRPM | OXYGEN SATURATION: 96 % | HEART RATE: 60 BPM

## 2025-05-25 LAB
ANION GAP SERPL CALCULATED.3IONS-SCNC: 14 MMOL/L (ref 9–16)
BUN SERPL-MCNC: 30 MG/DL (ref 8–23)
C DIFF GDH + TOXINS A+B STL QL IA.RAPID: NEGATIVE
CALCIUM SERPL-MCNC: 8.9 MG/DL (ref 8.8–10.2)
CHLORIDE SERPL-SCNC: 94 MMOL/L (ref 98–107)
CO2 SERPL-SCNC: 24 MMOL/L (ref 20–31)
CREAT SERPL-MCNC: 4.3 MG/DL (ref 0.7–1.2)
GFR, ESTIMATED: 14 ML/MIN/1.73M2
GLUCOSE BLD-MCNC: 157 MG/DL (ref 75–110)
GLUCOSE BLD-MCNC: 158 MG/DL (ref 75–110)
GLUCOSE SERPL-MCNC: 218 MG/DL (ref 82–115)
POTASSIUM SERPL-SCNC: 4.2 MMOL/L (ref 3.7–5.3)
SODIUM SERPL-SCNC: 132 MMOL/L (ref 136–145)
SPECIMEN DESCRIPTION: NORMAL

## 2025-05-25 PROCEDURE — 82947 ASSAY GLUCOSE BLOOD QUANT: CPT

## 2025-05-25 PROCEDURE — 99232 SBSQ HOSP IP/OBS MODERATE 35: CPT | Performed by: INTERNAL MEDICINE

## 2025-05-25 PROCEDURE — 80048 BASIC METABOLIC PNL TOTAL CA: CPT

## 2025-05-25 PROCEDURE — 36415 COLL VENOUS BLD VENIPUNCTURE: CPT

## 2025-05-25 PROCEDURE — 6370000000 HC RX 637 (ALT 250 FOR IP)

## 2025-05-25 PROCEDURE — 2500000003 HC RX 250 WO HCPCS

## 2025-05-25 PROCEDURE — 6370000000 HC RX 637 (ALT 250 FOR IP): Performed by: INTERNAL MEDICINE

## 2025-05-25 PROCEDURE — 6360000002 HC RX W HCPCS: Performed by: INTERNAL MEDICINE

## 2025-05-25 PROCEDURE — 6370000000 HC RX 637 (ALT 250 FOR IP): Performed by: NURSE PRACTITIONER

## 2025-05-25 RX ORDER — GUAIFENESIN/DEXTROMETHORPHAN 100-10MG/5
10 SYRUP ORAL 3 TIMES DAILY
Qty: 120 ML | Refills: 0 | Status: SHIPPED | OUTPATIENT
Start: 2025-05-25 | End: 2025-05-30

## 2025-05-25 RX ORDER — PREDNISONE 20 MG/1
40 TABLET ORAL DAILY
Qty: 6 TABLET | Refills: 0 | Status: SHIPPED | OUTPATIENT
Start: 2025-05-26 | End: 2025-05-29

## 2025-05-25 RX ADMIN — GABAPENTIN 300 MG: 300 CAPSULE ORAL at 08:32

## 2025-05-25 RX ADMIN — SEVELAMER CARBONATE 800 MG: 800 TABLET, FILM COATED ORAL at 08:32

## 2025-05-25 RX ADMIN — BUMETANIDE 2 MG: 1 TABLET ORAL at 08:31

## 2025-05-25 RX ADMIN — PREDNISONE 40 MG: 20 TABLET ORAL at 08:32

## 2025-05-25 RX ADMIN — SODIUM CHLORIDE, PRESERVATIVE FREE 10 ML: 5 INJECTION INTRAVENOUS at 08:30

## 2025-05-25 RX ADMIN — HEPARIN SODIUM 5000 UNITS: 5000 INJECTION INTRAVENOUS; SUBCUTANEOUS at 06:05

## 2025-05-25 RX ADMIN — GUAIFENESIN SYRUP AND DEXTROMETHORPHAN 10 ML: 100; 10 SYRUP ORAL at 08:31

## 2025-05-25 RX ADMIN — KETOROLAC TROMETHAMINE 1 DROP: 0.5 SOLUTION OPHTHALMIC at 08:29

## 2025-05-25 RX ADMIN — GLIPIZIDE 20 MG: 10 TABLET ORAL at 06:05

## 2025-05-25 RX ADMIN — AMLODIPINE BESYLATE 5 MG: 5 TABLET ORAL at 08:31

## 2025-05-25 RX ADMIN — ASPIRIN 81 MG: 81 TABLET, COATED ORAL at 08:32

## 2025-05-25 NOTE — PROGRESS NOTES
[facility]  Office: [phone]  Aaron Canales DO, Sree Thomas, DO, Adam Lee DO, Steve Solis, DO, Andrea Macias MD, Betsy Ovalles MD, Marycruz Ellis MD, Michelle Dexter MD,  Mike Burnett MD, Marcin Armstrong MD, Mey Bentley MD,  Jesus Wright DO, Shannon Quintero MD, Qasim Perez MD, Chalo Canales DO, Maggie Hernandez MD,  Fabian Jennings DO, Flori Nye MD, Meena Suarez MD, Shy Vasquez MD,  Sukumar Echavarria MD, Abad Acosta MD, Darrius Anaya MD, Kory Baxter MD, Kelvin Pereyra MD, Emil Sparks MD, Jaspal Mccabe DO, Anju Mujica MD, Chicho Lombardi MD, Jesus Vasques MD, Mohsin Reza, MD, Nannette Cleaning MD, Shirley Waterhouse, CNP,  Lakesha Bermudez, CNP, Jaspal Fletcher, CNP,  Patrica Lakhani, DNP, Lo Mcallister, CNP, Lana Main, CNP, Hanna Aly, CNP, Angeli Hargrove, CNP, Holli Rose, PA-C, Luanne Gonzalez, CNP, Irving Marks, CNP,  Malu Monteiro, CNP, Joy Ham, CNP, Jefferson Nash, PA-C, Lauren Archibald, CNP,  Bertha Leroy, CNS, Mala Clarke, CNP, Vandana Hinojosa, CNP,   Octavia Hsu, CNP         [facility]   IN-PATIENT SERVICE   [facility]    Progress Note    5/25/2025    10:51 AM    Name:   Samuel Mix  MRN:     [id number]     Acct:      [id number]   Room:   1012/1012-02   Day:  2  Admit Date:  5/23/2025  3:24 PM    PCP:   Alex Pickard MD  Code Status:  Full Code    Subjective:     C/C:   Chief Complaint   Patient presents with    Shortness of Breath     Px arrives complaining of SOB that initiated Monday. Px states he was at PCP earlier today and dx w pneumonia but SOB is persistent. Px also notes he is having chest pain across chest     Interval History Status: .   Shortness of breath is  better after coming to hospital and starting treatment including hemodialysis done yesterday  Saturating 94% on room air  Still has cough with yellow expectoration-improved  Denies fever or chills  Blood sugars 158, A1c 5.0,

## 2025-05-25 NOTE — CARE COORDINATION
Case Management Assessment  Initial Evaluation    Date/Time of Evaluation: 5/25/2025 1:24 PM  Assessment Completed by: Yina Kiran RN    If patient is discharged prior to next notation, then this note serves as note for discharge by case management.    Patient Name: Samuel Mix                   YOB: 1955  Diagnosis: Shortness of breath [R06.02]  Hypoxia [R09.02]  Pulmonary edema, acute (HCC) [J81.0]  Dyspnea, unspecified type [R06.00]  Congestive heart failure, unspecified HF chronicity, unspecified heart failure type (HCC) [I50.9]                   Date / Time: 5/23/2025  3:24 PM    Patient Admission Status: Inpatient   Readmission Risk (Low < 19, Mod (19-27), High > 27): Readmission Risk Score: 17.6    Current PCP: Alex Pickard MD  PCP verified by CM? Yes    Chart Reviewed: Yes      History Provided by: Patient  Patient Orientation: Alert and Oriented    Patient Cognition: Alert    Hospitalization in the last 30 days (Readmission):  No    If yes, Readmission Assessment in  Navigator will be completed.    Advance Directives:      Code Status: Full Code   Patient's Primary Decision Maker is: Legal Next of Kin    Primary Decision Maker: Blanka Mix - Spouse - [phone]    Discharge Planning:    Patient lives with: Spouse/Significant Other Type of Home: House  Primary Care Giver: Self  Patient Support Systems include: Spouse/Significant Other, Family Members, Children   Current Financial resources: Medicare  Current community resources:    Current services prior to admission: Durable Medical Equipment, Other (Comment) ([facility])            Current DME: Walker, Other (Comment), Wheelchair ([employer] boot knee  scooter)            Type of Home Care services:  None    ADLS  Prior functional level: Independent in ADLs/IADLs  Current functional level: Independent in ADLs/IADLs    PT AM-PAC:   /24  OT AM-PAC:   /24    Family can provide assistance at DC: Yes  Would you like Case

## 2025-05-25 NOTE — PROGRESS NOTES
Pt inquiring about discharge status, Dr Macias notified and states that patient needs to be seen by nephrology prior to discharge decision being made, Dr Ching made aware and called received from Dr Ching, states that patient can be discharged from nephrology perspective and to resume routine dialysis treatments, Dr Macias updated

## 2025-05-25 NOTE — PLAN OF CARE
Patient resting in bed, continuous pulse ox maintained, Oxygen saturation weaned down to room air.  Alert and oriented x4. Up to bathroom independently, Boot at bedside, stated he needs to wear it if \"walking more than 100 steps\"  ACHS blood sugar checks, required 4 units of insulin.   Contact precautions in place for VRE, MRSA, and cdiff rule out, still need stool sample, patient aware and will call out when he has a bowel movement.   Patient denied having pain.  Patient safety maintained.     Problem: Chronic Conditions and Co-morbidities  Goal: Patient's chronic conditions and co-morbidity symptoms are monitored and maintained or improved  Outcome: Progressing     Problem: Discharge Planning  Goal: Discharge to home or other facility with appropriate resources  Outcome: Progressing     Problem: Safety - Adult  Goal: Free from fall injury  Outcome: Progressing     Problem: ABCDS Injury Assessment  Goal: Absence of physical injury  Outcome: Progressing     Problem: Respiratory - Adult  Goal: Achieves optimal ventilation and oxygenation  Outcome: Progressing     Problem: Gastrointestinal - Adult  Goal: Maintains or returns to baseline bowel function  Outcome: Progressing  Goal: Maintains adequate nutritional intake  Outcome: Progressing     Problem: Metabolic/Fluid and Electrolytes - Adult  Goal: Electrolytes maintained within normal limits  Outcome: Progressing  Goal: Hemodynamic stability and optimal renal function maintained  Outcome: Progressing  Goal: Glucose maintained within prescribed range  Outcome: Progressing     
Shift uneventful, chronic dialysis patient,  dialysis treatment received today, tolerated well, oral antibiotics and steroids, aerosol treatments and routine robitussin for cough      Problem: Pain  Goal: Verbalizes/displays adequate comfort level or baseline comfort level  Outcome: Progressing   .Patient having pain on their generalized and rates it a 5. Pain interventions includerelaxation techniques, medication, and regular rest periods. Patients goal for pain relief is 5. The need for pain and symptom management will be considered in the discharge planning process to ensure patients comfort.      Problem: Respiratory - Adult  Goal: Achieves optimal ventilation and oxygenation  Outcome: Progressing  Shortness of breath w/min activity, strong cough and able to clear airway of secretions and position self for comfort, educated on pacing activities, recognizing limitations and when to rest, supplemental oxygen utilized prn w/continuous pulse ox, Spo2 maintained @ >92%     Problem: Safety - Adult  Goal: Free from fall injury  Outcome: Progressing     Problem: Chronic Conditions and Co-morbidities  Goal: Patient's chronic conditions and co-morbidity symptoms are monitored and maintained or improved  Outcome: Progressing     Problem: Discharge Planning  Goal: Discharge to home or other facility with appropriate resources  Outcome: Progressing   Plans for discharge home when medically stable, no needs noted  
rest periods. Patients goal for pain relief is 3. The need for pain and symptom management will be considered in the discharge planning process to ensure patients comfort.

## 2025-05-25 NOTE — DISCHARGE SUMMARY
[facility]  Office: [phone]  Aaron Canales DO, Sree Thomas DO, Adam Lee DO, Steve Solis DO, Andrea Macias MD, Betsy Ovalles MD, Marycruz Ellis MD, Michelle Dexter MD,  Mike Burnett MD, Marcin Armstrong MD, Mey Bentley MD,  Jesus Wright DO, Shannon Quintero MD, Qasim Perez MD, Chalo Canales DO, Maggie Hernandez MD,  Fabian Jennings DO, Flori Nye MD, Meena Suarez MD, Shy Vasquez MD,  Sukumar Echavarria MD, Abad Acosta MD, Darrius Anaya MD, Kory Baxter MD, Kelvin Pereyra MD, Emil Sparks MD, Jaspal Mccabe DO, Anju Mujica MD, Chicho Lombardi MD, Jesus Vasques MD, Mohsin Reza, MD, Nannette Cleaning MD, Shirley Waterhouse, CNP,  Lakesha Bermudez, CNP, Jaspal Fletcher, CNP,  Patrica Lakhani, DNP, Lo Mcallister, CNP, Lana Main, CNP, Hanna Aly, CNP, Angeli Hargrove, CNP, Holli Rose, PA-C, Luanne Gonzalez, CNP, Irving Marks, CNP,  Malu Monteiro, CNP, Joy Ham, CNP, Jefferson Nash, PA-C, Lauren Archibald, CNP,  Bertha Leroy, CNS, Mala Clarke, CNP, Vandana Hinojosa, CNP,   Octavia Hsu, CNP         [facility]   IN-PATIENT SERVICE   [facility]    Discharge Summary     Patient ID: Samuel Mix  :  1955   MRN: [id number]     ACCOUNT:  [id number]   Patient's PCP: Alex Pickard MD  Admit Date: 2025   Discharge Date: 2025     Length of Stay: 2  Code Status:  Full Code  Admitting Physician: Andrea Macias MD  Discharge Physician: Andrea Macias MD     Active Discharge Diagnoses:     Hospital Problem Lists:  Principal Problem:    Pulmonary edema, acute (HCC)  Active Problems:    Charcot's joint of right foot    Essential hypertension    Charcot foot due to diabetes mellitus (HCC)    Hyperlipidemia    Diabetic polyneuropathy associated with type 2 diabetes mellitus (HCC)    ESRD needing dialysis (HCC)  Resolved Problems:    * No resolved hospital problems. *    Acute tracheobronchitis         Admission

## 2025-05-25 NOTE — PROGRESS NOTES
IV and telemetry removed, wife in, discharge instructions given to patient using AVS. Prescription pick-up information re-iterated. Patient voiced understanding regarding prescriptions, resuming home medications, follow up appointments and care of self at home. Patient taken to the front entrance via wheelchair with personal belongings and discharged home via private vehicle

## 2025-05-25 NOTE — DISCHARGE INSTR - DIET

## (undated) DEVICE — DRESSING,GAUZE,XEROFORM,CURAD,5"X9",ST: Brand: CURAD

## (undated) DEVICE — SKIN PREP TRAY W/CHG: Brand: MEDLINE INDUSTRIES, INC.

## (undated) DEVICE — MHPB HAND AND FOOT PACK: Brand: MEDLINE INDUSTRIES, INC.

## (undated) DEVICE — SUTURE MCRYL SZ 2-0 L27IN ABSRB VLT SH L26MM 1/2 CIR TAPR Y317H

## (undated) DEVICE — GLOVE SURG SZ 8 L12IN FNGR THK79MIL GRN LTX FREE

## (undated) DEVICE — Device

## (undated) DEVICE — TUBE SURGICAL IRRIGATION

## (undated) DEVICE — YANKAUER,FLEXIBLE HANDLE,REGLR CAPACITY: Brand: MEDLINE INDUSTRIES, INC.

## (undated) DEVICE — RING CONNECTION BOLT LRF: Brand: HOFFMANN

## (undated) DEVICE — Device: Brand: SALVATION

## (undated) DEVICE — WIRE, DIAMOND POINT LRF: Brand: HOFFMANN

## (undated) DEVICE — GLOVE ORTHO 8   MSG9480

## (undated) DEVICE — TAPERED DRILL BIT: Brand: SALVATION

## (undated) DEVICE — SUTURE PROL SZ 3-0 L18IN NONABSORBABLE BLU L24MM FS-1 3/8 8684G

## (undated) DEVICE — SOLUTION IV IRRIG 500ML 0.9% SODIUM CHL 2F7123

## (undated) DEVICE — DRAPE,REIN 53X77,STERILE: Brand: MEDLINE

## (undated) DEVICE — PREMIUM DRY TRAY LF: Brand: MEDLINE INDUSTRIES, INC.

## (undated) DEVICE — SOLUTION PREP POVIDONE IOD FOR SKIN MUCOUS MEM PRIOR TO

## (undated) DEVICE — SUTURE PROL SZ 3-0 L18IN NONABSORBABLE BLU L19MM PS-2 3/8 8687H

## (undated) DEVICE — PAD,ABDOMINAL,5"X9",ST,LF,25/BX: Brand: MEDLINE INDUSTRIES, INC.

## (undated) DEVICE — SYRINGE, LUER LOCK, 10ML: Brand: MEDLINE

## (undated) DEVICE — HANDPIECE SET WITH COAXIAL HIGH FLOW TIP AND SUCTION TUBE: Brand: INTERPULSE

## (undated) DEVICE — SUTURE PROL SZ 2-0 L30IN NONABSORBABLE BLU L26MM SH 1/2 CIR 8833H

## (undated) DEVICE — DRESSING,GAUZE,XEROFORM,CURAD,1"X8",ST: Brand: CURAD

## (undated) DEVICE — GOWN,AURORA,NONRNF,XL,30/CS: Brand: MEDLINE

## (undated) DEVICE — SHEET, ORTHO, SPLIT, STERILE: Brand: MEDLINE

## (undated) DEVICE — C-ARMOR C-ARM EQUIPMENT COVERS CLEAR STERILE UNIVERSAL FIT 12 PER CASE: Brand: C-ARMOR

## (undated) DEVICE — APPLICATOR MEDICATED 26 CC SOLUTION HI LT ORNG CHLORAPREP

## (undated) DEVICE — WIRE BOLT - SHORT LRF FOR WIRE DIA 1.5 TO 2.0MM: Brand: HOFFMANN

## (undated) DEVICE — STRAP,POSITIONING,KNEE/BODY,FOAM,4X60": Brand: MEDLINE

## (undated) DEVICE — BANDAGE,ELASTIC,ESMARK,STERILE,4"X9',LF: Brand: MEDLINE

## (undated) DEVICE — ELECTRODE PT RET AD L9FT HI MOIST COND ADH HYDRGEL CORDED

## (undated) DEVICE — NEEDLE FLTR 18GA L1.5IN MEM THK5UM BLNT DISP

## (undated) DEVICE — CONTAINER,SPECIMEN,OR STERILE,4OZ: Brand: MEDLINE

## (undated) DEVICE — HIGH PERFORMANCE BONE DRILL

## (undated) DEVICE — SOLUTION IRRIG 1000ML STRL H2O USP PLAS POUR BTL

## (undated) DEVICE — GLOVE SURG SZ 8 L12IN FNGR THK87MIL WHT LTX FREE

## (undated) DEVICE — STRIP SKIN CLSR W0.25XL4IN WHT SPUNBOUND FBR NYL HI ADH

## (undated) DEVICE — SOLUTION IRRIG 1000ML 0.9% SOD CHL USP POUR PLAS BTL

## (undated) DEVICE — CHLORAPREP 26ML ORANGE

## (undated) DEVICE — MIS STERILE INSTRUMENT PACK W/ BLADE: Brand: PROSTEP™

## (undated) DEVICE — RING , THREADED ROD CONNECTING NUT LRF M6 X 6MM: Brand: HOFFMANN

## (undated) DEVICE — GAUZE,SPONGE,FLUFF,6"X6.75",STRL,5/TRAY: Brand: MEDLINE

## (undated) DEVICE — GLOVE SURG SZ 75 CRM LTX FREE POLYISOPRENE POLYMER BEAD ANTI

## (undated) DEVICE — GLOVE SURG SZ 8 L12IN THK75MIL DK GRN LTX FREE

## (undated) DEVICE — SELF-DRILLING HALF PIN: Brand: APEX

## (undated) DEVICE — SPONGE LAP SFT 18X18 IN X RAY DETECTABLE

## (undated) DEVICE — WASHER LRF 7MM (RED): Brand: HOFFMANN

## (undated) DEVICE — GAUZE,PACKING STRIP,PLAIN,1/4"X5YD,STRL: Brand: CURAD

## (undated) DEVICE — COVER,TABLE,HEAVY DUTY,50"X90",STRL: Brand: MEDLINE

## (undated) DEVICE — DRAIN SURG PENROSE 0.25X18 IN CLOSED WND DRAINAGE PREM SIL

## (undated) DEVICE — DISPOSABLE TOURNIQUET CUFF SINGLE BLADDER, SINGLE PORT AND QUICK CONNECT CONNECTOR: Brand: COLOR CUFF

## (undated) DEVICE — C-ARM: Brand: UNBRANDED

## (undated) DEVICE — DRAPE EQUIP C ARM 85X54 IN MINI CLR LF DISP

## (undated) DEVICE — GLOVE SURG SZ 8 CRM LTX FREE POLYISOPRENE POLYMER BEAD ANTI

## (undated) DEVICE — SPONGE LAP W18XL18IN WHT COT 4 PLY FLD STRUNG RADPQ DISP ST

## (undated) DEVICE — WIRE WITH OLIVE, DIAMOND POINT LRF: Brand: HOFFMANN

## (undated) DEVICE — BLANKET WRM W29.9XL79.1IN UP BODY FORC AIR MISTRAL-AIR

## (undated) DEVICE — SOLUTION SCRB 4OZ 10% POVIDONE IOD ANTIMIC BTL

## (undated) DEVICE — BNDG,ELSTC,MATRIX,STRL,4"X5YD,LF,HOOK&LP: Brand: MEDLINE

## (undated) DEVICE — BLADE OPHTH ORNG GRINDLESS SMALLER ALTERNATIVE TO NO15 GEN

## (undated) DEVICE — TUBING, SUCTION, 9/32" X 20', STRAIGHT: Brand: MEDLINE INDUSTRIES, INC.

## (undated) DEVICE — PADDING CAST W4INXL4YD SYN NAT PROTOUCH

## (undated) DEVICE — WEDGE BURR: Brand: MICA

## (undated) DEVICE — TOWEL,OR,DSP,ST,BLUE,DLX,XR,4/PK,20PK/CS: Brand: MEDLINE

## (undated) DEVICE — BNDG,ELSTC,MATRIX,STRL,6"X5YD,LF,HOOK&LP: Brand: MEDLINE

## (undated) DEVICE — 3M™ WARMING BLANKET, UPPER BODY, 10 PER CASE, 42268: Brand: BAIR HUGGER™

## (undated) DEVICE — HYPODERMIC SAFETY NEEDLE: Brand: MAGELLAN

## (undated) DEVICE — NEEDLE HYPO 25GA L1.5IN BLU POLYPR HUB S STL REG BVL STR

## (undated) DEVICE — SPONGE GZ 4X4 IN 16-PLY DETECTABLE W/ DMT MSTR TAG

## (undated) DEVICE — COVER,C-ARM,41X74: Brand: MEDLINE

## (undated) DEVICE — MEDIUM ROCKER SHOE - LONG LRF 140MM - 155MM: Brand: HOFFMANN

## (undated) DEVICE — INTENDED FOR TISSUE SEPARATION, AND OTHER PROCEDURES THAT REQUIRE A SHARP SURGICAL BLADE TO PUNCTURE OR CUT.: Brand: BARD-PARKER ® CARBON RIB-BACK BLADES

## (undated) DEVICE — SOLUTION IV IRRIG POUR BRL 0.9% SODIUM CHL 2F7124

## (undated) DEVICE — CUTTING BURR: Brand: MICA

## (undated) DEVICE — APEX PIN ADAPTOR - SHORT LRF FOR PIN DIA 3-4-5-6MM: Brand: HOFFMANN

## (undated) DEVICE — BANDAGE,GAUZE,BULKEE II,4.5"X4.1YD,STRL: Brand: MEDLINE

## (undated) DEVICE — GLOVE SURG SZ 6 CRM LTX FREE POLYISOPRENE POLYMER BEAD ANTI

## (undated) DEVICE — DRAPE,EXTREMITY,89X128,STERILE: Brand: MEDLINE

## (undated) DEVICE — GOWN,SIRUS,NONRNF,SETINSLV,XL,20/CS: Brand: MEDLINE

## (undated) DEVICE — Z DISCONTINUED BY MEDLINE USE 2271199 TRAY PREP WET 4% CHG SCRB SOL

## (undated) DEVICE — MARKER,SKIN,WI/RULER AND LABELS: Brand: MEDLINE

## (undated) DEVICE — TOWEL,OR,DSP,ST,NATURAL,DLX,4/PK,20PK/CS: Brand: MEDLINE

## (undated) DEVICE — SUTURE ETHLN SZ 3-0 L18IN NONABSORBABLE BLK L24MM FS-1 3/8 663G

## (undated) DEVICE — FOOT RING LONG LRF: Brand: HOFFMANN

## (undated) DEVICE — CONNECTING NUT - SHORT LRF M8 X 6MM: Brand: HOFFMANN

## (undated) DEVICE — MASTISOL ADHESIVE LIQ 2/3ML

## (undated) DEVICE — SUTURE PROL SZ 4-0 L30IN NONABSORBABLE BLU SH L26MM 1/2 CIR 8831H

## (undated) DEVICE — PADDING,UNDERCAST,COTTON, 4"X4YD STERILE: Brand: MEDLINE

## (undated) DEVICE — CUFF TRNQT 30 X 4 1BLA 1PRT QUICK CONNECT

## (undated) DEVICE — SWAB CULT CLR BLU PLAS RAYON LIQ AMIES AERB ANAERB FRIC CAP

## (undated) DEVICE — TUBE TO ROD COUPLING COMPACT  15MM,5MM: Brand: HOFFMANN

## (undated) DEVICE — FRAME EYE REUSE FOR SFTY GLS ASST CLR REUSE NONSHIELD

## (undated) DEVICE — GLOVE SURG SZ 65 CRM LTX FREE POLYISOPRENE POLYMER BEAD ANTI

## (undated) DEVICE — COMPRESSION , DISTRACTION TUBE COMPACT: Brand: HOFFMANN

## (undated) DEVICE — PADDING CAST W4INXL4YD SPUN DACRON POLY POR SYN VERSATILE

## (undated) DEVICE — FULL RING LRF: Brand: HOFFMANN

## (undated) DEVICE — 450 ML BOTTLE OF 0.05% CHLORHEXIDINE GLUCONATE IN 99.95% STERILE WATER FOR IRRIGATION, USP AND APPLICATOR.: Brand: IRRISEPT ANTIMICROBIAL WOUND LAVAGE

## (undated) DEVICE — OPEN RING LRF: Brand: HOFFMANN

## (undated) DEVICE — ZIMMER® STERILE DISPOSABLE TOURNIQUET CUFF WITH PLC, DUAL PORT, SINGLE BLADDER, 18 IN. (46 CM)

## (undated) DEVICE — COVER LIGHT HNDL HARMONY E-SERIES M-SERIES G-SERIES FREE5

## (undated) DEVICE — APEX PIN ADAPTOR - LONG LRF FOR PIN DIA 3-4-5-6MM: Brand: HOFFMANN

## (undated) DEVICE — STANDARD HYPODERMIC NEEDLE,POLYPROPYLENE HUB: Brand: MONOJECT

## (undated) DEVICE — SOLUTION IV IRRIG 1000ML POUR BTL 2F7114

## (undated) DEVICE — TOURNIQUET CUF BLD PRESSURE 4X18 IN 2 PRT SINGLE BLDR RED

## (undated) DEVICE — SST TWIST DRILL, AO TYPE, 2MM DIA. X 75MM: Brand: MICROAIRE®

## (undated) DEVICE — FOOT ARCH LRF: Brand: HOFFMANN

## (undated) DEVICE — GLOVE SURG SZ 75 L12IN FNGR THK87MIL WHT LTX FREE

## (undated) DEVICE — SHIELD EYE PLAS LENS DISP

## (undated) DEVICE — GLOVE ORANGE PI 8   MSG9080

## (undated) DEVICE — STATIC STRUT - LONG LRF: Brand: HOFFMANN

## (undated) DEVICE — PADDING UNDERCAST W4INXL4YD COT FBR LO LINTING WYTEX

## (undated) DEVICE — Z INACTIVE USE 2660664 SOLUTION IRRIG 3000ML 0.9% SOD CHL USP UROMATIC PLAS CONT

## (undated) DEVICE — SYRINGE MED 10ML LUERLOCK TIP W/O SFTY DISP

## (undated) DEVICE — DISPOSABLE END CAPS (BLUE) APEX   DIA 5MM: Brand: APEX